# Patient Record
Sex: MALE | Race: WHITE | NOT HISPANIC OR LATINO | Employment: UNEMPLOYED | URBAN - METROPOLITAN AREA
[De-identification: names, ages, dates, MRNs, and addresses within clinical notes are randomized per-mention and may not be internally consistent; named-entity substitution may affect disease eponyms.]

---

## 2017-10-08 ENCOUNTER — HOSPITAL ENCOUNTER (EMERGENCY)
Facility: CLINIC | Age: 47
Discharge: HOME | End: 2017-10-08
Attending: EMERGENCY MEDICINE | Admitting: EMERGENCY MEDICINE
Payer: COMMERCIAL

## 2017-10-08 VITALS
OXYGEN SATURATION: 99 % | WEIGHT: 137 LBS | DIASTOLIC BLOOD PRESSURE: 106 MMHG | SYSTOLIC BLOOD PRESSURE: 165 MMHG | RESPIRATION RATE: 16 BRPM | TEMPERATURE: 98.4 F

## 2017-10-08 DIAGNOSIS — R73.9 HYPERGLYCEMIA: ICD-10-CM

## 2017-10-08 LAB
ALBUMIN SERPL-MCNC: 3.4 G/DL (ref 3.4–5)
ALP SERPL-CCNC: 124 U/L (ref 40–150)
ALT SERPL W P-5'-P-CCNC: 52 U/L (ref 0–70)
ANION GAP SERPL CALCULATED.3IONS-SCNC: 8 MMOL/L (ref 3–14)
AST SERPL W P-5'-P-CCNC: 73 U/L (ref 0–45)
BASOPHILS # BLD AUTO: 0 10E9/L (ref 0–0.2)
BASOPHILS NFR BLD AUTO: 0.3 %
BILIRUB SERPL-MCNC: 0.2 MG/DL (ref 0.2–1.3)
BUN SERPL-MCNC: 9 MG/DL (ref 7–30)
CALCIUM SERPL-MCNC: 9.5 MG/DL (ref 8.5–10.1)
CHLORIDE SERPL-SCNC: 95 MMOL/L (ref 94–109)
CO2 SERPL-SCNC: 28 MMOL/L (ref 20–32)
CREAT SERPL-MCNC: 0.68 MG/DL (ref 0.66–1.25)
DIFFERENTIAL METHOD BLD: ABNORMAL
EOSINOPHIL # BLD AUTO: 0.1 10E9/L (ref 0–0.7)
EOSINOPHIL NFR BLD AUTO: 2 %
ERYTHROCYTE [DISTWIDTH] IN BLOOD BY AUTOMATED COUNT: 13.4 % (ref 10–15)
GFR SERPL CREATININE-BSD FRML MDRD: >90 ML/MIN/1.7M2
GLUCOSE BLDC GLUCOMTR-MCNC: 316 MG/DL (ref 70–99)
GLUCOSE BLDC GLUCOMTR-MCNC: 329 MG/DL (ref 70–99)
GLUCOSE BLDC GLUCOMTR-MCNC: 405 MG/DL (ref 70–99)
GLUCOSE SERPL-MCNC: 403 MG/DL (ref 70–99)
HCT VFR BLD AUTO: 34.2 % (ref 40–53)
HGB BLD-MCNC: 12.1 G/DL (ref 13.3–17.7)
IMM GRANULOCYTES # BLD: 0 10E9/L (ref 0–0.4)
IMM GRANULOCYTES NFR BLD: 0 %
LIPASE SERPL-CCNC: 278 U/L (ref 73–393)
LYMPHOCYTES # BLD AUTO: 0.9 10E9/L (ref 0.8–5.3)
LYMPHOCYTES NFR BLD AUTO: 23.9 %
MCH RBC QN AUTO: 29.7 PG (ref 26.5–33)
MCHC RBC AUTO-ENTMCNC: 35.4 G/DL (ref 31.5–36.5)
MCV RBC AUTO: 84 FL (ref 78–100)
MONOCYTES # BLD AUTO: 0.5 10E9/L (ref 0–1.3)
MONOCYTES NFR BLD AUTO: 13.5 %
NEUTROPHILS # BLD AUTO: 2.4 10E9/L (ref 1.6–8.3)
NEUTROPHILS NFR BLD AUTO: 60.3 %
NRBC # BLD AUTO: 0 10*3/UL
NRBC BLD AUTO-RTO: 0 /100
PLATELET # BLD AUTO: 238 10E9/L (ref 150–450)
POTASSIUM SERPL-SCNC: 3.8 MMOL/L (ref 3.4–5.3)
PROT SERPL-MCNC: 7.7 G/DL (ref 6.8–8.8)
RBC # BLD AUTO: 4.07 10E12/L (ref 4.4–5.9)
SODIUM SERPL-SCNC: 131 MMOL/L (ref 133–144)
WBC # BLD AUTO: 3.9 10E9/L (ref 4–11)

## 2017-10-08 PROCEDURE — 96374 THER/PROPH/DIAG INJ IV PUSH: CPT

## 2017-10-08 PROCEDURE — 99284 EMERGENCY DEPT VISIT MOD MDM: CPT | Mod: 25

## 2017-10-08 PROCEDURE — 85025 COMPLETE CBC W/AUTO DIFF WBC: CPT | Performed by: EMERGENCY MEDICINE

## 2017-10-08 PROCEDURE — 00000146 ZZHCL STATISTIC GLUCOSE BY METER IP

## 2017-10-08 PROCEDURE — 25000131 ZZH RX MED GY IP 250 OP 636 PS 637: Performed by: EMERGENCY MEDICINE

## 2017-10-08 PROCEDURE — 83690 ASSAY OF LIPASE: CPT | Performed by: EMERGENCY MEDICINE

## 2017-10-08 PROCEDURE — 96361 HYDRATE IV INFUSION ADD-ON: CPT

## 2017-10-08 PROCEDURE — 25000128 H RX IP 250 OP 636: Performed by: EMERGENCY MEDICINE

## 2017-10-08 PROCEDURE — 80053 COMPREHEN METABOLIC PANEL: CPT | Performed by: EMERGENCY MEDICINE

## 2017-10-08 RX ORDER — ASPIRIN 81 MG/1
81 TABLET, CHEWABLE ORAL DAILY
COMMUNITY
End: 2022-03-08

## 2017-10-08 RX ADMIN — SODIUM CHLORIDE 4 UNITS: 9 INJECTION, SOLUTION INTRAVENOUS at 21:53

## 2017-10-08 RX ADMIN — SODIUM CHLORIDE 1000 ML: 9 INJECTION, SOLUTION INTRAVENOUS at 21:55

## 2017-10-08 NOTE — ED AVS SNAPSHOT
Emergency Department    [address] [facility] [address]    Phone:  [phone]    Fax:  [phone]                                       Long [place]   MRN: [id number]    Department:   Emergency Department   Date of Visit:  10/8/2017           After Visit Summary Signature Page     I have received my discharge instructions, and my questions have been answered. I have discussed any challenges I see with this plan with the nurse or doctor.    ..........................................................................................................................................  Patient/Patient Representative Signature      ..........................................................................................................................................  Patient Representative Print Name and Relationship to Patient    ..................................................               ................................................  Date                                            Time    ..........................................................................................................................................  Reviewed by Signature/Title    ...................................................              ..............................................  Date                                                            Time

## 2017-10-08 NOTE — ED AVS SNAPSHOT
Emergency Department    [address] [facility] [address]    Phone:  [phone]    Fax:  [phone]                                       Long Mayfield   MRN: [id number]    Department:   Emergency Department   Date of Visit:  10/8/2017           Patient Information     Date Of Birth          1970        Your diagnoses for this visit were:     Hyperglycemia        You were seen by Cecy Wright MD.      Follow-up Information     Follow up with Jackelyn Jimenez MD.    Specialty:  Family Practice    Contact information:    [facility] [address]  [address]  [facility] [id number] [phone]          Discharge Instructions         How to Check Your Blood Sugar    Keeping track of how much sugar (glucose) is in your blood is an important part of self-care when you have diabetes. This is also called self-monitoring of blood glucose (SMBG). To make sure your glucose and insulin are in balance, check your blood sugar as instructed by your healthcare provider. You may need to check your blood glucose levels at certain times every day. Or you may need to check them only a few times a week.  What you need  To check your blood sugar, make sure you have the following:     A small pricking needle (lancing device)    Test strips    A glucose meter    A notebook, chart, or log book and pen or pencil   Using a blood glucose meter  You can check your blood sugar at home, at work, and anywhere else. Your diabetes team will help you choose a blood glucose meter. A meter measures the amount of glucose in a tiny drop of blood. You ll use a device called a lancet to draw a drop of blood. Put the strip in the meter first. Then touch the test strip to the drop of blood. The meter then gives you a number (reading) that tells you the level of your blood sugar.  Aim for your target range  Your blood sugar should be in your target range--not too high and not too low. A target range is where your blood sugar level  is healthiest. Staying in this range as much as possible will help lower your risk for health problems (complications). Your diabetes team will help you figure out the best target range for you. That range depends on many things. They include your age, other health problems, how well your diabetes is controlled, and how long you have had diabetes. In general, target ranges are:    Control of blood glucose (hemoglobin A1c or A1c): generally, 7.0% or less. You will usually have this test at the lab.    Before a meal (preprandial glucose): Between 80 and 130 mg/dL.    One to 2 hours after a meal (postprandial glucose): Less than 180 mg/dL.  Track your readings  Use a notebook, chart, or log book to keep track of your readings. Write down the date, time, and your blood sugar level numbers. This helps you see patterns, such as high blood sugar after eating certain foods. Take your log along when you see your healthcare provider. Your blood glucose levels will help your provider decide if he or she needs to make any changes to your management plan. To check your blood sugar, follow the steps below.  Step 1. Get ready    Wash your hands with soap and warm (not hot) water.    Follow all of the instructions that came with your glucometer. Be sure that your test strips were designed to be used with your meter and are not .   Step 2. Draw a drop of blood    Prick the side of your finger with the lancet. Squeeze gently until you get a drop of blood. Squeezing too hard can cause an inaccurate reading.    Put the lancet in a special sharps container. Ask your healthcare team where you can buy one or what you can use to throw away any sharps.    If you are unable to get enough blood, hold your hand at your side and gently shake it.  Step 3. Place the drop on a strip    Wait for the meter to show a message or symbol that it is time to test.    Touch the test strip to the drop of blood.    Be sure to follow the instructions  included with meter.  Step 4. Read and record your results    Wait for your meter to show the result.    If you see an error message, recheck using a fresh strip and a fresh drop of blood. Also recheck if the glucose numbers aren't what you expect--too low without symptoms, or too high for no reason.    Write the results in your log book.  Date Last Reviewed: 6/1/2016 2000-2017 [employer]. [address] [place] [address], [place] [address]. All rights reserved. This information is not intended as a substitute for professional medical care. Always follow your healthcare professional's instructions.          High Blood Sugar (Hyperglycemia)     When you have hyperglycemia, drink plenty of water or other sugar-free, caffeine-free liquids.   Too much glucose (sugar) in your blood is called hyperglycemia or high blood sugar. High blood sugar can lead to a dangerous condition called ketoacidosis. In severe cases, it can lead to dehydration and coma.  Possible causes of hyperglycemia    Inadequate treatment plan for diabetes     Being sick    Being under stress    Taking certain medicines, such as steroids    Eating too much food, especially carbohydrates    Being less active than usual    Not taking enough diabetes medicine  Symptoms of hyperglycemia  Hyperglycemia may not cause symptoms. If you do have symptoms, they may include:    Thirst    Frequent need to urinate    Feeling tired    Nausea and vomiting    Itchy, dry skin    Blurry vision    Fast breathing and breath that smells fruity     Weakness    Dizziness    Wounds or skin infections that don t heal    Unexplained weight loss if hyperglycemia lasts for more than a few days   What you should do  Make sure you do the following:    Check your blood sugar.    Drink plenty of sugar-free, caffeine-free liquids such as water. Don t drink fruit juice.    Check your blood sugar again every 4 hours. If you take insulin or diabetes medicines, follow your sick-day  plan for taking medicine. Call your healthcare provider if you are not able to eat.    Check your blood or urine for ketones as directed.    Call your healthcare provider if your blood sugar and ketones do not return to your target range.  Preventing high blood sugar  To help keep your blood sugar from getting too high:    Control stress.    When you're ill, follow your sick-day plan.     Follow your meal plan. Eat only the amount of food on your meal plan    Follow your exercise plan.    Take your insulin or diabetes medicines as directed by your healthcare team. Also test your blood sugar as directed. If the plan is not working for you, discuss it with your healthcare provider.  Other things to do    Carry a medical ID card, a compact USB drive, or wear a medical alert bracelet or necklace. It should say that you have diabetes. It should also say what to do in case you pass out or go into a coma.    Make sure family, friends, and coworkers know the signs of high blood sugar. Tell them what to do if your blood sugar gets very high and you can t help yourself.    Talk to your healthcare team about other things you can do to prevent high blood sugar.   Special note: Drink plenty of sugar-free and caffeine-free liquids when you feel symptoms of hyperglycemia. Call your healthcare provider if you keep having episodes of hyperglycemia.   Date Last Reviewed: 5/1/2016 2000-2017 [employer]. [address] [place] [address], [place] [id number]. All rights reserved. This information is not intended as a substitute for professional medical care. Always follow your healthcare professional's instructions.          24 Hour Appointment Hotline       To make an appointment at any [facility], call [phone] ([phone]). If you don't have a family doctor or clinic, we will help you find one. [facility] are conveniently located to serve the needs of you and your family.             Review of your medicines       Our records show that you are taking the medicines listed below. If these are incorrect, please call your family doctor or clinic.        Dose / Directions Last dose taken    aspirin 81 MG chewable tablet   Dose:  81 mg        Take 81 mg by mouth daily   Refills:  0        insulin  UNIT/ML injection   Commonly known as:  HumuLIN N/NovoLIN N        Inject Subcutaneous 2 times daily (before meals)   Refills:  0        LEVEMIR FLEXPEN/FLEXTOUCH 100 UNIT/ML injection   Generic drug:  insulin detemir        Inject Subcutaneous At Bedtime   Refills:  0                Procedures and tests performed during your visit     Procedure/Test Number of Times Performed    CBC with platelets differential 1    Comprehensive metabolic panel 1    Glucose by meter 3    Glucose monitor nursing POCT 1    Lipase 1      Orders Needing Specimen Collection     Ordered          10/08/17 2057  UA reflex to Microscopic and Culture - STAT, [facility]: STAT, Needs to be Collected     Scheduled Task Status   10/08/17 2058 Collect UA reflex to Microscopic and Culture Open   Order Class:  PCU Collect                  Pending Results     No orders found from 10/6/2017 to 10/9/2017.            Pending Culture Results     No orders found from 10/6/2017 to 10/9/2017.            Pending Results Instructions     If you had any lab results that were not finalized at the time of your Discharge, you can call the ED Lab Result RN at [phone]. You will be contacted by this team for any positive Lab results or changes in treatment. The nurses are available 7 days a week from 10A to 6:30P.  You can leave a message 24 hours per day and they will return your call.        Test Results From Your Hospital Stay        10/8/2017  8:56 PM      Component Results     Component Value Ref Range & Units Status    Glucose 405 (H) 70 - 99 mg/dL Final         10/8/2017  9:25 PM      Component Results     Component Value Ref Range & Units Status    WBC 3.9 (L) 4.0 -  11.0 10e9/L Final    RBC Count 4.07 (L) 4.4 - 5.9 10e12/L Final    Hemoglobin 12.1 (L) 13.3 - 17.7 g/dL Final    Hematocrit 34.2 (L) 40.0 - 53.0 % Final    MCV 84 78 - 100 fl Final    MCH 29.7 26.5 - 33.0 pg Final    MCHC 35.4 31.5 - 36.5 g/dL Final    RDW 13.4 10.0 - 15.0 % Final    Platelet Count 238 150 - 450 10e9/L Final    Diff Method Automated Method  Final    % Neutrophils 60.3 % Final    % Lymphocytes 23.9 % Final    % Monocytes 13.5 % Final    % Eosinophils 2.0 % Final    % Basophils 0.3 % Final    % Immature Granulocytes 0.0 % Final    Nucleated RBCs 0 0 /100 Final    Absolute Neutrophil 2.4 1.6 - 8.3 10e9/L Final    Absolute Lymphocytes 0.9 0.8 - 5.3 10e9/L Final    Absolute Monocytes 0.5 0.0 - 1.3 10e9/L Final    Absolute Eosinophils 0.1 0.0 - 0.7 10e9/L Final    Absolute Basophils 0.0 0.0 - 0.2 10e9/L Final    Abs Immature Granulocytes 0.0 0 - 0.4 10e9/L Final    Absolute Nucleated RBC 0.0  Final         10/8/2017  9:42 PM      Component Results     Component Value Ref Range & Units Status    Sodium 131 (L) 133 - 144 mmol/L Final    Potassium 3.8 3.4 - 5.3 mmol/L Final    Chloride 95 94 - 109 mmol/L Final    Carbon Dioxide 28 20 - 32 mmol/L Final    Anion Gap 8 3 - 14 mmol/L Final    Glucose 403 (H) 70 - 99 mg/dL Final    Urea Nitrogen 9 7 - 30 mg/dL Final    Creatinine 0.68 0.66 - 1.25 mg/dL Final    GFR Estimate >90 >60 mL/min/1.7m2 Final    Non  GFR Calc    GFR Estimate If Black >90 >60 mL/min/1.7m2 Final    African American GFR Calc    Calcium 9.5 8.5 - 10.1 mg/dL Final    Bilirubin Total 0.2 0.2 - 1.3 mg/dL Final    Albumin 3.4 3.4 - 5.0 g/dL Final    Protein Total 7.7 6.8 - 8.8 g/dL Final    Alkaline Phosphatase 124 40 - 150 U/L Final    ALT 52 0 - 70 U/L Final    AST 73 (H) 0 - 45 U/L Final         10/8/2017  9:40 PM      Component Results     Component Value Ref Range & Units Status    Lipase 278 73 - 393 U/L Final         10/8/2017 10:27 PM      Component Results     Component  Value Ref Range & Units Status    Glucose 329 (H) 70 - 99 mg/dL Final         10/8/2017 10:51 PM      Component Results     Component Value Ref Range & Units Status    Glucose 316 (H) 70 - 99 mg/dL Final                Clinical Quality Measure: Blood Pressure Screening     Your blood pressure was checked while you were in the emergency department today. The last reading we obtained was  BP: (!) 165/106 . Please read the guidelines below about what these numbers mean and what you should do about them.  If your systolic blood pressure (the top number) is less than 120 and your diastolic blood pressure (the bottom number) is less than 80, then your blood pressure is normal. There is nothing more that you need to do about it.  If your systolic blood pressure (the top number) is 120-139 or your diastolic blood pressure (the bottom number) is 80-89, your blood pressure may be higher than it should be. You should have your blood pressure rechecked within a year by a primary care provider.  If your systolic blood pressure (the top number) is 140 or greater or your diastolic blood pressure (the bottom number) is 90 or greater, you may have high blood pressure. High blood pressure is treatable, but if left untreated over time it can put you at risk for heart attack, stroke, or kidney failure. You should have your blood pressure rechecked by a primary care provider within the next 4 weeks.  If your provider in the emergency department today gave you specific instructions to follow-up with your doctor or provider even sooner than that, you should follow that instruction and not wait for up to 4 weeks for your follow-up visit.        Thank you for choosing [facility]       Thank you for choosing [facility] for your care. Our goal is always to provide you with excellent care. Hearing back from our patients is one way we can continue to improve our services. Please take a few minutes to complete the written survey that you may receive  "in the mail after you visit with us. Thank you!        [employer] Information     [employer] lets you send messages to your doctor, view your test results, renew your prescriptions, schedule appointments and more. To sign up, go to [email or web address] . Click on \"Log in\" on the left side of the screen, which will take you to the Welcome page. Then click on \"Sign up Now\" on the right side of the page.     You will be asked to enter the access code listed below, as well as some personal information. Please follow the directions to create your username and password.     Your access code is: [email or web address]  Expires: 2018 10:49 PM     Your access code will  in 90 days. If you need help or a new code, please call your [place] clinic or [phone].        Care EveryWhere ID     This is your Care EveryWhere ID. This could be used by other organizations to access your [place] medical records  [id number]        Equal Access to Services     [facility] CenterESTELITA : Hadii donna calderón hadasho Sosandyali, waaxda luqadaha, qaybta kaalmada adeegyada, meaghan hui . So [facility] [phone].    ATENCIÓN: Si habla español, tiene a kaufman disposición servicios gratuitos de asistencia lingüística. Llame al [phone].    We comply with applicable federal civil rights laws and [place] laws. We do not discriminate on the basis of race, color, national origin, age, disability, sex, sexual orientation, or gender identity.            After Visit Summary       This is your record. Keep this with you and show to your community pharmacist(s) and doctor(s) at your next visit.                  "

## 2017-10-09 NOTE — DISCHARGE INSTRUCTIONS
How to Check Your Blood Sugar    Keeping track of how much sugar (glucose) is in your blood is an important part of self-care when you have diabetes. This is also called self-monitoring of blood glucose (SMBG). To make sure your glucose and insulin are in balance, check your blood sugar as instructed by your healthcare provider. You may need to check your blood glucose levels at certain times every day. Or you may need to check them only a few times a week.  What you need  To check your blood sugar, make sure you have the following:     A small pricking needle (lancing device)    Test strips    A glucose meter    A notebook, chart, or log book and pen or pencil   Using a blood glucose meter  You can check your blood sugar at home, at work, and anywhere else. Your diabetes team will help you choose a blood glucose meter. A meter measures the amount of glucose in a tiny drop of blood. You ll use a device called a lancet to draw a drop of blood. Put the strip in the meter first. Then touch the test strip to the drop of blood. The meter then gives you a number (reading) that tells you the level of your blood sugar.  Aim for your target range  Your blood sugar should be in your target range--not too high and not too low. A target range is where your blood sugar level is healthiest. Staying in this range as much as possible will help lower your risk for health problems (complications). Your diabetes team will help you figure out the best target range for you. That range depends on many things. They include your age, other health problems, how well your diabetes is controlled, and how long you have had diabetes. In general, target ranges are:    Control of blood glucose (hemoglobin A1c or A1c): generally, 7.0% or less. You will usually have this test at the lab.    Before a meal (preprandial glucose): Between 80 and 130 mg/dL.    One to 2 hours after a meal (postprandial glucose): Less than 180 mg/dL.  Track your  readings  Use a notebook, chart, or log book to keep track of your readings. Write down the date, time, and your blood sugar level numbers. This helps you see patterns, such as high blood sugar after eating certain foods. Take your log along when you see your healthcare provider. Your blood glucose levels will help your provider decide if he or she needs to make any changes to your management plan. To check your blood sugar, follow the steps below.  Step 1. Get ready    Wash your hands with soap and warm (not hot) water.    Follow all of the instructions that came with your glucometer. Be sure that your test strips were designed to be used with your meter and are not .   Step 2. Draw a drop of blood    Prick the side of your finger with the lancet. Squeeze gently until you get a drop of blood. Squeezing too hard can cause an inaccurate reading.    Put the lancet in a special sharps container. Ask your healthcare team where you can buy one or what you can use to throw away any sharps.    If you are unable to get enough blood, hold your hand at your side and gently shake it.  Step 3. Place the drop on a strip    Wait for the meter to show a message or symbol that it is time to test.    Touch the test strip to the drop of blood.    Be sure to follow the instructions included with meter.  Step 4. Read and record your results    Wait for your meter to show the result.    If you see an error message, recheck using a fresh strip and a fresh drop of blood. Also recheck if the glucose numbers aren't what you expect--too low without symptoms, or too high for no reason.    Write the results in your log book.  Date Last Reviewed: 2016-2017 [employer]. [address] [place] [address], [place] [address]. All rights reserved. This information is not intended as a substitute for professional medical care. Always follow your healthcare professional's instructions.          High Blood Sugar (Hyperglycemia)      When you have hyperglycemia, drink plenty of water or other sugar-free, caffeine-free liquids.   Too much glucose (sugar) in your blood is called hyperglycemia or high blood sugar. High blood sugar can lead to a dangerous condition called ketoacidosis. In severe cases, it can lead to dehydration and coma.  Possible causes of hyperglycemia    Inadequate treatment plan for diabetes     Being sick    Being under stress    Taking certain medicines, such as steroids    Eating too much food, especially carbohydrates    Being less active than usual    Not taking enough diabetes medicine  Symptoms of hyperglycemia  Hyperglycemia may not cause symptoms. If you do have symptoms, they may include:    Thirst    Frequent need to urinate    Feeling tired    Nausea and vomiting    Itchy, dry skin    Blurry vision    Fast breathing and breath that smells fruity     Weakness    Dizziness    Wounds or skin infections that don t heal    Unexplained weight loss if hyperglycemia lasts for more than a few days   What you should do  Make sure you do the following:    Check your blood sugar.    Drink plenty of sugar-free, caffeine-free liquids such as water. Don t drink fruit juice.    Check your blood sugar again every 4 hours. If you take insulin or diabetes medicines, follow your sick-day plan for taking medicine. Call your healthcare provider if you are not able to eat.    Check your blood or urine for ketones as directed.    Call your healthcare provider if your blood sugar and ketones do not return to your target range.  Preventing high blood sugar  To help keep your blood sugar from getting too high:    Control stress.    When you're ill, follow your sick-day plan.     Follow your meal plan. Eat only the amount of food on your meal plan    Follow your exercise plan.    Take your insulin or diabetes medicines as directed by your healthcare team. Also test your blood sugar as directed. If the plan is not working for you, discuss it  with your healthcare provider.  Other things to do    Carry a medical ID card, a compact USB drive, or wear a medical alert bracelet or necklace. It should say that you have diabetes. It should also say what to do in case you pass out or go into a coma.    Make sure family, friends, and coworkers know the signs of high blood sugar. Tell them what to do if your blood sugar gets very high and you can t help yourself.    Talk to your healthcare team about other things you can do to prevent high blood sugar.   Special note: Drink plenty of sugar-free and caffeine-free liquids when you feel symptoms of hyperglycemia. Call your healthcare provider if you keep having episodes of hyperglycemia.   Date Last Reviewed: 5/1/2016 2000-2017 [employer]. [address] [place] [address], [place] [address]. All rights reserved. This information is not intended as a substitute for professional medical care. Always follow your healthcare professional's instructions.

## 2017-10-09 NOTE — ED PROVIDER NOTES
"  History     Chief Complaint:  Hyperglycemia     HPI   Long Mayfield is a 47 year old male with a history of Bell's Palsy who presents with hyperglycemia. The patient was just diagnosed with diabetes and was discharged from the hospital. The patient states that his blood glucose measurement was 300 at lunch time so he took insulin. The patient was prescribed Prednisone for his Bell's Palsy which \"jump-started\" his diabetes, and why he went to the hospital. He stopped the Prednisone immediately. After his discharge from the hospital, the patient began taking short-term insulin with his meals and before bed for his diabetes today. He took insulin and ate lunch today at [facility], drove to [place] and was going to eat dinner and read his blood sugar and it was at 529. When he realized his sugar was at 529, he did not take any insulin, instead he presented to the emergency department immediately. The patient does not have a PCP in [place], which is why he presents to the emergency department. He denies any symptoms other than the high blood pressure. The patient is visiting [place] from [place], and recently moved from [place] two months ago. Moreover, he is moving to [place] in two weeks.    Allergies:  No Known Drug Allergies     Medications:    Insulin determir  Aspirin   Insulin NPH    Past Medical History:    Bell's palsy  Diabetes     Past Surgical History:    Orthopedic surgery    Family History:    Adopted    Social History:  The patient was alone.  Smoking Status: Never  Smokeless Tobacco: Never  Alcohol Use: No    Review of Systems   Constitutional:        Hyperglycemia   All other systems reviewed and are negative.      Physical Exam   First Vitals:  BP: (!) 152/101  Heart Rate: 76  Temp: 98.4  F (36.9  C)  Resp: 16  Weight: 62.1 kg (137 lb)  SpO2: 99 %    Physical Exam  General: Patient is alert and normal appearing.  HEENT: Head atraumatic    Eyes: pupils equal and reactive. Conjunctiva " clear   Nares: patent   Oropharynx: no lesions, uvula midline, no palatal draping, normal voice, no trismus  Neck: Supple without lymphadenopathy, no meningismus  Chest: Heart regular rate and rhythm.   Lungs: Equal clear to auscultation with no wheeze or rales  Abdomen: Soft, non tender, nondistended, normal bowel sounds  Back: No costovertebral angle tenderness, no midline C, T or L spine tenderness  Neuro: left facial droop involving forehead, normal speech, strength equal bilaterally,   Extremities: No deformities, equal radial and DP pulses. No clubbing, cyanosis.  No edema  Skin: Warm and dry with no rash.     Emergency Department Course     Laboratory:  Laboratory findings were communicated with the patient who voiced understanding of the findings.  Glucose by meter (2241): 316 (H)   Glucose by meter (2221): 329 (H)   Glucose by meter (2049: 405 (H)   CBC:  WBC 3.9 (L), HGB 12.1 (L), o/w WNL. ()   CMP:  (L), Glucose 403 (H), AST 73 (H) o/w WNL (Creatinine 0.68)  Lipase: 278    Interventions:  2153 - Insulin 4mg IV  2155 - NS Bolus 1,000mL IV      Emergency Department Course:  Nursing notes and vitals reviewed.  2125: I performed an exam of the patient as documented above.   2242: Patient rechecked and updated.   Findings and plan explained to the Patient. Patient discharged home with instructions regarding supportive care, medications, and reasons to return. The importance of close follow-up was reviewed.   I personally reviewed the laboratory results with the Patient and answered all related questions prior to discharge.    Impression & Plan      Medical Decision Making:  Long Mayfield is a 47 year old male who presents for evaluation of elevated blood sugar.  By blood work there is no signs of DKA, no clinical features to suggest hyperosmolar issues. Based on history of medication and compliance, I would continue new recent insulin sliding scale started at recent hospitalization and not start  new meds at this time. Patient with dietary noncompliance ([employer] for lunch and sedentary for 4 hour drive afterward this evening) likely contributing factors to hyperglycemia.    Discussed in detail with patient and they agree.  Patient new to the area.  PMD information provided.  Patient understands may need sliding scale adjustment as he is newly on these meds but given dietary noncompliance would continue to keep blood sugar log for a few more days before deciding.  Patient asymptomatic tonight.  Given IV insulin and fluids in ED with improvement in blood sugar.  Will eat at home and then take night time lantus.     Diagnosis:    ICD-10-CM    1. Hyperglycemia R73.9        Disposition:  Discharged to home.    Scribe Disclosure:  IDulce, am serving as a scribe at 9:25 PM on 10/8/2017 to document services personally performed by Cecy Wright MD based on my observations and the provider's statements to me.   10/8/2017    EMERGENCY DEPARTMENT       Cecy Wright MD  10/09/17 0033

## 2022-03-08 ENCOUNTER — APPOINTMENT (OUTPATIENT)
Dept: ULTRASOUND IMAGING | Facility: CLINIC | Age: 52
End: 2022-03-08
Attending: STUDENT IN AN ORGANIZED HEALTH CARE EDUCATION/TRAINING PROGRAM
Payer: MEDICAID

## 2022-03-08 ENCOUNTER — HOSPITAL ENCOUNTER (INPATIENT)
Facility: CLINIC | Age: 52
LOS: 4 days | Discharge: HOME | End: 2022-03-12
Attending: EMERGENCY MEDICINE | Admitting: STUDENT IN AN ORGANIZED HEALTH CARE EDUCATION/TRAINING PROGRAM
Payer: MEDICAID

## 2022-03-08 DIAGNOSIS — I10 BENIGN ESSENTIAL HYPERTENSION: ICD-10-CM

## 2022-03-08 DIAGNOSIS — Z79.4 INSULIN DEPENDENT TYPE 2 DIABETES MELLITUS (H): Primary | ICD-10-CM

## 2022-03-08 DIAGNOSIS — E11.9 INSULIN DEPENDENT TYPE 2 DIABETES MELLITUS (H): Primary | ICD-10-CM

## 2022-03-08 DIAGNOSIS — R19.7 VOMITING AND DIARRHEA: ICD-10-CM

## 2022-03-08 DIAGNOSIS — R73.9 HYPERGLYCEMIA: ICD-10-CM

## 2022-03-08 DIAGNOSIS — A04.72 C. DIFFICILE COLITIS: ICD-10-CM

## 2022-03-08 DIAGNOSIS — R11.10 VOMITING AND DIARRHEA: ICD-10-CM

## 2022-03-08 DIAGNOSIS — E46 MALNUTRITION, UNSPECIFIED TYPE (H): ICD-10-CM

## 2022-03-08 DIAGNOSIS — E88.89 KETOSIS (H): ICD-10-CM

## 2022-03-08 LAB
ALBUMIN SERPL-MCNC: 3.4 G/DL (ref 3.4–5)
ALBUMIN UR-MCNC: 20 MG/DL
ALP SERPL-CCNC: 175 U/L (ref 40–150)
ALT SERPL W P-5'-P-CCNC: 28 U/L (ref 0–70)
ANION GAP SERPL CALCULATED.3IONS-SCNC: 16 MMOL/L (ref 3–14)
APPEARANCE UR: CLEAR
AST SERPL W P-5'-P-CCNC: 40 U/L (ref 0–45)
BASE EXCESS BLDV CALC-SCNC: -2.9 MMOL/L (ref -7.7–1.9)
BASOPHILS # BLD AUTO: 0 10E3/UL (ref 0–0.2)
BASOPHILS NFR BLD AUTO: 0 %
BILIRUB DIRECT SERPL-MCNC: 0.3 MG/DL (ref 0–0.2)
BILIRUB SERPL-MCNC: 1.1 MG/DL (ref 0.2–1.3)
BILIRUB UR QL STRIP: ABNORMAL
BUN SERPL-MCNC: 15 MG/DL (ref 7–30)
CALCIUM SERPL-MCNC: 9.6 MG/DL (ref 8.5–10.1)
CHLORIDE BLD-SCNC: 87 MMOL/L (ref 94–109)
CO2 SERPL-SCNC: 21 MMOL/L (ref 20–32)
COLOR UR AUTO: YELLOW
CREAT SERPL-MCNC: 0.91 MG/DL (ref 0.66–1.25)
EOSINOPHIL # BLD AUTO: 0.3 10E3/UL (ref 0–0.7)
EOSINOPHIL NFR BLD AUTO: 5 %
ERYTHROCYTE [DISTWIDTH] IN BLOOD BY AUTOMATED COUNT: 13.6 % (ref 10–15)
ETHANOL SERPL-MCNC: <0.01 G/DL
GFR SERPL CREATININE-BSD FRML MDRD: >90 ML/MIN/1.73M2
GLUCOSE BLD-MCNC: 384 MG/DL (ref 70–99)
GLUCOSE BLDC GLUCOMTR-MCNC: 241 MG/DL (ref 70–99)
GLUCOSE UR STRIP-MCNC: >=1000 MG/DL
HBA1C MFR BLD: >14 % (ref 0–5.6)
HCO3 BLDV-SCNC: 22 MMOL/L (ref 21–28)
HCT VFR BLD AUTO: 35.2 % (ref 40–53)
HGB BLD-MCNC: 11.9 G/DL (ref 13.3–17.7)
HGB UR QL STRIP: NEGATIVE
HOLD SPECIMEN: NORMAL
IMM GRANULOCYTES # BLD: 0 10E3/UL
IMM GRANULOCYTES NFR BLD: 0 %
KETONES BLD-SCNC: 5.9 MMOL/L (ref 0–0.6)
KETONES UR STRIP-MCNC: 80 MG/DL
LEUKOCYTE ESTERASE UR QL STRIP: NEGATIVE
LYMPHOCYTES # BLD AUTO: 0.6 10E3/UL (ref 0.8–5.3)
LYMPHOCYTES NFR BLD AUTO: 8 %
MAGNESIUM SERPL-MCNC: 1.7 MG/DL (ref 1.6–2.3)
MCH RBC QN AUTO: 29 PG (ref 26.5–33)
MCHC RBC AUTO-ENTMCNC: 33.8 G/DL (ref 31.5–36.5)
MCV RBC AUTO: 86 FL (ref 78–100)
MONOCYTES # BLD AUTO: 1 10E3/UL (ref 0–1.3)
MONOCYTES NFR BLD AUTO: 15 %
MUCOUS THREADS #/AREA URNS LPF: PRESENT /LPF
NEUTROPHILS # BLD AUTO: 5 10E3/UL (ref 1.6–8.3)
NEUTROPHILS NFR BLD AUTO: 72 %
NITRATE UR QL: NEGATIVE
NRBC # BLD AUTO: 0 10E3/UL
NRBC BLD AUTO-RTO: 0 /100
O2/TOTAL GAS SETTING VFR VENT: 21 %
OSMOLALITY SERPL: 286 MMOL/KG (ref 275–295)
OSMOLALITY UR: 606 MMOL/KG (ref 100–1200)
OXYHGB MFR BLDV: 74 % (ref 70–75)
PCO2 BLDV: 36 MM HG (ref 40–50)
PH BLDV: 7.39 [PH] (ref 7.32–7.43)
PH UR STRIP: 5 [PH] (ref 5–7)
PHOSPHATE SERPL-MCNC: 2.3 MG/DL (ref 2.5–4.5)
PLATELET # BLD AUTO: 243 10E3/UL (ref 150–450)
PO2 BLDV: 40 MM HG (ref 25–47)
POTASSIUM BLD-SCNC: 3.7 MMOL/L (ref 3.4–5.3)
PROT SERPL-MCNC: 7.7 G/DL (ref 6.8–8.8)
RBC # BLD AUTO: 4.11 10E6/UL (ref 4.4–5.9)
RBC URINE: 0 /HPF
SARS-COV-2 RNA RESP QL NAA+PROBE: NEGATIVE
SODIUM SERPL-SCNC: 124 MMOL/L (ref 133–144)
SP GR UR STRIP: 1.03 (ref 1–1.03)
UROBILINOGEN UR STRIP-MCNC: NORMAL MG/DL
WBC # BLD AUTO: 7 10E3/UL (ref 4–11)
WBC URINE: 8 /HPF

## 2022-03-08 PROCEDURE — 82805 BLOOD GASES W/O2 SATURATION: CPT

## 2022-03-08 PROCEDURE — 250N000012 HC RX MED GY IP 250 OP 636 PS 637

## 2022-03-08 PROCEDURE — 76705 ECHO EXAM OF ABDOMEN: CPT

## 2022-03-08 PROCEDURE — 96361 HYDRATE IV INFUSION ADD-ON: CPT

## 2022-03-08 PROCEDURE — 81001 URINALYSIS AUTO W/SCOPE: CPT | Performed by: EMERGENCY MEDICINE

## 2022-03-08 PROCEDURE — 83930 ASSAY OF BLOOD OSMOLALITY: CPT | Performed by: STUDENT IN AN ORGANIZED HEALTH CARE EDUCATION/TRAINING PROGRAM

## 2022-03-08 PROCEDURE — 83036 HEMOGLOBIN GLYCOSYLATED A1C: CPT | Performed by: STUDENT IN AN ORGANIZED HEALTH CARE EDUCATION/TRAINING PROGRAM

## 2022-03-08 PROCEDURE — 80053 COMPREHEN METABOLIC PANEL: CPT | Performed by: EMERGENCY MEDICINE

## 2022-03-08 PROCEDURE — 82248 BILIRUBIN DIRECT: CPT | Performed by: EMERGENCY MEDICINE

## 2022-03-08 PROCEDURE — 96374 THER/PROPH/DIAG INJ IV PUSH: CPT

## 2022-03-08 PROCEDURE — 84100 ASSAY OF PHOSPHORUS: CPT | Performed by: STUDENT IN AN ORGANIZED HEALTH CARE EDUCATION/TRAINING PROGRAM

## 2022-03-08 PROCEDURE — 83935 ASSAY OF URINE OSMOLALITY: CPT | Performed by: STUDENT IN AN ORGANIZED HEALTH CARE EDUCATION/TRAINING PROGRAM

## 2022-03-08 PROCEDURE — 120N000001 HC R&B MED SURG/OB

## 2022-03-08 PROCEDURE — 36415 COLL VENOUS BLD VENIPUNCTURE: CPT

## 2022-03-08 PROCEDURE — 258N000003 HC RX IP 258 OP 636: Performed by: EMERGENCY MEDICINE

## 2022-03-08 PROCEDURE — 85025 COMPLETE CBC W/AUTO DIFF WBC: CPT | Performed by: EMERGENCY MEDICINE

## 2022-03-08 PROCEDURE — 82565 ASSAY OF CREATININE: CPT | Performed by: HOSPITALIST

## 2022-03-08 PROCEDURE — 82010 KETONE BODYS QUAN: CPT

## 2022-03-08 PROCEDURE — 82077 ASSAY SPEC XCP UR&BREATH IA: CPT

## 2022-03-08 PROCEDURE — 83735 ASSAY OF MAGNESIUM: CPT

## 2022-03-08 PROCEDURE — 87635 SARS-COV-2 COVID-19 AMP PRB: CPT | Performed by: EMERGENCY MEDICINE

## 2022-03-08 PROCEDURE — 99285 EMERGENCY DEPT VISIT HI MDM: CPT | Mod: 25

## 2022-03-08 PROCEDURE — 84295 ASSAY OF SERUM SODIUM: CPT | Performed by: HOSPITALIST

## 2022-03-08 PROCEDURE — C9803 HOPD COVID-19 SPEC COLLECT: HCPCS

## 2022-03-08 PROCEDURE — 36415 COLL VENOUS BLD VENIPUNCTURE: CPT | Performed by: EMERGENCY MEDICINE

## 2022-03-08 PROCEDURE — 36415 COLL VENOUS BLD VENIPUNCTURE: CPT | Performed by: STUDENT IN AN ORGANIZED HEALTH CARE EDUCATION/TRAINING PROGRAM

## 2022-03-08 PROCEDURE — 99223 1ST HOSP IP/OBS HIGH 75: CPT | Mod: AI | Performed by: STUDENT IN AN ORGANIZED HEALTH CARE EDUCATION/TRAINING PROGRAM

## 2022-03-08 PROCEDURE — 258N000003 HC RX IP 258 OP 636

## 2022-03-08 RX ORDER — DIAZEPAM 5 MG
10 TABLET ORAL EVERY 30 MIN PRN
Status: DISCONTINUED | OUTPATIENT
Start: 2022-03-08 | End: 2022-03-12 | Stop reason: HOSPADM

## 2022-03-08 RX ORDER — OLANZAPINE 5 MG/1
5-10 TABLET, ORALLY DISINTEGRATING ORAL EVERY 6 HOURS PRN
Status: DISCONTINUED | OUTPATIENT
Start: 2022-03-08 | End: 2022-03-12 | Stop reason: HOSPADM

## 2022-03-08 RX ORDER — AMOXICILLIN 250 MG
2 CAPSULE ORAL 2 TIMES DAILY PRN
Status: DISCONTINUED | OUTPATIENT
Start: 2022-03-08 | End: 2022-03-12 | Stop reason: HOSPADM

## 2022-03-08 RX ORDER — LOPERAMIDE HCL 2 MG
2 CAPSULE ORAL 4 TIMES DAILY PRN
Status: ON HOLD | COMMUNITY
End: 2022-03-12

## 2022-03-08 RX ORDER — PROCHLORPERAZINE 25 MG
25 SUPPOSITORY, RECTAL RECTAL EVERY 12 HOURS PRN
Status: DISCONTINUED | OUTPATIENT
Start: 2022-03-08 | End: 2022-03-12 | Stop reason: HOSPADM

## 2022-03-08 RX ORDER — HALOPERIDOL 5 MG/ML
2.5-5 INJECTION INTRAMUSCULAR EVERY 6 HOURS PRN
Status: DISCONTINUED | OUTPATIENT
Start: 2022-03-08 | End: 2022-03-12 | Stop reason: HOSPADM

## 2022-03-08 RX ORDER — LIDOCAINE 40 MG/G
CREAM TOPICAL
Status: DISCONTINUED | OUTPATIENT
Start: 2022-03-08 | End: 2022-03-12 | Stop reason: HOSPADM

## 2022-03-08 RX ORDER — MULTIPLE VITAMINS W/ MINERALS TAB 9MG-400MCG
1 TAB ORAL DAILY
Status: DISCONTINUED | OUTPATIENT
Start: 2022-03-09 | End: 2022-03-12 | Stop reason: HOSPADM

## 2022-03-08 RX ORDER — ONDANSETRON 4 MG/1
4 TABLET, ORALLY DISINTEGRATING ORAL EVERY 6 HOURS PRN
Status: DISCONTINUED | OUTPATIENT
Start: 2022-03-08 | End: 2022-03-12 | Stop reason: HOSPADM

## 2022-03-08 RX ORDER — PLANT STANOL ESTER 450 MG
2.5 TABLET ORAL DAILY
COMMUNITY
End: 2024-05-03

## 2022-03-08 RX ORDER — FLUMAZENIL 0.1 MG/ML
0.2 INJECTION, SOLUTION INTRAVENOUS
Status: DISCONTINUED | OUTPATIENT
Start: 2022-03-08 | End: 2022-03-12 | Stop reason: HOSPADM

## 2022-03-08 RX ORDER — PROCHLORPERAZINE MALEATE 5 MG
10 TABLET ORAL EVERY 6 HOURS PRN
Status: DISCONTINUED | OUTPATIENT
Start: 2022-03-08 | End: 2022-03-12 | Stop reason: HOSPADM

## 2022-03-08 RX ORDER — NICOTINE POLACRILEX 4 MG
15-30 LOZENGE BUCCAL
Status: DISCONTINUED | OUTPATIENT
Start: 2022-03-08 | End: 2022-03-12 | Stop reason: HOSPADM

## 2022-03-08 RX ORDER — POTASSIUM CHLORIDE 7.45 MG/ML
10 INJECTION INTRAVENOUS ONCE
Status: DISCONTINUED | OUTPATIENT
Start: 2022-03-08 | End: 2022-03-08

## 2022-03-08 RX ORDER — DEXTROSE, SODIUM CHLORIDE, SODIUM LACTATE, POTASSIUM CHLORIDE, AND CALCIUM CHLORIDE 5; .6; .31; .03; .02 G/100ML; G/100ML; G/100ML; G/100ML; G/100ML
INJECTION, SOLUTION INTRAVENOUS CONTINUOUS
Status: DISCONTINUED | OUTPATIENT
Start: 2022-03-08 | End: 2022-03-09

## 2022-03-08 RX ORDER — ACETAMINOPHEN 650 MG/1
650 SUPPOSITORY RECTAL EVERY 6 HOURS PRN
Status: DISCONTINUED | OUTPATIENT
Start: 2022-03-08 | End: 2022-03-12 | Stop reason: HOSPADM

## 2022-03-08 RX ORDER — POLYETHYLENE GLYCOL 3350 17 G/17G
17 POWDER, FOR SOLUTION ORAL DAILY PRN
Status: DISCONTINUED | OUTPATIENT
Start: 2022-03-08 | End: 2022-03-12 | Stop reason: HOSPADM

## 2022-03-08 RX ORDER — DEXTROSE MONOHYDRATE 25 G/50ML
25-50 INJECTION, SOLUTION INTRAVENOUS
Status: DISCONTINUED | OUTPATIENT
Start: 2022-03-08 | End: 2022-03-12 | Stop reason: HOSPADM

## 2022-03-08 RX ORDER — FOLIC ACID 1 MG/1
1 TABLET ORAL DAILY
Status: DISCONTINUED | OUTPATIENT
Start: 2022-03-09 | End: 2022-03-12 | Stop reason: HOSPADM

## 2022-03-08 RX ORDER — AMOXICILLIN 250 MG
1 CAPSULE ORAL 2 TIMES DAILY PRN
Status: DISCONTINUED | OUTPATIENT
Start: 2022-03-08 | End: 2022-03-12 | Stop reason: HOSPADM

## 2022-03-08 RX ORDER — DIAZEPAM 10 MG/2ML
5-10 INJECTION, SOLUTION INTRAMUSCULAR; INTRAVENOUS EVERY 30 MIN PRN
Status: DISCONTINUED | OUTPATIENT
Start: 2022-03-08 | End: 2022-03-12 | Stop reason: HOSPADM

## 2022-03-08 RX ORDER — ONDANSETRON 2 MG/ML
4 INJECTION INTRAMUSCULAR; INTRAVENOUS EVERY 6 HOURS PRN
Status: DISCONTINUED | OUTPATIENT
Start: 2022-03-08 | End: 2022-03-12 | Stop reason: HOSPADM

## 2022-03-08 RX ORDER — ACETAMINOPHEN 325 MG/1
650 TABLET ORAL EVERY 6 HOURS PRN
Status: DISCONTINUED | OUTPATIENT
Start: 2022-03-08 | End: 2022-03-12 | Stop reason: HOSPADM

## 2022-03-08 RX ADMIN — SODIUM CHLORIDE 1000 ML: 9 INJECTION, SOLUTION INTRAVENOUS at 19:29

## 2022-03-08 RX ADMIN — SODIUM CHLORIDE 6 UNITS: 9 INJECTION, SOLUTION INTRAVENOUS at 20:29

## 2022-03-08 RX ADMIN — SODIUM CHLORIDE 1000 ML: 9 INJECTION, SOLUTION INTRAVENOUS at 20:11

## 2022-03-08 ASSESSMENT — ACTIVITIES OF DAILY LIVING (ADL)
CONCENTRATING,_REMEMBERING_OR_MAKING_DECISIONS_DIFFICULTY: NO
ADLS_ACUITY_SCORE: 4
DIFFICULTY_EATING/SWALLOWING: NO
WEAR_GLASSES_OR_BLIND: YES
DRESSING/BATHING_DIFFICULTY: NO
CHANGE_IN_FUNCTIONAL_STATUS_SINCE_ONSET_OF_CURRENT_ILLNESS/INJURY: NO
WALKING_OR_CLIMBING_STAIRS_DIFFICULTY: NO
FALL_HISTORY_WITHIN_LAST_SIX_MONTHS: NO
ADLS_ACUITY_SCORE: 12
HEARING_DIFFICULTY_OR_DEAF: NO
DOING_ERRANDS_INDEPENDENTLY_DIFFICULTY: NO
DIFFICULTY_COMMUNICATING: NO
ADLS_ACUITY_SCORE: 4
TOILETING_ISSUES: NO

## 2022-03-08 ASSESSMENT — ENCOUNTER SYMPTOMS
BLOOD IN STOOL: 0
VOMITING: 1
NAUSEA: 1
DIARRHEA: 1
DIZZINESS: 0
COUGH: 0
SHORTNESS OF BREATH: 0
APPETITE CHANGE: 1
FEVER: 0
FATIGUE: 1
NUMBNESS: 0
DYSURIA: 0
ABDOMINAL PAIN: 0

## 2022-03-08 NOTE — ED TRIAGE NOTES
Pt presents to the ER via triage with c/o diarrhea x 2-3 weeks and N/V x 2 days. Hx of DM2 BS at home 438 this am. Takes long acting insulin to cover BSs.

## 2022-03-09 ENCOUNTER — APPOINTMENT (OUTPATIENT)
Dept: CT IMAGING | Facility: CLINIC | Age: 52
End: 2022-03-09
Attending: HOSPITALIST
Payer: MEDICAID

## 2022-03-09 LAB
ALBUMIN SERPL-MCNC: 2.9 G/DL (ref 3.4–5)
ALP SERPL-CCNC: 299 U/L (ref 40–150)
ALT SERPL W P-5'-P-CCNC: 54 U/L (ref 0–70)
ANION GAP SERPL CALCULATED.3IONS-SCNC: 7 MMOL/L (ref 3–14)
AST SERPL W P-5'-P-CCNC: 153 U/L (ref 0–45)
BILIRUB SERPL-MCNC: 0.6 MG/DL (ref 0.2–1.3)
BUN SERPL-MCNC: 12 MG/DL (ref 7–30)
C COLI+JEJUNI+LARI FUSA STL QL NAA+PROBE: NOT DETECTED
C DIFF TOX B STL QL: POSITIVE
CALCIUM SERPL-MCNC: 8.7 MG/DL (ref 8.5–10.1)
CHLORIDE BLD-SCNC: 97 MMOL/L (ref 94–109)
CO2 SERPL-SCNC: 27 MMOL/L (ref 20–32)
CREAT SERPL-MCNC: 0.64 MG/DL (ref 0.66–1.25)
CREAT SERPL-MCNC: 0.77 MG/DL (ref 0.66–1.25)
CREAT UR-MCNC: 31 MG/DL
EC STX1 GENE STL QL NAA+PROBE: NOT DETECTED
EC STX2 GENE STL QL NAA+PROBE: NOT DETECTED
ERYTHROCYTE [DISTWIDTH] IN BLOOD BY AUTOMATED COUNT: 13.5 % (ref 10–15)
FRACT EXCRET NA UR+SERPL-RTO: 0.5 %
GFR SERPL CREATININE-BSD FRML MDRD: >90 ML/MIN/1.73M2
GFR SERPL CREATININE-BSD FRML MDRD: >90 ML/MIN/1.73M2
GLUCOSE BLD-MCNC: 142 MG/DL (ref 70–99)
GLUCOSE BLDC GLUCOMTR-MCNC: 152 MG/DL (ref 70–99)
GLUCOSE BLDC GLUCOMTR-MCNC: 170 MG/DL (ref 70–99)
GLUCOSE BLDC GLUCOMTR-MCNC: 217 MG/DL (ref 70–99)
GLUCOSE BLDC GLUCOMTR-MCNC: 342 MG/DL (ref 70–99)
GLUCOSE BLDC GLUCOMTR-MCNC: 352 MG/DL (ref 70–99)
GLUCOSE BLDC GLUCOMTR-MCNC: 415 MG/DL (ref 70–99)
HCT VFR BLD AUTO: 33.3 % (ref 40–53)
HGB BLD-MCNC: 11.4 G/DL (ref 13.3–17.7)
MAGNESIUM SERPL-MCNC: 1.8 MG/DL (ref 1.6–2.3)
MCH RBC QN AUTO: 28.6 PG (ref 26.5–33)
MCHC RBC AUTO-ENTMCNC: 34.2 G/DL (ref 31.5–36.5)
MCV RBC AUTO: 84 FL (ref 78–100)
NOROV GI+II ORF1-ORF2 JNC STL QL NAA+PR: NOT DETECTED
PLATELET # BLD AUTO: 205 10E3/UL (ref 150–450)
POTASSIUM BLD-SCNC: 3.1 MMOL/L (ref 3.4–5.3)
POTASSIUM BLD-SCNC: 3.7 MMOL/L (ref 3.4–5.3)
PROT SERPL-MCNC: 6.7 G/DL (ref 6.8–8.8)
RBC # BLD AUTO: 3.98 10E6/UL (ref 4.4–5.9)
RVA NSP5 STL QL NAA+PROBE: NOT DETECTED
SALMONELLA SP RPOD STL QL NAA+PROBE: NOT DETECTED
SHIGELLA SP+EIEC IPAH STL QL NAA+PROBE: NOT DETECTED
SODIUM SERPL-SCNC: 130 MMOL/L (ref 133–144)
SODIUM SERPL-SCNC: 131 MMOL/L (ref 133–144)
SODIUM UR-SCNC: 28 MMOL/L
TSH SERPL DL<=0.005 MIU/L-ACNC: 1.8 MU/L (ref 0.4–4)
V CHOL+PARA RFBL+TRKH+TNAA STL QL NAA+PR: NOT DETECTED
WBC # BLD AUTO: 4.5 10E3/UL (ref 4–11)
Y ENTERO RECN STL QL NAA+PROBE: NOT DETECTED

## 2022-03-09 PROCEDURE — 999N000216 HC STATISTIC ADULT CD FACE TO FACE-NO CHRG

## 2022-03-09 PROCEDURE — 74177 CT ABD & PELVIS W/CONTRAST: CPT

## 2022-03-09 PROCEDURE — 87493 C DIFF AMPLIFIED PROBE: CPT

## 2022-03-09 PROCEDURE — 85027 COMPLETE CBC AUTOMATED: CPT | Performed by: STUDENT IN AN ORGANIZED HEALTH CARE EDUCATION/TRAINING PROGRAM

## 2022-03-09 PROCEDURE — 84132 ASSAY OF SERUM POTASSIUM: CPT | Performed by: HOSPITALIST

## 2022-03-09 PROCEDURE — 250N000009 HC RX 250: Performed by: HOSPITALIST

## 2022-03-09 PROCEDURE — 99233 SBSQ HOSP IP/OBS HIGH 50: CPT | Performed by: HOSPITALIST

## 2022-03-09 PROCEDURE — 258N000003 HC RX IP 258 OP 636: Performed by: INTERNAL MEDICINE

## 2022-03-09 PROCEDURE — 120N000001 HC R&B MED SURG/OB

## 2022-03-09 PROCEDURE — 250N000012 HC RX MED GY IP 250 OP 636 PS 637: Performed by: STUDENT IN AN ORGANIZED HEALTH CARE EDUCATION/TRAINING PROGRAM

## 2022-03-09 PROCEDURE — 84443 ASSAY THYROID STIM HORMONE: CPT | Performed by: HOSPITALIST

## 2022-03-09 PROCEDURE — 36415 COLL VENOUS BLD VENIPUNCTURE: CPT | Performed by: STUDENT IN AN ORGANIZED HEALTH CARE EDUCATION/TRAINING PROGRAM

## 2022-03-09 PROCEDURE — 80053 COMPREHEN METABOLIC PANEL: CPT | Performed by: STUDENT IN AN ORGANIZED HEALTH CARE EDUCATION/TRAINING PROGRAM

## 2022-03-09 PROCEDURE — 250N000013 HC RX MED GY IP 250 OP 250 PS 637: Performed by: HOSPITALIST

## 2022-03-09 PROCEDURE — 250N000013 HC RX MED GY IP 250 OP 250 PS 637: Performed by: STUDENT IN AN ORGANIZED HEALTH CARE EDUCATION/TRAINING PROGRAM

## 2022-03-09 PROCEDURE — 84300 ASSAY OF URINE SODIUM: CPT | Performed by: STUDENT IN AN ORGANIZED HEALTH CARE EDUCATION/TRAINING PROGRAM

## 2022-03-09 PROCEDURE — 250N000011 HC RX IP 250 OP 636: Performed by: STUDENT IN AN ORGANIZED HEALTH CARE EDUCATION/TRAINING PROGRAM

## 2022-03-09 PROCEDURE — 82040 ASSAY OF SERUM ALBUMIN: CPT | Performed by: STUDENT IN AN ORGANIZED HEALTH CARE EDUCATION/TRAINING PROGRAM

## 2022-03-09 PROCEDURE — 87506 IADNA-DNA/RNA PROBE TQ 6-11: CPT

## 2022-03-09 PROCEDURE — 250N000013 HC RX MED GY IP 250 OP 250 PS 637: Performed by: INTERNAL MEDICINE

## 2022-03-09 PROCEDURE — 250N000011 HC RX IP 250 OP 636: Performed by: HOSPITALIST

## 2022-03-09 PROCEDURE — 36415 COLL VENOUS BLD VENIPUNCTURE: CPT | Performed by: HOSPITALIST

## 2022-03-09 PROCEDURE — 83735 ASSAY OF MAGNESIUM: CPT | Performed by: HOSPITALIST

## 2022-03-09 RX ORDER — IOPAMIDOL 755 MG/ML
58 INJECTION, SOLUTION INTRAVASCULAR ONCE
Status: COMPLETED | OUTPATIENT
Start: 2022-03-09 | End: 2022-03-09

## 2022-03-09 RX ORDER — PLANT STANOL ESTER 450 MG
2.5 TABLET ORAL DAILY
Status: DISCONTINUED | OUTPATIENT
Start: 2022-03-09 | End: 2022-03-12 | Stop reason: HOSPADM

## 2022-03-09 RX ORDER — SODIUM CHLORIDE, SODIUM LACTATE, POTASSIUM CHLORIDE, CALCIUM CHLORIDE 600; 310; 30; 20 MG/100ML; MG/100ML; MG/100ML; MG/100ML
INJECTION, SOLUTION INTRAVENOUS CONTINUOUS
Status: DISCONTINUED | OUTPATIENT
Start: 2022-03-09 | End: 2022-03-11

## 2022-03-09 RX ORDER — POTASSIUM CHLORIDE 1500 MG/1
20 TABLET, EXTENDED RELEASE ORAL ONCE
Status: COMPLETED | OUTPATIENT
Start: 2022-03-09 | End: 2022-03-09

## 2022-03-09 RX ORDER — VANCOMYCIN HYDROCHLORIDE 125 MG/1
125 CAPSULE ORAL 4 TIMES DAILY
Status: DISCONTINUED | OUTPATIENT
Start: 2022-03-09 | End: 2022-03-12 | Stop reason: HOSPADM

## 2022-03-09 RX ADMIN — SODIUM CHLORIDE, POTASSIUM CHLORIDE, SODIUM LACTATE AND CALCIUM CHLORIDE: 600; 310; 30; 20 INJECTION, SOLUTION INTRAVENOUS at 22:32

## 2022-03-09 RX ADMIN — SODIUM CHLORIDE, POTASSIUM CHLORIDE, SODIUM LACTATE AND CALCIUM CHLORIDE: 600; 310; 30; 20 INJECTION, SOLUTION INTRAVENOUS at 02:51

## 2022-03-09 RX ADMIN — POTASSIUM & SODIUM PHOSPHATES POWDER PACK 280-160-250 MG 1 PACKET: 280-160-250 PACK at 21:38

## 2022-03-09 RX ADMIN — POTASSIUM & SODIUM PHOSPHATES POWDER PACK 280-160-250 MG 1 PACKET: 280-160-250 PACK at 17:12

## 2022-03-09 RX ADMIN — MULTIPLE VITAMINS W/ MINERALS TAB 1 TABLET: TAB at 09:20

## 2022-03-09 RX ADMIN — VANCOMYCIN HYDROCHLORIDE 125 MG: 125 CAPSULE ORAL at 17:12

## 2022-03-09 RX ADMIN — INSULIN GLARGINE 5 UNITS: 100 INJECTION, SOLUTION SUBCUTANEOUS at 09:42

## 2022-03-09 RX ADMIN — VANCOMYCIN HYDROCHLORIDE 125 MG: 125 CAPSULE ORAL at 21:38

## 2022-03-09 RX ADMIN — THIAMINE HCL TAB 100 MG 100 MG: 100 TAB at 09:20

## 2022-03-09 RX ADMIN — POTASSIUM & SODIUM PHOSPHATES POWDER PACK 280-160-250 MG 1 PACKET: 280-160-250 PACK at 10:26

## 2022-03-09 RX ADMIN — SITAGLIPTIN 100 MG: 100 TABLET, FILM COATED ORAL at 14:24

## 2022-03-09 RX ADMIN — VANCOMYCIN HYDROCHLORIDE 125 MG: 125 CAPSULE ORAL at 14:11

## 2022-03-09 RX ADMIN — POTASSIUM CHLORIDE 20 MEQ: 1500 TABLET, EXTENDED RELEASE ORAL at 09:20

## 2022-03-09 RX ADMIN — SODIUM CHLORIDE 60 ML: 9 INJECTION, SOLUTION INTRAVENOUS at 11:30

## 2022-03-09 RX ADMIN — Medication 2.5 MEQ: at 14:24

## 2022-03-09 RX ADMIN — SODIUM CHLORIDE, POTASSIUM CHLORIDE, SODIUM LACTATE AND CALCIUM CHLORIDE: 600; 310; 30; 20 INJECTION, SOLUTION INTRAVENOUS at 12:59

## 2022-03-09 RX ADMIN — VANCOMYCIN HYDROCHLORIDE 125 MG: 125 CAPSULE ORAL at 09:20

## 2022-03-09 RX ADMIN — SODIUM CHLORIDE, SODIUM LACTATE, POTASSIUM CHLORIDE, CALCIUM CHLORIDE AND DEXTROSE MONOHYDRATE: 5; 600; 310; 30; 20 INJECTION, SOLUTION INTRAVENOUS at 01:30

## 2022-03-09 RX ADMIN — FOLIC ACID 1 MG: 1 TABLET ORAL at 09:20

## 2022-03-09 RX ADMIN — IOPAMIDOL 58 ML: 755 INJECTION, SOLUTION INTRAVENOUS at 11:30

## 2022-03-09 ASSESSMENT — ACTIVITIES OF DAILY LIVING (ADL)
ADLS_ACUITY_SCORE: 4
ADLS_ACUITY_SCORE: 8
ADLS_ACUITY_SCORE: 4

## 2022-03-09 NOTE — PROGRESS NOTES
[facility]  Hospitalist Progress Note        Ronald Wilkins MD   03/09/2022        Interval History:        - remains afebrile, no leukocytosis  - C diff came back positive and started on PO Vancomycin; enteric panel pending  - K 3.1; phos 2.3, Na 131  - US abdomen UR  - discussed with GI, since c diff positive, will hold off on formal GI eval at this time; patient states he was planned for outpatient colonoscopy in [place] but cancelled due to ongoing diarrhea         Assessment and Plan:        Long Mayfield is a 51 year old male with PMH of chronic diarrhea, alcohol use, diabetes admitted on 3/8/2022. He presents with chronic diarrhea and generalized weakness.      Likely acute on Chronic diarrhea  C diff diarrhea  Weight loss  Dehydration  Hypokalemia  Hypophosphatemia  Hyponatremia  Malnutrition  - he reports 10-20 episodes of diarrhea daily for at least 8 months; feels dehydrated, has lost about 25 lbs in  Just over a year; states he was planned for colonoscopy in [place] but was canceled due to ongoing diarrhea   - noted positive C diff this admission ; enteric panel pending   - will continue PO vancomycin ; discussed with Dr Strong from GI, will hold off on formal evaluation at this time; will consult GI if no significant clinical improvement with C diff treatment   -given chronic diarrhea, unintentional weight loss will get a CT chest/abdomen/pelvis for further evaluation  -will check thyroid function test  -electrolyte derangements including low K , Na and phos likely due to ongoing loss with diarrhea; will start K and Phos supple protocol; check magnesium; monitor BMP  - will continue IVF until improvement and diarrhea  - Na 130---131, K 3.1; phos 2.3  -nutrition consulted for malnutrition, weight loss     EtOH use disorder  *nearly daily use. Buys 1 pint of vodka every 4 days on average with episodes of significant increase  *states he drinks for pain control, and denies hx  "of withdrawal  - CIWA protocxol; not currently in withdrawal  - CD consulted but patient not interested in evaluation of referrals to inpatient CD treatment at this time  - thiamine, folate    Hyperglycemia  uncontrolled DMT2  - A1c markedly elevated >14  -PTA on Trulicity, Januvia (used to be on metformin in past, discontinued due to diarrhea)  -hold off on Trulicity; resume Januvia  - started on Lantus 5 units daily this admission  - will continue sliding scale insulin    Orders Placed This Encounter      Moderate Consistent Carb (60 g CHO per Meal) Diet    DVT Prophylaxis: Pneumatic Compression Devices    Code Status:   FULL CODE       Clinically Significant Risk Factors Present on Admission         # Hyponatremia: Na = 131 mmol/L (Ref range: 133 - 144 mmol/L) on admission, will monitor as appropriate         # Diabetes, type II: last A1C >14.0 % (Ref range: 0.0 - 5.6 %)       Disposition: likely 2 to 3 days pending clinical improvement and diarrhea and workup complete  Will get PT/OT eval and sw for disposition planning    Page Me (7 am to 6 pm)    Care plan discussed with patient and nursing              Physical Exam:      Blood pressure (!) 133/90, pulse 87, temperature 99  F (37.2  C), temperature source Oral, resp. rate 17, height 1.651 m (5' 5\"), weight 51.8 kg (114 lb 3.2 oz), SpO2 100 %.  Vitals:    03/08/22 2135   Weight: 51.8 kg (114 lb 3.2 oz)     Vital Signs with Ranges  Temp:  [98.4  F (36.9  C)-99  F (37.2  C)] 99  F (37.2  C)  Pulse:  [87-98] 87  Resp:  [17-18] 17  BP: (130-148)/(62-90) 133/90  SpO2:  [100 %] 100 %  I/O's Last 24 hours  I/O last 3 completed shifts:  In: 240 [P.O.:240]  Out: 1600 [Urine:1600]    Constitutional: Alert, awake and oriented X 3; resting comfortably in no apparent distress; thin built   HEENT: Pupils equal and reactive to light and accomodation, neck supple    Oral cavity: Dry oral mucosa   Cardiovascular: Normal s1 s2, regular rate and rhythm, no murmur   Lungs: B/l " clear to auscultation, no wheezes or crepitations   Abdomen: Soft, nt, nd, no guarding, rigidity or rebound; BS +   LE : No edema   Musculoskeletal: Power 5/5 in all extremities   Neuro: No focal neurological deficits noted   Psychiatry: normal mood and affect                Medications:          folic acid  1 mg Oral Daily     insulin aspart  1-7 Units Subcutaneous TID AC     insulin aspart  1-5 Units Subcutaneous At Bedtime     insulin glargine  5 Units Subcutaneous QAM AC     multivitamin w/minerals  1 tablet Oral Daily     potassium & sodium phosphates  1 packet Oral TID     potassium chloride  20 mEq Oral Once     sodium chloride (PF)  3 mL Intracatheter Q8H     thiamine  100 mg Oral Daily     vancomycin  125 mg Oral 4x Daily     PRN Meds: acetaminophen **OR** acetaminophen, glucose **OR** dextrose **OR** glucagon, diazepam **OR** diazepam, flumazenil, OLANZapine zydis **OR** haloperidol lactate, lidocaine 4%, lidocaine (buffered or not buffered), melatonin, ondansetron **OR** ondansetron, polyethylene glycol, prochlorperazine **OR** prochlorperazine **OR** prochlorperazine, senna-docusate **OR** senna-docusate, sodium chloride (PF)         Data:      All new lab and imaging data was reviewed.   Recent Labs   Lab Test 03/09/22  0651 03/08/22  1756 10/08/17  2105   WBC 4.5 7.0 3.9*   HGB 11.4* 11.9* 12.1*   MCV 84 86 84    243 238      Recent Labs   Lab Test 03/09/22  0651 03/09/22  0440 03/09/22  0154 03/08/22  2150 03/08/22  2146 03/08/22  1756 10/08/17  2105   *  --   --   --  130* 124* 131*   POTASSIUM 3.1*  --   --   --   --  3.7 3.8   CHLORIDE 97  --   --   --   --  87* 95   CO2 27  --   --   --   --  21 28   BUN 12  --   --   --   --  15 9   CR 0.64*  --   --   --  0.77 0.91 0.68   ANIONGAP 7  --   --   --   --  16* 8   AROLDO 8.7  --   --   --   --  9.6 9.5   * 217* 415*   < >  --  384* 403*    < > = values in this interval not displayed.     No lab results found.    Invalid input(s):  TROP, TROPONINIES

## 2022-03-09 NOTE — CONSULTS
3/9/2022      Spoke with pt via telephone to offer CD services. Pt reports he is not interested in an evaluation or referrals to inpatient CD treatment at this time. Pt reports he is not attending sober support meetings. Pt reports he doesn't need any CD resources at this time. Pt is able to call Mental Health and Addiction Services Line: [phone] and make an appt through [facility] if he would like an evaluation after he is discharged.     Home  [facility]  [email or web address]      Supportive Services     [facility]  https://www.smartrecovery.org    Alcoholics Anonymous  http://www.aa.org  Community Drug & Alcohol Support Resources   Alcoholics Anonymous   24/7 Phone Line: [phone]   [email or web address]   [email or web address].org/   For additional list of online meetings: http://aa-intergroup.org     AMISH Christiansen  Phone: [phone]  Email: [email or web address]

## 2022-03-09 NOTE — ED PROVIDER NOTES
History   Chief Complaint:  Nausea, Vomiting, & Diarrhea       HPI   Long Mayfield is a 51 year old male with history of recently diagnosed diabetes II and hypertension who presents to the ED complaining of nausea, vomiting and diarrhea.  The patient states that over the past 3 weeks he has had diarrhea.  For the past 2 days he has felt very dehydrated, nauseous and has vomited a few times.  He denies any abdominal pain or fevers.  He has noticed no blood in his stools.  He describes his diarrhea as watery and brown in color.  He denies any recent antibiotic use.  No recent travel, no ill exposures.  He is triple vaccinated for Covid.  He states he is on a regimen of Trulicity shot once weekly and oral Januvia daily.  He mentions he could not tolerate Metformin so these medications were recently switched up. The patient states he drinks ETOH, but hasn't had a drink for four days.    Review of Systems   Constitutional: Positive for appetite change and fatigue. Negative for fever.   Respiratory: Negative for shortness of breath.    Cardiovascular: Negative for chest pain.   Gastrointestinal: Positive for diarrhea, nausea and vomiting. Negative for abdominal pain and blood in stool.   Genitourinary: Negative for dysuria.   Neurological: Negative for dizziness and numbness.   All other systems reviewed and are negative.    Allergies:  No Known Allergies    Medications:  Januvia  Trulicity    Past Medical History:     Past Medical History:   Diagnosis Date     Bell's palsy      Diabetes (H)        Past Surgical History:    Past Surgical History:   Procedure Laterality Date     ORTHOPEDIC SURGERY          Family History:    Patient is adopted.  No known family history.    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  HENT: Patient wearing face mask. When taken off, mucous membranes appear dry.  Eyes: Conjunctive and sclera clear.  CV: Regular rate and rhythm. Normal S1, S2. No appreciable murmurs, gallops or rubs.  Resp: Lungs clear to auscultation bilaterally. Normal respiratory effort. Speaks in full sentences. No stridor or cough observed.  GI: Abdomen soft, non distended and nontender. No rebound or guarding.  MSK: Moves all extremities without difficulty. No lower extremity edema.  Skin: Warm and dry.  Neuro: Awake, alert, oriented. Cranial nerves grossly intact.  Psych: Cooperative. Normal affect.    Emergency Department Course   Laboratory:  Labs Ordered and Resulted from Time of ED Arrival to Time of ED Departure   BASIC METABOLIC PANEL - Abnormal       Result Value    Sodium 124 (*)     Potassium 3.7      Chloride 87 (*)     Carbon Dioxide (CO2) 21      Anion Gap 16 (*)     Urea Nitrogen 15      Creatinine 0.91      Calcium 9.6      Glucose 384 (*)     GFR Estimate >90     HEPATIC FUNCTION PANEL - Abnormal    Bilirubin Total 1.1      Bilirubin Direct 0.3 (*)     Protein Total 7.7      Albumin 3.4      Alkaline Phosphatase 175 (*)     AST 40      ALT 28     CBC WITH PLATELETS AND DIFFERENTIAL - Abnormal    WBC Count 7.0      RBC Count 4.11 (*)     Hemoglobin 11.9 (*)     Hematocrit 35.2 (*)     MCV 86      MCH 29.0      MCHC 33.8      RDW 13.6      Platelet Count 243      % Neutrophils 72      % Lymphocytes 8      % Monocytes 15      % Eosinophils 5      % Basophils 0      % Immature Granulocytes 0      NRBCs per 100 WBC 0      Absolute Neutrophils 5.0      Absolute Lymphocytes 0.6 (*)     Absolute Monocytes 1.0      " Absolute Eosinophils 0.3      Absolute Basophils 0.0      Absolute Immature Granulocytes 0.0      Absolute NRBCs 0.0     BLOOD GAS VENOUS WITH OXYHEMOGLOBIN - Abnormal    pH Venous 7.39      pCO2 Venous 36 (*)     pO2 Venous 40      Bicarbonate Venous 22      FIO2 21      Oxyhemoglobin Venous 74      Base Excess/Deficit (+/-) -2.9     KETONE BETA-HYDROXYBUTYRATE QUANTITATIVE, RAPID - Abnormal    Ketone (Beta-Hydroxybutyrate) Quantitative 5.9 (*)    ROUTINE UA WITH MICROSCOPIC REFLEX TO CULTURE - Abnormal    Color Urine Yellow      Appearance Urine Clear      Glucose Urine >=1000 (*)     Bilirubin Urine Small (*)     Ketones Urine 80  (*)     Specific Gravity Urine 1.029      Blood Urine Negative      pH Urine 5.0      Protein Albumin Urine 20  (*)     Urobilinogen Urine Normal      Nitrite Urine Negative      Leukocyte Esterase Urine Negative      Mucus Urine Present (*)     RBC Urine 0      WBC Urine 8 (*)    MAGNESIUM - Normal    Magnesium 1.7     ETHYL ALCOHOL LEVEL - Normal    Alcohol ethyl <0.01     COVID-19 VIRUS (CORONAVIRUS) BY PCR - Normal    SARS CoV2 PCR Negative     PHOSPHORUS   URINE MACROSCOPIC WITH REFLEX TO MICRO   OSMOLALITY   OSMOLALITY, RANDOM URINE   ENTERIC BACTERIA AND VIRUS PANEL BY KYLE STOOL   CLOSTRIDIUM DIFFICILE TOXIN B   FRACTIONAL EXCRETION OF SODIUM        Emergency Department Course:    Reviewed:  I reviewed nursing notes, vitals and past medical history    Assessments:  1859 I obtained history and examined the patient as noted above. I discussed his case with my attending physician. We ordered 2L normal saline, potassium replacement and 6units of insulin.    2030 My attending physician Dr. Asencio spoke to the hospitalist, Dr. Iyer, who accepted the patient for admission.    Interventions:  Medications   glucose gel 15-30 g (has no administration in time range)     Or   dextrose 50 % injection 25-50 mL (has no administration in time range)     Or   glucagon injection 1 mg (has no  administration in time range)   insulin aspart (NovoLOG) injection (RAPID ACTING) (has no administration in time range)   insulin aspart (NovoLOG) injection (RAPID ACTING) (has no administration in time range)   insulin glargine (LANTUS PEN) injection 5 Units (has no administration in time range)   lidocaine 1 % 0.1-1 mL (has no administration in time range)   lidocaine (LMX4) cream (has no administration in time range)   sodium chloride (PF) 0.9% PF flush 3 mL (has no administration in time range)   sodium chloride (PF) 0.9% PF flush 3 mL (has no administration in time range)   OLANZapine zydis (zyPREXA) ODT tab 5-10 mg (has no administration in time range)     Or   haloperidol lactate (HALDOL) injection 2.5-5 mg (has no administration in time range)   flumazenil (ROMAZICON) injection 0.2 mg (has no administration in time range)   melatonin tablet 5 mg (has no administration in time range)   dextrose 5% in lactated ringers infusion (has no administration in time range)   acetaminophen (TYLENOL) tablet 650 mg (has no administration in time range)     Or   acetaminophen (TYLENOL) Suppository 650 mg (has no administration in time range)   senna-docusate (SENOKOT-S/PERICOLACE) 8.6-50 MG per tablet 1 tablet (has no administration in time range)     Or   senna-docusate (SENOKOT-S/PERICOLACE) 8.6-50 MG per tablet 2 tablet (has no administration in time range)   polyethylene glycol (MIRALAX) Packet 17 g (has no administration in time range)   prochlorperazine (COMPAZINE) injection 10 mg (has no administration in time range)     Or   prochlorperazine (COMPAZINE) tablet 10 mg (has no administration in time range)     Or   prochlorperazine (COMPAZINE) suppository 25 mg (has no administration in time range)   ondansetron (ZOFRAN-ODT) ODT tab 4 mg (has no administration in time range)     Or   ondansetron (ZOFRAN) injection 4 mg (has no administration in time range)   diazepam (VALIUM) tablet 10 mg (has no administration in  time range)     Or   diazepam (VALIUM) injection 5-10 mg (has no administration in time range)   thiamine (B-1) tablet 100 mg (has no administration in time range)   folic acid (FOLVITE) tablet 1 mg (has no administration in time range)   multivitamin w/minerals (THERA-VIT-M) tablet 1 tablet (has no administration in time range)   0.9% sodium chloride BOLUS (0 mLs Intravenous Stopped 3/8/22 2011)   0.9% sodium chloride BOLUS (1,000 mLs Intravenous New Bag 3/8/22 2011)   insulin regular 1 unit/mL injection 6 Units (6 Units Intravenous Given 3/8/22 2029)     Disposition:  The patient was admitted to the hospital under the care of Dr. Iyer.     Impression & Plan       Medical Decision Making:  Long is a 52yo male with recently diagnosed Type II Diabetes who presented to the emergency department for nausea, vomiting and diarrhea per detailed HPI above.  On labs, the patient was found to have blood sugar of 384 with an anion gap of 16.  I was initially concerned for possible DKA and immediately started IV fluids.  Ketones were elevated, however his pH was 7.39 and his bicarb was 22, thus he did not meet criteria for DKA.  I discussed his case with my attending physician Dr. Asencio.  We decided to provide 6 units of insulin to lower his blood sugar and also provided IV potassium.  The patient was given 2 L of fluids as well.  Currently the patient is on a dual regimen of oral Januvia and weekly Trulicity.  Apparently, he was unable to tolerate Metformin when he was first diagnosed.  He has only recently been started on his medication regimen.  I spoke to the pharmacist, who felt that the patient's oral Januvia is likely contributory to his diarrhea.  I did obtain enteric viral studies and C. difficile as well, which were pending at the time of admission.  Patient had no leukocytosis and was afebrile, so I was not concerned for infection as the cause of his hyperglycemia.  CBC showed no signs of anemia.  Metabolic  panel showed sodium of 124, however when corrected for hyperglycemia it was at 129.  We provided normal saline which will help this.  My attending provider Dr. Asencio spoke with the hospitalist, Dr. Iyer, who accepted the patient for admission and further evaluation.  He remained hemodynamically stable throughout his course here.  All questions were answered prior to admission.      Diagnosis:    ICD-10-CM    1. Hyperglycemia  R73.9    2. Ketosis (H)  E88.89    3. Vomiting and diarrhea  R11.10     R19.7        Scribe Disclosure:  Julita SANTIAGO APC-T  I saw and evaluated this patient under attending provider Dr. Asencio.           Julita Martell PA-C  03/08/22 0105

## 2022-03-09 NOTE — PHARMACY-ADMISSION MEDICATION HISTORY
Pharmacy Medication History  Admission medication history interview status for the 3/8/2022  admission is complete. See EPIC admission navigator for prior to admission medications     Location of Interview: Patient room  Medication history sources: Patient    In the past week, patient estimated taking medication this percent of the time: greater than 90%    Medication reconciliation completed by provider prior to medication history? No    Time spent in this activity: 20 minutes    Prior to Admission medications    Medication Sig Last Dose Taking? Auth Provider   dulaglutide (TRULICITY) 0.75 MG/0.5ML pen Inject 0.75 mg Subcutaneous every 7 days 3/6/2022 Yes Unknown, Entered By History   loperamide (IMODIUM) 2 MG capsule Take 2 mg by mouth 4 times daily as needed for diarrhea Past Week at Unknown time Yes Unknown, Entered By History   potassium gluconate 2.5 MEQ tablet Take 2.5 mEq by mouth daily 3/8/2022 at Unknown time Yes Unknown, Entered By History   sitagliptin (JANUVIA) 100 MG tablet Take 100 mg by mouth daily 3/8/2022 at am Yes Unknown, Entered By History       The information provided in this note is only as accurate as the sources available at the time of update(s)

## 2022-03-09 NOTE — ED NOTES
DATE:  3/8/2022   TIME OF RECEIPT FROM LAB:  7:37 PM  LAB TEST:  Ketone   LAB VALUE:  5.9  RESULTS GIVEN WITH READ-BACK TO (PROVIDER):  Nory Asencio MD  TIME LAB VALUE REPORTED TO PROVIDER:   7:38 PM

## 2022-03-09 NOTE — PROVIDER NOTIFICATION
MD Notification    Notified Person: MD    Notified Person Name: IAM MISTRY     Notification Date/Time: 3/9/22 @ 0229    Notification Interaction: text page    Purpose of Notification: FYI: 0200 blood sugar was 415. Pt on D5 LR at 100 mL/hr.     Orders Received: Give 10 units Novolog. Change D5 LR to plain LR at 100 mL/hr.

## 2022-03-09 NOTE — UTILIZATION REVIEW
Admission Status; Secondary Review Determination       Under the authority of the Utilization Management Committee, the utilization review process indicated a secondary review on the above patient. The review outcome is based on review of the medical records, discussions with staff, and applying clinical experience noted on the date of the review.     (x) Inpatient Status Appropriate - This patient's medical care is consistent with medical management for inpatient care and reasonable inpatient medical practice.     RATIONALE FOR DETERMINATION       Long Mayfield is a 51 year old male with history of alcohol use disorder, diabetes poorly controlled, and chronic diarrhea. He presented to the [facility] ER emergency department on 3/8/2022 for evaluation of of nausea, vomiting, worsened diarrhea, and weakness.  He had been unable to get out of bed for several days.   He reports having diarrhea since at least last summer.    Normally, he has multiple episodes per day of liquidy brown stools but no blood or melanic component and no associated pain or cramping.  His diarrhea has been worse in recent days or weeks.  After a drinking binge with a friend last week he began having nausea and vomiting.  He became weak and was barely able to get out of bed.  Emergency department evaluation showed heart rate in the 90s.  Laboratory evaluation showed sodium 130 alkaline phosphatase 175, hemoglobin A1c greater than 14, ketones 5.9, glucose 384, white blood cells 7, hemoglobin 11.9, urinalysis with glucose, negative COVID-19 testing, and negative blood alcohol level.  Ultrasound of abdomen and pelvis was unremarkable.  Patient was admitted to the hospital for further cares.  Stool was sent for C. difficile testing which came back positive.  Enteric panel is pending.  He has continued to have watery stools- at least 5 overnight.  He continues to be weak.  Blood sugars remain quite elevated.  Inpatient admission  is appropriate for ongoing treatment of C. difficile colitis, severe deconditioning, and uncontrolled diabetes.       At the time of admission with the information available to the attending physician more than 2 nights Hospital complex care was anticipated, based on patient risk of adverse outcome if treated as outpatient and complex care required. Inpatient admission is appropriate based on the Medicare guidelines.     This document was produced using voice recognition software       The information on this document is developed by the utilization review team in order for the business office to ensure compliance. This only denotes the appropriateness of proper admission status and does not reflect the quality of care rendered.   The definitions of Inpatient Status and Observation Status used in making the determination above are those provided in the CMS Coverage Manual, Chapter 1 and Chapter 6, section 70.4.   Sincerely,     Jr Lemos MD    Utilization Review  Physician Advisor  [facility].

## 2022-03-09 NOTE — PROGRESS NOTES
GI consult received for diarrhea.     Overnight C diff pcr +, now on vanco.     Discussed with Dr. Wilkins, somewhat of an atypical presentation for C diff, but we agree to treat this and if failing to improve, formal GI consult would be appropriate.  Presently, +C diff precludes endoscopic eval, however.     Ramesh Strong DO   [facility]  Cell [phone]

## 2022-03-09 NOTE — CONSULTS
Care Management Initial Consult    General Information  Assessment completed with: Patient,    Type of CM/SW Visit: Offer D/C Planning    Primary Care Provider verified and updated as needed: Yes   Readmission within the last 30 days:        Reason for Consult: discharge planning  Advance Care Planning:            Communication Assessment  Patient's communication style: spoken language (English or Bilingual)    Hearing Difficulty or Deaf: no   Wear Glasses or Blind: yes    Cognitive  Cognitive/Neuro/Behavioral: WDL                      Living Environment:   People in home: parent(s)     Current living Arrangements: apartment      Able to return to prior arrangements: yes       Family/Social Support:  Care provided by: self  Provides care for:    Marital Status: Single             Description of Support System:           Current Resources:   Patient receiving home care services: No     Community Resources: None  Equipment currently used at home: none  Supplies currently used at home:      Employment/Financial:  Employment Status: employed part-time        Financial Concerns:             Lifestyle & Psychosocial Needs:  Social Determinants of Health     Tobacco Use: Low Risk      Smoking Tobacco Use: Never Smoker     Smokeless Tobacco Use: Never Used   Alcohol Use: Not on file   Financial Resource Strain: Not on file   Food Insecurity: Not on file   Transportation Needs: Not on file   Physical Activity: Not on file   Stress: Not on file   Social Connections: Not on file   Intimate Partner Violence: Not on file   Depression: Not on file   Housing Stability: Not on file       Functional Status:  Prior to admission patient needed assistance:              Mental Health Status:          Chemical Dependency Status:                Values/Beliefs:  Spiritual, Cultural Beliefs, Anabaptism Practices, Values that affect care:                 Additional Information:  Met with patient to discuss role in discharge planning. Pt lives  "indep with his mom.  Reports he recently moved here from [place] to \"help\" his mom during COVID.  Pt works part time at [employer] store. Pt notes he has insurance ([facility]) and was going to establish with his mom's clinic (in [place], name unknown).  Pt voices no needs for discharge.  States he feels better now that he knows what is causing diarrhea and has started treatment. Pt wants to make own follow up appointments and voices no other needs for discharge.     CC available should needs arise.     Jenifer Escamilla RN      "

## 2022-03-09 NOTE — ED NOTES
[facility]  ED Nurse Handoff Report    ED Chief complaint: Nausea, Vomiting, & Diarrhea      ED Diagnosis:   Final diagnoses:   Hyperglycemia   Ketosis (H)   Vomiting and diarrhea       Code Status: Full Code    Allergies: No Known Allergies    Patient Story: Patient presents to the ED complaining of months of diarrhea   Focused Assessment:  Patient is alert and oriented, hx of diarrhea and diabetes.  States recently his medications were changed and he is no longer taking insulin.  Patient is a poor historian.  Patient up with stand by assist to the bathroom.      Treatments and/or interventions provided: labs, imaging   Patient's response to treatments and/or interventions:   Labs Ordered and Resulted from Time of ED Arrival to Time of ED Departure   BASIC METABOLIC PANEL - Abnormal       Result Value    Sodium 124 (*)     Potassium 3.7      Chloride 87 (*)     Carbon Dioxide (CO2) 21      Anion Gap 16 (*)     Urea Nitrogen 15      Creatinine 0.91      Calcium 9.6      Glucose 384 (*)     GFR Estimate >90     HEPATIC FUNCTION PANEL - Abnormal    Bilirubin Total 1.1      Bilirubin Direct 0.3 (*)     Protein Total 7.7      Albumin 3.4      Alkaline Phosphatase 175 (*)     AST 40      ALT 28     CBC WITH PLATELETS AND DIFFERENTIAL - Abnormal    WBC Count 7.0      RBC Count 4.11 (*)     Hemoglobin 11.9 (*)     Hematocrit 35.2 (*)     MCV 86      MCH 29.0      MCHC 33.8      RDW 13.6      Platelet Count 243      % Neutrophils 72      % Lymphocytes 8      % Monocytes 15      % Eosinophils 5      % Basophils 0      % Immature Granulocytes 0      NRBCs per 100 WBC 0      Absolute Neutrophils 5.0      Absolute Lymphocytes 0.6 (*)     Absolute Monocytes 1.0      Absolute Eosinophils 0.3      Absolute Basophils 0.0      Absolute Immature Granulocytes 0.0      Absolute NRBCs 0.0     BLOOD GAS VENOUS WITH OXYHEMOGLOBIN - Abnormal    pH Venous 7.39      pCO2 Venous 36 (*)     pO2 Venous 40      Bicarbonate  Venous 22      FIO2 21      Oxyhemoglobin Venous 74      Base Excess/Deficit (+/-) -2.9     KETONE BETA-HYDROXYBUTYRATE QUANTITATIVE, RAPID - Abnormal    Ketone (Beta-Hydroxybutyrate) Quantitative 5.9 (*)    ROUTINE UA WITH MICROSCOPIC REFLEX TO CULTURE - Abnormal    Color Urine Yellow      Appearance Urine Clear      Glucose Urine >=1000 (*)     Bilirubin Urine Small (*)     Ketones Urine 80  (*)     Specific Gravity Urine 1.029      Blood Urine Negative      pH Urine 5.0      Protein Albumin Urine 20  (*)     Urobilinogen Urine Normal      Nitrite Urine Negative      Leukocyte Esterase Urine Negative      Mucus Urine Present (*)     RBC Urine 0      WBC Urine 8 (*)    MAGNESIUM - Normal    Magnesium 1.7     ETHYL ALCOHOL LEVEL - Normal    Alcohol ethyl <0.01     COVID-19 VIRUS (CORONAVIRUS) BY PCR   ENTERIC BACTERIA AND VIRUS PANEL BY KYLE STOOL   CLOSTRIDIUM DIFFICILE TOXIN B         To be done/followed up on inpatient unit:      Does this patient have any cognitive concerns?: none    Activity level - Baseline/Home:  Independent  Activity Level - Current:   Independent    Patient's Preferred language: English   Needed?: No    Isolation: None  Infection: Not Applicable  Patient tested for COVID 19 prior to admission: YES  Bariatric?: No    Vital Signs:   Vitals:    03/08/22 1758   BP: (!) 148/62   Patient Position: Chair   Pulse: 98   Resp: 18   Temp: 98.4  F (36.9  C)   TempSrc: Oral   SpO2: 100%       Cardiac Rhythm:     Was the PSS-3 completed:   Yes  What interventions are required if any?               Family Comments: Sister at bedside   OBS brochure/video discussed/provided to patient/family: N/A              Name of person given brochure if not patient:               Relationship to patient:     For the majority of the shift this patient's behavior was Green.   Behavioral interventions performed were .    ED NURSE PHONE NUMBER: *[id number]

## 2022-03-09 NOTE — PROGRESS NOTES
RECEIVING UNIT ED HANDOFF REVIEW    ED Nurse Handoff Report was reviewed by: Miroslava Ji RN on March 8, 2022 at 9:14 PM

## 2022-03-09 NOTE — PLAN OF CARE
Shift Summary [id number]    Pt arrived from ED at 2130 via cart. Ambulated from cart to room.     Admitting Diagnosis: Ketosis (H) [E88.89]  Hyperglycemia [R73.9]  Vomiting and diarrhea [R11.10, R19.7]   Vitals WDL  Pain generalized  A&Ox4  Voiding - multiple liquidy stools. Bowel incontinence at times. Depends on.   Mobility - up ad amina  CMS N/T baseline in BLE  Lung Sounds clear on room air  GI - abdominal discomfort especially during/after eating.     Mod carb diet    Blood sugar at 0200 was 415. MD notified. D5 LR at 100 mL/hr changed to LR at 100 mL/hr. Novolog 10 units given.     C. Diff positive - Oral Vanco QID ordered     Plan: TBD - social work needs to be consulted per family for placement/housing/insurance issues.    Goal Outcome Evaluation:    Plan of Care Reviewed With: patient     Overall Patient Progress: improving

## 2022-03-09 NOTE — H&P
[facility]    History and Physical - Hospitalist Service       Date of Admission:  3/8/2022    Assessment & Plan      Long Mayfield is a 51 year old male admitted on 3/8/2022. He presents with chronic diarrhea and inability to get out of bed.     Chronic diarrhea  *reports 10-20 episodes of diarrhea daily for at least 8 months. Diarrhea is unformed, liquid, brown. No blood, melena, mucus. No abdominal pain or cramping. No obvious association with diet. Unclear trigger, but he thinks that it started with metformin and worsened with januvia.  - Cdiff and enteric panel  - consult to [facility]    EtOH use disorder  *nearly daily use. Buys 1 pint of vodka every 4 days on average with episodes of significant increase  *states he drinks for pain control, and denies hx of withdrawal  -CIWA, can discontinue in 24 hours if no withdrawal  -thiamine, folate  -chem dept    Ketosis, likely mixed for EtOH and starvation   Anion gap  *binge episode of EtOH on 3/4 followed by several days of poor solute intake (only drinking H2O)  - IVF with dextrose    Pseudohyponatremia  *Sodium corrects to 131  Hyponatremia  *Likely from poor solute intake.  - check urine studies and osms  - monitor  - IVF as above    Hyperglycemia  DMT2  - A1c  - hold PTA meds  - start lantus 5  - MDSSI    Concern for malnutrition  - reports weight loss, poor PO  - consult to nutrition       Diet:   carb controlled  DVT Prophylaxis: Pneumatic Compression Devices  Osman Catheter: Not present  Central Lines: None  Cardiac Monitoring: None  Code Status:   FULL CODE    Clinically Significant Risk Factors Present on Admission         # Hyponatremia: Na = 124 mmol/L (Ref range: 133 - 144 mmol/L) on admission, will monitor as appropriate    # Anion Gap Metabolic Acidosis: AG = 16 mmol/L (Ref range: 3 - 14 mmol/L) on admission, will monitor and treat as appropriate    # Platelet Defect: home medication list includes an antiplatelet medication        Disposition Plan   Expected Discharge:    Anticipated discharge location:  Awaiting care coordination huddle  Delays:            The patient's care was discussed with the Bedside Nurse, Patient, Patient's Family and ED Team.    Jerry Iyer MD  Hospitalist Service  [facility]  Securely message with the Vocera Web Console (learn more here)  Text page via wildcraft Paging/Directory         ______________________________________________________________________    Chief Complaint   diarrhea    History is obtained from the patient, electronic health record, emergency department physician and patient's sister    History of Present Illness   Long Mayfield is a 51 year old male who has history of alcohol use disorder, diabetes poorly controlled, and chronic diarrhea and presents to hospital on 3/8/2022.  By report he has had diarrhea since at least last summer.  Multiple episodes per day of liquidy brown stools.  No blood or melanic component.  No associated pain or cramping.  He does have an issue with daily or near daily alcohol use.  He reports that he drinks approximately 1 pint of vodka every 4 to 5 days.  Sometimes he supplements this with beer.  Recently on Friday he had a significant binge at the bar with 2 gentle persons that he had never met before.  After this he felt significantly unwell and had some increased nausea and vomiting and was unable to get out of bed for several days.  Due to the inability to get of bed his family became concerned and brought him to urgent care who then directed them to the emergency department.    Review of Systems    The 10 point Review of Systems is negative other than noted in the HPI or here.     Past Medical History    I have reviewed this patient's medical history and updated it with pertinent information if needed.   Past Medical History:   Diagnosis Date     Bell's palsy      Diabetes (H)        Past Surgical History   I have reviewed this  patient's surgical history and updated it with pertinent information if needed.  Past Surgical History:   Procedure Laterality Date     ORTHOPEDIC SURGERY         Social History   I have reviewed this patient's social history and updated it with pertinent information if needed.  Social History     Tobacco Use     Smoking status: Never Smoker     Smokeless tobacco: Never Used   Substance Use Topics     Alcohol use: Yes     Drug use: No       Family History     No significant family history, including no reported history of sudden cardiac death.    Prior to Admission Medications   Prior to Admission Medications   Prescriptions Last Dose Informant Patient Reported? Taking?   aspirin 81 MG chewable tablet   Yes No   Sig: Take 81 mg by mouth daily   insulin NPH (HUMULIN N/NOVOLIN N) 100 UNIT/ML injection   Yes No   Sig: Inject Subcutaneous 2 times daily (before meals)   insulin detemir (LEVEMIR FLEXPEN/FLEXTOUCH) 100 UNIT/ML injection   Yes No   Sig: Inject Subcutaneous At Bedtime      Facility-Administered Medications: None     Allergies   No Known Allergies    Physical Exam   Vital Signs: Temp: 98.4  F (36.9  C) Temp src: Oral BP: (!) 148/62 Pulse: 98   Resp: 18 SpO2: 100 % O2 Device: None (Room air)    Weight: 0 lbs 0 oz    Constitutional: Awake, alert, cooperative, no apparent cardiopulmonary distress. thin  Eyes: Conjunctiva and pupils examined and normal.  HEENT: Moist mucous membranes, normal dentition.  Respiratory: Clear to auscultation bilaterally, no crackles or wheezing.  Cardiovascular: Regular rate and rhythm, normal S1 and S2, and no murmur noted.  GI: Soft, non-distended, non-tender, normal bowel sounds.  Lymph/Hematologic: No anterior cervical or supraclavicular adenopathy.  Skin: No rashes, no cyanosis, no edema noted on exposed skin.  Musculoskeletal: No joint swelling, erythema or tenderness. No gross bony abnormalities  Neurologic: Cranial nerves 2-12 grossly intact, normal strength and  sensation.  Psychiatric: Alert, oriented to person, place and time, no obvious anxiety or depression.      Data   Data reviewed today: I reviewed all medications, new labs and imaging results over the last 24 hours. I personally reviewed no images or EKG's today.    Recent Labs   Lab 03/08/22  1756   WBC 7.0   HGB 11.9*   MCV 86      *   POTASSIUM 3.7   CHLORIDE 87*   CO2 21   BUN 15   CR 0.91   ANIONGAP 16*   AROLDO 9.6   *   ALBUMIN 3.4   PROTTOTAL 7.7   BILITOTAL 1.1   ALKPHOS 175*   ALT 28   AST 40     No results found for this or any previous visit (from the past 24 hour(s)).

## 2022-03-09 NOTE — PROVIDER NOTIFICATION
MD Notification    Notified Person: MD    Notified Person Name: Meghann Onofre    Notification Date/Time: 3/9/22 @ 0633    Notification Interaction: Text Page    Purpose of Notification: 4930 [address].HMickey - LORII: C. Diff test result returned positive. ANGUS Hernandez *[id number]     Orders Received: Oral Vanco ordered

## 2022-03-09 NOTE — ED PROVIDER NOTES
"  History   Chief Complaint:  Nausea, Vomiting, & Diarrhea       HPI   Long Mayfield is a 51 year old male with history of type 2 diabetes who presents with decreased appetite and vomiting for the last couple of days. The patient reports that in late November he was switched to Trulicity, last injection on Sunday, and Januvia, which he takes once a day. He does not take any insulin. Since then he has had diarrhea. He denies abdominal pain, bloody stools, fever, or cough. No history of gallstones or gallbladder issues. He does drink alcohol but denies a history of alcohol withdrawal.     Review of Systems   Constitutional: Positive for appetite change. Negative for fever.   Respiratory: Negative for cough.    Gastrointestinal: Positive for diarrhea and vomiting. Negative for abdominal pain and blood in stool.   All other systems reviewed and are negative.    Allergies:  The patient has no known allergies.     Medications:  Aspirin 81 mg  Trulicity   Januvia    Past Medical History:     Bell's palsy   Diabetes  SALVADOR  Alcohol dependence  Hypertension    Past Surgical History:    Orthopedic surgery    Social History:  The patient presents to the ED with his sister.   Recently moved to the area.     Physical Exam     Patient Vitals for the past 24 hrs:   BP Temp Temp src Pulse Resp SpO2 Height Weight   03/08/22 2135 130/83 98.4  F (36.9  C) Oral 96 18 100 % 1.651 m (5' 5\") 51.8 kg (114 lb 3.2 oz)   03/08/22 1758 (!) 148/62 98.4  F (36.9  C) Oral 98 18 100 % -- --       Physical Exam  Nursing note and vitals reviewed.  Constitutional:  Appears well-developed and well-nourished.   HENT:   Head:    Atraumatic.   Mouth/Throat:   Oropharynx is clear and moist. No oropharyngeal exudate. Dry mucous membranes  Eyes:    Pupils are equal, round, and reactive to light.   Neck:    Normal range of motion. Neck supple.      No tracheal deviation present. No thyromegaly present.   Cardiovascular:  Normal rate, regular rhythm, no " murmur   Pulmonary/Chest: Breath sounds are clear and equal without wheezes or crackles.  Abdominal:   Soft. Bowel sounds are normal. Exhibits no distension and      no mass. There is no tenderness.      There is no rebound and no guarding.   Musculoskeletal:  Exhibits no edema.   Lymphadenopathy:  No cervical adenopathy.   Neurological:   Alert and oriented to person, place, and time.   Skin:    Skin is warm and dry. No rash noted. No pallor.     Emergency Department Course     Laboratory:  Labs Ordered and Resulted from Time of ED Arrival to Time of ED Departure   BASIC METABOLIC PANEL - Abnormal       Result Value    Sodium 124 (*)     Potassium 3.7      Chloride 87 (*)     Carbon Dioxide (CO2) 21      Anion Gap 16 (*)     Urea Nitrogen 15      Creatinine 0.91      Calcium 9.6      Glucose 384 (*)     GFR Estimate >90     HEPATIC FUNCTION PANEL - Abnormal    Bilirubin Total 1.1      Bilirubin Direct 0.3 (*)     Protein Total 7.7      Albumin 3.4      Alkaline Phosphatase 175 (*)     AST 40      ALT 28     CBC WITH PLATELETS AND DIFFERENTIAL - Abnormal    WBC Count 7.0      RBC Count 4.11 (*)     Hemoglobin 11.9 (*)     Hematocrit 35.2 (*)     MCV 86      MCH 29.0      MCHC 33.8      RDW 13.6      Platelet Count 243      % Neutrophils 72      % Lymphocytes 8      % Monocytes 15      % Eosinophils 5      % Basophils 0      % Immature Granulocytes 0      NRBCs per 100 WBC 0      Absolute Neutrophils 5.0      Absolute Lymphocytes 0.6 (*)     Absolute Monocytes 1.0      Absolute Eosinophils 0.3      Absolute Basophils 0.0      Absolute Immature Granulocytes 0.0      Absolute NRBCs 0.0     BLOOD GAS VENOUS WITH OXYHEMOGLOBIN - Abnormal    pH Venous 7.39      pCO2 Venous 36 (*)     pO2 Venous 40      Bicarbonate Venous 22      FIO2 21      Oxyhemoglobin Venous 74      Base Excess/Deficit (+/-) -2.9     KETONE BETA-HYDROXYBUTYRATE QUANTITATIVE, RAPID - Abnormal    Ketone (Beta-Hydroxybutyrate) Quantitative 5.9 (*)     ROUTINE UA WITH MICROSCOPIC REFLEX TO CULTURE - Abnormal    Color Urine Yellow      Appearance Urine Clear      Glucose Urine >=1000 (*)     Bilirubin Urine Small (*)     Ketones Urine 80  (*)     Specific Gravity Urine 1.029      Blood Urine Negative      pH Urine 5.0      Protein Albumin Urine 20  (*)     Urobilinogen Urine Normal      Nitrite Urine Negative      Leukocyte Esterase Urine Negative      Mucus Urine Present (*)     RBC Urine 0      WBC Urine 8 (*)    MAGNESIUM - Normal    Magnesium 1.7     ETHYL ALCOHOL LEVEL - Normal    Alcohol ethyl <0.01     COVID-19 VIRUS (CORONAVIRUS) BY PCR - Normal    SARS CoV2 PCR Negative     PHOSPHORUS   URINE MACROSCOPIC WITH REFLEX TO MICRO   OSMOLALITY   OSMOLALITY, RANDOM URINE   ENTERIC BACTERIA AND VIRUS PANEL BY KYLE STOOL   CLOSTRIDIUM DIFFICILE TOXIN B   FRACTIONAL EXCRETION OF SODIUM      Emergency Department Course:         Reviewed:  I reviewed nursing notes, vitals, past medical history and Care Everywhere    Assessments:  2011 I obtained history and examined the patient as noted above.     2026 I rechecked the patient and explained plan for admission.     Consults:  2016 I spoke with Dr. Iyer, hospitalist, who accepts the patient.    Interventions:  1929 NS 1L IV  2011 NS 1L IV  2029 Insulin regular 6 units IV    Disposition:  The patient was admitted to the hospital under the care of Dr. Iyer.     Impression & Plan     Medical Decision Making:  This patient has a history of diabetes and has had poorly controlled diabetes melitis with elevated blood sugars.  He drinks alcohol also and has been binge drinking heavily along with having GI symptoms of several months of daily diarrhea.  He has ketosis which is likely a mixture of alcohol and starvation and pseudohyponatremia.  He was IV hydrated with fluids here and given insulin IV and admitted to the care of the hospitalist for further evaluation and treatment.  He does not have any sign of bacterial  infection or sepsis at this time.    Diagnosis:    ICD-10-CM    1. Hyperglycemia  R73.9    2. Ketosis (H)  E88.89    3. Vomiting and diarrhea  R11.10     R19.7        Scribe Disclosure:  I, Shahid Ordonez, am serving as a scribe at 8:10 PM on 3/8/2022 to document services personally performed by Nory Asencio MD based on my observations and the provider's statements to me.            Nory Asencio MD  03/24/22 0165

## 2022-03-09 NOTE — CONSULTS
"CLINICAL NUTRITION SERVICES  -  ASSESSMENT NOTE    Recommendations Ordered by Registered Dietitian (RD):   - Suggested foods that can be helpful with diarrhea - BRAT diet. Educated that they are also high carb foods.    Future/Additional Recommendations:   - Diabetes diet ed as able.    Malnutrition: (3/9)   % Weight Loss:  Up to 20% in 1 year (moderate malnutrition)  % Intake:  </= 75% for >/= 1 month (severe malnutrition)  Subcutaneous Fat Loss:  Orbital region moderate depletion, Upper arm region moderate depletion and Thoracic region moderate depletion  Muscle Loss:  Temporal region moderate depletion, Clavicle bone region moderate depletion and Dorsal hand region moderate depletion  Fluid Retention:  None noted    Malnutrition Diagnosis: Severe malnutrition  In Context of:  Acute illness or injury  Chronic illness or disease      REASON FOR ASSESSMENT  Long Mayfield is a 51 year old male seen by Registered Dietitian for Nutrition Admission Risk Screen Received -   Have you recently lost weight without trying in the last 6 months ? - \"yes lost 2-13 pounds\"  Have you been eating poorly due to a decreased appetite ?- \"yes\"   and Provider Order - \"malnutrition assessment\"    NUTRITION HISTORY  - Information obtained from chart review and patient report.   - Recently diagnosed DM2, Hypertension. Presented with nausea, vomiting, diarrhea (found to be CDiff +).   - Diarrhea ongoing for the past 8 months, N/V about 2 days PTA.   - Daily alcohol use    - Pt gives a somewhat convoluted history of diarrhea and weight loss.   - He reports DM diagnosis about ?5 years ago. He was started on Metformin at some point, which caused diarrhea - no longer takes.   - Thinks the chronic diarrhea began sometime between end of 2020 - Spring 2021. Has been ongoing.   - \"I don't understand how I'm dehydrated, I'm constantly drinking water for dry mouth\". States his medication Januvia causes dry mouth.   - \"everything I eat goes right " "through me\".   - Once weighed a stable 140#, now down to 114#. Pt states \"I don't own a scale\" - had a hard time providing time span of weight loss.       CURRENT NUTRITION ORDERS  Diet Order:     Moderate Consistent Carbohydrate     Current Intake/Tolerance:  Ordered a large breakfast - omelet, cottage cheese, blueberry muffin, skim milk x2.     NUTRITION FOCUSED PHYSICAL ASSESSMENT FOR DIAGNOSING MALNUTRITION)  Yes         Observed:    Muscle wasting (refer to documentation in Malnutrition section) and Subcutaneous fat loss (refer to documentation in Malnutrition section)    Obtained from Chart/Interdisciplinary Team:  +Cdiff. Started on vanco    ANTHROPOMETRICS  Height: 5' 5\"  Weight: 114 lbs 3.2 oz (51.8 kg)   Body mass index is 19 kg/m .  Weight Status:  Normal BMI  IBW: 61.8 kg   % IBW: 84%  Weight History: up to an 18% weight loss in ~1 year.   Wt Readings from Last 10 Encounters:   03/08/22 51.8 kg (114 lb 3.2 oz)   10/08/17 62.1 kg (137 lb)       LABS  Labs reviewed  Na 131 (L), K 3.1 (L)  Alk Phos 299 (H),  (H)    Recent Labs   Lab 03/09/22  0819 03/09/22  0651 03/09/22  0440 03/09/22  0154 03/08/22  2150 03/08/22  1756   * 142* 217* 415* 241* 384*       MEDICATIONS  Medications reviewed  Folic Acid 1 mg, Thiamine, Thera vit M daily   Medium sliding scale insulin + Lantus 5 units  Neutra-Phos  LR @ 100 mL/hr     ASSESSED NUTRITION NEEDS PER APPROVED PRACTICE GUIDELINES:  Dosing Weight 51.8 kg   Estimated Energy Needs: 1402-6669 kcals (30-35 Kcal/Kg)  Justification: repletion and underweight  Estimated Protein Needs: 62-78+ grams protein (1.2-1.5+ g pro/Kg)  Justification: Repletion  Estimated Fluid Needs: 1 mL/kcal   Justification: maintenance    MALNUTRITION:  % Weight Loss:  Up to 20% in 1 year (moderate malnutrition)  % Intake:  </= 75% for >/= 1 month (severe malnutrition)  Subcutaneous Fat Loss:  Orbital region moderate depletion, Upper arm region moderate depletion and Thoracic region " moderate depletion  Muscle Loss:  Temporal region moderate depletion, Clavicle bone region moderate depletion and Dorsal hand region moderate depletion  Fluid Retention:  None noted    Malnutrition Diagnosis: Severe malnutrition  In Context of:  Acute illness or injury  Chronic illness or disease    NUTRITION DIAGNOSIS:  Inadequate oral intake related to increased losses in diarrhea, acute nausea/vomiting as evidenced by patient reports that everything he eats goes right through him, weight loss of up to 20% in 1 year, low stores of fat and muscle on exam.       NUTRITION INTERVENTIONS  Recommendations / Nutrition Prescription  Mod CHO diet  BRAT diet while having excessive stooling      Implementation  Nutrition education: Provided education on BRAT diet. Eventually will benefit from DM diet ed.       Nutrition Goals  Intake of at least 75% meals BID - TID.       MONITORING AND EVALUATION:  Progress towards goals will be monitored and evaluated per protocol and Practice Guidelines    Maggie Maurer RD, LD  [facility], [facility], [facility], [facility]  Pager: [phone]  Weekend Pager: [phone]

## 2022-03-10 ENCOUNTER — APPOINTMENT (OUTPATIENT)
Dept: OCCUPATIONAL THERAPY | Facility: CLINIC | Age: 52
End: 2022-03-10
Attending: HOSPITALIST
Payer: MEDICAID

## 2022-03-10 LAB
ANION GAP SERPL CALCULATED.3IONS-SCNC: 7 MMOL/L (ref 3–14)
BUN SERPL-MCNC: 7 MG/DL (ref 7–30)
CALCIUM SERPL-MCNC: 8.2 MG/DL (ref 8.5–10.1)
CHLORIDE BLD-SCNC: 96 MMOL/L (ref 94–109)
CO2 SERPL-SCNC: 27 MMOL/L (ref 20–32)
CREAT SERPL-MCNC: 0.64 MG/DL (ref 0.66–1.25)
GFR SERPL CREATININE-BSD FRML MDRD: >90 ML/MIN/1.73M2
GLUCOSE BLD-MCNC: 331 MG/DL (ref 70–99)
GLUCOSE BLDC GLUCOMTR-MCNC: 242 MG/DL (ref 70–99)
GLUCOSE BLDC GLUCOMTR-MCNC: 269 MG/DL (ref 70–99)
GLUCOSE BLDC GLUCOMTR-MCNC: 285 MG/DL (ref 70–99)
GLUCOSE BLDC GLUCOMTR-MCNC: 305 MG/DL (ref 70–99)
GLUCOSE BLDC GLUCOMTR-MCNC: 335 MG/DL (ref 70–99)
GLUCOSE BLDC GLUCOMTR-MCNC: 338 MG/DL (ref 70–99)
PHOSPHATE SERPL-MCNC: 2.4 MG/DL (ref 2.5–4.5)
POTASSIUM BLD-SCNC: 3.4 MMOL/L (ref 3.4–5.3)
SODIUM SERPL-SCNC: 130 MMOL/L (ref 133–144)

## 2022-03-10 PROCEDURE — 82310 ASSAY OF CALCIUM: CPT | Performed by: HOSPITALIST

## 2022-03-10 PROCEDURE — 250N000013 HC RX MED GY IP 250 OP 250 PS 637: Performed by: INTERNAL MEDICINE

## 2022-03-10 PROCEDURE — 97165 OT EVAL LOW COMPLEX 30 MIN: CPT | Mod: GO | Performed by: OCCUPATIONAL THERAPIST

## 2022-03-10 PROCEDURE — 84100 ASSAY OF PHOSPHORUS: CPT | Performed by: HOSPITALIST

## 2022-03-10 PROCEDURE — 250N000013 HC RX MED GY IP 250 OP 250 PS 637: Performed by: STUDENT IN AN ORGANIZED HEALTH CARE EDUCATION/TRAINING PROGRAM

## 2022-03-10 PROCEDURE — 120N000001 HC R&B MED SURG/OB

## 2022-03-10 PROCEDURE — 250N000012 HC RX MED GY IP 250 OP 636 PS 637: Performed by: STUDENT IN AN ORGANIZED HEALTH CARE EDUCATION/TRAINING PROGRAM

## 2022-03-10 PROCEDURE — 36415 COLL VENOUS BLD VENIPUNCTURE: CPT | Performed by: HOSPITALIST

## 2022-03-10 PROCEDURE — 99233 SBSQ HOSP IP/OBS HIGH 50: CPT | Performed by: HOSPITALIST

## 2022-03-10 PROCEDURE — 250N000013 HC RX MED GY IP 250 OP 250 PS 637: Performed by: HOSPITALIST

## 2022-03-10 RX ADMIN — INSULIN ASPART 3 UNITS: 100 INJECTION, SOLUTION INTRAVENOUS; SUBCUTANEOUS at 21:28

## 2022-03-10 RX ADMIN — Medication 2.5 MEQ: at 08:49

## 2022-03-10 RX ADMIN — FOLIC ACID 1 MG: 1 TABLET ORAL at 08:49

## 2022-03-10 RX ADMIN — POTASSIUM & SODIUM PHOSPHATES POWDER PACK 280-160-250 MG 1 PACKET: 280-160-250 PACK at 11:35

## 2022-03-10 RX ADMIN — POTASSIUM & SODIUM PHOSPHATES POWDER PACK 280-160-250 MG 1 PACKET: 280-160-250 PACK at 21:26

## 2022-03-10 RX ADMIN — VANCOMYCIN HYDROCHLORIDE 125 MG: 125 CAPSULE ORAL at 08:49

## 2022-03-10 RX ADMIN — VANCOMYCIN HYDROCHLORIDE 125 MG: 125 CAPSULE ORAL at 12:35

## 2022-03-10 RX ADMIN — MULTIPLE VITAMINS W/ MINERALS TAB 1 TABLET: TAB at 08:49

## 2022-03-10 RX ADMIN — VANCOMYCIN HYDROCHLORIDE 125 MG: 125 CAPSULE ORAL at 21:26

## 2022-03-10 RX ADMIN — POTASSIUM & SODIUM PHOSPHATES POWDER PACK 280-160-250 MG 1 PACKET: 280-160-250 PACK at 17:41

## 2022-03-10 RX ADMIN — SITAGLIPTIN 100 MG: 100 TABLET, FILM COATED ORAL at 08:49

## 2022-03-10 RX ADMIN — THIAMINE HCL TAB 100 MG 100 MG: 100 TAB at 08:49

## 2022-03-10 RX ADMIN — INSULIN GLARGINE 5 UNITS: 100 INJECTION, SOLUTION SUBCUTANEOUS at 08:25

## 2022-03-10 RX ADMIN — VANCOMYCIN HYDROCHLORIDE 125 MG: 125 CAPSULE ORAL at 17:41

## 2022-03-10 ASSESSMENT — ACTIVITIES OF DAILY LIVING (ADL)
ADLS_ACUITY_SCORE: 8
ADLS_ACUITY_SCORE: 4
ADLS_ACUITY_SCORE: 8
ADLS_ACUITY_SCORE: 4
ADLS_ACUITY_SCORE: 4
ADLS_ACUITY_SCORE: 8
ADLS_ACUITY_SCORE: 8
ADLS_ACUITY_SCORE: 4
ADLS_ACUITY_SCORE: 8
ADLS_ACUITY_SCORE: 4
ADLS_ACUITY_SCORE: 4
ADLS_ACUITY_SCORE: 8

## 2022-03-10 NOTE — PROGRESS NOTES
Care Management Follow Up    Length of Stay (days): 2    Expected Discharge Date: 03/11/2022     Concerns to be Addressed:       Patient plan of care discussed at interdisciplinary rounds: Yes    Anticipated Discharge Disposition: Home  Disposition Comments: Pt wants to make own follow up appointments.   Anticipated Discharge Services:    Anticipated Discharge DME:      Patient/family educated on Medicare website which has current facility and service quality ratings:    Education Provided on the Discharge Plan:    Patient/Family in Agreement with the Plan: unable to assess    Referrals Placed by CM/SW:    Private pay costs discussed: Not applicable    Additional Information:  Writer received a phone call from patients sister Mimi.  Mimi reports her concern for Logn once he leaves the hospital and wanted to know the plan for after discharge.  Writer informed Mimi that plan is for patient to be discharged home tomorrow (Friday 3/11).  Mimi then indicated her concern because he lives with his mother and drinks alcohol and does not take care of his diabetes or other personal needs.  She also stated Long told her this morning that he will be discharged on Saturday.      Writer shared with Mimi that Long has declined to go to [facility] dep treatment and has indicated to the CC that he will make his own follow up appointment.  Long is free to make his own decisions.  Writer suggested if the concern is for Long living with his elderly, the family may want to discuss their concerns directly with him and require sobriety while he is staying in his mom's home.  Writer also indicated the plan is for discharge home tomorrow unless the hospitalist's note for today, indicates otherwise.  Writer will call Mimi back after MD note is complete to update her.  Mimi stated she or her brother will pick Long up when he is ready for discharge.      Missy Kunz RN

## 2022-03-10 NOTE — PLAN OF CARE
Goal Outcome Evaluation:        C. Diff      A/O x4. VSS on RA. Up independently. CIWA 0, 0, 0 - may be discontinued at 2100. Denies pain. Tolerating mod carb diet. , 342, and 352 - one time does 10 units. K+ 3.1, replaced, recheck 3.7. Phos 2.3, replaced - one more does tonight, recheck in AM. Discharge pending clinical improvement.

## 2022-03-10 NOTE — PLAN OF CARE
Physical Therapy: Orders received. Chart reviewed and discussed with care team.? Physical Therapy not indicated due to being independent with all mobility per OT screen.? Defer discharge recommendations to OT and medical team.? Will complete orders.

## 2022-03-10 NOTE — PROGRESS NOTES
03/10/22 1205   Quick Adds   Type of Visit Initial Occupational Therapy Evaluation   Living Environment   People in Home parent(s)  (83 year old mother)   Current Living Arrangements condominium   Home Accessibility no concerns   Self-Care   Usual Activity Tolerance good   Current Activity Tolerance moderate   Equipment Currently Used at Home none   Fall history within last six months no   Activity/Exercise/Self-Care Comment At baseline, pt is (I) with all ADL and mobility   Instrumental Activities of Daily Living (IADL)   Previous Responsibilities work;driving;shopping;meal prep;housekeeping;laundry;medication management;finances   General Information   Onset of Illness/Injury or Date of Surgery 03/08/22   Referring Physician Ronald Wilkins MD   Patient/Family Therapy Goal Statement (OT) Long Mayfield is a 51 year old male with history of alcohol use disorder, diabetes poorly controlled, and chronic diarrhea. He presented to the [facility] ER emergency department on 3/8/2022 for evaluation of of nausea, vomiting, worsened diarrhea, and weakness. Found to be positive for C-Diff   Cognitive Status Examination   Orientation Status orientation to person, place and time   Affect/Mental Status (Cognitive) WNL   Follows Commands follows multi-step commands   Cognitive Status Comments No concerns noted. Pt able to recall recent events, what medication he is taking. Reports he decided to stop taking certain meds due to their side effects; encouraged him to speak with MD about this.   Visual Perception   Visual Impairment/Limitations WNL;corrective lenses for reading   Sensory   Sensory Comments reports neuropathy in hands and feet   Pain Assessment   Patient Currently in Pain Yes, see Vital Sign flowsheet  (chronic back, knee, shoulder pain)   Range of Motion Comprehensive   General Range of Motion no range of motion deficits identified   Comment, General Range of Motion sore shoulder from  arthritis   Strength Comprehensive (MMT)   General Manual Muscle Testing (MMT) Assessment no strength deficits identified   Muscle Tone Assessment   Muscle Tone Quick Adds No deficits were identified   Coordination   Upper Extremity Coordination No deficits were identified   Coordination Comments arthritis and neuropathy in hands but WFL   Bed Mobility   Comment (Bed Mobility) (I) supine<>EOB   Transfers   Transfer Comments (I) sit<>stand   Balance   Balance Comments (I) ambulation without AD   Activities of Daily Living   BADL Assessment/Intervention lower body dressing;toileting   Lower Body Dressing Assessment/Training   [place] Level (Lower Body Dressing) independent   Toileting   [place] Level (Toileting) independent   Clinical Impression   Criteria for Skilled Therapeutic Interventions Met (OT) Evaluation only;No problems identified which require skilled intervention   Clinical Decision Making Complexity (OT) low complexity   Risk & Benefits of therapy have been explained evaluation/treatment results reviewed;participants voiced agreement with care plan;participants included;patient   OT Discharge Planning   OT Discharge Recommendation (DC Rec) home   OT Rationale for DC Rec Pt is (I) with ADL and mobility; no skilled therapy needed. In chart it notes his sister has some concern over his medication management. Pt able to tell me what meds he is on and what they are for, but reports some meds had side effects he did not like so he stopped taking them. Encouraged him to talk with MD/Pharmacist about what his best options are.   OT Brief overview of current status up (I)ly in room   Total Evaluation Time (Minutes)   Total Evaluation Time (Minutes) 10

## 2022-03-10 NOTE — PROGRESS NOTES
X Cover    Received page for BG of 352, reviewed glucose/insulin log will give patient an additional 10 units of rapid acting insulin.    Emile Ward MD PhD

## 2022-03-10 NOTE — PROGRESS NOTES
[facility]  Hospitalist Progress Note        Ronald Wilkins MD   03/10/2022        Interval History:        - Continues with loose watery stools but reports feeling better with improving appetite  - CT chest /abdomen/pelvis noted with no definite malignancy  - normal TSH  - Nutrition following for severe malnutrition; diet/supplements per nutrition  - noted markedly elevated blood sugars in 300s, likely uncontrolled diabetes; will increase Lantus to 10 units BID         Assessment and Plan:        Long Mayfield is a 51 year old male with PMH of chronic diarrhea, alcohol use, diabetes admitted on 3/8/2022. He presents with chronic diarrhea and generalized weakness.      Likely acute on Chronic diarrhea  C diff diarrhea  Weight loss  Dehydration  Hypokalemia  Hypophosphatemia  Hyponatremia  Severe Malnutrition  - he reports 10-20 episodes of diarrhea daily for at least 8 months; feels dehydrated, has lost about 25 lbs in  Just over a year; states he was planned for colonoscopy in [place] but was canceled due to ongoing diarrhea   - noted positive C diff this admission ; enteric panel unremarkable  - will continue PO vancomycin ; discussed with Dr Strong from GI, will hold off on formal evaluation at this time; will consult GI if no significant clinical improvement with C diff treatment   - given chronic diarrhea, unintentional weight loss, obtained a CT chest/abdomen/pelvis on 3/9 which was noted with no definite malignancy  - normal TSH  - Nutrition following for severe malnutrition; diet/supplements per nutrition   - electrolyte derangements including low K , Na and phos likely due to ongoing loss with diarrhea; supplementing per protocol; monitor BMP  - will continue IVF until improvement and diarrhea  - Na in 130s; K 3.1---3.4; phos 2.3---2.4    Hyperglycemia  uncontrolled DMT2  - A1c markedly elevated >14  - PTA on Trulicity, Januvia (used to be on metformin in past, discontinued due  "to diarrhea; he also used to be on Insulin in past and apparently d/rui after starting Trulicity)  - hold off on Trulicity while here; continue Januvia  - started on Lantus, will increase to 10 units BID; will need insulin and supplies on discharge   - will continue sliding scale insulin     EtOH use disorder  *nearly daily use. Buys 1 pint of vodka every 4 days on average with episodes of significant increase  *states he drinks for pain control, and denies hx of withdrawal  - no s/o alcohol withdrawal; will discontinue CIWA protocol  - CD consulted but patient not interested in evaluation of referrals to inpatient CD treatment at this time  - thiamine, folate    Orders Placed This Encounter      Moderate Consistent Carb (60 g CHO per Meal) Diet    DVT Prophylaxis: Pneumatic Compression Devices    Code Status:   FULL CODE       Clinically Significant Risk Factors Present on Admission              # Diabetes, type II: last A1C >14.0 % (Ref range: 0.0 - 5.6 %)  # Severe Malnutrition: based on nutrition assessment      Disposition: likely 1-2 days pending clinical improvement in diarrhea  No PT needs; OT rec home    Page Me (7 am to 6 pm)    Care plan discussed with patient; he doesnot consent to discussion wwith family at this time              Physical Exam:      Blood pressure (!) 137/91, pulse 80, temperature 97.5  F (36.4  C), temperature source Oral, resp. rate 16, height 1.651 m (5' 5\"), weight 51.8 kg (114 lb 3.2 oz), SpO2 100 %.  Vitals:    03/08/22 2135   Weight: 51.8 kg (114 lb 3.2 oz)     Vital Signs with Ranges  Temp:  [97.5  F (36.4  C)-98  F (36.7  C)] 97.5  F (36.4  C)  Pulse:  [80-82] 80  Resp:  [16] 16  BP: (127-147)/(87-99) 137/91  SpO2:  [98 %-100 %] 100 %  I/O's Last 24 hours  I/O last 3 completed shifts:  In: 480 [P.O.:480]  Out: -     Constitutional: Alert, awake and oriented X 3; resting comfortably in no apparent distress; thin built   HEENT: Pupils equal and reactive to light and accomodation, " neck supple    Oral cavity: Moist oral mucosa   Cardiovascular: Normal s1 s2, regular rate and rhythm, no murmur   Lungs: B/l clear to auscultation, no wheezes or crepitations   Abdomen: Soft, nt, nd, no guarding, rigidity or rebound; BS +   LE : No edema   Musculoskeletal: Power 5/5 in all extremities   Neuro: No focal neurological deficits noted   Psychiatry: normal mood and affect                Medications:          folic acid  1 mg Oral Daily     insulin aspart  1-7 Units Subcutaneous TID AC     insulin aspart  1-5 Units Subcutaneous At Bedtime     insulin glargine  5 Units Subcutaneous QAM AC     multivitamin w/minerals  1 tablet Oral Daily     potassium gluconate  2.5 mEq Oral Daily     sitagliptin  100 mg Oral Daily     sodium chloride (PF)  3 mL Intracatheter Q8H     thiamine  100 mg Oral Daily     vancomycin  125 mg Oral 4x Daily     PRN Meds: acetaminophen **OR** acetaminophen, glucose **OR** dextrose **OR** glucagon, diazepam **OR** diazepam, flumazenil, OLANZapine zydis **OR** haloperidol lactate, lidocaine 4%, lidocaine (buffered or not buffered), melatonin, ondansetron **OR** ondansetron, polyethylene glycol, prochlorperazine **OR** prochlorperazine **OR** prochlorperazine, senna-docusate **OR** senna-docusate, sodium chloride (PF)         Data:      All new lab and imaging data was reviewed.   Recent Labs   Lab Test 03/09/22  0651 03/08/22  1756 10/08/17  2105   WBC 4.5 7.0 3.9*   HGB 11.4* 11.9* 12.1*   MCV 84 86 84    243 238      Recent Labs   Lab Test 03/10/22  0647 03/10/22  0604 03/10/22  0214 03/09/22  1756 03/09/22  1307 03/09/22  0819 03/09/22  0651 03/08/22  2150 03/08/22  2146 03/08/22  1756   *  --   --   --   --   --  131*  --  130* 124*   POTASSIUM 3.4  --   --   --  3.7  --  3.1*  --   --  3.7   CHLORIDE 96  --   --   --   --   --  97  --   --  87*   CO2 27  --   --   --   --   --  27  --   --  21   BUN 7  --   --   --   --   --  12  --   --  15   CR 0.64*  --   --   --    --   --  0.64*  --  0.77 0.91   ANIONGAP 7  --   --   --   --   --  7  --   --  16*   AROLDO 8.2*  --   --   --   --   --  8.7  --   --  9.6   * 305* 338*   < >  --    < > 142*   < >  --  384*    < > = values in this interval not displayed.     No lab results found.    Invalid input(s): TROP, TROPONINIES

## 2022-03-10 NOTE — PROVIDER NOTIFICATION
MD Notification    Notified Person: MD    Notified Person Name: Ed    Notification Date/Time: 3/9/22   1015    Notification Interaction:  Web page     Purpose of Notification:  BG notification     Orders Received: one time does of 10 units    Comments: FYI pt BG is 352.

## 2022-03-10 NOTE — PLAN OF CARE
A04. Talkative. Indep. Sometimes incont bowel. Cont with voiding bladder. CDIFF isolation. /ml/hr. RA. Tylenol for pain. Phosphorous replacement pending after am lab draw. Denied abdominal pain. Diabetic.

## 2022-03-11 LAB
ANION GAP SERPL CALCULATED.3IONS-SCNC: 4 MMOL/L (ref 3–14)
BUN SERPL-MCNC: 7 MG/DL (ref 7–30)
CALCIUM SERPL-MCNC: 8.6 MG/DL (ref 8.5–10.1)
CHLORIDE BLD-SCNC: 97 MMOL/L (ref 94–109)
CO2 SERPL-SCNC: 30 MMOL/L (ref 20–32)
CREAT SERPL-MCNC: 0.59 MG/DL (ref 0.66–1.25)
GFR SERPL CREATININE-BSD FRML MDRD: >90 ML/MIN/1.73M2
GLUCOSE BLD-MCNC: 166 MG/DL (ref 70–99)
GLUCOSE BLDC GLUCOMTR-MCNC: 173 MG/DL (ref 70–99)
GLUCOSE BLDC GLUCOMTR-MCNC: 188 MG/DL (ref 70–99)
GLUCOSE BLDC GLUCOMTR-MCNC: 191 MG/DL (ref 70–99)
GLUCOSE BLDC GLUCOMTR-MCNC: 260 MG/DL (ref 70–99)
PHOSPHATE SERPL-MCNC: 3 MG/DL (ref 2.5–4.5)
POTASSIUM BLD-SCNC: 3.6 MMOL/L (ref 3.4–5.3)
SODIUM SERPL-SCNC: 131 MMOL/L (ref 133–144)

## 2022-03-11 PROCEDURE — 99233 SBSQ HOSP IP/OBS HIGH 50: CPT | Performed by: HOSPITALIST

## 2022-03-11 PROCEDURE — 84100 ASSAY OF PHOSPHORUS: CPT | Performed by: HOSPITALIST

## 2022-03-11 PROCEDURE — 36415 COLL VENOUS BLD VENIPUNCTURE: CPT | Performed by: HOSPITALIST

## 2022-03-11 PROCEDURE — 250N000013 HC RX MED GY IP 250 OP 250 PS 637: Performed by: INTERNAL MEDICINE

## 2022-03-11 PROCEDURE — 82310 ASSAY OF CALCIUM: CPT | Performed by: HOSPITALIST

## 2022-03-11 PROCEDURE — 120N000001 HC R&B MED SURG/OB

## 2022-03-11 PROCEDURE — 258N000003 HC RX IP 258 OP 636: Performed by: HOSPITALIST

## 2022-03-11 PROCEDURE — 250N000013 HC RX MED GY IP 250 OP 250 PS 637: Performed by: HOSPITALIST

## 2022-03-11 PROCEDURE — 250N000013 HC RX MED GY IP 250 OP 250 PS 637: Performed by: STUDENT IN AN ORGANIZED HEALTH CARE EDUCATION/TRAINING PROGRAM

## 2022-03-11 RX ORDER — AMLODIPINE BESYLATE 5 MG/1
5 TABLET ORAL DAILY
Status: DISCONTINUED | OUTPATIENT
Start: 2022-03-11 | End: 2022-03-12 | Stop reason: HOSPADM

## 2022-03-11 RX ORDER — HYDRALAZINE HYDROCHLORIDE 20 MG/ML
10 INJECTION INTRAMUSCULAR; INTRAVENOUS EVERY 4 HOURS PRN
Status: DISCONTINUED | OUTPATIENT
Start: 2022-03-11 | End: 2022-03-12 | Stop reason: HOSPADM

## 2022-03-11 RX ORDER — SODIUM CHLORIDE, SODIUM LACTATE, POTASSIUM CHLORIDE, CALCIUM CHLORIDE 600; 310; 30; 20 MG/100ML; MG/100ML; MG/100ML; MG/100ML
INJECTION, SOLUTION INTRAVENOUS CONTINUOUS
Status: ACTIVE | OUTPATIENT
Start: 2022-03-11 | End: 2022-03-12

## 2022-03-11 RX ADMIN — SITAGLIPTIN 100 MG: 100 TABLET, FILM COATED ORAL at 09:40

## 2022-03-11 RX ADMIN — VANCOMYCIN HYDROCHLORIDE 125 MG: 125 CAPSULE ORAL at 18:17

## 2022-03-11 RX ADMIN — VANCOMYCIN HYDROCHLORIDE 125 MG: 125 CAPSULE ORAL at 13:07

## 2022-03-11 RX ADMIN — VANCOMYCIN HYDROCHLORIDE 125 MG: 125 CAPSULE ORAL at 22:37

## 2022-03-11 RX ADMIN — MULTIPLE VITAMINS W/ MINERALS TAB 1 TABLET: TAB at 09:40

## 2022-03-11 RX ADMIN — THIAMINE HCL TAB 100 MG 100 MG: 100 TAB at 09:40

## 2022-03-11 RX ADMIN — AMLODIPINE BESYLATE 5 MG: 5 TABLET ORAL at 09:40

## 2022-03-11 RX ADMIN — VANCOMYCIN HYDROCHLORIDE 125 MG: 125 CAPSULE ORAL at 09:40

## 2022-03-11 RX ADMIN — Medication 2.5 MEQ: at 09:40

## 2022-03-11 RX ADMIN — FOLIC ACID 1 MG: 1 TABLET ORAL at 09:40

## 2022-03-11 RX ADMIN — SODIUM CHLORIDE, POTASSIUM CHLORIDE, SODIUM LACTATE AND CALCIUM CHLORIDE: 600; 310; 30; 20 INJECTION, SOLUTION INTRAVENOUS at 16:15

## 2022-03-11 ASSESSMENT — ACTIVITIES OF DAILY LIVING (ADL)
ADLS_ACUITY_SCORE: 4
ADLS_ACUITY_SCORE: 5
ADLS_ACUITY_SCORE: 4
ADLS_ACUITY_SCORE: 5
ADLS_ACUITY_SCORE: 5
ADLS_ACUITY_SCORE: 4
ADLS_ACUITY_SCORE: 5
ADLS_ACUITY_SCORE: 4
ADLS_ACUITY_SCORE: 4
ADLS_ACUITY_SCORE: 5
ADLS_ACUITY_SCORE: 4
ADLS_ACUITY_SCORE: 4
ADLS_ACUITY_SCORE: 5
ADLS_ACUITY_SCORE: 4
ADLS_ACUITY_SCORE: 5
ADLS_ACUITY_SCORE: 4
ADLS_ACUITY_SCORE: 4

## 2022-03-11 NOTE — PLAN OF CARE
Goal Outcome Evaluation:                    , 260. AxO x4. Inde in room. Pt declined bed alarms/staff assistance/ hospital socks with mobility. Verbal ed given and pt verbalized understanding and still refused. Enteric precautions. Continue to monitor and with plan of care.

## 2022-03-11 NOTE — PLAN OF CARE
Shift Summary 3/10-3/11    Admitting Diagnosis: Ketosis (H) [E88.89]  Hyperglycemia [R73.9]  Vomiting and diarrhea [R11.10, R19.7]   Vitals Stable  Pain: Denies  A&Ox4  Voiding: Indep. Stool is loose and frequent  Mobility: Ind  Tele NA  CMS Intact  Lung Sounds CL via RA  GI: Loose stools   Dressing N/A     Pot and Phos replacement and C-diff ISO    Orders Placed This Encounter      Moderate Consistent Carb (60 g CHO per Meal) Diet       Plan: Discharge when medically cleared.

## 2022-03-11 NOTE — PLAN OF CARE
Goal Outcome Evaluation:           A/O x4. VSS on RA. Up independently. Denies pain. Frequent loose stools. Mod carb diet. , 269, 285, and 242. Lantis adjusted today. Phos 2.4, started replacement, one more does tonight, recheck scheduled for AM. CIWA 0, 0, discontinued. Discharge pending clinical improvement.

## 2022-03-11 NOTE — PROGRESS NOTES
[facility]  Hospitalist Progress Note        Ronadl Wilkins MD   03/11/2022        Interval History:        - Continues with some loose stools, remains afebrile, no pain abdomen; good appetite  - sodium stable in 130s, potassium and phosphorus improved  - will decrease LVF to 75 ml/hr and discontinue in 24 hrs  - blood glucose better controlled in 160s---170s; will increase Lantus to 12 units BID  - BP elevated in 160s; he used to be on Lisinopril in past but then had discontinued on his own; will start amlodipine 5 mg daily         Assessment and Plan:        Long Mayfield is a 51 year old male with PMH of chronic diarrhea, alcohol use, diabetes admitted on 3/8/2022. He presents with chronic diarrhea and generalized weakness.      Likely acute on Chronic diarrhea  C diff diarrhea  Weight loss  Dehydration  Hypokalemia  Hypophosphatemia  Hyponatremia  Severe Malnutrition  - he reports 10-20 episodes of diarrhea daily for at least 8 months; reported feeling dehydrated, has lost about 25 lbs in  Just over a year; states he was planned for colonoscopy in [place] but was canceled due to ongoing diarrhea   - noted positive C diff this admission ; enteric panel unremarkable  - will continue PO vancomycin ; discussed with Dr Strong from GI, will hold off on formal evaluation at this time  - given chronic diarrhea, unintentional weight loss, obtained a CT chest/abdomen/pelvis on 3/9 which was noted with no definite malignancy  - normal TSH  - Nutrition following for severe malnutrition; diet/supplements per nutrition   - electrolyte derangements including low K , Na and phos likely due to ongoing loss with diarrhea; supplementing per protocol  - sodium stable in 130s, potassium and phosphorus improved  - will decrease LVF to 75 ml/hr and discontinue in 24 hrs  - will need to complete a course of PO vancomycin for C diff diarrhea before following up with G.I. for consideration of  "colonoscopy    Hyperglycemia  uncontrolled DMT2  - A1c markedly elevated >14 with BG >300s  - PTA on Trulicity, Januvia (used to be on metformin in past, discontinued due to diarrhea; he also used to be on Insulin in past and apparently d/rui after starting Trulicity)  - hold off on Trulicity while here; continue Januvia  - started on Lantus and blood glucose better controlled in 160s---170s; will increase Lantus to 12 units BID; will need prescription for insulin on discharge  - will continue sliding scale insulin    Hypertension  - BP elevated in 160s; he used to be on Lisinopril in past but then had discontinued on his own; will start amlodipine 5 mg daily     EtOH use disorder  *nearly daily use. Buys 1 pint of vodka every 4 days on average with episodes of significant increase  *states he drinks for pain control, and denies hx of withdrawal  - no s/o alcohol withdrawal; discontinued CIWA protocol 3/10  - CD consulted but patient not interested in evaluation of referrals to inpatient CD treatment at this time  - thiamine, folate    Orders Placed This Encounter      Moderate Consistent Carb (60 g CHO per Meal) Diet    DVT Prophylaxis: Pneumatic Compression Devices    Code Status:   FULL CODE       Clinically Significant Risk Factors Present on Admission              # Diabetes, type II: last A1C >14.0 % (Ref range: 0.0 - 5.6 %)  # Severe Malnutrition: based on nutrition assessment      Disposition: likely 3/12 if clinically stable   No PT needs; OT rec home    - will need to complete a course of PO vancomycin for C diff diarrhea before following up with G.I. for consideration of colonoscopy    Page Me (7 am to 6 pm)    Care plan discussed with patient; he doesnot consent to discussion with family at this time              Physical Exam:      Blood pressure (!) 169/116, pulse 79, temperature 98.4  F (36.9  C), temperature source Oral, resp. rate 16, height 1.651 m (5' 5\"), weight 51.8 kg (114 lb 3.2 oz), SpO2 100 " %.  Vitals:    03/08/22 2135   Weight: 51.8 kg (114 lb 3.2 oz)     Vital Signs with Ranges  Temp:  [97.7  F (36.5  C)-98.4  F (36.9  C)] 98.4  F (36.9  C)  Pulse:  [79-81] 79  Resp:  [16] 16  BP: (138-178)/() 169/116  SpO2:  [99 %-100 %] 100 %  I/O's Last 24 hours  I/O last 3 completed shifts:  In: 1680 [P.O.:1680]  Out: -     Constitutional: Alert, awake and oriented X 3; resting comfortably in no apparent distress; thin built   HEENT: Pupils equal and reactive to light and accomodation, neck supple    Oral cavity: Moist oral mucosa   Cardiovascular: Normal s1 s2, regular rate and rhythm, no murmur   Lungs: B/l clear to auscultation, no wheezes or crepitations   Abdomen: Soft, nt, nd, no guarding, rigidity or rebound; BS +   LE : No edema   Musculoskeletal: Power 5/5 in all extremities   Neuro: No focal neurological deficits noted   Psychiatry: normal mood and affect                Medications:          amLODIPine  5 mg Oral Daily     folic acid  1 mg Oral Daily     insulin aspart  1-7 Units Subcutaneous TID AC     insulin aspart  1-5 Units Subcutaneous At Bedtime     insulin glargine  12 Units Subcutaneous BID     multivitamin w/minerals  1 tablet Oral Daily     potassium gluconate  2.5 mEq Oral Daily     sitagliptin  100 mg Oral Daily     sodium chloride (PF)  3 mL Intracatheter Q8H     thiamine  100 mg Oral Daily     vancomycin  125 mg Oral 4x Daily     PRN Meds: acetaminophen **OR** acetaminophen, glucose **OR** dextrose **OR** glucagon, diazepam **OR** diazepam, flumazenil, OLANZapine zydis **OR** haloperidol lactate, hydrALAZINE, lidocaine 4%, lidocaine (buffered or not buffered), melatonin, ondansetron **OR** ondansetron, polyethylene glycol, prochlorperazine **OR** prochlorperazine **OR** prochlorperazine, senna-docusate **OR** senna-docusate, sodium chloride (PF)         Data:      All new lab and imaging data was reviewed.   Recent Labs   Lab Test 03/09/22  0651 03/08/22  1756 10/08/17  2105   WBC  4.5 7.0 3.9*   HGB 11.4* 11.9* 12.1*   MCV 84 86 84    243 238      Recent Labs   Lab Test 03/11/22  1243 03/11/22  0649 03/11/22  0636 03/10/22  1134 03/10/22  0647 03/09/22  1756 03/09/22  1307 03/09/22  0819 03/09/22  0651   NA  --  131*  --   --  130*  --   --   --  131*   POTASSIUM  --  3.6  --   --  3.4  --  3.7  --  3.1*   CHLORIDE  --  97  --   --  96  --   --   --  97   CO2  --  30  --   --  27  --   --   --  27   BUN  --  7  --   --  7  --   --   --  12   CR  --  0.59*  --   --  0.64*  --   --   --  0.64*   ANIONGAP  --  4  --   --  7  --   --   --  7   AROLDO  --  8.6  --   --  8.2*  --   --   --  8.7   * 166* 173*   < > 331*   < >  --    < > 142*    < > = values in this interval not displayed.     No lab results found.    Invalid input(s): TROP, TROPONINIES

## 2022-03-11 NOTE — PROGRESS NOTES
CLINICAL NUTRITION SERVICES - REASSESSMENT NOTE    Recommendations Ordered by Registered Dietitian (RD):   - Pt is eating much better. No concerns at this time.    Malnutrition:  (3/9)   % Weight Loss:  Up to 20% in 1 year (moderate malnutrition)  % Intake:  </= 75% for >/= 1 month (severe malnutrition)  Subcutaneous Fat Loss:  Orbital region moderate depletion, Upper arm region moderate depletion and Thoracic region moderate depletion  Muscle Loss:  Temporal region moderate depletion, Clavicle bone region moderate depletion and Dorsal hand region moderate depletion  Fluid Retention:  None noted     Malnutrition Diagnosis: Severe malnutrition  In Context of:  Acute illness or injury  Chronic illness or disease     EVALUATION OF PROGRESS TOWARD GOALS   Diet: Moderate CHO  Intake/Tolerance:   - Pt is eating 100% of meals since admission. Reviewed meals ordered, pt orders adequately TID.   - Saw pt in room this afternoon - eating lunch. Hamburger, chips, ice cream, pepsi, is[place] mist.   - Reports that he continues with frequent loose stooling (records show last BM was 2x today, only 1 recorded yesterday).     - Pt had no questions regarding diet order today but did accept written education materials.     ASSESSED NUTRITION NEEDS:  Dosing Weight 51.8 kg   Estimated Energy Needs: 7593-0071 kcals (30-35 Kcal/Kg)  Justification: repletion and underweight  Estimated Protein Needs: 62-78+ grams protein (1.2-1.5+ g pro/Kg)  Justification: Repletion  Estimated Fluid Needs: 1 mL/kcal   Justification: maintenance    Previous Goals:   Intake of at least 75% meals BID - TID  Evaluation: Met    Previous Nutrition Diagnosis:   Inadequate oral intake related to increased losses in diarrhea, acute nausea/vomiting as evidenced by patient reports that everything he eats goes right through him, weight loss of up to 20% in 1 year, low stores of fat and muscle on exam  Evaluation: Improving    CURRENT NUTRITION DIAGNOSIS  No nutrition  diagnosis identified at this time     INTERVENTIONS  Recommendations / Nutrition Prescription  Moderate CHO diet     Implementation  Nutrition Education: provided handout on carb counting for DM    Goals  Intake of <75% adequate meals TID.       MONITORING AND EVALUATION:  Progress towards goals will be monitored and evaluated per protocol and Practice Guidelines    Maggie Maurer RD, LD  [facility] [place], [facility], Ortho, Ortho Spine  Pager: [phone]  Weekend Pager: [phone]

## 2022-03-12 VITALS
SYSTOLIC BLOOD PRESSURE: 146 MMHG | TEMPERATURE: 97.7 F | OXYGEN SATURATION: 100 % | DIASTOLIC BLOOD PRESSURE: 98 MMHG | WEIGHT: 114.2 LBS | HEART RATE: 81 BPM | RESPIRATION RATE: 18 BRPM | HEIGHT: 65 IN | BODY MASS INDEX: 19.03 KG/M2

## 2022-03-12 LAB
ANION GAP SERPL CALCULATED.3IONS-SCNC: 5 MMOL/L (ref 3–14)
BASOPHILS # BLD AUTO: 0 10E3/UL (ref 0–0.2)
BASOPHILS NFR BLD AUTO: 1 %
BUN SERPL-MCNC: 7 MG/DL (ref 7–30)
CALCIUM SERPL-MCNC: 8.8 MG/DL (ref 8.5–10.1)
CHLORIDE BLD-SCNC: 96 MMOL/L (ref 94–109)
CO2 SERPL-SCNC: 29 MMOL/L (ref 20–32)
CREAT SERPL-MCNC: 0.6 MG/DL (ref 0.66–1.25)
EOSINOPHIL # BLD AUTO: 0.1 10E3/UL (ref 0–0.7)
EOSINOPHIL NFR BLD AUTO: 2 %
ERYTHROCYTE [DISTWIDTH] IN BLOOD BY AUTOMATED COUNT: 13.4 % (ref 10–15)
GFR SERPL CREATININE-BSD FRML MDRD: >90 ML/MIN/1.73M2
GLUCOSE BLD-MCNC: 271 MG/DL (ref 70–99)
GLUCOSE BLDC GLUCOMTR-MCNC: 167 MG/DL (ref 70–99)
GLUCOSE BLDC GLUCOMTR-MCNC: 219 MG/DL (ref 70–99)
GLUCOSE BLDC GLUCOMTR-MCNC: 239 MG/DL (ref 70–99)
GLUCOSE BLDC GLUCOMTR-MCNC: 266 MG/DL (ref 70–99)
HCT VFR BLD AUTO: 37.1 % (ref 40–53)
HGB BLD-MCNC: 12 G/DL (ref 13.3–17.7)
IMM GRANULOCYTES # BLD: 0 10E3/UL
IMM GRANULOCYTES NFR BLD: 1 %
LYMPHOCYTES # BLD AUTO: 1.2 10E3/UL (ref 0.8–5.3)
LYMPHOCYTES NFR BLD AUTO: 35 %
MCH RBC QN AUTO: 28.1 PG (ref 26.5–33)
MCHC RBC AUTO-ENTMCNC: 32.3 G/DL (ref 31.5–36.5)
MCV RBC AUTO: 87 FL (ref 78–100)
MONOCYTES # BLD AUTO: 0.7 10E3/UL (ref 0–1.3)
MONOCYTES NFR BLD AUTO: 19 %
NEUTROPHILS # BLD AUTO: 1.5 10E3/UL (ref 1.6–8.3)
NEUTROPHILS NFR BLD AUTO: 42 %
NRBC # BLD AUTO: 0 10E3/UL
NRBC BLD AUTO-RTO: 0 /100
PHOSPHATE SERPL-MCNC: 3.6 MG/DL (ref 2.5–4.5)
PLATELET # BLD AUTO: 250 10E3/UL (ref 150–450)
POTASSIUM BLD-SCNC: 3.8 MMOL/L (ref 3.4–5.3)
RBC # BLD AUTO: 4.27 10E6/UL (ref 4.4–5.9)
SODIUM SERPL-SCNC: 130 MMOL/L (ref 133–144)
WBC # BLD AUTO: 3.5 10E3/UL (ref 4–11)

## 2022-03-12 PROCEDURE — 85025 COMPLETE CBC W/AUTO DIFF WBC: CPT | Performed by: HOSPITALIST

## 2022-03-12 PROCEDURE — 250N000013 HC RX MED GY IP 250 OP 250 PS 637: Performed by: STUDENT IN AN ORGANIZED HEALTH CARE EDUCATION/TRAINING PROGRAM

## 2022-03-12 PROCEDURE — 82310 ASSAY OF CALCIUM: CPT | Performed by: HOSPITALIST

## 2022-03-12 PROCEDURE — 84100 ASSAY OF PHOSPHORUS: CPT | Performed by: HOSPITALIST

## 2022-03-12 PROCEDURE — 250N000013 HC RX MED GY IP 250 OP 250 PS 637: Performed by: INTERNAL MEDICINE

## 2022-03-12 PROCEDURE — 99239 HOSP IP/OBS DSCHRG MGMT >30: CPT | Performed by: INTERNAL MEDICINE

## 2022-03-12 PROCEDURE — 36415 COLL VENOUS BLD VENIPUNCTURE: CPT | Performed by: HOSPITALIST

## 2022-03-12 PROCEDURE — 250N000013 HC RX MED GY IP 250 OP 250 PS 637: Performed by: HOSPITALIST

## 2022-03-12 RX ORDER — AMLODIPINE BESYLATE 5 MG/1
5 TABLET ORAL DAILY
Qty: 30 TABLET | Refills: 0 | Status: SHIPPED | OUTPATIENT
Start: 2022-03-13 | End: 2024-09-24

## 2022-03-12 RX ORDER — FOLIC ACID 1 MG/1
1 TABLET ORAL DAILY
Qty: 30 TABLET | Refills: 0 | Status: SHIPPED | OUTPATIENT
Start: 2022-03-13 | End: 2024-05-03

## 2022-03-12 RX ORDER — LANOLIN ALCOHOL/MO/W.PET/CERES
100 CREAM (GRAM) TOPICAL DAILY
Qty: 30 TABLET | Refills: 0 | Status: SHIPPED | OUTPATIENT
Start: 2022-03-13 | End: 2024-05-03

## 2022-03-12 RX ORDER — MULTIPLE VITAMINS W/ MINERALS TAB 9MG-400MCG
1 TAB ORAL DAILY
Qty: 30 TABLET | Refills: 0 | Status: SHIPPED | OUTPATIENT
Start: 2022-03-13

## 2022-03-12 RX ORDER — VANCOMYCIN HYDROCHLORIDE 125 MG/1
125 CAPSULE ORAL 4 TIMES DAILY
Qty: 44 CAPSULE | Refills: 0 | Status: SHIPPED | OUTPATIENT
Start: 2022-03-12 | End: 2022-03-23

## 2022-03-12 RX ADMIN — AMLODIPINE BESYLATE 5 MG: 5 TABLET ORAL at 08:33

## 2022-03-12 RX ADMIN — Medication 2.5 MEQ: at 08:33

## 2022-03-12 RX ADMIN — MULTIPLE VITAMINS W/ MINERALS TAB 1 TABLET: TAB at 08:33

## 2022-03-12 RX ADMIN — ACETAMINOPHEN 650 MG: 325 TABLET, FILM COATED ORAL at 02:57

## 2022-03-12 RX ADMIN — ACETAMINOPHEN 650 MG: 325 TABLET, FILM COATED ORAL at 09:37

## 2022-03-12 RX ADMIN — FOLIC ACID 1 MG: 1 TABLET ORAL at 08:33

## 2022-03-12 RX ADMIN — THIAMINE HCL TAB 100 MG 100 MG: 100 TAB at 08:33

## 2022-03-12 RX ADMIN — VANCOMYCIN HYDROCHLORIDE 125 MG: 125 CAPSULE ORAL at 12:53

## 2022-03-12 RX ADMIN — VANCOMYCIN HYDROCHLORIDE 125 MG: 125 CAPSULE ORAL at 08:33

## 2022-03-12 RX ADMIN — SITAGLIPTIN 100 MG: 100 TABLET, FILM COATED ORAL at 08:34

## 2022-03-12 ASSESSMENT — ACTIVITIES OF DAILY LIVING (ADL)
ADLS_ACUITY_SCORE: 5
ADLS_ACUITY_SCORE: 4
ADLS_ACUITY_SCORE: 5
ADLS_ACUITY_SCORE: 4
ADLS_ACUITY_SCORE: 5
ADLS_ACUITY_SCORE: 5

## 2022-03-12 NOTE — PLAN OF CARE
Goal Outcome Evaluation:  Alert and oriented X4, VSS on RA, independent in room, Blood sugar 188 and 191 during the shift, will continue to monitor.

## 2022-03-12 NOTE — PROGRESS NOTES
A&Ox4, ind in room, VSS. Loose stools throughout shift, tylenol given for mild pain. Will continue to monitor.

## 2022-03-12 NOTE — PROGRESS NOTES
Pt A&O x4, up indep. VSS except BP is slightly high. RA. On enteric precaution. Mild pain to back managed with Tyl. Adequate I&O. BM x3. AVS and med reviewed with pt. All questions answered. Pt has all his belongings. Will discharge in the afternoon.

## 2022-03-12 NOTE — DISCHARGE SUMMARY
[facility]  Hospitalist Discharge Summary      Date of Admission:  3/8/2022  Date of Discharge:  3/12/2022  Discharging Provider: Kimberly Ma MD  Discharge Service: Hospitalist Service    Discharge Diagnoses   C. difficile colitis with diarrhea  Acute on chronic diarrhea related to above  Severe malnutrition  Hypokalemia  Hypophosphatemia  Hyponatremia  Uncontrolled type 2 diabetes mellitus  Hypertension  Ethanol use disorder    Follow-ups Needed After Discharge   Follow-up Appointments     Follow-up and recommended labs and tests       Follow up with primary care provider, Physician No Ref-Primary, within 7   days for hospital follow- up.  The following labs/tests are recommended:   CBC, BMP.             Unresulted Labs Ordered in the Past 30 Days of this Admission     No orders found from 2/6/2022 to 3/9/2022.        Discharge Disposition   Discharged to home  Condition at discharge: Stable    Hospital Course   Long Mayfield is a 51 year old male with PMH of chronic diarrhea, alcohol use, diabetes admitted on 3/8/2022. He presented with chronic diarrhea and generalized weakness.      Acute on Chronic diarrhea  C diff diarrhea  Dehydration  Hypokalemia  Hypophosphatemia  Hyponatremia  Severe Malnutrition  -At presentation 10-20 episodes of diarrhea daily for at least 8 months; reported feeling dehydrated, has lost about 25 lbs in  Just over a year; states he was planned for colonoscopy in [place] but was canceled due to ongoing diarrhea   - noted positive C diff this admission ; enteric panel unremarkable  -Was started on PO vancomycin  - given chronic diarrhea, unintentional weight loss,CT chest/abdomen/pelvis was done on 3/9 which was noted with no definite malignancy  - normal TSH  - Nutrition following for severe malnutrition; diet/supplements per nutrition, multivitamin  - electrolyte derangements including low K , Na and phos likely due to ongoing loss with diarrhea;  supplementing per protocol  - sodium stable in 130s, potassium and phosphorus improved  -Outpatient follow-up with PCP  -Outpatient consideration of colonoscopy after completion of treatment for C. difficile  -Discharged on oral vancomycin to complete 2 weeks course     Hyperglycemia  uncontrolled DMT2  - A1c markedly elevated >14 with BG >300s  - PTA on Trulicity, Januvia (used to be on metformin in past, discontinued due to diarrhea; he also used to be on Insulin in past and apparently d/rui after starting Trulicity)  -PTA Trulicity and Januvia  -Also started back on Lantus and was discharged on 12 units twice daily and NovoLog sliding scale insulin.  Patient to follow-up with PCP for medication adjustment     Hypertension  - BP elevated in 160s; he used to be on Lisinopril in past but then had discontinued on his own  -Started on amlodipine during this hospital stay  -Advised to monitor his blood pressure and follow-up with PCP for any medication adjustment     EtOH use disorder  *nearly daily use. Buys 1 pint of vodka every 4 days on average with episodes of significant increase  *states he drinks for pain control, and denies hx of withdrawal  - no s/o alcohol withdrawal; discontinued CIWA protocol 3/10  - CD consulted but patient not interested in evaluation of referrals to inpatient CD treatment at this time  - thiamine, folate       Consultations This Hospital Stay   GASTROENTEROLOGY IP CONSULT  CHEMICAL DEPENDENCY IP CONSULT  NUTRITION SERVICES ADULT IP CONSULT  PHYSICAL THERAPY ADULT IP CONSULT  OCCUPATIONAL THERAPY ADULT IP CONSULT  SOCIAL WORK IP CONSULT  CARE MANAGEMENT / SOCIAL WORK IP CONSULT    Code Status   Full Code    Time Spent on this Encounter   IKimberly MD, personally saw the patient today and spent greater than 30 minutes discharging this patient.       Kimberly aM MD  [facility] ORTHOPED[facility] SPINE  [address] [address]  Phone: [phone]  Fax:  [phone]  ______________________________________________________________________    Physical Exam   Vital Signs: Temp: 97.7  F (36.5  C) Temp src: Oral BP: (!) 146/98 Pulse: 81   Resp: 18 SpO2: 100 % O2 Device: None (Room air)    Weight: 114 lbs 3.2 oz  Exam:  Constitutional: Awake, alert and no distress. Appears comfortable  Head: Normocephalic. No masses, lesions, tenderness or abnormalities  ENT: ENT exam normal, no neck nodes or sinus tenderness  Cardiovascular: RRR.  No murmurs, no rubs or JVD  Respiratory: Normal WOB,b/l equal air entry, no wheezes or crackles   Gastrointestinal: Abdomen soft, non-tender. BS normal. No masses, organomegaly  : Deferred  Extremities : No edema , no clubbing or cyanosis    Neurologic: Cranial nerves II-XII grossly intact , power symmetrical, Reflexes normal and symmetric. Sensation grossly WNL.         Primary Care Physician   Physician No Ref-Primary    Discharge Orders      Reason for your hospital stay    C diff colitis     Follow-up and recommended labs and tests     Follow up with primary care provider, Physician No Ref-Primary, within 7 days for hospital follow- up.  The following labs/tests are recommended: CBC, BMP.     Activity    Your activity upon discharge: activity as tolerated     Monitor and record    blood glucose 4 times a day, before meals and at bedtime and readings to PCP for any medication adjustment   blood pressure daily and readings to PCP for any medication adjustment     Diet    Follow this diet upon discharge: Orders Placed This Encounter      Moderate Consistent Carb (60 g CHO per Meal) Diet       Significant Results and Procedures   Results for orders placed or performed during the hospital encounter of 03/08/22   US Abdomen Limited    Narrative    EXAM: US ABDOMEN LIMITED  LOCATION: [facility]  DATE/TIME: 3/8/2022 11:15 PM    INDICATION: abdominal pain  COMPARISON: None.  TECHNIQUE: Limited abdominal  ultrasound.    FINDINGS:    GALLBLADDER: Normal. No gallstones, wall thickening, or pericholecystic fluid. Negative sonographic Nolan's sign.    BILE DUCTS: No biliary dilatation. The common duct measures 2 mm.    LIVER: Normal parenchyma with smooth contour. No focal mass.    RIGHT KIDNEY: No hydronephrosis.    PANCREAS: The visualized portions are normal.    No ascites.      Impression    IMPRESSION:  1.  Normal limited abdominal ultrasound.       CT Chest/Abdomen/Pelvis w Contrast    Narrative    CT CHEST/ABDOMEN/PELVIS WITH CONTRAST  3/9/2022 11:47 AM    CLINICAL HISTORY: Weight loss, unintended. Failure to thrive, chronic  diarrhea.    TECHNIQUE: CT scan of the chest, abdomen, and pelvis was performed  following injection of IV contrast. Multiplanar reformats were  obtained. Dose reduction techniques were used.   CONTRAST: 58 mL Isovue-370    COMPARISON: None.    FINDINGS:   LUNGS AND PLEURA: There are a few scattered diminutive pulmonary  nodules measuring less than 3 mm. An example nodule in the right  middle lobe on image 186 measures 2 mm. The lungs are otherwise clear.  No pleural effusion.    MEDIASTINUM/AXILLAE: Small upper mediastinal lymph nodes measure less  than 1 cm. No lymphadenopathy by size criteria. Moderate coronary  artery calcification.    HEPATOBILIARY: 1.3 cm indeterminate lesion posterior right hepatic  lobe, series 3 image 128. No additional liver lesions.    PANCREAS: The pancreas is atrophic with calcifications, consistent  with chronic pancreatitis. No inflammation.    SPLEEN: Normal.    ADRENAL GLANDS: Normal.    KIDNEYS/BLADDER: Normal.    BOWEL: Normal.    PELVIC ORGANS: Normal.    ADDITIONAL FINDINGS: Diffuse small vessel atherosclerotic  calcification consistent with diabetes.    MUSCULOSKELETAL: Normal.      Impression    IMPRESSION:  1.  No definite malignancy in the chest, abdomen, or pelvis.  2.  Indeterminate liver lesion. Consider MRI for further evaluation.    JUAN JOSE DIMAS  MD VIVIANA         SYSTEM ID:  [id number]       Discharge Medications   Current Discharge Medication List      START taking these medications    Details   amLODIPine (NORVASC) 5 MG tablet Take 1 tablet (5 mg) by mouth daily  Qty: 30 tablet, Refills: 0    Comments: Future refills by PCP  Physician No Ref-Primary with phone number None.  Associated Diagnoses: Benign essential hypertension      folic acid (FOLVITE) 1 MG tablet Take 1 tablet (1 mg) by mouth daily  Qty: 30 tablet, Refills: 0    Associated Diagnoses: Malnutrition, unspecified type (H)      !! insulin aspart (NOVOLOG PEN) 100 UNIT/ML pen Inject 1-7 Units Subcutaneous 3 times daily (before meals) For Pre-Meal  - 189 give 1 unit. For Pre-Meal  - 239 give 2 units. For Pre-Meal  - 289 give 3 units. For Pre-Meal  - 339 give 4 units. For Pre-Meal - 399 give 5 units. For Pre-Meal -449 give 6 units For Pre-Meal BG greater than or equal to 450 give 7 units.  Qty: 15 mL, Refills: 0    Associated Diagnoses: Insulin dependent type 2 diabetes mellitus (H)      !! insulin aspart (NOVOLOG PEN) 100 UNIT/ML pen Inject 1-5 Units Subcutaneous At Bedtime For  - 249 give 1 units.   For  - 299 give 2 units.   For  - 349 give 3 units.   For  -399 give 4 units.   For BG greater than or equal to 400 give 5 units.  Qty: 15 mL, Refills: 0    Associated Diagnoses: Insulin dependent type 2 diabetes mellitus (H)      insulin glargine (LANTUS PEN) 100 UNIT/ML pen Inject 12 Units Subcutaneous 2 times daily  Qty: 15 mL, Refills: 0    Comments: If Lantus is not covered by insurance, may substitute Basaglar or Semglee or other insulin glargine product per insurance preference at same dose and frequency.  Associated Diagnoses: Insulin dependent type 2 diabetes mellitus (H)      multivitamin w/minerals (THERA-VIT-M) tablet Take 1 tablet by mouth daily  Qty: 30 tablet, Refills: 0    Associated Diagnoses: Malnutrition, unspecified  type (H)      thiamine (B-1) 100 MG tablet Take 1 tablet (100 mg) by mouth daily  Qty: 30 tablet, Refills: 0    Associated Diagnoses: Malnutrition, unspecified type (H)      vancomycin (VANCOCIN) 125 MG capsule Take 1 capsule (125 mg) by mouth 4 times daily for 11 days  Qty: 44 capsule, Refills: 0    Associated Diagnoses: C. difficile colitis       !! - Potential duplicate medications found. Please discuss with provider.      CONTINUE these medications which have NOT CHANGED    Details   dulaglutide (TRULICITY) 0.75 MG/0.5ML pen Inject 0.75 mg Subcutaneous every 7 days      potassium gluconate 2.5 MEQ tablet Take 2.5 mEq by mouth daily      sitagliptin (JANUVIA) 100 MG tablet Take 100 mg by mouth daily         STOP taking these medications       loperamide (IMODIUM) 2 MG capsule Comments:   Reason for Stopping:             Allergies   No Known Allergies

## 2022-03-14 ENCOUNTER — PATIENT OUTREACH (OUTPATIENT)
Dept: CARE COORDINATION | Facility: CLINIC | Age: 52
End: 2022-03-14
Payer: MEDICAID

## 2022-03-14 DIAGNOSIS — Z71.89 OTHER SPECIFIED COUNSELING: ICD-10-CM

## 2022-03-14 NOTE — PROGRESS NOTES
Clinic Care Coordination Contact  [facility]/Voicemail       Clinical Data: Care Coordinator Outreach  Outreach attempted x 1.  Left message on patient's voicemail with call back information and requested return call.  Plan: Care Coordinator will try to reach patient again in 1-2 business days.    Frannie Borja  Community Health Worker  [facility] Care [facility]  [phone]

## 2022-03-15 NOTE — PROGRESS NOTES
Clinic Care Coordination Contact  [facility]/Voicemail       Clinical Data: Care Coordinator Outreach  Outreach attempted x 2.  Left message on patient's voicemail with call back information and requested return call.  Plan:Care Coordinator will do no further outreaches at this time.    Frannie Borja  Community Health Worker  [facility] Care [facility]  [phone]

## 2022-04-21 ENCOUNTER — TRANSFERRED RECORDS (OUTPATIENT)
Dept: HEALTH INFORMATION MANAGEMENT | Facility: CLINIC | Age: 52
End: 2022-04-21

## 2022-08-06 ENCOUNTER — HOSPITAL ENCOUNTER (EMERGENCY)
Facility: CLINIC | Age: 52
Discharge: HOME | End: 2022-08-07
Attending: EMERGENCY MEDICINE | Admitting: EMERGENCY MEDICINE
Payer: COMMERCIAL

## 2022-08-06 DIAGNOSIS — R73.9 HYPERGLYCEMIA: ICD-10-CM

## 2022-08-06 DIAGNOSIS — E87.20 LACTIC ACIDOSIS: ICD-10-CM

## 2022-08-06 DIAGNOSIS — F10.229 ACUTE ALCOHOLIC INTOXICATION IN ALCOHOLISM WITH COMPLICATION (H): ICD-10-CM

## 2022-08-06 LAB
ALBUMIN SERPL-MCNC: 3 G/DL (ref 3.4–5)
ALP SERPL-CCNC: 118 U/L (ref 40–150)
ALT SERPL W P-5'-P-CCNC: 57 U/L (ref 0–70)
ANION GAP SERPL CALCULATED.3IONS-SCNC: 13 MMOL/L (ref 3–14)
AST SERPL W P-5'-P-CCNC: 71 U/L (ref 0–45)
BASOPHILS # BLD AUTO: 0 10E3/UL (ref 0–0.2)
BASOPHILS NFR BLD AUTO: 1 %
BILIRUB SERPL-MCNC: 0.2 MG/DL (ref 0.2–1.3)
BUN SERPL-MCNC: 12 MG/DL (ref 7–30)
CALCIUM SERPL-MCNC: 8.5 MG/DL (ref 8.5–10.1)
CHLORIDE BLD-SCNC: 97 MMOL/L (ref 94–109)
CO2 SERPL-SCNC: 24 MMOL/L (ref 20–32)
CREAT SERPL-MCNC: 0.71 MG/DL (ref 0.66–1.25)
EOSINOPHIL # BLD AUTO: 0.2 10E3/UL (ref 0–0.7)
EOSINOPHIL NFR BLD AUTO: 2 %
ERYTHROCYTE [DISTWIDTH] IN BLOOD BY AUTOMATED COUNT: 14.2 % (ref 10–15)
ETHANOL SERPL-MCNC: 0.44 G/DL
GFR SERPL CREATININE-BSD FRML MDRD: >90 ML/MIN/1.73M2
GLUCOSE BLD-MCNC: 407 MG/DL (ref 70–99)
GLUCOSE BLDC GLUCOMTR-MCNC: 249 MG/DL (ref 70–99)
GLUCOSE BLDC GLUCOMTR-MCNC: 323 MG/DL (ref 70–99)
HCT VFR BLD AUTO: 33.9 % (ref 40–53)
HGB BLD-MCNC: 11.4 G/DL (ref 13.3–17.7)
IMM GRANULOCYTES # BLD: 0 10E3/UL
IMM GRANULOCYTES NFR BLD: 0 %
KETONES BLD-SCNC: 0.2 MMOL/L (ref 0–0.6)
LACTATE SERPL-SCNC: 4.8 MMOL/L (ref 0.7–2)
LIPASE SERPL-CCNC: 30 U/L (ref 73–393)
LYMPHOCYTES # BLD AUTO: 2.8 10E3/UL (ref 0.8–5.3)
LYMPHOCYTES NFR BLD AUTO: 37 %
MCH RBC QN AUTO: 27.5 PG (ref 26.5–33)
MCHC RBC AUTO-ENTMCNC: 33.6 G/DL (ref 31.5–36.5)
MCV RBC AUTO: 82 FL (ref 78–100)
MONOCYTES # BLD AUTO: 0.5 10E3/UL (ref 0–1.3)
MONOCYTES NFR BLD AUTO: 7 %
NEUTROPHILS # BLD AUTO: 4.1 10E3/UL (ref 1.6–8.3)
NEUTROPHILS NFR BLD AUTO: 53 %
NRBC # BLD AUTO: 0 10E3/UL
NRBC BLD AUTO-RTO: 0 /100
PH BLDV: 7.39 [PH] (ref 7.32–7.43)
PLATELET # BLD AUTO: 239 10E3/UL (ref 150–450)
POTASSIUM BLD-SCNC: 2.9 MMOL/L (ref 3.4–5.3)
PROT SERPL-MCNC: 6.8 G/DL (ref 6.8–8.8)
RBC # BLD AUTO: 4.14 10E6/UL (ref 4.4–5.9)
SODIUM SERPL-SCNC: 134 MMOL/L (ref 133–144)
TROPONIN I SERPL HS-MCNC: 8 NG/L
WBC # BLD AUTO: 7.7 10E3/UL (ref 4–11)

## 2022-08-06 PROCEDURE — 36415 COLL VENOUS BLD VENIPUNCTURE: CPT | Performed by: EMERGENCY MEDICINE

## 2022-08-06 PROCEDURE — 96365 THER/PROPH/DIAG IV INF INIT: CPT | Mod: 59

## 2022-08-06 PROCEDURE — 82040 ASSAY OF SERUM ALBUMIN: CPT | Performed by: EMERGENCY MEDICINE

## 2022-08-06 PROCEDURE — 250N000011 HC RX IP 250 OP 636: Performed by: EMERGENCY MEDICINE

## 2022-08-06 PROCEDURE — 85014 HEMATOCRIT: CPT | Performed by: EMERGENCY MEDICINE

## 2022-08-06 PROCEDURE — 82010 KETONE BODYS QUAN: CPT | Performed by: EMERGENCY MEDICINE

## 2022-08-06 PROCEDURE — 80053 COMPREHEN METABOLIC PANEL: CPT | Performed by: EMERGENCY MEDICINE

## 2022-08-06 PROCEDURE — 96361 HYDRATE IV INFUSION ADD-ON: CPT

## 2022-08-06 PROCEDURE — 82800 BLOOD PH: CPT | Performed by: EMERGENCY MEDICINE

## 2022-08-06 PROCEDURE — 96366 THER/PROPH/DIAG IV INF ADDON: CPT

## 2022-08-06 PROCEDURE — 258N000003 HC RX IP 258 OP 636: Performed by: EMERGENCY MEDICINE

## 2022-08-06 PROCEDURE — 99285 EMERGENCY DEPT VISIT HI MDM: CPT | Mod: 25

## 2022-08-06 PROCEDURE — 83690 ASSAY OF LIPASE: CPT | Performed by: EMERGENCY MEDICINE

## 2022-08-06 PROCEDURE — 82077 ASSAY SPEC XCP UR&BREATH IA: CPT | Performed by: EMERGENCY MEDICINE

## 2022-08-06 PROCEDURE — 83605 ASSAY OF LACTIC ACID: CPT | Performed by: EMERGENCY MEDICINE

## 2022-08-06 PROCEDURE — 84484 ASSAY OF TROPONIN QUANT: CPT | Performed by: EMERGENCY MEDICINE

## 2022-08-06 PROCEDURE — 250N000013 HC RX MED GY IP 250 OP 250 PS 637: Performed by: EMERGENCY MEDICINE

## 2022-08-06 RX ORDER — MAGNESIUM SULFATE HEPTAHYDRATE 40 MG/ML
2 INJECTION, SOLUTION INTRAVENOUS ONCE
Status: COMPLETED | OUTPATIENT
Start: 2022-08-06 | End: 2022-08-07

## 2022-08-06 RX ORDER — POTASSIUM CHLORIDE 1500 MG/1
40 TABLET, EXTENDED RELEASE ORAL ONCE
Status: COMPLETED | OUTPATIENT
Start: 2022-08-06 | End: 2022-08-06

## 2022-08-06 RX ADMIN — MAGNESIUM SULFATE HEPTAHYDRATE 2 G: 40 INJECTION, SOLUTION INTRAVENOUS at 23:40

## 2022-08-06 RX ADMIN — SODIUM CHLORIDE, POTASSIUM CHLORIDE, SODIUM LACTATE AND CALCIUM CHLORIDE 2000 ML: 600; 310; 30; 20 INJECTION, SOLUTION INTRAVENOUS at 20:22

## 2022-08-06 RX ADMIN — POTASSIUM CHLORIDE 40 MEQ: 1500 TABLET, EXTENDED RELEASE ORAL at 20:28

## 2022-08-06 ASSESSMENT — ENCOUNTER SYMPTOMS
VOMITING: 0
CONFUSION: 1
DYSPHORIC MOOD: 1

## 2022-08-07 VITALS
SYSTOLIC BLOOD PRESSURE: 174 MMHG | TEMPERATURE: 98.2 F | OXYGEN SATURATION: 98 % | WEIGHT: 140 LBS | BODY MASS INDEX: 23.3 KG/M2 | DIASTOLIC BLOOD PRESSURE: 115 MMHG | RESPIRATION RATE: 18 BRPM | HEART RATE: 95 BPM

## 2022-08-07 LAB
GLUCOSE BLDC GLUCOMTR-MCNC: 292 MG/DL (ref 70–99)
HCO3 BLDV-SCNC: 27 MMOL/L (ref 21–28)
LACTATE BLD-SCNC: 3.4 MMOL/L
PCO2 BLDV: 41 MM HG (ref 40–50)
PH BLDV: 7.42 [PH] (ref 7.32–7.43)
PO2 BLDV: 69 MM HG (ref 25–47)
SAO2 % BLDV: 94 % (ref 94–100)

## 2022-08-07 PROCEDURE — 250N000012 HC RX MED GY IP 250 OP 636 PS 637: Performed by: EMERGENCY MEDICINE

## 2022-08-07 PROCEDURE — 83605 ASSAY OF LACTIC ACID: CPT

## 2022-08-07 PROCEDURE — 96372 THER/PROPH/DIAG INJ SC/IM: CPT | Performed by: EMERGENCY MEDICINE

## 2022-08-07 PROCEDURE — 82803 BLOOD GASES ANY COMBINATION: CPT

## 2022-08-07 RX ORDER — LORAZEPAM 1 MG/1
1 TABLET ORAL
Qty: 8 TABLET | Refills: 0 | Status: SHIPPED | OUTPATIENT
Start: 2022-08-07 | End: 2024-05-03

## 2022-08-07 RX ADMIN — INSULIN ASPART 3 UNITS: 100 INJECTION, SOLUTION INTRAVENOUS; SUBCUTANEOUS at 08:42

## 2022-08-07 NOTE — ED NOTES
The writer called [facility] detox facilities again, no beds available at this time.

## 2022-08-07 NOTE — ED NOTES
Patient alert, oriented x4. Patient states he was mourning the death of one of his close friends. Also states he has chronic pain and nothing takes it away.   Patient aware that we will be planning with family for dispo plan.

## 2022-08-07 NOTE — DISCHARGE INSTRUCTIONS

## 2022-08-07 NOTE — ED NOTES
Pt ambulatory and eating without difficulty.  Pt and family informed all detox centers in the [facility] are full.  Plans to feed pt, make one more round of calls to detox centers, and discharge pt if beds are not available.   Pt requesting removal of PIVs.

## 2022-08-07 NOTE — ED NOTES
Plan to discharge pt to care of his brother.  Pt provided with e-prescribed ativan to manage potential withdrawal symptoms, as well as detox resources in the [facility] area.

## 2022-08-07 NOTE — ED NOTES
RN spoke with patient's brother Richie. Richie states that pt has history of alcohol abuse, multiple TBIs, and pancreatitis. Richie reported to RN that pt has cognitive deficits as his baseline and is not able to manage medication independently. Richie stated that patient was not allowed to return home to their mother's house, where pt has been living, due to alcohol abuse. Richie would like to be called and updated regarding the plan of care for pt.     Richie Mayfield (brother)  [phone]

## 2022-08-07 NOTE — ED TRIAGE NOTES
Pt BIBA; First BG was 542;  when arrived. Concerned for DKA; history of DM type 1. Family called EMS concerned with altered mental state and alcohol abuse; pt was found by family covered in feces and slurring.      Triage Assessment     Row Name 08/06/22 1951       Triage Assessment (Adult)    Airway WDL WDL       Respiratory WDL    Respiratory WDL WDL       Skin Circulation/Temperature WDL    Skin Circulation/Temperature WDL WDL       Cardiac WDL    Cardiac WDL WDL       Peripheral/Neurovascular WDL    Peripheral Neurovascular WDL WDL       Cognitive/Neuro/Behavioral WDL    Cognitive/Neuro/Behavioral WDL X    Arousal Level arouses to voice    Orientation situation    Speech slurred;slow;incoherent    Mood/Behavior cooperative       [place] Coma Scale    Best Eye Response 4-->(E4) spontaneous    Best Motor Response 6-->(M6) obeys commands    Best Verbal Response 5-->(V5) oriented    [place] Coma Scale Score 15

## 2022-08-07 NOTE — ED NOTES
RN spoke with pt's sister Edilia. RN provided update and will communicate with family when patient is planning to be discharged. Edilia would like to be involved in discharge planning with MD.     Edilia Mayfield (sister)  [phone]

## 2022-08-07 NOTE — ED PROVIDER NOTES
Pt alert in the morning.  Pt signed out to Dr. Erazo with detox vs discharge pending.  Pt had stated he was interested in detox and nurse was looking into availability.       Abram Mortensen MD  08/07/22 5845

## 2022-08-07 NOTE — ED PROVIDER NOTES
Patient signed out to me pending clinical sobriety and to see if there is a detox bed.  He came in with an alcoholic ketoacidosis with an elevated lactate.  His blood sugars are coming down and they are in the 200s now.  He was given 3 units of regular insulin and breakfast.  His lactate is still elevated but improved after IV fluids.  The patient is clinically sober.  There are no detox beds.  The patient is willing to be discharged home with a prescription for Ativan for signs of withdrawal and follow-up in the clinic this week.  He was given numbers for detox and if needs it this week and able to get in, he can go there directly.      ICD-10-CM    1. Acute alcoholic intoxication in alcoholism with complication (H)  F10.229    2. Hyperglycemia  R73.9    3. Lactic acidosis  E87.2           Amanda Erazo MD  08/07/22 4040

## 2022-08-07 NOTE — ED NOTES
The writer called [facility], [address] [place] [address], [facility], and [facility].  No beds available at this time.

## 2022-08-07 NOTE — ED PROVIDER NOTES
History     Chief Complaint:  Blood Sugar Problem    HPI   Long Mayfield is a 52 year old male with a history of insulin dependant type two diabetes mellitus, hypertension, and chronic pancreatitis who presents to the emergency department via EMS for evaluation of high blood sugar. The patient was found by his brother and parents. They were concerned that he was altered and abnormally tired. EMS was called, and Long was transported to the emergency department. En route, they obtained a blood sugar of 542, and he was hypotensive in the 90's. Here, he notes a friend just passed away. He states he was sad and did not want to leave his room. He states his blood sugars are always high. He denied any pain or emesis. He also denied any use of alcohol.     Review of Systems   Unable to perform ROS: Mental status change   Gastrointestinal: Negative for vomiting.   Psychiatric/Behavioral: Positive for behavioral problems, confusion and dysphoric mood.   All other systems reviewed and are negative.    Allergies:  The patient has no known allergies on file.     Medications:    amLODIPine (NORVASC) 5 MG tablet  dulaglutide (TRULICITY) 0.75 MG/0.5ML pen  folic acid (FOLVITE) 1 MG tablet  multivitamin w/minerals (THERA-VIT-M) tablet  potassium gluconate 2.5 MEQ tablet  sitagliptin (JANUVIA) 100 MG tablet  thiamine (B-1) 100 MG tablet    Past Medical History:    Bell's palsy   Type two diabetes mellitus    SALVADOR  Alcohol dependence  Hypertension   Hyperlipidemia  GERD  Chronic pancreatitis    Past Surgical History:    Orthopedic surgery     Social History:  The patient presents to the ED via EMS.     Physical Exam     Patient Vitals for the past 24 hrs:   Pulse Resp SpO2 Weight   08/06/22 2030 82 12 96 % --   08/06/22 1951 -- -- -- 63.5 kg (140 lb)     Physical Exam  Nursing note and vitals reviewed.  HENT:   Mouth/Throat: Moist mucous membranes.   Eyes: EOMI, nonicteric sclera  Cardiovascular: Normal rate, regular rhythm, no  murmurs, rubs, or gallops  Pulmonary/Chest: Effort normal and breath sounds normal. No respiratory distress. No wheezes. No rales.   Abdominal: Soft. Nontender, nondistended, no guarding or rigidity.   Musculoskeletal: Normal range of motion.   Neurological: Alert. Speech slurred.  Moves all extremities spontaneously.   Skin: Skin is warm and dry. No rash noted.   Psychiatric: Normal mood and affect.       Emergency Department Course     Laboratory:  Labs Ordered and Resulted from Time of ED Arrival to Time of ED Departure   COMPREHENSIVE METABOLIC PANEL - Abnormal       Result Value    Sodium 134      Potassium 2.9 (*)     Chloride 97      Carbon Dioxide (CO2) 24      Anion Gap 13      Urea Nitrogen 12      Creatinine 0.71      Calcium 8.5      Glucose 407 (*)     Alkaline Phosphatase 118      AST 71 (*)     ALT 57      Protein Total 6.8      Albumin 3.0 (*)     Bilirubin Total 0.2      GFR Estimate >90     LACTIC ACID WHOLE BLOOD - Abnormal    Lactic Acid 4.8 (*)    ETHYL ALCOHOL LEVEL - Abnormal    Alcohol ethyl 0.44 (*)    LIPASE - Abnormal    Lipase 30 (*)    CBC WITH PLATELETS AND DIFFERENTIAL - Abnormal    WBC Count 7.7      RBC Count 4.14 (*)     Hemoglobin 11.4 (*)     Hematocrit 33.9 (*)     MCV 82      MCH 27.5      MCHC 33.6      RDW 14.2      Platelet Count 239      % Neutrophils 53      % Lymphocytes 37      % Monocytes 7      % Eosinophils 2      % Basophils 1      % Immature Granulocytes 0      NRBCs per 100 WBC 0      Absolute Neutrophils 4.1      Absolute Lymphocytes 2.8      Absolute Monocytes 0.5      Absolute Eosinophils 0.2      Absolute Basophils 0.0      Absolute Immature Granulocytes 0.0      Absolute NRBCs 0.0     GLUCOSE BY METER - Abnormal    GLUCOSE BY METER POCT 323 (*)    TROPONIN I - Normal    Troponin I High Sensitivity 8     KETONE BETA-HYDROXYBUTYRATE QUANTITATIVE, RAPID - Normal    Ketone (Beta-Hydroxybutyrate) Quantitative 0.2     PH VENOUS - Normal    pH Venous 7.39        Emergency Department Course:    Reviewed:  I reviewed nursing notes, vitals, past medical history and Care Everywhere    Assessments:  1939 I obtained history and examined the patient as noted above.   2037 I rechecked the patient and explained findings.     Interventions:  2022  lactated ringers BOLUS 2,000 mL, IV  2028  potassium chloride ER (KLOR-CON M) CR tablet 40 mEq, PO    Disposition:  Care of the patient was transferred to my colleague Dr. Mortensen pending clinical sobriety.     Impression & Plan      Medical Decision Making:  Pt presents as a red team activation via EMS for concern of altered mental status in context of hyperglycemia. Broad ddx considered. No evidence of trauma noted on exam. Pt is altered and slurring his speech. He denied any alcohol use. Concern for DKA, AKA, starvation ketosis, head bleed among other etiologies. Labs obtained and are notable for hyperglycemia without evidence of ketosis or acidosis. Pt alcohol level significantly elevated at 0.44. This is certainly reason for lactic acidosis. I am not concerned for severe sepsis or septic shock. No need to repeat this lab given pt's EtOH level. Glucose improving after IVF here in ED. Plan for pt to sober up then discharge home with sober  - likely in AM. Pt signed out to Dr. Mortensen pending ultimate disposition.         Diagnosis:    ICD-10-CM    1. Acute alcoholic intoxication in alcoholism with complication (H)  F10.229    2. Hyperglycemia  R73.9    3. Lactic acidosis  E87.2      Scribe Disclosure:  I, Jieleoncio Glover, am serving as a scribe at 7:39 PM on 8/6/2022 to document services personally performed by Jamari Dugan MD based on my observations and the provider's statements to me.      Jamari Dugan MD  08/06/22 7116

## 2022-08-07 NOTE — ED NOTES
Bed: ED20  Expected date:   Expected time:   Means of arrival:   Comments:  [phone] [place] DKA [id number]

## 2022-09-17 ENCOUNTER — HEALTH MAINTENANCE LETTER (OUTPATIENT)
Age: 52
End: 2022-09-17

## 2022-12-22 ENCOUNTER — TRANSFERRED RECORDS (OUTPATIENT)
Dept: HEALTH INFORMATION MANAGEMENT | Facility: CLINIC | Age: 52
End: 2022-12-22

## 2023-01-23 ENCOUNTER — HEALTH MAINTENANCE LETTER (OUTPATIENT)
Age: 53
End: 2023-01-23

## 2023-01-23 ENCOUNTER — HOSPITAL ENCOUNTER (EMERGENCY)
Facility: CLINIC | Age: 53
Discharge: HOME | End: 2023-01-23
Attending: EMERGENCY MEDICINE | Admitting: EMERGENCY MEDICINE
Payer: COMMERCIAL

## 2023-01-23 VITALS
RESPIRATION RATE: 16 BRPM | HEART RATE: 77 BPM | TEMPERATURE: 97.8 F | OXYGEN SATURATION: 99 % | SYSTOLIC BLOOD PRESSURE: 109 MMHG | DIASTOLIC BLOOD PRESSURE: 65 MMHG

## 2023-01-23 DIAGNOSIS — R42 LIGHTHEADEDNESS: ICD-10-CM

## 2023-01-23 LAB
ABO/RH(D): NORMAL
ALBUMIN SERPL BCG-MCNC: 4.1 G/DL (ref 3.5–5.2)
ALP SERPL-CCNC: 113 U/L (ref 40–129)
ALT SERPL W P-5'-P-CCNC: 27 U/L (ref 10–50)
ANION GAP SERPL CALCULATED.3IONS-SCNC: 10 MMOL/L (ref 7–15)
ANTIBODY SCREEN: NEGATIVE
AST SERPL W P-5'-P-CCNC: 35 U/L (ref 10–50)
B-OH-BUTYR SERPL-MCNC: <0.2 MMOL/L
BASOPHILS # BLD AUTO: 0 10E3/UL (ref 0–0.2)
BASOPHILS NFR BLD AUTO: 1 %
BILIRUB SERPL-MCNC: 0.2 MG/DL
BUN SERPL-MCNC: 22.9 MG/DL (ref 6–20)
CALCIUM SERPL-MCNC: 9.6 MG/DL (ref 8.6–10)
CHLORIDE SERPL-SCNC: 93 MMOL/L (ref 98–107)
CREAT SERPL-MCNC: 0.93 MG/DL (ref 0.67–1.17)
DEPRECATED HCO3 PLAS-SCNC: 30 MMOL/L (ref 22–29)
EOSINOPHIL # BLD AUTO: 0.2 10E3/UL (ref 0–0.7)
EOSINOPHIL NFR BLD AUTO: 2 %
ERYTHROCYTE [DISTWIDTH] IN BLOOD BY AUTOMATED COUNT: 13.5 % (ref 10–15)
GFR SERPL CREATININE-BSD FRML MDRD: >90 ML/MIN/1.73M2
GLUCOSE SERPL-MCNC: 253 MG/DL (ref 70–99)
HCT VFR BLD AUTO: 37.5 % (ref 40–53)
HGB BLD-MCNC: 12.4 G/DL (ref 13.3–17.7)
HOLD SPECIMEN: NORMAL
HOLD SPECIMEN: NORMAL
IMM GRANULOCYTES # BLD: 0 10E3/UL
IMM GRANULOCYTES NFR BLD: 0 %
LIPASE SERPL-CCNC: 6 U/L (ref 13–60)
LYMPHOCYTES # BLD AUTO: 2.4 10E3/UL (ref 0.8–5.3)
LYMPHOCYTES NFR BLD AUTO: 34 %
MCH RBC QN AUTO: 27.2 PG (ref 26.5–33)
MCHC RBC AUTO-ENTMCNC: 33.1 G/DL (ref 31.5–36.5)
MCV RBC AUTO: 82 FL (ref 78–100)
MONOCYTES # BLD AUTO: 0.5 10E3/UL (ref 0–1.3)
MONOCYTES NFR BLD AUTO: 7 %
NEUTROPHILS # BLD AUTO: 4 10E3/UL (ref 1.6–8.3)
NEUTROPHILS NFR BLD AUTO: 56 %
NRBC # BLD AUTO: 0 10E3/UL
NRBC BLD AUTO-RTO: 0 /100
PLATELET # BLD AUTO: 347 10E3/UL (ref 150–450)
POTASSIUM SERPL-SCNC: 3.4 MMOL/L (ref 3.4–5.3)
PROT SERPL-MCNC: 7.2 G/DL (ref 6.4–8.3)
RBC # BLD AUTO: 4.56 10E6/UL (ref 4.4–5.9)
SODIUM SERPL-SCNC: 133 MMOL/L (ref 136–145)
SPECIMEN EXPIRATION DATE: NORMAL
WBC # BLD AUTO: 7.1 10E3/UL (ref 4–11)

## 2023-01-23 PROCEDURE — 80053 COMPREHEN METABOLIC PANEL: CPT | Performed by: EMERGENCY MEDICINE

## 2023-01-23 PROCEDURE — 258N000003 HC RX IP 258 OP 636: Performed by: EMERGENCY MEDICINE

## 2023-01-23 PROCEDURE — 82010 KETONE BODYS QUAN: CPT | Performed by: EMERGENCY MEDICINE

## 2023-01-23 PROCEDURE — 96360 HYDRATION IV INFUSION INIT: CPT

## 2023-01-23 PROCEDURE — 86901 BLOOD TYPING SEROLOGIC RH(D): CPT | Performed by: EMERGENCY MEDICINE

## 2023-01-23 PROCEDURE — 85025 COMPLETE CBC W/AUTO DIFF WBC: CPT | Performed by: EMERGENCY MEDICINE

## 2023-01-23 PROCEDURE — 83690 ASSAY OF LIPASE: CPT | Performed by: EMERGENCY MEDICINE

## 2023-01-23 PROCEDURE — 99283 EMERGENCY DEPT VISIT LOW MDM: CPT | Mod: 25

## 2023-01-23 PROCEDURE — 36415 COLL VENOUS BLD VENIPUNCTURE: CPT | Performed by: EMERGENCY MEDICINE

## 2023-01-23 PROCEDURE — 96361 HYDRATE IV INFUSION ADD-ON: CPT

## 2023-01-23 RX ADMIN — SODIUM CHLORIDE 1000 ML: 9 INJECTION, SOLUTION INTRAVENOUS at 19:46

## 2023-01-23 ASSESSMENT — ENCOUNTER SYMPTOMS
LIGHT-HEADEDNESS: 1
ABDOMINAL PAIN: 0
VOMITING: 0
HEADACHES: 0
SHORTNESS OF BREATH: 0

## 2023-01-23 ASSESSMENT — ACTIVITIES OF DAILY LIVING (ADL): ADLS_ACUITY_SCORE: 33

## 2023-01-24 NOTE — ED PROVIDER NOTES
History     Chief Complaint:  Lightheadedness     HPI   Long Mayfield is a 52 year old male who presents with lightheadedness. The patient reports that he has been having intermittent lightheaded episodes with activity for over the past week. The patient states the episodes are worse with activity and resolve with rest.  He also indicates that the episodes do not last very long.  He states that he had his blood glucose checked at his endocrinologist where it was found to be over 700 and he was referred to the ED. He reports that his mother was really sick and passed away 1/22, so he was unable to go to the ED at that time. The patient denies any chest pain, shortness of breath, abdominal pain, vomiting, and new headache. He notes chronic diarrhea and neck pain that is unchanged. The patient denies any alcohol consumption and states he has been sober for 5 months.  Additionally, he currently does not feel lightheaded.    Review of External Notes: Care everywhere past medical history updated 1/23/23     ROS:  Review of Systems   Respiratory: Negative for shortness of breath.    Cardiovascular: Negative for chest pain.   Gastrointestinal: Negative for abdominal pain and vomiting.   Neurological: Positive for light-headedness. Negative for headaches.   All other systems reviewed and are negative.      Allergies:  No Known Allergies     Medications:    Norvasc   Trulicity   Novolog   Lantus   Ativan   Januvia   Creon   Lexapro   Neurontin   Lipitor   Viagra    Past Medical History:    Bell's palsy   Diabetes   Hypertension   Alcohol abuse   Depression   Cervical disc disease  Hyperlipidemia   GERD    Past Surgical History:    Orthopedic surgery     Social History:   reports that he has never smoked. He has never used smokeless tobacco. He denies current alcohol use. He reports that he does not use drugs.  PCP: Ana Maria Blakely     Physical Exam     Patient Vitals for the past 24 hrs:   BP Temp Temp src Pulse  Resp SpO2   01/23/23 1952 109/65 -- -- -- -- --   01/23/23 1951 99/63 -- -- -- -- --   01/23/23 1944 -- -- -- 77 -- --   01/23/23 1934 (!) 69/31 97.8  F (36.6  C) Temporal 82 16 99 %        Physical Exam  Nursing note and vitals reviewed.  Constitutional:  Oriented to person, place, and time. Cooperative.   HENT:   Mouth/Throat:   Face covering in place   Eyes:    Conjunctivae normal and EOM are normal.      Pupils are equal, round, and reactive to light.   Neck:    Trachea normal.   Cardiovascular:  Normal rate, regular rhythm, normal heart sounds and normal pulses. 3/6 systolic murmur present.   Pulmonary/Chest:  Effort normal and breath sounds normal.   Abdominal:   Soft. Normal appearance and bowel sounds are normal.      There is no tenderness.      There is no rebound and no CVA tenderness.   Musculoskeletal:  Extremities atraumatic x 4.   Lymphadenopathy:  No cervical adenopathy.   Neurological:   Alert and oriented to person, place, and time. Normal strength.      No cranial nerve deficit or sensory deficit. GCS eye subscore is 4. GCS verbal subscore is 5. GCS motor subscore is 6.   Skin:    Skin is intact. No rash noted.   Psychiatric:   Normal mood and affect.    Emergency Department Course     Laboratory:  Labs Ordered and Resulted from Time of ED Arrival to Time of ED Departure   COMPREHENSIVE METABOLIC PANEL - Abnormal       Result Value    Sodium 133 (*)     Potassium 3.4      Chloride 93 (*)     Carbon Dioxide (CO2) 30 (*)     Anion Gap 10      Urea Nitrogen 22.9 (*)     Creatinine 0.93      Calcium 9.6      Glucose 253 (*)     Alkaline Phosphatase 113      AST 35      ALT 27      Protein Total 7.2      Albumin 4.1      Bilirubin Total 0.2      GFR Estimate >90     LIPASE - Abnormal    Lipase 6 (*)    CBC WITH PLATELETS AND DIFFERENTIAL - Abnormal    WBC Count 7.1      RBC Count 4.56      Hemoglobin 12.4 (*)     Hematocrit 37.5 (*)     MCV 82      MCH 27.2      MCHC 33.1      RDW 13.5      Platelet  Count 347      % Neutrophils 56      % Lymphocytes 34      % Monocytes 7      % Eosinophils 2      % Basophils 1      % Immature Granulocytes 0      NRBCs per 100 WBC 0      Absolute Neutrophils 4.0      Absolute Lymphocytes 2.4      Absolute Monocytes 0.5      Absolute Eosinophils 0.2      Absolute Basophils 0.0      Absolute Immature Granulocytes 0.0      Absolute NRBCs 0.0     KETONE BETA-HYDROXYBUTYRATE QUANTITATIVE, RAPID - Normal    Ketone (Beta-Hydroxybutyrate) Quantitative <0.20     TYPE AND SCREEN, ADULT    ABO/RH(D) O POS      Antibody Screen Negative      SPECIMEN EXPIRATION DATE [id number]     ABO/RH TYPE AND SCREEN          Emergency Department Course & Assessments:    Interventions:  Medications   0.9% sodium chloride BOLUS (0 mLs Intravenous Stopped 1/23/23 2318)        ED Course as of 01/23/23 2319 Mon Jan 23, 2023 2308 I obtained history and examined the patient as noted above.         Social Determinants of Health affecting care:  Mother passed away 1/22     Disposition:  The patient was discharged to home.     Impression & Plan      Medical Decision Making:  This is a 52-year-old male who came in with lightheadedness periodically, as well as a very high blood sugar from his clinic from January 20.  He has a normal exam here with normal vital signs, and his laboratory work-up today is unremarkable, other than his blood sugar.  However his blood sugar is at a much more normal level at 253.  He is not in DKA.  I suspect that his episodes of dizziness are likely from dehydration and possibly elevated blood sugars.  However at this point I am not finding anything significant to require hospitalization at this point.  I feel that he is safe for discharge and continued outpatient management.  I stressed the importance of close outpatient follow-up with his endocrinologist as well as keeping well-hydrated and managing his blood sugar much more closely.  He should return here with any concerns or  worsening symptoms as well.    Diagnosis:    ICD-10-CM    1. Lightheadedness  R42            Scribe Disclosure:  I, Katiuska Vasquez, am serving as a scribe at 11:00 PM on 1/23/2023 to document services personally performed by Crispin Tabor MD based on my observations and the provider's statements to me.     1/23/2023   Crispin Tabor MD Lashkowitz, Seth H, MD  01/24/23 0506

## 2023-01-24 NOTE — DISCHARGE INSTRUCTIONS
Discharge Instructions  Dizziness (Lightheaded)  Today you were seen for dizziness.  Dizziness can be caused by many things and it can be very difficult to determine the cause of dizziness.  At this time, your provider has found no signs that your dizziness is due to a serious or life-threatening condition. However, sometimes there is a serious problem that does not show up right away, and it is important for you to follow up with your regular provider as instructed.  Generally, every Emergency Department visit should have a follow-up clinic visit with either a primary or a specialty clinic/provider. Please follow-up as instructed by your emergency provider today.      Return to the Emergency Department if:    You pass out (fainting or falling out), especially during exercise.    You develop chest pain, chest pressure or difficulty breathing.  Your feel an irregular heartbeat.  You have excessive vaginal bleeding, or blood in your stool or vomit (throw up).  You have a high fever.  Your symptoms get worse or more frequent.    If when you begin to feel dizzy or lightheaded, it is important to sit down or lay down immediately to prevent injury from falling.  If you were given a prescription for medicine here today, be sure to read all of the information (including the package insert) that comes with your prescription.  This will include important information about the medicine, its side effects, and any warnings that you need to know about.  The pharmacist who fills the prescription can provide more information and answer questions you may have about the medicine.  If you have questions or concerns that the pharmacist cannot address, please call or return to the Emergency Department.   Remember that you can always come back to the Emergency Department if you are not able to see your regular provider in the amount of time listed above, if you get any new symptoms, or if there is anything that worries you.     No

## 2023-05-06 ENCOUNTER — HEALTH MAINTENANCE LETTER (OUTPATIENT)
Age: 53
End: 2023-05-06

## 2023-10-07 ENCOUNTER — HEALTH MAINTENANCE LETTER (OUTPATIENT)
Age: 53
End: 2023-10-07

## 2023-10-10 ENCOUNTER — TRANSFERRED RECORDS (OUTPATIENT)
Dept: HEALTH INFORMATION MANAGEMENT | Facility: CLINIC | Age: 53
End: 2023-10-10

## 2024-01-08 ENCOUNTER — MEDICAL CORRESPONDENCE (OUTPATIENT)
Dept: HEALTH INFORMATION MANAGEMENT | Facility: CLINIC | Age: 54
End: 2024-01-08

## 2024-01-11 ENCOUNTER — MEDICAL CORRESPONDENCE (OUTPATIENT)
Dept: HEALTH INFORMATION MANAGEMENT | Facility: CLINIC | Age: 54
End: 2024-01-11

## 2024-02-24 ENCOUNTER — HEALTH MAINTENANCE LETTER (OUTPATIENT)
Age: 54
End: 2024-02-24

## 2024-02-25 ENCOUNTER — NURSE TRIAGE (OUTPATIENT)
Dept: NURSING | Facility: CLINIC | Age: 54
End: 2024-02-25
Payer: COMMERCIAL

## 2024-02-25 ENCOUNTER — HOSPITAL ENCOUNTER (EMERGENCY)
Facility: CLINIC | Age: 54
Discharge: HOME | End: 2024-02-26
Attending: EMERGENCY MEDICINE | Admitting: EMERGENCY MEDICINE
Payer: COMMERCIAL

## 2024-02-25 VITALS
WEIGHT: 140 LBS | SYSTOLIC BLOOD PRESSURE: 174 MMHG | TEMPERATURE: 97 F | HEART RATE: 73 BPM | DIASTOLIC BLOOD PRESSURE: 114 MMHG | RESPIRATION RATE: 16 BRPM | OXYGEN SATURATION: 98 % | BODY MASS INDEX: 23.32 KG/M2 | HEIGHT: 65 IN

## 2024-02-25 DIAGNOSIS — R51.9 ACUTE NONINTRACTABLE HEADACHE, UNSPECIFIED HEADACHE TYPE: ICD-10-CM

## 2024-02-25 DIAGNOSIS — K08.89 PAIN, DENTAL: ICD-10-CM

## 2024-02-25 PROCEDURE — 250N000011 HC RX IP 250 OP 636: Performed by: EMERGENCY MEDICINE

## 2024-02-25 PROCEDURE — 99285 EMERGENCY DEPT VISIT HI MDM: CPT

## 2024-02-25 PROCEDURE — 250N000013 HC RX MED GY IP 250 OP 250 PS 637: Performed by: EMERGENCY MEDICINE

## 2024-02-25 RX ORDER — HYDROCODONE BITARTRATE AND ACETAMINOPHEN 5; 325 MG/1; MG/1
1 TABLET ORAL EVERY 6 HOURS PRN
Qty: 10 TABLET | Refills: 0 | Status: SHIPPED | OUTPATIENT
Start: 2024-02-25 | End: 2024-02-25

## 2024-02-25 RX ORDER — HYDROCODONE BITARTRATE AND ACETAMINOPHEN 5; 325 MG/1; MG/1
1 TABLET ORAL EVERY 6 HOURS PRN
Qty: 10 TABLET | Refills: 0 | Status: ON HOLD | OUTPATIENT
Start: 2024-02-25 | End: 2024-05-07

## 2024-02-25 RX ORDER — HYDROCODONE BITARTRATE AND ACETAMINOPHEN 5; 325 MG/1; MG/1
2 TABLET ORAL ONCE
Status: COMPLETED | OUTPATIENT
Start: 2024-02-25 | End: 2024-02-25

## 2024-02-25 RX ORDER — IBUPROFEN 600 MG/1
600 TABLET, FILM COATED ORAL ONCE
Status: COMPLETED | OUTPATIENT
Start: 2024-02-25 | End: 2024-02-25

## 2024-02-25 RX ORDER — IBUPROFEN 600 MG/1
600 TABLET, FILM COATED ORAL EVERY 6 HOURS PRN
Qty: 30 TABLET | Refills: 0 | Status: ON HOLD | OUTPATIENT
Start: 2024-02-25 | End: 2024-05-07

## 2024-02-25 RX ORDER — ONDANSETRON 4 MG/1
4 TABLET, ORALLY DISINTEGRATING ORAL ONCE
Status: COMPLETED | OUTPATIENT
Start: 2024-02-25 | End: 2024-02-25

## 2024-02-25 RX ORDER — HYDRALAZINE HYDROCHLORIDE 25 MG/1
25 TABLET, FILM COATED ORAL ONCE
Status: COMPLETED | OUTPATIENT
Start: 2024-02-25 | End: 2024-02-25

## 2024-02-25 RX ORDER — IBUPROFEN 600 MG/1
600 TABLET, FILM COATED ORAL EVERY 6 HOURS PRN
Qty: 30 TABLET | Refills: 0 | Status: SHIPPED | OUTPATIENT
Start: 2024-02-25 | End: 2024-02-25

## 2024-02-25 RX ADMIN — HYDROCODONE BITARTRATE AND ACETAMINOPHEN 2 TABLET: 5; 325 TABLET ORAL at 23:02

## 2024-02-25 RX ADMIN — IBUPROFEN 600 MG: 600 TABLET ORAL at 23:02

## 2024-02-25 RX ADMIN — HYDRALAZINE HYDROCHLORIDE 25 MG: 25 TABLET, FILM COATED ORAL at 23:02

## 2024-02-25 RX ADMIN — ONDANSETRON 4 MG: 4 TABLET, ORALLY DISINTEGRATING ORAL at 23:02

## 2024-02-25 ASSESSMENT — ACTIVITIES OF DAILY LIVING (ADL)
ADLS_ACUITY_SCORE: 37
ADLS_ACUITY_SCORE: 35
ADLS_ACUITY_SCORE: 35

## 2024-02-25 NOTE — ED TRIAGE NOTES
Pt comes in today with a headache for the last 4 days   Pt 7 days ago had a tooth pulled   Pt also has had persistent Htn since tooth pulled   Pt came from a group home

## 2024-02-25 NOTE — TELEPHONE ENCOUNTER
HA 3-4 days.   Nurse Triage SBAR    Is this a 2nd Level Triage? NO    Situation: pt is calling due to an ongoing HA for the last 3-4 days.  8-9/10. Pain now. Had a tooth pulled 8 days ago.  Denies fever.  BP was 180/? Last night.  Pt states he never gets HA's.     vomited this am.      Protocol Recommended Disposition:   Go to ED Now (Or PCP Triage), See HCP Within 4 Hours (Or PCP Triage)    Recommendation:     ER.  He is at an [facility]  care facilty.  Now.    Reason for Disposition   [1] SEVERE pain (e.g., excruciating, unable to do any normal activities) AND [2] not improved 2 hours after pain medicine   Patient sounds very sick or weak to the triager    Additional Information   Negative: Sounds like a life-threatening emergency to the triager   Negative: Tooth knocked out   Negative: [1] Bleeding present > 30 minutes AND [2] using correct technique of direct pressure   Negative: [1] Bleeding now AND [2] second call after being instructed in correct technique of direct pressure   Negative: [1] Has packing sutured over socket (extraction site) AND [2] now bleeding around the packing  (Exception: Few drops or ooze.)   Negative: Tongue is very swollen and tender   Negative: [1] Face is swollen AND [2] fever   Negative: Patient sounds very sick or weak to the triager   Negative: Difficult to awaken or acting confused (e.g., disoriented, slurred speech)   Negative: [1] Weakness of the face, arm or leg on one side of the body AND [2] new-onset   Negative: [1] Numbness of the face, arm or leg on one side of the body AND [2] new-onset   Negative: [1] Loss of speech or garbled speech AND [2] new-onset   Negative: Passed out (i.e., lost consciousness, collapsed and was not responding)   Negative: Sounds like a life-threatening emergency to the triager   Negative: Followed a head injury   Negative: Pregnant   Negative: Postpartum (from 0 to 6 weeks after delivery)   Negative: Traumatic Brain Injury (TBI) is suspected    "Negative: Unable to walk, or can only walk with assistance (e.g., requires support)   Negative: Stiff neck (can't touch chin to chest)   Negative: Severe pain in one eye   Negative: [1] Other family members (or people in same household) with headaches AND [2] possibility of carbon monoxide exposure   Negative: [1] Fever > 100.0 F (37.8 C) AND [2] diabetes mellitus or weak immune system (e.g., HIV positive, cancer chemo, splenectomy, organ transplant, chronic steroids)   Negative: Loss of vision or double vision  (Exception: Same as prior migraines.)   Negative: [1] SEVERE headache AND [2] sudden-onset (i.e., reaching maximum intensity within seconds to 1 hour)   Negative: [1] SEVERE headache AND [2] fever   Negative: [1] SEVERE headache (e.g., excruciating) AND [2] \"worst headache\" of life    Protocols used: Tooth [id number], Headache-A-    Samantha Chakraborty RN on 2/25/2024 at 2:43 PM   "

## 2024-02-26 NOTE — ED PROVIDER NOTES
History     Chief Complaint:  Headache and Dental Pain       HPI   Long Mayfield is a 53 year old male who presents to the ED for evaluation of 4 days of headache and persistent pain in his right mandible area at the site of a tooth extraction.  The patient states that he had a tooth extracted within the last week although he cannot remember exactly what day it was as the tooth was broken and an old filling had fallen out.  It was causing pain therefore his dentist extracted it.  Since then he has had intermittent left-sided headaches.  No bleeding swelling or pus in the area of the dental extraction.  No fevers.  He denies any fall or head injury.  No numbness tingling or weakness.  He denies any other symptoms or concerns at this time.    Independent Historian:   None - Patient Only    Review of External Notes:  I reviewed the paper records sent by the Wend[facility] of [place] including the patient's facesheet medication records and [facility]. -Patient is not currently on any antibiotics.    Medications:    HYDROcodone-acetaminophen (NORCO) 5-325 MG tablet  ibuprofen (ADVIL/MOTRIN) 600 MG tablet  amLODIPine (NORVASC) 5 MG tablet  dulaglutide (TRULICITY) 0.75 MG/0.5ML pen  folic acid (FOLVITE) 1 MG tablet  insulin aspart (NOVOLOG PEN) 100 UNIT/ML pen  insulin aspart (NOVOLOG PEN) 100 UNIT/ML pen  insulin glargine (LANTUS PEN) 100 UNIT/ML pen  LORazepam (ATIVAN) 1 MG tablet  multivitamin w/minerals (THERA-VIT-M) tablet  potassium gluconate 2.5 MEQ tablet  sitagliptin (JANUVIA) 100 MG tablet  thiamine (B-1) 100 MG tablet        Past Medical History:    Past Medical History:   Diagnosis Date    Bell's palsy     Diabetes (H)        Past Surgical History:    Past Surgical History:   Procedure Laterality Date    ORTHOPEDIC SURGERY          Physical Exam   Patient Vitals for the past 24 hrs:   BP Temp Temp src Pulse Resp SpO2 Height Weight   02/25/24 1621 (!) 174/114 97  F (36.1  C) Oral 73 16 98 % -- --   02/25/24 1618 -- --  "-- -- -- -- 1.651 m (5' 5\") 63.5 kg (140 lb)        Physical Exam  General: Well appearing, nontoxic.  Resting comfortably  Head:  Scalp, face, and head appear normal, atraumatic non tender to palpation. No tenderness to palpation over the temporal region.   Eyes:  Pupils are equal, round, reactive to light     Conjunctivae non-injected and sclerae white  ENT:    The external nose is normal. Right TM and auditory canal normal.     Oropharynx and posterior pharynx is normal without swelling erythema or exudates.  There is a recent right-sided maxillary tooth extraction site at the first molar which appears to be healing well.  No gingival swelling erythema fluctuance purulence or bleeding.  No dry socket.    Pinnae are normal  Neck:  Normal range of motion    There is no rigidity noted    Trachea is in the midline  CV:  Extremities warm and well-perfused.  Resp:  There is no tachypnea    No increased work of breathing  MS:  Normal muscular tone  Skin:  No rash or acute skin lesions noted  Neuro:  A&Ox3, GCS 15    CN II - XII intact    Speech clear, fluent, and normal    Strength 5/5 and symmetric in bilateral upper and lower extremities.    No pronator drift. No leg drift. SILT throughout.    No ankle clonus    FTN testing normal. No tremor.     No meningismus   Psych: Normal affect. Appropriate interactions.     Emergency Department Course   No results found for this or any previous visit.    Imaging:  No orders to display        Laboratory:  Labs Ordered and Resulted from Time of ED Arrival to Time of ED Departure - No data to display     Procedures     Emergency Department Course & Assessments:             Interventions:  Medications   hydrALAZINE (APRESOLINE) tablet 25 mg (25 mg Oral $Given 2/25/24 2302)   ondansetron (ZOFRAN ODT) ODT tab 4 mg (4 mg Oral $Given 2/25/24 2302)   HYDROcodone-acetaminophen (NORCO) 5-325 MG per tablet 2 tablet (2 tablets Oral $Given 2/25/24 2302)   ibuprofen (ADVIL/MOTRIN) tablet 600 " mg (600 mg Oral $Given 2/25/24 0684)        Assessments, Independent Interpretation, Consults/Discussion of Management/Tests:  ED Course as of 02/26/24 1157   Sun Feb 25, 2024   7987 Patient seen and evaluated        Social Determinants of Health affecting care:  None    Disposition:  The patient was discharged to home.     Impression & Plan      Medical Decision Making:  Long Mayfield is a 53 year old male who presents to the ED for evaluation of pain following recent dental extraction of a right mandibular molar resulting in headache.  On my evaluation he is well-appearing, hemodynamically stable and afebrile.  No focal neurologic deficits.  No head trauma.  No fever.  Examination is reassuring.  No dry socket.  No neurologic deficits.  No evidence of any infection, bleeding or dehiscence of the tooth extraction site.  No indication for any emergent imaging.  I recommended supportive care at home with Tylenol, ibuprofen and limited Norco.  Patient's blood pressure was elevated and his home dose of hydralazine was given.  I recommended close follow-up with the dentist and primary care physician if symptoms do not continue to improve.  At this time there is no evidence of underlying emergent serious life-threatening process.  He is stable for ongoing outpatient management.  Patient agreeable with plan of care he was discharged in stable condition.      Diagnosis:    ICD-10-CM    1. Pain, dental  K08.89       2. Acute nonintractable headache, unspecified headache type  R51.9            Discharge Medications:  Discharge Medication List as of 2/25/2024 11:35 PM        START taking these medications    Details   HYDROcodone-acetaminophen (NORCO) 5-325 MG tablet Take 1 tablet by mouth every 6 hours as needed for severe pain, Disp-10 tablet, R-0, E-Prescribe      ibuprofen (ADVIL/MOTRIN) 600 MG tablet Take 1 tablet (600 mg) by mouth every 6 hours as needed for other (pain, aches, fever) Take with food., Disp-30 tablet,  R-0, Local Print                2/25/2024   Nahum Freeman MD Daro, Ryan Clay, MD  02/26/24 7512

## 2024-03-18 ENCOUNTER — TRANSFERRED RECORDS (OUTPATIENT)
Dept: HEALTH INFORMATION MANAGEMENT | Facility: CLINIC | Age: 54
End: 2024-03-18

## 2024-03-28 ENCOUNTER — TRANSFERRED RECORDS (OUTPATIENT)
Dept: HEALTH INFORMATION MANAGEMENT | Facility: CLINIC | Age: 54
End: 2024-03-28

## 2024-05-03 ENCOUNTER — HOSPITAL ENCOUNTER (OUTPATIENT)
Facility: CLINIC | Age: 54
Setting detail: OBSERVATION
Discharge: NURSING, REHAB OR LONG-TERM CARE | End: 2024-05-07
Attending: EMERGENCY MEDICINE | Admitting: INTERNAL MEDICINE
Payer: COMMERCIAL

## 2024-05-03 DIAGNOSIS — G89.29 CHRONIC NECK AND BACK PAIN: Primary | ICD-10-CM

## 2024-05-03 DIAGNOSIS — M25.561 RIGHT KNEE PAIN, UNSPECIFIED CHRONICITY: ICD-10-CM

## 2024-05-03 DIAGNOSIS — M54.9 CHRONIC NECK AND BACK PAIN: Primary | ICD-10-CM

## 2024-05-03 DIAGNOSIS — I10 PRIMARY HYPERTENSION: ICD-10-CM

## 2024-05-03 DIAGNOSIS — N28.9 ACUTE RENAL INSUFFICIENCY: ICD-10-CM

## 2024-05-03 DIAGNOSIS — K21.9 GASTROESOPHAGEAL REFLUX DISEASE WITHOUT ESOPHAGITIS: ICD-10-CM

## 2024-05-03 DIAGNOSIS — M54.2 CHRONIC NECK AND BACK PAIN: Primary | ICD-10-CM

## 2024-05-03 DIAGNOSIS — E16.2 HYPOGLYCEMIA: ICD-10-CM

## 2024-05-03 DIAGNOSIS — D64.9 ANEMIA, UNSPECIFIED TYPE: ICD-10-CM

## 2024-05-03 PROBLEM — I51.5 CARDIAC CALCIFICATION (H): Status: ACTIVE | Noted: 2023-01-19

## 2024-05-03 PROBLEM — E78.49 OTHER HYPERLIPIDEMIA: Status: ACTIVE | Noted: 2022-03-15

## 2024-05-03 PROBLEM — M50.00 CERVICAL DISC DISEASE WITH MYELOPATHY: Status: ACTIVE | Noted: 2022-05-01

## 2024-05-03 PROBLEM — L97.519 DIABETIC ULCER OF TOE OF RIGHT FOOT ASSOCIATED WITH TYPE 2 DIABETES MELLITUS, UNSPECIFIED ULCER STAGE (H): Status: ACTIVE | Noted: 2023-12-01

## 2024-05-03 PROBLEM — M54.42 CHRONIC BILATERAL LOW BACK PAIN WITH BILATERAL SCIATICA: Status: ACTIVE | Noted: 2022-04-08

## 2024-05-03 PROBLEM — F10.10 ALCOHOL ABUSE: Status: ACTIVE | Noted: 2022-09-12

## 2024-05-03 PROBLEM — I96 GANGRENE OF TOE OF RIGHT FOOT (H): Status: ACTIVE | Noted: 2023-11-08

## 2024-05-03 PROBLEM — R63.4 UNINTENTIONAL WEIGHT LOSS: Status: ACTIVE | Noted: 2023-10-11

## 2024-05-03 PROBLEM — K86.81 EXOCRINE PANCREATIC INSUFFICIENCY: Status: ACTIVE | Noted: 2024-05-03

## 2024-05-03 PROBLEM — Z79.4 TYPE 2 DIABETES MELLITUS, WITH LONG-TERM CURRENT USE OF INSULIN (H): Status: ACTIVE | Noted: 2018-10-05

## 2024-05-03 PROBLEM — E11.621 DIABETIC ULCER OF TOE OF RIGHT FOOT ASSOCIATED WITH TYPE 2 DIABETES MELLITUS, UNSPECIFIED ULCER STAGE (H): Status: ACTIVE | Noted: 2023-12-01

## 2024-05-03 PROBLEM — E11.9 TYPE 2 DIABETES MELLITUS, WITH LONG-TERM CURRENT USE OF INSULIN (H): Status: ACTIVE | Noted: 2018-10-05

## 2024-05-03 PROBLEM — F33.2 SEVERE EPISODE OF RECURRENT MAJOR DEPRESSIVE DISORDER, WITHOUT PSYCHOTIC FEATURES (H): Status: ACTIVE | Noted: 2023-01-19

## 2024-05-03 PROBLEM — M54.41 CHRONIC BILATERAL LOW BACK PAIN WITH BILATERAL SCIATICA: Status: ACTIVE | Noted: 2022-04-08

## 2024-05-03 LAB
ABO/RH(D): NORMAL
ALBUMIN SERPL BCG-MCNC: 3.4 G/DL (ref 3.5–5.2)
ALP SERPL-CCNC: 138 U/L (ref 40–150)
ALT SERPL W P-5'-P-CCNC: 8 U/L (ref 0–70)
ANION GAP SERPL CALCULATED.3IONS-SCNC: 12 MMOL/L (ref 7–15)
ANTIBODY SCREEN: NEGATIVE
AST SERPL W P-5'-P-CCNC: 17 U/L (ref 0–45)
BASOPHILS # BLD AUTO: 0 10E3/UL (ref 0–0.2)
BASOPHILS NFR BLD AUTO: 0 %
BILIRUB SERPL-MCNC: <0.2 MG/DL
BLD PROD TYP BPU: NORMAL
BLOOD COMPONENT TYPE: NORMAL
BUN SERPL-MCNC: 33.5 MG/DL (ref 6–20)
CALCIUM SERPL-MCNC: 8.5 MG/DL (ref 8.6–10)
CHLORIDE SERPL-SCNC: 101 MMOL/L (ref 98–107)
CODING SYSTEM: NORMAL
CREAT SERPL-MCNC: 1.5 MG/DL (ref 0.67–1.17)
CROSSMATCH: NORMAL
DEPRECATED HCO3 PLAS-SCNC: 23 MMOL/L (ref 22–29)
EGFRCR SERPLBLD CKD-EPI 2021: 54 ML/MIN/1.73M2
EOSINOPHIL # BLD AUTO: 0.3 10E3/UL (ref 0–0.7)
EOSINOPHIL NFR BLD AUTO: 2 %
ERYTHROCYTE [DISTWIDTH] IN BLOOD BY AUTOMATED COUNT: 15.9 % (ref 10–15)
FERRITIN SERPL-MCNC: 104 NG/ML (ref 31–409)
GLUCOSE BLDC GLUCOMTR-MCNC: 101 MG/DL (ref 70–99)
GLUCOSE BLDC GLUCOMTR-MCNC: 103 MG/DL (ref 70–99)
GLUCOSE BLDC GLUCOMTR-MCNC: 121 MG/DL (ref 70–99)
GLUCOSE BLDC GLUCOMTR-MCNC: 134 MG/DL (ref 70–99)
GLUCOSE BLDC GLUCOMTR-MCNC: 144 MG/DL (ref 70–99)
GLUCOSE BLDC GLUCOMTR-MCNC: 67 MG/DL (ref 70–99)
GLUCOSE BLDC GLUCOMTR-MCNC: 96 MG/DL (ref 70–99)
GLUCOSE SERPL-MCNC: 98 MG/DL (ref 70–99)
HCT VFR BLD AUTO: 22.5 % (ref 40–53)
HEMOCCULT STL QL: NEGATIVE
HGB BLD-MCNC: 7 G/DL (ref 13.3–17.7)
HOLD SPECIMEN: NORMAL
HOLD SPECIMEN: NORMAL
IMM GRANULOCYTES # BLD: 0.1 10E3/UL
IMM GRANULOCYTES NFR BLD: 1 %
IRON BINDING CAPACITY (ROCHE): 238 UG/DL (ref 240–430)
IRON SATN MFR SERPL: 5 % (ref 15–46)
IRON SERPL-MCNC: 11 UG/DL (ref 61–157)
ISSUE DATE AND TIME: NORMAL
LYMPHOCYTES # BLD AUTO: 1.4 10E3/UL (ref 0.8–5.3)
LYMPHOCYTES NFR BLD AUTO: 13 %
MCH RBC QN AUTO: 24.3 PG (ref 26.5–33)
MCHC RBC AUTO-ENTMCNC: 31.1 G/DL (ref 31.5–36.5)
MCV RBC AUTO: 78 FL (ref 78–100)
MONOCYTES # BLD AUTO: 1 10E3/UL (ref 0–1.3)
MONOCYTES NFR BLD AUTO: 9 %
NEUTROPHILS # BLD AUTO: 8.1 10E3/UL (ref 1.6–8.3)
NEUTROPHILS NFR BLD AUTO: 75 %
NRBC # BLD AUTO: 0 10E3/UL
NRBC BLD AUTO-RTO: 0 /100
PLATELET # BLD AUTO: 364 10E3/UL (ref 150–450)
POTASSIUM SERPL-SCNC: 4.2 MMOL/L (ref 3.4–5.3)
PROT SERPL-MCNC: 7.4 G/DL (ref 6.4–8.3)
RBC # BLD AUTO: 2.88 10E6/UL (ref 4.4–5.9)
RETICS # AUTO: 0.06 10E6/UL (ref 0.03–0.1)
RETICS/RBC NFR AUTO: 1.9 % (ref 0.5–2)
SODIUM SERPL-SCNC: 136 MMOL/L (ref 135–145)
SPECIMEN EXPIRATION DATE: NORMAL
UNIT ABO/RH: NORMAL
UNIT NUMBER: NORMAL
UNIT STATUS: NORMAL
UNIT TYPE ISBT: 5100
WBC # BLD AUTO: 10.9 10E3/UL (ref 4–11)

## 2024-05-03 PROCEDURE — C9113 INJ PANTOPRAZOLE SODIUM, VIA: HCPCS | Performed by: EMERGENCY MEDICINE

## 2024-05-03 PROCEDURE — 82962 GLUCOSE BLOOD TEST: CPT

## 2024-05-03 PROCEDURE — P9016 RBC LEUKOCYTES REDUCED: HCPCS | Performed by: EMERGENCY MEDICINE

## 2024-05-03 PROCEDURE — 36415 COLL VENOUS BLD VENIPUNCTURE: CPT | Performed by: EMERGENCY MEDICINE

## 2024-05-03 PROCEDURE — 86900 BLOOD TYPING SEROLOGIC ABO: CPT | Performed by: EMERGENCY MEDICINE

## 2024-05-03 PROCEDURE — 36430 TRANSFUSION BLD/BLD COMPNT: CPT

## 2024-05-03 PROCEDURE — 82728 ASSAY OF FERRITIN: CPT | Performed by: INTERNAL MEDICINE

## 2024-05-03 PROCEDURE — 99285 EMERGENCY DEPT VISIT HI MDM: CPT | Mod: 25

## 2024-05-03 PROCEDURE — 86923 COMPATIBILITY TEST ELECTRIC: CPT | Performed by: EMERGENCY MEDICINE

## 2024-05-03 PROCEDURE — 85045 AUTOMATED RETICULOCYTE COUNT: CPT | Performed by: EMERGENCY MEDICINE

## 2024-05-03 PROCEDURE — 83550 IRON BINDING TEST: CPT | Performed by: EMERGENCY MEDICINE

## 2024-05-03 PROCEDURE — 96374 THER/PROPH/DIAG INJ IV PUSH: CPT

## 2024-05-03 PROCEDURE — 250N000013 HC RX MED GY IP 250 OP 250 PS 637: Performed by: INTERNAL MEDICINE

## 2024-05-03 PROCEDURE — 258N000003 HC RX IP 258 OP 636: Performed by: EMERGENCY MEDICINE

## 2024-05-03 PROCEDURE — 82272 OCCULT BLD FECES 1-3 TESTS: CPT | Performed by: EMERGENCY MEDICINE

## 2024-05-03 PROCEDURE — 250N000011 HC RX IP 250 OP 636: Performed by: EMERGENCY MEDICINE

## 2024-05-03 PROCEDURE — 85025 COMPLETE CBC W/AUTO DIFF WBC: CPT | Performed by: EMERGENCY MEDICINE

## 2024-05-03 PROCEDURE — 96375 TX/PRO/DX INJ NEW DRUG ADDON: CPT

## 2024-05-03 PROCEDURE — 258N000001 HC RX 258

## 2024-05-03 PROCEDURE — 99223 1ST HOSP IP/OBS HIGH 75: CPT | Mod: AI | Performed by: INTERNAL MEDICINE

## 2024-05-03 PROCEDURE — 120N000001 HC R&B MED SURG/OB

## 2024-05-03 PROCEDURE — 96361 HYDRATE IV INFUSION ADD-ON: CPT

## 2024-05-03 PROCEDURE — 80053 COMPREHEN METABOLIC PANEL: CPT | Performed by: EMERGENCY MEDICINE

## 2024-05-03 RX ORDER — NALOXONE HYDROCHLORIDE 0.4 MG/ML
0.2 INJECTION, SOLUTION INTRAMUSCULAR; INTRAVENOUS; SUBCUTANEOUS
Status: DISCONTINUED | OUTPATIENT
Start: 2024-05-03 | End: 2024-05-07 | Stop reason: HOSPADM

## 2024-05-03 RX ORDER — ASPIRIN 81 MG/1
81 TABLET ORAL DAILY
COMMUNITY

## 2024-05-03 RX ORDER — AMLODIPINE BESYLATE 5 MG/1
5 TABLET ORAL DAILY
Status: ON HOLD | COMMUNITY
End: 2024-05-07

## 2024-05-03 RX ORDER — METHOCARBAMOL 500 MG/1
500 TABLET, FILM COATED ORAL EVERY 8 HOURS PRN
COMMUNITY

## 2024-05-03 RX ORDER — OXYCODONE HYDROCHLORIDE 5 MG/1
5 TABLET ORAL EVERY 4 HOURS PRN
Status: DISCONTINUED | OUTPATIENT
Start: 2024-05-03 | End: 2024-05-05

## 2024-05-03 RX ORDER — LIDOCAINE 4 G/G
1 PATCH TOPICAL AT BEDTIME
Status: DISCONTINUED | OUTPATIENT
Start: 2024-05-03 | End: 2024-05-07 | Stop reason: HOSPADM

## 2024-05-03 RX ORDER — INSULIN ASPART 100 [IU]/ML
1-5 INJECTION, SOLUTION INTRAVENOUS; SUBCUTANEOUS
COMMUNITY

## 2024-05-03 RX ORDER — PROCHLORPERAZINE 25 MG
25 SUPPOSITORY, RECTAL RECTAL EVERY 12 HOURS PRN
Status: DISCONTINUED | OUTPATIENT
Start: 2024-05-03 | End: 2024-05-07 | Stop reason: HOSPADM

## 2024-05-03 RX ORDER — LOPERAMIDE HCL 2 MG
4 CAPSULE ORAL 3 TIMES DAILY PRN
Status: DISCONTINUED | OUTPATIENT
Start: 2024-05-03 | End: 2024-05-07 | Stop reason: HOSPADM

## 2024-05-03 RX ORDER — LIDOCAINE 4 G/G
1 PATCH TOPICAL 2 TIMES DAILY
Status: ON HOLD | COMMUNITY
End: 2024-05-07

## 2024-05-03 RX ORDER — GABAPENTIN 100 MG/1
100 CAPSULE ORAL 3 TIMES DAILY
Status: DISCONTINUED | OUTPATIENT
Start: 2024-05-03 | End: 2024-05-07 | Stop reason: HOSPADM

## 2024-05-03 RX ORDER — DULOXETIN HYDROCHLORIDE 30 MG/1
60 CAPSULE, DELAYED RELEASE ORAL DAILY
Status: DISCONTINUED | OUTPATIENT
Start: 2024-05-04 | End: 2024-05-07 | Stop reason: HOSPADM

## 2024-05-03 RX ORDER — SODIUM CHLORIDE 9 MG/ML
INJECTION, SOLUTION INTRAVENOUS CONTINUOUS
Status: DISCONTINUED | OUTPATIENT
Start: 2024-05-03 | End: 2024-05-04

## 2024-05-03 RX ORDER — DEXTROSE MONOHYDRATE 25 G/50ML
50 INJECTION, SOLUTION INTRAVENOUS ONCE
Status: COMPLETED | OUTPATIENT
Start: 2024-05-03 | End: 2024-05-03

## 2024-05-03 RX ORDER — AMLODIPINE BESYLATE 5 MG/1
5 TABLET ORAL DAILY
Status: DISCONTINUED | OUTPATIENT
Start: 2024-05-04 | End: 2024-05-07 | Stop reason: HOSPADM

## 2024-05-03 RX ORDER — HYDRALAZINE HYDROCHLORIDE 25 MG/1
25 TABLET, FILM COATED ORAL 2 TIMES DAILY
COMMUNITY

## 2024-05-03 RX ORDER — HYDRALAZINE HYDROCHLORIDE 20 MG/ML
10 INJECTION INTRAMUSCULAR; INTRAVENOUS EVERY 4 HOURS PRN
Status: DISCONTINUED | OUTPATIENT
Start: 2024-05-03 | End: 2024-05-07 | Stop reason: HOSPADM

## 2024-05-03 RX ORDER — BUMETANIDE 2 MG/1
2 TABLET ORAL DAILY
Status: ON HOLD | COMMUNITY
End: 2024-05-07

## 2024-05-03 RX ORDER — DEXTROSE MONOHYDRATE 25 G/50ML
25-50 INJECTION, SOLUTION INTRAVENOUS
Status: DISCONTINUED | OUTPATIENT
Start: 2024-05-03 | End: 2024-05-07 | Stop reason: HOSPADM

## 2024-05-03 RX ORDER — HYDRALAZINE HYDROCHLORIDE 25 MG/1
25 TABLET, FILM COATED ORAL 2 TIMES DAILY
Status: DISCONTINUED | OUTPATIENT
Start: 2024-05-03 | End: 2024-05-07 | Stop reason: HOSPADM

## 2024-05-03 RX ORDER — ACETAMINOPHEN 325 MG/1
650 TABLET ORAL EVERY 6 HOURS PRN
Status: DISCONTINUED | OUTPATIENT
Start: 2024-05-03 | End: 2024-05-03

## 2024-05-03 RX ORDER — DEXTROSE MONOHYDRATE 25 G/50ML
INJECTION, SOLUTION INTRAVENOUS
Status: COMPLETED
Start: 2024-05-03 | End: 2024-05-03

## 2024-05-03 RX ORDER — METHOCARBAMOL 500 MG/1
500 TABLET, FILM COATED ORAL EVERY 8 HOURS PRN
Status: DISCONTINUED | OUTPATIENT
Start: 2024-05-03 | End: 2024-05-07 | Stop reason: HOSPADM

## 2024-05-03 RX ORDER — PANTOPRAZOLE SODIUM 40 MG/1
40 TABLET, DELAYED RELEASE ORAL
Status: DISCONTINUED | OUTPATIENT
Start: 2024-05-04 | End: 2024-05-06

## 2024-05-03 RX ORDER — CALCIUM CARBONATE 500 MG/1
1000 TABLET, CHEWABLE ORAL 4 TIMES DAILY PRN
Status: DISCONTINUED | OUTPATIENT
Start: 2024-05-03 | End: 2024-05-07 | Stop reason: HOSPADM

## 2024-05-03 RX ORDER — INSULIN ASPART 100 [IU]/ML
8 INJECTION, SOLUTION INTRAVENOUS; SUBCUTANEOUS 2 TIMES DAILY WITH MEALS
Status: ON HOLD | COMMUNITY
End: 2024-05-07

## 2024-05-03 RX ORDER — SODIUM POLYSTYRENE SULFONATE 15 G/60ML
15 SUSPENSION ORAL; RECTAL DAILY
Status: ON HOLD | COMMUNITY
End: 2024-05-07

## 2024-05-03 RX ORDER — ONDANSETRON 4 MG/1
4 TABLET, ORALLY DISINTEGRATING ORAL EVERY 6 HOURS PRN
Status: DISCONTINUED | OUTPATIENT
Start: 2024-05-03 | End: 2024-05-07 | Stop reason: HOSPADM

## 2024-05-03 RX ORDER — MULTIPLE VITAMINS W/ MINERALS TAB 9MG-400MCG
1 TAB ORAL DAILY
Status: DISCONTINUED | OUTPATIENT
Start: 2024-05-04 | End: 2024-05-07 | Stop reason: HOSPADM

## 2024-05-03 RX ORDER — ACETAMINOPHEN 325 MG/1
650 TABLET ORAL EVERY 6 HOURS PRN
COMMUNITY
End: 2024-09-11

## 2024-05-03 RX ORDER — ONDANSETRON 2 MG/ML
4 INJECTION INTRAMUSCULAR; INTRAVENOUS EVERY 6 HOURS PRN
Status: DISCONTINUED | OUTPATIENT
Start: 2024-05-03 | End: 2024-05-07 | Stop reason: HOSPADM

## 2024-05-03 RX ORDER — INSULIN ASPART 100 [IU]/ML
10 INJECTION, SOLUTION INTRAVENOUS; SUBCUTANEOUS
Status: ON HOLD | COMMUNITY
End: 2024-05-07

## 2024-05-03 RX ORDER — LOPERAMIDE HCL 2 MG
4 CAPSULE ORAL EVERY 8 HOURS PRN
COMMUNITY

## 2024-05-03 RX ORDER — PROCHLORPERAZINE MALEATE 10 MG
10 TABLET ORAL EVERY 6 HOURS PRN
Status: DISCONTINUED | OUTPATIENT
Start: 2024-05-03 | End: 2024-05-07 | Stop reason: HOSPADM

## 2024-05-03 RX ORDER — ACETAMINOPHEN 325 MG/1
650 TABLET ORAL EVERY 4 HOURS PRN
Status: DISCONTINUED | OUTPATIENT
Start: 2024-05-03 | End: 2024-05-07 | Stop reason: HOSPADM

## 2024-05-03 RX ORDER — HYDRALAZINE HYDROCHLORIDE 10 MG/1
10 TABLET, FILM COATED ORAL EVERY 4 HOURS PRN
Status: DISCONTINUED | OUTPATIENT
Start: 2024-05-03 | End: 2024-05-07 | Stop reason: HOSPADM

## 2024-05-03 RX ORDER — ASPIRIN 81 MG/1
81 TABLET ORAL DAILY
Status: DISCONTINUED | OUTPATIENT
Start: 2024-05-04 | End: 2024-05-07 | Stop reason: HOSPADM

## 2024-05-03 RX ORDER — NICOTINE POLACRILEX 4 MG
15-30 LOZENGE BUCCAL
Status: DISCONTINUED | OUTPATIENT
Start: 2024-05-03 | End: 2024-05-07 | Stop reason: HOSPADM

## 2024-05-03 RX ORDER — ACETAMINOPHEN 650 MG/1
650 SUPPOSITORY RECTAL EVERY 4 HOURS PRN
Status: DISCONTINUED | OUTPATIENT
Start: 2024-05-03 | End: 2024-05-07 | Stop reason: HOSPADM

## 2024-05-03 RX ORDER — NALOXONE HYDROCHLORIDE 0.4 MG/ML
0.4 INJECTION, SOLUTION INTRAMUSCULAR; INTRAVENOUS; SUBCUTANEOUS
Status: DISCONTINUED | OUTPATIENT
Start: 2024-05-03 | End: 2024-05-07 | Stop reason: HOSPADM

## 2024-05-03 RX ORDER — GABAPENTIN 100 MG/1
300 CAPSULE ORAL EVERY 12 HOURS
COMMUNITY
End: 2024-09-11

## 2024-05-03 RX ORDER — CETIRIZINE HYDROCHLORIDE 10 MG/1
10 TABLET ORAL DAILY
Status: DISCONTINUED | OUTPATIENT
Start: 2024-05-04 | End: 2024-05-07 | Stop reason: HOSPADM

## 2024-05-03 RX ORDER — DULOXETIN HYDROCHLORIDE 60 MG/1
60 CAPSULE, DELAYED RELEASE ORAL DAILY
COMMUNITY

## 2024-05-03 RX ORDER — CETIRIZINE HYDROCHLORIDE 10 MG/1
10 TABLET ORAL DAILY
COMMUNITY

## 2024-05-03 RX ORDER — LIDOCAINE 40 MG/G
CREAM TOPICAL
Status: DISCONTINUED | OUTPATIENT
Start: 2024-05-03 | End: 2024-05-07 | Stop reason: HOSPADM

## 2024-05-03 RX ADMIN — DEXTROSE MONOHYDRATE 50 ML: 25 INJECTION, SOLUTION INTRAVENOUS at 17:16

## 2024-05-03 RX ADMIN — PANTOPRAZOLE SODIUM 80 MG: 40 INJECTION, POWDER, FOR SOLUTION INTRAVENOUS at 19:23

## 2024-05-03 RX ADMIN — SODIUM CHLORIDE 1000 ML: 9 INJECTION, SOLUTION INTRAVENOUS at 16:31

## 2024-05-03 RX ADMIN — GABAPENTIN 100 MG: 100 CAPSULE ORAL at 22:49

## 2024-05-03 RX ADMIN — SODIUM CHLORIDE 1000 ML: 9 INJECTION, SOLUTION INTRAVENOUS at 17:16

## 2024-05-03 RX ADMIN — OXYCODONE HYDROCHLORIDE 5 MG: 5 TABLET ORAL at 20:58

## 2024-05-03 RX ADMIN — HYDRALAZINE HYDROCHLORIDE 25 MG: 25 TABLET, FILM COATED ORAL at 22:49

## 2024-05-03 RX ADMIN — LIDOCAINE 1 PATCH: 4 PATCH TOPICAL at 22:49

## 2024-05-03 ASSESSMENT — ACTIVITIES OF DAILY LIVING (ADL)
ADLS_ACUITY_SCORE: 37
ADLS_ACUITY_SCORE: 22
ADLS_ACUITY_SCORE: 22
ADLS_ACUITY_SCORE: 37
ADLS_ACUITY_SCORE: 37

## 2024-05-03 ASSESSMENT — COLUMBIA-SUICIDE SEVERITY RATING SCALE - C-SSRS: IS THE PATIENT NOT ABLE TO COMPLETE C-SSRS: UNABLE TO VERBALIZE

## 2024-05-03 NOTE — ED NOTES
[facility]  ED Nurse Handoff Report    ED Chief complaint: Hypoglycemia      ED Diagnosis:   Final diagnoses:   None       Code Status: tbd    Allergies: No Known Allergies    Patient Story: Long Mayfield is a 54 year old male who presents to the ED with a history of gait and mobility muscle wasting and atrophy, coronary artery disease, insulin-dependent diabetes, acute pulmonary edema, diabetic foot ulcer with staphylococcal infection and right transmetatarsal joint amputation, chronic pancreatitis, among other chronic medical conditions.  The patient currently resides at [facility] in a care facility.  Reportedly, the patient was given 10 units of subcutaneous insulin aspartate FlexPen earlier this afternoon for a blood glucose around 200.  The patient then went to bed and when staff went to wake him up but he was unresponsive.  His blood glucose was in the 30s, EMS was contacted and they arrived and provided 20 g of D10.  Blood sugar was checked by them and was 199 and then shortly thereafter 111.  On arrival to the emergency department the nurse checked the blood sugar which was 101. He received one amp of D50 and his last blood sugar was 140. He is very tired but easily awakened. He received 2 liters or NS.         Treatments and/or interventions provided: dextrose and NS  Patient's response to treatments and/or interventions: good    To be done/followed up on inpatient unit:  cont to monitor blood sugars    Does this patient have any cognitive concerns?:  none    Activity level - Baseline/Home:  Stand with Assist  Activity Level - Current:   Stand with Assist    Patient's Preferred language: English   Needed?: No    Isolation: Contact   Infection: ESBL  Patient tested for COVID 19 prior to admission: NO  Bariatric?: No    Vital Signs:   Vitals:    05/03/24 1715 05/03/24 1730 05/03/24 1745 05/03/24 1800   BP: 94/55 (!) 140/77 (!) 152/92 (!) 163/83   Pulse: 66 71 70 67    Resp:  14 12 10   Temp:       TempSrc:       SpO2: 98% 99% 96% 94%       Cardiac Rhythm:     Was the PSS-3 completed:   No -but unable to answer  What interventions are required if any?               Family Comments: none  OBS brochure/video discussed/provided to patient/family: No              Name of person given brochure if not patient: na              Relationship to patient: na    For the majority of the shift this patient's behavior was Green.   Behavioral interventions performed were na.    ED NURSE PHONE NUMBER: [phone]

## 2024-05-03 NOTE — H&P
[facility]    History and Physical : Hospitalist Service:        Date of Admission:  5/3/2024    Cumulative Summary:   Long Mayfield is a 54 year old male with past medical history significant for insulin-dependent diabetes mellitus, history of diabetic ulcer of foot leading to right toe amputation, history of major depressive disorder, essential hypertension, hyperlipidemia, CAD, history of flash pulmonary edema, exocrine pancreatic insufficiency causing chronic diarrhea, history of alcohol abuse, sober for 2 years and per patient history of chronic muscular atrophy mobility disorder due to which patient currently has been residing at [facility].  Patient receives Lantus, 3 times a day NovoLog fixed dose 8 units, 8 units and then 10 units and medium dose sliding scale insulin at facility.  Patient was noticed to be hypoglycemic this afternoon by staff when he was also sleepy and was slightly encephalopathic.  EMS was called who gave patient D10 and brought patient to the ED for further evaluation.  Patient is also complaining of more fatigue and decreased appetite recently.  Patient was found to have acute kidney injury with creatinine of 1.5 as compared to 0.8 and was also found to have hemoglobin of 7.0 which was documented 12.215 months ago.  Patient is admitted for further evaluation and management.    Assessment & Plan     Insulin-dependent diabetes mellitus  History of diabetic ulcer of toe of right foot, s/p toe amputation, stable  Hypoglycemia: Most likely secondary to insulin administration in the picture of poor oral intake, acute kidney injury and fatigue    As above, patient is facility resident at this time.  Patient has been receiving fixed NovoLog 8 units, 8 units and 10 units 3 times a day with meals.    Specific information regarding which meals get what dose is not available at the time of admission.  Patient also receives medium dose sliding scale  insulin.  Patient also received 15 units of Lantus in the morning.  It seems like patient received Lantus 15 units, 8 units NovoLog with breakfast this morning and then again received 10 units of NovoLog with lunch and later was found to be encephalopathic.  His blood sugar was in the 30s and EMS was called.  Patient was given D10 infusion and was later also given amp of D50 in ED.  Patient has also received 2 L of IV fluids and is admitted for further evaluation.    On obtaining history, patient is denying any frequent histories of hypoglycemia and thinks that usually his appetite is good although in the past few days his appetite has not been that great.    --Admit patient to medical floor with telemetry for close monitoring.  --Will order to regular diet.  --Activity as tolerated with fall precautions and assist, patient does use wheelchairs on and off in the facility.  --Will hold glargine at this time but expecting that patient should be able to receive 8 to 10 units tomorrow morning.  --Will hold carb coverage.  --Will order medium dose sliding scale insulin.  --Patient last 2 blood sugars are 130 and 110.  --Patient will also be given IV fluids.  --Recheck BMP tomorrow morning.  --Depending upon his blood sugars in the next 24 hours, will reassess if his PTA medication dose need to be adjusted.    Acute kidney injury:     Likely secondary to prerenal azotemia.  As patient has decreased appetite and poor oral oral intake in the past few days.  No significant changes in medications.    Of note patient also has been on Bumex, also use Advil for chronic pain from time to time.  Patient also has history of chronic diarrhea which can also contribute to volume insufficiency.    --As above, patient has received 2 L of normal saline in the ED.  --Will order normal saline at 75 mill per hour for next 30 hours.  --Repeat BMP tomorrow.  --Will check UA with [facility].  --Will hold off on renal ultrasound this evening, if  renal functions are not improving then patient will need further renal imaging.  --Of note, patient does not have any recent use of antibiotics.    Newly diagnosed anemia   Previous hemoglobin was 12.2 , 15 months ago, now presenting with hemoglobin of 7.0.  Patient has not noticed any dark or melanotic stools.  Patient has been following up with [facility] in an outpatient setting for chronic diarrhea from exocrine pancreatic insufficiency.  Patient is denying any colonoscopy or EGD in the past.  According to patient he has been due for colonoscopy.  Patient is at risk for developing NSAID induced gastric ulcer as he has been using NSAIDs for his chronic pain    --Will check iron panel with ferritin.  --Check reticulocyte count.  --Will check orthostatic blood pressure.  --Continue to monitor on telemetry.  --Continue intake and output.  --Will transfuse 1 unit of packed red blood cell transfusion.  --Repeat CBC tomorrow, patient has probably slow blood loss, does not need hemoglobin to be repeated tonight.  --Check fecal occult blood test.  --Will consult [facility] if they will consider EGD or colonoscopy in an inpatient setting versus schedule it for outpatient.  --Will hold NSAIDs.  --Start patient on Protonix 40 mg p.o. daily.  --Will check 2D echo of the heart as patient has been receiving IV fluids and 1 unit of packed red blood cell transfusion and there is history of CAD and history of flash pulmonary edema.    History of alcohol abuse: Noted, sober for 2 years, encouraged him to continue severity.    Cervical disc disease with myelopathy  Chronic debilitation  Diabetic neuropathy    --Continue as needed Tylenol.  --Continue PTA Cymbalta.  --Continue PTA gabapentin.  --Continue PTA lidocaine patch.  --Continue PTA Robaxin.  --Hold PTA Norco.  --Will order oxycodone 2.5 mg p.o. every 4 hours for moderate pain and 5 mg every 6 hours for severe pain.  --Will hold Advil as above due to the concern for  possible gastric ulcer.    Chronic pancreatic insufficiency leading to chronic diarrhea:  Patient follows up with [facility] in an outpatient setting, takes Creon as supplement  --Continue PTA Creon.  --Will order PTA loperamide.  --Not sure why patient PTA medications include Kayexalate, will hold off, might consider stopping it on discharge.    Essential hypertension  Hyperlipidemia  History of CAD  History of flash pulmonary edema    --Continue PTA hydralazine.  --Will hold PTA Bumex.  --Continue amlodipine.  --Continue PTA atorvastatin 10 mg p.o. 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    Clinically Significant Risk Factors Present on Admission              # Hypoalbuminemia: Lowest albumin = 3.4 g/dL at 5/3/2024  4:26 PM, will monitor as appropriate   # Drug Induced Platelet Defect: home medication list includes an antiplatelet medication   # Hypertension: Noted on problem list                 Diet:  Regular diet  Osman Catheter: Not present  DVT Prophylaxis: Pneumatic Compression Devices  Code Status: Full Code    Disposition: Expecting patient to stay more than 2 nights, tentative discharge on Monday morning    The patient's care was discussed with the Patient and ED Team.    Medical Decision Making       75 MINUTES SPENT BY ME on the date of service doing chart review, history, exam, documentation & further activities per the note.         Shira Lin MD, FACP    ----------------------------------------------------------------------------------------------------------------------    Primary Care Physician   Ana Maria Blakely    Chief Complaint     Metabolic encephalopathy secondary to hypoglycemia, resolved  Fatigue and poor appetite      History is obtained from the patient      History of Present Illness     Long Mayfield is a 54 year old male with  past medical history significant for insulin-dependent diabetes mellitus, history of diabetic ulcer of foot leading to toes amputation, history of major depressive disorder, essential hypertension, hyperlipidemia, coronary artery disease, history of flash pulmonary edema, exocrine pancreatic insufficiency causing chronic diarrhea, history of alcohol abuse, now sober for 2 years and cording to patient history of chronic muscular atrophy mobility disorder due to which currently patient has been residing at a care facility.    According to patient, he was in his usual state of health at [facility], where he is usually given 15 units of Lantus in the morning, patient also receives 8 units of fast acting NovoLog with 2 meals (twice a day) and 10 units of NovoLog with 1 meal.  It is not clear from history regarding timings of administration of NovoLog with which meal.  Patient also has sliding scale insulin ordered.  It seems like patient already received Lantus this morning and then received 8 units of NovoLog this morning and then 10 units later in the afternoon with lunch.  According to patient he does not have a great appetite.  Patient then went to bed and when his staff went to wake him up they found him unresponsive and his blood sugar was in the 30s.  EMS was called and when they arrived they gave patient 20 g of D10.  Blood sugar was then checked and it was 199 and then shortly after 111.  On arrival to the ED his blood sugar was 101.  Then it dropped again to 67 and patient was given amp of D50.  Patient was also given 2 L of IV fluids in the emergency department, so far now patient blood sugars are close to 130, diet has been ordered in the ED and patient has not received any further D5.    On arrival to the ED patient temperature was 97.2, pulse was 75, blood pressure was 93/56, respiratory rate was 12 and he was saturating 97% on room air and he was alert awake and oriented and in his encephalopathy  has completely resolved.  His blood work was concerning for creatinine of 1.50 his creatinine was in normal range in the beginning of the year.  His calcium was 8.5, his alkaline phosphatase and LFTs were in normal range.  His blood glucose was 134.  His CBC was also concerning for hemoglobin of 7.0 which was 15 months ago 12.2.  Patient is absolutely denying any history of melanoma although he is not sure if he has pay too much attention to his bowel movements.  Patient told me that he does have chronic pancreatic insufficiency because of that he has chronic diarrhea for which he takes Creon and loperamide.  He has been following up with gastroenterology because of this issue and was seen by [facility] few months ago.  Patient does not remember if he has undergone colonoscopy.  Per history patient also has a history of pulmonary edema in the past, patient was given 500 mL IV fluids in the ED.  Patient will also be given 1 unit of packed red blood cell transfusion, fecal occult blood test has been obtained, iron studies are sent and patient is admitted for further evaluation and management.    Review of Systems       CONSTITUTIONAL:  positive for fatigue and generalized weakness.  EYES:  negative for blurred vision and visual disturbance  HEENT:  negative for hoarseness and voice change  RESPIRATORY:  negative for cough with sputum, dyspnea, wheezing and chest pain  CARDIOVASCULAR:  negative for chest pain, palpitations, orthopnea, exertional chest pressure/discomfort  GASTROINTESTINAL: Negative for abd pain, nausea , vomiting ,constipation and abdominal pain  GENITOURINARY: Negative for burning /urgency and frequency.  HEMATOLOGIC/LYMPHATIC:  negative  ALLERGIC/IMMUNOLOGIC:  negative for drug reactions  ENDOCRINE:  negative for diabetic symptoms including polyuria, polydipsia and weight loss  MUSCULOSKELETAL:  positive for arthralgias  NEUROLOGICAL:  negative  BEHAVIOR/PSYCH: negative      Past Medical History     I have reviewed this patient's medical history and updated it with pertinent information if needed.   Past Medical History:   Diagnosis Date    Bell's palsy     Diabetes (H)        Past Surgical History   I have reviewed this patient's surgical history and updated it with pertinent information if needed.  Past Surgical History:   Procedure Laterality Date    ORTHOPEDIC SURGERY         Prior to Admission Medications   Prior to Admission Medications   Prescriptions Last Dose Informant Patient Reported? Taking?   DULoxetine (CYMBALTA) 60 MG capsule   Yes Yes   Sig: Take 60 mg by mouth daily   HYDROcodone-acetaminophen (NORCO) 5-325 MG tablet   No Yes   Sig: Take 1 tablet by mouth every 6 hours as needed for severe pain   Lidocaine (LIDOCARE) 4 % Patch   Yes Yes   Sig: Place 1 patch onto the skin 2 times daily Apply to neck/back  To prevent lidocaine toxicity, patient should be patch free for 12 hrs daily.   acetaminophen (TYLENOL) 325 MG tablet   Yes Yes   Sig: Take 650 mg by mouth every 6 hours as needed for pain   amLODIPine (NORVASC) 5 MG tablet   No Yes   Sig: Take 1 tablet (5 mg) by mouth daily   amLODIPine (NORVASC) 5 MG tablet   Yes Yes   Sig: Take 5 mg by mouth daily   aspirin 81 MG EC tablet   Yes Yes   Sig: Take 81 mg by mouth daily   bumetanide (BUMEX) 2 MG tablet   Yes Yes   Sig: Take 2 mg by mouth daily   cetirizine (ZYRTEC) 10 MG tablet   Yes Yes   Sig: Take 10 mg by mouth daily   gabapentin (NEURONTIN) 100 MG capsule  at am  Yes Yes   Sig: Take 100 mg by mouth 3 times daily   hydrALAZINE (APRESOLINE) 25 MG tablet   Yes Yes   Sig: Take 25 mg by mouth 2 times daily Hold if SBP < 110   ibuprofen (ADVIL/MOTRIN) 600 MG tablet   No Yes   Sig: Take 1 tablet (600 mg) by mouth every 6 hours as needed for other (pain, aches, fever) Take with food.   insulin aspart (NOVOLOG FLEXPEN) 100 UNIT/ML pen   Yes Yes   Sig: Inject 1-5 Units Subcutaneous 3 times daily (with meals) Inject as per sliding scale:  If 200-250 =  1   251-300 = 2  301-350 = 3  351-400 = 4  401+ = 5  Repeat BS after 3 hours and call MD if still over 400   insulin aspart (NOVOLOG FLEXPEN) 100 UNIT/ML pen   Yes Yes   Sig: Inject 8 Units Subcutaneous 2 times daily (with meals) Breakfast and lunch   insulin aspart (NOVOLOG FLEXPEN) 100 UNIT/ML pen   Yes Yes   Sig: Inject 10 Units Subcutaneous daily (with dinner)   insulin glargine (LANTUS PEN) 100 UNIT/ML pen   Yes Yes   Sig: Inject 15 Units Subcutaneous every morning   lipase-protease-amylase (CREON 36) [id number] units CPEP   Yes Yes   Sig: Take 3 capsules by mouth 3 times daily (with meals)   lipase-protease-amylase (CREON 36) [id number] units CPEP   Yes Yes   Sig: Take 1-2 capsules by mouth Take with snacks or supplements   loperamide (IMODIUM) 2 MG capsule   Yes Yes   Sig: Take 4 mg by mouth 3 times daily as needed for diarrhea   methocarbamol (ROBAXIN) 500 MG tablet   Yes Yes   Sig: Take 500 mg by mouth every 8 hours as needed for muscle spasms   multivitamin w/minerals (THERA-VIT-M) tablet   No Yes   Sig: Take 1 tablet by mouth daily   sodium polystyrene (KAYEXALATE) 15 GM/60ML suspension   Yes Yes   Sig: Take 15 g by mouth daily Avoid taking other oral medications 3 hours before or after this medication.      Facility-Administered Medications: None     Allergies   No Known Allergies    Social History   I have reviewed this patient's social history and updated it with pertinent information if needed. Long Mayfield  reports that he has never smoked. He has never used smokeless tobacco. He reports current alcohol use. He reports that he does not use drugs.  His brother and sister live in the area    Family History   I have reviewed this patient's family history and updated it with pertinent information if needed.   Mother  of old age      Physical Exam   Temp: 97.2  F (36.2  C) Temp src: Temporal BP: 134/76 Pulse: 63   Resp: 11 SpO2: 97 %      Vital Signs with Ranges  Temp:  [97.2  F  (36.2  C)] 97.2  F (36.2  C)  Pulse:  [63-75] 63  Resp:  [10-14] 11  BP: ()/(55-92) 134/76  SpO2:  [82 %-99 %] 97 %  0 lbs 0 oz    Constitutional: Awake, alert,oriented to time, place and person , cooperative, no apparent distress.Pleasent and cooperative  Eyes: Conjunctiva and pupils examined and normal.  HEENT: Moist mucous membranes, normal dentition.  Respiratory: Clear to auscultation bilaterally, no crackles or wheezing.  Cardiovascular: Regular rate and rhythm, normal S1 and S2, and no murmur noted.  GI: Soft, non-distended, non-tender, normal bowel sounds.  Lymph/Hematologic: No anterior cervical or supraclavicular adenopathy.  Skin: No rashes, no cyanosis, no edema.  S/p right toe amputation, healed site  Musculoskeletal: No joint swelling, erythema or tenderness.  Neurologic: Cranial nerves 2-12 intact, normal strength and sensation.  Psychiatric: Alert, oriented to person, place and time, no obvious anxiety or depression.    Data   Data reviewed today: No EKG or imagings were obtained today at the time of admission.  EKG is ordered .    Recent Labs   Lab 05/03/24  1853 05/03/24  1801 05/03/24  1737 05/03/24  1708 05/03/24  1626   WBC  --   --   --   --  10.9   HGB  --   --   --   --  7.0*   MCV  --   --   --   --  78   PLT  --   --   --   --  364   NA  --   --   --   --  136   POTASSIUM  --   --   --   --  4.2   CHLORIDE  --   --   --   --  101   CO2  --   --   --   --  23   BUN  --   --   --   --  33.5*   CR  --   --   --   --  1.50*   ANIONGAP  --   --   --   --  12   AROLDO  --   --   --   --  8.5*   * 144* 103*   < > 98   ALBUMIN  --   --   --   --  3.4*   PROTTOTAL  --   --   --   --  7.4   BILITOTAL  --   --   --   --  <0.2   ALKPHOS  --   --   --   --  138   ALT  --   --   --   --  8   AST  --   --   --   --  17    < > = values in this interval not displayed.       Imaging:  No results found for this or any previous visit (from the past 24 hour(s)).

## 2024-05-03 NOTE — ED TRIAGE NOTES
Pt had 10 units of fast acting insulin with lunch with a blood sugar of 199, staff went to wake pt up in the afternoon and he was unresponsive with a blood sugar of 35, ems gave 20 grams of D10 and bg was 199 and then 111

## 2024-05-03 NOTE — PHARMACY-ADMISSION MEDICATION HISTORY
Pharmacist Admission Medication History    Admission medication history is complete. The information provided in this note is only as accurate as the sources available at the time of the update.    Information Source(s): Facility (U/NH/) medication list/MAR and CareEverywhere/SureScripts via N/A    Pertinent Information: Med hx completed using med list (not MAR) from [facility]. Facility staff confirmed that 8 units insulin aspart is given with breakfast and lunch but 10 units is given with dinner    Changes made to PTA medication list:  Added: hydralazine, cymbalta, creon, cetirizine, bumex, apap, SPS, asa, lidocaine, loperamide, methocarbamol, gabapentin  Deleted: trulicity, folic acid, lorazepam, potassium gluconate, januvia, thiamine  Changed: novolog, lantus    Allergies reviewed with patient and updates made in EHR: no    Medication History Completed By: Mimi Stout RPH 5/3/2024 6:44 PM    PTA Med List   Medication Sig Note Last Dose    acetaminophen (TYLENOL) 325 MG tablet Take 650 mg by mouth every 6 hours as needed for pain      amLODIPine (NORVASC) 5 MG tablet Take 5 mg by mouth daily      amLODIPine (NORVASC) 5 MG tablet Take 1 tablet (5 mg) by mouth daily      aspirin 81 MG EC tablet Take 81 mg by mouth daily      bumetanide (BUMEX) 2 MG tablet Take 2 mg by mouth daily      cetirizine (ZYRTEC) 10 MG tablet Take 10 mg by mouth daily      DULoxetine (CYMBALTA) 60 MG capsule Take 60 mg by mouth daily      gabapentin (NEURONTIN) 100 MG capsule Take 100 mg by mouth 3 times daily   at am    hydrALAZINE (APRESOLINE) 25 MG tablet Take 25 mg by mouth 2 times daily Hold if SBP < 110      HYDROcodone-acetaminophen (NORCO) 5-325 MG tablet Take 1 tablet by mouth every 6 hours as needed for severe pain      ibuprofen (ADVIL/MOTRIN) 600 MG tablet Take 1 tablet (600 mg) by mouth every 6 hours as needed for other (pain, aches, fever) Take with food.      insulin aspart (NOVOLOG FLEXPEN) 100 UNIT/ML pen Inject  1-5 Units Subcutaneous 3 times daily (with meals) Inject as per sliding scale:  If 200-250 = 1   251-300 = 2  301-350 = 3  351-400 = 4  401+ = 5  Repeat BS after 3 hours and call MD if still over 400      insulin aspart (NOVOLOG FLEXPEN) 100 UNIT/ML pen Inject 8 Units Subcutaneous 2 times daily (with meals) Breakfast and lunch      insulin aspart (NOVOLOG FLEXPEN) 100 UNIT/ML pen Inject 10 Units Subcutaneous daily (with dinner)      insulin glargine (LANTUS PEN) 100 UNIT/ML pen Inject 15 Units Subcutaneous every morning      Lidocaine (LIDOCARE) 4 % Patch Place 1 patch onto the skin 2 times daily Apply to neck/back  To prevent lidocaine toxicity, patient should be patch free for 12 hrs daily. 5/3/2024: On facility list for BID although typically only applied QD to allow for 12 hour lidocaine-free period      lipase-protease-amylase (CREON 36) [id number] units CPEP Take 3 capsules by mouth 3 times daily (with meals)      lipase-protease-amylase (CREON 36) [id number] units CPEP Take 1-2 capsules by mouth Take with snacks or supplements      loperamide (IMODIUM) 2 MG capsule Take 4 mg by mouth 3 times daily as needed for diarrhea      methocarbamol (ROBAXIN) 500 MG tablet Take 500 mg by mouth every 8 hours as needed for muscle spasms      multivitamin w/minerals (THERA-VIT-M) tablet Take 1 tablet by mouth daily      sodium polystyrene (KAYEXALATE) 15 GM/60ML suspension Take 15 g by mouth daily Avoid taking other oral medications 3 hours before or after this medication.

## 2024-05-03 NOTE — ED PROVIDER NOTES
History     Chief Complaint:  Hypoglycemia       HPI   Long Mayfield is a 54 year old male who presents to the ED with a history of gait and mobility muscle wasting and atrophy, coronary artery disease, insulin-dependent diabetes, acute pulmonary edema, diabetic foot ulcer with staphylococcal infection and right transmetatarsal joint amputation, chronic pancreatitis, among other chronic medical conditions.  The patient currently resides at [facility] in a care facility.  Reportedly, the patient was given 10 units of subcutaneous insulin aspartate FlexPen earlier this afternoon for a blood glucose around 200.  The patient then went to bed and when staff went to wake him up but he was unresponsive.  His blood glucose was in the 30s, EMS was contacted and they arrived and provided 20 g of D10.  Blood sugar was checked by them and was 199 and then shortly thereafter 111.  On arrival to the emergency department the nurse checked the blood sugar which was 101.      Independent Historian:   None.    Review of External Notes:  The [facility] records were reviewed in detail.  The patient is on Lantus insulin 15 units subcutaneously in the morning.  He is also on fast acting insulin aspartate FlexPen 8 units subcutaneously twice a day with meals, and he has 10 units subcutaneously 1 time a day with meals.  The patient also is written for a sliding scale.    Review of old laboratories in the system show that the patient does not normally have a hemoglobin on the order of 7.  His hemoglobin has historically been above 10.    Allergies:  No Known Allergies     Listed Medications:    acetaminophen (TYLENOL) 325 MG tablet  amLODIPine (NORVASC) 5 MG tablet  amLODIPine (NORVASC) 5 MG tablet  aspirin 81 MG EC tablet  bumetanide (BUMEX) 2 MG tablet  cetirizine (ZYRTEC) 10 MG tablet  DULoxetine (CYMBALTA) 60 MG capsule  gabapentin (NEURONTIN) 100 MG capsule  hydrALAZINE (APRESOLINE) 25 MG  tablet  HYDROcodone-acetaminophen (NORCO) 5-325 MG tablet  ibuprofen (ADVIL/MOTRIN) 600 MG tablet  insulin aspart (NOVOLOG FLEXPEN) 100 UNIT/ML pen  insulin aspart (NOVOLOG FLEXPEN) 100 UNIT/ML pen  insulin aspart (NOVOLOG FLEXPEN) 100 UNIT/ML pen  insulin glargine (LANTUS PEN) 100 UNIT/ML pen  Lidocaine (LIDOCARE) 4 % Patch  lipase-protease-amylase (CREON 36) 62275-204404-278831 units CPEP  lipase-protease-amylase (CREON 36) 20745-808880-044851 units CPEP  loperamide (IMODIUM) 2 MG capsule  methocarbamol (ROBAXIN) 500 MG tablet  multivitamin w/minerals (THERA-VIT-M) tablet  sodium polystyrene (KAYEXALATE) 15 GM/60ML suspension        Past Medical and Surgical History:    Past Medical History:   Diagnosis Date    Bell's palsy     Diabetes (H)      Past Surgical History:   Procedure Laterality Date    ORTHOPEDIC SURGERY          Family History:    family history is not on file.    Social History:   reports that he has never smoked. He has never used smokeless tobacco. He reports current alcohol use. He reports that he does not use drugs.      Physical Exam   Patient Vitals for the past 24 hrs:   BP Temp Temp src Pulse Resp SpO2   05/03/24 1845 134/76 -- -- 63 11 97 %   05/03/24 1830 137/79 -- -- 64 11 97 %   05/03/24 1815 (!) 141/74 -- -- 68 14 96 %   05/03/24 1800 (!) 163/83 -- -- 67 10 94 %   05/03/24 1745 (!) 152/92 -- -- 70 12 96 %   05/03/24 1730 (!) 140/77 -- -- 71 14 99 %   05/03/24 1715 94/55 -- -- 66 -- 98 %   05/03/24 1700 114/64 -- -- 66 -- (!) 82 %   05/03/24 1645 115/72 -- -- 72 14 97 %   05/03/24 1633 108/58 97.2  F (36.2  C) Temporal 71 12 98 %   05/03/24 1627 105/60 -- -- 72 -- 99 %   05/03/24 1620 93/56 -- -- 75 12 97 %        General: Resting comfortably on the gurney.  Appears mildly somnolent.  Easily awakes to voice.  Head:  The scalp, face, and head appear normal  Eyes:  The pupils are equal, round, and reactive to light    There is no nystagmus    Extraocular muscles are intact    Conjunctivae  and sclerae are normal  ENT:    The nose is normal    Pinnae are normal    The oropharynx is normal  Neck:  Normal range of motion    There is no rigidity noted  CV:  Regular rate  Normal underlying rhythm     Normal S1/S2    No pathological murmur detected  Resp:  Lungs are clear    There is no tachypnea    Non-labored    No rales    No wheezing   GI:  Abdomen is soft, there is no rigidity    Mild tympany in the upper abdomen, asymptomatic    No rebound tenderness     Non-surgical without peritoneal features at this time  Rectal:  There is some yellowish/brown stool in the rectal vault.  There is no melena.  There is no hematochezia.  MS:      Symmetric motor strength, given this, there is symmetric weakness which is chronic.    No major joint effusions    No asymmetric leg swelling, no calf tenderness    Right foot: Prior scarring and transmetatarsal amputation  Skin:  No rash or acute skin lesions noted    Scarring from prior staphylococcal infection to the right lower extremity is noted.  Neuro:  Speech is normal and fluent, there is no aphasia    No motor deficits    Cranial nerves are intact  Psych: Awake. Alert.      Normal affect.  Appropriate interactions.            Emergency Department Course   EKG:  No results found for this or any previous visit.     Imaging:  No orders to display        Reports in this section have been read by the radiologist.    Laboratory:  Labs Ordered and Resulted from Time of ED Arrival to Time of ED Departure   COMPREHENSIVE METABOLIC PANEL - Abnormal       Result Value    Sodium 136      Potassium 4.2      Carbon Dioxide (CO2) 23      Anion Gap 12      Urea Nitrogen 33.5 (*)     Creatinine 1.50 (*)     GFR Estimate 54 (*)     Calcium 8.5 (*)     Chloride 101      Glucose 98      Alkaline Phosphatase 138      AST 17      ALT 8      Protein Total 7.4      Albumin 3.4 (*)     Bilirubin Total <0.2     CBC WITH PLATELETS AND DIFFERENTIAL - Abnormal    WBC Count 10.9      RBC Count  2.88 (*)     Hemoglobin 7.0 (*)     Hematocrit 22.5 (*)     MCV 78      MCH 24.3 (*)     MCHC 31.1 (*)     RDW 15.9 (*)     Platelet Count 364      % Neutrophils 75      % Lymphocytes 13      % Monocytes 9      % Eosinophils 2      % Basophils 0      % Immature Granulocytes 1      NRBCs per 100 WBC 0      Absolute Neutrophils 8.1      Absolute Lymphocytes 1.4      Absolute Monocytes 1.0      Absolute Eosinophils 0.3      Absolute Basophils 0.0      Absolute Immature Granulocytes 0.1      Absolute NRBCs 0.0     GLUCOSE BY METER - Abnormal    GLUCOSE BY METER POCT 101 (*)    GLUCOSE BY METER - Abnormal    GLUCOSE BY METER POCT 67 (*)    GLUCOSE BY METER - Abnormal    GLUCOSE BY METER POCT 103 (*)    TYPE AND SCREEN, ADULT    ABO/RH(D) O POS      SPECIMEN EXPIRATION DATE [id number]     ABO/RH TYPE AND SCREEN        Procedures:  Procedures       Emergency Department Course & Assessments:             Interventions/ED Medications:  Medications   dextrose 50 % injection 50 mL (has no administration in time range)   pantoprazole (PROTONIX) IV push injection 80 mg (has no administration in time range)   sodium chloride 0.9% BOLUS 1,000 mL (0 mLs Intravenous Stopped 5/3/24 1716)   dextrose 50 % injection 50 mL (50 mLs Intravenous $Given 5/3/24 1716)   sodium chloride 0.9% BOLUS 1,000 mL (0 mLs Intravenous Stopped 5/3/24 1745)          Independent Interpretation of Radiology Studies by Dr. Phillips      Assessments/Consultations/Discussion of Management:     7:14 PM  I spoke with the admitting hospitalist, Dr. Lin regarding the admission.  I performed a rectal examination on the patient.    Disposition:  Admit to the hospital    Impression & Plan        Medical Decision Making:    This patient presents to the emergency department complaining of low blood sugar.  He notes that he has had poor appetite and feels generally weak malaised and fatigued.  He was found to have a blood glucose of 33 earlier this afternoon after  receiving reportedly 10 units of fast acting insulin.  He is written for insulin 3 times a day with meals (fast-acting) and 1 dose in the morning of Lantus (long-acting).  The patient was given prehospital dextrose solution which caused an immediate rise in his blood sugar but it started falling again upon arrival to the emergency department.  Things eventually stabilized.  His fast acting insulin likely was starting to wear off.  The patient was hydrated in the emergency department as his blood pressure was slightly soft.  The patient returned with a hemoglobin at 7 which is new.  This could explain the patient's weakness and fatigue.  There is no evidence of rosalinda melena or hematochezia.  BUN is mildly elevated which could indicate upper GI bleed or prerenal azotemia and acute renal failure.  The renal insufficiency could lead to increased insulin effect.  Patient will require admission to the hospital for further management.  He will be made inpatient status at this time.  Transfusion with 1 unit of packed red blood cells has been ordered.  Empiric Protonix was administered intravenously.      Diagnosis:    ICD-10-CM    1. Anemia, unspecified type  D64.9       2. Acute renal insufficiency  N28.9       3. Hypoglycemia  E16.2              Discharge Medications (if applicable):         Mark Phillips MD  5/3/2024   Mark Phillips MD Rock, Michael P, MD  05/03/24 1916

## 2024-05-04 ENCOUNTER — APPOINTMENT (OUTPATIENT)
Dept: PHYSICAL THERAPY | Facility: CLINIC | Age: 54
End: 2024-05-04
Attending: INTERNAL MEDICINE
Payer: COMMERCIAL

## 2024-05-04 LAB
ALBUMIN SERPL BCG-MCNC: 3.4 G/DL (ref 3.5–5.2)
ALBUMIN UR-MCNC: 20 MG/DL
ALP SERPL-CCNC: 134 U/L (ref 40–150)
ALT SERPL W P-5'-P-CCNC: 8 U/L (ref 0–70)
ANION GAP SERPL CALCULATED.3IONS-SCNC: 12 MMOL/L (ref 7–15)
APPEARANCE UR: CLEAR
AST SERPL W P-5'-P-CCNC: 15 U/L (ref 0–45)
BASOPHILS # BLD AUTO: 0 10E3/UL (ref 0–0.2)
BASOPHILS NFR BLD AUTO: 0 %
BILIRUB SERPL-MCNC: 0.2 MG/DL
BILIRUB UR QL STRIP: NEGATIVE
BUN SERPL-MCNC: 25 MG/DL (ref 6–20)
CALCIUM SERPL-MCNC: 8.6 MG/DL (ref 8.6–10)
CHLORIDE SERPL-SCNC: 104 MMOL/L (ref 98–107)
COLOR UR AUTO: ABNORMAL
CREAT SERPL-MCNC: 1.17 MG/DL (ref 0.67–1.17)
CREAT SERPL-MCNC: 1.35 MG/DL (ref 0.67–1.17)
CREAT UR-MCNC: 38.4 MG/DL
DEPRECATED HCO3 PLAS-SCNC: 19 MMOL/L (ref 22–29)
EGFRCR SERPLBLD CKD-EPI 2021: 73 ML/MIN/1.73M2
EOSINOPHIL # BLD AUTO: 0.3 10E3/UL (ref 0–0.7)
EOSINOPHIL NFR BLD AUTO: 3 %
ERYTHROCYTE [DISTWIDTH] IN BLOOD BY AUTOMATED COUNT: 15.8 % (ref 10–15)
FRACT EXCRET NA UR+SERPL-RTO: 2.1 %
GLUCOSE BLDC GLUCOMTR-MCNC: 125 MG/DL (ref 70–99)
GLUCOSE BLDC GLUCOMTR-MCNC: 203 MG/DL (ref 70–99)
GLUCOSE BLDC GLUCOMTR-MCNC: 220 MG/DL (ref 70–99)
GLUCOSE BLDC GLUCOMTR-MCNC: 249 MG/DL (ref 70–99)
GLUCOSE BLDC GLUCOMTR-MCNC: 305 MG/DL (ref 70–99)
GLUCOSE BLDC GLUCOMTR-MCNC: 322 MG/DL (ref 70–99)
GLUCOSE SERPL-MCNC: 138 MG/DL (ref 70–99)
GLUCOSE UR STRIP-MCNC: NEGATIVE MG/DL
HBA1C MFR BLD: 9.4 %
HCT VFR BLD AUTO: 26.4 % (ref 40–53)
HGB BLD-MCNC: 8.2 G/DL (ref 13.3–17.7)
HGB BLD-MCNC: 8.8 G/DL (ref 13.3–17.7)
HGB UR QL STRIP: ABNORMAL
IMM GRANULOCYTES # BLD: 0 10E3/UL
IMM GRANULOCYTES NFR BLD: 0 %
KETONES UR STRIP-MCNC: NEGATIVE MG/DL
LEUKOCYTE ESTERASE UR QL STRIP: NEGATIVE
LYMPHOCYTES # BLD AUTO: 1.7 10E3/UL (ref 0.8–5.3)
LYMPHOCYTES NFR BLD AUTO: 18 %
MAGNESIUM SERPL-MCNC: 2 MG/DL (ref 1.7–2.3)
MCH RBC QN AUTO: 24.3 PG (ref 26.5–33)
MCHC RBC AUTO-ENTMCNC: 31.1 G/DL (ref 31.5–36.5)
MCV RBC AUTO: 78 FL (ref 78–100)
MONOCYTES # BLD AUTO: 1.1 10E3/UL (ref 0–1.3)
MONOCYTES NFR BLD AUTO: 11 %
MUCOUS THREADS #/AREA URNS LPF: PRESENT /LPF
NEUTROPHILS # BLD AUTO: 6.3 10E3/UL (ref 1.6–8.3)
NEUTROPHILS NFR BLD AUTO: 68 %
NITRATE UR QL: NEGATIVE
NRBC # BLD AUTO: 0 10E3/UL
NRBC BLD AUTO-RTO: 0 /100
PH UR STRIP: 5.5 [PH] (ref 5–7)
PHOSPHATE SERPL-MCNC: 3.1 MG/DL (ref 2.5–4.5)
PLATELET # BLD AUTO: 334 10E3/UL (ref 150–450)
POTASSIUM SERPL-SCNC: 4.5 MMOL/L (ref 3.4–5.3)
PROT SERPL-MCNC: 7.5 G/DL (ref 6.4–8.3)
RBC # BLD AUTO: 3.38 10E6/UL (ref 4.4–5.9)
RBC URINE: 8 /HPF
SODIUM SERPL-SCNC: 135 MMOL/L (ref 135–145)
SODIUM SERPL-SCNC: 135 MMOL/L (ref 135–145)
SODIUM UR-SCNC: 81 MMOL/L
SP GR UR STRIP: 1.01 (ref 1–1.03)
UROBILINOGEN UR STRIP-MCNC: NORMAL MG/DL
WBC # BLD AUTO: 9.4 10E3/UL (ref 4–11)
WBC URINE: <1 /HPF

## 2024-05-04 PROCEDURE — 250N000011 HC RX IP 250 OP 636: Performed by: HOSPITALIST

## 2024-05-04 PROCEDURE — 250N000012 HC RX MED GY IP 250 OP 636 PS 637: Performed by: INTERNAL MEDICINE

## 2024-05-04 PROCEDURE — 93005 ELECTROCARDIOGRAM TRACING: CPT

## 2024-05-04 PROCEDURE — 84300 ASSAY OF URINE SODIUM: CPT | Performed by: INTERNAL MEDICINE

## 2024-05-04 PROCEDURE — 96375 TX/PRO/DX INJ NEW DRUG ADDON: CPT

## 2024-05-04 PROCEDURE — 83735 ASSAY OF MAGNESIUM: CPT | Performed by: HOSPITALIST

## 2024-05-04 PROCEDURE — G0378 HOSPITAL OBSERVATION PER HR: HCPCS

## 2024-05-04 PROCEDURE — 84295 ASSAY OF SERUM SODIUM: CPT | Performed by: INTERNAL MEDICINE

## 2024-05-04 PROCEDURE — 96361 HYDRATE IV INFUSION ADD-ON: CPT

## 2024-05-04 PROCEDURE — 83036 HEMOGLOBIN GLYCOSYLATED A1C: CPT | Performed by: INTERNAL MEDICINE

## 2024-05-04 PROCEDURE — 97530 THERAPEUTIC ACTIVITIES: CPT | Mod: GP

## 2024-05-04 PROCEDURE — 85025 COMPLETE CBC W/AUTO DIFF WBC: CPT | Performed by: INTERNAL MEDICINE

## 2024-05-04 PROCEDURE — 82962 GLUCOSE BLOOD TEST: CPT

## 2024-05-04 PROCEDURE — 99233 SBSQ HOSP IP/OBS HIGH 50: CPT | Performed by: HOSPITALIST

## 2024-05-04 PROCEDURE — 250N000013 HC RX MED GY IP 250 OP 250 PS 637: Performed by: INTERNAL MEDICINE

## 2024-05-04 PROCEDURE — 82565 ASSAY OF CREATININE: CPT | Performed by: INTERNAL MEDICINE

## 2024-05-04 PROCEDURE — 258N000003 HC RX IP 258 OP 636: Performed by: INTERNAL MEDICINE

## 2024-05-04 PROCEDURE — 36415 COLL VENOUS BLD VENIPUNCTURE: CPT | Performed by: INTERNAL MEDICINE

## 2024-05-04 PROCEDURE — 97161 PT EVAL LOW COMPLEX 20 MIN: CPT | Mod: GP

## 2024-05-04 PROCEDURE — 93010 ELECTROCARDIOGRAM REPORT: CPT | Performed by: INTERNAL MEDICINE

## 2024-05-04 PROCEDURE — 258N000003 HC RX IP 258 OP 636: Performed by: HOSPITALIST

## 2024-05-04 PROCEDURE — 81001 URINALYSIS AUTO W/SCOPE: CPT | Performed by: INTERNAL MEDICINE

## 2024-05-04 PROCEDURE — 84100 ASSAY OF PHOSPHORUS: CPT | Performed by: HOSPITALIST

## 2024-05-04 PROCEDURE — 85018 HEMOGLOBIN: CPT | Performed by: HOSPITALIST

## 2024-05-04 PROCEDURE — 36415 COLL VENOUS BLD VENIPUNCTURE: CPT | Performed by: HOSPITALIST

## 2024-05-04 RX ADMIN — ACETAMINOPHEN 650 MG: 325 TABLET, FILM COATED ORAL at 22:11

## 2024-05-04 RX ADMIN — PANTOPRAZOLE SODIUM 40 MG: 40 TABLET, DELAYED RELEASE ORAL at 06:55

## 2024-05-04 RX ADMIN — SODIUM CHLORIDE: 9 INJECTION, SOLUTION INTRAVENOUS at 00:00

## 2024-05-04 RX ADMIN — IRON SUCROSE 300 MG: 20 INJECTION, SOLUTION INTRAVENOUS at 16:54

## 2024-05-04 RX ADMIN — HYDRALAZINE HYDROCHLORIDE 25 MG: 25 TABLET, FILM COATED ORAL at 20:38

## 2024-05-04 RX ADMIN — INSULIN ASPART 3 UNITS: 100 INJECTION, SOLUTION INTRAVENOUS; SUBCUTANEOUS at 22:15

## 2024-05-04 RX ADMIN — PANCRELIPASE 3 CAPSULE: 36000; 180000; 114000 CAPSULE, DELAYED RELEASE PELLETS ORAL at 12:15

## 2024-05-04 RX ADMIN — GABAPENTIN 100 MG: 100 CAPSULE ORAL at 09:26

## 2024-05-04 RX ADMIN — Medication 1 TABLET: at 09:26

## 2024-05-04 RX ADMIN — LOPERAMIDE HYDROCHLORIDE 4 MG: 2 CAPSULE ORAL at 09:50

## 2024-05-04 RX ADMIN — CETIRIZINE HYDROCHLORIDE 10 MG: 10 TABLET, FILM COATED ORAL at 09:26

## 2024-05-04 RX ADMIN — ASPIRIN 81 MG: 81 TABLET, COATED ORAL at 09:26

## 2024-05-04 RX ADMIN — GABAPENTIN 100 MG: 100 CAPSULE ORAL at 13:46

## 2024-05-04 RX ADMIN — AMLODIPINE BESYLATE 5 MG: 5 TABLET ORAL at 09:26

## 2024-05-04 RX ADMIN — PANCRELIPASE 2 CAPSULE: 36000; 180000; 114000 CAPSULE, DELAYED RELEASE PELLETS ORAL at 22:09

## 2024-05-04 RX ADMIN — DULOXETINE HYDROCHLORIDE 60 MG: 60 CAPSULE, DELAYED RELEASE ORAL at 09:26

## 2024-05-04 RX ADMIN — LIDOCAINE 1 PATCH: 4 PATCH TOPICAL at 21:58

## 2024-05-04 RX ADMIN — OXYCODONE HYDROCHLORIDE 5 MG: 5 TABLET ORAL at 06:55

## 2024-05-04 RX ADMIN — PANCRELIPASE 3 CAPSULE: 36000; 180000; 114000 CAPSULE, DELAYED RELEASE PELLETS ORAL at 16:03

## 2024-05-04 RX ADMIN — HYDRALAZINE HYDROCHLORIDE 25 MG: 25 TABLET, FILM COATED ORAL at 09:26

## 2024-05-04 RX ADMIN — OXYCODONE HYDROCHLORIDE 5 MG: 5 TABLET ORAL at 16:02

## 2024-05-04 RX ADMIN — METHOCARBAMOL 500 MG: 500 TABLET ORAL at 22:11

## 2024-05-04 RX ADMIN — GABAPENTIN 100 MG: 100 CAPSULE ORAL at 20:39

## 2024-05-04 ASSESSMENT — ACTIVITIES OF DAILY LIVING (ADL)
ADLS_ACUITY_SCORE: 29
ADLS_ACUITY_SCORE: 25
ADLS_ACUITY_SCORE: 30
ADLS_ACUITY_SCORE: 24
ADLS_ACUITY_SCORE: 29
ADLS_ACUITY_SCORE: 25
ADLS_ACUITY_SCORE: 24
ADLS_ACUITY_SCORE: 25
ADLS_ACUITY_SCORE: 25
ADLS_ACUITY_SCORE: 29
ADLS_ACUITY_SCORE: 25
ADLS_ACUITY_SCORE: 30
ADLS_ACUITY_SCORE: 30
ADLS_ACUITY_SCORE: 29
ADLS_ACUITY_SCORE: 24
ADLS_ACUITY_SCORE: 25
ADLS_ACUITY_SCORE: 29
ADLS_ACUITY_SCORE: 25
ADLS_ACUITY_SCORE: 29
ADLS_ACUITY_SCORE: 24
ADLS_ACUITY_SCORE: 24
ADLS_ACUITY_SCORE: 30
ADLS_ACUITY_SCORE: 24

## 2024-05-04 NOTE — CONSULTS
GASTROENTEROLOGY CONSULTATION      Long Mayfield  [id number] [employer] [facility] [address]  54 year old male     Admission Date/Time: 5/3/2024  Primary Care Provider: Ana Maria Blakely     We were asked to see the patient in consultation by Dr. Briceño for evaluation of anemia.    ASSESSMENT:    1.  Anemia-no overt GI bleeding, iron studies are consistent with anemia of chronic disease, however, patient was seen in our clinic by Dr. Sifuentes January 2024 and recommended to have an upper endoscopy and colonoscopy for diarrhea.  Also, given the hemoglobin drop from 12 January 2020 23-7 now, it is reasonable to pursue endoscopic evaluation.    2.  Insulin-dependent diabetes with hyperglycemia-history of toe amputation.  Management per primary service    3.  Acute renal insufficiency-management per primary service    4.  Chronic diarrhea-patient does have a history of exocrine pancreatic insufficiency, he does take pancreatic enzymes and Imodium.    RECOMMENDATIONS:  -Monitor hemoglobin  - Clear liquid diet starting at midnight  - GoLytely prep tomorrow starting at 1500  - N.p.o. at midnight tomorrow  - Plan for upper endoscopy and colonoscopy with MAC sedation on Monday  - Daily PPI    Miguelangel Arenas MD   [facility]  [phone]      ________________________________________________________________________        HPI:  Long Mayfield is a 54 year old male who has a past medical history significant for insulin-dependent diabetes mellitus.  History of diabetic foot ulcer leading to right toe imitation.  Also, history of major depressive disorder, hypertension, hyperlipidemia, coronary disease, flash pulmonary edema, exocrine pancreatic insufficiency, alcohol abuse now sober for 2 years.  Per H&P, patient also reports chronic muscular atrophy mobility disorder for which the patient is residing at an [facility].    Patient reports that he felt hot and was concern for hypoglycemia, thus he was admitted.   He does report some fatigue, he had slight encephalopathy on admission.  His hemoglobin was found to be 7 and per the EMR, it was 12 in January 2023.  Patient denies any melena or hematochezia.  There is no hematemesis.  He does work chronic diarrhea for which she takes Creon and Imodium.     ROS: A comprehensive ten point review of systems was negative aside from those in mentioned in the HPI.      PAST MEDICAL HISTORY:  Past Medical History:   Diagnosis Date    Bell's palsy     Diabetes (H)      PAST SURGICAL HISTORY:  Past Surgical History:   Procedure Laterality Date    ORTHOPEDIC SURGERY       SOCIAL HISTORY:  Social History     Tobacco Use    Smoking status: Never    Smokeless tobacco: Never   Substance Use Topics    Alcohol use: Yes    Drug use: No     FAMILY HISTORY:  History reviewed. No pertinent family history.  ALLERGIES: No Known Allergies  MEDICATIONS:   Prior to Admission medications    Medication Sig Start Date End Date Taking? Authorizing Provider   acetaminophen (TYLENOL) 325 MG tablet Take 650 mg by mouth every 6 hours as needed for pain   Yes Unknown, Entered By History   amLODIPine (NORVASC) 5 MG tablet Take 5 mg by mouth daily   Yes Unknown, Entered By History   amLODIPine (NORVASC) 5 MG tablet Take 1 tablet (5 mg) by mouth daily 3/13/22  Yes Kimberly Ma MD   aspirin 81 MG EC tablet Take 81 mg by mouth daily   Yes Unknown, Entered By History   bumetanide (BUMEX) 2 MG tablet Take 2 mg by mouth daily   Yes Unknown, Entered By History   cetirizine (ZYRTEC) 10 MG tablet Take 10 mg by mouth daily   Yes Unknown, Entered By History   DULoxetine (CYMBALTA) 60 MG capsule Take 60 mg by mouth daily   Yes Unknown, Entered By History   gabapentin (NEURONTIN) 100 MG capsule Take 100 mg by mouth 3 times daily   Yes Unknown, Entered By History   hydrALAZINE (APRESOLINE) 25 MG tablet Take 25 mg by mouth 2 times daily Hold if SBP < 110   Yes Unknown, Entered By History   HYDROcodone-acetaminophen (NORCO)  5-325 MG tablet Take 1 tablet by mouth every 6 hours as needed for severe pain 2/25/24  Yes Nahum Freeman MD   ibuprofen (ADVIL/MOTRIN) 600 MG tablet Take 1 tablet (600 mg) by mouth every 6 hours as needed for other (pain, aches, fever) Take with food. 2/25/24  Yes Nahum Freeman MD   insulin aspart (NOVOLOG FLEXPEN) 100 UNIT/ML pen Inject 1-5 Units Subcutaneous 3 times daily (with meals) Inject as per sliding scale:  If 200-250 = 1   251-300 = 2  301-350 = 3  351-400 = 4  401+ = 5  Repeat BS after 3 hours and call MD if still over 400   Yes Unknown, Entered By History   insulin aspart (NOVOLOG FLEXPEN) 100 UNIT/ML pen Inject 8 Units Subcutaneous 2 times daily (with meals) Breakfast and lunch   Yes Unknown, Entered By History   insulin aspart (NOVOLOG FLEXPEN) 100 UNIT/ML pen Inject 10 Units Subcutaneous daily (with dinner)   Yes Unknown, Entered By History   insulin glargine (LANTUS PEN) 100 UNIT/ML pen Inject 15 Units Subcutaneous every morning   Yes Unknown, Entered By History   Lidocaine (LIDOCARE) 4 % Patch Place 1 patch onto the skin 2 times daily Apply to neck/back  To prevent lidocaine toxicity, patient should be patch free for 12 hrs daily.   Yes Unknown, Entered By History   lipase-protease-amylase (CREON 36) 52486-457574-395219 units CPEP Take 3 capsules by mouth 3 times daily (with meals)   Yes Unknown, Entered By History   lipase-protease-amylase (CREON 36) 04921-680134-822526 units CPEP Take 1-2 capsules by mouth Take with snacks or supplements   Yes Unknown, Entered By History   loperamide (IMODIUM) 2 MG capsule Take 4 mg by mouth 3 times daily as needed for diarrhea   Yes Unknown, Entered By History   methocarbamol (ROBAXIN) 500 MG tablet Take 500 mg by mouth every 8 hours as needed for muscle spasms   Yes Unknown, Entered By History   multivitamin w/minerals (THERA-VIT-M) tablet Take 1 tablet by mouth daily 3/13/22  Yes Kimberly Ma MD   sodium polystyrene (KAYEXALATE) 15 GM/60ML suspension  Take 15 g by mouth daily Avoid taking other oral medications 3 hours before or after this medication.   Yes Unknown, Entered By History     PHYSICAL EXAM:   /89 (BP Location: Left arm)   Pulse 92   Temp 97.9  F (36.6  C) (Oral)   Resp 17   SpO2 100%    GEN: Alert, NAD.  RUPERT: AT, anicteric, OP without erythema, exudate, or ulcers.    LYMPH: No LAD noted.  HRT: RRR, no JAYA  LUNGS: CTA  ABD: +BS, non-tender, non-distended, no rebound or guarding.  SKIN: No rash, jaundice   NEURO: MS intact       ADDITIONAL DATA:   I reviewed the patient's new clinical lab test results.   Recent Labs   Lab Test 05/03/24 1626 01/23/23 1945 08/06/22 2005   WBC 10.9 7.1 7.7   HGB 7.0* 12.4* 11.4*   MCV 78 82 82    347 239     Recent Labs   Lab Test 05/04/24 0857 05/04/24 0246 05/04/24 0201 05/03/24 2251 05/03/24 1708 05/03/24 1626 05/03/24 1624 01/23/23 1945 08/06/22 2125 08/06/22 2005   NA  --  135  --   --   --  136  --  133*  --  134   POTASSIUM  --   --   --   --   --  4.2  --  3.4  --  2.9*   CHLORIDE  --   --   --   --   --  101  --  93*  --  97   CO2  --   --   --   --   --  23  --  30*  --  24   BUN  --   --   --   --   --  33.5*  --  22.9*  --  12   CR  --  1.35*  --   --   --  1.50*  --  0.93  --  0.71   ANIONGAP  --   --   --   --   --  12  --  10  --  13   AROLDO  --   --   --   --   --  8.5*  --  9.6  --  8.5   *  --  203* 96   < > 98   < > 253*   < > 407*    < > = values in this interval not displayed.     Recent Labs   Lab Test 05/04/24 0246 05/03/24 1626 01/23/23 1945 08/06/22  2005 03/09/22  0651 03/08/22 2011 03/08/22  1756 10/08/17  2105   ALBUMIN  --  3.4* 4.1 3.0*   < >  --    < > 3.4   BILITOTAL  --  <0.2 0.2 0.2   < >  --    < > 0.2   ALT  --  8 27 57   < >  --    < > 52   AST  --  17 35 71*   < >  --    < > 73*   PROTEIN 20*  --   --   --   --  20*  --   --    LIPASE  --   --  6* 30*  --   --   --  278    < > = values in this interval not displayed.        I reviewed the  patient's new imaging results.

## 2024-05-04 NOTE — PROGRESS NOTES
RECEIVING UNIT ED HANDOFF REVIEW    ED Nurse Handoff Report was reviewed by: Danisha Carranza RN on May 3, 2024 at 8:56 PM

## 2024-05-04 NOTE — PLAN OF CARE
Goal Outcome Evaluation:      Plan of Care Reviewed With: patient    Overall Patient Progress: improvingOverall Patient Progress: improving     Summary:  Admitted with hypoglycemia and Anemia.   DATE & TIME: 05/03-04/2024; [id number]    Cognitive Concerns/ Orientation: A/O x4. Calm and cooperative.    BEHAVIOR & AGGRESSION TOOL COLOR: Green   CIWA SCORE: N/A   ABNL VS/O2: Hypertensive, otherwise VSS On RA  MOBILITY: SBA   PAIN MANAGMENT: PRN oxy available. C/o pain on neck/lt knee; warm pack provided   DIET: Tolerating a regular diet; Good appetite  BOWEL/BLADDER: Continent of B/B. Uses urinal at bedside   ABNL LAB/BG: HGB 7.0, creat 1.35, ca+ 8.5. BG 96, 203  DRAIN/DEVICES: PIV SL   TELEMETRY RHYTHM:  NSR   SKIN: Scab to rt shin. Rt foot partial amputation   TESTS/PROCEDURES: Echo needs to be completed   D/C DAY/GOALS/PLACE: Rest   OTHER IMPORTANT INFO: 1 Unit of RBC given 05/03; Recheck this am. UA/UC completed, waiting UC results. Stool sample negative for occult blood. Pt refused bed alarm.

## 2024-05-04 NOTE — UTILIZATION REVIEW
"      Admission Status; Secondary Review Determination         Under the authority of the Utilization Management Committee, the utilization review process indicated a secondary review on the above patient.  The review outcome is based on review of the medical records, discussions with staff, and applying clinical experience noted on the date of the review.          (x) Observation Status Appropriate - This patient does not meet hospital inpatient criteria and is placed in observation status. If this patient's primary payer is Medicare and was admitted as an inpatient, Condition Code 44 should be used and patient status changed to \"observation\".     RATIONALE FOR DETERMINATION            The Patient is 55 y/o  man with PMHx significant for muscle wasting and atrophy with secondary gait and mobility issues, CAD, DM 1, diabetic foot ulcer with partial foot amputation, chronic pancreatitis lives in a facility and he received additional 10 units subcu insulin aspartate for blood sugar around 200.  When the staff went to recheck the patient, he was unresponsive, blood sugar was in the 30s.  EMS provided 20 g of D10 and blood sugars went up to 199.  In ER blood sugar was 101.    Admission blood pressure on the high side, otherwise normal vital signs.    Blood sugar is better controlled, no hypoglycemia with adjusting insulin dose  Admission creatinine at 1.5 improved to Prior range after IV fluids.  Admission hemoglobin at 7.0  Patient received 1 unit of RBC and her hemoglobin stabilized at 8.2.  GI felt anemia was anemia of chronic disease and recommended upper and lower endoscopy since hemoglobin was significantly lower from the baseline of 12.4 and the patient was recommended endoscopy in January 2024 for chronic diarrhea.    54-year-old man with DM1 was given 10 units subcu insulin for blood sugar of 200 and he was found unresponsive with blood sugars of 30.  Patient recovered with D10 and blood sugars are better with " insulin adjustment.  Hemoglobin of 7.0 for which he received 1 unit RBC, no signs of bleeding and negative occult blood.  Patient scheduled for upper and lower endoscopy     The severity of illness, intensity of service provided, expected LOS and risk for adverse outcome make the care appropriate for further observation; however, doesn't meet criteria for hospital inpatient admission. Dr. Briceño  notified of this determination.    This document was produced using voice recognition software.      The information on this document is developed by the utilization review team in order for the business office to ensure compliance.  This only denotes the appropriateness of proper admission status and does not reflect the quality of care rendered.         The definitions of Inpatient Status and Observation Status used in making the determination above are those provided in the CMS Coverage Manual, Chapter 1 and Chapter 6, section 70.4.      Sincerely,     CATIE ESTEBAN MD   Utilization Review  Physician Advisor  [facility]

## 2024-05-04 NOTE — PLAN OF CARE
Physical Therapy Discharge Summary    Reason for therapy discharge:    All goals and outcomes met, no further needs identified.    Progress towards therapy goal(s). See goals on Care Plan in [facility] electronic health record for goal details.  Goals met    Therapy recommendation(s):    Continue home exercise program. Continue short distance ambulation for exercise.

## 2024-05-04 NOTE — PROGRESS NOTES
"   05/04/24 0828   Appointment Info   Signing Clinician's Name / Credentials (PT) Sanjana Blake, PT, DPT   Living Environment   People in Home facility resident   Transportation Anticipated health plan transportation   Living Environment Comments Lives at [facility], which provides meals, cleaning, meds.   Self-Care   Usual Activity Tolerance moderate   Current Activity Tolerance moderate   Equipment Currently Used at Home wheelchair, manual;cane, straight   Activity/Exercise/Self-Care Comment Pt reports variable levels of mobility- states he uses w/c for some distances including sometimes to get to the bathroom, otherwise a cane, but also that he could walk around [employer]. Reports independence with dressing, toileting, bathing.   General Information   Onset of Illness/Injury or Date of Surgery 05/03/24   Referring Physician Shira Lin MD   Patient/Family Therapy Goals Statement (PT) to go home   Pertinent History of Current Problem (include personal factors and/or comorbidities that impact the POC) \" Long Mayfield is a 54 year old male with past medical history significant for insulin-dependent diabetes mellitus, history of diabetic ulcer of foot leading to right toe amputation, history of major depressive disorder, essential hypertension, hyperlipidemia, CAD, history of flash pulmonary edema, exocrine pancreatic insufficiency causing chronic diarrhea, history of alcohol abuse, sober for 2 years and per patient history of chronic muscular atrophy mobility disorder due to which patient currently has been residing at [facility].  Patient receives Lantus, 3 times a day NovoLog fixed dose 8 units, 8 units and then 10 units and medium dose sliding scale insulin at facility.  Patient was noticed to be hypoglycemic this afternoon by staff when he was also sleepy and was slightly encephalopathic.  EMS was called who gave patient D10 and brought patient to the ED for further evaluation.  Patient is also " "complaining of more fatigue and decreased appetite recently.  Patient was found to have acute kidney injury with creatinine of 1.5 as compared to 0.8 and was also found to have hemoglobin of 7.0 which was documented 12.215 months ago.  Patient is admitted for further evaluation and management.\"   Existing Precautions/Restrictions fall   Weight-Bearing Status - LLE full weight-bearing   Weight-Bearing Status - RLE full weight-bearing   Cognition   Orientation Status (Cognition) oriented to;person;place;time   Cognitive Status Comments may have memory deficits?   Pain Assessment   Patient Currently in Pain Yes, see Vital Sign flowsheet  (R knee)   Integumentary/Edema   Integumentary/Edema Comments R knee more swollen compared to L, painful   Range of Motion (ROM)   ROM Comment stiff R knee, but WFL range   Strength (Manual Muscle Testing)   Strength (Manual Muscle Testing) No deficits observed during functional mobility   Strength Comments fatigues over time   Bed Mobility   Comment, (Bed Mobility) supervision supine to seated EOB   Transfers   Comment, (Transfers) supervision with holding IV pole   Gait/Stairs (Locomotion)   Comment, (Gait/Stairs) supervision pushing IV pole   Balance   Balance Comments no overt deficits   Sensory Examination   Sensory Perception Comments neuropathy in B feet   Clinical Impression   Criteria for Skilled Therapeutic Intervention Yes, treatment indicated   PT Diagnosis (PT) impaired functional mobility   Influenced by the following impairments decreased activity tolerance   Functional limitations due to impairments ambulation, transfers   Clinical Presentation (PT Evaluation Complexity) stable   Clinical Presentation Rationale clinical judgement   Clinical Decision Making (Complexity) low complexity   Planned Therapy Interventions (PT) balance training;bed mobility training;gait training;patient/family education;transfer training   Risk & Benefits of therapy have been explained " evaluation/treatment results reviewed;care plan/treatment goals reviewed;risks/benefits reviewed;current/potential barriers reviewed;participants voiced agreement with care plan;participants included;patient   PT Total Evaluation Time   PT Eval, Low Complexity Minutes (78802) 12   Physical Therapy Goals   PT Frequency One time eval and treatment only   PT Predicted Duration/Target Date for Goal Attainment 05/04/24   PT Goals Bed Mobility;Transfers;Gait   PT: Bed Mobility Independent   PT: Transfers Independent   PT: Gait Independent   Interventions   Interventions Quick Adds Therapeutic Activity   Therapeutic Activity   Therapeutic Activities: dynamic activities to improve functional performance Minutes (56532) 10   Symptoms Noted During/After Treatment None   Treatment Detail/Skilled Intervention Pt reluctant to mobilize as he just woke up, but ultimately agreeable. Supine to seated EOB w/o assist, needed time to adjust to upright and distracted easily needing cuing back to task. Standing transfer with holding IV pole, no overt LOB. Ambulation x 30ft with pushing IV pole, mild gait deviation likely d/t partial R foot amp, otherwise no LOB. Back to sitting EOB at end of session. Reviewed recommendations for x 3 walks a day while in hospital and up to chair for meals. Pt verbally agreed, but hopes to discharge soon.   PT Discharge Planning   PT Plan d/c   PT Discharge Recommendation (DC Rec) home with assist   PT Rationale for DC Rec Anticipated patient will be able to return back to [facility] when medically appropriate. pt is able to mobilize without assist, likely close to his baseline for mobility.   PT Brief overview of current status supervision with pushing IV pole for ambulation   Total Session Time   Timed Code Treatment Minutes 10   Total Session Time (sum of timed and untimed services) [address] [place] [address] Rehabilitation Services  OUTPATIENT PHYSICAL THERAPY EVALUATION  PLAN OF TREATMENT FOR OUTPATIENT  REHABILITATION  (COMPLETE FOR INITIAL CLAIMS ONLY)  Patient's Last Name, First Name, M.I.  YOB: 1970  Long Mayfield                        Provider's Name  [facility] Medical Record No.  [id number]                             Onset Date:  05/03/24   Start of Care Date:      Type:     _X_PT   ___OT   ___SLP Medical Diagnosis:                 PT Diagnosis:  impaired functional mobility Visits from SOC:  1     See note for plan of treatment, functional goals and certification details    I CERTIFY THE NEED FOR THESE SERVICES FURNISHED UNDER        THIS PLAN OF TREATMENT AND WHILE UNDER MY CARE     (Physician co-signature of this document indicates review and certification of the therapy plan).

## 2024-05-04 NOTE — PROGRESS NOTES
[facility]  Hospitalist Progress Note   05/04/2024          Assessment and Plan:       Long Mayfield is a 54 year old male with history of insulin-dependent diabetes mellitus, history of diabetic ulcer of foot leading to right toe amputation, history of major depressive disorder, hypertension, hyperlipidemia, CAD, exocrine pancreatic insufficiency causing chronic diarrhea, history of alcohol abuse, sober for 2 years and per patient history of chronic muscular atrophy mobility disorder due to which patient currently has been residing at [facility] admitted on-5/3/2024 with symptomatic hypoglycemia and anemia.     Hypoglycemia: Likely multifactorial from insulin administration, poor intake, acute kidney injury.  Uncontrolled type 2 diabetes mellitus, insulin-dependent.  Patient's last hemoglobin A1c 12.9 in November 2023, now hemoglobin A1c 9.4 in the setting of severe anemia.  --Per report patient received Lantus 15 units, 8 units NovoLog with breakfast this morning and then received 10 units of NovoLog with lunch and later was found to be poorly responsive, blood glucose was in the 30s.  EMS contacted, received 20 g of D10 and blood sugar checked and was 199.   -- Per report mental status subsequently improved.   --Carb controlled diet.  Hold insulin Lantus.  Medium intensity sliding scale insulin.  Will 6 on insulin aspart 5 units 3 times daily with meals.  Discontinue IV fluids.  Monitor blood sugars closely, optimize regimen.  Recommend endocrinology evaluation as outpatient    Status post PRBC transfusion  Acute on chronic anemia, likely multifactorial  Severe iron deficiency.  On outside records review hemoglobin fluctuating between 7-8.  Last hemoglobin from 1/11/2024 at 7.2.  Now presented with hemoglobin of 7.0.  Reports on and off Advil use.  No previous EGD or colonoscopies in the past.    -Received 1 unit of blood transfusion on 5/3.  This morning hemoglobin recheck  pending.  Occult blood negative.  Iron saturation index 5.  Ferritin 104.  Occult blood negative.  Iron saturation index 5.  Ferritin 104.  [facility] following, plan for EGD and colonoscopy on Monday.  Appreciate comanagement.  Conditional order to transfuse for hemoglobin less than 7 or symptomatic.    Monitor hemoglobin levels twice daily or earlier if symptomatic.  Hold NSAIDs.  Continue Protonix 40 mg oral daily.    CKD stage 2-3  Previous baseline Cr 1-1.5 in outside records.  Patient reports poor appetite, PTA on Bumex and also uses Advil as needed for pain.  Creatinine 1.5 on admission, Fena 2.1.  -- Received IV fluids, Bumex held with which creatinine improved to 1.17.  Discontinue IV fluids, continue to hold p.o. with PTA Bumex tonight.  Monitor renal function in a.m., avoid nephrotoxic drugs.     Hypertension, hyperlipidemia  History of CAD.  History of flash pulmonary edema.  Denies any chest pain or palpitations or shortness of breath at this time.  Telemetry monitoring.  Echocardiogram pending.  Hold PTA aspirin until colonoscopy am   Continue PTA hydralazine and amlodipine.  Continue PTA Lipitor.  Hold PTA Bumex.  Discontinued IV fluids.     Chronic pancreatic insufficiency leading to chronic diarrhea:  Follows up with [facility] in an outpatient setting, takes Creon as supplement  --Continue PTA Creon and PTA loperamide.    History of hyperkalemia.  Admitted 1/2024 for hyperkalemia and then discharged with Kayexalate.  Potassium within normal limits, might consider discontinuing at discharge    History of alcohol abuse: Noted, sober for 2 years  Encouraged him to continue soberity.     Cervical disc disease with myelopathy  Chronic bilateral leg pain.  Diabetic neuropathy.  History of diabetic foot ulcer, osteomyelitis status post right transmetatarsal amputation.  No acute issues.  Continue PTA Cymbalta, gabapentin, Robaxin.  Continue PTA lidocaine patch.  Hold PTA [place], as needed  oxycodone 2.5 mg p.o. every 4 hours for moderate pain and 5 mg every 6 hours for severe pain.  Hold Advil      History of severe recurrent major depressive disorder   No acute issues   --As above, continue PTA Cymbalta.     Physical deconditioning from medical illness, chronic debility.  Per report uses wheelchair on and off at care facility.  Activity as tolerated with fall precautions and assistance.  Rehab team evaluation prior to discharge.    History of nighttime oxygen desaturation.  Was recommended sleep studies as outpatient.    History of lung nodule.  On chart review 10mm spiculated LLL nodule (noted on CT chest abdominal pelvis from 12/27/2023)   Was then recommend outpatient imaging.  Unsure if patient had followed up.  Recommend age-appropriate health guidance, follow-up imaging as outpatient     Orders Placed This Encounter      Combination Diet Regular Diet Adult      NPO per Anesthesia Guidelines for Procedure/Surgery Except for: Meds      DVT Prophylaxis: SCDs, ambulate in the setting of anemia requiring blood transfer  Code Status: Full Code  Disposition: Expected discharge likely Tuesday after procedure    Medically Ready for Discharge: Anticipated in 2-4 Days    Discussed with patient, bedside RN  >51 minutes spent by me on the date of service doing chart review, history, exam, documentation & further activities per the note.      Ludwig Briceño MD        Interval History:      Patient lying in bed.  Denies any chest pain or palpitations.  Denies any headache or dizziness.  Denies any nausea or vomiting.  Denies any new tingling or numbness.  Blood glucose 96 > 203  Hemoglobin 7.0, received 1 unit blood transfusion.  Telemetry normal sinus rhythm.         Physical Exam:        Physical Exam   Temp:  [97.2  F (36.2  C)-98.2  F (36.8  C)] 97.9  F (36.6  C)  Pulse:  [63-92] (P) 81  Resp:  [10-18] (P) 18  BP: ()/() (P) 160/97  SpO2:  [82 %-100 %] (P) 99 %    Intake/Output Summary (Last  24 hours) at 5/4/2024 1325  Last data filed at 5/4/2024 1216  Gross per 24 hour   Intake 1491 ml   Output 2000 ml   Net -509 ml     PHYSICAL EXAM  GENERAL: Patient is in no distress. Alert and oriented.  HEART: Regular rate and rhythm. S1S2.   LUNGS: Clear to auscultation bilaterally. No expiratory wheeze.  Respirations unlabored  ABDOMEN: Soft, no abdominal tenderness, bowel sounds heard   NEURO: Moving all extremities.  EXTREMITIES: No pedal edema.   SKIN: Warm, dry.  Right foot transmetatarsal amputation.  Scarring right lower extremity  PSYCHIATRY Cooperative       Medications:        Current Facility-Administered Medications   Medication Dose Route Frequency Provider Last Rate Last Admin    amLODIPine (NORVASC) tablet 5 mg  5 mg Oral Daily Shira Lin MD   5 mg at 05/04/24 0926    aspirin EC tablet 81 mg  81 mg Oral Daily Shira Lin MD   81 mg at 05/04/24 0926    cetirizine (zyrTEC) tablet 10 mg  10 mg Oral Daily Shira Lin MD   10 mg at 05/04/24 0926    DULoxetine (CYMBALTA) DR capsule 60 mg  60 mg Oral Daily Shira Lin MD   60 mg at 05/04/24 0926    gabapentin (NEURONTIN) capsule 100 mg  100 mg Oral TID Shira Lin MD   100 mg at 05/04/24 0926    hydrALAZINE (APRESOLINE) tablet 25 mg  25 mg Oral BID Shira Lin MD   25 mg at 05/04/24 0926    insulin aspart (NovoLOG) injection (RAPID ACTING)  1-7 Units Subcutaneous TID AC Shira Lin MD   2 Units at 05/04/24 0923    insulin aspart (NovoLOG) injection (RAPID ACTING)  1-5 Units Subcutaneous At Bedtime Shira Lin MD        Lidocaine (LIDOCARE) 4 % Patch 1 patch  1 patch Transdermal At Bedtime Shira Lin MD   1 patch at 05/03/24 0464    lipase-protease-amylase (CREON 36) 55449-776086-205169 units per EC Capsule 3 capsule  3 capsule Oral TID w/meals Shira Lin MD   3 capsule at 05/04/24 1215    multivitamin w/minerals (THERA-VIT-M) tablet 1 tablet  1 tablet Oral Daily Shira Lin MD   1 tablet at 05/04/24 0959    pantoprazole (PROTONIX)  EC tablet 40 mg  40 mg Oral QAM AC Shira Lin MD   40 mg at 05/04/24 0655    [START ON 5/5/2024] polyethylene glycol (GoLYTELY) suspension 4,000 mL  4,000 mL Oral Once Miguelangel Arenas MD        sodium chloride (PF) 0.9% PF flush 3 mL  3 mL Intracatheter Q8H Shira Lin MD   3 mL at 05/04/24 0656     Current Facility-Administered Medications   Medication Dose Route Frequency Provider Last Rate Last Admin    acetaminophen (TYLENOL) tablet 650 mg  650 mg Oral Q4H PRN Shira Lin MD        Or    acetaminophen (TYLENOL) Suppository 650 mg  650 mg Rectal Q4H PRN Shira Lin MD        calcium carbonate (TUMS) chewable tablet 1,000 mg  1,000 mg Oral 4x Daily PRN Shira Lin MD        glucose gel 15-30 g  15-30 g Oral Q15 Min PRN Shira Lin MD        Or    dextrose 50 % injection 25-50 mL  25-50 mL Intravenous Q15 Min PRN Shira Lin MD        Or    glucagon injection 1 mg  1 mg Subcutaneous Q15 Min PRN Shira Lin MD        hydrALAZINE (APRESOLINE) tablet 10 mg  10 mg Oral Q4H PRN Shira Lin MD        Or    hydrALAZINE (APRESOLINE) injection 10 mg  10 mg Intravenous Q4H PRN Shira Lin MD        lidocaine (LMX4) cream   Topical Q1H PRN Shira Lin MD        lidocaine 1 % 0.1-1 mL  0.1-1 mL Other Q1H PRN Shira Lin MD        lipase-protease-amylase (CREON 36) 01277-280978-217349 units per EC Capsule 1-2 capsule  1-2 capsule Oral With Snacks or Supplements Shira Lin MD        loperamide (IMODIUM) capsule 4 mg  4 mg Oral TID PRN Shira Lin MD   4 mg at 05/04/24 0950    melatonin tablet 5 mg  5 mg Oral At Bedtime PRN Shira Lin MD        methocarbamol (ROBAXIN) tablet 500 mg  500 mg Oral Q8H PRN Shira Lin MD        naloxone (NARCAN) injection 0.2 mg  0.2 mg Intravenous Q2 Min PRN Ludwig Briceño MD        Or    naloxone (NARCAN) injection 0.4 mg  0.4 mg Intravenous Q2 Min PRN Ludwig Briceño MD        Or    naloxone (NARCAN) injection 0.2 mg  0.2 mg Intramuscular Q2 Min PRN  Ludwig Briceño MD        Or    naloxone (NARCAN) injection 0.4 mg  0.4 mg Intramuscular Q2 Min PRN Ludwig Briceño MD        ondansetron (ZOFRAN ODT) ODT tab 4 mg  4 mg Oral Q6H PRN Shira Lin MD        Or    ondansetron (ZOFRAN) injection 4 mg  4 mg Intravenous Q6H PRN Shira Lin MD        oxyCODONE (ROXICODONE) tablet 5 mg  5 mg Oral Q4H PRN Shira Lin MD   5 mg at 05/04/24 0655    oxyCODONE IR (ROXICODONE) half-tab 2.5 mg  2.5 mg Oral Q4H PRN Shira Lin MD        prochlorperazine (COMPAZINE) injection 10 mg  10 mg Intravenous Q6H PRN Shira Lin MD        Or    prochlorperazine (COMPAZINE) tablet 10 mg  10 mg Oral Q6H PRN Shira Lin MD        Or    prochlorperazine (COMPAZINE) suppository 25 mg  25 mg Rectal Q12H PRN Shira Lin MD        sodium chloride (PF) 0.9% PF flush 3 mL  3 mL Intracatheter q1 min prn Shira Lin MD                Data:      All new lab and imaging data was reviewed.

## 2024-05-05 ENCOUNTER — APPOINTMENT (OUTPATIENT)
Dept: CARDIOLOGY | Facility: CLINIC | Age: 54
End: 2024-05-05
Attending: INTERNAL MEDICINE
Payer: COMMERCIAL

## 2024-05-05 LAB
ANION GAP SERPL CALCULATED.3IONS-SCNC: 12 MMOL/L (ref 7–15)
ATRIAL RATE - MUSE: 88 BPM
BUN SERPL-MCNC: 22.5 MG/DL (ref 6–20)
CALCIUM SERPL-MCNC: 9.1 MG/DL (ref 8.6–10)
CHLORIDE SERPL-SCNC: 100 MMOL/L (ref 98–107)
CREAT SERPL-MCNC: 1.17 MG/DL (ref 0.67–1.17)
DEPRECATED HCO3 PLAS-SCNC: 22 MMOL/L (ref 22–29)
DIASTOLIC BLOOD PRESSURE - MUSE: NORMAL MMHG
EGFRCR SERPLBLD CKD-EPI 2021: 73 ML/MIN/1.73M2
ERYTHROCYTE [DISTWIDTH] IN BLOOD BY AUTOMATED COUNT: 15.8 % (ref 10–15)
GLUCOSE BLDC GLUCOMTR-MCNC: 132 MG/DL (ref 70–99)
GLUCOSE BLDC GLUCOMTR-MCNC: 142 MG/DL (ref 70–99)
GLUCOSE BLDC GLUCOMTR-MCNC: 221 MG/DL (ref 70–99)
GLUCOSE BLDC GLUCOMTR-MCNC: 271 MG/DL (ref 70–99)
GLUCOSE BLDC GLUCOMTR-MCNC: 339 MG/DL (ref 70–99)
GLUCOSE SERPL-MCNC: 230 MG/DL (ref 70–99)
HCT VFR BLD AUTO: 30.1 % (ref 40–53)
HGB BLD-MCNC: 9.2 G/DL (ref 13.3–17.7)
INTERPRETATION ECG - MUSE: NORMAL
LVEF ECHO: NORMAL
MAGNESIUM SERPL-MCNC: 2.1 MG/DL (ref 1.7–2.3)
MAGNESIUM SERPL-MCNC: 2.2 MG/DL (ref 1.7–2.3)
MCH RBC QN AUTO: 24.5 PG (ref 26.5–33)
MCHC RBC AUTO-ENTMCNC: 30.6 G/DL (ref 31.5–36.5)
MCV RBC AUTO: 80 FL (ref 78–100)
P AXIS - MUSE: 40 DEGREES
PHOSPHATE SERPL-MCNC: 3.6 MG/DL (ref 2.5–4.5)
PHOSPHATE SERPL-MCNC: 3.8 MG/DL (ref 2.5–4.5)
PLATELET # BLD AUTO: 371 10E3/UL (ref 150–450)
POTASSIUM SERPL-SCNC: 4.6 MMOL/L (ref 3.4–5.3)
PR INTERVAL - MUSE: 178 MS
QRS DURATION - MUSE: 72 MS
QT - MUSE: 380 MS
QTC - MUSE: 459 MS
R AXIS - MUSE: -16 DEGREES
RBC # BLD AUTO: 3.75 10E6/UL (ref 4.4–5.9)
SODIUM SERPL-SCNC: 134 MMOL/L (ref 135–145)
SYSTOLIC BLOOD PRESSURE - MUSE: NORMAL MMHG
T AXIS - MUSE: 17 DEGREES
VENTRICULAR RATE- MUSE: 88 BPM
WBC # BLD AUTO: 8.6 10E3/UL (ref 4–11)

## 2024-05-05 PROCEDURE — 93306 TTE W/DOPPLER COMPLETE: CPT

## 2024-05-05 PROCEDURE — 99232 SBSQ HOSP IP/OBS MODERATE 35: CPT | Performed by: HOSPITALIST

## 2024-05-05 PROCEDURE — 258N000003 HC RX IP 258 OP 636: Performed by: HOSPITALIST

## 2024-05-05 PROCEDURE — G0378 HOSPITAL OBSERVATION PER HR: HCPCS

## 2024-05-05 PROCEDURE — 84100 ASSAY OF PHOSPHORUS: CPT | Performed by: HOSPITALIST

## 2024-05-05 PROCEDURE — 80048 BASIC METABOLIC PNL TOTAL CA: CPT | Performed by: HOSPITALIST

## 2024-05-05 PROCEDURE — 85027 COMPLETE CBC AUTOMATED: CPT | Performed by: INTERNAL MEDICINE

## 2024-05-05 PROCEDURE — 999N000111 HC STATISTIC OT IP EVAL DEFER: Performed by: OCCUPATIONAL THERAPIST

## 2024-05-05 PROCEDURE — 36415 COLL VENOUS BLD VENIPUNCTURE: CPT | Performed by: HOSPITALIST

## 2024-05-05 PROCEDURE — 250N000013 HC RX MED GY IP 250 OP 250 PS 637: Performed by: HOSPITALIST

## 2024-05-05 PROCEDURE — 250N000011 HC RX IP 250 OP 636: Performed by: HOSPITALIST

## 2024-05-05 PROCEDURE — 82962 GLUCOSE BLOOD TEST: CPT

## 2024-05-05 PROCEDURE — 93306 TTE W/DOPPLER COMPLETE: CPT | Mod: 26 | Performed by: INTERNAL MEDICINE

## 2024-05-05 PROCEDURE — 250N000013 HC RX MED GY IP 250 OP 250 PS 637: Performed by: INTERNAL MEDICINE

## 2024-05-05 PROCEDURE — 96376 TX/PRO/DX INJ SAME DRUG ADON: CPT

## 2024-05-05 PROCEDURE — 83735 ASSAY OF MAGNESIUM: CPT | Performed by: HOSPITALIST

## 2024-05-05 RX ORDER — HYDROCODONE BITARTRATE AND ACETAMINOPHEN 5; 325 MG/1; MG/1
1 TABLET ORAL EVERY 6 HOURS PRN
Status: DISCONTINUED | OUTPATIENT
Start: 2024-05-05 | End: 2024-05-07 | Stop reason: HOSPADM

## 2024-05-05 RX ADMIN — PANTOPRAZOLE SODIUM 40 MG: 40 TABLET, DELAYED RELEASE ORAL at 06:45

## 2024-05-05 RX ADMIN — CETIRIZINE HYDROCHLORIDE 10 MG: 10 TABLET, FILM COATED ORAL at 09:23

## 2024-05-05 RX ADMIN — PANCRELIPASE 2 CAPSULE: 36000; 180000; 114000 CAPSULE, DELAYED RELEASE PELLETS ORAL at 09:23

## 2024-05-05 RX ADMIN — HYDRALAZINE HYDROCHLORIDE 25 MG: 25 TABLET, FILM COATED ORAL at 09:23

## 2024-05-05 RX ADMIN — HYDROCODONE BITARTRATE AND ACETAMINOPHEN 1 TABLET: 5; 325 TABLET ORAL at 21:05

## 2024-05-05 RX ADMIN — GABAPENTIN 100 MG: 100 CAPSULE ORAL at 19:44

## 2024-05-05 RX ADMIN — METHOCARBAMOL 500 MG: 500 TABLET ORAL at 10:04

## 2024-05-05 RX ADMIN — OXYCODONE HYDROCHLORIDE 5 MG: 5 TABLET ORAL at 09:31

## 2024-05-05 RX ADMIN — GABAPENTIN 100 MG: 100 CAPSULE ORAL at 09:22

## 2024-05-05 RX ADMIN — LOPERAMIDE HYDROCHLORIDE 4 MG: 2 CAPSULE ORAL at 00:13

## 2024-05-05 RX ADMIN — HYDROCODONE BITARTRATE AND ACETAMINOPHEN 1 TABLET: 5; 325 TABLET ORAL at 14:25

## 2024-05-05 RX ADMIN — POLYETHYLENE GLYCOL 3350, SODIUM SULFATE ANHYDROUS, SODIUM BICARBONATE, SODIUM CHLORIDE, POTASSIUM CHLORIDE 4000 ML: 236; 22.74; 6.74; 5.86; 2.97 POWDER, FOR SOLUTION ORAL at 17:46

## 2024-05-05 RX ADMIN — DULOXETINE HYDROCHLORIDE 60 MG: 60 CAPSULE, DELAYED RELEASE ORAL at 09:22

## 2024-05-05 RX ADMIN — OXYCODONE HYDROCHLORIDE 5 MG: 5 TABLET ORAL at 00:18

## 2024-05-05 RX ADMIN — AMLODIPINE BESYLATE 5 MG: 5 TABLET ORAL at 09:24

## 2024-05-05 RX ADMIN — IRON SUCROSE 300 MG: 20 INJECTION, SOLUTION INTRAVENOUS at 18:22

## 2024-05-05 RX ADMIN — METHOCARBAMOL 500 MG: 500 TABLET ORAL at 17:57

## 2024-05-05 RX ADMIN — ACETAMINOPHEN 650 MG: 325 TABLET, FILM COATED ORAL at 10:08

## 2024-05-05 RX ADMIN — LIDOCAINE 1 PATCH: 4 PATCH TOPICAL at 21:05

## 2024-05-05 RX ADMIN — Medication 1 TABLET: at 09:23

## 2024-05-05 RX ADMIN — HYDRALAZINE HYDROCHLORIDE 25 MG: 25 TABLET, FILM COATED ORAL at 19:44

## 2024-05-05 RX ADMIN — PANCRELIPASE 1 CAPSULE: 36000; 180000; 114000 CAPSULE, DELAYED RELEASE PELLETS ORAL at 17:43

## 2024-05-05 RX ADMIN — GABAPENTIN 100 MG: 100 CAPSULE ORAL at 14:25

## 2024-05-05 RX ADMIN — INSULIN ASPART 1 UNITS: 100 INJECTION, SOLUTION INTRAVENOUS; SUBCUTANEOUS at 22:31

## 2024-05-05 ASSESSMENT — ACTIVITIES OF DAILY LIVING (ADL)
ADLS_ACUITY_SCORE: 30
ADLS_ACUITY_SCORE: 30
ADLS_ACUITY_SCORE: 29
ADLS_ACUITY_SCORE: 30
ADLS_ACUITY_SCORE: 29
ADLS_ACUITY_SCORE: 29
DEPENDENT_IADLS:: CLEANING;COOKING;LAUNDRY;MEDICATION MANAGEMENT;MEAL PREPARATION
ADLS_ACUITY_SCORE: 30
ADLS_ACUITY_SCORE: 29
ADLS_ACUITY_SCORE: 29
ADLS_ACUITY_SCORE: 30
ADLS_ACUITY_SCORE: 30
ADLS_ACUITY_SCORE: 29
ADLS_ACUITY_SCORE: 30
ADLS_ACUITY_SCORE: 29
ADLS_ACUITY_SCORE: 30
ADLS_ACUITY_SCORE: 29

## 2024-05-05 NOTE — PROGRESS NOTES
Occupational Therapy: Orders received. Chart reviewed and discussed with care team. patient history of chronic muscular atrophy mobility disorder due to which patient currently has been residing at [facility] admitted on-5/3/2024 with symptomatic hypoglycemia and anemia under observation status. Per PT, pt is supervision with functional mobility and near or at baseline. Recommending return to [facility] when medically stable. As pt near baseline with functional mobility and safe discharge plan, will defer OT to LTC facility to futher screen as needed for ADl's and cognition.  Defer discharge recommendations to PT and medical team.? Will complete orders. Recommend OT at pt's LTC facility for safety with ADl/IADl's to PLOF as needed.

## 2024-05-05 NOTE — PLAN OF CARE
Goal Outcome Evaluation:    Summary:  Admitted with hypoglycemia and Anemia.   DATE & TIME: 5/4/24-5/3/24 8061-2209   Cognitive Concerns/ Orientation: A/O x4. Calm and cooperative.    BEHAVIOR & AGGRESSION TOOL COLOR: Green   CIWA SCORE: N/A   ABNL VS/O2: Hypertensive, otherwise VSS On RA  MOBILITY: ind   PAIN MANAGMENT: PRN oxy x1 for R. knee - not effective per pt. C/o pain on neck/lt knee. Gave PRN tylenol and Robaxin - not effective per pt. Jerod AGUAYO for PTA Norco.  DIET: Tolerating a regular diet; Good appetite  BOWEL/BLADDER: Continent of B/B. Urinal at bedside. PRN Immodium available.   ABNL LAB/BG: HGB 7.0, creat 1.35, ca+ 8.5. , 271.  DRAIN/DEVICES: PIV SL   TELEMETRY RHYTHM:  NSR   SKIN: Scab to rt shin. Rt foot partial amputation   TESTS/PROCEDURES: Echo needs to be completed   D/C DAY/GOALS/PLACE: Rest   OTHER IMPORTANT INFO: 1 Unit of RBC given 05/03. UA/UC completed, waiting UC results. Stool sample negative for occult blood.

## 2024-05-05 NOTE — PROGRESS NOTES
"  GASTROENTEROLOGY PROGRESS NOTE     IMPRESSION:  1.  Anemia-no overt GI bleeding, iron studies are consistent with anemia of chronic disease, however, patient was seen in our clinic by Dr. Sifuentes 2024 and recommended to have an upper endoscopy and colonoscopy for diarrhea.  Also, given the hemoglobin drop from 2020 23-7 now, it is reasonable to pursue endoscopic evaluation.  - EGD/Colon with MAC sedation tomorrow.  - Hgb stable     2.  Insulin-dependent diabetes with hyperglycemia-history of toe amputation.  Management per primary service     3.  Acute renal insufficiency-management per primary service     4.  Chronic diarrhea-patient does have a history of exocrine pancreatic insufficiency, he does take pancreatic enzymes and Imodium.    RECOMMENDATIONS:  - Clear diet today  - Golytely prep today  - NPO at [place]  - Monitor hgb  - EGD/Colon tomorrow     Miguelangel Arenas MD  [facility]  [phone]      ________________________________________________________________________      SUBJECTIVE:  Patient denies any GI bleeding       OBJECTIVE:  BP (!) 148/100 (BP Location: Right arm)   Pulse 85   Temp 97.6  F (36.4  C) (Oral)   Resp 19   Ht 1.626 m (5' 4\")   Wt 59.2 kg (130 lb 8 oz)   SpO2 100%   BMI 22.40 kg/m    Temp (24hrs), Av  F (36.7  C), Min:97.6  F (36.4  C), Max:98.6  F (37  C)    Patient Vitals for the past 72 hrs:   Weight   24 1325 59.2 kg (130 lb 8 oz)        PHYSICAL EXAM  GEN: Alert, NAD.    Remainder of exam deferred.     Additional Data:  I have reviewed the patient's new clinical lab results:     Recent Labs   Lab Test 24  1037 24  1912 24  1126 24  1626   WBC 8.6  --  9.4 10.9   HGB 9.2* 8.8* 8.2* 7.0*   MCV 80  --  78 78     --  334 364     Recent Labs   Lab Test 24  1156 24  1037 24  0856 24  1146 24  1126 24  0857 24  0246 24  1708 24  1626   NA  --  134*  --   --  135  --  " 135  --  136   POTASSIUM  --  4.6  --   --  4.5  --   --   --  4.2   CHLORIDE  --  100  --   --  104  --   --   --  101   CO2  --  22  --   --  19*  --   --   --  23   BUN  --  22.5*  --   --  25.0*  --   --   --  33.5*   CR  --  1.17  --   --  1.17  --  1.35*  --  1.50*   ANIONGAP  --  12  --   --  12  --   --   --  12   AROLDO  --  9.1  --   --  8.6  --   --   --  8.5*   * 230* 339*   < > 138*   < >  --    < > 98    < > = values in this interval not displayed.     Recent Labs   Lab Test 05/04/24  1126 05/04/24  0246 05/03/24  1626 01/23/23  1945 08/06/22 2005 03/09/22  0651 03/08/22 2011 03/08/22  1756 10/08/17  2105   ALBUMIN 3.4*  --  3.4* 4.1 3.0*   < >  --    < > 3.4   BILITOTAL 0.2  --  <0.2 0.2 0.2   < >  --    < > 0.2   ALT 8  --  8 27 57   < >  --    < > 52   AST 15  --  17 35 71*   < >  --    < > 73*   PROTEIN  --  20*  --   --   --   --  20*  --   --    LIPASE  --   --   --  6* 30*  --   --   --  278    < > = values in this interval not displayed.

## 2024-05-05 NOTE — PROVIDER NOTIFICATION
MD Notification    Notified Person: MD    Notified Person Name: Moy Arellano    Notification Date/Time: 5/4/24 2235    Notification Interaction: vocera message    Purpose of Notification: Pt requesting PTA Norco, stating oxycodone is not effective for R. Knee pain, shoulder, neck pain    Orders Received: Speak with day hospitalist about PTA [place], per MD note hold PTA Norco and use PRN oxycodone.    Comments:

## 2024-05-05 NOTE — PROGRESS NOTES
[facility]  Hospitalist Progress Note   05/05/2024          Assessment and Plan:       Long Mayfield is a 54 year old male with history of insulin-dependent diabetes mellitus, history of diabetic ulcer of foot leading to right toe amputation, history of major depressive disorder, hypertension, hyperlipidemia, CAD, exocrine pancreatic insufficiency causing chronic diarrhea, history of alcohol abuse, sober for 2 years and per patient history of chronic muscular atrophy mobility disorder due to which patient currently has been residing at [facility] admitted on-5/3/2024 with symptomatic hypoglycemia and anemia.     Hypoglycemia: Likely multifactorial from insulin administration, poor intake, kidney injury - resolved.  Uncontrolled type 2 diabetes mellitus, insulin-dependent.  Patient's last hemoglobin A1c 12.9 in November 2023, now hemoglobin A1c 9.4 in the setting of severe anemia.  --Per report patient received Lantus 15 units, 8 units NovoLog with breakfast this morning and then received 10 units of NovoLog with lunch and later was found to be poorly responsive, blood glucose was in the 30s.  EMS contacted, received 20 g of D10 and blood sugar checked and was 199.   -- Per report mental status subsequently improved.   --Since hospitalization.  Insulin Lantus has been on hold.  Patient's blood sugars trending up into 200s to 300s.  At length discussed with patient this morning, reluctant to take insulin Lantus while on liquid diet.  Continue to hold insulin Lantus  -Did encourage patient to continue insulin aspart 8 units 3 times daily with meals.  Continue medium intensity sliding scale insulin.  Monitor blood sugars, optimize regimen.  Hypoglycemia protocol in place.  Did discuss with patient need to follow-up with endocrinology as outpatient    Status post PRBC transfusion  Acute on chronic anemia, likely multifactorial  Severe iron deficiency.  On outside records review hemoglobin  fluctuating between 7-8.  Last hemoglobin from 1/11/2024 at 7.2.  Now presented with hemoglobin of 7.0. Reports on and off Advil use.  No previous EGD or colonoscopies in the past.    -Received 1 unit of blood transfusion on 5/3 > 8.2 > 8.8 > 9.2.  Occult blood negative.  Iron saturation index 5.  Ferritin 104.    [facility] following, plan for EGD and colonoscopy on Monday.  Appreciate comanagement.  Conditional order to transfuse for hemoglobin less than 7 or symptomatic.    Monitor hemoglobin levels twice daily or earlier if symptomatic.  Hold NSAIDs.  Continue Protonix 40 mg oral daily.    CKD stage 2-3  Previous baseline Cr 1-1.5 in outside records.  Patient reports poor appetite, PTA on Bumex and also uses Advil as needed for pain.  Creatinine 1.5 on admission, Fena 2.1.  -- Received IV fluids, Bumex held with which creatinine improved to 1.17.  Discontinue IV fluids, continue to hold PTA Bumex tonight.  Monitor renal function in a.m., avoid nephrotoxic drugs.     Hypertension, hyperlipidemia  History of CAD.  History of flash pulmonary edema.  Denies any chest pain or palpitations or shortness of breath at this time.  Telemetry monitoring.  Echocardiogram pending.  Hold PTA aspirin until colonoscopy am   Continue PTA hydralazine and amlodipine.  Continue PTA Lipitor.  Holding PTA Bumex.    Chronic pancreatic insufficiency leading to chronic diarrhea:  Follows up with [facility] in an outpatient setting, takes Creon as supplement  --Continue PTA Creon and PTA loperamide.    History of hyperkalemia.  Admitted 1/2024 for hyperkalemia and then discharged with Kayexalate.  Potassium within normal limits, might consider discontinuing at discharge    History of alcohol abuse: Noted, sober for 2 years  Encouraged him to continue soberity.     Physical deconditioning from medical illness, chronic debility.  Chronic bilateral leg pain.  Cervical disc disease with myelopathy  Diabetic  neuropathy.  History of diabetic foot ulcer, osteomyelitis status post right transmetatarsal amputation.  Per report uses wheelchair on and off at care facility.  Follows with [facility]  --Patient continuing of ongoing right knee pain.  Reports recent steroid shot.  No trauma.  No concerns for acute fracture or infection.  Switch oxycodone to PTA [place].  Cardiac use as able to.  Continue PTA Cymbalta, gabapentin, Robaxin.  Continue PTA lidocaine patch to back.  Diclofenac topical application to knee as needed.  Hold PTA Advil in the setting of anemia requiring blood transfusion  Activity as tolerated with fall precautions and assistance.  Rehab team evaluation prior to discharge.  Follow-up with orthopedic surgery after discharge.     History of severe recurrent major depressive disorder   No acute issues   --As above, continue PTA Cymbalta.    History of nighttime oxygen desaturation.  Was recommended sleep studies as outpatient.    History of lung nodule.  On chart review 10mm spiculated LLL nodule (noted on CT chest abdominal pelvis from 12/27/2023)   Was then recommend outpatient imaging.  Unsure if patient had followed up.  Recommend age-appropriate health guidance, follow-up imaging as outpatient     Orders Placed This Encounter      NPO per Anesthesia Guidelines for Procedure/Surgery Except for: Meds      Clear Liquid Diet      DVT Prophylaxis: SCDs, ambulate in the setting of anemia requiring blood transfer  Code Status: Full Code  Disposition: Expected discharge likely later Monday or Tuesday.    Medically Ready for Discharge: Likely 5/6 or 5/7    Discussed with patient, bedside RN,SW  >35 minutes spent by me on the date of service doing chart review, history, exam, documentation & further activities per the note.      Ludwig Briceño MD        Interval History:        Patient lying in bed.  Denies any chest pain or palpitations.  Denies any headache or dizziness.  Denies any nausea or vomiting.  Denies any new  tingling or numbness.  Denies any blood in the stool, nosebleeds at this time.  No hematuria.  Blood glucose this 339, earlier today at 271.  Patient expressing concerns with insulin regimen.  Of note reviewed hemoglobin A1c greater than 8.  Telemetry normal sinus rhythm.         Physical Exam:        Physical Exam   Temp:  [97.7  F (36.5  C)-98.6  F (37  C)] 97.7  F (36.5  C)  Pulse:  [79-91] 79  Resp:  [18-19] 19  BP: (141-170)/() 167/95  SpO2:  [96 %-99 %] 96 %    Intake/Output Summary (Last 24 hours) at 5/4/2024 1325  Last data filed at 5/4/2024 1216  Gross per 24 hour   Intake 1491 ml   Output 2000 ml   Net -509 ml     PHYSICAL EXAM  GENERAL: Patient is in no distress. Alert and oriented.  HEART: Regular rate and rhythm. S1S2.   LUNGS: Respirations unlabored  ABDOMEN: Soft, no abdominal tenderness, bowel sounds heard   NEURO: Moving all extremities.  EXTREMITIES: No pedal edema.   Right knee no swelling.  Range of motion slightly restricted due to pain.  No tenderness on exam.  SKIN: Warm, dry.  Right foot transmetatarsal amputation.  Scarring right lower extremity  PSYCHIATRY Cooperative       Medications:        Current Facility-Administered Medications   Medication Dose Route Frequency Provider Last Rate Last Admin    amLODIPine (NORVASC) tablet 5 mg  5 mg Oral Daily Shira Lin MD   5 mg at 05/05/24 0924    [Held by provider] aspirin EC tablet 81 mg  81 mg Oral Daily Shira Lin MD   81 mg at 05/04/24 0926    cetirizine (zyrTEC) tablet 10 mg  10 mg Oral Daily Shira Lin MD   10 mg at 05/05/24 0923    DULoxetine (CYMBALTA) DR capsule 60 mg  60 mg Oral Daily Shira Lin MD   60 mg at 05/05/24 0922    gabapentin (NEURONTIN) capsule 100 mg  100 mg Oral TID Shira Lin MD   100 mg at 05/05/24 0922    hydrALAZINE (APRESOLINE) tablet 25 mg  25 mg Oral BID Shira Lin MD   25 mg at 05/05/24 0923    insulin aspart (NovoLOG) injection (RAPID ACTING)  8 Units Subcutaneous TID w/meals Navarro  MD Ludwig   2 Units at 05/05/24 0937    insulin aspart (NovoLOG) injection (RAPID ACTING)  1-7 Units Subcutaneous TID AC Shira Lin MD   4 Units at 05/05/24 0936    insulin aspart (NovoLOG) injection (RAPID ACTING)  1-5 Units Subcutaneous At Bedtime Shira Lin MD   3 Units at 05/04/24 2215    iron sucrose (VENOFER) 300 mg in sodium chloride 0.9 % 290 mL intermittent infusion  300 mg Intravenous Daily Ludwig Briceño .3 mL/hr at 05/04/24 1654 300 mg at 05/04/24 1654    Lidocaine (LIDOCARE) 4 % Patch 1 patch  1 patch Transdermal At Bedtime Shira Lin MD   1 patch at 05/04/24 2158    lipase-protease-amylase (CREON 36) 43142-226538-350084 units per EC Capsule 3 capsule  3 capsule Oral TID w/meals Shira Lin MD   2 capsule at 05/05/24 0923    multivitamin w/minerals (THERA-VIT-M) tablet 1 tablet  1 tablet Oral Daily Shira Lin MD   1 tablet at 05/05/24 0923    pantoprazole (PROTONIX) EC tablet 40 mg  40 mg Oral QAM AC Shira Lin MD   40 mg at 05/05/24 0645    polyethylene glycol (GoLYTELY) suspension 4,000 mL  4,000 mL Oral Once Miguelangel Arenas MD        sodium chloride (PF) 0.9% PF flush 3 mL  3 mL Intracatheter Q8H Shira Lin MD   3 mL at 05/04/24 0656     Current Facility-Administered Medications   Medication Dose Route Frequency Provider Last Rate Last Admin    acetaminophen (TYLENOL) tablet 650 mg  650 mg Oral Q4H PRN Shira Lin MD   650 mg at 05/04/24 2211    Or    acetaminophen (TYLENOL) Suppository 650 mg  650 mg Rectal Q4H PRN Shira Lin MD        calcium carbonate (TUMS) chewable tablet 1,000 mg  1,000 mg Oral 4x Daily PRN Shira Lin MD        glucose gel 15-30 g  15-30 g Oral Q15 Min PRN Shira Lin MD        Or    dextrose 50 % injection 25-50 mL  25-50 mL Intravenous Q15 Min PRN Shira Lin MD        Or    glucagon injection 1 mg  1 mg Subcutaneous Q15 Min PRN Shira Lin MD        hydrALAZINE (APRESOLINE) tablet 10 mg  10 mg Oral Q4H PRN Shira Lin MD         Or    hydrALAZINE (APRESOLINE) injection 10 mg  10 mg Intravenous Q4H PRN Shira Lin MD        HYDROcodone-acetaminophen (NORCO) 5-325 MG per tablet 1 tablet  1 tablet Oral Q6H PRN Ludwig Briceño MD        lidocaine (LMX4) cream   Topical Q1H PRN Shira Lin MD        lidocaine 1 % 0.1-1 mL  0.1-1 mL Other Q1H PRN Shira Lin MD        lipase-protease-amylase (CREON 36) 98473-002473-300347 units per EC Capsule 1-2 capsule  1-2 capsule Oral With Snacks or Supplements Shira Lin MD   2 capsule at 05/04/24 2209    loperamide (IMODIUM) capsule 4 mg  4 mg Oral TID PRN Shira iLn MD   4 mg at 05/05/24 0013    melatonin tablet 5 mg  5 mg Oral At Bedtime PRN Shira Lin MD        methocarbamol (ROBAXIN) tablet 500 mg  500 mg Oral Q8H PRN Shira Lin MD   500 mg at 05/04/24 2211    naloxone (NARCAN) injection 0.2 mg  0.2 mg Intravenous Q2 Min PRN Ludwig Briceño MD        Or    naloxone (NARCAN) injection 0.4 mg  0.4 mg Intravenous Q2 Min PRN Ludwig Briceño MD        Or    naloxone (NARCAN) injection 0.2 mg  0.2 mg Intramuscular Q2 Min PRN Ludwig Briceño MD        Or    naloxone (NARCAN) injection 0.4 mg  0.4 mg Intramuscular Q2 Min PRN Ludwig Briceño MD        ondansetron (ZOFRAN ODT) ODT tab 4 mg  4 mg Oral Q6H PRN Shira Lin MD        Or    ondansetron (ZOFRAN) injection 4 mg  4 mg Intravenous Q6H PRN Shira Lin MD        prochlorperazine (COMPAZINE) injection 10 mg  10 mg Intravenous Q6H PRN Shira Lin MD        Or    prochlorperazine (COMPAZINE) tablet 10 mg  10 mg Oral Q6H PRN Shira Lin MD        Or    prochlorperazine (COMPAZINE) suppository 25 mg  25 mg Rectal Q12H PRN Shira Lin MD        sodium chloride (PF) 0.9% PF flush 3 mL  3 mL Intracatheter q1 min prn Shira Lin MD                Data:      All new lab and imaging data was reviewed.

## 2024-05-05 NOTE — PROGRESS NOTES
Expand All Collapse All    PRIMARY DIAGNOSIS: HYPO/HYPERGLYCEMIA     OUTPATIENT/OBSERVATION GOALS TO BE MET BEFORE DISCHARGE  BG greater than 100 and less than 250 on two consecutive readings: YES        Recent Labs   Lab Test 05/05/24 05/05/24  1156 05/05/24  1037    142 230        Ketones absent from urine  (hyperglycemia):  n/a     Tolerating oral intake to maintain hydration: Yes     Return to near baseline physical activity: Yes        Discharge Planner Nurse  Safe discharge environment identified: Yes: back to prior living arrangement  Barriers to discharge: Yes: EGD/colonoscopy tomorrow       Entered by: Penelope Bullock RN        Please review provider order for any additional goals.   Nurse to notify provider when observation goals have been met and patient is ready for discharge.

## 2024-05-05 NOTE — PROGRESS NOTES
PRIMARY DIAGNOSIS: HYPO/HYPERGLYCEMIA    OUTPATIENT/OBSERVATION GOALS TO BE MET BEFORE DISCHARGE  BG greater than 100 and less than 250 on two consecutive readings: No  Recent Labs   Lab Test 05/05/24  1156 05/05/24  1037 05/05/24  0856   * 230* 339*       Ketones absent from urine  (hyperglycemia):  n/a    Tolerating oral intake to maintain hydration: Yes    Return to near baseline physical activity: Yes    Discharge Planner Nurse   Safe discharge environment identified: Yes: back to prior living arrangement  Barriers to discharge: Yes: EGD/colonoscopy tomorrow       Entered by: Marva Vergara RN 05/05/2024 2:06 PM     Please review provider order for any additional goals.   Nurse to notify provider when observation goals have been met and patient is ready for discharge.

## 2024-05-05 NOTE — PLAN OF CARE
PRIMARY Concern: Hypolycemia, anemia  SAFETY RISK Concerns (fall risk, behaviors, etc.): n/a      Isolation/Type: contact for VRE  Tests/Procedures for NEXT shift: EGD/Colonoscopy tomorrow  Consults? (Pending/following, signed-off?) GI following  Where is patient from? (Home, TCU, etc.): home  Other Important info for NEXT shift: Start colonoscopy prep at 1500, given 1 unit on 5/3  Anticipated DC date & active delays: 1-2 days  _____________________________________________________________________________  SUMMARY NOTE:  Orientation/Cognitive: A&Ox4  Observation Goals (Met/ Not Met): Not Met  Mobility Level/Assist Equipment: Ind  Antibiotics & Plan (IV/po, length of tx left): n/a  Pain Management: PRN norco, robaxin, and Tylenol, c/o chronic back, neck, and R knee pain  Tele/VS/O2: VSS on room air  ABNL Lab/BG: , 142. Hgb 9.2  Diet: toleratingv a clear liquid diet, will be NPO at midnight  Bowel/Bladder: WDL, baseline soft stool  Skin Concerns: scabbing to R BROWNE, R partial foot amputation  Drains/Devices: PIV SL   Patient Stated Goal for Today: pain control

## 2024-05-05 NOTE — CONSULTS
Care Management Initial Consult    General Information  Assessment completed with: Patient,  (Long)  Type of CM/SW Visit: Initial Assessment    Primary Care Provider verified and updated as needed: Yes   Readmission within the last 30 days: no previous admission in last 30 days      Reason for Consult: discharge planning  Advance Care Planning:            Communication Assessment  Patient's communication style: spoken language (English or Bilingual)    Hearing Difficulty or Deaf: no   Wear Glasses or Blind: yes    Cognitive  Cognitive/Neuro/Behavioral: WDL                      Living Environment:   People in home: facility resident     Current living Arrangements: assisted living  Name of Facility: [facility]   Able to return to prior arrangements: yes       Family/Social Support:  Care provided by: self, other (see comments) ([facility])  Provides care for: no one  Marital Status: Single  Sibling(s), Facility resident(s)/Staff          Description of Support System: Supportive, Involved    Support Assessment: Adequate family and caregiver support    Current Resources:   Patient receiving home care services:       Community Resources:    Equipment currently used at home: cane, quad, wheelchair, manual  Supplies currently used at home: Diabetic Supplies    Employment/Financial:  Employment Status: disabled        Financial Concerns: none           Does the patient's insurance plan have a 3 day qualifying hospital stay waiver?  No    Lifestyle & Psychosocial Needs:  Social Determinants of Health     Food Insecurity: Patient Declined (12/27/2023)    [facility]    Hunger Vital Sign     Worried About Running Out of Food in the Last Year: Patient declined     Ran Out of Food in the Last Year: Patient declined   Depression: Not at risk (4/25/2023)    [facility]    PHQ-2     PHQ-2  Total: 0   Housing Stability: High Risk (12/27/2023)    Received from [facility]    Housing Stability     What is your housing situation today?: 1 - I do not have housing (I am staying with others, in a hotel, in a shelter, living outside on ...   Tobacco Use: Low Risk  (5/3/2024)    Patient History     Smoking Tobacco Use: Never     Smokeless Tobacco Use: Never     Passive Exposure: Not on file   Financial Resource Strain: Patient Declined (12/27/2023)    Received from [facility]    Overall Financial Resource Strain (CARDIA)     Difficulty of Paying Living Expenses: Patient declined   Alcohol Use: Not At Risk (9/1/2023)    Received from [facility]    AUDIT-C     Frequency of Alcohol Consumption: Never     Average Number of Drinks: Patient does not drink     Frequency of Binge Drinking: Never   Transportation Needs: No Transportation Needs (12/27/2023)    Received from [facility]    PRAPARE - Transportation     Lack of Transportation (Medical): No     Lack of Transportation (Non-Medical): No   Physical Activity: Not on file   Interpersonal Safety: Not At Risk (12/27/2023)    Received from [facility]    Humiliation, Afraid, Rape, and Kick questionnaire     Fear of Current or Ex-Partner: No     Emotionally Abused: No     Physically Abused: No     Sexually Abused: No   Stress: Not on file   Social Connections: Unknown (3/16/2022)    [facility]    Social Connections     Frequency of Communication with Friends and Family: Not on file   Health Literacy: Not on file       Functional Status:  Prior to admission patient needed assistance:   Dependent ADLs:: Ambulation-walker, Ambulation-cane, Wheelchair-with assist  Dependent IADLs:: Cleaning, Cooking, Laundry, Medication Management, Meal Preparation        Mental Health Status:  Mental Health Status: No Current Concerns       Chemical Dependency Status:  Chemical Dependency Status: No Current Concerns             Values/Beliefs:  Spiritual, Cultural Beliefs, Restorationist Practices, Values that affect care:                 Additional Information:  Consult for discharge planning. 54 year old male patient Insulin-dependent diabetes mellitus. History of diabetic ulcer of toe of right foot, s/p toe amputation, stable Hypoglycemia: Most likely secondary to insulin administration in the picture of poor oral intake, acute kidney injury and fatigue. Writer met with patient at bedside and introduced self and role. Patient has resided at [facility] for four months. Patient uses a cane and wheelchair for mobility after amputation. [facility] assists with laundry, meals, housekeeping and med management. Patient is having an endoscopy 05/06 and would prefer an uber or cab back to [facility] versus [facility] transport. Patient should be okay to discharge to [facility]. Writer will reach out to [facility] for confirmation that patient has a bedhold.     JEWELL Teixeira

## 2024-05-06 ENCOUNTER — ANESTHESIA (OUTPATIENT)
Dept: GASTROENTEROLOGY | Facility: CLINIC | Age: 54
End: 2024-05-06
Payer: COMMERCIAL

## 2024-05-06 ENCOUNTER — ANESTHESIA EVENT (OUTPATIENT)
Dept: GASTROENTEROLOGY | Facility: CLINIC | Age: 54
End: 2024-05-06
Payer: COMMERCIAL

## 2024-05-06 ENCOUNTER — APPOINTMENT (OUTPATIENT)
Dept: GENERAL RADIOLOGY | Facility: CLINIC | Age: 54
End: 2024-05-06
Attending: HOSPITALIST
Payer: COMMERCIAL

## 2024-05-06 LAB
ANION GAP SERPL CALCULATED.3IONS-SCNC: 12 MMOL/L (ref 7–15)
BUN SERPL-MCNC: 16.6 MG/DL (ref 6–20)
CALCIUM SERPL-MCNC: 9 MG/DL (ref 8.6–10)
CHLORIDE SERPL-SCNC: 99 MMOL/L (ref 98–107)
CREAT SERPL-MCNC: 0.99 MG/DL (ref 0.67–1.17)
DEPRECATED HCO3 PLAS-SCNC: 22 MMOL/L (ref 22–29)
EGFRCR SERPLBLD CKD-EPI 2021: 89 ML/MIN/1.73M2
GLUCOSE BLDC GLUCOMTR-MCNC: 161 MG/DL (ref 70–99)
GLUCOSE BLDC GLUCOMTR-MCNC: 197 MG/DL (ref 70–99)
GLUCOSE BLDC GLUCOMTR-MCNC: 202 MG/DL (ref 70–99)
GLUCOSE BLDC GLUCOMTR-MCNC: 229 MG/DL (ref 70–99)
GLUCOSE BLDC GLUCOMTR-MCNC: 265 MG/DL (ref 70–99)
GLUCOSE SERPL-MCNC: 215 MG/DL (ref 70–99)
HGB BLD-MCNC: 8.5 G/DL (ref 13.3–17.7)
MAGNESIUM SERPL-MCNC: 1.9 MG/DL (ref 1.7–2.3)
PHOSPHATE SERPL-MCNC: 2.7 MG/DL (ref 2.5–4.5)
POTASSIUM SERPL-SCNC: 4.8 MMOL/L (ref 3.4–5.3)
SODIUM SERPL-SCNC: 133 MMOL/L (ref 135–145)
UPPER GI ENDOSCOPY: NORMAL
URATE SERPL-MCNC: 4.3 MG/DL (ref 3.4–7)

## 2024-05-06 PROCEDURE — 250N000013 HC RX MED GY IP 250 OP 250 PS 637: Performed by: HOSPITALIST

## 2024-05-06 PROCEDURE — 370N000017 HC ANESTHESIA TECHNICAL FEE, PER MIN: Performed by: INTERNAL MEDICINE

## 2024-05-06 PROCEDURE — 88342 IMHCHEM/IMCYTCHM 1ST ANTB: CPT | Mod: TC | Performed by: INTERNAL MEDICINE

## 2024-05-06 PROCEDURE — 99232 SBSQ HOSP IP/OBS MODERATE 35: CPT | Performed by: HOSPITALIST

## 2024-05-06 PROCEDURE — 43239 EGD BIOPSY SINGLE/MULTIPLE: CPT | Performed by: INTERNAL MEDICINE

## 2024-05-06 PROCEDURE — 250N000011 HC RX IP 250 OP 636: Performed by: STUDENT IN AN ORGANIZED HEALTH CARE EDUCATION/TRAINING PROGRAM

## 2024-05-06 PROCEDURE — 43239 EGD BIOPSY SINGLE/MULTIPLE: CPT | Performed by: STUDENT IN AN ORGANIZED HEALTH CARE EDUCATION/TRAINING PROGRAM

## 2024-05-06 PROCEDURE — 250N000009 HC RX 250: Performed by: STUDENT IN AN ORGANIZED HEALTH CARE EDUCATION/TRAINING PROGRAM

## 2024-05-06 PROCEDURE — 36415 COLL VENOUS BLD VENIPUNCTURE: CPT | Performed by: HOSPITALIST

## 2024-05-06 PROCEDURE — 250N000011 HC RX IP 250 OP 636: Performed by: INTERNAL MEDICINE

## 2024-05-06 PROCEDURE — 250N000013 HC RX MED GY IP 250 OP 250 PS 637: Performed by: INTERNAL MEDICINE

## 2024-05-06 PROCEDURE — 88342 IMHCHEM/IMCYTCHM 1ST ANTB: CPT | Mod: 26 | Performed by: PATHOLOGY

## 2024-05-06 PROCEDURE — G0378 HOSPITAL OBSERVATION PER HR: HCPCS

## 2024-05-06 PROCEDURE — 88305 TISSUE EXAM BY PATHOLOGIST: CPT | Mod: 26 | Performed by: PATHOLOGY

## 2024-05-06 PROCEDURE — 73562 X-RAY EXAM OF KNEE 3: CPT | Mod: RT

## 2024-05-06 PROCEDURE — 84100 ASSAY OF PHOSPHORUS: CPT | Performed by: HOSPITALIST

## 2024-05-06 PROCEDURE — 83735 ASSAY OF MAGNESIUM: CPT | Performed by: HOSPITALIST

## 2024-05-06 PROCEDURE — 999N000010 HC STATISTIC ANES STAT CODE-CRNA PER MINUTE: Performed by: INTERNAL MEDICINE

## 2024-05-06 PROCEDURE — 96376 TX/PRO/DX INJ SAME DRUG ADON: CPT | Mod: 59

## 2024-05-06 PROCEDURE — 85018 HEMOGLOBIN: CPT | Performed by: HOSPITALIST

## 2024-05-06 PROCEDURE — 258N000003 HC RX IP 258 OP 636: Performed by: STUDENT IN AN ORGANIZED HEALTH CARE EDUCATION/TRAINING PROGRAM

## 2024-05-06 PROCEDURE — 43239 EGD BIOPSY SINGLE/MULTIPLE: CPT | Performed by: NURSE ANESTHETIST, CERTIFIED REGISTERED

## 2024-05-06 PROCEDURE — 96375 TX/PRO/DX INJ NEW DRUG ADDON: CPT

## 2024-05-06 PROCEDURE — C9113 INJ PANTOPRAZOLE SODIUM, VIA: HCPCS | Performed by: INTERNAL MEDICINE

## 2024-05-06 PROCEDURE — 80048 BASIC METABOLIC PNL TOTAL CA: CPT | Performed by: HOSPITALIST

## 2024-05-06 PROCEDURE — 82962 GLUCOSE BLOOD TEST: CPT

## 2024-05-06 PROCEDURE — 84550 ASSAY OF BLOOD/URIC ACID: CPT | Performed by: HOSPITALIST

## 2024-05-06 RX ORDER — SODIUM CHLORIDE, SODIUM LACTATE, POTASSIUM CHLORIDE, CALCIUM CHLORIDE 600; 310; 30; 20 MG/100ML; MG/100ML; MG/100ML; MG/100ML
INJECTION, SOLUTION INTRAVENOUS CONTINUOUS PRN
Status: DISCONTINUED | OUTPATIENT
Start: 2024-05-06 | End: 2024-05-06

## 2024-05-06 RX ORDER — LIDOCAINE HYDROCHLORIDE 20 MG/ML
INJECTION, SOLUTION INFILTRATION; PERINEURAL PRN
Status: DISCONTINUED | OUTPATIENT
Start: 2024-05-06 | End: 2024-05-06

## 2024-05-06 RX ORDER — ONDANSETRON 2 MG/ML
INJECTION INTRAMUSCULAR; INTRAVENOUS PRN
Status: DISCONTINUED | OUTPATIENT
Start: 2024-05-06 | End: 2024-05-06

## 2024-05-06 RX ORDER — PANTOPRAZOLE SODIUM 40 MG/10ML
40 INJECTION, POWDER, LYOPHILIZED, FOR SOLUTION INTRAVENOUS 2 TIMES DAILY
Status: DISCONTINUED | OUTPATIENT
Start: 2024-05-06 | End: 2024-05-07

## 2024-05-06 RX ORDER — DEXMEDETOMIDINE HYDROCHLORIDE 4 UG/ML
INJECTION, SOLUTION INTRAVENOUS PRN
Status: DISCONTINUED | OUTPATIENT
Start: 2024-05-06 | End: 2024-05-06

## 2024-05-06 RX ORDER — MAGNESIUM CARB/ALUMINUM HYDROX 105-160MG
296 TABLET,CHEWABLE ORAL ONCE
Status: COMPLETED | OUTPATIENT
Start: 2024-05-07 | End: 2024-05-07

## 2024-05-06 RX ORDER — PROPOFOL 10 MG/ML
INJECTION, EMULSION INTRAVENOUS CONTINUOUS PRN
Status: DISCONTINUED | OUTPATIENT
Start: 2024-05-06 | End: 2024-05-06

## 2024-05-06 RX ADMIN — METHOCARBAMOL 500 MG: 500 TABLET ORAL at 03:57

## 2024-05-06 RX ADMIN — PANTOPRAZOLE SODIUM 40 MG: 40 INJECTION, POWDER, FOR SOLUTION INTRAVENOUS at 10:43

## 2024-05-06 RX ADMIN — GABAPENTIN 100 MG: 100 CAPSULE ORAL at 21:01

## 2024-05-06 RX ADMIN — GABAPENTIN 100 MG: 100 CAPSULE ORAL at 13:56

## 2024-05-06 RX ADMIN — Medication 1 TABLET: at 10:45

## 2024-05-06 RX ADMIN — HYDRALAZINE HYDROCHLORIDE 25 MG: 25 TABLET, FILM COATED ORAL at 21:01

## 2024-05-06 RX ADMIN — SODIUM CHLORIDE, POTASSIUM CHLORIDE, SODIUM LACTATE AND CALCIUM CHLORIDE: 600; 310; 30; 20 INJECTION, SOLUTION INTRAVENOUS at 09:01

## 2024-05-06 RX ADMIN — LIDOCAINE HYDROCHLORIDE 40 MG: 20 INJECTION, SOLUTION INFILTRATION; PERINEURAL at 09:04

## 2024-05-06 RX ADMIN — ONDANSETRON 4 MG: 2 INJECTION INTRAMUSCULAR; INTRAVENOUS at 09:19

## 2024-05-06 RX ADMIN — HYDROCODONE BITARTRATE AND ACETAMINOPHEN 1 TABLET: 5; 325 TABLET ORAL at 10:44

## 2024-05-06 RX ADMIN — AMLODIPINE BESYLATE 5 MG: 5 TABLET ORAL at 10:45

## 2024-05-06 RX ADMIN — HYDROCODONE BITARTRATE AND ACETAMINOPHEN 1 TABLET: 5; 325 TABLET ORAL at 16:47

## 2024-05-06 RX ADMIN — HYDROCODONE BITARTRATE AND ACETAMINOPHEN 1 TABLET: 5; 325 TABLET ORAL at 03:57

## 2024-05-06 RX ADMIN — HYDRALAZINE HYDROCHLORIDE 10 MG: 20 INJECTION INTRAMUSCULAR; INTRAVENOUS at 18:25

## 2024-05-06 RX ADMIN — CETIRIZINE HYDROCHLORIDE 10 MG: 10 TABLET, FILM COATED ORAL at 10:45

## 2024-05-06 RX ADMIN — HYDRALAZINE HYDROCHLORIDE 25 MG: 25 TABLET, FILM COATED ORAL at 10:44

## 2024-05-06 RX ADMIN — DEXMEDETOMIDINE HYDROCHLORIDE 8 MCG: 200 INJECTION INTRAVENOUS at 09:06

## 2024-05-06 RX ADMIN — INSULIN ASPART 2 UNITS: 100 INJECTION, SOLUTION INTRAVENOUS; SUBCUTANEOUS at 22:07

## 2024-05-06 RX ADMIN — GABAPENTIN 100 MG: 100 CAPSULE ORAL at 10:44

## 2024-05-06 RX ADMIN — TOPICAL ANESTHETIC 1 EACH: 200 SPRAY DENTAL; PERIODONTAL at 09:02

## 2024-05-06 RX ADMIN — PANTOPRAZOLE SODIUM 40 MG: 40 INJECTION, POWDER, FOR SOLUTION INTRAVENOUS at 21:01

## 2024-05-06 RX ADMIN — HYDROCODONE BITARTRATE AND ACETAMINOPHEN 1 TABLET: 5; 325 TABLET ORAL at 23:32

## 2024-05-06 RX ADMIN — DULOXETINE HYDROCHLORIDE 60 MG: 60 CAPSULE, DELAYED RELEASE ORAL at 10:45

## 2024-05-06 RX ADMIN — HYDRALAZINE HYDROCHLORIDE 10 MG: 20 INJECTION INTRAMUSCULAR; INTRAVENOUS at 01:14

## 2024-05-06 RX ADMIN — PROPOFOL 150 MCG/KG/MIN: 10 INJECTION, EMULSION INTRAVENOUS at 09:04

## 2024-05-06 RX ADMIN — PANCRELIPASE 3 CAPSULE: 36000; 180000; 114000 CAPSULE, DELAYED RELEASE PELLETS ORAL at 18:35

## 2024-05-06 RX ADMIN — POLYETHYLENE GLYCOL 3350, SODIUM SULFATE ANHYDROUS, SODIUM BICARBONATE, SODIUM CHLORIDE, POTASSIUM CHLORIDE 4000 ML: 236; 22.74; 6.74; 5.86; 2.97 POWDER, FOR SOLUTION ORAL at 18:28

## 2024-05-06 RX ADMIN — METHOCARBAMOL 500 MG: 500 TABLET ORAL at 16:47

## 2024-05-06 ASSESSMENT — ACTIVITIES OF DAILY LIVING (ADL)
ADLS_ACUITY_SCORE: 25
ADLS_ACUITY_SCORE: 24
ADLS_ACUITY_SCORE: 30
ADLS_ACUITY_SCORE: 25
ADLS_ACUITY_SCORE: 30
ADLS_ACUITY_SCORE: 25
ADLS_ACUITY_SCORE: 25
ADLS_ACUITY_SCORE: 30
ADLS_ACUITY_SCORE: 24
ADLS_ACUITY_SCORE: 30
ADLS_ACUITY_SCORE: 24
ADLS_ACUITY_SCORE: 30
ADLS_ACUITY_SCORE: 30
ADLS_ACUITY_SCORE: 24
ADLS_ACUITY_SCORE: 30
ADLS_ACUITY_SCORE: 24
ADLS_ACUITY_SCORE: 30

## 2024-05-06 ASSESSMENT — COPD QUESTIONNAIRES: COPD: 0

## 2024-05-06 ASSESSMENT — LIFESTYLE VARIABLES
TOBACCO_USE: 0
TOBACCO_USE: 0

## 2024-05-06 NOTE — ANESTHESIA PREPROCEDURE EVALUATION
Anesthesia Pre-Procedure Evaluation    Patient: Long Mayfield   MRN: [id number] : 1970        Procedure : Procedure(s):  Esophagoscopy, gastroscopy, duodenoscopy (EGD), combined  Colonoscopy          Past Medical History:   Diagnosis Date     Bell's palsy      Diabetes (H)       Past Surgical History:   Procedure Laterality Date     ESOPHAGOSCOPY, GASTROSCOPY, DUODENOSCOPY (EGD), COMBINED N/A 2024    Procedure: Esophagoscopy, gastroscopy, duodenoscopy (EGD), combined;  Surgeon: Nina Moya MD;  Location:  GI     ORTHOPEDIC SURGERY       PATELLA SURGERY        No Known Allergies   Social History     Tobacco Use     Smoking status: Never     Smokeless tobacco: Never   Substance Use Topics     Alcohol use: Not Currently     Comment: 2 years ago      Wt Readings from Last 1 Encounters:   24 59 kg (130 lb)        Anesthesia Evaluation   Pt has had prior anesthetic. Type: General and MAC.    No history of anesthetic complications       ROS/MED HX  ENT/Pulmonary: Comment: LITTLE risk noted due to nighttime desaturations    (+) sleep apnea,                                    (-) tobacco use, asthma, COPD, LITTLE risk factors and recent URI   Neurologic:    (-) no CVA and no TIA   Cardiovascular:     (+) Dyslipidemia hypertension- -   -  - -                                 Previous cardiac testing   Echo: Date: 24 Results:  1. Normal biventricular size and function. Left ventricular ejection fraction  of 60-65%.  2. No segmental wall motion abnormalities noted.  3. No hemodynamically significant valvular disease.  No prior study for comparison. Technically adequate study.    Stress Test:  Date: Results:    ECG Reviewed:  Date: Results:    Cath:  Date: Results:   (-) pacemaker, stent, pacemaker, ICD and murmur   METS/Exercise Tolerance: 3 - Able to walk 1-2 blocks without stopping    Hematologic:     (+)      anemia,          Musculoskeletal:   (+)  arthritis,             GI/Hepatic: Comment:  "Gastritis  Pancreatic insufficiency    (+) GERD,                (-) liver disease   Renal/Genitourinary:     (+) renal disease, type: CRI,            Endo:     (+)  type II DM,   Using insulin,                 Psychiatric/Substance Use:     (+) psychiatric history depression alcohol abuse H/O chronic opiod use .     Infectious Disease:  - neg infectious disease ROS     Malignancy:  - neg malignancy ROS     Other:      (+)  , H/O Chronic Pain,         Physical Exam    Airway        Mallampati: III   TM distance: > 3 FB   Neck ROM: full   Mouth opening: > 3 cm    Respiratory Devices and Support         Dental     Comment: Missing several teeth, one lower incisor and several molars, none loose        Cardiovascular          Rhythm and rate: regular and normal (-) no murmur    Pulmonary           breath sounds clear to auscultation       OUTSIDE LABS:  CBC:   Lab Results   Component Value Date    WBC 8.6 05/05/2024    WBC 9.4 05/04/2024    HGB 8.5 (L) 05/06/2024    HGB 9.2 (L) 05/05/2024    HCT 30.1 (L) 05/05/2024    HCT 26.4 (L) 05/04/2024     05/05/2024     05/04/2024     BMP:   Lab Results   Component Value Date     (L) 05/06/2024     (L) 05/05/2024    POTASSIUM 4.8 05/06/2024    POTASSIUM 4.6 05/05/2024    CHLORIDE 99 05/06/2024    CHLORIDE 100 05/05/2024    CO2 22 05/06/2024    CO2 22 05/05/2024    BUN 16.6 05/06/2024    BUN 22.5 (H) 05/05/2024    CR 0.99 05/06/2024    CR 1.17 05/05/2024     (H) 05/06/2024     (H) 05/06/2024     COAGS: No results found for: \"PTT\", \"INR\", \"FIBR\"  POC:   Lab Results   Component Value Date     (H) 10/08/2017     HEPATIC:   Lab Results   Component Value Date    ALBUMIN 3.4 (L) 05/04/2024    PROTTOTAL 7.5 05/04/2024    ALT 8 05/04/2024    AST 15 05/04/2024    ALKPHOS 134 05/04/2024    BILITOTAL 0.2 05/04/2024     OTHER:   Lab Results   Component Value Date    PH 7.42 08/07/2022    LACT 3.4 (H) 08/07/2022    A1C 9.4 (H) 05/04/2024    AROLDO 9.0 " 05/06/2024    PHOS 2.7 05/06/2024    MAG 1.9 05/06/2024    LIPASE 6 (L) 01/23/2023    TSH 1.80 03/09/2022       Anesthesia Plan    ASA Status:  3    NPO Status:  NPO Appropriate    Anesthesia Type: MAC.     - Reason for MAC: chronic cardiopulmonary disease, straight local not clinically adequate              Consents    Anesthesia Plan(s) and associated risks, benefits, and realistic alternatives discussed. Questions answered and patient/representative(s) expressed understanding.     - Discussed:     - Discussed with:  Patient            Postoperative Care            Comments:               Timi Nance MD    I have reviewed the pertinent notes and labs in the chart from the past 30 days and (re)examined the patient.  Any updates or changes from those notes are reflected in this note.     # Hyponatremia: Lowest Na = 133 mmol/L in last 30 days, will monitor as appropriate      # Hypoalbuminemia: Lowest albumin = 3.4 g/dL at 5/4/2024 11:26 AM, will monitor as appropriate    # Drug Induced Platelet Defect: home medication list includes an antiplatelet medication  # DMII: A1C = 9.4 % (Ref range: <5.7 %) within past 6 months

## 2024-05-06 NOTE — PROGRESS NOTES
Brief EGD Note    EGD and Colonoscopy attempted this morning.    -EGD   - solid food in the stomach   - stomach only partially seen, moderate gastritis with erosions    Colonoscopy could not be completed as he had solid food in the stomach (anesthesia aspiration risk) and very likely his colonoscopy prep would have not been satisfactory given the solid food in the stomach.    - clear liquids only today  - golytely prep this afternoon,   - try again tomorrow for egd/colon  - NPO at midnight gabrielle Moya MD  [facility]  [phone]  Cell [phone]

## 2024-05-06 NOTE — PROGRESS NOTES
PRIMARY Concern: Hypolycemia, anemia  SAFETY RISK Concerns (fall risk, behaviors, etc.): n/a      Isolation/Type: contact for VRE  Tests/Procedures for NEXT shift: EGD/Colonoscopy tomorrow  Consults? (Pending/following, signed-off?) GI following  Where is patient from? (Home, TCU, etc.): home  Other Important info for NEXT shift: Bowel prep begun  Anticipated DC date & active delays: 1-2 days  _____________________________________________________________________________  SUMMARY NOTE:  Orientation/Cognitive: A&Ox4  Observation Goals (Met/ Not Met): Not Met  Mobility Level/Assist Equipment: Ind  Antibiotics & Plan (IV/po, length of tx left): n/a  Pain Management: PRN robaxin given x1 this shift, PRN norco given in AM  Tele/VS/O2: VSS on room air  ABNL Lab/BG: . Hgb 9.2  Diet: tolerating a clear liquid diet, will be NPO at midnight  Bowel/Bladder: WDL, baseline soft stool  Skin Concerns: scabbing to R BROWNE, R partial foot amputation  Drains/Devices: PIV SL            Patient Stated Goal for Today: pain control   Low Risk (score 7-11)

## 2024-05-06 NOTE — CONSULTS
SW: Consult in to send patient to [facility] for review for inpatient. Updated charge RN with request.     JEWELL Baeza  Social Work  [facility]

## 2024-05-06 NOTE — PROGRESS NOTES
[facility]  Hospitalist Progress Note   05/06/2024          Assessment and Plan:       Long Mayfield is a 54 year old male with history of insulin-dependent diabetes mellitus, history of diabetic ulcer of foot leading to right toe amputation, history of major depressive disorder, hypertension, hyperlipidemia, CAD, exocrine pancreatic insufficiency causing chronic diarrhea, history of alcohol abuse, sober for 2 years and per patient history of chronic muscular atrophy mobility disorder due to which patient currently has been residing at [facility] admitted on-5/3/2024 with symptomatic hypoglycemia and anemia.     Hypoglycemia: Likely multifactorial from insulin administration, poor intake, kidney injury - resolved.  Uncontrolled type 2 diabetes mellitus, insulin-dependent.  Patient's last hemoglobin A1c 12.9 in November 2023, now hemoglobin A1c 9.4 in the setting of severe anemia.  --Per report patient received Lantus 15 units, 8 units NovoLog with breakfast this morning and then received 10 units of NovoLog with lunch and later was found to be poorly responsive, blood glucose was in the 30s.  EMS contacted, received 20 g of D10 and blood sugar checked and was 199. On chart review mental status subsequently improved.   --Since hospitalization. Insulin Lantus has been on hold.    Continue to hold insulin Lantus while on liquid diet, bowel prep for colonoscopy tomorrow  -Did encourage patient to continue insulin aspart 6 units 3 times daily with meals.  Continue medium intensity sliding scale insulin.  Monitor blood sugars, optimize regimen.  Hypoglycemia protocol in place.  Did discuss with patient need to follow-up with endocrinology as outpatient.    Status post PRBC transfusion  Acute on chronic anemia, likely multifactorial  Severe iron deficiency.  Moderate gastritis on EGD.  On outside records review hemoglobin fluctuating between 7-8.  Last hemoglobin from 1/11/2024 at 7.2.  Now  presented with hemoglobin of 7.0. Reports on and off Advil use.    No previous EGD or colonoscopies in the past.    -Received 1 unit of blood transfusion on 5/3 > 8.2 > 8.8 > 9.2.  Occult blood negative.  Iron saturation index 5.  Ferritin 104.    -Received 1 unit of PRBC on 5/3, 2 doses of IV Venofer on 5/4, 5/5.  EGD 5/6 -solid food in the stomach. Stomach only partially seen, moderate gastritis with erosions.  [facility] following, plan for EGD and colonoscopy on Tuesday.  Appreciate comanagement.  Clear liquid diet, n.p.o. from midnight.  Conditional order to transfuse for hemoglobin less than 7 or symptomatic.    Monitor hemoglobin levels in a.m. or earlier if symptomatic.  Hold NSAIDs.  Continue Protonix 40 mg oral daily.  Recommend hematology evaluation as outpatient  Holding PTA aspirin for procedure.    Right knee pain.  History of degenerative joint disease, prior steroid injections  Follows with [facility], has had prior steroid injections.  No trauma.  Continues to report ongoing right knee pain, little relief of pain with Norco, gabapentin, Robaxin and topical diclofenac.   Right knee swelling +, warmth +.  Range of motion slightly restricted due to pain. + tenderness on exam.  No erythema.  -X-ray right knee ordered, will follow uric acid levels  Pain management as below.     Physical deconditioning from medical illness, chronic debility.  Chronic bilateral leg pain.  Cervical disc disease with myelopathy  Diabetic neuropathy.  History of diabetic foot ulcer, osteomyelitis status post right transmetatarsal amputation.  Per report uses wheelchair on and off at care facility.  Follows with [facility]  Continue PTA as needed Norco for moderate to severe pain.   Continue PTA Cymbalta, gabapentin, Robaxin.  Continue PTA lidocaine patch to back.  Diclofenac topical application to knee as needed.  Hold PTA Advil in the setting of anemia requiring blood transfusion  Activity as tolerated with fall  precautions and assistance.  PT, OT evaluation requested.  Follow-up with orthopedic surgery after discharge.    CKD stage 2-3  Previous baseline Cr 1-1.5 in outside records.  Patient reports poor appetite, PTA on Bumex and also uses Advil as needed for pain.  Creatinine 1.5 on admission, Fena 2.1.  -- Received IV fluids, Bumex held with which creatinine improved to 0.9  Continue to hold PTA Bumex tonight while undergoing colonoscopy prep  Monitor renal function in a.m., avoid nephrotoxic drugs.     Hypertension, hyperlipidemia  History of CAD.  History of flash pulmonary edema.  Denies any chest pain or palpitations or shortness of breath at this time.  Telemetry monitoring -no acute events.  Echocardiogram with LVEF of 60 to 65%.  No segmental wall motion abnormalities, no hemodynamically significant valvular disease  Hold PTA aspirin until colonoscopy in am.  Holding PTA Bumex.  Continue PTA hydralazine and amlodipine.  Continue PTA Lipitor.    History of severe recurrent major depressive disorder   No acute issues   --As above, continue PTA Cymbalta.    History of nighttime oxygen desaturation.  Was recommended sleep studies as outpatient.    History of lung nodule.  On chart review 10mm spiculated LLL nodule (noted on CT chest abdominal pelvis from 12/27/2023)   Was then recommend outpatient imaging.  Unsure if patient had followed up.  Recommend age-appropriate health guidance, follow-up imaging as outpatient.    Chronic pancreatic insufficiency leading to chronic diarrhea:  Follows up with [facility] in an outpatient setting, takes Creon as supplement  --Continue PTA Creon and PTA loperamide.    History of hyperkalemia.  Admitted 1/2024 for hyperkalemia and then discharged with Kayexalate.  Potassium within normal limits, might consider discontinuing at discharge    History of alcohol abuse: Noted, sober for 2 years  Encouraged him to continue soberity.     Orders Placed This Encounter      Clear Liquid  Diet      DVT Prophylaxis: SCDs, ambulate in the setting of anemia requiring blood transfer  Code Status: Full Code  Disposition: Expected discharge likely later Monday or Tuesday.    Medically Ready for Discharge: Likely 5/6 or 5/7    Discussed with patient, bedside RN,MADYSON  >35 minutes spent by me on the date of service doing chart review, history, exam, documentation & further activities per the note.      Ludwig Briceño MD        Interval History:        Patient lying in bed.  Denies any chest pain or palpitations.  Denies any headache or dizziness.  Denies any nausea or vomiting.  Denies any new tingling or numbness.  Denies any blood in the stool, nosebleeds at this time.  No hematuria.  Expressing some frustration with canceled colonoscopy, having to prep again for procedure, liquid diet.   Continues to complain of ongoing pain in his right knee, reports little relief of pain with current regimen.  On chart review noted chronic pain.  Expressing concerns with insulin regimen while on liquid diet, colonoscopy prep.  Fasting blood sugars this morning 202.         Physical Exam:        Physical Exam   Temp:  [97.3  F (36.3  C)-97.9  F (36.6  C)] 97.9  F (36.6  C)  Pulse:  [79-92] 83  Resp:  [9-20] 12  BP: (121-180)/() 154/102  SpO2:  [95 %-100 %] 95 %    Intake/Output Summary (Last 24 hours) at 5/4/2024 1325  Last data filed at 5/4/2024 1216  Gross per 24 hour   Intake 1491 ml   Output 2000 ml   Net -509 ml     PHYSICAL EXAM  GENERAL: Patient is in no distress. Alert and oriented.  HEART: Regular rate and rhythm. S1S2.   LUNGS: Respirations unlabored  ABDOMEN: Soft, no abdominal tenderness, bowel sounds heard   NEURO: Moving all extremities.  EXTREMITIES: No pedal edema.   Right knee swelling +, warmth +.  Range of motion slightly restricted due to pain. + tenderness on exam.  No erythema.  SKIN: Warm, dry.  Right foot transmetatarsal amputation.  Scarring right lower extremity  PSYCHIATRY Cooperative        Medications:        Current Facility-Administered Medications   Medication Dose Route Frequency Provider Last Rate Last Admin    amLODIPine (NORVASC) tablet 5 mg  5 mg Oral Daily Shira Lin MD   5 mg at 05/06/24 1045    [Held by provider] aspirin EC tablet 81 mg  81 mg Oral Daily Shira iLn MD   81 mg at 05/04/24 0926    cetirizine (zyrTEC) tablet 10 mg  10 mg Oral Daily Shira Lin MD   10 mg at 05/06/24 1045    DULoxetine (CYMBALTA) DR capsule 60 mg  60 mg Oral Daily Shira Lin MD   60 mg at 05/06/24 1045    gabapentin (NEURONTIN) capsule 100 mg  100 mg Oral TID Shira Lin MD   100 mg at 05/06/24 1044    hydrALAZINE (APRESOLINE) tablet 25 mg  25 mg Oral BID Shira Lin MD   25 mg at 05/06/24 1044    insulin aspart (NovoLOG) injection (RAPID ACTING)  8 Units Subcutaneous TID w/meals Ludwig Briceño MD   6 Units at 05/05/24 1006    insulin aspart (NovoLOG) injection (RAPID ACTING)  1-7 Units Subcutaneous TID AC Shira Lin MD   2 Units at 05/06/24 1100    insulin aspart (NovoLOG) injection (RAPID ACTING)  1-5 Units Subcutaneous At Bedtime Shira Lin MD   1 Units at 05/05/24 2231    Lidocaine (LIDOCARE) 4 % Patch 1 patch  1 patch Transdermal At Bedtime Shira Lin MD   1 patch at 05/05/24 2105    lipase-protease-amylase (CREON 36) 09362-071334-179238 units per EC Capsule 3 capsule  3 capsule Oral TID w/meals Shira Lin MD   1 capsule at 05/05/24 1743    polyethylene glycol (GoLYTELY) suspension 4,000 mL  4,000 mL Oral Once Nina Moya MD        Followed by    [START ON 5/7/2024] magnesium citrate solution 296 mL  296 mL Oral Once Nina Moya MD        multivitamin w/minerals (THERA-VIT-M) tablet 1 tablet  1 tablet Oral Daily Shira Lin MD   1 tablet at 05/06/24 1045    pantoprazole (PROTONIX) injection 40 mg  40 mg Intravenous BID Nina Moya MD   40 mg at 05/06/24 104    sodium chloride (PF) 0.9% PF flush 3 mL  3 mL Intracatheter Q8H Lin,  MD Shira   3 mL at 05/05/24 2232     Current Facility-Administered Medications   Medication Dose Route Frequency Provider Last Rate Last Admin    acetaminophen (TYLENOL) tablet 650 mg  650 mg Oral Q4H PRN Shira Lin MD   650 mg at 05/05/24 1008    Or    acetaminophen (TYLENOL) Suppository 650 mg  650 mg Rectal Q4H PRN Shira Lin MD        calcium carbonate (TUMS) chewable tablet 1,000 mg  1,000 mg Oral 4x Daily PRN Shira Lin MD        glucose gel 15-30 g  15-30 g Oral Q15 Min PRN Shira Lin MD        Or    dextrose 50 % injection 25-50 mL  25-50 mL Intravenous Q15 Min PRN Shira Lin MD        Or    glucagon injection 1 mg  1 mg Subcutaneous Q15 Min PRN Shira Lin MD        diclofenac (VOLTAREN) 1 % topical gel 2 g  2 g Topical 4x Daily PRN Ludwig Briceño MD        hydrALAZINE (APRESOLINE) tablet 10 mg  10 mg Oral Q4H PRN Shira Lin MD        Or    hydrALAZINE (APRESOLINE) injection 10 mg  10 mg Intravenous Q4H PRN Shira Lin MD   10 mg at 05/06/24 0114    HYDROcodone-acetaminophen (NORCO) 5-325 MG per tablet 1 tablet  1 tablet Oral Q6H PRN Ludwig Briceño MD   1 tablet at 05/06/24 1044    lidocaine (LMX4) cream   Topical Q1H PRN Shira Lin MD        lidocaine 1 % 0.1-1 mL  0.1-1 mL Other Q1H PRN Shira Lin MD        lipase-protease-amylase (CREON 36) [id number] units per EC Capsule 1-2 capsule  1-2 capsule Oral With Snacks or Supplements Shira Lin MD   2 capsule at 05/04/24 2209    loperamide (IMODIUM) capsule 4 mg  4 mg Oral TID PRN Shira iLn MD   4 mg at 05/05/24 0013    melatonin tablet 5 mg  5 mg Oral At Bedtime PRN Shira Lin MD        methocarbamol (ROBAXIN) tablet 500 mg  500 mg Oral Q8H PRN Shira Lin MD   500 mg at 05/06/24 0357    naloxone (NARCAN) injection 0.2 mg  0.2 mg Intravenous Q2 Min PRN Ludwig Briceño MD        Or    naloxone (NARCAN) injection 0.4 mg  0.4 mg Intravenous Q2 Min PRN Ludwig Briceño MD        Or    naloxone  (NARCAN) injection 0.2 mg  0.2 mg Intramuscular Q2 Min PRN Ludwig Briceño MD        Or    naloxone (NARCAN) injection 0.4 mg  0.4 mg Intramuscular Q2 Min PRN Ludwig Briceño MD        ondansetron (ZOFRAN ODT) ODT tab 4 mg  4 mg Oral Q6H PRN Shira Lin MD        Or    ondansetron (ZOFRAN) injection 4 mg  4 mg Intravenous Q6H PRN Shira Lin MD        prochlorperazine (COMPAZINE) injection 10 mg  10 mg Intravenous Q6H PRN Shira Lin MD        Or    prochlorperazine (COMPAZINE) tablet 10 mg  10 mg Oral Q6H PRN Shira Lin MD        Or    prochlorperazine (COMPAZINE) suppository 25 mg  25 mg Rectal Q12H PRN Shira Lin MD        sodium chloride (PF) 0.9% PF flush 3 mL  3 mL Intracatheter q1 min prn Shira Lin MD                Data:      All new lab and imaging data was reviewed.

## 2024-05-06 NOTE — PROGRESS NOTES
PRIMARY DIAGNOSIS: GI BLEED    OUTPATIENT/OBSERVATION GOALS TO BE MET BEFORE DISCHARGE  Orthostatic performed: No    Stable Hgb Yes.   Recent Labs   Lab Test 05/06/24  0724 05/05/24  1037 05/04/24  1912   HGB 8.5* 9.2* 8.8*       Resolved or declined bleeding episodes: Yes Last episode: NA    Appropriate testing complete: No, repeat EGD and colonoscopy tomorrow    Cleared for discharge by consultants (if involved): No    Safe discharge environment identified: Yes    Discharge Planner Nurse   Safe discharge environment identified: Yes  Barriers to discharge: No       Entered by: Aman Ann RN 05/06/2024 11:44 AM     Please review provider order for any additional goals.   Nurse to notify provider when observation goals have been met and patient is ready for discharge.

## 2024-05-06 NOTE — ANESTHESIA PREPROCEDURE EVALUATION
Anesthesia Pre-Procedure Evaluation    Patient: Long Mayfield   MRN: [id number] : 1970        Procedure : Procedure(s):  Esophagoscopy, gastroscopy, duodenoscopy (EGD), combined  Colonoscopy          Past Medical History:   Diagnosis Date    Bell's palsy     Diabetes (H)       Past Surgical History:   Procedure Laterality Date    ORTHOPEDIC SURGERY        No Known Allergies   Social History     Tobacco Use    Smoking status: Never    Smokeless tobacco: Never   Substance Use Topics    Alcohol use: Yes      Wt Readings from Last 1 Encounters:   24 59.2 kg (130 lb 8 oz)        Anesthesia Evaluation            ROS/MED HX  ENT/Pulmonary:    (-) tobacco use, asthma and sleep apnea   Neurologic:    (-) no CVA and no TIA   Cardiovascular:     (+)  hypertension- -   -  - -                                 Previous cardiac testing   Echo: Date: 24 Results:  1. Normal biventricular size and function. Left ventricular ejection fraction  of 60-65%.  2. No segmental wall motion abnormalities noted.  3. No hemodynamically significant valvular disease.  No prior study for comparison. Technically adequate study.    Stress Test:  Date: Results:    ECG Reviewed:  Date: Results:    Cath:  Date: Results:   (-) pacemaker, stent, pacemaker and ICD   METS/Exercise Tolerance: 3 - Able to walk 1-2 blocks without stopping    Hematologic:     (+)      anemia,          Musculoskeletal:   (+)  arthritis,             GI/Hepatic:     (+) GERD,                   Renal/Genitourinary:     (+) renal disease, type: CRI,            Endo:     (+)  type II DM,                    Psychiatric/Substance Use:     (+)   alcohol abuse      Infectious Disease:  - neg infectious disease ROS     Malignancy:  - neg malignancy ROS     Other:      (+)  , H/O Chronic Pain,         Physical Exam    Airway        Mallampati: II   TM distance: > 3 FB   Neck ROM: full   Mouth opening: > 3 cm    Respiratory Devices and Support         Dental       (+)  "Modest Abnormalities - crowns, retainers, 1 or 2 missing teeth      Cardiovascular   cardiovascular exam normal          Pulmonary   pulmonary exam normal                OUTSIDE LABS:  CBC:   Lab Results   Component Value Date    WBC 8.6 05/05/2024    WBC 9.4 05/04/2024    HGB 8.5 (L) 05/06/2024    HGB 9.2 (L) 05/05/2024    HCT 30.1 (L) 05/05/2024    HCT 26.4 (L) 05/04/2024     05/05/2024     05/04/2024     BMP:   Lab Results   Component Value Date     (L) 05/06/2024     (L) 05/05/2024    POTASSIUM 4.8 05/06/2024    POTASSIUM 4.6 05/05/2024    CHLORIDE 99 05/06/2024    CHLORIDE 100 05/05/2024    CO2 22 05/06/2024    CO2 22 05/05/2024    BUN 16.6 05/06/2024    BUN 22.5 (H) 05/05/2024    CR 0.99 05/06/2024    CR 1.17 05/05/2024     (H) 05/06/2024     (H) 05/06/2024     COAGS: No results found for: \"PTT\", \"INR\", \"FIBR\"  POC:   Lab Results   Component Value Date     (H) 10/08/2017     HEPATIC:   Lab Results   Component Value Date    ALBUMIN 3.4 (L) 05/04/2024    PROTTOTAL 7.5 05/04/2024    ALT 8 05/04/2024    AST 15 05/04/2024    ALKPHOS 134 05/04/2024    BILITOTAL 0.2 05/04/2024     OTHER:   Lab Results   Component Value Date    PH 7.42 08/07/2022    LACT 3.4 (H) 08/07/2022    A1C 9.4 (H) 05/04/2024    AROLDO 9.0 05/06/2024    PHOS 2.7 05/06/2024    MAG 1.9 05/06/2024    LIPASE 6 (L) 01/23/2023    TSH 1.80 03/09/2022       Anesthesia Plan    ASA Status:  3    NPO Status:  NPO Appropriate    Anesthesia Type: MAC.     - Reason for MAC: immobility needed, straight local not clinically adequate              Consents    Anesthesia Plan(s) and associated risks, benefits, and realistic alternatives discussed. Questions answered and patient/representative(s) expressed understanding.     - Discussed:     - Discussed with:  Patient            Postoperative Care    Pain management: IV analgesics.   PONV prophylaxis: Ondansetron (or other 5HT-3)     Comments:               Jordy Cornejo" MD Ramesh    I have reviewed the pertinent notes and labs in the chart from the past 30 days and (re)examined the patient.  Any updates or changes from those notes are reflected in this note.     # Hyponatremia: Lowest Na = 133 mmol/L in last 30 days, will monitor as appropriate      # Hypoalbuminemia: Lowest albumin = 3.4 g/dL at 5/4/2024 11:26 AM, will monitor as appropriate    # Drug Induced Platelet Defect: home medication list includes an antiplatelet medication  # DMII: A1C = 9.4 % (Ref range: <5.7 %) within past 6 months

## 2024-05-06 NOTE — OR NURSING
Patient had a lot of food still in the stomach.  The colonoscopy was canceled due to incomplete prep and high risk for aspiration.

## 2024-05-06 NOTE — UTILIZATION REVIEW
Concurrent stay review; Secondary Review Determination    Under the authority of the Utilization Management Committee, the utilization review process indicated a secondary review on the above patient. The review outcome is based on review of the medical records, discussions with staff, and applying clinical experience noted on the date of the review.    (x) Observation Status Appropriate - Concurrent stay review        RATIONALE FOR DETERMINATION: 54-year-old male with known history of muscle wasting/atrophy, CAD, insulin-dependent diabetes, prior heart failure, diabetic foot ulcer with staphylococcal infection requiring right transmetatarsal joint amputation, chronic pancreatitis, major depression, history of alcohol abuse who resides at a assisted living facility was found to be confused and noted to have hypoglycemia and given D10 by paramedics.  Patient himself notes increased fatigue and reduced appetite recently and patient found to have significant acute kidney injury with marked anemia.  Last December/January patient was noted to have hemoglobins in the 7.2-7.8 range without iron deficiency.  Patient's mental status has improved as well as the acute kidney injury.  Observation care appropriate for initial management of hypoglycemia.    Patient is clinically improving and there is no clear indication to change patient's status to inpatient. The severity of illness, intensity of service provided, expected LOS and risk for adverse outcome make the care appropriate for observation.    This document was produced using voice recognition software    The information on this document is developed by the utilization review team in order for the business office to ensure compliance. This only denotes the appropriateness of proper admission status and does not reflect the quality of care rendered.    The definitions of Inpatient Status and Observation Status used in making the determination above are those provided in  the CMS Coverage Manual, Chapter 1 and Chapter 6, section 70.4.    Sincerely,    He Reyes MD  Utilization Review  Physician Advisor  [facility].

## 2024-05-06 NOTE — PROGRESS NOTES
Goal Outcome Evaluation:  PRIMARY Concern: Hypolycemia, anemia  SAFETY RISK Concerns (fall risk, behaviors, etc.): n/a      Isolation/Type: contact for ESBL  Tests/Procedures for NEXT shift: repeat EGD/Colonoscopy tomorrow, X-ray of R knee, RN to call ED X-ray when pt is ready  Consults? (Pending/following, signed-off?) GI following, SW following  Where is patient from? (Home, TCU, etc.): [facility]  Other Important info for NEXT shift: repeat bowel prep this evening  Anticipated DC date & active delays: 1-2 days  _____________________________________________________________________________  SUMMARY NOTE:  Orientation/Cognitive: A&Ox4  Observation Goals (Met/ Not Met): Not Met  Mobility Level/Assist Equipment: SBA  Antibiotics & Plan (IV/po, length of tx left): n/a  Pain Management: PRN robaxin available and Norco  given x 1 for right knee pain with minimal relief, cold pack in use  Tele/VS/O2: VSS on room air  ABNL Lab/BG: BG in the high 100s-200s, insulin dosing adjusted, Hgb down to 8.5  Diet: NPO at midnight, clear liquid for now  Bowel/Bladder: Continent  Skin Concerns: scabbing to R BROWNE, R partial foot amputation  Drains/Devices: PIV SL            Patient Stated Goal for Today: pain control

## 2024-05-06 NOTE — PLAN OF CARE
Goal Outcome Evaluation:  PRIMARY Concern: Hypolycemia, anemia  SAFETY RISK Concerns (fall risk, behaviors, etc.): n/a      Isolation/Type: contact for VRE  Tests/Procedures for NEXT shift: EGD/Colonoscopy today  Consults? (Pending/following, signed-off?) GI following, SW following  Where is patient from? (Home, TCU, etc.): [facility]  Other Important info for NEXT shift: Bowel prep completed, BM clear  Anticipated DC date & active delays: 1-2 days  _____________________________________________________________________________  SUMMARY NOTE:  Orientation/Cognitive: A&Ox4  Observation Goals (Met/ Not Met): Not Met  Mobility Level/Assist Equipment: Ind  Antibiotics & Plan (IV/po, length of tx left): n/a  Pain Management: PRN robaxin  and Norco  given x 1 for right knee pain  Tele/VS/O2: VSS on room air  ABNL Lab/BG: . Hgb 9.2  Diet: NPO at midnight  Bowel/Bladder: WDL, baseline soft stool  Skin Concerns: scabbing to R BROWNE, R partial foot amputation  Drains/Devices: PIV SL            Patient Stated Goal for Today: pain control

## 2024-05-06 NOTE — ANESTHESIA CARE TRANSFER NOTE
Patient: Long Mayfield    Procedure: Procedure(s):  Esophagoscopy, gastroscopy, duodenoscopy (EGD), combined  Colonoscopy       Diagnosis: Anemia [D64.9]  Diagnosis Additional Information: No value filed.    Anesthesia Type:   MAC     Note:    Oropharynx: oropharynx clear of all foreign objects and spontaneously breathing  Level of Consciousness: drowsy  Oxygen Supplementation: room air    Independent Airway: airway patency satisfactory and stable  Dentition: dentition unchanged  Vital Signs Stable: post-procedure vital signs reviewed and stable  Report to RN Given: handoff report given  Patient transferred to: PACU    Handoff Report: Identifed the Patient, Identified the Reponsible Provider, Reviewed the pertinent medical history, Discussed the surgical course, Reviewed Intra-OP anesthesia mangement and issues during anesthesia, Set expectations for post-procedure period and Allowed opportunity for questions and acknowledgement of understanding      Vitals:  Vitals Value Taken Time   /95 05/06/24 0924   Temp     Pulse 74 05/06/24 0926   Resp 13 05/06/24 0926   SpO2 98 % 05/06/24 0926   Vitals shown include unfiled device data.    Electronically Signed By: ROSE Sheppard CRNA  May 6, 2024  9:27 AM

## 2024-05-06 NOTE — ANESTHESIA POSTPROCEDURE EVALUATION
Patient: Long Mayfield    Procedure: Procedure(s):  Esophagoscopy, gastroscopy, duodenoscopy (EGD), combined       Anesthesia Type:  MAC    Note:  Disposition: Inpatient   Postop Pain Control: Uneventful            Sign Out: Well controlled pain   PONV: No   Neuro/Psych: Uneventful            Sign Out: Acceptable/Baseline neuro status   Airway/Respiratory: Uneventful            Sign Out: Acceptable/Baseline resp. status   CV/Hemodynamics: Uneventful            Sign Out: Acceptable CV status   Other NRE: NONE   DID A NON-ROUTINE EVENT OCCUR?            Last vitals:  Vitals Value Taken Time   /102 05/06/24 1045   Temp 36.6  C (97.9  F) 05/06/24 1045   Pulse 83 05/06/24 1045   Resp 12 05/06/24 1045   SpO2 95 % 05/06/24 1045       Electronically Signed By: Jordy Guillory MD  May 6, 2024  12:47 PM

## 2024-05-06 NOTE — PROGRESS NOTES
PRIMARY DIAGNOSIS: HYPO/HYPERGLYCEMIA     OUTPATIENT/OBSERVATION GOALS TO BE MET BEFORE DISCHARGE  BG greater than 100 and less than 250 on two consecutive readings: YES                          Recent Labs    Lab Test 05/05/24     1744 05/05/24  1156 05/05/24  1037        142 230             Ketones absent from urine  (hyperglycemia):  n/a     Tolerating oral intake to maintain hydration: Yes     Return to near baseline physical activity: Yes        Discharge Planner Nurse  Safe discharge environment identified: Yes: back to prior living arrangement  Barriers to discharge: Yes: EGD/colonoscopy tomorrow       Entered by: Penelope Bullock RN        Please review provider order for any additional goals.   Nurse to notify provider when observation goals have been met and patient is ready for discharge.

## 2024-05-06 NOTE — PROGRESS NOTES
Observation goals  PRIOR TO DISCHARGE        Comments: -diagnostic tests and consults completed and resulted: Not met  -vital signs normal or at patient baseline: Not met  -adequate pain control on oral analgesics: not met  Nurse to notify provider when observation goals have been met and patient is ready for discharge.

## 2024-05-06 NOTE — PROGRESS NOTES
PRE-PROCEDURE NOTE    CHIEF COMPLAINT / REASON FOR PROCEDURE:  anemia    History and Physical Reviewed:  yes    PRE-SEDATION ASSESSMENT:    Lung Exam:  normal  Heart Exam:  normal  Mallampati Airway Classification:  3  Previous reaction to anesthesia/sedation:   no  Sedation plan based on assessment:  MAC  Comment(s):  risks of procedure explained  ASA Classification:  3    IMPRESSION:  rule out cause of bleeding by egd and colonoscopy    PLAN:  egd and colonoscopy       Nina Moya MD, MD

## 2024-05-07 ENCOUNTER — APPOINTMENT (OUTPATIENT)
Dept: OCCUPATIONAL THERAPY | Facility: CLINIC | Age: 54
End: 2024-05-07
Attending: HOSPITALIST
Payer: COMMERCIAL

## 2024-05-07 ENCOUNTER — ANESTHESIA (OUTPATIENT)
Dept: GASTROENTEROLOGY | Facility: CLINIC | Age: 54
End: 2024-05-07
Payer: COMMERCIAL

## 2024-05-07 VITALS
DIASTOLIC BLOOD PRESSURE: 82 MMHG | SYSTOLIC BLOOD PRESSURE: 125 MMHG | TEMPERATURE: 97.7 F | OXYGEN SATURATION: 99 % | WEIGHT: 130 LBS | HEART RATE: 89 BPM | HEIGHT: 64 IN | RESPIRATION RATE: 16 BRPM | BODY MASS INDEX: 22.2 KG/M2

## 2024-05-07 LAB
ANION GAP SERPL CALCULATED.3IONS-SCNC: 12 MMOL/L (ref 7–15)
BUN SERPL-MCNC: 13.2 MG/DL (ref 6–20)
CALCIUM SERPL-MCNC: 9 MG/DL (ref 8.6–10)
CHLORIDE SERPL-SCNC: 99 MMOL/L (ref 98–107)
COLONOSCOPY: NORMAL
CREAT SERPL-MCNC: 1.02 MG/DL (ref 0.67–1.17)
DEPRECATED HCO3 PLAS-SCNC: 23 MMOL/L (ref 22–29)
EGFRCR SERPLBLD CKD-EPI 2021: 86 ML/MIN/1.73M2
GLUCOSE BLDC GLUCOMTR-MCNC: 172 MG/DL (ref 70–99)
GLUCOSE BLDC GLUCOMTR-MCNC: 188 MG/DL (ref 70–99)
GLUCOSE BLDC GLUCOMTR-MCNC: 190 MG/DL (ref 70–99)
GLUCOSE BLDC GLUCOMTR-MCNC: 221 MG/DL (ref 70–99)
GLUCOSE SERPL-MCNC: 196 MG/DL (ref 70–99)
HGB BLD-MCNC: 8.4 G/DL (ref 13.3–17.7)
POTASSIUM SERPL-SCNC: 4.5 MMOL/L (ref 3.4–5.3)
SODIUM SERPL-SCNC: 134 MMOL/L (ref 135–145)
UPPER GI ENDOSCOPY: NORMAL

## 2024-05-07 PROCEDURE — 250N000013 HC RX MED GY IP 250 OP 250 PS 637: Performed by: HOSPITALIST

## 2024-05-07 PROCEDURE — 258N000003 HC RX IP 258 OP 636: Performed by: NURSE ANESTHETIST, CERTIFIED REGISTERED

## 2024-05-07 PROCEDURE — 99239 HOSP IP/OBS DSCHRG MGMT >30: CPT | Performed by: HOSPITALIST

## 2024-05-07 PROCEDURE — 96376 TX/PRO/DX INJ SAME DRUG ADON: CPT | Mod: 59

## 2024-05-07 PROCEDURE — 43235 EGD DIAGNOSTIC BRUSH WASH: CPT | Performed by: ANESTHESIOLOGY

## 2024-05-07 PROCEDURE — 43235 EGD DIAGNOSTIC BRUSH WASH: CPT | Performed by: INTERNAL MEDICINE

## 2024-05-07 PROCEDURE — 250N000011 HC RX IP 250 OP 636: Performed by: INTERNAL MEDICINE

## 2024-05-07 PROCEDURE — 82565 ASSAY OF CREATININE: CPT | Performed by: HOSPITALIST

## 2024-05-07 PROCEDURE — 250N000013 HC RX MED GY IP 250 OP 250 PS 637: Performed by: INTERNAL MEDICINE

## 2024-05-07 PROCEDURE — 250N000011 HC RX IP 250 OP 636: Performed by: NURSE ANESTHETIST, CERTIFIED REGISTERED

## 2024-05-07 PROCEDURE — 85018 HEMOGLOBIN: CPT | Performed by: HOSPITALIST

## 2024-05-07 PROCEDURE — G0378 HOSPITAL OBSERVATION PER HR: HCPCS

## 2024-05-07 PROCEDURE — 250N000009 HC RX 250: Performed by: NURSE ANESTHETIST, CERTIFIED REGISTERED

## 2024-05-07 PROCEDURE — 97165 OT EVAL LOW COMPLEX 30 MIN: CPT | Mod: GO

## 2024-05-07 PROCEDURE — 82962 GLUCOSE BLOOD TEST: CPT

## 2024-05-07 PROCEDURE — C9113 INJ PANTOPRAZOLE SODIUM, VIA: HCPCS | Performed by: INTERNAL MEDICINE

## 2024-05-07 PROCEDURE — 370N000017 HC ANESTHESIA TECHNICAL FEE, PER MIN: Performed by: INTERNAL MEDICINE

## 2024-05-07 PROCEDURE — 45380 COLONOSCOPY AND BIOPSY: CPT | Performed by: INTERNAL MEDICINE

## 2024-05-07 PROCEDURE — 88305 TISSUE EXAM BY PATHOLOGIST: CPT | Mod: 26 | Performed by: PATHOLOGY

## 2024-05-07 PROCEDURE — 36415 COLL VENOUS BLD VENIPUNCTURE: CPT | Performed by: HOSPITALIST

## 2024-05-07 PROCEDURE — 82374 ASSAY BLOOD CARBON DIOXIDE: CPT | Performed by: HOSPITALIST

## 2024-05-07 PROCEDURE — 88305 TISSUE EXAM BY PATHOLOGIST: CPT | Mod: TC | Performed by: INTERNAL MEDICINE

## 2024-05-07 PROCEDURE — 999N000010 HC STATISTIC ANES STAT CODE-CRNA PER MINUTE: Performed by: INTERNAL MEDICINE

## 2024-05-07 PROCEDURE — 43235 EGD DIAGNOSTIC BRUSH WASH: CPT | Performed by: NURSE ANESTHETIST, CERTIFIED REGISTERED

## 2024-05-07 RX ORDER — PROPOFOL 10 MG/ML
INJECTION, EMULSION INTRAVENOUS PRN
Status: DISCONTINUED | OUTPATIENT
Start: 2024-05-07 | End: 2024-05-07

## 2024-05-07 RX ORDER — PANTOPRAZOLE SODIUM 40 MG/1
40 TABLET, DELAYED RELEASE ORAL DAILY
Status: DISCONTINUED | OUTPATIENT
Start: 2024-05-08 | End: 2024-05-07 | Stop reason: HOSPADM

## 2024-05-07 RX ORDER — ONDANSETRON 2 MG/ML
INJECTION INTRAMUSCULAR; INTRAVENOUS PRN
Status: DISCONTINUED | OUTPATIENT
Start: 2024-05-07 | End: 2024-05-07

## 2024-05-07 RX ORDER — PANTOPRAZOLE SODIUM 40 MG/1
40 TABLET, DELAYED RELEASE ORAL DAILY
Qty: 30 TABLET | Refills: 0 | Status: SHIPPED | OUTPATIENT
Start: 2024-05-07

## 2024-05-07 RX ORDER — LIDOCAINE HYDROCHLORIDE 20 MG/ML
INJECTION, SOLUTION INFILTRATION; PERINEURAL PRN
Status: DISCONTINUED | OUTPATIENT
Start: 2024-05-07 | End: 2024-05-07

## 2024-05-07 RX ORDER — HYDROCODONE BITARTRATE AND ACETAMINOPHEN 5; 325 MG/1; MG/1
1 TABLET ORAL EVERY 8 HOURS PRN
Qty: 10 TABLET | Refills: 0 | Status: ON HOLD | OUTPATIENT
Start: 2024-05-07 | End: 2024-07-16

## 2024-05-07 RX ORDER — BUMETANIDE 1 MG/1
1 TABLET ORAL DAILY
COMMUNITY
Start: 2024-05-07 | End: 2024-05-07

## 2024-05-07 RX ORDER — BUMETANIDE 1 MG/1
1 TABLET ORAL DAILY
Qty: 30 TABLET | Refills: 0 | Status: SHIPPED | OUTPATIENT
Start: 2024-05-07

## 2024-05-07 RX ORDER — LIDOCAINE 4 G/G
2 PATCH TOPICAL EVERY 24 HOURS
Qty: 15 PATCH | Refills: 0 | Status: ON HOLD | OUTPATIENT
Start: 2024-05-07 | End: 2024-09-14

## 2024-05-07 RX ORDER — INSULIN ASPART 100 [IU]/ML
8 INJECTION, SOLUTION INTRAVENOUS; SUBCUTANEOUS 2 TIMES DAILY WITH MEALS
COMMUNITY
Start: 2024-05-07

## 2024-05-07 RX ORDER — PROPOFOL 10 MG/ML
INJECTION, EMULSION INTRAVENOUS CONTINUOUS PRN
Status: DISCONTINUED | OUTPATIENT
Start: 2024-05-07 | End: 2024-05-07

## 2024-05-07 RX ORDER — FENTANYL CITRATE 50 UG/ML
INJECTION, SOLUTION INTRAMUSCULAR; INTRAVENOUS PRN
Status: DISCONTINUED | OUTPATIENT
Start: 2024-05-07 | End: 2024-05-07

## 2024-05-07 RX ORDER — SODIUM CHLORIDE, SODIUM LACTATE, POTASSIUM CHLORIDE, CALCIUM CHLORIDE 600; 310; 30; 20 MG/100ML; MG/100ML; MG/100ML; MG/100ML
INJECTION, SOLUTION INTRAVENOUS CONTINUOUS PRN
Status: DISCONTINUED | OUTPATIENT
Start: 2024-05-07 | End: 2024-05-07

## 2024-05-07 RX ORDER — HYDROCODONE BITARTRATE AND ACETAMINOPHEN 5; 325 MG/1; MG/1
1 TABLET ORAL EVERY 6 HOURS PRN
Qty: 10 TABLET | Refills: 0 | Status: SHIPPED | OUTPATIENT
Start: 2024-05-07 | End: 2024-05-07

## 2024-05-07 RX ORDER — PANTOPRAZOLE SODIUM 40 MG/1
40 TABLET, DELAYED RELEASE ORAL DAILY
Qty: 30 TABLET | Refills: 0 | Status: SHIPPED | OUTPATIENT
Start: 2024-05-07 | End: 2024-05-07

## 2024-05-07 RX ORDER — LIDOCAINE 4 G/G
1 PATCH TOPICAL EVERY 24 HOURS
Qty: 15 PATCH | Refills: 0 | Status: SHIPPED | OUTPATIENT
Start: 2024-05-07 | End: 2024-05-07

## 2024-05-07 RX ORDER — LIDOCAINE 4 G/G
2 PATCH TOPICAL EVERY 24 HOURS
Qty: 15 PATCH | Refills: 0 | Status: SHIPPED | OUTPATIENT
Start: 2024-05-07 | End: 2024-05-07

## 2024-05-07 RX ADMIN — PROPOFOL 150 MCG/KG/MIN: 10 INJECTION, EMULSION INTRAVENOUS at 10:56

## 2024-05-07 RX ADMIN — LIDOCAINE HYDROCHLORIDE 60 MG: 20 INJECTION, SOLUTION INFILTRATION; PERINEURAL at 10:56

## 2024-05-07 RX ADMIN — CETIRIZINE HYDROCHLORIDE 10 MG: 10 TABLET, FILM COATED ORAL at 08:29

## 2024-05-07 RX ADMIN — PANTOPRAZOLE SODIUM 40 MG: 40 INJECTION, POWDER, FOR SOLUTION INTRAVENOUS at 08:29

## 2024-05-07 RX ADMIN — HYDRALAZINE HYDROCHLORIDE 25 MG: 25 TABLET, FILM COATED ORAL at 08:29

## 2024-05-07 RX ADMIN — DULOXETINE HYDROCHLORIDE 60 MG: 60 CAPSULE, DELAYED RELEASE ORAL at 08:29

## 2024-05-07 RX ADMIN — LOPERAMIDE HYDROCHLORIDE 4 MG: 2 CAPSULE ORAL at 13:37

## 2024-05-07 RX ADMIN — HYDROCODONE BITARTRATE AND ACETAMINOPHEN 1 TABLET: 5; 325 TABLET ORAL at 08:28

## 2024-05-07 RX ADMIN — METHOCARBAMOL 500 MG: 500 TABLET ORAL at 08:49

## 2024-05-07 RX ADMIN — PANCRELIPASE 3 CAPSULE: 36000; 180000; 114000 CAPSULE, DELAYED RELEASE PELLETS ORAL at 13:30

## 2024-05-07 RX ADMIN — PROPOFOL 50 MG: 10 INJECTION, EMULSION INTRAVENOUS at 10:56

## 2024-05-07 RX ADMIN — HYDROCODONE BITARTRATE AND ACETAMINOPHEN 1 TABLET: 5; 325 TABLET ORAL at 15:04

## 2024-05-07 RX ADMIN — MAGNESIUM CITRATE 296 ML: 1.75 LIQUID ORAL at 04:51

## 2024-05-07 RX ADMIN — FENTANYL CITRATE 50 MCG: 50 INJECTION INTRAMUSCULAR; INTRAVENOUS at 10:55

## 2024-05-07 RX ADMIN — Medication 1 TABLET: at 08:28

## 2024-05-07 RX ADMIN — GABAPENTIN 100 MG: 100 CAPSULE ORAL at 08:28

## 2024-05-07 RX ADMIN — AMLODIPINE BESYLATE 5 MG: 5 TABLET ORAL at 08:29

## 2024-05-07 RX ADMIN — SODIUM CHLORIDE, POTASSIUM CHLORIDE, SODIUM LACTATE AND CALCIUM CHLORIDE: 600; 310; 30; 20 INJECTION, SOLUTION INTRAVENOUS at 10:52

## 2024-05-07 RX ADMIN — ONDANSETRON 4 MG: 2 INJECTION INTRAMUSCULAR; INTRAVENOUS at 10:58

## 2024-05-07 ASSESSMENT — ACTIVITIES OF DAILY LIVING (ADL)
ADLS_ACUITY_SCORE: 25

## 2024-05-07 NOTE — PROGRESS NOTES
[facility] Brief GI Note    EGD and Colonoscopy completed.    Findings:  - small amount of solid food in the stomach, gastritis, no large ulcers, no reason for severe anemia seen  - On colonoscopy, no evidence of colitis, no AVMs, no polyps, left sided diverticulosis    A/P:  No Findings on EGD or colonoscopy to explain anemia. There was gastritis, which could have been contributing, but did not appear severe enough to cause a hgb of 7.   - Await biopsies to rule out celiac and microscopic colitis, I will notify patient if these are positive  - resume diet  - daily oral protonix    GI will not actively follow, please call if questions arise and we would be happy to see patient again.    Nina Moya MD  [facility]  [phone] - cell

## 2024-05-07 NOTE — PLAN OF CARE
OUTPATIENT/OBSERVATION GOALS TO BE MET BEFORE DISCHARGE        1. Colonoscopy/endoscopy complete: not met  2. Tolerating oral intake to maintain hydration: met  3. Return to near baseline physical activity: met

## 2024-05-07 NOTE — ANESTHESIA CARE TRANSFER NOTE
Patient: Long Mayfield    Procedure: Procedure(s):  Esophagoscopy, gastroscopy, duodenoscopy (EGD), combined  Colonoscopy       Diagnosis: Anemia [D64.9]  Diagnosis Additional Information: No value filed.    Anesthesia Type:   MAC     Note:    Oropharynx: oropharynx clear of all foreign objects and spontaneously breathing  Level of Consciousness: awake  Oxygen Supplementation: room air    Independent Airway: airway patency satisfactory and stable  Dentition: dentition unchanged  Vital Signs Stable: post-procedure vital signs reviewed and stable  Report to RN Given: handoff report given  Patient transferred to: Phase II    Handoff Report: Identifed the Patient, Identified the Reponsible Provider, Reviewed the pertinent medical history, Discussed the surgical course, Reviewed Intra-OP anesthesia mangement and issues during anesthesia, Set expectations for post-procedure period and Allowed opportunity for questions and acknowledgement of understanding      Vitals:  Vitals Value Taken Time   BP     Temp     Pulse 77 05/07/24 1132   Resp 9 05/07/24 1132   SpO2 97 % 05/07/24 1132   Vitals shown include unfiled device data.    Electronically Signed By: ROSE García CRNA  May 7, 2024  11:34 AM

## 2024-05-07 NOTE — PLAN OF CARE
PRIMARY Concern: Hypolycemia, anemia  SAFETY RISK Concerns (fall risk, behaviors, etc.): n/a      Isolation/Type: contact for VRE  Tests/Procedures for NEXT shift: Repeat EGD/Colonoscopy today. XR R knee resulted.  Consults? (Pending/following, signed-off?) GI following, SW following  Where is patient from? (Home, TCU, etc.): [facility]  Other Important info for NEXT shift: Bowel prep done.  Anticipated DC date & active delays: 1-2 days  ______________________________________________________________________  SUMMARY NOTE:  Orientation/Cognitive: A&Ox4  Observation Goals (Met/ Not Met): Not Met  Mobility Level/Assist Equipment: Ind  Antibiotics & Plan (IV/po, length of tx left): n/a  Pain Management: Norco  given x 1 for right knee pain  Tele/VS/O2: VSS on RA ex hypertensive   Effective. Ortho BP neg.  ABNL Lab/BG: See chart  Diet: Clear liquid. NPO at midnight  Bowel/Bladder: Up to bathroom, loose clear stool per pt.  Skin Concerns: scabbing to R BROWNE, R partial foot amputation  Drains/Devices: PIV SL            Patient Stated Goal for Today: pain control

## 2024-05-07 NOTE — DISCHARGE SUMMARY
Discharge Summary  Hospitalist    Date of Admission:  5/3/2024  Date of Discharge:  5/7/2024  Discharging Provider: Ludwig Briceño MD    Primary Care Physician   Ana Maria Blakely  Primary Care Provider Phone Number: [phone]  Primary Care Provider Fax Number: [phone]    PRINCIPAL DIAGNOSIS  Hypoglycemia: Likely multifactorial from insulin administration, poor intake, kidney injury - resolved.  Status post PRBC transfusion  Acute on chronic anemia, likely multifactorial  Severe iron deficiency.  Moderate gastritis on EGD.  Acute on chronic right knee pain -improving.     Past Medical History:   Diagnosis Date    Arthritis     Bell's palsy     Diabetes (H)     Hypertension        History of Present Illness   Long Mayfield is an 54 year old male who presented with hypoglycemia.     Hospital Course   Long Mayfield is a 54 year old male with history of insulin-dependent diabetes mellitus, history of diabetic ulcer of foot leading to right toe amputation, history of major depressive disorder, hypertension, hyperlipidemia, CAD, exocrine pancreatic insufficiency causing chronic diarrhea, history of alcohol abuse, sober for 2 years and per patient history of chronic muscular atrophy mobility disorder due to which patient currently has been residing at [facility] admitted on-5/3/2024 with symptomatic hypoglycemia and anemia.     Hypoglycemia: Likely multifactorial from insulin administration, poor intake, kidney injury - resolved.  Uncontrolled type 2 diabetes mellitus, insulin-dependent.  Patient's last hemoglobin A1c 12.9 in November 2023, now hemoglobin A1c 9.4 in the setting of severe anemia.  --Per report patient received Lantus 15 units, 8 units NovoLog with breakfast this morning and then received 10 units of NovoLog with lunch and later was found to be poorly responsive, blood glucose was in the 30s.  EMS contacted, received 20 g of D10 and blood sugar checked and was 199. On chart review  mental status subsequently improved.   --Since hospitalization. Insulin Lantus has been on hold [initially for hypoglycemia, subsequently patient having bowel prep for endoscopy]  Recommend to resume PTA insulin Lantus on discharge.  Will switch PTA insulin aspart to 8 units 3 times daily with meals.  Continue PTA sliding scale insulin.  Monitor blood sugars 3 times a day, review on provider visit and optimize therapy.  Recommend follow-up with endocrinology, diabetic educator as outpatient.  Emphasized need for optimal blood sugar control given ongoing medical issues.    Status post PRBC transfusion  Acute on chronic anemia, likely multifactorial  Severe iron deficiency.  Moderate gastritis on EGD.  On outside records review hemoglobin fluctuating between 7-8.  Last hemoglobin from 1/11/2024 at 7.2.  Now presented with hemoglobin of 7.0. Reports on and off Advil use.    -Received 1 unit of blood transfusion on 5/3 > 8.2 > 8.8 > 9.2 > 8.5 >8.4  Occult blood negative.  Iron saturation index 5.  Ferritin 104.    -Received 1 unit of PRBC on 5/3, 2 doses of IV Venofer on 5/4, 5/5.  EGD 5/6 -solid food in the stomach. Stomach only partially seen, moderate gastritis with erosions.  Repeat EGD 5/7 with small amount of food residue in the stomach, moderate gastritis.  Colonoscopy 5/7 with moderate diverticulosis in the sigmoid colon and in the descending colon.  External hemorrhoids.    [facility] followed during hospital stay.  Biopsies taken during EGD and colonoscopy.  Postprocedure tolerated oral diet.  Plan for discharge with close follow-up.  Continue Protonix 40 mg oral daily.  Continue PTA aspirin.  Avoid NSAIDs.  Recommend follow-up with hematology for further anemia workup.  Follow-up with [facility], biopsies from EGD, colonoscopy pending.    Monitor hemoglobin level in 7 days or earlier if symptomatic.    Acute on chronic right knee pain -improving.   Follows with [facility], history of  degenerative joint disease, prior steroid injections, No trauma.    Right knee swelling +, warmth improved.  No tenderness.  No erythema.  Afebrile, no leukocytosis.  Uric acid 4.3.  X-ray right knee with tricompartmental degenerative arthrosis with small marginal osteophytes and moderate joint space narrowing.  No acute fracture.  Moderate joint effusion or synovitis.    Support to the sleeve for the knee while ambulating.  Fall precautions.  As needed Tylenol.  Norco as needed for severe pain.  Recommend follow-up with [facility] orthopedic surgery for ongoing right knee pain.     Physical deconditioning from medical illness, chronic debility.  Chronic bilateral leg pain.  History of chronic muscular atrophy mobility disorder  Cervical disc disease with myelopathy.  History of chronic back pain.  Diabetic neuropathy.  History of diabetic foot ulcer, osteomyelitis status post right transmetatarsal amputation.  Per report uses wheelchair on and off at care facility.  Follows with [facility]  Continue PTA as needed Norco , prescription for 10 tablets provided on discharge.  Minimize narcotic use as able to.   Continue PTA Cymbalta, gabapentin, Robaxin.  Continue PTA lidocaine patch to back.  Discontinued PTA NSAIDs in the setting of gastritis, anemia.  Activity as tolerated with fall precautions and assistance.  PT, OT followed, ongoing rehabilitation as outpatient.  Follow-up with orthopedic surgery after discharge.     CKD stage 2-3  Previous baseline Cr 1-1.5 in outside records.  Patient reports poor appetite, PTA on Bumex and also uses Advil as needed for pain.  Creatinine 1.5 on admission, Fena 2.1.  -- Received IV fluids, Bumex held with which creatinine improved to 0.9.  Resumed PTA Bumex at lower dose of 1 mg oral daily on discharge.  Monitor renal function in 5 to 7 days, avoid nephrotoxic drugs.  Avoid NSAIDs     Hypertension, hyperlipidemia  History of CAD.  History of flash pulmonary edema.  Denies any chest  pain or palpitations or shortness of breath at this time.  Telemetry monitoring - no acute events.  Echocardiogram with LVEF of 60 to 65%.  No segmental wall motion abnormalities, no hemodynamically significant valvular disease  PTA aspirin held for EGD, colonoscopy.  Resumed on discharge.  Held PTA Bumex as patient had been bowel prep for endoscopy evaluation during hospital stay.  Resumed PTA Bumex at lower dose of 1 mg oral daily on discharge.  Optimize dose as outpatient.  Continue PTA hydralazine and amlodipine.  Continue PTA Lipitor.  Monitor daily blood pressure, heart rate review on provider visit and optimize diuretic dose.    Monitor daily weights if able to.     History of severe recurrent major depressive disorder   No acute issues   --As above, continue PTA Cymbalta.     History of nighttime oxygen desaturation.  Was recommended sleep studies as outpatient.     History of lung nodule.  On chart review 10mm spiculated LLL nodule (noted on CT chest abdominal pelvis from 12/27/2023)   Was then recommend outpatient imaging.  Unsure if patient had followed up.  Recommend age-appropriate health guidance, Follow-up previously noted lung nodule per protocol as outpatient.    Chronic pancreatic insufficiency leading to chronic diarrhea:  Follows up with [facility] in an outpatient setting, takes Creon as supplement  --Continue PTA Creon and PTA loperamide.     History of hyperkalemia.  Admitted 1/2024 for hyperkalemia and then discharged with Kayexalate.  Potassium within normal limits, axillary panel during hospital stay.   On Bumex as above.  Discontinue Kayexalate on discharge     History of alcohol abuse: Noted, sober for 2 years  Encouraged him to continue soberity.    Ludwig Briceño MD.    Pending Results   These results will be followed up by ordering provider   Unresulted Labs Ordered in the Past 30 Days of this Admission       Date and Time Order Name Status Description    5/7/2024 11:17 AM Surgical  Pathology Exam In process     5/6/2024  9:27 AM Surgical Pathology Exam In process                Physical Exam   Vitals:    05/04/24 1325 05/06/24 0846   Weight: 59.2 kg (130 lb 8 oz) 59 kg (130 lb)     Vital Signs with Ranges  Temp:  [97.2  F (36.2  C)-98.2  F (36.8  C)] 97.2  F (36.2  C)  Pulse:  [78-93] 86  Resp:  [7-18] 11  BP: (100-192)/() 100/86  SpO2:  [95 %-99 %] 95 %  I/O last 3 completed shifts:  In: 2200 [P.O.:2000; I.V.:200]  Out: -   PHYSICAL EXAM  GENERAL: Patient is in no distress. Alert and oriented.  HEART: Regular rate and rhythm. S1S2.   LUNGS: Respirations unlabored  ABDOMEN: Soft, no abdominal tenderness, bowel sounds heard   NEURO: Moving all extremities.  EXTREMITIES: No pedal edema.   Right knee swelling +, no warmth, no tenderness.Range of motion intact   SKIN: Warm, dry.  Right foot transmetatarsal amputation.  Scarring right lower extremity  PSYCHIATRY Cooperative  )Consultations This Hospital Stay   PHYSICAL THERAPY ADULT IP CONSULT  OCCUPATIONAL THERAPY ADULT IP CONSULT  CARE MANAGEMENT / SOCIAL WORK IP CONSULT  GASTROENTEROLOGY IP CONSULT  CARE MANAGEMENT / SOCIAL WORK IP CONSULT  OCCUPATIONAL THERAPY ADULT IP CONSULT    Time Spent on this Encounter   Ludwig CORREA MD, personally saw the patient today and spent greater than 30 minutes discharging this patient.. Discussed with patient, bedside RN.    Discharge Disposition   Discharged to home,LTC.  Condition at discharge: Good    Discharge Orders      Reason for your hospital stay    You were admitted to the hospital with symptomatic hypoglycemia and anemia.  Followed by hospitalist team, gastroenterology.  Underwent EGD and colonoscopy.  Plan for discharge with close follow-up.     Follow-up and recommended labs and tests     Follow up with primary care provider, Ana Maria Blakely, within 7 days for hospital follow- up.    The following labs/tests are recommended: CBC, BMP in 1 week.    Recommend follow-up with [facility] orthopedic surgery for ongoing right knee pain.    Follow-up with [facility], biopsies from EGD, colonoscopy pending.      Recommend follow-up with endocrinology, diabetic educator as outpatient.  Recommend follow-up with hematology for further anemia workup.    Recommend age-appropriate health maintenance as outpatient.  Follow-up previously noted lung nodule per protocol as outpatient.    Consider sleep studies as outpatient.     Activity    Your activity upon discharge: activity as tolerated     Monitor and record    Monitor blood sugars 3 times a day, review on provider visit and optimize therapy.    Monitor daily blood pressure, heart rate review on provider visit and optimize diuretic dose.    Monitor daily weights if able to.     Discharge Instructions    Avoid nephrotoxic drugs including NSAIDs.  Fall precautions.  Can consider Knee support sleeve for ambulation.     Mantoux instructions    Give two-step Mantoux (PPD) Per Facility Policy Yes     General info for SNF    Length of Stay Estimate: Long Term Care  Condition at Discharge: Stable  Level of care:skilled   Rehabilitation Potential: Fair  Admission H&P remains valid and up-to-date: Yes  Recent Chemotherapy: N/A  Use Nursing Home Standing Orders: Yes     Activity - Up with nursing assistance     Physical Therapy Adult Consult    Evaluate and treat as clinically indicated.    Reason: Physical deconditioning     Occupational Therapy Adult Consult    Evaluate and treat as clinically indicated.    Reason: Physical deconditioning     Diet    Recommend carbohydrate controlled diet       Discharge Medications   Current Discharge Medication List        START taking these medications    Details   pantoprazole (PROTONIX) 40 MG EC tablet Take 1 tablet (40 mg) by mouth daily  Qty: 30 tablet, Refills: 0    Associated Diagnoses: Gastroesophageal reflux disease without esophagitis           CONTINUE these medications which have CHANGED     Details   bumetanide (BUMEX) 1 MG tablet Take 1 tablet (1 mg) by mouth daily Dose decreased to 1 mg on discharge.  Optimize dose on PCP visit.  Qty: 30 tablet, Refills: 0    Associated Diagnoses: Primary hypertension      HYDROcodone-acetaminophen (NORCO) 5-325 MG tablet Take 1 tablet by mouth every 8 hours as needed for severe pain Minimise use as able to. Hold if drowsy.  Qty: 10 tablet, Refills: 0    Associated Diagnoses: Right knee pain, unspecified chronicity      !! insulin aspart (NOVOLOG FLEXPEN) 100 UNIT/ML pen Inject 8 Units Subcutaneous 3 times daily (with meals) Breakfast and lunch      Lidocaine (LIDOCARE) 4 % Patch Place 2 patches onto the skin every 24 hours Apply to neck/back. To prevent lidocaine toxicity, patient should be patch free for 12 hrs daily.  Qty: 15 patch, Refills: 0    Associated Diagnoses: Chronic neck and back pain       !! - Potential duplicate medications found. Please discuss with provider.        CONTINUE these medications which have NOT CHANGED    Details   acetaminophen (TYLENOL) 325 MG tablet Take 650 mg by mouth every 6 hours as needed for pain      amLODIPine (NORVASC) 5 MG tablet Take 1 tablet (5 mg) by mouth daily  Qty: 30 tablet, Refills: 0    Comments: Future refills by PCP  Physician No Ref-Primary with phone number None.  Associated Diagnoses: Benign essential hypertension      aspirin 81 MG EC tablet Take 81 mg by mouth daily      cetirizine (ZYRTEC) 10 MG tablet Take 10 mg by mouth daily      DULoxetine (CYMBALTA) 60 MG capsule Take 60 mg by mouth daily      gabapentin (NEURONTIN) 100 MG capsule Take 100 mg by mouth 3 times daily      hydrALAZINE (APRESOLINE) 25 MG tablet Take 25 mg by mouth 2 times daily Hold if SBP < 110      !! insulin aspart (NOVOLOG FLEXPEN) 100 UNIT/ML pen Inject 1-5 Units Subcutaneous 3 times daily (with meals) Inject as per sliding scale:  If 200-250 = 1   251-300 = 2  301-350 = 3  351-400 = 4  401+ = 5  Repeat BS after 3 hours and call MD  if still over 400      insulin glargine (LANTUS PEN) 100 UNIT/ML pen Inject 15 Units Subcutaneous every morning      !! lipase-protease-amylase (CREON 36) 19749-342486-943652 units CPEP Take 3 capsules by mouth 3 times daily (with meals)      !! lipase-protease-amylase (CREON 36) 30011-225129-668945 units CPEP Take 1-2 capsules by mouth Take with snacks or supplements      loperamide (IMODIUM) 2 MG capsule Take 4 mg by mouth 3 times daily as needed for diarrhea      methocarbamol (ROBAXIN) 500 MG tablet Take 500 mg by mouth every 8 hours as needed for muscle spasms      multivitamin w/minerals (THERA-VIT-M) tablet Take 1 tablet by mouth daily  Qty: 30 tablet, Refills: 0    Associated Diagnoses: Malnutrition, unspecified type (H24)       !! - Potential duplicate medications found. Please discuss with provider.        STOP taking these medications       ibuprofen (ADVIL/MOTRIN) 600 MG tablet Comments:   Reason for Stopping:         sodium polystyrene (KAYEXALATE) 15 GM/60ML suspension Comments:   Reason for Stopping:             Allergies   No Known Allergies    DATA  Most Recent 3 CBC's:  Recent Labs   Lab Test 05/07/24  0716 05/06/24  0724 05/05/24  1037 05/04/24  1912 05/04/24  1126 05/03/24  1626   WBC  --   --  8.6  --  9.4 10.9   HGB 8.4* 8.5* 9.2*   < > 8.2* 7.0*   MCV  --   --  80  --  78 78   PLT  --   --  371  --  334 364    < > = values in this interval not displayed.      Most Recent 3 BMP's:  Recent Labs   Lab Test 05/07/24  1213 05/07/24  0827 05/07/24  0716 05/06/24  0818 05/06/24  0724 05/05/24  1156 05/05/24  1037   NA  --   --  134*  --  133*  --  134*   POTASSIUM  --   --  4.5  --  4.8  --  4.6   CHLORIDE  --   --  99  --  99  --  100   CO2  --   --  23  --  22  --  22   BUN  --   --  13.2  --  16.6  --  22.5*   CR  --   --  1.02  --  0.99  --  1.17   ANIONGAP  --   --  12  --  12  --  12   AROLDO  --   --  9.0  --  9.0  --  9.1   * 188* 196*   < > 215*   < > 230*    < > = values in this  interval not displayed.     Most Recent 2 LFT's:  Recent Labs   Lab Test 24  1126 24  1626   AST 15 17   ALT 8 8   ALKPHOS 134 138   BILITOTAL 0.2 <0.2     Most Recent TSH, T4 and A1c Labs:  Recent Labs   Lab Test 24  1126 22  0651   TSH  --  1.80   A1C 9.4*  --      Results for orders placed or performed during the hospital encounter of 24   XR Knee Right 3 Views    Narrative    EXAM: XR KNEE RIGHT 3 VIEWS  LOCATION: [facility]  DATE: 2024    INDICATION: right knee pain with swelling.  COMPARISON: None.      Impression    IMPRESSION: Tricompartmental degenerative arthrosis with small marginal osteophytes and moderate joint space narrowing. No acute fracture. Moderate joint effusion and/or synovitis.   Echocardiogram Complete     Value    LVEF  60-65%    Narrative    [id number]  [id number]  [id number]  [id number]^TRISTEN^KATHYA     [facility]  Echocardiography Laboratory  [address] [place] [address]     Name: ENEIDA COLLADO  MRN: [id number]  : 1970  Study Date: 2024 01:24 PM  Age: 54 yrs  Gender: Male  Patient Location: [facility]  Reason For Study: SOB  Ordering Physician: KATHYA RAMON  Referring Physician: KATHYA RAMON  Performed By: Timothy Gaitan     BSA: 1.6 m2  Height: 64 in  Weight: 130 lb  HR: 76  BP: 165/102 mmHg  ______________________________________________________________________________  Procedure  Complete Echo Adult.  ______________________________________________________________________________  Interpretation Summary     1. Normal biventricular size and function. Left ventricular ejection fraction  of 60-65%.  2. No segmental wall motion abnormalities noted.  3. No hemodynamically significant valvular disease.  No prior study for comparison. Technically adequate study.  ______________________________________________________________________________  Left Ventricle  The left ventricle is normal in size.  There is mild concentric left  ventricular hypertrophy. Left ventricular systolic function is normal. The  visual ejection fraction is 60-65%. Diastolic Doppler findings (E/E' ratio  and/or other parameters) suggest left ventricular filling pressures are  indeterminate. No regional wall motion abnormalities noted.     Right Ventricle  The right ventricle is normal in size and function.     Atria  Normal left atrial size. Right atrial size is normal.     Mitral Valve  The mitral valve leaflets appear normal. There is no evidence of stenosis,  fluttering, or prolapse. There is mild (1+) mitral regurgitation.     Tricuspid Valve  Normal tricuspid valve. There is trace tricuspid regurgitation. The right  ventricular systolic pressure is approximated at 12.8 mmHg plus the right  atrial pressure.     Aortic Valve  Normal tricuspid aortic valve. There is trace aortic regurgitation. No aortic  stenosis is present.     Pulmonic Valve  The pulmonic valve is not well visualized.     Vessels  Normal size aorta. Normal size ascending aorta. IVC diameter <2.1 cm  collapsing >50% with sniff suggests a normal RA pressure of 3 mmHg.     Pericardium  There is no pericardial effusion.     Rhythm  Sinus rhythm was noted.  ______________________________________________________________________________  MMode/2D Measurements & Calculations  IVSd: 0.93 cm     LVIDd: 5.0 cm  LVIDs: 3.5 cm  LVPWd: 1.0 cm  FS: 30.5 %  LV mass(C)d: 176.9 grams  LV mass(C)dI: 108.6 grams/m2  Ao root diam: 3.7 cm  LA dimension: 4.3 cm  asc Aorta Diam: 3.5 cm  LA/Ao: 1.2  LVOT diam: 2.0 cm  LVOT area: 3.3 cm2  Ao root diam index Ht(cm/m): 2.3  Ao root diam index BSA (cm/m2): 2.3  Asc Ao diam index BSA (cm/m2): 2.2  Asc Ao diam index Ht(cm/m): 2.2  LA Volume (BP): 51.9 ml     LA Volume Index (BP): 31.8 ml/m2  RV Base: 3.6 cm  RWT: 0.42  TAPSE: 2.5 cm     Doppler Measurements & Calculations  MV E max bessy: 125.0 cm/sec  MV A max bessy: 87.0 cm/sec  MV E/A: 1.4  MV dec  time: 0.18 sec  Ao V2 max: 128.0 cm/sec  Ao max P.0 mmHg  Ao V2 mean: 91.4 cm/sec  Ao mean P.0 mmHg  Ao V2 VTI: 27.6 cm  BRO(I,D): 2.5 cm2  BRO(V,D): 2.7 cm2  LV V1 max P.7 mmHg  LV V1 max: 108.0 cm/sec  LV V1 VTI: 21.6 cm  SV(LVOT): 70.3 ml  SI(LVOT): 43.2 ml/m2  PA acc time: 0.15 sec  TR max jarred: 178.9 cm/sec  TR max P.8 mmHg  AV Jarred Ratio (DI): 0.84  BRO Index (cm2/m2): 1.6     E/E' avg: 10.5  Lateral E/e': 7.7  Medial E/e': 13.4  RV S Jarred: 11.3 cm/sec     ______________________________________________________________________________  Report approved by: Kit Beatty 2024 06:36 PM

## 2024-05-07 NOTE — PLAN OF CARE
PRIMARY Concern: hypoglycemia and anemia  SAFETY RISK Concerns (fall risk, behaviors, etc.): none      Isolation/Type: contact  Tests/Procedures for NEXT shift: none  Consults? (Pending/following, signed-off?) GI following, SW following  Where is patient from? (Home, TCU, etc.): [facility]  Other Important info for NEXT shift: pt plans to discharge back to [facility]. Pt wanted to take an Uber home, but needs supervision for 24 hours. RN spoke to endoscopy who stated pt will need medical transportation. Pt said staff at facility are available 24/7 round throughout night.  Anticipated DC date & active delays: 5/7  _____________________________________________________________________________  SUMMARY NOTE:  Orientation/Cognitive: A&Ox4  Observation Goals (Met/ Not Met): met  Mobility Level/Assist Equipment: independent  Antibiotics & Plan (IV/po, length of tx left): n/a  Pain Management: prn norco given x1  Tele/VS/O2: VSS  ABNL Lab/BG: hgb 8.4, , 190, 172  Diet: regular  Bowel/Bladder: continent  Skin Concerns: none  Drains/Devices: none  Patient Stated Goal for Today: discharge  OUTPATIENT/OBSERVATION GOALS TO BE MET BEFORE DISCHARGE        1. Colonoscopy/endoscopy complete: met  2. Tolerating oral intake to maintain hydration: met  3. Return to near baseline physical activity: met

## 2024-05-07 NOTE — PROGRESS NOTES
PRIMARY DIAGNOSIS: GI BLEED    OUTPATIENT/OBSERVATION GOALS TO BE MET BEFORE DISCHARGE  Orthostatic performed: No    Stable Hgb Yes.   Recent Labs   Lab Test 05/06/24  0724 05/05/24  1037 05/04/24  1912   HGB 8.5* 9.2* 8.8*       Resolved or declined bleeding episodes: No,  without any bleeding Last episode: none    Appropriate testing complete: Yes. Repeat EGD and colonoscopy tomorrow    Cleared for discharge by consultants (if involved): No , GI following    Safe discharge environment identified: No    Discharge Planner Nurse   Safe discharge environment identified: No  Barriers to discharge: Yes       Entered by: Franky Hi RN 05/06/2024 8:47 PM     Please review provider order for any additional goals.   Nurse to notify provider when observation goals have been met and patient is ready for discharge.

## 2024-05-07 NOTE — PLAN OF CARE
Discharge instructions and education reviewed, medication schedule and follow up appts discussed. Pt had no questions. All belongings sent with pt. Paper scripts sent with pt (too soon to fill at discharge pharmacy with pt's insurance). Transportation arranged by SW, next shift will discharge pt.

## 2024-05-07 NOTE — PROGRESS NOTES
PRIMARY Concern: Hypolycemia, anemia  SAFETY RISK Concerns (fall risk, behaviors, etc.): n/a      Isolation/Type: contact for VRE  Tests/Procedures for NEXT shift: Repeat EGD/Colonoscopy tomorrow. XR R knee resulted.  Consults? (Pending/following, signed-off?) GI following, SW following  Where is patient from? (Home, TCU, etc.): [facility]  Other Important info for NEXT shift: Bowel prep started, almost done.  Anticipated DC date & active delays: 1-2 days  ______________________________________________________________________  SUMMARY NOTE:  Orientation/Cognitive: A&Ox4  Observation Goals (Met/ Not Met): Not Met  Mobility Level/Assist Equipment: Ind  Antibiotics & Plan (IV/po, length of tx left): n/a  Pain Management: PRN robaxin and Norco  given x 1 for right knee pain  Tele/VS/O2: VSS on RA ex hypertensive 181/101. Prn iv hydralazine given. Effective. Ortho BP neg.  ABNL Lab/BG: , 265. Hgb 9.2. Na- 133.  Diet: Clear liquid. NPO at midnight  Bowel/Bladder: Up to bathroom, loose brown stool per pt.  Skin Concerns: scabbing to R BROWNE, R partial foot amputation  Drains/Devices: PIV SL            Patient Stated Goal for Today: pain control

## 2024-05-07 NOTE — PROGRESS NOTES
Care Management Discharge Note    Discharge Date: 05/07/2024       Discharge Disposition: Assisted Living    Discharge Services:      Discharge DME:      Discharge Transportation: health plan transportation    Private pay costs discussed: Not applicable    Does the patient's insurance plan have a 3 day qualifying hospital stay waiver?  No    PAS Confirmation Code:    Patient/family educated on Medicare website which has current facility and service quality ratings:      Education Provided on the Discharge Plan:    Persons Notified of Discharge Plans: Bedside Marco Cedeno with [facility].  Patient/Family in Agreement with the Plan:      Handoff Referral Completed: No    Additional Information:  Writer is informed by colleague that bedside RN stopped by to state that pt is planning on discharging back to TCU at 3pm today via uber.  Writer spoke with bedside RN who verifies that discharge is set for 3pm for pt via uber.   Writer spoke with Marco from [facility]. She states that pt can return with rehab/tcu orders. She states no concern regarding insurance coverage for TCU. She states pt can return via uber if he wishes and they will keep an eye out for him. She states she has received orders that writer has faxed her. She states no further questions or concerns at this time.     Addendum: Writer is informed by bedside that pt is now needing transport for wheelchair. Writer set up transport for [phone]. Writer updated marco of transport time. Marco states understanding.     LUDIN Luke  Social Work  [facility]

## 2024-05-07 NOTE — PROGRESS NOTES
05/07/24 0906   Appointment Info   Signing Clinician's Name / Credentials (OT) Alfredo Vicente, OTR/L   Quick Adds   Quick Adds Certification   Living Environment   People in Home facility resident   Home Accessibility no concerns   Living Environment Comments Pt lives in [facility]   Self-Care   Usual Activity Tolerance moderate   Current Activity Tolerance moderate   Equipment Currently Used at Home cane, quad;wheelchair, manual   Activity/Exercise/Self-Care Comment Pt reports variable levels of mobility- states he uses w/c for some distances including sometimes to get to the bathroom, otherwise a cane, but also that he could walk around [employer]. Baseline independent with ADL, driving, bill pay, and other IADL. Facility provides meals, cleaning and meds.   General Information   Onset of Illness/Injury or Date of Surgery 05/03/24   Referring Physician Ludwig Briceño MD   Patient/Family Therapy Goal Statement (OT) get better   Additional Occupational Profile Info/Pertinent History of Current Problem Long Mayfield is a 54 year old male with history of insulin-dependent diabetes mellitus, history of diabetic ulcer of foot leading to right toe amputation, history of major depressive disorder, hypertension, hyperlipidemia, CAD, exocrine pancreatic insufficiency causing chronic diarrhea, history of alcohol abuse, sober for 2 years and per patient history of chronic muscular atrophy mobility disorder due to which patient currently has been residing at [facility] admitted on-5/3/2024 with symptomatic hypoglycemia and anemia.   Cognitive Status Examination   Orientation Status orientation to person, place and time   Affect/Mental Status (Cognitive) WFL   Follows Commands WFL   Cognitive Screens/Assessments   Cognitive Assessments Completed Other Cognitive Screen/Assessment   Cognitive Assessment Test Short Blessed Test   Cognitive Assessment Test Score 3   Cognitive Assessment Test Interpretation 0-4 =  normal. The patient is off on time of day by >1 hour (1 hr 20 min). Otherwise no errors on cognition screen.   Pain Assessment   Patient Currently in Pain Yes, see Vital Sign flowsheet   Activities of Daily Living   BADL Assessment/Intervention lower body dressing;upper body dressing;toileting;grooming   Upper Body Dressing Assessment/Training   [place] Level (Upper Body Dressing) independent   Lower Body Dressing Assessment/Training   [place] Level (Lower Body Dressing) independent   Grooming Assessment/Training   [place] Level (Grooming) independent   Toileting   [place] Level (Toileting) independent   Clinical Impression   Criteria for Skilled Therapeutic Interventions Met (OT) Evaluation only   Clinical Decision Making Complexity (OT) problem focused assessment/low complexity   OT Total Evaluation Time   OT Eval, Low Complexity Minutes (63397) 10   Therapy Certification   Medical Diagnosis hypoglycemia   Start of Care Date 05/07/24   Certification date from 05/07/24   Certification date to 05/07/24   OT Discharge Planning   OT Discharge Recommendation (DC Rec) home with assist   OT Rationale for DC Rec Anticipated patient will be able to return back to [facility] when medically appropriate. Patient is able to mobilize and completed ADL without assist, up independent in room. Scores within normal range on cognition screen. Will benefit from continued assist for taxing IADL at [facility].   Total Session Time   Total Session Time (sum of timed and untimed services) 10      [facility]  OUTPATIENT OCCUPATIONAL THERAPY  EVALUATION  PLAN OF TREATMENT FOR OUTPATIENT REHABILITATION  (COMPLETE FOR INITIAL CLAIMS ONLY)  Patient's Last Name, First Name, M.I.  YOB: 1970  Long Mayfield                          Provider's Name  [facility] Medical Record No.  [id number]                             Onset Date:  05/03/24   Start  of Care Date:  05/07/24   Type:     ___PT   _X_OT   ___SLP Medical Diagnosis:  (P) hypoglycemia                    OT Diagnosis:    Visits from SOC:  1     See note for plan of treatment, functional goals and certification details    I CERTIFY THE NEED FOR THESE SERVICES FURNISHED UNDER        THIS PLAN OF TREATMENT AND WHILE UNDER MY CARE     (Physician co-signature of this document indicates review and certification of the therapy plan).

## 2024-05-07 NOTE — PROGRESS NOTES
PRE-PROCEDURE NOTE    CHIEF COMPLAINT / REASON FOR PROCEDURE:  anemia    History and Physical Reviewed:  yes    PRE-SEDATION ASSESSMENT:    Lung Exam:  normal  Heart Exam:  normal  Mallampati Airway Classification:  3  Previous reaction to anesthesia/sedation:   no  Sedation plan based on assessment:  MAC  Comment(s):  risks explained  ASA Classification:  3    IMPRESSION:  anemia    PLAN:  egd and colon       Nina Moya MD, MD

## 2024-05-07 NOTE — ANESTHESIA POSTPROCEDURE EVALUATION
Patient: Long Mayfield    Procedure: Procedure(s):  Esophagoscopy, gastroscopy, duodenoscopy (EGD), combined  Colonoscopy       Anesthesia Type:  MAC    Note:  Disposition: Inpatient   Postop Pain Control: Uneventful            Sign Out: Well controlled pain   PONV: No   Neuro/Psych: Uneventful            Sign Out: Acceptable/Baseline neuro status   Airway/Respiratory: Uneventful            Sign Out: Acceptable/Baseline resp. status   CV/Hemodynamics: Uneventful            Sign Out: Acceptable CV status; No obvious hypovolemia; No obvious fluid overload   Other NRE: NONE   DID A NON-ROUTINE EVENT OCCUR? No       Last vitals:  Vitals Value Taken Time   /86 05/07/24 1228   Temp 36.2  C (97.2  F) 05/07/24 1228   Pulse 86 05/07/24 1228   Resp 11 05/07/24 1145   SpO2 95 % 05/07/24 1228       Electronically Signed By: Timi Nance MD  May 7, 2024  12:45 PM

## 2024-05-08 ENCOUNTER — PATIENT OUTREACH (OUTPATIENT)
Dept: CARE COORDINATION | Facility: CLINIC | Age: 54
End: 2024-05-08
Payer: COMMERCIAL

## 2024-05-08 LAB
PATH REPORT.COMMENTS IMP SPEC: NORMAL
PATH REPORT.FINAL DX SPEC: NORMAL
PATH REPORT.FINAL DX SPEC: NORMAL
PATH REPORT.GROSS SPEC: NORMAL
PATH REPORT.GROSS SPEC: NORMAL
PATH REPORT.MICROSCOPIC SPEC OTHER STN: NORMAL
PATH REPORT.MICROSCOPIC SPEC OTHER STN: NORMAL
PATH REPORT.RELEVANT HX SPEC: NORMAL
PATH REPORT.RELEVANT HX SPEC: NORMAL
PHOTO IMAGE: NORMAL
PHOTO IMAGE: NORMAL

## 2024-05-08 NOTE — PROGRESS NOTES
[facility]    Background: Transitional Care Management program identified per system criteria and reviewed by [facility] team for possible outreach.    Assessment: Upon chart review, CCRC Team member will not proceed with patient outreach related to this episode of Transitional Care Management program due to reason below:    Non-[facility] TCU: CCRC team member noted patient discharged to TCU/ARU/LTACH. Patient is not established with a [facility] currently supported by Primary Care-Care Coordination therefore handoff to Primary Care-Care Coordination is not appropriate at this time.    Plan: Transitional Care Management episode addressed appropriately per reason noted above.      SARINA Moran  [phone]  [facility]     *Connected Care Resource Team does NOT follow patient ongoing. Referrals are identified based on internal discharge reports and the outreach is to ensure patient has an understanding of their discharge instructions.

## 2024-05-09 ENCOUNTER — TRANSFERRED RECORDS (OUTPATIENT)
Dept: HEALTH INFORMATION MANAGEMENT | Facility: CLINIC | Age: 54
End: 2024-05-09

## 2024-05-17 ENCOUNTER — HOSPITAL ENCOUNTER (OUTPATIENT)
Facility: CLINIC | Age: 54
End: 2024-05-17
Attending: ORTHOPAEDIC SURGERY | Admitting: ORTHOPAEDIC SURGERY
Payer: COMMERCIAL

## 2024-05-22 ENCOUNTER — LAB REQUISITION (OUTPATIENT)
Dept: LAB | Facility: CLINIC | Age: 54
End: 2024-05-22
Payer: COMMERCIAL

## 2024-05-22 DIAGNOSIS — M25.461 EFFUSION, RIGHT KNEE: ICD-10-CM

## 2024-05-22 LAB
APPEARANCE FLD: ABNORMAL
BACTERIA SNV CULT: NORMAL
CELL COUNT BODY FLUID SOURCE: ABNORMAL
COLOR FLD: YELLOW
CRYSTALS SNV MICRO: NORMAL
GRAM STAIN RESULT: NORMAL
GRAM STAIN RESULT: NORMAL
LYMPHOCYTES NFR FLD MANUAL: 1 %
MONOS+MACROS NFR FLD MANUAL: 1 %
NEUTS BAND NFR FLD MANUAL: 98 %
WBC # FLD AUTO: ABNORMAL /UL

## 2024-05-22 PROCEDURE — 89060 EXAM SYNOVIAL FLUID CRYSTALS: CPT | Mod: ORL | Performed by: ORTHOPAEDIC SURGERY

## 2024-05-22 PROCEDURE — 87070 CULTURE OTHR SPECIMN AEROBIC: CPT | Mod: ORL | Performed by: ORTHOPAEDIC SURGERY

## 2024-05-22 PROCEDURE — 87205 SMEAR GRAM STAIN: CPT | Mod: ORL | Performed by: ORTHOPAEDIC SURGERY

## 2024-05-22 PROCEDURE — 87075 CULTR BACTERIA EXCEPT BLOOD: CPT | Mod: ORL | Performed by: ORTHOPAEDIC SURGERY

## 2024-05-22 PROCEDURE — 89051 BODY FLUID CELL COUNT: CPT | Mod: ORL | Performed by: ORTHOPAEDIC SURGERY

## 2024-05-27 LAB — BACTERIA SNV CULT: NO GROWTH

## 2024-05-29 ENCOUNTER — LAB REQUISITION (OUTPATIENT)
Dept: LAB | Facility: CLINIC | Age: 54
End: 2024-05-29
Payer: COMMERCIAL

## 2024-05-29 DIAGNOSIS — M25.569 PAIN IN UNSPECIFIED KNEE: ICD-10-CM

## 2024-05-29 LAB
APPEARANCE FLD: ABNORMAL
BACTERIA SPEC CULT: NORMAL
CELL COUNT BODY FLUID SOURCE: ABNORMAL
COLOR FLD: YELLOW
CRYSTALS SNV MICRO: NORMAL
GRAM STAIN RESULT: NORMAL
GRAM STAIN RESULT: NORMAL
LYMPHOCYTES NFR FLD MANUAL: 1 %
MONOS+MACROS NFR FLD MANUAL: 4 %
NEUTS BAND NFR FLD MANUAL: 95 %
WBC # FLD AUTO: ABNORMAL /UL

## 2024-05-29 PROCEDURE — 87205 SMEAR GRAM STAIN: CPT | Mod: ORL | Performed by: ORTHOPAEDIC SURGERY

## 2024-05-29 PROCEDURE — 87075 CULTR BACTERIA EXCEPT BLOOD: CPT | Mod: ORL | Performed by: ORTHOPAEDIC SURGERY

## 2024-05-29 PROCEDURE — 89060 EXAM SYNOVIAL FLUID CRYSTALS: CPT | Mod: ORL | Performed by: ORTHOPAEDIC SURGERY

## 2024-05-29 PROCEDURE — 89051 BODY FLUID CELL COUNT: CPT | Mod: ORL | Performed by: ORTHOPAEDIC SURGERY

## 2024-05-29 PROCEDURE — 87070 CULTURE OTHR SPECIMN AEROBIC: CPT | Mod: ORL | Performed by: ORTHOPAEDIC SURGERY

## 2024-06-03 LAB — BACTERIA SNV CULT: NO GROWTH

## 2024-06-05 LAB — BACTERIA SNV CULT: NORMAL

## 2024-06-12 LAB — BACTERIA SNV CULT: NORMAL

## 2024-06-15 RX ORDER — DEXAMETHASONE SODIUM PHOSPHATE 10 MG/ML
10 INJECTION, SOLUTION INTRAMUSCULAR; INTRAVENOUS ONCE
Status: CANCELLED | OUTPATIENT
Start: 2024-06-15 | End: 2024-06-15

## 2024-06-15 RX ORDER — GABAPENTIN 300 MG/1
300 CAPSULE ORAL
Status: CANCELLED | OUTPATIENT
Start: 2024-06-15

## 2024-06-15 RX ORDER — CEFAZOLIN SODIUM/WATER 2 G/20 ML
2 SYRINGE (ML) INTRAVENOUS
Status: CANCELLED | OUTPATIENT
Start: 2024-06-15

## 2024-06-15 RX ORDER — CEFAZOLIN SODIUM/WATER 2 G/20 ML
2 SYRINGE (ML) INTRAVENOUS SEE ADMIN INSTRUCTIONS
Status: CANCELLED | OUTPATIENT
Start: 2024-06-15

## 2024-06-16 RX ORDER — FENTANYL CITRATE 50 UG/ML
25 INJECTION, SOLUTION INTRAMUSCULAR; INTRAVENOUS EVERY 5 MIN PRN
Status: CANCELLED | OUTPATIENT
Start: 2024-06-16

## 2024-06-16 RX ORDER — ONDANSETRON 2 MG/ML
4 INJECTION INTRAMUSCULAR; INTRAVENOUS EVERY 30 MIN PRN
Status: CANCELLED | OUTPATIENT
Start: 2024-06-16

## 2024-06-16 RX ORDER — DEXAMETHASONE SODIUM PHOSPHATE 4 MG/ML
4 INJECTION, SOLUTION INTRA-ARTICULAR; INTRALESIONAL; INTRAMUSCULAR; INTRAVENOUS; SOFT TISSUE
Status: CANCELLED | OUTPATIENT
Start: 2024-06-16

## 2024-06-16 RX ORDER — ONDANSETRON 4 MG/1
4 TABLET, ORALLY DISINTEGRATING ORAL EVERY 30 MIN PRN
Status: CANCELLED | OUTPATIENT
Start: 2024-06-16

## 2024-06-16 RX ORDER — HYDROMORPHONE HYDROCHLORIDE 1 MG/ML
0.4 INJECTION, SOLUTION INTRAMUSCULAR; INTRAVENOUS; SUBCUTANEOUS EVERY 5 MIN PRN
Status: CANCELLED | OUTPATIENT
Start: 2024-06-16

## 2024-06-16 RX ORDER — SODIUM CHLORIDE, SODIUM LACTATE, POTASSIUM CHLORIDE, CALCIUM CHLORIDE 600; 310; 30; 20 MG/100ML; MG/100ML; MG/100ML; MG/100ML
INJECTION, SOLUTION INTRAVENOUS CONTINUOUS
Status: CANCELLED | OUTPATIENT
Start: 2024-06-16

## 2024-06-16 RX ORDER — LABETALOL HYDROCHLORIDE 5 MG/ML
10 INJECTION, SOLUTION INTRAVENOUS
Status: CANCELLED | OUTPATIENT
Start: 2024-06-16

## 2024-06-16 RX ORDER — FENTANYL CITRATE 50 UG/ML
50 INJECTION, SOLUTION INTRAMUSCULAR; INTRAVENOUS EVERY 5 MIN PRN
Status: CANCELLED | OUTPATIENT
Start: 2024-06-16

## 2024-06-16 RX ORDER — HYDROMORPHONE HYDROCHLORIDE 1 MG/ML
0.2 INJECTION, SOLUTION INTRAMUSCULAR; INTRAVENOUS; SUBCUTANEOUS EVERY 5 MIN PRN
Status: CANCELLED | OUTPATIENT
Start: 2024-06-16

## 2024-06-16 RX ORDER — NALOXONE HYDROCHLORIDE 0.4 MG/ML
0.1 INJECTION, SOLUTION INTRAMUSCULAR; INTRAVENOUS; SUBCUTANEOUS
Status: CANCELLED | OUTPATIENT
Start: 2024-06-16

## 2024-06-17 ENCOUNTER — MEDICAL CORRESPONDENCE (OUTPATIENT)
Dept: HEALTH INFORMATION MANAGEMENT | Facility: CLINIC | Age: 54
End: 2024-06-17
Payer: COMMERCIAL

## 2024-06-17 NOTE — PROGRESS NOTES
PTA medications completed by Medication Scribe day of surgery     Medication history sources: H&P and MAR (Wendisuebetsy Maguire)  In the past week, patient estimated taking medication this percent of the time: Greater than 90%      Significant changes made to the medication list:  None      Additional medication history information:   None    Medication reconciliation completed by provider prior to medication history? No    Time spent in this activity: 45 minutes    The information provided in this note is only as accurate as the sources available at the time of update(s)      Prior to Admission medications    Medication Sig Last Dose Taking? Auth Provider Long Term End Date   acetaminophen (TYLENOL) 325 MG tablet Take 650 mg by mouth every 6 hours as needed for pain  at PRN Yes Unknown, Entered By History     amLODIPine (NORVASC) 5 MG tablet Take 1 tablet (5 mg) by mouth daily 6/16/2024 at 10am Yes Kimberly Ma MD Yes    aspirin 81 MG EC tablet Take 81 mg by mouth daily 6/13/2024 at 10am Yes Unknown, Entered By History     bumetanide (BUMEX) 1 MG tablet Take 1 tablet (1 mg) by mouth daily Dose decreased to 1 mg on discharge.  Optimize dose on PCP visit. 6/16/2024 at 10am Yes Ludwig Briceño MD Yes    cetirizine (ZYRTEC) 10 MG tablet Take 10 mg by mouth daily 6/16/2024 at 10am Yes Unknown, Entered By History     DULoxetine (CYMBALTA) 60 MG capsule Take 60 mg by mouth daily 6/16/2024 at 10am Yes Unknown, Entered By History Yes    gabapentin (NEURONTIN) 100 MG capsule Take 100 mg by mouth 3 times daily 6/17/2024 at 8am Yes Unknown, Entered By History Yes    hydrALAZINE (APRESOLINE) 25 MG tablet Take 25 mg by mouth 2 times daily Hold if SBP < 110 6/16/2024 at 11pm Yes Unknown, Entered By History     HYDROcodone-acetaminophen (NORCO) 5-325 MG tablet Take 1 tablet by mouth every 8 hours as needed for severe pain Minimise use as able to. Hold if drowsy. 6/13/2024 at PRN Yes Ludwig Briceño MD     insulin aspart  (NOVOLOG FLEXPEN) 100 UNIT/ML pen Inject 8 Units Subcutaneous 3 times daily (with meals) Breakfast and lunch 6/16/2024 at 5pm Yes Ludwig Briceño MD Yes    insulin glargine (LANTUS PEN) 100 UNIT/ML pen Inject 15 Units Subcutaneous every morning 6/16/2024 at 10am Yes Unknown, Entered By History No    Lidocaine (LIDOCARE) 4 % Patch Place 2 patches onto the skin every 24 hours Apply to neck/back. To prevent lidocaine toxicity, patient should be patch free for 12 hrs daily. 6/7/2024 at 8pm Yes Ludwig Briceño MD     lipase-protease-amylase (CREON 36) [id number] units CPEP Take 3 capsules by mouth 3 times daily (with meals) 6/16/2024 at 4pm Yes Unknown, Entered By History     loperamide (IMODIUM) 2 MG capsule Take 4 mg by mouth 3 times daily as needed for diarrhea 6/12/2024 at PRN Yes Unknown, Entered By History No    methocarbamol (ROBAXIN) 500 MG tablet Take 500 mg by mouth every 8 hours as needed for muscle spasms 6/13/2024 at PRN Yes Unknown, Entered By History     multivitamin w/minerals (THERA-VIT-M) tablet Take 1 tablet by mouth daily 6/16/2024 at 10am Yes Kimberly Ma MD     pantoprazole (PROTONIX) 40 MG EC tablet Take 1 tablet (40 mg) by mouth daily 6/16/2024 at 10am Yes Ludwig Briceño MD     insulin aspart (NOVOLOG FLEXPEN) 100 UNIT/ML pen Inject 1-5 Units Subcutaneous 3 times daily (with meals) Inject as per sliding scale:  If 200-250 = 1   251-300 = 2  301-350 = 3  351-400 = 4  401+ = 5  Repeat BS after 3 hours and call MD if still over 400   Unknown, Entered By History Yes    lipase-protease-amylase (CREON 36) [id number] units CPEP Take 1-2 capsules by mouth Take with snacks or supplements   Unknown, Entered By History            Medication history completed by: Theodore Dickinson

## 2024-07-05 DIAGNOSIS — M54.2 NECK PAIN: Primary | ICD-10-CM

## 2024-07-09 ENCOUNTER — HOSPITAL ENCOUNTER (OUTPATIENT)
Dept: CT IMAGING | Facility: CLINIC | Age: 54
Discharge: HOME | End: 2024-07-09
Attending: HOSPITALIST | Admitting: HOSPITALIST
Payer: COMMERCIAL

## 2024-07-09 DIAGNOSIS — R63.4 WEIGHT LOSS: ICD-10-CM

## 2024-07-09 LAB
CREAT BLD-MCNC: 1.9 MG/DL (ref 0.7–1.3)
EGFRCR SERPLBLD CKD-EPI 2021: 41 ML/MIN/1.73M2

## 2024-07-09 PROCEDURE — 250N000011 HC RX IP 250 OP 636: Performed by: HOSPITALIST

## 2024-07-09 PROCEDURE — 74177 CT ABD & PELVIS W/CONTRAST: CPT

## 2024-07-09 PROCEDURE — 250N000009 HC RX 250: Performed by: HOSPITALIST

## 2024-07-09 PROCEDURE — 82565 ASSAY OF CREATININE: CPT

## 2024-07-09 RX ORDER — IOPAMIDOL 755 MG/ML
64 INJECTION, SOLUTION INTRAVASCULAR ONCE
Status: COMPLETED | OUTPATIENT
Start: 2024-07-09 | End: 2024-07-09

## 2024-07-09 RX ADMIN — SODIUM CHLORIDE 58 ML: 9 INJECTION, SOLUTION INTRAVENOUS at 15:53

## 2024-07-09 RX ADMIN — IOPAMIDOL 64 ML: 755 INJECTION, SOLUTION INTRAVENOUS at 15:52

## 2024-07-09 NOTE — TELEPHONE ENCOUNTER
Action July 9, 2024 3:20 PM MT   Action Taken Sent a request for the emg report from [facility].  Sent a request for imaging from [facility].  Sent [facility] to scan.        DIAGNOSIS: NECK/CERVICAL SPINE   APPOINTMENT DATE: 07/10/2024   NOTES STATUS DETAILS   OFFICE NOTE from referring provider Media Tab 03/14/2024 - Nahum Garcia MD - [facility]   OFFICE NOTE from other specialist Media Tab 05/09/2024 - Simeon Wallace MD - [facility]   EMG (for Spine) In process [facility]   MRI PACS [facility]:  03/28/2024, 12/22/2022, 04/21/2022 - C Spine  10/10/2023 - Shoulder   CT SCAN PACS [facility]:  03/18/2024 - C Spine   XRAYS (IMAGES & REPORTS) PACS [facility]:  04/14/2022 - C Spine  04/14/2022 - L Spine

## 2024-07-10 ENCOUNTER — ANCILLARY PROCEDURE (OUTPATIENT)
Dept: GENERAL RADIOLOGY | Facility: CLINIC | Age: 54
End: 2024-07-10
Attending: ORTHOPAEDIC SURGERY
Payer: COMMERCIAL

## 2024-07-10 ENCOUNTER — PRE VISIT (OUTPATIENT)
Dept: ORTHOPEDICS | Facility: CLINIC | Age: 54
End: 2024-07-10

## 2024-07-10 ENCOUNTER — LAB (OUTPATIENT)
Dept: LAB | Facility: CLINIC | Age: 54
End: 2024-07-10
Payer: COMMERCIAL

## 2024-07-10 ENCOUNTER — OFFICE VISIT (OUTPATIENT)
Dept: ORTHOPEDICS | Facility: CLINIC | Age: 54
End: 2024-07-10
Payer: COMMERCIAL

## 2024-07-10 VITALS — BODY MASS INDEX: 21.51 KG/M2 | WEIGHT: 126 LBS | HEIGHT: 64 IN

## 2024-07-10 DIAGNOSIS — M25.561 ACUTE PAIN OF RIGHT KNEE: Primary | ICD-10-CM

## 2024-07-10 DIAGNOSIS — M54.2 NECK PAIN: ICD-10-CM

## 2024-07-10 DIAGNOSIS — M25.561 RIGHT KNEE PAIN: ICD-10-CM

## 2024-07-10 DIAGNOSIS — M50.00 CERVICAL DISC DISEASE WITH MYELOPATHY: ICD-10-CM

## 2024-07-10 DIAGNOSIS — F10.10 ALCOHOL ABUSE: ICD-10-CM

## 2024-07-10 DIAGNOSIS — K86.81 EXOCRINE PANCREATIC INSUFFICIENCY: ICD-10-CM

## 2024-07-10 DIAGNOSIS — M00.9 PYOGENIC ARTHRITIS OF RIGHT KNEE JOINT, DUE TO UNSPECIFIED ORGANISM (H): ICD-10-CM

## 2024-07-10 DIAGNOSIS — R63.4 UNINTENTIONAL WEIGHT LOSS: ICD-10-CM

## 2024-07-10 DIAGNOSIS — D64.9 ANEMIA, UNSPECIFIED TYPE: ICD-10-CM

## 2024-07-10 DIAGNOSIS — M54.12 CERVICAL RADICULOPATHY: ICD-10-CM

## 2024-07-10 LAB
ALBUMIN SERPL BCG-MCNC: 3.5 G/DL (ref 3.5–5.2)
ALP SERPL-CCNC: 188 U/L (ref 40–150)
ALT SERPL W P-5'-P-CCNC: 14 U/L (ref 0–70)
ANION GAP SERPL CALCULATED.3IONS-SCNC: 7 MMOL/L (ref 7–15)
APPEARANCE FLD: ABNORMAL
AST SERPL W P-5'-P-CCNC: 17 U/L (ref 0–45)
BASOPHILS # BLD AUTO: 0 10E3/UL (ref 0–0.2)
BASOPHILS NFR BLD AUTO: 0 %
BILIRUB SERPL-MCNC: 0.2 MG/DL
BUN SERPL-MCNC: 26 MG/DL (ref 6–20)
CALCIUM SERPL-MCNC: 9.1 MG/DL (ref 8.6–10)
CELL COUNT BODY FLUID SOURCE: ABNORMAL
CHLORIDE SERPL-SCNC: 98 MMOL/L (ref 98–107)
COLOR FLD: YELLOW
CREAT SERPL-MCNC: 1.61 MG/DL (ref 0.67–1.17)
CRP SERPL-MCNC: 113 MG/L
DEPRECATED HCO3 PLAS-SCNC: 26 MMOL/L (ref 22–29)
EGFRCR SERPLBLD CKD-EPI 2021: 50 ML/MIN/1.73M2
EOSINOPHIL # BLD AUTO: 0.1 10E3/UL (ref 0–0.7)
EOSINOPHIL NFR BLD AUTO: 1 %
ERYTHROCYTE [DISTWIDTH] IN BLOOD BY AUTOMATED COUNT: 19.1 % (ref 10–15)
ERYTHROCYTE [SEDIMENTATION RATE] IN BLOOD BY WESTERGREN METHOD: 112 MM/HR (ref 0–20)
GLUCOSE SERPL-MCNC: 195 MG/DL (ref 70–99)
HCT VFR BLD AUTO: 26.9 % (ref 40–53)
HGB BLD-MCNC: 8.3 G/DL (ref 13.3–17.7)
IMM GRANULOCYTES # BLD: 0.1 10E3/UL
IMM GRANULOCYTES NFR BLD: 1 %
LYMPHOCYTES # BLD AUTO: 1.8 10E3/UL (ref 0.8–5.3)
LYMPHOCYTES NFR BLD AUTO: 17 %
LYMPHOCYTES NFR FLD MANUAL: 1 %
MCH RBC QN AUTO: 24.6 PG (ref 26.5–33)
MCHC RBC AUTO-ENTMCNC: 30.9 G/DL (ref 31.5–36.5)
MCV RBC AUTO: 80 FL (ref 78–100)
MONOCYTES # BLD AUTO: 1.3 10E3/UL (ref 0–1.3)
MONOCYTES NFR BLD AUTO: 13 %
MONOS+MACROS NFR FLD MANUAL: 6 %
NEUTROPHILS # BLD AUTO: 7.1 10E3/UL (ref 1.6–8.3)
NEUTROPHILS NFR BLD AUTO: 68 %
NEUTS BAND NFR FLD MANUAL: 93 %
NRBC # BLD AUTO: 0 10E3/UL
NRBC BLD AUTO-RTO: 0 /100
PLATELET # BLD AUTO: 438 10E3/UL (ref 150–450)
POTASSIUM SERPL-SCNC: 5.4 MMOL/L (ref 3.4–5.3)
PROT SERPL-MCNC: 7.9 G/DL (ref 6.4–8.3)
RBC # BLD AUTO: 3.38 10E6/UL (ref 4.4–5.9)
SODIUM SERPL-SCNC: 131 MMOL/L (ref 135–145)
WBC # BLD AUTO: 10.4 10E3/UL (ref 4–11)
WBC # FLD AUTO: ABNORMAL /UL

## 2024-07-10 PROCEDURE — 85025 COMPLETE CBC W/AUTO DIFF WBC: CPT | Performed by: PATHOLOGY

## 2024-07-10 PROCEDURE — 72050 X-RAY EXAM NECK SPINE 4/5VWS: CPT | Performed by: RADIOLOGY

## 2024-07-10 PROCEDURE — 87075 CULTR BACTERIA EXCEPT BLOOD: CPT | Performed by: ORTHOPAEDIC SURGERY

## 2024-07-10 PROCEDURE — 86140 C-REACTIVE PROTEIN: CPT | Performed by: PATHOLOGY

## 2024-07-10 PROCEDURE — 99204 OFFICE O/P NEW MOD 45 MIN: CPT | Performed by: ORTHOPAEDIC SURGERY

## 2024-07-10 PROCEDURE — 85652 RBC SED RATE AUTOMATED: CPT | Performed by: PATHOLOGY

## 2024-07-10 PROCEDURE — 36415 COLL VENOUS BLD VENIPUNCTURE: CPT | Performed by: PATHOLOGY

## 2024-07-10 PROCEDURE — 99000 SPECIMEN HANDLING OFFICE-LAB: CPT | Performed by: PATHOLOGY

## 2024-07-10 PROCEDURE — 89050 BODY FLUID CELL COUNT: CPT | Performed by: ORTHOPAEDIC SURGERY

## 2024-07-10 PROCEDURE — 80053 COMPREHEN METABOLIC PANEL: CPT | Performed by: PATHOLOGY

## 2024-07-10 PROCEDURE — 87205 SMEAR GRAM STAIN: CPT | Performed by: ORTHOPAEDIC SURGERY

## 2024-07-10 RX ORDER — TRANEXAMIC ACID 650 MG/1
1950 TABLET ORAL ONCE
Status: CANCELLED | OUTPATIENT
Start: 2024-07-10 | End: 2024-07-10

## 2024-07-10 RX ORDER — CEFAZOLIN SODIUM 2 G/50ML
2 SOLUTION INTRAVENOUS SEE ADMIN INSTRUCTIONS
Status: CANCELLED | OUTPATIENT
Start: 2024-07-10

## 2024-07-10 RX ORDER — CEFAZOLIN SODIUM 2 G/50ML
2 SOLUTION INTRAVENOUS
Status: CANCELLED | OUTPATIENT
Start: 2024-07-10

## 2024-07-10 NOTE — PROGRESS NOTES
Spine Surgery Consultation    REFERRING PHYSICIAN: Referred Self   PRIMARY CARE PHYSICIAN: Ana Maria Blakely           Chief Complaint:   Consult ( neck & (B) shoulders, surgical consult 2nd opinion /  ??? Jose @ [facility] )      History of Present Illness:  Symptom Profile Including: location of symptoms, onset, severity, exacerbating/alleviating factors, previous treatments:        Long Mayfield is a 54 year old male who Presents to orthopedic spine clinic for evaluation of cervical myelopathy.  The patient was a patient of Dr. Nahum Gurrola.  He was scheduled to have a two-level ACDF however on the date of his surgery he was called and told his surgery was canceled.  This was reportedly due to abnormalities in his lab results.  The patient has an extremely complex history which started last November when he was admitted for an infection in his foot that resulted in partial amputation of his right foot.  He has been in a TCU since the time of this surgery.  He was treated on long-term antibiotics.  In addition the patient has history of right knee pain with an aspiration at [facility] in late May.  The results of the studies were concerning from our perspective for infection however there was no follow-up or surgery scheduled.  He continues to report right knee pain and swelling.  No additional complaints or concerns reported at this time.         Past Medical History:     Past Medical History:   Diagnosis Date    Allergic rhinitis     Anemia     Arthritis     Bell's palsy     Cervicalgia     Diabetes (H)     Diabetic foot ulcer (H)     Generalized edema     Hyperkalemia     Hypertension     Hypo-osmolality and hyponatremia     Major depressive disorder, recurrent episode, mild (H24)     Other acute osteomyelitis, right ankle and foot (H)     Other chronic pancreatitis (H)     Other polyosteoarthritis     Solitary pulmonary nodule             Past Surgical History:     Past Surgical History:   Procedure Laterality  Date    COLONOSCOPY N/A 05/07/2024    Procedure: Colonoscopy;  Surgeon: Nina Moya MD;  Location:  GI    ESOPHAGOSCOPY, GASTROSCOPY, DUODENOSCOPY (EGD), COMBINED N/A 05/06/2024    Procedure: Esophagoscopy, gastroscopy, duodenoscopy (EGD), combined;  Surgeon: Nina Moya MD;  Location:  GI    ESOPHAGOSCOPY, GASTROSCOPY, DUODENOSCOPY (EGD), COMBINED N/A 05/07/2024    Procedure: Esophagoscopy, gastroscopy, duodenoscopy (EGD), combined;  Surgeon: Nina Moya MD;  Location:  GI    ORTHOPEDIC SURGERY      partial amputation of right foot Right     PATELLA SURGERY              Social History:     Social History     Tobacco Use    Smoking status: Never    Smokeless tobacco: Never   Substance Use Topics    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         Allergies:     Allergies   Allergen Reactions    Vancomycin      Other Reaction(s): Renal Failure    Vancomycin-induced nephrotoxicity (11/2023, outside hospital)    Seasonal Allergies      HAYFEVER:    -Watery Eyes  -Eye burn    Daptomycin Rash     Likely delayed hypersensitivity reaction to daptomycin 11/2023            Medications:     Current Outpatient Medications   Medication Sig Dispense Refill    acetaminophen (TYLENOL) 325 MG tablet Take 650 mg by mouth every 6 hours as needed for pain      amLODIPine (NORVASC) 5 MG tablet Take 1 tablet (5 mg) by mouth daily 30 tablet 0    aspirin 81 MG EC tablet Take 81 mg by mouth daily      bumetanide (BUMEX) 1 MG tablet Take 1 tablet (1 mg) by mouth daily Dose decreased to 1 mg on discharge.  Optimize dose on PCP visit. 30 tablet 0    cetirizine (ZYRTEC) 10 MG tablet Take 10 mg by mouth daily      DULoxetine (CYMBALTA) 60 MG capsule Take 60 mg by mouth daily      gabapentin (NEURONTIN) 100 MG capsule Take 100 mg by mouth 3 times daily      hydrALAZINE (APRESOLINE) 25 MG tablet Take 25 mg by mouth 2 times daily Hold if  "SBP < 110      HYDROcodone-acetaminophen (NORCO) 5-325 MG tablet Take 1 tablet by mouth every 8 hours as needed for severe pain Minimise use as able to. Hold if drowsy. 10 tablet 0    insulin aspart (NOVOLOG FLEXPEN) 100 UNIT/ML pen Inject 8 Units Subcutaneous 3 times daily (with meals) Breakfast and lunch      insulin aspart (NOVOLOG FLEXPEN) 100 UNIT/ML pen Inject 1-5 Units Subcutaneous 3 times daily (with meals) Inject as per sliding scale:  If 200-250 = 1   251-300 = 2  301-350 = 3  351-400 = 4  401+ = 5  Repeat BS after 3 hours and call MD if still over 400      insulin glargine (LANTUS PEN) 100 UNIT/ML pen Inject 15 Units Subcutaneous every morning      Lidocaine (LIDOCARE) 4 % Patch Place 2 patches onto the skin every 24 hours Apply to neck/back. To prevent lidocaine toxicity, patient should be patch free for 12 hrs daily. 15 patch 0    lipase-protease-amylase (CREON 36) [id number] units CPEP Take 3 capsules by mouth 3 times daily (with meals)      lipase-protease-amylase (CREON 36) [id number] units CPEP Take 1-2 capsules by mouth Take with snacks or supplements      loperamide (IMODIUM) 2 MG capsule Take 4 mg by mouth 3 times daily as needed for diarrhea      methocarbamol (ROBAXIN) 500 MG tablet Take 500 mg by mouth every 8 hours as needed for muscle spasms      multivitamin w/minerals (THERA-VIT-M) tablet Take 1 tablet by mouth daily 30 tablet 0    pantoprazole (PROTONIX) 40 MG EC tablet Take 1 tablet (40 mg) by mouth daily 30 tablet 0     No current facility-administered medications for this visit.             Review of Systems:     A 10 point ROS was performed and reviewed. Specific responses to these questions are noted at the end of the document.         Physical Exam:   Vitals: Ht 1.626 m (5' 4\")   Wt 57.2 kg (126 lb)   BMI 21.63 kg/m    Constitutional: awake, alert, cooperative, no apparent distress, appears stated age.    Eyes: The sclera are white.  Ears, Nose, Throat: The " trachea is midline.  Psychiatric: The patient has a normal affect.  Respiratory: breathing non-labored  Cardiovascular: The extremities are warm and perfused.  Skin: no obvious rashes or lesions.  Musculoskeletal, Neurologic, and Spine:   Cervical spine:    Appearance -no gross step-offs, kyphosis.    Motor -     C5: Deltoids R 5/5 and L \4/5 strength    C6: Biceps R 5/5 and L 4/5 strength     C7: Triceps R 5/5 and L 5/5 strength     C8:  R 5/5 and L 5/5 strength     T1: Dorsal interossei R 4/5 and L 4/5 strength    The patient does have bilateral hand atrophy.  He has significant weakness in his rotator cuff with a positive drop arm test.  He uses his right hand to position his left hand in space.      Sensation: intact to light touch in C5-T1      Special Tests -      Lhermitte's Test - Negative     Spurling's Test - Negative      Richardson's Test - Negative       Lumbar Spine:    Appearance - No gross stepoffs or deformities    Motor -     L2-3: Hip flexion 5/5 R and 5/5 L strength          L3/4:  Knee extension R 5/5 and L 5/5 strength         L4/5:  Foot dorsiflexion R 5/5 L 5/5 and       EHL dorsiflexion R 4/5 L 4/5 strength         S1:  Plantarflexion/Peroneal Muscles  R 5/5 and L 5/5 strength    Sensation: intact to light touch L3-S1 distribution BLE      Neurologic:      REFLEXES Left Right   Biceps 1+ 1+   Triceps 1+ 1+   Brachioradialis 1+ 1+   Patella 1+ 1+   Ankle jerk 1+ 1+   Babinski No upgoing great toe No upgoing great toe   Clonus 0 beats 0 beats     Hip Exam:  No pain with hip log roll and no tenderness over the greater trochanters.    Alignment:  Patient stands with a neutral standing sagittal balance.    The right knee is swollen and tender to palpation.  He lacks full extension.           Imaging:   We ordered and independently reviewed new radiographs at this clinic visit. The results were discussed with the patient.  Findings include:    X-ray cervical spine: My personal interpretation,  spondylolisthesis of C3 on 4 and C4 on 5 that reduces with extension.    MRI cervical spine: My personal interpretation, there is degenerative changes throughout the cervical spine with spondylolisthesis of C3 on 4 and C4 on 5.  There is severe central and neuroforaminal stenosis at these levels.             Assessment and Plan:   Assessment:  54 year old male with a complex medical history with signs and symptoms concerning for cervical myelopathy.  We did discuss the natural history of cervical myelopathy and the surgical indications.  However at this time we are concerned that the patient may have a septic right knee due to previous aspirate findings and continued pain and swelling.  As such we felt that the patient should have a right knee aspiration.  We discussed the risk, benefits, nature of the procedure in detail.  Verbal consent was obtained.  Will also send the patient for new labs including CBC CMP, CRP, ESR.    Procedure: Right knee aspiration  The right knee was prepped and draped in sterile fashion.  An 18-gauge needle was introduced into the patient's right knee.  I removed approximately 60 cc of cloudy yellow fluid concerning for infection.  The needle was removed and a sterile bandage was replaced.  EBL: Minimal  Complications: None     Plan:  Right knee aspiration  CBC's, CMP, ESR, CRP  Possible right knee irrigation debridement    The patient was seen and plan was developed under supervision of my attending, Dr. Dejon Espinoza

## 2024-07-10 NOTE — H&P (VIEW-ONLY)
Spine Surgery Consultation    REFERRING PHYSICIAN: Referred Self   PRIMARY CARE PHYSICIAN: Ana Maria Blakely           Chief Complaint:   Consult ( neck & (B) shoulders, surgical consult 2nd opinion /  ??? Jose @ [facility] )      History of Present Illness:  Symptom Profile Including: location of symptoms, onset, severity, exacerbating/alleviating factors, previous treatments:        Long Mayfield is a 54 year old male who Presents to orthopedic spine clinic for evaluation of cervical myelopathy.  The patient was a patient of Dr. Nahum Gurrola.  He was scheduled to have a two-level ACDF however on the date of his surgery he was called and told his surgery was canceled.  This was reportedly due to abnormalities in his lab results.  The patient has an extremely complex history which started last November when he was admitted for an infection in his foot that resulted in partial amputation of his right foot.  He has been in a TCU since the time of this surgery.  He was treated on long-term antibiotics.  In addition the patient has history of right knee pain with an aspiration at [facility] in late May.  The results of the studies were concerning from our perspective for infection however there was no follow-up or surgery scheduled.  He continues to report right knee pain and swelling.  No additional complaints or concerns reported at this time.         Past Medical History:     Past Medical History:   Diagnosis Date    Allergic rhinitis     Anemia     Arthritis     Bell's palsy     Cervicalgia     Diabetes (H)     Diabetic foot ulcer (H)     Generalized edema     Hyperkalemia     Hypertension     Hypo-osmolality and hyponatremia     Major depressive disorder, recurrent episode, mild (H24)     Other acute osteomyelitis, right ankle and foot (H)     Other chronic pancreatitis (H)     Other polyosteoarthritis     Solitary pulmonary nodule             Past Surgical History:     Past Surgical History:   Procedure Laterality  Date    COLONOSCOPY N/A 05/07/2024    Procedure: Colonoscopy;  Surgeon: Nina Moya MD;  Location:  GI    ESOPHAGOSCOPY, GASTROSCOPY, DUODENOSCOPY (EGD), COMBINED N/A 05/06/2024    Procedure: Esophagoscopy, gastroscopy, duodenoscopy (EGD), combined;  Surgeon: Nina Moya MD;  Location:  GI    ESOPHAGOSCOPY, GASTROSCOPY, DUODENOSCOPY (EGD), COMBINED N/A 05/07/2024    Procedure: Esophagoscopy, gastroscopy, duodenoscopy (EGD), combined;  Surgeon: Nina Moya MD;  Location:  GI    ORTHOPEDIC SURGERY      partial amputation of right foot Right     PATELLA SURGERY              Social History:     Social History     Tobacco Use    Smoking status: Never    Smokeless tobacco: Never   Substance Use Topics    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         Allergies:     Allergies   Allergen Reactions    Vancomycin      Other Reaction(s): Renal Failure    Vancomycin-induced nephrotoxicity (11/2023, outside hospital)    Seasonal Allergies      HAYFEVER:    -Watery Eyes  -Eye burn    Daptomycin Rash     Likely delayed hypersensitivity reaction to daptomycin 11/2023            Medications:     Current Outpatient Medications   Medication Sig Dispense Refill    acetaminophen (TYLENOL) 325 MG tablet Take 650 mg by mouth every 6 hours as needed for pain      amLODIPine (NORVASC) 5 MG tablet Take 1 tablet (5 mg) by mouth daily 30 tablet 0    aspirin 81 MG EC tablet Take 81 mg by mouth daily      bumetanide (BUMEX) 1 MG tablet Take 1 tablet (1 mg) by mouth daily Dose decreased to 1 mg on discharge.  Optimize dose on PCP visit. 30 tablet 0    cetirizine (ZYRTEC) 10 MG tablet Take 10 mg by mouth daily      DULoxetine (CYMBALTA) 60 MG capsule Take 60 mg by mouth daily      gabapentin (NEURONTIN) 100 MG capsule Take 100 mg by mouth 3 times daily      hydrALAZINE (APRESOLINE) 25 MG tablet Take 25 mg by mouth 2 times daily Hold if  "SBP < 110      HYDROcodone-acetaminophen (NORCO) 5-325 MG tablet Take 1 tablet by mouth every 8 hours as needed for severe pain Minimise use as able to. Hold if drowsy. 10 tablet 0    insulin aspart (NOVOLOG FLEXPEN) 100 UNIT/ML pen Inject 8 Units Subcutaneous 3 times daily (with meals) Breakfast and lunch      insulin aspart (NOVOLOG FLEXPEN) 100 UNIT/ML pen Inject 1-5 Units Subcutaneous 3 times daily (with meals) Inject as per sliding scale:  If 200-250 = 1   251-300 = 2  301-350 = 3  351-400 = 4  401+ = 5  Repeat BS after 3 hours and call MD if still over 400      insulin glargine (LANTUS PEN) 100 UNIT/ML pen Inject 15 Units Subcutaneous every morning      Lidocaine (LIDOCARE) 4 % Patch Place 2 patches onto the skin every 24 hours Apply to neck/back. To prevent lidocaine toxicity, patient should be patch free for 12 hrs daily. 15 patch 0    lipase-protease-amylase (CREON 36) [id number] units CPEP Take 3 capsules by mouth 3 times daily (with meals)      lipase-protease-amylase (CREON 36) [id number] units CPEP Take 1-2 capsules by mouth Take with snacks or supplements      loperamide (IMODIUM) 2 MG capsule Take 4 mg by mouth 3 times daily as needed for diarrhea      methocarbamol (ROBAXIN) 500 MG tablet Take 500 mg by mouth every 8 hours as needed for muscle spasms      multivitamin w/minerals (THERA-VIT-M) tablet Take 1 tablet by mouth daily 30 tablet 0    pantoprazole (PROTONIX) 40 MG EC tablet Take 1 tablet (40 mg) by mouth daily 30 tablet 0     No current facility-administered medications for this visit.             Review of Systems:     A 10 point ROS was performed and reviewed. Specific responses to these questions are noted at the end of the document.         Physical Exam:   Vitals: Ht 1.626 m (5' 4\")   Wt 57.2 kg (126 lb)   BMI 21.63 kg/m    Constitutional: awake, alert, cooperative, no apparent distress, appears stated age.    Eyes: The sclera are white.  Ears, Nose, Throat: The " trachea is midline.  Psychiatric: The patient has a normal affect.  Respiratory: breathing non-labored  Cardiovascular: The extremities are warm and perfused.  Skin: no obvious rashes or lesions.  Musculoskeletal, Neurologic, and Spine:   Cervical spine:    Appearance -no gross step-offs, kyphosis.    Motor -     C5: Deltoids R 5/5 and L \4/5 strength    C6: Biceps R 5/5 and L 4/5 strength     C7: Triceps R 5/5 and L 5/5 strength     C8:  R 5/5 and L 5/5 strength     T1: Dorsal interossei R 4/5 and L 4/5 strength    The patient does have bilateral hand atrophy.  He has significant weakness in his rotator cuff with a positive drop arm test.  He uses his right hand to position his left hand in space.      Sensation: intact to light touch in C5-T1      Special Tests -      Lhermitte's Test - Negative     Spurling's Test - Negative      Richardson's Test - Negative       Lumbar Spine:    Appearance - No gross stepoffs or deformities    Motor -     L2-3: Hip flexion 5/5 R and 5/5 L strength          L3/4:  Knee extension R 5/5 and L 5/5 strength         L4/5:  Foot dorsiflexion R 5/5 L 5/5 and       EHL dorsiflexion R 4/5 L 4/5 strength         S1:  Plantarflexion/Peroneal Muscles  R 5/5 and L 5/5 strength    Sensation: intact to light touch L3-S1 distribution BLE      Neurologic:      REFLEXES Left Right   Biceps 1+ 1+   Triceps 1+ 1+   Brachioradialis 1+ 1+   Patella 1+ 1+   Ankle jerk 1+ 1+   Babinski No upgoing great toe No upgoing great toe   Clonus 0 beats 0 beats     Hip Exam:  No pain with hip log roll and no tenderness over the greater trochanters.    Alignment:  Patient stands with a neutral standing sagittal balance.    The right knee is swollen and tender to palpation.  He lacks full extension.           Imaging:   We ordered and independently reviewed new radiographs at this clinic visit. The results were discussed with the patient.  Findings include:    X-ray cervical spine: My personal interpretation,  spondylolisthesis of C3 on 4 and C4 on 5 that reduces with extension.    MRI cervical spine: My personal interpretation, there is degenerative changes throughout the cervical spine with spondylolisthesis of C3 on 4 and C4 on 5.  There is severe central and neuroforaminal stenosis at these levels.             Assessment and Plan:   Assessment:  54 year old male with a complex medical history with signs and symptoms concerning for cervical myelopathy.  We did discuss the natural history of cervical myelopathy and the surgical indications.  However at this time we are concerned that the patient may have a septic right knee due to previous aspirate findings and continued pain and swelling.  As such we felt that the patient should have a right knee aspiration.  We discussed the risk, benefits, nature of the procedure in detail.  Verbal consent was obtained.  Will also send the patient for new labs including CBC CMP, CRP, ESR.    Procedure: Right knee aspiration  The right knee was prepped and draped in sterile fashion.  An 18-gauge needle was introduced into the patient's right knee.  I removed approximately 60 cc of cloudy yellow fluid concerning for infection.  The needle was removed and a sterile bandage was replaced.  EBL: Minimal  Complications: None     Plan:  Right knee aspiration  CBC's, CMP, ESR, CRP  Possible right knee irrigation debridement    The patient was seen and plan was developed under supervision of my attending, Dr. Dejon Espinoza

## 2024-07-10 NOTE — LETTER
7/10/2024      Long Mayfield  [address] [address]      Dear Colleague,    Thank you for referring your patient, Long Mayfield, to the [facility] [place]. Please see a copy of my visit note below.    Spine Surgery Consultation    REFERRING PHYSICIAN: Referred Self   PRIMARY CARE PHYSICIAN: Ana Maria Blakely           Chief Complaint:   Consult ( neck & (B) shoulders, surgical consult 2nd opinion /  ??? Jose @ [facility] )      History of Present Illness:  Symptom Profile Including: location of symptoms, onset, severity, exacerbating/alleviating factors, previous treatments:        Long Mayfield is a 54 year old male who Presents to orthopedic spine clinic for evaluation of cervical myelopathy.  The patient was a patient of Dr. Nahum Gurrola.  He was scheduled to have a two-level ACDF however on the date of his surgery he was called and told his surgery was canceled.  This was reportedly due to abnormalities in his lab results.  The patient has an extremely complex history which started last November when he was admitted for an infection in his foot that resulted in partial amputation of his right foot.  He has been in a TCU since the time of this surgery.  He was treated on long-term antibiotics.  In addition the patient has history of right knee pain with an aspiration at [facility] in late May.  The results of the studies were concerning from our perspective for infection however there was no follow-up or surgery scheduled.  He continues to report right knee pain and swelling.  No additional complaints or concerns reported at this time.         Past Medical History:     Past Medical History:   Diagnosis Date    Allergic rhinitis     Anemia     Arthritis     Bell's palsy     Cervicalgia     Diabetes (H)     Diabetic foot ulcer (H)     Generalized edema     Hyperkalemia     Hypertension     Hypo-osmolality and hyponatremia     Major depressive disorder, recurrent  episode, mild (H24)     Other acute osteomyelitis, right ankle and foot (H)     Other chronic pancreatitis (H)     Other polyosteoarthritis     Solitary pulmonary nodule             Past Surgical History:     Past Surgical History:   Procedure Laterality Date    COLONOSCOPY N/A 05/07/2024    Procedure: Colonoscopy;  Surgeon: Nina Moya MD;  Location:  GI    ESOPHAGOSCOPY, GASTROSCOPY, DUODENOSCOPY (EGD), COMBINED N/A 05/06/2024    Procedure: Esophagoscopy, gastroscopy, duodenoscopy (EGD), combined;  Surgeon: Nina Moya MD;  Location:  GI    ESOPHAGOSCOPY, GASTROSCOPY, DUODENOSCOPY (EGD), COMBINED N/A 05/07/2024    Procedure: Esophagoscopy, gastroscopy, duodenoscopy (EGD), combined;  Surgeon: Nina Moya MD;  Location:  GI    ORTHOPEDIC SURGERY      partial amputation of right foot Right     P[facility]LLA SURGERY              Social History:     Social History     Tobacco Use    Smoking status: Never    Smokeless tobacco: Never   Substance Use Topics    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         Allergies:     Allergies   Allergen Reactions    Vancomycin      Other Reaction(s): Renal Failure    Vancomycin-induced nephrotoxicity (11/2023, outside hospital)    Seasonal Allergies      HAYFEVER:    -Watery Eyes  -Eye burn    Daptomycin Rash     Likely delayed hypersensitivity reaction to daptomycin 11/2023            Medications:     Current Outpatient Medications   Medication Sig Dispense Refill    acetaminophen (TYLENOL) 325 MG tablet Take 650 mg by mouth every 6 hours as needed for pain      amLODIPine (NORVASC) 5 MG tablet Take 1 tablet (5 mg) by mouth daily 30 tablet 0    aspirin 81 MG EC tablet Take 81 mg by mouth daily      bumetanide (BUMEX) 1 MG tablet Take 1 tablet (1 mg) by mouth daily Dose decreased to 1 mg on discharge.  Optimize dose on PCP visit. 30 tablet 0    cetirizine (ZYRTEC) 10 MG tablet  Take 10 mg by mouth daily      DULoxetine (CYMBALTA) 60 MG capsule Take 60 mg by mouth daily      gabapentin (NEURONTIN) 100 MG capsule Take 100 mg by mouth 3 times daily      hydrALAZINE (APRESOLINE) 25 MG tablet Take 25 mg by mouth 2 times daily Hold if SBP < 110      HYDROcodone-acetaminophen (NORCO) 5-325 MG tablet Take 1 tablet by mouth every 8 hours as needed for severe pain Minimise use as able to. Hold if drowsy. 10 tablet 0    insulin aspart (NOVOLOG FLEXPEN) 100 UNIT/ML pen Inject 8 Units Subcutaneous 3 times daily (with meals) Breakfast and lunch      insulin aspart (NOVOLOG FLEXPEN) 100 UNIT/ML pen Inject 1-5 Units Subcutaneous 3 times daily (with meals) Inject as per sliding scale:  If 200-250 = 1   251-300 = 2  301-350 = 3  351-400 = 4  401+ = 5  Repeat BS after 3 hours and call MD if still over 400      insulin glargine (LANTUS PEN) 100 UNIT/ML pen Inject 15 Units Subcutaneous every morning      Lidocaine (LIDOCARE) 4 % Patch Place 2 patches onto the skin every 24 hours Apply to neck/back. To prevent lidocaine toxicity, patient should be patch free for 12 hrs daily. 15 patch 0    lipase-protease-amylase (CREON 36) [id number] units CPEP Take 3 capsules by mouth 3 times daily (with meals)      lipase-protease-amylase (CREON 36) [id number] units CPEP Take 1-2 capsules by mouth Take with snacks or supplements      loperamide (IMODIUM) 2 MG capsule Take 4 mg by mouth 3 times daily as needed for diarrhea      methocarbamol (ROBAXIN) 500 MG tablet Take 500 mg by mouth every 8 hours as needed for muscle spasms      multivitamin w/minerals (THERA-VIT-M) tablet Take 1 tablet by mouth daily 30 tablet 0    pantoprazole (PROTONIX) 40 MG EC tablet Take 1 tablet (40 mg) by mouth daily 30 tablet 0     No current facility-administered medications for this visit.             Review of Systems:     A 10 point ROS was performed and reviewed. Specific responses to these questions are noted at the end of  "the document.         Physical Exam:   Vitals: Ht 1.626 m (5' 4\")   Wt 57.2 kg (126 lb)   BMI 21.63 kg/m    Constitutional: awake, alert, cooperative, no apparent distress, appears stated age.    Eyes: The sclera are white.  Ears, Nose, Throat: The trachea is midline.  Psychiatric: The patient has a normal affect.  Respiratory: breathing non-labored  Cardiovascular: The extremities are warm and perfused.  Skin: no obvious rashes or lesions.  Musculoskeletal, Neurologic, and Spine:   Cervical spine:    Appearance -no gross step-offs, kyphosis.    Motor -     C5: Deltoids R 5/5 and L \4/5 strength    C6: Biceps R 5/5 and L 4/5 strength     C7: Triceps R 5/5 and L 5/5 strength     C8:  R 5/5 and L 5/5 strength     T1: Dorsal interossei R 4/5 and L 4/5 strength    The patient does have bilateral hand atrophy.  He has significant weakness in his rotator cuff with a positive drop arm test.  He uses his right hand to position his left hand in space.      Sensation: intact to light touch in C5-T1      Special Tests -      Lhermitte's Test - Negative     Spurling's Test - Negative      Richardson's Test - Negative       Lumbar Spine:    Appearance - No gross stepoffs or deformities    Motor -     L2-3: Hip flexion 5/5 R and 5/5 L strength          L3/4:  Knee extension R 5/5 and L 5/5 strength         L4/5:  Foot dorsiflexion R 5/5 L 5/5 and       EHL dorsiflexion R 4/5 L 4/5 strength         S1:  Plantarflexion/Peroneal Muscles  R 5/5 and L 5/5 strength    Sensation: intact to light touch L3-S1 distribution BLE      Neurologic:      REFLEXES Left Right   Biceps 1+ 1+   Triceps 1+ 1+   Brachioradialis 1+ 1+   Patella 1+ 1+   Ankle jerk 1+ 1+   Babinski No upgoing great toe No upgoing great toe   Clonus 0 beats 0 beats     Hip Exam:  No pain with hip log roll and no tenderness over the greater trochanters.    Alignment:  Patient stands with a neutral standing sagittal balance.    The right knee is swollen and tender to " palpation.  He lacks full extension.           Imaging:   We ordered and independently reviewed new radiographs at this clinic visit. The results were discussed with the patient.  Findings include:    X-ray cervical spine: My personal interpretation, spondylolisthesis of C3 on 4 and C4 on 5 that reduces with extension.    MRI cervical spine: My personal interpretation, there is degenerative changes throughout the cervical spine with spondylolisthesis of C3 on 4 and C4 on 5.  There is severe central and neuroforaminal stenosis at these levels.             Assessment and Plan:   Assessment:  54 year old male with a complex medical history with signs and symptoms concerning for cervical myelopathy.  We did discuss the natural history of cervical myelopathy and the surgical indications.  However at this time we are concerned that the patient may have a septic right knee due to previous aspirate findings and continued pain and swelling.  As such we felt that the patient should have a right knee aspiration.  We discussed the risk, benefits, nature of the procedure in detail.  Verbal consent was obtained.  Will also send the patient for new labs including CBC CMP, CRP, ESR.    Procedure: Right knee aspiration  The right knee was prepped and draped in sterile fashion.  An 18-gauge needle was introduced into the patient's right knee.  I removed approximately 60 cc of cloudy yellow fluid concerning for infection.  The needle was removed and a sterile bandage was replaced.  EBL: Minimal  Complications: None     Plan:  Right knee aspiration  CBC's, CMP, ESR, CRP  Possible right knee irrigation debridement    The patient was seen and plan was developed under supervision of my attending, Dr. Dejon Espinoza              Attestation signed by Dejon Espinoza MD at 7/11/2024  1:46 PM (Updated):  Long Mayfield presents following referral from Dr. Garcia ([facility]) for evaluation of cervical myelopathy.  He also has several  months of right knee pain with obvious effusion.  Unfortunately, patient previously had arthrocentesis of right knee highly suggestive of infection which was not addressed. He has CRP > 100 consistent with active infection.  We performed repeat arthrocentesis with >65,000 nucleated cells and 93% PMN consistent with septic arthritis.  I counseled patient on need for irrigation and debridement and have added him for irrigation and debridement of the right knee. He also has multiple metabolic abnormalities on his labs which will need to be addressed by internal medicine prior to addressing his cervical stenosis.  He has myelopathy and profound weakness of the left upper extremity and will need decompression and fusion after he is optimized from medical perspective.     I personally examined patient and counseled him regarding both issues.  I agree with the documentation by Dr. Keyes.     Time spent on this clinical encounter including previsit chart review, history and physical examination, patient counseling and documentation was 30 minutes on the date of encounter.    Dejon Espinoza MD  , Department of Orthopedic Surgery  [facility]

## 2024-07-10 NOTE — NURSING NOTE
"Reason For Visit:   Chief Complaint   Patient presents with    Consult      neck & (B) shoulders, surgical consult 2nd opinion /  ??? Jose @ [facility]        Primary MD: Ana Maria Blakely  Ref. MD: Dr. Jose WHALEYO    ?  No  Occupation: Disability.    Date of injury: No  Type of injury: on going Chronic.    Date of surgery: No  Type of surgery: No.    Smoker: No  Request smoking cessation information: No    Ht 1.626 m (5' 4\")   Wt 57.2 kg (126 lb)   BMI 21.63 kg/m      Pain Assessment  Patient Currently in Pain: Yes  0-10 Pain Scale: 8  Primary Pain Location: Neck    Oswestry (ARDEN) Questionnaire        7/10/2024     9:59 AM   OSWESTRY DISABILITY INDEX   Count 10   Sum 25   Oswestry Score (%) 50 %            Neck Disability Index (NDI) Questionnaire        7/10/2024    10:09 AM   Neck Disability Index (NDI)   Neck Disability Index: Count 9   NDI: Total Score = SUM (points for all 10 findings) Incomplete   Neck Disability in Percent = (Total Score) / 50 * 100 Incomplete              Visual Analog Pain Scale  Back Pain Scale 0-10: 0  Right leg pain: 0  Left leg pain: 0  Neck Pain Scale 0-10: 8  Right arm pain: 6.5  Left arm pain: 8    Promis 10 Assessment         No data to display                         Racquel Crandall LPN   "

## 2024-07-11 ENCOUNTER — ANESTHESIA EVENT (OUTPATIENT)
Dept: SURGERY | Facility: CLINIC | Age: 54
End: 2024-07-11
Payer: COMMERCIAL

## 2024-07-11 ENCOUNTER — HOSPITAL ENCOUNTER (INPATIENT)
Facility: CLINIC | Age: 54
LOS: 4 days | Discharge: NURSING, REHAB OR LONG-TERM CARE | End: 2024-07-17
Attending: ORTHOPAEDIC SURGERY | Admitting: ORTHOPAEDIC SURGERY
Payer: COMMERCIAL

## 2024-07-11 ENCOUNTER — ANESTHESIA (OUTPATIENT)
Dept: SURGERY | Facility: CLINIC | Age: 54
End: 2024-07-11
Payer: COMMERCIAL

## 2024-07-11 ENCOUNTER — TELEPHONE (OUTPATIENT)
Dept: ORTHOPEDICS | Facility: CLINIC | Age: 54
End: 2024-07-11
Payer: COMMERCIAL

## 2024-07-11 DIAGNOSIS — M25.561 ACUTE PAIN OF RIGHT KNEE: ICD-10-CM

## 2024-07-11 DIAGNOSIS — Z98.890 S/P DEBRIDEMENT: Primary | ICD-10-CM

## 2024-07-11 DIAGNOSIS — M00.9 PYOGENIC ARTHRITIS OF RIGHT KNEE JOINT, DUE TO UNSPECIFIED ORGANISM (H): ICD-10-CM

## 2024-07-11 PROBLEM — M54.12 CERVICAL RADICULOPATHY: Status: ACTIVE | Noted: 2024-07-11

## 2024-07-11 PROBLEM — E11.29 TYPE 2 DIABETES MELLITUS WITH KIDNEY COMPLICATION, WITH LONG-TERM CURRENT USE OF INSULIN (H): Status: ACTIVE | Noted: 2018-10-05

## 2024-07-11 LAB
ABO/RH(D): NORMAL
ANTIBODY SCREEN: NEGATIVE
BACTERIA SPEC CULT: NORMAL
GLUCOSE BLDC GLUCOMTR-MCNC: 148 MG/DL (ref 70–99)
GLUCOSE BLDC GLUCOMTR-MCNC: 189 MG/DL (ref 70–99)
GLUCOSE BLDC GLUCOMTR-MCNC: 290 MG/DL (ref 70–99)
GRAM STAIN RESULT: NORMAL
SPECIMEN EXPIRATION DATE: NORMAL

## 2024-07-11 PROCEDURE — 272N000001 HC OR GENERAL SUPPLY STERILE: Performed by: ORTHOPAEDIC SURGERY

## 2024-07-11 PROCEDURE — 99254 IP/OBS CNSLTJ NEW/EST MOD 60: CPT

## 2024-07-11 PROCEDURE — 250N000009 HC RX 250

## 2024-07-11 PROCEDURE — 360N000076 HC SURGERY LEVEL 3, PER MIN: Performed by: ORTHOPAEDIC SURGERY

## 2024-07-11 PROCEDURE — 87801 DETECT AGNT MULT DNA AMPLI: CPT | Performed by: INTERNAL MEDICINE

## 2024-07-11 PROCEDURE — 250N000013 HC RX MED GY IP 250 OP 250 PS 637: Performed by: STUDENT IN AN ORGANIZED HEALTH CARE EDUCATION/TRAINING PROGRAM

## 2024-07-11 PROCEDURE — 250N000025 HC SEVOFLURANE, PER MIN: Performed by: ORTHOPAEDIC SURGERY

## 2024-07-11 PROCEDURE — 250N000011 HC RX IP 250 OP 636: Performed by: ORTHOPAEDIC SURGERY

## 2024-07-11 PROCEDURE — 87205 SMEAR GRAM STAIN: CPT | Performed by: ORTHOPAEDIC SURGERY

## 2024-07-11 PROCEDURE — 250N000013 HC RX MED GY IP 250 OP 250 PS 637: Performed by: ORTHOPAEDIC SURGERY

## 2024-07-11 PROCEDURE — 87075 CULTR BACTERIA EXCEPT BLOOD: CPT | Performed by: ORTHOPAEDIC SURGERY

## 2024-07-11 PROCEDURE — 27347 REMOVE KNEE CYST: CPT | Performed by: ANESTHESIOLOGY

## 2024-07-11 PROCEDURE — 250N000009 HC RX 250: Performed by: ORTHOPAEDIC SURGERY

## 2024-07-11 PROCEDURE — 36415 COLL VENOUS BLD VENIPUNCTURE: CPT | Performed by: STUDENT IN AN ORGANIZED HEALTH CARE EDUCATION/TRAINING PROGRAM

## 2024-07-11 PROCEDURE — 250N000013 HC RX MED GY IP 250 OP 250 PS 637

## 2024-07-11 PROCEDURE — 370N000017 HC ANESTHESIA TECHNICAL FEE, PER MIN: Performed by: ORTHOPAEDIC SURGERY

## 2024-07-11 PROCEDURE — 250N000011 HC RX IP 250 OP 636

## 2024-07-11 PROCEDURE — 27347 REMOVE KNEE CYST: CPT

## 2024-07-11 PROCEDURE — 0SBC0ZZ EXCISION OF RIGHT KNEE JOINT, OPEN APPROACH: ICD-10-PCS | Performed by: ORTHOPAEDIC SURGERY

## 2024-07-11 PROCEDURE — 999N000141 HC STATISTIC PRE-PROCEDURE NURSING ASSESSMENT: Performed by: ORTHOPAEDIC SURGERY

## 2024-07-11 PROCEDURE — 258N000001 HC RX 258: Performed by: ORTHOPAEDIC SURGERY

## 2024-07-11 PROCEDURE — 87102 FUNGUS ISOLATION CULTURE: CPT | Performed by: ORTHOPAEDIC SURGERY

## 2024-07-11 PROCEDURE — 258N000003 HC RX IP 258 OP 636

## 2024-07-11 PROCEDURE — 710N000010 HC RECOVERY PHASE 1, LEVEL 2, PER MIN: Performed by: ORTHOPAEDIC SURGERY

## 2024-07-11 PROCEDURE — 258N000003 HC RX IP 258 OP 636: Performed by: STUDENT IN AN ORGANIZED HEALTH CARE EDUCATION/TRAINING PROGRAM

## 2024-07-11 PROCEDURE — 87070 CULTURE OTHR SPECIMN AEROBIC: CPT | Performed by: ORTHOPAEDIC SURGERY

## 2024-07-11 PROCEDURE — 120N000002 HC R&B MED SURG/OB UMMC

## 2024-07-11 PROCEDURE — 86900 BLOOD TYPING SEROLOGIC ABO: CPT | Performed by: STUDENT IN AN ORGANIZED HEALTH CARE EDUCATION/TRAINING PROGRAM

## 2024-07-11 RX ORDER — LIDOCAINE HYDROCHLORIDE 20 MG/ML
INJECTION, SOLUTION INFILTRATION; PERINEURAL PRN
Status: DISCONTINUED | OUTPATIENT
Start: 2024-07-11 | End: 2024-07-11

## 2024-07-11 RX ORDER — OXYCODONE HYDROCHLORIDE 5 MG/1
5 TABLET ORAL
Status: COMPLETED | OUTPATIENT
Start: 2024-07-11 | End: 2024-07-11

## 2024-07-11 RX ORDER — ONDANSETRON 2 MG/ML
4 INJECTION INTRAMUSCULAR; INTRAVENOUS EVERY 30 MIN PRN
Status: DISCONTINUED | OUTPATIENT
Start: 2024-07-11 | End: 2024-07-11 | Stop reason: HOSPADM

## 2024-07-11 RX ORDER — FENTANYL CITRATE 50 UG/ML
INJECTION, SOLUTION INTRAMUSCULAR; INTRAVENOUS PRN
Status: DISCONTINUED | OUTPATIENT
Start: 2024-07-11 | End: 2024-07-11

## 2024-07-11 RX ORDER — HYDRALAZINE HYDROCHLORIDE 20 MG/ML
2.5-5 INJECTION INTRAMUSCULAR; INTRAVENOUS EVERY 10 MIN PRN
Status: DISCONTINUED | OUTPATIENT
Start: 2024-07-11 | End: 2024-07-11 | Stop reason: HOSPADM

## 2024-07-11 RX ORDER — ONDANSETRON 2 MG/ML
INJECTION INTRAMUSCULAR; INTRAVENOUS PRN
Status: DISCONTINUED | OUTPATIENT
Start: 2024-07-11 | End: 2024-07-11

## 2024-07-11 RX ORDER — NALOXONE HYDROCHLORIDE 0.4 MG/ML
0.2 INJECTION, SOLUTION INTRAMUSCULAR; INTRAVENOUS; SUBCUTANEOUS
Status: DISCONTINUED | OUTPATIENT
Start: 2024-07-11 | End: 2024-07-17 | Stop reason: HOSPADM

## 2024-07-11 RX ORDER — ASPIRIN 81 MG/1
81 TABLET ORAL DAILY
Status: DISCONTINUED | OUTPATIENT
Start: 2024-07-12 | End: 2024-07-17 | Stop reason: HOSPADM

## 2024-07-11 RX ORDER — HYDRALAZINE HYDROCHLORIDE 25 MG/1
25 TABLET, FILM COATED ORAL 2 TIMES DAILY
Status: DISCONTINUED | OUTPATIENT
Start: 2024-07-11 | End: 2024-07-17 | Stop reason: HOSPADM

## 2024-07-11 RX ORDER — METHOCARBAMOL 500 MG/1
500 TABLET, FILM COATED ORAL EVERY 8 HOURS PRN
Status: DISCONTINUED | OUTPATIENT
Start: 2024-07-11 | End: 2024-07-17 | Stop reason: HOSPADM

## 2024-07-11 RX ORDER — NALOXONE HYDROCHLORIDE 0.4 MG/ML
0.4 INJECTION, SOLUTION INTRAMUSCULAR; INTRAVENOUS; SUBCUTANEOUS
Status: DISCONTINUED | OUTPATIENT
Start: 2024-07-11 | End: 2024-07-17 | Stop reason: HOSPADM

## 2024-07-11 RX ORDER — NICOTINE POLACRILEX 4 MG
15-30 LOZENGE BUCCAL
Status: DISCONTINUED | OUTPATIENT
Start: 2024-07-11 | End: 2024-07-12

## 2024-07-11 RX ORDER — ONDANSETRON 4 MG/1
4 TABLET, ORALLY DISINTEGRATING ORAL EVERY 30 MIN PRN
Status: DISCONTINUED | OUTPATIENT
Start: 2024-07-11 | End: 2024-07-11 | Stop reason: HOSPADM

## 2024-07-11 RX ORDER — TRANEXAMIC ACID 650 MG/1
1950 TABLET ORAL ONCE
Status: COMPLETED | OUTPATIENT
Start: 2024-07-11 | End: 2024-07-11

## 2024-07-11 RX ORDER — DEXTROSE MONOHYDRATE 25 G/50ML
25-50 INJECTION, SOLUTION INTRAVENOUS
Status: DISCONTINUED | OUTPATIENT
Start: 2024-07-11 | End: 2024-07-12

## 2024-07-11 RX ORDER — CEFAZOLIN SODIUM/WATER 2 G/20 ML
2 SYRINGE (ML) INTRAVENOUS SEE ADMIN INSTRUCTIONS
Status: DISCONTINUED | OUTPATIENT
Start: 2024-07-11 | End: 2024-07-11 | Stop reason: HOSPADM

## 2024-07-11 RX ORDER — GABAPENTIN 100 MG/1
100 CAPSULE ORAL 3 TIMES DAILY
Status: DISCONTINUED | OUTPATIENT
Start: 2024-07-11 | End: 2024-07-17 | Stop reason: HOSPADM

## 2024-07-11 RX ORDER — FENTANYL CITRATE 50 UG/ML
50 INJECTION, SOLUTION INTRAMUSCULAR; INTRAVENOUS EVERY 5 MIN PRN
Status: DISCONTINUED | OUTPATIENT
Start: 2024-07-11 | End: 2024-07-11 | Stop reason: HOSPADM

## 2024-07-11 RX ORDER — CETIRIZINE HYDROCHLORIDE 10 MG/1
10 TABLET ORAL DAILY
Status: DISCONTINUED | OUTPATIENT
Start: 2024-07-12 | End: 2024-07-17 | Stop reason: HOSPADM

## 2024-07-11 RX ORDER — PANTOPRAZOLE SODIUM 40 MG/1
40 TABLET, DELAYED RELEASE ORAL DAILY
Status: DISCONTINUED | OUTPATIENT
Start: 2024-07-12 | End: 2024-07-17 | Stop reason: HOSPADM

## 2024-07-11 RX ORDER — TOBRAMYCIN 1.2 G/30ML
INJECTION, POWDER, LYOPHILIZED, FOR SOLUTION INTRAVENOUS PRN
Status: DISCONTINUED | OUTPATIENT
Start: 2024-07-11 | End: 2024-07-11 | Stop reason: HOSPADM

## 2024-07-11 RX ORDER — LOPERAMIDE HCL 2 MG
4 CAPSULE ORAL 3 TIMES DAILY PRN
Status: DISCONTINUED | OUTPATIENT
Start: 2024-07-11 | End: 2024-07-17 | Stop reason: HOSPADM

## 2024-07-11 RX ORDER — BUMETANIDE 1 MG/1
1 TABLET ORAL DAILY
Status: DISCONTINUED | OUTPATIENT
Start: 2024-07-12 | End: 2024-07-17 | Stop reason: HOSPADM

## 2024-07-11 RX ORDER — PROPOFOL 10 MG/ML
INJECTION, EMULSION INTRAVENOUS PRN
Status: DISCONTINUED | OUTPATIENT
Start: 2024-07-11 | End: 2024-07-11

## 2024-07-11 RX ORDER — SODIUM CHLORIDE, SODIUM LACTATE, POTASSIUM CHLORIDE, CALCIUM CHLORIDE 600; 310; 30; 20 MG/100ML; MG/100ML; MG/100ML; MG/100ML
INJECTION, SOLUTION INTRAVENOUS CONTINUOUS
Status: DISCONTINUED | OUTPATIENT
Start: 2024-07-11 | End: 2024-07-11 | Stop reason: HOSPADM

## 2024-07-11 RX ORDER — MAGNESIUM HYDROXIDE 1200 MG/15ML
LIQUID ORAL PRN
Status: DISCONTINUED | OUTPATIENT
Start: 2024-07-11 | End: 2024-07-11 | Stop reason: HOSPADM

## 2024-07-11 RX ORDER — FENTANYL CITRATE 50 UG/ML
25 INJECTION, SOLUTION INTRAMUSCULAR; INTRAVENOUS EVERY 5 MIN PRN
Status: DISCONTINUED | OUTPATIENT
Start: 2024-07-11 | End: 2024-07-11 | Stop reason: HOSPADM

## 2024-07-11 RX ORDER — HALOPERIDOL 5 MG/ML
1 INJECTION INTRAMUSCULAR
Status: DISCONTINUED | OUTPATIENT
Start: 2024-07-11 | End: 2024-07-11 | Stop reason: HOSPADM

## 2024-07-11 RX ORDER — MULTIPLE VITAMINS W/ MINERALS TAB 9MG-400MCG
1 TAB ORAL DAILY
Status: DISCONTINUED | OUTPATIENT
Start: 2024-07-12 | End: 2024-07-17 | Stop reason: HOSPADM

## 2024-07-11 RX ORDER — DEXAMETHASONE SODIUM PHOSPHATE 4 MG/ML
4 INJECTION, SOLUTION INTRA-ARTICULAR; INTRALESIONAL; INTRAMUSCULAR; INTRAVENOUS; SOFT TISSUE
Status: DISCONTINUED | OUTPATIENT
Start: 2024-07-11 | End: 2024-07-11 | Stop reason: HOSPADM

## 2024-07-11 RX ORDER — ACETAMINOPHEN 325 MG/1
975 TABLET ORAL ONCE
Status: COMPLETED | OUTPATIENT
Start: 2024-07-11 | End: 2024-07-11

## 2024-07-11 RX ORDER — LABETALOL HYDROCHLORIDE 5 MG/ML
10 INJECTION, SOLUTION INTRAVENOUS
Status: DISCONTINUED | OUTPATIENT
Start: 2024-07-11 | End: 2024-07-11 | Stop reason: HOSPADM

## 2024-07-11 RX ORDER — ACETAMINOPHEN 325 MG/1
975 TABLET ORAL ONCE
Status: DISCONTINUED | OUTPATIENT
Start: 2024-07-11 | End: 2024-07-11 | Stop reason: HOSPADM

## 2024-07-11 RX ORDER — HYDROMORPHONE HYDROCHLORIDE 1 MG/ML
0.4 INJECTION, SOLUTION INTRAMUSCULAR; INTRAVENOUS; SUBCUTANEOUS EVERY 5 MIN PRN
Status: DISCONTINUED | OUTPATIENT
Start: 2024-07-11 | End: 2024-07-11 | Stop reason: HOSPADM

## 2024-07-11 RX ORDER — KETAMINE HYDROCHLORIDE 10 MG/ML
INJECTION INTRAMUSCULAR; INTRAVENOUS PRN
Status: DISCONTINUED | OUTPATIENT
Start: 2024-07-11 | End: 2024-07-11

## 2024-07-11 RX ORDER — NALOXONE HYDROCHLORIDE 0.4 MG/ML
0.1 INJECTION, SOLUTION INTRAMUSCULAR; INTRAVENOUS; SUBCUTANEOUS
Status: DISCONTINUED | OUTPATIENT
Start: 2024-07-11 | End: 2024-07-11 | Stop reason: HOSPADM

## 2024-07-11 RX ORDER — LIDOCAINE 40 MG/G
CREAM TOPICAL
Status: DISCONTINUED | OUTPATIENT
Start: 2024-07-11 | End: 2024-07-11 | Stop reason: HOSPADM

## 2024-07-11 RX ORDER — AMLODIPINE BESYLATE 5 MG/1
5 TABLET ORAL DAILY
Status: DISCONTINUED | OUTPATIENT
Start: 2024-07-12 | End: 2024-07-17 | Stop reason: HOSPADM

## 2024-07-11 RX ORDER — EPHEDRINE SULFATE 50 MG/ML
INJECTION, SOLUTION INTRAMUSCULAR; INTRAVENOUS; SUBCUTANEOUS PRN
Status: DISCONTINUED | OUTPATIENT
Start: 2024-07-11 | End: 2024-07-11

## 2024-07-11 RX ORDER — HYDROMORPHONE HYDROCHLORIDE 1 MG/ML
0.2 INJECTION, SOLUTION INTRAMUSCULAR; INTRAVENOUS; SUBCUTANEOUS EVERY 5 MIN PRN
Status: DISCONTINUED | OUTPATIENT
Start: 2024-07-11 | End: 2024-07-11 | Stop reason: HOSPADM

## 2024-07-11 RX ORDER — CEFAZOLIN SODIUM/WATER 2 G/20 ML
2 SYRINGE (ML) INTRAVENOUS
Status: COMPLETED | OUTPATIENT
Start: 2024-07-11 | End: 2024-07-11

## 2024-07-11 RX ORDER — DULOXETIN HYDROCHLORIDE 60 MG/1
60 CAPSULE, DELAYED RELEASE ORAL DAILY
Status: DISCONTINUED | OUTPATIENT
Start: 2024-07-12 | End: 2024-07-17 | Stop reason: HOSPADM

## 2024-07-11 RX ADMIN — Medication 2 G: at 18:26

## 2024-07-11 RX ADMIN — PHENYLEPHRINE HYDROCHLORIDE 100 MCG: 10 INJECTION INTRAVENOUS at 18:27

## 2024-07-11 RX ADMIN — EPHEDRINE SULFATE 5 MG: 5 INJECTION INTRAVENOUS at 18:58

## 2024-07-11 RX ADMIN — EPHEDRINE SULFATE 5 MG: 5 INJECTION INTRAVENOUS at 18:48

## 2024-07-11 RX ADMIN — MIDAZOLAM 2 MG: 1 INJECTION INTRAMUSCULAR; INTRAVENOUS at 18:25

## 2024-07-11 RX ADMIN — FENTANYL CITRATE 100 MCG: 50 INJECTION INTRAMUSCULAR; INTRAVENOUS at 18:26

## 2024-07-11 RX ADMIN — PROPOFOL 150 MG: 10 INJECTION, EMULSION INTRAVENOUS at 18:27

## 2024-07-11 RX ADMIN — Medication 25 MG: at 18:44

## 2024-07-11 RX ADMIN — ONDANSETRON 4 MG: 2 INJECTION INTRAMUSCULAR; INTRAVENOUS at 19:21

## 2024-07-11 RX ADMIN — SUGAMMADEX 200 MG: 100 INJECTION, SOLUTION INTRAVENOUS at 19:35

## 2024-07-11 RX ADMIN — EPHEDRINE SULFATE 5 MG: 5 INJECTION INTRAVENOUS at 19:33

## 2024-07-11 RX ADMIN — OXYCODONE HYDROCHLORIDE 5 MG: 5 TABLET ORAL at 23:52

## 2024-07-11 RX ADMIN — PROPOFOL 50 MG: 10 INJECTION, EMULSION INTRAVENOUS at 18:25

## 2024-07-11 RX ADMIN — PHENYLEPHRINE HYDROCHLORIDE 50 MCG: 10 INJECTION INTRAVENOUS at 19:18

## 2024-07-11 RX ADMIN — SODIUM CHLORIDE, POTASSIUM CHLORIDE, SODIUM LACTATE AND CALCIUM CHLORIDE: 600; 310; 30; 20 INJECTION, SOLUTION INTRAVENOUS at 18:10

## 2024-07-11 RX ADMIN — LIDOCAINE HYDROCHLORIDE 80 MG: 20 INJECTION, SOLUTION INFILTRATION; PERINEURAL at 18:26

## 2024-07-11 RX ADMIN — HYDRALAZINE HYDROCHLORIDE 25 MG: 25 TABLET ORAL at 23:17

## 2024-07-11 RX ADMIN — GABAPENTIN 100 MG: 100 CAPSULE ORAL at 23:17

## 2024-07-11 RX ADMIN — ACETAMINOPHEN 975 MG: 325 TABLET, FILM COATED ORAL at 17:54

## 2024-07-11 RX ADMIN — TRANEXAMIC ACID 1950 MG: 650 TABLET ORAL at 17:44

## 2024-07-11 RX ADMIN — Medication 50 MG: at 18:27

## 2024-07-11 RX ADMIN — EPHEDRINE SULFATE 5 MG: 5 INJECTION INTRAVENOUS at 18:50

## 2024-07-11 RX ADMIN — EPHEDRINE SULFATE 5 MG: 5 INJECTION INTRAVENOUS at 19:16

## 2024-07-11 ASSESSMENT — ACTIVITIES OF DAILY LIVING (ADL)
ADLS_ACUITY_SCORE: 38
ADLS_ACUITY_SCORE: 39
ADLS_ACUITY_SCORE: 38
ADLS_ACUITY_SCORE: 38
ADLS_ACUITY_SCORE: 35

## 2024-07-11 ASSESSMENT — COPD QUESTIONNAIRES: COPD: 0

## 2024-07-11 ASSESSMENT — LIFESTYLE VARIABLES: TOBACCO_USE: 0

## 2024-07-11 NOTE — ANESTHESIA PREPROCEDURE EVALUATION
Anesthesia Pre-Procedure Evaluation    Patient: Long Mayfield   MRN: [id number] : 1970        Procedure : Procedure(s):  Irrigation and debridement knee          Past Medical History:   Diagnosis Date     Allergic rhinitis      Anemia      Arthritis      Bell's palsy      Cervicalgia      Diabetes (H)      Diabetic foot ulcer (H)      Generalized edema      Hyperkalemia      Hypertension      Hypo-osmolality and hyponatremia      Major depressive disorder, recurrent episode, mild (H24)      Other acute osteomyelitis, right ankle and foot (H)      Other chronic pancreatitis (H)      Other polyosteoarthritis      Solitary pulmonary nodule       Past Surgical History:   Procedure Laterality Date     COLONOSCOPY N/A 2024    Procedure: Colonoscopy;  Surgeon: Nina Moya MD;  Location:  GI     ESOPHAGOSCOPY, GASTROSCOPY, DUODENOSCOPY (EGD), COMBINED N/A 2024    Procedure: Esophagoscopy, gastroscopy, duodenoscopy (EGD), combined;  Surgeon: Nina Moya MD;  Location:  GI     ESOPHAGOSCOPY, GASTROSCOPY, DUODENOSCOPY (EGD), COMBINED N/A 2024    Procedure: Esophagoscopy, gastroscopy, duodenoscopy (EGD), combined;  Surgeon: Nina Moya MD;  Location:  GI     ORTHOPEDIC SURGERY       partial amputation of right foot Right      PATELLA SURGERY        Allergies   Allergen Reactions     Vancomycin      Other Reaction(s): Renal Failure    Vancomycin-induced nephrotoxicity (2023, outside hospital)     Seasonal Allergies      HAYFEVER:    -Watery Eyes  -Eye burn     Daptomycin Rash     Likely delayed hypersensitivity reaction to daptomycin 2023      Social History     Tobacco Use     Smoking status: Never     Smokeless tobacco: Never   Substance Use Topics     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      Wt Readings from Last 1 Encounters:   24 57.2 kg (126 lb 1.7 oz)        Anesthesia Evaluation   Pt has had prior anesthetic. Type: General  and MAC.    No history of anesthetic complications       ROS/MED HX  ENT/Pulmonary: Comment: LITTLE risk noted due to nighttime desaturations    (+) sleep apnea,                                    (-) tobacco use, asthma, COPD, LITTLE risk factors and recent URI   Neurologic:    (-) no CVA and no TIA   Cardiovascular:     (+) Dyslipidemia hypertension- -   -  - -                                 Previous cardiac testing   Echo: Date: 5/5/24 Results:  1. Normal biventricular size and function. Left ventricular ejection fraction  of 60-65%.  2. No segmental wall motion abnormalities noted.  3. No hemodynamically significant valvular disease.  No prior study for comparison. Technically adequate study.    Stress Test:  Date: Results:    ECG Reviewed:  Date: Results:    Cath:  Date: Results:   (-) pacemaker, stent, pacemaker and ICD   METS/Exercise Tolerance: 3 - Able to walk 1-2 blocks without stopping    Hematologic:     (+)      anemia,          Musculoskeletal: Comment: Osteomyelitis R ankle and foot  (+)  arthritis,             GI/Hepatic: Comment: Gastritis  Pancreatic insufficiency    (+) GERD,                (-) liver disease   Renal/Genitourinary:     (+) renal disease, type: CRI,            Endo:     (+)  type II DM,   Using insulin,                 Psychiatric/Substance Use:     (+) psychiatric history depression alcohol abuse H/O chronic opiod use .     Infectious Disease:  - neg infectious disease ROS     Malignancy:  - neg malignancy ROS     Other:      (+)  , H/O Chronic Pain,         Physical Exam    Airway        Mallampati: II   TM distance: > 3 FB   Neck ROM: full   Mouth opening: > 3 cm    Respiratory Devices and Support         Dental       (+) Minor Abnormalities - some fillings, tiny chips      Cardiovascular   cardiovascular exam normal          Pulmonary   pulmonary exam normal              OUTSIDE LABS:  CBC:   Lab Results   Component Value Date    WBC 10.4 07/10/2024    WBC 8.6 05/05/2024    HGB 8.3  "(L) 07/10/2024    HGB 8.4 (L) 05/07/2024    HCT 26.9 (L) 07/10/2024    HCT 30.1 (L) 05/05/2024     07/10/2024     05/05/2024     BMP:   Lab Results   Component Value Date     (L) 07/10/2024     (L) 05/07/2024    POTASSIUM 5.4 (H) 07/10/2024    POTASSIUM 4.5 05/07/2024    CHLORIDE 98 07/10/2024    CHLORIDE 99 05/07/2024    CO2 26 07/10/2024    CO2 23 05/07/2024    BUN 26.0 (H) 07/10/2024    BUN 13.2 05/07/2024    CR 1.61 (H) 07/10/2024    CR 1.9 (H) 07/09/2024     (H) 07/11/2024     (H) 07/10/2024     COAGS: No results found for: \"PTT\", \"INR\", \"FIBR\"  POC:   Lab Results   Component Value Date     (H) 10/08/2017     HEPATIC:   Lab Results   Component Value Date    ALBUMIN 3.5 07/10/2024    PROTTOTAL 7.9 07/10/2024    ALT 14 07/10/2024    AST 17 07/10/2024    ALKPHOS 188 (H) 07/10/2024    BILITOTAL 0.2 07/10/2024     OTHER:   Lab Results   Component Value Date    PH 7.42 08/07/2022    LACT 3.4 (H) 08/07/2022    A1C 9.4 (H) 05/04/2024    AROLDO 9.1 07/10/2024    PHOS 2.7 05/06/2024    MAG 1.9 05/06/2024    LIPASE 6 (L) 01/23/2023    TSH 1.80 03/09/2022     (H) 07/10/2024       Anesthesia Plan    ASA Status:  3       Anesthesia Type: General.     - Airway: ETT   Induction: Intravenous.   Maintenance: Balanced.        Consents    Anesthesia Plan(s) and associated risks, benefits, and realistic alternatives discussed. Questions answered and patient/representative(s) expressed understanding.     - Discussed: Risks, Benefits and Alternatives for BOTH SEDATION and the PROCEDURE were discussed     - Discussed with:  Patient            Postoperative Care    Pain management: IV analgesics, Multi-modal analgesia.   PONV prophylaxis: Ondansetron (or other 5HT-3), Scopolamine patch     Comments:               Sharri Pires MD    I have reviewed the pertinent notes and labs in the chart from the past 30 days and (re)examined the patient.  Any updates or changes from those " notes are reflected in this note.    # Hyperkalemia: Highest K = 5.4 mmol/L in last 2 days, will monitor as appropriate  # Hyponatremia: Lowest Na = 131 mmol/L in last 30 days, will monitor as appropriate         # Drug Induced Platelet Defect: home medication list includes an antiplatelet medication  # DMII: A1C = 9.4 % (Ref range: <5.7 %) within past 6 months

## 2024-07-11 NOTE — TELEPHONE ENCOUNTER
RN called patient to discuss admission to hospital / surgery this afternoon. Patient talked to Dr. Espinoza and is going to the hospital here in a couple minutes per patient. He has had clear liquids until 3 and is NPO now.     Laura Hartley RN

## 2024-07-11 NOTE — LETTER
Transition Communication Hand-off for Care Transitions to Next Level of Care Provider    Name: Long Mayfield  : 1970  MRN #: [id number]  Primary Care Provider: Ana Maria Blakely     Primary Clinic: [address]  [place] [address]     Reason for Hospitalization:  Pyogenic arthritis of right knee joint, due to unspecified organism (H) [M00.9]  Admit Date/Time: 2024  4:54 PM  Discharge Date: 24  Payor Source: Payor: [employer] / Plan: [employer] AND [employer] / Product Type: Indemnity /          Reason for Communication Hand-off Referral: Other Continuity of care    Discharge Plan: Back to long term care facility       Concern for non-adherence with plan of care:   No  Discharge Needs Assessment:  Needs      Flowsheet Row Most Recent Value   Equipment Currently Used at Home hospital bed, grab bar, toilet, grab bar, tub/shower, cane, straight, raised toilet seat, shower chair, wheelchair, manual            Already enrolled in Tele-monitoring program and name of program:  N/A  Follow-up specialty is recommended: Yes      Follow-up plan:    Future Appointments   Date Time Provider Department Center   2024  1:00 PM Dejon Espinoza MD [facility]   2024  3:00 PM Edward Zuleta MD [facility]       Any outstanding tests or procedures:        Referrals       Future Labs/Procedures    Occupational Therapy Adult Consult     Comments:    Evaluate and treat as clinically indicated.    Reason: Status Post Knee Surgery    Physical Therapy Adult Consult     Comments:    Evaluate and treat as clinically indicated.    Reason: Status Post Knee Surgery              Key Recommendations:      Mellissa Jimenez, HAILEY  [phone]    AVS/Discharge Summary is the source of truth; this is a helpful guide for improved communication of patient story

## 2024-07-11 NOTE — INTERVAL H&P NOTE
I have reviewed the surgical (or preoperative) H&P that is linked to this encounter, and examined the patient. There are no significant changes    Clinical Conditions Present on Arrival:  Clinically Significant Risk Factors Present on Admission        # Hyperkalemia: Highest K = 5.4 mmol/L in last 2 days, will monitor as appropriate  # Hyponatremia: Lowest Na = 131 mmol/L in last 30 days, will monitor as appropriate         # Drug Induced Platelet Defect: home medication list includes an antiplatelet medication      # DMII: A1C = 9.4 % (Ref range: <5.7 %) within past 6 months

## 2024-07-12 PROBLEM — M25.561 ACUTE PAIN OF RIGHT KNEE: Status: ACTIVE | Noted: 2024-07-12

## 2024-07-12 LAB
ANION GAP SERPL CALCULATED.3IONS-SCNC: 9 MMOL/L (ref 7–15)
BASOPHILS # BLD AUTO: 0 10E3/UL (ref 0–0.2)
BASOPHILS NFR BLD AUTO: 1 %
BUN SERPL-MCNC: 29.4 MG/DL (ref 6–20)
CALCIUM SERPL-MCNC: 8.5 MG/DL (ref 8.6–10)
CHLORIDE SERPL-SCNC: 99 MMOL/L (ref 98–107)
CREAT SERPL-MCNC: 1.72 MG/DL (ref 0.67–1.17)
CRP SERPL-MCNC: 69.93 MG/L
DEPRECATED HCO3 PLAS-SCNC: 24 MMOL/L (ref 22–29)
EGFRCR SERPLBLD CKD-EPI 2021: 46 ML/MIN/1.73M2
EOSINOPHIL # BLD AUTO: 0.2 10E3/UL (ref 0–0.7)
EOSINOPHIL NFR BLD AUTO: 3 %
ERYTHROCYTE [DISTWIDTH] IN BLOOD BY AUTOMATED COUNT: 18.8 % (ref 10–15)
GLUCOSE BLDC GLUCOMTR-MCNC: 135 MG/DL (ref 70–99)
GLUCOSE BLDC GLUCOMTR-MCNC: 220 MG/DL (ref 70–99)
GLUCOSE BLDC GLUCOMTR-MCNC: 293 MG/DL (ref 70–99)
GLUCOSE BLDC GLUCOMTR-MCNC: 373 MG/DL (ref 70–99)
GLUCOSE BLDC GLUCOMTR-MCNC: 389 MG/DL (ref 70–99)
GLUCOSE BLDC GLUCOMTR-MCNC: 401 MG/DL (ref 70–99)
GLUCOSE BLDC GLUCOMTR-MCNC: 91 MG/DL (ref 70–99)
GLUCOSE SERPL-MCNC: 390 MG/DL (ref 70–99)
HBV SURFACE AG SERPL QL IA: NONREACTIVE
HCT VFR BLD AUTO: 23.4 % (ref 40–53)
HCV AB SERPL QL IA: NONREACTIVE
HGB BLD-MCNC: 7.3 G/DL (ref 13.3–17.7)
HIV 1+2 AB+HIV1 P24 AG SERPL QL IA: NONREACTIVE
IMM GRANULOCYTES # BLD: 0 10E3/UL
IMM GRANULOCYTES NFR BLD: 0 %
LYMPHOCYTES # BLD AUTO: 1.6 10E3/UL (ref 0.8–5.3)
LYMPHOCYTES NFR BLD AUTO: 21 %
MCH RBC QN AUTO: 25.1 PG (ref 26.5–33)
MCHC RBC AUTO-ENTMCNC: 31.2 G/DL (ref 31.5–36.5)
MCV RBC AUTO: 80 FL (ref 78–100)
MONOCYTES # BLD AUTO: 0.7 10E3/UL (ref 0–1.3)
MONOCYTES NFR BLD AUTO: 9 %
NEUTROPHILS # BLD AUTO: 4.9 10E3/UL (ref 1.6–8.3)
NEUTROPHILS NFR BLD AUTO: 66 %
NRBC # BLD AUTO: 0 10E3/UL
NRBC BLD AUTO-RTO: 0 /100
PLATELET # BLD AUTO: 448 10E3/UL (ref 150–450)
POTASSIUM SERPL-SCNC: 5.2 MMOL/L (ref 3.4–5.3)
RBC # BLD AUTO: 2.91 10E6/UL (ref 4.4–5.9)
SODIUM SERPL-SCNC: 132 MMOL/L (ref 135–145)
WBC # BLD AUTO: 7.5 10E3/UL (ref 4–11)

## 2024-07-12 PROCEDURE — 80048 BASIC METABOLIC PNL TOTAL CA: CPT | Performed by: ORTHOPAEDIC SURGERY

## 2024-07-12 PROCEDURE — 250N000012 HC RX MED GY IP 250 OP 636 PS 637

## 2024-07-12 PROCEDURE — 258N000003 HC RX IP 258 OP 636: Performed by: ORTHOPAEDIC SURGERY

## 2024-07-12 PROCEDURE — 250N000013 HC RX MED GY IP 250 OP 250 PS 637: Performed by: INTERNAL MEDICINE

## 2024-07-12 PROCEDURE — 250N000013 HC RX MED GY IP 250 OP 250 PS 637

## 2024-07-12 PROCEDURE — 87340 HEPATITIS B SURFACE AG IA: CPT | Performed by: INTERNAL MEDICINE

## 2024-07-12 PROCEDURE — 86140 C-REACTIVE PROTEIN: CPT | Performed by: ORTHOPAEDIC SURGERY

## 2024-07-12 PROCEDURE — 99232 SBSQ HOSP IP/OBS MODERATE 35: CPT | Performed by: INTERNAL MEDICINE

## 2024-07-12 PROCEDURE — 99255 IP/OBS CONSLTJ NEW/EST HI 80: CPT | Performed by: INTERNAL MEDICINE

## 2024-07-12 PROCEDURE — 86803 HEPATITIS C AB TEST: CPT | Performed by: INTERNAL MEDICINE

## 2024-07-12 PROCEDURE — 258N000003 HC RX IP 258 OP 636: Performed by: INTERNAL MEDICINE

## 2024-07-12 PROCEDURE — 250N000013 HC RX MED GY IP 250 OP 250 PS 637: Performed by: ORTHOPAEDIC SURGERY

## 2024-07-12 PROCEDURE — 86618 LYME DISEASE ANTIBODY: CPT | Performed by: INTERNAL MEDICINE

## 2024-07-12 PROCEDURE — G0378 HOSPITAL OBSERVATION PER HR: HCPCS

## 2024-07-12 PROCEDURE — 87081 CULTURE SCREEN ONLY: CPT | Performed by: INTERNAL MEDICINE

## 2024-07-12 PROCEDURE — 36415 COLL VENOUS BLD VENIPUNCTURE: CPT | Performed by: ORTHOPAEDIC SURGERY

## 2024-07-12 PROCEDURE — 85025 COMPLETE CBC W/AUTO DIFF WBC: CPT | Performed by: ORTHOPAEDIC SURGERY

## 2024-07-12 PROCEDURE — 82962 GLUCOSE BLOOD TEST: CPT

## 2024-07-12 PROCEDURE — 87389 HIV-1 AG W/HIV-1&-2 AB AG IA: CPT | Performed by: INTERNAL MEDICINE

## 2024-07-12 PROCEDURE — 250N000011 HC RX IP 250 OP 636: Performed by: INTERNAL MEDICINE

## 2024-07-12 RX ORDER — OXYCODONE HYDROCHLORIDE 5 MG/1
5 TABLET ORAL
Status: DISCONTINUED | OUTPATIENT
Start: 2024-07-12 | End: 2024-07-17 | Stop reason: HOSPADM

## 2024-07-12 RX ORDER — SODIUM CHLORIDE, SODIUM LACTATE, POTASSIUM CHLORIDE, CALCIUM CHLORIDE 600; 310; 30; 20 MG/100ML; MG/100ML; MG/100ML; MG/100ML
INJECTION, SOLUTION INTRAVENOUS CONTINUOUS
Status: DISCONTINUED | OUTPATIENT
Start: 2024-07-12 | End: 2024-07-15

## 2024-07-12 RX ORDER — CEFTRIAXONE 2 G/1
2 INJECTION, POWDER, FOR SOLUTION INTRAMUSCULAR; INTRAVENOUS EVERY 24 HOURS
Status: DISCONTINUED | OUTPATIENT
Start: 2024-07-12 | End: 2024-07-17 | Stop reason: HOSPADM

## 2024-07-12 RX ORDER — OXYCODONE HYDROCHLORIDE 5 MG/1
5 TABLET ORAL
Status: COMPLETED | OUTPATIENT
Start: 2024-07-12 | End: 2024-07-12

## 2024-07-12 RX ORDER — SODIUM CHLORIDE 9 MG/ML
INJECTION, SOLUTION INTRAVENOUS
Status: COMPLETED
Start: 2024-07-12 | End: 2024-07-12

## 2024-07-12 RX ORDER — DEXTROSE MONOHYDRATE 25 G/50ML
25-50 INJECTION, SOLUTION INTRAVENOUS
Status: DISCONTINUED | OUTPATIENT
Start: 2024-07-12 | End: 2024-07-17 | Stop reason: HOSPADM

## 2024-07-12 RX ORDER — NICOTINE POLACRILEX 4 MG
15-30 LOZENGE BUCCAL
Status: DISCONTINUED | OUTPATIENT
Start: 2024-07-12 | End: 2024-07-17 | Stop reason: HOSPADM

## 2024-07-12 RX ADMIN — SODIUM CHLORIDE, POTASSIUM CHLORIDE, SODIUM LACTATE AND CALCIUM CHLORIDE: 600; 310; 30; 20 INJECTION, SOLUTION INTRAVENOUS at 17:13

## 2024-07-12 RX ADMIN — CETIRIZINE HYDROCHLORIDE 10 MG: 10 TABLET, FILM COATED ORAL at 07:53

## 2024-07-12 RX ADMIN — OXYCODONE HYDROCHLORIDE 5 MG: 5 TABLET ORAL at 21:07

## 2024-07-12 RX ADMIN — AMLODIPINE BESYLATE 5 MG: 5 TABLET ORAL at 07:54

## 2024-07-12 RX ADMIN — METHOCARBAMOL 500 MG: 500 TABLET ORAL at 04:27

## 2024-07-12 RX ADMIN — HYDRALAZINE HYDROCHLORIDE 25 MG: 25 TABLET ORAL at 19:19

## 2024-07-12 RX ADMIN — LOPERAMIDE HYDROCHLORIDE 4 MG: 2 CAPSULE ORAL at 11:11

## 2024-07-12 RX ADMIN — CEFTRIAXONE SODIUM 2 G: 2 INJECTION, POWDER, FOR SOLUTION INTRAMUSCULAR; INTRAVENOUS at 12:03

## 2024-07-12 RX ADMIN — GABAPENTIN 100 MG: 100 CAPSULE ORAL at 07:54

## 2024-07-12 RX ADMIN — PANCRELIPASE 3 CAPSULE: 36000; 180000; 114000 CAPSULE, DELAYED RELEASE PELLETS ORAL at 08:44

## 2024-07-12 RX ADMIN — DULOXETINE HYDROCHLORIDE 60 MG: 60 CAPSULE, DELAYED RELEASE ORAL at 07:54

## 2024-07-12 RX ADMIN — INSULIN ASPART 3 UNITS: 100 INJECTION, SOLUTION INTRAVENOUS; SUBCUTANEOUS at 08:00

## 2024-07-12 RX ADMIN — OXYCODONE HYDROCHLORIDE 5 MG: 5 TABLET ORAL at 11:11

## 2024-07-12 RX ADMIN — GABAPENTIN 100 MG: 100 CAPSULE ORAL at 19:19

## 2024-07-12 RX ADMIN — PANTOPRAZOLE SODIUM 40 MG: 40 TABLET, DELAYED RELEASE ORAL at 07:54

## 2024-07-12 RX ADMIN — SODIUM CHLORIDE 500 ML: 9 INJECTION, SOLUTION INTRAVENOUS at 12:03

## 2024-07-12 RX ADMIN — Medication 1 TABLET: at 07:54

## 2024-07-12 RX ADMIN — PANCRELIPASE 3 CAPSULE: 36000; 180000; 114000 CAPSULE, DELAYED RELEASE PELLETS ORAL at 18:19

## 2024-07-12 RX ADMIN — HYDRALAZINE HYDROCHLORIDE 25 MG: 25 TABLET ORAL at 07:54

## 2024-07-12 RX ADMIN — ASPIRIN 81 MG: 81 TABLET, COATED ORAL at 07:54

## 2024-07-12 RX ADMIN — GABAPENTIN 100 MG: 100 CAPSULE ORAL at 14:58

## 2024-07-12 ASSESSMENT — ACTIVITIES OF DAILY LIVING (ADL)
ADLS_ACUITY_SCORE: 29

## 2024-07-12 NOTE — PLAN OF CARE
Goal Outcome Evaluation:          Pt A&Ox4. Pt denies chest pain, SOB, N/V, N/T. Pt states pain. Paged provider, Oxy ordered and given. Pt dressing CDI. JOSH output 30mL. Gave insulin per orders. Need to collect urine sample for pt, unable to this shift, pt aware to call. Continued IV ABX. Continue POC.

## 2024-07-12 NOTE — PLAN OF CARE
Goal Outcome Evaluation:      Plan of Care Reviewed With: patient    Overall Patient Progress: improvingOverall Patient Progress: improving    Outcome Evaluation: A&Ox4. C/o Chronic pain. PRN given per MAR. BM this shift. Up with assist of 1 walker/GB pivot to commode. Tolerating PO fluid and food. Blood sugar high this shift page provider. 1x correction dose given per MAR. JOSH drain with minimal out put. 15 ml this shift. Incontinent of bowel and bladder at times.     Continue to monitor POC.

## 2024-07-12 NOTE — PROGRESS NOTES
Hyperglycemic to 401mg/dL at 4am. This is after eating during the night.   - One-time 3 units insulin aspart now. RN to ensure that next correctional dose is at least 3 hours after this one is administered.   - Day team to review insulin regimen and adjust as indicated.     Wendy Cuevas MD  07/12/24  4:08 AM

## 2024-07-12 NOTE — ANESTHESIA PROCEDURE NOTES
Airway       Patient location during procedure: OR       Procedure Start/Stop Times: 7/11/2024 6:29 PM  Staff -        Anesthesiologist:  Sharri Castillo MD       CRNA: Malka Ross APRN CRNA       Performed By: CRNA  Consent for Airway        Urgency: elective  Indications and Patient Condition       Indications for airway management: luis daniel-procedural       Induction type:intravenous       Mask difficulty assessment: 0 - not attempted    Final Airway Details       Final airway type: endotracheal airway       Successful airway: ETT - single and Oral  Endotracheal Airway Details        ETT size (mm): 7.0       Cuffed: yes       Tracheal cuff pressure (cm H2O): 25       Successful intubation technique: video laryngoscopy       VL Blade Size: Glidescope 3       Grade View of Cords: 1       Adjucts: stylet       Position: Right       Measured from: gums/teeth       Secured at (cm): 22       Bite block used: None    Post intubation assessment        Placement verified by: capnometry, equal breath sounds and chest rise        Number of attempts at approach: 1       Number of other approaches attempted: 0       Secured with: tape       Ease of procedure: easy       Dentition: Intact and Unchanged    Medication(s) Administered   Medication Administration Time: 7/11/2024 6:29 PM

## 2024-07-12 NOTE — OR NURSING
"PACU to Inpatient Nursing Handoff    Patient Long Mayfield is a 54 year old male who speaks English.   Procedure Procedure(s):  Irrigation and debridement knee   Surgeon(s) Primary: Dejon Espinoza MD     Allergies   Allergen Reactions    Vancomycin      Other Reaction(s): Renal Failure    Vancomycin-induced nephrotoxicity (11/2023, outside hospital)    Seasonal Allergies      HAYFEVER:    -Watery Eyes  -Eye burn    Daptomycin Rash     Likely delayed hypersensitivity reaction to daptomycin 11/2023       Isolation  [unfilled]     Past Medical History   has a past medical history of Allergic rhinitis, Anemia, Arthritis, Bell's palsy, Cervicalgia, Diabetes (H), Diabetic foot ulcer (H), Generalized edema, Hyperkalemia, Hypertension, Hypo-osmolality and hyponatremia, Major depressive disorder, recurrent episode, mild (H24), Other acute osteomyelitis, right ankle and foot (H), Other chronic pancreatitis (H), Other polyosteoarthritis, and Solitary pulmonary nodule.    Anesthesia General   Dermatome Level     Preop Meds Not applicable   Nerve block Not applicable   Intraop Meds fentanyl (Sublimaze): 100 mcg total  ondansetron (Zofran): last given at 1921  Versed 2 mg ketamine 25 mg,    Local Meds No   Antibiotics cefazolin (Ancef) - last given at 1826     Pain Patient Currently in Pain: no   PACU meds  Not applicable   PCA / epidural No   Capnography     Telemetry ECG Rhythm: Normal sinus rhythm   Inpatient Telemetry Monitor Ordered? No        Labs Glucose Lab Results   Component Value Date     07/11/2024     08/06/2022     10/08/2017       Hgb Lab Results   Component Value Date    HGB 8.3 07/10/2024    HGB 12.1 10/08/2017       INR No results found for: \"INR\"   PACU Imaging Not applicable     Wound/Incision Incision/Surgical Site 07/11/24 Right Knee (Active)   Incision Assessment UTV 07/11/24 1953   Flower-Incision Assessment UTV 07/11/24 1953   Closure Sutures;Staples 07/11/24 1953   Dressing " Intervention Clean, dry, intact 07/11/24 1953   Number of days: 0      CMS        Equipment ice pack   Other LDA       IV Access Peripheral IV 07/11/24 Right Lower forearm (Active)   Site Assessment WDL 07/11/24 1953   Line Status Infusing 07/11/24 1953   Dressing Transparent 07/11/24 1953   Dressing Status clean;dry;intact 07/11/24 1953   Phlebitis Scale 0-->no symptoms 07/11/24 1953   Infiltration? no 07/11/24 1953   Number of days: 0      Blood Products Not applicable EBL 0 mL   Intake/Output Date 07/11/24 0700 - 07/12/24 0659   Shift 8131-3921 0982-1441 2108-8346 24 Hour Total   INTAKE   I.V.  600  600   Shift Total(mL/kg)  600(10.49)  600(10.49)   OUTPUT   Shift Total(mL/kg)       Weight (kg)  57.2 57.2 57.2      Drains / Osman Closed/Suction Drain 1 Right Knee Bulb (Active)   Site Description UTV 07/11/24 1953   Dressing Status Normal: Clean, Dry & Intact 07/11/24 1953   Drainage Appearance Bloody/Bright Red 07/11/24 1953   Number of days: 0      Time of void PreOp Time of Void Prior to Procedure: 1730 (07/11/24 1727)    PostOp      Diapered? Yes   Bladder Scan     PO    water and ice chips     Vitals    B/P: 136/76  T: 97.5  F (36.4  C)    Temp src: Axillary  P:  Pulse: 76 (07/11/24 2000)          R: 11  O2:  SpO2: 100 %    O2 Device: Oxymask (07/11/24 1953)    Oxygen Delivery: 6 LPM (07/11/24 1953)         Family/support present His sister is his contact   Patient belongings     Patient transported on cart   DC meds/scripts (obs/outpt) Not applicable   Inpatient Pain Meds Released? Yes       Special needs/considerations None   Tasks needing completion None       Shruthi Degroot, RN  ernesto

## 2024-07-12 NOTE — CONSULTS
"[facility]  Consult Note - Hospitalist Service  Date of Admission:  7/11/2024  Consult Requested by: Dejon Espinoza MD    Reason for Consult: \"medical comanagement of complex patient\"     Assessment & Plan   Long Mayfield is a 54 year old male admitted on 7/11/2024. He has a PMHx notable for right knee pain, cervical myelopathy, chronic bilateral leg pain, chronic back pain, chronic muscular atrophy mobility disorder, hx diabetic foot ulcer and osteomyelitis s/p right transmetatarsal amputation, diabetic neuropathy, type 2 DM, acute on chronic anemia, iron deficiency anemia, GERD, CKD stage 3, HTN, HLD, hx of CAD, depression, and chronic pancreatic insufficiency leading to chronic diarrhea. He is currently admitted following I&D of right knee with orthopaedic surgery. Internal Medicine has been consulted for medical co-management.     Right knee pain s/p irrigation and debridement of right knee (7/11/2024)   Right knee pain with aspiration [facility] in May 2024. Results of this study concerning for infection but now follow-up or surgery was scheduled. Continued to report right knee pain and swelling at OP ortho appointment on 7/10/2024. At this appointment, he underwent a right knee aspiration where 60 ml of cloudy yellow fluid was removed, concerning for infection. Admitted following the above procedure with the orthopaedic team.   - orthopaedic surgery primary  - PT/OT  - infectious disease consult per primary team   - pain control per primary team   - antibiotics per primary team  - activity restrictions per primary team     Cervical myelopathy   Chronic bilateral leg pain   Hx of chronic back pain   Hx of chronic muscular atrophy mobility disorder   Follows with OP spine clinic, last seen 7/10/2024. Was scheduled to have two-level ACDF however this surgery was cancelled d/t lab abnormalities per chart review. Uses a wheelchair on and off. Follows with [facility].   - " continue PTA gabapentin 100 mg TID, robaxin  - PT/OT as above   - fall precautions     Hx of diabetic foot ulcer, osteomyelitis s/p right transmetatarsal amputation   Diabetic neuropathy   [facility] since [facility]. Treated with long-term antibiotics.   - noted     Type 2 DM  HgbA1c 9.4% on 5/4/24.   - initiate sliding scale insulin that best matches PTA sliding scale   - continue PTA insulin glargine 15 units daily AM  - continue PTA insulin aspart 8 units TID with meals     Acute on chronic anemia   Iron deficiency anemia   Baseline hgb ~ 8-9 recently. Currently, Hgb 8.3.   - monitor for s/s of bleeding  - CBC in the AM     GERD   - continue PTA protonix 40 mg daily     CKD stage 3  Baseline creatinine ~ 1.0-1.5. Creatinine slightly elevated from baseline on 7/10.   - repeat BMP in the AM     HTN  HLD  Hx of CAD   - hold PTA bumex 1 mg oral daily in setting of slight rise in creatinine   - continue PTA hydralazine 25 mg BID  - continue PTA amlodipine 5 mg daily   - continue PTA aspirin 81 mg daily     Depression  - continue PTA Cymbalta 60 mg daily     Chronic pancreatic insufficiency leading to chronic diarrhea   Follows with [facility] as OP.   - continue PTA creon and loperamide TID PRN       Clinically Significant Risk Factors Present on Admission        # Hyperkalemia: Highest K = 5.4 mmol/L in last 2 days, will monitor as appropriate         # Drug Induced Platelet Defect: home medication list includes an antiplatelet medication   # Hypertension: Noted on problem list    # Anemia: based on hgb <11          # DMII: A1C = 9.4 % (Ref range: <5.7 %) within past 6 months       # Financial/Environmental Concerns:           Debi Pyle NP  Hospitalist Service  Securely message with [employer] (more info)  Text page via [facility] Paging/Directory   ______________________________________________________________________    Chief Complaint   Surgery    History is obtained from the patient and electronic health  record    History of Present Illness   Long Mayfield is a 54 year old male admitted on 7/11/2024. He has a PMHx notable for right knee pain, cervical myelopathy, chronic bilateral leg pain, chronic back pain, chronic muscular atrophy mobility disorder, hx diabetic foot ulcer and osteomyelitis s/p right transmetatarsal amputation, diabetic neuropathy, type 2 DM, acute on chronic anemia, iron deficiency anemia, GERD, CKD stage 3, HTN, HLD, hx of CAD, depression, and chronic pancreatic insufficiency leading to chronic diarrhea. He is currently admitted following I&D of right knee with orthopaedic surgery. Internal Medicine has been consulted for medical co-management.     Long was seen resting in bed. He says that he feels good, no pain. No questions or concerns at this time.       Past Medical History    Past Medical History:   Diagnosis Date    Allergic rhinitis     Anemia     Arthritis     Bell's palsy     Cervicalgia     Diabetes (H)     Diabetic foot ulcer (H)     Generalized edema     Hyperkalemia     Hypertension     Hypo-osmolality and hyponatremia     Major depressive disorder, recurrent episode, mild (H24)     Other acute osteomyelitis, right ankle and foot (H)     Other chronic pancreatitis (H)     Other polyosteoarthritis     Solitary pulmonary nodule        Past Surgical History   Past Surgical History:   Procedure Laterality Date    COLONOSCOPY N/A 05/07/2024    Procedure: Colonoscopy;  Surgeon: Nina Moya MD;  Location:  GI    ESOPHAGOSCOPY, GASTROSCOPY, DUODENOSCOPY (EGD), COMBINED N/A 05/06/2024    Procedure: Esophagoscopy, gastroscopy, duodenoscopy (EGD), combined;  Surgeon: Nina Moya MD;  Location:  GI    ESOPHAGOSCOPY, GASTROSCOPY, DUODENOSCOPY (EGD), COMBINED N/A 05/07/2024    Procedure: Esophagoscopy, gastroscopy, duodenoscopy (EGD), combined;  Surgeon: Nina Moya MD;  Location:  GI    ORTHOPEDIC SURGERY      partial amputation of right foot  Right     PATELLA SURGERY         Medications   Medications Prior to Admission   Medication Sig Dispense Refill Last Dose    acetaminophen (TYLENOL) 325 MG tablet Take 650 mg by mouth every 6 hours as needed for pain   Past Week    amLODIPine (NORVASC) 5 MG tablet Take 1 tablet (5 mg) by mouth daily 30 tablet 0 7/11/2024    aspirin 81 MG EC tablet Take 81 mg by mouth daily   7/10/2024 at 0800    bumetanide (BUMEX) 1 MG tablet Take 1 tablet (1 mg) by mouth daily Dose decreased to 1 mg on discharge.  Optimize dose on PCP visit. 30 tablet 0 7/11/2024 at 0800    cetirizine (ZYRTEC) 10 MG tablet Take 10 mg by mouth daily   7/11/2024 at 0800    DULoxetine (CYMBALTA) 60 MG capsule Take 60 mg by mouth daily   7/11/2024 at 0800    gabapentin (NEURONTIN) 100 MG capsule Take 100 mg by mouth 3 times daily   7/11/2024 at 0900    hydrALAZINE (APRESOLINE) 25 MG tablet Take 25 mg by mouth 2 times daily Hold if SBP < 110   7/11/2024 at 0900    HYDROcodone-acetaminophen (NORCO) 5-325 MG tablet Take 1 tablet by mouth every 8 hours as needed for severe pain Minimise use as able to. Hold if drowsy. 10 tablet 0 7/10/2024    insulin aspart (NOVOLOG FLEXPEN) 100 UNIT/ML pen Inject 8 Units Subcutaneous 3 times daily (with meals) Breakfast and lunch   7/11/2024 at 0900    insulin aspart (NOVOLOG FLEXPEN) 100 UNIT/ML pen Inject 1-5 Units Subcutaneous 3 times daily (with meals) Inject as per sliding scale:  If 200-250 = 1   251-300 = 2  301-350 = 3  351-400 = 4  401+ = 5  Repeat BS after 3 hours and call MD if still over 400   7/11/2024 at 0900    insulin glargine (LANTUS PEN) 100 UNIT/ML pen Inject 15 Units Subcutaneous every morning   7/11/2024 at 0920    Lidocaine (LIDOCARE) 4 % Patch Place 2 patches onto the skin every 24 hours Apply to neck/back. To prevent lidocaine toxicity, patient should be patch free for 12 hrs daily. 15 patch 0 Past Week    lipase-protease-amylase (CREON 36) [id number] units CPEP Take 3 capsules by mouth 3  times daily (with meals)   7/11/2024 at 0930    lipase-protease-amylase (CREON 36) [id number] units CPEP Take 1-2 capsules by mouth Take with snacks or supplements   7/11/2024 at 0930    loperamide (IMODIUM) 2 MG capsule Take 4 mg by mouth 3 times daily as needed for diarrhea   7/11/2024    methocarbamol (ROBAXIN) 500 MG tablet Take 500 mg by mouth every 8 hours as needed for muscle spasms   Past Week    multivitamin w/minerals (THERA-VIT-M) tablet Take 1 tablet by mouth daily 30 tablet 0 7/11/2024 at 0900    pantoprazole (PROTONIX) 40 MG EC tablet Take 1 tablet (40 mg) by mouth daily 30 tablet 0 7/11/2024 at 0900         Physical Exam   Vital Signs: Temp: 97.5  F (36.4  C) Temp src: Oral BP: 131/76 Pulse: 78   Resp: 14 SpO2: 99 % O2 Device: None (Room air) Oxygen Delivery: 6 LPM  Weight: 137 lbs 12.6 oz    GENERAL: Alert and awake. Answering questions appropriately. Oriented x 3. NAD. Pleasant and conversational   HEENT: Anicteric sclera. EOMI. Mucous membranes moist   CARDIOVASCULAR: RRR. S1, S2. No murmurs, rubs, or gallops.   RESPIRATORY: Effort normal on RA. Clear to auscultation bilaterally, no rales, rhonchi or wheezes  GI: Abdomen soft, non-tender abdomen without rebound or guarding, normoactive bowel sounds present  MUSCULOSKELETAL: Moves all extremities.   EXTREMITIES: No peripheral edema. No calf asymmetry, erythema, or tenderness.   NEUROLOGICAL: No focal deficits. Moving all extremities symmetrically.   SKIN: Intact. Warm and dry. No jaundice. No rashes on exposed skin   PSYCH: Affect appropriate     Medical Decision Making       40 MINUTES SPENT BY ME on the date of service doing chart review, history, exam, documentation & further activities per the note.      Data   Imaging results reviewed over the past 24 hrs:   No results found for this or any previous visit (from the past 24 hour(s)).  Recent Labs   Lab 07/11/24  1956 07/11/24  1713 07/10/24  1157 07/09/24  1552   WBC  --   --  10.4  --     HGB  --   --  8.3*  --    MCV  --   --  80  --    PLT  --   --  438  --    NA  --   --  131*  --    POTASSIUM  --   --  5.4*  --    CHLORIDE  --   --  98  --    CO2  --   --  26  --    BUN  --   --  26.0*  --    CR  --   --  1.61* 1.9*   ANIONGAP  --   --  7  --    AROLDO  --   --  9.1  --    * 148* 195*  --    ALBUMIN  --   --  3.5  --    PROTTOTAL  --   --  7.9  --    BILITOTAL  --   --  0.2  --    ALKPHOS  --   --  188*  --    ALT  --   --  14  --    AST  --   --  17  --

## 2024-07-12 NOTE — ANESTHESIA CARE TRANSFER NOTE
Patient: Long Mayfield    Procedure: Procedure(s):  Irrigation and debridement knee       Diagnosis: Pyogenic arthritis of right knee joint, due to unspecified organism (H) [M00.9]  Diagnosis Additional Information: No value filed.    Anesthesia Type:   No value filed.     Note:    Oropharynx: oropharynx clear of all foreign objects and spontaneously breathing  Level of Consciousness: awake and drowsy  Oxygen Supplementation: face mask  Level of Supplemental Oxygen (L/min / FiO2): 8  Independent Airway: airway patency satisfactory and stable  Dentition: dentition unchanged  Vital Signs Stable: post-procedure vital signs reviewed and stable  Report to RN Given: handoff report given  Patient transferred to: PACU    Handoff Report: Identifed the Patient, Identified the Reponsible Provider, Reviewed the pertinent medical history, Discussed the surgical course, Reviewed Intra-OP anesthesia mangement and issues during anesthesia, Set expectations for post-procedure period and Allowed opportunity for questions and acknowledgement of understanding      Vitals:  Vitals Value Taken Time   /71 07/11/24 1953   Temp 36.8    Pulse 76 07/11/24 1958   Resp 9 07/11/24 1958   SpO2 100 % 07/11/24 1958   Vitals shown include unfiled device data.    Electronically Signed By: JAMIR ZIMMERMAN APRN CRNA  July 11, 2024  7:59 PM  
alcohol intoxication

## 2024-07-12 NOTE — ANESTHESIA POSTPROCEDURE EVALUATION
Patient: Long Mayfield    Procedure: Procedure(s):  Irrigation and debridement knee       Anesthesia Type:  No value filed.    Note:  Disposition: Inpatient   Postop Pain Control: Uneventful            Sign Out: Well controlled pain   PONV: No   Neuro/Psych: Uneventful            Sign Out: Acceptable/Baseline neuro status   Airway/Respiratory: Uneventful            Sign Out: Acceptable/Baseline resp. status   CV/Hemodynamics: Uneventful            Sign Out: Acceptable CV status; No obvious hypovolemia; No obvious fluid overload   Other NRE:    DID A NON-ROUTINE EVENT OCCUR?        Last vitals:  Vitals Value Taken Time   BP 90/72 07/11/24 2034   Temp 36.4  C (97.5  F) 07/11/24 1953   Pulse 81 07/11/24 2039   Resp 16 07/11/24 2039   SpO2 97 % 07/11/24 2039   Vitals shown include unfiled device data.    Electronically Signed By: Liban Hamilton MD  July 11, 2024  9:10 PM

## 2024-07-12 NOTE — PROGRESS NOTES
[facility]    Medicine Progress Note - Hospitalist Service, GOLD TEAM 19    Date of Admission:  7/11/2024    Assessment & Plan   Long Mayfield is a 54 year old male admitted on 7/11/2024.  He has a PMHx notable for right knee pain, cervical myelopathy, chronic bilateral leg pain, chronic back pain, chronic muscular atrophy mobility disorder, hx diabetic foot ulcer and osteomyelitis s/p right transmetatarsal amputation, diabetic neuropathy, type 2 DM, acute on chronic anemia, iron deficiency anemia, GERD, CKD stage 3, HTN, HLD, hx of CAD, depression, and chronic pancreatic insufficiency leading to chronic diarrhea. He underwent I&D of right knee by Dr. Espinoza on 7/11 and was then admitted. Internal Medicine consulted for medical co-management.      Right knee pain  S/p irrigation and debridement of right knee (7/11/2024)   ---   Right knee pain with aspiration at [facility] in May 2024.  Results of this study concerning for infection.   ---   Continued to report right knee pain and swelling at OP ortho appointment on 7/10/2024.  At that appointment, he underwent right knee aspiration where 60 ml of cloudy yellow fluid was removed, concerning for infection.   ---   He was admitted on 7/11 and underwent I&D of right knee with Dr. Espinoza  ---   NGTD from right knee aspirate on 7/10 and 7/11  ---   PT/OT  ---   Currently on ceftriaxone 2 g IV daily  ---   ID consult service requested  ---   Pain control per primary team      Cervical myelopathy   Chronic bilateral leg pain   Hx of chronic back pain   Hx of chronic muscular atrophy mobility disorder   ---   Follows with OP spine clinic, last seen 7/10/2024.  Was scheduled to have two-level ACDF, however this surgery was cancelled d/t lab abnormalities per chart review.  Uses a wheelchair on and off.  Follows with [facility].   ---   Continue PTA gabapentin 100 mg TID, robaxin  ---   PT/OT as above   ---   Fall precautions       Hx of diabetic foot ulcer, osteomyelitis s/p right transmetatarsal amputation   Diabetic neuropathy   ---   Residing at [facility] since this surgery.   ---   Treated with long-term antibiotics.       Type 2 DM  ---   Hgba1c 9.4% on 5/4/24.   ---   Glucose has been in the range of 1 95-3 90  ---   Increase PTA insulin glargine to 25 units q AM  ---   Hold PTA insulin aspart 8 units TID with meals   ---   Start high intensity NovoLog SSI     Acute on chronic anemia   Iron deficiency anemia   ---   Baseline Hgb ~ 8-9 recently.    ---   Hgb is 7.3 g today, was 8.3 g on 7/10  ---   Monitor for s/s of bleeding     GERD   ---   Continue PTA protonix 40 mg daily      CKD stage 3  ---   Baseline creatinine ~ 1.0-1.5.   ---   Creatinine slightly elevated at 1.72 today  ---   Start gentle IVF hydration  ---   Repeat BMP in the AM      HTN  HLD  Hx of CAD   ---   Hold PTA bumex 1 mg oral daily in setting of slight rise in creatinine   ---   Continue PTA hydralazine 25 mg BID  ---   Continue PTA amlodipine 5 mg daily   ---   Continue PTA aspirin 81 mg daily      Depression  ----   Continue PTA Cymbalta 60 mg daily      Chronic pancreatic insufficiency leading to chronic diarrhea   ---   Follows with [facility] as OP.   ---   Continue PTA creon and loperamide TID PRN     Hyponatremia  ---   Na level is 132  ---   Suspect due to hypovolemia and hyperglycemia  ---   Insulin dose adjusted  ---   Start gentle isotonic IVF hydration        Diet: Consistent Carbohydrate Diet Moderate Consistent Carb (60 g CHO per Meal) Diet    DVT Prophylaxis: ASA EC 81 mg daily  Osman Catheter: Not present  Lines: None     Cardiac Monitoring: None  Code Status:   Full code  Disposition Plan   per primary orthopedics team      Dmitry Uribe MD  Hospitalist Service, GOLD TEAM [address] [place] [address]  Securely message with [employer] (more info)  Text page via [facility] Paging/Directory   See signed in provider for up to date coverage  information  ______________________________________________________________________    Interval History   Has right knee pain  No other complaint  Uneventful night    Physical Exam   Vital Signs: Temp: 98.2  F (36.8  C) Temp src: Oral BP: (!) 163/89 Pulse: 75   Resp: 16 SpO2: 95 % O2 Device: None (Room air) Oxygen Delivery: 6 LPM  Weight: 137 lbs 12.6 oz  General: aao x 3, NAD.  HEENT:  NC/AT, PERRL, EOMI, neck supple, no thyromegaly, op clear, mmm.  CVS:  NL s 1 and s2, no m/r/g.  Lungs:  CTA B/L.   Abd:  Soft, + bs, NT, no rebound or gaurding, no fluid shift.  Ext:  No c/c.  Lymph:  No edema.  Neuro:  Nonfocal.  Musculoskeletal: No calf tenderness to palpation.    Skin:  No rash.  Psychiatry:  Mood and affect appropriate.        Data   ------------------------- PAST 24 HR DATA REVIEWED -----------------------------------------------    I have personally reviewed the following data over the past 24 hrs:    7.5  \   7.3 (L)   / 448     132 (L) 99 29.4 (H) /  135 (H)   5.2 24 1.72 (H) \     Procal: N/A CRP: 69.93 (H) Lactic Acid: N/A         Imaging results reviewed over the past 24 hrs:   No results found for this or any previous visit (from the past 24 hour(s)).

## 2024-07-12 NOTE — CONSULTS
[facility] ID SERVICE: NEW CONSULTATION  Patient:  Long Mayfield, Date of birth 1970, Medical record number [id number]  Date of Admission: 7/11/2024  Date of Visit:  7/12/2024  Requesting Provider: Dejon Espinoza    ASSESSMENT AND PLAN     Long Mayfield is a 54 year old male with right knee swelling. Synovial fluid showed inflammation. So far, multiple cultures are negative. I would start work up for unusual etiologies for septic arthritis to rule out them out. Due to his exposure outdoors, I added Lyme. I will also request STI testing. Due to his chronic diarrhea, I will add Whipple's testing. In the meantime, I would start empiric treatment for septic arthritis. The most common causes are GP and GN organisms which we can cover. This will also treat Lyme and gonorrhea.     IMPRESSION:  Culture-negative right knee arthritis, etiology under investigation   T2 DM   History of diabetic foot infection, s/p right TMA  History of alcohol use disoder  Chronic diarrhea   History of pancreatitis     RECOMMENDATIONS:  HIV, HCV, HBsAg, Lyme ab (added to stored specimen).   ESBL stool culture weekly x2.   Start IV ceftriaxone 2g daily (ordered).     Thank you for this consult. ID will continue to follow this patient. Please feel free to call with any questions.     Najma Zuleta MD  Infectious Diseases   Contact me via [employer]     80 MINUTES SPENT BY ME on the date of service doing chart review, history, exam, documentation & further activities per the note.         SUBJECTIVE       History of Present Illness:    Long Mayfield is a 54 year old male with right knee swelling. He could not tell when exactly it started to become swollen or painful. It seems like it's been on and off for yours.   He has had injection to his right knee from 2 years ago. This year he had another injection to his right knee in April 2024 at [facility]. However, in May, they found that his knee is too swollen so they aspirated the knee  "instead. Cell count 76k and a week later 109k. Cultures negative. He was not told there's a problem and was not given antibiotics. He has chronic diarrhea attributed to pancreatitis from previous alcohol use disorder. He does not have fever, chills, diarrhea.     He was born in [facility]. He lived in [place], [facility] and [facility]. He skied a lot. He was sexually active with women but not recently. He does not inject drugs. 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 He does not recall tick bites.     Antimicrobial therapy:  Ceftri 7/12-    Past Medical History:  Past Medical History:   Diagnosis Date    Allergic rhinitis     Anemia     Arthritis     Bell's palsy     Cervicalgia     Diabetes (H)     Diabetic foot ulcer (H)     Generalized edema     Hyperkalemia     Hypertension     Hypo-osmolality and hyponatremia     Major depressive disorder, recurrent episode, mild (H24)     Other acute osteomyelitis, right ankle and foot (H)     Other chronic pancreatitis (H)     Other polyosteoarthritis     Solitary pulmonary nodule        Allergies   Allergen Reactions    Vancomycin      Other Reaction(s): Renal Failure    Vancomycin-induced nephrotoxicity (11/2023, outside hospital)    Seasonal Allergies      HAYFEVER:    -Watery Eyes  -Eye burn    Daptomycin Rash     Likely delayed hypersensitivity reaction to daptomycin 11/2023     OBJECTIVE     Physical Exam:  /77 (BP Location: Right arm)   Pulse 75   Temp 98.2  F (36.8  C) (Oral)   Resp 16   Ht 1.626 m (5' 4\")   Wt 62.5 kg (137 lb 12.6 oz)   SpO2 95%   BMI 23.65 kg/m     Exam:  GENERAL:  Well-developed, well-nourished, sitting in bed in no acute distress.   ENT: Oropharynx is moist without exudates or ulcers.  EYES:  Eyes have anicteric sclerae.    NECK:  Supple.  LUNGS:  Clear to auscultation.  CARDIOVASCULAR:  Regular rate and rhythm with no murmurs  ABDOMEN:  Normal bowel sounds, soft, nontender.  EXT: Right knee swollen and mildly " tender. No tenderness. Left knee normal.   NEUROLOGIC:  Grossly nonfocal.    I reviewed the results of the following diagnostics:    CRP  7/12 69  7/10 113    Microbiology:  7/11 R knee -   7/10 R knee - 65k, 93%N  Cx -  5/29 R knee - 109k, 95%N - no growth   5/22 R knee 76k 98%N - no growth

## 2024-07-13 LAB
ANION GAP SERPL CALCULATED.3IONS-SCNC: 9 MMOL/L (ref 7–15)
B BURGDOR IGG+IGM SER QL: 0.09
BASOPHILS # BLD AUTO: 0 10E3/UL (ref 0–0.2)
BASOPHILS NFR BLD AUTO: 0 %
BUN SERPL-MCNC: 21.9 MG/DL (ref 6–20)
CALCIUM SERPL-MCNC: 8.3 MG/DL (ref 8.6–10)
CHLORIDE SERPL-SCNC: 100 MMOL/L (ref 98–107)
CREAT SERPL-MCNC: 1.38 MG/DL (ref 0.67–1.17)
DEPRECATED HCO3 PLAS-SCNC: 24 MMOL/L (ref 22–29)
EGFRCR SERPLBLD CKD-EPI 2021: 60 ML/MIN/1.73M2
EOSINOPHIL # BLD AUTO: 0.2 10E3/UL (ref 0–0.7)
EOSINOPHIL NFR BLD AUTO: 3 %
ERYTHROCYTE [DISTWIDTH] IN BLOOD BY AUTOMATED COUNT: 18.8 % (ref 10–15)
GLUCOSE BLDC GLUCOMTR-MCNC: 184 MG/DL (ref 70–99)
GLUCOSE BLDC GLUCOMTR-MCNC: 199 MG/DL (ref 70–99)
GLUCOSE BLDC GLUCOMTR-MCNC: 204 MG/DL (ref 70–99)
GLUCOSE BLDC GLUCOMTR-MCNC: 207 MG/DL (ref 70–99)
GLUCOSE BLDC GLUCOMTR-MCNC: 219 MG/DL (ref 70–99)
GLUCOSE BLDC GLUCOMTR-MCNC: 227 MG/DL (ref 70–99)
GLUCOSE BLDC GLUCOMTR-MCNC: 253 MG/DL (ref 70–99)
GLUCOSE SERPL-MCNC: 218 MG/DL (ref 70–99)
HCT VFR BLD AUTO: 25 % (ref 40–53)
HGB BLD-MCNC: 7.6 G/DL (ref 13.3–17.7)
IMM GRANULOCYTES # BLD: 0 10E3/UL
IMM GRANULOCYTES NFR BLD: 0 %
LYMPHOCYTES # BLD AUTO: 1.5 10E3/UL (ref 0.8–5.3)
LYMPHOCYTES NFR BLD AUTO: 18 %
MAGNESIUM SERPL-MCNC: 2.3 MG/DL (ref 1.7–2.3)
MCH RBC QN AUTO: 24.6 PG (ref 26.5–33)
MCHC RBC AUTO-ENTMCNC: 30.4 G/DL (ref 31.5–36.5)
MCV RBC AUTO: 81 FL (ref 78–100)
MONOCYTES # BLD AUTO: 0.7 10E3/UL (ref 0–1.3)
MONOCYTES NFR BLD AUTO: 8 %
NEUTROPHILS # BLD AUTO: 6 10E3/UL (ref 1.6–8.3)
NEUTROPHILS NFR BLD AUTO: 71 %
NRBC # BLD AUTO: 0 10E3/UL
NRBC BLD AUTO-RTO: 0 /100
PLATELET # BLD AUTO: 465 10E3/UL (ref 150–450)
POTASSIUM SERPL-SCNC: 5.6 MMOL/L (ref 3.4–5.3)
POTASSIUM SERPL-SCNC: 5.6 MMOL/L (ref 3.4–5.3)
RBC # BLD AUTO: 3.09 10E6/UL (ref 4.4–5.9)
SODIUM SERPL-SCNC: 133 MMOL/L (ref 135–145)
WBC # BLD AUTO: 8.5 10E3/UL (ref 4–11)

## 2024-07-13 PROCEDURE — 83735 ASSAY OF MAGNESIUM: CPT | Performed by: INTERNAL MEDICINE

## 2024-07-13 PROCEDURE — 250N000013 HC RX MED GY IP 250 OP 250 PS 637: Performed by: INTERNAL MEDICINE

## 2024-07-13 PROCEDURE — 250N000011 HC RX IP 250 OP 636: Performed by: INTERNAL MEDICINE

## 2024-07-13 PROCEDURE — 82962 GLUCOSE BLOOD TEST: CPT

## 2024-07-13 PROCEDURE — 36415 COLL VENOUS BLD VENIPUNCTURE: CPT | Performed by: ORTHOPAEDIC SURGERY

## 2024-07-13 PROCEDURE — 36415 COLL VENOUS BLD VENIPUNCTURE: CPT | Performed by: PHYSICIAN ASSISTANT

## 2024-07-13 PROCEDURE — 85004 AUTOMATED DIFF WBC COUNT: CPT | Performed by: ORTHOPAEDIC SURGERY

## 2024-07-13 PROCEDURE — 96374 THER/PROPH/DIAG INJ IV PUSH: CPT

## 2024-07-13 PROCEDURE — G0378 HOSPITAL OBSERVATION PER HR: HCPCS

## 2024-07-13 PROCEDURE — 250N000013 HC RX MED GY IP 250 OP 250 PS 637: Performed by: ORTHOPAEDIC SURGERY

## 2024-07-13 PROCEDURE — 84132 ASSAY OF SERUM POTASSIUM: CPT | Performed by: PHYSICIAN ASSISTANT

## 2024-07-13 PROCEDURE — 80048 BASIC METABOLIC PNL TOTAL CA: CPT | Performed by: ORTHOPAEDIC SURGERY

## 2024-07-13 PROCEDURE — 258N000003 HC RX IP 258 OP 636: Performed by: INTERNAL MEDICINE

## 2024-07-13 PROCEDURE — 87798 DETECT AGENT NOS DNA AMP: CPT | Performed by: INTERNAL MEDICINE

## 2024-07-13 PROCEDURE — 87491 CHLMYD TRACH DNA AMP PROBE: CPT | Performed by: INTERNAL MEDICINE

## 2024-07-13 PROCEDURE — 250N000013 HC RX MED GY IP 250 OP 250 PS 637

## 2024-07-13 PROCEDURE — 99233 SBSQ HOSP IP/OBS HIGH 50: CPT | Performed by: INTERNAL MEDICINE

## 2024-07-13 RX ADMIN — GABAPENTIN 100 MG: 100 CAPSULE ORAL at 20:52

## 2024-07-13 RX ADMIN — PANTOPRAZOLE SODIUM 40 MG: 40 TABLET, DELAYED RELEASE ORAL at 08:05

## 2024-07-13 RX ADMIN — METHOCARBAMOL 500 MG: 500 TABLET ORAL at 17:16

## 2024-07-13 RX ADMIN — OXYCODONE HYDROCHLORIDE 5 MG: 5 TABLET ORAL at 20:52

## 2024-07-13 RX ADMIN — OXYCODONE HYDROCHLORIDE 5 MG: 5 TABLET ORAL at 17:16

## 2024-07-13 RX ADMIN — OXYCODONE HYDROCHLORIDE 5 MG: 5 TABLET ORAL at 10:06

## 2024-07-13 RX ADMIN — DULOXETINE HYDROCHLORIDE 60 MG: 60 CAPSULE, DELAYED RELEASE ORAL at 08:05

## 2024-07-13 RX ADMIN — SODIUM CHLORIDE, POTASSIUM CHLORIDE, SODIUM LACTATE AND CALCIUM CHLORIDE: 600; 310; 30; 20 INJECTION, SOLUTION INTRAVENOUS at 02:50

## 2024-07-13 RX ADMIN — OXYCODONE HYDROCHLORIDE 5 MG: 5 TABLET ORAL at 06:08

## 2024-07-13 RX ADMIN — HYDRALAZINE HYDROCHLORIDE 25 MG: 25 TABLET ORAL at 08:05

## 2024-07-13 RX ADMIN — HYDRALAZINE HYDROCHLORIDE 25 MG: 25 TABLET ORAL at 20:52

## 2024-07-13 RX ADMIN — GABAPENTIN 100 MG: 100 CAPSULE ORAL at 14:22

## 2024-07-13 RX ADMIN — ASPIRIN 81 MG: 81 TABLET, COATED ORAL at 08:05

## 2024-07-13 RX ADMIN — OXYCODONE HYDROCHLORIDE 5 MG: 5 TABLET ORAL at 02:49

## 2024-07-13 RX ADMIN — CEFTRIAXONE SODIUM 2 G: 2 INJECTION, POWDER, FOR SOLUTION INTRAMUSCULAR; INTRAVENOUS at 11:48

## 2024-07-13 RX ADMIN — AMLODIPINE BESYLATE 5 MG: 5 TABLET ORAL at 08:05

## 2024-07-13 RX ADMIN — GABAPENTIN 100 MG: 100 CAPSULE ORAL at 08:05

## 2024-07-13 RX ADMIN — PANCRELIPASE 3 CAPSULE: 36000; 180000; 114000 CAPSULE, DELAYED RELEASE PELLETS ORAL at 17:04

## 2024-07-13 RX ADMIN — Medication 1 TABLET: at 08:05

## 2024-07-13 RX ADMIN — PANCRELIPASE 3 CAPSULE: 36000; 180000; 114000 CAPSULE, DELAYED RELEASE PELLETS ORAL at 08:19

## 2024-07-13 RX ADMIN — METHOCARBAMOL 500 MG: 500 TABLET ORAL at 02:49

## 2024-07-13 RX ADMIN — CETIRIZINE HYDROCHLORIDE 10 MG: 10 TABLET, FILM COATED ORAL at 08:05

## 2024-07-13 ASSESSMENT — ACTIVITIES OF DAILY LIVING (ADL)
ADLS_ACUITY_SCORE: 29

## 2024-07-13 NOTE — PROGRESS NOTES
Pt requesting oxycodone, does not have current active order but was previously getting 5 mg q3h prn. Messaged [facility] via [employer].

## 2024-07-13 NOTE — PLAN OF CARE
Patient POD 1 from I&D R knee    Goal Outcome Evaluation:      Plan of Care Reviewed With: patient    Overall Patient Progress: no change    Outcome Evaluation: No changes      VS: Temp: 98.2  F (36.8  C) Temp src: Oral BP: 139/78 Pulse: 77   Resp: 16 SpO2: 94 % O2 Device: None (Room air)      O2: RA   Output: Pivots to commode. Still needs urine sample- has been contaminated by stool, pt instructed to use urinal or cup. 30 out per JOSH   Last BM: 7/12, has chronic diarrhea. Stool sample sent.    Activity: SBA   Skin: Incision to knee   Pain: High and chronic, received oxycodone x1   Neuro: A&O x4   Labs: , 293. Received one time dose 10 unit(s) Lantus   Diet: Mod carb   IV: R PIV   LDA:  JOSH drain   Plan: Continue therapies   Additional Info:

## 2024-07-13 NOTE — PROGRESS NOTES
[facility]    Medicine Progress Note - Hospitalist Service, GOLD TEAM 19    Date of Admission:  7/11/2024    Assessment & Plan   Long Mayfield is a 54 year old male admitted on 7/11/2024.  He has a PMHx notable for right knee pain, cervical myelopathy, chronic bilateral leg pain, chronic back pain, chronic muscular atrophy mobility disorder, hx diabetic foot ulcer and osteomyelitis s/p right transmetatarsal amputation, diabetic neuropathy, type 2 DM, acute on chronic anemia, iron deficiency anemia, GERD, CKD stage 3, HTN, HLD, hx of CAD, depression, and chronic pancreatic insufficiency leading to chronic diarrhea. He underwent I&D of right knee by Dr. Espinoza on 7/11 and was then admitted. Internal Medicine consulted for medical co-management.      Right knee pain  S/p irrigation and debridement of right knee (7/11/2024)   ---   Right knee pain with aspiration at O in May 2024.  Results of this study concerning for infection.   ---   Continued to report right knee pain and swelling at OP ortho appointment on 7/10/2024.  At that appointment, he underwent right knee aspiration where 60 ml of cloudy yellow fluid was removed, concerning for infection.   ---   Admitted on 7/11 after undergoing I&D in the OR by Dr. Espinoza  ---   NGTD from aspirate culture on 7/10 and 7/11  ---   Orthopaedic surgery primary  ---   PT/OT following  ---   HIV antigen antibody combo nonreactive  ---   HCV antibody nonreactive   ---   HBsAg nonreactive  ---   Lyme ab results pending  ---   ESBL stool culture weekly x2, first sample collected on 7/12 and result pending  ---   Currently on ceftriaxone 2 g IV daily  ---   ID consult service signed off  ---   Pain control per primary team      Cervical myelopathy   Chronic bilateral leg pain   Hx of chronic back pain   Hx of chronic muscular atrophy mobility disorder   ---   Follows with OP spine clinic, last seen 7/10/2024.  Was  scheduled to have two-level ACDF, however this surgery was cancelled d/t lab abnormalities per chart review.  Uses a wheelchair on and off.  Follows with [facility].   ---   Continue PTA gabapentin 100 mg TID, robaxin  ---   PT/OT as above   ---   Fall precautions      Hx of diabetic foot ulcer, osteomyelitis s/p right transmetatarsal amputation   Diabetic neuropathy   ---   Residing at [facility] since this surgery.   ---   Treated with long-term antibiotics.       Type 2 DM  ---   Hgba1c 9.4% on 5/4/24.   ---   Glucose has been in the range of 1 95-3 90  ---   Continue increased dose of glargine 25 units q AM  ---   Holding PTA insulin aspart 8 units TID with meals   ---   Continue high intensity NovoLog SSI     Acute on chronic anemia   Iron deficiency anemia   ---   Baseline Hgb ~ 8-9 recently.    ---   Hgb is 7.6 g today, was 8.3 g on 7/10  ---   Monitor for s/s of bleeding     GERD   ---   Continue PTA protonix 40 mg daily      CKD stage 3  ---   Baseline creatinine ~ 1.0-1.5.   ---   Creatinine was 1.61 on 7/10 ---> 1.72 on 7/12 ---> 1.38 today, within baseline range  ---   Continue gentle IVF hydration  ---   Repeat BMP in the AM      HTN  HLD  Hx of CAD   ---   Hold PTA bumex 1 mg oral daily in setting of slight rise in creatinine   ---   Continue PTA hydralazine 25 mg BID  ---   Continue PTA amlodipine 5 mg daily   ---   Continue PTA aspirin 81 mg daily      Depression  ----   Continue PTA Cymbalta 60 mg daily      Chronic pancreatic insufficiency leading to chronic diarrhea   ---   Follows with [facility] as OP.   ---   Continue PTA creon and loperamide TID PRN     Hyponatremia  ---   Na level is 131 ---> 132 ---> 133  ---   Suspect due to hypovolemia and hyperglycemia  ---   Insulin dose adjusted  ---   Continue gentle isotonic IVF hydration    Hyperkalemia  ---   K level is 5.6 today, was 5.2 yesterday  ---   Continue IVF hydration  ---   Will treat him with Lokelma 15 mg po once today          Diet: Consistent  Carbohydrate Diet Moderate Consistent Carb (60 g CHO per Meal) Diet    DVT Prophylaxis: ASA EC 81 mg daily  Osman Catheter: Not present  Lines: None     Cardiac Monitoring: None  Code Status:   Full code  Disposition Plan   per primary orthopedics team          Dmitry Uribe MD  Hospitalist Service, GOLD TEAM 19  [facility]  Securely message with [employer] (more info)  Text page via [employer] Paging/Directory   See signed in provider for up to date coverage information  ______________________________________________________________________    Interval History   Right knee pain better today  No other complaint  Uneventful night    Physical Exam   Vital Signs: Temp: 98.4  F (36.9  C) Temp src: Oral BP: (!) 148/93 Pulse: 76   Resp: 19 SpO2: 98 % O2 Device: None (Room air)    Weight: 137 lbs 12.6 oz  General: aao x 3, NAD.  HEENT:  NC/AT, PERRL, EOMI, neck supple, no thyromegaly, op clear, mmm.  CVS:  NL s 1 and s2, no m/r/g.  Lungs:  CTA B/L.   Abd:  Soft, + bs, NT, no rebound or gaurding, no fluid shift.  Ext:  No c/c.  Lymph:  No edema.  Neuro:  Nonfocal.  Musculoskeletal: No calf tenderness to palpation.    Skin:  No rash.  Psychiatry:  Mood and affect appropriate.        Data   ------------------------- PAST 24 HR DATA REVIEWED -----------------------------------------------    I have personally reviewed the following data over the past 24 hrs:    8.5  \   7.6 (L)   / 465 (H)     133 (L) 100 21.9 (H) /  218 (H)   5.6 (H) 24 1.38 (H) \       Imaging results reviewed over the past 24 hrs:   No results found for this or any previous visit (from the past 24 hour(s)).

## 2024-07-13 NOTE — PROGRESS NOTES
"  Orthopaedic Surgery Daily Progress Note     Subjective:   Pain is well controlled. Slightly better than than prior to surgery. Tolerating diet. No fever, chills.  No urinary difficulties.     Physical Exam   BP (!) 148/93 (BP Location: Right arm)   Pulse 76   Temp 98.4  F (36.9  C) (Oral)   Resp 19   Ht 1.626 m (5' 4\")   Wt 62.5 kg (137 lb 12.6 oz)   SpO2 98%   BMI 23.65 kg/m      Intake/Output Summary (Last 24 hours) at 7/13/2024 1218  Last data filed at 7/12/2024 2216  Gross per 24 hour   Intake --   Output 30 ml   Net -30 ml     General: Alert, well-appearing  in no acute distress.  Respiratory: Non-labored breathing.   Cardiovascular: Extremities warm and well perfused   Extremities: moving all four extremities.   RLE:  -Sens: SILT over the plantar and dorsal aspect of the foot  - prior amputations of the toes.   -Motor: Fires TA and gastroc  -Vasc: Distal aspect of the extremity is warm and well perfused  Dressing CDI    Skin: As noted above. No rashes or lesions appreciated.    Drains with 30/30 out in the last two shifts with serosangenous fluids    Labs    Complete Blood Count   Recent Labs   Lab 07/13/24  0833 07/12/24  0716 07/10/24  1157   WBC 8.5 7.5 10.4   HGB 7.6* 7.3* 8.3*   * 448 438     Basic Metabolic Panel  Recent Labs   Lab 07/13/24  1149 07/13/24  0833 07/13/24  0756 07/13/24  0232 07/12/24  0730 07/12/24  0716 07/11/24  1713 07/10/24  1157 07/09/24  1552   0000   NA  --  133*  --   --   --  132*  --  131*  --   --    POTASSIUM  --  5.6*  --   --   --  5.2  --  5.4*  --   --    CHLORIDE  --  100  --   --   --  99  --  98  --   --    CO2  --  24  --   --   --  24  --  26  --   --    BUN  --  21.9*  --   --   --  29.4*  --  26.0*  --   --    CR  --  1.38*  --   --   --  1.72*  --  1.61* 1.9*  --    * 218* 227* 207*   < > 390*   < > 195*  --    < >    < > = values in this interval not displayed.     Coagulation Profile  No lab results found in last 7 " days.    Assessment/Plan:  Assessment and Plan:  Long Mayfield is a 54 year old male with status post right knee I&D on 07/11 with Dr. Espinoza. Cultures without growth.     Today:   Follow cultures   Continue ceftriaxone per ID  Continue drains per ID      Orthopedic Surgery Primary  Activity: Up with assist until independent. Knee ROM as tolerated.  Weight bearing status: WBAT.  Pain management: Transition from IV to PO as tolerated.    Antibiotics: Continue antibiotics per ID, recommending ceftriaxone.   Diet: Begin with clear fluids and progress diet as tolerated.   DVT prophylaxis: ASA 81 mg daily  Labs: Monitor Hgb   Bracing/Splinting: None.   Dressings: Keep clean, dry and intact , maintain drain - anticipate discontinuing on 07/14  Elevation: Elevate RLE on pillows to keep above the level of the heart as much as possible - no pillows under knee.   Drains: Removed.  Physical Therapy/Occupational Therapy: Eval and treat. iD  Consults: IM, SW.  Follow-up: Clinic 2 weeks with Dr. Nichols  Disposition: Pending antibiotic plan    --  Max Hoffman MD  Orthopedic Surgery PGY-4

## 2024-07-13 NOTE — PLAN OF CARE
"Goal Outcome Evaluation:  Vitally stable, /77 (BP Location: Right arm, Patient Position: Supine, Cuff Size: Adult Regular)   Pulse 77   Temp 98.2  F (36.8  C) (Oral)   Resp 16   Ht 1.626 m (5' 4\")   Wt 62.5 kg (137 lb 12.6 oz)   SpO2 94%   BMI 23.65 kg/m        Alert and oriented.    Assist of one with walker.    JOSH bulb detached from the drainage tubing. Was reinserted and tapped. Provider informed    Pain managed with oxycodone and robaxin.       Continue plan of care.                          "

## 2024-07-13 NOTE — PLAN OF CARE
Goal Outcome Evaluation:         Pt A&Ox4. Pt denies chest pain, SOB, N/V, N/T. Pt states pain, managed with Oxy, and Robaxin. Pt refused morning Lantus 25 units, states it will drop his BS and doesn't like how it feels, paged provider.Collected urine sample, sent to lab. Gave insulin per orders. JOSH minimal output. Continue POC.

## 2024-07-14 LAB
ANION GAP SERPL CALCULATED.3IONS-SCNC: 9 MMOL/L (ref 7–15)
BASOPHILS # BLD AUTO: 0 10E3/UL (ref 0–0.2)
BASOPHILS NFR BLD AUTO: 0 %
BUN SERPL-MCNC: 22.7 MG/DL (ref 6–20)
C TRACH DNA SPEC QL PROBE+SIG AMP: NEGATIVE
CALCIUM SERPL-MCNC: 8.9 MG/DL (ref 8.6–10)
CHLORIDE SERPL-SCNC: 100 MMOL/L (ref 98–107)
CREAT SERPL-MCNC: 1.29 MG/DL (ref 0.67–1.17)
CRP SERPL-MCNC: 65.82 MG/L
DEPRECATED HCO3 PLAS-SCNC: 24 MMOL/L (ref 22–29)
EGFRCR SERPLBLD CKD-EPI 2021: 65 ML/MIN/1.73M2
EOSINOPHIL # BLD AUTO: 0.3 10E3/UL (ref 0–0.7)
EOSINOPHIL NFR BLD AUTO: 3 %
ERYTHROCYTE [DISTWIDTH] IN BLOOD BY AUTOMATED COUNT: 18.8 % (ref 10–15)
GLUCOSE BLDC GLUCOMTR-MCNC: 160 MG/DL (ref 70–99)
GLUCOSE BLDC GLUCOMTR-MCNC: 242 MG/DL (ref 70–99)
GLUCOSE BLDC GLUCOMTR-MCNC: 242 MG/DL (ref 70–99)
GLUCOSE BLDC GLUCOMTR-MCNC: 277 MG/DL (ref 70–99)
GLUCOSE BLDC GLUCOMTR-MCNC: 88 MG/DL (ref 70–99)
GLUCOSE SERPL-MCNC: 226 MG/DL (ref 70–99)
HCT VFR BLD AUTO: 24.1 % (ref 40–53)
HGB BLD-MCNC: 7.5 G/DL (ref 13.3–17.7)
IMM GRANULOCYTES # BLD: 0 10E3/UL
IMM GRANULOCYTES NFR BLD: 0 %
LYMPHOCYTES # BLD AUTO: 1.7 10E3/UL (ref 0.8–5.3)
LYMPHOCYTES NFR BLD AUTO: 22 %
MCH RBC QN AUTO: 24.8 PG (ref 26.5–33)
MCHC RBC AUTO-ENTMCNC: 31.1 G/DL (ref 31.5–36.5)
MCV RBC AUTO: 80 FL (ref 78–100)
MONOCYTES # BLD AUTO: 0.7 10E3/UL (ref 0–1.3)
MONOCYTES NFR BLD AUTO: 9 %
N GONORRHOEA DNA SPEC QL NAA+PROBE: NEGATIVE
NEUTROPHILS # BLD AUTO: 5.1 10E3/UL (ref 1.6–8.3)
NEUTROPHILS NFR BLD AUTO: 66 %
NRBC # BLD AUTO: 0 10E3/UL
NRBC BLD AUTO-RTO: 0 /100
PLATELET # BLD AUTO: 474 10E3/UL (ref 150–450)
POTASSIUM SERPL-SCNC: 5.1 MMOL/L (ref 3.4–5.3)
POTASSIUM SERPL-SCNC: 5.3 MMOL/L (ref 3.4–5.3)
POTASSIUM SERPL-SCNC: 5.6 MMOL/L (ref 3.4–5.3)
RBC # BLD AUTO: 3.03 10E6/UL (ref 4.4–5.9)
SODIUM SERPL-SCNC: 133 MMOL/L (ref 135–145)
WBC # BLD AUTO: 7.8 10E3/UL (ref 4–11)

## 2024-07-14 PROCEDURE — 258N000003 HC RX IP 258 OP 636: Performed by: INTERNAL MEDICINE

## 2024-07-14 PROCEDURE — 250N000013 HC RX MED GY IP 250 OP 250 PS 637: Performed by: INTERNAL MEDICINE

## 2024-07-14 PROCEDURE — 36415 COLL VENOUS BLD VENIPUNCTURE: CPT | Performed by: ORTHOPAEDIC SURGERY

## 2024-07-14 PROCEDURE — 96376 TX/PRO/DX INJ SAME DRUG ADON: CPT

## 2024-07-14 PROCEDURE — 84132 ASSAY OF SERUM POTASSIUM: CPT | Performed by: INTERNAL MEDICINE

## 2024-07-14 PROCEDURE — 99233 SBSQ HOSP IP/OBS HIGH 50: CPT | Performed by: INTERNAL MEDICINE

## 2024-07-14 PROCEDURE — 82962 GLUCOSE BLOOD TEST: CPT

## 2024-07-14 PROCEDURE — 36415 COLL VENOUS BLD VENIPUNCTURE: CPT | Performed by: INTERNAL MEDICINE

## 2024-07-14 PROCEDURE — 250N000013 HC RX MED GY IP 250 OP 250 PS 637

## 2024-07-14 PROCEDURE — 80048 BASIC METABOLIC PNL TOTAL CA: CPT | Performed by: PHYSICIAN ASSISTANT

## 2024-07-14 PROCEDURE — G0378 HOSPITAL OBSERVATION PER HR: HCPCS

## 2024-07-14 PROCEDURE — 85004 AUTOMATED DIFF WBC COUNT: CPT | Performed by: ORTHOPAEDIC SURGERY

## 2024-07-14 PROCEDURE — 250N000013 HC RX MED GY IP 250 OP 250 PS 637: Performed by: ORTHOPAEDIC SURGERY

## 2024-07-14 PROCEDURE — 96375 TX/PRO/DX INJ NEW DRUG ADDON: CPT

## 2024-07-14 PROCEDURE — 36415 COLL VENOUS BLD VENIPUNCTURE: CPT | Performed by: PHYSICIAN ASSISTANT

## 2024-07-14 PROCEDURE — 120N000002 HC R&B MED SURG/OB UMMC

## 2024-07-14 PROCEDURE — 250N000011 HC RX IP 250 OP 636: Performed by: INTERNAL MEDICINE

## 2024-07-14 PROCEDURE — 82565 ASSAY OF CREATININE: CPT | Performed by: ORTHOPAEDIC SURGERY

## 2024-07-14 PROCEDURE — 86140 C-REACTIVE PROTEIN: CPT | Performed by: ORTHOPAEDIC SURGERY

## 2024-07-14 RX ORDER — BUMETANIDE 0.25 MG/ML
1 INJECTION INTRAMUSCULAR; INTRAVENOUS ONCE
Status: COMPLETED | OUTPATIENT
Start: 2024-07-14 | End: 2024-07-14

## 2024-07-14 RX ORDER — MAGNESIUM OXIDE 400 MG/1
800 TABLET ORAL ONCE
Status: DISCONTINUED | OUTPATIENT
Start: 2024-07-14 | End: 2024-07-14

## 2024-07-14 RX ADMIN — HYDRALAZINE HYDROCHLORIDE 25 MG: 25 TABLET ORAL at 20:39

## 2024-07-14 RX ADMIN — CEFTRIAXONE SODIUM 2 G: 2 INJECTION, POWDER, FOR SOLUTION INTRAMUSCULAR; INTRAVENOUS at 13:12

## 2024-07-14 RX ADMIN — SODIUM CHLORIDE, POTASSIUM CHLORIDE, SODIUM LACTATE AND CALCIUM CHLORIDE: 600; 310; 30; 20 INJECTION, SOLUTION INTRAVENOUS at 14:38

## 2024-07-14 RX ADMIN — PANCRELIPASE 3 CAPSULE: 36000; 180000; 114000 CAPSULE, DELAYED RELEASE PELLETS ORAL at 15:54

## 2024-07-14 RX ADMIN — ASPIRIN 81 MG: 81 TABLET, COATED ORAL at 07:49

## 2024-07-14 RX ADMIN — OXYCODONE HYDROCHLORIDE 5 MG: 5 TABLET ORAL at 17:32

## 2024-07-14 RX ADMIN — CETIRIZINE HYDROCHLORIDE 10 MG: 10 TABLET, FILM COATED ORAL at 07:49

## 2024-07-14 RX ADMIN — AMLODIPINE BESYLATE 5 MG: 5 TABLET ORAL at 07:49

## 2024-07-14 RX ADMIN — PANTOPRAZOLE SODIUM 40 MG: 40 TABLET, DELAYED RELEASE ORAL at 07:49

## 2024-07-14 RX ADMIN — OXYCODONE HYDROCHLORIDE 5 MG: 5 TABLET ORAL at 20:40

## 2024-07-14 RX ADMIN — SODIUM CHLORIDE, POTASSIUM CHLORIDE, SODIUM LACTATE AND CALCIUM CHLORIDE 1000 ML: 600; 310; 30; 20 INJECTION, SOLUTION INTRAVENOUS at 12:07

## 2024-07-14 RX ADMIN — OXYCODONE HYDROCHLORIDE 5 MG: 5 TABLET ORAL at 13:27

## 2024-07-14 RX ADMIN — PANCRELIPASE 3 CAPSULE: 36000; 180000; 114000 CAPSULE, DELAYED RELEASE PELLETS ORAL at 07:52

## 2024-07-14 RX ADMIN — GABAPENTIN 100 MG: 100 CAPSULE ORAL at 20:40

## 2024-07-14 RX ADMIN — OXYCODONE HYDROCHLORIDE 5 MG: 5 TABLET ORAL at 06:45

## 2024-07-14 RX ADMIN — METHOCARBAMOL 500 MG: 500 TABLET ORAL at 13:27

## 2024-07-14 RX ADMIN — BUMETANIDE 1 MG: 0.25 INJECTION INTRAMUSCULAR; INTRAVENOUS at 13:11

## 2024-07-14 RX ADMIN — Medication 1 TABLET: at 07:49

## 2024-07-14 RX ADMIN — HYDRALAZINE HYDROCHLORIDE 25 MG: 25 TABLET ORAL at 07:49

## 2024-07-14 RX ADMIN — GABAPENTIN 100 MG: 100 CAPSULE ORAL at 13:12

## 2024-07-14 RX ADMIN — GABAPENTIN 100 MG: 100 CAPSULE ORAL at 07:49

## 2024-07-14 RX ADMIN — DULOXETINE HYDROCHLORIDE 60 MG: 60 CAPSULE, DELAYED RELEASE ORAL at 07:49

## 2024-07-14 RX ADMIN — PANCRELIPASE 3 CAPSULE: 36000; 180000; 114000 CAPSULE, DELAYED RELEASE PELLETS ORAL at 21:39

## 2024-07-14 ASSESSMENT — ACTIVITIES OF DAILY LIVING (ADL)
ADLS_ACUITY_SCORE: 32
ADLS_ACUITY_SCORE: 32
ADLS_ACUITY_SCORE: 29
ADLS_ACUITY_SCORE: 32
ADLS_ACUITY_SCORE: 32
ADLS_ACUITY_SCORE: 29
ADLS_ACUITY_SCORE: 32
ADLS_ACUITY_SCORE: 29
ADLS_ACUITY_SCORE: 32
ADLS_ACUITY_SCORE: 29
ADLS_ACUITY_SCORE: 32
DEPENDENT_IADLS:: CLEANING;COOKING;LAUNDRY;SHOPPING;MEAL PREPARATION;MEDICATION MANAGEMENT;TRANSPORTATION
ADLS_ACUITY_SCORE: 29
ADLS_ACUITY_SCORE: 29
ADLS_ACUITY_SCORE: 32
ADLS_ACUITY_SCORE: 29
ADLS_ACUITY_SCORE: 32
ADLS_ACUITY_SCORE: 32
ADLS_ACUITY_SCORE: 29

## 2024-07-14 NOTE — UTILIZATION REVIEW
Admission Status; Secondary Review Determination         Under the authority of the Utilization Management Committee, the utilization review process indicated a secondary review on the above patient.  The review outcome is based on review of the medical records, discussions with staff, and applying clinical experience noted on the date of the review.        (x)      Inpatient Status Appropriate - This patient's medical care is consistent with medical management for inpatient care and reasonable inpatient medical practice.     RATIONALE FOR DETERMINATION   Patient is a 54-year-old male who was admitted on 7/11/2024.  Patient has had right knee pain and was seen by orthopedic surgery.  Because of concerns for septic knee an I&D was done of this knee with aspiration of 60 mL of cloudy yellow fluid removed.  Infectious disease consult was requested.  Patient does have a history of osteomyelitis and has type 2 diabetes mellitus.  Recommendations for HIV and HCV along with HBS antigen and Lyme manage on recommended.  Recommendation also for starting IV ceftriaxone based on concerns for right knee septic arthritis.  Initial cultures are negative.  He had received steroid injections in April and because of excessive swelling and may, aspiration was done with cell count of 76,000.  Patient does have a history of pancreatitis.  Today's orthopedic visit does recommend continuation of IV ceftriaxone.  Hemoglobin is quite low at 7.5 consistent with admission hemoglobin.  CRP is quite high at 65.82.  Based on need for additional evaluation and treatment with significant findings, recommend switching from observation to inpatient status. Will attempt to find appropriate ortho service provider to make this change.       The severity of illness, intensity of service provided, expected LOS and risk for adverse outcome make the care complex, high risk and appropriate for hospital admission.        The information on this document is  developed by the utilization review team in order for the business office to ensure compliance.  This only denotes the appropriateness of proper admission status and does not reflect the quality of care rendered.         The definitions of Inpatient Status and Observation Status used in making the determination above are those provided in the CMS Coverage Manual, Chapter 1 and Chapter 6, section 70.4.      Sincerely,     Keith Gutierrez MD  Physician Advisor  Utilization Review/ Case Management  [facility].

## 2024-07-14 NOTE — PLAN OF CARE
Goal Outcome Evaluation:  Patient is alert and oriented x 4 and able to make needs known using call light. Pt denies chest pain, cough or SOB. Appetite good; tolerating regular diet. Pain is managed with Oxycodone and Robaxin PRN; patient advocates for pain medications as needed. Continues on IV maintenance at 100 ml/hr. Up to wheelchair independently. Dressing and wraps CDI. Tele NSR. Possible discharge to home tomorrow.

## 2024-07-14 NOTE — PROGRESS NOTES
"Orthopedic Surgery Progress Note: 07/14/2024    Subjective:   No acute events overnight. Pain well-controlled. Tolerating PO without N/V. Voiding. Denies CP/SOB. No fevers/chills. Denies new numbness or tingling.  No Other concerns or complaints. Cultures from 7/11 ngtd.     Objective:   /81   Pulse 82   Temp 98.2  F (36.8  C) (Oral)   Resp 16   Ht 1.626 m (5' 4\")   Wt 62.5 kg (137 lb 12.6 oz)   SpO2 94%   BMI 23.65 kg/m    No intake/output data recorded.  General: NAD. Resting comfortably in bed.  Respiratory: Breathing comfortably on RA.  Drain Output: 30cc over last 24 and 45 over last  shift. Output appears Serosanguinous   Musculoskeletal:     RLE  Dressings clean and dry   SILT in sural, saphenous, superficial peroneal nerve, deep peroneal nerve, and tibial nerve distributions  Fires FHL/TA and EHL/GSC without issue  2+ DP Pulse. Foot is WWP      Laboratory Data:  Lab Results   Component Value Date    WBC 7.8 07/14/2024    HGB 7.5 (L) 07/14/2024     (H) 07/14/2024       Images:  No new images were obtained.     Assessment & Plan:    Long Mayfield is a 54 year old male with status post right knee I&D on 07/11 with Dr. Espinoza. Cultures without growth.      7/14/24  Follow cultures   Continue ceftriaxone per ID  Pull drain      Orthopedic Surgery Primary  Activity: Up with assist until independent. Knee ROM as tolerated.  Weight bearing status: WBAT.  Pain management: Transition from IV to PO as tolerated.    Antibiotics: Continue antibiotics per ID, recommending ceftriaxone.   Diet: Begin with clear fluids and progress diet as tolerated.   DVT prophylaxis: ASA 81 mg daily  Labs: Monitor Hgb   Bracing/Splinting: None.   Dressings: Keep clean, dry and intact , maintain drain - anticipate discontinuing on 07/14  Elevation: Elevate RLE on pillows to keep above the level of the heart as much as possible - no pillows under knee.   Drains: Removed.  Physical Therapy/Occupational Therapy: " Eval and treat. iD  Consults: MADYSON AKINS.  Follow-up: Clinic 2 weeks with Dr. Nichols  Disposition: Pending antibiotic plan    ------------------------------------------------------------------------------------------    Thank you,    Rasheed Oropeza MD   PGY3  Department of Orthopaedic Surgery  Pager [phone]      Please page me directly with any questions/concerns during regular weekday hours before 5 pm. If there is no response, if it is a weekend, or if it is during evening hours then please page the orthopedic surgery resident on call.    FOLLOWUP:    No future appointments.

## 2024-07-14 NOTE — OP NOTE
Orthopaedic Surgery Op-Note     Patient: Long Mayfield  : 1970  Date of Service: 2024 7:51 AM    Pre-operative Diagnosis:   Pyogenic arthritis of right knee joint, due to unspecified organism (H) [M00.9]    Post-operative Diagnosis: SAME    Procedure(s) Performed: irrigation and debridement of right knee    Staff: Dr. Dejon Espinoza MD  Resident/Fellow: There was no resident or fellow available to assist with this case      Implants: none  Drains: 10 Fr JOSH channel drain in right knee  Intra-op Labs/Cxs/Specimens:   ID Type Source Tests Collected by Time Destination   A : Right Knee Aspirate Knee, Right ANAEROBIC BACTERIAL CULTURE ROUTINE, GRAM STAIN, FUNGAL OR YEAST CULTURE ROUTINE, AEROBIC BACTERIAL CULTURE ROUTINE Dejon Espinoza MD 2024  7:12 PM    B : Right Knee Aspirate Knee, Right ANAEROBIC BACTERIAL CULTURE ROUTINE, GRAM STAIN, FUNGAL OR YEAST CULTURE ROUTINE, AEROBIC BACTERIAL CULTURE ROUTINE Dejon Espinoza MD 2024  7:13 PM    C : Right Knee Aspirate Knee, Right ANAEROBIC BACTERIAL CULTURE ROUTINE, GRAM STAIN, FUNGAL OR YEAST CULTURE ROUTINE, AEROBIC BACTERIAL CULTURE ROUTINE Dejon Espinoza MD 2024  7:13 PM        Indication for Procedure:   Patient is a 54 year old male who presented to my clinic with months of right knee pain, effusion, prior aspirate with greater than 100k nucleated cells.  He was indicated for irrigation and debridement of the right knee to reduce infectious burden, prevent additional inflammatory damage to the articular surface, and eliminate a nidus of infection prior to expected need for neck surgery to address his severe cervical myelopathy.  After appropriate discussion of risks, alternatives and benefits of surgery the patient elected to proceed with surgery.  We discussed the risk of cardiac, pulmonary, and embolic complications which can be life threatening. We also discussed risk of incomplete symptom relief, injury to the  nerve roots or nerves which can result in permanent sensory or motor function loss or persistent infection require additional surgery to address. No guarantees were given. Patient voluntarily signed written informed consent.       Description of Procedure:   On the day of surgery I met the patient in the preoperative holding area.  We reviewed the surgical plan. I answered the patient's questions to the best of my ability. Site was marked and consent forms were signed by the patient and I.  OR and Anesthesia team were briefed. Patient was taken to the OR and general anesthesia administered with endotracheal intubation. IV antibiotics and tranexamic acid were administered prior to incision. Patient was positioned in the supine position.  Non-sterile tourniquet was applied to the right thigh.  The surgical site was prepped and draped in sterile fashion. Timeout was performed.    The right leg was elevated for exsanguination for one minute and the thigh tourniquet inflated to 250 mmHg. I then made a right medial parapatellar incision. I then used bovie to create arthrotomy in line with the skin incision.  Immediately there was abnormal appearing synovial fluid. I collected a large amount of the joint fluid for cultures.  I then used a ley and Leksell to debride the synovium extensively, sending additional cultures.  I then irrigated the joint with 2 liters of sterile saline by cysto tubing. .  Given the purulent nature of the joint fluid and the chronic timeline of his symptoms I elected to irrigate the joint with dillute betadine.  I felt the need to eradicate infection outweighed the potential harmful effect of dillute betadine on the joint.  I then irrigated with additional 2L of saline by cysto tubing. I then irrigated the joint with irricept solution. After the irricept solution was suctioned out I irrigated the joint with a final 2 liters of sterile saline.  After the irrigation and debridement was completed I  sent an additional 3 cultures to inform whether any infection remained in the joint, which would be an indication for additional I&D surgery.       I then placed a drain in the knee.  The tourniquet was deflated.  I then placed tobramycin powder in the joint. The arthrotomy was closed with 0 PDS.  The dermis was then closed with 2-0 and skin closed with staples.  Sterile bandage was applied. Drapes were removed and patient transferred to the hospital cart. Patient emerged from general anesthesia and was extubated. They were taken to the PACU in stable condition.     All sponge and needle counts were correct x 2.  I was present and scrubbed for the entire procedure.     Post op plan:   Ortho Spine Primary team  Activity: Up with assist until independent.   Weight bearing status: Weight bear as tolerated  Pain management: Oral pain medications with IV for breakthrough. Wean IV as able.    Antibiotics: Will ask ID regarding antibiotics; if drain in place continue IV antibiotics until drains out  Diet: Begin with clear fluids and progress diet as tolerated. Shar nutritional supplement twice daily  DVT prophylaxis: Mechanical prophylaxis with SCD  Imaging: Standing radiographs when able  Labs: Monitor Hgb   Bracing/Splinting: None.   Dressings: Keep clean, dry and intact. Patient may shower with bandage in place. Dressing to be removed 2 weeks after surgery. Please change or reinforce as necessary for strikethrough or saturation.   Drains: drain to be removed when output < 30 ml per shift  Physical Therapy/Occupational Therapy: Eval and treat.  Consults: IM, MADYSNO, ID  Follow-up: Clinic 2 weeks  Disposition: Admitted for physical therapy and pain control    Dejon Espinoza MD  Orthopedic Spine Surgeon  , Department of Orthopedic Surgery

## 2024-07-14 NOTE — PROGRESS NOTES
[facility]    Medicine Progress Note - Hospitalist Service, GOLD TEAM 19    Date of Admission:  7/11/2024    Assessment & Plan   Long Mayfield is a 54 year old male admitted on 7/11/2024.  He has a PMHx notable for right knee pain, cervical myelopathy, chronic bilateral leg pain, chronic back pain, chronic muscular atrophy mobility disorder, hx diabetic foot ulcer and osteomyelitis s/p right transmetatarsal amputation, diabetic neuropathy, type 2 DM, acute on chronic anemia, iron deficiency anemia, GERD, CKD stage 3, HTN, HLD, hx of CAD, depression, and chronic pancreatic insufficiency leading to chronic diarrhea. He underwent I&D of right knee by Dr. Espinoza on 7/11 and was then admitted. Internal Medicine consulted for medical co-management.      Right knee pain  S/p irrigation and debridement of right knee (7/11/2024)   ---   Right knee pain with aspiration at O in May 2024.  Result of this study concerning for infection.   ---   Continued to report right knee pain and swelling at OP ortho appointment on 7/10/2024.    ---   S/p right knee aspiration on 7/10 in clinic where 60 ml of cloudy yellow fluid was removed, concerning for infection.  S/p I&D on 7/11 by Dr. Espinoza at  OR  ---   Orthopaedic surgery primary  ---   NGTD from right knee aspirate on 7/10 and 7/11  ---   Has right knee drain  ---   HIV antigen antibody combo nonreactive  ---   HCV antibody nonreactive   ---   HBsAg nonreactive  ---   Lyme ab negative  ---   ESBL stool culture weekly x2, first sample collected on 7/12 and result pending  ---   Currently on ceftriaxone 2 g IV daily  ---   ID consult service signed off  ---   Pain control per primary team   ---   PT/OT following  ---   We may need to reconsult with ID tomorrow for discharge oral antibiotic     Cervical myelopathy   Chronic bilateral leg pain   Hx of chronic back pain   Hx of chronic muscular atrophy mobility disorder    ---   Follows with OP spine clinic, last seen 7/10/2024.  Was scheduled to have two-level ACDF, however this surgery was cancelled d/t lab abnormalities per chart review.  Uses a wheelchair on and off.  Follows with [facility].   ---   Continue PTA gabapentin 100 mg TID, robaxin  ---   PT/OT as above   ---   Fall precautions      Hx of diabetic foot ulcer, osteomyelitis s/p right transmetatarsal amputation   Diabetic neuropathy   ---   Residing at [facility] since this surgery.   ---   Treated with long-term antibiotics.       Type 2 DM  ---   Hgba1c 9.4% on 5/4/24.   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    ---   Hgb is 7.5 g today, was 8.3 g on 7/10  ---   Monitor for s/s of bleeding     GERD   ---   Continue PTA protonix 40 mg daily      CKD stage 3  ---   Baseline creatinine ~ 1.0-1.5.   ---   Creatinine was 1.61 --> 1.72 ---> 1.38 ---> 1.29 today, within baseline range  ---   Continue gentle IVF hydration  ---   Repeat BMP in the AM      HTN  HLD  Hx of CAD   ---   PTA bumex 1 mg oral daily on hold since admission because of slight creatinine rise.  However, patient received Bumex 1 mg IV today for treatment of hyperkalemia  ---   Continue PTA hydralazine 25 mg BID  ---   Continue PTA amlodipine 5 mg daily   ---   Continue PTA aspirin 81 mg daily      Depression  ----   Continue PTA Cymbalta 60 mg daily      Chronic pancreatic insufficiency leading to chronic diarrhea   ---   Follows with [facility] as OP.   ---   Continue PTA creon and loperamide TID PRN     Hyponatremia  ---   Na level is 131 ---> 132 ---> 133  ---    Suspect due to hypovolemia and hyperglycemia  ---   Insulin dose adjusted  ---   Continue gentle isotonic IVF hydration    Hyperkalemia  ---   K level is 5.6 again today, patient refused to take Lokelma or Kayexalate because of side effect of nausea and vomiting  ---   Continue IVF hydration  ---   Will treat him with a liter of LR bolus and Bumex 5 mg IV once today  ---   Repeat K level later this afternoon          Diet: Consistent Carbohydrate Diet Moderate Consistent Carb (60 g CHO per Meal) Diet    DVT Prophylaxis: ASA EC 81 mg daily  Osman Catheter: Not present  Lines: None     Cardiac Monitoring: ACTIVE order. Indication: Electrolyte Imbalance (24 hours)- Magnesium <1.3 mg/ml; Potassium < =2.8 or > 5.5 mg/ml  Code Status:   Full code  Disposition Plan   per primary orthopedics team          Dmitry Uribe MD  Hospitalist Service, GOLD TEAM [address] [place] [address]  Securely message with "[employer] (more info)  Text page via [facility] Paging/Directory   See signed in provider for up to date coverage information  ______________________________________________________________________    Interval History   Patient refused to take Lokelma or Kayexalate for treatment of hyperkalemia  He states both medications give him nausea and vomiting  He does not have any other complaints and would like to discharge home as soon as possible  He still have right knee drain in place  Right knee pain under fair control with current medications    Physical Exam   Vital Signs: Temp: 98.4  F (36.9  C) Temp src: Oral BP: 136/81 Pulse: 77   Resp: 19 SpO2: 94 % O2 Device: None (Room air)    Weight: 137 lbs 12.6 oz  General: aao x 3, NAD.  HEENT:  NC/AT, PERRL, EOMI, neck supple, no thyromegaly, op clear, mmm.  CVS:  NL s 1 and s2, no m/r/g.  Lungs:  CTA B/L.   Abd:  Soft, + bs, NT, no rebound or gaurding, no fluid shift.  Ext:  No c/c.  Lymph:  No edema.  Neuro:  Nonfocal.  Musculoskeletal: No calf  tenderness to palpation.    Skin:  No rash.  Psychiatry:  Mood and affect appropriate.        Data   ------------------------- PAST 24 HR DATA REVIEWED -----------------------------------------------    I have personally reviewed the following data over the past 24 hrs:    7.8  \   7.5 (L)   / 474 (H)     133 (L) 100 22.7 (H) /  242 (H)   5.6 (H) 24 1.29 (H) \     Procal: N/A CRP: 65.82 (H) Lactic Acid: N/A         Imaging results reviewed over the past 24 hrs:   No results found for this or any previous visit (from the past 24 hour(s)).

## 2024-07-14 NOTE — CONSULTS
Care Management Initial Consult    General Information  Assessment completed with: Patient, VM-chart review,  (Long Mayfield)  Type of CM/SW Visit: Initial Assessment    Primary Care Provider verified and updated as needed: Yes (Pt currently under the care of SNF PCP, but pt plans to resume care with community PCP, Dr. Blakely, at [facility].)   Readmission within the last 30 days: no previous admission in last 30 days      Reason for Consult: discharge planning  Advance Care Planning: Advance Care Planning Reviewed: no concerns identified          Communication Assessment  Patient's communication style: spoken language (English or Bilingual)    Hearing Difficulty or Deaf: no   Wear Glasses or Blind: yes    Cognitive  Cognitive/Neuro/Behavioral: WDL                      Living Environment:   People in home: facility resident     Current living Arrangements: residential facility  Name of Facility:  ([facility])   Able to return to prior arrangements: yes       Family/Social Support:  Care provided by: self (and SNF staff)  Provides care for: no one, unable/limited ability to care for self  Marital Status: Single  Sibling(s), Friend          Description of Support System: Supportive, Involved    Support Assessment: Adequate family and caregiver support, Adequate social supports    Current Resources:   Patient receiving home care services: No     Community Resources: Skilled Nursing Facility  Equipment currently used at home: hospital bed, grab bar, toilet, grab bar, tub/shower, cane, straight, raised toilet seat, shower chair, wheelchair, manual  Supplies currently used at home: Diabetic Supplies    Employment/Financial:  Employment Status: disabled (SSDI status pending, pt anticipates receiving decision in July 2024.)     Employment/ Comments:  (Pt is not a )  Financial Concerns: none   Referral to Financial Worker: No       Does the patient's insurance plan have a 3 day qualifying  hospital stay waiver?  Yes     Which insurance plan 3 day waiver is available? Alternative insurance waiver    Will the waiver be used for post-acute placement? No    Lifestyle & Psychosocial Needs:  Social Determinants of Health     Food Insecurity: Patient Declined (12/27/2023)    Received from [facility]    Hunger Vital Sign     Worried About Running Out of Food in the Last Year: Patient declined     Ran Out of Food in the Last Year: Patient declined   Depression: Not at risk (7/10/2024)    PHQ-2     PHQ-2 Score: 2   Housing Stability: High Risk (12/27/2023)    Received from [facility]    Housing Stability     What is your housing situation today?: 1 - I do not have housing (I am staying with others, in a hotel, in a shelter, living outside on ...   Tobacco Use: Low Risk  (7/11/2024)    Patient History     Smoking Tobacco Use: Never     Smokeless Tobacco Use: Never     Passive Exposure: Not on file   Financial Resource Strain: Patient Declined (12/27/2023)    Received from [facility]    Overall Financial Resource Strain (CARDIA)     Difficulty of Paying Living Expenses: Patient declined   Alcohol Use: Not At Risk (6/27/2024)    Received from [facility]    AUDIT-C     Frequency of Alcohol Consumption: Never     Average Number of Drinks: Patient does not drink     Frequency of Binge Drinking: Never   Transportation Needs: No Transportation Needs (12/27/2023)    Received from [facility]    PRAPARE - Transportation     Lack of Transportation (Medical): No     Lack of Transportation (Non-Medical): No   Physical Activity: Not on file   Interpersonal Safety: Not At Risk (12/27/2023)    Received from [facility]    Humiliation, Afraid, Rape, and Kick questionnaire     Fear of Current or Ex-Partner: No     Emotionally Abused: No     Physically Abused: No     Sexually Abused: No    Stress: Not on file   Social Connections: Unknown (3/16/2022)    Received from [facility]    Social Connections     Frequency of Communication with Friends and Family: Not on file   Health Literacy: Not on file       Functional Status:  Prior to admission patient needed assistance:   Dependent ADLs:: Wheelchair-independent, Ambulation-cane  Dependent IADLs:: Cleaning, Cooking, Laundry, Shopping, Meal Preparation, Medication Management, Transportation  Assesssment of Functional Status: At functional baseline    Mental Health Status:  Mental Health Status: No Current Concerns       Chemical Dependency Status:  Chemical Dependency Status: Past Concern  Chemical Dependency Management: Previous treatment          Values/Beliefs:  Spiritual, Cultural Beliefs, Catholic Practices, Values that affect care: yes  Description of Beliefs that Will Affect Care:  (Pt is Shireen)            Additional Information:  Long Mayfield is a 54 year old male admitted on 7/11/2024 to undergo  I&D of right knee with Dr. Espinoza. He has a PMHx notable for right knee pain, cervical myelopathy, chronic bilateral leg pain, chronic back pain, chronic muscular atrophy mobility disorder, hx diabetic foot ulcer and osteomyelitis s/p right transmetatarsal amputation, diabetic neuropathy, type 2 DM, acute on chronic anemia, iron deficiency anemia, GERD, CKD stage 3, HTN, HLD, hx of CAD, depression, and chronic pancreatic insufficiency leading to chronic diarrhea.     SW met with pt at bedside to for Initial CMA. Pt currently resides at [facility] in LTC (no longer receiving PT/OT 5 days/week). Per chart review, [facility] CC, Marycruz-Raymundo Alexandra, has been working with pt for [place] Choice Assessment (presumably to get pt on CADI waiver). Pt reported working with a Relocation Worker for community placement. Pt reported that he has [employer], but usually utilizes  [employer] ride and family for transportation. Pt reported he is IND with ADLs and utilizes a straight cane or WC for mobility (uses WC as a walker). Pt confirmed that he still receives mail at address on face sheet (sister's residence). Pt reported good family support and denied any MH concerns. Pt stated he has been sober from alcohol for 2 years, since he completed IP CD Tx.    [facility]  Fabiola Melgar RN, PHN, MSN  PH: [phone]  Fax: [phone]  [email or web address]       DISCHARGE PLAN: Pt will return to [facility] for LTC [place] [address]  PH: [phone]  Fax: [phone]      Unit CM/SW will continue to follow for pt's return to SNF.          Weekend Coverage   JEWELL JARA   PHONE: [phone]    Weekday 5 MS  PH: [phone]     SW Coverage SEARCHABLE in [employer] - search 5 MS SW  (or by name)  Or click on link below:  5 Med Surg Vocera

## 2024-07-14 NOTE — PLAN OF CARE
Goal Outcome Evaluation:         Pt A&Ox4. Pt denies chest pain, SOB, N/V, N/T. Pt states pain, managed with Oxy and Robaxin. Removed pt drain in knee. Pt up in wheelchair Independently. Gave insulin per orders. Continue POC.

## 2024-07-15 LAB
ANION GAP SERPL CALCULATED.3IONS-SCNC: 9 MMOL/L (ref 7–15)
BACTERIA ASPIRATE CULT: NO GROWTH
BACTERIA STL CULT: NORMAL
BASOPHILS # BLD AUTO: 0 10E3/UL (ref 0–0.2)
BASOPHILS NFR BLD AUTO: 0 %
BUN SERPL-MCNC: 23 MG/DL (ref 6–20)
CALCIUM SERPL-MCNC: 8.2 MG/DL (ref 8.6–10)
CHLORIDE SERPL-SCNC: 99 MMOL/L (ref 98–107)
CREAT SERPL-MCNC: 1.12 MG/DL (ref 0.67–1.17)
DEPRECATED HCO3 PLAS-SCNC: 25 MMOL/L (ref 22–29)
EGFRCR SERPLBLD CKD-EPI 2021: 77 ML/MIN/1.73M2
EOSINOPHIL # BLD AUTO: 0.3 10E3/UL (ref 0–0.7)
EOSINOPHIL NFR BLD AUTO: 3 %
ERYTHROCYTE [DISTWIDTH] IN BLOOD BY AUTOMATED COUNT: 18.5 % (ref 10–15)
GLUCOSE BLDC GLUCOMTR-MCNC: 117 MG/DL (ref 70–99)
GLUCOSE BLDC GLUCOMTR-MCNC: 183 MG/DL (ref 70–99)
GLUCOSE BLDC GLUCOMTR-MCNC: 196 MG/DL (ref 70–99)
GLUCOSE BLDC GLUCOMTR-MCNC: 229 MG/DL (ref 70–99)
GLUCOSE BLDC GLUCOMTR-MCNC: 248 MG/DL (ref 70–99)
GLUCOSE SERPL-MCNC: 231 MG/DL (ref 70–99)
GRAM STAIN RESULT: NORMAL
GRAM STAIN RESULT: NORMAL
HCT VFR BLD AUTO: 23.3 % (ref 40–53)
HGB BLD-MCNC: 7.2 G/DL (ref 13.3–17.7)
IMM GRANULOCYTES # BLD: 0 10E3/UL
IMM GRANULOCYTES NFR BLD: 0 %
LYMPHOCYTES # BLD AUTO: 1.4 10E3/UL (ref 0.8–5.3)
LYMPHOCYTES NFR BLD AUTO: 18 %
MCH RBC QN AUTO: 24.9 PG (ref 26.5–33)
MCHC RBC AUTO-ENTMCNC: 30.9 G/DL (ref 31.5–36.5)
MCV RBC AUTO: 81 FL (ref 78–100)
MONOCYTES # BLD AUTO: 0.8 10E3/UL (ref 0–1.3)
MONOCYTES NFR BLD AUTO: 10 %
NEUTROPHILS # BLD AUTO: 5.2 10E3/UL (ref 1.6–8.3)
NEUTROPHILS NFR BLD AUTO: 69 %
NRBC # BLD AUTO: 0 10E3/UL
NRBC BLD AUTO-RTO: 0 /100
PLATELET # BLD AUTO: 437 10E3/UL (ref 150–450)
POTASSIUM SERPL-SCNC: 4.9 MMOL/L (ref 3.4–5.3)
RBC # BLD AUTO: 2.89 10E6/UL (ref 4.4–5.9)
SODIUM SERPL-SCNC: 133 MMOL/L (ref 135–145)
WBC # BLD AUTO: 7.7 10E3/UL (ref 4–11)

## 2024-07-15 PROCEDURE — 120N000002 HC R&B MED SURG/OB UMMC

## 2024-07-15 PROCEDURE — 250N000013 HC RX MED GY IP 250 OP 250 PS 637: Performed by: ORTHOPAEDIC SURGERY

## 2024-07-15 PROCEDURE — 99232 SBSQ HOSP IP/OBS MODERATE 35: CPT | Performed by: INTERNAL MEDICINE

## 2024-07-15 PROCEDURE — 258N000003 HC RX IP 258 OP 636: Performed by: INTERNAL MEDICINE

## 2024-07-15 PROCEDURE — 99233 SBSQ HOSP IP/OBS HIGH 50: CPT | Performed by: STUDENT IN AN ORGANIZED HEALTH CARE EDUCATION/TRAINING PROGRAM

## 2024-07-15 PROCEDURE — 36415 COLL VENOUS BLD VENIPUNCTURE: CPT | Performed by: ORTHOPAEDIC SURGERY

## 2024-07-15 PROCEDURE — 250N000013 HC RX MED GY IP 250 OP 250 PS 637: Performed by: INTERNAL MEDICINE

## 2024-07-15 PROCEDURE — 36415 COLL VENOUS BLD VENIPUNCTURE: CPT | Performed by: STUDENT IN AN ORGANIZED HEALTH CARE EDUCATION/TRAINING PROGRAM

## 2024-07-15 PROCEDURE — 86038 ANTINUCLEAR ANTIBODIES: CPT | Performed by: STUDENT IN AN ORGANIZED HEALTH CARE EDUCATION/TRAINING PROGRAM

## 2024-07-15 PROCEDURE — 250N000013 HC RX MED GY IP 250 OP 250 PS 637

## 2024-07-15 PROCEDURE — 80048 BASIC METABOLIC PNL TOTAL CA: CPT | Performed by: ORTHOPAEDIC SURGERY

## 2024-07-15 PROCEDURE — 250N000011 HC RX IP 250 OP 636: Performed by: INTERNAL MEDICINE

## 2024-07-15 PROCEDURE — 85048 AUTOMATED LEUKOCYTE COUNT: CPT | Performed by: ORTHOPAEDIC SURGERY

## 2024-07-15 RX ORDER — LIDOCAINE 40 MG/G
CREAM TOPICAL
Status: DISCONTINUED | OUTPATIENT
Start: 2024-07-15 | End: 2024-07-17 | Stop reason: HOSPADM

## 2024-07-15 RX ADMIN — SODIUM CHLORIDE, POTASSIUM CHLORIDE, SODIUM LACTATE AND CALCIUM CHLORIDE: 600; 310; 30; 20 INJECTION, SOLUTION INTRAVENOUS at 14:16

## 2024-07-15 RX ADMIN — PANCRELIPASE 3 CAPSULE: 36000; 180000; 114000 CAPSULE, DELAYED RELEASE PELLETS ORAL at 19:59

## 2024-07-15 RX ADMIN — PANTOPRAZOLE SODIUM 40 MG: 40 TABLET, DELAYED RELEASE ORAL at 08:58

## 2024-07-15 RX ADMIN — AMLODIPINE BESYLATE 5 MG: 5 TABLET ORAL at 08:58

## 2024-07-15 RX ADMIN — GABAPENTIN 100 MG: 100 CAPSULE ORAL at 19:58

## 2024-07-15 RX ADMIN — ASPIRIN 81 MG: 81 TABLET, COATED ORAL at 08:58

## 2024-07-15 RX ADMIN — CEFTRIAXONE SODIUM 2 G: 2 INJECTION, POWDER, FOR SOLUTION INTRAMUSCULAR; INTRAVENOUS at 12:09

## 2024-07-15 RX ADMIN — OXYCODONE HYDROCHLORIDE 5 MG: 5 TABLET ORAL at 16:59

## 2024-07-15 RX ADMIN — PANCRELIPASE 3 CAPSULE: 36000; 180000; 114000 CAPSULE, DELAYED RELEASE PELLETS ORAL at 10:08

## 2024-07-15 RX ADMIN — OXYCODONE HYDROCHLORIDE 5 MG: 5 TABLET ORAL at 02:38

## 2024-07-15 RX ADMIN — METHOCARBAMOL 500 MG: 500 TABLET ORAL at 16:59

## 2024-07-15 RX ADMIN — Medication 1 TABLET: at 08:58

## 2024-07-15 RX ADMIN — HYDRALAZINE HYDROCHLORIDE 25 MG: 25 TABLET ORAL at 08:58

## 2024-07-15 RX ADMIN — PANCRELIPASE 3 CAPSULE: 36000; 180000; 114000 CAPSULE, DELAYED RELEASE PELLETS ORAL at 15:06

## 2024-07-15 RX ADMIN — OXYCODONE HYDROCHLORIDE 5 MG: 5 TABLET ORAL at 08:58

## 2024-07-15 RX ADMIN — GABAPENTIN 100 MG: 100 CAPSULE ORAL at 14:05

## 2024-07-15 RX ADMIN — DULOXETINE HYDROCHLORIDE 60 MG: 60 CAPSULE, DELAYED RELEASE ORAL at 08:58

## 2024-07-15 RX ADMIN — CETIRIZINE HYDROCHLORIDE 10 MG: 10 TABLET, FILM COATED ORAL at 08:58

## 2024-07-15 RX ADMIN — OXYCODONE HYDROCHLORIDE 5 MG: 5 TABLET ORAL at 14:05

## 2024-07-15 RX ADMIN — HYDRALAZINE HYDROCHLORIDE 25 MG: 25 TABLET ORAL at 19:58

## 2024-07-15 RX ADMIN — METHOCARBAMOL 500 MG: 500 TABLET ORAL at 02:38

## 2024-07-15 RX ADMIN — GABAPENTIN 100 MG: 100 CAPSULE ORAL at 08:58

## 2024-07-15 RX ADMIN — SODIUM CHLORIDE, POTASSIUM CHLORIDE, SODIUM LACTATE AND CALCIUM CHLORIDE: 600; 310; 30; 20 INJECTION, SOLUTION INTRAVENOUS at 02:39

## 2024-07-15 ASSESSMENT — ACTIVITIES OF DAILY LIVING (ADL)
ADLS_ACUITY_SCORE: 32
ADLS_ACUITY_SCORE: 30
ADLS_ACUITY_SCORE: 32
ADLS_ACUITY_SCORE: 30
ADLS_ACUITY_SCORE: 32
ADLS_ACUITY_SCORE: 30
ADLS_ACUITY_SCORE: 32
ADLS_ACUITY_SCORE: 30
ADLS_ACUITY_SCORE: 30
ADLS_ACUITY_SCORE: 32
ADLS_ACUITY_SCORE: 30
ADLS_ACUITY_SCORE: 32
ADLS_ACUITY_SCORE: 30
ADLS_ACUITY_SCORE: 30
ADLS_ACUITY_SCORE: 32

## 2024-07-15 NOTE — PLAN OF CARE
Goal Outcome Evaluation:       54 year old patient admitted 7/11 for I&D of the right knee. Patient is post-op day 4. Vitals are stable, on room air, alert x4. Last BM was on, urine output is adequate. Patient can use either the bedside commode or get up to bathroom. Regular diet, stand by assist. Left PIV running 100 ml/hr of LR. Patient is on tele, patient uses a wheel chair for ambulation. Patient has diabetes type 2 with sliding scale and pre-meal glucose. Patient also takes creon prior to meals.

## 2024-07-15 NOTE — PLAN OF CARE
"Goal Outcome Evaluation:       Vitally stable, /83 (BP Location: Right arm)   Pulse 76   Temp 98.2  F (36.8  C) (Oral)   Resp 16   Ht 1.626 m (5' 4\")   Wt 62.5 kg (137 lb 12.6 oz)   SpO2 95%   BMI 23.65 kg/m        Alert and oriented X4.    Pain managed with oxycodone and robaxin.    Refused bed alarm.     On Blood sugar checks and sliding scale.    Continue plan of care.                 "

## 2024-07-15 NOTE — PROGRESS NOTES
[facility] ID SERVICE :  PROGRESS NOTE    Patient:  Long Mayfield   YOB: 1970, MRN: [id number]  Date of Visit: 07/15/2024  Date of Admission: 7/11/2024  Consult Requester: Dejon Espinoza MD          Assessment and Recommendations:     ID Problem List:  Culture negative right knee arthritis, unclear etiology so far, ongoing work up  7/10/2024 Synovial fluid analysis TNC 60312, 93% neutrophils; cultures NGTD  Elevated CRP, down trending  SALVADOR on admission, resolving  Type 2 DM (A1c 9.4% on 5/4/2024)  Hx of diabetic foot infection, s/p right TMA  Peripheral vascular disease  Hx of alcohol use disorder  Chronic diarrhea  Hx of pancreatitis    Discussion:  53 yo M with right knee swelling. Synovial fluid showed inflammation. So far, multiple cultures are negative. Writer spoke to [facility] lab if able to send broad range PCR. However, not adequate quantity of specimen left to run this molecular testing. Work up for unusual pathogens were obtained - negative Lyme, Chlamydia/Gonorrhea; pending Whipple's testing. Noted some improvement in right knee swelling and down trending CRP since on empiric antibiotic. Recommend to continue iv ceftriaxone. Anticipate about 3 weeks course, and outpatient ID follow up.     Recommendations:  Synovial fluid cultures remained negative so far.   Of note, writer inquired with [facility] lab regarding broad range PCR. However, there is not enough quantity of synovial fluid left to send for this molecular testing.   Pending Whipple's testing  Continue iv ceftriaxone 2g daily  KATHY w/ reflex for comprehensive work up for arthritis. Though likely septic arthritis, given down trending CRP while on empiric antibiotic coverage.   Okay to place PICC line, anticipate about empiric 3 weeks course, and outpatient ID follow up. Sent message to our ID schedulers.     Recommendations are discussed with the primary and orthopedic teams.     ID team will  "continue to follow. Please reach out if any questions or concerns.     Total time spent during this encounter (chart review, documentation, MDM, coordination of care): 50 minutes    Selene Mccurdy MD   Infectious Diseases Staff Physician  Allyson leach   07/15/2024         Interval History and Events:     Seen and examined - no new complaints. Notes some decrease in right knee swelling.          HPI as adopted from initial ID consult on 7/12/2024:     Long Mayfield is a 54 year old male with right knee swelling. He could not tell when exactly it started to become swollen or painful. It seems like it's been on and off for yours.   He has had injection to his right knee from 2 years ago. This year he had another injection to his right knee in April 2024 at [facility]. However, in May, they found that his knee is too swollen so they aspirated the knee instead. Cell count 76k and a week later 109k. Cultures negative. He was not told there's a problem and was not given antibiotics. He has chronic diarrhea attributed to pancreatitis from previous alcohol use disorder. He does not have fever, chills, diarrhea.      He was born in [facility]. He lived in [place], [facility] and [facility]. He skied a lot. He was sexually active with women but not recently. He does not inject drugs. 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 He does not recall tick bites.          Review of Systems:   Targeted 10 point ROS was completed with pertinent positives and negatives are detailed above.         Antimicrobial history:     Current:  Ceftriaxone 7/12 - present         Physical Examination:     Vital signs:  BP (!) 142/93 (BP Location: Right arm)   Pulse 76   Temp 98.4  F (36.9  C) (Oral)   Resp 16   Ht 1.626 m (5' 4\")   Wt 62.5 kg (137 lb 12.6 oz)   SpO2 95%   BMI 23.65 kg/m      GENERAL: alert and no distress  NECK: no adenopathy, no asymmetry, masses, or scars  RESP: lungs clear to auscultation - no rales, " rhonchi or wheezes  CV: regular rate and rhythm, normal S1 S2, no S3 or S4, no murmur, click or rub, no peripheral edema  ABDOMEN: soft, nontender, no hepatosplenomegaly, no masses and bowel sounds normal  MS: Right knee +swelling, +warmth, +tender, decreased active and passive ROM. Right TMA - well healed stump.       Lines and devices:    PIV CDI, non-tender, no surrounding erythema.    Labs, Microbiology and Imaging studies reviewed.   Elevated CRP, down trending    Lyme total Ab negative  Chlamydia/Gonorrhea negative  Whipple's testing - in process         Laboratory Data:       Microbiology:    Culture   Date Value Ref Range Status   07/12/2024 Culture in progress  Preliminary   07/11/2024 No anaerobic organisms isolated after 3 days  Preliminary   07/11/2024 No anaerobic organisms isolated after 3 days  Preliminary   07/11/2024 No growth after 3 days  Preliminary   07/11/2024 No growth after 3 days  Preliminary   07/11/2024 No growth after 3 days  Preliminary   07/11/2024 No growth after 3 days  Preliminary   07/11/2024 No anaerobic organisms isolated after 3 days  Preliminary   07/11/2024 No growth after 3 days  Preliminary   07/11/2024 No growth after 3 days  Preliminary   07/10/2024 No anaerobic organisms isolated after 4 days  Preliminary   07/10/2024 No Growth  Final   05/29/2024 No Growth  Final   05/29/2024 No anaerobic organisms isolated  Final   05/22/2024 No Growth  Final   05/22/2024 No anaerobic organisms isolated  Final   05/22/2024 See corresponding culture for results  Final     GS Culture   Date Value Ref Range Status   07/11/2024 See corresponding culture for results  Final   07/11/2024 See corresponding culture for results  Final   07/11/2024 See corresponding culture for results  Final   05/29/2024 See corresponding culture for results  Final     Inflammatory Markers:   Recent Labs   Lab Test 07/10/24  1157   *     Urine Studies:    Recent Labs   Lab Test 05/04/24  0246 03/08/22 2011    LEUKEST Negative Negative   WBCU <1 8*     Hematology Studies:    Recent Labs   Lab Test 07/15/24  0839 07/14/24  0611 07/13/24  0833 07/12/24  0716 07/10/24  1157 05/07/24  0716 05/06/24  0724 05/05/24  1037 03/08/22  1756 10/08/17  2105   WBC 7.7 7.8 8.5 7.5 10.4  --   --  8.6   < > 3.9*   ANEU  --   --   --   --   --   --   --   --   --  2.4   AEOS  --   --   --   --   --   --   --   --   --  0.1   HGB 7.2* 7.5* 7.6* 7.3* 8.3* 8.4*   < > 9.2*   < > 12.1*   MCV 81 80 81 80 80  --   --  80   < > 84    474* 465* 448 438  --   --  371   < > 238    < > = values in this interval not displayed.      Metabolic Studies:   Recent Labs   Lab Test 07/15/24  0839 07/14/24  1411 07/14/24  0611 07/14/24  0118 07/13/24  2053 07/13/24  0833 07/12/24  0716 07/10/24  1157   *  --  133*  --   --  133* 132* 131*   POTASSIUM 4.9 5.1 5.6* 5.3 5.6* 5.6* 5.2 5.4*   CHLORIDE 99  --  100  --   --  100 99 98   CO2 25  --  24  --   --  24 24 26   BUN 23.0*  --  22.7*  --   --  21.9* 29.4* 26.0*   CR 1.12  --  1.29*  --   --  1.38* 1.72* 1.61*   GFRESTIMATED 77  --  65  --   --  60* 46* 50*     Hepatic Studies:   Recent Labs   Lab Test 07/10/24  1157 05/04/24  1126 05/03/24  1626 01/23/23  1945 08/06/22 2005 03/09/22  0651   BILITOTAL 0.2 0.2 <0.2 0.2 0.2 0.6   ALKPHOS 188* 134 138 113 118 299*   ALBUMIN 3.5 3.4* 3.4* 4.1 3.0* 2.9*   AST 17 15 17 35 71* 153*   ALT 14 8 8 27 57 54     Hepatitis B Testing:   Recent Labs   Lab Test 07/12/24  0716   HEPBANG Nonreactive     Hepatitis C Testing:   Hepatitis C Antibody   Date Value Ref Range Status   07/12/2024 Nonreactive Nonreactive Final     Comment:     A nonreactive screening test result does not exclude the possibility of exposure to or infection with HCV. Nonreactive screening test results in individuals with prior exposure to HCV may be due to antibody levels below the limit of detection of this assay or lack of reactivity to the HCV antigens used in this assay. Patients with  recent HCV infections (<3 months from time of exposure) may have false-negative HCV antibody results due to the time needed for seroconversion (average of 8 to 9 weeks).          Last check of C difficile  C Difficile Toxin B by PCR   Date Value Ref Range Status   2022 Positive (A) Negative Final     Comment:     Detection of C. difficile nucleic acid in stools confirms the presence of these organisms in diarrheal patients but may not indicate that C. difficile are the etiologic agents of the diarrhea. Results from the Xpert C. difficile assay should be interpreted in conjunction with other laboratory and clinical data available to the clinician.       Last Pathology Report   Case Report   Date Value Ref Range Status   2024   Final    Surgical Pathology Report                         Case: [id number]                                  Authorizing Provider:  Nina Moya MD Collected:           2024 11:17 AM          Ordering Location:     [facility]          Received:            2024 12:08 PM                                 [facility]                                                          Pathologist:           Miguelangel Martin MD                                                            Specimen:    Large Intestine, Colon, Random, rule out microscopic colitis                                Clinical Information   Date Value Ref Range Status   2024   Final    Procedure:  Esophagoscopy, gastroscopy, duodenoscopy (EGD), combined  Colonoscopy  Pre-op Diagnosis: Anemia [D64.9]  Post-op Diagnosis: D64.9 - Anemia [ICD-10-CM]       Final Diagnosis   Date Value Ref Range Status   2024   Final    Colon, random, biopsies:  --No significant histopathologic change.                  Imagin/10/2024 XR cervical spine:  IMPRESSION:   1.  Cervical degenerative changes, most pronounced at C6-7. Grade 1 anterolisthesis of C3 on C4, C4 on C5, and C7 on T1. No dynamic  instability.  2.  Severe calcifications in the bilateral carotid artery bulbs.    7/9/2024 CT-AP w/ contrast:  IMPRESSION:   1.  Mild right inguinal lymphadenopathy is indeterminant but more likely reactive.  2.  Otherwise, no acute findings or significant change in the abdomen or pelvis compared to previous.

## 2024-07-15 NOTE — PLAN OF CARE
Goal Outcome Evaluation: reviewed w/ pt.    Received alert and oriented x4, in RA  On telemetry - NSR  Complained of pain, given Oxycodone  Denies, N/V, SOB and chest discomfort  Baseline - mild tingling sensation on b/l lower extremities  Surgical dressing is dry and intact - Rt lower leg  Due oral meds given  Blood glucose checked, on Carb coverage.   Pt is not eating on time. Pending Carb Coverage  On wheelchair by himself  Instructed to use call light for assistance.  Possible for discharge tomorrow.

## 2024-07-15 NOTE — PROGRESS NOTES
[facility]    Medicine Progress Note - Hospitalist Service, GOLD TEAM 19    Date of Admission:  7/11/2024    Course reviewed with  Dr Uribe    Assessment & Plan   Long Mayfield is a 54 year old male admitted on 7/11/2024.  He has a PMHx notable for right knee pain, cervical myelopathy, chronic bilateral leg pain, chronic back pain, chronic muscular atrophy mobility disorder, hx diabetic foot ulcer and osteomyelitis s/p right transmetatarsal amputation, diabetic neuropathy, type 2 DM, acute on chronic anemia, iron deficiency anemia, GERD, CKD stage 3, HTN, HLD, hx of CAD, depression, and chronic pancreatic insufficiency leading to chronic diarrhea. He underwent I&D of right knee by Dr. Espinoza on 7/11 and was then admitted. Internal Medicine consulted for medical co-management.      Right knee pain  S/p irrigation and debridement of right knee (7/11/2024)   ---   Right knee pain with aspiration at O in May 2024.  Result of this study concerning for infection.   ---   Continued to report right knee pain and swelling at OP ortho appointment on 7/10/2024.    ---   S/p right knee aspiration on 7/10 in clinic where 60 ml of cloudy yellow fluid was removed, concerning for infection.  S/p I&D on 7/11 by Dr. Espinoza at  OR  ---   Orthopaedic surgery primary  ---   NGTD from right knee aspirate on 7/10 and 7/11  ---   Has right knee drain  ---   HIV antigen antibody combo nonreactive  ---   HCV antibody nonreactive   ---   HBsAg nonreactive  ---   Lyme ab negative  ---   ESBL stool culture weekly x2, first sample collected on 7/12 and result pending  ---   Currently on ceftriaxone 2 g IV daily  ---   ID likely to recommend continuing empiric Ceftriaxone-  --- PICC line ordered     Cervical myelopathy   Chronic bilateral leg pain   Hx of chronic back pain   Hx of chronic muscular atrophy mobility disorder   ---   Follows with OP spine clinic, last seen 7/10/2024.  Was  scheduled to have two-level ACDF, however this surgery was cancelled d/t lab abnormalities per chart review.  Uses a wheelchair on and off.  Follows with [facility].   ---   Continue PTA gabapentin 100 mg TID, robaxin  ---   PT/OT as above   ---   Fall precautions      Hx of diabetic foot ulcer, osteomyelitis s/p right transmetatarsal amputation   Diabetic neuropathy   ---   Residing at [facility] since this surgery.         Type 2 DM  ---   Hgba1c 9.4% on 5/4/24.   ---   Glucose has been > 200+  ---   On Lantus 15 units sq qam prior to admit  ---   Pt adamant that he can only receive Lantus 10 units daily + prandial insulin---  ---   Holding PTA insulin aspart 8 units TID with meals   ---   Continue high intensity NovoLog SSI     Acute on chronic anemia   Iron deficiency anemia   ---   Baseline Hgb ~ 8-9 recently.    ---   Hgb gradually declining, asymptomatic  ---   Monitor for s/s of bleeding  ---   repeat Hgb 7/16/24     GERD   ---   Continue PTA protonix 40 mg daily      CKD stage 3  ---   Baseline creatinine ~ 1.0-1.5.   ---   Creatinine was 1.61 --> 1.72 ---> 1.38 ---> 112 today, within baseline range  ---   Continue to monitor bmp       HTN  HLD  Hx of CAD   ---   PTA bumex 1 mg oral daily on hold since admission   ---   Continue PTA hydralazine 25 mg BID  ---   Continue PTA amlodipine 5 mg daily   ---   Continue PTA aspirin 81 mg daily   ---   Resume Bumex 1 mg daily in am     Depression  ----   Continue PTA Cymbalta 60 mg daily      Chronic pancreatic insufficiency leading to chronic diarrhea   ---   Follows with [facility] as OP.   ---   Continue PTA creon and loperamide TID PRN     Hyponatremia  ---   Na level is 131 ---> 132 ---> 133  ---   Saline lock IV    Hyperkalemia  Stable  Plan repeat BMP in am  ---          Diet: Consistent Carbohydrate Diet Moderate Consistent Carb (60 g CHO per Meal) Diet    DVT Prophylaxis: ASA EC 81 mg daily  Osman Catheter: Not present  Lines: None     Cardiac Monitoring: ACTIVE order.  Indication: Electrolyte Imbalance (24 hours)- Magnesium <1.3 mg/ml; Potassium < =2.8 or > 5.5 mg/ml  Code Status:   Full code  Disposition Plan   per primary orthopedics team          Jr Westbrook MD  Hospitalist Service, GOLD TEAM 19  [facility]  Securely message with [employer] (more info)  Text page via [employer] Paging/Directory   See signed in provider for up to date coverage information  ______________________________________________________________________    Interval History     Pt feels well  Mild knee pain  No cough, chest pain, SOB    Physical Exam   Vital Signs: Temp: 98.4  F (36.9  C) Temp src: Oral BP: (!) 142/93 Pulse: 76   Resp: 16 SpO2: 95 % O2 Device: None (Room air)    Weight: 137 lbs 12.6 oz  General: in NAD  HEENT:  oral mass  CVS: RRR  Lungs:  clear   Abd:  Soft,  Ext:  No c/c.  Lymph:  No edema.  Neuro:  Nonfocal.  Musculoskeletal: No calf tenderness        Data   ------------------------- PAST 24 HR DATA REVIEWED -----------------------------------------------    I have personally reviewed the following data over the past 24 hrs:    7.7  \   7.2 (L)   / 437     133 (L) 99 23.0 (H) /  248 (H)   4.9 25 1.12 \       Imaging results reviewed over the past 24 hrs:   No results found for this or any previous visit (from the past 24 hour(s)).

## 2024-07-15 NOTE — PROGRESS NOTES
"Orthopedic Surgery Progress Note: 07/15/2024    Subjective:   No acute events overnight. Pain well-controlled. States that it is difficult to say if he has less pain now compared to prior to the surgery. Tolerating PO without N/V. Voiding. Denies CP/SOB. No fevers/chills. Denies new numbness or tingling.      Objective:   /83 (BP Location: Right arm)   Pulse 76   Temp 98.2  F (36.8  C) (Oral)   Resp 16   Ht 1.626 m (5' 4\")   Wt 62.5 kg (137 lb 12.6 oz)   SpO2 95%   BMI 23.65 kg/m    No intake/output data recorded.  General: NAD. Resting comfortably in bed.  Respiratory: Breathing comfortably on RA.  Musculoskeletal:     RLE  Dressings clean and dry   Drain has been removed  Able to tolerate knee motion from 5-80 without pain  SILT over the dorsal and plantar surfaces of the foot  Fires TA and EHL/GSC without issue   Foot is WWP    Laboratory Data:  Lab Results   Component Value Date    WBC 7.8 07/14/2024    HGB 7.5 (L) 07/14/2024     (H) 07/14/2024     Cultures without growth to date.     Images:  No new images were obtained.     Assessment & Plan:    Long Mayfield is a 54 year old male with status post right knee I&D on 07/11 with Dr. Espinoza for a concern for a chronic right knee septic arthritis. Cultures without growth.      7/14/24  Follow cultures   Continue ceftriaxone per ID - need to re-discuss antibiotic plan with ID  OK for discharge when have antibiotic plan     Orthopedic Surgery Primary  Activity: Up with assist until independent. Knee ROM as tolerated.  Weight bearing status: WBAT.  Pain management: Transition from IV to PO as tolerated.    Antibiotics: Continue antibiotics per ID, recommending ceftriaxone.   Diet: Begin with clear fluids and progress diet as tolerated.   DVT prophylaxis: ASA 81 mg daily  Labs: Monitor Hgb   Bracing/Splinting: None.   Dressings: Keep clean, dry and intact , maintain drain - anticipate discontinuing on 07/14  Elevation: Elevate RLE on pillows " to keep above the level of the heart as much as possible - no pillows under knee.   Drains: Removed.  Physical Therapy/Occupational Therapy: Eval and treat. iD  Consults: MADYSON AKINS.  Follow-up: Clinic 2 weeks with Dr. Nichols  Disposition: Pending antibiotic plan    ------------------------------------------------------------------------------------------    Thank you,    Max Hoffman MD     FOLLOWUP:    No future appointments.

## 2024-07-16 LAB
ANA SER QL IF: NEGATIVE
ANION GAP SERPL CALCULATED.3IONS-SCNC: 8 MMOL/L (ref 7–15)
BACTERIA ASPIRATE CULT: NO GROWTH
BASOPHILS # BLD AUTO: 0.1 10E3/UL (ref 0–0.2)
BASOPHILS NFR BLD AUTO: 1 %
BUN SERPL-MCNC: 19.4 MG/DL (ref 6–20)
CALCIUM SERPL-MCNC: 8.3 MG/DL (ref 8.6–10)
CHLORIDE SERPL-SCNC: 103 MMOL/L (ref 98–107)
CREAT SERPL-MCNC: 1.13 MG/DL (ref 0.67–1.17)
CRP SERPL-MCNC: 60.91 MG/L
EGFRCR SERPLBLD CKD-EPI 2021: 76 ML/MIN/1.73M2
EOSINOPHIL # BLD AUTO: 0.3 10E3/UL (ref 0–0.7)
EOSINOPHIL NFR BLD AUTO: 4 %
ERYTHROCYTE [DISTWIDTH] IN BLOOD BY AUTOMATED COUNT: 18.6 % (ref 10–15)
GLUCOSE BLDC GLUCOMTR-MCNC: 128 MG/DL (ref 70–99)
GLUCOSE BLDC GLUCOMTR-MCNC: 190 MG/DL (ref 70–99)
GLUCOSE BLDC GLUCOMTR-MCNC: 198 MG/DL (ref 70–99)
GLUCOSE SERPL-MCNC: 194 MG/DL (ref 70–99)
HCO3 SERPL-SCNC: 26 MMOL/L (ref 22–29)
HCT VFR BLD AUTO: 24.4 % (ref 40–53)
HGB BLD-MCNC: 7.4 G/DL (ref 13.3–17.7)
IMM GRANULOCYTES # BLD: 0 10E3/UL
IMM GRANULOCYTES NFR BLD: 0 %
LYMPHOCYTES # BLD AUTO: 1.7 10E3/UL (ref 0.8–5.3)
LYMPHOCYTES NFR BLD AUTO: 23 %
MCH RBC QN AUTO: 24.6 PG (ref 26.5–33)
MCHC RBC AUTO-ENTMCNC: 30.3 G/DL (ref 31.5–36.5)
MCV RBC AUTO: 81 FL (ref 78–100)
MONOCYTES # BLD AUTO: 0.7 10E3/UL (ref 0–1.3)
MONOCYTES NFR BLD AUTO: 9 %
NEUTROPHILS # BLD AUTO: 4.5 10E3/UL (ref 1.6–8.3)
NEUTROPHILS NFR BLD AUTO: 63 %
NRBC # BLD AUTO: 0 10E3/UL
NRBC BLD AUTO-RTO: 0 /100
PLATELET # BLD AUTO: 493 10E3/UL (ref 150–450)
POTASSIUM SERPL-SCNC: 4.9 MMOL/L (ref 3.4–5.3)
RBC # BLD AUTO: 3.01 10E6/UL (ref 4.4–5.9)
SODIUM SERPL-SCNC: 137 MMOL/L (ref 135–145)
WBC # BLD AUTO: 7.3 10E3/UL (ref 4–11)

## 2024-07-16 PROCEDURE — 250N000013 HC RX MED GY IP 250 OP 250 PS 637: Performed by: INTERNAL MEDICINE

## 2024-07-16 PROCEDURE — 80048 BASIC METABOLIC PNL TOTAL CA: CPT | Performed by: ORTHOPAEDIC SURGERY

## 2024-07-16 PROCEDURE — 250N000013 HC RX MED GY IP 250 OP 250 PS 637: Performed by: ORTHOPAEDIC SURGERY

## 2024-07-16 PROCEDURE — 85025 COMPLETE CBC W/AUTO DIFF WBC: CPT | Performed by: ORTHOPAEDIC SURGERY

## 2024-07-16 PROCEDURE — 99233 SBSQ HOSP IP/OBS HIGH 50: CPT | Performed by: STUDENT IN AN ORGANIZED HEALTH CARE EDUCATION/TRAINING PROGRAM

## 2024-07-16 PROCEDURE — 99232 SBSQ HOSP IP/OBS MODERATE 35: CPT | Performed by: INTERNAL MEDICINE

## 2024-07-16 PROCEDURE — 36415 COLL VENOUS BLD VENIPUNCTURE: CPT | Performed by: ORTHOPAEDIC SURGERY

## 2024-07-16 PROCEDURE — 250N000013 HC RX MED GY IP 250 OP 250 PS 637

## 2024-07-16 PROCEDURE — 86140 C-REACTIVE PROTEIN: CPT | Performed by: ORTHOPAEDIC SURGERY

## 2024-07-16 PROCEDURE — 120N000002 HC R&B MED SURG/OB UMMC

## 2024-07-16 PROCEDURE — 250N000011 HC RX IP 250 OP 636: Performed by: INTERNAL MEDICINE

## 2024-07-16 RX ORDER — CEFTRIAXONE 2 G/1
2 INJECTION, POWDER, FOR SOLUTION INTRAMUSCULAR; INTRAVENOUS EVERY 24 HOURS
Refills: 0 | Status: ACTIVE
Start: 2024-07-12 | End: 2024-08-11

## 2024-07-16 RX ORDER — OXYCODONE HYDROCHLORIDE 5 MG/1
5 TABLET ORAL
Qty: 30 TABLET | Refills: 0 | Status: ON HOLD | OUTPATIENT
Start: 2024-07-16 | End: 2024-08-19

## 2024-07-16 RX ADMIN — CETIRIZINE HYDROCHLORIDE 10 MG: 10 TABLET, FILM COATED ORAL at 08:16

## 2024-07-16 RX ADMIN — PANCRELIPASE 3 CAPSULE: 36000; 180000; 114000 CAPSULE, DELAYED RELEASE PELLETS ORAL at 08:30

## 2024-07-16 RX ADMIN — Medication 1 TABLET: at 08:15

## 2024-07-16 RX ADMIN — CEFTRIAXONE SODIUM 2 G: 2 INJECTION, POWDER, FOR SOLUTION INTRAMUSCULAR; INTRAVENOUS at 11:06

## 2024-07-16 RX ADMIN — GABAPENTIN 100 MG: 100 CAPSULE ORAL at 20:11

## 2024-07-16 RX ADMIN — HYDRALAZINE HYDROCHLORIDE 25 MG: 25 TABLET ORAL at 08:15

## 2024-07-16 RX ADMIN — DULOXETINE HYDROCHLORIDE 60 MG: 60 CAPSULE, DELAYED RELEASE ORAL at 08:15

## 2024-07-16 RX ADMIN — HYDRALAZINE HYDROCHLORIDE 25 MG: 25 TABLET ORAL at 20:11

## 2024-07-16 RX ADMIN — METHOCARBAMOL 500 MG: 500 TABLET ORAL at 08:15

## 2024-07-16 RX ADMIN — METHOCARBAMOL 500 MG: 500 TABLET ORAL at 23:03

## 2024-07-16 RX ADMIN — OXYCODONE HYDROCHLORIDE 5 MG: 5 TABLET ORAL at 01:43

## 2024-07-16 RX ADMIN — AMLODIPINE BESYLATE 5 MG: 5 TABLET ORAL at 08:15

## 2024-07-16 RX ADMIN — OXYCODONE HYDROCHLORIDE 5 MG: 5 TABLET ORAL at 15:18

## 2024-07-16 RX ADMIN — OXYCODONE HYDROCHLORIDE 5 MG: 5 TABLET ORAL at 11:04

## 2024-07-16 RX ADMIN — GABAPENTIN 100 MG: 100 CAPSULE ORAL at 15:18

## 2024-07-16 RX ADMIN — GABAPENTIN 100 MG: 100 CAPSULE ORAL at 08:15

## 2024-07-16 RX ADMIN — BUMETANIDE 1 MG: 1 TABLET ORAL at 08:15

## 2024-07-16 RX ADMIN — PANTOPRAZOLE SODIUM 40 MG: 40 TABLET, DELAYED RELEASE ORAL at 08:15

## 2024-07-16 RX ADMIN — PANCRELIPASE 3 CAPSULE: 36000; 180000; 114000 CAPSULE, DELAYED RELEASE PELLETS ORAL at 15:29

## 2024-07-16 RX ADMIN — OXYCODONE HYDROCHLORIDE 5 MG: 5 TABLET ORAL at 05:58

## 2024-07-16 RX ADMIN — ASPIRIN 81 MG: 81 TABLET, COATED ORAL at 08:15

## 2024-07-16 RX ADMIN — OXYCODONE HYDROCHLORIDE 5 MG: 5 TABLET ORAL at 20:11

## 2024-07-16 ASSESSMENT — ACTIVITIES OF DAILY LIVING (ADL)
ADLS_ACUITY_SCORE: 30
ADLS_ACUITY_SCORE: 29
ADLS_ACUITY_SCORE: 30
ADLS_ACUITY_SCORE: 29
ADLS_ACUITY_SCORE: 29
ADLS_ACUITY_SCORE: 30
ADLS_ACUITY_SCORE: 29
ADLS_ACUITY_SCORE: 30
ADLS_ACUITY_SCORE: 29
ADLS_ACUITY_SCORE: 30
ADLS_ACUITY_SCORE: 29
ADLS_ACUITY_SCORE: 30
ADLS_ACUITY_SCORE: 29
ADLS_ACUITY_SCORE: 30
ADLS_ACUITY_SCORE: 30

## 2024-07-16 NOTE — PLAN OF CARE
Goal Outcome Evaluation:      Plan of Care Reviewed With: patient    Overall Patient Progress: improvingOverall Patient Progress: improving    Orientation: Remains alert and orinted x4.    Oxygen:On RA; Reports no SOB.    Bowel: Continent. LBM 7/16. Large formed.    Bladder: voiding without difficulty.    Pain: Rates pain 8/10. Managed with PRN oxycodone and robaxin.    Ambulation/Transfers: Pt independent in room; uses wheelchair. WBAT on RLE.    Blood sugars:BG checks TID.    Diet/ Liquids: Regular/ Thin    Tubes/ Lines/ Drains: L PIV SL.    Skin: R knee incision; Dressing C/D/I.    Call  light within reach. Nephrology clearance before PICC line placement per peds vascular.    Continue with POC.

## 2024-07-16 NOTE — PLAN OF CARE
"Goal Outcome Evaluation:       BP (!) 158/97 (BP Location: Right arm)   Pulse 84   Temp 98.3  F (36.8  C) (Oral)   Resp 16   Ht 1.626 m (5' 4\")   Wt 62.5 kg (137 lb 12.6 oz)   SpO2 100%   BMI 23.65 kg/m         End of shift Summary: See flowsheet for VS and detail assessments.     Changes this Shift: No acute change on this shift.     Pulmonary: Pt is stable on RA. Sat >95%. Pt denies chest pain, cough or SOB.      Output: Voids adequate, use urinal at bedside. LBM 7/15, passing flatus. Active BS. Denies, N/V      Activity: Ax1, on wheelchair by himself, Independently.      Skin: R knee incision site, visible skin is intact.     Pain: Pt c/o neck, shoulder, back and knee pain. Moderate to sever pine. Pain managed with oxycodone and robaxin.     Neuro/CMS: Alert and oriented X4. Calm and cooperative. Baseline - mild tingling sensation on b/l lower extremities.     Dressings/Drains: Surgical dressing is dry and intact - Right lower leg.     IV: L PIV infusing TKO NS.    Diet: Appetite good; regular diet, thin liquid, meds whole.     Additional info: On BG checks 177 & 196, no correction needed. Sleep and rest promoted. Call light within reach and able to make needs known.      Plan: Continue plan of care.           "

## 2024-07-16 NOTE — PROGRESS NOTES
[facility] ID SERVICE :  PROGRESS NOTE    Patient:  Long Mayfield   YOB: 1970, MRN: [id number]  Date of Visit: 07/16/2024  Date of Admission: 7/11/2024  Consult Requester: Dejon Espinoza MD          Assessment and Recommendations:     ID Problem List:  Culture negative right knee arthritis, unclear etiology so far, ongoing work up  7/11/2024 s/p I&D, intra-op cultures remained negative  7/10/2024 Synovial fluid analysis TNC 34554, 93% neutrophils; cultures NGTD  Elevated CRP, down trending  SALVADOR on admission, resolving  Type 2 DM (A1c 9.4% on 5/4/2024)  Hx of diabetic foot infection, s/p right TMA  Peripheral vascular disease  Hx of alcohol use disorder  Chronic diarrhea  Hx of pancreatitis    Discussion:  55 yo M with right knee swelling. Synovial fluid showed inflammation. S/p I&D on 7/11. So far, multiple cultures are negative. Writer spoke to [facility] lab if able to send broad range PCR. However, not adequate quantity of specimen left to run this molecular testing. Work up for unusual pathogens were obtained - negative Lyme, Chlamydia/Gonorrhea; pending Whipple's testing. Noted some improvement in right knee swelling and down trending CRP since on empiric antibiotic. Picture of surgery site reviewed in patient's phone - well approximated, clean/dry/intact incision. Recommend to continue iv ceftriaxone. Anticipate about 3-4 weeks course, and final plan upon outpatient ID follow up.     Recommendations:  Synovial fluid cultures remained negative so far.   Of note, writer inquired with [facility] lab regarding broad range PCR. However, there is not enough quantity of synovial fluid left to send for this molecular testing.   Continue iv ceftriaxone 2g daily  Follow up pending test results:  Whipple's  KATHY w/ reflex for comprehensive work up for arthritis. Though likely septic arthritis, given down trending CRP while on empiric antibiotic coverage.   Awaiting PICC line  "placement  Anticipate about empiric 3-4 weeks course, and outpatient ID follow up with Dr. Zuleta (already scheduled for 7/31/2024)   See OPAT note below for additional recommendations    ========================================================  Primary team:  Place order panel  OPAT Pharmacy ([facility]) Review and ID Care Transition   Place imaging order(s) requested above prior to discharge.  Contact ID teams about missed ID clinic appointments and/or ongoing therapy needs.    Prolonged Parenteral/Oral Antibiotic Recommendations and ID Follow up  This template provides final ID recommendations as of this date.     Infectious Diseases Indication: Right knee septic arthritis, culture negative    Antibiotic Information  Name of Antibiotic Dose of Antibiotic1 Anticipated duration Effective start date2 End date      IV Ceftriaxone   2g daily   3-4 weeks    7/12/2024   TBD in clinic                 1.Dose of antibiotic will need to be renally adjusted if creatinine clearance changes  2.Effective start date is the date of adequate therapy with appropriate spectrum    Method of antibiotic delivery:PICC line.Is the line being used for another indication besides antimicrobials? No At the end of therapy should the line used for antimicrobials be removed or de-accessed? Yes. Selecting \"yes\" will function as written order to remove PICC line or de-access the indwelling line at the end of therapy.    Weekly labs required: CBC with diff, CMP, and CRP. Dr. Mccurdy will follow labs at discharge until ID follow up. Fax labs to ID clinic.    Are there pending microbial tests: Yes Cultures and Other Whipple's and KATHY w/ reflex panel      We will attempt to make OPAT appointments within 2 weeks of discharge based on clinic availability.  Provider: Song, timing of visit  7/31/2024 (already scheduled) , and the type of clinic appointment visit In-Person visit.   Patients discharged to [facility] will be seen by [facility] Infectious Diseases " group if ID follow up is need. [facility] ID academic group is not credentialed there.    ID provider route note: OPAT RN Care Coordinator [facility] ID Clinic Information:  Phone: [phone]  Fax: [phone] (Attention ID clinic nurses)    ========================================================  Recommendations are discussed with the primary and orthopedic teams.     ID team will continue to follow. Please reach out if any questions or concerns.     Total time spent during this encounter (chart review, documentation, MDM, coordination of care): 50 minutes    Selene Mccurdy MD   Infectious Diseases Staff Physician  Allyson haylee   07/16/2024         Interval History and Events:     Seen and examined - No new complaints. Has questions about antibiotic plan, given negative cultures.          HPI as adopted from initial ID consult on 7/12/2024:     Long Mayfield is a 54 year old male with right knee swelling. He could not tell when exactly it started to become swollen or painful. It seems like it's been on and off for yours.   He has had injection to his right knee from 2 years ago. This year he had another injection to his right knee in April 2024 at [facility]. However, in May, they found that his knee is too swollen so they aspirated the knee instead. Cell count 76k and a week later 109k. Cultures negative. He was not told there's a problem and was not given antibiotics. He has chronic diarrhea attributed to pancreatitis from previous alcohol use disorder. He does not have fever, chills, diarrhea.      He was born in [facility]. He lived in [place], [place] [facility] [place] [facility] and [place]. He skied a lot. He was sexually active with women but not recently. He does not inject drugs. 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 He does not recall tick bites.          Review of Systems:   Targeted 10 point ROS was completed with pertinent positives and negatives are detailed  "above.         Antimicrobial history:     Current:  Ceftriaxone 7/12 - present         Physical Examination:     Vital signs:  /87 (BP Location: Right arm)   Pulse 77   Temp 97.8  F (36.6  C) (Oral)   Resp 14   Ht 1.626 m (5' 4\")   Wt 62.5 kg (137 lb 12.6 oz)   SpO2 98%   BMI 23.65 kg/m      GENERAL: alert and no distress  NECK: no adenopathy, no asymmetry, masses, or scars  RESP: lungs clear to auscultation - no rales, rhonchi or wheezes  CV: regular rate and rhythm, normal S1 S2, no S3 or S4, no murmur, click or rub, no peripheral edema  ABDOMEN: soft, nontender, no hepatosplenomegaly, no masses and bowel sounds normal  MS: Right knee covered with ace wrap; +intact ROM 5-90 degrees w/o pain. Right TMA - well healed stump.     Lines and devices:    PIV CDI, non-tender, no surrounding erythema.    Labs, Microbiology and Imaging studies reviewed.   Elevated CRP, down trending    Lyme total Ab negative  Chlamydia/Gonorrhea negative  Whipple's testing - in process         Laboratory Data:       Microbiology:    Culture   Date Value Ref Range Status   07/12/2024   Final    No ESBL-producing organisms isolated. A negative result does not preclude the presence of ESBL-producing organisms.   07/11/2024 No anaerobic organisms isolated after 4 days  Preliminary   07/11/2024 No anaerobic organisms isolated after 4 days  Preliminary   07/11/2024 No growth after 4 days  Preliminary   07/11/2024 No growth after 4 days  Preliminary   07/11/2024 No Growth  Final   07/11/2024 No Growth  Final   07/11/2024 No anaerobic organisms isolated after 4 days  Preliminary   07/11/2024 No growth after 4 days  Preliminary   07/11/2024 No Growth  Final   07/10/2024 No anaerobic organisms isolated after 5 days  Preliminary   07/10/2024 No Growth  Final   05/29/2024 No Growth  Final   05/29/2024 No anaerobic organisms isolated  Final   05/22/2024 No Growth  Final   05/22/2024 No anaerobic organisms isolated  Final   05/22/2024 See " corresponding culture for results  Final      Culture   Date Value Ref Range Status   07/11/2024 See corresponding culture for results  Final   07/11/2024 See corresponding culture for results  Final   07/11/2024 See corresponding culture for results  Final   05/29/2024 See corresponding culture for results  Final     Inflammatory Markers:   Recent Labs   Lab Test 07/10/24  1157   *     Urine Studies:    Recent Labs   Lab Test 05/04/24  0246 03/08/22 2011   LEUKEST Negative Negative   WBCU <1 8*     Hematology Studies:    Recent Labs   Lab Test 07/16/24  0716 07/15/24  0839 07/14/24  0611 07/13/24  0833 07/12/24  0716 07/10/24  1157 03/08/22  1756 10/08/17  2105   WBC 7.3 7.7 7.8 8.5 7.5 10.4   < > 3.9*   ANEU  --   --   --   --   --   --   --  2.4   AEOS  --   --   --   --   --   --   --  0.1   HGB 7.4* 7.2* 7.5* 7.6* 7.3* 8.3*   < > 12.1*   MCV 81 81 80 81 80 80   < > 84   * 437 474* 465* 448 438   < > 238    < > = values in this interval not displayed.      Metabolic Studies:   Recent Labs   Lab Test 07/15/24  0839 07/14/24  1411 07/14/24  0611 07/14/24  0118 07/13/24 2053 07/13/24  0833 07/12/24  0716 07/10/24  1157   *  --  133*  --   --  133* 132* 131*   POTASSIUM 4.9 5.1 5.6* 5.3 5.6* 5.6* 5.2 5.4*   CHLORIDE 99  --  100  --   --  100 99 98   CO2 25  --  24  --   --  24 24 26   BUN 23.0*  --  22.7*  --   --  21.9* 29.4* 26.0*   CR 1.12  --  1.29*  --   --  1.38* 1.72* 1.61*   GFRESTIMATED 77  --  65  --   --  60* 46* 50*     Hepatic Studies:   Recent Labs   Lab Test 07/10/24  1157 05/04/24  1126 05/03/24  1626 01/23/23  1945 08/06/22 2005 03/09/22  0651   BILITOTAL 0.2 0.2 <0.2 0.2 0.2 0.6   ALKPHOS 188* 134 138 113 118 299*   ALBUMIN 3.5 3.4* 3.4* 4.1 3.0* 2.9*   AST 17 15 17 35 71* 153*   ALT 14 8 8 27 57 54     Hepatitis B Testing:   Recent Labs   Lab Test 07/12/24  0716   HEPBANG Nonreactive     Hepatitis C Testing:   Hepatitis C Antibody   Date Value Ref Range Status   07/12/2024  Nonreactive Nonreactive Final     Comment:     A nonreactive screening test result does not exclude the possibility of exposure to or infection with HCV. Nonreactive screening test results in individuals with prior exposure to HCV may be due to antibody levels below the limit of detection of this assay or lack of reactivity to the HCV antigens used in this assay. Patients with recent HCV infections (<3 months from time of exposure) may have false-negative HCV antibody results due to the time needed for seroconversion (average of 8 to 9 weeks).          Last check of C difficile  C Difficile Toxin B by PCR   Date Value Ref Range Status   03/09/2022 Positive (A) Negative Final     Comment:     Detection of C. difficile nucleic acid in stools confirms the presence of these organisms in diarrheal patients but may not indicate that C. difficile are the etiologic agents of the diarrhea. Results from the Xpert C. difficile assay should be interpreted in conjunction with other laboratory and clinical data available to the clinician.       Last Pathology Report   Case Report   Date Value Ref Range Status   05/07/2024   Final    Surgical Pathology Report                         Case: [id number]                                  Authorizing Provider:  Nina Moya MD Collected:           05/07/2024 11:17 AM          Ordering Location:     [facility]          Received:            05/07/2024 12:08 PM                                 [facility] Endoscopy                                                          Pathologist:           Miguelangel Martin MD                                                            Specimen:    Large Intestine, Colon, Random, rule out microscopic colitis                                Clinical Information   Date Value Ref Range Status   05/07/2024   Final    Procedure:  Esophagoscopy, gastroscopy, duodenoscopy (EGD), combined  Colonoscopy  Pre-op Diagnosis: Anemia [D64.9]  Post-op  Diagnosis: D64.9 - Anemia [ICD-10-CM]       Final Diagnosis   Date Value Ref Range Status   2024   Final    Colon, random, biopsies:  --No significant histopathologic change.                  Imagin/10/2024 XR cervical spine:  IMPRESSION:   1.  Cervical degenerative changes, most pronounced at C6-7. Grade 1 anterolisthesis of C3 on C4, C4 on C5, and C7 on T1. No dynamic instability.  2.  Severe calcifications in the bilateral carotid artery bulbs.    2024 CT-AP w/ contrast:  IMPRESSION:   1.  Mild right inguinal lymphadenopathy is indeterminant but more likely reactive.  2.  Otherwise, no acute findings or significant change in the abdomen or pelvis compared to previous.

## 2024-07-16 NOTE — PROGRESS NOTES
"Orthopedic Surgery Progress Note: 07/16/2024    Subjective:   No acute events overnight. Pain well-controlled. Knee is feeling well.  Tolerating PO without N/V. Voiding. Denies CP/SOB. No fevers/chills. Denies new numbness or tingling.      Objective:   BP (!) 158/97 (BP Location: Right arm)   Pulse 84   Temp 98.3  F (36.8  C) (Oral)   Resp 16   Ht 1.626 m (5' 4\")   Wt 62.5 kg (137 lb 12.6 oz)   SpO2 100%   BMI 23.65 kg/m    I/O this shift:  In: -   Out: 650 [Urine:650]  General: NAD. Resting comfortably in bed.  Respiratory: Breathing comfortably on RA.  Musculoskeletal:     RLE  Dressings clean and dry. Dressings changed and wounds are CDI without drainage or surrounding erythema.   Drain has been removed  Able to tolerate knee motion from 5-90 without pain  SILT over the dorsal and plantar surfaces of the foot  Fires TA and EHL/GSC without issue   Foot is WWP    Laboratory Data:  Lab Results   Component Value Date    WBC 7.7 07/15/2024    HGB 7.2 (L) 07/15/2024     07/15/2024     Cultures without growth to date.     Images:  No new images were obtained.     Assessment & Plan:    Long Mayfield is a 54 year old male with status post right knee I&D on 07/11 with Dr. Espinoza for a concern for a chronic right knee septic arthritis. Cultures without growth.     Followed by ID recommending PICC line placement for three week course with outpatient ID follow up.      7/14/24  Follow cultures   Continue ceftriaxone per ID - plan for PICC line placement and three week course   OK for discharge when have antibiotic plan     Orthopedic Surgery Primary  Activity: Up with assist until independent. Knee ROM as tolerated.  Weight bearing status: WBAT.  Pain management: Transition from IV to PO as tolerated.    Antibiotics: Continue antibiotics per ID, recommending ceftriaxone.   Diet: Begin with clear fluids and progress diet as tolerated.   DVT prophylaxis: ASA 81 mg daily  Labs: Monitor Hgb "   Bracing/Splinting: None.   Dressings: Keep clean, dry and intact , maintain drain - anticipate discontinuing on 07/14  Elevation: Elevate RLE on pillows to keep above the level of the heart as much as possible - no pillows under knee.   Drains: Removed.  Physical Therapy/Occupational Therapy: Eval and treat. iD  Consults: MADYSON AKINS.  Follow-up: Clinic 2 weeks with Dr. Nichols  Disposition: Pending antibiotic plan    ------------------------------------------------------------------------------------------    Thank you,    Max Hoffman MD     FOLLOWUP:    No future appointments.

## 2024-07-16 NOTE — PROGRESS NOTES
Consult placed for PICC for 3wks of IV antibiotics related to right knee infection.     Pt has hx of CKD III. Nephrology clearance is needed prior to PICC placement. MD on Gold team and bedside RN notified.

## 2024-07-16 NOTE — PROGRESS NOTES
"Prolonged Parenteral/Oral Antibiotic Recommendations and ID Follow up  This template provides final ID recommendations as of this date.      Infectious Diseases Indication: Right knee septic arthritis, culture negative     Antibiotic Information  Name of Antibiotic Dose of Antibiotic1 Anticipated duration Effective start date2 End date      IV Ceftriaxone   2g daily   3-4 weeks    7/12/2024   TBD in clinic                           1.Dose of antibiotic will need to be renally adjusted if creatinine clearance changes  2.Effective start date is the date of adequate therapy with appropriate spectrum     Method of antibiotic delivery:PICC line.Is the line being used for another indication besides antimicrobials? No At the end of therapy should the line used for antimicrobials be removed or de-accessed? Yes. Selecting \"yes\" will function as written order to remove PICC line or de-access the indwelling line at the end of therapy.     Weekly labs required: CBC with diff, CMP, and CRP. Dr. Mccurdy will follow labs at discharge until ID follow up. Fax labs to ID clinic.     Are there pending microbial tests: Yes Cultures and Other Whipple's and KATHY w/ reflex panel       We will attempt to make OPAT appointments within 2 weeks of discharge based on clinic availability.  Provider: Song, timing of visit  7/31/2024 (already scheduled) , and the type of clinic appointment visit In-Person visit.   Patients [facility] if ID follow up is need. [facility] ID academic group is not credentialed there.     ID provider route note: OPAT RN Care Coordinator [facility] Information:  Phone: [phone]  Fax: [phone] (Attention ID clinic nurses)  "

## 2024-07-17 VITALS
BODY MASS INDEX: 23.52 KG/M2 | OXYGEN SATURATION: 98 % | DIASTOLIC BLOOD PRESSURE: 99 MMHG | WEIGHT: 137.79 LBS | TEMPERATURE: 98.4 F | RESPIRATION RATE: 18 BRPM | HEART RATE: 77 BPM | HEIGHT: 64 IN | SYSTOLIC BLOOD PRESSURE: 178 MMHG

## 2024-07-17 LAB
ANION GAP SERPL CALCULATED.3IONS-SCNC: 9 MMOL/L (ref 7–15)
BASOPHILS # BLD AUTO: 0 10E3/UL (ref 0–0.2)
BASOPHILS NFR BLD AUTO: 0 %
BUN SERPL-MCNC: 29 MG/DL (ref 6–20)
CALCIUM SERPL-MCNC: 8.9 MG/DL (ref 8.8–10.4)
CHLORIDE SERPL-SCNC: 103 MMOL/L (ref 98–107)
CREAT SERPL-MCNC: 1.28 MG/DL (ref 0.67–1.17)
CRP SERPL-MCNC: 43.99 MG/L
EGFRCR SERPLBLD CKD-EPI 2021: 66 ML/MIN/1.73M2
EOSINOPHIL # BLD AUTO: 0.3 10E3/UL (ref 0–0.7)
EOSINOPHIL NFR BLD AUTO: 4 %
ERYTHROCYTE [DISTWIDTH] IN BLOOD BY AUTOMATED COUNT: 18.4 % (ref 10–15)
GLUCOSE BLDC GLUCOMTR-MCNC: 173 MG/DL (ref 70–99)
GLUCOSE BLDC GLUCOMTR-MCNC: 199 MG/DL (ref 70–99)
GLUCOSE BLDC GLUCOMTR-MCNC: 232 MG/DL (ref 70–99)
GLUCOSE SERPL-MCNC: 217 MG/DL (ref 70–99)
HCO3 SERPL-SCNC: 23 MMOL/L (ref 22–29)
HCT VFR BLD AUTO: 23.9 % (ref 40–53)
HGB BLD-MCNC: 7.4 G/DL (ref 13.3–17.7)
IMM GRANULOCYTES # BLD: 0 10E3/UL
IMM GRANULOCYTES NFR BLD: 0 %
LYMPHOCYTES # BLD AUTO: 1.5 10E3/UL (ref 0.8–5.3)
LYMPHOCYTES NFR BLD AUTO: 20 %
MCH RBC QN AUTO: 24.9 PG (ref 26.5–33)
MCHC RBC AUTO-ENTMCNC: 31 G/DL (ref 31.5–36.5)
MCV RBC AUTO: 81 FL (ref 78–100)
MONOCYTES # BLD AUTO: 0.6 10E3/UL (ref 0–1.3)
MONOCYTES NFR BLD AUTO: 8 %
NEUTROPHILS # BLD AUTO: 5.1 10E3/UL (ref 1.6–8.3)
NEUTROPHILS NFR BLD AUTO: 68 %
NRBC # BLD AUTO: 0 10E3/UL
NRBC BLD AUTO-RTO: 0 /100
PLATELET # BLD AUTO: 491 10E3/UL (ref 150–450)
POTASSIUM SERPL-SCNC: 4.8 MMOL/L (ref 3.4–5.3)
RBC # BLD AUTO: 2.97 10E6/UL (ref 4.4–5.9)
SODIUM SERPL-SCNC: 135 MMOL/L (ref 135–145)
T WHIPPLEI DNA SPEC QL NAA+PROBE: NOT DETECTED
WBC # BLD AUTO: 7.5 10E3/UL (ref 4–11)

## 2024-07-17 PROCEDURE — 250N000011 HC RX IP 250 OP 636: Performed by: ORTHOPAEDIC SURGERY

## 2024-07-17 PROCEDURE — 99233 SBSQ HOSP IP/OBS HIGH 50: CPT | Performed by: INTERNAL MEDICINE

## 2024-07-17 PROCEDURE — 86140 C-REACTIVE PROTEIN: CPT

## 2024-07-17 PROCEDURE — 250N000011 HC RX IP 250 OP 636: Performed by: INTERNAL MEDICINE

## 2024-07-17 PROCEDURE — 250N000013 HC RX MED GY IP 250 OP 250 PS 637: Performed by: INTERNAL MEDICINE

## 2024-07-17 PROCEDURE — 250N000013 HC RX MED GY IP 250 OP 250 PS 637: Performed by: ORTHOPAEDIC SURGERY

## 2024-07-17 PROCEDURE — 80048 BASIC METABOLIC PNL TOTAL CA: CPT | Performed by: ORTHOPAEDIC SURGERY

## 2024-07-17 PROCEDURE — 999N000040 HC STATISTIC CONSULT NO CHARGE VASC ACCESS

## 2024-07-17 PROCEDURE — 999N000007 HC SITE CHECK

## 2024-07-17 PROCEDURE — 250N000009 HC RX 250: Performed by: INTERNAL MEDICINE

## 2024-07-17 PROCEDURE — 85049 AUTOMATED PLATELET COUNT: CPT | Performed by: ORTHOPAEDIC SURGERY

## 2024-07-17 PROCEDURE — 272N000276 HC DEVICE 3FR SECURACATH

## 2024-07-17 PROCEDURE — 250N000013 HC RX MED GY IP 250 OP 250 PS 637

## 2024-07-17 PROCEDURE — 36569 INSJ PICC 5 YR+ W/O IMAGING: CPT

## 2024-07-17 PROCEDURE — 272N000454 HC KIT, 3FR SV SINGLE LUMEN POWERPICC

## 2024-07-17 PROCEDURE — 36415 COLL VENOUS BLD VENIPUNCTURE: CPT | Performed by: ORTHOPAEDIC SURGERY

## 2024-07-17 RX ORDER — HEPARIN SODIUM,PORCINE 10 UNIT/ML
5-20 VIAL (ML) INTRAVENOUS EVERY 24 HOURS
Status: DISCONTINUED | OUTPATIENT
Start: 2024-07-17 | End: 2024-07-17 | Stop reason: HOSPADM

## 2024-07-17 RX ORDER — HEPARIN SODIUM,PORCINE 10 UNIT/ML
5-20 VIAL (ML) INTRAVENOUS
Status: DISCONTINUED | OUTPATIENT
Start: 2024-07-17 | End: 2024-07-17 | Stop reason: HOSPADM

## 2024-07-17 RX ADMIN — CEFTRIAXONE SODIUM 2 G: 2 INJECTION, POWDER, FOR SOLUTION INTRAMUSCULAR; INTRAVENOUS at 11:22

## 2024-07-17 RX ADMIN — GABAPENTIN 100 MG: 100 CAPSULE ORAL at 14:41

## 2024-07-17 RX ADMIN — GABAPENTIN 100 MG: 100 CAPSULE ORAL at 09:16

## 2024-07-17 RX ADMIN — ASPIRIN 81 MG: 81 TABLET, COATED ORAL at 09:17

## 2024-07-17 RX ADMIN — BUMETANIDE 1 MG: 1 TABLET ORAL at 09:17

## 2024-07-17 RX ADMIN — OXYCODONE HYDROCHLORIDE 5 MG: 5 TABLET ORAL at 06:48

## 2024-07-17 RX ADMIN — OXYCODONE HYDROCHLORIDE 5 MG: 5 TABLET ORAL at 10:06

## 2024-07-17 RX ADMIN — PANCRELIPASE 3 CAPSULE: 36000; 180000; 114000 CAPSULE, DELAYED RELEASE PELLETS ORAL at 09:18

## 2024-07-17 RX ADMIN — HEPARIN, PORCINE (PF) 10 UNIT/ML INTRAVENOUS SYRINGE 5 ML: at 15:05

## 2024-07-17 RX ADMIN — Medication 1 TABLET: at 09:17

## 2024-07-17 RX ADMIN — METHOCARBAMOL 500 MG: 500 TABLET ORAL at 06:48

## 2024-07-17 RX ADMIN — PANTOPRAZOLE SODIUM 40 MG: 40 TABLET, DELAYED RELEASE ORAL at 09:17

## 2024-07-17 RX ADMIN — CETIRIZINE HYDROCHLORIDE 10 MG: 10 TABLET, FILM COATED ORAL at 09:17

## 2024-07-17 RX ADMIN — OXYCODONE HYDROCHLORIDE 5 MG: 5 TABLET ORAL at 15:28

## 2024-07-17 RX ADMIN — OXYCODONE HYDROCHLORIDE 5 MG: 5 TABLET ORAL at 00:04

## 2024-07-17 RX ADMIN — HYDRALAZINE HYDROCHLORIDE 25 MG: 25 TABLET ORAL at 09:17

## 2024-07-17 RX ADMIN — LIDOCAINE HYDROCHLORIDE ANHYDROUS 1 ML: 10 INJECTION, SOLUTION INFILTRATION at 14:16

## 2024-07-17 RX ADMIN — DULOXETINE HYDROCHLORIDE 60 MG: 60 CAPSULE, DELAYED RELEASE ORAL at 09:17

## 2024-07-17 RX ADMIN — AMLODIPINE BESYLATE 5 MG: 5 TABLET ORAL at 09:17

## 2024-07-17 ASSESSMENT — ACTIVITIES OF DAILY LIVING (ADL)
ADLS_ACUITY_SCORE: 29

## 2024-07-17 NOTE — PROGRESS NOTES
"Orthopedic Surgery Progress Note: 07/17/2024    Subjective:   No acute events overnight. Pain well-controlled. Knee feeling stable.  Tolerating PO without N/V. Voiding. Denies CP/SOB. No fevers/chills. Denies new numbness or tingling.      Objective:   BP (!) 178/99   Pulse 77   Temp 98.1  F (36.7  C) (Oral)   Resp 16   Ht 1.626 m (5' 4\")   Wt 62.5 kg (137 lb 12.6 oz)   SpO2 98%   BMI 23.65 kg/m    No intake/output data recorded.  General: NAD. Resting comfortably in bed.  Respiratory: Breathing comfortably on RA.  Musculoskeletal:     RLE  Dressings clean and dry.  Able to tolerate knee motion from 90  of flexion without pain  SILT over the dorsal and plantar surfaces of the foot  Fires TA and EHL/GSC without issue   Foot is WWP    Laboratory Data:  Lab Results   Component Value Date    WBC 7.3 07/16/2024    HGB 7.4 (L) 07/16/2024     (H) 07/16/2024     Cultures without growth to date.     Images:  No new images were obtained.     Assessment & Plan:    Long Mayfield is a 54 year old male with status post right knee I&D on 07/11 with Dr. Espinoza for a concern for a chronic right knee septic arthritis. Cultures without growth.     Followed by ID recommending PICC line placement for three week course with outpatient ID follow up.      7/14/24  Follow cultures   Continue ceftriaxone per ID - plan for PICC line placement and 3-4 week course  OK for discharge when have antibiotic plan     Orthopedic Surgery Primary  Activity: Up with assist until independent. Knee ROM as tolerated.  Weight bearing status: WBAT.  Pain management: Transition from IV to PO as tolerated.    Antibiotics: Continue antibiotics per ID, recommending ceftriaxone.   Diet: Begin with clear fluids and progress diet as tolerated.   DVT prophylaxis: ASA 81 mg daily  Labs: Monitor Hgb   Bracing/Splinting: None.   Dressings: Keep clean, dry and intact , maintain drain - anticipate discontinuing on 07/14  Elevation: Elevate RLE on " pillows to keep above the level of the heart as much as possible - no pillows under knee.   Drains: Removed.  Physical Therapy/Occupational Therapy: Eval and treat. iD  Consults: MADYSON AKINS.  Follow-up: Clinic 2 weeks with Dr. Nichols  Disposition: Pending antibiotic plan    ------------------------------------------------------------------------------------------    Thank you,    Max Hoffman MD     FOLLOWUP:    No future appointments.

## 2024-07-17 NOTE — PROGRESS NOTES
[facility]    Medicine Progress Note - Hospitalist Service, GOLD TEAM 19    Date of Admission:  7/11/2024    Course reviewed with  Dr Uribe    Assessment & Plan   Long Mayfield is a 54 year old male admitted on 7/11/2024.  He has a PMHx notable for right knee pain, cervical myelopathy, chronic bilateral leg pain, chronic back pain, chronic muscular atrophy mobility disorder, hx diabetic foot ulcer and osteomyelitis s/p right transmetatarsal amputation, diabetic neuropathy, type 2 DM, acute on chronic anemia, iron deficiency anemia, GERD, CKD stage 3, HTN, HLD, hx of CAD, depression, and chronic pancreatic insufficiency leading to chronic diarrhea. He underwent I&D of right knee by Dr. Espinoza on 7/11 and was then admitted. Internal Medicine consulted for medical co-management.      # Right knee pain  # S/p irrigation and debridement of right knee (7/11/2024)   - Right knee pain with aspiration at [facility] in May 2024.  Result of this study concerning for infection.   - Continued to report right knee pain and swelling at OP ortho appointment on 7/10/2024.    - /p right knee aspiration on 7/10 in clinic where 60 ml of cloudy yellow fluid was removed, concerning for infection.    - S/p I&D on 7/11 by Dr. Espinoza at  OR  - Orthopaedic surgery primary  - NGTD from right knee aspirate on 7/10 and 7/11  - Has right knee drain  - HIV antigen antibody combo nonreactive  - HCV antibody nonreactive   - HBsAg nonreactive  - Lyme ab negative  - S/p PICC line placement  - ESBL stool culture weekly x2, first sample collected on 7/12 and result pending  - Currently on ceftriaxone 2 g IV daily; per infectious disease anticipate 3-4 weeks of abx, but will follow-up with Dr. Zuleta   - Cleared for PICC line by Nephrology        # Cervical myelopathy   # Chronic bilateral leg pain   # Hx of chronic back pain   # Hx of chronic muscular atrophy mobility disorder   - Follows with OP spine  clinic, last seen 7/10/2024.  Was scheduled to have two-level ACDF, however this surgery was cancelled d/t lab abnormalities per chart review.  Uses a wheelchair on and off.  Follows with [facility].   - Continue PTA gabapentin 100 mg TID, robaxin  - PT/OT as above   - Fall precautions      # Hx of diabetic foot ulcer, osteomyelitis s/p right transmetatarsal amputation   # Diabetic neuropathy   - Residing at [facility] since this surgery.         #Type 2 DM  - Hgba1c 9.4% on 5/4/24.   - Glucose has been > 200+  - On Lantus 15 units sq qam prior to admit  - Pt adamant that he can only receive Lantus 10 units daily + prandial insulin---  - Holding PTA insulin aspart 8 units TID with meals   - Continue high intensity NovoLog sliding scale insulin  - blood sugar mostly labile.      #Acute on chronic anemia   #Iron deficiency anemia   - Baseline Hgb ~ 8-9 recently.    - Hgb gradually declining, asymptomatic, and relatively stable   - Monitor for s/s of bleeding     GERD   - Continue PTA protonix 40 mg daily      CKD stage 3  - Baseline creatinine ~ 1.0-1.5.   - Cr close to baseline now        # HTN  # HLD  # Hx of CAD   - Continue PTA hydralazine 25 mg BID  - Continue PTA amlodipine 5 mg daily   - ontinue PTA aspirin 81 mg daily   - Resumed Bumex 1 mg daily in am     # Depression  - Continue PTA Cymbalta 60 mg daily      # Chronic pancreatic insufficiency leading to chronic diarrhea   - Follows with [facility] as OP.   - Continue PTA creon and loperamide TID PRN     # Hyponatremia - resolved   - Na level is 131 ---> 132 ---> 133  - Saline lock IV    # Hyperkalemia - resolved         Diet: Consistent Carbohydrate Diet Moderate Consistent Carb (60 g CHO per Meal) Diet  Diet    DVT Prophylaxis: ASA EC 81 mg daily  Osman Catheter: Not present  Lines: None     Cardiac Monitoring: None  Code Status:   Full code  Disposition Plan   per primary orthopedics team          BAILEY PERDOMO MD  Hospitalist Service, GOLD TEAM [address] [place] [address]  [facility]  Securely message with Zauber (more info)  Text page via [employer] Paging/Directory   See signed in provider for up to date coverage information  ______________________________________________________________________    Interval History   Afebrile and hemodynamically stable  Reports adequate pain control   Likely to get PICC line placed today     Physical Exam   Vital Signs: Temp: 98.1  F (36.7  C) Temp src: Oral BP: (!) 178/99     Resp: 16   O2 Device: None (Room air)    Weight: 137 lbs 12.6 oz    General Appearance: Lying comfortably in bed, on room air, in no acute distress of discomfort  HEENT: PERRL: EOMI; moist mucous membrane w/o lesions  Neck: No JVD  Pulmonary: Clear to auscultation bilaterally, no wheezes or crackles  CVS: Regular rhythm, no murmurs, rubs or gallops  GI: BS (+), soft nontender, no rebound or guarding   Extremities: s/p R-TMA, R-knee in dressing   Skin: No rashes or lesions  Neurologic: A&O x3          Data   ------------------------- PAST 24 HR DATA REVIEWED -----------------------------------------------    I have personally reviewed the following data over the past 24 hrs:    7.5  \   7.4 (L)   / 491 (H)     135 103 29.0 (H) /  217 (H)   4.8 23 1.28 (H) \     Procal: N/A CRP: 43.99 (H) Lactic Acid: N/A         Imaging results reviewed over the past 24 hrs:   No results found for this or any previous visit (from the past 24 hour(s)).

## 2024-07-17 NOTE — CONSULTS
Nephrology Note    Received request to clear patient for PICC line for ABx treatment.     Patient is a 54 year old male with insulin-dependent DM and somewhat variable creatinine history, but generally his eGFR has been >60, where it is now. UAs have shown a low degree of albuminuria, and UPCR checked at [facility] in April 2024 was 0.16 g/g (also low-grade).    Thus, his CKD is mild (more appropriately categorized as stage 2 CKD than stage 3). A PICC is acceptable at this time.     Nephrology will sign off.     Yunior Nolan MD

## 2024-07-17 NOTE — PROGRESS NOTES
Care Management Discharge Note    Discharge Date: 07/17/2024       Discharge Disposition:  (Return to SNF for LTC)    Discharge Services:  (IADL assistance from SNF staff; continue working with Relocation Worker for community placement; continue working with [facility] to get on CADI waiver)    Discharge DME: None    Discharge Transportation: health plan transportation    Private pay costs discussed: Not applicable    Does the patient's insurance plan have a 3 day qualifying hospital stay waiver?  No    PAS Confirmation Code:  (N/A, as pt is returning to prior SNF.)  Patient/family educated on Medicare website which has current facility and service quality ratings: no    Education Provided on the Discharge Plan: Yes  Persons Notified of Discharge Plans: patient  Patient/Family in Agreement with the Plan: yes    Handoff Referral Completed: Yes    Additional Information:  Writer notified by vascular access that pt has PICC line placed. Writer updated hospitalist and ortho. Discharge orders were entered. Writer updated patient in room regarding discharge plan. Pt stated that he will call he health plan to set up a hospital discharge ride. Writer called [facility] to notify them of discharge and they should expect patient back today by 5pm. Writer updated charge nurse and bedside nurse about discharge. Facility will fill meds.  Manually faxed discharge orders and hard script to facility.    Writer called number listed for [facility] but the number listed is incorrect. Patient explained that he has not been in touch with this CC.    [facility]  Fabiola Melgar RN, PHN, MSN  PH: ?? (Previous phone number listed is incorrect)  Fax: [phone]  [email or web address]      DISCHARGE PLAN: Pt will return to [facility] for LTC when [place] [address]  PH: [phone]  Fax: [phone]    Mellissa Jimenez, MSN, APRN, AGCNS-BC    5MS [phone]  Nurse Coordinator  Securely message with Allyson, 5 Med Surg Vocera

## 2024-07-17 NOTE — PLAN OF CARE
[id number]  Goal Outcome Evaluation:           Overall Patient Progress: improvingOverall Patient Progress: improving    Outcome Evaluation: A&Ox4. Denies SOB, nausea, chest pain. Chronic pain and acute managed with PRN's. Weight bearing as tolerated. Last BM 07/16. Left PIV SL. Calls approperiately.    Pending PICC placement for IV ABX. Per Peds vascular note needs clearance from nephology. Provider notified and will let day team know.     Continue to monitor POC.

## 2024-07-17 NOTE — PLAN OF CARE
Goal Outcome Evaluation:       A/O x 4. Resting in bed. Oxycodone given for pain with relief. VSS. Dressing intact. No acute issues. Cont to assess.

## 2024-07-17 NOTE — PHARMACY
[facility]  Parenteral ANtibiotic Review at Departure from Acute Care Collaborative Note     Patient: Long Mayfield  MRN: [id number]  Allergies: Vancomycin, Seasonal allergies, and Daptomycin    Current Location:  5MS  OPAT to be provided by: External Facility (TCU, ARU, LTACH)  [facility]    Line Type: PICC    Diagnosis/Indications: Right knee septic arthritis, culture negative  Organism(s): Culture negative  MRDO? No  Pending Cultures/Microbiological Tests: yes 7/10 and 7/11 knee aspirate culture finalization, Whipple's PCR and KATHY w/ reflex panel    Inpatient ID involved in developing OPAT plan: Yes - discharge OPAT plan has no changes from ID provider, Dr. Selene Mccurdy, OPAT plan charted on 7/16/2024    Outpatient ID Follow-up: ID OPAT Clinic Referral Placed ([facility] Ph: [phone] and Fax: [phone]) - appointment scheduled (on 7/31/2024 with Dr. Najma Zuleta)  Designated Provider: Dr. Selene Mccurdy until ID follow-up appointment    Antimicrobial Regimen / Route Anticipated  Duration Start Date Stop /  Reassess Date   Ceftriaxone 2 g every 24 hours/IV Anticipated 3-4 weeks, duration TBD in ID clinic 7/12/2024 TBD     Laboratory Tests and Monitoring Frequency: CBC with Diff, CMP, CRP Once Weekly    Imaging/Miscellaneous Monitoring: None    ID Pharmacist Interventions: None                          Julita Daniel, PharmD, BCIDP  Pager: [phone]

## 2024-07-17 NOTE — PROGRESS NOTES
Care Management Follow Up    Length of Stay (days): 4    Expected Discharge Date:       Concerns to be Addressed: discharge planning     Patient plan of care discussed at interdisciplinary rounds: Yes    Anticipated Discharge Disposition:  (Return to SNF for LTC)     Anticipated Discharge Services:  (IADL assistance from SNF staff; continue working with Relocation Worker for community placement; continue working with [facility] to get on CADI waiver)  Anticipated Discharge DME: None    Patient/family educated on Medicare website which has current facility and service quality ratings: no  Education Provided on the Discharge Plan: Yes  Patient/Family in Agreement with the Plan: yes    Referrals Placed by CM/SW:    Private pay costs discussed: Not applicable    Additional Information:  Writer spoke with someone at pt's long term care facility. If patient gets PICC and provider is ready to discharge him, they can accept patient before 6pm. Verified that LTC facility can manage IV ABX. Writer updated provider.     [facility]  Fabiola Melgar RN, PHN, MSN  PH: [phone]  Fax: [phone]  [email or web address]      DISCHARGE PLAN: Pt will return to [facility] for LTC when [place] [address]  PH: [phone]  Fax: [phone]      Mellissa Jimenez, MSN, APRN, [facility] [phone]  Nurse Coordinator  Securely message with Allyson, 5 Med Surg Vocera

## 2024-07-17 NOTE — PROCEDURES
[facility]    Single Lumen PICC Placement    Date/Time: 7/17/2024 1:47 PM    Performed by: Celeste Avery RN  Authorized by: Dejon Espinoza MD  Indications: vascular access      UNIVERSAL PROTOCOL   Site Marked: Yes  Prior Images Obtained and Reviewed:  Yes  Required items: Required blood products, implants, devices and special equipment available    Patient identity confirmed:  Verbally with patient, arm band and hospital-assigned identification number  NA - No sedation, light sedation, or local anesthesia  Confirmation Checklist:  Patient's identity using two indicators, relevant allergies, procedure was appropriate and matched the consent or emergent situation and correct equipment/implants were available  Time out: Immediately prior to the procedure a time out was called    Universal Protocol: the Joint Commission Universal Protocol was followed    Preparation: Patient was prepped and draped in usual sterile fashion    ESBL (mL):  2     ANESTHESIA    Anesthesia:  Local infiltration  Local Anesthetic:  Lidocaine 1% without epinephrine  Anesthetic Total (mL):  1      SEDATION    Patient Sedated: No        Preparation: skin prepped with ChloraPrep  Skin prep agent: skin prep agent completely dried prior to procedure  Sterile barriers: maximum sterile barriers were used: cap, mask, sterile gown, sterile gloves, and large sterile sheet  Hand hygiene: hand hygiene performed prior to central venous catheter insertion  Type of line used: PICC  Catheter type: single lumen  Lumen type: power PICC and non-valved  Catheter size: 3 Fr  Brand: Bard  Lot number: [id number]  Placement method: venipuncture, MST, ultrasound and tip navigation system  Number of attempts: 1  Difficulty threading catheter: no  Successful placement: yes  Orientation: right  Catheter to Vein (%): 22  Location: basilic vein  Tip Location: SVC/RA Junction  Site rationale: left arm stiffness  : 11cm  up.  Extremity circumference: 26  Visible catheter length: 1  Total catheter length: 38  Dressing and securement: adhesive securement device, chlorhexidine disc applied, gloves changed prior to final dressing, blood cleaned with CHG, transparent securement dressing and statlock  Post procedure assessment: blood return through all ports, free fluid flow and placement verified by 3CG technology  PROCEDURE   Patient Tolerance:  Patient tolerated the procedure well with no immediate complicationsDescribe Procedure: Successful PICC placement.  Tip verification 3cg technology ok to use  Disposal: sharps and needle count correct at the end of procedure, needles and guidewire disposed in sharps container

## 2024-07-17 NOTE — PLAN OF CARE
"  BP (!) 178/99   Pulse 77   Temp 98.4  F (36.9  C) (Oral)   Resp 18   Ht 1.626 m (5' 4\")   Wt 62.5 kg (137 lb 12.6 oz)   SpO2 98%   BMI 23.65 kg/m    Vss on RA   Up IND     Pt able to make needs known, call light within reach   Plan to Discharge back to TCU this afternoon     Problem: Adult Inpatient Plan of Care  Goal: Plan of Care Review  Description: The Plan of Care Review/Shift note should be completed every shift.  The Outcome Evaluation is a brief statement about your assessment that the patient is improving, declining, or no change.  This information will be displayed automatically on your shift  note.  Outcome: Adequate for Care Transition  Flowsheets (Taken 7/17/2024 1526)  Outcome Evaluation: A&Ox4, up IND. VSS on RA. Pain management w/ oral oxycodone. PICC placed today. Adequate for DC  Plan of Care Reviewed With: patient  Overall Patient Progress: improving   Goal Outcome Evaluation:      Plan of Care Reviewed With: patient    Overall Patient Progress: improvingOverall Patient Progress: improving    Outcome Evaluation: A&Ox4, up IND. VSS on RA. Pain management w/ oral oxycodone. PICC placed today. Adequate for DC        Pt. discharged at 1545 to TCU, was accompanied by Ride Service, and left with personal belongings. Pt. received complete discharge paperwork and ALL medications faxed to TCU facility.   Pt. had no further questions at the time of discharge and no unmet needs were identified.    "

## 2024-07-17 NOTE — CONSULTS
"PICC placed in right upper extremity for anticipated 4 weeks of antibiotic therapy. Patient tolerated well and denies pain. Tip location verified at the cavoatrial junction (CAJ) using ECG technology. PICC is ready to use. To contact the [facility] team enter a \"nursing to consult for vascular access\" [id number] order in [facility].        "

## 2024-07-17 NOTE — PROGRESS NOTES
Order received for PICC Placement.  Patient has history of CKD3A.  In accordance to KDOQI guidelines request made to gold team provider to contact nephrology to obtain clearance for PICC line placement.  Once clearance is obtained PICC will be scheduled by the [facility] Access Team.  For questions or plan of care change please contact  Vascular Access Triage via [employer].

## 2024-07-18 ENCOUNTER — PATIENT OUTREACH (OUTPATIENT)
Dept: CARE COORDINATION | Facility: CLINIC | Age: 54
End: 2024-07-18
Payer: COMMERCIAL

## 2024-07-18 NOTE — DISCHARGE SUMMARY
ORTHOPEDIC SURGERY DISCHARGE SUMMARY     Date of Admission: 7/11/2024  Date of Discharge: 7/17/2024  3:56 PM  Disposition: Home  Staff Physician: Dr Espinoza  Primary Care Provider: Ana Maria Blakely    DISCHARGE DIAGNOSIS:  Pyogenic arthritis of right knee joint, due to unspecified organism (H) [M00.9]    PROCEDURES: Procedure(s):  Irrigation and debridement knee right on 7/11/2024    BRIEF HISTORY:  This is a 54 year old patient who has been followed in clinic. Please refer to that documentation for full details. Briefly, the patient has a history of chronic right knee septic arthritis. He had an aspiration in clinic with significantly elevated WBC concerning for ongoing infection within the knee. Therefore, after reviewing non-operative and operative options including the risks and benefits associated with each, the patient elected to proceed with the above stated procedure.     HOSPITAL COURSE:    The patient was admitted following the above listed procedures for pain control and rehabilitation. Long Mayfield did well post-operatively. Medicine was consulted post operatively to aid in management of medical co-morbidities. Infectious disease was consulted to assist with antibiotic plan. The patient received routine nursing cares and at the time of discharge was medically stable. Vital signs were stable throughout admission. The patient is tolerating a regular diet and is voiding spontaneously. All PT/OT goals have been met for safe mobility. Pain is now controlled on oral medications which will be available on discharge. Stool softeners have been used while taking pain medications to help prevent constipation. Long Mayfield is deemed medically safe to discharge.     Antibiotics:  Per ID recommendations. Empiric Ceftriaxone via PICC line for 3-4 weeks  DVT prophylaxis:  ASA initiated after surgery and will be continued for 4-6 weeks.  PT Progress:  Has met PT/OT goals for safe mobility.   Pain Meds:   Weaned off all IV pain meds by discharge.  Inpatient Events:  No significant postoperative events or complications.     PHYSICAL EXAM:    General: NAD. Resting comfortably in bed.  Respiratory: Breathing comfortably on RA.  Musculoskeletal:      RLE  Dressings clean and dry.  Able to tolerate knee motion from 90  of flexion without pain  SILT over the dorsal and plantar surfaces of the foot  Fires TA and EHL/GSC without issue   Foot is WWP       FOLLOWUP:    Follow up with Dr. Espinoza at two weeks postoperatively.    Future Appointments   Date Time Provider Department Center   7/31/2024  1:00 PM Dejon Espinoza MD [facility]   7/31/2024  3:00 PM Edward Zuleta MD [facility]       Orthopedic Surgery appointments are at the [facility] ([address] [place] [address]). Call [phone] to schedule a follow-up appointment at this location with your provider.     PLANNED DISCHARGE ORDERS:      Discharge Medication List as of 7/17/2024 10:04 PM        START taking these medications    Details   cefTRIAXone (ROCEPHIN) 2 GM vial Inject 2 g into the vein every 24 hours for 30 days, R-0, No Print Out      oxyCODONE (ROXICODONE) 5 MG tablet Take 1 tablet (5 mg) by mouth every 3 hours as needed for severe pain, Disp-30 tablet, R-0, Local Print           CONTINUE these medications which have NOT CHANGED    Details   acetaminophen (TYLENOL) 325 MG tablet Take 650 mg by mouth every 6 hours as needed for pain, Historical      amLODIPine (NORVASC) 5 MG tablet Take 1 tablet (5 mg) by mouth daily, Disp-30 tablet, R-0, E-PrescribeFuture refills by PCP  Physician No Ref-Primary with phone number None.      aspirin 81 MG EC tablet Take 81 mg by mouth daily, Historical      bumetanide (BUMEX) 1 MG tablet Take 1 tablet (1 mg) by mouth daily Dose decreased to 1 mg on discharge.  Optimize dose on PCP visit., Disp-30 tablet, R-0, Local Print      cetirizine (ZYRTEC) 10 MG tablet  Take 10 mg by mouth daily, Historical      DULoxetine (CYMBALTA) 60 MG capsule Take 60 mg by mouth daily, Historical      gabapentin (NEURONTIN) 100 MG capsule Take 100 mg by mouth 3 times daily, Historical      hydrALAZINE (APRESOLINE) 25 MG tablet Take 25 mg by mouth 2 times daily Hold if SBP < 110, Historical      !! insulin aspart (NOVOLOG FLEXPEN) 100 UNIT/ML pen Inject 8 Units Subcutaneous 3 times daily (with meals) Breakfast and lunch, Historical      !! insulin aspart (NOVOLOG FLEXPEN) 100 UNIT/ML pen Inject 1-5 Units Subcutaneous 3 times daily (with meals) Inject as per sliding scale:  If 200-250 = 1   251-300 = 2  301-350 = 3  351-400 = 4  401+ = 5  Repeat BS after 3 hours and call MD if still over 400, Historical      insulin glargine (LANTUS PEN) 100 UNIT/ML pen Inject 15 Units Subcutaneous every morning, Historical      Lidocaine (LIDOCARE) 4 % Patch Place 2 patches onto the skin every 24 hours Apply to neck/back. To prevent lidocaine toxicity, patient should be patch free for 12 hrs daily.Disp-15 patch, R-0Local Print      !! lipase-protease-amylase (CREON 36) 92775-145930-552717 units CPEP Take 3 capsules by mouth 3 times daily (with meals), Historical      !! lipase-protease-amylase (CREON 36) 02042-407793-468988 units CPEP Take 1-2 capsules by mouth Take with snacks or supplements, Historical      loperamide (IMODIUM) 2 MG capsule Take 4 mg by mouth 3 times daily as needed for diarrhea, Historical      methocarbamol (ROBAXIN) 500 MG tablet Take 500 mg by mouth every 8 hours as needed for muscle spasms, Historical      multivitamin w/minerals (THERA-VIT-M) tablet Take 1 tablet by mouth daily, Disp-30 tablet, R-0, Local Print      pantoprazole (PROTONIX) 40 MG EC tablet Take 1 tablet (40 mg) by mouth daily, Disp-30 tablet, R-0, Local Print       !! - Potential duplicate medications found. Please discuss with provider.        STOP taking these medications       HYDROcodone-acetaminophen (NORCO) 5-325  "MG tablet Comments:   Reason for Stopping:                 Discharge Procedure Orders   Comprehensive metabolic panel   Standing Status: Standing Number of Occurrences: 4 Standing Exp. Date: 07/16/25   Order Comments: Fax lab result to [facility] ID Clinic Information:  Phone: [phone]  Fax: [phone] (Attention ID clinic nurses)     CRP, inflammation   Standing Status: Standing Number of Occurrences: 4 Standing Exp. Date: 07/16/25   Order Comments: Fax lab result to [facility] ID Clinic Information:  Phone: [phone]  Fax: [phone] (Attention ID clinic nurses)     OPAT enrollment and ID Clinic Referral   Referral Priority: Routine: Next available opening Referral Type: Consultation   Number of Visits Requested: 1     General info for SNF   Order Comments: Length of Stay Estimate: Short Term Care: Estimated # of Days <30 Condition at Discharge: Stable Level of care:skilled  Rehabilitation Potential: Good Admission H&P remains valid and up-to-date: Yes Recent Chemotherapy: N/A Use Nursing Home Standing Orders: Yes     Mantoux Instructions   Order Comments: Give two-step Mantoux (PPD) Per Facility Policy {.:[id number]     Incentive Spirometry   Order Comments: Incentive Spirometry 10 times per hour, 4 times per day.     Reason for your hospital stay   Order Comments: Right knee I&D     When to call - Contact Surgeon Team   Order Comments: You may experience symptoms that require follow-up before your scheduled appointment. Refer to the \"Stoplight Tool\" for instructions on when to contact your Surgeon Team if you are concerned about pain control, blood clots, constipation, or if you are unable to urinate.     When to call - Reach out to Urgent Care   Order Comments: If you are not able to reach your Surgeon Team and you need immediate care, go to the Orthopedic Walk-in Clinic or Urgent Care at your Surgeon's office.  Do NOT go to the Emergency " Room unless you have shortness of breath, chest pain, or other signs of a medical emergency.     When to call - Reasons to Call 911   Order Comments: Call 911 immediately if you experience sudden-onset chest pain, arm weakness/numbness, slurred speech, or shortness of breath     Symptoms - Fever Management   Order Comments: A low grade fever can be expected after surgery.  Use acetaminophen (TYLENOL) as needed for fever management.  Contact your Surgeon Team if you have a fever greater than 101.5 F, chills, and/or night sweats.     Symptoms - Constipation management   Order Comments: Constipation (hard, dry bowel movements) is expected after surgery due to the combination of being less active, the anesthetic, and the opioid pain medication.  You can do the following to help reduce constipation:  ~  FLUIDS:  Drink clear liquids (water or Gatorade), or juice (apple/prune).  ~  DIET:  Eat a fiber rich diet.    ~  ACTIVITY:  Get up and move around several times a day.  Increase your activity as you are able.  MEDICATIONS:  Reduce the risk of constipation by starting medications before you are constipated.  You can take Miralax   (1 packet as directed) and/or a stool softener (Senokot 1-2 tablets 1-2 times a day).  If you already have constipation and these medications are not working, you can get magnesium citrate and use as directed.  If you continue to have constipation you can try an over the counter suppository or enema.  Call your Surgeon Team if it has been greater than 3 days since your last bowel movement.     Symptoms - Reduced Urine Output   Order Comments: Changes in the amount of fluids you drank before and after surgery may result in problems urinating.  It is important to stay well-hydrated after surgery and drink plenty of water. If it has been greater than 8 hours since you have urinated despite drinking plenty of water, call your Surgeon Team.     Activity - Exercises to prevent blood clots   Order  Comments: Unless otherwise directed by your Surgeon team, perform the following exercises at least three times per day for the first four weeks after surgery to prevent blood clots in your legs: 1) Point and flex your feet (Ankle Pumps), 2) Move your ankle around in big circles, 3) Wiggle your toes, 4) Walk, even for short distances, several times a day, will help decrease the risk of blood clots.     Order Specific Question Answer Comments   Is discharge order? Yes      Comfort and Pain Management - Pain after Surgery   Order Comments: Pain after surgery is normal and expected.  You will have some amount of pain for several weeks after surgery.  Your pain will improve with time.  There are several things you can do to help reduce your pain including: rest, ice, elevation, and using pain medications as needed. Contact your Surgeon Team if you have pain that persists or worsens after surgery despite rest, ice, elevation, and taking your medication(s) as prescribed. Contact your Surgeon Team if you have new numbness, tingling, or weakness in your operative extremity.     Comfort and Pain Management - Swelling after Surgery   Order Comments: Swelling and/or bruising of the surgical extremity is common and may persist for several months after surgery. In addition to frequent icing and elevation, gentle compressive support with an ACE wrap or tubigrip may help with swelling. Apply compression regularly, removing at least twice daily to perform skin checks. Contact your Surgeon Team if your swelling increases and is NOT associated with an increase in your activity level, or if your swelling increases and is associated with redness and pain.     Comfort and Pain Management - Cold therapy   Order Comments: Ice can be used to control swelling and discomfort after surgery. Place a thin towel over your operative site and apply the ice pack overtop. Leave ice pack in place for 20 minutes, then remove for 20 minutes. Repeat this  20 minutes on/20 minutes off routine as often as tolerated.     Medication Instructions - Acetaminophen (TYLENOL) Instructions   Order Comments: You were discharged with acetaminophen (TYLENOL) for pain management after surgery. Acetaminophen most effectively manages pain symptoms when it is taken on a schedule without missing doses (every four, six, or eight hours). Your Provider will prescribe a safe daily dose between 3000 - 4000 mg.  Do NOT exceed this daily dose. Most patients use acetaminophen for pain control for the first four weeks after surgery.  You can wean from this medication as your pain decreases.     Medication Instructions - NSAID Instructions   Order Comments: You were discharged with an anti-inflammatory medication for pain management to use in combination with acetaminophen (TYLENOL) and the narcotic pain medication.  Take this medication exactly as directed.  You should only take one anti-inflammatory at a time.  Some common anti-inflammatories include: ibuprofen (ADVIL, MOTRIN), naproxen (ALEVE, NAPROSYN), celecoxib (CELEBREX), meloxicam (MOBIC), ketorolac (TORADOL).  Take this medication with food and water.     Follow Up Care   Order Comments: Follow-up with your Surgeon Team in 2 weeks for wound check.     Assess heels for pressure points     Shower with wound/dressing covered   Order Comments: You must COVER your dressing or incision with saran wrap (or any other non-permeable covering) to allow the incision to remain dry while showering. You may shower 7 days after surgery as long as the surgical wound stays dry. Continue to cover your dressing or incision for showering until your first office visit.  You are strictly prohibited from soaking or submerging the surgical wound underwater.     Medication instructions -  Anticoagulation - aspirin   Order Comments: Take the aspirin as prescribed for a total of four weeks after surgery.  This is given to help minimize your risk of blood clot.  "    Comfort and Pain Management - LOWER Extremity Elevation   Order Comments: Swelling is expected for several months after surgery. This type of swelling is usually associated with gravity and activity, and can be improved with elevation.   The best way to do this is to get your \"toes above your nose\" by laying down and placing several pillows lengthwise under your calf and heel. When elevating your leg keep your knee completely straight. Perform this elevation as often as possible especially for the first two weeks after surgery.     Physical Therapy Adult Consult   Order Comments: Evaluate and treat as clinically indicated.    Reason: Status Post Knee Surgery     Occupational Therapy Adult Consult   Order Comments: Evaluate and treat as clinically indicated.    Reason: Status Post Knee Surgery     Fall precautions     Diet   Order Comments: Follow this diet upon discharge: Regular     Order Specific Question Answer Comments   Is discharge order? Yes      CBC with platelets and differential   Standing Status: Standing Number of Occurrences: 4 Standing Exp. Date: 07/16/25   Order Comments: Fax lab result to [facility] ID Clinic Information:  Phone: [phone]  Fax: [phone] (Attention ID clinic nurses)       Orthopedic surgery staff for this patient is Dr. Espinoza. Discussed.    --  Max Hoffman MD  Orthopedic Surgery PGY-4        "

## 2024-07-18 NOTE — PROGRESS NOTES
[facility]    Background: Transitional Care Management program identified per system criteria and reviewed by [facility] team for possible outreach.    Assessment: Upon chart review, [facility] Team member will not proceed with patient outreach related to this episode of Transitional Care Management program due to reason below:    Patient has discharged to a Memory Care, Long-term Care, Assisted Living or Group Home where patient is receiving on-site support with their daily cares, including support with hospital follow up plan.    Patient discharged back to their Skilled Nursing Facility/Long-Term Care Facility. No [facility] outreach calls needed at this time.    Plan: Transitional Care Management episode addressed appropriately per reason noted above.      SARINA Moran  [phone]  [facility]     *Connected Care Resource Team does NOT follow patient ongoing. Referrals are identified based on internal discharge reports and the outreach is to ensure patient has an understanding of their discharge instructions.

## 2024-07-22 LAB — SCANNED LAB RESULT: NORMAL

## 2024-07-24 LAB — BACTERIA ASPIRATE CULT: NORMAL

## 2024-07-25 LAB
BACTERIA ASPIRATE CULT: NORMAL

## 2024-07-31 ENCOUNTER — LAB (OUTPATIENT)
Dept: LAB | Facility: CLINIC | Age: 54
End: 2024-07-31
Payer: COMMERCIAL

## 2024-07-31 ENCOUNTER — OFFICE VISIT (OUTPATIENT)
Dept: ORTHOPEDICS | Facility: CLINIC | Age: 54
End: 2024-07-31
Payer: COMMERCIAL

## 2024-07-31 ENCOUNTER — OFFICE VISIT (OUTPATIENT)
Dept: CT IMAGING | Facility: CLINIC | Age: 54
End: 2024-07-31
Attending: INTERNAL MEDICINE
Payer: COMMERCIAL

## 2024-07-31 ENCOUNTER — MEDICAL CORRESPONDENCE (OUTPATIENT)
Dept: HEALTH INFORMATION MANAGEMENT | Facility: CLINIC | Age: 54
End: 2024-07-31

## 2024-07-31 VITALS
HEART RATE: 85 BPM | OXYGEN SATURATION: 100 % | DIASTOLIC BLOOD PRESSURE: 101 MMHG | BODY MASS INDEX: 22.14 KG/M2 | SYSTOLIC BLOOD PRESSURE: 186 MMHG | WEIGHT: 129 LBS

## 2024-07-31 DIAGNOSIS — M00.9 PYOGENIC ARTHRITIS OF RIGHT KNEE JOINT, DUE TO UNSPECIFIED ORGANISM (H): Primary | ICD-10-CM

## 2024-07-31 DIAGNOSIS — M00.9 PYOGENIC ARTHRITIS OF RIGHT KNEE JOINT, DUE TO UNSPECIFIED ORGANISM (H): ICD-10-CM

## 2024-07-31 DIAGNOSIS — Z79.2 ENCOUNTER FOR LONG-TERM (CURRENT) USE OF ANTIBIOTICS: ICD-10-CM

## 2024-07-31 DIAGNOSIS — Z79.2 ENCOUNTER FOR LONG-TERM (CURRENT) USE OF ANTIBIOTICS: Primary | ICD-10-CM

## 2024-07-31 LAB
BASOPHILS # BLD AUTO: 0.1 10E3/UL (ref 0–0.2)
BASOPHILS NFR BLD AUTO: 1 %
EOSINOPHIL # BLD AUTO: 0.5 10E3/UL (ref 0–0.7)
EOSINOPHIL NFR BLD AUTO: 5 %
ERYTHROCYTE [DISTWIDTH] IN BLOOD BY AUTOMATED COUNT: 17.8 % (ref 10–15)
HCT VFR BLD AUTO: 26.8 % (ref 40–53)
HGB BLD-MCNC: 8.1 G/DL (ref 13.3–17.7)
IMM GRANULOCYTES # BLD: 0 10E3/UL
IMM GRANULOCYTES NFR BLD: 0 %
LYMPHOCYTES # BLD AUTO: 1.8 10E3/UL (ref 0.8–5.3)
LYMPHOCYTES NFR BLD AUTO: 19 %
MCH RBC QN AUTO: 24.5 PG (ref 26.5–33)
MCHC RBC AUTO-ENTMCNC: 30.2 G/DL (ref 31.5–36.5)
MCV RBC AUTO: 81 FL (ref 78–100)
MONOCYTES # BLD AUTO: 0.8 10E3/UL (ref 0–1.3)
MONOCYTES NFR BLD AUTO: 9 %
NEUTROPHILS # BLD AUTO: 6.2 10E3/UL (ref 1.6–8.3)
NEUTROPHILS NFR BLD AUTO: 67 %
PLATELET # BLD AUTO: 357 10E3/UL (ref 150–450)
RBC # BLD AUTO: 3.3 10E6/UL (ref 4.4–5.9)
WBC # BLD AUTO: 9.4 10E3/UL (ref 4–11)

## 2024-07-31 PROCEDURE — 36415 COLL VENOUS BLD VENIPUNCTURE: CPT

## 2024-07-31 PROCEDURE — G0463 HOSPITAL OUTPT CLINIC VISIT: HCPCS | Performed by: INTERNAL MEDICINE

## 2024-07-31 PROCEDURE — 84075 ASSAY ALKALINE PHOSPHATASE: CPT

## 2024-07-31 PROCEDURE — 85025 COMPLETE CBC W/AUTO DIFF WBC: CPT

## 2024-07-31 PROCEDURE — 84460 ALANINE AMINO (ALT) (SGPT): CPT

## 2024-07-31 PROCEDURE — G2211 COMPLEX E/M VISIT ADD ON: HCPCS | Performed by: INTERNAL MEDICINE

## 2024-07-31 PROCEDURE — 99024 POSTOP FOLLOW-UP VISIT: CPT | Performed by: ORTHOPAEDIC SURGERY

## 2024-07-31 PROCEDURE — 99215 OFFICE O/P EST HI 40 MIN: CPT | Performed by: INTERNAL MEDICINE

## 2024-07-31 PROCEDURE — 86140 C-REACTIVE PROTEIN: CPT

## 2024-07-31 PROCEDURE — 82565 ASSAY OF CREATININE: CPT

## 2024-07-31 ASSESSMENT — PAIN SCALES - GENERAL: PAINLEVEL: EXTREME PAIN (8)

## 2024-07-31 NOTE — LETTER
7/31/2024      Long Mayfield  [address] [address]      Dear Colleague,    Thank you for referring your patient, Long Mayfield, to the [facility] [place]. Please see a copy of my visit note below.    Concern: Follow-up right knee irrigation debridement    Bertrand follows up today now 20 days status post open irrigation debridement of right knee.  Intraoperative cultures have been negative to date.  Preoperative he had an aspirate with greater than 100,000 cells.  Knee pain has been improving he is walking without assistive device.  No constitutional symptoms.  He remains on IV antibiotics per Dr. Zuleta.      Examination today the knee looks well-healed.  Staples in place without surrounding erythema or fluctuance.'s knee range of motion is without pain.    Removed staples today and placed a dry bandage.    Asked patient to leave the bandage in place for 1 day after that it can be open to air.  Okay to shower now but asked him to not submerge the leg for 3 days.  After that time he can submerge in pool, tub or even a lake.  Will follow-up as needed basis.      Dejon Espinoza MD  , Department of Orthopedic Surgery  [facility]      Again, thank you for allowing me to participate in the care of your patient.        Sincerely,        Dejon Espinoza MD

## 2024-07-31 NOTE — NURSING NOTE
Reason For Visit:   Chief Complaint   Patient presents with    RECHECK     DOS 7/11/24 irrigation and debridement of right knee       Primary MD: Ana Maria Blakely  Ref. MD: EST    ?  No  Occupation: Disability.     Date of injury: No  Type of injury: on going Chronic.     Date of surgery: 7/11/2024  Type of surgery: Irrigation and debridement knee      Smoker: No  Request smoking cessation information: No    There were no vitals taken for this visit.    Pain Assessment  Patient Currently in Pain: Yes  0-10 Pain Scale: 7  Primary Pain Location: Knee    Oswestry (ARDEN) Questionnaire        7/10/2024     9:59 AM   OSWESTRY DISABILITY INDEX   Count 10   Sum 25   Oswestry Score (%) 50 %            Neck Disability Index (NDI) Questionnaire        7/10/2024    10:09 AM   Neck Disability Index (NDI)   Neck Disability Index: Count 9   NDI: Total Score = SUM (points for all 10 findings) Incomplete   Neck Disability in Percent = (Total Score) / 50 * 100 Incomplete              Racquel Crandall LPN

## 2024-07-31 NOTE — NURSING NOTE
Chief Complaint   Patient presents with    RECHECK     2-3 week hospital follow up per Dr. Mccurdy     BP (!) 186/101 (BP Location: Left arm, Patient Position: Sitting, Cuff Size: Adult Regular)   Pulse 85   Wt 58.5 kg (129 lb)   SpO2 100%   BMI 22.14 kg/m    Nina Gregorio [facility]

## 2024-07-31 NOTE — PROGRESS NOTES
[facility] INFECTIOUS DISEASE  [address]  [address]  [facility] [address]  Phone: [phone]  Fax: [phone]    Patient:  Long Mayfield, Date of birth 1970  Date of Visit:  07/31/2024  Referring Provider Selene Mccurdy  Reason for visit: Right knee septic arthritis     Assessment & Plan      Long Mayfield is a 54 year old male with right knee swelling. Synovial fluid showed inflammation. I discussed with him again that due to very high number of cells in his knee, we assumed and treated him for infection. However, we did not find a definitive cause of the infection. Therefore, he would need close monitoring and if there is persistent inflammation, then we will need to investigate further.     His CRP today is still elevated so I will complete therapy at 4 weeks. I will see him in 2 months to check if his knee swelling has improved.      IMPRESSION:  Culture-negative right knee arthritis, s/p I&D Jul 11, 2024 Dr. Espinoza  T2 DM   History of diabetic foot infection, s/p right TMA  History of alcohol use disoder  Chronic diarrhea   History of pancreatitis      RECOMMENDATIONS:  Continue IV ceftriaxone 2g daily until Aug 8, 2024.  Check CRP on Aug 8 then pull PICC.   Return to clinic in Oct 2024.    40 minutes spent by me on the date of the encounter doing chart review, history and exam, documentation and further activities per the note  The longitudinal plan of care for the diagnosis(es)/condition(s) as documented were addressed during this visit. Due to the added complexity in care, I will continue to support Long in the subsequent management and with ongoing continuity of care.    History of Present Illness     Pertinent history obtain from: patient    Long Mayfield is a 54 year old male with right knee swelling. He could not tell when exactly it started to become swollen or painful. It seems like it's been on and off for years.   He has had injection to his right  knee from 2 years ago. This year he had another injection to his right knee in April 2024 at [facility]. However, in May, they found that his knee is too swollen so they aspirated the knee instead. Cell count 76k and a week later 109k. Cultures negative. He was not told there's a problem and was not given antibiotics. He has chronic diarrhea attributed to pancreatitis from previous alcohol use disorder. He does not have fever, chills, diarrhea.      He was born in [facility] [place] [facility] [place] [facility] [place]. He lived in [place], [place] and [place]. He skied a lot. He was sexually active with women but not recently. He does not inject drugs. 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 He does not recall tick bites.     Jul 11, 2024 - Irrigation and debridement knee, Dr. Espinoza [facility] - abnormal appearing synovial fluid. purulent nature of the joint fluid     He still has left knee swelling but no pain or limitation in ROM.     Physical Exam     BP (!) 186/101 (BP Location: Left arm, Patient Position: Sitting, Cuff Size: Adult Regular)   Pulse 85   Wt 58.5 kg (129 lb)   SpO2 100%   BMI 22.14 kg/m    Right knee swelling with warmth. No limitation in ROM. Incision well healed.   Left knee without swelling.     Data   Laboratory data and imaging listed below     KATHY - neg     CRP  7/31 19  7/17 43  7/12 69  7/10 113     Microbiology:  7/13 GC - neg   7/13 [employer] Blood - neg   7/12 Lyme, HIV, HCV, HBsAg - neg   7/11 R knee - 3/3 no growth, 16s neg  7/10 R knee - 65k, 93%N  Cx - no growth   5/29 R knee - 109k, 95%N - no growth   5/22 R knee 76k 98%N - no growth

## 2024-07-31 NOTE — PROGRESS NOTES
Concern: Follow-up right knee irrigation debridement    Bertrand follows up today now 20 days status post open irrigation debridement of right knee.  Intraoperative cultures have been negative to date.  Preoperative he had an aspirate with greater than 100,000 cells.  Knee pain has been improving he is walking without assistive device.  No constitutional symptoms.  He remains on IV antibiotics per Dr. Zuleta.      Examination today the knee looks well-healed.  Staples in place without surrounding erythema or fluctuance.'s knee range of motion is without pain.    Removed staples today and placed a dry bandage.    Asked patient to leave the bandage in place for 1 day after that it can be open to air.  Okay to shower now but asked him to not submerge the leg for 3 days.  After that time he can submerge in pool, tub or even a lake.  Will follow-up as needed basis.      Dejon Espinoza MD  , Department of Orthopedic Surgery  [facility]

## 2024-08-01 LAB
ALP SERPL-CCNC: 151 U/L (ref 40–150)
ALT SERPL W P-5'-P-CCNC: 10 U/L (ref 0–70)
CREAT SERPL-MCNC: 1.27 MG/DL (ref 0.67–1.17)
CRP SERPL-MCNC: 19 MG/L
EGFRCR SERPLBLD CKD-EPI 2021: 66 ML/MIN/1.73M2

## 2024-08-01 NOTE — PROGRESS NOTES
"Per Dr. Zuleta's OV 7/31/24   \"RECOMMENDATIONS:  Continue IV ceftriaxone 2g daily until Aug 8, 2024.  Check CRP on Aug 8 then pull PICC.   Return to clinic in Oct 2024.\"    Faxed orders to [facility] PH: [phone] Fax: [phone]        Surinder Horton RN  Infectious Disease on 8/1/2024 at 8:42 AM    "

## 2024-08-02 ENCOUNTER — NURSE TRIAGE (OUTPATIENT)
Dept: INFECTIOUS DISEASES | Facility: CLINIC | Age: 54
End: 2024-08-02
Payer: COMMERCIAL

## 2024-08-02 NOTE — TELEPHONE ENCOUNTER
S-(situation): Patient calling as he was told by Dr. Zuleta that if swelling came back that he was to call clinic. He saw provider on Wed. Then Thursday morning woke up with double the size in knee and very stiff unable to walk without pain. Patient has tried ice and pain medication. This is mildly helping.     B-(background): Saw Dr. Zuleta for follow up Right knee septic arthritis post hospital discharge 7/11/24. Patient is on IV ceftriaxone till 8/8/24.     A-(assessment): Patient is having new swelling of right knee while on abx and was told to call if this happens.     R-(recommendations): Will send to provider to follow up as RN recommended to have pt go to UC or ED and patient refused to go. Patient would like provider to review and follow up with the patient.

## 2024-08-02 NOTE — TELEPHONE ENCOUNTER
"Reason for Disposition    SEVERE pain (e.g., excruciating, unable to walk) and not improved after 2 hours of pain medicine    SEVERE swelling (e.g., can't move swollen knee at all)    Additional Information    Negative: Followed a knee injury    Negative: Followed a bee sting and has localized swelling (e.g., small area of puffy or swollen skin)    Negative: Followed an insect bite and has localized swelling (e.g., small area of puffy or swollen skin)    Negative: Knee pain is main symptom    Negative: Swelling of calf or leg is main symptom    Negative: Knee pain and fever    Negative: Knee redness and fever    Negative: Patient sounds very sick or weak to the triager    Negative: Redness and painful when touched and no fever    Negative: Red area or streak > 2 inches (or 5 cm)    Negative: Thigh or calf pain and only 1 side and present > 1 hour    Negative: Thigh or calf swelling and only 1 side    Answer Assessment - Initial Assessment Questions  1. LOCATION: \"Where is the swelling located?\"  (e.g., left, right, both knees)      Right knee  2. ONSET: \"When did the swelling start?\" \"Does it come and go, or is it there all the time?\"      Thursday morning   3. SWELLING: \"How bad is the swelling?\" Or, \"How large is it?\" (e.g., mild, moderate, severe; size of localized swelling)     - NONE: No joint swelling.    - LOCALIZED: Localized; small area of puffy or swollen skin (e.g., insect bite, skin irritation).    - MILD: Joint looks or feels mildly swollen or puffy.    - MODERATE: Swollen; interferes with normal activities (e.g., work or school); can't move joint normally (bend and straighten completely); may be limping.    - SEVERE: Very swollen; can't move swollen joint at all; limping a lot or unable to walk.      Moderate - at least double the size   4. PAIN: \"Is there any pain?\" If Yes, ask: \"How bad is it?\" (Scale 1-10; or mild, moderate, severe)    - NONE (0): no pain.    - MILD (1-3): doesn't interfere with " "normal activities.     - MODERATE (4-7): interferes with normal activities (e.g., work or school) or awakens from sleep, limping.     - SEVERE (8-10): excruciating pain, unable to do any normal activities, unable to walk.       8/10 pain   5. SETTING: \"Has there been any recent work, exercise or other activity that involved that part of the body?\"       No   6. AGGRAVATING FACTORS: \"What makes the knee swelling worse?\" (e.g., walking, climbing stairs, running)      Walking make it worse  7. ASSOCIATED SYMPTOMS: \"Is there any pain or redness?\"      Been using wheelchair   8. OTHER SYMPTOMS: \"Do you have any other symptoms?\" (e.g., chest pain, difficulty breathing, fever, calf pain)      None   9. PREGNANCY: \"Is there any chance you are pregnant?\" \"When was your last menstrual period?\"      na    Protocols used: Knee Swelling-A-[place]    "

## 2024-08-07 NOTE — TELEPHONE ENCOUNTER
Called pt to follow up on knee pain, still swollen and tight over weekend. Today is loosen up a lot more and can walk on it more. Feels better about progress today. No redness or pain or warmth.     Doing much better than last week. Not expecting any further follow up.       Surinder Horton RN  Infectious Disease on 8/7/2024 at 10:36 AM

## 2024-08-08 ENCOUNTER — TELEPHONE (OUTPATIENT)
Dept: INFECTIOUS DISEASES | Facility: CLINIC | Age: 54
End: 2024-08-08
Payer: COMMERCIAL

## 2024-08-08 LAB
BACTERIA ASPIRATE CULT: NO GROWTH

## 2024-08-12 ENCOUNTER — TELEPHONE (OUTPATIENT)
Dept: ORTHOPEDICS | Facility: CLINIC | Age: 54
End: 2024-08-12
Payer: COMMERCIAL

## 2024-08-12 DIAGNOSIS — M00.9 PYOGENIC ARTHRITIS OF RIGHT KNEE JOINT, DUE TO UNSPECIFIED ORGANISM (H): Primary | ICD-10-CM

## 2024-08-12 DIAGNOSIS — M25.561 ACUTE PAIN OF RIGHT KNEE: ICD-10-CM

## 2024-08-12 NOTE — TELEPHONE ENCOUNTER
RN attempted to call patient. Left VM for patient to call back clinic number.     Laura Hartley RN

## 2024-08-12 NOTE — TELEPHONE ENCOUNTER
Pt is saying his right knee is swollen again and wans to follow up with DR. Espinoza please review if follow up is appropriate with Alexis or if he is supposed to follow up with another provider    Could we send this information to you in [employer] or would you prefer to receive a phone call?:   Patient would prefer a phone call   Okay to leave a detailed message?: Yes at Cell number on file:    Telephone Information:   Mobile [phone]

## 2024-08-13 NOTE — TELEPHONE ENCOUNTER
Other: Pt is returning call back to Laura is requesting a call back asap     Could we send this information to you in [employer] or would you prefer to receive a phone call?:   Patient would prefer a phone call   Okay to leave a detailed message?: Yes at Cell number on file:    Telephone Information:   Mobile [phone]

## 2024-08-13 NOTE — TELEPHONE ENCOUNTER
RN called patient to discus condition. Patient stating that he is having increase knee pain and swelling. It feels like the last time he states when we had to take him into surgery. RN scheduled patient an appointment tomorrow with Dr. Espinoza and labs prior.     Laura Hartley RN

## 2024-08-14 ENCOUNTER — OFFICE VISIT (OUTPATIENT)
Dept: ORTHOPEDICS | Facility: CLINIC | Age: 54
End: 2024-08-14
Payer: COMMERCIAL

## 2024-08-14 ENCOUNTER — TELEPHONE (OUTPATIENT)
Dept: INFECTIOUS DISEASES | Facility: CLINIC | Age: 54
End: 2024-08-14

## 2024-08-14 ENCOUNTER — LAB (OUTPATIENT)
Dept: LAB | Facility: CLINIC | Age: 54
End: 2024-08-14
Payer: COMMERCIAL

## 2024-08-14 DIAGNOSIS — M25.561 ACUTE PAIN OF RIGHT KNEE: ICD-10-CM

## 2024-08-14 DIAGNOSIS — M00.9 PYOGENIC ARTHRITIS OF RIGHT KNEE JOINT, DUE TO UNSPECIFIED ORGANISM (H): Primary | ICD-10-CM

## 2024-08-14 DIAGNOSIS — M00.9 PYOGENIC ARTHRITIS OF RIGHT KNEE JOINT, DUE TO UNSPECIFIED ORGANISM (H): ICD-10-CM

## 2024-08-14 LAB
APPEARANCE FLD: ABNORMAL
BASOPHILS # BLD AUTO: 0 10E3/UL (ref 0–0.2)
BASOPHILS NFR BLD AUTO: 1 %
CELL COUNT BODY FLUID SOURCE: ABNORMAL
COLOR FLD: YELLOW
CRP SERPL-MCNC: 103 MG/L
CRYSTALS SNV MICRO: NORMAL
EOSINOPHIL # BLD AUTO: 0.4 10E3/UL (ref 0–0.7)
EOSINOPHIL NFR BLD AUTO: 4 %
ERYTHROCYTE [DISTWIDTH] IN BLOOD BY AUTOMATED COUNT: 17.7 % (ref 10–15)
ERYTHROCYTE [SEDIMENTATION RATE] IN BLOOD BY WESTERGREN METHOD: 105 MM/HR (ref 0–20)
HCT VFR BLD AUTO: 25 % (ref 40–53)
HGB BLD-MCNC: 7.4 G/DL (ref 13.3–17.7)
IMM GRANULOCYTES # BLD: 0 10E3/UL
IMM GRANULOCYTES NFR BLD: 0 %
LYMPHOCYTES # BLD AUTO: 1.7 10E3/UL (ref 0.8–5.3)
LYMPHOCYTES NFR BLD AUTO: 22 %
LYMPHOCYTES NFR FLD MANUAL: 0 %
MCH RBC QN AUTO: 23.6 PG (ref 26.5–33)
MCHC RBC AUTO-ENTMCNC: 29.6 G/DL (ref 31.5–36.5)
MCV RBC AUTO: 80 FL (ref 78–100)
MONOCYTES # BLD AUTO: 1.1 10E3/UL (ref 0–1.3)
MONOCYTES NFR BLD AUTO: 14 %
MONOS+MACROS NFR FLD MANUAL: 0 %
NEUTROPHILS # BLD AUTO: 4.7 10E3/UL (ref 1.6–8.3)
NEUTROPHILS NFR BLD AUTO: 59 %
NEUTS BAND NFR FLD MANUAL: 100 %
NRBC # BLD AUTO: 0 10E3/UL
NRBC BLD AUTO-RTO: 0 /100
PLATELET # BLD AUTO: 312 10E3/UL (ref 150–450)
RBC # BLD AUTO: 3.13 10E6/UL (ref 4.4–5.9)
WBC # BLD AUTO: 8 10E3/UL (ref 4–11)
WBC # FLD AUTO: ABNORMAL /UL

## 2024-08-14 PROCEDURE — 85025 COMPLETE CBC W/AUTO DIFF WBC: CPT | Performed by: PATHOLOGY

## 2024-08-14 PROCEDURE — 87075 CULTR BACTERIA EXCEPT BLOOD: CPT | Performed by: ORTHOPAEDIC SURGERY

## 2024-08-14 PROCEDURE — 89060 EXAM SYNOVIAL FLUID CRYSTALS: CPT | Performed by: ORTHOPAEDIC SURGERY

## 2024-08-14 PROCEDURE — 85652 RBC SED RATE AUTOMATED: CPT | Performed by: ORTHOPAEDIC SURGERY

## 2024-08-14 PROCEDURE — 36415 COLL VENOUS BLD VENIPUNCTURE: CPT | Performed by: PATHOLOGY

## 2024-08-14 PROCEDURE — 87070 CULTURE OTHR SPECIMN AEROBIC: CPT | Performed by: ORTHOPAEDIC SURGERY

## 2024-08-14 PROCEDURE — 87102 FUNGUS ISOLATION CULTURE: CPT | Performed by: ORTHOPAEDIC SURGERY

## 2024-08-14 PROCEDURE — 89050 BODY FLUID CELL COUNT: CPT | Performed by: ORTHOPAEDIC SURGERY

## 2024-08-14 PROCEDURE — 86140 C-REACTIVE PROTEIN: CPT | Performed by: PATHOLOGY

## 2024-08-14 PROCEDURE — 99024 POSTOP FOLLOW-UP VISIT: CPT | Performed by: ORTHOPAEDIC SURGERY

## 2024-08-14 PROCEDURE — 99000 SPECIMEN HANDLING OFFICE-LAB: CPT | Performed by: PATHOLOGY

## 2024-08-14 NOTE — PROGRESS NOTES
Chief Concern: one month follow up after Irrigation and Debridement RIGHT knee on 7/11    HPI:   S/p I and D of right knee pyogenic arthritis on 7/11. Over past several days knee has swollen and is stiff and painful. No constitutional symptoms    On exam obvious right knee effusion. Lacks terminal extension and flexion due to effusion and pain. Has pedal edema below the right knee without fluctuance. Very concerning for persistent/recurrent septic arthritis.    CRP drawn today has jumped to 103 from 19 two weeks ago    I performed aspiration of the right knee under sterile conditions.  I aspirated 35 mL of yellow, turbid appearing fluid.  Aspirate was sent for cell count, crystals, gram stain, aerobic, anaerobic, fungal cultures.     I counseled Bertrand that if aspirate is consistent with infection he will need another irrigation and debridement to reduce infectious burden.      Will follow labs/cultures and coordinate as appropriate.       Dejon Espinoza MD  , Department of Orthopedic Surgery  [facility]      Addendum 08/15/2024  Aspirate shows 94,560 total nucleated cells with 100% PMN. Cultures with no growth however given history and cell count patient needs surgical irrigation and debridement of the right knee. Case request has been placed. Have communicated.       Dejon Espinoza MD  , Department of Orthopedic Surgery  [facility]

## 2024-08-14 NOTE — TELEPHONE ENCOUNTER
Called [facility] where patient resides currently. Had to leave a VM to contact RN to discuss if patient has discontinued abx and had PICC removed.       Surinder Horton RN  Infectious Disease on 8/14/2024 at 11:41 AM

## 2024-08-14 NOTE — NURSING NOTE
Reason For Visit:   Chief Complaint   Patient presents with    RECHECK     Increased knee swelling and pain // labs       There were no vitals taken for this visit.    Pain Assessment  Patient Currently in Pain: Yes  0-10 Pain Scale: 8  Primary Pain Location: Knee    Racquel Crandall LPN

## 2024-08-14 NOTE — LETTER
8/14/2024      Long Mayfield  [address] [address]      Dear Colleague,    Thank you for referring your patient, Long Mayfield, to the [facility] [place]. Please see a copy of my visit note below.    Chief Concern: one month follow up after Irrigation and Debridement RIGHT knee on 7/11    HPI:   S/p I and D of right knee pyogenic arthritis on 7/11. Over past several days knee has swollen and is stiff and painful. No constitutional symptoms    On exam obvious right knee effusion. Lacks terminal extension and flexion due to effusion and pain. Has pedal edema below the right knee without fluctuance. Very concerning for persistent/recurrent septic arthritis.    CRP drawn today has jumped to 103 from 19 two weeks ago    I performed aspiration of the right knee under sterile conditions.  I aspirated 35 mL of yellow, turbid appearing fluid.  Aspirate was sent for cell count, crystals, gram stain, aerobic, anaerobic, fungal cultures.     I counseled Bertrand that if aspirate is consistent with infection he will need another irrigation and debridement to reduce infectious burden.      Will follow labs/cultures and coordinate as appropriate.       Dejno Espinoza MD  , Department of Orthopedic Surgery  [facility]      Addendum 08/15/2024  Aspirate shows 94,560 total nucleated cells with 100% PMN. Cultures with no growth however given history and cell count patient needs surgical irrigation and debridement of the right knee. Case request has been placed. Have communicated.       Dejon Espinoza MD  , Department of Orthopedic Surgery  [facility]            Again, thank you for allowing me to participate in the care of your patient.        Sincerely,        Dejon Espinoza MD

## 2024-08-15 RX ORDER — CEFAZOLIN SODIUM 2 G/50ML
2 SOLUTION INTRAVENOUS SEE ADMIN INSTRUCTIONS
Status: CANCELLED | OUTPATIENT
Start: 2024-08-15

## 2024-08-15 RX ORDER — GABAPENTIN 300 MG/1
300 CAPSULE ORAL
Status: CANCELLED | OUTPATIENT
Start: 2024-08-15

## 2024-08-15 RX ORDER — CEFAZOLIN SODIUM 2 G/50ML
2 SOLUTION INTRAVENOUS
Status: CANCELLED | OUTPATIENT
Start: 2024-08-15

## 2024-08-15 RX ORDER — ACETAMINOPHEN 325 MG/1
975 TABLET ORAL ONCE
Status: CANCELLED | OUTPATIENT
Start: 2024-08-15 | End: 2024-08-15

## 2024-08-15 NOTE — TELEPHONE ENCOUNTER
Called TCU and spoke with RN caring for him today. She checked and he completed abx and had PICC removed on 8/14/24.  Informed RN that patient was to RTC in OCT but no appt has been scheduled, she stated the [facility] will call back to make that appointment.         Documentation of OPAT Completion    OPAT completed on: 8/8/2024    Date Infusion company notified on: TCU had orders for LOT 8/8/24     PICC Pulled on: 8/14/24    Episode closed? 08/15/24     Flowsheet updated? 08/15/24

## 2024-08-16 ENCOUNTER — HOSPITAL ENCOUNTER (INPATIENT)
Facility: CLINIC | Age: 54
LOS: 3 days | Discharge: NURSING, REHAB OR LONG-TERM CARE | End: 2024-08-19
Attending: EMERGENCY MEDICINE | Admitting: INTERNAL MEDICINE
Payer: COMMERCIAL

## 2024-08-16 ENCOUNTER — HOSPITAL ENCOUNTER (OUTPATIENT)
Facility: CLINIC | Age: 54
Discharge: STILL ADMITTED OR ADMINISTRATIVE | End: 2024-08-16
Attending: ORTHOPAEDIC SURGERY | Admitting: ORTHOPAEDIC SURGERY
Payer: COMMERCIAL

## 2024-08-16 VITALS
OXYGEN SATURATION: 98 % | BODY MASS INDEX: 22.37 KG/M2 | SYSTOLIC BLOOD PRESSURE: 166 MMHG | RESPIRATION RATE: 16 BRPM | DIASTOLIC BLOOD PRESSURE: 101 MMHG | HEIGHT: 65 IN | WEIGHT: 134.26 LBS | HEART RATE: 90 BPM | TEMPERATURE: 97.8 F

## 2024-08-16 DIAGNOSIS — Z98.890 S/P DEBRIDEMENT: ICD-10-CM

## 2024-08-16 DIAGNOSIS — E11.65 INADEQUATELY CONTROLLED DIABETES MELLITUS (H): ICD-10-CM

## 2024-08-16 DIAGNOSIS — Z79.4 ENCOUNTER FOR LONG-TERM (CURRENT) USE OF INSULIN (H): ICD-10-CM

## 2024-08-16 DIAGNOSIS — R73.9 HYPERGLYCEMIA: ICD-10-CM

## 2024-08-16 DIAGNOSIS — M00.9 PYOGENIC ARTHRITIS OF RIGHT KNEE JOINT, DUE TO UNSPECIFIED ORGANISM (H): ICD-10-CM

## 2024-08-16 DIAGNOSIS — M00.9 PYOGENIC ARTHRITIS OF RIGHT KNEE JOINT, DUE TO UNSPECIFIED ORGANISM (H): Primary | ICD-10-CM

## 2024-08-16 DIAGNOSIS — M25.561 ACUTE PAIN OF RIGHT KNEE: ICD-10-CM

## 2024-08-16 LAB
ABO/RH(D): NORMAL
ALBUMIN SERPL BCG-MCNC: 2.7 G/DL (ref 3.5–5.2)
ALBUMIN UR-MCNC: 30 MG/DL
ALP SERPL-CCNC: 138 U/L (ref 40–150)
ALT SERPL W P-5'-P-CCNC: 7 U/L (ref 0–70)
ANION GAP SERPL CALCULATED.3IONS-SCNC: 11 MMOL/L (ref 7–15)
ANION GAP SERPL CALCULATED.3IONS-SCNC: 13 MMOL/L (ref 7–15)
ANTIBODY SCREEN: NEGATIVE
APPEARANCE UR: CLEAR
APTT PPP: 46 SECONDS (ref 22–38)
AST SERPL W P-5'-P-CCNC: 15 U/L (ref 0–45)
BASOPHILS # BLD AUTO: 0.1 10E3/UL (ref 0–0.2)
BASOPHILS # BLD AUTO: 0.1 10E3/UL (ref 0–0.2)
BASOPHILS NFR BLD AUTO: 1 %
BASOPHILS NFR BLD AUTO: 1 %
BILIRUB SERPL-MCNC: <0.2 MG/DL
BILIRUB UR QL STRIP: NEGATIVE
BUN SERPL-MCNC: 24.5 MG/DL (ref 6–20)
BUN SERPL-MCNC: 36.9 MG/DL (ref 6–20)
CALCIUM SERPL-MCNC: 6.2 MG/DL (ref 8.8–10.4)
CALCIUM SERPL-MCNC: 8.6 MG/DL (ref 8.8–10.4)
CHLORIDE SERPL-SCNC: 110 MMOL/L (ref 98–107)
CHLORIDE SERPL-SCNC: 92 MMOL/L (ref 98–107)
COLOR UR AUTO: ABNORMAL
CREAT SERPL-MCNC: 0.97 MG/DL (ref 0.67–1.17)
CREAT SERPL-MCNC: 1.49 MG/DL (ref 0.67–1.17)
EGFRCR SERPLBLD CKD-EPI 2021: 55 ML/MIN/1.73M2
EGFRCR SERPLBLD CKD-EPI 2021: >90 ML/MIN/1.73M2
EOSINOPHIL # BLD AUTO: 0.3 10E3/UL (ref 0–0.7)
EOSINOPHIL # BLD AUTO: 0.4 10E3/UL (ref 0–0.7)
EOSINOPHIL NFR BLD AUTO: 5 %
EOSINOPHIL NFR BLD AUTO: 5 %
ERYTHROCYTE [DISTWIDTH] IN BLOOD BY AUTOMATED COUNT: 17.2 % (ref 10–15)
ERYTHROCYTE [DISTWIDTH] IN BLOOD BY AUTOMATED COUNT: 17.4 % (ref 10–15)
GLUCOSE BLDC GLUCOMTR-MCNC: 109 MG/DL (ref 70–99)
GLUCOSE BLDC GLUCOMTR-MCNC: 183 MG/DL (ref 70–99)
GLUCOSE BLDC GLUCOMTR-MCNC: 200 MG/DL (ref 70–99)
GLUCOSE BLDC GLUCOMTR-MCNC: 284 MG/DL (ref 70–99)
GLUCOSE BLDC GLUCOMTR-MCNC: 405 MG/DL (ref 70–99)
GLUCOSE BLDC GLUCOMTR-MCNC: 459 MG/DL (ref 70–99)
GLUCOSE BLDC GLUCOMTR-MCNC: 558 MG/DL (ref 70–99)
GLUCOSE SERPL-MCNC: 188 MG/DL (ref 70–99)
GLUCOSE SERPL-MCNC: 638 MG/DL (ref 70–99)
GLUCOSE UR STRIP-MCNC: >=1000 MG/DL
HCO3 SERPL-SCNC: 14 MMOL/L (ref 22–29)
HCO3 SERPL-SCNC: 21 MMOL/L (ref 22–29)
HCT VFR BLD AUTO: 24.7 % (ref 40–53)
HCT VFR BLD AUTO: 25.8 % (ref 40–53)
HGB BLD-MCNC: 7.4 G/DL (ref 13.3–17.7)
HGB BLD-MCNC: 7.9 G/DL (ref 13.3–17.7)
HGB UR QL STRIP: NEGATIVE
HOLD SPECIMEN: NORMAL
IMM GRANULOCYTES # BLD: 0 10E3/UL
IMM GRANULOCYTES # BLD: 0 10E3/UL
IMM GRANULOCYTES NFR BLD: 0 %
IMM GRANULOCYTES NFR BLD: 0 %
INR PPP: 1.14 (ref 0.85–1.15)
KETONES UR STRIP-MCNC: NEGATIVE MG/DL
LEUKOCYTE ESTERASE UR QL STRIP: NEGATIVE
LYMPHOCYTES # BLD AUTO: 2 10E3/UL (ref 0.8–5.3)
LYMPHOCYTES # BLD AUTO: 2.1 10E3/UL (ref 0.8–5.3)
LYMPHOCYTES NFR BLD AUTO: 28 %
LYMPHOCYTES NFR BLD AUTO: 29 %
MCH RBC QN AUTO: 24.3 PG (ref 26.5–33)
MCH RBC QN AUTO: 24.3 PG (ref 26.5–33)
MCHC RBC AUTO-ENTMCNC: 30 G/DL (ref 31.5–36.5)
MCHC RBC AUTO-ENTMCNC: 30.6 G/DL (ref 31.5–36.5)
MCV RBC AUTO: 79 FL (ref 78–100)
MCV RBC AUTO: 81 FL (ref 78–100)
MONOCYTES # BLD AUTO: 0.7 10E3/UL (ref 0–1.3)
MONOCYTES # BLD AUTO: 0.7 10E3/UL (ref 0–1.3)
MONOCYTES NFR BLD AUTO: 10 %
MONOCYTES NFR BLD AUTO: 9 %
NEUTROPHILS # BLD AUTO: 3.8 10E3/UL (ref 1.6–8.3)
NEUTROPHILS # BLD AUTO: 4.4 10E3/UL (ref 1.6–8.3)
NEUTROPHILS NFR BLD AUTO: 55 %
NEUTROPHILS NFR BLD AUTO: 57 %
NITRATE UR QL: NEGATIVE
NRBC # BLD AUTO: 0 10E3/UL
NRBC # BLD AUTO: 0 10E3/UL
NRBC BLD AUTO-RTO: 0 /100
NRBC BLD AUTO-RTO: 0 /100
PH UR STRIP: 5.5 [PH] (ref 5–7)
PLATELET # BLD AUTO: 374 10E3/UL (ref 150–450)
PLATELET # BLD AUTO: 395 10E3/UL (ref 150–450)
POTASSIUM SERPL-SCNC: 3.5 MMOL/L (ref 3.4–5.3)
POTASSIUM SERPL-SCNC: 5.4 MMOL/L (ref 3.4–5.3)
PROT SERPL-MCNC: 6.8 G/DL (ref 6.4–8.3)
RBC # BLD AUTO: 3.04 10E6/UL (ref 4.4–5.9)
RBC # BLD AUTO: 3.25 10E6/UL (ref 4.4–5.9)
RBC URINE: 3 /HPF
SODIUM SERPL-SCNC: 126 MMOL/L (ref 135–145)
SODIUM SERPL-SCNC: 135 MMOL/L (ref 135–145)
SP GR UR STRIP: 1.01 (ref 1–1.03)
SPECIMEN EXPIRATION DATE: NORMAL
SQUAMOUS EPITHELIAL: <1 /HPF
UROBILINOGEN UR STRIP-MCNC: NORMAL MG/DL
WBC # BLD AUTO: 6.9 10E3/UL (ref 4–11)
WBC # BLD AUTO: 7.7 10E3/UL (ref 4–11)
WBC URINE: <1 /HPF

## 2024-08-16 PROCEDURE — 999N000001 HC CANCELLED SURGERY UP TO 15 MINS: Performed by: ORTHOPAEDIC SURGERY

## 2024-08-16 PROCEDURE — 82962 GLUCOSE BLOOD TEST: CPT

## 2024-08-16 PROCEDURE — 99285 EMERGENCY DEPT VISIT HI MDM: CPT | Performed by: EMERGENCY MEDICINE

## 2024-08-16 PROCEDURE — 85004 AUTOMATED DIFF WBC COUNT: CPT | Performed by: EMERGENCY MEDICINE

## 2024-08-16 PROCEDURE — 86900 BLOOD TYPING SEROLOGIC ABO: CPT | Performed by: ORTHOPAEDIC SURGERY

## 2024-08-16 PROCEDURE — 81001 URINALYSIS AUTO W/SCOPE: CPT | Performed by: EMERGENCY MEDICINE

## 2024-08-16 PROCEDURE — 250N000013 HC RX MED GY IP 250 OP 250 PS 637: Performed by: INTERNAL MEDICINE

## 2024-08-16 PROCEDURE — 36415 COLL VENOUS BLD VENIPUNCTURE: CPT | Performed by: ORTHOPAEDIC SURGERY

## 2024-08-16 PROCEDURE — 85610 PROTHROMBIN TIME: CPT | Performed by: ORTHOPAEDIC SURGERY

## 2024-08-16 PROCEDURE — 258N000003 HC RX IP 258 OP 636: Performed by: EMERGENCY MEDICINE

## 2024-08-16 PROCEDURE — 85048 AUTOMATED LEUKOCYTE COUNT: CPT | Performed by: ORTHOPAEDIC SURGERY

## 2024-08-16 PROCEDURE — 250N000012 HC RX MED GY IP 250 OP 636 PS 637: Performed by: INTERNAL MEDICINE

## 2024-08-16 PROCEDURE — 86923 COMPATIBILITY TEST ELECTRIC: CPT | Performed by: ANESTHESIOLOGY

## 2024-08-16 PROCEDURE — 36415 COLL VENOUS BLD VENIPUNCTURE: CPT | Performed by: EMERGENCY MEDICINE

## 2024-08-16 PROCEDURE — 85730 THROMBOPLASTIN TIME PARTIAL: CPT | Performed by: ORTHOPAEDIC SURGERY

## 2024-08-16 PROCEDURE — 250N000011 HC RX IP 250 OP 636: Performed by: INTERNAL MEDICINE

## 2024-08-16 PROCEDURE — 999N000141 HC STATISTIC PRE-PROCEDURE NURSING ASSESSMENT: Performed by: ORTHOPAEDIC SURGERY

## 2024-08-16 PROCEDURE — 120N000002 HC R&B MED SURG/OB UMMC

## 2024-08-16 PROCEDURE — 80048 BASIC METABOLIC PNL TOTAL CA: CPT | Performed by: ORTHOPAEDIC SURGERY

## 2024-08-16 RX ORDER — IBUPROFEN 600 MG/1
1 TABLET ORAL DAILY PRN
COMMUNITY

## 2024-08-16 RX ORDER — HYDRALAZINE HYDROCHLORIDE 25 MG/1
25 TABLET, FILM COATED ORAL 2 TIMES DAILY
Status: DISCONTINUED | OUTPATIENT
Start: 2024-08-16 | End: 2024-08-19 | Stop reason: HOSPADM

## 2024-08-16 RX ORDER — AMOXICILLIN 250 MG
1 CAPSULE ORAL 2 TIMES DAILY PRN
Status: DISCONTINUED | OUTPATIENT
Start: 2024-08-16 | End: 2024-08-19 | Stop reason: HOSPADM

## 2024-08-16 RX ORDER — BUMETANIDE 1 MG/1
1 TABLET ORAL DAILY
Status: DISCONTINUED | OUTPATIENT
Start: 2024-08-16 | End: 2024-08-19 | Stop reason: HOSPADM

## 2024-08-16 RX ORDER — ENOXAPARIN SODIUM 100 MG/ML
30 INJECTION SUBCUTANEOUS EVERY 24 HOURS
Status: DISCONTINUED | OUTPATIENT
Start: 2024-08-16 | End: 2024-08-16

## 2024-08-16 RX ORDER — GABAPENTIN 300 MG/1
300 CAPSULE ORAL 2 TIMES DAILY
Status: DISCONTINUED | OUTPATIENT
Start: 2024-08-16 | End: 2024-08-19 | Stop reason: HOSPADM

## 2024-08-16 RX ORDER — GABAPENTIN 100 MG/1
100 CAPSULE ORAL 3 TIMES DAILY
Status: DISCONTINUED | OUTPATIENT
Start: 2024-08-16 | End: 2024-08-16

## 2024-08-16 RX ORDER — AMOXICILLIN 250 MG
2 CAPSULE ORAL 2 TIMES DAILY PRN
Status: DISCONTINUED | OUTPATIENT
Start: 2024-08-16 | End: 2024-08-19 | Stop reason: HOSPADM

## 2024-08-16 RX ORDER — LIDOCAINE 40 MG/G
CREAM TOPICAL
Status: DISCONTINUED | OUTPATIENT
Start: 2024-08-16 | End: 2024-08-19 | Stop reason: HOSPADM

## 2024-08-16 RX ORDER — NICOTINE POLACRILEX 4 MG
15-30 LOZENGE BUCCAL
Status: DISCONTINUED | OUTPATIENT
Start: 2024-08-16 | End: 2024-08-19 | Stop reason: HOSPADM

## 2024-08-16 RX ORDER — CEFAZOLIN SODIUM/WATER 2 G/20 ML
2 SYRINGE (ML) INTRAVENOUS
Status: DISCONTINUED | OUTPATIENT
Start: 2024-08-16 | End: 2024-08-16 | Stop reason: HOSPADM

## 2024-08-16 RX ORDER — MULTIPLE VITAMINS W/ MINERALS TAB 9MG-400MCG
1 TAB ORAL DAILY
Status: DISCONTINUED | OUTPATIENT
Start: 2024-08-16 | End: 2024-08-19 | Stop reason: HOSPADM

## 2024-08-16 RX ORDER — PANTOPRAZOLE SODIUM 40 MG/1
40 TABLET, DELAYED RELEASE ORAL
Status: DISCONTINUED | OUTPATIENT
Start: 2024-08-16 | End: 2024-08-19 | Stop reason: HOSPADM

## 2024-08-16 RX ORDER — GABAPENTIN 100 MG/1
300 CAPSULE ORAL
Status: DISCONTINUED | OUTPATIENT
Start: 2024-08-16 | End: 2024-08-16 | Stop reason: HOSPADM

## 2024-08-16 RX ORDER — DULOXETIN HYDROCHLORIDE 60 MG/1
60 CAPSULE, DELAYED RELEASE ORAL DAILY
Status: DISCONTINUED | OUTPATIENT
Start: 2024-08-16 | End: 2024-08-19 | Stop reason: HOSPADM

## 2024-08-16 RX ORDER — CALCIUM CARBONATE 500 MG/1
1000 TABLET, CHEWABLE ORAL 4 TIMES DAILY PRN
Status: DISCONTINUED | OUTPATIENT
Start: 2024-08-16 | End: 2024-08-19 | Stop reason: HOSPADM

## 2024-08-16 RX ORDER — CEFAZOLIN SODIUM/WATER 2 G/20 ML
2 SYRINGE (ML) INTRAVENOUS SEE ADMIN INSTRUCTIONS
Status: DISCONTINUED | OUTPATIENT
Start: 2024-08-16 | End: 2024-08-16 | Stop reason: HOSPADM

## 2024-08-16 RX ORDER — ACETAMINOPHEN 325 MG/1
650 TABLET ORAL EVERY 6 HOURS PRN
Status: DISCONTINUED | OUTPATIENT
Start: 2024-08-16 | End: 2024-08-19 | Stop reason: HOSPADM

## 2024-08-16 RX ORDER — DEXTROSE MONOHYDRATE 25 G/50ML
25-50 INJECTION, SOLUTION INTRAVENOUS
Status: DISCONTINUED | OUTPATIENT
Start: 2024-08-16 | End: 2024-08-19 | Stop reason: HOSPADM

## 2024-08-16 RX ORDER — AMLODIPINE BESYLATE 5 MG/1
5 TABLET ORAL DAILY
Status: DISCONTINUED | OUTPATIENT
Start: 2024-08-16 | End: 2024-08-19 | Stop reason: HOSPADM

## 2024-08-16 RX ORDER — ACETAMINOPHEN 325 MG/1
975 TABLET ORAL ONCE
Status: COMPLETED | OUTPATIENT
Start: 2024-08-16 | End: 2024-08-16

## 2024-08-16 RX ORDER — OXYCODONE HYDROCHLORIDE 5 MG/1
5 TABLET ORAL
Status: DISCONTINUED | OUTPATIENT
Start: 2024-08-16 | End: 2024-08-19 | Stop reason: HOSPADM

## 2024-08-16 RX ORDER — ASPIRIN 81 MG/1
81 TABLET ORAL DAILY
Status: DISCONTINUED | OUTPATIENT
Start: 2024-08-16 | End: 2024-08-19 | Stop reason: HOSPADM

## 2024-08-16 RX ORDER — ENOXAPARIN SODIUM 100 MG/ML
40 INJECTION SUBCUTANEOUS EVERY 24 HOURS
Status: DISCONTINUED | OUTPATIENT
Start: 2024-08-16 | End: 2024-08-19 | Stop reason: HOSPADM

## 2024-08-16 RX ADMIN — HYDRALAZINE HYDROCHLORIDE 25 MG: 25 TABLET ORAL at 12:31

## 2024-08-16 RX ADMIN — DULOXETINE HYDROCHLORIDE 60 MG: 60 CAPSULE, DELAYED RELEASE ORAL at 12:30

## 2024-08-16 RX ADMIN — HYDRALAZINE HYDROCHLORIDE 25 MG: 25 TABLET ORAL at 21:21

## 2024-08-16 RX ADMIN — INSULIN ASPART 1 UNITS: 100 INJECTION, SOLUTION INTRAVENOUS; SUBCUTANEOUS at 19:03

## 2024-08-16 RX ADMIN — AMLODIPINE BESYLATE 5 MG: 5 TABLET ORAL at 12:30

## 2024-08-16 RX ADMIN — SODIUM CHLORIDE 1000 ML: 9 INJECTION, SOLUTION INTRAVENOUS at 08:43

## 2024-08-16 RX ADMIN — OXYCODONE HYDROCHLORIDE 5 MG: 5 TABLET ORAL at 12:31

## 2024-08-16 RX ADMIN — INSULIN GLARGINE 13 UNITS: 100 INJECTION, SOLUTION SUBCUTANEOUS at 14:29

## 2024-08-16 RX ADMIN — Medication 1 TABLET: at 12:30

## 2024-08-16 RX ADMIN — PANTOPRAZOLE SODIUM 40 MG: 40 TABLET, DELAYED RELEASE ORAL at 14:28

## 2024-08-16 RX ADMIN — OXYCODONE HYDROCHLORIDE 5 MG: 5 TABLET ORAL at 21:33

## 2024-08-16 RX ADMIN — ENOXAPARIN SODIUM 40 MG: 40 INJECTION SUBCUTANEOUS at 14:30

## 2024-08-16 RX ADMIN — GABAPENTIN 300 MG: 300 CAPSULE ORAL at 21:20

## 2024-08-16 ASSESSMENT — ACTIVITIES OF DAILY LIVING (ADL)
ADLS_ACUITY_SCORE: 37
ADLS_ACUITY_SCORE: 29
ADLS_ACUITY_SCORE: 37
ADLS_ACUITY_SCORE: 27
ADLS_ACUITY_SCORE: 37

## 2024-08-16 ASSESSMENT — COLUMBIA-SUICIDE SEVERITY RATING SCALE - C-SSRS
2. HAVE YOU ACTUALLY HAD ANY THOUGHTS OF KILLING YOURSELF IN THE PAST MONTH?: NO
1. IN THE PAST MONTH, HAVE YOU WISHED YOU WERE DEAD OR WISHED YOU COULD GO TO SLEEP AND NOT WAKE UP?: NO
6. HAVE YOU EVER DONE ANYTHING, STARTED TO DO ANYTHING, OR PREPARED TO DO ANYTHING TO END YOUR LIFE?: NO

## 2024-08-16 NOTE — PROGRESS NOTES
Case cancelled by writer this AM. Patient presented as outpatient for I&D knee procedure.     Blood sugars as high as 638 on BMP panel. Hyponatremia 126, K 5.4. History of poorly controlled type 2 diabetes.     Patient asymptomatic. Vitals stable.    Discussed with Dr. Alexis STEWARD.     Plan to send to ED for rule out DKA and better medical management. Patient will then need medical admission for optimization for anesthesia and I&D procedure.    Given significant nature of his sugar, electrolyte abnormalities, and the resulting fluid imbalances most likely will need to be add on over the weekend.     Signout given to ED provider.     Maurice Cortes MD  Anesthesia Staff

## 2024-08-16 NOTE — ED PROVIDER NOTES
"ED Provider Note  [facility]      History     Chief Complaint   Patient presents with    Hyperglycemia     Pt was in pre-op here at the hospital. BS was 638, later dropped to 459. Pt gave self 8 U of insulin in the lobby before going into pre-op. A1c at 10.2 per pre-op nurse report, ANGUS Vallejo.     HPI  Long Mayfield is a 54 year old male who presents from preop.  Patient reports that he is a type II diabetic, he is insulin-dependent.  He reports that yesterday he had made the mistake of \"eating some peanut butter and jelly sandwiches\" and getting preoccupied and forgetting to check his blood sugar.  He notes he normally wears his glucometer.  He got to preop at 5 in the morning and checked his blood sugars noticed it was in the 600s and administered himself insulin.  By the time he was in preop his electrolytes were abnormal including the elevated sugars so they canceled his surgery and sent him into the ER for optimization.    Patient denies any other acute concerns right now including no belly pain, nausea vomiting.  No reported history of DKA.  He is currently undergoing surgery of his right knee for OR debridement and washout for concern for septic arthritis.  He endorses knee pain and swelling but no fevers chills nausea vomiting or systemic illness.  He endorses his usual chronic neck and shoulder pain but no other acute concerns today.  Reports he is otherwise feeling well.    Past Medical History  Past Medical History:   Diagnosis Date    Allergic rhinitis     Anemia     Arthritis     Bell's palsy     Cervicalgia     Diabetes (H)     Diabetic foot ulcer (H)     Generalized edema     Hyperkalemia     Hypertension     Hypo-osmolality and hyponatremia     Major depressive disorder, recurrent episode, mild (H24)     Other acute osteomyelitis, right ankle and foot (H)     Other chronic pancreatitis (H)     Other polyosteoarthritis     Solitary pulmonary nodule      Past Surgical " History:   Procedure Laterality Date    COLONOSCOPY N/A 05/07/2024    Procedure: Colonoscopy;  Surgeon: Nina Moya MD;  Location:  GI    ESOPHAGOSCOPY, GASTROSCOPY, DUODENOSCOPY (EGD), COMBINED N/A 05/06/2024    Procedure: Esophagoscopy, gastroscopy, duodenoscopy (EGD), combined;  Surgeon: Nina Moya MD;  Location:  GI    ESOPHAGOSCOPY, GASTROSCOPY, DUODENOSCOPY (EGD), COMBINED N/A 05/07/2024    Procedure: Esophagoscopy, gastroscopy, duodenoscopy (EGD), combined;  Surgeon: Nina Moya MD;  Location:  GI    IRRIGATION AND DEBRIDEMENT SPINE Right 7/11/2024    Procedure: Irrigation and debridement knee;  Surgeon: Dejon Espinoza MD;  Location: [facility]    ORTHOPEDIC SURGERY      partial amputation of right foot Right     PATELLA SURGERY       acetaminophen (TYLENOL) 325 MG tablet  amLODIPine (NORVASC) 5 MG tablet  aspirin 81 MG EC tablet  bumetanide (BUMEX) 1 MG tablet  cetirizine (ZYRTEC) 10 MG tablet  DULoxetine (CYMBALTA) 60 MG capsule  gabapentin (NEURONTIN) 100 MG capsule  Glucagon, rDNA, (GLUCAGON EMERGENCY) 1 MG KIT  hydrALAZINE (APRESOLINE) 25 MG tablet  insulin aspart (NOVOLOG FLEXPEN) 100 UNIT/ML pen  insulin aspart (NOVOLOG FLEXPEN) 100 UNIT/ML pen  insulin aspart (NOVOLOG PEN) 100 UNIT/ML pen  insulin glargine (LANTUS PEN) 100 UNIT/ML pen  lipase-protease-amylase (CREON 36) 63184-463232-815655 units CPEP  lipase-protease-amylase (CREON 36) 17770-895541-144514 units CPEP  loperamide (IMODIUM) 2 MG capsule  methocarbamol (ROBAXIN) 500 MG tablet  multivitamin w/minerals (THERA-VIT-M) tablet  oxyCODONE (ROXICODONE) 5 MG tablet  pantoprazole (PROTONIX) 40 MG EC tablet  Lidocaine (LIDOCARE) 4 % Patch      Allergies   Allergen Reactions    Vancomycin      Other Reaction(s): Renal Failure    Vancomycin-induced nephrotoxicity (11/2023, outside hospital)    Seasonal Allergies      HAYFEVER:    -Watery Eyes  -Eye burn    Daptomycin Rash     Likely delayed hypersensitivity  reaction to daptomycin 11/2023     Family History  No family history on file.  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    Physical Exam   BP: 139/71  Pulse: 85  Temp: 97.9  F (36.6  C)  Resp: 18  SpO2: 98 %  Physical Exam  General: awake, alert, NAD  Head: normal cephalic  HEENT: pupils equal, conjugate gaze intact  Neck: Supple  CV: regular rate and rhythm without murmur  Lungs: clear to auscultation  Abd: soft, non-tender, no guarding, no peritoneal signs  EXT: Patient's right knee has well-healed surgical incisions noted.  It is swollen, tender, no warmth or redness appreciated.  Neuro: awake, answers questions appropriately. No focal deficits noted       ED Course, Procedures, & Data      Procedures    Results for orders placed or performed during the hospital encounter of 08/16/24   Comprehensive metabolic panel     Status: Abnormal   Result Value Ref Range    Sodium 135 135 - 145 mmol/L    Potassium 3.5 3.4 - 5.3 mmol/L    Carbon Dioxide (CO2) 14 (L) 22 - 29 mmol/L    Anion Gap 11 7 - 15 mmol/L    Urea Nitrogen 24.5 (H) 6.0 - 20.0 mg/dL    Creatinine 0.97 0.67 - 1.17 mg/dL    GFR Estimate >90 >60 mL/min/1.73m2    Calcium 6.2 (L) 8.8 - 10.4 mg/dL    Chloride 110 (H) 98 - 107 mmol/L    Glucose 188 (H) 70 - 99 mg/dL    Alkaline Phosphatase 138 40 - 150 U/L    AST 15 0 - 45 U/L    ALT 7 0 - 70 U/L    Protein Total 6.8 6.4 - 8.3 g/dL    Albumin 2.7 (L) 3.5 - 5.2 g/dL    Bilirubin Total <0.2 <=1.2 mg/dL   UA with Microscopic reflex to Culture     Status: Abnormal    Specimen: Urine, Clean Catch   Result Value Ref Range    Color Urine Straw Colorless, Straw, Light Yellow,  Yellow    Appearance Urine Clear Clear    Glucose Urine >=1000 (A) Negative mg/dL    Bilirubin Urine Negative Negative    Ketones Urine Negative Negative mg/dL    Specific Gravity Urine 1.009 1.003 - 1.035    Blood Urine Negative Negative    pH Urine 5.5 5.0 - 7.0    Protein Albumin Urine 30 (A) Negative mg/dL    Urobilinogen Urine Normal Normal, 2.0 mg/dL    Nitrite Urine Negative Negative    Leukocyte Esterase Urine Negative Negative    RBC Urine 3 (H) <=2 /HPF    WBC Urine <1 <=5 /HPF    Squamous Epithelials Urine <1 <=1 /HPF    Narrative    Urine Culture not indicated   CBC with platelets differential     Status: None ()    Narrative    The following orders were created for panel order CBC with platelets differential.  Procedure                               Abnormality         Status                     ---------                               -----------         ------                     CBC with platelets and d...[[id number]]                                                   Please view results for these tests on the individual orders.   Results for orders placed or performed during the hospital encounter of 08/16/24   Basic metabolic panel     Status: Abnormal   Result Value Ref Range    Sodium 126 (L) 135 - 145 mmol/L    Potassium 5.4 (H) 3.4 - 5.3 mmol/L    Chloride 92 (L) 98 - 107 mmol/L    Carbon Dioxide (CO2) 21 (L) 22 - 29 mmol/L    Anion Gap 13 7 - 15 mmol/L    Urea Nitrogen 36.9 (H) 6.0 - 20.0 mg/dL    Creatinine 1.49 (H) 0.67 - 1.17 mg/dL    GFR Estimate 55 (L) >60 mL/min/1.73m2    Calcium 8.6 (L) 8.8 - 10.4 mg/dL    Glucose 638 (HH) 70 - 99 mg/dL   INR     Status: Normal   Result Value Ref Range    INR 1.14 0.85 - 1.15   Partial thromboplastin time     Status: Abnormal   Result Value Ref Range    aPTT 46 (H) 22 - 38 Seconds   CBC with platelets and differential     Status: Abnormal   Result Value Ref Range    WBC Count 6.9 4.0 - 11.0 10e3/uL    RBC Count 3.04 (L) 4.40 - 5.90 10e6/uL    Hemoglobin  7.4 (L) 13.3 - 17.7 g/dL    Hematocrit 24.7 (L) 40.0 - 53.0 %    MCV 81 78 - 100 fL    MCH 24.3 (L) 26.5 - 33.0 pg    MCHC 30.0 (L) 31.5 - 36.5 g/dL    RDW 17.4 (H) 10.0 - 15.0 %    Platelet Count 374 150 - 450 10e3/uL    % Neutrophils 55 %    % Lymphocytes 29 %    % Monocytes 10 %    % Eosinophils 5 %    % Basophils 1 %    % Immature Granulocytes 0 %    NRBCs per 100 WBC 0 <1 /100    Absolute Neutrophils 3.8 1.6 - 8.3 10e3/uL    Absolute Lymphocytes 2.0 0.8 - 5.3 10e3/uL    Absolute Monocytes 0.7 0.0 - 1.3 10e3/uL    Absolute Eosinophils 0.3 0.0 - 0.7 10e3/uL    Absolute Basophils 0.1 0.0 - 0.2 10e3/uL    Absolute Immature Granulocytes 0.0 <=0.4 10e3/uL    Absolute NRBCs 0.0 10e3/uL   Glucose by meter     Status: Abnormal   Result Value Ref Range    GLUCOSE BY METER POCT 558 (HH) 70 - 99 mg/dL   Glucose by meter     Status: Abnormal   Result Value Ref Range    GLUCOSE BY METER POCT 459 (H) 70 - 99 mg/dL   Glucose by meter     Status: Abnormal   Result Value Ref Range    GLUCOSE BY METER POCT 405 (H) 70 - 99 mg/dL   Glucose by meter     Status: Abnormal   Result Value Ref Range    GLUCOSE BY METER POCT 284 (H) 70 - 99 mg/dL   Adult Type and Screen     Status: None   Result Value Ref Range    ABO/RH(D) O POS     Antibody Screen Negative Negative    SPECIMEN EXPIRATION DATE [id number]    CBC with platelets differential     Status: Abnormal    Narrative    The following orders were created for panel order CBC with platelets differential.  Procedure                               Abnormality         Status                     ---------                               -----------         ------                     CBC with platelets and d...[[id number]]  Abnormal            Final result                 Please view results for these tests on the individual orders.   ABO/Rh type and screen     Status: None    Narrative    The following orders were created for panel order ABO/Rh type and screen.  Procedure                                Abnormality         Status                     ---------                               -----------         ------                     Adult Type and Screen[[id number]]                            Final result                 Please view results for these tests on the individual orders.     Medications   lidocaine 1 % 0.1-1 mL (has no administration in time range)   lidocaine (LMX4) cream (has no administration in time range)   sodium chloride (PF) 0.9% PF flush 3 mL (has no administration in time range)   sodium chloride (PF) 0.9% PF flush 3 mL (has no administration in time range)   senna-docusate (SENOKOT-S/PERICOLACE) 8.6-50 MG per tablet 1 tablet (has no administration in time range)     Or   senna-docusate (SENOKOT-S/PERICOLACE) 8.6-50 MG per tablet 2 tablet (has no administration in time range)   calcium carbonate (TUMS) chewable tablet 1,000 mg (has no administration in time range)   acetaminophen (TYLENOL) tablet 650 mg (has no administration in time range)   amLODIPine (NORVASC) tablet 5 mg (has no administration in time range)   aspirin EC tablet 81 mg ( Oral Automatically Held 8/19/24 0800)   bumetanide (BUMEX) tablet 1 mg ( Oral Automatically Held 8/19/24 0800)   DULoxetine (CYMBALTA) DR capsule 60 mg (has no administration in time range)   gabapentin (NEURONTIN) capsule 100 mg (has no administration in time range)   hydrALAZINE (APRESOLINE) tablet 25 mg (has no administration in time range)   insulin aspart (NovoLOG) injection (RAPID ACTING) (has no administration in time range)   insulin aspart (NovoLOG) injection (RAPID ACTING) ( Subcutaneous Automatically Held 8/19/24 1800)   oxyCODONE (ROXICODONE) tablet 5 mg (has no administration in time range)   multivitamin w/minerals (THERA-VIT-M) tablet 1 tablet (has no administration in time range)   lipase-protease-amylase (CREON 36) 39408-289939-246721 units per EC Capsule 3 capsule (has no administration in time range)   enoxaparin ANTICOAGULANT  (LOVENOX) injection 30 mg (has no administration in time range)   insulin glargine (LANTUS PEN) injection 13 Units ( Subcutaneous Automatically Held 8/20/24 0800)   insulin glargine (LANTUS PEN) injection 5 Units (has no administration in time range)   sodium chloride 0.9% BOLUS 1,000 mL (0 mLs Intravenous Stopped 8/16/24 0951)     Labs Ordered and Resulted from Time of ED Arrival to Time of ED Departure   COMPREHENSIVE METABOLIC PANEL - Abnormal       Result Value    Sodium 135      Potassium 3.5      Carbon Dioxide (CO2) 14 (*)     Anion Gap 11      Urea Nitrogen 24.5 (*)     Creatinine 0.97      GFR Estimate >90      Calcium 6.2 (*)     Chloride 110 (*)     Glucose 188 (*)     Alkaline Phosphatase 138      AST 15      ALT 7      Protein Total 6.8      Albumin 2.7 (*)     Bilirubin Total <0.2     ROUTINE UA WITH MICROSCOPIC REFLEX TO CULTURE - Abnormal    Color Urine Straw      Appearance Urine Clear      Glucose Urine >=1000 (*)     Bilirubin Urine Negative      Ketones Urine Negative      Specific Gravity Urine 1.009      Blood Urine Negative      pH Urine 5.5      Protein Albumin Urine 30 (*)     Urobilinogen Urine Normal      Nitrite Urine Negative      Leukocyte Esterase Urine Negative      RBC Urine 3 (*)     WBC Urine <1      Squamous Epithelials Urine <1     GLUCOSE MONITOR NURSING POCT   CBC WITH PLATELETS AND DIFFERENTIAL     No orders to display          Critical care was not performed.     Medical Decision Making  The patient's presentation was of moderate complexity (a chronic illness mild to moderate exacerbation, progression, or side effect of treatment).    The patient's evaluation involved:  review of external note(s) from 1 sources (see separate area of note for details)  review of 2 test result(s) ordered prior to this encounter (see separate area of note for details)  ordering and/or review of 2 test(s) in this encounter (see separate area of note for details)  discussion of management or test  interpretation with another health professional (sign off from the anesthesia staff and sign out to admitting service)    The patient's management necessitated high risk (a decision regarding hospitalization).    Assessment & Plan    Long Mayfield is a 54-year-old male who is presented to the emergency department after being found to have elevated sugars and preop.    On exam he has normal vital signs, no acute complaints.  Has been n.p.o. since midnight.  Notes that he neglected to check his sugar after eating peanut butter jelly sandwiches and did not notice his hyperglycemia till 5 AM at which point he corrected with insulin.  Point-of-care glucose upon arrival was 218.     I reviewed BMP that was drawn prior to arrival with hyponatremia (likely reflective of the hyper glycemia) minimally elevated potassium at 5.4 and minimally low CO2 at 21, but no elevated anion gap.  Will recheck.  Will also check urine to assess for ketones.  Would anticipate BMP is improved now that his point-of-care glucoses in the 200s.    Patient's recheck glucose was 188.  Had a low bicarb however no anion gap and negative urine ketones.  He is asymptomatic with no anion gap and negative urinary ketones so I do not think this is DKA.  Patient will be admitted to the medicine service for further management.  Surgical be postponed until tomorrow.      I have reviewed the nursing notes. I have reviewed the findings, diagnosis, plan and need for follow up with the patient.    New Prescriptions    No medications on file       Final diagnoses:   Hyperglycemia       Charles KINNEY [facility] EMERGENCY DEPARTMENT  8/16/2024     Charles Lange MD  08/16/24 0634

## 2024-08-16 NOTE — PHARMACY-ADMISSION MEDICATION HISTORY
Pharmacist Admission Medication History    Admission medication history is complete. The information provided in this note is only as accurate as the sources available at the time of the update.    Information Source(s): Facility (TCU/NH/) medication list/MAR via  fax.    Pertinent Information: Last doses were unable to be verified. Pt is a poor historian and was unable to recall any medications, encouraged writer to speak with [facility] in [place] ([phone]) where the pt resides.    Changes made to PTA medication list:  Added: insulin Aspart given with snacks, glucagon emergency kit.  Deleted: None  Changed: gabapentin dosing frequency, oxycodone dosing freqency (from q3h to q4h).    Allergies reviewed with patient and updates made in EHR:  attempted to assess, but pt is a poor historian.    Medication History Completed By: Sherry Castellon [facility] 8/16/2024 11:05 AM    PTA Med List   Medication Sig Last Dose    acetaminophen (TYLENOL) 325 MG tablet Take 650 mg by mouth every 6 hours as needed for pain Past Week    amLODIPine (NORVASC) 5 MG tablet Take 1 tablet (5 mg) by mouth daily Past Week    aspirin 81 MG EC tablet Take 81 mg by mouth daily Past Week    bumetanide (BUMEX) 1 MG tablet Take 1 tablet (1 mg) by mouth daily Dose decreased to 1 mg on discharge.  Optimize dose on PCP visit. Past Week    cetirizine (ZYRTEC) 10 MG tablet Take 10 mg by mouth daily Past Week    DULoxetine (CYMBALTA) 60 MG capsule Take 60 mg by mouth daily Past Week    gabapentin (NEURONTIN) 100 MG capsule Take 300 mg by mouth every 12 hours Past Week    hydrALAZINE (APRESOLINE) 25 MG tablet Take 25 mg by mouth 2 times daily Hold if SBP < 110 Past Week    insulin aspart (NOVOLOG FLEXPEN) 100 UNIT/ML pen Inject 8 Units subcutaneously 2 times daily (with meals) Breakfast and lunch Past Week    insulin aspart (NOVOLOG FLEXPEN) 100 UNIT/ML pen Inject 1-5 Units Subcutaneous 3 times daily (with meals) Inject as per sliding scale:  If 200-250  = 1   251-300 = 2  301-350 = 3  351-400 = 4  401+ = 5  Repeat BS after 3 hours and call MD if still over 400 Past Week    insulin aspart (NOVOLOG PEN) 100 UNIT/ML pen Inject 6 Units subcutaneously every morning Past Week    insulin glargine (LANTUS PEN) 100 UNIT/ML pen Inject 13 Units subcutaneously every morning Past Week    lipase-protease-amylase (CREON 36) 10911-534511-355928 units CPEP Take 3 capsules by mouth 3 times daily (with meals) Past Week    lipase-protease-amylase (CREON 36) 32044-479626-563498 units CPEP Take 2 capsules by mouth Take with snacks or supplements (evening snack) Past Week    loperamide (IMODIUM) 2 MG capsule Take 4 mg by mouth 3 times daily as needed for diarrhea Past Week    methocarbamol (ROBAXIN) 500 MG tablet Take 500 mg by mouth every 8 hours as needed for muscle spasms Past Week    multivitamin w/minerals (THERA-VIT-M) tablet Take 1 tablet by mouth daily Past Week    oxyCODONE (ROXICODONE) 5 MG tablet Take 1 tablet (5 mg) by mouth every 3 hours as needed for severe pain (Patient taking differently: Take 5 mg by mouth every 4 hours as needed for severe pain) Past Week    pantoprazole (PROTONIX) 40 MG EC tablet Take 1 tablet (40 mg) by mouth daily Past Week

## 2024-08-16 NOTE — ED NOTES
Bed: ED18  Expected date: 8/16/24  Expected time: 7:41 AM  Means of arrival:   Comments:  D.H from pre-op high BS

## 2024-08-16 NOTE — ED TRIAGE NOTES
Pt from TCU. Pt was down in pre-op for an I&D of the R knee. BS was 638 in pre-op. Came down to 459 due to pt giving self 8 units of novalog insulin at around 0530. All other meds were held this morning. Hx of hospital admision due to high blood sugar in July. Knee swollen and tender. 18g in L forearm. Report got from Pre-op nurse, ANGUS Vallejo.      Triage Assessment (Adult)       Row Name 08/16/24 0817          Triage Assessment    Airway WDL WDL        Respiratory WDL    Respiratory WDL WDL        Skin Circulation/Temperature WDL    Skin Circulation/Temperature WDL WDL        Cardiac WDL    Cardiac WDL WDL        Peripheral/Neurovascular WDL    Peripheral Neurovascular WDL WDL        Cognitive/Neuro/Behavioral WDL    Cognitive/Neuro/Behavioral WDL WDL

## 2024-08-16 NOTE — CONSULTS
Orthopaedic Surgery Consultation Note    Long Mayfield MRN# [id number]   Age: 54 year old YOB: 1970     Date of Admission:  8/16/2024    Reason for consult: Right knee septic arthritis       Requesting physician: Dejon Anders MD         Assessment and Plan:   Assessment:  Pt is a 54 year old male with a history of poorly managed diabetes and septic arthritis of the right knee s/p washout on 7/11/2024 by Dr. Espinoza.  Here for repeat I&D of the right knee however upon arrival to the preop the patient was noted to have blood sugar of 558 and was admitted to medicine for glycemic control.    Plan:  OR on 8/17/2024 for repeat I&D of the right knee    Medicine primary  Activity: Up with assist.  Weight bearing status: WBAT  Antibiotics: Not currently on antibiotics  Diet: N.p.o. at midnight tonight  DVT prophylaxis: Per primary  Bracing/Splinting: None  Elevation: Elevate RLE on pillows for edema control  Wound Care: N/A  Pain management: utilize all oral meds first, utilize IV meds for severe breakthrough pain after PO meds given adequate time to take effect  Imaging: Complete  Labs: Trend glucose  Cultures: IntraOp  Consults: IM, ID, PT/OT. Appreciate assistance in patient care  Disposition: TBD  Follow-up: TBD          History of Present Illness:   Patient was seen and examined by me. History, PMH, Meds, SH, complete ROS (10 organ systems) and PE reviewed with patient and prior medical records.      Pt is a 54 year old male with a history of poorly treated diabetes, osteomyelitis of the right foot's s/p TMA, electrolyte abnormalities, pancreatitis and chronic neck pain, here for repeat I&D of the right knee septic arthritis.  Patient states that he had previously been receiving injections at [facility] however right knee became edematous and extremely painful a few months ago.  He was seen by Dr. Espinoza for complaints of neck pain however during the visit it was clear that there was concern  for infection of the right knee.  I&D was completed at that time and the patient was placed on long-term antibiotics which were completed on 8/8/2024.  After completion of antibiotics CRP was rechecked 2 weeks later and was noted to be elevated again to 103.  Aspiration was completed and showed cell count of 94,560 with no growth on cultures.  Patient was scheduled for repeat I&D of the right knee this morning however his blood sugars were noted to be above 500 and the surgery was canceled.  Currently admitted to medicine for glycemic control with plan on surgery for the morning         Past Medical History:     Past Medical History:   Diagnosis Date    Allergic rhinitis     Anemia     Arthritis     Bell's palsy     Cervicalgia     Diabetes (H)     Diabetic foot ulcer (H)     Generalized edema     Hyperkalemia     Hypertension     Hypo-osmolality and hyponatremia     Major depressive disorder, recurrent episode, mild (H24)     Other acute osteomyelitis, right ankle and foot (H)     Other chronic pancreatitis (H)     Other polyosteoarthritis     Solitary pulmonary nodule              Past Surgical History:     Past Surgical History:   Procedure Laterality Date    COLONOSCOPY N/A 05/07/2024    Procedure: Colonoscopy;  Surgeon: Nina Moya MD;  Location:  GI    ESOPHAGOSCOPY, GASTROSCOPY, DUODENOSCOPY (EGD), COMBINED N/A 05/06/2024    Procedure: Esophagoscopy, gastroscopy, duodenoscopy (EGD), combined;  Surgeon: Nina oMya MD;  Location:  GI    ESOPHAGOSCOPY, GASTROSCOPY, DUODENOSCOPY (EGD), COMBINED N/A 05/07/2024    Procedure: Esophagoscopy, gastroscopy, duodenoscopy (EGD), combined;  Surgeon: Nina Moya MD;  Location:  GI    IRRIGATION AND DEBRIDEMENT SPINE Right 7/11/2024    Procedure: Irrigation and debridement knee;  Surgeon: Dejon Espinoza MD;  Location:  OR    ORTHOPEDIC SURGERY      partial amputation of right foot Right     PATELLA SURGERY                Social History:     Social History     Socioeconomic History    Marital status: Single   Tobacco Use    Smoking status: Never    Smokeless tobacco: Never   Vaping Use    Vaping status: Never Used   Substance and Sexual Activity    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    Drug use: No     Social Determinants of Health     Financial Resource Strain: Patient Declined (12/27/2023)    Received from [facility]    Overall Financial Resource Strain (CARDIA)     Difficulty of Paying Living Expenses: Patient declined   Food Insecurity: Patient Declined (12/27/2023)    Received from [facility]    Hunger Vital Sign     Worried About Running Out of Food in the Last Year: Patient declined     Ran Out of Food in the Last Year: Patient declined   Transportation Needs: No Transportation Needs (12/27/2023)    Received from [facility]    PRAPARE - Transportation     Lack of Transportation (Medical): No     Lack of Transportation (Non-Medical): No    Received from [facility]    Social Connections   Interpersonal Safety: Not At Risk (12/27/2023)    Received from [facility]    Humiliation, Afraid, Rape, and Kick questionnaire     Fear of Current or Ex-Partner: No     Emotionally Abused: No     Physically Abused: No     Sexually Abused: No   Housing Stability: High Risk (12/27/2023)    Received from [facility]    Housing Stability     What is your housing situation today?: 1 - I do not have housing (I am staying with others, in a hotel, in a shelter, living outside on ...             Family History:   No family history on file.           Medications:     Current Facility-Administered Medications   Medication Dose Route Frequency Provider Last Rate Last Admin    acetaminophen (TYLENOL) tablet 650 mg   650 mg Oral Q6H PRN Dejon Anders MD        amLODIPine (NORVASC) tablet 5 mg  5 mg Oral Daily Dejon Anders MD        [Held by provider] aspirin EC tablet 81 mg  81 mg Oral Daily Dejon Anders MD        [Held by provider] bumetanide (BUMEX) tablet 1 mg  1 mg Oral Daily Dejon Anders MD        calcium carbonate (TUMS) chewable tablet 1,000 mg  1,000 mg Oral 4x Daily PRN Dejon Anders MD        DULoxetine (CYMBALTA) DR capsule 60 mg  60 mg Oral Daily Dejon Anders MD        enoxaparin ANTICOAGULANT (LOVENOX) injection 30 mg  30 mg Subcutaneous Q24H Dejon Anders MD        gabapentin (NEURONTIN) capsule 100 mg  100 mg Oral TID Dejon Anders MD        hydrALAZINE (APRESOLINE) tablet 25 mg  25 mg Oral BID Dejon Anders MD        insulin aspart (NovoLOG) injection (RAPID ACTING)  1-5 Units Subcutaneous TID w/meals Dejon Anders MD        [Held by provider] insulin aspart (NovoLOG) injection (RAPID ACTING)  8 Units Subcutaneous TID w/meals Dejon Anders MD        [Held by provider] insulin glargine (LANTUS PEN) injection 13 Units  13 Units Subcutaneous QAM Dejon Anders MD        insulin glargine (LANTUS PEN) injection 5 Units  5 Units Subcutaneous Once Dejon Anders MD        lidocaine (LMX4) cream   Topical Q1H PRN Dejon Anders MD        lidocaine 1 % 0.1-1 mL  0.1-1 mL Other Q1H PRN Dejon Anders MD        [START ON 8/17/2024] lipase-protease-amylase (CREON 36) [id number] units per EC Capsule 3 capsule  3 capsule Oral TID w/meals Dejon Anders MD        multivitamin w/minerals (THERA-VIT-M) tablet 1 tablet  1 tablet Oral Daily Dejon Anders MD        oxyCODONE (ROXICODONE) tablet 5 mg  5 mg Oral Q3H PRN Dejon Anders MD        senna-docusate (SENOKOT-S/PERICOLACE) 8.6-50 MG per tablet 1 tablet  1 tablet Oral BID Dejon Gil MD        Or    lamont  (SENOKOT-S/PERICOLACE) 8.6-50 MG per tablet 2 tablet  2 tablet Oral BID PRN Dejon Anders MD        sodium chloride (PF) 0.9% PF flush 3 mL  3 mL Intracatheter Q8H Dejon Anders MD        sodium chloride (PF) 0.9% PF flush 3 mL  3 mL Intracatheter q1 min prn Dejon Anders MD         Current Outpatient Medications   Medication Sig Dispense Refill    acetaminophen (TYLENOL) 325 MG tablet Take 650 mg by mouth every 6 hours as needed for pain      amLODIPine (NORVASC) 5 MG tablet Take 1 tablet (5 mg) by mouth daily 30 tablet 0    aspirin 81 MG EC tablet Take 81 mg by mouth daily      bumetanide (BUMEX) 1 MG tablet Take 1 tablet (1 mg) by mouth daily Dose decreased to 1 mg on discharge.  Optimize dose on PCP visit. 30 tablet 0    cetirizine (ZYRTEC) 10 MG tablet Take 10 mg by mouth daily      DULoxetine (CYMBALTA) 60 MG capsule Take 60 mg by mouth daily      gabapentin (NEURONTIN) 100 MG capsule Take 300 mg by mouth every 12 hours      Glucagon, rDNA, (GLUCAGON EMERGENCY) 1 MG KIT Inject 1 mg into the muscle daily as needed (hypoglycemia)      hydrALAZINE (APRESOLINE) 25 MG tablet Take 25 mg by mouth 2 times daily Hold if SBP < 110      insulin aspart (NOVOLOG FLEXPEN) 100 UNIT/ML pen Inject 8 Units subcutaneously 2 times daily (with meals) Breakfast and lunch      insulin aspart (NOVOLOG FLEXPEN) 100 UNIT/ML pen Inject 1-5 Units Subcutaneous 3 times daily (with meals) Inject as per sliding scale:  If 200-250 = 1   251-300 = 2  301-350 = 3  351-400 = 4  401+ = 5  Repeat BS after 3 hours and call MD if still over 400      insulin aspart (NOVOLOG PEN) 100 UNIT/ML pen Inject 6 Units subcutaneously every morning      insulin glargine (LANTUS PEN) 100 UNIT/ML pen Inject 13 Units subcutaneously every morning      lipase-protease-amylase (CREON 36) [id number] units CPEP Take 3 capsules by mouth 3 times daily (with meals)      lipase-protease-amylase (CREON 36) [id number] units CPEP Take 2  capsules by mouth Take with snacks or supplements (evening snack)      loperamide (IMODIUM) 2 MG capsule Take 4 mg by mouth 3 times daily as needed for diarrhea      methocarbamol (ROBAXIN) 500 MG tablet Take 500 mg by mouth every 8 hours as needed for muscle spasms      multivitamin w/minerals (THERA-VIT-M) tablet Take 1 tablet by mouth daily 30 tablet 0    oxyCODONE (ROXICODONE) 5 MG tablet Take 1 tablet (5 mg) by mouth every 3 hours as needed for severe pain (Patient taking differently: Take 5 mg by mouth every 4 hours as needed for severe pain) 30 tablet 0    pantoprazole (PROTONIX) 40 MG EC tablet Take 1 tablet (40 mg) by mouth daily 30 tablet 0    Lidocaine (LIDOCARE) 4 % Patch Place 2 patches onto the skin every 24 hours Apply to neck/back. To prevent lidocaine toxicity, patient should be patch free for 12 hrs daily. (Patient not taking: Reported on 8/16/2024) 15 patch 0             Allergies:      Allergies   Allergen Reactions    Vancomycin      Other Reaction(s): Renal Failure    Vancomycin-induced nephrotoxicity (11/2023, outside hospital)    Seasonal Allergies      HAYFEVER:    -Watery Eyes  -Eye burn    Daptomycin Rash     Likely delayed hypersensitivity reaction to daptomycin 11/2023            Review of Systems:   A comprehensive 10 point review of systems (constitutional, ENT, cardiac, peripheral vascular, respiratory, GI, , Musculoskeletal, skin, Neurological) was performed and found to be negative except as described in this note.           Physical Exam:   COMPLETE EXAMINATION:   VITAL SIGNS: /71   Pulse 85   Temp 97.9  F (36.6  C) (Oral)   Resp 18   SpO2 98%   GENERAL:  No acute distress, calm and cooperative   RESP: Non labored breathing  MSK:  Right Upper Extremity  - No gross deformity. Skin intact  - All compartments soft and compressible  - Fires deltoid, biceps, triceps, wrist extension/flexion, EPL, FPL, FDS/FDP/EDC all fingers, IO  - SILT to axillary, median, radial, and ulnar  nerve distributions  - 2+ radial pulse, fingers warm and well perfused    Left Upper Extremity  - No gross deformity. Skin intact  - All compartments soft and compressible  - Fires deltoid, biceps, triceps, wrist extension/flexion, EPL, FPL, FDS/FDP/EDC all fingers, IO  - SILT to median, radial, and ulnar nerve distributions. Chronic decrease in sensation to the axillary nerve distribution  - 2+ radial pulse, fingers warm and well perfused    Right Lower Extremity  - Skin intact  - Prior TMA of right foot is well healed. DP/PT pulses are palpable at 2+ and foot is WWP  - All compartments soft and compressible  - No pain with log roll or axial loading.  - Fires quad, TA, gastroc/soleus. Able to SLR  - sensation is intact to the LE proximal to prior TMA  -Right knee has significant edema which extends both proximally and distally to the knee.  There is no warmth or erythema of the knee.  -Right knee ROM: -20 degrees extension to 90 degrees flexion with minimal pain    Left Lower Extremity  - No gross deformity. Skin intact  - All compartments soft and compressible  - No pain with log roll or axial loading.  - Fires quad, TA, gastroc/soleus, EHL, FHL, wiggles toes. Able to SLR  - SILT to superficial peroneal, deep peroneal, saphenous, sural, and tibial nerve distributions  - 2+ DP and PT, foot warm and well perfused            Data:   All pertinent laboratory data reviewed  Recent Labs   Lab Test 08/16/24  0610 08/14/24  1253 07/31/24  1550 07/12/24  0716 07/10/24  1157   HGB 7.4* 7.4* 8.1*   < > 8.3*   SED  --  105*  --   --  112*   WBC 6.9 8.0 9.4   < > 10.4    < > = values in this interval not displayed.     Recent Labs   Lab Test 08/14/24  1338 07/10/24  1136 05/29/24  1000   FNEU 100 93 95   FCOL Yellow Yellow Yellow   FAPR Turbid* Turbid* Cloudy*   FWBC 94,560 65,670 109,100       Imaging: N/A    This consultation has been discussed with Dr. Lugo. Orthopedic staff for this patient is Dr. Alban Potts  MD Keny  Orthopedic Surgery Resident- PGY1

## 2024-08-16 NOTE — LETTER
Recipient:  Kimberly at [facility]          Sender:  JEWELL Jeffrey  [phone]          Date: August 19, 2024  Patient Name:  Long Mayfield  Patient YOB: 1970  Routing Message:  Discharge orders for D. H. (Also sent via [employer])        The documents accompanying this notice contain confidential information belonging to the sender.  This information is intended only for the use of the individual or entity named above.  The authorized recipient of this information is prohibited from disclosing this information to any other party and is required to destroy the information after its stated need has been fulfilled, unless otherwise required by state law.    If you are not the intended recipient, you are hereby notified that any disclosure, copy, distribution or action taken in reliance on the contents of these documents is strictly prohibited.  If you have received this document in error, please return it by fax to [phone] with a note on the cover sheet explaining why you are returning it (e.g. not your patient, not your provider, etc.).  If you need further assistance, please call [facility] Centralized Transcription at [phone].  Documents may also be returned by mail to [employer], , [address], [place], [place] .

## 2024-08-16 NOTE — OR NURSING
Patient's blood sugar is 558 this morning here in pre-op. Patient states that he had 2 peanut butter sandwiches last night before 2100 and thinks he forgot to take his insulin. He stated that he gave himself 8 units of insulin in the waiting room before coming here to pre-op. Anesthesiologist in charge, Dr. Barrientos, called and messaged- awaiting response.Will recheck blood sugar.

## 2024-08-16 NOTE — H&P
[facility]    History and Physical - Hospitalist Service, GOLD TEAM 21       Date of Admission:  8/16/2024    Assessment & Plan      Long Mayfield is a 54 year old male admitted on 8/16/2024. He has a history of hypertension, diabetes mellitus type II, dependent on insulin, s/p R TMA    Presented this morning to preop for right knee washout by orthopedics  Found to have glucose of 638 and was sent to ED for mgmt  Also with SALVADOR  Admitted to medicine    #hyperglycemia  #DM2 insulin dependent   -resolved with 8 units of aspart in ED  -continue PTA insulin dosing:   -lantus 13 units qam, including today when eating   -while here, aspart 6 units with each meal, plus an additional sliding scale with meals as follows:  201-250: 1  251-300: 2  301-350: 3  351-400: 4  401+: 5  -continue PTA cymbalta     #SALVADOR  -resolved in ED with IVF  -now oral fluids     #right knee infection  -NPO after midnight for ortho washout, now scheduled for 8/17 am  -lantus 5 units tomorrow while NPO with no meal coverage when not eating  -no medical contraindication to proceed with surgery at this time    #hypertension  -continue PTA amlodipine 5 and PTA hydralazine 25 bid  -holding PTA aspirin, restart as per orthopedics           Diet: NPO per Anesthesia Guidelines for Procedure/Surgery Except for: Meds  NPO per Anesthesia Guidelines for Procedure/Surgery Except for: Meds    DVT Prophylaxis: Enoxaparin (Lovenox) SQ  Osman Catheter: Not present  Lines: None     Cardiac Monitoring: None  Code Status: Full Code      Clinically Significant Risk Factors Present on Admission        # Hyperkalemia: Highest K = 5.4 mmol/L in last 2 days, will monitor as appropriate  # Hyponatremia: Lowest Na = 126 mmol/L in last 2 days, will monitor as appropriate      # Hypoalbuminemia: Lowest albumin = 2.7 g/dL at 8/16/2024  9:11 AM, will monitor as appropriate   # Drug Induced Platelet Defect: home medication list  includes an antiplatelet medication   # Hypertension: Noted on problem list    # Anemia: based on hgb <11      # DMII: A1C = N/A within past 6 months       # Financial/Environmental Concerns:                 Disposition Plan     Medically Ready for Discharge:            Dejon Anders MD  Hospitalist Service, GOLD TEAM [address] [place] [address]  Securely message with [employer] (more info)  Text page via [facility] Paging/Directory   See signed in provider for up to date coverage information    ______________________________________________________________________    Chief Complaint   Hyperglycemia     History is obtained from the patient    History of Present Illness   Long Mayfield is a 54 year old male who presented to preop this morning for a planned washout in the OR with orthopedics.   Upon arrival, glucose found to be >600, and was sent to the ED for management.   Pt gave himself aspart 8 subcutaneous from his own supply. Glucose improved to 200s.   No chest pain  No dyspnea   No fevers  No urinary complaints   No lightheadedness  No other complaints   Washout with ortho was postponed to tomorrow morning and was admitted to medicine.       Past Medical History    Past Medical History:   Diagnosis Date    Allergic rhinitis     Anemia     Arthritis     Bell's palsy     Cervicalgia     Diabetes (H)     Diabetic foot ulcer (H)     Generalized edema     Hyperkalemia     Hypertension     Hypo-osmolality and hyponatremia     Major depressive disorder, recurrent episode, mild (H24)     Other acute osteomyelitis, right ankle and foot (H)     Other chronic pancreatitis (H)     Other polyosteoarthritis     Solitary pulmonary nodule        Past Surgical History   Past Surgical History:   Procedure Laterality Date    COLONOSCOPY N/A 05/07/2024    Procedure: Colonoscopy;  Surgeon: Nina Moya MD;  Location:  GI    ESOPHAGOSCOPY, GASTROSCOPY, DUODENOSCOPY (EGD), COMBINED N/A  05/06/2024    Procedure: Esophagoscopy, gastroscopy, duodenoscopy (EGD), combined;  Surgeon: Nina Moya MD;  Location:  GI    ESOPHAGOSCOPY, GASTROSCOPY, DUODENOSCOPY (EGD), COMBINED N/A 05/07/2024    Procedure: Esophagoscopy, gastroscopy, duodenoscopy (EGD), combined;  Surgeon: Nina Moya MD;  Location:  GI    IRRIGATION AND DEBRIDEMENT SPINE Right 7/11/2024    Procedure: Irrigation and debridement knee;  Surgeon: Dejon Espinoza MD;  Location: [facility] OR    ORTHOPEDIC SURGERY      partial amputation of right foot Right     PATELLA SURGERY         Prior to Admission Medications   Prior to Admission Medications   Prescriptions Last Dose Informant Patient Reported? Taking?   DULoxetine (CYMBALTA) 60 MG capsule Past Week Nursing Home Yes Yes   Sig: Take 60 mg by mouth daily   Glucagon, rDNA, (GLUCAGON EMERGENCY) 1 MG KIT Unknown  Yes Yes   Sig: Inject 1 mg into the muscle daily as needed (hypoglycemia)   Lidocaine (LIDOCARE) 4 % Patch Not Taking Nursing Home No No   Sig: Place 2 patches onto the skin every 24 hours Apply to neck/back. To prevent lidocaine toxicity, patient should be patch free for 12 hrs daily.   Patient not taking: Reported on 8/16/2024   acetaminophen (TYLENOL) 325 MG tablet Past Week Nursing Home Yes Yes   Sig: Take 650 mg by mouth every 6 hours as needed for pain   amLODIPine (NORVASC) 5 MG tablet Past Week Nursing Home No Yes   Sig: Take 1 tablet (5 mg) by mouth daily   aspirin 81 MG EC tablet Past Week Nursing Home Yes Yes   Sig: Take 81 mg by mouth daily   bumetanide (BUMEX) 1 MG tablet Past Week Nursing Home No Yes   Sig: Take 1 tablet (1 mg) by mouth daily Dose decreased to 1 mg on discharge.  Optimize dose on PCP visit.   cetirizine (ZYRTEC) 10 MG tablet Past Week Nursing Home Yes Yes   Sig: Take 10 mg by mouth daily   gabapentin (NEURONTIN) 100 MG capsule Past Week Nursing Home Yes Yes   Sig: Take 300 mg by mouth every 12 hours   hydrALAZINE (APRESOLINE) 25 MG  tablet Past Week Nursing Home Yes Yes   Sig: Take 25 mg by mouth 2 times daily Hold if SBP < 110   insulin aspart (NOVOLOG FLEXPEN) 100 UNIT/ML pen Past Week Nursing Home Yes Yes   Sig: Inject 1-5 Units Subcutaneous 3 times daily (with meals) Inject as per sliding scale:  If 200-250 = 1   251-300 = 2  301-350 = 3  351-400 = 4  401+ = 5  Repeat BS after 3 hours and call MD if still over 400   insulin aspart (NOVOLOG FLEXPEN) 100 UNIT/ML pen Past Week Nursing Home Yes Yes   Sig: Inject 8 Units subcutaneously 2 times daily (with meals) Breakfast and lunch   insulin aspart (NOVOLOG PEN) 100 UNIT/ML pen Past Week  Yes Yes   Sig: Inject 6 Units subcutaneously every morning   insulin glargine (LANTUS PEN) 100 UNIT/ML pen Past Week Nursing Home Yes Yes   Sig: Inject 13 Units subcutaneously every morning   lipase-protease-amylase (CREON 36) [id number] units CPEP Past Week Nursing Home Yes Yes   Sig: Take 3 capsules by mouth 3 times daily (with meals)   lipase-protease-amylase (CREON 36) [id number] units CPEP Past Week Nursing Home Yes Yes   Sig: Take 2 capsules by mouth Take with snacks or supplements (evening snack)   loperamide (IMODIUM) 2 MG capsule Past Week Nursing Home Yes Yes   Sig: Take 4 mg by mouth 3 times daily as needed for diarrhea   methocarbamol (ROBAXIN) 500 MG tablet Past Week Nursing Home Yes Yes   Sig: Take 500 mg by mouth every 8 hours as needed for muscle spasms   multivitamin w/minerals (THERA-VIT-M) tablet Past Week Nursing Home No Yes   Sig: Take 1 tablet by mouth daily   oxyCODONE (ROXICODONE) 5 MG tablet Past Week  No Yes   Sig: Take 1 tablet (5 mg) by mouth every 3 hours as needed for severe pain   Patient taking differently: Take 5 mg by mouth every 4 hours as needed for severe pain   pantoprazole (PROTONIX) 40 MG EC tablet Past Week Nursing Home No Yes   Sig: Take 1 tablet (40 mg) by mouth daily      Facility-Administered Medications: None        Review of Systems    The 10  point Review of Systems is negative other than noted in the HPI.       Physical Exam   Vital Signs: Temp: 97.9  F (36.6  C) Temp src: Oral BP: 139/71 Pulse: 85   Resp: 18 SpO2: 98 %      Weight: 0 lbs 0 oz    General Appearance: alert and oriented to person, place and time  HEENT: clear sclera, EOMI   Respiratory: clear to auscultation bilaterally   Cardiovascular: regular rate and rhythm   GI: soft nontender nondistended no rebound tenderness   Genitourinary: no suprapubic tenderness   Skin: no rash  Musculoskeletal: right TMA. Right knee swollen and warm   Neurologic: ambulatory   Psychiatric: normal affect     Medical Decision Making       60 MINUTES SPENT BY ME on the date of service doing chart review, history, exam, documentation & further activities per the note.      Data

## 2024-08-16 NOTE — OR NURSING
Addended by: JARAD DE LA GARZA on: 8/9/2023 04:09 PM     Modules accepted: Orders     Patient transferring to the emergency department, until a bed on medicine floor opens up, to decrease his blood sugars before his I&D Handoff given to ANGUS Fontana. All belongings sent with him.

## 2024-08-17 ENCOUNTER — ANESTHESIA (OUTPATIENT)
Dept: SURGERY | Facility: CLINIC | Age: 54
End: 2024-08-17
Payer: COMMERCIAL

## 2024-08-17 ENCOUNTER — ANESTHESIA EVENT (OUTPATIENT)
Dept: SURGERY | Facility: CLINIC | Age: 54
End: 2024-08-17
Payer: COMMERCIAL

## 2024-08-17 LAB
ANION GAP SERPL CALCULATED.3IONS-SCNC: 8 MMOL/L (ref 7–15)
BLD PROD TYP BPU: NORMAL
BLOOD COMPONENT TYPE: NORMAL
BUN SERPL-MCNC: 25.7 MG/DL (ref 6–20)
CALCIUM SERPL-MCNC: 8.3 MG/DL (ref 8.8–10.4)
CHLORIDE SERPL-SCNC: 105 MMOL/L (ref 98–107)
CODING SYSTEM: NORMAL
CREAT SERPL-MCNC: 1.14 MG/DL (ref 0.67–1.17)
CROSSMATCH: NORMAL
CRP SERPL-MCNC: 38.98 MG/L
EGFRCR SERPLBLD CKD-EPI 2021: 75 ML/MIN/1.73M2
ERYTHROCYTE [DISTWIDTH] IN BLOOD BY AUTOMATED COUNT: 17.2 % (ref 10–15)
GLUCOSE BLDC GLUCOMTR-MCNC: 101 MG/DL (ref 70–99)
GLUCOSE BLDC GLUCOMTR-MCNC: 109 MG/DL (ref 70–99)
GLUCOSE BLDC GLUCOMTR-MCNC: 200 MG/DL (ref 70–99)
GLUCOSE BLDC GLUCOMTR-MCNC: 237 MG/DL (ref 70–99)
GLUCOSE BLDC GLUCOMTR-MCNC: 285 MG/DL (ref 70–99)
GLUCOSE BLDC GLUCOMTR-MCNC: 319 MG/DL (ref 70–99)
GLUCOSE BLDC GLUCOMTR-MCNC: 342 MG/DL (ref 70–99)
GLUCOSE BLDC GLUCOMTR-MCNC: 78 MG/DL (ref 70–99)
GLUCOSE BLDC GLUCOMTR-MCNC: 80 MG/DL (ref 70–99)
GLUCOSE SERPL-MCNC: 140 MG/DL (ref 70–99)
HBA1C MFR BLD: 9.2 %
HCO3 SERPL-SCNC: 22 MMOL/L (ref 22–29)
HCT VFR BLD AUTO: 22.9 % (ref 40–53)
HGB BLD-MCNC: 7 G/DL (ref 13.3–17.7)
MCH RBC QN AUTO: 24.4 PG (ref 26.5–33)
MCHC RBC AUTO-ENTMCNC: 30.6 G/DL (ref 31.5–36.5)
MCV RBC AUTO: 80 FL (ref 78–100)
PLATELET # BLD AUTO: 337 10E3/UL (ref 150–450)
POTASSIUM SERPL-SCNC: 5 MMOL/L (ref 3.4–5.3)
RBC # BLD AUTO: 2.87 10E6/UL (ref 4.4–5.9)
SODIUM SERPL-SCNC: 135 MMOL/L (ref 135–145)
UNIT ABO/RH: NORMAL
UNIT NUMBER: NORMAL
UNIT STATUS: NORMAL
UNIT TYPE ISBT: 5100
WBC # BLD AUTO: 5.7 10E3/UL (ref 4–11)

## 2024-08-17 PROCEDURE — 360N000076 HC SURGERY LEVEL 3, PER MIN: Performed by: ORTHOPAEDIC SURGERY

## 2024-08-17 PROCEDURE — 87801 DETECT AGNT MULT DNA AMPLI: CPT | Performed by: STUDENT IN AN ORGANIZED HEALTH CARE EDUCATION/TRAINING PROGRAM

## 2024-08-17 PROCEDURE — 27310 EXPLORATION OF KNEE JOINT: CPT | Performed by: NURSE ANESTHETIST, CERTIFIED REGISTERED

## 2024-08-17 PROCEDURE — 83036 HEMOGLOBIN GLYCOSYLATED A1C: CPT | Performed by: INTERNAL MEDICINE

## 2024-08-17 PROCEDURE — 258N000003 HC RX IP 258 OP 636: Performed by: NURSE ANESTHETIST, CERTIFIED REGISTERED

## 2024-08-17 PROCEDURE — 250N000013 HC RX MED GY IP 250 OP 250 PS 637

## 2024-08-17 PROCEDURE — 86140 C-REACTIVE PROTEIN: CPT | Performed by: STUDENT IN AN ORGANIZED HEALTH CARE EDUCATION/TRAINING PROGRAM

## 2024-08-17 PROCEDURE — 85027 COMPLETE CBC AUTOMATED: CPT | Performed by: INTERNAL MEDICINE

## 2024-08-17 PROCEDURE — 250N000013 HC RX MED GY IP 250 OP 250 PS 637: Performed by: INTERNAL MEDICINE

## 2024-08-17 PROCEDURE — 272N000001 HC OR GENERAL SUPPLY STERILE: Performed by: ORTHOPAEDIC SURGERY

## 2024-08-17 PROCEDURE — 370N000017 HC ANESTHESIA TECHNICAL FEE, PER MIN: Performed by: ORTHOPAEDIC SURGERY

## 2024-08-17 PROCEDURE — 36415 COLL VENOUS BLD VENIPUNCTURE: CPT | Performed by: INTERNAL MEDICINE

## 2024-08-17 PROCEDURE — 87070 CULTURE OTHR SPECIMN AEROBIC: CPT | Performed by: ORTHOPAEDIC SURGERY

## 2024-08-17 PROCEDURE — 0MDN4ZZ EXTRACTION OF RIGHT KNEE BURSA AND LIGAMENT, PERCUTANEOUS ENDOSCOPIC APPROACH: ICD-10-PCS | Performed by: ORTHOPAEDIC SURGERY

## 2024-08-17 PROCEDURE — 710N000010 HC RECOVERY PHASE 1, LEVEL 2, PER MIN: Performed by: ORTHOPAEDIC SURGERY

## 2024-08-17 PROCEDURE — 87075 CULTR BACTERIA EXCEPT BLOOD: CPT | Performed by: ORTHOPAEDIC SURGERY

## 2024-08-17 PROCEDURE — 99255 IP/OBS CONSLTJ NEW/EST HI 80: CPT | Performed by: STUDENT IN AN ORGANIZED HEALTH CARE EDUCATION/TRAINING PROGRAM

## 2024-08-17 PROCEDURE — 258N000001 HC RX 258: Performed by: ORTHOPAEDIC SURGERY

## 2024-08-17 PROCEDURE — 87102 FUNGUS ISOLATION CULTURE: CPT | Performed by: ORTHOPAEDIC SURGERY

## 2024-08-17 PROCEDURE — 250N000025 HC SEVOFLURANE, PER MIN: Performed by: ORTHOPAEDIC SURGERY

## 2024-08-17 PROCEDURE — 82374 ASSAY BLOOD CARBON DIOXIDE: CPT | Performed by: INTERNAL MEDICINE

## 2024-08-17 PROCEDURE — 250N000011 HC RX IP 250 OP 636: Performed by: NURSE ANESTHETIST, CERTIFIED REGISTERED

## 2024-08-17 PROCEDURE — 120N000002 HC R&B MED SURG/OB UMMC

## 2024-08-17 PROCEDURE — 27310 EXPLORATION OF KNEE JOINT: CPT | Performed by: ANESTHESIOLOGY

## 2024-08-17 PROCEDURE — 999N000141 HC STATISTIC PRE-PROCEDURE NURSING ASSESSMENT: Performed by: ORTHOPAEDIC SURGERY

## 2024-08-17 PROCEDURE — 250N000012 HC RX MED GY IP 250 OP 636 PS 637

## 2024-08-17 PROCEDURE — 250N000009 HC RX 250: Performed by: NURSE ANESTHETIST, CERTIFIED REGISTERED

## 2024-08-17 PROCEDURE — 250N000011 HC RX IP 250 OP 636

## 2024-08-17 RX ORDER — HYDROMORPHONE HYDROCHLORIDE 1 MG/ML
0.4 INJECTION, SOLUTION INTRAMUSCULAR; INTRAVENOUS; SUBCUTANEOUS EVERY 5 MIN PRN
Status: DISCONTINUED | OUTPATIENT
Start: 2024-08-17 | End: 2024-08-17 | Stop reason: HOSPADM

## 2024-08-17 RX ORDER — NALOXONE HYDROCHLORIDE 0.4 MG/ML
0.1 INJECTION, SOLUTION INTRAMUSCULAR; INTRAVENOUS; SUBCUTANEOUS
Status: DISCONTINUED | OUTPATIENT
Start: 2024-08-17 | End: 2024-08-17 | Stop reason: HOSPADM

## 2024-08-17 RX ORDER — PROPOFOL 10 MG/ML
INJECTION, EMULSION INTRAVENOUS PRN
Status: DISCONTINUED | OUTPATIENT
Start: 2024-08-17 | End: 2024-08-17

## 2024-08-17 RX ORDER — LABETALOL HYDROCHLORIDE 5 MG/ML
10 INJECTION, SOLUTION INTRAVENOUS
Status: DISCONTINUED | OUTPATIENT
Start: 2024-08-17 | End: 2024-08-17 | Stop reason: HOSPADM

## 2024-08-17 RX ORDER — ONDANSETRON 2 MG/ML
4 INJECTION INTRAMUSCULAR; INTRAVENOUS EVERY 30 MIN PRN
Status: DISCONTINUED | OUTPATIENT
Start: 2024-08-17 | End: 2024-08-17 | Stop reason: HOSPADM

## 2024-08-17 RX ORDER — FENTANYL CITRATE 50 UG/ML
25 INJECTION, SOLUTION INTRAMUSCULAR; INTRAVENOUS EVERY 5 MIN PRN
Status: DISCONTINUED | OUTPATIENT
Start: 2024-08-17 | End: 2024-08-17 | Stop reason: HOSPADM

## 2024-08-17 RX ORDER — HYDROMORPHONE HYDROCHLORIDE 1 MG/ML
0.2 INJECTION, SOLUTION INTRAMUSCULAR; INTRAVENOUS; SUBCUTANEOUS EVERY 5 MIN PRN
Status: DISCONTINUED | OUTPATIENT
Start: 2024-08-17 | End: 2024-08-17 | Stop reason: HOSPADM

## 2024-08-17 RX ORDER — CEFTRIAXONE 2 G/1
2 INJECTION, POWDER, FOR SOLUTION INTRAMUSCULAR; INTRAVENOUS EVERY 24 HOURS
Status: DISCONTINUED | OUTPATIENT
Start: 2024-08-17 | End: 2024-08-19 | Stop reason: HOSPADM

## 2024-08-17 RX ORDER — FENTANYL CITRATE 50 UG/ML
INJECTION, SOLUTION INTRAMUSCULAR; INTRAVENOUS PRN
Status: DISCONTINUED | OUTPATIENT
Start: 2024-08-17 | End: 2024-08-17

## 2024-08-17 RX ORDER — DEXAMETHASONE SODIUM PHOSPHATE 4 MG/ML
4 INJECTION, SOLUTION INTRA-ARTICULAR; INTRALESIONAL; INTRAMUSCULAR; INTRAVENOUS; SOFT TISSUE
Status: DISCONTINUED | OUTPATIENT
Start: 2024-08-17 | End: 2024-08-17 | Stop reason: HOSPADM

## 2024-08-17 RX ORDER — LIDOCAINE HYDROCHLORIDE 20 MG/ML
INJECTION, SOLUTION INFILTRATION; PERINEURAL PRN
Status: DISCONTINUED | OUTPATIENT
Start: 2024-08-17 | End: 2024-08-17

## 2024-08-17 RX ORDER — SODIUM CHLORIDE, SODIUM LACTATE, POTASSIUM CHLORIDE, CALCIUM CHLORIDE 600; 310; 30; 20 MG/100ML; MG/100ML; MG/100ML; MG/100ML
INJECTION, SOLUTION INTRAVENOUS CONTINUOUS PRN
Status: DISCONTINUED | OUTPATIENT
Start: 2024-08-17 | End: 2024-08-17

## 2024-08-17 RX ORDER — MEPERIDINE HYDROCHLORIDE 25 MG/ML
12.5 INJECTION INTRAMUSCULAR; INTRAVENOUS; SUBCUTANEOUS EVERY 5 MIN PRN
Status: DISCONTINUED | OUTPATIENT
Start: 2024-08-17 | End: 2024-08-17 | Stop reason: HOSPADM

## 2024-08-17 RX ORDER — CEFAZOLIN SODIUM/WATER 2 G/20 ML
SYRINGE (ML) INTRAVENOUS PRN
Status: DISCONTINUED | OUTPATIENT
Start: 2024-08-17 | End: 2024-08-17

## 2024-08-17 RX ORDER — ONDANSETRON 2 MG/ML
INJECTION INTRAMUSCULAR; INTRAVENOUS PRN
Status: DISCONTINUED | OUTPATIENT
Start: 2024-08-17 | End: 2024-08-17

## 2024-08-17 RX ORDER — SODIUM CHLORIDE, SODIUM LACTATE, POTASSIUM CHLORIDE, CALCIUM CHLORIDE 600; 310; 30; 20 MG/100ML; MG/100ML; MG/100ML; MG/100ML
INJECTION, SOLUTION INTRAVENOUS CONTINUOUS
Status: DISCONTINUED | OUTPATIENT
Start: 2024-08-17 | End: 2024-08-17 | Stop reason: HOSPADM

## 2024-08-17 RX ORDER — FENTANYL CITRATE 50 UG/ML
50 INJECTION, SOLUTION INTRAMUSCULAR; INTRAVENOUS EVERY 5 MIN PRN
Status: DISCONTINUED | OUTPATIENT
Start: 2024-08-17 | End: 2024-08-17 | Stop reason: HOSPADM

## 2024-08-17 RX ORDER — ONDANSETRON 4 MG/1
4 TABLET, ORALLY DISINTEGRATING ORAL EVERY 30 MIN PRN
Status: DISCONTINUED | OUTPATIENT
Start: 2024-08-17 | End: 2024-08-17 | Stop reason: HOSPADM

## 2024-08-17 RX ORDER — TRANEXAMIC ACID 650 MG/1
1950 TABLET ORAL ONCE
Status: COMPLETED | OUTPATIENT
Start: 2024-08-17 | End: 2024-08-17

## 2024-08-17 RX ADMIN — PANCRELIPASE 3 CAPSULE: 36000; 180000; 114000 CAPSULE, DELAYED RELEASE PELLETS ORAL at 19:15

## 2024-08-17 RX ADMIN — SODIUM CHLORIDE, POTASSIUM CHLORIDE, SODIUM LACTATE AND CALCIUM CHLORIDE: 600; 310; 30; 20 INJECTION, SOLUTION INTRAVENOUS at 09:05

## 2024-08-17 RX ADMIN — MIDAZOLAM 2 MG: 1 INJECTION INTRAMUSCULAR; INTRAVENOUS at 09:15

## 2024-08-17 RX ADMIN — Medication 50 MG: at 09:19

## 2024-08-17 RX ADMIN — OXYCODONE HYDROCHLORIDE 5 MG: 5 TABLET ORAL at 17:28

## 2024-08-17 RX ADMIN — OXYCODONE HYDROCHLORIDE 5 MG: 5 TABLET ORAL at 08:00

## 2024-08-17 RX ADMIN — PROPOFOL 150 MG: 10 INJECTION, EMULSION INTRAVENOUS at 09:18

## 2024-08-17 RX ADMIN — TRANEXAMIC ACID 1950 MG: 650 TABLET ORAL at 08:09

## 2024-08-17 RX ADMIN — LIDOCAINE HYDROCHLORIDE 100 MG: 20 INJECTION, SOLUTION INFILTRATION; PERINEURAL at 09:18

## 2024-08-17 RX ADMIN — HYDRALAZINE HYDROCHLORIDE 25 MG: 25 TABLET ORAL at 21:54

## 2024-08-17 RX ADMIN — INSULIN ASPART 3 UNITS: 100 INJECTION, SOLUTION INTRAVENOUS; SUBCUTANEOUS at 17:33

## 2024-08-17 RX ADMIN — FENTANYL CITRATE 50 MCG: 50 INJECTION INTRAMUSCULAR; INTRAVENOUS at 09:18

## 2024-08-17 RX ADMIN — PANTOPRAZOLE SODIUM 40 MG: 40 TABLET, DELAYED RELEASE ORAL at 08:00

## 2024-08-17 RX ADMIN — PHENYLEPHRINE HYDROCHLORIDE 50 MCG: 10 INJECTION INTRAVENOUS at 09:52

## 2024-08-17 RX ADMIN — GABAPENTIN 300 MG: 300 CAPSULE ORAL at 21:54

## 2024-08-17 RX ADMIN — ONDANSETRON 4 MG: 2 INJECTION INTRAMUSCULAR; INTRAVENOUS at 10:09

## 2024-08-17 RX ADMIN — SUGAMMADEX 200 MG: 100 INJECTION, SOLUTION INTRAVENOUS at 10:16

## 2024-08-17 RX ADMIN — OXYCODONE HYDROCHLORIDE 5 MG: 5 TABLET ORAL at 04:07

## 2024-08-17 RX ADMIN — GABAPENTIN 300 MG: 300 CAPSULE ORAL at 07:59

## 2024-08-17 RX ADMIN — DULOXETINE HYDROCHLORIDE 60 MG: 60 CAPSULE, DELAYED RELEASE ORAL at 08:00

## 2024-08-17 RX ADMIN — Medication 2 G: at 09:18

## 2024-08-17 RX ADMIN — OXYCODONE HYDROCHLORIDE 5 MG: 5 TABLET ORAL at 22:54

## 2024-08-17 RX ADMIN — CEFTRIAXONE SODIUM 2 G: 2 INJECTION, POWDER, FOR SOLUTION INTRAMUSCULAR; INTRAVENOUS at 12:44

## 2024-08-17 RX ADMIN — OXYCODONE HYDROCHLORIDE 5 MG: 5 TABLET ORAL at 00:33

## 2024-08-17 ASSESSMENT — ACTIVITIES OF DAILY LIVING (ADL)
ADLS_ACUITY_SCORE: 27
ADLS_ACUITY_SCORE: 27
ADLS_ACUITY_SCORE: 28
ADLS_ACUITY_SCORE: 27
ADLS_ACUITY_SCORE: 25
ADLS_ACUITY_SCORE: 28
ADLS_ACUITY_SCORE: 28
ADLS_ACUITY_SCORE: 27
ADLS_ACUITY_SCORE: 28
ADLS_ACUITY_SCORE: 27
ADLS_ACUITY_SCORE: 27
ADLS_ACUITY_SCORE: 28
ADLS_ACUITY_SCORE: 27
ADLS_ACUITY_SCORE: 28
ADLS_ACUITY_SCORE: 28

## 2024-08-17 ASSESSMENT — LIFESTYLE VARIABLES: TOBACCO_USE: 0

## 2024-08-17 ASSESSMENT — COPD QUESTIONNAIRES: COPD: 0

## 2024-08-17 NOTE — PROGRESS NOTES
[facility]    Medicine Progress Note - Hospitalist Service, GOLD TEAM 21    Date of Admission:  8/16/2024    Assessment & Plan      Long Mayfield is a 54 year old male admitted on 8/16/2024. He has a history of hypertension, diabetes mellitus type II, dependent on insulin, s/p R TMA    Presented this morning to preop for right knee washout by orthopedics  Found to have glucose of 638 and was sent to ED for mgmt  Also with SALVADOR, improved  Admitted to medicine 8/16  S/p right knee I&D 8/17 by ortho  Seen by ID, input much appreciated. Cultures sent. On ceftriaxone.     #hyperglycemia  #DM2 insulin dependent   -resolved with 8 units of aspart in ED  -continue PTA insulin dosing when eating:   -lantus 13 units daily. Received 8/16 at 3pm. Can resume later today when eating    -while here, aspart 6 units with each meal, plus an additional sliding scale with meals as follows:  201-250: 1  251-300: 2  301-350: 3  351-400: 4  401+: 5  -continue PTA cymbalta     #SALVADOR  -resolved in ED with IVF  -now oral fluids     #septic arthritis of right knee  S/p I&D in OR 8/17  No systemic signs of sepsis  Monitor temperature   Seen by ID. Cultures sent. On ceftriaxone. Trending CRP  Hold lovenox dvt ppx today, can resume in am 8/18    #hypertension  -continue PTA amlodipine 5 and PTA hydralazine 25 bid  -holding PTA aspirin, restart as per orthopedics           Diet: NPO per Anesthesia Guidelines for Procedure/Surgery Except for: Meds    DVT Prophylaxis: Enoxaparin (Lovenox) SQ  Osman Catheter: Not present  Lines: None     Cardiac Monitoring: None  Code Status: Full Code      Clinically Significant Risk Factors        # Hyperkalemia: Highest K = 5.4 mmol/L in last 2 days, will monitor as appropriate  # Hyponatremia: Lowest Na = 126 mmol/L in last 2 days, will monitor as appropriate      # Hypoalbuminemia: Lowest albumin = 2.7 g/dL at 8/16/2024  9:11 AM, will monitor as appropriate     #  Hypertension: Noted on problem list          # DMII: A1C = 9.2 % (Ref range: <5.7 %) within past 6 months       # Financial/Environmental Concerns:                 Disposition Plan     Medically Ready for Discharge: Anticipated Tomorrow             Dejon Anders MD  Hospitalist Service, GOLD TEAM [address] [place] [address]  Securely message with [employer] (more info)  Text page via [employer] Paging/Directory   See signed in provider for up to date coverage information  ______________________________________________________________________    Interval History   To OR today for right knee I&D    Physical Exam   Vital Signs: Temp: 98.1  F (36.7  C) Temp src: Axillary BP: 139/75 Pulse: 66   Resp: 14 SpO2: 100 % O2 Device: None (Room air) Oxygen Delivery: 4 LPM  Weight: 130 lbs 8.2 oz        Medical Decision Making             Data

## 2024-08-17 NOTE — ED TRIAGE NOTES
Triage Assessment (Adult)       Row Name 08/16/24 0817          Triage Assessment    Airway WDL WDL        Respiratory WDL    Respiratory WDL WDL        Skin Circulation/Temperature WDL    Skin Circulation/Temperature WDL WDL        Cardiac WDL    Cardiac WDL WDL        Peripheral/Neurovascular WDL    Peripheral Neurovascular WDL WDL        Cognitive/Neuro/Behavioral WDL    Cognitive/Neuro/Behavioral WDL WDL

## 2024-08-17 NOTE — PLAN OF CARE
5 ORTHO Admission Note    Reason for admission: R knee I&D   Primary team notified of pt arrival - yes, Xcover Dr. Alexia Martinez informed via text page.  Admitted from:  ED  Via: cart  Accompanied by: self  Belongings: remains with pt, and documented in flowsheet; home med sent to central pharm.  Admission Required Doc Completed: Yes  Teaching: Orientation to unit and call light- call light within reach, use of console, meal times, when to call for the RN, and enforced importance of safety.  IV Access: yes, SL  Telemetry: No  Ht./Wt.: Completed  Code Status verified on armband: Yes  2 RN Skin Assessment Completed with: Radha GRECO.  Suction/Ambu bag/Flowmeter at bedside: Yes    Pt status:  R knee, neck + shoulder pain managed with Oxy Q3H. Up ad amina, cane at bedside. NPO since midnight. 2AM . First surgical scrub completed. Checklist started. OR today @ 0900 for R knee I&D.      Temp:  [97.8  F (36.6  C)-98.2  F (36.8  C)] 98.2  F (36.8  C)  Pulse:  [81-90] 81  Resp:  [16-18] 18  BP: (126-166)/() 126/75  SpO2:  [97 %-99 %] 99 %        Goal Outcome Evaluation:  Plan of Care Reviewed With: patient  Overall Patient Progress: no change    For vital signs and complete assessments, please see documentation flowsheets.

## 2024-08-17 NOTE — ANESTHESIA PREPROCEDURE EVALUATION
Anesthesia Pre-Procedure Evaluation    Patient: Long Mayfield   MRN: [id number] : 1970        Procedure : Procedure(s):  Arthroscopic  or possible open IRRIGATION AND DEBRIDEMENT, Right KNEE,          Past Medical History:   Diagnosis Date     Allergic rhinitis      Anemia      Arthritis      Bell's palsy      Cervicalgia      Diabetes (H)      Diabetic foot ulcer (H)      Generalized edema      Hyperkalemia      Hypertension      Hypo-osmolality and hyponatremia      Major depressive disorder, recurrent episode, mild (H24)      Other acute osteomyelitis, right ankle and foot (H)      Other chronic pancreatitis (H)      Other polyosteoarthritis      Solitary pulmonary nodule       Past Surgical History:   Procedure Laterality Date     COLONOSCOPY N/A 2024    Procedure: Colonoscopy;  Surgeon: Nina Moya MD;  Location:  GI     ESOPHAGOSCOPY, GASTROSCOPY, DUODENOSCOPY (EGD), COMBINED N/A 2024    Procedure: Esophagoscopy, gastroscopy, duodenoscopy (EGD), combined;  Surgeon: Nina Moya MD;  Location:  GI     ESOPHAGOSCOPY, GASTROSCOPY, DUODENOSCOPY (EGD), COMBINED N/A 2024    Procedure: Esophagoscopy, gastroscopy, duodenoscopy (EGD), combined;  Surgeon: Nina Moya MD;  Location:  GI     IRRIGATION AND DEBRIDEMENT SPINE Right 2024    Procedure: Irrigation and debridement knee;  Surgeon: Dejon Espinoza MD;  Location: [facility] OR     ORTHOPEDIC SURGERY       partial amputation of right foot Right      PATELLA SURGERY        Allergies   Allergen Reactions     Vancomycin      Other Reaction(s): Renal Failure    Vancomycin-induced nephrotoxicity (2023, outside hospital)     Seasonal Allergies      HAYFEVER:    -Watery Eyes  -Eye burn     Daptomycin Rash     Likely delayed hypersensitivity reaction to daptomycin 2023      Social History     Tobacco Use     Smoking status: Never     Smokeless tobacco: Never   Substance Use Topics     Alcohol  use: Not Currently     Comment: sober 1 year      Wt Readings from Last 1 Encounters:   08/16/24 59.2 kg (130 lb 8.2 oz)        Anesthesia Evaluation   Pt has had prior anesthetic. Type: General and MAC.    No history of anesthetic complications       ROS/MED HX  ENT/Pulmonary: Comment: LITTLE risk noted due to nighttime desaturations    (+) sleep apnea,                                    (-) tobacco use, asthma, COPD, LITTLE risk factors and recent URI   Neurologic:    (-) no CVA and no TIA   Cardiovascular:     (+) Dyslipidemia hypertension- -   -  - -                                 Previous cardiac testing   Echo: Date: 5/5/24 Results:  1. Normal biventricular size and function. Left ventricular ejection fraction  of 60-65%.  2. No segmental wall motion abnormalities noted.  3. No hemodynamically significant valvular disease.  No prior study for comparison. Technically adequate study.    Stress Test:  Date: Results:    ECG Reviewed:  Date: Results:    Cath:  Date: Results:   (-) pacemaker, stent, pacemaker and ICD   METS/Exercise Tolerance: 3 - Able to walk 1-2 blocks without stopping    Hematologic: Comments: Patient has had anemia since at least 5/24. Unknown reason for being so low. He was transfused prior to one of his recent procedures which put him around 8. He has been hovering around 7.4. he is 7 this morning, no SOB  symptoms. Says he has been more fatigued at home.    (+)      anemia,          Musculoskeletal: Comment: Osteomyelitis R ankle and foot  (+)  arthritis,             GI/Hepatic: Comment: Gastritis  Pancreatic insufficiency    (+) GERD,                (-) liver disease   Renal/Genitourinary:     (+) renal disease, type: CRI,            Endo:     (+)  type II DM,   Using insulin,  Normal glucose range: 200's,               Psychiatric/Substance Use:     (+) psychiatric history depression alcohol abuse H/O chronic opiod use .     Infectious Disease:  - neg infectious disease ROS     Malignancy:  -  neg malignancy ROS     Other:      (+)  , H/O Chronic Pain,         Physical Exam    Airway        Mallampati: II   TM distance: > 3 FB   Neck ROM: full   Mouth opening: > 3 cm    Respiratory Devices and Support         Dental     Comment: All of his bottom teeth are rotten and decaying. None are loose    (+) Multiple visibly decayed, broken teeth and Removable bridges or other hardware      Cardiovascular   cardiovascular exam normal          Pulmonary   pulmonary exam normal            OUTSIDE LABS:  CBC:   Lab Results   Component Value Date    WBC 5.7 08/17/2024    WBC 7.7 08/16/2024    HGB 7.0 (L) 08/17/2024    HGB 7.9 (L) 08/16/2024    HCT 22.9 (L) 08/17/2024    HCT 25.8 (L) 08/16/2024     08/17/2024     08/16/2024     BMP:   Lab Results   Component Value Date     08/17/2024     08/16/2024    POTASSIUM 5.0 08/17/2024    POTASSIUM 3.5 08/16/2024    CHLORIDE 105 08/17/2024    CHLORIDE 110 (H) 08/16/2024    CO2 22 08/17/2024    CO2 14 (L) 08/16/2024    BUN 25.7 (H) 08/17/2024    BUN 24.5 (H) 08/16/2024    CR 1.14 08/17/2024    CR 0.97 08/16/2024     (H) 08/17/2024     (H) 08/17/2024     COAGS:   Lab Results   Component Value Date    PTT 46 (H) 08/16/2024    INR 1.14 08/16/2024     POC:   Lab Results   Component Value Date     (H) 10/08/2017     HEPATIC:   Lab Results   Component Value Date    ALBUMIN 2.7 (L) 08/16/2024    PROTTOTAL 6.8 08/16/2024    ALT 7 08/16/2024    AST 15 08/16/2024    ALKPHOS 138 08/16/2024    BILITOTAL <0.2 08/16/2024     OTHER:   Lab Results   Component Value Date    PH 7.42 08/07/2022    LACT 3.4 (H) 08/07/2022    A1C 9.2 (H) 08/17/2024    AROLDO 8.3 (L) 08/17/2024    PHOS 2.7 05/06/2024    MAG 2.3 07/13/2024    LIPASE 6 (L) 01/23/2023    TSH 1.80 03/09/2022     (H) 08/14/2024       Anesthesia Plan    ASA Status:  3    NPO Status:  NPO Appropriate    Anesthesia Type: General.     - Airway: ETT   Induction: Intravenous.   Maintenance:  Inhalation.   Techniques and Equipment:     - Airway: Video-Laryngoscope       - Blood: T&C (I unit ordered to be ready on call to OR)     Consents    Anesthesia Plan(s) and associated risks, benefits, and realistic alternatives discussed. Questions answered and patient/representative(s) expressed understanding.     - Discussed:     - Discussed with:  Patient      - Extended Intubation/Ventilatory Support Discussed: No.      - Patient is DNR/DNI Status: No     Use of blood products discussed: Yes.     - Discussed with: Patient.     - Consented: consented to blood products     Postoperative Care    Pain management: IV analgesics, Oral pain medications.   PONV prophylaxis: Ondansetron (or other 5HT-3), Promethazine or metoclopramide     Comments:    Other Comments: We will avoid decadron due to his diabetes. Patient understands risks and benefits of GA as well as tranfusion of PRBC. We discussed transfusing if needed during surgery.           Amanda Kidd MD    I have reviewed the pertinent notes and labs in the chart from the past 30 days and (re)examined the patient.  Any updates or changes from those notes are reflected in this note.    # Hyperkalemia: Highest K = 5.4 mmol/L in last 2 days, will monitor as appropriate  # Hyponatremia: Lowest Na = 126 mmol/L in last 2 days, will monitor as appropriate      # Hypoalbuminemia: Lowest albumin = 2.7 g/dL at 8/16/2024  9:11 AM, will monitor as appropriate    # Drug Induced Platelet Defect: home medication list includes an antiplatelet medication  # DMII: A1C = 9.2 % (Ref range: <5.7 %) within past 6 months

## 2024-08-17 NOTE — PROGRESS NOTES
PACU to Inpatient Nursing Handoff    Patient Long Mayfield is a 54 year old male who speaks English.   Procedure Procedure(s):  Arthroscopic  IRRIGATION AND DEBRIDEMENT, Right KNEE,   Surgeon(s) Primary: Jeff Phoenix MD  Resident - Assisting: Delroy Bustillo MD; Dario Moctezuma MD     Allergies   Allergen Reactions    Vancomycin      Other Reaction(s): Renal Failure    Vancomycin-induced nephrotoxicity (11/2023, outside hospital)    Seasonal Allergies      HAYFEVER:    -Watery Eyes  -Eye burn    Daptomycin Rash     Likely delayed hypersensitivity reaction to daptomycin 11/2023       Isolation  Contact     Past Medical History   has a past medical history of Allergic rhinitis, Anemia, Arthritis, Bell's palsy, Cervicalgia, Diabetes (H), Diabetic foot ulcer (H), Generalized edema, Hyperkalemia, Hypertension, Hypo-osmolality and hyponatremia, Major depressive disorder, recurrent episode, mild (H24), Other acute osteomyelitis, right ankle and foot (H), Other chronic pancreatitis (H), Other polyosteoarthritis, and Solitary pulmonary nodule.    Anesthesia Choice   Dermatome Level     Preop Meds 1950 txa - time given: 0809  5 mg oxycodone at 0800  300 mg gabapentin at 0759   Nerve block Not applicable   Intraop Meds lidocaine 2% (mg)   Total dose: 100 mg  Date/Time Rate/Dose/Volume Action Route Admin User Audit    08/17/24 0918 100 mg Given Other Kimi Serrano APRN CRNA     propofol 10 mg/mL (mg)   Total dose: 150 mg  Date/Time Rate/Dose/Volume Action Route Admin User Audit    08/17/24 0918 150 mg Given Intravenous Kimi Serrano APRN CRNA     fentaNYL 50 mcg/mL (mcg)   Total dose: 50 mcg  Date/Time Rate/Dose/Volume Action Route Admin User Audit    08/17/24 0918 50 mcg Given Intravenous Kimi Serrano APRN CRNA     rocuronium 10 mg/mL (mg)   Total dose: 50 mg  Date/Time Rate/Dose/Volume Action Route Admin User Audit    08/17/24 0919 50 mg Given Intravenous Kimi Serrano APRN CRNA      ceFAZolin (ANCEF) IV solution 2g/20 mL syringe (g)   Total dose: 2 g  Date/Time Rate/Dose/Volume Action Route Admin User Audit    08/17/24 0918 2 g Given Intravenous Kimi Serrano APRN CRNA     midazolam 1 mg/mL (mg)   Total dose: 2 mg  Date/Time Rate/Dose/Volume Action Route Admin User Audit    08/17/24 0915 2 mg Given Intravenous Kimi Serrano APRN CRNA     phenylephrine (KATIE-SYNEPHRINE) injection (mcg)   Total dose: 50 mcg  Date/Time Rate/Dose/Volume Action Route Admin User Audit    08/17/24 0952 50 mcg New Bag Intravenous Kimi Serrano APRN CRNA     ondansetron 2 mg/mL (mg)   Total dose: 4 mg  Date/Time Rate/Dose/Volume Action Route Admin User Audit    08/17/24 1009 4 mg Given Intravenous Jacki Valente APRN CRNA     sugammadex (BRIDION) 200mg/2mL (mg)   Total dose: 200 mg  Date/Time Rate/Dose/Volume Action Route Admin User Audit    08/17/24 1016 200 mg Given Intravenous Jacki Valente APRN CRNA     LR (mL)   Total volume: 0 mL  Date/Time Rate/Dose/Volume Action Route Admin User Audit    08/17/24 0905  New Bag Intravenous Kimi Serrano APRN CRNA        Local Meds No   Antibiotics cefazolin (Ancef) - last given at 0918       Pain Patient Currently in Pain: yes   PACU meds  Not applicable   PCA / epidural No   Capnography     Telemetry     Inpatient Telemetry Monitor Ordered? No        Labs Glucose Lab Results   Component Value Date     08/17/2024     08/17/2024     08/06/2022     10/08/2017       Hgb Lab Results   Component Value Date    HGB 7.0 08/17/2024    HGB 12.1 10/08/2017       INR Lab Results   Component Value Date    INR 1.14 08/16/2024      PACU Imaging Not applicable     Wound/Incision Incision/Surgical Site 08/17/24 Anterior;Right Knee (Active)   Number of days: 0      CMS        Equipment Not applicable   Other LDA ETT Cuffed 7.5 mm (Active)   Number of days: 0        IV Access Peripheral IV 08/17/24 Anterior;Right Upper forearm (Active)    Site Assessment WDL 08/17/24 0800   Line Status Saline locked 08/17/24 0800   Dressing Transparent 08/17/24 0800   Dressing Status clean;dry;intact 08/17/24 0800   Phlebitis Scale 0-->no symptoms 08/17/24 0800   Infiltration? no 08/17/24 0800   Number of days: 0      Blood Products Not applicable EBL 10 mL   Intake/Output    Drains / Osman     Time of void PreOp Time of Void Prior to Procedure: 0819 (08/17/24 0818)    PostOp Urine Occurrence: 1 (08/17/24 0905)    Diapered? No   Bladder Scan     PO    tolerating sips and water     Vitals    B/P: 139/75  T: 98.1  F (36.7  C)    Temp src: Oral  P:  Pulse: 66 (08/17/24 1100)          R: 16  O2:  SpO2: 100 %    O2 Device: None (Room air) (08/17/24 1100)              Family/support present No one present   Patient belongings     Patient transported on bed   DC meds/scripts (obs/outpt) Not applicable   Inpatient Pain Meds Released? No       Special needs/considerations Glucose 78 at 1044. Pt drank one apple juice, chocolate pudding, and apple sauce. Recheck glucose is 80 at 1124. Per [facility] okay to go upstairs   Tasks needing completion None       Jackie Thomas RN

## 2024-08-17 NOTE — ANESTHESIA POSTPROCEDURE EVALUATION
Patient: Long Mayfield    Procedure: Procedure(s):  Arthroscopic  IRRIGATION AND DEBRIDEMENT, Right KNEE,       Anesthesia Type:  General    Note:  Disposition: Inpatient   Postop Pain Control: Uneventful            Sign Out: Well controlled pain   PONV: No   Neuro/Psych: Uneventful            Sign Out: Acceptable/Baseline neuro status   Airway/Respiratory: Uneventful            Sign Out: Acceptable/Baseline resp. status   CV/Hemodynamics: Uneventful            Sign Out: Acceptable CV status; No obvious hypovolemia; No obvious fluid overload   Other NRE: NONE   DID A NON-ROUTINE EVENT OCCUR? No       Last vitals:  Vitals Value Taken Time   /82 08/17/24 1116   Temp 36  C (96.8  F) 08/17/24 1115   Pulse 67 08/17/24 1125   Resp 16 08/17/24 1125   SpO2 99 % 08/17/24 1125   Vitals shown include unfiled device data.    Electronically Signed By: Amanda Kidd MD  August 17, 2024  11:26 AM

## 2024-08-17 NOTE — CONSULTS
"  [facility] ID SERVICE : NEW CONSULTATION  Patient:  Long Mayfield, Date of birth 1970, Medical record number [id number]  Date of Admission: 8/16/2024  Date of Visit:  8/17/2024  Requesting Provider: Dejon Anders         Assessment and Recommendations:     ID Problem List:  Right knee septic arthritis s/p I&D 8/17/2024 8/17/2024 intra-op cultures, in-process  8/14/2024 synovial fluid aspirate: analysis yellow, turbid, TNC 96304, 100% neutrophils; culture NGTD    Hx of culture-negative right knee arthritis, unclear etiology, previous work up negative  S/p iv ceftriaxone (7/12 - 8/8, 2024)  7/11/2024 s/p I&D, intra-op cultures negative; 16S negative.   7/10/2024 Synovial fluid analysis TNC 30581, 93% neutrophils; cultures negative    Elevated CRP  Type 2 DM (A1c 9.2% on 8/17/2024)  Hx of diabetic foot infection, leading to MSSA bacteremia s/p right TMA (11/2023 - 01/[place] at [facility] [place]  Peripheral vascular disease  Hx of alcohol use disorder  Chronic diarrhea, thought 2/2 chronic pancreatitis  Hx of pancreatitis    Discussion:  53 yo M with right knee septic arthritis, previous negative work up s/p I&D 7/11/2024, negative cultures, received empiric iv ceftriaxone 4 weeks through 8/8/2024. However, persistent swelling, pain. Outpatient Orthopedic evaluation 8/14, s/p arthrocentesis, which consistent with infection, though culture remained negative so far. Now admitted and underwent another I&D. OP findings \"Significant synovitis throughout the knee. There was a large knee effusion. There was murky and cloudy fluid that was expressed from the joint upon making an arthrotomy\". Multiple intra-op cultures are obtained. Writer spoke to Microbiology lab regarding molecular testing, if no growth by day 3. Would check MRSA nares. Continue iv ceftriaxone, further recommendations to follow based on intra-op cultures.     Recommendations:  Continue iv ceftriaxone 2g daily  MRSA nares (ordered)  Follow up " intra-op cultures, and synovial fluid aspirate 8/14 (NGTD).   Writer spoke to Microbiology lab regarding add-on 16S and 28S to intra-op cultures from 8/17/2024, if were to remained negative by day 3.   Trend CRP q72h while inpatient    Recommendations discussed with primary team.    Thank you for this consult. ID team will continue to follow this patient. Please reach out if any questions or concerns.     I am covering this weekend and Dr. Kory Mendenhall will assume [facility] ID service from Monday 8/19/2024 onwards.     Total time spent during this encounter (chart review, documentation, MDM, coordination of care): 80 minutes     Selene Mccurdy MD  Infectious Diseases Staff Physician  Allyson haylee   Date: 8/17/2024       History of Present Illness:     Long Mayfield is a 53 yo M with PMHx of uncontrolled DM (A1c 9.2% on 8/17/2024), chronic pancreatitis and likely diarrhea 2/2 it, hx of diabetic foot infection with MSSA bacteremia s/p right TMA (at [facility] [place] [id number]), PAD, osteoarthritis of right knee s/p injections at [facility] since April 2024. Reportedly, found swollen knee in May, which was aspirated, cell count of 76k, and a week after 109k with cultures negative. It seems that patient was not made aware of it. He then presented at [facility] in July, 2024 with progressively worsening right knee swelling, and pain. Arthrocentesis was suggestive of infection, underwent I&D 7/11/2024. Cultures remained negative including 16S PCR. Seen by inpatient ID team at that time. Additional work up obtained including Lyme, Gonococcal/Chlamydia, Whipple's, HIV, HBV, HCV - all negative. He received empiric iv ceftriaxone about 4 weeks through 8/8, with improvement in CRP. Per patient, there was no improvement in swelling rather worsening with ambulation. He has been off of antibiotics since then. Seen in Orthopedic clinic on 8/14. Arthrocentesis suggestive of infection, once again culture remained negative. Admitted and  underwent another I&D 8/17. Intra-op cultures are obtained. Started on iv ceftriaxone. ID consult is requested for antibiotic management.          Review of Systems:     Complete 12 point review of systems negative except as noted in HPI.          Antimicrobial history:     Current:  Ceftriaxone 8/17 - present  Cefazolin (luis daniel-op)    Prior:  Ceftriaxone 7/12 - 8/8       Past Medical History:     Past Medical History:   Diagnosis Date    Allergic rhinitis     Anemia     Arthritis     Bell's palsy     Cervicalgia     Diabetes (H)     Diabetic foot ulcer (H)     Generalized edema     Hyperkalemia     Hypertension     Hypo-osmolality and hyponatremia     Major depressive disorder, recurrent episode, mild (H24)     Other acute osteomyelitis, right ankle and foot (H)     Other chronic pancreatitis (H)     Other polyosteoarthritis     Solitary pulmonary nodule          Allergies:      Allergies   Allergen Reactions    Vancomycin      Other Reaction(s): Renal Failure    Vancomycin-induced nephrotoxicity (11/2023, outside hospital)    Seasonal Allergies      HAYFEVER:    -Watery Eyes  -Eye burn    Daptomycin Rash     Likely delayed hypersensitivity reaction to daptomycin 11/2023          Family History:   Reviewed and noncontributory.   History reviewed. No pertinent family history.       Social History:     Social History     Socioeconomic History    Marital status: Single     Spouse name: Not on file    Number of children: Not on file    Years of education: Not on file    Highest education level: Not on file   Occupational History    Not on file   Tobacco Use    Smoking status: Never    Smokeless tobacco: Never   Vaping Use    Vaping status: Never Used   Substance and Sexual Activity    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    Drug use: No    Sexual activity: Not on file   Other Topics Concern    Not on file   Social History Narrative    Not on file     Social Determinants of Health     Financial Resource  "Strain: Patient Declined (12/27/2023)    Received from [facility]    Overall Financial Resource Strain (CARDIA)     Difficulty of Paying Living Expenses: Patient declined   Food Insecurity: Patient Declined (12/27/2023)    Received from [facility]    Hunger Vital Sign     Worried About Running Out of Food in the Last Year: Patient declined     Ran Out of Food in the Last Year: Patient declined   Transportation Needs: No Transportation Needs (12/27/2023)    Received from [facility]    PRAPARE - Transportation     Lack of Transportation (Medical): No     Lack of Transportation (Non-Medical): No   Physical Activity: Not on file   Stress: Not on file   Social Connections: Unknown (3/16/2022)    Received from [facility]    Social Connections     Frequency of Communication with Friends and Family: Not on file   Interpersonal Safety: Not At Risk (12/27/2023)    Received from [facility]    Humiliation, Afraid, Rape, and Kick questionnaire     Fear of Current or Ex-Partner: No     Emotionally Abused: No     Physically Abused: No     Sexually Abused: No   Housing Stability: High Risk (12/27/2023)    Received from [facility]    Housing Stability     What is your housing situation today?: 1 - I do not have housing (I am staying with others, in a hotel, in a shelter, living outside on ...              Physical Examination:     Vital signs:  BP (!) 178/95   Pulse 72   Temp 97.2  F (36.2  C) (Oral)   Resp 14   Ht 1.651 m (5' 5\")   Wt 59.2 kg (130 lb 8.2 oz)   SpO2 98%   BMI 21.72 kg/m      GENERAL: alert and no distress  EYES: Eyes grossly normal to inspection, PERRL and conjunctivae and sclerae normal  HENT: ear canals and TM's normal, nose and mouth without ulcers or lesions  NECK: no adenopathy, " no asymmetry, masses, or scars  RESP: lungs clear to auscultation - no rales, rhonchi or wheezes  CV: regular rate and rhythm, normal S1 S2, no S3 or S4, no murmur, click or rub, no peripheral edema  ABDOMEN: soft, nontender, no hepatosplenomegaly, no masses and bowel sounds normal  MS: s/p R TMA (no acute issues, well healed stump), s/p right knee I&D with hemovac in place  SKIN: no suspicious lesions or rashes  NEURO: Normal strength and tone, mentation intact and speech normal  PSYCH: mentation appears normal, affect normal/bright      Lines and devices:    PIV CDI, non-tender, no surrounding erythema.    Labs, Microbiology and Imaging studies reviewed.   Elevated CRP    July 2024:  Lyme total Ab negative  Chlamydia/Gonorrhea negative  Whipple's testing - negative    7/12/2024 HIV non reactive         Laboratory Data:       Microbiology:    Culture   Date Value Ref Range Status   08/14/2024 No anaerobic organisms isolated after 3 days  Preliminary   08/14/2024 No growth after 2 days  Preliminary   08/14/2024 No growth after 3 days  Preliminary   07/12/2024   Final    No ESBL-producing organisms isolated. A negative result does not preclude the presence of ESBL-producing organisms.   07/11/2024 No anaerobic organisms isolated  Final   07/11/2024 No anaerobic organisms isolated  Final   07/11/2024 No Growth  Final   07/11/2024 No Growth  Final   07/11/2024 No Growth  Final   07/11/2024 No Growth  Final   07/11/2024 No anaerobic organisms isolated  Final   07/11/2024 No Growth  Final   07/11/2024 No Growth  Final   07/10/2024 No anaerobic organisms isolated  Final   07/10/2024 No Growth  Final   05/29/2024 No Growth  Final   05/29/2024 No anaerobic organisms isolated  Final   05/22/2024 No Growth  Final   05/22/2024 No anaerobic organisms isolated  Final   05/22/2024 See corresponding culture for results  Final      Culture   Date Value Ref Range Status   07/11/2024 See corresponding culture for results  Final    07/11/2024 See corresponding culture for results  Final   07/11/2024 See corresponding culture for results  Final   05/29/2024 See corresponding culture for results  Final     Inflammatory Markers:   Recent Labs   Lab Test 08/14/24  1253 07/10/24  1157   * 112*     Urine Studies:    Recent Labs   Lab Test 08/16/24  0957 05/04/24  0246 03/08/22 2011   LEUKEST Negative Negative Negative   WBCU <1 <1 8*     Hematology Studies:    Recent Labs   Lab Test 08/17/24  0648 08/16/24  1540 08/16/24  0610 08/14/24  1253 07/31/24  1550 07/17/24  0555 03/08/22  1756 10/08/17  2105   WBC 5.7 7.7 6.9 8.0 9.4 7.5   < > 3.9*   ANEU  --   --   --   --   --   --   --  2.4   AEOS  --   --   --   --   --   --   --  0.1   HGB 7.0* 7.9* 7.4* 7.4* 8.1* 7.4*   < > 12.1*   MCV 80 79 81 80 81 81   < > 84    395 374 312 357 491*   < > 238    < > = values in this interval not displayed.      Metabolic Studies:   Recent Labs   Lab Test 08/17/24  0648 08/16/24  0911 08/16/24  0610 07/31/24  1550 07/17/24  0555 07/16/24  0716    135 126*  --  135 137   POTASSIUM 5.0 3.5 5.4*  --  4.8 4.9   CHLORIDE 105 110* 92*  --  103 103   CO2 22 14* 21*  --  23 26   BUN 25.7* 24.5* 36.9*  --  29.0* 19.4   CR 1.14 0.97 1.49* 1.27* 1.28* 1.13   GFRESTIMATED 75 >90 55* 66 66 76     Hepatic Studies:   Recent Labs   Lab Test 08/16/24  0911 07/31/24  1550 07/10/24  1157 05/04/24  1126 05/03/24  1626 01/23/23  1945 08/06/22 2005   BILITOTAL <0.2  --  0.2 0.2 <0.2 0.2 0.2   ALKPHOS 138 151* 188* 134 138 113 118   ALBUMIN 2.7*  --  3.5 3.4* 3.4* 4.1 3.0*   AST 15  --  17 15 17 35 71*   ALT 7 10 14 8 8 27 57       Hepatitis B Testing:   Recent Labs   Lab Test 07/12/24  0716   HEPBANG Nonreactive     Hepatitis C Testing:   Hepatitis C Antibody   Date Value Ref Range Status   07/12/2024 Nonreactive Nonreactive Final     Comment:     A nonreactive screening test result does not exclude the possibility of exposure to or infection with HCV. Nonreactive  screening test results in individuals with prior exposure to HCV may be due to antibody levels below the limit of detection of this assay or lack of reactivity to the HCV antigens used in this assay. Patients with recent HCV infections (<3 months from time of exposure) may have false-negative HCV antibody results due to the time needed for seroconversion (average of 8 to 9 weeks).            Last check of C difficile  C Difficile Toxin B by PCR   Date Value Ref Range Status   03/09/2022 Positive (A) Negative Final     Comment:     Detection of C. difficile nucleic acid in stools confirms the presence of these organisms in diarrheal patients but may not indicate that C. difficile are the etiologic agents of the diarrhea. Results from the Xpert C. difficile assay should be interpreted in conjunction with other laboratory and clinical data available to the clinician.       Last Pathology Report   Case Report   Date Value Ref Range Status   05/07/2024   Final    Surgical Pathology Report                         Case: [id number]                                  Authorizing Provider:  Nina Moya MD Collected:           05/07/2024 11:17 AM          Ordering Location:     [facility]          Received:            05/07/2024 12:08 PM                                 [facility]                                                          Pathologist:           Miguelangel Martin MD                                                            Specimen:    Large Intestine, Colon, Random, rule out microscopic colitis                                Clinical Information   Date Value Ref Range Status   05/07/2024   Final    Procedure:  Esophagoscopy, gastroscopy, duodenoscopy (EGD), combined  Colonoscopy  Pre-op Diagnosis: Anemia [D64.9]  Post-op Diagnosis: D64.9 - Anemia [ICD-10-CM]       Final Diagnosis   Date Value Ref Range Status   05/07/2024   Final    Colon, random, biopsies:  --No significant  histopathologic change.                  Imaging:     None, this admission

## 2024-08-17 NOTE — PLAN OF CARE
"Goal Outcome Evaluation:      Plan of Care Reviewed With: patient    Overall Patient Progress: improvingOverall Patient Progress: improving         VS: Blood pressure (!) 178/95, pulse 72, temperature 97.2  F (36.2  C), temperature source Oral, resp. rate 14, height 1.651 m (5' 5\"), weight 59.2 kg (130 lb 8.2 oz), SpO2 98%.   O2: Stable on RA. Denies SOB or CP.   Output: Voids spontaneously in bathroom.    Last BM: 8/16   Activity: SBA with walker.    Skin: R knee surgical incision   Pain: Managed with PRN oxycodone.    CMS: A&O x 4   Dressing: CDI   Diet: Regular diet.    LDA: R PIV TKO, drain   Equipment: IV pole, call light, and personal belongings   Plan: Continue POC.    Additional Info:          "

## 2024-08-17 NOTE — BRIEF OP NOTE
[facility]    Brief Operative Note    Pre-operative diagnosis: Pyogenic arthritis of right knee joint, due to unspecified organism (H) [M00.9]  Post-operative diagnosis Same as pre-operative diagnosis    Procedure: Arthroscopic, Right - Knee  or possible open IRRIGATION AND DEBRIDEMENT, Right KNEE,, Right - Knee    Surgeon: Surgeons and Role:     * Jeff Phoenix MD - Primary     * Delroy Bustillo MD - Resident - Assisting     * Dario Moctezuma MD - Resident - Assisting  Anesthesia: Choice   Estimated Blood Loss: 10 mL     Drains: Hemovac - suprapatellar pouch  Specimens:   ID Type Source Tests Collected by Time Destination   A : (1) Rt Knee Synovium Tissue Tissue Knee, Right ANAEROBIC BACTERIAL CULTURE ROUTINE, FUNGAL OR YEAST CULTURE ROUTINE, AEROBIC BACTERIAL CULTURE ROUTINE Jeff Phoenix MD 8/17/2024 10:05 AM    B : (2) Rt Knee Synovium Tissue Tissue Knee, Right ANAEROBIC BACTERIAL CULTURE ROUTINE, FUNGAL OR YEAST CULTURE ROUTINE, AEROBIC BACTERIAL CULTURE ROUTINE Jeff Phoenix MD 8/17/2024 10:06 AM    C : (3) Rt Knee Synovium Tissue Tissue Knee, Right ANAEROBIC BACTERIAL CULTURE ROUTINE, FUNGAL OR YEAST CULTURE ROUTINE, AEROBIC BACTERIAL CULTURE ROUTINE Jeff Phoenix MD 8/17/2024 10:06 AM      Findings:   None.  Complications: None.  Implants: * No implants in log *    POST OPERATIVE PLAN    Assessment/Plan: Long Mayfield is a 54 year old male with a history of septic arthritis on the right knee who presented with repeat knee effusion and swelling with aspiration with concern for recurrent septic arthritis and is now status post repeat arthroscopic I&D of the right knee on 8/17/2024 with Dr. Phoenix    Activity: up with assist  Weight bearing status: WBAT  Antibiotics:  Per primary. Recommend broad spectrum and narrow per cultures. Recommend ID consult  Diet: Regular. Bowel regimen. Anti-emetics PRN.     DVT prophylaxis: Per primary. OK to resume POD 1  Elevation: Elevate heels off of bed on pillows   Wound Care: Dressings in place for 5 days  Drains: hemovac drain in the suprapatellar pouch. Will monitor output  Pain management: Orals PRN, IV for breakthrough only  X-rays: complete  Physical Therapy: Mobilization, ROM, ADL's  Occupational Therapy: ADL's  Labs: Trend CRP q 48 hours  Disposition: Pending post op course and cultures   Plan to follow up in clinic with Dr. Espinoza in 1 week from discharge    No future appointments.      Max Hoffman MD  Orthopaedic Surgery, PGY4

## 2024-08-17 NOTE — PROGRESS NOTES
Orthopedic Surgery Progress Note 08/17/2024    Subjective:  No acute overnight events, vitals are stable. Pain is well-tolerated on current regimen. Denies any new numbness or tingling, fevers/chills, nausea/vomiting, chest pain or shortness of breath.    Objective:  Temp: 98.2  F (36.8  C) Temp src: Oral BP: 126/75 Pulse: 81   Resp: 18 SpO2: 99 % O2 Device: None (Room air)      Exam:  Gen: No acute distress, resting comfortably in bed. Alert and oriented, responds to questions appropriately.   MSK:  Right Lower Extremity  - Skin intact  - Prior TMA of right foot is well healed. DP/PT pulses are palpable at 2+ and foot is WWP  - All compartments soft and compressible  - No pain with log roll or axial loading.  - Fires quad, TA, gastroc/soleus. Able to SLR  - sensation is intact to the LE proximal to prior TMA  -Right knee has significant edema which extends both proximally and distally to the knee.  There is no warmth or erythema of the knee.  -Right knee ROM: -20 degrees extension to 90 degrees flexion with minimal pain      Recent Labs   Lab 08/17/24  0648 08/16/24  1540 08/16/24  0610   WBC 5.7 7.7 6.9   HGB 7.0* 7.9* 7.4*    395 374     Erythrocyte Sedimentation Rate   Date Value Ref Range Status   08/14/2024 105 (H) 0 - 20 mm/hr Final         Assessment:  Pt is a 54 year old male with a history of poorly managed diabetes and septic arthritis of the right knee s/p washout on 7/11/2024 by Dr. Espinoza.  Here for repeat I&D of the right knee however upon arrival to the preop the patient was noted to have blood sugar of 558 and was admitted to medicine for glycemic control.     Plan:  OR on 8/17/2024 for repeat I&D of the right knee     Medicine primary  Activity: Up with assist.  Weight bearing status: WBAT  Antibiotics: Not currently on antibiotics  Diet: N.p.o. at midnight tonight  DVT prophylaxis: Per primary  Bracing/Splinting: None  Elevation: Elevate RLE on pillows for edema control  Wound Care:  N/A  Pain management: utilize all oral meds first, utilize IV meds for severe breakthrough pain after PO meds given adequate time to take effect  Imaging: Complete  Labs: Trend glucose  Cultures: IntraOp  Consults: IM, ID, PT/OT. Appreciate assistance in patient care  Disposition: TBD  Follow-up: TBD    Orthopedic surgery staff for this patient is Dr. Phoenix. Discussed.    --  Respectfully,    Delroy Bustillo MD  Orthopedic Surgery Resident- PGY1      No future appointments.

## 2024-08-17 NOTE — ANESTHESIA CARE TRANSFER NOTE
Patient: Long Mayfield    Procedure: Procedure(s):  Arthroscopic  IRRIGATION AND DEBRIDEMENT, Right KNEE,       Diagnosis: Pyogenic arthritis of right knee joint, due to unspecified organism (H) [M00.9]  Diagnosis Additional Information: No value filed.    Anesthesia Type:   General     Note:    Oropharynx: oropharynx clear of all foreign objects and spontaneously breathing  Level of Consciousness: drowsy  Oxygen Supplementation: face mask  Level of Supplemental Oxygen (L/min / FiO2): 7  Independent Airway: airway patency satisfactory and stable  Dentition: dentition unchanged  Vital Signs Stable: post-procedure vital signs reviewed and stable  Report to RN Given: handoff report given  Patient transferred to: PACU    Handoff Report: Identifed the Patient, Identified the Reponsible Provider, Reviewed the pertinent medical history, Discussed the surgical course, Reviewed Intra-OP anesthesia mangement and issues during anesthesia, Set expectations for post-procedure period and Allowed opportunity for questions and acknowledgement of understanding      Vitals:  Vitals Value Taken Time   /67    Temp 36.7    Pulse 65 08/17/24 1040   Resp 9 08/17/24 1040   SpO2 100 % 08/17/24 1040   Vitals shown include unfiled device data.    Electronically Signed By: ROSE Castillo CRNA  August 17, 2024  10:40 AM

## 2024-08-17 NOTE — OP NOTE
OPERATIVE REPORT    Patient: Long Mayfield  : 1970  Date of Service: 2024     PREOPERATIVE DIAGNOSIS: Pyogenic arthritis of right knee joint, due to unspecified organism (H) [M00.9]               POSTOPERATIVE DIAGNOSIS: Same    PROCEDURES:  1. Procedure(s):  Arthroscopic  IRRIGATION AND DEBRIDEMENT, Right KNEE,    STAFF SURGEON: Dr. Aj Phoenix MD    ASSISTANT:   Max Hoffman MD, PGY-4  Delroy Bustillo MD, PGY-1    ANESTHESIA: General Endotracheal    COMPLICATIONS: None apparent immediately postoperatively.    SPECIMENS: See below  ID Type Source Tests Collected by Time Destination   A : (1) Rt Knee Synovium Tissue Tissue Knee, Right ANAEROBIC BACTERIAL CULTURE ROUTINE, FUNGAL OR YEAST CULTURE ROUTINE, AEROBIC BACTERIAL CULTURE ROUTINE Jeff Phoenix MD 2024 10:05 AM    B : (2) Rt Knee Synovium Tissue Tissue Knee, Right ANAEROBIC BACTERIAL CULTURE ROUTINE, FUNGAL OR YEAST CULTURE ROUTINE, AEROBIC BACTERIAL CULTURE ROUTINE Jeff Phoenix MD 2024 10:06 AM    C : (3) Rt Knee Synovium Tissue Tissue Knee, Right ANAEROBIC BACTERIAL CULTURE ROUTINE, FUNGAL OR YEAST CULTURE ROUTINE, AEROBIC BACTERIAL CULTURE ROUTINE Jeff Phoenix MD 2024 10:06 AM        DRAINS: Hemovac drain in the suprapatellar pouch    ESTIMATED BLOOD LOSS: 10cc    IMPLANTS: None      SIGNIFICANT FINDINGS:  Significant synovitis throughout the knee.  There was a large knee effusion.  There was murky and cloudy fluid that was expressed from the joint upon making an arthrotomy.    INDICATIONS:                          Long Mayfield is a 54 year old male who has a history of poorly controlled diabetes, osteomyelitis of the right foot status post transmetatarsal amputation as well as a history of right knee septic arthritis.  Patient initially underwent a irrigation and debridement of the right knee in July with Dr. Espinoza.  Cultures at that time were without growth.   The patient was seen by infectious disease and was treated with a prolonged course of antibiotics.  The patient followed up with Dr. Espinoza in clinic on 06/15/2024 and he had recurrent right knee pain as well as a knee effusion.  The patient had an elevated CRP and aspiration was performed and he had a cell count at 94,000 white blood cells.  Due to the elevated white cell count he was recommended to undergo a repeat irrigation and debridement of the right knee.. Indications for surgery were discussed with the patient in detail. After discussing risks, benefits and alternatives to procedure, the patient elected to proceed with surgical intervention.     The patient understood that risks include, but are not limited to: Bleeding, infection, damage to surrounding structures (such as nerve, vessel or tendon), need for additional procedures, pain, stiffness, need for therapy, and anesthetic complications. The patient expressed understanding and agreement and wished to proceed.     Consent was obtained for the procedure.    DESCRIPTION OF PROCEDURE:             Long Mayfield was met in the preoperative holding area where the correct surgical site was identified and marked by the primary surgeon. The patient was thentaken to the operating room, where the Anesthesiology Service induced satisfactory general anesthesia. Venous thromboembolic prophylaxis was performed with sequential devices.  The patient was placed supineon the operating room table with all bony prominences well padded and a safety strap across the patient's waist. The patient was then prepped and draped in the usual sterile fashion. Prior to proceeding with the operation a timeout was held per hospital policy correctly identifying the patient, operative site, and procedure to be performed. All in the room agreed.    On marks about the knee were prepped and marked.  An anterior lateral portal was established.  A stab incision was made with an 11  blade.  A snap was used to make an arthrotomy into the supracondylar notch.  On the arthrotomy was made a significant amount of murky synovial fluid was expressed and sent for culture. A pituitary rongeur was introduced into the knee and synovium was obtained and sent for culture. The arthroscope was inserted into the knee and diagnostic arthroscopy was undertaken. Within the suprapatellar pouch there was a significant amount of synovitis and hypertrophic synovitis. An anterolateral outflow portal was established under direct visualization. The was also hypertrophic and inflamed synovium in the gutters as well as the fat pad. A anteromedial portal was then established under direct visualization. The arthroscopic shaver was introduced and brought into the suprapatellar pouch.  The hypertrophic and inflamed synovium was debrided using arthroscopic shaver.  This was also performed in the medial and lateral gutters as well as in the anterior fat pad.  No loose bodies were seen.  A total of 10 L of sterile saline was irrigated through the joint.  A Hemovac drain was introduced through the anteromedial portal and was brought out through the superolateral outflow portal.  The wounds were then closed with nylon sutures using a portal stitch.  The Hemovac drain was sutured into place with a drain stitch.  Dressing with Adaptic, gauze, ABD, cast padding and an Ace wrap.    All needle and sponge counts were correct at the end of the procedure. There were no complications.    IJeff MD was present in the operating room for the entire procedure.      The patient was awoken from anesthesia and taken to the postoperative recovery unit in stable condition.    POSTOPERATIVE PLAN:    Activity: up with assist  Weight bearing status: WBAT  Antibiotics:  Per primary. Recommend broad spectrum and narrow per cultures. Recommend ID consult  Diet: Regular. Bowel regimen. Anti-emetics PRN.    DVT prophylaxis: Per primary.  OK to resume POD 1  Elevation: Elevate heels off of bed on pillows   Wound Care: Dressings in place for 5 days  Drains: hemovac drain in the suprapatellar pouch. Will monitor output  Pain management: Orals PRN, IV for breakthrough only  X-rays: complete  Physical Therapy: Mobilization, ROM, ADL's  Occupational Therapy: ADL's  Labs: Trend CRP q 48 hours  Disposition: Pending post op course and cultures   Plan to follow up in clinic with Dr. Espinoza in 1 week from discharge     Max Hoffman MD  PGY-4 Orthopaedic Surgery

## 2024-08-17 NOTE — ANESTHESIA PROCEDURE NOTES
Airway       Patient location during procedure: OR       Procedure Start/Stop Times: 8/17/2024 9:20 AM  Staff -        CRNA: Kimi Serrano APRN CRNA       Performed By: CRNA  Consent for Airway        Urgency: elective  Indications and Patient Condition       Indications for airway management: luis daniel-procedural       Induction type:intravenous       Mask difficulty assessment: 1 - vent by mask    Final Airway Details       Final airway type: endotracheal airway       Successful airway: ETT - single  Endotracheal Airway Details        ETT size (mm): 7.5       Cuffed: yes       Successful intubation technique: video laryngoscopy       VL Blade Size: MAC 3       Grade View of Cords: 1       Adjucts: stylet       Position: Right       Measured from: lips       Secured at (cm): 22       Bite block used: None    Post intubation assessment        Placement verified by: capnometry, equal breath sounds and chest rise        Number of attempts at approach: 1       Number of other approaches attempted: 0       Secured with: tape       Ease of procedure: easy       Dentition: Intact and Unchanged    Medication(s) Administered   Medication Administration Time: 8/17/2024 9:20 AM

## 2024-08-18 LAB
ANION GAP SERPL CALCULATED.3IONS-SCNC: 11 MMOL/L (ref 7–15)
BUN SERPL-MCNC: 17 MG/DL (ref 6–20)
CALCIUM SERPL-MCNC: 8.2 MG/DL (ref 8.8–10.4)
CHLORIDE SERPL-SCNC: 104 MMOL/L (ref 98–107)
CREAT SERPL-MCNC: 0.98 MG/DL (ref 0.67–1.17)
EGFRCR SERPLBLD CKD-EPI 2021: >90 ML/MIN/1.73M2
ERYTHROCYTE [DISTWIDTH] IN BLOOD BY AUTOMATED COUNT: 17 % (ref 10–15)
GLUCOSE BLDC GLUCOMTR-MCNC: 119 MG/DL (ref 70–99)
GLUCOSE BLDC GLUCOMTR-MCNC: 297 MG/DL (ref 70–99)
GLUCOSE BLDC GLUCOMTR-MCNC: 311 MG/DL (ref 70–99)
GLUCOSE BLDC GLUCOMTR-MCNC: 364 MG/DL (ref 70–99)
GLUCOSE BLDC GLUCOMTR-MCNC: 378 MG/DL (ref 70–99)
GLUCOSE BLDC GLUCOMTR-MCNC: 77 MG/DL (ref 70–99)
GLUCOSE SERPL-MCNC: 324 MG/DL (ref 70–99)
HCO3 SERPL-SCNC: 21 MMOL/L (ref 22–29)
HCT VFR BLD AUTO: 24.3 % (ref 40–53)
HGB BLD-MCNC: 7.4 G/DL (ref 13.3–17.7)
MCH RBC QN AUTO: 24.3 PG (ref 26.5–33)
MCHC RBC AUTO-ENTMCNC: 30.5 G/DL (ref 31.5–36.5)
MCV RBC AUTO: 80 FL (ref 78–100)
PLATELET # BLD AUTO: 361 10E3/UL (ref 150–450)
POTASSIUM SERPL-SCNC: 5.5 MMOL/L (ref 3.4–5.3)
RBC # BLD AUTO: 3.04 10E6/UL (ref 4.4–5.9)
SODIUM SERPL-SCNC: 136 MMOL/L (ref 135–145)
WBC # BLD AUTO: 6.2 10E3/UL (ref 4–11)

## 2024-08-18 PROCEDURE — 250N000013 HC RX MED GY IP 250 OP 250 PS 637

## 2024-08-18 PROCEDURE — 250N000011 HC RX IP 250 OP 636: Performed by: INTERNAL MEDICINE

## 2024-08-18 PROCEDURE — 36415 COLL VENOUS BLD VENIPUNCTURE: CPT

## 2024-08-18 PROCEDURE — 85014 HEMATOCRIT: CPT

## 2024-08-18 PROCEDURE — 250N000012 HC RX MED GY IP 250 OP 636 PS 637: Performed by: INTERNAL MEDICINE

## 2024-08-18 PROCEDURE — 80048 BASIC METABOLIC PNL TOTAL CA: CPT

## 2024-08-18 PROCEDURE — 120N000002 HC R&B MED SURG/OB UMMC

## 2024-08-18 PROCEDURE — 250N000011 HC RX IP 250 OP 636

## 2024-08-18 PROCEDURE — 250N000013 HC RX MED GY IP 250 OP 250 PS 637: Performed by: INTERNAL MEDICINE

## 2024-08-18 RX ORDER — LOPERAMIDE HCL 2 MG
2 CAPSULE ORAL 4 TIMES DAILY PRN
Status: DISCONTINUED | OUTPATIENT
Start: 2024-08-18 | End: 2024-08-19 | Stop reason: HOSPADM

## 2024-08-18 RX ADMIN — OXYCODONE HYDROCHLORIDE 5 MG: 5 TABLET ORAL at 19:25

## 2024-08-18 RX ADMIN — GABAPENTIN 300 MG: 300 CAPSULE ORAL at 08:10

## 2024-08-18 RX ADMIN — PANCRELIPASE 3 CAPSULE: 36000; 180000; 114000 CAPSULE, DELAYED RELEASE PELLETS ORAL at 13:45

## 2024-08-18 RX ADMIN — PANCRELIPASE 3 CAPSULE: 36000; 180000; 114000 CAPSULE, DELAYED RELEASE PELLETS ORAL at 08:12

## 2024-08-18 RX ADMIN — HYDRALAZINE HYDROCHLORIDE 25 MG: 25 TABLET ORAL at 19:21

## 2024-08-18 RX ADMIN — DULOXETINE HYDROCHLORIDE 60 MG: 60 CAPSULE, DELAYED RELEASE ORAL at 08:10

## 2024-08-18 RX ADMIN — Medication 1 TABLET: at 08:10

## 2024-08-18 RX ADMIN — PANCRELIPASE 3 CAPSULE: 36000; 180000; 114000 CAPSULE, DELAYED RELEASE PELLETS ORAL at 19:07

## 2024-08-18 RX ADMIN — LOPERAMIDE HYDROCHLORIDE 2 MG: 2 CAPSULE ORAL at 19:25

## 2024-08-18 RX ADMIN — PANTOPRAZOLE SODIUM 40 MG: 40 TABLET, DELAYED RELEASE ORAL at 08:10

## 2024-08-18 RX ADMIN — OXYCODONE HYDROCHLORIDE 5 MG: 5 TABLET ORAL at 13:44

## 2024-08-18 RX ADMIN — OXYCODONE HYDROCHLORIDE 5 MG: 5 TABLET ORAL at 08:19

## 2024-08-18 RX ADMIN — GABAPENTIN 300 MG: 300 CAPSULE ORAL at 19:21

## 2024-08-18 RX ADMIN — ENOXAPARIN SODIUM 40 MG: 40 INJECTION SUBCUTANEOUS at 13:21

## 2024-08-18 RX ADMIN — INSULIN ASPART 4 UNITS: 100 INJECTION, SOLUTION INTRAVENOUS; SUBCUTANEOUS at 15:04

## 2024-08-18 RX ADMIN — CEFTRIAXONE SODIUM 2 G: 2 INJECTION, POWDER, FOR SOLUTION INTRAMUSCULAR; INTRAVENOUS at 13:21

## 2024-08-18 RX ADMIN — HYDRALAZINE HYDROCHLORIDE 25 MG: 25 TABLET ORAL at 08:09

## 2024-08-18 RX ADMIN — INSULIN GLARGINE 13 UNITS: 100 INJECTION, SOLUTION SUBCUTANEOUS at 15:04

## 2024-08-18 RX ADMIN — INSULIN ASPART 4 UNITS: 100 INJECTION, SOLUTION INTRAVENOUS; SUBCUTANEOUS at 10:14

## 2024-08-18 RX ADMIN — AMLODIPINE BESYLATE 5 MG: 5 TABLET ORAL at 08:10

## 2024-08-18 ASSESSMENT — ACTIVITIES OF DAILY LIVING (ADL)
ADLS_ACUITY_SCORE: 28
ADLS_ACUITY_SCORE: 29
ADLS_ACUITY_SCORE: 28
ADLS_ACUITY_SCORE: 29
ADLS_ACUITY_SCORE: 28
ADLS_ACUITY_SCORE: 29
ADLS_ACUITY_SCORE: 27
ADLS_ACUITY_SCORE: 28
ADLS_ACUITY_SCORE: 27
ADLS_ACUITY_SCORE: 29
ADLS_ACUITY_SCORE: 28
ADLS_ACUITY_SCORE: 27
ADLS_ACUITY_SCORE: 29
ADLS_ACUITY_SCORE: 27
ADLS_ACUITY_SCORE: 28
DEPENDENT_IADLS:: MEDICATION MANAGEMENT;TRANSPORTATION
ADLS_ACUITY_SCORE: 28
ADLS_ACUITY_SCORE: 29
ADLS_ACUITY_SCORE: 27
ADLS_ACUITY_SCORE: 28
ADLS_ACUITY_SCORE: 29
ADLS_ACUITY_SCORE: 29

## 2024-08-18 NOTE — PLAN OF CARE
"Goal Outcome Evaluation:       Vitally stable, except blood pressure elevated, BP (!) 149/91   Pulse 75   Temp 97.2  F (36.2  C) (Oral)   Resp 16   Ht 1.651 m (5' 5\")   Wt 59.2 kg (130 lb 8.2 oz)   SpO2 99%   BMI 21.72 kg/m      Alert and oriented X4.    Standby assist of one with walker.    Blood sugar checks.    Pain managed with oxycodone    Continue plan of care                 "

## 2024-08-18 NOTE — PLAN OF CARE
Goal Outcome Evaluation:      Plan of Care Reviewed With: patient          Outcome Evaluation: Initial CM assessment. Anticipate return to LTC once medically stable.
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
## 2024-08-18 NOTE — PROGRESS NOTES
Orthopedic Surgery Progress Note 08/18/2024    Subjective:  No acute overnight events, vitals are stable. Pain is well-tolerated on current regimen. Was able to ambulate with minimal discomfort. Denies any new numbness or tingling, fevers/chills, nausea/vomiting, chest pain or shortness of breath.    Objective:  Temp: 97.2  F (36.2  C) Temp src: Oral BP: (!) 146/90 Pulse: 75   Resp: 16 SpO2: 99 % O2 Device: None (Room air) Oxygen Delivery: 4 LPM    Exam:  Gen: No acute distress, resting comfortably in bed. Alert and oriented, responds to questions appropriately.   MSK:    RLE:  - Dressings c/d/i  - SILT femoral/tibial/sural/saphenous/DP/SP nerves  - Fires quad, TA, EHL, FHL, GaSC  - PT/DP pulses 1+, foot wwp. Prior TMA    Drain output: 100 ml.    Recent Labs   Lab 08/17/24  0648 08/16/24  1540 08/16/24  0610   WBC 5.7 7.7 6.9   HGB 7.0* 7.9* 7.4*    395 374     Erythrocyte Sedimentation Rate   Date Value Ref Range Status   08/14/2024 105 (H) 0 - 20 mm/hr Final       Culture results: none to date    Assessment: Long Mayfield is a 54 year old male s/p R knee I&D    Plan:  Medicine Primary    Long Mayfield is a 54 year old male with a history of septic arthritis on the right knee who presented with repeat knee effusion and swelling with aspiration with concern for recurrent septic arthritis and is now status post repeat arthroscopic I&D of the right knee on 8/17/2024 with Dr. Phoenix     Activity: up with assist  Weight bearing status: WBAT  Antibiotics:  Per primary. Recommend broad spectrum and narrow per cultures. Recommend ID consult  Diet: Regular. Bowel regimen. Anti-emetics PRN.    DVT prophylaxis: Per primary. OK to resume POD 1  Elevation: Elevate heels off of bed on pillows   Wound Care: Dressings in place for 5 days  Drains: hemovac drain in the suprapatellar pouch. Will monitor output  Pain management: Orals PRN, IV for breakthrough only  X-rays: complete  Physical Therapy: Mobilization, ROM,  ADL's  Occupational Therapy: ADL's  Labs: Trend CRP q 48 hours  Disposition: Pending post op course and cultures   Plan to follow up in clinic with Dr. Espinoza in 1 week from discharge     No future appointments.      Nina Carrion, M4    Resident/Fellow Attestation   I, Delroy Bustillo MD, was present with the medical/OMI student who participated in the service and in the documentation of the note.  I have verified the history and personally performed the physical exam and medical decision making.  I agree with the assessment and plan of care as documented in the note.      Delroy Bustillo MD  PGY1  Date of Service (when I saw the patient): 08/18/24

## 2024-08-18 NOTE — PLAN OF CARE
"Pt BG at 0200 was 311, pt has been eating. Refused Lantus and said it \"will bottom him out\" per report, did take sliding scale insulin on eves. New Lantus pen came on eves, pt says he will still refuse.  "
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
## 2024-08-18 NOTE — CONSULTS
Care Management Initial Consult    General Information  Assessment completed with: Patient, VM-chart reviewLong  Type of CM/SW Visit: Initial Assessment    Primary Care Provider verified and updated as needed: Yes   Readmission within the last 30 days: previous discharge plan unsuccessful   Return Category: Exacerbation of disease  Reason for Consult: discharge planning  Advance Care Planning: Advance Care Planning Reviewed: other (see comments) (created health care directive today)          Communication Assessment  Patient's communication style: spoken language (English or Bilingual)    Hearing Difficulty or Deaf: no   Wear Glasses or Blind: yes    Cognitive  Cognitive/Neuro/Behavioral: WDL  Level of Consciousness: lethargic  Arousal Level: arouses to voice                Living Environment:   People in home: facility resident     Current living Arrangements: residential facility  Name of Facility: [facility]   Able to return to prior arrangements: yes       Family/Social Support:  Care provided by: other (see comments) (LTC staff)  Provides care for: no one, unable/limited ability to care for self  Marital Status: Single  Facility resident(s)/Staff          Description of Support System: Supportive    Support Assessment: Adequate family and caregiver support, Adequate social supports    Current Resources:   Patient receiving home care services: No     Community Resources: County Programs, County Worker  Equipment currently used at home: cane, quad, wheelchair, manual  Supplies currently used at home: Diabetic Supplies    Employment/Financial:  Employment Status: disabled        Financial Concerns: none   Referral to Financial Worker: No       Does the patient's insurance plan have a 3 day qualifying hospital stay waiver?  No    Lifestyle & Psychosocial Needs:  Social Determinants of Health     Food Insecurity: Patient Declined (12/27/2023)    Received from [facility] [place], [facility]     Hunger Vital Sign     Worried About Running Out of Food in the Last Year: Patient declined     Ran Out of Food in the Last Year: Patient declined   Depression: Not at risk (8/14/2024)    PHQ-2     PHQ-2 Score: 2   Housing Stability: High Risk (12/27/2023)    Received from [facility]    Housing Stability     What is your housing situation today?: 1 - I do not have housing (I am staying with others, in a hotel, in a shelter, living outside on ...   Tobacco Use: Low Risk  (8/17/2024)    Patient History     Smoking Tobacco Use: Never     Smokeless Tobacco Use: Never     Passive Exposure: Not on file   Financial Resource Strain: Patient Declined (12/27/2023)    Received from [facility]    Overall Financial Resource Strain (CARDIA)     Difficulty of Paying Living Expenses: Patient declined   Alcohol Use: Not At Risk (6/27/2024)    Received from [facility]    AUDIT-C     Frequency of Alcohol Consumption: Never     Average Number of Drinks: Patient does not drink     Frequency of Binge Drinking: Never   Transportation Needs: No Transportation Needs (12/27/2023)    Received from [facility]    PRAPARE - Transportation     Lack of Transportation (Medical): No     Lack of Transportation (Non-Medical): No   Physical Activity: Not on file   Interpersonal Safety: Not At Risk (12/27/2023)    Received from [facility]    Humiliation, Afraid, Rape, and Kick questionnaire     Fear of Current or Ex-Partner: No     Emotionally Abused: No     Physically Abused: No     Sexually Abused: No   Stress: Not on file   Social Connections: Unknown (3/16/2022)    Received from [facility], [facility]    Social Connections     Frequency of Communication with Friends and Family: Not on file   Health Literacy: Not on file       Functional Status:  Prior to admission  patient needed assistance:   Dependent ADLs:: Ambulation-cane  Dependent IADLs:: Medication Management, Transportation  Assesssment of Functional Status: At functional baseline    Mental Health Status:  Mental Health Status: No Current Concerns       Chemical Dependency Status:  Chemical Dependency Status: Past Concern (sober > 2 years)             Values/Beliefs:  Spiritual, Cultural Beliefs, Yazidi Practices, Values that affect care: yes  Description of Beliefs that Will Affect Care: Shireen            Additional Information:  Long Mayfield is a 54 year old male admitted to hospital on 8/16/2024 due to Hyperglycemia [R73.9]  Pyogenic arthritis of right knee joint, due to unspecified organism (H) [M00.9]. Weekend SW met with Long Mayfield for initial CM assessment. He is alert and oriented times three.    Long continues to reside at [facility]. He is working with his  and anticipates moving into his own apartment in September - [facility] and receiving services there. He anticipates getting his SSDI approved this upcoming week. He denies and current MH/CD concerns. No spiritual/Samaritan needs during hospitalization. He is not a . DME includes cane and wheelchair although he is able to ambulate independently at times. Anticipates return to LTC once medically stable, has had IV abx in the past at LTC. Transportation through health plan.     Fabiola KHAN RN, PHN, MSN  [email or web address]   [facility] Care Coordination     [place] [id number] [phone]    PLAN:  [x] Health care directive (HCD) completed naming his brother and sister as joint decisions makers. Original given to Long, copy scanned and emailed to: [email or web address]. Copy also sent to his LTC.     TO-DO:  [] Coordinate return to LTC once medically stable.   [] Arrange discharge transportation.  [] External hand off -  Ana Maria Blakely [facility] PCP        Ruth Aviles, [facility] Sebasticook Valley HospitalSW, MSW  8/18/2024  , Casual Staff, Care Management Department   SEARCHABLE in [facility] - search SOCIAL WORK     [place] (0800 - 1630) Saturday and Sunday     Units: 4A Vocera, 4C Vocera, 4E Vocera      Units: 5A 7444-1036 Vocera, 5A 9565-0008 Vocera , BMT SW 1 BMT SW 2, BMT SW 3 & BMT SW 4   5C Off Service 5401 - 5416  5C Off Service [phone]   Units: 6A Vocera, 6B Vocera    Units: 6C Vocera   Units: 7A Vocera, 7B Vocera    Units: 7C Med Surg 7401 thru 7418, 7C Med Surg 7502 thru 7521    Unit: [place] ED Vocera, [place] Obs Vocera     [facility] ([phone]) Saturday and Sunday      Units: 5 Ortho Vocera, 5 Med Surg Vocera, WB ED Vocera   Units: 6 Med Surg Vocera, 8 Med Surg Vocera, 10 ICU Vocera      After hours Vocera [facility] and After Hours Vocera [place]   Saturday & Sunday (1630 - 0000)  Mon-Fri ([phone])   [facility] Recognized Holidays  ([phone])

## 2024-08-18 NOTE — PROGRESS NOTES
[facility]    Medicine Progress Note - Hospitalist Service, GOLD TEAM 21    Date of Admission:  8/16/2024    Assessment & Plan   Long Mayfield is a 54 year old male admitted on 8/16/2024. He has a history of hypertension, diabetes mellitus type II, dependent on insulin, s/p R TMA    Presented this morning to preop for right knee washout by orthopedics  Found to have glucose of 638 and was sent to ED for mgmt  Also with SALVADOR, improved  Admitted to medicine 8/16  S/p right knee I&D 8/17 by ortho  Seen by ID, input much appreciated. Cultures sent 8/17. On ceftriaxone 8/17-    #hyperglycemia  #DM2 insulin dependent   -resolved with 8 units of aspart in ED  -continue PTA insulin dosing when eating:   -lantus 13 units daily. Pt declined yesterday, glucoses 285-364 with aspart 12 and 10.   -lantus today     -while here, aspart 6 units with each meal, plus an additional sliding scale with meals as follows:  201-250: 1  251-300: 2  301-350: 3  351-400: 4  401+: 5  -continue PTA cymbalta     #SALVADOR  -resolved in ED with IVF  -now oral fluids     #septic arthritis of right knee  S/p I&D in OR 8/17  No systemic signs of sepsis  Monitor temperature   Seen by ID. Cultures sent. On ceftriaxone. Trending CRP  lovenox dvt ppx  8/18    #hypertension  -continue PTA amlodipine 5 and PTA hydralazine 25 bid  -restarted PTA aspirin          Diet: Advance Diet as Tolerated: Regular Diet Adult    DVT Prophylaxis: Enoxaparin (Lovenox) SQ  Soman Catheter: Not present  Lines: None     Cardiac Monitoring: None  Code Status: Full Code      Clinically Significant Risk Factors        # Hyperkalemia: Highest K = 5.5 mmol/L in last 2 days, will monitor as appropriate       # Hypoalbuminemia: Lowest albumin = 2.7 g/dL at 8/16/2024  9:11 AM, will monitor as appropriate     # Hypertension: Noted on problem list          # DMII: A1C = 9.2 % (Ref range: <5.7 %) within past 6 months       #  Financial/Environmental Concerns: none               Disposition Plan     Medically Ready for Discharge: Anticipated Tomorrow             Dejon Anders MD  Hospitalist Service, GOLD TEAM 21  [facility]  Securely message with [employer] (more info)  Text page via [employer] Paging/Directory   See signed in provider for up to date coverage information  ______________________________________________________________________    Interval History   No events    Physical Exam   Vital Signs: Temp: 97.2  F (36.2  C) Temp src: Oral BP: 133/89 Pulse: 75   Resp: 16 SpO2: 99 % O2 Device: None (Room air)    Weight: 130 lbs 8.2 oz        Medical Decision Making             Data

## 2024-08-19 ENCOUNTER — DOCUMENTATION ONLY (OUTPATIENT)
Dept: OTHER | Facility: CLINIC | Age: 54
End: 2024-08-19
Payer: COMMERCIAL

## 2024-08-19 ENCOUNTER — HOSPITAL ENCOUNTER (EMERGENCY)
Facility: CLINIC | Age: 54
Discharge: NURSING, REHAB OR LONG-TERM CARE | End: 2024-08-19
Attending: EMERGENCY MEDICINE | Admitting: EMERGENCY MEDICINE
Payer: COMMERCIAL

## 2024-08-19 ENCOUNTER — APPOINTMENT (OUTPATIENT)
Dept: CT IMAGING | Facility: CLINIC | Age: 54
End: 2024-08-19
Attending: EMERGENCY MEDICINE
Payer: COMMERCIAL

## 2024-08-19 VITALS
SYSTOLIC BLOOD PRESSURE: 185 MMHG | HEIGHT: 65 IN | RESPIRATION RATE: 16 BRPM | OXYGEN SATURATION: 98 % | TEMPERATURE: 97.4 F | HEART RATE: 75 BPM | WEIGHT: 130.51 LBS | DIASTOLIC BLOOD PRESSURE: 98 MMHG | BODY MASS INDEX: 21.74 KG/M2

## 2024-08-19 VITALS
RESPIRATION RATE: 18 BRPM | OXYGEN SATURATION: 99 % | SYSTOLIC BLOOD PRESSURE: 154 MMHG | DIASTOLIC BLOOD PRESSURE: 90 MMHG | TEMPERATURE: 97.8 F | HEART RATE: 77 BPM

## 2024-08-19 DIAGNOSIS — M96.89 POSTOPERATIVE SURGICAL COMPLICATION INVOLVING MUSCULOSKELETAL SYSTEM ASSOCIATED WITH MUSCULOSKELETAL PROCEDURE, UNSPECIFIED COMPLICATION: ICD-10-CM

## 2024-08-19 DIAGNOSIS — M25.00 HEMARTHROSIS: ICD-10-CM

## 2024-08-19 LAB
ANION GAP SERPL CALCULATED.3IONS-SCNC: 6 MMOL/L (ref 7–15)
BACTERIA ASPIRATE CULT: NO GROWTH
BUN SERPL-MCNC: 21.5 MG/DL (ref 6–20)
CALCIUM SERPL-MCNC: 8.6 MG/DL (ref 8.8–10.4)
CHLORIDE SERPL-SCNC: 103 MMOL/L (ref 98–107)
CREAT SERPL-MCNC: 1.15 MG/DL (ref 0.67–1.17)
EGFRCR SERPLBLD CKD-EPI 2021: 75 ML/MIN/1.73M2
ERYTHROCYTE [DISTWIDTH] IN BLOOD BY AUTOMATED COUNT: 17.2 % (ref 10–15)
GLUCOSE BLDC GLUCOMTR-MCNC: 234 MG/DL (ref 70–99)
GLUCOSE BLDC GLUCOMTR-MCNC: 318 MG/DL (ref 70–99)
GLUCOSE BLDC GLUCOMTR-MCNC: 323 MG/DL (ref 70–99)
GLUCOSE SERPL-MCNC: 343 MG/DL (ref 70–99)
HCO3 SERPL-SCNC: 23 MMOL/L (ref 22–29)
HCT VFR BLD AUTO: 23.6 % (ref 40–53)
HGB BLD-MCNC: 7.1 G/DL (ref 13.3–17.7)
MCH RBC QN AUTO: 24.1 PG (ref 26.5–33)
MCHC RBC AUTO-ENTMCNC: 30.1 G/DL (ref 31.5–36.5)
MCV RBC AUTO: 80 FL (ref 78–100)
PLATELET # BLD AUTO: 391 10E3/UL (ref 150–450)
POTASSIUM SERPL-SCNC: 5.6 MMOL/L (ref 3.4–5.3)
RBC # BLD AUTO: 2.94 10E6/UL (ref 4.4–5.9)
SODIUM SERPL-SCNC: 132 MMOL/L (ref 135–145)
WBC # BLD AUTO: 6.1 10E3/UL (ref 4–11)

## 2024-08-19 PROCEDURE — 73700 CT LOWER EXTREMITY W/O DYE: CPT | Mod: RT

## 2024-08-19 PROCEDURE — 250N000013 HC RX MED GY IP 250 OP 250 PS 637

## 2024-08-19 PROCEDURE — 250N000013 HC RX MED GY IP 250 OP 250 PS 637: Performed by: INTERNAL MEDICINE

## 2024-08-19 PROCEDURE — 99233 SBSQ HOSP IP/OBS HIGH 50: CPT | Performed by: STUDENT IN AN ORGANIZED HEALTH CARE EDUCATION/TRAINING PROGRAM

## 2024-08-19 PROCEDURE — 93010 ELECTROCARDIOGRAM REPORT: CPT | Performed by: INTERNAL MEDICINE

## 2024-08-19 PROCEDURE — 99284 EMERGENCY DEPT VISIT MOD MDM: CPT | Mod: 25

## 2024-08-19 PROCEDURE — 250N000011 HC RX IP 250 OP 636: Performed by: INTERNAL MEDICINE

## 2024-08-19 PROCEDURE — G0463 HOSPITAL OUTPT CLINIC VISIT: HCPCS | Mod: 25

## 2024-08-19 PROCEDURE — 36415 COLL VENOUS BLD VENIPUNCTURE: CPT

## 2024-08-19 PROCEDURE — 250N000011 HC RX IP 250 OP 636

## 2024-08-19 PROCEDURE — 85027 COMPLETE CBC AUTOMATED: CPT

## 2024-08-19 PROCEDURE — 80048 BASIC METABOLIC PNL TOTAL CA: CPT

## 2024-08-19 PROCEDURE — 97602 WOUND(S) CARE NON-SELECTIVE: CPT

## 2024-08-19 RX ORDER — CIPROFLOXACIN 500 MG/1
500 TABLET, FILM COATED ORAL 2 TIMES DAILY
Qty: 28 TABLET | Refills: 0 | Status: SHIPPED | OUTPATIENT
Start: 2024-08-19 | End: 2024-08-19

## 2024-08-19 RX ORDER — OXYCODONE HYDROCHLORIDE 5 MG/1
5 TABLET ORAL EVERY 6 HOURS PRN
Qty: 12 TABLET | Refills: 0 | Status: SHIPPED | OUTPATIENT
Start: 2024-08-19 | End: 2024-08-22

## 2024-08-19 RX ORDER — CIPROFLOXACIN 250 MG/1
750 TABLET, FILM COATED ORAL 2 TIMES DAILY
Qty: 84 TABLET | Refills: 0 | Status: SHIPPED | OUTPATIENT
Start: 2024-08-19 | End: 2024-09-02

## 2024-08-19 RX ADMIN — PANTOPRAZOLE SODIUM 40 MG: 40 TABLET, DELAYED RELEASE ORAL at 09:14

## 2024-08-19 RX ADMIN — HYDRALAZINE HYDROCHLORIDE 25 MG: 25 TABLET ORAL at 09:13

## 2024-08-19 RX ADMIN — ASPIRIN 81 MG: 81 TABLET, COATED ORAL at 09:14

## 2024-08-19 RX ADMIN — Medication 1 TABLET: at 09:14

## 2024-08-19 RX ADMIN — AMLODIPINE BESYLATE 5 MG: 5 TABLET ORAL at 09:14

## 2024-08-19 RX ADMIN — OXYCODONE HYDROCHLORIDE 5 MG: 5 TABLET ORAL at 09:26

## 2024-08-19 RX ADMIN — DULOXETINE HYDROCHLORIDE 60 MG: 60 CAPSULE, DELAYED RELEASE ORAL at 09:13

## 2024-08-19 RX ADMIN — LOPERAMIDE HYDROCHLORIDE 2 MG: 2 CAPSULE ORAL at 09:26

## 2024-08-19 RX ADMIN — PANCRELIPASE 3 CAPSULE: 36000; 180000; 114000 CAPSULE, DELAYED RELEASE PELLETS ORAL at 09:38

## 2024-08-19 RX ADMIN — ACETAMINOPHEN 650 MG: 325 TABLET ORAL at 02:12

## 2024-08-19 RX ADMIN — OXYCODONE HYDROCHLORIDE 5 MG: 5 TABLET ORAL at 02:12

## 2024-08-19 RX ADMIN — OXYCODONE HYDROCHLORIDE 5 MG: 5 TABLET ORAL at 12:52

## 2024-08-19 RX ADMIN — INSULIN ASPART 3 UNITS: 100 INJECTION, SOLUTION INTRAVENOUS; SUBCUTANEOUS at 09:39

## 2024-08-19 RX ADMIN — ACETAMINOPHEN 650 MG: 325 TABLET ORAL at 12:52

## 2024-08-19 RX ADMIN — CEFTRIAXONE SODIUM 2 G: 2 INJECTION, POWDER, FOR SOLUTION INTRAMUSCULAR; INTRAVENOUS at 12:42

## 2024-08-19 RX ADMIN — ENOXAPARIN SODIUM 40 MG: 40 INJECTION SUBCUTANEOUS at 12:44

## 2024-08-19 RX ADMIN — GABAPENTIN 300 MG: 300 CAPSULE ORAL at 09:14

## 2024-08-19 ASSESSMENT — ACTIVITIES OF DAILY LIVING (ADL)
ADLS_ACUITY_SCORE: 29
ADLS_ACUITY_SCORE: 37
ADLS_ACUITY_SCORE: 29
ADLS_ACUITY_SCORE: 37
ADLS_ACUITY_SCORE: 29
ADLS_ACUITY_SCORE: 37
ADLS_ACUITY_SCORE: 29

## 2024-08-19 ASSESSMENT — COLUMBIA-SUICIDE SEVERITY RATING SCALE - C-SSRS
1. IN THE PAST MONTH, HAVE YOU WISHED YOU WERE DEAD OR WISHED YOU COULD GO TO SLEEP AND NOT WAKE UP?: NO
2. HAVE YOU ACTUALLY HAD ANY THOUGHTS OF KILLING YOURSELF IN THE PAST MONTH?: NO
6. HAVE YOU EVER DONE ANYTHING, STARTED TO DO ANYTHING, OR PREPARED TO DO ANYTHING TO END YOUR LIFE?: NO

## 2024-08-19 NOTE — DISCHARGE SUMMARY
[facility]  Hospitalist Discharge Summary      Date of Admission:  8/16/2024  Date of Discharge:  8/19/2024  Discharging Provider: Dejon Anders MD  Discharge Service: Hospitalist Service, GOLD TEAM 21    Discharge Diagnoses   Septic arthritis of right knee  Hyperglycemia     Clinically Significant Risk Factors     # DMII: A1C = 9.2 % (Ref range: <5.7 %) within past 6 months       Follow-ups Needed After Discharge   Follow-up Appointments     Adult [facility] Follow-up and recommended labs and tests      Follow up with primary care provider, Ana Maria Blakely, within 7 days   for hospital follow- up.     Follow up with ID clinic within 2 weeks as arranged.     Appointments on [place] and/or [facility] (with [facility] or [facility]   provider or service). Call [phone] if you haven't heard regarding   these appointments within 7 days of discharge.            Unresulted Labs Ordered in the Past 30 Days of this Admission       Date and Time Order Name Status Description    8/19/2024 11:25 AM Fungal DNA 28S Ribosomal Non Blood In process     8/19/2024 11:24 AM Bacterial DNA 16S Ribosomal Non Blood with Reflex to NGS 16S with Reflex to Enterobacterales Identification In process     8/17/2024 10:06 AM Tissue Aerobic Bacterial Culture Routine Preliminary     8/17/2024 10:06 AM Tissue Aerobic Bacterial Culture Routine Preliminary     8/17/2024 10:06 AM Tissue Aerobic Bacterial Culture Routine Preliminary     8/17/2024 10:06 AM Fungal or Yeast Culture Routine Preliminary     8/17/2024 10:06 AM Fungal or Yeast Culture Routine Preliminary     8/17/2024 10:06 AM Fungal or Yeast Culture Routine Preliminary     8/17/2024 10:06 AM Anaerobic Bacterial Culture Routine Preliminary     8/17/2024 10:06 AM Anaerobic Bacterial Culture Routine Preliminary     8/17/2024 10:06 AM Anaerobic Bacterial Culture Routine Preliminary     8/17/2024  8:18 AM Prepare red blood cells (unit)  Preliminary     8/14/2024  1:19 PM FUNGAL OR YEAST CULTURE ROUTINE Preliminary     8/14/2024  1:19 PM ANAEROBIC BACTERIAL CULTURE ROUTINE Preliminary         These results will be followed up by ID clinic.     Discharge Disposition   Discharged to home  Condition at discharge: Stable    Hospital Course   Long Mayfield is a 54 year old male admitted on 8/16/2024. He has a history of hypertension, diabetes mellitus type II, dependent on insulin, s/p R TMA    Presented to preop on 8/16 for right knee washout by orthopedics  Found to have glucose of 638 and was sent to ED for mgmt  Also with SALVADOR, improved  Admitted to medicine 8/16  S/p right knee I&D 8/17 by ortho  Seen by ID, input much appreciated.   On ceftriaxone 8/17-8/19  Discharged 8/19 with cipro 750 po bid x 14 more days. Follow-up in ID clinic arranged.     #hyperglycemia  #DM2 insulin dependent   -resolved with 8 units of aspart in ED  -continue PTA insulin dosing when eating:   -lantus 13 units daily, glucose .     -while here, aspart 6 units with each meal, plus an additional sliding scale with meals as follows:  201-250: 1  251-300: 2  301-350: 3  351-400: 4  401+: 5    -continue PTA cymbalta     #SALVADOR  -resolved in ED with IVF  -now oral fluids     #septic arthritis of right knee  S/p I&D in OR 8/17  No systemic signs of sepsis  Monitor temperature   Seen by ID. Cultures sent. On ceftriaxone -> cipro 750 po bid x 14 more days   lovenox dvt ppx while admitted     #hypertension  -continue PTA amlodipine 5 and PTA hydralazine 25 bid  -restarted PTA aspirin    Consultations This Hospital Stay   ORTHOPAEDIC SURGERY ADULT/PEDS IP CONSULT  INFECTIOUS DISEASE [facility] ADULT IP CONSULT  CARE MANAGEMENT / SOCIAL WORK IP CONSULT  WOUND OSTOMY CONTINENCE NURSE  IP CONSULT    Code Status   Full Code    Time Spent on this Encounter   IDejon MD, personally saw the patient today and spent greater than 30 minutes discharging this patient.        Dejon Anders MD  [facility]  [address] [address]  Phone: [phone]  Fax: [phone]  ______________________________________________________________________    Physical Exam   Vital Signs: Temp: 97.4  F (36.3  C) Temp src: Oral BP: (!) 185/98 Pulse: (P) 72   Resp: 16 SpO2: 98 % O2 Device: None (Room air)    Weight: 130 lbs 8.2 oz  General Appearance: A&Ox3 in NAD  Respiratory: CTAB   Cardiovascular: RRR  GI: soft nt  Skin: wwp  Other: Right knee s/p I&D        Primary Care Physician   Ana Maria Blakely    Discharge Orders      Reason for your hospital stay    Septic arthritis of right knee     Activity    Your activity upon discharge: activity as tolerated and no driving for today     Adult [facility] Follow-up and recommended labs and tests    Follow up with primary care provider, Ana Maria Blakely, within 7 days for hospital follow- up.     Follow up with ID clinic within 2 weeks as arranged.     Appointments on [place] and/or [facility] (with [facility] or [facility] provider or service). Call [phone] if you haven't heard regarding these appointments within 7 days of discharge.     Diet    Follow this diet upon discharge: Orders Placed This Encounter      Advance Diet as Tolerated: Regular Diet Adult       Significant Results and Procedures        Discharge Medications   Current Discharge Medication List        START taking these medications    Details   ciprofloxacin (CIPRO) 250 MG tablet Take 3 tablets (750 mg) by mouth 2 times daily for 14 days  Qty: 84 tablet, Refills: 0    Associated Diagnoses: Acute pain of right knee           CONTINUE these medications which have CHANGED    Details   oxyCODONE (ROXICODONE) 5 MG tablet Take 1 tablet (5 mg) by mouth every 6 hours as needed for pain  Qty: 12 tablet, Refills: 0    Associated Diagnoses: Acute pain of right knee           CONTINUE these medications which have NOT CHANGED    Details    acetaminophen (TYLENOL) 325 MG tablet Take 650 mg by mouth every 6 hours as needed for pain      amLODIPine (NORVASC) 5 MG tablet Take 1 tablet (5 mg) by mouth daily  Qty: 30 tablet, Refills: 0    Comments: Future refills by PCP  Physician No Ref-Primary with phone number None.  Associated Diagnoses: Benign essential hypertension      aspirin 81 MG EC tablet Take 81 mg by mouth daily      bumetanide (BUMEX) 1 MG tablet Take 1 tablet (1 mg) by mouth daily Dose decreased to 1 mg on discharge.  Optimize dose on PCP visit.  Qty: 30 tablet, Refills: 0    Associated Diagnoses: Primary hypertension      cetirizine (ZYRTEC) 10 MG tablet Take 10 mg by mouth daily      DULoxetine (CYMBALTA) 60 MG capsule Take 60 mg by mouth daily      gabapentin (NEURONTIN) 100 MG capsule Take 300 mg by mouth every 12 hours      Glucagon, rDNA, (GLUCAGON EMERGENCY) 1 MG KIT Inject 1 mg into the muscle daily as needed (hypoglycemia)      hydrALAZINE (APRESOLINE) 25 MG tablet Take 25 mg by mouth 2 times daily Hold if SBP < 110      !! insulin aspart (NOVOLOG FLEXPEN) 100 UNIT/ML pen Inject 8 Units subcutaneously 2 times daily (with meals) Breakfast and lunch      !! insulin aspart (NOVOLOG FLEXPEN) 100 UNIT/ML pen Inject 1-5 Units subcutaneously 3 times daily (with meals) In addition to 8 units with each meal, inject additional units as per this sliding scale:  If 200-250 = 1   251-300 = 2   301-350 = 3  351-400 = 4  401+ = 5  Repeat BS after 3 hours and call MD if still over 400      !! insulin aspart (NOVOLOG PEN) 100 UNIT/ML pen Inject 6 Units subcutaneously every morning      insulin glargine (LANTUS PEN) 100 UNIT/ML pen Inject 13 Units subcutaneously every morning      lipase-protease-amylase (CREON 36) 19602-071704-444170 units CPEP Take 2 capsules by mouth Take with snacks or supplements (evening snack)      loperamide (IMODIUM) 2 MG capsule Take 4 mg by mouth 3 times daily as needed for diarrhea      methocarbamol (ROBAXIN) 500 MG  tablet Take 500 mg by mouth every 8 hours as needed for muscle spasms      multivitamin w/minerals (THERA-VIT-M) tablet Take 1 tablet by mouth daily  Qty: 30 tablet, Refills: 0    Associated Diagnoses: Malnutrition, unspecified type (H24)      pantoprazole (PROTONIX) 40 MG EC tablet Take 1 tablet (40 mg) by mouth daily  Qty: 30 tablet, Refills: 0    Associated Diagnoses: Gastroesophageal reflux disease without esophagitis      Lidocaine (LIDOCARE) 4 % Patch Place 2 patches onto the skin every 24 hours Apply to neck/back. To prevent lidocaine toxicity, patient should be patch free for 12 hrs daily.  Qty: 15 patch, Refills: 0    Associated Diagnoses: Chronic neck and back pain       !! - Potential duplicate medications found. Please discuss with provider.        Allergies   Allergies   Allergen Reactions    Vancomycin      Other Reaction(s): Renal Failure    Vancomycin-induced nephrotoxicity (11/2023, outside hospital)    Seasonal Allergies      HAYFEVER:    -Watery Eyes  -Eye burn    Daptomycin Rash     Likely delayed hypersensitivity reaction to daptomycin 11/2023

## 2024-08-19 NOTE — PROGRESS NOTES
Pt sent with incorrect dose of ciprofloxacin (sent with 500mg tablets instead of 250mg), RN notified nursing home RN (Miguel) that he has the incorrect dose with him. Miguel informed this RN he can order the correct dose for the patient.

## 2024-08-19 NOTE — PROGRESS NOTES
[facility]    Medicine Progress Note - Hospitalist Service, GOLD TEAM 21    Date of Admission:  8/16/2024    Assessment & Plan   Long Mayfield is a 54 year old male admitted on 8/16/2024. He has a history of hypertension, diabetes mellitus type II, dependent on insulin, s/p R TMA    Presented to preop on 8/16 for right knee washout by orthopedics  Found to have glucose of 638 and was sent to ED for mgmt  Also with SALVADOR, improved  Admitted to medicine 8/16  S/p right knee I&D 8/17 by ortho  Seen by ID, input much appreciated, awaiting microbiology add-ons if cultures negative on day 3. Cultures sent 8/17. On ceftriaxone 8/17-    #hyperglycemia  #DM2 insulin dependent   -resolved with 8 units of aspart in ED  -continue PTA insulin dosing when eating:   -lantus 13 units daily, glucose .     -while here, aspart 6 units with each meal, plus an additional sliding scale with meals as follows:  201-250: 1  251-300: 2  301-350: 3  351-400: 4  401+: 5    -continue PTA cymbalta     #SALVADOR  -resolved in ED with IVF  -now oral fluids     #septic arthritis of right knee  S/p I&D in OR 8/17  No systemic signs of sepsis  Monitor temperature   Seen by ID. Cultures sent. On ceftriaxone. Trending CRP  lovenox dvt ppx  8/18-     #hypertension  -continue PTA amlodipine 5 and PTA hydralazine 25 bid  -restarted PTA aspirin          Diet: Advance Diet as Tolerated: Regular Diet Adult    DVT Prophylaxis: Enoxaparin (Lovenox) SQ  Osman Catheter: Not present  Lines: None     Cardiac Monitoring: None  Code Status: Full Code      Clinically Significant Risk Factors        # Hyperkalemia: Highest K = 5.6 mmol/L in last 2 days, will monitor as appropriate       # Hypoalbuminemia: Lowest albumin = 2.7 g/dL at 8/16/2024  9:11 AM, will monitor as appropriate     # Hypertension: Noted on problem list          # DMII: A1C = 9.2 % (Ref range: <5.7 %) within past 6 months       #  Financial/Environmental Concerns: none               Disposition Plan     Medically Ready for Discharge: Anticipated Tomorrow             Dejon Anders MD  Hospitalist Service, GOLD TEAM 21  [facility]  Securely message with [employer] (more info)  Text page via [employer] Paging/Directory   See signed in provider for up to date coverage information  ______________________________________________________________________    Interval History   No events  No complaints  Reports good appetite     Physical Exam   Vital Signs: Temp: 97.4  F (36.3  C) Temp src: Oral BP: (!) 185/98 Pulse: (P) 72   Resp: 16 SpO2: 98 % O2 Device: None (Room air)    Weight: 130 lbs 8.2 oz    General Appearance: A&Ox3 in NAD  Respiratory: CTAB   Cardiovascular: RRR   GI: soft nt  Skin: wwp  Other: Right knee s/p I&D     Medical Decision Making             Data      ,

## 2024-08-19 NOTE — PLAN OF CARE
Patient A/Ox4. Denies CP, SOB, dizziness/LH. LSCTA. +fl/BS. Voiding well in bathroom. Dressing to knee CDI, re-wrapped by evening RN. Tolerating regular diet without NV, BG checks. Activity level is good, pt walks in room occasionally. IV SL. Pain rated as comfortably managed throughout shift, given oxycodone and tylenol PRN x1 so far. SW following for placement. Patient has demonstrated ability to call appropriately. Patient is resting with call light within reach. Will continue to monitor.

## 2024-08-19 NOTE — PROGRESS NOTES
Orthopedic Surgery Progress Note 08/19/2024    Subjective:  No acute overnight events, vitals are stable. Pain is well-tolerated on current regimen. Denies any new numbness or tingling, fevers/chills, nausea/vomiting, chest pain or shortness of breath.    Objective:  Temp: (P) 98.4  F (36.9  C) Temp src: (P) Oral BP: (!) 175/89 Pulse: (P) 72   Resp: (P) 18   O2 Device: (P) None (Room air)      Exam:  Gen: No acute distress, resting comfortably in bed. Alert and oriented, responds to questions appropriately.   MSK:  RLE:  - Dressings c/d/i  - SILT femoral/tibial/sural/saphenous/DP/SP nerves  - Fires quad, TA, GaSC  - PT/DP pulses 2+, foot wwp    Drain output: 75.    Recent Labs   Lab 08/18/24  0744 08/17/24  0648 08/16/24  1540   WBC 6.2 5.7 7.7   HGB 7.4* 7.0* 7.9*    337 395     Erythrocyte Sedimentation Rate   Date Value Ref Range Status   08/14/2024 105 (H) 0 - 20 mm/hr Final       Culture results: NGTD    Assessment: Long Mayfield is a 54 year old male with a history of septic arthritis on the right knee who presented with repeat knee effusion and swelling with aspiration with concern for recurrent septic arthritis and is now status post repeat arthroscopic I&D of the right knee on 8/17/2024 with Dr. Phoenix     Plan:  Medicine Primary    Activity: Up with assist.  Weight bearing status: WBAT  Antibiotics: Ancef 2g daily until cultures result per ID  Diet: Begin with clear fluids and progress diet as tolerated.  DVT prophylaxis: Per primary  Elevation: Elevate RLE while at rest for edema control.  Wound Care: Dressings in place for 5 days.  Drains: Please document output per shift.  Pain management: Utilize all oral meds first, IV meds for severe breakthrough pain after PO meds given adequate time to take effect.  X-rays: Complete  Physical Therapy: ROM, ADL's, mobilization  Occupational Therapy: ADLs.  Labs: Trend CRP q72hrs .  Cultures: Pending, follow culture results closely.  Consults: PT, OT, ID.  Appreciate assistance in caring for this patient.  Disposition: Pending progress with therapies, pain control on orals, medical stability, and cultures.  Follow-up: Clinic with Dr. Espinoza in 1 week from discharge.    Orthopedic surgery staff for this patient is Dr. Phoenix. Discussed.  Pilo Maguire, MS  [facility]     Resident/Fellow Attestation   I, Jose Cain MD, was present with the medical/OMI student who participated in the service and in the documentation of the note.  I have verified the history and personally performed the physical exam and medical decision making.  I agree with the assessment and plan of care as documented in the note.        Jose Cain MD  PGY1  Date of Service (when I saw the patient): 08/19/24      No future appointments.

## 2024-08-19 NOTE — PROGRESS NOTES
Care Management Discharge Note    Discharge Date: 08/19/2024       Discharge Disposition: Return [place] [address]  PH: [phone]  Fax: [phone]    Discharge Services: I/ADL assistance from SNF staff; continued Care Coordination for return to community.    [facility] CC  Fabiola Melgar RN, PHN, MSN  PH: [phone]  Fax: [phone]  [email or web address]     Discharge DME: None    Discharge Transportation: health plan transportation - [facility] PMAP plan (PH: [phone])    Private pay costs discussed: Not applicable d/t pt having MA.    Does the patient's insurance plan have a 3 day qualifying hospital stay waiver?  Yes     Which insurance plan 3 day waiver is available? Alternative insurance waiver    Will the waiver be used for post-acute placement? No - pt is returning to SNF for LTC    PAS Confirmation Code:  N/A, as pt is returning to prior SNF.  Patient/family educated on Medicare website which has current facility and service quality ratings: no    Education Provided on the Discharge Plan: Yes  Persons Notified of Discharge Plans: Bedside nurse, patient, Dr. Anders, and Kimberly in Admissions at [facility].  Patient/Family in Agreement with the Plan:  Yes    Handoff Referral Completed: No; pt's care is managed by SNF PCP at this time. SNF PCP will send hand-off to pt's PCP if/when pt discharges back to the community.    Additional Information:  1330: MADYSON received notification from bedside nurse that pt is med ready to discharge today and needs a cab ride for transportation    1332: MADYSON spoke to Kimberly in Admissions at [facility], who confirmed that pt has a bed-hold. MADYSON informed Kimberly that pt is med ready to discharge today. Kimberly stated that pt can come anytime before 7:00 p.m. today, confirmed fax # for orders to be sent to (documented above).    1339: Discharge orders faxed  and sent via [employer].    1400: MADYSON arranged transportation through pt's [facility] PMAP plan (PH: [phone]). [employer] will pick-up pt from the main lobby at 3:00 p.m.,  will call when in route.    1420: MADYSON updated pt at bedside of the above discharge transportation details, pt to head down to main lobby at 2:50 p.m.    1422: MADYSON updated bedside nurse of discharge ride details via [employer].    1423: MADYSON spoke to Kimberly in Admissions, provided update of pt's discharge ride time. Kimberly stated that orders were received, nothing further needed from hospital.          LUDIN Jeffrey, LSW  [facility]   PHONE: [phone]    MADYSON Coverage SEARCHABLE in [employer] - search [facility] SW  (or by name)  Or click on link below:  [facility] [place]

## 2024-08-19 NOTE — PROGRESS NOTES
General ID Service: Follow Up Note      Patient:  Long Mayfield, Date of birth 1970, Medical record number [id number]  Date of Visit:  August 19, 2024         Assessment and Recommendations:   Problem List:  Right knee septic arthritis s/p I&D 8/17/2024 8/17/2024 intra-op cultures, in-process  8/14/2024 synovial fluid aspirate: analysis yellow, turbid, TNC 75438, 100% neutrophils; culture NGTD  Hx of culture-negative right knee arthritis, unclear etiology, previous work up negative  S/p iv ceftriaxone (7/12 - 8/8, 2024)  7/11/2024 s/p I&D, intra-op cultures negative; 16S negative.   7/10/2024 Synovial fluid analysis TNC 23084, 93% neutrophils; cultures negative   Type 2 DM (A1c 9.2% on 8/17/2024)  Hx of diabetic foot infection, leading to MSSA bacteremia s/p right [place] [facility] [place]  Peripheral vascular disease  Hx of alcohol use disorder  Chronic diarrhea, thought 2/2 chronic pancreatitis  Hx of pancreatitis    Recommendations:  Continue ceftriaxone 2g q24h while here  On discharge, could transition to ciprofloxain 750mg PO q12h to maintain similar spectrum while transitioning to PO  Would aim for further two weeks of therapy (shorter course given he just finished 4 weeks of IV ceftriaxone for the same complaint)  Will need EKG prior to transitioning to this regimen to ensure QTc not too prolonged  Have messaged our clinic to arrange follow-up in ~2 weeks to assess response to therapy and determine need for longer course of abx  Contact microbiology lab and have ordered both 16s and 28s testing, will take several days for results.   Only a small amount of sample remains, so if only one able to be sent, have asked to prioritize 16s.    ID will sign off at this time, but please do not hesitate to call with any questions!    Discussion:  53yo M with h/o uncontrolled DM2, previous DFI s/p right TMA who was hospitalized for right knee septic arthritis s/p I&D on 7/11 with negative work-up  including 16s. He received 4 weeks of IV ceftriaxone (ending on 8/8/24) with improvement in inflammatory markers but with minimal symptomatic improvement, per patient. He is now re-admitted and has had a repeat I&D (8/17/24), again with negative cultures. He is symptomatically improving on ceftriaxone at present.     Given he recently finished a 4 week course of ceftriaxone and is symptomatically improving, I think we could potentially swap to PO therapy with ciprofloxacin (will need updated EKG). Will arrange for ID clinic follow-up in about 2 weeks to assess need for longer abx course. Have also asked microbiology lab to send 16s and, if sample size permits, 28s testing.    Willian Mendenhall MD  Division of Infectious Diseases and International Medicine  P: [phone]        Interval History:     Seen and examined. Says knee is feeling a bit better today. Denies fevers/chills overnight and today.         Review of Systems:     Full 9 pt ROS obtained and negative unless noted above in assessment and interval history.         Current Antimicrobials     Ceftriaxone         Physical Exam:   Ranges for vital signs:  Temp:  [97.4  F (36.3  C)-98.4  F (36.9  C)] 97.4  F (36.3  C)  Pulse:  [72] (P) 72  Resp:  [16-18] 16  BP: (175-185)/(89-98) 185/98  SpO2:  [98 %] 98 %    Intake/Output Summary (Last 24 hours) at 8/19/2024 1140  Last data filed at 8/18/2024 2215  Gross per 24 hour   Intake --   Output 75 ml   Net -75 ml     Exam:  GENERAL:  well-developed, well-nourished, sitting in bed in no acute distress.   ENT:  Head is normocephalic, atraumatic. Oropharynx is moist without exudates or ulcers.  EYES:  Eyes have anicteric sclerae.    NECK:  Supple.  LUNGS:  Clear to auscultation.  CARDIOVASCULAR:  Regular rate and rhythm with no murmurs, gallops or rubs.  ABDOMEN:  Normal bowel sounds, soft, nontender.  EXT:Right leg wrapped with drain in place, serosang fluid in line. Other limbs WNL.  SKIN:  No acute rashes.     NEUROLOGIC:  Grossly nonfocal.         Laboratory Data:   Reviewed.  Pertinent for:    Microbiology:  Culture   Date Value Ref Range Status   08/17/2024 No anaerobic organisms isolated after 1 day  Preliminary   08/17/2024 No anaerobic organisms isolated after 1 day  Preliminary   08/17/2024 No growth after 1 day  Preliminary   08/17/2024 No growth after 1 day  Preliminary   08/17/2024 No growth after 1 day  Preliminary   08/17/2024 No growth after 1 day  Preliminary   08/17/2024 No anaerobic organisms isolated after 1 day  Preliminary   08/17/2024 No growth after 1 day  Preliminary   08/17/2024 No growth after 1 day  Preliminary   08/14/2024 No anaerobic organisms isolated after 4 days  Preliminary   08/14/2024 No Growth  Final   08/14/2024 No growth after 4 days  Preliminary   07/12/2024   Final    No ESBL-producing organisms isolated. A negative result does not preclude the presence of ESBL-producing organisms.   07/11/2024 No anaerobic organisms isolated  Final   07/11/2024 No anaerobic organisms isolated  Final   07/11/2024 No Growth  Final   07/11/2024 No Growth  Final   07/11/2024 No Growth  Final   07/11/2024 No Growth  Final   07/11/2024 No anaerobic organisms isolated  Final   07/11/2024 No Growth  Final   07/11/2024 No Growth  Final   07/10/2024 No anaerobic organisms isolated  Final   07/10/2024 No Growth  Final   05/29/2024 No Growth  Final   05/29/2024 No anaerobic organisms isolated  Final   05/22/2024 No Growth  Final   05/22/2024 No anaerobic organisms isolated  Final   05/22/2024 See corresponding culture for results  Final     GS Culture   Date Value Ref Range Status   07/11/2024 See corresponding culture for results  Final   07/11/2024 See corresponding culture for results  Final   07/11/2024 See corresponding culture for results  Final   05/29/2024 See corresponding culture for results  Final     Inflammatory Markers    Recent Labs   Lab Test 08/14/24  1253 07/10/24  1157   * 112*      Metabolic Studies       Recent Labs   Lab Test 08/19/24  0925 08/19/24  0759 08/19/24  0611 08/19/24  0205 08/18/24  2158 08/18/24  1801 08/18/24  1011 08/18/24  0744 08/17/24  0755 08/17/24  0648 08/16/24  1215 08/16/24  0911 08/16/24  0654 08/16/24  0610 08/16/24  0607 07/31/24  1550 07/17/24  0936 07/17/24  0555 07/13/24  1149 07/13/24  0833 05/06/24  0818 05/06/24  0724 05/05/24  1744 05/05/24  1438 01/23/23  1945 08/07/22  0828 08/06/22 2125 08/06/22 2005   NA  --   --  132*  --   --   --   --  136  --  135  --  135  --  126*  --   --   --  135   < > 133*   < > 133*  --   --    < >  --   --  134   POTASSIUM  --   --  5.6*  --   --   --   --  5.5*  --  5.0  --  3.5  --  5.4*  --   --   --  4.8   < > 5.6*   < > 4.8  --   --    < >  --   --  2.9*   CHLORIDE  --   --  103  --   --   --   --  104  --  105  --  110*  --  92*  --   --   --  103   < > 100   < > 99  --   --    < >  --   --  97   CO2  --   --  23  --   --   --   --  21*  --  22  --  14*  --  21*  --   --   --  23   < > 24   < > 22  --   --    < >  --   --  24   ANIONGAP  --   --  6*  --   --   --   --  11  --  8  --  11  --  13  --   --   --  9   < > 9   < > 12  --   --    < >  --   --  13   BUN  --   --  21.5*  --   --   --   --  17.0  --  25.7*  --  24.5*  --  36.9*  --   --   --  29.0*   < > 21.9*   < > 16.6  --   --    < >  --   --  12   CR  --   --  1.15  --   --   --   --  0.98  --  1.14  --  0.97  --  1.49*  --  1.27*  --  1.28*   < > 1.38*   < > 0.99  --   --    < >  --   --  0.71   GFRESTIMATED  --   --  75  --   --   --   --  >90  --  75  --  >90  --  55*  --  66  --  66   < > 60*   < > 89  --   --    < >  --   --  >90   * 318* 343* 234* 119* 77   < > 324*   < > 140*   < > 188*   < > 638*   < >  --    < > 217*   < > 218*   < > 215*   < >  --    < >  --    < > 407*   A1C  --   --   --   --   --   --   --   --   --  9.2*  --   --   --   --   --   --   --   --   --   --   --   --   --   --    < >  --   --   --    AROLDO  --   --  8.6*   --   --   --   --  8.2*  --  8.3*  --  6.2*  --  8.6*  --   --   --  8.9   < > 8.3*   < > 9.0  --   --    < >  --   --  8.5   PHOS  --   --   --   --   --   --   --   --   --   --   --   --   --   --   --   --   --   --   --   --   --  2.7  --  3.8   < >  --   --   --    MAG  --   --   --   --   --   --   --   --   --   --   --   --   --   --   --   --   --   --   --  2.3  --  1.9  --  2.1   < >  --   --   --    LACT  --   --   --   --   --   --   --   --   --   --   --   --   --   --   --   --   --   --   --   --   --   --   --   --   --  3.4*  --  4.8*    < > = values in this interval not displayed.     Hepatic Studies    Recent Labs   Lab Test 08/16/24  0911 07/31/24  1550 07/10/24  1157 05/04/24  1126 05/03/24  1626 01/23/23  1945 08/06/22 2005   BILITOTAL <0.2  --  0.2 0.2 <0.2 0.2 0.2   ALKPHOS 138 151* 188* 134 138 113 118   ALBUMIN 2.7*  --  3.5 3.4* 3.4* 4.1 3.0*   AST 15  --  17 15 17 35 71*   ALT 7 10 14 8 8 27 57     Hematology Studies      Recent Labs   Lab Test 08/19/24  0611 08/18/24  0744 08/17/24  0648 08/16/24  1540 08/16/24  0610 08/14/24  1253 03/08/22  1756 10/08/17  2105   WBC 6.1 6.2 5.7 7.7 6.9 8.0   < > 3.9*   ANEU  --   --   --   --   --   --   --  2.4   ALYM  --   --   --   --   --   --   --  0.9   MIKA  --   --   --   --   --   --   --  0.5   AEOS  --   --   --   --   --   --   --  0.1   HGB 7.1* 7.4* 7.0* 7.9* 7.4* 7.4*   < > 12.1*   HCT 23.6* 24.3* 22.9* 25.8* 24.7* 25.0*   < > 34.2*    361 337 395 374 312   < > 238    < > = values in this interval not displayed.     Urine Studies     Recent Labs   Lab Test 08/16/24  0957 05/04/24  0246 03/08/22 2011   URINEPH 5.5 5.5 5.0   NITRITE Negative Negative Negative   LEUKEST Negative Negative Negative   WBCU <1 <1 8*         Latest Ref Rng & Units 8/19/2024     6:11 AM 8/18/2024     7:44 AM 8/17/2024     6:48 AM 8/16/2024     3:40 PM 8/16/2024     9:11 AM   Transplant Immunosuppression Labs   Creat 0.67 - 1.17 mg/dL 1.15  0.98  1.14    0.97    Urea Nitrogen 6.0 - 20.0 mg/dL 21.5  17.0  25.7   24.5    WBC 4.0 - 11.0 10e3/uL 6.1  6.2  5.7  7.7     Neutrophil %    57

## 2024-08-19 NOTE — CONSULTS
[facility]  WOC Nurse Inpatient Assessment     Consulted for: Right middle finger butn    Summary: Patient with burn to tip of right middle finger prior to admission- burn from steam from microwave popcorn.    Patient History (according to provider note(s):      Per Dr Jose Cain on 8/19/2024: Long Mayfield is a 54 year old male with a history of septic arthritis on the right knee who presented with repeat knee effusion and swelling with aspiration with concern for recurrent septic arthritis and is now status post repeat arthroscopic I&D of the right knee on 8/17/2024 with Dr. Phoenix     Assessment:      Areas visualized during today's visit: Right middle finger    Wound location: Tip of right middle finger    8/19/2024    Last photo: 8/19/2024  Wound due to: Burn  Wound history/plan of care: from steam from bag of microwave popcorn, present on admission  Wound base: 100 % dermis     Palpation of the wound bed: normal      Drainage: scant     Description of drainage: serous     Measurements (length x width x depth, in cm): 1  x 1  x  0.1 cm      Tunneling: N/A     Undermining: N/A  Periwound skin: Intact      Color: pink      Temperature: normal   Odor: none  Pain: denies   Pain interventions prior to dressing change: patient tolerated well  Treatment goal: Heal   STATUS: initial assessment  Supplies ordered: supplies stored on unit       Treatment Plan:     Right middle finger: Daily : Wash with saline and gauze or soap and water. Coat wound with thin layer of bacitracin and cover with BandAid.      Orders: Written    RECOMMEND PRIMARY TEAM ORDER: None, at this time  Education provided: plan of care  Discussed plan of care with: Patient  WOC nurse follow-up plan: signing off  Notify WOC if wound(s) deteriorate.  Nursing to notify the Provider(s) and re-consult the WOC Nurse if new skin concern.    DATA:     Current support surface: Standard  Standard Isoflex  gel  Containment of urine/stool: Continent of bladder and Continent of bowel  BMI: Body mass index is 21.72 kg/m .   Active diet order: Orders Placed This Encounter      Advance Diet as Tolerated: Regular Diet Adult     Output: I/O last 3 completed shifts:  In: -   Out: 75 [Drains:75]     Labs:   Recent Labs   Lab 08/19/24  0611 08/18/24  0744 08/17/24  0648 08/16/24  1540 08/16/24  0911 08/16/24  0610   ALBUMIN  --   --   --   --  2.7*  --    HGB 7.1*   < > 7.0*   < >  --  7.4*   INR  --   --   --   --   --  1.14   WBC 6.1   < > 5.7   < >  --  6.9   A1C  --   --  9.2*  --   --   --     < > = values in this interval not displayed.     Pressure injury risk assessment:   Sensory Perception: 3-->slightly limited  Moisture: 3-->occasionally moist  Activity: 3-->walks occasionally  Mobility: 3-->slightly limited  Nutrition: 4-->excellent  Friction and Shear: 3-->no apparent problem  Blaze Score: 19    Kathya Washington RN CWOCN  Pager no longer is use, please contact through [place] group: [facility]  Dept. Office Number: *[phone]

## 2024-08-19 NOTE — PLAN OF CARE
"Goal Outcome Evaluation:      Plan of Care Reviewed With: patient    Overall Patient Progress: improvingOverall Patient Progress: improving       VS: BP (!) 185/98 (BP Location: Right leg, Patient Position: Semi-Butcher's, Cuff Size: Adult Regular)   Pulse (P) 72   Temp 97.4  F (36.3  C) (Oral)   Resp 16   Ht 1.651 m (5' 5\")   Wt 59.2 kg (130 lb 8.2 oz)   SpO2 98%   BMI 21.72 kg/m       O2: SpO2 >90% on RA. Denies SOB/chest pain.    Output: Voids adequately and spontaneously into toilet    Last BM: 8/19, loperamide given    Activity: Independent, steady, walker    Skin: R knee surgical incision, R middle finger burn (seen by WOC today, dressing change due 8/20)    Pain: Managed with PRN oxycodone    CMS: A&Ox4. Baseline neuropathy.    Dressing: Small serosanguinous dried drainage on anterior knee, ace wrap unwrapped and reapplied.    Diet: Regular, BG checks    LDA: Hemovac in place    Equipment: IV pole, personal belongings, walker    Plan: Pending   Additional Info: Declined lunch BG check, has dexcom in place.        DISCHARGE SUMMARY    Pt discharging to: Back to LTC facility   Transportation: Taxi   AVS given and discussed: Pt was given AVS and pt states understanding of content. Pt has no further questions. Paperwork sent with pt to nursing home.   Stoplight Tool given and discussed: Yes, no further questions.  Medications given: Yes, discussed. No further questions. Home loperamide returned to pt.   Belongings returned: Yes, ensured all belongings packed and sent with pt. No items in security.   Comments: Escorted safely to elevators. 3295. RN to RN report given to Miguel at [facility].           "

## 2024-08-20 ENCOUNTER — TELEPHONE (OUTPATIENT)
Dept: ORTHOPEDICS | Facility: CLINIC | Age: 54
End: 2024-08-20
Payer: COMMERCIAL

## 2024-08-20 DIAGNOSIS — T85.838A BLEEDING FROM JACKSON-PRATT DRAIN, INITIAL ENCOUNTER: Primary | ICD-10-CM

## 2024-08-20 LAB
ATRIAL RATE - MUSE: 78 BPM
DIASTOLIC BLOOD PRESSURE - MUSE: NORMAL MMHG
INTERPRETATION ECG - MUSE: NORMAL
P AXIS - MUSE: 64 DEGREES
PR INTERVAL - MUSE: 174 MS
QRS DURATION - MUSE: 64 MS
QT - MUSE: 392 MS
QTC - MUSE: 446 MS
R AXIS - MUSE: -14 DEGREES
SYSTOLIC BLOOD PRESSURE - MUSE: NORMAL MMHG
T AXIS - MUSE: 16 DEGREES
VENTRICULAR RATE- MUSE: 78 BPM

## 2024-08-20 NOTE — PROGRESS NOTES
Brief Ortho Progress Note    I was contacted by Dr. Cheatham at the [facility] ED. This patient had an I&D and drain placement into the R knee on 8/17 with Dr. Phoenix. He was discharged today from [facility]. Reportedly, patient went to use restroom around 1600 and ace wrap got caught in clothing and he felt it tugging on drain. CT scan looks reassuring for drain placement and Dr. Cheatham noted that drain sutures are in place and the drain tubing is in place.     We will schedule the patient for follow up within 1 week with Dr. Espinoza. Schedulers messaged to ensure follow up is scheduled.    Tita Xavier MD  Resident Physician  Orthopedic Surgery

## 2024-08-20 NOTE — TELEPHONE ENCOUNTER
Pt is POST OP, see ON FILE (8.16.24)    Pt had to go to the ER, due to his POST OP bandage concerns and also POST OP drain concerns.    Pt would like to see MD Alexis on 8.21.24 >> but MD Alexis is booked out until 9.18.24    Are you able to fit this pt in?    This TE is being sent as HIGH PRIORITY.    Pt was directed to the After Hours On Call MD    Pt's call was also transferred to the Nurse Triage line.    Please CALL pt, as soon as possible, to discuss pt's concerns and a plan of care. Thank you.

## 2024-08-20 NOTE — ED TRIAGE NOTES
Patient arrives EMS from [facility]. Had knee surgery on Saturday and was discharged today. Pt went to use restroom around 1600 and ace wrap got caught in clothing and pulled out.

## 2024-08-20 NOTE — ED PROVIDER NOTES
Emergency Department Note      History of Present Illness     Chief Complaint  Post-op Problem    HPI  Long Mayfield is a 54 year old male who presents the emergency room after feeling like earlier he pulled on the hematoma drainage tube from his right knee.  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  He states he has no other trauma, no other pain and no other concerns.      Independent Historian  No    Review of External Notes  Yes I have reviewed the patient's last hospital admission to the [facility] from the 16th to 19 August 2024.  Had his right knee washed out by Ortho 2 days ago and was discharged on ceftriaxone and Cipro with ID follow-up.    It looks like his original surgery was done during an admission on 11 July of this year by Dr. Espinoza over at the [facility] for pyogenic arthritis of the right knee joint.      Past Medical History   Medical History and Problem List  Past Medical History:   Diagnosis Date    Allergic rhinitis     Anemia     Arthritis     Bell's palsy     Cervicalgia     Diabetes (H)     Diabetic foot ulcer (H)     Generalized edema     Hyperkalemia     Hypertension     Hypo-osmolality and hyponatremia     Major depressive disorder, recurrent episode, mild (H24)     Other acute osteomyelitis, right ankle and foot (H)     Other chronic pancreatitis (H)     Other polyosteoarthritis     Solitary pulmonary nodule        Medications  acetaminophen (TYLENOL) 325 MG tablet  amLODIPine (NORVASC) 5 MG tablet  aspirin 81 MG EC tablet  bumetanide (BUMEX) 1 MG tablet  cetirizine (ZYRTEC) 10 MG tablet  ciprofloxacin (CIPRO) 250 MG tablet  DULoxetine (CYMBALTA) 60 MG capsule  gabapentin (NEURONTIN) 100 MG capsule  Glucagon, rDNA, (GLUCAGON EMERGENCY) 1 MG KIT  hydrALAZINE (APRESOLINE) 25 MG tablet  insulin aspart (NOVOLOG FLEXPEN) 100 UNIT/ML pen  insulin aspart (NOVOLOG FLEXPEN) 100 UNIT/ML  pen  insulin aspart (NOVOLOG PEN) 100 UNIT/ML pen  insulin glargine (LANTUS PEN) 100 UNIT/ML pen  Lidocaine (LIDOCARE) 4 % Patch  lipase-protease-amylase (CREON 36) 38014-492009-043781 units CPEP  loperamide (IMODIUM) 2 MG capsule  methocarbamol (ROBAXIN) 500 MG tablet  multivitamin w/minerals (THERA-VIT-M) tablet  oxyCODONE (ROXICODONE) 5 MG tablet  pantoprazole (PROTONIX) 40 MG EC tablet        Surgical History   Past Surgical History:   Procedure Laterality Date    ARTHROSCOPY KNEE Right 8/17/2024    Procedure: Arthroscopic;  Surgeon: Jeff Phoenix MD;  Location: [facility] OR    COLONOSCOPY N/A 05/07/2024    Procedure: Colonoscopy;  Surgeon: Nina Moya MD;  Location:  GI    ESOPHAGOSCOPY, GASTROSCOPY, DUODENOSCOPY (EGD), COMBINED N/A 05/06/2024    Procedure: Esophagoscopy, gastroscopy, duodenoscopy (EGD), combined;  Surgeon: Nina Moya MD;  Location:  GI    ESOPHAGOSCOPY, GASTROSCOPY, DUODENOSCOPY (EGD), COMBINED N/A 05/07/2024    Procedure: Esophagoscopy, gastroscopy, duodenoscopy (EGD), combined;  Surgeon: Nina Moya MD;  Location:  GI    IRRIGATION AND DEBRIDEMENT KNEE, COMBINED Right 8/17/2024    Procedure: IRRIGATION AND DEBRIDEMENT, Right KNEE,;  Surgeon: Jeff Phoenix MD;  Location: [facility] OR    IRRIGATION AND DEBRIDEMENT SPINE Right 7/11/2024    Procedure: Irrigation and debridement knee;  Surgeon: Dejon Espinoza MD;  Location: [facility] OR    ORTHOPEDIC SURGERY      partial amputation of right foot Right     PATELLA SURGERY           Physical Exam   Patient Vitals for the past 24 hrs:   BP Temp Temp src Pulse Resp SpO2   08/19/24 2005 (!) 160/109 97.8  F (36.6  C) Oral 74 16 96 %       Physical Exam  Vitals: reviewed by me  General: Pt seen on [facility], pleasant, cooperative, and alert to conversation  Eyes: Tracking well, clear conjunctiva BL  ENT: MMM, midline trachea.   Lungs: No tachypnea, no accessory muscle use. No respiratory  distress.   CV: Rate as above  MSK: no joint effusion.  Right knee is bandaged and when bandages removed, there is a clear insertion point of the catheter into the knee and it appears to be in place.  There is a suture around the insertion site, and also 1 nearby tethering around the tube and both of these are intact.  Skin: No rash  Neuro: Clear speech and no facial droop.  Psych: Not RIS, no e/o AH/VH      Diagnostics   Lab Results   Labs Ordered and Resulted from Time of ED Arrival to Time of ED Departure - No data to display    Imaging  CT Knee Right w/o Contrast   Final Result   IMPRESSION:   1.  Surgical drain within the right knee.   2.  Tricompartmental degenerative arthrosis with a large heterogeneous joint effusion, possibly reflecting a hemarthrosis.                     ED Course      Discussion of Management   Yes I spoke with the orthopedist on-call at [facility] covering the group that did the original surgery and washout.  It is felt that the tube is still very much in place after the physician he would the CT scan.  The physicians name was Dr. Xavier        Optional/Additional Documentation  Stress/Adjustment Disorders      Medical Decision Making / Diagnosis       MDM  This is a very pleasant 54-year-old presents the emergency room with what sounds like a concern that he possibly had pulled out his surgical drain.  From what I can see externally, there is no evidence of trauma to the skin and both of the stay sutures, 1 of which is of course tied around the tube, are intact with only minimal laxity noted at the skin surface.  There is no bleeding, the drain still seems to have blood coming out of it and based my discussion with the orthopedist, I do think that discharge is appropriate.  He certainly is high risk given his recent washout and diabetes history and the orthopedic team has recommended very close outpatient follow-up and they are going to get in touch with him and set up an  appointment in the neck several days I am told.  Patient feels comfortable with this plan, will plan for discharge as above.    ICD-10 Codes:    ICD-10-CM    1. Postoperative surgical complication involving musculoskeletal system associated with musculoskeletal procedure, unspecified complication  M96.89       2. Hemarthrosis  M25.00                           Mark Cheatham MD  08/19/24 3302

## 2024-08-20 NOTE — ED NOTES
Called [facility] and let them know patient will be coming back. Reviewed AVS with ANGUS Evans. Transport set up.

## 2024-08-20 NOTE — DISCHARGE INSTRUCTIONS
As we discussed, it does look like the tube is still in place on the CT scan and on my exam outside on the scan.  [facility] and they also agree that you should continue with your discharge and they will try and see you in the next several days with an expedited appointment I was told.  Please come back to the ER immediately with any concerns you have or any new symptoms.  Please be very careful to not pull out the drain if you are at all able.   LOW SALT  AND LOW FAT DIET. CONTINUE CURRENT MEDICATIONS TAKING REGULARLY. DECREASE IMDUR DOSAGE TO 30 MG. DAILY. [address] OXYGEN AT 2 LITRE/MIN AS NEEDED. REGULAR WALKING ADVISED. NEXT APPOINTMENT IN 1 MONTH.

## 2024-08-20 NOTE — ED NOTES
Bed: ED24  Expected date:   Expected time:   Means of arrival:   Comments:  Ting 2 54m dislodged tube ETA now

## 2024-08-21 NOTE — TELEPHONE ENCOUNTER
Other: Wade Long is calling because he has some questions, his tube that is coming of his  incision is leaking    Could we send this information to you in [employer] or would you prefer to receive a phone call?:   Patient would prefer a phone call   Okay to leave a detailed message?: Yes at Cell number on file:    Telephone Information:   Mobile [phone]

## 2024-08-21 NOTE — TELEPHONE ENCOUNTER
RN called patient and reiterated what they are suppose to help him with. Patient verbalized understanding    Laura Hartley RN

## 2024-08-21 NOTE — TELEPHONE ENCOUNTER
RN called patient to assess condition. Patient stating that he has a lot of blood coming out of where the drain is placed. He states this is going around the tube and saturating the gauze and ace bandage. RN asked patient if he or nursing staff at [facility] is stripping his drain. Patient stating he doesn't know what this is. RN got in touch with patient's RN staff that states they don't have an order for this. RN spoke with Dr. Espinoza who verbalized order for prn drain stripping and maintenance stripping of JOSH drain. RN faxed this order over to [phone].     Order can be found under nursing orders tab    Laura Hartley RN

## 2024-08-22 LAB
BACTERIA TISS BX CULT: NO GROWTH

## 2024-08-23 ENCOUNTER — TELEPHONE (OUTPATIENT)
Dept: ORTHOPEDICS | Facility: CLINIC | Age: 54
End: 2024-08-23
Payer: COMMERCIAL

## 2024-08-23 ENCOUNTER — HOSPITAL ENCOUNTER (EMERGENCY)
Facility: CLINIC | Age: 54
Discharge: HOME | End: 2024-08-23
Attending: EMERGENCY MEDICINE | Admitting: EMERGENCY MEDICINE
Payer: COMMERCIAL

## 2024-08-23 VITALS
TEMPERATURE: 98.5 F | DIASTOLIC BLOOD PRESSURE: 101 MMHG | RESPIRATION RATE: 16 BRPM | HEART RATE: 89 BPM | OXYGEN SATURATION: 98 % | SYSTOLIC BLOOD PRESSURE: 170 MMHG

## 2024-08-23 DIAGNOSIS — Z51.89 VISIT FOR WOUND CHECK: ICD-10-CM

## 2024-08-23 LAB
ANION GAP SERPL CALCULATED.3IONS-SCNC: 10 MMOL/L (ref 7–15)
BASOPHILS # BLD AUTO: 0 10E3/UL (ref 0–0.2)
BASOPHILS NFR BLD AUTO: 1 %
BUN SERPL-MCNC: 21.2 MG/DL (ref 6–20)
CALCIUM SERPL-MCNC: 8.7 MG/DL (ref 8.8–10.4)
CHLORIDE SERPL-SCNC: 103 MMOL/L (ref 98–107)
CREAT SERPL-MCNC: 1.25 MG/DL (ref 0.67–1.17)
CRP SERPL-MCNC: 101.15 MG/L
EGFRCR SERPLBLD CKD-EPI 2021: 68 ML/MIN/1.73M2
EOSINOPHIL # BLD AUTO: 0.4 10E3/UL (ref 0–0.7)
EOSINOPHIL NFR BLD AUTO: 4 %
ERYTHROCYTE [DISTWIDTH] IN BLOOD BY AUTOMATED COUNT: 17.7 % (ref 10–15)
GLUCOSE SERPL-MCNC: 230 MG/DL (ref 70–99)
HCO3 SERPL-SCNC: 20 MMOL/L (ref 22–29)
HCT VFR BLD AUTO: 25.6 % (ref 40–53)
HGB BLD-MCNC: 7.9 G/DL (ref 13.3–17.7)
IMM GRANULOCYTES # BLD: 0 10E3/UL
IMM GRANULOCYTES NFR BLD: 1 %
LYMPHOCYTES # BLD AUTO: 1.6 10E3/UL (ref 0.8–5.3)
LYMPHOCYTES NFR BLD AUTO: 20 %
MCH RBC QN AUTO: 24.7 PG (ref 26.5–33)
MCHC RBC AUTO-ENTMCNC: 30.9 G/DL (ref 31.5–36.5)
MCV RBC AUTO: 80 FL (ref 78–100)
MONOCYTES # BLD AUTO: 0.9 10E3/UL (ref 0–1.3)
MONOCYTES NFR BLD AUTO: 11 %
NEUTROPHILS # BLD AUTO: 5.3 10E3/UL (ref 1.6–8.3)
NEUTROPHILS NFR BLD AUTO: 64 %
NRBC # BLD AUTO: 0 10E3/UL
NRBC BLD AUTO-RTO: 0 /100
PLATELET # BLD AUTO: 383 10E3/UL (ref 150–450)
POTASSIUM SERPL-SCNC: 5.2 MMOL/L (ref 3.4–5.3)
RBC # BLD AUTO: 3.2 10E6/UL (ref 4.4–5.9)
SODIUM SERPL-SCNC: 133 MMOL/L (ref 135–145)
WBC # BLD AUTO: 8.3 10E3/UL (ref 4–11)

## 2024-08-23 PROCEDURE — 99283 EMERGENCY DEPT VISIT LOW MDM: CPT

## 2024-08-23 PROCEDURE — 86140 C-REACTIVE PROTEIN: CPT | Performed by: EMERGENCY MEDICINE

## 2024-08-23 PROCEDURE — 36415 COLL VENOUS BLD VENIPUNCTURE: CPT | Performed by: EMERGENCY MEDICINE

## 2024-08-23 PROCEDURE — 85025 COMPLETE CBC W/AUTO DIFF WBC: CPT | Performed by: EMERGENCY MEDICINE

## 2024-08-23 PROCEDURE — 80048 BASIC METABOLIC PNL TOTAL CA: CPT | Performed by: EMERGENCY MEDICINE

## 2024-08-23 ASSESSMENT — COLUMBIA-SUICIDE SEVERITY RATING SCALE - C-SSRS
6. HAVE YOU EVER DONE ANYTHING, STARTED TO DO ANYTHING, OR PREPARED TO DO ANYTHING TO END YOUR LIFE?: NO
1. IN THE PAST MONTH, HAVE YOU WISHED YOU WERE DEAD OR WISHED YOU COULD GO TO SLEEP AND NOT WAKE UP?: NO
2. HAVE YOU ACTUALLY HAD ANY THOUGHTS OF KILLING YOURSELF IN THE PAST MONTH?: NO

## 2024-08-23 ASSESSMENT — ACTIVITIES OF DAILY LIVING (ADL)
ADLS_ACUITY_SCORE: 37
ADLS_ACUITY_SCORE: 35

## 2024-08-23 NOTE — ED TRIAGE NOTES
Pt with hx of recent right knee surgery with ariel shepherd drain - pt noted since Monday, drain appeared to not be working and dressing is all wet and needing frequent changing. Denies fevers     Triage Assessment (Adult)       Row Name 08/23/24 1651          Respiratory WDL    Respiratory WDL WDL        Cardiac WDL    Cardiac WDL WDL        Cognitive/Neuro/Behavioral WDL    Cognitive/Neuro/Behavioral WDL WDL

## 2024-08-23 NOTE — TELEPHONE ENCOUNTER
Other: pt called would like for care team to reach out regarding tubing and incision concerns     Could we send this information to you in [employer] or would you prefer to receive a phone call?:   Patient would prefer a phone call   Okay to leave a detailed message?: Yes at Cell number on file:    Telephone Information:   Mobile [phone]

## 2024-08-23 NOTE — TELEPHONE ENCOUNTER
RN called patient who states that the nursing staff still won't help with his drain. Patient stating he has been stripping the drain himself but that it's still leaking blood and fluid around the tube and he is changing his dressing. RN encouraged patient to go to the ED for evaluation.     Laura Hartley RN     Rec appt 9/6 at 2:30-3:00 PM for evaluation.  If pt cannot make this visit rec urgent care visit

## 2024-08-23 NOTE — ED PROVIDER NOTES
Emergency Department Note      History of Present Illness     Chief Complaint   Drainage from Incision    HPI   Long Mayfield is a 54 year old male with a history of diabetes, generalized edema, hyperkalemia, and hypertension presenting with drainage from his right knee incision. He had his knee cleaned and flushed out for the second time 6 days ago. He did not get a replacement. The drainage was left in place and there is no found infection. His doctors think there might be, but they have not found one. Long is here today because the drainage is not going down the tube, but around it. He had a CT scan done on Monday that was normal. He has changed the dressing multiple times. Long is able to bend his leg and walk around. He denies any new pain.    Independent Historian   None    Review of External Notes   I reviewed the discharge summary from hospitalization from 8/16/2024 through 8/19/2024 at [facility].  Patient was admitted for concern of a knee infection.  He went for a knee washout by orthopedics.  He was found to have blood sugar of 638 and with SALVADOR.  He is status post I&D on 8/17 by orthopedics and was seen by infectious disease.  He was on ceftriaxone from 817 8 2 through 8 19 and discharged with Cipro 750 p.o. twice daily for 14 more days.    I reviewed the nursing note from earlier today.  Patient reported that nursing staff were not helping him with the drain.  He states that he was stripping the drain himself but still leaking blood and fluid around the tube and was told to come to the emergency department.    I reviewed joint aspirate and tissue cultures from 8/17/24. All are no growth to date.      Past Medical History     Medical History and Problem List   Allergic rhinitis  Anemia  Arthritis  Acidosis   SALVADOR  Alcohol dependence   Bell's palsy  Cervicalgia  Cardiac calcification   Chronic pancreatitis   Diabetes, type II   Diabetic foot ulcer   Generalized edema  Gangrene of toe  of right foot   GERD  Hyperkalemia  Hypertension  Hyperglycemia   Hyponatremia   Hypo-osmolality and hyponatremia  Ketosis   Major depressive disorder, recurrent episode, mild   Other acute osteomyelitis, right ankle and foot  Other chronic pancreatitis   Other polyosteoarthritis  Solitary pulmonary nodule  Vomiting and diarrhea     Medications   Amlodipine   Aspirin 81 mg  Atorvastatin   Bumetanide   Certirizine   Ciprofloxacin   Duloxetine   Furosemide   Gabapentin  Hydralazine   Insulin aspart   Insulin glargine   Loperamide   Methocarbamol   Metoprolol tartrate   Methylprednisolone   Pantoprazole     Surgical History   Arthroscopy knee, right  Colonoscopy  EGD x2  Irrigation and debridement knee, right  Irrigation and debridement spine, right  Orthopedic surgery  Partial amputation of right foot  Patella surgery, right     Physical Exam     Patient Vitals for the past 24 hrs:   BP Temp Pulse Resp SpO2   08/23/24 2100 (!) 170/101 -- -- -- --   08/23/24 2058 -- -- -- -- 98 %   08/23/24 2057 -- -- 89 -- --   08/23/24 1654 134/78 98.5  F (36.9  C) 92 16 98 %     Physical Exam  General: Well-nourished,  appears to be resting comfortably when I enter the room  Eyes: PERRL, conjunctivae pink no scleral icterus or conjunctival injection  ENT:  Moist mucus membranes, posterior oropharynx clear without erythema or exudates  Respiratory:  Lungs clear to auscultation bilaterally, no crackles/rubs/wheezes.  Good air movement  CV: Normal rate and rhythm, no murmurs/rubs/gallops  GI:  Abdomen soft and non-distended.  Normoactive BS.  No tenderness, guarding or rebound  Skin: Warm, dry.  No rashes or petechiae  Musculoskeletal: No peripheral edema or calf tenderness.  Right knee with dressing with strikethrough as seen below.  Able to extend and flex without significant difficulty.  Mild right ankle edema.  Incision from toe/foot amputation is clean dry and intact without signs of infection.      Neuro: Alert and oriented to  person/place/time  Psychiatric: Normal affect      Diagnostics     Lab Results   Labs Ordered and Resulted from Time of ED Arrival to Time of ED Departure   BASIC METABOLIC PANEL - Abnormal       Result Value    Sodium 133 (*)     Potassium 5.2      Chloride 103      Carbon Dioxide (CO2) 20 (*)     Anion Gap 10      Urea Nitrogen 21.2 (*)     Creatinine 1.25 (*)     GFR Estimate 68      Calcium 8.7 (*)     Glucose 230 (*)    CBC WITH PLATELETS AND DIFFERENTIAL - Abnormal    WBC Count 8.3      RBC Count 3.20 (*)     Hemoglobin 7.9 (*)     Hematocrit 25.6 (*)     MCV 80      MCH 24.7 (*)     MCHC 30.9 (*)     RDW 17.7 (*)     Platelet Count 383      % Neutrophils 64      % Lymphocytes 20      % Monocytes 11      % Eosinophils 4      % Basophils 1      % Immature Granulocytes 1      NRBCs per 100 WBC 0      Absolute Neutrophils 5.3      Absolute Lymphocytes 1.6      Absolute Monocytes 0.9      Absolute Eosinophils 0.4      Absolute Basophils 0.0      Absolute Immature Granulocytes 0.0      Absolute NRBCs 0.0     CRP INFLAMMATION - Abnormal    CRP Inflammation 101.15 (*)        Imaging   No orders to display     Independent Interpretation   None    ED Course      Medications Administered   Medications - No data to display    Procedures   Procedures     Discussion of Management   Orthopedics, Dr. Bartholomew     ED Course   ED Course as of 08/24/24 0022   Fri Aug 23, 2024   1832 I obtained the history and examined the patient as above.    2022 I rechecked and updated the patient.     2045 I spoke with Dr. Bartholomew from orthopedics regarding the patient's presentation and plan of care.     2110 I rechecked the patient and thoroughly inspected his drainage tube.    2132 I spoke with Dr. Bartholomew from orthopedics regarding the patient's presentation and plan of care.         Additional Documentation  None    Medical Decision Making / Diagnosis     CMS Diagnoses: None    MIPS   None    MDM   Long Mayfield is a 54 year old male  status post I&D and washout for suspected right knee infection.  He comes today with concern that his Noah-Sidhu drain is not functioning properly and he is having leakage around his drain site.  No fevers, increase in swelling, increase in pain or other systemic symptoms.  I have very low suspicion for worsening of the infection.  He confirms that he is taking his ciprofloxacin.  I obtain laboratory studies which were normal white blood cell count.  He does not have a fever here.  He is not tachycardic.  He has no other signs of sepsis.  I consulted with orthopedics at the [place] where his surgeon is.  They recommended that we check a CRP to trend this.  They noted that he has an appointment coming up with his orthopedic surgeon in 4 days at which time the drain would be removed.  They recommended that if the drain is holding suction we should leave it in place.  I checked per their instructions and the drain is indeed holding suction.  I did obtain a CRP and unfortunately this is increased from what it previously was.  However, this is a nonspecific test/ Tthere is no purulent drainage in the drain tubing.  No overlying evidence of skin infection.  The resident recommended that we could check a joint aspirate and if it is elevated he could be admitted for intravenous antibiotics.  I discussed this with the patient who has had this done before.  He reports that he does not have a fever, does not have an elevated white count, does not have any increase in swelling and no increase in pain and he declines this at this time.  I think this is completely reasonable.  He is to follow-up with his surgeon as directed and scheduled in 4 days.  If he develops a fever, worsening pain, worsening swelling, redness, warmth or any new or concerning symptoms he should come to the emergency department as he may need the aspiration and antibiotics at that time.  At this time, with reasonable clinical confidence, I do believe  he safe for discharge home.    Disposition   The patient was discharged.     Diagnosis     ICD-10-CM    1. Visit for wound check  Z51.89            Discharge Medications   Discharge Medication List as of 8/23/2024 10:32 PM            Scribe Disclosure:  I, Phoenix Peterson, am serving as a scribe at 6:37 PM on 8/23/2024 to document services personally performed by Shruthi Carrillo MD based on my observations and the provider's statements to me.        Shruthi Carrillo MD  08/24/24 0026       Shruthi Carrillo MD  08/24/24 0027

## 2024-08-24 LAB
BACTERIA TISS BX CULT: NORMAL

## 2024-08-24 NOTE — DISCHARGE INSTRUCTIONS
*Continue wound cares and dressing changes.  *Continue your medications including your ciprofloxacin.  *Follow-up with your surgeon in 4 days as previously scheduled.  *Return to the emergency department if you develop a fever, worsening pain, redness or worsening swelling, are unable to range her knee or become worse in any way.

## 2024-08-24 NOTE — PROVIDER NOTIFICATION
Orthopaedic Surgery ED Phone Consult      DATE OF SERVICE:  2024    Orthopedic Surgery was contacted to provide curbside consultation and weigh in on the care Long Mayfield (patient) received in the ED for operative concerns after operative debridement by our team of his right knee for septic arthritis.     I was called by Dr. Carrillo on  regarding Long Mayfield (patient) and was provided with the following information.  He presented from his transitional care unit for evaluation of his right knee.  He has a retained intra-articular drain in place.  The drain has been having ongoing output, including output that is requiring changing of his bandages.  He told them that his knee pain has been stable, not necessarily getting worse.  Per the ED staff, the clinical exam revealed some generalized swelling around the knee but no erythema, pain appropriately controlled.  The drain was found to be holding suction with serosanguineous drainage and some varying degrees of blood within the tubing.  No purulence in the drain canister.        He was on ceftriaxone from  through  and has been more recently discharged on ciprofloxacin 750 mg twice daily for another 14 days.  He was afebrile, no leukocytosis however CRP was elevated 201 from 38 to 7 days ago.    He has scheduled follow-up on     Based on the limited information provided, I recommended the followin-year-old male patient with history of right knee septic arthritis requiring 2 washouts presenting with drain concerns.    I discussed with the treating emergency department provider that since his CRP is elevated it would be reasonable to consider reaspirating the knee to identify if there is any recrudescence of his septic arthritis, especially as he has been transitioned off of IV antibiotics.  Based on the report it sounds like his knee is not any more painful and there is no purulence in the drain however there is no really other good  explanation for the elevation in his CRP.  If knee aspiration showed cell count greater than 50k then could consider another surgical debridement.     If the cell count is less than 50,000 or if he would not like to proceed with aspiration then would be reasonable to proceed with previous antibiotic plan and follow-up as scheduled in 3 days.  Continue the drain.  Change dressings as needed.     Dr. Carrillo did not request that I personally see or examine the patient at this time.      My recommendations are not intended to take the place of the Dr. Carrillo's clinical judgment, which should always be utilized to provide the most appropriate care to meet the unique needs of each patient.     Orthopedic Surgery did not see nor was involved with the physical examination of the patient for this encounter.     --  Jose Alberto Welch MD  Orthopedic Surgery PGY-4

## 2024-08-26 ENCOUNTER — TELEPHONE (OUTPATIENT)
Dept: OTHER | Facility: CLINIC | Age: 54
End: 2024-08-26
Payer: COMMERCIAL

## 2024-08-26 ENCOUNTER — TELEPHONE (OUTPATIENT)
Dept: INFECTIOUS DISEASES | Facility: CLINIC | Age: 54
End: 2024-08-26
Payer: COMMERCIAL

## 2024-08-26 ENCOUNTER — TELEPHONE (OUTPATIENT)
Dept: ORTHOPEDICS | Facility: CLINIC | Age: 54
End: 2024-08-26
Payer: COMMERCIAL

## 2024-08-26 DIAGNOSIS — Z48.03 CHANGE OR REMOVAL OF DRAINS: Primary | ICD-10-CM

## 2024-08-26 LAB
SCANNED LAB RESULT: NORMAL
SCANNED LAB RESULT: NORMAL

## 2024-08-26 NOTE — TELEPHONE ENCOUNTER
ATC called pt. He informed that his tube came out and still having some drainage. He has been covering the area with dry dressing. Informed pt that Dr. Espinoza's team left for the day and the clinic is closed now. Provided ortho resident on-call number to pt for further recommendations. Also transferred call to the  number.       -SINTIA Cartagena- [facility]

## 2024-08-26 NOTE — TELEPHONE ENCOUNTER
HERMELINDO called 8/26 to sched a follow up with any ID provider per Dr. Mendenhall.       ----- Message from Kizzy PAT sent at 8/19/2024 11:21 AM CDT -----  Can you please reach out to help this patient get scheduled for an appointment.   Thanks,  LIBAN Durand  ----- Message -----  From: Willian Mendenhall MD  Sent: 8/19/2024  11:17 AM CDT  To: [facility]    Hi,    Could we get Mr. Mayfield scheduled for a virtual or in-person visit with any general ID doc in about tow weeks? He previously followed with Dr. Zuleta, but he doesn't appear to have any availability for the next 1+ months.    -Kory

## 2024-08-26 NOTE — TELEPHONE ENCOUNTER
Other: Patient had tube come out wondering what he should do would like to speak to the care team       Could we send this information to you in [employer] or would you prefer to receive a phone call?:   Patient would prefer a phone call   Okay to leave a detailed message?: Yes at Cell number on file:    Telephone Information:   Mobile [phone]

## 2024-08-26 NOTE — TELEPHONE ENCOUNTER
The patient called in to the center, he is status post Hemovac placement in his suprapatellar pouch with Dr. Espinoza on 8/17/2024 after an arthroscopic I&D.  The patient reports that since his discharge, the Hemovac drain came loose and has fallen out of his incision a couple times.  This has resulted in him heading to the ED twice now to have the drain replaced, and confirmed to be in proper placement by imaging resulting.  He calls again today sharing that his drain fell out of this incision, and was wondering what he must do.  Considering this is a recurrent problem, the patient is almost 10 days postop, and he has scheduled follow-up with an OMI tomorrow, I informed the patient he does not have to return to an emergency department for placement of the Hemovac drain, and to bring his concerns to clinic tomorrow.  He reports that there was minimal drainage in the Hemovac before it fell out today.  He does not share that he has been having excessive leakage from the site.  He continues to be on his antibiotics from discharge.  Patient is comfortable with this plan, all questions answered.  Informed the patient to arrive promptly to his 420 appointment tomorrow with any additional concerns.     Jose Cain MD  Orthopedic Surgery PGY-1

## 2024-08-27 ENCOUNTER — OFFICE VISIT (OUTPATIENT)
Dept: ORTHOPEDICS | Facility: CLINIC | Age: 54
End: 2024-08-27
Payer: COMMERCIAL

## 2024-08-27 ENCOUNTER — LAB (OUTPATIENT)
Dept: LAB | Facility: CLINIC | Age: 54
End: 2024-08-27
Payer: COMMERCIAL

## 2024-08-27 DIAGNOSIS — M00.9 PYOGENIC ARTHRITIS OF RIGHT KNEE JOINT, DUE TO UNSPECIFIED ORGANISM (H): Primary | ICD-10-CM

## 2024-08-27 DIAGNOSIS — Z98.890 S/P DEBRIDEMENT: ICD-10-CM

## 2024-08-27 DIAGNOSIS — M00.9 PYOGENIC ARTHRITIS OF RIGHT KNEE JOINT, DUE TO UNSPECIFIED ORGANISM (H): ICD-10-CM

## 2024-08-27 LAB
ALBUMIN SERPL BCG-MCNC: 3.4 G/DL (ref 3.5–5.2)
ALP SERPL-CCNC: 170 U/L (ref 40–150)
ALT SERPL W P-5'-P-CCNC: 15 U/L (ref 0–70)
ANION GAP SERPL CALCULATED.3IONS-SCNC: 8 MMOL/L (ref 7–15)
APPEARANCE FLD: ABNORMAL
AST SERPL W P-5'-P-CCNC: 26 U/L (ref 0–45)
BASOPHILS # BLD AUTO: 0 10E3/UL (ref 0–0.2)
BASOPHILS NFR BLD AUTO: 1 %
BILIRUB SERPL-MCNC: <0.2 MG/DL
BUN SERPL-MCNC: 21.1 MG/DL (ref 6–20)
CALCIUM SERPL-MCNC: 8.6 MG/DL (ref 8.8–10.4)
CELL COUNT BODY FLUID SOURCE: ABNORMAL
CHLORIDE SERPL-SCNC: 102 MMOL/L (ref 98–107)
COLOR FLD: ABNORMAL
CREAT SERPL-MCNC: 1.63 MG/DL (ref 0.67–1.17)
CRP SERPL-MCNC: 39.6 MG/L
CRYSTALS SNV MICRO: NORMAL
EGFRCR SERPLBLD CKD-EPI 2021: 49 ML/MIN/1.73M2
EOSINOPHIL # BLD AUTO: 0.4 10E3/UL (ref 0–0.7)
EOSINOPHIL NFR BLD AUTO: 4 %
ERYTHROCYTE [DISTWIDTH] IN BLOOD BY AUTOMATED COUNT: 17.8 % (ref 10–15)
ERYTHROCYTE [SEDIMENTATION RATE] IN BLOOD BY WESTERGREN METHOD: 99 MM/HR (ref 0–20)
GLUCOSE SERPL-MCNC: 242 MG/DL (ref 70–99)
HCO3 SERPL-SCNC: 24 MMOL/L (ref 22–29)
HCT VFR BLD AUTO: 24.6 % (ref 40–53)
HGB BLD-MCNC: 7.3 G/DL (ref 13.3–17.7)
IMM GRANULOCYTES # BLD: 0 10E3/UL
IMM GRANULOCYTES NFR BLD: 1 %
LYMPHOCYTES # BLD AUTO: 2.4 10E3/UL (ref 0.8–5.3)
LYMPHOCYTES NFR BLD AUTO: 29 %
LYMPHOCYTES NFR FLD MANUAL: 2 %
MCH RBC QN AUTO: 23.8 PG (ref 26.5–33)
MCHC RBC AUTO-ENTMCNC: 29.7 G/DL (ref 31.5–36.5)
MCV RBC AUTO: 80 FL (ref 78–100)
MONOCYTES # BLD AUTO: 0.9 10E3/UL (ref 0–1.3)
MONOCYTES NFR BLD AUTO: 11 %
MONOS+MACROS NFR FLD MANUAL: 2 %
NEUTROPHILS # BLD AUTO: 4.6 10E3/UL (ref 1.6–8.3)
NEUTROPHILS NFR BLD AUTO: 54 %
NEUTS BAND NFR FLD MANUAL: 96 %
NRBC # BLD AUTO: 0 10E3/UL
NRBC BLD AUTO-RTO: 0 /100
PLATELET # BLD AUTO: 375 10E3/UL (ref 150–450)
POTASSIUM SERPL-SCNC: 4.9 MMOL/L (ref 3.4–5.3)
PROT SERPL-MCNC: 7.9 G/DL (ref 6.4–8.3)
RBC # BLD AUTO: 3.07 10E6/UL (ref 4.4–5.9)
SODIUM SERPL-SCNC: 134 MMOL/L (ref 135–145)
WBC # BLD AUTO: 8.4 10E3/UL (ref 4–11)
WBC # FLD AUTO: ABNORMAL /UL

## 2024-08-27 PROCEDURE — 87206 SMEAR FLUORESCENT/ACID STAI: CPT | Mod: 90 | Performed by: PATHOLOGY

## 2024-08-27 PROCEDURE — 87075 CULTR BACTERIA EXCEPT BLOOD: CPT

## 2024-08-27 PROCEDURE — 99024 POSTOP FOLLOW-UP VISIT: CPT

## 2024-08-27 PROCEDURE — 87102 FUNGUS ISOLATION CULTURE: CPT

## 2024-08-27 PROCEDURE — 87116 MYCOBACTERIA CULTURE: CPT | Mod: 90 | Performed by: PATHOLOGY

## 2024-08-27 PROCEDURE — 20610 DRAIN/INJ JOINT/BURSA W/O US: CPT | Mod: 78

## 2024-08-27 PROCEDURE — 86360 T CELL ABSOLUTE COUNT/RATIO: CPT | Mod: GT | Performed by: INTERNAL MEDICINE

## 2024-08-27 PROCEDURE — 99000 SPECIMEN HANDLING OFFICE-LAB: CPT | Performed by: PATHOLOGY

## 2024-08-27 PROCEDURE — 36415 COLL VENOUS BLD VENIPUNCTURE: CPT | Performed by: PATHOLOGY

## 2024-08-27 PROCEDURE — 89050 BODY FLUID CELL COUNT: CPT

## 2024-08-27 PROCEDURE — 80053 COMPREHEN METABOLIC PANEL: CPT | Performed by: PATHOLOGY

## 2024-08-27 PROCEDURE — 86359 T CELLS TOTAL COUNT: CPT | Mod: GT | Performed by: INTERNAL MEDICINE

## 2024-08-27 PROCEDURE — 87070 CULTURE OTHR SPECIMN AEROBIC: CPT

## 2024-08-27 PROCEDURE — 86140 C-REACTIVE PROTEIN: CPT | Performed by: PATHOLOGY

## 2024-08-27 PROCEDURE — 85652 RBC SED RATE AUTOMATED: CPT | Performed by: PATHOLOGY

## 2024-08-27 PROCEDURE — 89060 EXAM SYNOVIAL FLUID CRYSTALS: CPT

## 2024-08-27 PROCEDURE — 85025 COMPLETE CBC W/AUTO DIFF WBC: CPT | Performed by: PATHOLOGY

## 2024-08-27 NOTE — LETTER
8/27/2024      Long Mayfield  [address] [facility]  [place] [address]      Dear Colleague,    Thank you for referring your patient, Long Mayfield, to the [facility] [place]. Please see a copy of my visit note below.    Chief Complaint:   Follow up    Procedures:  Right knee irrigation and debridement - 7/11/24, Dr. Espinoza, [facility]  Right knee irrigation and debridement - 8/17/24, Dr. Phoenix, [facility]    History:  Long Mayfield is a 54 year old patient with uncontrolled DM2 (A1C 9.2) s/p above procedures, here for follow up. Has unfortunately had a rough course in terms of his right knee, outlined below:  5/2/24: Completed Euflexa injection series -[facility]  5/22/24: Returned to clinic with painful R knee effusion, aspirated in clinic -76,860 cells, no growth on cultures - [facility]  5/29/24: Returned to clinic again with painful effusion, R knee was re aspirated and R knee corticosteroid injection was administered. Synovial fluid returned 109,100 cells, no growth on cultures - [facility]  6/13/24: Returned to clinic, subjective improvement in symptoms - [facility]  7/10/24: Referral from Dr. Garcia ([facility]) to see Dr. Espinoza ([facility]) for neck eval. Alexis re aspirated R knee after patient endorsed months of right knee pain. , 65,670 cell count.   7/11/24: R knee I&D - Dr. Espinoza, [facility]  8/14/24: Re aspiration in clinic with 94,560 cells, 100% PMN, indicated for repeat surgical irrigation and debridement. Of note patient's initially surgery was cancelled after he was found to have a  in pre op. - Dr. Espinoza, [facility]  8/17/24: Repeat R knee I&D - Dr. Phoenix, [facility]  8/19/24: Discharged to TCU with hemovac drain in place. Later presented to ED feeling as though his drain pulled out. CT R knee showed drain in place.  8/23/24: Presents to ED with significant drainage about the dressing, no drainage in hemovac drain. No new pain. .5, ED/patient declined R knee aspiration recommended by  Ortho team. Discharged to TCU.     Today Long is here for follow up. Denies new or worsening knee pain, though states it certainly is not improving. He is able to ambulate with use of a  cane. States his hemovac drain has disconnected from the tubing, he provides this in a bag. Open tubing is still attached to his knee, anchor suture intact.  Endorses drainage saturating his dressing around the drain tubing 3x daily until this morning. Denies fever, malaise, cough, shortness of breath, or N/V. Taking Cipro as prescribed by Infectious Disease.       Exam:     General: Awake, Alert, and oriented. Articulates and communicates with a normal affect     Right Lower Extremity:  Obvious R knee effusion   Drain tubing in place anchored with suture, open without hemovac drain attached  Arthroscopic incisions well healed without erythema, drainage, or wound dehiscence. Nylon sutures removed.   ROM 10-80, tolerates passive ROM within this arc of motion   TA/Gsc/EHL/FHL with 5/5 strength  Distal pulses palpable, toes are warm and well perfused   Gait: Antalgic with use of a cane     Imaging:  Imaging of the right knee from 5/6/24  and 8/19/24 were independently reviewed by me and findings were discussed with the patient today. The imaging demonstrates tricompartmental OA with large effusion     Medications:     Current Outpatient Medications:      acetaminophen (TYLENOL) 325 MG tablet, Take 650 mg by mouth every 6 hours as needed for pain, Disp: , Rfl:      amLODIPine (NORVASC) 5 MG tablet, Take 1 tablet (5 mg) by mouth daily, Disp: 30 tablet, Rfl: 0     aspirin 81 MG EC tablet, Take 81 mg by mouth daily, Disp: , Rfl:      bumetanide (BUMEX) 1 MG tablet, Take 1 tablet (1 mg) by mouth daily Dose decreased to 1 mg on discharge.  Optimize dose on PCP visit., Disp: 30 tablet, Rfl: 0     cetirizine (ZYRTEC) 10 MG tablet, Take 10 mg by mouth daily, Disp: , Rfl:      ciprofloxacin (CIPRO) 250 MG tablet, Take 3 tablets (750 mg) by  mouth 2 times daily for 14 days, Disp: 84 tablet, Rfl: 0     DULoxetine (CYMBALTA) 60 MG capsule, Take 60 mg by mouth daily, Disp: , Rfl:      gabapentin (NEURONTIN) 100 MG capsule, Take 300 mg by mouth every 12 hours, Disp: , Rfl:      Glucagon, rDNA, (GLUCAGON EMERGENCY) 1 MG KIT, Inject 1 mg into the muscle daily as needed (hypoglycemia), Disp: , Rfl:      hydrALAZINE (APRESOLINE) 25 MG tablet, Take 25 mg by mouth 2 times daily Hold if SBP < 110, Disp: , Rfl:      insulin aspart (NOVOLOG FLEXPEN) 100 UNIT/ML pen, Inject 8 Units subcutaneously 2 times daily (with meals) Breakfast and lunch, Disp: , Rfl:      insulin aspart (NOVOLOG FLEXPEN) 100 UNIT/ML pen, Inject 1-5 Units subcutaneously 3 times daily (with meals) In addition to 8 units with each meal, inject additional units as per this sliding scale: If 200-250 = 1  251-300 = 2  301-350 = 3 351-400 = 4 401+ = 5 Repeat BS after 3 hours and call MD if still over 400, Disp: , Rfl:      insulin aspart (NOVOLOG PEN) 100 UNIT/ML pen, Inject 6 Units subcutaneously every morning, Disp: , Rfl:      insulin glargine (LANTUS PEN) 100 UNIT/ML pen, Inject 13 Units subcutaneously every morning, Disp: , Rfl:      lipase-protease-amylase (CREON 36) 67556-240532-769000 units CPEP, Take 2 capsules by mouth Take with snacks or supplements (evening snack), Disp: , Rfl:      loperamide (IMODIUM) 2 MG capsule, Take 4 mg by mouth 3 times daily as needed for diarrhea, Disp: , Rfl:      methocarbamol (ROBAXIN) 500 MG tablet, Take 500 mg by mouth every 8 hours as needed for muscle spasms, Disp: , Rfl:      multivitamin w/minerals (THERA-VIT-M) tablet, Take 1 tablet by mouth daily, Disp: 30 tablet, Rfl: 0     pantoprazole (PROTONIX) 40 MG EC tablet, Take 1 tablet (40 mg) by mouth daily, Disp: 30 tablet, Rfl: 0     Lidocaine (LIDOCARE) 4 % Patch, Place 2 patches onto the skin every 24 hours Apply to neck/back. To prevent lidocaine toxicity, patient should be patch free for 12 hrs daily.  (Patient not taking: Reported on 8/16/2024), Disp: 15 patch, Rfl: 0    Assessment/Plan:  Persistent right knee pain and effusion s/p R knee I&D x 2, 10 days s/p most recent I&D on PO ciprofloxacin per ID. I am concerned about persistent infection as he had a CRP elevated >100 4 days ago and open drain tubing with significant drainage over the last week in the setting of uncontrolled diabetes. R knee was re aspirated in clinic today and sent for cell count, cultures, and crystals. Repeat CBC, CRP, ESR. Negative Lyme titers last admission. Will discuss with our surgical team/Alexis Walton when results return. Will update patient with plan by phone. We discussed this plan at length, the patient verbally expressed understanding.       Yinka Bello PA-C 8/27/2024 4:16 PM  Orthopedic Surgery    Large Joint Injection/Arthocentesis: R knee joint    Date/Time: 8/27/2024 5:17 PM    Performed by: Yinka Bello PA-C  Authorized by: Yinka Bello PA-C    Indications:  Pain  Needle Size:  18 G  Guidance: landmark guided    Approach:  Superolateral  Location:  Knee      Aspirate amount (mL):  9  Aspirate:  Cloudy and blood-tinged  Aspirate analysis: sent for lab analysis    Outcome:  Tolerated well, no immediate complications  Procedure discussed: discussed risks, benefits, and alternatives    Consent Given by:  Patient  Timeout: timeout called immediately prior to procedure    Prep: patient was prepped and draped in usual sterile fashion            Again, thank you for allowing me to participate in the care of your patient.        Sincerely,        YINKA VILLAFANA PA-C

## 2024-08-27 NOTE — PROGRESS NOTES
Chief Complaint:   Follow up    Procedures:  Right knee irrigation and debridement - 7/11/24, Dr. Espinoza, [facility]  Right knee irrigation and debridement - 8/17/24, Dr. Phoenix, [facility]    History:  Long Mayfield is a 54 year old patient with uncontrolled DM2 (A1C 9.2) s/p above procedures, here for follow up. Has unfortunately had a rough course in terms of his right knee, outlined below:  5/2/24: Completed Euflexa injection series -[facility]  5/22/24: Returned to clinic with painful R knee effusion, aspirated in clinic -76,860 cells, no growth on cultures - [facility]  5/29/24: Returned to clinic again with painful effusion, R knee was re aspirated and R knee corticosteroid injection was administered. Synovial fluid returned 109,100 cells, no growth on cultures - [facility]  6/13/24: Returned to clinic, subjective improvement in symptoms - [facility]  7/10/24: Referral from Dr. Garcia ([facility]) to see Dr. Espinoza ([facility]) for neck eval. Alexis re aspirated R knee after patient endorsed months of right knee pain. , 65,670 cell count.   7/11/24: R knee I&D - Dr. Espinoza, [facility]  8/14/24: Re aspiration in clinic with 94,560 cells, 100% PMN, indicated for repeat surgical irrigation and debridement. Of note patient's initially surgery was cancelled after he was found to have a  in pre op. - Dr. Espinoza, [facility]  8/17/24: Repeat R knee I&D - Dr. Phoenix, [facility]  8/19/24: Discharged to TCU with hemovac drain in place. Later presented to ED feeling as though his drain pulled out. CT R knee showed drain in place.  8/23/24: Presents to ED with significant drainage about the dressing, no drainage in hemovac drain. No new pain. .5, ED/patient declined R knee aspiration recommended by Ortho team. Discharged to TCU.     Today Long is here for follow up. Denies new or worsening knee pain, though states it certainly is not improving. He is able to ambulate with use of a  cane. States his hemovac drain has disconnected from the  tubing, he provides this in a bag. Open tubing is still attached to his knee, anchor suture intact.  Endorses drainage saturating his dressing around the drain tubing 3x daily until this morning. Denies fever, malaise, cough, shortness of breath, or N/V. Taking Cipro as prescribed by Infectious Disease.       Exam:     General: Awake, Alert, and oriented. Articulates and communicates with a normal affect     Right Lower Extremity:  Obvious R knee effusion   Drain tubing in place anchored with suture, open without hemovac drain attached  Arthroscopic incisions well healed without erythema, drainage, or wound dehiscence. Nylon sutures removed.   ROM 10-80, tolerates passive ROM within this arc of motion   TA/Gsc/EHL/FHL with 5/5 strength  Distal pulses palpable, toes are warm and well perfused   Gait: Antalgic with use of a cane     Imaging:  Imaging of the right knee from 5/6/24  and 8/19/24 were independently reviewed by me and findings were discussed with the patient today. The imaging demonstrates tricompartmental OA with large effusion     Medications:     Current Outpatient Medications:     acetaminophen (TYLENOL) 325 MG tablet, Take 650 mg by mouth every 6 hours as needed for pain, Disp: , Rfl:     amLODIPine (NORVASC) 5 MG tablet, Take 1 tablet (5 mg) by mouth daily, Disp: 30 tablet, Rfl: 0    aspirin 81 MG EC tablet, Take 81 mg by mouth daily, Disp: , Rfl:     bumetanide (BUMEX) 1 MG tablet, Take 1 tablet (1 mg) by mouth daily Dose decreased to 1 mg on discharge.  Optimize dose on PCP visit., Disp: 30 tablet, Rfl: 0    cetirizine (ZYRTEC) 10 MG tablet, Take 10 mg by mouth daily, Disp: , Rfl:     ciprofloxacin (CIPRO) 250 MG tablet, Take 3 tablets (750 mg) by mouth 2 times daily for 14 days, Disp: 84 tablet, Rfl: 0    DULoxetine (CYMBALTA) 60 MG capsule, Take 60 mg by mouth daily, Disp: , Rfl:     gabapentin (NEURONTIN) 100 MG capsule, Take 300 mg by mouth every 12 hours, Disp: , Rfl:     Glucagon, rDNA,  (GLUCAGON EMERGENCY) 1 MG KIT, Inject 1 mg into the muscle daily as needed (hypoglycemia), Disp: , Rfl:     hydrALAZINE (APRESOLINE) 25 MG tablet, Take 25 mg by mouth 2 times daily Hold if SBP < 110, Disp: , Rfl:     insulin aspart (NOVOLOG FLEXPEN) 100 UNIT/ML pen, Inject 8 Units subcutaneously 2 times daily (with meals) Breakfast and lunch, Disp: , Rfl:     insulin aspart (NOVOLOG FLEXPEN) 100 UNIT/ML pen, Inject 1-5 Units subcutaneously 3 times daily (with meals) In addition to 8 units with each meal, inject additional units as per this sliding scale: If 200-250 = 1  251-300 = 2  301-350 = 3 351-400 = 4 401+ = 5 Repeat BS after 3 hours and call MD if still over 400, Disp: , Rfl:     insulin aspart (NOVOLOG PEN) 100 UNIT/ML pen, Inject 6 Units subcutaneously every morning, Disp: , Rfl:     insulin glargine (LANTUS PEN) 100 UNIT/ML pen, Inject 13 Units subcutaneously every morning, Disp: , Rfl:     lipase-protease-amylase (CREON 36) [id number] units CPEP, Take 2 capsules by mouth Take with snacks or supplements (evening snack), Disp: , Rfl:     loperamide (IMODIUM) 2 MG capsule, Take 4 mg by mouth 3 times daily as needed for diarrhea, Disp: , Rfl:     methocarbamol (ROBAXIN) 500 MG tablet, Take 500 mg by mouth every 8 hours as needed for muscle spasms, Disp: , Rfl:     multivitamin w/minerals (THERA-VIT-M) tablet, Take 1 tablet by mouth daily, Disp: 30 tablet, Rfl: 0    pantoprazole (PROTONIX) 40 MG EC tablet, Take 1 tablet (40 mg) by mouth daily, Disp: 30 tablet, Rfl: 0    Lidocaine (LIDOCARE) 4 % Patch, Place 2 patches onto the skin every 24 hours Apply to neck/back. To prevent lidocaine toxicity, patient should be patch free for 12 hrs daily. (Patient not taking: Reported on 8/16/2024), Disp: 15 patch, Rfl: 0    Assessment/Plan:  Persistent right knee pain and effusion s/p R knee I&D x 2, 10 days s/p most recent I&D on PO ciprofloxacin per ID. I am concerned about persistent infection as he had a CRP  elevated >100 4 days ago and open drain tubing with significant drainage over the last week in the setting of uncontrolled diabetes. R knee was re aspirated in clinic today and sent for cell count, cultures, and crystals. Repeat CBC, CRP, ESR. Negative Lyme titers last admission. Will discuss with our surgical team/Alexis Walton when results return. Will update patient with plan by phone. We discussed this plan at length, the patient verbally expressed understanding.       Tootie Bello PA-C 8/27/2024 4:16 PM  Orthopedic Surgery    Large Joint Injection/Arthocentesis: R knee joint    Date/Time: 8/27/2024 5:17 PM    Performed by: Tootie Bello PA-C  Authorized by: Tootie Bello PA-C    Indications:  Pain  Needle Size:  18 G  Guidance: landmark guided    Approach:  Superolateral  Location:  Knee      Aspirate amount (mL):  9  Aspirate:  Cloudy and blood-tinged  Aspirate analysis: sent for lab analysis    Outcome:  Tolerated well, no immediate complications  Procedure discussed: discussed risks, benefits, and alternatives    Consent Given by:  Patient  Timeout: timeout called immediately prior to procedure    Prep: patient was prepped and draped in usual sterile fashion

## 2024-08-27 NOTE — NURSING NOTE
Reason For Visit:   Chief Complaint   Patient presents with    Surgical Followup     10d s/p right knee I&D Dr. Phoenix, DOS: 8/17/24.           There were no vitals taken for this visit.    Pain Assessment  Patient Currently in Pain: Yes  0-10 Pain Scale: 6  Primary Pain Location: Knee (Right)    Miracle Hobbs ATC

## 2024-08-28 ENCOUNTER — TELEPHONE (OUTPATIENT)
Dept: INFECTIOUS DISEASES | Facility: CLINIC | Age: 54
End: 2024-08-28

## 2024-08-28 ENCOUNTER — VIRTUAL VISIT (OUTPATIENT)
Dept: CT IMAGING | Facility: CLINIC | Age: 54
End: 2024-08-28
Attending: INTERNAL MEDICINE
Payer: COMMERCIAL

## 2024-08-28 VITALS — WEIGHT: 130 LBS | HEIGHT: 64 IN | BODY MASS INDEX: 22.2 KG/M2

## 2024-08-28 DIAGNOSIS — M25.461 PAIN AND SWELLING OF RIGHT KNEE: Primary | ICD-10-CM

## 2024-08-28 DIAGNOSIS — M25.561 PAIN AND SWELLING OF RIGHT KNEE: Primary | ICD-10-CM

## 2024-08-28 LAB — BACTERIA ASPIRATE CULT: NORMAL

## 2024-08-28 PROCEDURE — G2211 COMPLEX E/M VISIT ADD ON: HCPCS | Mod: 95 | Performed by: INTERNAL MEDICINE

## 2024-08-28 PROCEDURE — 99215 OFFICE O/P EST HI 40 MIN: CPT | Mod: 95 | Performed by: INTERNAL MEDICINE

## 2024-08-28 ASSESSMENT — PAIN SCALES - GENERAL: PAINLEVEL: EXTREME PAIN (8)

## 2024-08-28 NOTE — NURSING NOTE
Current patient location:  TCU    Is the patient currently in the state of [place]? YES    Visit mode:VIDEO    If the visit is dropped, the patient can be reconnected by: VIDEO VISIT: Text to cell phone:   Telephone Information:   Mobile [phone]    and VIDEO VISIT: Send to e-mail at: [email or web address]    Will anyone else be joining the visit? NO  (If patient encounters technical issues they should call [phone])    How would you like to obtain your AVS? MyChart    Are changes needed to the allergy or medication list? No    Patient denies any changes and states that all information remains accurate since last reviewed/verified.   Reviewed yesterday.     Are refills needed on medications prescribed by this physician? Will discuss with provider    Rooming Documentation:  Not applicable    No other vitals to report today    Reason for visit: GISSELLE Roy MA VVF

## 2024-08-28 NOTE — TELEPHONE ENCOUNTER
Called patient to offer virtual video visit with Dr. Zuleta today at 4:30pm as requested by ortho team. LVM requesting call back on direct line. Appointment held for patient.    Sent patient a [employer] message offering appointment today with Dr. Zuleta. Patient not active on [employer] since 07/23/24.

## 2024-08-28 NOTE — PROGRESS NOTES
[facility] INFECTIOUS DISEASE  [address]  [address]  [facility] [address]  Phone: [phone]  Fax: [phone]    Patient:  Long Mayfield, Date of birth 1970  Date of Visit:  08/28/2024  Referring Provider Referred Self  Reason for visit: Right knee inflammatory arthritis     Assessment & Plan      Long Mayfield is a 54 year old male with persistent right knee swelling and found to have high synovial cell count. Multiple cultures off antibiotics has been negative and two I&D with antibiotic therapy is not effective. We need to rule out less common causes of persistent monoarticular inflammatory arthritis. I will expand the workup to zoonotic causes of infection. I will refer him to our rheumatology colleagues to assist in the differential diagnosis. Due to his pancreatitis history and his chronic diarrhea, I will refer him to GI to review possible Whipple's disease. It is unlikely to affect single joint but not impossible. I don't think he needs an urgent debridement as his case is chronic and he is not septic.     Right knee inflammatory arthritis, etiology to be determined, s/p I&D Jul 11, Aug 18, 2024  T2 DM   History of diabetic foot infection, s/p right TMA  History of alcohol use disoder  Chronic diarrhea   History of pancreatitis    PLAN:  Brucella, Bartonella, Q fever, CD4, Ig levels.   MRI right knee with and without contrast if negative.   Rheumatology and GI consult.   Complete oral ciprofloxacin course.     ADDENDUM:  Additional ID testing negative. Would pursue MRI of right knee. I called him with the updates. I sent a message to the Ortho team regarding the plan.     50 minutes spent by me on the date of the encounter doing chart review, history and exam, documentation and further activities per the note  The longitudinal plan of care for the diagnosis(es)/condition(s) as documented were addressed during this visit. Due to the added complexity in care, I will  continue to support Long in the subsequent management and with ongoing continuity of care.    Virtual Visit Details    Type of service:  Video Visit   Video Start Time:   Video End Time:  Joined the call at 8/28/2024, 4:38:18 pm.  Left the call at 8/28/2024, 5:01:10 pm.  You were on the call for 22 minutes 52 seconds .    Originating Location (pt. Location): Home    Distant Location (provider location):  On-site  Platform used for Video Visit: Gian    History of Present Illness     Pertinent history obtain from: patient    Long Mayfield is a 54 year old male with right knee swelling. He could not tell when exactly it started to become swollen or painful. It seems like it's been on and off for yours.   He has had injection to his right knee from 2 years ago. This year he had another injection to his right knee in April 2024 at [facility]. However, in May, they found that his knee is too swollen so they aspirated the knee instead. Cell count 76k and a week later 109k. Cultures negative. He was not told there's a problem and was not given antibiotics. He has chronic diarrhea attributed to pancreatitis from previous alcohol use disorder. He does not have fever, chills, diarrhea.      He was born in [facility]. He lived in [place], [facility] and [facility]. He skied a lot. He was sexually active with women but not recently. He does not inject drugs. 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 He does not recall tick bites. 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     7/11/23 I&D - cultures negative. Discharged on 4 weeks of ceftriaxone. Therapy ended Aug 8  8/14 Seen by Ortho Dr. Espinoza. Cell count 94k, 100%N Culture negative, crystals negative  8/17 Arthroscopic I&D Dr. Phoenix - cultures negative. - discharged on ciprofloxacin     8/19 CT scan Surgical drain within the right knee. Tricompartmental degenerative arthrosis with a large heterogeneous joint effusion, possibly reflecting a  hemarthrosis.  8/28 Seen by Ortho aspirated cell count 53k, 96% N    I talked to him through video today. He does not have fevers. Still has knee swelling. No new joint pains.     Data   Laboratory data and imaging listed below was reviewed prior to this encounter.     CRP elevated     MICRO  8/27 R knee -   8/17 R knee - no growth, 16s, 28s negative, fungal cx neg  8/14 R knee - no growth   7/13 GC - neg   7/13 Whipple Blood - neg   7/12 Lyme, HIV, HCV, HBsAg - neg   7/11 R knee - 3/3 no growth, 16s neg  7/10 R knee - 65k, 93%N  Cx - no growth   5/29 R knee - 109k, 95%N - no growth   5/22 R knee 76k 98%N - no growth

## 2024-08-28 NOTE — TELEPHONE ENCOUNTER
----- Message -----   From: Laura Hartley, ANGUS   Sent: 8/28/2024  11:09 AM CDT   To: Vianca Johnson PA-C; *     I spoke with Tootie and she thinks we should also get ID involved sooner. I am including them in this message to see if they can get him in this week with recommendations. This is a chronic ongoing thing for the patient.     Laura Green BSN RN   Orthopedic Clinic   Dr. Dejon Phoenix   ----- Message -----   From: Jeff Phoenix MD   Sent: 8/28/2024  10:42 AM CDT   To: Vianca Johnson PA-C; Laura Hartley RN; *     Tootie saw and evaluated the patient, what would you recormmend based on your clinical assessment?   ----- Message -----   From: Laura Hartley RN   Sent: 8/28/2024   9:14 AM CDT   To: Vianca Johnson PA-C; *     Hi Dr. Phoenix,     This was a patient that Dr. Espinoza had originally washed out and you also did on 08/17/2024. Our Orthopedic MAX Sommers saw him yesterday in clinic, he has a follow up with Infectious Disease next week. We did an aspiration yesterday that yielded 9 ml. Below are his labs from yesterday. After reviewing them do you feel like he needs to go back in for another washout?

## 2024-08-29 ENCOUNTER — LAB (OUTPATIENT)
Dept: LAB | Facility: CLINIC | Age: 54
End: 2024-08-29
Payer: COMMERCIAL

## 2024-08-29 DIAGNOSIS — M25.461 PAIN AND SWELLING OF RIGHT KNEE: ICD-10-CM

## 2024-08-29 DIAGNOSIS — M25.561 PAIN AND SWELLING OF RIGHT KNEE: ICD-10-CM

## 2024-08-29 LAB
CD3 CELLS # BLD: 1661 CELLS/UL (ref 603–2990)
CD3 CELLS NFR BLD: 66 % (ref 49–84)
CD3+CD4+ CELLS # BLD: 952 CELLS/UL (ref 441–2156)
CD3+CD4+ CELLS NFR BLD: 38 % (ref 28–63)
CD3+CD4+ CELLS/CD3+CD8+ CLL BLD: 1.41 % (ref 1.4–2.6)
CD3+CD8+ CELLS # BLD: 674 CELLS/UL (ref 125–1312)
CD3+CD8+ CELLS NFR BLD: 27 % (ref 10–40)
Lab: NORMAL
PERFORMING LABORATORY: NORMAL
SPECIMEN STATUS: NORMAL
T CELL COMMENT: NORMAL
T PALLIDUM AB SER QL: NONREACTIVE
TEST NAME: NORMAL

## 2024-08-29 PROCEDURE — 86611 BARTONELLA ANTIBODY: CPT

## 2024-08-29 PROCEDURE — 36415 COLL VENOUS BLD VENIPUNCTURE: CPT

## 2024-08-29 PROCEDURE — 82784 ASSAY IGA/IGD/IGG/IGM EACH: CPT

## 2024-08-29 PROCEDURE — 86622 BRUCELLA ANTIBODY: CPT | Mod: 59

## 2024-08-29 PROCEDURE — 86638 Q FEVER ANTIBODY: CPT

## 2024-08-29 PROCEDURE — 86780 TREPONEMA PALLIDUM: CPT

## 2024-08-30 LAB
ACID FAST STAIN (ARUP): NORMAL
ACID FAST STAIN (ARUP): NORMAL
IGA SERPL-MCNC: 404 MG/DL (ref 84–499)
IGG SERPL-MCNC: 1884 MG/DL (ref 610–1616)
IGM SERPL-MCNC: 239 MG/DL (ref 35–242)
MAYO MISC RESULT: NORMAL

## 2024-09-01 LAB
BACTERIA SNV CULT: NO GROWTH
BRUCELLA AB TITR SER AGGL: NORMAL {TITER}
GRAM STAIN RESULT: NORMAL
GRAM STAIN RESULT: NORMAL

## 2024-09-02 LAB
C BURNET PH1 IGG SER QL IF: NEGATIVE
C BURNET PH2 IGG SER QL IF: NEGATIVE

## 2024-09-04 ENCOUNTER — TELEPHONE (OUTPATIENT)
Dept: ORTHOPEDICS | Facility: CLINIC | Age: 54
End: 2024-09-04
Payer: COMMERCIAL

## 2024-09-04 ENCOUNTER — TELEPHONE (OUTPATIENT)
Dept: RHEUMATOLOGY | Facility: CLINIC | Age: 54
End: 2024-09-04
Payer: COMMERCIAL

## 2024-09-04 ENCOUNTER — TELEPHONE (OUTPATIENT)
Dept: INFECTIOUS DISEASES | Facility: CLINIC | Age: 54
End: 2024-09-04
Payer: COMMERCIAL

## 2024-09-04 NOTE — TELEPHONE ENCOUNTER
EP called 9/4 to sched pt for 10/2024 to see Dr. Zuleta per provider and gave number for Imaging for pt to sched MRI per provider. Pt had a question about prednisone or any type of steriod. Reaching out to ID team.     ----- Message from NORMA ZULETA sent at 9/3/2024  9:43 AM CDT -----  Please schedule his MRI at [facility] please. Thank you.   And a return visit with me in Oct 2024. At earliest possible slot.

## 2024-09-04 NOTE — TELEPHONE ENCOUNTER
[facility] Call Center    Phone Message    May a detailed message be left on voicemail: yes     Reason for Call: Other: Pt calling in to speak with Tootie Raya regarding his knee. Per pt, his knee is still very swollen and would like to talk to someone. Please call pt back on cell number     Action Taken: Other: [phone]    Travel Screening: Not Applicable     Date of Service:

## 2024-09-04 NOTE — TELEPHONE ENCOUNTER
----- Message from Hector Cruz sent at 9/4/2024  1:37 PM CDT -----  Regarding: Schedule Appointment  [facility] team,  Can you please schedule an appointment for this patient on 9/17 at 1:30 pm, new vasculitis.    Thank you,

## 2024-09-05 ENCOUNTER — TELEPHONE (OUTPATIENT)
Dept: ORTHOPEDICS | Facility: CLINIC | Age: 54
End: 2024-09-05
Payer: COMMERCIAL

## 2024-09-05 NOTE — TELEPHONE ENCOUNTER
Edward Zuleta MD Barfnecht, Bria, ANGUS; [facility] Infectious Disease Adult Csc22 hours ago (6:16 PM)     DT  I called the patient with the plan. See associated note.

## 2024-09-05 NOTE — TELEPHONE ENCOUNTER
ATC called patient to schedule appointment with Darren Oviedo PA-C for MRI review of knee and possible aspiration or biopsy of knee for laboratory analysis.  Providers involved were then notified of the appointment per ongoing staff message discussion.    Mark Bethea MS, ATC, LAT, [facility]  Dr. Jeff Garcia

## 2024-09-06 PROBLEM — K86.1 CHRONIC PANCREATITIS (H): Status: ACTIVE | Noted: 2024-09-06

## 2024-09-06 PROBLEM — M25.561 ACUTE PAIN OF RIGHT KNEE: Status: RESOLVED | Noted: 2024-07-12 | Resolved: 2024-09-06

## 2024-09-06 PROBLEM — Z76.89 ENCOUNTER TO ESTABLISH CARE: Status: ACTIVE | Noted: 2024-09-06

## 2024-09-06 PROBLEM — E16.2 HYPOGLYCEMIA: Status: RESOLVED | Noted: 2024-05-03 | Resolved: 2024-09-06

## 2024-09-06 PROBLEM — K52.9 CHRONIC DIARRHEA: Status: ACTIVE | Noted: 2022-03-08

## 2024-09-06 PROBLEM — Z87.19 HISTORY OF ACUTE PANCREATITIS: Status: ACTIVE | Noted: 2024-09-06

## 2024-09-06 PROBLEM — I96 GANGRENE OF TOE OF RIGHT FOOT (H): Status: RESOLVED | Noted: 2023-11-08 | Resolved: 2024-09-06

## 2024-09-06 PROBLEM — M25.561 RIGHT KNEE PAIN: Status: RESOLVED | Noted: 2024-07-11 | Resolved: 2024-09-06

## 2024-09-10 LAB — BACTERIA SNV CULT: NORMAL

## 2024-09-11 ENCOUNTER — HOSPITAL ENCOUNTER (INPATIENT)
Facility: CLINIC | Age: 54
LOS: 3 days | Discharge: NURSING, REHAB OR LONG-TERM CARE | End: 2024-09-14
Attending: EMERGENCY MEDICINE | Admitting: HOSPITALIST
Payer: COMMERCIAL

## 2024-09-11 ENCOUNTER — APPOINTMENT (OUTPATIENT)
Dept: GENERAL RADIOLOGY | Facility: CLINIC | Age: 54
End: 2024-09-11
Attending: EMERGENCY MEDICINE
Payer: COMMERCIAL

## 2024-09-11 ENCOUNTER — MEDICAL CORRESPONDENCE (OUTPATIENT)
Dept: HEALTH INFORMATION MANAGEMENT | Facility: CLINIC | Age: 54
End: 2024-09-11

## 2024-09-11 ENCOUNTER — APPOINTMENT (OUTPATIENT)
Dept: CT IMAGING | Facility: CLINIC | Age: 54
End: 2024-09-11
Attending: EMERGENCY MEDICINE
Payer: COMMERCIAL

## 2024-09-11 DIAGNOSIS — R09.02 HYPOXIA: ICD-10-CM

## 2024-09-11 DIAGNOSIS — R52 PAIN: Primary | ICD-10-CM

## 2024-09-11 DIAGNOSIS — R39.9 LOWER URINARY TRACT SYMPTOMS (LUTS): ICD-10-CM

## 2024-09-11 DIAGNOSIS — J18.9 PNEUMONIA OF LEFT LOWER LOBE DUE TO INFECTIOUS ORGANISM: ICD-10-CM

## 2024-09-11 LAB
ALBUMIN SERPL BCG-MCNC: 3 G/DL (ref 3.5–5.2)
ALBUMIN UR-MCNC: 70 MG/DL
ALP SERPL-CCNC: 176 U/L (ref 40–150)
ALT SERPL W P-5'-P-CCNC: 9 U/L (ref 0–70)
ANION GAP SERPL CALCULATED.3IONS-SCNC: 15 MMOL/L (ref 7–15)
APPEARANCE UR: ABNORMAL
AST SERPL W P-5'-P-CCNC: 24 U/L (ref 0–45)
ATRIAL RATE - MUSE: 91 BPM
BACTERIA ASPIRATE CULT: NO GROWTH
BASE EXCESS BLDV CALC-SCNC: -3 MMOL/L (ref -3–3)
BASOPHILS # BLD AUTO: 0 10E3/UL (ref 0–0.2)
BASOPHILS NFR BLD AUTO: 0 %
BILIRUB SERPL-MCNC: 0.3 MG/DL
BILIRUB UR QL STRIP: NEGATIVE
BUN SERPL-MCNC: 41.7 MG/DL (ref 6–20)
C DIFF TOX B STL QL: NEGATIVE
CALCIUM SERPL-MCNC: 8.2 MG/DL (ref 8.8–10.4)
CHLORIDE SERPL-SCNC: 94 MMOL/L (ref 98–107)
COLOR UR AUTO: YELLOW
CREAT SERPL-MCNC: 2.41 MG/DL (ref 0.67–1.17)
CRP SERPL-MCNC: 228.22 MG/L
DIASTOLIC BLOOD PRESSURE - MUSE: NORMAL MMHG
EGFRCR SERPLBLD CKD-EPI 2021: 31 ML/MIN/1.73M2
EOSINOPHIL # BLD AUTO: 0 10E3/UL (ref 0–0.7)
EOSINOPHIL NFR BLD AUTO: 0 %
ERYTHROCYTE [DISTWIDTH] IN BLOOD BY AUTOMATED COUNT: 16.6 % (ref 10–15)
FLUAV RNA SPEC QL NAA+PROBE: NEGATIVE
FLUBV RNA RESP QL NAA+PROBE: NEGATIVE
FRAGMENTS BLD QL SMEAR: SLIGHT
GLUCOSE BLDC GLUCOMTR-MCNC: 183 MG/DL (ref 70–99)
GLUCOSE BLDC GLUCOMTR-MCNC: 269 MG/DL (ref 70–99)
GLUCOSE SERPL-MCNC: 266 MG/DL (ref 70–99)
GLUCOSE UR STRIP-MCNC: 100 MG/DL
HCO3 BLDV-SCNC: 22 MMOL/L (ref 21–28)
HCO3 SERPL-SCNC: 19 MMOL/L (ref 22–29)
HCT VFR BLD AUTO: 23.8 % (ref 40–53)
HGB BLD-MCNC: 7.4 G/DL (ref 13.3–17.7)
HGB UR QL STRIP: ABNORMAL
HOLD SPECIMEN: NORMAL
HOLD SPECIMEN: NORMAL
HYALINE CASTS: 20 /LPF
IMM GRANULOCYTES # BLD: 0 10E3/UL
IMM GRANULOCYTES NFR BLD: 0 %
INTERPRETATION ECG - MUSE: NORMAL
KETONES UR STRIP-MCNC: NEGATIVE MG/DL
LACTATE BLD-SCNC: 1.4 MMOL/L
LEUKOCYTE ESTERASE UR QL STRIP: NEGATIVE
LYMPHOCYTES # BLD AUTO: 0.9 10E3/UL (ref 0.8–5.3)
LYMPHOCYTES NFR BLD AUTO: 12 %
MCH RBC QN AUTO: 24 PG (ref 26.5–33)
MCHC RBC AUTO-ENTMCNC: 31.1 G/DL (ref 31.5–36.5)
MCV RBC AUTO: 77 FL (ref 78–100)
MONOCYTES # BLD AUTO: 0.8 10E3/UL (ref 0–1.3)
MONOCYTES NFR BLD AUTO: 11 %
MUCOUS THREADS #/AREA URNS LPF: PRESENT /LPF
NEUTROPHILS # BLD AUTO: 5.8 10E3/UL (ref 1.6–8.3)
NEUTROPHILS NFR BLD AUTO: 77 %
NITRATE UR QL: NEGATIVE
NRBC # BLD AUTO: 0 10E3/UL
NRBC BLD AUTO-RTO: 0 /100
OSMOLALITY UR: 321 MMOL/KG (ref 100–1200)
P AXIS - MUSE: 79 DEGREES
PCO2 BLDV: 38 MM HG (ref 40–50)
PH BLDV: 7.37 [PH] (ref 7.32–7.43)
PH UR STRIP: 5.5 [PH] (ref 5–7)
PLAT MORPH BLD: ABNORMAL
PLATELET # BLD AUTO: 409 10E3/UL (ref 150–450)
PO2 BLDV: 34 MM HG (ref 25–47)
POTASSIUM SERPL-SCNC: 4.7 MMOL/L (ref 3.4–5.3)
POTASSIUM SERPL-SCNC: 4.8 MMOL/L (ref 3.4–5.3)
POTASSIUM SERPL-SCNC: 5.1 MMOL/L (ref 3.4–5.3)
POTASSIUM SERPL-SCNC: 5.4 MMOL/L (ref 3.4–5.3)
PR INTERVAL - MUSE: 180 MS
PROT SERPL-MCNC: 7.7 G/DL (ref 6.4–8.3)
QRS DURATION - MUSE: 72 MS
QT - MUSE: 352 MS
QTC - MUSE: 432 MS
R AXIS - MUSE: 23 DEGREES
RBC # BLD AUTO: 3.08 10E6/UL (ref 4.4–5.9)
RBC MORPH BLD: ABNORMAL
RBC URINE: 17 /HPF
RSV RNA SPEC NAA+PROBE: NEGATIVE
SAO2 % BLDV: 64 % (ref 70–75)
SARS-COV-2 RNA RESP QL NAA+PROBE: NEGATIVE
SODIUM SERPL-SCNC: 128 MMOL/L (ref 135–145)
SODIUM SERPL-SCNC: 128 MMOL/L (ref 135–145)
SODIUM UR-SCNC: <20 MMOL/L
SP GR UR STRIP: 1.02 (ref 1–1.03)
SQUAMOUS EPITHELIAL: 1 /HPF
SYSTOLIC BLOOD PRESSURE - MUSE: NORMAL MMHG
T AXIS - MUSE: 60 DEGREES
UROBILINOGEN UR STRIP-MCNC: NORMAL MG/DL
VENTRICULAR RATE- MUSE: 91 BPM
WBC # BLD AUTO: 7.5 10E3/UL (ref 4–11)
WBC URINE: 2 /HPF

## 2024-09-11 PROCEDURE — 87205 SMEAR GRAM STAIN: CPT

## 2024-09-11 PROCEDURE — 84450 TRANSFERASE (AST) (SGOT): CPT | Performed by: EMERGENCY MEDICINE

## 2024-09-11 PROCEDURE — 99285 EMERGENCY DEPT VISIT HI MDM: CPT | Mod: 25

## 2024-09-11 PROCEDURE — 87507 IADNA-DNA/RNA PROBE TQ 12-25: CPT

## 2024-09-11 PROCEDURE — 36415 COLL VENOUS BLD VENIPUNCTURE: CPT

## 2024-09-11 PROCEDURE — 87493 C DIFF AMPLIFIED PROBE: CPT

## 2024-09-11 PROCEDURE — 250N000013 HC RX MED GY IP 250 OP 250 PS 637: Performed by: EMERGENCY MEDICINE

## 2024-09-11 PROCEDURE — 84295 ASSAY OF SERUM SODIUM: CPT

## 2024-09-11 PROCEDURE — 81001 URINALYSIS AUTO W/SCOPE: CPT

## 2024-09-11 PROCEDURE — 96360 HYDRATION IV INFUSION INIT: CPT

## 2024-09-11 PROCEDURE — 250N000012 HC RX MED GY IP 250 OP 636 PS 637

## 2024-09-11 PROCEDURE — 99223 1ST HOSP IP/OBS HIGH 75: CPT

## 2024-09-11 PROCEDURE — 82040 ASSAY OF SERUM ALBUMIN: CPT | Performed by: EMERGENCY MEDICINE

## 2024-09-11 PROCEDURE — 72125 CT NECK SPINE W/O DYE: CPT

## 2024-09-11 PROCEDURE — 85004 AUTOMATED DIFF WBC COUNT: CPT | Performed by: EMERGENCY MEDICINE

## 2024-09-11 PROCEDURE — 258N000003 HC RX IP 258 OP 636

## 2024-09-11 PROCEDURE — 83935 ASSAY OF URINE OSMOLALITY: CPT

## 2024-09-11 PROCEDURE — 93005 ELECTROCARDIOGRAM TRACING: CPT

## 2024-09-11 PROCEDURE — 87040 BLOOD CULTURE FOR BACTERIA: CPT | Performed by: EMERGENCY MEDICINE

## 2024-09-11 PROCEDURE — 82803 BLOOD GASES ANY COMBINATION: CPT

## 2024-09-11 PROCEDURE — 250N000013 HC RX MED GY IP 250 OP 250 PS 637

## 2024-09-11 PROCEDURE — 258N000003 HC RX IP 258 OP 636: Performed by: EMERGENCY MEDICINE

## 2024-09-11 PROCEDURE — 87637 SARSCOV2&INF A&B&RSV AMP PRB: CPT | Performed by: EMERGENCY MEDICINE

## 2024-09-11 PROCEDURE — 71046 X-RAY EXAM CHEST 2 VIEWS: CPT

## 2024-09-11 PROCEDURE — 36415 COLL VENOUS BLD VENIPUNCTURE: CPT | Performed by: EMERGENCY MEDICINE

## 2024-09-11 PROCEDURE — 84132 ASSAY OF SERUM POTASSIUM: CPT

## 2024-09-11 PROCEDURE — 84300 ASSAY OF URINE SODIUM: CPT

## 2024-09-11 PROCEDURE — 86140 C-REACTIVE PROTEIN: CPT | Performed by: HOSPITALIST

## 2024-09-11 PROCEDURE — 120N000001 HC R&B MED SURG/OB

## 2024-09-11 PROCEDURE — 250N000011 HC RX IP 250 OP 636: Performed by: EMERGENCY MEDICINE

## 2024-09-11 PROCEDURE — 70450 CT HEAD/BRAIN W/O DYE: CPT

## 2024-09-11 RX ORDER — NICOTINE POLACRILEX 4 MG
LOZENGE BUCCAL PRN
COMMUNITY

## 2024-09-11 RX ORDER — NALOXONE HYDROCHLORIDE 0.4 MG/ML
0.2 INJECTION, SOLUTION INTRAMUSCULAR; INTRAVENOUS; SUBCUTANEOUS
Status: DISCONTINUED | OUTPATIENT
Start: 2024-09-11 | End: 2024-09-14 | Stop reason: HOSPADM

## 2024-09-11 RX ORDER — NICOTINE POLACRILEX 4 MG
15-30 LOZENGE BUCCAL
Status: DISCONTINUED | OUTPATIENT
Start: 2024-09-11 | End: 2024-09-11

## 2024-09-11 RX ORDER — OXYCODONE HYDROCHLORIDE 5 MG/1
5 TABLET ORAL EVERY 6 HOURS PRN
Status: ON HOLD | COMMUNITY
End: 2024-09-14

## 2024-09-11 RX ORDER — DULOXETIN HYDROCHLORIDE 60 MG/1
60 CAPSULE, DELAYED RELEASE ORAL DAILY
Status: DISCONTINUED | OUTPATIENT
Start: 2024-09-12 | End: 2024-09-14 | Stop reason: HOSPADM

## 2024-09-11 RX ORDER — ASPIRIN 81 MG/1
81 TABLET ORAL DAILY
Status: DISCONTINUED | OUTPATIENT
Start: 2024-09-12 | End: 2024-09-14 | Stop reason: HOSPADM

## 2024-09-11 RX ORDER — ACETAMINOPHEN 650 MG/1
650 SUPPOSITORY RECTAL EVERY 4 HOURS PRN
Status: DISCONTINUED | OUTPATIENT
Start: 2024-09-11 | End: 2024-09-14 | Stop reason: HOSPADM

## 2024-09-11 RX ORDER — OXYCODONE HYDROCHLORIDE 5 MG/1
5 TABLET ORAL EVERY 6 HOURS PRN
Status: DISCONTINUED | OUTPATIENT
Start: 2024-09-11 | End: 2024-09-14 | Stop reason: HOSPADM

## 2024-09-11 RX ORDER — ACETAMINOPHEN 500 MG
1000 TABLET ORAL ONCE
Status: COMPLETED | OUTPATIENT
Start: 2024-09-11 | End: 2024-09-11

## 2024-09-11 RX ORDER — AMOXICILLIN 250 MG
1 CAPSULE ORAL 2 TIMES DAILY PRN
Status: DISCONTINUED | OUTPATIENT
Start: 2024-09-11 | End: 2024-09-14 | Stop reason: HOSPADM

## 2024-09-11 RX ORDER — ACETAMINOPHEN 500 MG
1000 TABLET ORAL 3 TIMES DAILY
COMMUNITY

## 2024-09-11 RX ORDER — ONDANSETRON 4 MG/1
4 TABLET, ORALLY DISINTEGRATING ORAL EVERY 6 HOURS PRN
Status: DISCONTINUED | OUTPATIENT
Start: 2024-09-11 | End: 2024-09-14 | Stop reason: HOSPADM

## 2024-09-11 RX ORDER — NICOTINE POLACRILEX 4 MG
15-30 LOZENGE BUCCAL
Status: DISCONTINUED | OUTPATIENT
Start: 2024-09-11 | End: 2024-09-14 | Stop reason: HOSPADM

## 2024-09-11 RX ORDER — SODIUM CHLORIDE 9 MG/ML
INJECTION, SOLUTION INTRAVENOUS CONTINUOUS
Status: DISCONTINUED | OUTPATIENT
Start: 2024-09-11 | End: 2024-09-14 | Stop reason: HOSPADM

## 2024-09-11 RX ORDER — PANTOPRAZOLE SODIUM 40 MG/1
40 TABLET, DELAYED RELEASE ORAL DAILY
Status: DISCONTINUED | OUTPATIENT
Start: 2024-09-12 | End: 2024-09-14 | Stop reason: HOSPADM

## 2024-09-11 RX ORDER — ACETAMINOPHEN 500 MG
1000 TABLET ORAL DAILY PRN
COMMUNITY

## 2024-09-11 RX ORDER — AMOXICILLIN 250 MG
2 CAPSULE ORAL 2 TIMES DAILY PRN
Status: DISCONTINUED | OUTPATIENT
Start: 2024-09-11 | End: 2024-09-14 | Stop reason: HOSPADM

## 2024-09-11 RX ORDER — DEXTROSE MONOHYDRATE 25 G/50ML
25-50 INJECTION, SOLUTION INTRAVENOUS
Status: DISCONTINUED | OUTPATIENT
Start: 2024-09-11 | End: 2024-09-11

## 2024-09-11 RX ORDER — SODIUM POLYSTYRENE SULFONATE 15 G/60ML
30 SUSPENSION ORAL; RECTAL ONCE
Status: COMPLETED | OUTPATIENT
Start: 2024-09-11 | End: 2024-09-11

## 2024-09-11 RX ORDER — GABAPENTIN 300 MG/1
300 CAPSULE ORAL 2 TIMES DAILY
COMMUNITY

## 2024-09-11 RX ORDER — DEXTROSE MONOHYDRATE 25 G/50ML
25-50 INJECTION, SOLUTION INTRAVENOUS
Status: DISCONTINUED | OUTPATIENT
Start: 2024-09-11 | End: 2024-09-14 | Stop reason: HOSPADM

## 2024-09-11 RX ORDER — ACETAMINOPHEN 325 MG/1
650 TABLET ORAL EVERY 4 HOURS PRN
Status: DISCONTINUED | OUTPATIENT
Start: 2024-09-11 | End: 2024-09-14 | Stop reason: HOSPADM

## 2024-09-11 RX ORDER — ONDANSETRON 2 MG/ML
4 INJECTION INTRAMUSCULAR; INTRAVENOUS EVERY 6 HOURS PRN
Status: DISCONTINUED | OUTPATIENT
Start: 2024-09-11 | End: 2024-09-14 | Stop reason: HOSPADM

## 2024-09-11 RX ORDER — CEFTRIAXONE 2 G/1
2 INJECTION, POWDER, FOR SOLUTION INTRAMUSCULAR; INTRAVENOUS EVERY 24 HOURS
Status: DISCONTINUED | OUTPATIENT
Start: 2024-09-12 | End: 2024-09-14 | Stop reason: HOSPADM

## 2024-09-11 RX ORDER — AZITHROMYCIN 500 MG/1
500 INJECTION, POWDER, LYOPHILIZED, FOR SOLUTION INTRAVENOUS ONCE
Status: COMPLETED | OUTPATIENT
Start: 2024-09-11 | End: 2024-09-11

## 2024-09-11 RX ORDER — GABAPENTIN 300 MG/1
300 CAPSULE ORAL 2 TIMES DAILY
Status: DISCONTINUED | OUTPATIENT
Start: 2024-09-11 | End: 2024-09-14

## 2024-09-11 RX ORDER — POLYETHYLENE GLYCOL 3350 17 G/17G
17 POWDER, FOR SOLUTION ORAL 2 TIMES DAILY PRN
Status: DISCONTINUED | OUTPATIENT
Start: 2024-09-11 | End: 2024-09-14 | Stop reason: HOSPADM

## 2024-09-11 RX ORDER — CEFTRIAXONE 2 G/1
2 INJECTION, POWDER, FOR SOLUTION INTRAMUSCULAR; INTRAVENOUS ONCE
Status: COMPLETED | OUTPATIENT
Start: 2024-09-11 | End: 2024-09-11

## 2024-09-11 RX ORDER — NALOXONE HYDROCHLORIDE 0.4 MG/ML
0.4 INJECTION, SOLUTION INTRAMUSCULAR; INTRAVENOUS; SUBCUTANEOUS
Status: DISCONTINUED | OUTPATIENT
Start: 2024-09-11 | End: 2024-09-14 | Stop reason: HOSPADM

## 2024-09-11 RX ORDER — LIDOCAINE 40 MG/G
CREAM TOPICAL
Status: DISCONTINUED | OUTPATIENT
Start: 2024-09-11 | End: 2024-09-14 | Stop reason: HOSPADM

## 2024-09-11 RX ADMIN — PANCRELIPASE 1 CAPSULE: 36000; 180000; 114000 CAPSULE, DELAYED RELEASE PELLETS ORAL at 21:16

## 2024-09-11 RX ADMIN — ACETAMINOPHEN 650 MG: 325 TABLET ORAL at 21:17

## 2024-09-11 RX ADMIN — SODIUM CHLORIDE 1000 ML: 9 INJECTION, SOLUTION INTRAVENOUS at 11:36

## 2024-09-11 RX ADMIN — AZITHROMYCIN MONOHYDRATE 500 MG: 500 INJECTION, POWDER, LYOPHILIZED, FOR SOLUTION INTRAVENOUS at 13:39

## 2024-09-11 RX ADMIN — CEFTRIAXONE SODIUM 2 G: 2 INJECTION, POWDER, FOR SOLUTION INTRAMUSCULAR; INTRAVENOUS at 13:23

## 2024-09-11 RX ADMIN — ACETAMINOPHEN 1000 MG: 500 TABLET ORAL at 11:34

## 2024-09-11 RX ADMIN — PANCRELIPASE 3 CAPSULE: 36000; 180000; 114000 CAPSULE, DELAYED RELEASE PELLETS ORAL at 19:29

## 2024-09-11 RX ADMIN — INSULIN ASPART 6 UNITS: 100 INJECTION, SOLUTION INTRAVENOUS; SUBCUTANEOUS at 19:31

## 2024-09-11 RX ADMIN — OXYCODONE HYDROCHLORIDE 5 MG: 5 TABLET ORAL at 19:29

## 2024-09-11 RX ADMIN — SODIUM CHLORIDE: 9 INJECTION, SOLUTION INTRAVENOUS at 16:03

## 2024-09-11 ASSESSMENT — ACTIVITIES OF DAILY LIVING (ADL)
ADLS_ACUITY_SCORE: 39
ADLS_ACUITY_SCORE: 39
ADLS_ACUITY_SCORE: 26
ADLS_ACUITY_SCORE: 37
ADLS_ACUITY_SCORE: 37
ADLS_ACUITY_SCORE: 26
ADLS_ACUITY_SCORE: 26
ADLS_ACUITY_SCORE: 39
ADLS_ACUITY_SCORE: 26
ADLS_ACUITY_SCORE: 39

## 2024-09-11 NOTE — PROGRESS NOTES
RECEIVING UNIT ED HANDOFF REVIEW    ED Nurse Handoff Report was reviewed by: Luigi Artis RN on September 11, 2024 at 2:30 PM

## 2024-09-11 NOTE — H&P
[facility]    History and Physical - Hospitalist Service       Date of Admission:  9/11/2024    Assessment & Plan      Long Mayfield is a 54 year old male admitted on 9/11/2024. He has a past medical history of hypertension, diabetes mellitus type 2, and recent septic arthritis who presents from TCU after passing out in bed with reports of defecation and vomiting in his sleep.    Acute hypoxic respiratory failure secondary to aspiration pneumonia  Fever   *CXR shows patchy and nodular opacities throughout the left lung concerning for pneumonia  *COVID, RSV, Flu PCR negative   *VBG 7.37/38/64/22  *Lactic acid 1.4  *Blood culture; in process   -Admit to inpatient  -Vital signs every 4 hours  -continue ceftriaxone 2 g daily and 250 mg azithromycin IV daily   -Continuous pulse oximetry  -Supplemental oxygen to maintain oxygen saturations greater than 92%  -as needed acetaminophen for fever   -Admission chest x-ray concerning for extensive patchy and nodular opacities with recommendation for CT follow-up in 6 to 8 weeks to ensure resolution.    Altered mental status  Right eye hematoma  Likely secondary to infection noted above.  Staff this morning found the patient rolled in his bed between mattress and the wall.  Noted to have right eye subcutaneous hematoma.  Patient has no recollection of how he rolled into the wall.  Nursing staff found him with vomit stool. Neuro is intact on admission.  CT imaging ordered by ED attending.  -CT head; ordered not completed  -CT cervical spine; ordered not completed    Acute kidney injury  Hyponatremia- na 128  Hyperkalemia- k+ 5.4  *Creatinine 2.41 on admission GFR 31  *Received 1 L NS IV bolus in ED  -Continuous IV fluids 100 mL/hr of NS   -Add on urine osmolality and urine sodium  -Give x1 dose 30 g kayexalate    -repeat potassium q4 hrs for 2 occurrences  -repeat sodium in 6 hrs (~1700 pm)   -Continuous telemetry     Chronic diarrhea  No leukocytosis  "or abdominal pain on admission.  Fever with stool incontinence. Reports he ate \"funny tasting vegetable soup\" on 9/10/2024 at TCU.  -Obtain enteric bacterial and viral stool panel  -C diff panel given numerous antibiotics over the last 2 months  -Maintenance IV fluids as noted above    Acute blood loss anemia   Hgb 7.4 on admission. Had Hemovac to right knee over the course of the summer (drain was removed in August) along with x2 I&Ds. No concerns for acute bleeding on admission. Hemodynamically stable.   -Monitor daily hgb   -Could consider blood transfusion if Hgb <7.0     Right knee inflammatory arthritis s/p I&D July 11 and August 18, 2024  Etiology to be determined.  Admitted to [facility] 8/16 to 8/19/2024.  Has been seen a handful of times and outpatient follow-up with persistent right knee swelling found to have high synovial cell count.  Multiple cultures off antibiotics have been negative following I&D procedures with antibiotic therapy and ineffective.  Work up included zoonotic causes of infection.  Has been referred to rheumatology to assist in differential diagnosis.  -Possible new vasculitis. Rheumatology appointment scheduled for 9/17/24   -Orthopedic surgery recommended MRI right knee with and without contrast (scheduled for 9/21/24)   -Prescribed bumex 1 mg daily for knee swelling. Defer given SALVADOR noted above.     History of diabetic foot infection s/p right transmetatarsal amputation (all five toes) 2023  ESBL infected right foot wound 2023    Hyperglycemia  Diabetes mellitus type 2 insulin-dependent  PTA regimen: 13 units Lantus every morning, 8 units aspart insulin 2 times daily with breakfast and lunch and 6 units aspart every morning with additional sliding scale 3 times daily with meals  -Continue PTA Lantus  -Continue on high resistance sliding scale insulin  -Blood glucose checks 3 times daily and at bedtime  -Hypoglycemia protocol in place    Hypertension   PTA regimen: 5 mg amlodipine and 25 " mg hydralazine daily  -Hold PTA antihypertensives given normotension and infectious etiology noted above        Diet:  Moderate carb  DVT Prophylaxis: Pneumatic Compression Devices  Osman Catheter: Not present  Lines: None     Cardiac Monitoring: Yes, electrolyte derangements  Code Status:  Full code    Clinically Significant Risk Factors Present on Admission        # Hyperkalemia: Highest K = 5.4 mmol/L in last 2 days, will monitor as appropriate  # Hyponatremia: Lowest Na = 128 mmol/L in last 2 days, will monitor as appropriate      # Hypoalbuminemia: Lowest albumin = 3 g/dL at 9/11/2024 11:29 AM, will monitor as appropriate   # Drug Induced Platelet Defect: home medication list includes an antiplatelet medication  # Acute Kidney Injury, unspecified: based on a >150% or 0.3 mg/dL increase in last creatinine compared to past 90 day average, will monitor renal function  # Hypertension: Noted on problem list    # Anemia: based on hgb <11      # DMII: A1C = 9.2 % (Ref range: <5.7 %) within past 6 months       # Financial/Environmental Concerns:           Disposition Plan     Medically Ready for Discharge: Anticipated in 2-4 Days         The patient's care was discussed with the Attending Physician, Dr. Luna .    Monae Maldonado NP  Hospitalist Service  [facility] [place]  Securely message with [employer] (more info)  Text page via [employer] Paging/Directory     ______________________________________________________________________    Chief Complaint   Passing out with defecation and vomiting during sleep     History is obtained from the patient and electronic health record    History of Present Illness   Long Mayfield is a 54 year old male admitted on 9/11/2024. He has a past medical history of hypertension, diabetes mellitus type 2, and recent septic arthritis who presents from TCU after passing out in bed with reports of defecation and vomiting in his sleep.    Reviewed ED notes.  Received signout  from ED attending Dr. Torres.  Patient presented to the ED after being found by [facility] staff [facility] this morning.  Nursing staff noted the patient was between the mattress and the wall and had defecated and vomited in his sleep.  Patient does not recall these events happening or how he got in that position.  He reports poor sleeping over the last several days anticipating a [place] from [facility] to a new apartment in [place] and he has been up packing.  He reports he got up to the bathroom a handful of times overnight prior to this episode.  On arrival to ED patient reports frequent congested cough.  Reports ongoing chills.  Denies abdominal pain or dysuria.  No shortness of breath or chest pain.  Has repeated complaints of right knee swelling for which she has been following with orthopedic surgery.     Lab studies completed in the ED included a blood culture in process.  A CBC showing hemoglobin 7.4.  A comprehensive metabolic panel with sodium 128, potassium 5.4, CO2 19, BUN 41.7, creatinine 2.41, GFR 31, calcium 8.2.  Blood glucose 266, alk phos 176.  Albumin 3.0 VBG 7.3 7/38/64/22.  Lactic 1.4.  COVID flu and RSV PCR negative.    A urinalysis ordered; not resulted        Imaging studies completed in the ED included a 2 view chest x-ray showing extensive patchy and nodular opacities throughout the left lung.     A CT head and CT cervical spine ordered; not resulted     Patient received 1 g acetaminophen, 1 L NS IV bolus, 500 mg azithromycin and 2 g ceftriaxone IV while in the ED.       While in the ED patient was noted to be febrile temperature 101.7 oral, HR 86, /69, RR 13 and SpO2 92% on 4 L nasal cannula.     Past Medical History    Past Medical History:   Diagnosis Date    Acute pain of right knee 07/12/2024    Allergic rhinitis     Anemia     Arthritis     Bell's palsy     Cervicalgia     Diabetes (H)     Diabetic foot ulcer (H)     Generalized edema     Hyperkalemia     Hypertension     Hypo-osmolality  and hyponatremia     Hypoglycemia 05/03/2024    Major depressive disorder, recurrent episode, mild (H24)     Other acute osteomyelitis, right ankle and foot (H)     Other chronic pancreatitis (H)     Other polyosteoarthritis     Right knee pain 07/11/2024    Solitary pulmonary nodule        Past Surgical History   Past Surgical History:   Procedure Laterality Date    ARTHROSCOPY KNEE Right 8/17/2024    Procedure: Arthroscopic;  Surgeon: Jeff Phoenix MD;  Location: [facility] OR    COLONOSCOPY N/A 05/07/2024    Procedure: Colonoscopy;  Surgeon: Nina Moya MD;  Location:  GI    ESOPHAGOSCOPY, GASTROSCOPY, DUODENOSCOPY (EGD), COMBINED N/A 05/06/2024    Procedure: Esophagoscopy, gastroscopy, duodenoscopy (EGD), combined;  Surgeon: Nina Moya MD;  Location:  GI    ESOPHAGOSCOPY, GASTROSCOPY, DUODENOSCOPY (EGD), COMBINED N/A 05/07/2024    Procedure: Esophagoscopy, gastroscopy, duodenoscopy (EGD), combined;  Surgeon: Nina Moya MD;  Location:  GI    IRRIGATION AND DEBRIDEMENT KNEE, COMBINED Right 8/17/2024    Procedure: IRRIGATION AND DEBRIDEMENT, Right KNEE,;  Surgeon: Jeff Phoenix MD;  Location: [facility] OR    IRRIGATION AND DEBRIDEMENT SPINE Right 7/11/2024    Procedure: Irrigation and debridement knee;  Surgeon: Dejon Espinoza MD;  Location: [facility] OR    ORTHOPEDIC SURGERY      partial amputation of right foot Right     PATELLA SURGERY         Prior to Admission Medications   Prior to Admission Medications   Prescriptions Last Dose Informant Patient Reported? Taking?   DULoxetine (CYMBALTA) 60 MG capsule  Nursing Home Yes No   Sig: Take 60 mg by mouth daily   Glucagon, rDNA, (GLUCAGON EMERGENCY) 1 MG KIT   Yes No   Sig: Inject 1 mg into the muscle daily as needed (hypoglycemia)   Lidocaine (LIDOCARE) 4 % Patch  Nursing Home No No   Sig: Place 2 patches onto the skin every 24 hours Apply to neck/back. To prevent lidocaine toxicity, patient should be patch  free for 12 hrs daily.   Patient not taking: Reported on 8/16/2024   acetaminophen (TYLENOL) 325 MG tablet  Nursing Home Yes No   Sig: Take 650 mg by mouth every 6 hours as needed for pain   amLODIPine (NORVASC) 5 MG tablet  Nursing Home No No   Sig: Take 1 tablet (5 mg) by mouth daily   aspirin 81 MG EC tablet  Nursing Home Yes No   Sig: Take 81 mg by mouth daily   bumetanide (BUMEX) 1 MG tablet  Nursing Home No No   Sig: Take 1 tablet (1 mg) by mouth daily Dose decreased to 1 mg on discharge.  Optimize dose on PCP visit.   cetirizine (ZYRTEC) 10 MG tablet  Nursing Home Yes No   Sig: Take 10 mg by mouth daily   gabapentin (NEURONTIN) 100 MG capsule  Nursing Home Yes No   Sig: Take 300 mg by mouth every 12 hours   hydrALAZINE (APRESOLINE) 25 MG tablet  Nursing Home Yes No   Sig: Take 25 mg by mouth 2 times daily Hold if SBP < 110   insulin aspart (NOVOLOG FLEXPEN) 100 UNIT/ML pen  Nursing Home Yes No   Sig: Inject 1-5 Units subcutaneously 3 times daily (with meals) In addition to 8 units with each meal, inject additional units as per this sliding scale:  If 200-250 = 1   251-300 = 2   301-350 = 3  351-400 = 4  401+ = 5  Repeat BS after 3 hours and call MD if still over 400   insulin aspart (NOVOLOG FLEXPEN) 100 UNIT/ML pen  Nursing Home Yes No   Sig: Inject 8 Units subcutaneously 2 times daily (with meals) Breakfast and lunch   insulin aspart (NOVOLOG PEN) 100 UNIT/ML pen   Yes No   Sig: Inject 6 Units subcutaneously every morning   insulin glargine (LANTUS PEN) 100 UNIT/ML pen  Nursing Home Yes No   Sig: Inject 13 Units subcutaneously every morning   lipase-protease-amylase (CREON 36) 70300-518261-842123 units CPEP  Nursing Home Yes No   Sig: Take 2 capsules by mouth Take with snacks or supplements (evening snack)   loperamide (IMODIUM) 2 MG capsule  Nursing Home Yes No   Sig: Take 4 mg by mouth 3 times daily as needed for diarrhea   methocarbamol (ROBAXIN) 500 MG tablet  Nursing Home Yes No   Sig: Take 500 mg by  mouth every 8 hours as needed for muscle spasms   multivitamin w/minerals (THERA-VIT-M) tablet  Nursing Home No No   Sig: Take 1 tablet by mouth daily   pantoprazole (PROTONIX) 40 MG EC tablet  Nursing Home No No   Sig: Take 1 tablet (40 mg) by mouth daily      Facility-Administered Medications: None        Review of Systems    The 10 point Review of Systems is negative other than noted in the HPI or here.     Social History   I have reviewed this patient's social history and updated it with pertinent information if needed.  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    Drug use: No         Allergies   Allergies   Allergen Reactions    Vancomycin      Other Reaction(s): Renal Failure    Vancomycin-induced nephrotoxicity (11/2023, outside hospital)    Seasonal Allergies      HAYFEVER:    -Watery Eyes  -Eye burn    Daptomycin Rash     Likely delayed hypersensitivity reaction to daptomycin 11/2023        Physical Exam   Vital Signs: Temp: (!) 101.7  F (38.7  C) Temp src: Oral BP: 115/65 Pulse: 82   Resp: 10 SpO2: 92 % O2 Device: Nasal cannula Oxygen Delivery: 4 LPM  Weight: 130 lbs 0 oz    Physical Exam  Constitutional:       General: He is not in acute distress.     Appearance: He is normal weight. He is not ill-appearing or toxic-appearing.   HENT:      Mouth/Throat:      Mouth: Mucous membranes are dry.      Comments: Dried blood. No cuts or scabs or bite marks to tongue or lips. A few absent teeth removed by oral surgery and to wear retainer.    Eyes:      Extraocular Movements: Extraocular movements intact.      Pupils: Pupils are equal, round, and reactive to light.   Cardiovascular:      Rate and Rhythm: Normal rate and regular rhythm.      Pulses: Normal pulses.      Heart sounds: Normal heart sounds.   Pulmonary:      Effort: Pulmonary effort is  normal. No tachypnea or respiratory distress.      Breath sounds: Examination of the left-middle field reveals rales. Examination of the left-lower field reveals rales. Rales present.   Abdominal:      General: Bowel sounds are normal. There is no distension.      Palpations: Abdomen is soft. There is no mass.      Tenderness: There is no abdominal tenderness. There is no rebound.      Hernia: No hernia is present.   Musculoskeletal:      Right knee: Swelling and effusion present. No erythema, ecchymosis or crepitus. Decreased range of motion.      Comments: Absent right sided toes   Skin:     General: Skin is warm and dry.      Capillary Refill: Capillary refill takes less than 2 seconds.   Neurological:      Mental Status: He is oriented to person, place, and time. He is lethargic.   Psychiatric:         Mood and Affect: Mood normal.         Behavior: Behavior normal.         Thought Content: Thought content normal.       Medical Decision Making       75 MINUTES SPENT BY ME on the date of service doing chart review, history, exam, documentation & further activities per the note.      Data     I have personally reviewed the following data over the past 24 hrs:    7.5  \   7.4 (L)   / 409     128 (L) 94 (L) 41.7 (H) /  266 (H)   5.4 (H) 19 (L) 2.41 (H) \     ALT: 9 AST: 24 AP: 176 (H) TBILI: 0.3   ALB: 3.0 (L) TOT PROTEIN: 7.7 LIPASE: N/A     Procal: N/A CRP: N/A Lactic Acid: 1.4         Imaging results reviewed over the past 24 hrs:   Recent Results (from the past 24 hour(s))   Chest XR,  PA & LAT    Narrative    XR CHEST 2 VIEWS 9/11/2024 11:56 AM    HISTORY: hypoxia    COMPARISON: 3/9/2022      Impression    IMPRESSION: Extensive patchy and nodular opacities throughout the left  lung, concerning for pneumonia. A follow-up chest x-ray of chest CT  after treatment in about 6-8 weeks is recommended to ensure  resolution. No pleural effusion or pneumothorax. The cardiac and  mediastinal silhouettes are  normal.    BETSY JON MD         SYSTEM ID:  [id number]

## 2024-09-11 NOTE — CONSULTS
Care Management Initial Consult    General Information  Assessment completed with:  ,         Primary Care Provider verified and updated as needed:     Readmission within the last 30 days:           Advance Care Planning:            Communication Assessment  Patient's communication style: spoken language (English or Bilingual)             Cognitive  Cognitive/Neuro/Behavioral: .WDL except, all  Level of Consciousness: somnolent                   Living Environment:   People in home:       Current living Arrangements:        Able to return to prior arrangements:         Family/Social Support:  Care provided by:    Provides care for:       Support system:            Description of Support System:           Current Resources:   Patient receiving home care services:          Community Resources:    Equipment currently used at home:    Supplies currently used at home:      Employment/Financial:  Employment Status:          Financial Concerns:             Does the patient's insurance plan have a 3 day qualifying hospital stay waiver?  No    Lifestyle & Psychosocial Needs:  Social Determinants of Health     Food Insecurity: Patient Declined (12/27/2023)    Received from [facility]    Hunger Vital Sign     Worried About Running Out of Food in the Last Year: Patient declined     Ran Out of Food in the Last Year: Patient declined   Depression: Not at risk (8/28/2024)    PHQ-2     PHQ-2 Score: 0   Housing Stability: High Risk (12/27/2023)    Received from [facility]    Housing Stability     What is your housing situation today?: 1 - I do not have housing (I am staying with others, in a hotel, in a shelter, living outside on ...   Tobacco Use: Low Risk  (8/28/2024)    Patient History     Smoking Tobacco Use: Never     Smokeless Tobacco Use: Never     Passive Exposure: Not on file   Financial Resource Strain: Patient Declined (12/27/2023)    [facility]    Overall Financial Resource Strain (CARDIA)     Difficulty of Paying Living Expenses: Patient declined   Alcohol Use: Not At Risk (6/27/2024)    Received from [facility]    AUDIT-C     Frequency of Alcohol Consumption: Never     Average Number of Drinks: Patient does not drink     Frequency of Binge Drinking: Never   Transportation Needs: No Transportation Needs (12/27/2023)    Received from [facility]    PRAPARE - Transportation     Lack of Transportation (Medical): No     Lack of Transportation (Non-Medical): No   Physical Activity: Not on file   Interpersonal Safety: Not At Risk (12/27/2023)    Received from [facility]    Humiliation, Afraid, Rape, and Kick questionnaire     Fear of Current or Ex-Partner: No     Emotionally Abused: No     Physically Abused: No     Sexually Abused: No   Stress: Not on file   Social Connections: Unknown (3/16/2022)    [facility]    Social Connections     Frequency of Communication with Friends and Family: Not on file   Health Literacy: Not on file       Functional Status:  Prior to admission patient needed assistance:              Mental Health Status:          Chemical Dependency Status:                Values/Beliefs:  Spiritual, Cultural Beliefs, [employer] Practices, Values that affect care:                 Discussed  Partnership in Safe Discharge Planning  document with patient/family: No    Additional Information:  Per care management consult, chart was reviewed. H&P states pt is a \"54 year old male admitted on 9/11/2024. He has a past medical history of hypertension, diabetes mellitus type 2, and recent septic arthritis who presents from TCU after passing out in bed with reports of defecation and vomiting in his sleep.\"    SW met with pt at bedside. Pt was sleepy and drifting off during consult. " Pt confirmed he lives at [facility]. Pt has a cane and a wheelchair. [facility] helps with laundry, meals, housekeeping, and medication management.     MADYSON told pt they will check in once they have a better idea of discharge plans.     MADYSON called [facility] and [facility] for the  representative on the 3rd floor where pt lives. MADYSON requested a call back.     Next Steps: Wait for therapy recommendations.     JEWELL Davis  [facility]  Inpatient Care Management

## 2024-09-11 NOTE — ED TRIAGE NOTES
Patient staying at TCU s/p toe amputation. Staff stated he defacated and vomited in his sleep and seemed more confused. New swelling to forehead. VSS. Sats in high 80s for EMS. , patient is diabetic.

## 2024-09-11 NOTE — ED NOTES
Bed: ED25  Expected date:   Expected time:   Means of arrival:   Comments:  Ting 2 54m syncope ETA 1103

## 2024-09-11 NOTE — PHARMACY-ADMISSION MEDICATION HISTORY
Pharmacist Admission Medication History    Admission medication history is complete. The information provided in this note is only as accurate as the sources available at the time of the update.    Information Source(s): Facility ([facility]/) medication list/MAR via phone --> from [facility]    Pertinent Information:   1) There was confusion with patient as to whether or not he was on a Butrans patch, spoke with RN & patient. He had a Butrans patch placed 2 weeks ago (removed after 1 week) and didn't find it useful so he didn't receive any further doses and the plan was to discontinue the medication at discharge from the facility.      Changes made to PTA medication list:  Added: Tylenol, glucose 40%, TID creon doses  Deleted: None  Changed: Updated timing on Novolog dosing    Medication History Completed By: Barb Fontaine, PharmD 9/11/2024 2:20 PM    PTA Med List   Medication Sig Note Last Dose    acetaminophen (TYLENOL) 500 MG tablet Take 1,000 mg by mouth 3 times daily.  9/11/2024 at AM    acetaminophen (TYLENOL) 500 MG tablet Take 1,000 mg by mouth daily as needed for mild pain. In addition to scheduled doses.  prn med    amLODIPine (NORVASC) 5 MG tablet Take 1 tablet (5 mg) by mouth daily  9/11/2024 at AM    aspirin 81 MG EC tablet Take 81 mg by mouth daily  9/11/2024 at AM    bumetanide (BUMEX) 1 MG tablet Take 1 tablet (1 mg) by mouth daily Dose decreased to 1 mg on discharge.  Optimize dose on PCP visit.  9/11/2024 at AM    cetirizine (ZYRTEC) 10 MG tablet Take 10 mg by mouth daily  9/11/2024 at AM    DULoxetine (CYMBALTA) 60 MG capsule Take 60 mg by mouth daily  9/11/2024 at AM    gabapentin (NEURONTIN) 300 MG capsule Take 300 mg by mouth 2 times daily.  9/11/2024 at 0800    Glucagon, rDNA, (GLUCAGON EMERGENCY) 1 MG KIT Inject 1 mg into the muscle daily as needed (hypoglycemia)  prn med    glucose 40 % (400 mg/mL) gel Take by mouth as needed for low blood sugar (For BG <60).  prn med     hydrALAZINE (APRESOLINE) 25 MG tablet Take 25 mg by mouth 2 times daily Hold if SBP < 110  9/11/2024 at AM    insulin aspart (NOVOLOG FLEXPEN) 100 UNIT/ML pen Inject 8 Units subcutaneously 2 times daily (with meals). 1130 & 1730  9/10/2024    insulin aspart (NOVOLOG FLEXPEN) 100 UNIT/ML pen Inject 1-5 Units subcutaneously 3 times daily (with meals). In addition to 6-8 units with each meal, inject additional units as per this sliding scale:  If 200-250 = 1   251-300 = 2   301-350 = 3  351-400 = 4  401+ = 5  Repeat BS after 3 hours and call MD if still over 400  9/10/2024    insulin aspart (NOVOLOG PEN) 100 UNIT/ML pen Inject 6 Units subcutaneously every morning. 0730  9/10/2024 at AM    insulin glargine (LANTUS PEN) 100 UNIT/ML pen Inject 13 Units subcutaneously every morning 9/11/2024: Patient refused dose 9/10 9/9/2024    lipase-protease-amylase (CREON 36) 69204-698524-202843 units CPEP Take 3 capsules by mouth 3 times daily (with meals). 0730, 1130, 1630  9/11/2024 at 0730    lipase-protease-amylase (CREON 36) 11346-710055-822152 units CPEP Take 1 capsule by mouth 2 times daily. With snack 1000 & 2000  9/10/2024    loperamide (IMODIUM) 2 MG capsule Take 4 mg by mouth every 8 hours as needed for diarrhea.  prn med    methocarbamol (ROBAXIN) 500 MG tablet Take 500 mg by mouth every 8 hours as needed for muscle spasms  Past Week    multivitamin w/minerals (THERA-VIT-M) tablet Take 1 tablet by mouth daily  9/11/2024 at AM    oxyCODONE (ROXICODONE) 5 MG tablet Take 5 mg by mouth every 6 hours as needed for severe pain.  9/10/2024    pantoprazole (PROTONIX) 40 MG EC tablet Take 1 tablet (40 mg) by mouth daily  9/11/2024 at AM

## 2024-09-11 NOTE — PROVIDER NOTIFICATION
MD Notification    Notified Person: MD    Notified Person Name: THERESE Maldonado    Notification Date/Time: 1717 9/11/24    Notification Interaction: [employer] webpage    Purpose of Notification: Pharmacist wants you to know that kayexalate was never given. do you want us to still give it?     K =5.1 at 1724, next draw at 1930    Orders Received: no, please brenda not given    Comments:

## 2024-09-11 NOTE — PLAN OF CARE
Goal Outcome Evaluation:      Plan of Care Reviewed With: patient    Overall Patient Progress: no changeOverall Patient Progress: no change   [id number]  Pt arrived from TCU to ED for passing out in bed found on floor w/ report of defecation & vomiting in sleep. Was at TCU for recovery from toe amputation. Admitted to 8th floor at 1500.     ONLY POST BELOW FOR END OF SHIFT NOTE     Orientation: A&Ox4 lethargic - improved towards end of shift to alert & awake  Aggression Stop Light: Green  Activity: SBA w/ IV pole  Diet/BS Checks: AVHS, Mod Carb, BS =269  Tele:  NSR  IV Access/Drains: PIV infusing NS 100mL/hr  Pain Management: 8/10 Oxy given  Abnormal VS/Results: VSS on 2L, K+ 5.1, 4.8, next recheck at 1930, Hgb 7.4  Bowel/Bladder: episode of stool smear incontinence, otherwise continent in bathroom  Skin/Wounds: 2+ edema RLE, hematoma on head from fall at TCU  Consults: Pending  D/C Disposition: Pending  Other Info: Sputum, Urine, Stool Cx collected   Isolation for Rule out Enteric/Contact

## 2024-09-11 NOTE — ED PROVIDER NOTES
Emergency Department Note      History of Present Illness     Chief Complaint   Syncope      HPI   Long Mayfield is a 54 year old male with history of type 2 diabetes and hypertension who presents with syncope. The patient reports that he remembers little about passing out while in bed today, but staff at his TCU report that the patient defecated and vomited in his sleep today. He also has new swelling to the forehead and seemed more confused than his typical baseline. The patient explains that he has been staying at this TCU for about 8 months to recover from right toe amputation in the setting of type 2 diabetes issues. He denies urinary frequency and shortness of breath.    Independent Historian   None    Review of External Notes       Past Medical History     Medical History and Problem List   Allergic rhinitis  Anemia  Arthritis  Acidosis   SALVADOR  Alcohol dependence   Bell's palsy  Cervicalgia  Cardiac calcification   Chronic pancreatitis   Diabetes, type II   Diabetic foot ulcer   Generalized edema  Gangrene of toe of right foot   GERD  Hyperkalemia  Hypertension  Hyperglycemia   Hyponatremia   Hypo-osmolality and hyponatremia  Ketosis   Major depressive disorder, recurrent episode, mild   Other acute osteomyelitis, right ankle and foot  Other chronic pancreatitis   Other polyosteoarthritis  Solitary pulmonary nodule  Vomiting and diarrhea      Medications   Amlodipine   Aspirin 81 mg  Atorvastatin   Bumetanide   Certirizine   Ciprofloxacin   Duloxetine   Furosemide   Gabapentin  Hydralazine   Insulin aspart   Insulin glargine   Loperamide   Methocarbamol   Metoprolol tartrate   Methylprednisolone   Pantoprazole      Surgical History   Arthroscopy knee, right  Colonoscopy  EGD x2  Irrigation and debridement knee, right  Irrigation and debridement spine, right  Orthopedic surgery  Partial amputation of right foot  Patella surgery, right     Physical Exam     Patient Vitals for the past 24 hrs:   BP Temp Temp  "src Pulse Resp SpO2 Height Weight   09/11/24 1212 115/65 -- -- 82 10 -- -- --   09/11/24 1157 107/69 -- -- -- -- -- -- --   09/11/24 1142 126/74 -- -- 86 13 -- -- --   09/11/24 1114 136/70 (!) 101.7  F (38.7  C) Oral 92 18 92 % 1.651 m (5' 5\") 59 kg (130 lb)     Physical Exam  Vitals reviewed.   Constitutional:       Appearance: He is obese.   HENT:      Head: Normocephalic.      Right Ear: Tympanic membrane normal.      Left Ear: Tympanic membrane normal.      Mouth/Throat:      Mouth: Mucous membranes are moist.   Eyes:      General: No scleral icterus.     Pupils: Pupils are equal, round, and reactive to light.   Cardiovascular:      Rate and Rhythm: Regular rhythm. Tachycardia present.   Pulmonary:      Effort: Pulmonary effort is normal.      Breath sounds: Rhonchi present.   Abdominal:      General: Abdomen is flat. Bowel sounds are normal.   Skin:     General: Skin is warm.      Comments: There is prior amputation in the right midfoot.  Wound healed without dehiscence or signs of infection.   Neurological:      General: No focal deficit present.      Mental Status: He is alert and oriented to person, place, and time.   Psychiatric:         Mood and Affect: Mood normal.           Diagnostics     Lab Results   Labs Ordered and Resulted from Time of ED Arrival to Time of ED Departure   COMPREHENSIVE METABOLIC PANEL - Abnormal       Result Value    Sodium 128 (*)     Potassium 5.4 (*)     Carbon Dioxide (CO2) 19 (*)     Anion Gap 15      Urea Nitrogen 41.7 (*)     Creatinine 2.41 (*)     GFR Estimate 31 (*)     Calcium 8.2 (*)     Chloride 94 (*)     Glucose 266 (*)     Alkaline Phosphatase 176 (*)     AST 24      ALT 9      Protein Total 7.7      Albumin 3.0 (*)     Bilirubin Total 0.3     CBC WITH PLATELETS AND DIFFERENTIAL - Abnormal    WBC Count 7.5      RBC Count 3.08 (*)     Hemoglobin 7.4 (*)     Hematocrit 23.8 (*)     MCV 77 (*)     MCH 24.0 (*)     MCHC 31.1 (*)     RDW 16.6 (*)     Platelet Count 409   "    % Neutrophils 77      % Lymphocytes 12      % Monocytes 11      % Eosinophils 0      % Basophils 0      % Immature Granulocytes 0      NRBCs per 100 WBC 0      Absolute Neutrophils 5.8      Absolute Lymphocytes 0.9      Absolute Monocytes 0.8      Absolute Eosinophils 0.0      Absolute Basophils 0.0      Absolute Immature Granulocytes 0.0      Absolute NRBCs 0.0     RBC AND PLATELET MORPHOLOGY - Abnormal    RBC Morphology Confirmed RBC Indices      Platelet Assessment        Value: Automated Count Confirmed. Platelet morphology is normal.    RBC Fragments Slight (*)    ISTAT GASES LACTATE VENOUS POCT - Abnormal    Lactic Acid POCT 1.4      Bicarbonate Venous POCT 22      O2 Sat, Venous POCT 64 (*)     pCO2 Venous POCT 38 (*)     pH Venous POCT 7.37      pO2 Venous POCT 34      Base Excess/Deficit (+/-) POCT -3.0     INFLUENZA A/B, RSV, & SARS-COV2 PCR - Normal    Influenza A PCR Negative      Influenza B PCR Negative      RSV PCR Negative      SARS CoV2 PCR Negative     ROUTINE UA WITH MICROSCOPIC REFLEX TO CULTURE   BLOOD CULTURE       Imaging   Chest XR,  PA & LAT   Final Result   IMPRESSION: Extensive patchy and nodular opacities throughout the left   lung, concerning for pneumonia. A follow-up chest x-ray of chest CT   after treatment in about 6-8 weeks is recommended to ensure   resolution. No pleural effusion or pneumothorax. The cardiac and   mediastinal silhouettes are normal.      BETSY JON MD            SYSTEM ID:  [id number]          EKG   ECG results from 09/11/24   EKG 12 lead     Value    Systolic Blood Pressure     Diastolic Blood Pressure     Ventricular Rate 91    Atrial Rate 91    CT Interval 180    QRS Duration 72        QTc 432    P Axis 79    R AXIS 23    T Axis 60    Interpretation ECG      Sinus rhythm  Normal ECG  When compared with ECG of 19-Aug-2024 11:50,  T wave amplitude has increased in Lateral leads  Confirmed by GENERATED REPORT, COMPUTER ([id number]),  Converse Marlene  ([id number]) on 9/11/2024 11:39:54 AM         Independent Interpretation   CXR: Left lower lobe infiltrate.    ED Course      Medications Administered   Medications   cefTRIAXone (ROCEPHIN) 2 g vial to attach to  ml bag for ADULTS or NS 50 ml bag for PEDS (has no administration in time range)   azithromycin (ZITHROMAX) 500 mg vial to attach to  mL bag (has no administration in time range)   sodium chloride 0.9% BOLUS 1,000 mL (0 mLs Intravenous Stopped 9/11/24 1258)   acetaminophen (TYLENOL) tablet 1,000 mg (1,000 mg Oral $Given 9/11/24 1134)         Discussion of Management   1241 I spoke with Monae Maldonado, hospitalist MAX, who accepts the patient for Dr. Luna.    ED Course   ED Course as of 09/11/24 1302   Wed Sep 11, 2024   1140 I obtained history and examined the patient as noted above   1223 I rechecked the patient and explained findings. Discussed plan to admit.       Additional Documentation  None    Medical Decision Making / Diagnosis     CMS Diagnoses: None    MIPS       None    MDM   Long Mayfield is a 54 year old male presents with near syncopal event.  Possibly vomited in his sleep.  Currently in a TCU.  Coarse breath sounds present as well as fever to 101.  Chest x-ray confirms left lower lobe infiltrate.  Patient is on large doses of Butrans suspect this is pill placing patient at risk for either hypopnea or aspiration.  Suspect aspiration pneumonia.  Turner antibiotics given due to low oxygen levels and pneumonia recommended admission for IV antibiotics given discussed with the hospitalist and was admitted as an inpatient.      Disposition   The patient was admitted to the hospital.     Diagnosis     ICD-10-CM    1. Pneumonia of left lower lobe due to infectious organism  J18.9       2. Hypoxia  R09.02              Scribe Disclosure:  SUNNY, Arden Pierre, am serving as a scribe at 11:50 AM on 9/11/2024 to document services personally performed by Mitch Torres MD based on my  observations and the provider's statements to me.        Mitch Torres MD  09/12/24 [id number]

## 2024-09-12 LAB
ABO/RH(D): NORMAL
ADV 40+41 DNA STL QL NAA+NON-PROBE: NEGATIVE
ANION GAP SERPL CALCULATED.3IONS-SCNC: 13 MMOL/L (ref 7–15)
ANTIBODY SCREEN: NEGATIVE
ASTRO TYP 1-8 RNA STL QL NAA+NON-PROBE: NEGATIVE
BLD PROD TYP BPU: NORMAL
BLOOD COMPONENT TYPE: NORMAL
BUN SERPL-MCNC: 44.5 MG/DL (ref 6–20)
C CAYETANENSIS DNA STL QL NAA+NON-PROBE: NEGATIVE
CALCIUM SERPL-MCNC: 7.7 MG/DL (ref 8.8–10.4)
CAMPYLOBACTER DNA SPEC NAA+PROBE: NEGATIVE
CHLORIDE SERPL-SCNC: 100 MMOL/L (ref 98–107)
CODING SYSTEM: NORMAL
CREAT SERPL-MCNC: 1.92 MG/DL (ref 0.67–1.17)
CROSSMATCH: NORMAL
CRYPTOSP DNA STL QL NAA+NON-PROBE: NEGATIVE
E COLI O157 DNA STL QL NAA+NON-PROBE: NORMAL
E HISTOLYT DNA STL QL NAA+NON-PROBE: NEGATIVE
EAEC ASTA GENE ISLT QL NAA+PROBE: NEGATIVE
EC STX1+STX2 GENES STL QL NAA+NON-PROBE: NEGATIVE
EGFRCR SERPLBLD CKD-EPI 2021: 40 ML/MIN/1.73M2
EPEC EAE GENE STL QL NAA+NON-PROBE: NEGATIVE
ERYTHROCYTE [DISTWIDTH] IN BLOOD BY AUTOMATED COUNT: 16.6 % (ref 10–15)
ETEC LTA+ST1A+ST1B TOX ST NAA+NON-PROBE: NEGATIVE
G LAMBLIA DNA STL QL NAA+NON-PROBE: NEGATIVE
GLUCOSE BLDC GLUCOMTR-MCNC: 107 MG/DL (ref 70–99)
GLUCOSE BLDC GLUCOMTR-MCNC: 111 MG/DL (ref 70–99)
GLUCOSE BLDC GLUCOMTR-MCNC: 124 MG/DL (ref 70–99)
GLUCOSE BLDC GLUCOMTR-MCNC: 305 MG/DL (ref 70–99)
GLUCOSE BLDC GLUCOMTR-MCNC: 373 MG/DL (ref 70–99)
GLUCOSE SERPL-MCNC: 324 MG/DL (ref 70–99)
HCO3 SERPL-SCNC: 18 MMOL/L (ref 22–29)
HCT VFR BLD AUTO: 20.8 % (ref 40–53)
HGB BLD-MCNC: 6.4 G/DL (ref 13.3–17.7)
ISSUE DATE AND TIME: NORMAL
MCH RBC QN AUTO: 23.7 PG (ref 26.5–33)
MCHC RBC AUTO-ENTMCNC: 30.8 G/DL (ref 31.5–36.5)
MCV RBC AUTO: 77 FL (ref 78–100)
NOROVIRUS GI+II RNA STL QL NAA+NON-PROBE: NEGATIVE
P SHIGELLOIDES DNA STL QL NAA+NON-PROBE: NEGATIVE
PLATELET # BLD AUTO: 406 10E3/UL (ref 150–450)
POTASSIUM SERPL-SCNC: 4.8 MMOL/L (ref 3.4–5.3)
RBC # BLD AUTO: 2.7 10E6/UL (ref 4.4–5.9)
RVA RNA STL QL NAA+NON-PROBE: NEGATIVE
SALMONELLA SP RPOD STL QL NAA+PROBE: NEGATIVE
SAPO I+II+IV+V RNA STL QL NAA+NON-PROBE: NEGATIVE
SHIGELLA SP+EIEC IPAH ST NAA+NON-PROBE: NEGATIVE
SODIUM SERPL-SCNC: 131 MMOL/L (ref 135–145)
SPECIMEN EXPIRATION DATE: NORMAL
UNIT ABO/RH: NORMAL
UNIT NUMBER: NORMAL
UNIT STATUS: NORMAL
UNIT TYPE ISBT: 5100
V CHOLERAE DNA SPEC QL NAA+PROBE: NEGATIVE
VIBRIO DNA SPEC NAA+PROBE: NEGATIVE
WBC # BLD AUTO: 12.3 10E3/UL (ref 4–11)
Y ENTEROCOL DNA STL QL NAA+PROBE: NEGATIVE

## 2024-09-12 PROCEDURE — 250N000013 HC RX MED GY IP 250 OP 250 PS 637

## 2024-09-12 PROCEDURE — 85027 COMPLETE CBC AUTOMATED: CPT

## 2024-09-12 PROCEDURE — 36415 COLL VENOUS BLD VENIPUNCTURE: CPT

## 2024-09-12 PROCEDURE — P9016 RBC LEUKOCYTES REDUCED: HCPCS | Performed by: HOSPITALIST

## 2024-09-12 PROCEDURE — 250N000009 HC RX 250: Performed by: HOSPITALIST

## 2024-09-12 PROCEDURE — 258N000003 HC RX IP 258 OP 636

## 2024-09-12 PROCEDURE — 120N000001 HC R&B MED SURG/OB

## 2024-09-12 PROCEDURE — 250N000011 HC RX IP 250 OP 636

## 2024-09-12 PROCEDURE — 86900 BLOOD TYPING SEROLOGIC ABO: CPT | Performed by: HOSPITALIST

## 2024-09-12 PROCEDURE — 99233 SBSQ HOSP IP/OBS HIGH 50: CPT | Performed by: HOSPITALIST

## 2024-09-12 PROCEDURE — 80048 BASIC METABOLIC PNL TOTAL CA: CPT

## 2024-09-12 PROCEDURE — 86923 COMPATIBILITY TEST ELECTRIC: CPT | Performed by: HOSPITALIST

## 2024-09-12 PROCEDURE — 250N000012 HC RX MED GY IP 250 OP 636 PS 637

## 2024-09-12 RX ORDER — LOPERAMIDE HCL 2 MG
2-4 CAPSULE ORAL 4 TIMES DAILY PRN
Status: DISCONTINUED | OUTPATIENT
Start: 2024-09-12 | End: 2024-09-14 | Stop reason: HOSPADM

## 2024-09-12 RX ORDER — LIDOCAINE HYDROCHLORIDE 20 MG/ML
JELLY TOPICAL ONCE
Status: COMPLETED | OUTPATIENT
Start: 2024-09-12 | End: 2024-09-12

## 2024-09-12 RX ADMIN — PANTOPRAZOLE SODIUM 40 MG: 40 TABLET, DELAYED RELEASE ORAL at 09:59

## 2024-09-12 RX ADMIN — INSULIN ASPART 7 UNITS: 100 INJECTION, SOLUTION INTRAVENOUS; SUBCUTANEOUS at 09:53

## 2024-09-12 RX ADMIN — SODIUM CHLORIDE: 9 INJECTION, SOLUTION INTRAVENOUS at 11:49

## 2024-09-12 RX ADMIN — AZITHROMYCIN MONOHYDRATE 250 MG: 500 INJECTION, POWDER, LYOPHILIZED, FOR SOLUTION INTRAVENOUS at 14:39

## 2024-09-12 RX ADMIN — CEFTRIAXONE SODIUM 2 G: 2 INJECTION, POWDER, FOR SOLUTION INTRAMUSCULAR; INTRAVENOUS at 13:51

## 2024-09-12 RX ADMIN — LIDOCAINE HYDROCHLORIDE: 20 JELLY TOPICAL at 23:54

## 2024-09-12 RX ADMIN — ASPIRIN 81 MG: 81 TABLET, COATED ORAL at 09:59

## 2024-09-12 RX ADMIN — DULOXETINE HYDROCHLORIDE 60 MG: 60 CAPSULE, DELAYED RELEASE ORAL at 09:59

## 2024-09-12 RX ADMIN — SODIUM CHLORIDE: 9 INJECTION, SOLUTION INTRAVENOUS at 01:50

## 2024-09-12 RX ADMIN — OXYCODONE HYDROCHLORIDE 5 MG: 5 TABLET ORAL at 10:14

## 2024-09-12 RX ADMIN — OXYCODONE HYDROCHLORIDE 5 MG: 5 TABLET ORAL at 20:30

## 2024-09-12 RX ADMIN — INSULIN GLARGINE 13 UNITS: 100 INJECTION, SOLUTION SUBCUTANEOUS at 09:53

## 2024-09-12 ASSESSMENT — ACTIVITIES OF DAILY LIVING (ADL)
ADLS_ACUITY_SCORE: 27

## 2024-09-12 NOTE — PROGRESS NOTES
Care Management Follow Up    Length of Stay (days): 1    Expected Discharge Date: 09/13/2024     Concerns to be Addressed:       Patient plan of care discussed at interdisciplinary rounds: Yes    Anticipated Discharge Disposition: Long Term Care              Anticipated Discharge Services: None  Anticipated Discharge DME: None    Patient/family educated on Medicare website which has current facility and service quality ratings: no  Education Provided on the Discharge Plan: Yes  Patient/Family in Agreement with the Plan:      Referrals Placed by CM/SW: Post Acute Facilities  Private pay costs discussed: Not applicable    Discussed  Partnership in Safe Discharge Planning  document with patient/family: No     Handoff Completed: No, handoff not indicated or clinically appropriate    Additional Information:  Writer received a message from [employer] at [facility]. Patient has a LTC Bed hold and will return when stable for discharge.    Next Steps: Discharge to [facility] when Medically stable for discharge     JEWELL Diana

## 2024-09-12 NOTE — PLAN OF CARE
Goal Outcome Evaluation:    Summary:  Pt arrived from TCU to ED for passing out in bed found on floor w/ report of defecation & vomiting in sleep. Was at TCU for recovery from toe amputation.   1900-0730    Orientation: alert and oriented x4  Aggression Stop Light: green  Mobility: SBA with IV pole  Pain Management: Pain managed with Tylenol   Diet: regular   Tele: NSR  Vitals: Stable on 2L NC  Bowel/Bladder: continent; 1 BM during shift  Skin: 2+ edema RLE, hematoma on head from fall at TCU  Abnormal Lab/Assessments: , 373  Drain/Device/Wound: PIV infusing 100ml/hr   Consults: pending  D/C Disposition: pending    Shift Note: Sputum, Urine, Stool Cx collected   Isolation for Rule out Enteric/Contact

## 2024-09-12 NOTE — PROGRESS NOTES
[facility]    Medicine Progress Note - Hospitalist Service    Date of Admission:  9/11/2024    Assessment & Plan   Long Mayfield is a 54 year old male admitted on 9/11/2024. He has a past medical history of hypertension, diabetes mellitus type 2, and recent septic arthritis who presents from TCU after passing out in bed with reports of defecation and vomiting in his sleep.  He was evaluated in the ED and there was concern for aspiration pneumonia.  He was started on antibiotics and supplemental oxygen and admitted to the hospital service for further evaluation and management.    Acute hypoxic respiratory failure secondary to aspiration pneumonia  His memory of the events leading up to admission is somewhat poor.  States he ate some soup that tasted funny on the evening prior to admission.  Had an upset stomach that evening and vomited prior to going to bed.  Has chronic diarrhea that has not changed recently.  He feels like he slipped out of bed and ended up between the wall and bed.  CXR showed patchy and nodular opacities throughout the left lung concerning for pneumonia.  COVID, RSV, and Influenza PCR was negative on 9/11/24.  VBG 7.37/38/64/22.  Lactic acid 1.4.  Admitted to inpatient.  Started on Rocephin and azithromycin at the time of admission on 9/11/2024, continue the same.  Blood culture on 9/11/2024 pending, negative to date.  Continue supplemental oxygen, currently on 2 L/min, wean as tolerated.  As needed pain and nausea medications available.  PT consulted.  Admission chest x-ray concerning for extensive patchy and nodular opacities, recommend follow-up CT in 6 to 8 weeks to ensure resolution.  Likely discharge back to TCU in the next few days pending continued improvement.    Acute metabolic encephalopathy  Likely secondary to infection noted above.  On the morning of admission, staff at the transitional care unit found the patient rolled over in his bed between mattress  "and the wall.  Patient has no recollection of how he rolled into the wall.  Nursing staff found him with vomit and stool on himself.  Neuro was intact on admission.  CT head in the ED showed no acute intracranial abnormality.  CT cervical spine in the ED did not show any acute fracture or subluxation.  Improved on 9/12/2024.  Oriented x 3.  Continue to treat underlying infection and monitor mental status.    Right eye hematoma  Hematoma around right eye, likely related to injury when he was found between his bed and the wall at the transitional care unit.  CT head showed no acute intracranial abnormality.  No visual acuity changes or pain with movement of his eyes.  Monitor.  Symptomatic management.    Acute kidney injury  Hyponatremia  Hyperkalemia  On day of admission his sodium was 128, potassium 5.4, bicarb 19, creatinine 2.41.    Labs improved on 9/12/2024: Sodium 131, potassium 4.8, creatinine 1.92.  Continue IV fluids.  Recheck labs in AM.    Chronic diarrhea  No leukocytosis or abdominal pain on admission.  Reports he ate \"funny tasting vegetable soup\" on 9/10/2024 at TCU.  He feels like his bowel movements are at baseline.  Requests as needed loperamide, ordered.  Enteric bacterial and viral stool panels were negative on 9/11/24.    Anemia of chronic disease  Acute blood loss anemia   Hemoglobin 7.4 on admission.  Recent baseline hemoglobin has been around 7-9.  Had Hemovac to right knee over the course of the summer (drain was removed in August) along with x2 I&Ds. No concerns for acute bleeding on admission. Hemodynamically stable.   Hemoglobin down to 6.4 on 9/12/2024.  Recommend transfusion of 1 unit of PRBCs, risks/benefits of transfusion discussed with patient and he agrees to proceed.  Recheck hemoglobin in AM.    Right knee inflammatory arthritis s/p I&D July 11 and August 18, 2024, etiology unclear  Admitted to [facility] 8/16 to 8/19/2024.  Has been seen a handful of times and outpatient follow-up with " persistent right knee swelling found to have high synovial cell count.  Multiple cultures off antibiotics have been negative following I&D procedures with antibiotic therapy and ineffective.  Work up included zoonotic causes of infection.  Has been referred to rheumatology to assist in differential diagnosis.  Possible new vasculitis. Rheumatology appointment scheduled for 9/17/24.  Orthopedic surgery recommended MRI right knee with and without contrast (scheduled for 9/21/24).  Prescribed bumex 1 mg daily for knee swelling. Defer given SALVADOR noted above.  Symptoms and right knee appear to be at recent baseline, no acute worsening prior to or during this admission.  Continue to monitor closely and consider inpatient workup if needed.    Hyperglycemia  Diabetes mellitus type 2, insulin-dependent  History of diabetic foot infection s/p right transmetatarsal amputation (all five toes) 2023  ESBL infected right foot wound 2023  PTA regimen: 13 units Lantus every morning, 6 units aspart insulin daily with breakfast, 8 units twice daily with lunch and supper, and additional sliding scale insulin three times daily with meals  Continue PTA Lantus.  Monitor blood sugars before meals and at bedtime and use high-dose sliding scale NovoLog as indicated.   Add prandial NovoLog at 5 units 3 times daily as blood sugars persistently elevated.  Moderate carbohydrate diet.  Monitor for hypoglycemia.    Hypertension   PTA regimen: 5 mg amlodipine and 25 mg hydralazine daily.  PTA antihypertensives on hold for now, consider resuming if/when blood pressure trends up.          Diet: Combination Diet Regular Diet Adult; Moderate Consistent Carb (60 g CHO per Meal) Diet    DVT Prophylaxis: Pneumatic Compression Devices  Osman Catheter: Not present  Lines: None     Cardiac Monitoring: ACTIVE order. Indication: Electrolyte Imbalance (24 hours)- Magnesium <1.3 mg/ml; Potassium < =2.8 or > 5.5 mg/ml  Code Status: Full Code      Clinically  Significant Risk Factors Present on Admission        # Hyperkalemia: Highest K = 5.4 mmol/L in last 2 days, will monitor as appropriate  # Hyponatremia: Lowest Na = 128 mmol/L in last 2 days, will monitor as appropriate      # Hypoalbuminemia: Lowest albumin = 3 g/dL at 9/11/2024 11:29 AM, will monitor as appropriate   # Drug Induced Platelet Defect: home medication list includes an antiplatelet medication  # Acute Kidney Injury, unspecified: based on a >150% or 0.3 mg/dL increase in last creatinine compared to past 90 day average, will monitor renal function  # Hypertension: Noted on problem list    # Anemia: based on hgb <11      # DMII: A1C = 9.2 % (Ref range: <5.7 %) within past 6 months       # Financial/Environmental Concerns:                 Disposition Plan     Medically Ready for Discharge: Anticipated in 2-4 Days             Raman Luna MD  Hospitalist Service  [facility]  Securely message with [employer] (more info)  Text page via [employer] Paging/Directory   ______________________________________________________________________    Interval History   Long Mayfield was seen this morning.  He feels about the same today.  No new complaints/concerns.  Denies fevers, chest pain, shortness of breath, nausea, abdominal pain.  Has an ongoing cough.  Right knee swelling is about the same as it has been for the last few weeks, no better or worse.  Hemoglobin down to 6.4 this morning, he agrees with blood transfusion.  Plan of care discussed with nursing.    Physical Exam   Vital Signs: Temp: 99.9  F (37.7  C) Temp src: Oral BP: 117/69 Pulse: 87   Resp: 18 SpO2: 94 % O2 Device: Nasal cannula Oxygen Delivery: 2 LPM  Weight: 130 lbs 0 oz    Constitutional: awake, a little drowsy, cooperative, no apparent distress, laying in the hospital bed  Respiratory: no increased work of breathing, coarse lung sounds at left base  Cardiovascular: regular rate and rhythm, normal S1 and S2, no murmur  noted  GI: normal bowel sounds, soft, non-distended, non-tender  Skin: warm, dry  Musculoskeletal: no lower extremity pitting edema present, right knee effusion  Neurologic: awake, alert, oriented to name, month, year, city, current and previous [place] presidents; moves all extremities    Medical Decision Making       60 MINUTES SPENT BY ME on the date of service doing chart review, history, exam, documentation & further activities per the note.      Data     I have personally reviewed the following data over the past 24 hrs:    12.3 (H)  \   6.4 (LL)   / 406     131 (L) 100 44.5 (H) /  324 (H)   4.8 18 (L) 1.92 (H) \     ALT: N/A AST: N/A AP: N/A TBILI: N/A   ALB: N/A TOT PROTEIN: N/A LIPASE: N/A     Procal: N/A CRP: 228.22 (H) Lactic Acid: N/A         Imaging results reviewed over the past 24 hrs:   Recent Results (from the past 24 hour(s))   Chest XR,  PA & LAT    Narrative    XR CHEST 2 VIEWS 9/11/2024 11:56 AM    HISTORY: hypoxia    COMPARISON: 3/9/2022      Impression    IMPRESSION: Extensive patchy and nodular opacities throughout the left  lung, concerning for pneumonia. A follow-up chest x-ray of chest CT  after treatment in about 6-8 weeks is recommended to ensure  resolution. No pleural effusion or pneumothorax. The cardiac and  mediastinal silhouettes are normal.    BETSY JON MD         SYSTEM ID:  [id number]   Head CT w/o contrast    Narrative    EXAM: CT HEAD W/O CONTRAST  9/11/2024 2:17 PM     HISTORY: ALTERED HEAD INJURY       COMPARISON: None    TECHNIQUE: Using multidetector thin collimation helical acquisition  technique, axial, coronal and sagittal CT images from the skull base  to the vertex were obtained without intravenous contrast.   (topogram) image(s) also obtained and reviewed. Dose reduction  techniques were used.    FINDINGS:  No acute intracranial hemorrhage, mass effect, or midline shift.  Subcortical, periventricular areas of white matter hypoattenuation,  moderate parenchymal  volume loss and compensatory ventricular  dilatation compatible with sequelae of chronic microvascular ischemic  disease. Preserved gray-white matter differentiation.    Atraumatic calvarium. No substantial paranasal sinus mucosal disease.  Clear mastoid air cells. Nonfocal orbits.       Impression    IMPRESSION: Negative for acute intracranial hemorrhage, hydrocephalus  or transcortical infarct. No skull fracture.    RAÚL ABERNATHY DO         SYSTEM ID:  [id number]   CT Cervical Spine w/o Contrast    Narrative    EXAM: CT CERVICAL SPINE W/O CONTRAST  9/11/2024 2:17 PM     HISTORY: FALL HEAD INJURY; Neck pain; Trauma; Significant trauma       COMPARISON: none    TECHNIQUE: Using multidetector thin collimation helical acquisition  technique, axial, coronal and sagittal CT images through the cervical  spine were obtained without intravenous contrast. Dose reduction  techniques were used.    FINDINGS:  Preserved vertebral heights and alignment. No prevertebral edema.  Nonfocal extraspinal structures.    C2-T1: Loss of disc height at multiple levels. Multilevel facet  arthropathy. No high-grade spinal canal or neural foraminal stenosis.      Impression    IMPRESSION: Negative for fracture or posttraumatic subluxation.    RAÚL ABERNATHY DO         SYSTEM ID:  [id number]

## 2024-09-12 NOTE — PROVIDER NOTIFICATION
MD Notification    Notified Person: MD    Notified Person Name: Ryan Marie    Notification Date/Time: 09/12/24 0322    Notification Interaction: [employer] messaging    Purpose of Notification: Patient's 2 am BSC is 373    Orders Received:    Comments:

## 2024-09-13 LAB
ALBUMIN SERPL BCG-MCNC: 2.7 G/DL (ref 3.5–5.2)
ALP SERPL-CCNC: 160 U/L (ref 40–150)
ALT SERPL W P-5'-P-CCNC: 12 U/L (ref 0–70)
ANION GAP SERPL CALCULATED.3IONS-SCNC: 9 MMOL/L (ref 7–15)
AST SERPL W P-5'-P-CCNC: 40 U/L (ref 0–45)
BACTERIA SPT CULT: NORMAL
BILIRUB SERPL-MCNC: <0.2 MG/DL
BUN SERPL-MCNC: 25.4 MG/DL (ref 6–20)
CALCIUM SERPL-MCNC: 8.2 MG/DL (ref 8.8–10.4)
CHLORIDE SERPL-SCNC: 105 MMOL/L (ref 98–107)
CREAT SERPL-MCNC: 1.24 MG/DL (ref 0.67–1.17)
CRP SERPL-MCNC: 176.09 MG/L
EGFRCR SERPLBLD CKD-EPI 2021: 68 ML/MIN/1.73M2
ERYTHROCYTE [DISTWIDTH] IN BLOOD BY AUTOMATED COUNT: 16.7 % (ref 10–15)
GLUCOSE BLDC GLUCOMTR-MCNC: 103 MG/DL (ref 70–99)
GLUCOSE BLDC GLUCOMTR-MCNC: 122 MG/DL (ref 70–99)
GLUCOSE BLDC GLUCOMTR-MCNC: 133 MG/DL (ref 70–99)
GLUCOSE BLDC GLUCOMTR-MCNC: 97 MG/DL (ref 70–99)
GLUCOSE SERPL-MCNC: 120 MG/DL (ref 70–99)
GRAM STAIN RESULT: NORMAL
HCO3 SERPL-SCNC: 19 MMOL/L (ref 22–29)
HCT VFR BLD AUTO: 27.3 % (ref 40–53)
HGB BLD-MCNC: 8.4 G/DL (ref 13.3–17.7)
MCH RBC QN AUTO: 24.1 PG (ref 26.5–33)
MCHC RBC AUTO-ENTMCNC: 30.8 G/DL (ref 31.5–36.5)
MCV RBC AUTO: 78 FL (ref 78–100)
PLATELET # BLD AUTO: 462 10E3/UL (ref 150–450)
POTASSIUM SERPL-SCNC: 4.2 MMOL/L (ref 3.4–5.3)
PROT SERPL-MCNC: 7.1 G/DL (ref 6.4–8.3)
RBC # BLD AUTO: 3.48 10E6/UL (ref 4.4–5.9)
SODIUM SERPL-SCNC: 133 MMOL/L (ref 135–145)
WBC # BLD AUTO: 12.1 10E3/UL (ref 4–11)

## 2024-09-13 PROCEDURE — 250N000011 HC RX IP 250 OP 636

## 2024-09-13 PROCEDURE — 85018 HEMOGLOBIN: CPT | Performed by: HOSPITALIST

## 2024-09-13 PROCEDURE — 250N000013 HC RX MED GY IP 250 OP 250 PS 637: Performed by: HOSPITALIST

## 2024-09-13 PROCEDURE — 120N000001 HC R&B MED SURG/OB

## 2024-09-13 PROCEDURE — 250N000013 HC RX MED GY IP 250 OP 250 PS 637: Performed by: INTERNAL MEDICINE

## 2024-09-13 PROCEDURE — 258N000003 HC RX IP 258 OP 636

## 2024-09-13 PROCEDURE — 36415 COLL VENOUS BLD VENIPUNCTURE: CPT | Performed by: HOSPITALIST

## 2024-09-13 PROCEDURE — 250N000013 HC RX MED GY IP 250 OP 250 PS 637

## 2024-09-13 PROCEDURE — 86140 C-REACTIVE PROTEIN: CPT | Performed by: HOSPITALIST

## 2024-09-13 PROCEDURE — 80053 COMPREHEN METABOLIC PANEL: CPT | Performed by: HOSPITALIST

## 2024-09-13 PROCEDURE — 99232 SBSQ HOSP IP/OBS MODERATE 35: CPT | Performed by: HOSPITALIST

## 2024-09-13 RX ORDER — AMLODIPINE BESYLATE 5 MG/1
5 TABLET ORAL DAILY
Status: DISCONTINUED | OUTPATIENT
Start: 2024-09-13 | End: 2024-09-14 | Stop reason: HOSPADM

## 2024-09-13 RX ORDER — GUAIFENESIN 200 MG/10ML
200 LIQUID ORAL EVERY 4 HOURS PRN
Status: DISCONTINUED | OUTPATIENT
Start: 2024-09-13 | End: 2024-09-14 | Stop reason: HOSPADM

## 2024-09-13 RX ORDER — HYDRALAZINE HYDROCHLORIDE 25 MG/1
25 TABLET, FILM COATED ORAL 2 TIMES DAILY
Status: DISCONTINUED | OUTPATIENT
Start: 2024-09-13 | End: 2024-09-14 | Stop reason: HOSPADM

## 2024-09-13 RX ADMIN — SODIUM CHLORIDE: 9 INJECTION, SOLUTION INTRAVENOUS at 10:42

## 2024-09-13 RX ADMIN — HYDRALAZINE HYDROCHLORIDE 25 MG: 25 TABLET ORAL at 21:16

## 2024-09-13 RX ADMIN — ASPIRIN 81 MG: 81 TABLET, COATED ORAL at 10:31

## 2024-09-13 RX ADMIN — AMLODIPINE BESYLATE 5 MG: 5 TABLET ORAL at 13:10

## 2024-09-13 RX ADMIN — CEFTRIAXONE SODIUM 2 G: 2 INJECTION, POWDER, FOR SOLUTION INTRAMUSCULAR; INTRAVENOUS at 13:11

## 2024-09-13 RX ADMIN — GUAIFENESIN 200 MG: 200 SOLUTION ORAL at 22:23

## 2024-09-13 RX ADMIN — PANCRELIPASE 1 CAPSULE: 36000; 180000; 114000 CAPSULE, DELAYED RELEASE PELLETS ORAL at 22:23

## 2024-09-13 RX ADMIN — DULOXETINE HYDROCHLORIDE 60 MG: 60 CAPSULE, DELAYED RELEASE ORAL at 10:32

## 2024-09-13 RX ADMIN — SODIUM CHLORIDE: 9 INJECTION, SOLUTION INTRAVENOUS at 00:19

## 2024-09-13 RX ADMIN — AZITHROMYCIN MONOHYDRATE 250 MG: 500 INJECTION, POWDER, LYOPHILIZED, FOR SOLUTION INTRAVENOUS at 15:12

## 2024-09-13 RX ADMIN — ACETAMINOPHEN 650 MG: 325 TABLET ORAL at 20:36

## 2024-09-13 RX ADMIN — PANTOPRAZOLE SODIUM 40 MG: 40 TABLET, DELAYED RELEASE ORAL at 10:32

## 2024-09-13 RX ADMIN — OXYCODONE HYDROCHLORIDE 5 MG: 5 TABLET ORAL at 10:32

## 2024-09-13 RX ADMIN — OXYCODONE HYDROCHLORIDE 5 MG: 5 TABLET ORAL at 20:36

## 2024-09-13 RX ADMIN — ACETAMINOPHEN 650 MG: 325 TABLET ORAL at 10:32

## 2024-09-13 RX ADMIN — HYDRALAZINE HYDROCHLORIDE 25 MG: 25 TABLET ORAL at 13:10

## 2024-09-13 ASSESSMENT — ACTIVITIES OF DAILY LIVING (ADL)
ADLS_ACUITY_SCORE: 34
ADLS_ACUITY_SCORE: 27
ADLS_ACUITY_SCORE: 27
ADLS_ACUITY_SCORE: 29
ADLS_ACUITY_SCORE: 34
ADLS_ACUITY_SCORE: 27
ADLS_ACUITY_SCORE: 29
ADLS_ACUITY_SCORE: 34
ADLS_ACUITY_SCORE: 27
ADLS_ACUITY_SCORE: 29
ADLS_ACUITY_SCORE: 34
ADLS_ACUITY_SCORE: 27
ADLS_ACUITY_SCORE: 29
ADLS_ACUITY_SCORE: 34
ADLS_ACUITY_SCORE: 27
ADLS_ACUITY_SCORE: 34
ADLS_ACUITY_SCORE: 29
ADLS_ACUITY_SCORE: 27
ADLS_ACUITY_SCORE: 27
ADLS_ACUITY_SCORE: 34
ADLS_ACUITY_SCORE: 34

## 2024-09-13 NOTE — PLAN OF CARE
Goal Outcome Evaluation:    Shift Note: 0700-1930    Orientation: A/Ox4  Aggression Stop Light: Green   Activity: SBA   Diet/BS Checks: Mod card diet, poor appetite  Tele: NSR   IV Access/Drains: R PIV infusing  mL/hr, L PIV infusing blood transfusion   Pain Management: PRN oxycodone given x1 for pain   Abnormal VS/Results: VSS, on RA    Bowel/Bladder: Continent of B/B, no BM this shift   Skin/Wounds: R toe amputation, swelling to R knee, hematoma to R eye   Consults: SW following   D/C Disposition: pending pt status     Other Info: Hgb 6.4 today requiring PRBC, new L PIV placed. Pt slept most of the day, did not eat today d/t no appetite. Pt UOP low, strains to void. Bladder scanned this evening PRV and volume was 618 mL. Paged for PRN lidocaine cream for straight cath, pt declining intervention. Educated pt on urine retention, still reluctant to have straight cath done. Informed oncoming nurse, continue to monitor I&O's.

## 2024-09-13 NOTE — PROGRESS NOTES
[facility]    Medicine Progress Note - Hospitalist Service    Date of Admission:  9/11/2024    Assessment & Plan   Long Mayfield is a 54 year old male admitted on 9/11/2024. He has a past medical history of hypertension, diabetes mellitus type 2, and recent septic arthritis who presents from TCU after passing out in bed with reports of defecation and vomiting in his sleep.  He was evaluated in the ED and there was concern for aspiration pneumonia.  He was started on antibiotics and supplemental oxygen and admitted to the hospital service for further evaluation and management.    Acute hypoxic respiratory failure secondary to aspiration pneumonia  His memory of the events leading up to admission is somewhat poor.  States he ate some soup that tasted funny on the evening prior to admission.  Had an upset stomach that evening and vomited prior to going to bed.  Has chronic diarrhea that has not changed recently.  He feels like he slipped out of bed and ended up between the wall and bed.  CXR showed patchy and nodular opacities throughout the left lung concerning for pneumonia.  COVID, RSV, and Influenza PCR was negative on 9/11/24.  VBG 7.37/38/64/22.  Lactic acid 1.4.  Admitted to inpatient.  Started on Rocephin and azithromycin at the time of admission on 9/11/2024, continue the same.  Blood culture on 9/11/2024 pending, negative to date.  Continue supplemental oxygen, currently on 0-1 L/min, wean as tolerated.  As needed pain and nausea medications available.  PT consulted.  Admission chest x-ray concerning for extensive patchy and nodular opacities, recommend follow-up CT in 6 to 8 weeks to ensure resolution.  Potential discharge back to TCU as early as tomorrow pending continued improvement.    Acute metabolic encephalopathy  Likely secondary to infection noted above.  On the morning of admission, staff at the transitional care unit found the patient rolled over in his bed between  "mattress and the wall.  Patient has no recollection of how he rolled into the wall.  Nursing staff found him with vomit and stool on himself.  Neuro was intact on admission.  CT head in the ED showed no acute intracranial abnormality.  CT cervical spine in the ED did not show any acute fracture or subluxation.  Improved on 9/12/2024.  Oriented x 3.  Continue to treat underlying infection and monitor mental status.    Right eye hematoma  Hematoma around right eye, likely related to injury when he was found between his bed and the wall at the transitional care unit.  CT head showed no acute intracranial abnormality.  No visual acuity changes or pain with movement of his eyes.  Monitor.  Symptomatic management.    Acute kidney injury  Hyponatremia  Hyperkalemia  On day of admission his sodium was 128, potassium 5.4, bicarb 19, creatinine 2.41.    Labs improving. Sodium 133, potassium 4.2, creatinine 1.24 on 9/13/2024.  Decrease IV fluids.  Recheck labs in AM.    Chronic diarrhea  No leukocytosis or abdominal pain on admission.  Reports he ate \"funny tasting vegetable soup\" on 9/10/2024 at TCU.  He feels like his bowel movements are at baseline.  As needed loperamide available.  Enteric bacterial and viral stool panels were negative on 9/11/24.    Anemia of chronic disease  Acute blood loss anemia   Hemoglobin 7.4 on admission.  Recent baseline hemoglobin has been around 7-9.  Had Hemovac to right knee over the course of the summer (drain was removed in August) along with x2 I&Ds. No concerns for acute bleeding on admission. Hemodynamically stable.   Hemoglobin down to 6.4 on 9/12/2024.  Transfused 1 unit PRBCs on 9/12/2024 with improvement in hemoglobin to 8.4.  CBC in AM.    Right knee inflammatory arthritis s/p I&D July 11 and August 18, 2024, etiology unclear  Admitted to [facility] 8/16 to 8/19/2024.  Has been seen a handful of times and outpatient follow-up with persistent right knee swelling found to have high synovial " cell count.  Multiple cultures off antibiotics have been negative following I&D procedures with antibiotic therapy and ineffective.  Work up included zoonotic causes of infection.  Has been referred to rheumatology to assist in differential diagnosis.  Possible new vasculitis. Rheumatology appointment scheduled for 9/17/24.  Orthopedic surgery recommended MRI right knee with and without contrast (scheduled for 9/21/24).  Prescribed bumex 1 mg daily for knee swelling. Defer given SALVADOR noted above.  Symptoms and right knee continue to appear to be at recent baseline, no acute worsening prior to or during this admission.  Continue to monitor closely and consider inpatient workup if needed.    Hyperglycemia  Diabetes mellitus type 2, insulin-dependent  History of diabetic foot infection s/p right transmetatarsal amputation (all five toes) 2023  ESBL infected right foot wound 2023  PTA regimen: 13 units Lantus every morning, 6 units aspart insulin daily with breakfast, 8 units twice daily with lunch and supper, and additional sliding scale insulin three times daily with meals  Continue PTA Lantus, decrease dose to 10 units daily in a.m due to poor appetite.  Monitor blood sugars before meals and at bedtime and use high-dose sliding scale NovoLog as indicated.   Continue prandial NovoLog at 5 units 3 times daily.  Moderate carbohydrate diet.  Monitor for hypoglycemia.    Hypertension   PTA regimen: 5 mg amlodipine and 25 mg hydralazine daily.  PTA antihypertensives on hold initially, resumed on 9/13/2024 as blood pressure trended up.          Diet: Combination Diet Regular Diet Adult; Moderate Consistent Carb (60 g CHO per Meal) Diet    DVT Prophylaxis: Pneumatic Compression Devices  Osman Catheter: Not present  Lines: None     Cardiac Monitoring: ACTIVE order. Indication: Electrolyte Imbalance (24 hours)- Magnesium <1.3 mg/ml; Potassium < =2.8 or > 5.5 mg/ml  Code Status: Full Code      Clinically Significant Risk Factors          # Hyponatremia: Lowest Na = 128 mmol/L in last 2 days, will monitor as appropriate      # Hypoalbuminemia: Lowest albumin = 2.7 g/dL at 9/13/2024  8:58 AM, will monitor as appropriate     # Hypertension: Noted on problem list          # DMII: A1C = 9.2 % (Ref range: <5.7 %) within past 6 months       # Financial/Environmental Concerns:                 Disposition Plan     Medically Ready for Discharge: Anticipated Tomorrow             Raman Luna MD  Hospitalist Service  [facility]  Securely message with [employer] (more info)  Text page via [facility] Paging/Directory   ______________________________________________________________________    Interval History   Long Mayfield was seen today.  He feels about the same today.  Continues to have some generalized aches and pains.  Chronic diarrhea is at baseline.  Appetite is poor.  Denies nausea/vomiting/abdominal pain.  Right knee swelling stable.  Denies fevers, chest pain, shortness of breath.  Has a mild cough.  Plan of care discussed with nursing and social work.    Physical Exam   Vital Signs: Temp: 98.9  F (37.2  C) Temp src: Oral BP: (!) 160/91 Pulse: 92   Resp: 18 SpO2: 92 % O2 Device: None (Room air) Oxygen Delivery: 1 LPM  Weight: 130 lbs 0 oz    Constitutional: awake, alert, cooperative, no apparent distress, laying in the hospital bed  Respiratory: no increased work of breathing, coarse lung sounds at left base  Cardiovascular: regular rate and rhythm, normal S1 and S2, no murmur noted  GI: normal bowel sounds, soft, non-distended, non-tender  Skin: warm, dry  Musculoskeletal: no lower extremity pitting edema present, right knee effusion  Neurologic: awake, alert, oriented, moves all extremities    Medical Decision Making       40 MINUTES SPENT BY ME on the date of service doing chart review, history, exam, documentation & further activities per the note.      Data     I have personally reviewed the following data over the  past 24 hrs:    12.1 (H)  \   8.4 (L)   / 462 (H)     133 (L) 105 25.4 (H) /  122 (H)   4.2 19 (L) 1.24 (H) \     ALT: 12 AST: 40 AP: 160 (H) TBILI: <0.2   ALB: 2.7 (L) TOT PROTEIN: 7.1 LIPASE: N/A     Procal: N/A CRP: 176.09 (H) Lactic Acid: N/A

## 2024-09-13 NOTE — PROGRESS NOTES
Care Management Follow Up    Length of Stay (days): 2    Expected Discharge Date: 09/14/2024     Concerns to be Addressed:       Patient plan of care discussed at interdisciplinary rounds: Yes    Anticipated Discharge Disposition: Long Term Care    Anticipated Discharge Services: None  Anticipated Discharge DME: None    Patient/family educated on Medicare website which has current facility and service quality ratings: no  Education Provided on the Discharge Plan: Yes  Patient/Family in Agreement with the Plan:      Referrals Placed by CM/SW: Post Acute Facilities  Private pay costs discussed: Not applicable    Discussed  Partnership in Safe Discharge Planning  document with patient/family: No     Handoff Completed: No, handoff not indicated or clinically appropriate    Additional Information:  Writer called Carol Ann with [facility]. Patient is able to return over the weekend. SW can call Carol Ann's number at [phone] to coordinate discharge    Next Steps: Discharge to [facility] when medically stable     JEWELL Diana

## 2024-09-13 NOTE — PLAN OF CARE
[id number]  Orientation: a&ox4  Aggression Stop Light: green  Activity: SBA w/ IV pole  Diet/BS Checks: regular/mod carb. BS ACHS  Tele:  NSR  IV Access/Drains: R PIV infusing  mL/hr. L PIV SL  Pain Management: c/o of generalized pain in shoulder, back and R knee. Oxy given x1  Abnormal VS/Results: VSS on RA. Hgb 6.4. 1 unit PRBC given during day, recheck in AM  Bowel/Bladder: cont B/B  Skin/Wounds: R foot all toe amputations, edema in R leg. Hematoma on head from fall  Consults: SW  D/C Disposition: pending to [facility] when medically stable  Other Info:     - retaining urine, straight cath x1 for 475 mL following voiding protocol  -refused cont. Pulse ox

## 2024-09-13 NOTE — PROVIDER NOTIFICATION
MD Notification    Notified Person: MD adair    Notified Person Name:    Notification Date/Time:    Notification Interaction:    Purpose of Notification:pt wants lantus reduced as no appetite and poor po intake     Orders Received:    Comments:

## 2024-09-14 VITALS
SYSTOLIC BLOOD PRESSURE: 147 MMHG | OXYGEN SATURATION: 91 % | BODY MASS INDEX: 21.66 KG/M2 | TEMPERATURE: 97.8 F | DIASTOLIC BLOOD PRESSURE: 88 MMHG | HEART RATE: 83 BPM | RESPIRATION RATE: 18 BRPM | HEIGHT: 65 IN | WEIGHT: 130 LBS

## 2024-09-14 LAB
ANION GAP SERPL CALCULATED.3IONS-SCNC: 9 MMOL/L (ref 7–15)
BACTERIA TISS BX CULT: NO GROWTH
BUN SERPL-MCNC: 13.6 MG/DL (ref 6–20)
CALCIUM SERPL-MCNC: 8.6 MG/DL (ref 8.8–10.4)
CHLORIDE SERPL-SCNC: 104 MMOL/L (ref 98–107)
CREAT SERPL-MCNC: 1.01 MG/DL (ref 0.67–1.17)
EGFRCR SERPLBLD CKD-EPI 2021: 87 ML/MIN/1.73M2
ERYTHROCYTE [DISTWIDTH] IN BLOOD BY AUTOMATED COUNT: 16.8 % (ref 10–15)
GLUCOSE BLDC GLUCOMTR-MCNC: 167 MG/DL (ref 70–99)
GLUCOSE BLDC GLUCOMTR-MCNC: 200 MG/DL (ref 70–99)
GLUCOSE BLDC GLUCOMTR-MCNC: 99 MG/DL (ref 70–99)
GLUCOSE SERPL-MCNC: 167 MG/DL (ref 70–99)
HCO3 SERPL-SCNC: 22 MMOL/L (ref 22–29)
HCT VFR BLD AUTO: 27.7 % (ref 40–53)
HGB BLD-MCNC: 8.5 G/DL (ref 13.3–17.7)
MCH RBC QN AUTO: 23.6 PG (ref 26.5–33)
MCHC RBC AUTO-ENTMCNC: 30.7 G/DL (ref 31.5–36.5)
MCV RBC AUTO: 77 FL (ref 78–100)
PLATELET # BLD AUTO: 479 10E3/UL (ref 150–450)
POTASSIUM SERPL-SCNC: 4.2 MMOL/L (ref 3.4–5.3)
RBC # BLD AUTO: 3.6 10E6/UL (ref 4.4–5.9)
SODIUM SERPL-SCNC: 135 MMOL/L (ref 135–145)
WBC # BLD AUTO: 10.6 10E3/UL (ref 4–11)

## 2024-09-14 PROCEDURE — 250N000011 HC RX IP 250 OP 636

## 2024-09-14 PROCEDURE — 258N000003 HC RX IP 258 OP 636

## 2024-09-14 PROCEDURE — 85027 COMPLETE CBC AUTOMATED: CPT | Performed by: HOSPITALIST

## 2024-09-14 PROCEDURE — 250N000013 HC RX MED GY IP 250 OP 250 PS 637: Performed by: HOSPITALIST

## 2024-09-14 PROCEDURE — 250N000013 HC RX MED GY IP 250 OP 250 PS 637: Performed by: INTERNAL MEDICINE

## 2024-09-14 PROCEDURE — 36415 COLL VENOUS BLD VENIPUNCTURE: CPT | Performed by: HOSPITALIST

## 2024-09-14 PROCEDURE — 250N000013 HC RX MED GY IP 250 OP 250 PS 637

## 2024-09-14 PROCEDURE — 258N000003 HC RX IP 258 OP 636: Performed by: HOSPITALIST

## 2024-09-14 PROCEDURE — 99239 HOSP IP/OBS DSCHRG MGMT >30: CPT | Performed by: HOSPITALIST

## 2024-09-14 PROCEDURE — 80048 BASIC METABOLIC PNL TOTAL CA: CPT | Performed by: HOSPITALIST

## 2024-09-14 RX ORDER — AZITHROMYCIN 250 MG/1
250 TABLET, FILM COATED ORAL DAILY
DISCHARGE
Start: 2024-09-15 | End: 2024-09-16

## 2024-09-14 RX ORDER — OXYCODONE HYDROCHLORIDE 5 MG/1
5 TABLET ORAL EVERY 6 HOURS PRN
Qty: 10 TABLET | Refills: 0 | Status: SHIPPED | OUTPATIENT
Start: 2024-09-14

## 2024-09-14 RX ORDER — GUAIFENESIN 200 MG/10ML
200 LIQUID ORAL EVERY 4 HOURS PRN
DISCHARGE
Start: 2024-09-14

## 2024-09-14 RX ORDER — GABAPENTIN 300 MG/1
300 CAPSULE ORAL 2 TIMES DAILY
Status: DISCONTINUED | OUTPATIENT
Start: 2024-09-14 | End: 2024-09-14 | Stop reason: HOSPADM

## 2024-09-14 RX ORDER — TAMSULOSIN HYDROCHLORIDE 0.4 MG/1
0.4 CAPSULE ORAL DAILY
Status: DISCONTINUED | OUTPATIENT
Start: 2024-09-14 | End: 2024-09-14 | Stop reason: HOSPADM

## 2024-09-14 RX ORDER — TAMSULOSIN HYDROCHLORIDE 0.4 MG/1
0.4 CAPSULE ORAL DAILY
DISCHARGE
Start: 2024-09-15

## 2024-09-14 RX ORDER — CEFDINIR 300 MG/1
300 CAPSULE ORAL 2 TIMES DAILY
DISCHARGE
Start: 2024-09-14 | End: 2024-09-17

## 2024-09-14 RX ADMIN — AMLODIPINE BESYLATE 5 MG: 5 TABLET ORAL at 10:21

## 2024-09-14 RX ADMIN — HYDRALAZINE HYDROCHLORIDE 25 MG: 25 TABLET ORAL at 10:21

## 2024-09-14 RX ADMIN — GABAPENTIN 300 MG: 300 CAPSULE ORAL at 14:15

## 2024-09-14 RX ADMIN — SODIUM CHLORIDE: 9 INJECTION, SOLUTION INTRAVENOUS at 03:24

## 2024-09-14 RX ADMIN — AZITHROMYCIN MONOHYDRATE 250 MG: 500 INJECTION, POWDER, LYOPHILIZED, FOR SOLUTION INTRAVENOUS at 14:16

## 2024-09-14 RX ADMIN — ASPIRIN 81 MG: 81 TABLET, COATED ORAL at 10:21

## 2024-09-14 RX ADMIN — PANTOPRAZOLE SODIUM 40 MG: 40 TABLET, DELAYED RELEASE ORAL at 10:21

## 2024-09-14 RX ADMIN — TAMSULOSIN HYDROCHLORIDE 0.4 MG: 0.4 CAPSULE ORAL at 10:21

## 2024-09-14 RX ADMIN — DULOXETINE HYDROCHLORIDE 60 MG: 60 CAPSULE, DELAYED RELEASE ORAL at 10:21

## 2024-09-14 RX ADMIN — GUAIFENESIN 200 MG: 200 SOLUTION ORAL at 10:20

## 2024-09-14 RX ADMIN — CEFTRIAXONE SODIUM 2 G: 2 INJECTION, POWDER, FOR SOLUTION INTRAMUSCULAR; INTRAVENOUS at 13:04

## 2024-09-14 ASSESSMENT — ACTIVITIES OF DAILY LIVING (ADL)
ADLS_ACUITY_SCORE: 29
ADLS_ACUITY_SCORE: 28
ADLS_ACUITY_SCORE: 29
ADLS_ACUITY_SCORE: 28
ADLS_ACUITY_SCORE: 28
ADLS_ACUITY_SCORE: 29
ADLS_ACUITY_SCORE: 29

## 2024-09-14 NOTE — PROVIDER NOTIFICATION
MD Notification    Notified Person: MD    Notified Person Name: Marie    Notification Date/Time: 09/14/24 12:30 AM    Notification Interaction: [employer]aging    Purpose of Notification: Pt , refusing to be straight-cathed, please advise. Thanks.    Orders Received: MD aware, ok not to straight-cath, CTM    Comments:

## 2024-09-14 NOTE — PROGRESS NOTES
Discharge Note    Patient discharged to TCU via EMS/BLS accompanied by no family/friend .  IV: Discontinued  Prescriptions printed and given to patient/family and e-prescribed to pharmacy.   Belongings reviewed and sent with patient.   Home medications returned to patient: NA  Equipment sent with: patient, N/A.   patient verbalizes understanding of discharge instructions. AVS given to patient.

## 2024-09-14 NOTE — PLAN OF CARE
[id number] [phone]    Orientation: A&Ox4    Aggression Stop Light: Green    Activity: SBA w/IV pole    Diet/BS Checks: Regular/mod carb, BG checks - 97, 99    Tele: NSR    IV Access/Drains: R PIV infusing NS @ 50 mL/hr, L PIV SL    Pain Management: PRN oxy x1, tylenol x1, and heat packs given for R knee and back pain    Abnormal VS/Results: VSS on RA ex HTN    Bowel/Bladder: Continent,  - refused straight-cath - MD aware    Skin/Wounds: +2 R knee, leg edema    Consults: SW, PT    D/C Disposition: To [facility] pending improvement    Other Info:   - PRN robitussin x1 given for cough - effective  - Contact precautions maintained

## 2024-09-14 NOTE — PROVIDER NOTIFICATION
MD Notification    Notified Person: MD    Notified Person Name: Adan    Notification Date/Time: 09/13/24 10:03 PM     Notification Interaction: Vocera    Purpose of Notification: Pt c/o cough, can we get PRN robitussin? Thanks.    Orders Received: Ordered    Comments:

## 2024-09-14 NOTE — DISCHARGE SUMMARY
[facility]  Hospitalist Discharge Summary      Date of Admission:  9/11/2024  Date of Discharge:  9/14/2024  Discharging Provider: Raman Luna MD  Discharge Service: Hospitalist Service    Discharge Diagnoses   Acute hypoxic respiratory failure secondary to aspiration pneumonia  Acute metabolic encephalopathy  Right eye hematoma  Acute kidney injury  Hyponatremia  Hyperkalemia  Chronic diarrhea  Anemia of chronic disease  Acute blood loss anemia   Right knee inflammatory arthritis s/p I&D July 11 and August 18, 2024, etiology unclear  Hyperglycemia  Diabetes mellitus type 2, insulin-dependent  History of diabetic foot infection s/p right transmetatarsal amputation (all five toes) 2023  ESBL infected right foot wound 2023  Hypertension     Clinically Significant Risk Factors     # DMII: A1C = 9.2 % (Ref range: <5.7 %) within past 6 months       Follow-ups Needed After Discharge   Follow-up Appointments     Follow Up and recommended labs and tests      Follow up with long-term physician.  The following labs/tests are   recommended: CBC and BMP.  Follow up with Rheumatology and Orthopedic Surgery as previously directed.            Unresulted Labs Ordered in the Past 30 Days of this Admission       Date and Time Order Name Status Description    9/11/2024 11:28 AM Blood Culture Peripheral Blood Preliminary    These results will be followed up by Hospitalist Service.     Discharge Disposition   Discharged to short-term care facility  Condition at discharge: Stable    Hospital Course   Long May[place] is a 54 year old male admitted on 9/11/2024. He has a past medical history of hypertension, diabetes mellitus type 2, and recent septic arthritis who presents from TCU after passing out in bed with reports of defecation and vomiting in his sleep.  He was evaluated in the ED and there was concern for aspiration pneumonia.  He was started on antibiotics and supplemental oxygen and admitted  to the hospital service for further evaluation and management.    Acute hypoxic respiratory failure secondary to aspiration pneumonia  His memory of the events leading up to admission is somewhat poor.  States he ate some soup that tasted funny on the evening prior to admission.  Had an upset stomach that evening and vomited prior to going to bed.  Has chronic diarrhea that has not changed recently.  He feels like he slipped out of bed and ended up between the wall and bed.  CXR showed patchy and nodular opacities throughout the left lung concerning for pneumonia.  COVID, RSV, and Influenza PCR was negative on 9/11/24.  VBG 7.37/38/64/22.  Lactic acid 1.4.  Admitted to inpatient.  Started on Rocephin and azithromycin at the time of admission on 9/11/2024, continued in the hospital, will transition to oral omnicef for 3 days and azithromycin for 1 day upon discharge to complete course of antibiotics.  Blood culture on 9/11/2024 pending, negative to date.  Weaned off supplemental oxygen prior to discharge.  Admission chest x-ray concerning for extensive patchy and nodular opacities, recommend follow-up CT in 6 to 8 weeks to ensure resolution.  Much improved. Weaned off supplemental oxygen. Afebrile, denies shortness of breath. Has a mild cough. Feels ready for discharge back to TCU.  Discharge to TCU today.  Follow up with TCU provider.    Acute metabolic encephalopathy  Likely secondary to infection noted above.  On the morning of admission, staff at the transitional care unit found the patient rolled over in his bed between mattress and the wall.  Patient has no recollection of how he rolled into the wall.  Nursing staff found him with vomit and stool on himself.  Neuro was intact on admission.  CT head in the ED showed no acute intracranial abnormality.  CT cervical spine in the ED did not show any acute fracture or subluxation.  Improved on 9/12/2024.  Oriented x 3.  Treated underlying infection as noted  "above.    Right eye hematoma  Hematoma around right eye, likely related to injury when he was found between his bed and the wall at the transitional care unit.  CT head showed no acute intracranial abnormality.  No visual acuity changes or pain with movement of his eyes.  Monitor.  Symptomatic management.    Acute kidney injury  Hyponatremia  Hyperkalemia  On day of admission his sodium was 128, potassium 5.4, bicarb 19, creatinine 2.41.    Improved with IV fluids.  Sodium 135, potassium 4.2, creatinine 1.01 on 9/14/2024.  Check BMP at outpatient follow up.    Chronic diarrhea  No leukocytosis or abdominal pain on admission.  Reports he ate \"funny tasting vegetable soup\" on 9/10/2024 at TCU.  He feels like his bowel movements are at baseline.  As needed loperamide available.  Enteric bacterial and viral stool panels were negative on 9/11/24.    Anemia of chronic disease  Acute blood loss anemia   Hemoglobin 7.4 on admission.  Recent baseline hemoglobin has been around 7-9.  Had Hemovac to right knee over the course of the summer (drain was removed in August) along with x2 I&Ds. No concerns for acute bleeding on admission. Hemodynamically stable.   Hemoglobin down to 6.4 on 9/12/2024.  Transfused 1 unit PRBCs on 9/12/2024 with improvement in hemoglobin to 8.4 on 9/13/2024.  Hemoglobin stable at 8.5 on 9/14/2024.  Check CBC at outpatient follow up.    Right knee inflammatory arthritis s/p I&D July 11 and August 18, 2024, etiology unclear  Admitted to [facility] 8/16 to 8/19/2024.  Has been seen a handful of times and outpatient follow-up with persistent right knee swelling found to have high synovial cell count.  Multiple cultures off antibiotics have been negative following I&D procedures with antibiotic therapy and ineffective.  Work up included zoonotic causes of infection.  Has been referred to rheumatology to assist in differential diagnosis.  Possible new vasculitis. Rheumatology appointment scheduled for " 9/17/24.  Orthopedic surgery recommended MRI right knee with and without contrast (scheduled for 9/21/24).  Prescribed bumex 1 mg daily for knee swelling.  Bumex held in the hospital, will resume upon discharge.  Monitor at TCU and consider discontinuing bumex if any signs of dehydration.  Symptoms and right knee appeared to be at recent baseline, no acute worsening prior to or during this admission.  Continue with outpatient evaluation/management as previously directed.    Hyperglycemia  Diabetes mellitus type 2, insulin-dependent  History of diabetic foot infection s/p right transmetatarsal amputation (all five toes) 2023  ESBL infected right foot wound 2023  PTA regimen: 13 units Lantus every morning, 6 units aspart insulin daily with breakfast, 8 units twice daily with lunch and supper, and additional sliding scale insulin three times daily with meals  Resume PTA insulin regimen upon discharge.  Monitor blood sugars at TCU and adjust insulin regimen if needed.    Hypertension   PTA regimen: 5 mg amlodipine and 25 mg hydralazine daily.  PTA antihypertensives held initially, resumed on 9/13/2024 as blood pressure trended up.    Consultations This Hospital Stay   PHYSICAL THERAPY ADULT IP CONSULT  CARE MANAGEMENT / SOCIAL WORK IP CONSULT  PHYSICAL THERAPY ADULT IP CONSULT  OCCUPATIONAL THERAPY ADULT IP CONSULT    Code Status   Full Code    Time Spent on this Encounter   I, Raman Luna MD, personally saw the patient today and spent greater than 30 minutes discharging this patient.       Raman Luna MD  [place] [address] [facility]  [address] [place] [address] AVE, [address] [address]  Phone: [phone]  ______________________________________________________________________    Physical Exam   Vital Signs: Temp: 97.1  F (36.2  C) Temp src: Oral BP: (!) 153/82 Pulse: 78   Resp: 16 SpO2: 94 % O2 Device: None (Room air)    Weight: 130 lbs 0 oz  Constitutional: awake, alert, cooperative, no apparent  distress, laying in the hospital bed  Respiratory: no increased work of breathing, coarse lung sounds at left base  Cardiovascular: regular rate and rhythm, normal S1 and S2, no murmur noted  GI: normal bowel sounds, soft, non-distended, non-tender  Skin: warm, dry  Musculoskeletal: no lower extremity pitting edema present, right knee effusion stable  Neurologic: awake, alert, oriented, moves all extremities       Primary Care Physician   Ana Maria Blakely    Discharge Orders      General info for SNF    Length of Stay Estimate: Short Term Care: Estimated # of Days <30  Condition at Discharge: Improving  Level of care:skilled   Rehabilitation Potential: Good  Admission H&P remains valid and up-to-date: Yes  Recent Chemotherapy: N/A  Use Nursing Home Standing Orders: Yes     Mantoux instructions    Give two-step Mantoux (PPD) Per Facility Policy Yes     Follow Up and recommended labs and tests    Follow up with [facility] physician.  The following labs/tests are recommended: CBC and BMP.  Follow up with Rheumatology and Orthopedic Surgery as previously directed.     Reason for your hospital stay    Aspiration pneumonia     Glucose monitor nursing POCT    Before meals and at bedtime     Daily weights    Call Provider for weight gain of more than 2 pounds per day or 5 pounds per week.     Bladder scan    X 2 for post void residual     Activity - Up with nursing assistance     Full Code     Physical Therapy Adult Consult    Evaluate and treat as clinically indicated.    Reason:  deconditioning, right knee pain     Occupational Therapy Adult Consult    Evaluate and treat as clinically indicated.    Reason:  deconditioning, right knee pain     Contact Isolation     Diet    Follow this diet upon discharge: Moderate Consistent Carb (60 g CHO per Meal) Diet     Significant Results and Procedures   Most Recent 3 CBC's:  Recent Labs   Lab Test 09/14/24  1057 09/13/24  0858 09/12/24  0758   WBC 10.6 12.1* 12.3*   HGB 8.5*  8.4* 6.4*   MCV 77* 78 77*   * 462* 406     Most Recent 3 BMP's:  Recent Labs   Lab Test 09/14/24  1240 09/14/24  1057 09/14/24  0809 09/13/24  1015 09/13/24  0858 09/12/24  0952 09/12/24  0758   NA  --  135  --   --  133*  --  131*   POTASSIUM  --  4.2  --   --  4.2  --  4.8   CHLORIDE  --  104  --   --  105  --  100   CO2  --  22  --   --  19*  --  18*   BUN  --  13.6  --   --  25.4*  --  44.5*   CR  --  1.01  --   --  1.24*  --  1.92*   ANIONGAP  --  9  --   --  9  --  13   AROLDO  --  8.6*  --   --  8.2*  --  7.7*   * 167* 200*   < > 120*   < > 324*    < > = values in this interval not displayed.     Most Recent 2 LFT's:  Recent Labs   Lab Test 09/13/24  0858 09/11/24  1129   AST 40 24   ALT 12 9   ALKPHOS 160* 176*   BILITOTAL <0.2 0.3     Results for orders placed or performed during the hospital encounter of 09/11/24   Chest XR,  PA & LAT    Narrative    XR CHEST 2 VIEWS 9/11/2024 11:56 AM    HISTORY: hypoxia    COMPARISON: 3/9/2022      Impression    IMPRESSION: Extensive patchy and nodular opacities throughout the left  lung, concerning for pneumonia. A follow-up chest x-ray of chest CT  after treatment in about 6-8 weeks is recommended to ensure  resolution. No pleural effusion or pneumothorax. The cardiac and  mediastinal silhouettes are normal.    BETSY JON MD         SYSTEM ID:  [id number]   Head CT w/o contrast    Narrative    EXAM: CT HEAD W/O CONTRAST  9/11/2024 2:17 PM     HISTORY: ALTERED HEAD INJURY       COMPARISON: None    TECHNIQUE: Using multidetector thin collimation helical acquisition  technique, axial, coronal and sagittal CT images from the skull base  to the vertex were obtained without intravenous contrast.   (topogram) image(s) also obtained and reviewed. Dose reduction  techniques were used.    FINDINGS:  No acute intracranial hemorrhage, mass effect, or midline shift.  Subcortical, periventricular areas of white matter hypoattenuation,  moderate parenchymal volume  loss and compensatory ventricular  dilatation compatible with sequelae of chronic microvascular ischemic  disease. Preserved gray-white matter differentiation.    Atraumatic calvarium. No substantial paranasal sinus mucosal disease.  Clear mastoid air cells. Nonfocal orbits.       Impression    IMPRESSION: Negative for acute intracranial hemorrhage, hydrocephalus  or transcortical infarct. No skull fracture.    RAÚL ABERNATHY DO         SYSTEM ID:  [id number]   CT Cervical Spine w/o Contrast    Narrative    EXAM: CT CERVICAL SPINE W/O CONTRAST  9/11/2024 2:17 PM     HISTORY: FALL HEAD INJURY; Neck pain; Trauma; Significant trauma       COMPARISON: none    TECHNIQUE: Using multidetector thin collimation helical acquisition  technique, axial, coronal and sagittal CT images through the cervical  spine were obtained without intravenous contrast. Dose reduction  techniques were used.    FINDINGS:  Preserved vertebral heights and alignment. No prevertebral edema.  Nonfocal extraspinal structures.    C2-T1: Loss of disc height at multiple levels. Multilevel facet  arthropathy. No high-grade spinal canal or neural foraminal stenosis.      Impression    IMPRESSION: Negative for fracture or posttraumatic subluxation.    RAÚL ABERNATHY DO         SYSTEM ID:  [id number]     Discharge Medications   Current Discharge Medication List        START taking these medications    Details   azithromycin (ZITHROMAX) 250 MG tablet Take 1 tablet (250 mg) by mouth daily for 1 day. (One dose in AM on 9/15/24).    Associated Diagnoses: Pneumonia of left lower lobe due to infectious organism      cefdinir (OMNICEF) 300 MG capsule Take 1 capsule (300 mg) by mouth 2 times daily for 3 days. (Start in AM On 9/15/24, completes course with PM dose on 9/17/24).    Associated Diagnoses: Pneumonia of left lower lobe due to infectious organism      guaiFENesin (ROBITUSSIN) 20 mg/mL liquid Take 10 mLs (200 mg) by mouth every 4 hours as needed for cough.     Associated Diagnoses: Pneumonia of left lower lobe due to infectious organism      tamsulosin (FLOMAX) 0.4 MG capsule Take 1 capsule (0.4 mg) by mouth daily.    Associated Diagnoses: Lower urinary tract symptoms (LUTS)           CONTINUE these medications which have CHANGED    Details   oxyCODONE (ROXICODONE) 5 MG tablet Take 1 tablet (5 mg) by mouth every 6 hours as needed for severe pain.  Qty: 10 tablet, Refills: 0    Associated Diagnoses: Pain           CONTINUE these medications which have NOT CHANGED    Details   !! acetaminophen (TYLENOL) 500 MG tablet Take 1,000 mg by mouth 3 times daily.      !! acetaminophen (TYLENOL) 500 MG tablet Take 1,000 mg by mouth daily as needed for mild pain. In addition to scheduled doses.      amLODIPine (NORVASC) 5 MG tablet Take 1 tablet (5 mg) by mouth daily  Qty: 30 tablet, Refills: 0    Comments: Future refills by PCP  Physician No Ref-Primary with phone number None.  Associated Diagnoses: Benign essential hypertension      aspirin 81 MG EC tablet Take 81 mg by mouth daily      bumetanide (BUMEX) 1 MG tablet Take 1 tablet (1 mg) by mouth daily Dose decreased to 1 mg on discharge.  Optimize dose on PCP visit.  Qty: 30 tablet, Refills: 0    Associated Diagnoses: Primary hypertension      cetirizine (ZYRTEC) 10 MG tablet Take 10 mg by mouth daily      DULoxetine (CYMBALTA) 60 MG capsule Take 60 mg by mouth daily      gabapentin (NEURONTIN) 300 MG capsule Take 300 mg by mouth 2 times daily.      Glucagon, rDNA, (GLUCAGON EMERGENCY) 1 MG KIT Inject 1 mg into the muscle daily as needed (hypoglycemia)      glucose 40 % (400 mg/mL) gel Take by mouth as needed for low blood sugar (For BG <60).      hydrALAZINE (APRESOLINE) 25 MG tablet Take 25 mg by mouth 2 times daily Hold if SBP < 110      !! insulin aspart (NOVOLOG FLEXPEN) 100 UNIT/ML pen Inject 8 Units subcutaneously 2 times daily (with meals). 1130 & 1730      !! insulin aspart (NOVOLOG FLEXPEN) 100 UNIT/ML pen Inject 1-5  Units subcutaneously 3 times daily (with meals). In addition to 6-8 units with each meal, inject additional units as per this sliding scale:  If 200-250 = 1   251-300 = 2   301-350 = 3  351-400 = 4  401+ = 5  Repeat BS after 3 hours and call MD if still over 400      !! insulin aspart (NOVOLOG PEN) 100 UNIT/ML pen Inject 6 Units subcutaneously every morning. 0730      insulin glargine (LANTUS PEN) 100 UNIT/ML pen Inject 13 Units subcutaneously every morning      !! lipase-protease-amylase (CREON 36) 35939-741916-353014 units CPEP Take 3 capsules by mouth 3 times daily (with meals). 0730, 1130, 1630      !! lipase-protease-amylase (CREON 36) 73284-169457-258285 units CPEP Take 1 capsule by mouth 2 times daily. With snack 1000 & 2000      loperamide (IMODIUM) 2 MG capsule Take 4 mg by mouth every 8 hours as needed for diarrhea.      methocarbamol (ROBAXIN) 500 MG tablet Take 500 mg by mouth every 8 hours as needed for muscle spasms      multivitamin w/minerals (THERA-VIT-M) tablet Take 1 tablet by mouth daily  Qty: 30 tablet, Refills: 0    Associated Diagnoses: Malnutrition, unspecified type (H24)      pantoprazole (PROTONIX) 40 MG EC tablet Take 1 tablet (40 mg) by mouth daily  Qty: 30 tablet, Refills: 0    Associated Diagnoses: Gastroesophageal reflux disease without esophagitis       !! - Potential duplicate medications found. Please discuss with provider.        STOP taking these medications       Lidocaine (LIDOCARE) 4 % Patch Comments:   Reason for Stopping:             Allergies   Allergies   Allergen Reactions    Vancomycin      Other Reaction(s): Renal Failure    Vancomycin-induced nephrotoxicity (11/2023, outside hospital)    Seasonal Allergies      HAYFEVER:    -Watery Eyes  -Eye burn    Daptomycin Rash     Likely delayed hypersensitivity reaction to daptomycin 11/2023

## 2024-09-14 NOTE — PROGRESS NOTES
Care Management Discharge Note    Discharge Date: 09/14/2024       Discharge Disposition: Long Term Care    Discharge Services: None    Discharge DME: None    Discharge Transportation:  [facility]    Private pay costs discussed: transportation costs    Does the patient's insurance plan have a 3 day qualifying hospital stay waiver?  No    PAS Confirmation Code:    Patient/family educated on Medicare website which has current facility and service quality ratings: no    Education Provided on the Discharge Plan: Yes  Persons Notified of Discharge Plans: PtKimberly at [facility]  Patient/Family in Agreement with the Plan:  yes    Handoff Referral Completed: No, handoff not indicated or clinically appropriate    Additional Information:    MADYSON spoke with Kimberly at [facility] [place], they can take the pt back today. All orders were faxed for return and updated with transport time.    MADYSON spoke with the pt regarding discharge back to [facility], he agrees with the plan for discharge today. Initially we discussed transport back and he wanted to take a taxi, but later agreed to a WC ride due to not having any clothes with him and his WC not being present. WC ride set up with [facility] for pick-up between 15:30-14:10. Pt aware this will be submitted to insurance, but he could have a private cost to him.    Pt noted no further questions, he shared he will update his family with his discharge plan.    CASEY Dahl, [facility]  Phone [phone]

## 2024-09-14 NOTE — PLAN OF CARE
Goal Outcome Evaluation:                  [phone]  Orientation: a&ox4  Aggression Stop Light: green  Activity: SBA w/ IV pole, bed alarm off per request, but calls for BR  Diet/BS Checks: regular/mod carb. BS ACHS  Tele:  NSR  IV Access/Drains: R PIV infusing NS decreased to 50 mL/hr. L PIV SL  Pain Management: c/o of generalized pain in shoulder, back and R knee. Oxy and tylenol given x1 with relief.  Abnormal VS/Results: BP elevated, resumed home meds and improved. Hgb recheck 8.4. LS coarse LLL. Lantus adjusted as not eating too much and refused aspart at times.  Bowel/Bladder: cont/incont loose stool. Hesitancy with voiding and monitor PVR. Last 250. Urinal and hat given for monitoring.   Skin/Wounds: R foot all toe amputations, edema in R leg/knee chronic. Faint Hematoma/swelling on L eye from fall  Consults: SW  D/C Disposition: pending back to [facility] when medically stable, was moving into apartment this week, but then fell.   Other Info:      -refused cont. Pulse ox  -cont Iv abx

## 2024-09-14 NOTE — PROVIDER NOTIFICATION
MD Notification    Notified Person: MD adair    Notified Person Name:    Notification Date/Time:    Notification Interaction:    Purpose of Notification:pt wanting ivf stopped. asking why bumex stopped. not taking scheduled 5units of novalog ordered as states bgm will drop too much. currently trying to void as having diff all noc and says this is new problem    Orders Received:    Comments:

## 2024-09-16 ENCOUNTER — PATIENT OUTREACH (OUTPATIENT)
Dept: CARE COORDINATION | Facility: CLINIC | Age: 54
End: 2024-09-16
Payer: COMMERCIAL

## 2024-09-16 LAB — BACTERIA BLD CULT: NO GROWTH

## 2024-09-16 NOTE — PROGRESS NOTES
Connected Care Resource Center: [facility] [place]    Background: Transitional Care Management program identified per system criteria and reviewed by [facility] [place] team for possible outreach.    Assessment: Upon chart review, [facility] Team member will not proceed with patient outreach related to this episode of Transitional Care Management program due to reason below:    Patient has discharged to a Memory Care, Long-term Care, Assisted Living or Group Home where patient is receiving on-site support with their daily cares, including support with hospital follow up plan.    Plan: Transitional Care Management episode addressed appropriately per reason noted above.      Frannie Borja  Community Health Worker  [facility], [facility]  [phone]      *Connected Care Resource Team does NOT follow patient ongoing. Referrals are identified based on internal discharge reports and the outreach is to ensure patient has an understanding of their discharge instructions.

## 2024-09-17 ENCOUNTER — LAB (OUTPATIENT)
Dept: LAB | Facility: CLINIC | Age: 54
End: 2024-09-17
Payer: COMMERCIAL

## 2024-09-17 ENCOUNTER — HOSPITAL ENCOUNTER (OUTPATIENT)
Dept: GENERAL RADIOLOGY | Facility: CLINIC | Age: 54
Discharge: HOME | End: 2024-09-17
Attending: STUDENT IN AN ORGANIZED HEALTH CARE EDUCATION/TRAINING PROGRAM
Payer: COMMERCIAL

## 2024-09-17 ENCOUNTER — OFFICE VISIT (OUTPATIENT)
Dept: RHEUMATOLOGY | Facility: CLINIC | Age: 54
End: 2024-09-17
Attending: STUDENT IN AN ORGANIZED HEALTH CARE EDUCATION/TRAINING PROGRAM
Payer: COMMERCIAL

## 2024-09-17 VITALS
DIASTOLIC BLOOD PRESSURE: 98 MMHG | SYSTOLIC BLOOD PRESSURE: 179 MMHG | BODY MASS INDEX: 22.51 KG/M2 | HEART RATE: 82 BPM | OXYGEN SATURATION: 96 % | HEIGHT: 65 IN | WEIGHT: 135.1 LBS

## 2024-09-17 DIAGNOSIS — K86.1 CHRONIC PANCREATITIS, UNSPECIFIED PANCREATITIS TYPE (H): ICD-10-CM

## 2024-09-17 DIAGNOSIS — M25.50 MULTIPLE JOINT PAIN: ICD-10-CM

## 2024-09-17 DIAGNOSIS — Z11.1 TUBERCULOSIS SCREENING: ICD-10-CM

## 2024-09-17 DIAGNOSIS — Z86.79 HISTORY OF VASCULITIS: ICD-10-CM

## 2024-09-17 DIAGNOSIS — M25.50 MULTIPLE JOINT PAIN: Primary | ICD-10-CM

## 2024-09-17 PROBLEM — E87.5 HYPERKALEMIA: Status: ACTIVE | Noted: 2024-09-17

## 2024-09-17 PROBLEM — G93.41 ACUTE METABOLIC ENCEPHALOPATHY: Status: ACTIVE | Noted: 2024-09-17

## 2024-09-17 LAB
ALBUMIN UR-MCNC: 100 MG/DL
APPEARANCE UR: CLEAR
BACTERIA #/AREA URNS HPF: ABNORMAL /HPF
BILIRUB UR QL STRIP: NEGATIVE
COLOR UR AUTO: YELLOW
GLUCOSE UR STRIP-MCNC: 500 MG/DL
HGB UR QL STRIP: ABNORMAL
KETONES UR STRIP-MCNC: NEGATIVE MG/DL
LEUKOCYTE ESTERASE UR QL STRIP: NEGATIVE
MUCOUS THREADS #/AREA URNS LPF: PRESENT /LPF
NITRATE UR QL: NEGATIVE
PH UR STRIP: 6 [PH] (ref 5–7)
RBC #/AREA URNS AUTO: ABNORMAL /HPF
SP GR UR STRIP: 1.01 (ref 1–1.03)
UROBILINOGEN UR STRIP-ACNC: 0.2 E.U./DL
WBC #/AREA URNS AUTO: ABNORMAL /HPF

## 2024-09-17 PROCEDURE — 73130 X-RAY EXAM OF HAND: CPT | Mod: 50

## 2024-09-17 PROCEDURE — 81374 HLA I TYPING 1 ANTIGEN LR: CPT

## 2024-09-17 PROCEDURE — 81001 URINALYSIS AUTO W/SCOPE: CPT

## 2024-09-17 PROCEDURE — 72200 X-RAY EXAM SI JOINTS: CPT

## 2024-09-17 PROCEDURE — 36415 COLL VENOUS BLD VENIPUNCTURE: CPT

## 2024-09-17 PROCEDURE — G0463 HOSPITAL OUTPT CLINIC VISIT: HCPCS | Performed by: STUDENT IN AN ORGANIZED HEALTH CARE EDUCATION/TRAINING PROGRAM

## 2024-09-17 PROCEDURE — 83876 ASSAY MYELOPEROXIDASE: CPT

## 2024-09-17 PROCEDURE — 99205 OFFICE O/P NEW HI 60 MIN: CPT | Performed by: STUDENT IN AN ORGANIZED HEALTH CARE EDUCATION/TRAINING PROGRAM

## 2024-09-17 PROCEDURE — 73630 X-RAY EXAM OF FOOT: CPT | Mod: LT

## 2024-09-17 PROCEDURE — 83516 IMMUNOASSAY NONANTIBODY: CPT

## 2024-09-17 PROCEDURE — 82784 ASSAY IGA/IGD/IGG/IGM EACH: CPT

## 2024-09-17 PROCEDURE — 86431 RHEUMATOID FACTOR QUANT: CPT

## 2024-09-17 PROCEDURE — 86200 CCP ANTIBODY: CPT

## 2024-09-17 PROCEDURE — 82787 IGG 1 2 3 OR 4 EACH: CPT

## 2024-09-17 PROCEDURE — 82784 ASSAY IGA/IGD/IGG/IGM EACH: CPT | Mod: 59

## 2024-09-17 PROCEDURE — 99417 PROLNG OP E/M EACH 15 MIN: CPT | Performed by: STUDENT IN AN ORGANIZED HEALTH CARE EDUCATION/TRAINING PROGRAM

## 2024-09-17 ASSESSMENT — PAIN SCALES - GENERAL: PAINLEVEL: EXTREME PAIN (8)

## 2024-09-17 NOTE — LETTER
9/17/2024       RE: Long Mayfield  [address]  [place] [address]     Dear Colleague,    Thank you for referring your patient, Long Mayfield, to the [facility] RHEUMATOLOGY at [facility]. Please see a copy of my visit note below.    RHEUMATOLOGY OUTPATIENT CLINIC NOTE     Referring Provider: Edward Zuleta MD     Lab review     KATHY: Negative  dsDNA:  Negative    Complement C3: normal  Complement C4: normal  ANCA by IFA: Negative   MPO: Negative    ME-3 Abs: Positive, mildly positive outside at [facility]    Cell count, right knee multiple aspirations   Latest Reference Range & Units 05/22/24 09:26 05/29/24 10:00 07/10/24 11:36 08/14/24 13:38 08/27/24 17:09   Total Nucleated Cells /uL 76,860 109,100 65,670 94,560 53,900     Skin biopsy 12/2023, [facility]:   A. Skin, left, forearm, punch biopsy - Leukocytoclastic vasculitis.   B. Skin biopsy, left, forearm, direct immunofluorescence (DIF) -     Negative immunofluorescence     Imaging review     MRI cervical spine, outside, only report available 3/2024:  C4-5 moderate central/left-sided canal stenosis with left ventral cord impingement.  No abnormal T2 signal  Mild central canal stenosis with cord abutment C3-4  Multilevel degenerative foraminal stenosis severe left C 3-4, C4-5 with expected nerve impingement  Prominent erosive/inflammatory facet arthropathy left C3-4, C4-5    MRI Right foot, 1/2024:  1. Enhancing marrow edema at the second metatarsal amputation margin as well as head and neck of the third and fourth metatarsals. Indeterminate faint T1 hypointense marrow signal changes of the fourth metatarsal head and about the displaced oblique fracture of the third metatarsal neck. Overall findings compatible with osteitis, possibly osteomyelitis.   2. Irregular soft tissue amputation margins of the medial forefoot with emanating enhancing tissue edema concerning for cellulitis     CT chest,  Abdomen, pelvis without contrast, 12/2023:  1. Severe anasarca with moderate to large pleural effusions and a small pericardial effusion. Diffuse septal thickening in the lungs concerning for pulmonary edema. Anemic cardiac blood pool.   2. Solitary pulmonary nodule in the left lower lobe with an average diameter of 10 mm is indeterminate, but worrisome for neoplasm. This is new since 3/19/2022.   3. Patchy and confluent airspace consolidation within the left greater than right lung apices presumably represents infection, cannot exclude underlying soft tissue density nodularity on this noncontrast examination. Recommend pulmonary consultation.   4. Diffuse and severe vascular calcifications. No acute or suspicious abnormality noted within the abdomen or pelvis.     Subjective    Visit date September 17, 2024    oLng is a 54 year old male who presents today for consult.    He has chronic arthralgia since his age of 48 involving hands, elbows, neck, back and knees that he attributes to injuries from falling as he used to be a . He denies swelling in the hands, elbows, feet.     He has been getting gel injections in his knees with [facility] orthopedics and in May 2024, he developed swelling in the right knee, he was evaluated in July by Orthopedics at [facility], due to concerns for septic arthritis, he underwent washout and treated with 4 weeks of antibiotics however he had recurrence of knee swelling and has been following with infectious disease since then. His infectious workup has been so far inconclusive however his cell count has been persistently elevated.    In November - December 2023, he was hospitalized at [facility] for bilateral lower extremity swelling, rash, infection in his right big toe, he underwent surgery including amputation, there was concerns for osteomyelitis in the right foot.  During his evaluation, he was evaluated by dermatology and underwent skin biopsy showing LCV. He was treated  with antibiotics at the time during his hospital stay. He saw Nephrology for SALVADOR that was felt to be prerenal at the time vs antibiotic related during the time, he had ANCA serologies checked, ANCA by IFA was negative, NC-3 borderline positive, MPO antibody negative.    He denies recurrent sinus infections, epistaxis, hearing loss, uveitis, hemoptysis, hematuria.    He has chronic diarrhea and chronic pancreatitis, following with GI, unclear etiology but felt likely secondary to alcohol intake,  No reported inflammatory bowel disease on prior Colonoscopies    He has never smoked. He has not drank alcohol for over 2 years.  History of remote cocaine use, none recently.    No family history of autoimmune disease    Today, Long mentioned the following:    He was recently hospitalized for shortness of breath and felt to have pneumonia and treated with antibiotics. He had anemia and received blood transfusion, etiology of anemia is unknown.     He remains with pain and swelling in his right knee especially with ambulation.   He remains with some ankle swelling in the right ankle  He denies new or worsening arthritis in the hands, elbows, feet.     Denies recent fever but occasional chills  Denies recurrent sinus infections, epistaxis, hearing loss, uveitis, hemoptysis, hematuria, skin rash.     ROS    Current Medications   Azithromycin, cefdinir    Past Medical history   Past Medical History:   Diagnosis Date     Acute pain of right knee 07/12/2024     Allergic rhinitis      Anemia      Arthritis      Bell's palsy      Cervicalgia      Diabetes (H)      Diabetic foot ulcer (H)      Generalized edema      Hyperkalemia      Hypertension      Hypo-osmolality and hyponatremia      Hypoglycemia 05/03/2024     Major depressive disorder, recurrent episode, mild (H24)      Other acute osteomyelitis, right ankle and foot (H)      Other chronic pancreatitis (H)      Other polyosteoarthritis      Right knee pain 07/11/2024      "Solitary pulmonary nodule        Objective  BP (!) 179/98   Pulse 82   Ht 1.651 m (5' 5\")   Wt 61.3 kg (135 lb 1.6 oz)   SpO2 96%   BMI 22.48 kg/m        PHYSICAL EXAMINATION  Physical Exam  HENT:      Head: Normocephalic.      Mouth/Throat:      Mouth: Mucous membranes are moist.   Eyes:      Conjunctiva/sclera: Conjunctivae normal.   Pulmonary:      Effort: Pulmonary effort is normal.      Breath sounds: Normal breath sounds.   Musculoskeletal:         General: Swelling and tenderness present.      Comments: No noticeable swelling in the hands (MCP, PIP, DIP), wrists, elbows, ankles, feet.   Range of motion in the shoulders and hips are within accepted range for age.     Significant right knee swelling   Skin:     Findings: No rash.   Neurological:      Mental Status: He is alert.         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      He has been following with our colleagues in orthopedics and infectious disease for recurrent knee effusion, arthrocentesis revealed inflammatory cell count several times this year and based on infectious disease evaluation his workup for recurrent infection is inconclusive and there is no evidence of ongoing septic arthritis.  He has chronic arthralgias involving the neck, low back so unclear if there is an underlying spondylarthritis or " inflammatory arthropathy.  Outside MRI is showing extensive cervical disease and raising concerns for probable facet arthropathy.  He is planned for right knee MRI by infectious disease which is reasonable, I also recommended repeat evaluation with arthrocentesis by our colleagues in orthopedics and consideration to send for cytology to rule out pigmented villonodular synovitis.    We will undergo additional testing to evaluate for inflammatory arthropathy by getting x-rays of the hands, SI joint, left foot and check serology rheumatoid factor, CCP, HLA-B27.    He also has history of acute kidney injury, leukocytoclastic vasculitis however his ANCA's were discordant with mildly positive DC-3 antibody with negative immunofluorescence.  Therefore we discussed repeating his ANCA serologies, check urinalysis as well  to evaluate for possibility of ANCA associated vasculitis.  He has a history of pulmonary nodule and there was a plan for pulmonary referral, unclear if this was pursued at [facility].  This may need to be reevaluated based on test results.    He has chronic diarrhea and chronic pancreatitis, he has been evaluated by gastroenterology previously and seems like pancreatitis versus attributable to alcohol use, nonetheless given concerns of underlying autoimmune disease and reasonable to check for IgG levels with subclasses to rule out IgG4 related disease.    I will see him back after test results to discuss the next steps.    Plan:  - X-rays hands, left foot, SI joint to evaluate for inflammatory arthropathy  - Rheumatoid factor, CCP, HLA-B27  - ANCA serology, urinalysis   - IgG level with subclasses  - Agree with MRI of the right knee, arthrocentesis and cytology.      80 minutes spent by me on the date of the encounter doing chart review, history and exam, documentation and further activities per the note      Return in about 6 weeks (around 10/29/2024).    Hector Cruz MD  [facility]  RHEUMATOLOGY      Again, thank you for allowing me to participate in the care of your patient.      Sincerely,    Hector Cruz MD

## 2024-09-17 NOTE — NURSING NOTE
"Chief Complaint   Patient presents with    Consult     New Patient     BP (!) 179/98   Pulse 82   Ht 1.651 m (5' 5\")   Wt 61.3 kg (135 lb 1.6 oz)   SpO2 96%   BMI 22.48 kg/m    Sheri Butler on 9/17/2024 at 1:42 PM    "

## 2024-09-18 ENCOUNTER — TELEPHONE (OUTPATIENT)
Dept: ORTHOPEDICS | Facility: CLINIC | Age: 54
End: 2024-09-18
Payer: COMMERCIAL

## 2024-09-18 LAB
CCP AB SER IA-ACNC: 1.6 U/ML
RHEUMATOID FACT SERPL-ACNC: <10 IU/ML

## 2024-09-18 NOTE — TELEPHONE ENCOUNTER
Appointment     Reason for Call: Patient is wondering if there is anyway he can see Dr Phoenix on 09/23 because he will already be there for an appt herberth/Darren that day    Could we send this information to you in [employer] or would you prefer to receive a phone call?:   Patient would prefer a phone call   Okay to leave a detailed message?: No at Cell number on file:    Telephone Information:   Mobile [phone]

## 2024-09-19 NOTE — TELEPHONE ENCOUNTER
Patient was called back and the appointment with Dr. Phoenix was cancelled.  He will be able to be managed by the appointment on Monday with Darren SANTANA.  He was thankful for this call and had no other questions.

## 2024-09-19 NOTE — TELEPHONE ENCOUNTER
[facility] Call Center    Phone Message    May a detailed message be left on voicemail: yes     Reason for Call: Other: Pt is wondering if Darren can see him for both his MRI f/up and post op instead of coming back the next day to see Dr. Phoenix for his post op appt. Please review and call pt back to let him know.       Action Taken: Other: csc    Travel Screening: Not Applicable     Date of Service:

## 2024-09-20 LAB
IGA SERPL-MCNC: 457 MG/DL (ref 84–499)
IGG SERPL-MCNC: 2121 MG/DL (ref 610–1616)
IGG SERPL-MCNC: 2121 MG/DL (ref 610–1616)
IGG1 SER-MCNC: 1179 MG/DL (ref 382–929)
IGG2 SER-MCNC: 508 MG/DL (ref 242–700)
IGG3 SER-MCNC: 126 MG/DL (ref 22–176)
IGG4 SER-MCNC: 224 MG/DL (ref 4–86)
IGM SERPL-MCNC: 281 MG/DL (ref 35–242)
MYELOPEROXIDASE AB SER IA-ACNC: <0.3 U/ML
MYELOPEROXIDASE AB SER IA-ACNC: NEGATIVE
PROTEINASE3 AB SER IA-ACNC: <1 U/ML
PROTEINASE3 AB SER IA-ACNC: NEGATIVE
SUBCLASSES, PERCENT: 96 %

## 2024-09-21 ENCOUNTER — HOSPITAL ENCOUNTER (OUTPATIENT)
Dept: MRI IMAGING | Facility: CLINIC | Age: 54
Discharge: HOME | End: 2024-09-21
Attending: INTERNAL MEDICINE | Admitting: INTERNAL MEDICINE
Payer: COMMERCIAL

## 2024-09-21 DIAGNOSIS — M25.561 PAIN AND SWELLING OF RIGHT KNEE: ICD-10-CM

## 2024-09-21 DIAGNOSIS — M25.461 PAIN AND SWELLING OF RIGHT KNEE: ICD-10-CM

## 2024-09-21 PROCEDURE — 73723 MRI JOINT LWR EXTR W/O&W/DYE: CPT | Mod: RT

## 2024-09-21 PROCEDURE — A9585 GADOBUTROL INJECTION: HCPCS | Performed by: INTERNAL MEDICINE

## 2024-09-21 PROCEDURE — 255N000002 HC RX 255 OP 636: Performed by: INTERNAL MEDICINE

## 2024-09-21 RX ORDER — GADOBUTROL 604.72 MG/ML
6 INJECTION INTRAVENOUS ONCE
Status: COMPLETED | OUTPATIENT
Start: 2024-09-21 | End: 2024-09-21

## 2024-09-21 RX ADMIN — GADOBUTROL 6 ML: 604.72 INJECTION INTRAVENOUS at 15:24

## 2024-09-22 DIAGNOSIS — M25.50 MULTIPLE JOINT PAIN: ICD-10-CM

## 2024-09-22 DIAGNOSIS — K86.1 CHRONIC PANCREATITIS, UNSPECIFIED PANCREATITIS TYPE (H): ICD-10-CM

## 2024-09-22 DIAGNOSIS — R31.9 HEMATURIA, UNSPECIFIED TYPE: ICD-10-CM

## 2024-09-22 DIAGNOSIS — R59.9 ADENOPATHY: ICD-10-CM

## 2024-09-22 DIAGNOSIS — N28.9 RENAL INSUFFICIENCY: Primary | ICD-10-CM

## 2024-09-22 NOTE — RESULT ENCOUNTER NOTE
Urinalysis with red cells and proteins   Elevated IgG and IgG4 levels  ANCA serologies negative     Recommend:  CT evaluation for adenopathy given elevated of IgG and IgG4 levels   Nephrology consultation for further evaluation of renal disease that could be secondary to IgG4 related disease     Hector Cruz MD

## 2024-09-23 ENCOUNTER — OFFICE VISIT (OUTPATIENT)
Dept: ORTHOPEDICS | Facility: CLINIC | Age: 54
End: 2024-09-23
Payer: COMMERCIAL

## 2024-09-23 DIAGNOSIS — M00.9 PYOGENIC ARTHRITIS OF RIGHT KNEE JOINT, DUE TO UNSPECIFIED ORGANISM (H): Primary | ICD-10-CM

## 2024-09-23 DIAGNOSIS — M25.561 ACUTE PAIN OF RIGHT KNEE: ICD-10-CM

## 2024-09-23 LAB
APPEARANCE FLD: ABNORMAL
CELL COUNT BODY FLUID SOURCE: ABNORMAL
COLOR FLD: YELLOW
HOLD SPECIMEN: NORMAL
LYMPHOCYTES NFR FLD MANUAL: 1 %
MONOS+MACROS NFR FLD MANUAL: 0 %
NEUTS BAND NFR FLD MANUAL: 99 %
WBC # FLD AUTO: ABNORMAL /UL

## 2024-09-23 PROCEDURE — 88112 CYTOPATH CELL ENHANCE TECH: CPT | Mod: 26 | Performed by: PATHOLOGY

## 2024-09-23 PROCEDURE — 87116 MYCOBACTERIA CULTURE: CPT | Mod: 90 | Performed by: PATHOLOGY

## 2024-09-23 PROCEDURE — 87070 CULTURE OTHR SPECIMN AEROBIC: CPT | Performed by: PHYSICIAN ASSISTANT

## 2024-09-23 PROCEDURE — 99214 OFFICE O/P EST MOD 30 MIN: CPT | Mod: 25 | Performed by: PHYSICIAN ASSISTANT

## 2024-09-23 PROCEDURE — 88305 TISSUE EXAM BY PATHOLOGIST: CPT | Mod: 26 | Performed by: PATHOLOGY

## 2024-09-23 PROCEDURE — 88305 TISSUE EXAM BY PATHOLOGIST: CPT | Mod: TC | Performed by: PHYSICIAN ASSISTANT

## 2024-09-23 PROCEDURE — 87801 DETECT AGNT MULT DNA AMPLI: CPT | Performed by: PHYSICIAN ASSISTANT

## 2024-09-23 PROCEDURE — 87102 FUNGUS ISOLATION CULTURE: CPT | Performed by: PHYSICIAN ASSISTANT

## 2024-09-23 PROCEDURE — 89050 BODY FLUID CELL COUNT: CPT | Performed by: PHYSICIAN ASSISTANT

## 2024-09-23 PROCEDURE — 20610 DRAIN/INJ JOINT/BURSA W/O US: CPT | Mod: RT | Performed by: PHYSICIAN ASSISTANT

## 2024-09-23 PROCEDURE — 87206 SMEAR FLUORESCENT/ACID STAI: CPT | Mod: 90 | Performed by: PATHOLOGY

## 2024-09-23 PROCEDURE — 99000 SPECIMEN HANDLING OFFICE-LAB: CPT | Performed by: PATHOLOGY

## 2024-09-23 PROCEDURE — 87075 CULTR BACTERIA EXCEPT BLOOD: CPT | Performed by: PHYSICIAN ASSISTANT

## 2024-09-23 RX ORDER — LIDOCAINE HYDROCHLORIDE 10 MG/ML
3 INJECTION, SOLUTION EPIDURAL; INFILTRATION; INTRACAUDAL; PERINEURAL
Status: SHIPPED | OUTPATIENT
Start: 2024-09-23

## 2024-09-23 RX ADMIN — LIDOCAINE HYDROCHLORIDE 3 ML: 10 INJECTION, SOLUTION EPIDURAL; INFILTRATION; INTRACAUDAL; PERINEURAL at 16:23

## 2024-09-23 ASSESSMENT — KOOS JR
STRAIGHTENING KNEE FULLY: MODERATE
BENDING TO THE FLOOR TO PICK UP OBJECT: MODERATE
GOING UP OR DOWN STAIRS: SEVERE
HOW SEVERE IS YOUR KNEE STIFFNESS AFTER FIRST WAKING IN MORNING: SEVERE
RISING FROM SITTING: SEVERE
KOOS JR SCORING: 42.28
TWISING OR PIVOTING ON KNEE: SEVERE
STANDING UPRIGHT: MODERATE

## 2024-09-23 NOTE — PROGRESS NOTES
[facility]  Orthopaedic Surgery  by Darren Oviedo PA-C    [place] MRN# [id number]    YOB: 1970            Assessment and Plan:   Assessment:  54-year-old male with history of chronic knee pain secondary to tricompartmental degenerative changes with recent effusions suspicious for septic arthritis versus PVNS.  No signs of PVNS noted on MRI today.  Aspiration performed today.     Plan:  After examined the patient reviewing the x-rays and lab findings I extensive discussed my findings with the patient today.  I explained the MRI does not demonstrate any signs of PVNS or lesions that would cause his chronic effusions and pain.  In my opinion is still appears that this is a infectious or inflammatory process.  Although is not the normal process for the sake of being thorough I agreed we should aspirate the knee for cytology but also recommended further workup with NexGen sequencing as all cultures have been negative.  Given his uncontrolled diabetes there may be an indolent Terrio or fungal infection that has not presented itself.  Aspiration was performed and 45 mL of cloudy synovial fluid was sent for cytology, NexGen sequencing, aerobic, anaerobic, fungal, AFB and cell count.  If the patient experiences any fever or malaise start him to report to the emergency department.  When the labs been finalized I will reach out to him via [employer] with further recommendations.  I would also like him to follow-up with infectious disease for further recommendations.  All questions were answered the patient was agreed with the plan.       Darren Oviedo PA-C  Physician Assistant   Oncology and Adult Reconstructive Surgery  Dept Orthopaedic Surgery, [facility]             History of Present Illness:   54 year old male who presents today for evaluation of a chronic right knee effusion.  Patient states he was getting Synvisc injections of bilateral knees for arthritis at [facility].  Following his last Synvisc injection in May 2024 the right knee began to swell.  Knee was aspirated which showed a cell count of 76K and a week later at 100 9K.  All cultures were negative.  He was transferred to [facility] and did an I&D on 8/14/2024.  To follow-up I&D on 8/17/2024 by Dr. Phoenix.  All cultures have been negative and following the second I&D was placed on ciprofloxacin and referred to rheumatology and infectious disease.  He has had chronically elevated sed rate and CRP.  All cultures have remained negative.  Rheumatology and infectious disease reach out to me to rule out PVNS or other causes of chronic effusions.  Therefore an MRI was ordered and the patient is here today for review.  Patient states he continues to have chronic effusions and pain in the right knee.      Background history:  DX:  Acute metabolic encephalopathy  Hyperkalemia  Chronic pancreatitis  Type 2 diabetes with kidney complication  Alcohol abuse hyperglycemia  Primary hypertension  Cardiac calcification  GERD.    Anemia  Acute renal insufficiency    TREATMENTS:  1997, open reduction and intra fixation right patella  7/11/2024, I&D of right knee, Dr. Espinoza  8/17/2024, I&D of right knee, Dr. Phoenix           Physical Exam:     EXAMINATION pertinent findings:   PSYCH: Pleasant, healthy-appearing, alert, oriented x3, cooperative. Normal mood and affect.  VITAL SIGNS: There were no vitals taken for this visit..  Reviewed nursing intake notes.   There is no height or weight on file to calculate BMI.  RESP: non labored breathing   ABD: benign, soft, non-tender, no acute peritoneal findings  SKIN: grossly normal   LYMPHATIC: grossly normal, no adenopathy, no extremity edema  NEURO: grossly normal , no motor deficits  VASCULAR: satisfactory perfusion of all extremities   MUSCULOSKELETAL:     Right knee:   The incision is clean, dry, and intact with no erythema, dehiscence, or discharge.   Large joint effusion.  Pain with palpation diffusely throughout the knee.  Range of motion 0 to 90 degrees.  Good stability with varus valgus stress.  Normal patellar tracking minimal peripheral edema.    Right LE:              Thigh and leg compartments soft and compressible              +Quad/TA/GSC/FHL/EHL              SILT DP/SP/Cindy/Saph/Tib nerve distributions              Palpable dorsalis pedis pulse              Data:   All laboratory data reviewed  All imaging studies reviewed by me    9/21/2024 MRI right knee with and without contrast: Large joint effusion.  No soft tissue lesions appreciated.  Reactive synovitis noted.  Large complex popliteal cyst noted.  Subchondral tibial cysts with reactive edema.  Tricompartmental DJD with degenerative changes of the meniscus.    IMPRESSION:  1.  Large complex joint effusion with mildly thickened enhancing synovium of indeterminate etiology. Findings may be sequela of degenerative arthropathy, or an inflammatory or infectious arthropathy. If clinically indicated repeat arthrocentesis could be   performed for further characterization.  2.  Mild patchy enhancing edema-like marrow signal of the knee which is likely reactive. Early osteomyelitis cannot be entirely excluded, though, is considered less likely.  3.  Tricompartmental degenerative arthrosis.  4.  Marked mucoid degeneration/maceration of the medial and lateral menisci.  5.  Chronic rupture of the ACL.  6.  Mucoid degeneration and/or partial tearing of the PCL.  7.  Intact collateral ligaments.     Recent Labs   Lab Test 09/14/24  1057 09/13/24  0858 09/12/24  0758 09/11/24  1129 08/27/24  1725 08/16/24  0610 08/14/24  1253 07/12/24  0716 07/10/24  1157   HGB 8.5* 8.4* 6.4*   < > 7.3*   < > 7.4*   < > 8.3*   SED  --   --   --   --  99*  --  105*  --  112*   CRPI  --  176.09*  --    < > 39.60*   < > 103.00*   < > 113.00*   WBC 10.6 12.1* 12.3*   < > 8.4   < > 8.0   < > 10.4    < > = values in this interval not  displayed.     Recent Labs   Lab Test 08/27/24  1709 08/14/24  1338 07/10/24  1136   FNEU 96 100 93   FCOL Red* Yellow Yellow   FAPR Bloody* Turbid* Turbid*   FWBC 53,900 94,560 65,670      09/11/2024 1807 09/13/2024 1341 Respiratory Aerobic Bacterial Culture with Gram Stain [[id number]]   Sputum from Expectorate    Final result Component Value   Culture 2+ Normal Connor   Gram Stain Result <10 Squamous epithelial cells/low power field    >25 PMNs/low power field    1+ Mixed connor          09/11/2024 1801 09/12/2024 0554 Enteric Bacteria and Virus Panel PCR [[id number]]    Stool from Per Rectum    Final result Component Value   Campylobacter species Negative   Salmonella species Negative   Vibrio species Negative   Vibrio cholerae Negative   Yersinia enterocolitica Negative   Enteropathogenic E. coli (EPEC) Negative   Shiga-like toxin-producing E. coli (STEC) Negative   Shigella/Enteroinvasive E. coli (EIEC) Negative   Cryptosporidium species Negative   Giardia lamblia Negative   Norovirus Gl/Gll Negative   Rotavirus A Negative   Plesiomonas shigelloides Negative   Enteroaggregative E. coli (EAEC) Negative   Enterotoxigenic E. coli (ETEC) Negative   E. coli O157 NA   Cyclospora cayetanensis Negative   Entamoeba histolytica Negative   Adenovirus F40/41 Negative   Astrovirus Negative   Sapovirus Negative          09/11/2024 1801 09/11/2024 2326 C. difficile Toxin B PCR with reflex to C. difficile Antigen and Toxins A/B EIA [[id number]]    Stool from Per Rectum    Final result Component Value   C Difficile Toxin B by PCR Negative   A negative result does not exclude actual disease due to C. difficile and may be due to improper collection, handling and storage of the specimen or the number of organisms in the specimen is below the detection limit of the assay.          09/11/2024 1138 09/11/2024 1233 Symptomatic Influenza A/B, RSV, & SARS-CoV2 PCR (COVID-19) Nasopharyngeal [[id number]]    Swab from Nasopharyngeal     Final result Component Value   Influenza A PCR Negative   Influenza B PCR Negative   RSV PCR Negative   SARS CoV2 PCR Negative   NEGATIVE: SARS-CoV-2 (COVID-19) RNA not detected, presumed negative.          09/11/2024 1129 09/16/2024 1605 Blood Culture Peripheral Blood [[id number]]   Peripheral Blood    Final result Component Value   Culture No Growth          08/27/2024 1709 09/10/2024 1340 Anaerobic Bacterial Culture Routine [[id number]]   Synovial fluid from Knee, Right    Final result Component Value   Culture No anaerobic organisms isolated          08/27/2024 1709 09/01/2024 0708 Synovial fluid Aerobic Bacterial Culture Routine With Gram Stain [[id number]]   Synovial fluid from Knee, Right    Final result Component Value   Culture No Growth   Gram Stain Result No organisms seen    4+ WBC seen    Predominantly PMNs          08/27/2024 1709 08/27/2024 2020 Cell count with differential fluid [[id number]]    (Abnormal)   Synovial fluid from Knee, Right    Final result Component Value   No component results          08/27/2024 1709 09/22/2024 1831 Fungal or Yeast Culture Routine [[id number]]   Synovial fluid from Knee, Right    Preliminary result Component Value   Culture No growth after 26 days P          08/27/2024 1709 08/27/2024 1714 Acid-Fast Bacilli Culture and Stain [[id number]]    Synovial fluid from Knee, Right    In process Component Value   No component results          08/27/2024 1709 08/27/2024 2018 Crystal ID Synovial Fluid [[id number]]   Synovial fluid from Knee, Right    Final result Component Value   Crystals Analysis No clinically significant crystals seen.          08/27/2024 1709 08/27/2024 2018 Cell Count Body Fluid [[id number]]    (Abnormal)   Synovial fluid from Knee, Right    Final result Component Value Units   Color Red Abnormal     Clarity Bloody Abnormal     Cell Count Fluid Source Knee, Right    Total Nucleated Cells 53,900 /uL          08/27/2024 1709 08/30/2024 0940  Acid-Fast Bacilli Culture and Stain [[id number]]    Synovial fluid from Knee, Right    Preliminary result Component Value   Acid Fast Stain No acid fast bacilli seen P   Performed By: [id number] [place], [place] [address]Uqttlxdrfz Director: Raman Hummel MD, PhDCLIA Number: [id number]   Acid Fast Stain No acid fast bacilli seen P   Performed By: [facility] [id number] [place], [place] [id number] Director: Raman Hummel MD, PhDCLIA Number: [id number]  This is an appended report. These results have been appended to a previously preliminary verified report.          08/27/2024 1709 08/27/2024 2020 Differential Body Fluid [[id number]]    Synovial fluid from Knee, Right    Final result Component Value Units   % Neutrophils 96 %   % Lymphocytes 2 %   % Monocyte/Macrophages 2 %          08/17/2024 1006 08/24/2024 0722 Anaerobic Bacterial Culture Routine [[id number]]   Tissue from Knee, Right    Final result Component Value   Culture No anaerobic organisms isolated          08/17/2024 1006 08/24/2024 0722 Anaerobic Bacterial Culture Routine [[id number]]   Tissue from Knee, Right    Final result Component Value   Culture No anaerobic organisms isolated          08/17/2024 1006 09/14/2024 1023 Fungal or Yeast Culture Routine [[id number]]   Tissue from Knee, Right    Final result Component Value   Culture No Growth          08/17/2024 1006 09/14/2024 1023 Fungal or Yeast Culture Routine [[id number]]   Tissue from Knee, Right    Final result Component Value   Culture No Growth          08/17/2024 1006 08/22/2024 0705 Tissue Aerobic Bacterial Culture Routine [[id number]]   Tissue from Knee, Right    Final result Component Value   Culture No Growth          08/17/2024 1006 08/22/2024 0709 Tissue Aerobic Bacterial Culture Routine [[id number]]   Tissue from Knee, Right    Final result Component Value   Culture No Growth          08/17/2024 1005 08/24/2024 0722 Anaerobic  Bacterial Culture Routine [[id number]]   Tissue from Knee, Right    Final result Component Value   Culture No anaerobic organisms isolated          08/17/2024 1005 09/14/2024 1023 Fungal or Yeast Culture Routine [[id number]]   Tissue from Knee, Right    Final result Component Value   Culture No Growth          08/17/2024 1005 08/22/2024 0705 Tissue Aerobic Bacterial Culture Routine [[id number]]   Tissue from Knee, Right    Final result Component Value   Culture No Growth          08/17/2024 1005 08/26/2024 0755 Bacterial DNA 16S Ribosomal Non Blood with Reflex to NGS 16S with Reflex to Enterobacterales Identification [[id number]]   Tissue from Knee, Right    Final result Component Value   See Scanned Result BACTERIAL DNA 16S RIBOSOMAL NON BLOOD WITH REFLEX TO NGS 16S WITH REFLEX TO ENTEROBACTERALES IDENTIF-Scanned          08/17/2024 1005 08/26/2024 0757 Fungal DNA 28S Ribosomal Non Blood [[id number]]   Tissue from Knee, Right    Edited Result - FINAL Component Value   See Scanned Result FUNGAL DNA 28S RIBOSOMAL NON BLOOD-Scanned          08/14/2024 1338 08/28/2024 0754 Anaerobic Bacterial Culture Routine [[id number]]   Aspirate from Knee, Right    Final result Component Value   Culture No anaerobic organisms isolated          08/14/2024 1338 08/14/2024 1607 Cell count with differential fluid [[id number]]    (Abnormal)   Synovial fluid from Knee, Right    Final result Component Value   No component results          08/14/2024 1338 08/19/2024 0704 Aspirate Aerobic Bacterial Culture Routine Without Gram Stain [[id number]]   Aspirate from Knee, Right    Final result Component Value   Culture No Growth          08/14/2024 1338 09/11/2024 0716 Fungal or Yeast Culture Routine [[id number]]   Aspirate from Knee, Right    Final result Component Value   Culture No Growth          08/14/2024 1338 08/14/2024 1609 Crystal ID Synovial Fluid [[id number]]   Synovial fluid from Knee, Right    Final result  Component Value   Crystals Analysis No clinically significant crystals seen.          08/14/2024 1338 08/14/2024 1605 Cell Count Body Fluid [[id number]]    (Abnormal)   Synovial fluid from Knee, Right    Final result Component Value Units   Color Yellow    Clarity Turbid Abnormal     Cell Count Fluid Source Knee, Right    Total Nucleated Cells 94,560 /uL          08/14/2024 1338 08/14/2024 1607 Differential Body Fluid [[id number]]    Synovial fluid from Knee, Right    Final result Component Value Units   % Neutrophils 100 %   % Lymphocytes 0 %   % Monocyte/Macrophages 0 %          07/13/2024 1556 07/14/2024 1154 Chlamydia trachomatis/Neisseria gonorrhoeae by PCR [[id number]]   Urine, Voided    Final result Component Value   Chlamydia Trachomatis Negative   Negative for C. trachomatis rRNA by transcription mediated amplification.  A negative result by transcription mediated amplification does not preclude the presence of infection because results are dependent on proper and adequate collection, absence of inhibitors and sufficient rRNA to be detected.   Neisseria gonorrhoeae Negative   Negative for N. gonorrhoeae rRNA by transcription mediated amplification. A negative result by transcription mediated amplification does not preclude the presence of C. trachomatis infection because results are dependent on proper and adequate collection, absence of inhibitors and sufficient rRNA to be detected.          07/12/2024 2112 07/15/2024 1345 ESBL Organism Culture [[id number]]   Stool from Per Rectum    Final result Component Value   Culture No ESBL-producing organisms isolated. A negative result does not preclude the presence of ESBL-producing organisms.          07/11/2024 1913 07/25/2024 0749 Anaerobic Bacterial Culture Routine [[id number]]   Aspirate from Knee, Right    Final result Component Value   Culture No anaerobic organisms isolated          07/11/2024 1913 07/25/2024 1200 Anaerobic Bacterial Culture  Routine [[id number]]   Aspirate from Knee, Right    Final result Component Value   Culture No anaerobic organisms isolated          07/11/2024 1913 07/11/2024 2200 Gram Stain [[id number]]   Aspirate from Knee, Right    Final result Component Value   GS Culture See corresponding culture for results   Gram Stain Result No organisms seen   Gram Stain Result 4+ WBC seen   Predominantly PMNs          07/11/2024 1913 07/11/2024 2203 Gram Stain [[id number]]   Aspirate from Knee, Right    Final result Component Value   GS Culture See corresponding culture for results   Gram Stain Result No organisms seen   Gram Stain Result 4+ WBC seen   Predominantly PMNs          07/11/2024 1913 08/08/2024 0809 Fungal or Yeast Culture Routine [[id number]]   Aspirate from Knee, Right    Final result Component Value   Culture No Growth          07/11/2024 1913 08/08/2024 0809 Fungal or Yeast Culture Routine [[id number]]   Aspirate from Knee, Right    Final result Component Value   Culture No Growth          07/11/2024 1913 07/16/2024 0726 Aspirate Aerobic Bacterial Culture Routine [[id number]]   Aspirate from Knee, Right    Final result Component Value   Culture No Growth          07/11/2024 1913 07/16/2024 0726 Aspirate Aerobic Bacterial Culture Routine [[id number]]   Aspirate from Knee, Right    Final result Component Value   Culture No Growth          07/11/2024 1913 07/22/2024 0915 Bacterial DNA 16S Ribosomal Non Blood with Reflex to NGS 16S with Reflex to Enterobacterales Identification [[id number]]   Body fluid, unsp from Knee, Right    Final result Component Value   See Scanned Result BACTERIAL DNA 16S RIBOSOMAL NON BLOOD WITH REFLEX TO NGS 16S WITH REFLEX TO ENTEROBACTERALES IDENTIF-Scanned          07/11/2024 1912 07/25/2024 0757 Anaerobic Bacterial Culture Routine [[id number]]   Aspirate from Knee, Right    Final result Component Value   Culture No anaerobic organisms isolated          07/11/2024 1912 07/11/2024  2135 Gram Stain [[id number]]   Aspirate from Knee, Right    Final result Component Value   GS Culture See corresponding culture for results   Gram Stain Result No organisms seen   Gram Stain Result 4+ WBC seen   Predominantly PMNs          07/11/2024 1912 08/08/2024 0809 Fungal or Yeast Culture Routine [[id number]]   Aspirate from Knee, Right    Final result Component Value   Culture No Growth          07/11/2024 1912 07/16/2024 0733 Aspirate Aerobic Bacterial Culture Routine [[id number]]   Aspirate from Knee, Right    Final result Component Value   Culture No Growth          07/10/2024 1136 07/24/2024 0938 Anaerobic Bacterial Culture Routine [[id number]]   Aspirate from Knee, Right    Final result Component Value   Culture No anaerobic organisms isolated          07/10/2024 1136 07/10/2024 1506 Cell count with differential fluid [[id number]]    (Abnormal)   Synovial fluid from Knee, Right    Final result Component Value   No component results          07/10/2024 1136 07/15/2024 0715 Aspirate Aerobic Bacterial Culture Routine With Gram Stain [[id number]]   Aspirate from Knee, Right    Final result Component Value   Culture No Growth   Gram Stain Result No organisms seen    4+ WBC seen    Predominantly PMNs          07/10/2024 1136 07/10/2024 1506 Cell Count Body Fluid [[id number]]    (Abnormal)   Synovial fluid from Knee, Right    Final result Component Value Units   Color Yellow    Clarity Turbid Abnormal     Cell Count Fluid Source Knee, Right    Total Nucleated Cells 65,670 /uL          07/10/2024 1136 07/10/2024 1506 Differential Body Fluid [[id number]]    Synovial fluid from Knee, Right    Final result Component Value Units   % Neutrophils 93 %   % Lymphocytes 1 %   % Monocyte/Macrophages 6 %          05/29/2024 1000 06/03/2024 1031 Synovial fluid Aerobic Bacterial Culture Routine [[id number]]   Synovial fluid from Knee, Right    Final result Component Value   Culture No Growth           05/29/2024 1000 05/29/2024 1727 Gram Stain [[id number]]   Synovial fluid from Knee, Right    Final result Component Value   GS Culture See corresponding culture for results   Gram Stain Result No organisms seen   Gram Stain Result 4+ WBC seen   Predominantly PMNs          05/29/2024 1000 06/12/2024 1028 Anaerobic Bacterial Culture Routine [[id number]]   Synovial fluid from Knee, Right    Final result Component Value   Culture No anaerobic organisms isolated          05/29/2024 1000 05/29/2024 1546 Cell count with differential fluid [[id number]]    (Abnormal)   Synovial fluid from Knee, Right    Final result Component Value   No component results          05/29/2024 1000 05/29/2024 1546 Crystal ID Synovial Fluid [[id number]]   Synovial fluid from Knee, Right    Final result Component Value   Crystals Analysis No clinically significant crystals seen.          05/29/2024 1000 05/29/2024 1546 Cell Count Body Fluid [[id number]]    (Abnormal)   Synovial fluid from Knee, Right    Final result Component Value Units   Color Yellow    Clarity Cloudy Abnormal     Cell Count Fluid Source Knee, Right    Total Nucleated Cells 109,100 /uL          05/29/2024 1000 05/29/2024 1546 Differential Body Fluid [[id number]]    Synovial fluid from Knee, Right    Final result Component Value Units   % Neutrophils 95 %   % Lymphocytes 1 %   % Monocyte/Macrophages 4 %          05/22/2024 0926 05/27/2024 0709 Synovial fluid Aerobic Bacterial Culture Routine [[id number]]   Synovial fluid from Knee, Right    Final result Component Value   Culture No Growth          05/22/2024 0926 06/05/2024 0907 Anaerobic Bacterial Culture Routine [[id number]]   Synovial fluid from Knee, Right    Final result Component Value   Culture No anaerobic organisms isolated          05/22/2024 0926 05/22/2024 1348 Cell count with differential fluid [[id number]]    (Abnormal)   Synovial fluid from Knee, Right    Final result Component Value   No  component results          05/22/2024 0926 05/22/2024 1340 Crystal ID Synovial Fluid [[id number]]   Synovial fluid from Knee, Right    Final result Component Value   Crystals Analysis No clinically significant crystals seen.          05/22/2024 0926 05/22/2024 1631 Gram Stain [[id number]]   Synovial fluid from Knee, Right    Final result Component Value   Culture See corresponding culture for results   Gram Stain Result No organisms seen   Gram Stain Result 4+ WBC seen   Predominantly PMNs          05/22/2024 0926 05/22/2024 1348 Cell Count Body Fluid [[id number]]    (Abnormal)   Synovial fluid from Knee, Right    Final result Component Value Units   Color Yellow    Clarity Hazy Abnormal     Cell Count Fluid Source Knee, Right    Total Nucleated Cells 76,860 /uL          05/22/2024 0926 05/22/2024 1348 Differential Body Fluid [[id number]]    Synovial fluid from Knee, Right    Final result Component Value Units   % Neutrophils 98 %   % Lymphocytes 1 %   % Monocyte/Macrophages 1 %            DATA for DOCUMENTATION:         Past Medical History:     Patient Active Problem List   Diagnosis    Ketosis (H)    Hyperglycemia    Chronic diarrhea    Acute renal insufficiency    Anemia, unspecified type    Alcohol abuse    Cardiac calcification (H)    Cervical disc disease with myelopathy    Chronic bilateral low back pain with bilateral sciatica    Diabetic ulcer of toe of right foot associated with type 2 diabetes mellitus, unspecified ulcer stage (H)    Exocrine pancreatic insufficiency    GERD (gastroesophageal reflux disease)    Other hyperlipidemia    Primary hypertension    Severe episode of recurrent major depressive disorder, without psychotic features (H)    Type 2 diabetes mellitus with kidney complication, with long-term current use of insulin (H)    Unintentional weight loss    Pyogenic arthritis of right knee joint, due to unspecified organism (H)    Cervical radiculopathy    Change or removal of drains     Encounter to establish care    Chronic pancreatitis (H)    Hypoxia    Pneumonia of left lower lobe due to infectious organism    Acute metabolic encephalopathy    Hyperkalemia     Past Medical History:   Diagnosis Date    Acute pain of right knee 07/12/2024    Allergic rhinitis     Anemia     Arthritis     Bell's palsy     Cervicalgia     Diabetes (H)     Diabetic foot ulcer (H)     Generalized edema     Hyperkalemia     Hypertension     Hypo-osmolality and hyponatremia     Hypoglycemia 05/03/2024    Major depressive disorder, recurrent episode, mild (H24)     Other acute osteomyelitis, right ankle and foot (H)     Other chronic pancreatitis (H)     Other polyosteoarthritis     Right knee pain 07/11/2024    Solitary pulmonary nodule        Also see scanned health assessment forms.       Past Surgical History:     Past Surgical History:   Procedure Laterality Date    ARTHROSCOPY KNEE Right 8/17/2024    Procedure: Arthroscopic;  Surgeon: Jeff Phoenix MD;  Location: [facility]    COLONOSCOPY N/A 05/07/2024    Procedure: Colonoscopy;  Surgeon: Nina Moya MD;  Location:  GI    ESOPHAGOSCOPY, GASTROSCOPY, DUODENOSCOPY (EGD), COMBINED N/A 05/06/2024    Procedure: Esophagoscopy, gastroscopy, duodenoscopy (EGD), combined;  Surgeon: Nina Moya MD;  Location:  GI    ESOPHAGOSCOPY, GASTROSCOPY, DUODENOSCOPY (EGD), COMBINED N/A 05/07/2024    Procedure: Esophagoscopy, gastroscopy, duodenoscopy (EGD), combined;  Surgeon: Nina Moya MD;  Location:  GI    IRRIGATION AND DEBRIDEMENT KNEE, COMBINED Right 8/17/2024    Procedure: IRRIGATION AND DEBRIDEMENT, Right KNEE,;  Surgeon: Jeff Phoenix MD;  Location: [facility] OR    IRRIGATION AND DEBRIDEMENT SPINE Right 7/11/2024    Procedure: Irrigation and debridement knee;  Surgeon: Dejon Espinoza MD;  Location: [facility] OR    ORTHOPEDIC SURGERY      partial amputation of right foot Right     [facility]              Social  History:     Social History     Socioeconomic History    Marital status: Single     Spouse name: Not on file    Number of children: Not on file    Years of education: Not on file    Highest education level: Not on file   Occupational History    Not on file   Tobacco Use    Smoking status: Never    Smokeless tobacco: Never   Vaping Use    Vaping status: Never Used   Substance and Sexual Activity    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    Drug use: No    Sexual activity: Not on file   Other Topics Concern    Not on file   Social History Narrative    Not on file     Social Determinants of Health     Financial Resource Strain: Low Risk  (9/11/2024)    Financial Resource Strain     Within the past 12 months, have you or your family members you live with been unable to get utilities (heat, electricity) when it was really needed?: No   Food Insecurity: Low Risk  (9/11/2024)    Food Insecurity     Within the past 12 months, did you worry that your food would run out before you got money to buy more?: No     Within the past 12 months, did the food you bought just not last and you didn t have money to get more?: No   Transportation Needs: Low Risk  (9/11/2024)    Transportation Needs     Within the past 12 months, has lack of transportation kept you from medical appointments, getting your medicines, non-medical meetings or appointments, work, or from getting things that you need?: No   Physical Activity: Not on file   Stress: Not on file   Social Connections: Unknown (3/16/2022)    [facility]    Social Connections     Frequency of Communication with Friends and Family: Not on file   Interpersonal Safety: High Risk (9/11/2024)    Interpersonal Safety     Do you feel physically and emotionally safe where you currently live?: No     Within the past 12 months, have you been hit, slapped, kicked or otherwise physically  hurt by someone?: No     Within the past 12 months, have you been humiliated or emotionally abused in other ways by your partner or ex-partner?: No   Housing Stability: Low Risk  (9/11/2024)    Housing Stability     Do you have housing? : Yes     Are you worried about losing your housing?: No            Family History:     No family history on file.         Medications:     Current Outpatient Medications   Medication Sig Dispense Refill    acetaminophen (TYLENOL) 500 MG tablet Take 1,000 mg by mouth 3 times daily.      acetaminophen (TYLENOL) 500 MG tablet Take 1,000 mg by mouth daily as needed for mild pain. In addition to scheduled doses.      amLODIPine (NORVASC) 5 MG tablet Take 1 tablet (5 mg) by mouth daily 30 tablet 0    aspirin 81 MG EC tablet Take 81 mg by mouth daily      bumetanide (BUMEX) 1 MG tablet Take 1 tablet (1 mg) by mouth daily Dose decreased to 1 mg on discharge.  Optimize dose on PCP visit. 30 tablet 0    cetirizine (ZYRTEC) 10 MG tablet Take 10 mg by mouth daily      DULoxetine (CYMBALTA) 60 MG capsule Take 60 mg by mouth daily      gabapentin (NEURONTIN) 300 MG capsule Take 300 mg by mouth 2 times daily.      Glucagon, rDNA, (GLUCAGON EMERGENCY) 1 MG KIT Inject 1 mg into the muscle daily as needed (hypoglycemia)      glucose 40 % (400 mg/mL) gel Take by mouth as needed for low blood sugar (For BG <60).      guaiFENesin (ROBITUSSIN) 20 mg/mL liquid Take 10 mLs (200 mg) by mouth every 4 hours as needed for cough.      hydrALAZINE (APRESOLINE) 25 MG tablet Take 25 mg by mouth 2 times daily Hold if SBP < 110      insulin aspart (NOVOLOG FLEXPEN) 100 UNIT/ML pen Inject 8 Units subcutaneously 2 times daily (with meals). 1130 & 1730      insulin aspart (NOVOLOG FLEXPEN) 100 UNIT/ML pen Inject 1-5 Units subcutaneously 3 times daily (with meals). In addition to 6-8 units with each meal, inject additional units as per this sliding scale:  If 200-250 = 1   251-300 = 2   301-350 = 3  351-400 = 4  401+ =  5  Repeat BS after 3 hours and call MD if still over 400      insulin aspart (NOVOLOG PEN) 100 UNIT/ML pen Inject 6 Units subcutaneously every morning. 0730      insulin glargine (LANTUS PEN) 100 UNIT/ML pen Inject 13 Units subcutaneously every morning      lipase-protease-amylase (CREON 36) 69140-453369-769360 units CPEP Take 3 capsules by mouth 3 times daily (with meals). 0730, 1130, 1630      lipase-protease-amylase (CREON 36) 31154-121975-990955 units CPEP Take 1 capsule by mouth 2 times daily. With snack 1000 & 2000      loperamide (IMODIUM) 2 MG capsule Take 4 mg by mouth every 8 hours as needed for diarrhea.      methocarbamol (ROBAXIN) 500 MG tablet Take 500 mg by mouth every 8 hours as needed for muscle spasms      multivitamin w/minerals (THERA-VIT-M) tablet Take 1 tablet by mouth daily 30 tablet 0    oxyCODONE (ROXICODONE) 5 MG tablet Take 1 tablet (5 mg) by mouth every 6 hours as needed for severe pain. 10 tablet 0    pantoprazole (PROTONIX) 40 MG EC tablet Take 1 tablet (40 mg) by mouth daily 30 tablet 0    tamsulosin (FLOMAX) 0.4 MG capsule Take 1 capsule (0.4 mg) by mouth daily.       No current facility-administered medications for this visit.              Review of Systems:   A comprehensive 10 point review of systems (constitutional, ENT, cardiac, peripheral vascular, lymphatic, respiratory, GI, , Musculoskeletal, skin, Neurological) was performed and found to be negative except as described in this note.     Large Joint Injection/Arthocentesis: R knee joint    Date/Time: 9/23/2024 4:23 PM    Performed by: Darren Oviedo PA-C  Authorized by: Darren Oviedo PA-C    Indications:  Pain  Needle Size:  18 G  Guidance: landmark guided    Approach:  Superolateral  Location:  Knee      Medications:  3 mL lidocaine (PF) 1 %  Aspirate amount (mL):  45  Aspirate:  Cloudy  Outcome:  Tolerated well, no immediate complications  Consent Given by:  Patient  Timeout: timeout called immediately prior to  procedure    Prep: patient was prepped and draped in usual sterile fashion

## 2024-09-23 NOTE — NURSING NOTE
[facility] [address] [address]  [phone]  Dept: [phone]  ______________________________________________________________________________    Patient: Long Mayfield   : 1970   MRN: [id number]   2024    INVASIVE PROCEDURE SAFETY CHECKLIST    Date: 2024   Procedure:Right knee aspiration  Patient Name: Long Mayfield  MRN: [id number]  YOB: 1970    Action: Complete sections as appropriate. Any discrepancy results in a HARD COPY until resolved.     PRE PROCEDURE:  Patient ID verified with 2 identifiers (name and  or MRN): Yes  Procedure and site verified with patient/designee (when able): Yes  Accurate consent documentation in medical record: Yes  H&P (or appropriate assessment) documented in medical record: NA  H&P must be up to 20 days prior to procedure and updates within 24 hours of procedure as applicable: NA  Relevant diagnostic and radiology test results appropriately labeled and displayed as applicable: Yes  Procedure site(s) marked with provider initials: NA    TIMEOUT:  Time-Out performed immediately prior to starting procedure, including verbal and active participation of all team members addressing the following:Yes  * Correct patient identify  * Confirmed that the correct side and site are marked  * An accurate procedure consent form  * Agreement on the procedure to be done  * Correct patient position  * Relevant images and results are properly labeled and appropriately displayed  * The need to administer antibiotics or fluids for irrigation purposes during the procedure as applicable   * Safety precautions based on patient history or medication use    DURING PROCEDURE: Verification of correct person, site, and procedures any time the responsibility for care of the patient is transferred to another member of the care team.       The following medications were given:          Prior to injection, verified patient identity using patient's name and date of birth.  Due to injection administration, patient instructed to remain in clinic for 15 minutes  afterwards, and to report any adverse reaction to me immediately.    Joint aspiration was preformed   Medication Name: Lidocaine NDC [id number]  Drug Amount Wasted:  Yes: 2 mg/ml   Vial/Syringe: Single dose vial  Expiration Date:  04/2028      Scribed by Miracle Hobbs ATC for Darren Oviedo PA-C on September 23, 2024 at 4:23p based on the provider's statements to me.     Miracle Hobbs ATC

## 2024-09-23 NOTE — LETTER
9/23/2024      Long Mayfield  [address] [facility]  [place] [address]      Dear Colleague,    Thank you for referring your patient, Long Mayfield, to the [facility] [place]. Please see a copy of my visit note below.        [facility]  Orthopaedic Surgery  by Darren Oviedo PA-C    Long Mayfield MRN# [id number]    YOB: 1970            Assessment and Plan:   Assessment:  54-year-old male with history of chronic knee pain secondary to tricompartmental degenerative changes with recent effusions suspicious for septic arthritis versus PVNS.  No signs of PVNS noted on MRI today.  Aspiration performed today.     Plan:  After examined the patient reviewing the x-rays and lab findings I extensive discussed my findings with the patient today.  I explained the MRI does not demonstrate any signs of PVNS or lesions that would cause his chronic effusions and pain.  In my opinion is still appears that this is a infectious or inflammatory process.  Although is not the normal process for the sake of being thorough I agreed we should aspirate the knee for cytology but also recommended further workup with NexGen sequencing as all cultures have been negative.  Given his uncontrolled diabetes there may be an indolent Terrio or fungal infection that has not presented itself.  Aspiration was performed and 45 mL of cloudy synovial fluid was sent for cytology, NexGen sequencing, aerobic, anaerobic, fungal, AFB and cell count.  If the patient experiences any fever or malaise start him to report to the emergency department.  When the labs been finalized I will [facility] with further [facility].  I would also like him to follow-up with infectious disease for further recommendations.  All questions were answered the patient was agreed with the plan.       Darren Oviedo PA-C  Physician Assistant   Oncology and Adult Reconstructive Surgery  Dept Orthopaedic Surgery,  [facility]             History of Present Illness:   54 year old male who presents today for evaluation of a chronic right knee effusion.  Patient states he was getting Synvisc injections of bilateral knees for arthritis at [facility].  Following his last Synvisc injection in May 2024 the right knee began to swell.  Knee was aspirated which showed a cell count of 76K and a week later at 100 9K.  All cultures were negative.  He was transferred to [facility] and did an I&D on 8/14/2024.  To follow-up I&D on 8/17/2024 by Dr. Phoenix.  All cultures have been negative and following the second I&D was placed on ciprofloxacin and referred to rheumatology and infectious disease.  He has had chronically elevated sed rate and CRP.  All cultures have remained negative.  Rheumatology and infectious disease reach out to me to rule out PVNS or other causes of chronic effusions.  Therefore an MRI was ordered and the patient is here today for review.  Patient states he continues to have chronic effusions and pain in the right knee.      Background history:  DX:  Acute metabolic encephalopathy  Hyperkalemia  Chronic pancreatitis  Type 2 diabetes with kidney complication  Alcohol abuse hyperglycemia  Primary hypertension  Cardiac calcification  GERD.    Anemia  Acute renal insufficiency    TREATMENTS:  1997, open reduction and intra fixation right patella  7/11/2024, I&D of right knee, Dr. Espinoza  8/17/2024, I&D of right knee, Dr. Phoenix           Physical Exam:     EXAMINATION pertinent findings:   PSYCH: Pleasant, healthy-appearing, alert, oriented x3, cooperative. Normal mood and affect.  VITAL SIGNS: There were no vitals taken for this visit..  Reviewed nursing intake notes.   There is no height or weight on file to calculate BMI.  RESP: non labored breathing   ABD: benign, soft, non-tender, no acute peritoneal findings  SKIN: grossly normal   LYMPHATIC: grossly normal, no  adenopathy, no extremity edema  NEURO: grossly normal , no motor deficits  VASCULAR: satisfactory perfusion of all extremities   MUSCULOSKELETAL:     Right knee:   The incision is clean, dry, and intact with no erythema, dehiscence, or discharge.  Large joint effusion.  Pain with palpation diffusely throughout the knee.  Range of motion 0 to 90 degrees.  Good stability with varus valgus stress.  Normal patellar tracking minimal peripheral edema.    Right LE:              Thigh and leg compartments soft and compressible              +Quad/TA/GSC/FHL/EHL              SILT DP/SP/Cindy/Saph/Tib nerve distributions              Palpable dorsalis pedis pulse              Data:   All laboratory data reviewed  All imaging studies reviewed by me    9/21/2024 MRI right knee with and without contrast: Large joint effusion.  No soft tissue lesions appreciated.  Reactive synovitis noted.  Large complex popliteal cyst noted.  Subchondral tibial cysts with reactive edema.  Tricompartmental DJD with degenerative changes of the meniscus.    IMPRESSION:  1.  Large complex joint effusion with mildly thickened enhancing synovium of indeterminate etiology. Findings may be sequela of degenerative arthropathy, or an inflammatory or infectious arthropathy. If clinically indicated repeat arthrocentesis could be   performed for further characterization.  2.  Mild patchy enhancing edema-like marrow signal of the knee which is likely reactive. Early osteomyelitis cannot be entirely excluded, though, is considered less likely.  3.  Tricompartmental degenerative arthrosis.  4.  Marked mucoid degeneration/maceration of the medial and lateral menisci.  5.  Chronic rupture of the ACL.  6.  Mucoid degeneration and/or partial tearing of the PCL.  7.  Intact collateral ligaments.     Recent Labs   Lab Test 09/14/24  1057 09/13/24  0858 09/12/24  0758 09/11/24  1129 08/27/24  1725 08/16/24  0610 08/14/24  1253 07/12/24  0716 07/10/24  1157   HGB 8.5*  8.4* 6.4*   < > 7.3*   < > 7.4*   < > 8.3*   SED  --   --   --   --  99*  --  105*  --  112*   CRPI  --  176.09*  --    < > 39.60*   < > 103.00*   < > 113.00*   WBC 10.6 12.1* 12.3*   < > 8.4   < > 8.0   < > 10.4    < > = values in this interval not displayed.     Recent Labs   Lab Test 08/27/24  1709 08/14/24  1338 07/10/24  1136   FNEU 96 100 93   FCOL Red* Yellow Yellow   FAPR Bloody* Turbid* Turbid*   FWBC 53,900 94,560 65,670      09/11/2024 1807 09/13/2024 1341 Respiratory Aerobic Bacterial Culture with Gram Stain [[id number]]   Sputum from Expectorate    Final result Component Value   Culture 2+ Normal Connor   Gram Stain Result <10 Squamous epithelial cells/low power field    >25 PMNs/low power field    1+ Mixed connor          09/11/2024 1801 09/12/2024 0554 Enteric Bacteria and Virus Panel PCR [[id number]]    Stool from Per Rectum    Final result Component Value   Campylobacter species Negative   Salmonella species Negative   Vibrio species Negative   Vibrio cholerae Negative   Yersinia enterocolitica Negative   Enteropathogenic E. coli (EPEC) Negative   Shiga-like toxin-producing E. coli (STEC) Negative   Shigella/Enteroinvasive E. coli (EIEC) Negative   Cryptosporidium species Negative   Giardia lamblia Negative   Norovirus Gl/Gll Negative   Rotavirus A Negative   Plesiomonas shigelloides Negative   Enteroaggregative E. coli (EAEC) Negative   Enterotoxigenic E. coli (ETEC) Negative   E. coli O157 NA   Cyclospora cayetanensis Negative   Entamoeba histolytica Negative   Adenovirus F40/41 Negative   Astrovirus Negative   Sapovirus Negative          09/11/2024 1801 09/11/2024 2326 C. difficile Toxin B PCR with reflex to C. difficile Antigen and Toxins A/B EIA [[id number]]    Stool from Per Rectum    Final result Component Value   C Difficile Toxin B by PCR Negative   A negative result does not exclude actual disease due to C. difficile and may be due to improper collection, handling and storage of the  specimen or the number of organisms in the specimen is below the detection limit of the assay.          09/11/2024 1138 09/11/2024 1233 Symptomatic Influenza A/B, RSV, & SARS-CoV2 PCR (COVID-19) Nasopharyngeal [[id number]]    Swab from Nasopharyngeal    Final result Component Value   Influenza A PCR Negative   Influenza B PCR Negative   RSV PCR Negative   SARS CoV2 PCR Negative   NEGATIVE: SARS-CoV-2 (COVID-19) RNA not detected, presumed negative.          09/11/2024 1129 09/16/2024 1605 Blood Culture Peripheral Blood [[id number]]   Peripheral Blood    Final result Component Value   Culture No Growth          08/27/2024 1709 09/10/2024 1340 Anaerobic Bacterial Culture Routine [[id number]]   Synovial fluid from Knee, Right    Final result Component Value   Culture No anaerobic organisms isolated          08/27/2024 1709 09/01/2024 0708 Synovial fluid Aerobic Bacterial Culture Routine With Gram Stain [[id number]]   Synovial fluid from Knee, Right    Final result Component Value   Culture No Growth   Gram Stain Result No organisms seen    4+ WBC seen    Predominantly PMNs          08/27/2024 1709 08/27/2024 2020 Cell count with differential fluid [[id number]]    (Abnormal)   Synovial fluid from Knee, Right    Final result Component Value   No component results          08/27/2024 1709 09/22/2024 1831 Fungal or Yeast Culture Routine [[id number]]   Synovial fluid from Knee, Right    Preliminary result Component Value   Culture No growth after 26 days P          08/27/2024 1709 08/27/2024 1714 Acid-Fast Bacilli Culture and Stain [[id number]]    Synovial fluid from Knee, Right    In process Component Value   No component results          08/27/2024 1709 08/27/2024 2018 Crystal ID Synovial Fluid [[id number]]   Synovial fluid from Knee, Right    Final result Component Value   Crystals Analysis No clinically significant crystals seen.          08/27/2024 1709 08/27/2024 2018 Cell Count Body Fluid [[id number]]     (Abnormal)   Synovial fluid from Knee, Right    Final result Component Value Units   Color Red Abnormal     Clarity Bloody Abnormal     Cell Count Fluid Source Knee, Right    Total Nucleated Cells 53,900 /uL          08/27/2024 1709 08/30/2024 0940 Acid-Fast Bacilli Culture and Stain [[id number]]    Synovial fluid from Knee, Right    Preliminary result Component Value   Acid Fast Stain No acid fast bacilli seen P   Performed By: [facility] [place], [place] [id number] Director: Raman Hummel MD, PhDCLIA Number: [id number]   Acid Fast Stain No acid fast bacilli seen P   Performed By: [facility] [place], [place] [address]Yhnivxruxj Director: Raman Hummel MD, PhDCLIA Number: [id number]  This is an appended report. These results have been appended to a previously preliminary verified report.          08/27/2024 1709 08/27/2024 2020 Differential Body Fluid [[id number]]    Synovial fluid from Knee, Right    Final result Component Value Units   % Neutrophils 96 %   % Lymphocytes 2 %   % Monocyte/Macrophages 2 %          08/17/2024 1006 08/24/2024 0722 Anaerobic Bacterial Culture Routine [[id number]]   Tissue from Knee, Right    Final result Component Value   Culture No anaerobic organisms isolated          08/17/2024 1006 08/24/2024 0722 Anaerobic Bacterial Culture Routine [[id number]]   Tissue from Knee, Right    Final result Component Value   Culture No anaerobic organisms isolated          08/17/2024 1006 09/14/2024 1023 Fungal or Yeast Culture Routine [[id number]]   Tissue from Knee, Right    Final result Component Value   Culture No Growth          08/17/2024 1006 09/14/2024 1023 Fungal or Yeast Culture Routine [[id number]]   Tissue from Knee, Right    Final result Component Value   Culture No Growth          08/17/2024 1006 08/22/2024 0705 Tissue Aerobic Bacterial Culture Routine [[id number]]   Tissue from Knee, Right    Final result Component  Value   Culture No Growth          08/17/2024 1006 08/22/2024 0709 Tissue Aerobic Bacterial Culture Routine [[id number]]   Tissue from Knee, Right    Final result Component Value   Culture No Growth          08/17/2024 1005 08/24/2024 0722 Anaerobic Bacterial Culture Routine [[id number]]   Tissue from Knee, Right    Final result Component Value   Culture No anaerobic organisms isolated          08/17/2024 1005 09/14/2024 1023 Fungal or Yeast Culture Routine [[id number]]   Tissue from Knee, Right    Final result Component Value   Culture No Growth          08/17/2024 1005 08/22/2024 0705 Tissue Aerobic Bacterial Culture Routine [[id number]]   Tissue from Knee, Right    Final result Component Value   Culture No Growth          08/17/2024 1005 08/26/2024 0755 Bacterial DNA 16S Ribosomal Non Blood with Reflex to NGS 16S with Reflex to Enterobacterales Identification [[id number]]   Tissue from Knee, Right    Final result Component Value   See Scanned Result BACTERIAL DNA 16S RIBOSOMAL NON BLOOD WITH REFLEX TO NGS 16S WITH REFLEX TO ENTEROBACTERALES IDENTIF-Scanned          08/17/2024 1005 08/26/2024 0757 Fungal DNA 28S Ribosomal Non Blood [[id number]]   Tissue from Knee, Right    Edited Result - FINAL Component Value   See Scanned Result FUNGAL DNA 28S RIBOSOMAL NON BLOOD-Scanned          08/14/2024 1338 08/28/2024 0754 Anaerobic Bacterial Culture Routine [[id number]]   Aspirate from Knee, Right    Final result Component Value   Culture No anaerobic organisms isolated          08/14/2024 1338 08/14/2024 1607 Cell count with differential fluid [[id number]]    (Abnormal)   Synovial fluid from Knee, Right    Final result Component Value   No component results          08/14/2024 1338 08/19/2024 0704 Aspirate Aerobic Bacterial Culture Routine Without Gram Stain [[id number]]   Aspirate from Knee, Right    Final result Component Value   Culture No Growth          08/14/2024 1338 09/11/2024 0716 Fungal or  Yeast Culture Routine [[id number]]   Aspirate from Knee, Right    Final result Component Value   Culture No Growth          08/14/2024 1338 08/14/2024 1609 Crystal ID Synovial Fluid [[id number]]   Synovial fluid from Knee, Right    Final result Component Value   Crystals Analysis No clinically significant crystals seen.          08/14/2024 1338 08/14/2024 1605 Cell Count Body Fluid [[id number]]    (Abnormal)   Synovial fluid from Knee, Right    Final result Component Value Units   Color Yellow    Clarity Turbid Abnormal     Cell Count Fluid Source Knee, Right    Total Nucleated Cells 94,560 /uL          08/14/2024 1338 08/14/2024 1607 Differential Body Fluid [[id number]]    Synovial fluid from Knee, Right    Final result Component Value Units   % Neutrophils 100 %   % Lymphocytes 0 %   % Monocyte/Macrophages 0 %          07/13/2024 1556 07/14/2024 1154 Chlamydia trachomatis/Neisseria gonorrhoeae by PCR [[id number]]   Urine, Voided    Final result Component Value   Chlamydia Trachomatis Negative   Negative for C. trachomatis rRNA by transcription mediated amplification.  A negative result by transcription mediated amplification does not preclude the presence of infection because results are dependent on proper and adequate collection, absence of inhibitors and sufficient rRNA to be detected.   Neisseria gonorrhoeae Negative   Negative for N. gonorrhoeae rRNA by transcription mediated amplification. A negative result by transcription mediated amplification does not preclude the presence of C. trachomatis infection because results are dependent on proper and adequate collection, absence of inhibitors and sufficient rRNA to be detected.          07/12/2024 2112 07/15/2024 1345 ESBL Organism Culture [[id number]]   Stool from Per Rectum    Final result Component Value   Culture No ESBL-producing organisms isolated. A negative result does not preclude the presence of ESBL-producing organisms.           07/11/2024 1913 07/25/2024 0749 Anaerobic Bacterial Culture Routine [[id number]]   Aspirate from Knee, Right    Final result Component Value   Culture No anaerobic organisms isolated          07/11/2024 1913 07/25/2024 1200 Anaerobic Bacterial Culture Routine [[id number]]   Aspirate from Knee, Right    Final result Component Value   Culture No anaerobic organisms isolated          07/11/2024 1913 07/11/2024 2200 Gram Stain [[id number]]   Aspirate from Knee, Right    Final result Component Value   GS Culture See corresponding culture for results   Gram Stain Result No organisms seen   Gram Stain Result 4+ WBC seen   Predominantly PMNs          07/11/2024 1913 07/11/2024 2203 Gram Stain [[id number]]   Aspirate from Knee, Right    Final result Component Value   GS Culture See corresponding culture for results   Gram Stain Result No organisms seen   Gram Stain Result 4+ WBC seen   Predominantly PMNs          07/11/2024 1913 08/08/2024 0809 Fungal or Yeast Culture Routine [[id number]]   Aspirate from Knee, Right    Final result Component Value   Culture No Growth          07/11/2024 1913 08/08/2024 0809 Fungal or Yeast Culture Routine [[id number]]   Aspirate from Knee, Right    Final result Component Value   Culture No Growth          07/11/2024 1913 07/16/2024 0726 Aspirate Aerobic Bacterial Culture Routine [[id number]]   Aspirate from Knee, Right    Final result Component Value   Culture No Growth          07/11/2024 1913 07/16/2024 0726 Aspirate Aerobic Bacterial Culture Routine [[id number]]   Aspirate from Knee, Right    Final result Component Value   Culture No Growth          07/11/2024 1913 07/22/2024 0915 Bacterial DNA 16S Ribosomal Non Blood with Reflex to NGS 16S with Reflex to Enterobacterales Identification [[id number]]   Body fluid, unsp from Knee, Right    Final result Component Value   See Scanned Result BACTERIAL DNA 16S RIBOSOMAL NON BLOOD WITH REFLEX TO NGS 16S WITH REFLEX TO  ENTEROBACTERALES IDENTIF-Scanned          07/11/2024 1912 07/25/2024 0757 Anaerobic Bacterial Culture Routine [[id number]]   Aspirate from Knee, Right    Final result Component Value   Culture No anaerobic organisms isolated          07/11/2024 1912 07/11/2024 2135 Gram Stain [[id number]]   Aspirate from Knee, Right    Final result Component Value   GS Culture See corresponding culture for results   Gram Stain Result No organisms seen   Gram Stain Result 4+ WBC seen   Predominantly PMNs          07/11/2024 1912 08/08/2024 0809 Fungal or Yeast Culture Routine [[id number]]   Aspirate from Knee, Right    Final result Component Value   Culture No Growth          07/11/2024 1912 07/16/2024 0733 Aspirate Aerobic Bacterial Culture Routine [[id number]]   Aspirate from Knee, Right    Final result Component Value   Culture No Growth          07/10/2024 1136 07/24/2024 0938 Anaerobic Bacterial Culture Routine [[id number]]   Aspirate from Knee, Right    Final result Component Value   Culture No anaerobic organisms isolated          07/10/2024 1136 07/10/2024 1506 Cell count with differential fluid [[id number]]    (Abnormal)   Synovial fluid from Knee, Right    Final result Component Value   No component results          07/10/2024 1136 07/15/2024 0715 Aspirate Aerobic Bacterial Culture Routine With Gram Stain [[id number]]   Aspirate from Knee, Right    Final result Component Value   Culture No Growth   Gram Stain Result No organisms seen    4+ WBC seen    Predominantly PMNs          07/10/2024 1136 07/10/2024 1506 Cell Count Body Fluid [[id number]]    (Abnormal)   Synovial fluid from Knee, Right    Final result Component Value Units   Color Yellow    Clarity Turbid Abnormal     Cell Count Fluid Source Knee, Right    Total Nucleated Cells 65,670 /uL          07/10/2024 1136 07/10/2024 1506 Differential Body Fluid [[id number]]    Synovial fluid from Knee, Right    Final result Component Value Units   %  Neutrophils 93 %   % Lymphocytes 1 %   % Monocyte/Macrophages 6 %          05/29/2024 1000 06/03/2024 1031 Synovial fluid Aerobic Bacterial Culture Routine [[id number]]   Synovial fluid from Knee, Right    Final result Component Value   Culture No Growth          05/29/2024 1000 05/29/2024 1727 Gram Stain [[id number]]   Synovial fluid from Knee, Right    Final result Component Value   GS Culture See corresponding culture for results   Gram Stain Result No organisms seen   Gram Stain Result 4+ WBC seen   Predominantly PMNs          05/29/2024 1000 06/12/2024 1028 Anaerobic Bacterial Culture Routine [[id number]]   Synovial fluid from Knee, Right    Final result Component Value   Culture No anaerobic organisms isolated          05/29/2024 1000 05/29/2024 1546 Cell count with differential fluid [[id number]]    (Abnormal)   Synovial fluid from Knee, Right    Final result Component Value   No component results          05/29/2024 1000 05/29/2024 1546 Crystal ID Synovial Fluid [[id number]]   Synovial fluid from Knee, Right    Final result Component Value   Crystals Analysis No clinically significant crystals seen.          05/29/2024 1000 05/29/2024 1546 Cell Count Body Fluid [[id number]]    (Abnormal)   Synovial fluid from Knee, Right    Final result Component Value Units   Color Yellow    Clarity Cloudy Abnormal     Cell Count Fluid Source Knee, Right    Total Nucleated Cells 109,100 /uL          05/29/2024 1000 05/29/2024 1546 Differential Body Fluid [[id number]]    Synovial fluid from Knee, Right    Final result Component Value Units   % Neutrophils 95 %   % Lymphocytes 1 %   % Monocyte/Macrophages 4 %          05/22/2024 0926 05/27/2024 0709 Synovial fluid Aerobic Bacterial Culture Routine [[id number]]   Synovial fluid from Knee, Right    Final result Component Value   Culture No Growth          05/22/2024 0926 06/05/2024 0907 Anaerobic Bacterial Culture Routine [[id number]]   Synovial fluid from  Knee, Right    Final result Component Value   Culture No anaerobic organisms isolated          05/22/2024 0926 05/22/2024 1348 Cell count with differential fluid [[id number]]    (Abnormal)   Synovial fluid from Knee, Right    Final result Component Value   No component results          05/22/2024 0926 05/22/2024 1340 Crystal ID Synovial Fluid [[id number]]   Synovial fluid from Knee, Right    Final result Component Value   Crystals Analysis No clinically significant crystals seen.          05/22/2024 0926 05/22/2024 1631 Gram Stain [[id number]]   Synovial fluid from Knee, Right    Final result Component Value   Culture See corresponding culture for results   Gram Stain Result No organisms seen   Gram Stain Result 4+ WBC seen   Predominantly PMNs          05/22/2024 0926 05/22/2024 1348 Cell Count Body Fluid [[id number]]    (Abnormal)   Synovial fluid from Knee, Right    Final result Component Value Units   Color Yellow    Clarity Hazy Abnormal     Cell Count Fluid Source Knee, Right    Total Nucleated Cells 76,860 /uL          05/22/2024 0926 05/22/2024 1348 Differential Body Fluid [[id number]]    Synovial fluid from Knee, Right    Final result Component Value Units   % Neutrophils 98 %   % Lymphocytes 1 %   % Monocyte/Macrophages 1 %            DATA for DOCUMENTATION:         Past Medical History:     Patient Active Problem List   Diagnosis     Ketosis (H)     Hyperglycemia     Chronic diarrhea     Acute renal insufficiency     Anemia, unspecified type     Alcohol abuse     Cardiac calcification (H)     Cervical disc disease with myelopathy     Chronic bilateral low back pain with bilateral sciatica     Diabetic ulcer of toe of right foot associated with type 2 diabetes mellitus, unspecified ulcer stage (H)     Exocrine pancreatic insufficiency     GERD (gastroesophageal reflux disease)     Other hyperlipidemia     Primary hypertension     Severe episode of recurrent major depressive disorder, without  psychotic features (H)     Type 2 diabetes mellitus with kidney complication, with long-term current use of insulin (H)     Unintentional weight loss     Pyogenic arthritis of right knee joint, due to unspecified organism (H)     Cervical radiculopathy     Change or removal of drains     Encounter to establish care     Chronic pancreatitis (H)     Hypoxia     Pneumonia of left lower lobe due to infectious organism     Acute metabolic encephalopathy     Hyperkalemia     Past Medical History:   Diagnosis Date     Acute pain of right knee 07/12/2024     Allergic rhinitis      Anemia      Arthritis      Bell's palsy      Cervicalgia      Diabetes (H)      Diabetic foot ulcer (H)      Generalized edema      Hyperkalemia      Hypertension      Hypo-osmolality and hyponatremia      Hypoglycemia 05/03/2024     Major depressive disorder, recurrent episode, mild (H24)      Other acute osteomyelitis, right ankle and foot (H)      Other chronic pancreatitis (H)      Other polyosteoarthritis      Right knee pain 07/11/2024     Solitary pulmonary nodule        Also see scanned health assessment forms.       Past Surgical History:     Past Surgical History:   Procedure Laterality Date     ARTHROSCOPY KNEE Right 8/17/2024    Procedure: Arthroscopic;  Surgeon: Jeff Phoenix MD;  Location: [facility]     COLONOSCOPY N/A 05/07/2024    Procedure: Colonoscopy;  Surgeon: Nina Moya MD;  Location:  GI     ESOPHAGOSCOPY, GASTROSCOPY, DUODENOSCOPY (EGD), COMBINED N/A 05/06/2024    Procedure: Esophagoscopy, gastroscopy, duodenoscopy (EGD), combined;  Surgeon: Nina Moya MD;  Location:  GI     ESOPHAGOSCOPY, GASTROSCOPY, DUODENOSCOPY (EGD), COMBINED N/A 05/07/2024    Procedure: Esophagoscopy, gastroscopy, duodenoscopy (EGD), combined;  Surgeon: Nina Moya MD;  Location:  GI     IRRIGATION AND DEBRIDEMENT KNEE, COMBINED Right 8/17/2024    Procedure: IRRIGATION AND DEBRIDEMENT, Right KNEE,;   Surgeon: Jeff Phoenix MD;  Location: [facility]     IRRIGATION AND DEBRIDEMENT SPINE Right 7/11/2024    Procedure: Irrigation and debridement knee;  Surgeon: Dejon Espinoza MD;  Location: [facility] OR     ORTHOPEDIC SURGERY       partial amputation of right foot Right      PATELLA SURGERY              Social History:     Social History     Socioeconomic History     Marital status: Single     Spouse name: Not on file     Number of children: Not on file     Years of education: Not on file     Highest education level: Not on file   Occupational History     Not on file   Tobacco Use     Smoking status: Never     Smokeless tobacco: Never   Vaping Use     Vaping status: Never Used   Substance and Sexual Activity     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     Drug use: No     Sexual activity: Not on file   Other Topics Concern     Not on file   Social History Narrative     Not on file     Social Determinants of Health     Financial Resource Strain: Low Risk  (9/11/2024)    Financial Resource Strain      Within the past 12 months, have you or your family members you live with been unable to get utilities (heat, electricity) when it was really needed?: No   Food Insecurity: Low Risk  (9/11/2024)    Food Insecurity      Within the past 12 months, did you worry that your food would run out before you got money to buy more?: No      Within the past 12 months, did the food you bought just not last and you didn t have money to get more?: No   Transportation Needs: Low Risk  (9/11/2024)    Transportation Needs      Within the past 12 months, has lack of transportation kept you from medical appointments, getting your medicines, non-medical meetings or appointments, work, or from getting things that you need?: No   Physical Activity: Not on file   Stress: Not on file   Social Connections: Unknown (3/16/2022)    Received from [facility], [facility] [facility]    Social Connections      Frequency of Communication with Friends and Family: Not on file   Interpersonal Safety: High Risk (9/11/2024)    Interpersonal Safety      Do you feel physically and emotionally safe where you currently live?: No      Within the past 12 months, have you been hit, slapped, kicked or otherwise physically hurt by someone?: No      Within the past 12 months, have you been humiliated or emotionally abused in other ways by your partner or ex-partner?: No   Housing Stability: Low Risk  (9/11/2024)    Housing Stability      Do you have housing? : Yes      Are you worried about losing your housing?: No            Family History:     No family history on file.         Medications:     Current Outpatient Medications   Medication Sig Dispense Refill     acetaminophen (TYLENOL) 500 MG tablet Take 1,000 mg by mouth 3 times daily.       acetaminophen (TYLENOL) 500 MG tablet Take 1,000 mg by mouth daily as needed for mild pain. In addition to scheduled doses.       amLODIPine (NORVASC) 5 MG tablet Take 1 tablet (5 mg) by mouth daily 30 tablet 0     aspirin 81 MG EC tablet Take 81 mg by mouth daily       bumetanide (BUMEX) 1 MG tablet Take 1 tablet (1 mg) by mouth daily Dose decreased to 1 mg on discharge.  Optimize dose on PCP visit. 30 tablet 0     cetirizine (ZYRTEC) 10 MG tablet Take 10 mg by mouth daily       DULoxetine (CYMBALTA) 60 MG capsule Take 60 mg by mouth daily       gabapentin (NEURONTIN) 300 MG capsule Take 300 mg by mouth 2 times daily.       Glucagon, rDNA, (GLUCAGON EMERGENCY) 1 MG KIT Inject 1 mg into the muscle daily as needed (hypoglycemia)       glucose 40 % (400 mg/mL) gel Take by mouth as needed for low blood sugar (For BG <60).       guaiFENesin (ROBITUSSIN) 20 mg/mL liquid Take 10 mLs (200 mg) by mouth every 4 hours as needed for cough.       hydrALAZINE (APRESOLINE) 25 MG tablet Take 25 mg by mouth 2 times daily Hold if SBP < 110       insulin aspart (NOVOLOG FLEXPEN)  100 UNIT/ML pen Inject 8 Units subcutaneously 2 times daily (with meals). 1130 & 1730       insulin aspart (NOVOLOG FLEXPEN) 100 UNIT/ML pen Inject 1-5 Units subcutaneously 3 times daily (with meals). In addition to 6-8 units with each meal, inject additional units as per this sliding scale:  If 200-250 = 1   251-300 = 2   301-350 = 3  351-400 = 4  401+ = 5  Repeat BS after 3 hours and call MD if still over 400       insulin aspart (NOVOLOG PEN) 100 UNIT/ML pen Inject 6 Units subcutaneously every morning. 0730       insulin glargine (LANTUS PEN) 100 UNIT/ML pen Inject 13 Units subcutaneously every morning       lipase-protease-amylase (CREON 36) 32616-649059-518323 units CPEP Take 3 capsules by mouth 3 times daily (with meals). 0730, 1130, 1630       lipase-protease-amylase (CREON 36) 38380-573455-057914 units CPEP Take 1 capsule by mouth 2 times daily. With snack 1000 & 2000       loperamide (IMODIUM) 2 MG capsule Take 4 mg by mouth every 8 hours as needed for diarrhea.       methocarbamol (ROBAXIN) 500 MG tablet Take 500 mg by mouth every 8 hours as needed for muscle spasms       multivitamin w/minerals (THERA-VIT-M) tablet Take 1 tablet by mouth daily 30 tablet 0     oxyCODONE (ROXICODONE) 5 MG tablet Take 1 tablet (5 mg) by mouth every 6 hours as needed for severe pain. 10 tablet 0     pantoprazole (PROTONIX) 40 MG EC tablet Take 1 tablet (40 mg) by mouth daily 30 tablet 0     tamsulosin (FLOMAX) 0.4 MG capsule Take 1 capsule (0.4 mg) by mouth daily.       No current facility-administered medications for this visit.              Review of Systems:   A comprehensive 10 point review of systems (constitutional, ENT, cardiac, peripheral vascular, lymphatic, respiratory, GI, , Musculoskeletal, skin, Neurological) was performed and found to be negative except as described in this note.     Large Joint Injection/Arthocentesis: R knee joint    Date/Time: 9/23/2024 4:23 PM    Performed by: Darren Oviedo  MAX  Authorized by: Darren Oviedo PA-C    Indications:  Pain  Needle Size:  18 G  Guidance: landmark guided    Approach:  Superolateral  Location:  Knee      Medications:  3 mL lidocaine (PF) 1 %  Aspirate amount (mL):  45  Aspirate:  Cloudy  Outcome:  Tolerated well, no immediate complications  Consent Given by:  Patient  Timeout: timeout called immediately prior to procedure    Prep: patient was prepped and draped in usual sterile fashion          Again, thank you for allowing me to participate in the care of your patient.        Sincerely,        Darren Oviedo PA-C

## 2024-09-24 ENCOUNTER — OFFICE VISIT (OUTPATIENT)
Dept: FAMILY MEDICINE | Facility: CLINIC | Age: 54
End: 2024-09-24
Payer: COMMERCIAL

## 2024-09-24 VITALS
OXYGEN SATURATION: 98 % | SYSTOLIC BLOOD PRESSURE: 171 MMHG | HEART RATE: 76 BPM | WEIGHT: 133.3 LBS | DIASTOLIC BLOOD PRESSURE: 112 MMHG | TEMPERATURE: 98.5 F | BODY MASS INDEX: 22.21 KG/M2 | HEIGHT: 65 IN | RESPIRATION RATE: 16 BRPM

## 2024-09-24 DIAGNOSIS — I51.5 CARDIAC CALCIFICATION (H): ICD-10-CM

## 2024-09-24 DIAGNOSIS — E11.621 DIABETIC ULCER OF TOE OF RIGHT FOOT ASSOCIATED WITH TYPE 2 DIABETES MELLITUS, UNSPECIFIED ULCER STAGE (H): ICD-10-CM

## 2024-09-24 DIAGNOSIS — N28.9 ACUTE RENAL INSUFFICIENCY: ICD-10-CM

## 2024-09-24 DIAGNOSIS — K86.0 ALCOHOL-INDUCED CHRONIC PANCREATITIS (H): Primary | ICD-10-CM

## 2024-09-24 DIAGNOSIS — L97.519 DIABETIC ULCER OF TOE OF RIGHT FOOT ASSOCIATED WITH TYPE 2 DIABETES MELLITUS, UNSPECIFIED ULCER STAGE (H): ICD-10-CM

## 2024-09-24 DIAGNOSIS — N18.31 TYPE 2 DIABETES MELLITUS WITH STAGE 3A CHRONIC KIDNEY DISEASE, WITH LONG-TERM CURRENT USE OF INSULIN (H): ICD-10-CM

## 2024-09-24 DIAGNOSIS — Z79.4 TYPE 2 DIABETES MELLITUS WITH STAGE 3A CHRONIC KIDNEY DISEASE, WITH LONG-TERM CURRENT USE OF INSULIN (H): ICD-10-CM

## 2024-09-24 DIAGNOSIS — D64.9 ANEMIA, UNSPECIFIED TYPE: ICD-10-CM

## 2024-09-24 DIAGNOSIS — F33.2 SEVERE EPISODE OF RECURRENT MAJOR DEPRESSIVE DISORDER, WITHOUT PSYCHOTIC FEATURES (H): ICD-10-CM

## 2024-09-24 DIAGNOSIS — E87.5 HYPERKALEMIA: ICD-10-CM

## 2024-09-24 DIAGNOSIS — F10.10 ALCOHOL ABUSE: ICD-10-CM

## 2024-09-24 DIAGNOSIS — E11.22 TYPE 2 DIABETES MELLITUS WITH STAGE 3A CHRONIC KIDNEY DISEASE, WITH LONG-TERM CURRENT USE OF INSULIN (H): ICD-10-CM

## 2024-09-24 DIAGNOSIS — J18.9 PNEUMONIA OF LEFT LOWER LOBE DUE TO INFECTIOUS ORGANISM: ICD-10-CM

## 2024-09-24 DIAGNOSIS — E88.89 KETOSIS (H): ICD-10-CM

## 2024-09-24 DIAGNOSIS — M00.9 PYOGENIC ARTHRITIS OF RIGHT KNEE JOINT, DUE TO UNSPECIFIED ORGANISM (H): ICD-10-CM

## 2024-09-24 DIAGNOSIS — I10 PRIMARY HYPERTENSION: ICD-10-CM

## 2024-09-24 DIAGNOSIS — G93.41 ACUTE METABOLIC ENCEPHALOPATHY: ICD-10-CM

## 2024-09-24 LAB
ACID FAST STAIN (ARUP): NORMAL
ACID FAST STAIN (ARUP): NORMAL
ALBUMIN SERPL BCG-MCNC: 3.4 G/DL (ref 3.5–5.2)
ALP SERPL-CCNC: 161 U/L (ref 40–150)
ALT SERPL W P-5'-P-CCNC: 12 U/L (ref 0–70)
ANION GAP SERPL CALCULATED.3IONS-SCNC: 9 MMOL/L (ref 7–15)
AST SERPL W P-5'-P-CCNC: 21 U/L (ref 0–45)
BACTERIA SNV CULT: NO GROWTH
BASOPHILS # BLD AUTO: 0 10E3/UL (ref 0–0.2)
BASOPHILS NFR BLD AUTO: 0 %
BILIRUB SERPL-MCNC: 0.2 MG/DL
BUN SERPL-MCNC: 28.5 MG/DL (ref 6–20)
CALCIUM SERPL-MCNC: 9 MG/DL (ref 8.8–10.4)
CHLORIDE SERPL-SCNC: 102 MMOL/L (ref 98–107)
CHOLEST SERPL-MCNC: 138 MG/DL
CREAT SERPL-MCNC: 1.24 MG/DL (ref 0.67–1.17)
CREAT UR-MCNC: 50.3 MG/DL
CYSTATIN C (ROCHE): 2.1 MG/L (ref 0.6–1)
EGFRCR SERPLBLD CKD-EPI 2021: 68 ML/MIN/1.73M2
EOSINOPHIL # BLD AUTO: 0.3 10E3/UL (ref 0–0.7)
EOSINOPHIL NFR BLD AUTO: 2 %
ERYTHROCYTE [DISTWIDTH] IN BLOOD BY AUTOMATED COUNT: 18 % (ref 10–15)
FASTING STATUS PATIENT QL REPORTED: YES
FASTING STATUS PATIENT QL REPORTED: YES
FERRITIN SERPL-MCNC: 418 NG/ML (ref 31–409)
FOLATE SERPL-MCNC: 10 NG/ML (ref 4.6–34.8)
GFR/BSA.PRED SERPLBLD CYS-BASED-ARV: 29 ML/MIN/1.73M2
GLUCOSE SERPL-MCNC: 241 MG/DL (ref 70–99)
HCO3 SERPL-SCNC: 21 MMOL/L (ref 22–29)
HCT VFR BLD AUTO: 28 % (ref 40–53)
HDLC SERPL-MCNC: 45 MG/DL
HGB BLD-MCNC: 8.7 G/DL (ref 13.3–17.7)
IMM GRANULOCYTES # BLD: 0 10E3/UL
IMM GRANULOCYTES NFR BLD: 0 %
IRON BINDING CAPACITY (ROCHE): 230 UG/DL (ref 240–430)
IRON SATN MFR SERPL: 9 % (ref 15–46)
IRON SERPL-MCNC: 20 UG/DL (ref 61–157)
LDLC SERPL CALC-MCNC: 52 MG/DL
LYMPHOCYTES # BLD AUTO: 2 10E3/UL (ref 0.8–5.3)
LYMPHOCYTES NFR BLD AUTO: 19 %
MCH RBC QN AUTO: 24.2 PG (ref 26.5–33)
MCHC RBC AUTO-ENTMCNC: 31.1 G/DL (ref 31.5–36.5)
MCV RBC AUTO: 78 FL (ref 78–100)
MICROALBUMIN UR-MCNC: 1281 MG/L
MICROALBUMIN/CREAT UR: 2546.72 MG/G CR (ref 0–17)
MONOCYTES # BLD AUTO: 0.9 10E3/UL (ref 0–1.3)
MONOCYTES NFR BLD AUTO: 8 %
NEUTROPHILS # BLD AUTO: 7.5 10E3/UL (ref 1.6–8.3)
NEUTROPHILS NFR BLD AUTO: 70 %
NONHDLC SERPL-MCNC: 93 MG/DL
PLATELET # BLD AUTO: 307 10E3/UL (ref 150–450)
POTASSIUM SERPL-SCNC: 5.3 MMOL/L (ref 3.4–5.3)
PROT SERPL-MCNC: 8 G/DL (ref 6.4–8.3)
RBC # BLD AUTO: 3.6 10E6/UL (ref 4.4–5.9)
RETICS # AUTO: 0.04 10E6/UL (ref 0.03–0.1)
RETICS/RBC NFR AUTO: 1.1 % (ref 0.5–2)
SODIUM SERPL-SCNC: 132 MMOL/L (ref 135–145)
TRIGL SERPL-MCNC: 206 MG/DL
VIT B12 SERPL-MCNC: 662 PG/ML (ref 232–1245)
WBC # BLD AUTO: 10.8 10E3/UL (ref 4–11)

## 2024-09-24 PROCEDURE — 83550 IRON BINDING TEST: CPT | Performed by: PHYSICIAN ASSISTANT

## 2024-09-24 PROCEDURE — 82728 ASSAY OF FERRITIN: CPT | Performed by: PHYSICIAN ASSISTANT

## 2024-09-24 PROCEDURE — 90472 IMMUNIZATION ADMIN EACH ADD: CPT | Performed by: PHYSICIAN ASSISTANT

## 2024-09-24 PROCEDURE — 90677 PCV20 VACCINE IM: CPT | Performed by: PHYSICIAN ASSISTANT

## 2024-09-24 PROCEDURE — 80053 COMPREHEN METABOLIC PANEL: CPT | Performed by: PHYSICIAN ASSISTANT

## 2024-09-24 PROCEDURE — 82784 ASSAY IGA/IGD/IGG/IGM EACH: CPT | Performed by: INTERNAL MEDICINE

## 2024-09-24 PROCEDURE — 99204 OFFICE O/P NEW MOD 45 MIN: CPT | Mod: 25 | Performed by: PHYSICIAN ASSISTANT

## 2024-09-24 PROCEDURE — 82570 ASSAY OF URINE CREATININE: CPT | Performed by: PHYSICIAN ASSISTANT

## 2024-09-24 PROCEDURE — 85025 COMPLETE CBC W/AUTO DIFF WBC: CPT | Performed by: PHYSICIAN ASSISTANT

## 2024-09-24 PROCEDURE — 82607 VITAMIN B-12: CPT | Performed by: PHYSICIAN ASSISTANT

## 2024-09-24 PROCEDURE — 85045 AUTOMATED RETICULOCYTE COUNT: CPT | Performed by: PHYSICIAN ASSISTANT

## 2024-09-24 PROCEDURE — 90471 IMMUNIZATION ADMIN: CPT | Performed by: PHYSICIAN ASSISTANT

## 2024-09-24 PROCEDURE — 36415 COLL VENOUS BLD VENIPUNCTURE: CPT | Performed by: PHYSICIAN ASSISTANT

## 2024-09-24 PROCEDURE — 82746 ASSAY OF FOLIC ACID SERUM: CPT | Performed by: PHYSICIAN ASSISTANT

## 2024-09-24 PROCEDURE — 80061 LIPID PANEL: CPT | Performed by: PHYSICIAN ASSISTANT

## 2024-09-24 PROCEDURE — 85060 BLOOD SMEAR INTERPRETATION: CPT | Performed by: PATHOLOGY

## 2024-09-24 PROCEDURE — 90750 HZV VACC RECOMBINANT IM: CPT | Performed by: PHYSICIAN ASSISTANT

## 2024-09-24 PROCEDURE — 82043 UR ALBUMIN QUANTITATIVE: CPT | Performed by: PHYSICIAN ASSISTANT

## 2024-09-24 PROCEDURE — 83540 ASSAY OF IRON: CPT | Performed by: PHYSICIAN ASSISTANT

## 2024-09-24 PROCEDURE — 96127 BRIEF EMOTIONAL/BEHAV ASSMT: CPT | Performed by: PHYSICIAN ASSISTANT

## 2024-09-24 PROCEDURE — 82610 CYSTATIN C: CPT | Performed by: PHYSICIAN ASSISTANT

## 2024-09-24 PROCEDURE — 90673 RIV3 VACCINE NO PRESERV IM: CPT | Performed by: PHYSICIAN ASSISTANT

## 2024-09-24 RX ORDER — AMLODIPINE BESYLATE 10 MG/1
10 TABLET ORAL DAILY
Qty: 30 TABLET | Refills: 2 | Status: SHIPPED | OUTPATIENT
Start: 2024-09-24

## 2024-09-24 ASSESSMENT — PATIENT HEALTH QUESTIONNAIRE - PHQ9
SUM OF ALL RESPONSES TO PHQ QUESTIONS 1-9: 4
10. IF YOU CHECKED OFF ANY PROBLEMS, HOW DIFFICULT HAVE THESE PROBLEMS MADE IT FOR YOU TO DO YOUR WORK, TAKE CARE OF THINGS AT HOME, OR GET ALONG WITH OTHER PEOPLE: NOT DIFFICULT AT ALL
SUM OF ALL RESPONSES TO PHQ QUESTIONS 1-9: 4

## 2024-09-24 NOTE — ASSESSMENT & PLAN NOTE
Lab Results   Component Value Date     A1C 9.2 08/17/2024     A1C 9.4 05/04/2024     A1C >14.0 03/08/2022      Continue 13 units Lantus every morning, 6 units aspart insulin daily with breakfast, 8 units twice daily with lunch and supper, and additional sliding scale insulin three times daily with meals     He has not seen diabetes educator since July 2024.  It was recommended that once he is in his own place that he follow-up with the diabetes educator.  Order was placed today.    A1c due to be repeated November 2024.  Follow-up with endocrinology 11/5/2024.

## 2024-09-24 NOTE — ASSESSMENT & PLAN NOTE
Due to continued right knee swelling and history of chronic pancreatitis with chronic diarrhea, he was referred to GI to review possible Whipple's disease.

## 2024-09-24 NOTE — ASSESSMENT & PLAN NOTE
He was hospitalized with aspiration pneumonia on 9/11/2024.  He was treated with Rocephin and a results from [facility] at the time of admission.  He was discharged on Omnicef and azithromycin upon discharge.  Blood cultures negative to date.  Admission chest x-ray concerning for extensive patchy and nodular opacities, recommend follow-up CT in 6 to 8 weeks to ensure resolution.     He has a CT of the chest for follow-up scheduled for October 3, 2024.

## 2024-09-24 NOTE — ASSESSMENT & PLAN NOTE
On day of admission his sodium was 128, potassium 5.4, bicarb 19, creatinine 2.41.    Improved with IV fluids.  Sodium 135, potassium 4.2, creatinine 1.01 on 9/14/2024.     Will proceed with repeat CMP at this time.

## 2024-09-24 NOTE — ASSESSMENT & PLAN NOTE
Hospitalized for aspiration pneumonia 9/11/2024.  This was likely secondary to infection.  On the morning of admission, staff at the transitional care unit found the patient rolled over in his bed between mattress and the wall.  Patient has no recollection of how he rolled into the wall.  Nursing staff found him with vomit and stool on himself.  Neuro was intact on admission. CT head in the ED showed no acute intracranial abnormality.  Symptoms improved with treatment of infection.

## 2024-09-24 NOTE — ASSESSMENT & PLAN NOTE
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     Brucella, Bartonella, Q fever, CD4, Ig levels.   MRI right knee with and without contrast if negative.   Rheumatology and GI consult.   Complete oral ciprofloxacin course.      Admitted to [facility] 8/16 to 8/19/2024.  \"Has been seen a handful of times and outpatient follow-up with persistent right knee swelling found to have high synovial cell count.  Multiple cultures off antibiotics have been negative following I&D procedures with antibiotic therapy and ineffective.  Work up included zoonotic causes of infection.  Has been referred to rheumatology to assist in differential diagnosis.\"  Possible new vasculitis. Rheumatology appointment scheduled for 9/17/24.  Orthopedic surgery recommended MRI right knee with and without contrast (scheduled for 9/21/24).  Prescribed bumex 1 mg daily for knee swelling.  Bumex held in the hospital, will resume upon discharge.  Monitor at TCU and consider discontinuing bumex if any signs of dehydration.  Symptoms and right knee appeared to be at recent baseline, no acute worsening prior to or during this admission.  Continue with outpatient evaluation/management as previously directed.     \"He was seen by orthopedics yesterday with the following recommendations:  MRI does not demonstrate any signs of PVNS or lesions that would cause his chronic effusions and pain.  In my opinion is still appears that this is a infectious or inflammatory process.  Although is not the normal process for the sake of being thorough I agreed we should aspirate the knee for cytology but also recommended further workup with NexGen sequencing as all cultures have been negative.  Given his uncontrolled diabetes there may be an indolent Terrio or fungal infection that has not presented itself.  Aspiration was performed and 45 mL of cloudy synovial fluid was sent for cytology, NexGen sequencing, aerobic, anaerobic, " "fungal, AFB and cell count.  If the patient experiences any fever or malaise start him to report to the emergency department.  When the labs been finalized I will reach out to him via MyChart with further recommendations.  I would also like him to follow-up with infectious disease for further recommendations.  All questions were answered the patient was agreed with the plan.\"    He has upcoming appointment with ID on 10/2/24.  Upcoming appointment with Rheumatology 10/29/24.  "

## 2024-09-24 NOTE — ASSESSMENT & PLAN NOTE
Last Comprehensive Metabolic Panel:        Lab Results   Component Value Date      (L) 08/27/2024     POTASSIUM 4.9 08/27/2024     CHLORIDE 102 08/27/2024     CO2 24 08/27/2024     ANIONGAP 8 08/27/2024      (H) 08/27/2024     BUN 21.1 (H) 08/27/2024     CR 1.63 (H) 08/27/2024     GFRESTIMATED 49 (L) 08/27/2024     AROLDO 8.6 (L) 08/27/2024      Most recent creatinine 1.63 with GFR 49.  Will repeat BMP along with Cystatin C at this time.  Follow up appointment with Nephrology 1/27/25.    Addendum:   Repeat BMP completed September 24, 2024 demonstrated creatinine 1.24 with GFR.  Cystatin C is elevated at 2.1 with GFR of 29.  He currently has an appointment with nephrology in January.  We are going to attempt to move this up to him in the next month.

## 2024-09-24 NOTE — PROGRESS NOTES
"The longitudinal plan of care for the diagnosis(es)/condition(s) as documented were addressed during this visit. Due to the added complexity in care, I will continue to support Long in the subsequent management and with ongoing continuity of care.    I spent a total of 49 minutes on the day of the visit.   Time spent by me doing chart review, history and exam, documentation and further activities per the note    Assessment & Plan   Problem List Items Addressed This Visit       Ketosis (H)     Has a history of starvation ketosis.  Will proceed with CMP at this time.  Weight has stabilized.           Acute renal insufficiency     Last Comprehensive Metabolic Panel:        Lab Results   Component Value Date      (L) 08/27/2024     POTASSIUM 4.9 08/27/2024     CHLORIDE 102 08/27/2024     CO2 24 08/27/2024     ANIONGAP 8 08/27/2024      (H) 08/27/2024     BUN 21.1 (H) 08/27/2024     CR 1.63 (H) 08/27/2024     GFRESTIMATED 49 (L) 08/27/2024     AROLDO 8.6 (L) 08/27/2024      Most recent creatinine 1.63 with GFR 49.  Will repeat BMP along with Cystatin C at this time.  Follow up appointment with Nephrology 1/27/25.    Repeat BMP completed September 24, 2024 demonstrated creatinine 1.24 with GFR.  Cystatin C is elevated at 2.1 with GFR of 29.  He currently has an appointment with nephrology in January.  We are going to attempt to move this up to him in the next month.            Relevant Orders    Cystatin C with GFR (Completed)    Anemia, unspecified type     Lab Results   Component Value Date    WBC 10.6 09/14/2024    WBC 3.9 10/08/2017     Lab Results   Component Value Date    RBC 3.60 09/14/2024    RBC 4.07 10/08/2017     Lab Results   Component Value Date    HGB 8.5 09/14/2024    HGB 12.1 10/08/2017     Lab Results   Component Value Date    HCT 27.7 09/14/2024    HCT 34.2 10/08/2017     No components found for: \"MCT\"  Lab Results   Component Value Date    MCV 77 09/14/2024    MCV 84 10/08/2017     Lab " Results   Component Value Date    MCH 23.6 09/14/2024    MCH 29.7 10/08/2017     Lab Results   Component Value Date    MCHC 30.7 09/14/2024    MCHC 35.4 10/08/2017     Lab Results   Component Value Date    RDW 16.8 09/14/2024    RDW 13.4 10/08/2017     Lab Results   Component Value Date     09/14/2024     10/08/2017     He was recently hospitalized.  Hemoglobin was hemoglobin 7.4 on admission.  Recent baseline hemoglobin has been around 7-9.  Had Hemovac to right knee over the course of the summer (drain was removed in August) along with x2 I&Ds. No concerns for acute bleeding on admission. Hemodynamically stable.   Hemoglobin down to 6.4 on 9/12/2024.  Transfused 1 unit PRBCs on 9/12/2024 with improvement in hemoglobin to 8.4 on 9/13/2024.  Hemoglobin stable at 8.5 on 9/14/2024.     He has a history of anemia which has been noted since May 2024.  Cause has not been identified.  Hemoglobin has been stable right around 7.4.  Will proceed with additional evaluation with CBC, retake count, TSH, haptoglobin, iron studies, LDH, ferritin, B12 and folate.  Will refer to hematology for further evaluation as to the cause of her anemia.  Colonoscopy was completed in May 2024 which demonstrated several diverticula.  No source for bleeding was identified.  Pending results may need to see Hematology.           Relevant Orders    CBC with platelets and differential    Haptoglobin    Reticulocyte count    Vitamin B12 (Completed)    Folate (Completed)    Iron and iron binding capacity (Completed)    Ferritin (Completed)    Lab Blood Morphology Pathologist Review (Completed)    Alcohol abuse     He has a history of alcohol abuse but has not consumed alcohol for 2 years.  He was congratulated on that today.         Cardiac calcification (H)     May 3, 2024 echocardiogram was completed which demonstrated:  Interpretation Summary     1. Normal biventricular size and function. Left ventricular ejection fraction  of  60-65%.  2. No segmental wall motion abnormalities noted.  3. No hemodynamically significant valvular disease.  No prior study for comparison. Technically adequate study.     She is not currently on a statin.  ?????  She is on aspirin 81 mg daily.         Diabetic ulcer of toe of right foot associated with type 2 diabetes mellitus, unspecified ulcer stage (H)     He has a history of diabetic foot infection status post right TMA.  His most recent A1c in August was elevated at 9.2.  He is followed by endocrinology.  He has a follow-up appointment with endocrinology scheduled for 11/5/2024.  Hemoglobin A1C   Date Value Ref Range Status   08/17/2024 9.2 (H) <5.7 % Final     Comment:     Normal <5.7%   Prediabetes 5.7-6.4%    Diabetes 6.5% or higher     Note: Adopted from ADA consensus guidelines.             Relevant Orders    Lipid panel reflex to direct LDL Non-fasting (Completed)    Albumin Random Urine Quantitative with Creat Ratio (Completed)    Primary hypertension     Pressure is elevated today.  Recheck on blood pressure remains elevated at 171/112.  Will increase amlodipine to 10 mg daily.  He is to continue hydralazine 25 mg twice daily.  Follow-up in the clinic in 1 week for nurse blood pressure check.            Relevant Medications    amLODIPine (NORVASC) 10 MG tablet    Other Relevant Orders    Comprehensive metabolic panel (BMP + Alb, Alk Phos, ALT, AST, Total. Bili, TP) (Completed)    PRIMARY CARE FOLLOW-UP SCHEDULING    Severe episode of recurrent major depressive disorder, without psychotic features (H)     Symptoms stable on duloxetine 60 mg daily.         Type 2 diabetes mellitus with kidney complication, with long-term current use of insulin (H)           Lab Results   Component Value Date     A1C 9.2 08/17/2024     A1C 9.4 05/04/2024     A1C >14.0 03/08/2022      Continue 13 units Lantus every morning, 6 units aspart insulin daily with breakfast, 8 units twice daily with lunch and supper, and  "additional sliding scale insulin three times daily with meals     He has not seen diabetes educator since July 2024.  It was recommended that once he is in his own place that he follow-up with the diabetes educator.  Order was placed today.    A1c due to be repeated November 2024.  Follow-up with endocrinology 11/5/2024.            Relevant Orders    Adult Diabetes Education  Referral    Pyogenic arthritis of right knee joint, due to unspecified organism (H)     Right knee inflammatory arthritis, etiology to be determined, s/p I&D Jul 11, Aug 18, 2024.     Brucella, Bartonella, Q fever, CD4, Ig levels.   MRI right knee with and without contrast if negative.   Rheumatology and GI consult.   Complete oral ciprofloxacin course.      Admitted to [facility] 8/16 to 8/19/2024.  \"Has been seen a handful of times and outpatient follow-up with persistent right knee swelling found to have high synovial cell count.  Multiple cultures off antibiotics have been negative following I&D procedures with antibiotic therapy and ineffective.  Work up included zoonotic causes of infection.  Has been referred to rheumatology to assist in differential diagnosis.\"  Possible new vasculitis. Rheumatology appointment scheduled for 9/17/24.  Orthopedic surgery recommended MRI right knee with and without contrast (scheduled for 9/21/24).  Prescribed bumex 1 mg daily for knee swelling.  Bumex held in the hospital, will resume upon discharge.  Monitor at TCU and consider discontinuing bumex if any signs of dehydration.  Symptoms and right knee appeared to be at recent baseline, no acute worsening prior to or during this admission.  Continue with outpatient evaluation/management as previously directed.     \"He was seen by orthopedics yesterday with the following recommendations:  MRI does not demonstrate any signs of PVNS or lesions that would cause his chronic effusions and pain.  In my opinion is still appears that this is a infectious or " "inflammatory process.  Although is not the normal process for the sake of being thorough I agreed we should aspirate the knee for cytology but also recommended further workup with NexGen sequencing as all cultures have been negative.  Given his uncontrolled diabetes there may be an indolent Terrio or fungal infection that has not presented itself.  Aspiration was performed and 45 mL of cloudy synovial fluid was sent for cytology, NexGen sequencing, aerobic, anaerobic, fungal, AFB and cell count.  If the patient experiences any fever or malaise start him to report to the emergency department.  When the labs been finalized I will reach out to him via [employer] with further recommendations.  I would also like him to follow-up with infectious disease for further recommendations.  All questions were answered the patient was agreed with the plan.\"    He has upcoming appointment with ID on 10/2/24.  Upcoming appointment with Rheumatology 10/29/24.         Chronic pancreatitis (H) - Primary     Due to continued right knee swelling and history of chronic pancreatitis with chronic diarrhea, he was referred to GI to review possible Whipple's disease.            Pneumonia of left lower lobe due to infectious organism     He was hospitalized with aspiration pneumonia on 9/11/2024.  He was treated with Rocephin and a results from [facility] at the time of admission.  He was discharged on Omnicef and azithromycin upon discharge.  Blood cultures negative to date.  Admission chest x-ray concerning for extensive patchy and nodular opacities, recommend follow-up CT in 6 to 8 weeks to ensure resolution.     He has a CT of the chest for follow-up scheduled for October 3, 2024.         Acute metabolic encephalopathy     Hospitalized for aspiration pneumonia 9/11/2024.  This was likely secondary to infection.  On the morning of admission, staff at the transitional care unit found the patient rolled over in his bed between mattress and the wall.  " "Patient has no recollection of how he rolled into the wall.  Nursing staff found him with vomit and stool on himself.  Neuro was intact on admission. CT head in the ED showed no acute intracranial abnormality.  Symptoms improved with treatment of infection.          Hyperkalemia     On day of admission his sodium was 128, potassium 5.4, bicarb 19, creatinine 2.41.    Improved with IV fluids.  Sodium 135, potassium 4.2, creatinine 1.01 on 9/14/2024.     Will proceed with repeat CMP at this time.                 Shamir Alves is a 54 year old, presenting for the following health issues:  Establish Care (Go over meds that he received from [facility])        9/24/2024     9:23 AM   Additional Questions   Roomed by Julita HAYDEN   Accompanied by Self     History of Present Illness       Reason for visit:  Set up primary care   He is taking medications regularly.         Objective    BP (!) 171/112 (BP Location: Left arm, Patient Position: Sitting, Cuff Size: Adult Regular)   Pulse 76   Temp 98.5  F (36.9  C) (Oral)   Resp 16   Ht 1.651 m (5' 5\")   Wt 60.5 kg (133 lb 4.8 oz)   SpO2 98%   BMI 22.18 kg/m    Body mass index is 22.18 kg/m .  Physical Exam  Vitals and nursing note reviewed.   Constitutional:       Appearance: Normal appearance.   HENT:      Head: Normocephalic.   Cardiovascular:      Rate and Rhythm: Normal rate and regular rhythm.      Heart sounds: Normal heart sounds.   Pulmonary:      Effort: Pulmonary effort is normal.      Breath sounds: Normal breath sounds.   Musculoskeletal:      Comments: On exam of his right knee, he continues to have swelling that extends proximally above his knee.  There is no erythema or warmth to the touch.  Incision is healing nicely.               Signed Electronically by: Rena Blair PA-C    "

## 2024-09-24 NOTE — ASSESSMENT & PLAN NOTE
Has a history of starvation ketosis.  Will proceed with CMP at this time.  Weight has stabilized.

## 2024-09-24 NOTE — ASSESSMENT & PLAN NOTE
Pressure is elevated today.  Recheck on blood pressure remains elevated at 171/112.  Will increase amlodipine to 10 mg daily.  He is to continue hydralazine 25 mg twice daily.  Follow-up in the clinic in 1 week for nurse blood pressure check.

## 2024-09-24 NOTE — ASSESSMENT & PLAN NOTE
He has a history of alcohol abuse but has not consumed alcohol for 2 years.  He was congratulated on that today.

## 2024-09-24 NOTE — ASSESSMENT & PLAN NOTE
May 3, 2024 echocardiogram was completed which demonstrated:  Interpretation Summary     1. Normal biventricular size and function. Left ventricular ejection fraction  of 60-65%.  2. No segmental wall motion abnormalities noted.  3. No hemodynamically significant valvular disease.  No prior study for comparison. Technically adequate study.     She is not currently on a statin.  ?????  She is on aspirin 81 mg daily.

## 2024-09-24 NOTE — ASSESSMENT & PLAN NOTE
"Lab Results   Component Value Date    WBC 10.6 09/14/2024    WBC 3.9 10/08/2017     Lab Results   Component Value Date    RBC 3.60 09/14/2024    RBC 4.07 10/08/2017     Lab Results   Component Value Date    HGB 8.5 09/14/2024    HGB 12.1 10/08/2017     Lab Results   Component Value Date    HCT 27.7 09/14/2024    HCT 34.2 10/08/2017     No components found for: \"MCT\"  Lab Results   Component Value Date    MCV 77 09/14/2024    MCV 84 10/08/2017     Lab Results   Component Value Date    MCH 23.6 09/14/2024    MCH 29.7 10/08/2017     Lab Results   Component Value Date    MCHC 30.7 09/14/2024    MCHC 35.4 10/08/2017     Lab Results   Component Value Date    RDW 16.8 09/14/2024    RDW 13.4 10/08/2017     Lab Results   Component Value Date     09/14/2024     10/08/2017     He was recently hospitalized.  Hemoglobin was hemoglobin 7.4 on admission.  Recent baseline hemoglobin has been around 7-9.  Had Hemovac to right knee over the course of the summer (drain was removed in August) along with x2 I&Ds. No concerns for acute bleeding on admission. Hemodynamically stable.   Hemoglobin down to 6.4 on 9/12/2024.  Transfused 1 unit PRBCs on 9/12/2024 with improvement in hemoglobin to 8.4 on 9/13/2024.  Hemoglobin stable at 8.5 on 9/14/2024.     He has a history of anemia which has been noted since May 2024.  Cause has not been identified.  Hemoglobin has been stable right around 7.4.  Will proceed with additional evaluation with CBC, retake count, TSH, haptoglobin, iron studies, LDH, ferritin, B12 and folate.  Will refer to hematology for further evaluation as to the cause of her anemia.  Colonoscopy was completed in May 2024 which demonstrated several diverticula.  No source for bleeding was identified.  Pending results may need to see Hematology.    "

## 2024-09-24 NOTE — ASSESSMENT & PLAN NOTE
He has a history of diabetic foot infection status post right TMA.  His most recent A1c in August was elevated at 9.2.  He is followed by endocrinology.  He has a follow-up appointment with endocrinology scheduled for 11/5/2024.  Hemoglobin A1C   Date Value Ref Range Status   08/17/2024 9.2 (H) <5.7 % Final     Comment:     Normal <5.7%   Prediabetes 5.7-6.4%    Diabetes 6.5% or higher     Note: Adopted from ADA consensus guidelines.

## 2024-09-25 ENCOUNTER — TELEPHONE (OUTPATIENT)
Dept: FAMILY MEDICINE | Facility: CLINIC | Age: 54
End: 2024-09-25
Payer: COMMERCIAL

## 2024-09-25 LAB
PATH REPORT.COMMENTS IMP SPEC: NORMAL
PATH REPORT.FINAL DX SPEC: NORMAL
PATH REPORT.FINAL DX SPEC: NORMAL
PATH REPORT.GROSS SPEC: NORMAL
PATH REPORT.MICROSCOPIC SPEC OTHER STN: NORMAL
PATH REPORT.MICROSCOPIC SPEC OTHER STN: NORMAL
PATH REPORT.RELEVANT HX SPEC: NORMAL
PATH REPORT.RELEVANT HX SPEC: NORMAL

## 2024-09-25 NOTE — TELEPHONE ENCOUNTER
Left message to call back for: Long  Information to relay to patient: Please relay message below from Rena TAVERAS.

## 2024-09-26 ENCOUNTER — PATIENT OUTREACH (OUTPATIENT)
Dept: ONCOLOGY | Facility: CLINIC | Age: 54
End: 2024-09-26
Payer: COMMERCIAL

## 2024-09-26 ENCOUNTER — TELEPHONE (OUTPATIENT)
Dept: FAMILY MEDICINE | Facility: CLINIC | Age: 54
End: 2024-09-26
Payer: COMMERCIAL

## 2024-09-26 LAB
B LOCUS: NORMAL
B27TEST METHOD: NORMAL

## 2024-09-26 NOTE — PROGRESS NOTES
New Patient Oncology Nurse Navigator Note     Referral Received: 09/26/24      Referring provider: Rena Blair PA-C     Referring Clinic/Organization: [facility]     Referred to: Benign Hematology    Requested provider (if applicable): First available - did not specify      Evaluation for :   Diagnosis   D64.9 (ICD-10-CM) - Anemia, unspecified type      Clinical History (per Nurse review of records provided):      9/24 OV [place]:   He was recently hospitalized.  Hemoglobin was hemoglobin 7.4 on admission.  Recent baseline hemoglobin has been around 7-9.  Had Hemovac to right knee over the course of the summer (drain was removed in August) along with x2 I&Ds. No concerns for acute bleeding on admission. Hemodynamically stable.   Hemoglobin down to 6.4 on 9/12/2024.  Transfused 1 unit PRBCs on 9/12/2024 with improvement in hemoglobin to 8.4 on 9/13/2024.  Hemoglobin stable at 8.5 on 9/14/2024.     He has a history of anemia which has been noted since May 2024.  Cause has not been identified.  Hemoglobin has been stable right around 7.4.  Will proceed with additional evaluation with CBC, retake count, TSH, haptoglobin, iron studies, LDH, ferritin, B12 and folate.  Will refer to hematology for further evaluation as to the cause of her anemia.  Colonoscopy was completed in May 2024 which demonstrated several diverticula.  No source for bleeding was identified.  Pending results may need to see Hematology.       Latest Reference Range & Units 09/24/24 10:35   Sodium 135 - 145 mmol/L 132 (L)   Potassium 3.4 - 5.3 mmol/L 5.3   Chloride 98 - 107 mmol/L 102   Carbon Dioxide (CO2) 22 - 29 mmol/L 21 (L)   Urea Nitrogen 6.0 - 20.0 mg/dL 28.5 (H)   Creatinine 0.67 - 1.17 mg/dL 1.24 (H)   GFR Estimate >60 mL/min/1.73m2 68   Cystatin C 0.6 - 1.0 mg/L 2.1 (H)   GFR Calculated with Cystatin C >=60 mL/min/1.73m2 29 (L)   Calcium 8.8 - 10.4 mg/dL 9.0   Anion Gap 7 - 15 mmol/L 9   Albumin 3.5 - 5.2 g/dL 3.4 (L)   Protein Total  6.4 - 8.3 g/dL 8.0   Alkaline Phosphatase 40 - 150 U/L 161 (H)   ALT 0 - 70 U/L 12   AST 0 - 45 U/L 21   Bilirubin Total <=1.2 mg/dL 0.2   Cholesterol <200 mg/dL 138   Patient Fasting?  Yes  Yes   Ferritin 31 - 409 ng/mL 418 (H)   Folate 4.6 - 34.8 ng/mL 10.0   Glucose 70 - 99 mg/dL 241 (H)   HDL Cholesterol >=40 mg/dL 45   Iron 61 - 157 ug/dL 20 (L)   Iron Binding Capacity 240 - 430 ug/dL 230 (L)   Iron Sat Index 15 - 46 % 9 (L)   LDL Cholesterol Calculated <100 mg/dL 52   Non HDL Cholesterol <130 mg/dL 93   Triglycerides <150 mg/dL 206 (H)   Vitamin B12 232 - 1,245 pg/mL 662   WBC 4.0 - 11.0 10e3/uL 10.8   Hemoglobin 13.3 - 17.7 g/dL 8.7 (L)   Hematocrit 40.0 - 53.0 % 28.0 (L)   Platelet Count 150 - 450 10e3/uL 307   RBC Count 4.40 - 5.90 10e6/uL 3.60 (L)   MCV 78 - 100 fL 78   MCH 26.5 - 33.0 pg 24.2 (L)   MCHC 31.5 - 36.5 g/dL 31.1 (L)   RDW 10.0 - 15.0 % 18.0 (H)   % Neutrophils % 70   % Lymphocytes % 19   % Monocytes % 8   % Eosinophils % 2   % Basophils % 0   Absolute Basophils 0.0 - 0.2 10e3/uL 0.0   Absolute Eosinophils 0.0 - 0.7 10e3/uL 0.3   Absolute Immature Granulocytes <=0.4 10e3/uL 0.0   Absolute Lymphocytes 0.8 - 5.3 10e3/uL 2.0   Absolute Monocytes 0.0 - 1.3 10e3/uL 0.9   % Immature Granulocytes % 0   Absolute Neutrophils 1.6 - 8.3 10e3/uL 7.5   % Retic 0.5 - 2.0 % 1.1   Absolute Retic 0.025 - 0.095 10e6/uL 0.041   BLOOD MORPHOLOGY PATHOLOGIST REVIEW  Rpt   LAB BLOOD MORPHOLOGY PATHOLOGIST REVIEW  Rpt !   (L): Data is abnormally low  (H): Data is abnormally high  !: Data is abnormal  Rpt: View report in Results Review for more information  Records Location: [facility]     Records Needed:     N/A    Additional testing needed prior to consult:     ?    Referral updates and Plan:     09/26/2024 10:39 AM -  Referral received and reviewed. Anemia improving and stable.  Sent to NPS to schedule first available.    Neha Byrne, DELONN, RN  Hematology/Oncology Nurse Navigator  [facility]  [facility]  [phone] / [phone]

## 2024-09-26 NOTE — TELEPHONE ENCOUNTER
Patient's active nephrology referral is routine priority. Please advise if provider would like to change priority status of referral. Patient is not scheduled with [facility] Nephrology. If provider agrees, referral will need to be resent to nephrology clinic he is scheduled at to try to accommodate sooner appointment.     Rena Blair, Rena Hollis  Can you assist this patient to get his nephrology appointment moved up to hopefully within the next 2 to 4 weeks?  His current appointment is in January 2025 which is too far out.  He has increased Cystatin C with a low GFR.  Please let me know.  Thank you

## 2024-09-27 DIAGNOSIS — M00.9 PYOGENIC ARTHRITIS OF RIGHT KNEE JOINT, DUE TO UNSPECIFIED ORGANISM (H): ICD-10-CM

## 2024-09-27 DIAGNOSIS — M54.41 CHRONIC BILATERAL LOW BACK PAIN WITH BILATERAL SCIATICA: ICD-10-CM

## 2024-09-27 DIAGNOSIS — G89.29 CHRONIC BILATERAL LOW BACK PAIN WITH BILATERAL SCIATICA: ICD-10-CM

## 2024-09-27 DIAGNOSIS — N28.9 ACUTE RENAL INSUFFICIENCY: Primary | ICD-10-CM

## 2024-09-27 DIAGNOSIS — M54.42 CHRONIC BILATERAL LOW BACK PAIN WITH BILATERAL SCIATICA: ICD-10-CM

## 2024-09-27 LAB
IGG SERPL-MCNC: 2015 MG/DL (ref 610–1616)
IGG1 SER-MCNC: 1126 MG/DL (ref 382–929)
IGG2 SER-MCNC: 499 MG/DL (ref 242–700)
IGG3 SER-MCNC: 130 MG/DL (ref 22–176)
IGG4 SER-MCNC: 216 MG/DL (ref 4–86)
SUBCLASSES, PERCENT: 98 %

## 2024-09-27 NOTE — TELEPHONE ENCOUNTER
Called Long and relayed message from Rena TAVERAS.  Long stated that he has established care with Rena TAVERAS.  Follow up appointment scheduled for 10/15/24.  Reviewed referrals and scheduling numbers given.

## 2024-09-28 LAB — BACTERIA SNV CULT: NO GROWTH

## 2024-09-30 NOTE — TELEPHONE ENCOUNTER
"Patient reports in separate encounter he has established care with Rena Blair. Please advise if nephrology referral priority can be changed. Previously placed by Dr. Cruz for \"routine\".  "

## 2024-09-30 NOTE — TELEPHONE ENCOUNTER
General Call    Contacts       Contact Date/Time Type Contact Phone/Fax    09/30/2024 09:06 AM CDT Phone (Incoming) [place] (Self) [phone] (M)     Pt said Nephrology / Kidney provider not available for 5 mos, was told to get an appt within this month; please call pt back to advise           Reason for Call:  Pt said Nephrology / Kidney provider not available for 5 mos, was told to get an appt within this month; please call pt back to advise      What are your questions or concerns:  above     Date of last appointment with provider: 9.24.24    Could we send this information to you in [place] or would you prefer to receive a phone call?:   Patient would prefer a phone call   Okay to leave a detailed message?: Yes at Home number on file [phone] (home)

## 2024-10-01 ENCOUNTER — TELEPHONE (OUTPATIENT)
Dept: NEPHROLOGY | Facility: CLINIC | Age: 54
End: 2024-10-01
Payer: COMMERCIAL

## 2024-10-01 LAB
SCANNED LAB RESULT: NORMAL
SCANNED LAB RESULT: NORMAL

## 2024-10-01 NOTE — ASSESSMENT & PLAN NOTE
Lab Results   Component Value Date    WBC 9.5 10/15/2024    WBC 3.9 10/08/2017     Lab Results   Component Value Date    RBC 3.53 10/15/2024    RBC 4.07 10/08/2017     Lab Results   Component Value Date    HGB 8.3 10/15/2024    HGB 12.1 10/08/2017     Lab Results   Component Value Date    HCT 27.1 10/15/2024    HCT 34.2 10/08/2017     Lab Results   Component Value Date    MCV 77 10/15/2024    MCV 84 10/08/2017     Lab Results   Component Value Date    MCH 23.5 10/15/2024    MCH 29.7 10/08/2017     Lab Results   Component Value Date    MCHC 30.6 10/15/2024    MCHC 35.4 10/08/2017     Lab Results   Component Value Date    RDW 17.1 10/15/2024    RDW 13.4 10/08/2017     Lab Results   Component Value Date     10/15/2024     10/08/2017        He has a history of anemia which has been noted since May 2024.  Cause has not been identified.  Additional evaluation with CBC, retake count, TSH, haptoglobin, iron studies, LDH, ferritin, B12 and folate.  Ferritin elevated 418, iron low at 20, iron binding capacity low at 2030, iron saturation low at 9.  Normal B12 and folate.  Awaiting haptoglobin and reticulocyte count.  Denies blood in stool.  Upper and lower endoscopy completed 5/2024.   No GI source was identified for anemia.      He has been referred to hematology.  Appointment is scheduled for 12/12/2024.  Will attempt to move this appointment up to a closer date.  Concern for malignancy due to weight loss, anemia and abnormality seen on chest x-ray.  He has an upcoming CT of the chest scheduled for 10/22/2024 and a PET scan scheduled for 10/23/2024.    PERIPHERAL BLOOD MORPHOLOGY:        1.  MILD HYPOCHROMIC-BORDERLINE MICROCYTIC ANEMIA  A.  MODERATE ANISOCYTOSIS, WITHOUT INCREASED POIKILOCYTOSIS  B.  IRON STUDIES SUGGESTIVE OF ANEMIA OF CHRONIC DISEASE  C.  LACK OF CORRESPONDING RETICULOCYTOSIS        2.  NORMAL LEUKOCYTE DIFFERENTIAL AND MORPHOLOGY        3.  UNREMARKABLE PLATELET MORPHOLOGY

## 2024-10-01 NOTE — ASSESSMENT & PLAN NOTE
Last Comprehensive Metabolic Panel:  Lab Results   Component Value Date     (L) 10/15/2024    POTASSIUM 4.0 10/15/2024    CHLORIDE 98 10/15/2024    CO2 24 10/15/2024    ANIONGAP 9 10/15/2024     (H) 10/15/2024    BUN 22.3 (H) 10/15/2024    CR 1.35 (H) 10/15/2024    GFRESTIMATED 62 10/15/2024    AROLDO 8.6 (L) 10/15/2024      Seen by nephrology today.  He is going to undergo biopsy of the kidney.   Creatinine 1.35 with GFR 62  Awaiting Cystatin C.

## 2024-10-01 NOTE — TELEPHONE ENCOUNTER
This encounter is being sent to inform the clinic that this patient has a referral from Rena Blair for the diagnoses of Acute Renal Insufficiency, CKD, worsening Kidney function, cystatin C 2.1 GFR 29 and has requested that this patient be seen within 1-2 weeks. First referral was for Glomerular. Based on the availability of our provider(s), we are unable to accommodate this request.    Were all sites offered this patient?  Yes - This Pt is already scheduled from previous referral and is currently Thursday, 2/20 at 2:00 pm with Dr. Valdes.     Does scheduling algorithm request to schedule next available?  Patient has been scheduled for the first available opening with [facility] on 2/20.  We have informed the patient that the clinic will review their referral and reach out if a sooner appointment is medically necessary.     Sending message for review and follow-up with Pt if sooner appointment is available/needed. Thank you!

## 2024-10-01 NOTE — ASSESSMENT & PLAN NOTE
Last Comprehensive Metabolic Panel:  Sodium   Date Value Ref Range Status   09/24/2024 132 (L) 135 - 145 mmol/L Final   10/08/2017 131 (L) 133 - 144 mmol/L Final     Potassium   Date Value Ref Range Status   09/24/2024 5.3 3.4 - 5.3 mmol/L Final   08/06/2022 2.9 (L) 3.4 - 5.3 mmol/L Final   10/08/2017 3.8 3.4 - 5.3 mmol/L Final     Chloride   Date Value Ref Range Status   09/24/2024 102 98 - 107 mmol/L Final   08/06/2022 97 94 - 109 mmol/L Final   10/08/2017 95 94 - 109 mmol/L Final     Carbon Dioxide   Date Value Ref Range Status   10/08/2017 28 20 - 32 mmol/L Final     Carbon Dioxide (CO2)   Date Value Ref Range Status   09/24/2024 21 (L) 22 - 29 mmol/L Final   08/06/2022 24 20 - 32 mmol/L Final     Anion Gap   Date Value Ref Range Status   09/24/2024 9 7 - 15 mmol/L Final   08/06/2022 13 3 - 14 mmol/L Final   10/08/2017 8 3 - 14 mmol/L Final     Glucose   Date Value Ref Range Status   09/24/2024 241 (H) 70 - 99 mg/dL Final   08/06/2022 407 (H) 70 - 99 mg/dL Final   10/08/2017 403 (H) 70 - 99 mg/dL Final     GLUCOSE BY METER POCT   Date Value Ref Range Status   09/14/2024 167 (H) 70 - 99 mg/dL Final     Urea Nitrogen   Date Value Ref Range Status   09/24/2024 28.5 (H) 6.0 - 20.0 mg/dL Final   08/06/2022 12 7 - 30 mg/dL Final   10/08/2017 9 7 - 30 mg/dL Final     Creatinine   Date Value Ref Range Status   09/24/2024 1.24 (H) 0.67 - 1.17 mg/dL Final   10/08/2017 0.68 0.66 - 1.25 mg/dL Final     GFR Estimate   Date Value Ref Range Status   09/24/2024 68 >60 mL/min/1.73m2 Final     Comment:     eGFR calculated using 2021 CKD-EPI equation.   10/08/2017 >90 >60 mL/min/1.7m2 Final     Comment:     Non  GFR Calc     GFR, ESTIMATED POCT   Date Value Ref Range Status   07/09/2024 41 (L) >60 mL/min/1.73m2 Final     Calcium   Date Value Ref Range Status   09/24/2024 9.0 8.8 - 10.4 mg/dL Final     Comment:     Reference intervals for this test were updated on 7/16/2024 to reflect our healthy population more  accurately. There may be differences in the flagging of prior results with similar values performed with this method. Those prior results can be interpreted in the context of the updated reference intervals.   10/08/2017 9.5 8.5 - 10.1 mg/dL Final     Bilirubin Total   Date Value Ref Range Status   09/24/2024 0.2 <=1.2 mg/dL Final   10/08/2017 0.2 0.2 - 1.3 mg/dL Final     Alkaline Phosphatase   Date Value Ref Range Status   09/24/2024 161 (H) 40 - 150 U/L Final   10/08/2017 124 40 - 150 U/L Final     ALT   Date Value Ref Range Status   09/24/2024 12 0 - 70 U/L Final   10/08/2017 52 0 - 70 U/L Final     AST   Date Value Ref Range Status   09/24/2024 21 0 - 45 U/L Final   10/08/2017 73 (H) 0 - 45 U/L Final

## 2024-10-02 ENCOUNTER — VIRTUAL VISIT (OUTPATIENT)
Dept: CT IMAGING | Facility: CLINIC | Age: 54
End: 2024-10-02
Attending: INTERNAL MEDICINE
Payer: COMMERCIAL

## 2024-10-02 ENCOUNTER — TELEPHONE (OUTPATIENT)
Dept: RHEUMATOLOGY | Facility: CLINIC | Age: 54
End: 2024-10-02
Payer: COMMERCIAL

## 2024-10-02 VITALS — HEIGHT: 65 IN | BODY MASS INDEX: 21.66 KG/M2 | WEIGHT: 130 LBS

## 2024-10-02 DIAGNOSIS — M25.461 SWELLING OF RIGHT KNEE JOINT: Primary | ICD-10-CM

## 2024-10-02 DIAGNOSIS — R59.0 INGUINAL ADENOPATHY: ICD-10-CM

## 2024-10-02 DIAGNOSIS — R91.1 LUNG NODULE: Primary | ICD-10-CM

## 2024-10-02 PROBLEM — M00.9 PYOGENIC ARTHRITIS OF RIGHT KNEE JOINT, DUE TO UNSPECIFIED ORGANISM (H): Status: RESOLVED | Noted: 2024-07-11 | Resolved: 2024-10-02

## 2024-10-02 PROCEDURE — 99215 OFFICE O/P EST HI 40 MIN: CPT | Mod: 95 | Performed by: INTERNAL MEDICINE

## 2024-10-02 ASSESSMENT — PAIN SCALES - GENERAL: PAINLEVEL: EXTREME PAIN (8)

## 2024-10-02 NOTE — TELEPHONE ENCOUNTER
"Received message from Dr. Cruz \"Please call Long and cancel his scheduled CT for tomorrow.  Renal functions went down so best to avoid CT with contrast and I ordered PET scan instead. He had cystatin C after the visit showing worse renal functions after the last visit with me\". Patient called to inform. Patient provided with scheduling number for Radiology. Patient reports will call to cancel CT and get PET scan scheduled.     Coleen Plummer RN    "

## 2024-10-02 NOTE — NURSING NOTE
Patient declined medication review VVF.       Current patient location: Patient declined to provide     Is the patient currently in the state of [place]? YES    Visit mode:VIDEO    If the visit is dropped, the patient can be reconnected by: VIDEO VISIT: Text to cell phone:   Telephone Information:   Mobile [phone]    and VIDEO VISIT: Send to e-mail at: [email or web address]    Will anyone else be joining the visit? NO  (If patient encounters technical issues they should call [phone])    Are changes needed to the allergy or medication list? Pt declined med review    Are refills needed on medications prescribed by this physician? NO    Rooming Documentation:  Not applicable    Reason for visit: RECHECK (Swelling in right knee wont go down)      Magda Hammer VVF

## 2024-10-02 NOTE — TELEPHONE ENCOUNTER
Updated both Referral notes as well as Referring providers, Dr. Mancuso and NITIN Blair of scheduling.  Reached out to patient via viaCycle on availability.    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  Plan: follow up with availability once cleared to open.     Darlene Luna RN, BSN, PHN  [facility] Nephrology Nurse Navigator  [phone]

## 2024-10-02 NOTE — PROGRESS NOTES
[facility] INFECTIOUS DISEASE  [address]  [address]  [facility] [address]  Phone: [phone]  Fax: [phone]    Patient:  Long Mayfield, Date of birth 1970  Date of Visit:  10/02/2024  Referring Provider Referred Self  Reason for visit: Knee effusion     Assessment & Plan      Long Mayfield is a 54 year old male with persistent right knee swelling and found to have high synovial cell count. His inflammatory arthritis is culture negative multiple times and extensive ID workup has been negative so far. 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 I discussed the case with Dr. Cruz and we decided to pursue PET scan. I wonder what his pancreatitis and  Since he does not have a confirmed active infection, I will not follow him closely moving forward.      Right knee inflammatory arthritis, etiology to be determined, s/p I&D Jul 11, Aug 18, 2024  High IgG 4 levels  T2 DM   History of diabetic foot infection, s/p right TMA  History of alcohol use disoder  Chronic diarrhea   History of pancreatitis  CKD 4    PLAN:  Continue rheumatology workup.   Agree with PET scan.     40 minutes spent by me on the date of the encounter doing chart review, history and exam, documentation and further activities per the note    Virtual Visit Details    Type of service:  Video Visit   Video Start Time: 1:25:34 pm.  Video End Time:1:40:11 pm.    Originating Location (pt. Location): TCU    Distant Location (provider location):  On-site  Platform used for Video Visit: Gian    History of Present Illness     Pertinent history obtain from: patient    Long Mayfield is a 54 year old male with right knee swelling. He could not tell when exactly it started to become swollen or painful. It seems like it's been on and off for yours.   He has had injection to his right knee from 2 years ago. This year he had another injection to his right knee in April  2024 at [facility]. However, in May, they found that his knee is too swollen so they aspirated the knee instead. Cell count 76k and a week later 109k. Cultures negative. He was not told there's a problem and was not given antibiotics. He has chronic diarrhea attributed to pancreatitis from previous alcohol use disorder. He does not have fever, chills, diarrhea.      He was born in [facility]. He lived in [place], [place] [facility] and [facility]. He skied a lot. He was sexually active with women but not recently. He does not inject drugs. 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 He does not recall tick bites. 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      7/11/23 I&D - cultures negative. Discharged on 4 weeks of ceftriaxone. Therapy ended Aug 8  8/14 Seen by Ortho Dr. Espinoza. Cell count 94k, 100%N Culture negative, crystals negative  8/17 Arthroscopic I&D Dr. Phoenix - cultures negative. - discharged on ciprofloxacin      8/19 CT scan Surgical drain within the right knee. Tricompartmental degenerative arthrosis with a large heterogeneous joint effusion, possibly reflecting a hemarthrosis.  8/28 Seen by Ortho aspirated cell count 53k, 96% N  ID visit. Further testing requested. Antibiotic therapy completed.     9/21 MRI 1.  Large complex joint effusion with mildly thickened enhancing synovium of indeterminate etiology. Findings may be sequela of degenerative arthropathy, or an inflammatory or infectious arthropathy.   2.  Mild patchy enhancing edema-like marrow signal of the knee which is likely reactive. Early osteomyelitis cannot be entirely excluded, though, is considered less likely.    He is being worked up for rheumatologic diagnosis.     Today, I saw him through video partially. Then video stopped working so I called him. He still has right knee swelling and pain. He can walk but uses opioids. No fever, chills.       Data   Laboratory data and imaging listed below was reviewed prior to this encounter.      MICRO  9/23 R synovial fluid - 16s, 28s negative, cultures negative  8/29 Brucella, Q fever, Bartonella, syphilis ab - neg   8/27 R knee - fungal, afb, bacterial no growth   8/17 R knee - no growth, 16s, 28s negative, fungal cx neg  8/14 R knee - no growth   7/13 GC - neg   7/13 Whipple Blood - neg   7/12 Lyme, HIV, HCV, HBsAg - neg   7/11 R knee - 3/3 no growth, 16s neg  7/10 R knee - 65k, 93%N  Cx - no growth   5/29 R knee - 109k, 95%N - no growth   5/22 R knee 76k 98%N - no growth

## 2024-10-04 ENCOUNTER — TELEPHONE (OUTPATIENT)
Dept: NEPHROLOGY | Facility: CLINIC | Age: 54
End: 2024-10-04
Payer: COMMERCIAL

## 2024-10-04 DIAGNOSIS — R52 PAIN: ICD-10-CM

## 2024-10-04 NOTE — TELEPHONE ENCOUNTER
Patient Contacted for the patient to call back and schedule the following:    Appointment type: New Glomerular  Provider: Dr. Chinchilla  Return date: 10/15 held slot  Specialty phone number: [phone]  Additional appointment(s) needed: Labs prior  Additonal Notes: Slot per Darlene DIMAS

## 2024-10-04 NOTE — TELEPHONE ENCOUNTER
Medication Request  Medication name: oxyCODONE (ROXICODONE) 5 MG tablet   methocarbamol (ROBAXIN) 500 MG tablet   [facility]  When was patient last seen for this?:  09/24/2024  Patient offered appointment:  Patient scheduled 10/15/2024  Okay to leave a detailed message: no

## 2024-10-07 LAB — BACTERIA SNV CULT: NORMAL

## 2024-10-07 RX ORDER — OXYCODONE HYDROCHLORIDE 5 MG/1
5 TABLET ORAL EVERY 6 HOURS PRN
Qty: 10 TABLET | Refills: 0 | OUTPATIENT
Start: 2024-10-07

## 2024-10-07 RX ORDER — METHOCARBAMOL 500 MG/1
500 TABLET, FILM COATED ORAL EVERY 8 HOURS PRN
OUTPATIENT
Start: 2024-10-07

## 2024-10-07 NOTE — TELEPHONE ENCOUNTER
Please call patient and let him know that he needs to request the refill for the narcotics through his provider he was previously getting them through.  We do not have a CSA signed for him.  Thank you.

## 2024-10-07 NOTE — TELEPHONE ENCOUNTER
Spoke with Long regarding message from Rena Blair.  Patient stated that he was receiving them through his transitional care residence.  He only takes these when his knee and neck cause him pain.  He asked if he could come in to complete a CSA and was told that he needed an appointment with Rena TAVERAS.  Noted appointment on 10/15/24 and Long stated he would need this refilled before then.  No available in person appointments available this week.  Please advise on virtual visit to complete CSA is appropriate.      Patient would like a call with Rena Blair' response at [phone]

## 2024-10-08 RX ORDER — METHOCARBAMOL 500 MG/1
500 TABLET, FILM COATED ORAL EVERY 8 HOURS PRN
Qty: 30 TABLET | Refills: 0 | Status: SHIPPED | OUTPATIENT
Start: 2024-10-08

## 2024-10-08 RX ORDER — OXYCODONE HYDROCHLORIDE 5 MG/1
5 TABLET ORAL EVERY 6 HOURS PRN
Qty: 10 TABLET | Refills: 0 | Status: SHIPPED | OUTPATIENT
Start: 2024-10-08 | End: 2024-10-15

## 2024-10-08 NOTE — TELEPHONE ENCOUNTER
Called Long and relayed message from Rena Blair.  Long had question about a cough he just started.  He will try OTC cough medication and/ or antihistamine.

## 2024-10-09 NOTE — CONFIDENTIAL NOTE
DIAGNOSIS:  Acute renal insufficiency    DATE RECEIVED: 10.15.2024    NOTES STATUS DETAILS   OFFICE NOTE from referring provider Internal 09.27.2024 Rena Blair PA- C   DISCHARGE SUMMARY from hospital Internal 05.03.2024 [facility]   MEDICATION LIST Internal    LABS     CBC Internal 09.14.2024   CMP Internal 09.14.2024   BMP Internal 09.14.2024   UA Internal 09.17.2024   URINE PROTEIN Internal 09.17.2024

## 2024-10-15 ENCOUNTER — PRE VISIT (OUTPATIENT)
Dept: NEPHROLOGY | Facility: CLINIC | Age: 54
End: 2024-10-15

## 2024-10-15 ENCOUNTER — PATIENT OUTREACH (OUTPATIENT)
Dept: NEPHROLOGY | Facility: CLINIC | Age: 54
End: 2024-10-15

## 2024-10-15 ENCOUNTER — OFFICE VISIT (OUTPATIENT)
Dept: FAMILY MEDICINE | Facility: CLINIC | Age: 54
End: 2024-10-15
Payer: COMMERCIAL

## 2024-10-15 ENCOUNTER — OFFICE VISIT (OUTPATIENT)
Dept: NEPHROLOGY | Facility: CLINIC | Age: 54
End: 2024-10-15
Attending: STUDENT IN AN ORGANIZED HEALTH CARE EDUCATION/TRAINING PROGRAM
Payer: COMMERCIAL

## 2024-10-15 ENCOUNTER — LAB (OUTPATIENT)
Dept: LAB | Facility: CLINIC | Age: 54
End: 2024-10-15
Payer: COMMERCIAL

## 2024-10-15 VITALS
SYSTOLIC BLOOD PRESSURE: 107 MMHG | OXYGEN SATURATION: 92 % | HEART RATE: 85 BPM | WEIGHT: 122.2 LBS | BODY MASS INDEX: 20.34 KG/M2 | DIASTOLIC BLOOD PRESSURE: 73 MMHG | TEMPERATURE: 97.6 F

## 2024-10-15 VITALS
BODY MASS INDEX: 20.68 KG/M2 | RESPIRATION RATE: 16 BRPM | DIASTOLIC BLOOD PRESSURE: 72 MMHG | OXYGEN SATURATION: 99 % | HEIGHT: 65 IN | TEMPERATURE: 98.4 F | WEIGHT: 124.1 LBS | SYSTOLIC BLOOD PRESSURE: 129 MMHG | HEART RATE: 77 BPM

## 2024-10-15 DIAGNOSIS — E10.8 TYPE 1 DIABETES MELLITUS WITH COMPLICATIONS (H): ICD-10-CM

## 2024-10-15 DIAGNOSIS — N28.9 ACUTE RENAL INSUFFICIENCY: ICD-10-CM

## 2024-10-15 DIAGNOSIS — N18.32 ANEMIA DUE TO STAGE 3B CHRONIC KIDNEY DISEASE (H): ICD-10-CM

## 2024-10-15 DIAGNOSIS — D64.9 ANEMIA, UNSPECIFIED TYPE: ICD-10-CM

## 2024-10-15 DIAGNOSIS — E11.22 TYPE 2 DIABETES MELLITUS WITH STAGE 3A CHRONIC KIDNEY DISEASE, WITH LONG-TERM CURRENT USE OF INSULIN (H): ICD-10-CM

## 2024-10-15 DIAGNOSIS — R91.8 PULMONARY NODULES: Primary | ICD-10-CM

## 2024-10-15 DIAGNOSIS — K86.0 ALCOHOL-INDUCED CHRONIC PANCREATITIS (H): Primary | ICD-10-CM

## 2024-10-15 DIAGNOSIS — R63.4 UNINTENTIONAL WEIGHT LOSS: ICD-10-CM

## 2024-10-15 DIAGNOSIS — Z11.1 TUBERCULOSIS SCREENING: ICD-10-CM

## 2024-10-15 DIAGNOSIS — I77.6 VASCULITIS (H): ICD-10-CM

## 2024-10-15 DIAGNOSIS — N28.9 RENAL INSUFFICIENCY: ICD-10-CM

## 2024-10-15 DIAGNOSIS — N18.31 TYPE 2 DIABETES MELLITUS WITH STAGE 3A CHRONIC KIDNEY DISEASE, WITH LONG-TERM CURRENT USE OF INSULIN (H): ICD-10-CM

## 2024-10-15 DIAGNOSIS — F10.10 ALCOHOL ABUSE: ICD-10-CM

## 2024-10-15 DIAGNOSIS — D63.1 ANEMIA DUE TO STAGE 3B CHRONIC KIDNEY DISEASE (H): ICD-10-CM

## 2024-10-15 DIAGNOSIS — N18.32 CKD STAGE 3B, GFR 30-44 ML/MIN (H): ICD-10-CM

## 2024-10-15 DIAGNOSIS — Z79.4 TYPE 2 DIABETES MELLITUS WITH STAGE 3A CHRONIC KIDNEY DISEASE, WITH LONG-TERM CURRENT USE OF INSULIN (H): ICD-10-CM

## 2024-10-15 DIAGNOSIS — R93.89 ABNORMAL CXR: ICD-10-CM

## 2024-10-15 DIAGNOSIS — R80.1 PERSISTENT PROTEINURIA: ICD-10-CM

## 2024-10-15 DIAGNOSIS — L97.519 DIABETIC ULCER OF TOE OF RIGHT FOOT ASSOCIATED WITH TYPE 2 DIABETES MELLITUS, UNSPECIFIED ULCER STAGE (H): ICD-10-CM

## 2024-10-15 DIAGNOSIS — E87.5 HYPERKALEMIA: ICD-10-CM

## 2024-10-15 DIAGNOSIS — R31.9 HEMATURIA, UNSPECIFIED TYPE: ICD-10-CM

## 2024-10-15 DIAGNOSIS — R52 PAIN: ICD-10-CM

## 2024-10-15 DIAGNOSIS — M00.9 PYOGENIC ARTHRITIS OF RIGHT KNEE JOINT, DUE TO UNSPECIFIED ORGANISM (H): ICD-10-CM

## 2024-10-15 DIAGNOSIS — I10 PRIMARY HYPERTENSION: ICD-10-CM

## 2024-10-15 DIAGNOSIS — E11.621 DIABETIC ULCER OF TOE OF RIGHT FOOT ASSOCIATED WITH TYPE 2 DIABETES MELLITUS, UNSPECIFIED ULCER STAGE (H): ICD-10-CM

## 2024-10-15 LAB
ALBUMIN MFR UR ELPH: 93.2 MG/DL
ALBUMIN SERPL BCG-MCNC: 3.3 G/DL (ref 3.5–5.2)
ALBUMIN UR-MCNC: 70 MG/DL
ALP SERPL-CCNC: 121 U/L (ref 40–150)
ALT SERPL W P-5'-P-CCNC: <5 U/L (ref 0–70)
ANION GAP SERPL CALCULATED.3IONS-SCNC: 9 MMOL/L (ref 7–15)
APPEARANCE UR: CLEAR
AST SERPL W P-5'-P-CCNC: 15 U/L (ref 0–45)
BASOPHILS # BLD AUTO: 0.1 10E3/UL (ref 0–0.2)
BASOPHILS NFR BLD AUTO: 1 %
BILIRUB DIRECT SERPL-MCNC: <0.2 MG/DL (ref 0–0.3)
BILIRUB SERPL-MCNC: 0.2 MG/DL
BILIRUB UR QL STRIP: NEGATIVE
BUN SERPL-MCNC: 22.3 MG/DL (ref 6–20)
CALCIUM SERPL-MCNC: 8.6 MG/DL (ref 8.8–10.4)
CHLORIDE SERPL-SCNC: 98 MMOL/L (ref 98–107)
COLOR UR AUTO: ABNORMAL
CREAT SERPL-MCNC: 1.35 MG/DL (ref 0.67–1.17)
CREAT UR-MCNC: 65.9 MG/DL
CRP SERPL-MCNC: 69.2 MG/L
CYSTATIN C (ROCHE): 2 MG/L (ref 0.6–1)
EGFRCR SERPLBLD CKD-EPI 2021: 62 ML/MIN/1.73M2
EOSINOPHIL # BLD AUTO: 0.3 10E3/UL (ref 0–0.7)
EOSINOPHIL NFR BLD AUTO: 3 %
ERYTHROCYTE [DISTWIDTH] IN BLOOD BY AUTOMATED COUNT: 17.1 % (ref 10–15)
GFR/BSA.PRED SERPLBLD CYS-BASED-ARV: 31 ML/MIN/1.73M2
GLUCOSE SERPL-MCNC: 278 MG/DL (ref 70–99)
GLUCOSE UR STRIP-MCNC: 200 MG/DL
HCO3 SERPL-SCNC: 24 MMOL/L (ref 22–29)
HCT VFR BLD AUTO: 27.1 % (ref 40–53)
HGB BLD-MCNC: 8.3 G/DL (ref 13.3–17.7)
HGB UR QL STRIP: ABNORMAL
HYALINE CASTS: 14 /LPF
IMM GRANULOCYTES # BLD: 0 10E3/UL
IMM GRANULOCYTES NFR BLD: 0 %
KETONES UR STRIP-MCNC: NEGATIVE MG/DL
LEUKOCYTE ESTERASE UR QL STRIP: NEGATIVE
LYMPHOCYTES # BLD AUTO: 2.1 10E3/UL (ref 0.8–5.3)
LYMPHOCYTES NFR BLD AUTO: 22 %
MCH RBC QN AUTO: 23.5 PG (ref 26.5–33)
MCHC RBC AUTO-ENTMCNC: 30.6 G/DL (ref 31.5–36.5)
MCV RBC AUTO: 77 FL (ref 78–100)
MONOCYTES # BLD AUTO: 0.8 10E3/UL (ref 0–1.3)
MONOCYTES NFR BLD AUTO: 8 %
MUCOUS THREADS #/AREA URNS LPF: PRESENT /LPF
NEUTROPHILS # BLD AUTO: 6.3 10E3/UL (ref 1.6–8.3)
NEUTROPHILS NFR BLD AUTO: 67 %
NITRATE UR QL: NEGATIVE
NRBC # BLD AUTO: 0 10E3/UL
NRBC BLD AUTO-RTO: 0 /100
PH UR STRIP: 5.5 [PH] (ref 5–7)
PHOSPHATE SERPL-MCNC: 3.1 MG/DL (ref 2.5–4.5)
PLATELET # BLD AUTO: 620 10E3/UL (ref 150–450)
POTASSIUM SERPL-SCNC: 4 MMOL/L (ref 3.4–5.3)
PROT SERPL-MCNC: 8.2 G/DL (ref 6.4–8.3)
PROT/CREAT 24H UR: 1.41 MG/MG CR (ref 0–0.2)
RBC # BLD AUTO: 3.53 10E6/UL (ref 4.4–5.9)
RBC URINE: 3 /HPF
SODIUM SERPL-SCNC: 131 MMOL/L (ref 135–145)
SP GR UR STRIP: 1.01 (ref 1–1.03)
UROBILINOGEN UR STRIP-MCNC: NORMAL MG/DL
WBC # BLD AUTO: 9.5 10E3/UL (ref 4–11)
WBC URINE: 1 /HPF

## 2024-10-15 PROCEDURE — 86235 NUCLEAR ANTIGEN ANTIBODY: CPT | Performed by: INTERNAL MEDICINE

## 2024-10-15 PROCEDURE — 86160 COMPLEMENT ANTIGEN: CPT | Performed by: INTERNAL MEDICINE

## 2024-10-15 PROCEDURE — 84100 ASSAY OF PHOSPHORUS: CPT | Performed by: PATHOLOGY

## 2024-10-15 PROCEDURE — 86481 TB AG RESPONSE T-CELL SUSP: CPT | Performed by: STUDENT IN AN ORGANIZED HEALTH CARE EDUCATION/TRAINING PROGRAM

## 2024-10-15 PROCEDURE — 84156 ASSAY OF PROTEIN URINE: CPT | Performed by: PATHOLOGY

## 2024-10-15 PROCEDURE — 82610 CYSTATIN C: CPT | Performed by: INTERNAL MEDICINE

## 2024-10-15 PROCEDURE — 99205 OFFICE O/P NEW HI 60 MIN: CPT | Performed by: INTERNAL MEDICINE

## 2024-10-15 PROCEDURE — 99000 SPECIMEN HANDLING OFFICE-LAB: CPT | Performed by: PATHOLOGY

## 2024-10-15 PROCEDURE — 81001 URINALYSIS AUTO W/SCOPE: CPT | Performed by: PATHOLOGY

## 2024-10-15 PROCEDURE — G0463 HOSPITAL OUTPT CLINIC VISIT: HCPCS | Performed by: INTERNAL MEDICINE

## 2024-10-15 PROCEDURE — 82248 BILIRUBIN DIRECT: CPT | Performed by: PATHOLOGY

## 2024-10-15 PROCEDURE — 85025 COMPLETE CBC W/AUTO DIFF WBC: CPT | Performed by: PATHOLOGY

## 2024-10-15 PROCEDURE — 80053 COMPREHEN METABOLIC PANEL: CPT | Performed by: PATHOLOGY

## 2024-10-15 PROCEDURE — 99212 OFFICE O/P EST SF 10 MIN: CPT | Performed by: PHYSICIAN ASSISTANT

## 2024-10-15 PROCEDURE — 99417 PROLNG OP E/M EACH 15 MIN: CPT | Performed by: INTERNAL MEDICINE

## 2024-10-15 PROCEDURE — G2211 COMPLEX E/M VISIT ADD ON: HCPCS | Performed by: PHYSICIAN ASSISTANT

## 2024-10-15 PROCEDURE — 86140 C-REACTIVE PROTEIN: CPT | Performed by: PATHOLOGY

## 2024-10-15 PROCEDURE — 36415 COLL VENOUS BLD VENIPUNCTURE: CPT | Performed by: PATHOLOGY

## 2024-10-15 RX ORDER — OXYCODONE HYDROCHLORIDE 5 MG/1
5 TABLET ORAL EVERY 6 HOURS PRN
Qty: 10 TABLET | Refills: 0 | Status: SHIPPED | OUTPATIENT
Start: 2024-10-15

## 2024-10-15 RX ORDER — SILDENAFIL 50 MG/1
TABLET, FILM COATED ORAL PRN
COMMUNITY

## 2024-10-15 RX ORDER — ACYCLOVIR 400 MG/1
TABLET ORAL
COMMUNITY
Start: 2024-09-20

## 2024-10-15 RX ORDER — TRIAMCINOLONE ACETONIDE 1 MG/G
OINTMENT TOPICAL
COMMUNITY
Start: 2023-05-03

## 2024-10-15 ASSESSMENT — PAIN SCALES - GENERAL: PAINLEVEL: SEVERE PAIN (7)

## 2024-10-15 NOTE — ASSESSMENT & PLAN NOTE
He has a right knee inflammatory arthritis, etiology unclear at this time.  He was evaluated by infectious disease on October 2, 2024.   PET scan ordered.  IgG4 levels noted to be elevated.  His inflammatory arthritis is culture negative multiple times and extensive ID workup has been negative so far.  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 Upcoming appointment with Rheumatology 10/29/24.    Uses oxycodone 5 mg every 6 hours as needed.  He uses it only for severe pain.  CSA was signed today.  PDMP was reviewed.  He is also interested in looking into medical marijuana.

## 2024-10-15 NOTE — PROGRESS NOTES
Update from Dr. Chinchilla to support patient getting scheduled for CT scan as well as IR Kidney biopsy.  IR biopsy placed in visit.  CT scheduled for early next week for 10/22 505pm checkin at [facility] for 520pm CT scan.  No food or fluid restrictions.  Patient prefer afternoon appts and [facility] closest to patient vs [facility].  Confirmed with patient he is not on any blood thinners or aspirin at this time.  Updated MAR to prevent confusion.  Depending on labs may need urgent biopsy requested.   Fwd to [facility] team to watch for results and update Dr. Chinchilla on results and update patient/IR team if more urgent biopsy is needed.  Updated patient of CT appt for next week as patient is at another appt at this time.  Darlene Luna RN, BSN, PHN  [facility] Nephrology Nurse Navigator  [phone]

## 2024-10-15 NOTE — LETTER
Opioid / Opioid Plus Controlled Substance Agreement    This is an agreement between you and your provider about the safe and appropriate use of controlled substance/opioids prescribed by your care team. Controlled substances are medicines that can cause physical and mental dependence (abuse).    There are strict laws about having and using these medicines. We here at [facility] are committing to working with you in your efforts to get better. To support you in this work, we ll help you schedule regular office appointments for medicine refills. If we must cancel or change your appointment for any reason, we ll make sure you have enough medicine to last until your next appointment.     As a Provider, I will:  Listen carefully to your concerns and treat you with respect.   Recommend a treatment plan that I believe is in your best interest. This plan may involve therapies other than opioid pain medication.   Talk with you often about the possible benefits, and the risk of harm of any medicine that we prescribe for you.   Provide a plan on how to taper (discontinue or go off) using this medicine if the decision is made to stop its use.    As a Patient, I understand that opioid(s):   Are a controlled substance prescribed by my care team to help me function or work and manage my condition(s).   Are strong medicines and can cause serious side effects such as:  Drowsiness, which can seriously affect my driving ability  A lower breathing rate, enough to cause death  Harm to my thinking ability   Depression   Abuse of and addiction to this medicine  Need to be taken exactly as prescribed. Combining opioids with certain medicines or chemicals (such as illegal drugs, sedatives, sleeping pills, and benzodiazepines) can be dangerous or even fatal. If I stop opioids suddenly, I may have severe withdrawal symptoms.  Do not work for all types of pain nor for all patients. If they re not helpful, I may be asked to stop  them.        The risks, benefits and side effects of these medicine(s) were explained to me. I agree that:  I will take part in other treatments as advised by my care team. This may be psychiatry or counseling, physical therapy, behavioral therapy, group treatment or a referral to a specialist.     I will keep all my appointments. I understand that this is part of the monitoring of opioids. My care team may require an office visit for EVERY opioid/controlled substance refill. If I miss appointments or don t follow instructions, my care team may stop my medicine.    I will take my medicines as prescribed. I will not change the dose or schedule unless my care team tells me to. There will be no refills if I run out early.     I may be asked to come to the clinic and complete a urine drug test or complete a pill count at any time. If I don t give a urine sample or participate in a pill count, the care team may stop my medicine.    I will only receive prescriptions from this clinic for chronic pain. If I am treated by another provider for acute pain issues, I will tell them that I am taking opioid pain medication for chronic pain and that I have a treatment agreement with this provider. I will inform my [facility] care team within one business day if I am given a prescription for any pain medication by another healthcare provider. My [facility] care team can contact other providers and pharmacists about my use of any medicines.    It is up to me to make sure that I don t run out of my medicines on weekends or holidays. If my care team is willing to refill my opioid prescription without a visit, I must request refills only during office hours. Refills may take up to 3 business days to process. I will use one pharmacy to fill all my opioid and other controlled substance prescriptions. I will notify the clinic about any changes to my insurance or medication availability.    I am responsible for my  prescriptions. If the medicine/prescription is lost, stolen or destroyed, it will not be replaced. I also agree not to share controlled substance medicines with anyone.    I am aware I should not use any illegal or recreational drugs. I agree not to drink alcohol unless my care team says I can.       If I enroll in the [place] [facility] program, I will tell my care team prior to my next refill.     I will tell my care team right away if I become pregnant, have a new medical problem treated outside of my regular clinic, or have a change in my medications.    I understand that this medicine can affect my thinking, judgment and reaction time. Alcohol and drugs affect the brain and body, which can affect the safety of my driving. Being under the influence of alcohol or drugs can affect my decision-making, behaviors, personal safety, and the safety of others. Driving while impaired (DWI) can occur if a person is driving, operating, or in physical control of a car, motorcycle, boat, snowmobile, ATV, motorbike, off-road vehicle, or any other motor vehicle ([place] Statute 169A.20). I understand the risk if I choose to drive or operate any vehicle or machinery.    I understand that if I do not follow any of the conditions above, my prescriptions or treatment may be stopped or changed.          Opioids  What You Need to Know    What are opioids?   Opioids are pain medicines that must be prescribed by a doctor. They are also known as narcotics.     Examples are:   morphine (MS Contin, Nichole)  oxycodone (Oxycontin)  oxycodone and acetaminophen (Percocet)  hydrocodone and acetaminophen (Vicodin, Norco)   fentanyl patch (Duragesic)   hydromorphone (Dilaudid)   methadone  codeine (Tylenol #3)     What do opioids do well?   Opioids are best for severe short-term pain such as after a surgery or injury. They may work well for cancer pain. They may help some people with long-lasting (chronic) pain.     What do opioids NOT do  well?   Opioids never get rid of pain entirely, and they don t work well for most patients with chronic pain. Opioids don t reduce swelling, one of the causes of pain.                                    Other ways to manage chronic pain and improve function include:     Treat the health problem that may be causing pain  Anti-inflammation medicines, which reduce swelling and tenderness, such as ibuprofen (Advil, Motrin) or naproxen (Aleve)  Acetaminophen (Tylenol)  Antidepressants and anti-seizure medicines, especially for nerve pain  Topical treatments such as patches or creams  Injections or nerve blocks  Chiropractic or osteopathic treatment  Acupuncture, massage, deep breathing, meditation, visual imagery, aromatherapy  Use heat or ice at the pain site  Physical therapy   Exercise  Stop smoking  Take part in therapy       Risks and side effects     Talk to your doctor before you start or decide to keep taking opioids. Possible side effects include:    Lowering your breathing rate enough to cause death  Overdose, including death, especially if taking higher than prescribed doses  Worse depression symptoms; less pleasure in things you usually enjoy  Feeling tired or sluggish  Slower thoughts or cloudy thinking  Being more sensitive to pain over time; pain is harder to control  Trouble sleeping or restless sleep  Changes in hormone levels (for example, less testosterone)  Changes in sex drive or ability to have sex  Constipation  Unsafe driving  Itching and sweating  Dizziness  Nausea, throwing up and dry mouth    What else should I know about opioids?    Opioids may lead to dependence, tolerance, or addiction.    Dependence means that if you stop or reduce the medicine too quickly, you will have withdrawal symptoms. These include loose poop (diarrhea), jitters, flu-like symptoms, nervousness and tremors. Dependence is not the same as addiction.                     Tolerance means needing higher doses over time to  get the same effect. This may increase the chance of serious side effects.    Addiction is when people improperly use a substance that harms their body, their mind or their relations with others. Use of opiates can cause a relapse of addiction if you have a history of drug or alcohol abuse.    People who have used opioids for a long time may have a lower quality of life, worse depression, higher levels of pain and more visits to doctors.    You can overdose on opioids. Take these steps to lower your risk of overdose:    Recognize the signs:  Signs of overdose include decrease or loss of consciousness (blackout), slowed breathing, trouble waking up and blue lips. If someone is worried about overdose, they should call 911.    Talk to your doctor about Narcan (naloxone).   If you are at risk for overdose, you may be given a prescription for Narcan. This medicine very quickly reverses the effects of opioids.   If you overdose, a friend or family member can give you Narcan while waiting for the ambulance. They need to know the signs of overdose and how to give Narcan.     Don't use alcohol or street drugs.   Taking them with opioids can cause death.    Do not take any of these medicines unless your doctor says it s OK. Taking these with opioids can cause death:  Benzodiazepines, such as lorazepam (Ativan), alprazolam (Xanax) or diazepam (Valium)  Muscle relaxers, such as cyclobenzaprine (Flexeril)  Sleeping pills like zolpidem (Ambien)   Other opioids      How to keep you and other people safe while taking opioids:    Never share your opioids with others.  Opioid medicines are regulated by the Drug Enforcement Agency (ARACELY). Selling or sharing medications is a criminal act.    2. Be sure to store opioids in a secure place, locked up if possible. Young children can easily swallow them and overdose.    3. When you are traveling with your medicines, keep them in the original bottles. If you use a pill box, be sure you also  carry a copy of your medicine list from your clinic or pharmacy.    4. Safe disposal of opioids    Most pharmacies have places to get rid of medicine, called disposal kiosks. Medicine disposal options are also available in every [facility]. Search your county and  medication disposal  to find more options. You can find more details at:  [email or web address]://[email or web address]-green/[email or web address]-medications     I agree that my provider, clinic care team, and pharmacy may work with any city, state or federal law enforcement agency that investigates the misuse, sale, or other diversion of my controlled medicine. I will allow my provider to discuss my care with, or share a copy of, this agreement with any other treating provider, pharmacy or emergency room where I receive care.    I have read this agreement and have asked questions about anything I did not understand.    _______________________________________________________  Patient Signature - [place] _____________________                   Date     _______________________________________________________  Provider Signature - Rena Blair PA-C   _____________________                   Date     _______________________________________________________  Witness Signature (required if provider not present while patient signing)   _____________________                   Date

## 2024-10-15 NOTE — ASSESSMENT & PLAN NOTE
Last Comprehensive Metabolic Panel:  Lab Results   Component Value Date     (L) 10/15/2024    POTASSIUM 4.0 10/15/2024    CHLORIDE 98 10/15/2024    CO2 24 10/15/2024    ANIONGAP 9 10/15/2024     (H) 10/15/2024    BUN 22.3 (H) 10/15/2024    CR 1.35 (H) 10/15/2024    GFRESTIMATED 62 10/15/2024    AROLDO 8.6 (L) 10/15/2024     Seen by nephrology earlier today.  He is going to undergo kidney biopsy.  Creatinine 1.35 with GFR 62.  Awaiting Cystatin C at this time.  He is to avoid NSAIDs.

## 2024-10-15 NOTE — PATIENT INSTRUCTIONS
Please do not take Aspirin, aleve, motrin, ibuprofen.  Continue the other medications.  We will schedule you to have a kidney biopsy by the interventional radiology service.

## 2024-10-15 NOTE — PROGRESS NOTES
[facility]  Orthopaedic Surgery      Long Mayfield MRN# [id number]    YOB: 1970     Background history:  DX:  Acute metabolic encephalopathy  Hyperkalemia  Chronic pancreatitis  Type 2 diabetes with kidney complication  Alcohol abuse hyperglycemia  Primary hypertension  Cardiac calcification  GERD.    Anemia  Acute renal insufficiency    TREATMENTS:  1997, open reduction and intra fixation right patella  7/11/2024, I&D of right knee, Dr. Espinoza (Cultures negative)  8/17/2024, I&D of right knee, Dr. Phoenix (Cultures negative)  9/23/2024, Right knee aspiration, (Cutlures negative, Cytology negative)           Assessment and Plan:   Assessment:  54-year-old male with chronic right knee pain, severe bulky synovitis with extension proximally into the thigh, and recurrent effusions of unknown etiology    Plan:  We had a long discussion with the patient regarding his laboratory findings, imaging findings, and clinical presentation.  At this time, we are still uncertain as to the likely etiology of the patient's recurrent knee effusions, marked right lower extremity swelling, right knee pain, and persistently elevated synovial white blood cell counts.  Current workup has included both rheumatologic and infectious workups all which have appeared to be negative.  These infectious workups have included PCR and genetic analysis, as well as screening for tuberculosis--all which have been negative.  His advanced imaging does not appear to suggest osteomyelitis and the MRI demonstrates large complex knee effusions but no findings out right leg suggestive of PVNS.  In order to better elucidate the cause of the patient's symptoms, we believe that the next best course of action would be to obtain a synovial biopsy.  Therefore we will  attempt a synovial biopsy here in clinic.  If that proves unsuccessful then the patient will be scheduled for synovial biopsy of the right knee in the OR.  All patient's  questions were answered to his satisfaction.    Carlos Solorio MD  Fellow Adult Reconstruction     Attending MD (Dr. Dickson Day) Attestation :  This patient was seen and evaluated by me including a history, exam, and interpretation of all imaging and/or lab data.  I formulated the treatment plan along with either a physician's assistant (MAX) or training physician (resident/fellow), who also saw the patient. That individual or a scribe has documented the visit in the attached note which I approve.    MD Tashia Monet [facility]   Oncology and Adult Reconstructive Surgery  Dept Orthopaedic Surgery, [facility]  [phone] office, [phone] pager  [email or web address]          Procedure:   After informed consent repeat scanning was performed to identify the thickened synovium and medial intra-articular septation and adjacent anatomical structures. The skin was prepped and draped in sterile fashion.  1% lidocaine was used for local anesthesia.  Ultrasound was necessary to assure intrasynovial positioning and to avoid vital adjacent structures. Ultrasound guidance was used to pass a 14-gauge core biopsy needle into the mass for tissue biopsy. 10 passes were made.  Following the core needle biopsy 30 mL of serosanguineous complex fluid was aspirated from the joint and sent for labs. Images were permanently stored for the patient's record.The specimen was immediately placed in formalin and sent to the lab. The patient tolerated the procedure well and there were no immediate complications following the procedure.    1% lidocaine: Lot# [id number]    expiration Feb 1, 2025    Darren Oviedo PA-C  Physician Assistant   Oncology and Adult Reconstructive Surgery  Dept Orthopaedic Surgery, [facility]            History of Present Illness:   54 year old male who presents today for evaluation of a chronic right knee effusion.  Patient states he was getting Synvisc injections of bilateral knees for  arthritis at [facility].  Following his last Synvisc injection in May 2024 the right knee began to swell.  Knee was aspirated which showed a cell count of 76K and a week later at 100 9K.  All cultures were negative.  He was transferred to [facility] and did an I&D on 8/14/2024.  To follow-up I&D on 8/17/2024 by Dr. Phoenix.  All cultures have been negative and following the second I&D was placed on ciprofloxacin and referred to rheumatology and infectious disease.  He has had chronically elevated sed rate and CRP.  All cultures have remained negative.  Rheumatology and infectious disease reach out to me to rule out PVNS or other causes of chronic effusions.  He was recently evaluated in the orthopedic clinic where he underwent a right knee aspiration and the synovial fluid was sent for culture and cytology.  Both results to this workup were negative.  He has also been evaluated by rheumatology who has completed a autoimmune workup that has been inconclusive.  The patient's symptoms remain unchanged she has marked swelling to the right knee with a large effusion.  He has no draining sinuses or surrounding erythema.  He now requires a cane for ambulation assistance secondary to the right knee pain.      Of note the patient does have a right foot TMA (November 2023) for a infected diabetic foot ulcer which grew ESBL.             Physical Exam:     EXAMINATION pertinent findings:   PSYCH: Pleasant, healthy-appearing, alert, oriented x3, cooperative. Normal mood and affect.  VITAL SIGNS: There were no vitals taken for this visit..  Reviewed nursing intake notes.   There is no height or weight on file to calculate BMI.  RESP: non labored breathing   ABD: benign, soft, non-tender, no acute peritoneal findings  SKIN: grossly normal   LYMPHATIC: grossly normal, no adenopathy, no extremity edema  NEURO: grossly normal , no motor deficits  VASCULAR: satisfactory perfusion of all extremities    MUSCULOSKELETAL:     Right knee:   The incision is clean, dry, and intact with no erythema, dehiscence, or discharge.  Large joint effusion.  Pain with palpation diffusely throughout the knee.  Range of motion 0 to 90 degrees.  Good stability with varus valgus stress.  Normal patellar tracking minimal peripheral edema.    Right LE:              Thigh and leg compartments soft and compressible              +Quad/TA/GSC/FHL/EHL              SILT DP/SP/Cindy/Saph/Tib nerve distributions              Palpable dorsalis pedis pulse              Data:   All laboratory data reviewed  All imaging studies reviewed by me    9/21/2024 MRI right knee with and without contrast: Large joint effusion.  No soft tissue lesions appreciated.  Reactive synovitis noted.  Large complex popliteal cyst noted.  Subchondral tibial cysts with reactive edema.  Tricompartmental DJD with degenerative changes of the meniscus.    IMPRESSION:  1.  Large complex joint effusion with mildly thickened enhancing synovium of indeterminate etiology. Findings may be sequela of degenerative arthropathy, or an inflammatory or infectious arthropathy. If clinically indicated repeat arthrocentesis could be   performed for further characterization.  2.  Mild patchy enhancing edema-like marrow signal of the knee which is likely reactive. Early osteomyelitis cannot be entirely excluded, though, is considered less likely.  3.  Tricompartmental degenerative arthrosis.  4.  Marked mucoid degeneration/maceration of the medial and lateral menisci.  5.  Chronic rupture of the ACL.  6.  Mucoid degeneration and/or partial tearing of the PCL.  7.  Intact collateral ligaments.     Recent Labs   Lab Test 09/14/24  1057 09/13/24  0858 09/12/24  0758 09/11/24  1129 08/27/24  1725 08/16/24  0610 08/14/24  1253 07/12/24  0716 07/10/24  1157   HGB 8.5* 8.4* 6.4*   < > 7.3*   < > 7.4*   < > 8.3*   SED  --   --   --   --  99*  --  105*  --  112*   CRPI  --  176.09*  --    < > 39.60*    < > 103.00*   < > 113.00*   WBC 10.6 12.1* 12.3*   < > 8.4   < > 8.0   < > 10.4    < > = values in this interval not displayed.     Recent Labs   Lab Test 08/27/24  1709 08/14/24  1338 07/10/24  1136   FNEU 96 100 93   FCOL Red* Yellow Yellow   FAPR Bloody* Turbid* Turbid*   FWBC 53,900 94,560 65,670      09/11/2024 1807 09/13/2024 1341 Respiratory Aerobic Bacterial Culture with Gram Stain [[id number]]   Sputum from Expectorate    Final result Component Value   Culture 2+ Normal Connor   Gram Stain Result <10 Squamous epithelial cells/low power field    >25 PMNs/low power field    1+ Mixed connor          09/11/2024 1801 09/12/2024 0554 Enteric Bacteria and Virus Panel PCR [[id number]]    Stool from Per Rectum    Final result Component Value   Campylobacter species Negative   Salmonella species Negative   Vibrio species Negative   Vibrio cholerae Negative   Yersinia enterocolitica Negative   Enteropathogenic E. coli (EPEC) Negative   Shiga-like toxin-producing E. coli (STEC) Negative   Shigella/Enteroinvasive E. coli (EIEC) Negative   Cryptosporidium species Negative   Giardia lamblia Negative   Norovirus Gl/Gll Negative   Rotavirus A Negative   Plesiomonas shigelloides Negative   Enteroaggregative E. coli (EAEC) Negative   Enterotoxigenic E. coli (ETEC) Negative   E. coli O157 NA   Cyclospora cayetanensis Negative   Entamoeba histolytica Negative   Adenovirus F40/41 Negative   Astrovirus Negative   Sapovirus Negative          09/11/2024 1801 09/11/2024 2326 C. difficile Toxin B PCR with reflex to C. difficile Antigen and Toxins A/B EIA [[id number]]    Stool from Per Rectum    Final result Component Value   C Difficile Toxin B by PCR Negative   A negative result does not exclude actual disease due to C. difficile and may be due to improper collection, handling and storage of the specimen or the number of organisms in the specimen is below the detection limit of the assay.          09/11/2024 1138 09/11/2024  1233 Symptomatic Influenza A/B, RSV, & SARS-CoV2 PCR (COVID-19) Nasopharyngeal [[id number]]    Swab from Nasopharyngeal    Final result Component Value   Influenza A PCR Negative   Influenza B PCR Negative   RSV PCR Negative   SARS CoV2 PCR Negative   NEGATIVE: SARS-CoV-2 (COVID-19) RNA not detected, presumed negative.          09/11/2024 1129 09/16/2024 1605 Blood Culture Peripheral Blood [[id number]]   Peripheral Blood    Final result Component Value   Culture No Growth          08/27/2024 1709 09/10/2024 1340 Anaerobic Bacterial Culture Routine [[id number]]   Synovial fluid from Knee, Right    Final result Component Value   Culture No anaerobic organisms isolated          08/27/2024 1709 09/01/2024 0708 Synovial fluid Aerobic Bacterial Culture Routine With Gram Stain [[id number]]   Synovial fluid from Knee, Right    Final result Component Value   Culture No Growth   Gram Stain Result No organisms seen    4+ WBC seen    Predominantly PMNs          08/27/2024 1709 08/27/2024 2020 Cell count with differential fluid [[id number]]    (Abnormal)   Synovial fluid from Knee, Right    Final result Component Value   No component results          08/27/2024 1709 09/22/2024 1831 Fungal or Yeast Culture Routine [[id number]]   Synovial fluid from Knee, Right    Preliminary result Component Value   Culture No growth after 26 days P          08/27/2024 1709 08/27/2024 1714 Acid-Fast Bacilli Culture and Stain [[id number]]    Synovial fluid from Knee, Right    In process Component Value   No component results          08/27/2024 1709 08/27/2024 2018 Crystal ID Synovial Fluid [[id number]]   Synovial fluid from Knee, Right    Final result Component Value   Crystals Analysis No clinically significant crystals seen.          08/27/2024 1709 08/27/2024 2018 Cell Count Body Fluid [[id number]]    (Abnormal)   Synovial fluid from Knee, Right    Final result Component Value Units   Color Red Abnormal     Clarity Bloody  Abnormal     Cell Count Fluid Source Knee, Right    Total Nucleated Cells 53,900 /uL          08/27/2024 1709 08/30/2024 0940 Acid-Fast Bacilli Culture and Stain [[id number]]    Synovial fluid from Knee, Right    Preliminary result Component Value   Acid Fast Stain No acid fast bacilli seen P   Performed By: [facility] [place], [place] [id number] Director: Raman Hummel MD, PhDCLIA Number: [id number]   Acid Fast Stain No acid fast bacilli seen P   Performed By: [facility] [place], [place] [id number] Director: Raman Hummel MD, PhDCLIA Number: [id number]  This is an appended report. These results have been appended to a previously preliminary verified report.          08/27/2024 1709 08/27/2024 2020 Differential Body Fluid [[id number]]    Synovial fluid from Knee, Right    Final result Component Value Units   % Neutrophils 96 %   % Lymphocytes 2 %   % Monocyte/Macrophages 2 %          08/17/2024 1006 08/24/2024 0722 Anaerobic Bacterial Culture Routine [[id number]]   Tissue from Knee, Right    Final result Component Value   Culture No anaerobic organisms isolated          08/17/2024 1006 08/24/2024 0722 Anaerobic Bacterial Culture Routine [[id number]]   Tissue from Knee, Right    Final result Component Value   Culture No anaerobic organisms isolated          08/17/2024 1006 09/14/2024 1023 Fungal or Yeast Culture Routine [[id number]]   Tissue from Knee, Right    Final result Component Value   Culture No Growth          08/17/2024 1006 09/14/2024 1023 Fungal or Yeast Culture Routine [[id number]]   Tissue from Knee, Right    Final result Component Value   Culture No Growth          08/17/2024 1006 08/22/2024 0705 Tissue Aerobic Bacterial Culture Routine [[id number]]   Tissue from Knee, Right    Final result Component Value   Culture No Growth          08/17/2024 1006 08/22/2024 0709 Tissue Aerobic Bacterial Culture Routine [[id number]]    Tissue from Knee, Right    Final result Component Value   Culture No Growth          08/17/2024 1005 08/24/2024 0722 Anaerobic Bacterial Culture Routine [[id number]]   Tissue from Knee, Right    Final result Component Value   Culture No anaerobic organisms isolated          08/17/2024 1005 09/14/2024 1023 Fungal or Yeast Culture Routine [[id number]]   Tissue from Knee, Right    Final result Component Value   Culture No Growth          08/17/2024 1005 08/22/2024 0705 Tissue Aerobic Bacterial Culture Routine [[id number]]   Tissue from Knee, Right    Final result Component Value   Culture No Growth          08/17/2024 1005 08/26/2024 0755 Bacterial DNA 16S Ribosomal Non Blood with Reflex to NGS 16S with Reflex to Enterobacterales Identification [[id number]]   Tissue from Knee, Right    Final result Component Value   See Scanned Result BACTERIAL DNA 16S RIBOSOMAL NON BLOOD WITH REFLEX TO NGS 16S WITH REFLEX TO ENTEROBACTERALES IDENTIF-Scanned          08/17/2024 1005 08/26/2024 0757 Fungal DNA 28S Ribosomal Non Blood [[id number]]   Tissue from Knee, Right    Edited Result - FINAL Component Value   See Scanned Result FUNGAL DNA 28S RIBOSOMAL NON BLOOD-Scanned          08/14/2024 1338 08/28/2024 0754 Anaerobic Bacterial Culture Routine [[id number]]   Aspirate from Knee, Right    Final result Component Value   Culture No anaerobic organisms isolated          08/14/2024 1338 08/14/2024 1607 Cell count with differential fluid [[id number]]    (Abnormal)   Synovial fluid from Knee, Right    Final result Component Value   No component results          08/14/2024 1338 08/19/2024 0704 Aspirate Aerobic Bacterial Culture Routine Without Gram Stain [[id number]]   Aspirate from Knee, Right    Final result Component Value   Culture No Growth          08/14/2024 1338 09/11/2024 0716 Fungal or Yeast Culture Routine [[id number]]   Aspirate from Knee, Right    Final result Component Value   Culture No Growth           08/14/2024 1338 08/14/2024 1609 Crystal ID Synovial Fluid [[id number]]   Synovial fluid from Knee, Right    Final result Component Value   Crystals Analysis No clinically significant crystals seen.          08/14/2024 1338 08/14/2024 1605 Cell Count Body Fluid [[id number]]    (Abnormal)   Synovial fluid from Knee, Right    Final result Component Value Units   Color Yellow    Clarity Turbid Abnormal     Cell Count Fluid Source Knee, Right    Total Nucleated Cells 94,560 /uL          08/14/2024 1338 08/14/2024 1607 Differential Body Fluid [[id number]]    Synovial fluid from Knee, Right    Final result Component Value Units   % Neutrophils 100 %   % Lymphocytes 0 %   % Monocyte/Macrophages 0 %          07/13/2024 1556 07/14/2024 1154 Chlamydia trachomatis/Neisseria gonorrhoeae by PCR [[id number]]   Urine, Voided    Final result Component Value   Chlamydia Trachomatis Negative   Negative for C. trachomatis rRNA by transcription mediated amplification.  A negative result by transcription mediated amplification does not preclude the presence of infection because results are dependent on proper and adequate collection, absence of inhibitors and sufficient rRNA to be detected.   Neisseria gonorrhoeae Negative   Negative for N. gonorrhoeae rRNA by transcription mediated amplification. A negative result by transcription mediated amplification does not preclude the presence of C. trachomatis infection because results are dependent on proper and adequate collection, absence of inhibitors and sufficient rRNA to be detected.          07/12/2024 2112 07/15/2024 1345 ESBL Organism Culture [[id number]]   Stool from Per Rectum    Final result Component Value   Culture No ESBL-producing organisms isolated. A negative result does not preclude the presence of ESBL-producing organisms.          07/11/2024 1913 07/25/2024 0749 Anaerobic Bacterial Culture Routine [[id number]]   Aspirate from Knee, Right    Final result  Component Value   Culture No anaerobic organisms isolated          07/11/2024 1913 07/25/2024 1200 Anaerobic Bacterial Culture Routine [[id number]]   Aspirate from Knee, Right    Final result Component Value   Culture No anaerobic organisms isolated          07/11/2024 1913 07/11/2024 2200 Gram Stain [[id number]]   Aspirate from Knee, Right    Final result Component Value   GS Culture See corresponding culture for results   Gram Stain Result No organisms seen   Gram Stain Result 4+ WBC seen   Predominantly PMNs          07/11/2024 1913 07/11/2024 2203 Gram Stain [[id number]]   Aspirate from Knee, Right    Final result Component Value   GS Culture See corresponding culture for results   Gram Stain Result No organisms seen   Gram Stain Result 4+ WBC seen   Predominantly PMNs          07/11/2024 1913 08/08/2024 0809 Fungal or Yeast Culture Routine [[id number]]   Aspirate from Knee, Right    Final result Component Value   Culture No Growth          07/11/2024 1913 08/08/2024 0809 Fungal or Yeast Culture Routine [[id number]]   Aspirate from Knee, Right    Final result Component Value   Culture No Growth          07/11/2024 1913 07/16/2024 0726 Aspirate Aerobic Bacterial Culture Routine [[id number]]   Aspirate from Knee, Right    Final result Component Value   Culture No Growth          07/11/2024 1913 07/16/2024 0726 Aspirate Aerobic Bacterial Culture Routine [[id number]]   Aspirate from Knee, Right    Final result Component Value   Culture No Growth          07/11/2024 1913 07/22/2024 0915 Bacterial DNA 16S Ribosomal Non Blood with Reflex to NGS 16S with Reflex to Enterobacterales Identification [[id number]]   Body fluid, unsp from Knee, Right    Final result Component Value   See Scanned Result BACTERIAL DNA 16S RIBOSOMAL NON BLOOD WITH REFLEX TO NGS 16S WITH REFLEX TO ENTEROBACTERALES IDENTIF-Scanned          07/11/2024 1912 07/25/2024 0757 Anaerobic Bacterial Culture Routine [[id number]]   Aspirate  from Knee, Right    Final result Component Value   Culture No anaerobic organisms isolated          07/11/2024 1912 07/11/2024 2135 Gram Stain [[id number]]   Aspirate from Knee, Right    Final result Component Value   GS Culture See corresponding culture for results   Gram Stain Result No organisms seen   Gram Stain Result 4+ WBC seen   Predominantly PMNs          07/11/2024 1912 08/08/2024 0809 Fungal or Yeast Culture Routine [[id number]]   Aspirate from Knee, Right    Final result Component Value   Culture No Growth          07/11/2024 1912 07/16/2024 0733 Aspirate Aerobic Bacterial Culture Routine [[id number]]   Aspirate from Knee, Right    Final result Component Value   Culture No Growth          07/10/2024 1136 07/24/2024 0938 Anaerobic Bacterial Culture Routine [[id number]]   Aspirate from Knee, Right    Final result Component Value   Culture No anaerobic organisms isolated          07/10/2024 1136 07/10/2024 1506 Cell count with differential fluid [[id number]]    (Abnormal)   Synovial fluid from Knee, Right    Final result Component Value   No component results          07/10/2024 1136 07/15/2024 0715 Aspirate Aerobic Bacterial Culture Routine With Gram Stain [[id number]]   Aspirate from Knee, Right    Final result Component Value   Culture No Growth   Gram Stain Result No organisms seen    4+ WBC seen    Predominantly PMNs          07/10/2024 1136 07/10/2024 1506 Cell Count Body Fluid [[id number]]    (Abnormal)   Synovial fluid from Knee, Right    Final result Component Value Units   Color Yellow    Clarity Turbid Abnormal     Cell Count Fluid Source Knee, Right    Total Nucleated Cells 65,670 /uL          07/10/2024 1136 07/10/2024 1506 Differential Body Fluid [[id number]]    Synovial fluid from Knee, Right    Final result Component Value Units   % Neutrophils 93 %   % Lymphocytes 1 %   % Monocyte/Macrophages 6 %          05/29/2024 1000 06/03/2024 1031 Synovial fluid Aerobic Bacterial  Culture Routine [[id number]]   Synovial fluid from Knee, Right    Final result Component Value   Culture No Growth          05/29/2024 1000 05/29/2024 1727 Gram Stain [[id number]]   Synovial fluid from Knee, Right    Final result Component Value   GS Culture See corresponding culture for results   Gram Stain Result No organisms seen   Gram Stain Result 4+ WBC seen   Predominantly PMNs          05/29/2024 1000 06/12/2024 1028 Anaerobic Bacterial Culture Routine [[id number]]   Synovial fluid from Knee, Right    Final result Component Value   Culture No anaerobic organisms isolated          05/29/2024 1000 05/29/2024 1546 Cell count with differential fluid [[id number]]    (Abnormal)   Synovial fluid from Knee, Right    Final result Component Value   No component results          05/29/2024 1000 05/29/2024 1546 Crystal ID Synovial Fluid [[id number]]   Synovial fluid from Knee, Right    Final result Component Value   Crystals Analysis No clinically significant crystals seen.          05/29/2024 1000 05/29/2024 1546 Cell Count Body Fluid [[id number]]    (Abnormal)   Synovial fluid from Knee, Right    Final result Component Value Units   Color Yellow    Clarity Cloudy Abnormal     Cell Count Fluid Source Knee, Right    Total Nucleated Cells 109,100 /uL          05/29/2024 1000 05/29/2024 1546 Differential Body Fluid [[id number]]    Synovial fluid from Knee, Right    Final result Component Value Units   % Neutrophils 95 %   % Lymphocytes 1 %   % Monocyte/Macrophages 4 %          05/22/2024 0926 05/27/2024 0709 Synovial fluid Aerobic Bacterial Culture Routine [[id number]]   Synovial fluid from Knee, Right    Final result Component Value   Culture No Growth          05/22/2024 0926 06/05/2024 0907 Anaerobic Bacterial Culture Routine [[id number]]   Synovial fluid from Knee, Right    Final result Component Value   Culture No anaerobic organisms isolated          05/22/2024 0926 05/22/2024 1348 Cell count with  differential fluid [[id number]]    (Abnormal)   Synovial fluid from Knee, Right    Final result Component Value   No component results          05/22/2024 0926 05/22/2024 1340 Crystal ID Synovial Fluid [[id number]]   Synovial fluid from Knee, Right    Final result Component Value   Crystals Analysis No clinically significant crystals seen.          05/22/2024 0926 05/22/2024 1631 Gram Stain [[id number]]   Synovial fluid from Knee, Right    Final result Component Value   Culture See corresponding culture for results   Gram Stain Result No organisms seen   Gram Stain Result 4+ WBC seen   Predominantly PMNs          05/22/2024 0926 05/22/2024 1348 Cell Count Body Fluid [[id number]]    (Abnormal)   Synovial fluid from Knee, Right    Final result Component Value Units   Color Yellow    Clarity Hazy Abnormal     Cell Count Fluid Source Knee, Right    Total Nucleated Cells 76,860 /uL          05/22/2024 0926 05/22/2024 1348 Differential Body Fluid [[id number]]    Synovial fluid from Knee, Right    Final result Component Value Units   % Neutrophils 98 %   % Lymphocytes 1 %   % Monocyte/Macrophages 1 %            DATA for DOCUMENTATION:         Past Medical History:     Patient Active Problem List   Diagnosis    Ketosis (H)    Hyperglycemia    Chronic diarrhea    Acute renal insufficiency    Anemia, unspecified type    Alcohol abuse    Cardiac calcification (H)    Cervical disc disease with myelopathy    Chronic bilateral low back pain with bilateral sciatica    Diabetic ulcer of toe of right foot associated with type 2 diabetes mellitus, unspecified ulcer stage (H)    Exocrine pancreatic insufficiency    GERD (gastroesophageal reflux disease)    Other hyperlipidemia    Primary hypertension    Severe episode of recurrent major depressive disorder, without psychotic features (H)    Type 2 diabetes mellitus with kidney complication, with long-term current use of insulin (H)    Unintentional weight loss    Pyogenic  arthritis of right knee joint, due to unspecified organism (H)    Cervical radiculopathy    Change or removal of drains    Encounter to establish care    Chronic pancreatitis (H)    Hypoxia    Pneumonia of left lower lobe due to infectious organism    Acute metabolic encephalopathy    Hyperkalemia    Abnormal CXR     Past Medical History:   Diagnosis Date    Acute pain of right knee 07/12/2024    Allergic rhinitis     Anemia     Arthritis     Bell's palsy     Cervicalgia     Diabetes (H)     Diabetic foot ulcer (H)     Generalized edema     Hyperkalemia     Hypertension     Hypo-osmolality and hyponatremia     Hypoglycemia 05/03/2024    Major depressive disorder, recurrent episode, mild (H)     Other acute osteomyelitis, right ankle and foot (H)     Other chronic pancreatitis (H)     Other polyosteoarthritis     Right knee pain 07/11/2024    Solitary pulmonary nodule        Also see scanned health assessment forms.       Past Surgical History:     Past Surgical History:   Procedure Laterality Date    ARTHROSCOPY KNEE Right 8/17/2024    Procedure: Arthroscopic;  Surgeon: Jeff Phoenix MD;  Location: [facility]    COLONOSCOPY N/A 05/07/2024    Procedure: Colonoscopy;  Surgeon: Nina Moya MD;  Location:  GI    ESOPHAGOSCOPY, GASTROSCOPY, DUODENOSCOPY (EGD), COMBINED N/A 05/06/2024    Procedure: Esophagoscopy, gastroscopy, duodenoscopy (EGD), combined;  Surgeon: Nina Moya MD;  Location:  GI    ESOPHAGOSCOPY, GASTROSCOPY, DUODENOSCOPY (EGD), COMBINED N/A 05/07/2024    Procedure: Esophagoscopy, gastroscopy, duodenoscopy (EGD), combined;  Surgeon: Nina Moya MD;  Location:  GI    IRRIGATION AND DEBRIDEMENT KNEE, COMBINED Right 8/17/2024    Procedure: IRRIGATION AND DEBRIDEMENT, Right KNEE,;  Surgeon: Jeff Phoenix MD;  Location: [facility] OR    IRRIGATION AND DEBRIDEMENT SPINE Right 7/11/2024    Procedure: Irrigation and debridement knee;  Surgeon: Alexis  MD Dejon;  Location: [facility] OR    ORTHOPEDIC SURGERY      partial amputation of right foot Right     PATELLA SURGERY              Social History:     Social History     Socioeconomic History    Marital status: Single     Spouse name: Not on file    Number of children: Not on file    Years of education: Not on file    Highest education level: Not on file   Occupational History    Not on file   Tobacco Use    Smoking status: Never    Smokeless tobacco: Never   Vaping Use    Vaping status: Never Used   Substance and Sexual Activity    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    Drug use: No    Sexual activity: Not on file   Other Topics Concern    Not on file   Social History Narrative    Not on file     Social Determinants of Health     Financial Resource Strain: Low Risk  (9/24/2024)    Financial Resource Strain     Within the past 12 months, have you or your family members you live with been unable to get utilities (heat, electricity) when it was really needed?: No   Food Insecurity: Low Risk  (9/24/2024)    Food Insecurity     Within the past 12 months, did you worry that your food would run out before you got money to buy more?: No     Within the past 12 months, did the food you bought just not last and you didn t have money to get more?: No   Transportation Needs: Low Risk  (9/24/2024)    Transportation Needs     Within the past 12 months, has lack of transportation kept you from medical appointments, getting your medicines, non-medical meetings or appointments, work, or from getting things that you need?: No   Physical Activity: Not on file   Stress: Not on file   Social Connections: Unknown (3/16/2022)    Received from [facility], [facility] [place][note 2]    Social Connections     Frequency of Communication with Friends and Family: Not on file   Interpersonal Safety: High Risk (9/11/2024)    Interpersonal Safety     Do you feel physically and  emotionally safe where you currently live?: No     Within the past 12 months, have you been hit, slapped, kicked or otherwise physically hurt by someone?: No     Within the past 12 months, have you been humiliated or emotionally abused in other ways by your partner or ex-partner?: No   Housing Stability: Low Risk  (9/24/2024)    Housing Stability     Do you have housing? : Yes     Are you worried about losing your housing?: No            Family History:     No family history on file.         Medications:     Current Outpatient Medications   Medication Sig Dispense Refill    acetaminophen (TYLENOL) 500 MG tablet Take 1,000 mg by mouth daily as needed for mild pain. In addition to scheduled doses.      amLODIPine (NORVASC) 10 MG tablet Take 1 tablet (10 mg) by mouth daily. 30 tablet 2    bumetanide (BUMEX) 1 MG tablet Take 1 tablet (1 mg) by mouth daily Dose decreased to 1 mg on discharge.  Optimize dose on PCP visit. 30 tablet 0    cetirizine (ZYRTEC) 10 MG tablet Take 10 mg by mouth daily      Continuous Glucose Sensor (DEXCOM G7 SENSOR) MISC CHANGE EVERY 10 DAYS      DULoxetine (CYMBALTA) 60 MG capsule Take 60 mg by mouth daily      gabapentin (NEURONTIN) 300 MG capsule Take 300 mg by mouth 2 times daily.      Glucagon, rDNA, (GLUCAGON EMERGENCY) 1 MG KIT Inject 1 mg into the muscle daily as needed (hypoglycemia)      glucose 40 % (400 mg/mL) gel Take by mouth as needed for low blood sugar (For BG <60).      guaiFENesin (ROBITUSSIN) 20 mg/mL liquid Take 10 mLs (200 mg) by mouth every 4 hours as needed for cough.      hydrALAZINE (APRESOLINE) 25 MG tablet Take 25 mg by mouth 2 times daily Hold if SBP < 110      insulin aspart (NOVOLOG FLEXPEN) 100 UNIT/ML pen Inject 8 Units subcutaneously 2 times daily (with meals). 1130 & 1730      insulin aspart (NOVOLOG FLEXPEN) 100 UNIT/ML pen Inject 1-5 Units subcutaneously 3 times daily (with meals). In addition to 6-8 units with each meal, inject additional units as per this  sliding scale:  If 200-250 = 1   251-300 = 2   301-350 = 3  351-400 = 4  401+ = 5  Repeat BS after 3 hours and call MD if still over 400      insulin aspart (NOVOLOG PEN) 100 UNIT/ML pen Inject 6 Units subcutaneously every morning. 0730      insulin glargine (LANTUS PEN) 100 UNIT/ML pen Inject 13 Units subcutaneously every morning      lipase-protease-amylase (CREON 36) 48148-875759-877538 units CPEP Take 3 capsules by mouth 3 times daily (with meals). 0730, 1130, 1630      lipase-protease-amylase (CREON 36) 24444-244703-903710 units CPEP Take 1 capsule by mouth 2 times daily. With snack 1000 & 2000      loperamide (IMODIUM) 2 MG capsule Take 4 mg by mouth every 8 hours as needed for diarrhea.      methocarbamol (ROBAXIN) 500 MG tablet Take 1 tablet (500 mg) by mouth every 8 hours as needed for muscle spasms. 30 tablet 0    multivitamin w/minerals (THERA-VIT-M) tablet Take 1 tablet by mouth daily 30 tablet 0    oxyCODONE (ROXICODONE) 5 MG tablet Take 1 tablet (5 mg) by mouth every 6 hours as needed for severe pain. 10 tablet 0    pantoprazole (PROTONIX) 40 MG EC tablet Take 1 tablet (40 mg) by mouth daily 30 tablet 0    sildenafil (VIAGRA) 50 MG tablet as needed.      tamsulosin (FLOMAX) 0.4 MG capsule Take 1 capsule (0.4 mg) by mouth daily.      triamcinolone (KENALOG) 0.1 % external ointment Apply topically.       Current Facility-Administered Medications   Medication Dose Route Frequency Provider Last Rate Last Admin    lidocaine (PF) (XYLOCAINE) 1 % injection 3 mL  3 mL      3 mL at 09/23/24 1623              Review of Systems:   A comprehensive 10 point review of systems (constitutional, ENT, cardiac, peripheral vascular, lymphatic, respiratory, GI, , Musculoskeletal, skin, Neurological) was performed and found to be negative except as described in this note.

## 2024-10-15 NOTE — ASSESSMENT & PLAN NOTE
Previous CT of the chest completed at [facility] in December 2023 demonstrated:   Impression:   1. Severe anasarca with moderate to large pleural effusions and a small pericardial effusion. Diffuse septal thickening in the lungs concerning for pulmonary edema. Anemic cardiac blood pool.   2. Solitary pulmonary nodule in the left lower lobe with an average diameter of 10 mm is indeterminate, but worrisome for neoplasm. This is new since 3/19/2022.   3. Patchy and confluent airspace consolidation within the left greater than right lung apices presumably represents infection, cannot exclude underlying soft tissue density nodularity on this noncontrast examination. Recommend pulmonary consultation.   4. Diffuse and severe vascular calcifications. No acute or suspicious abnormality noted within the abdomen or pelvis.     CXR completed September 11, 2024:  Extensive patchy and nodular opacities throughout the left  lung, concerning for pneumonia. A follow-up chest x-ray of chest CT  after treatment in about 6-8 weeks is recommended to ensure  resolution. No pleural effusion or pneumothorax. The cardiac and  mediastinal silhouettes are normal.    No history of smoking.  PET scan has been ordered by ID.  Concern for possible malignancy with weight loss and anemia and abnormal CXR.  No history of smoking.

## 2024-10-15 NOTE — LETTER
10/15/2024       RE: Long Mayfield  [address] [facility] [address]     Dear Colleague,    Thank you for referring your patient, Long Mayfield, to the [facility] NEPHROLOGY [facility]INIC [place] at [facility]. Please see a copy of my visit note below.    Nephrology Clinic    Long Mayfield MRN:[id number] YOB: 1970  Date of Service: 10/15/2024  Primary care provider: Ana Maria Blakely  Requesting physician: AISHA Mcgowan       REASON FOR CONSULT: evaluation for possible systemic vasculitis ? ANCA    HISTORY OF PRESENT ILLNESS:   Long Mayfield is a 54 year old male who presents for evaluation of possible ANCA vasculitis.  Per Dr Mcgowan's evaluation of 9/17/24:  He has chronic arthralgia since his age of 48 involving hands, elbows, neck, back and knees that he attributes to injuries from falling as he used to be a . He denies swelling in the hands, elbows, feet.      He has been getting gel injections in his knees with [facility] orthopedics and in May 2024, he developed swelling in the right knee, he was evaluated in July by Orthopedics at [facility], due to concerns for septic arthritis, he underwent washout and treated with 4 weeks of antibiotics. however he had recurrence of knee swelling. His infectious workup has been so far inconclusive however his cell count has been persistently elevated.    Hospitalize in [facility], Nov 2023.  Staph infection in R first toe.  Hospitalization during which he reports having severe edema, diffuse, including scrotal edema. Has 30 Lbs of fluid weight.  Required high dose diuretics.     In November - December 2023, he was hospitalized at [facility] for bilateral lower extremity swelling, rash, infection in his right big toe, he underwent surgery including amputation, there was concerns for osteomyelitis in the right foot.  During his evaluation, a skin biopsy showed LCV. He was treated  "with antibiotics. He saw Nephrology for SALVADOR that was felt to be prerenal at the time vs antibiotic related during the time.    ANCA by IFA was negative, MI-3 borderline positive, MPO antibody negative. Repeat ANCA by Dr Mcgowan on 9/17: neg by IgG        He has chronic diarrhea and chronic pancreatitis, following with GI, unclear etiology but felt likely secondary to alcohol intake,  No reported inflammatory bowel disease on prior Colonoscopies     He has never smoked. He has not drank alcohol for over 2 years.  History of remote cocaine use, none recently.     He was recently hospitalized for shortness of breath and felt to have pneumonia and treated with antibiotics. He had anemia and received blood transfusion, etiology of anemia is unknown.  Was also admitted for hypoglycemia.    Has had diabetes x 6 yrs.  Has always been on insulin.    Around 2015, Remote history of being overweight (peak weight 180 lbs). Went on low carb and no alcohol + exercise -> lost 30lbs to his baseline weight.     History of HTN for several years.  States BP tends to be labile.     Denies history of gross hematuria     History of pulmonary nodule seen on CT scan in Dec (in setting of anasarca, pulm edema and effusions).       October 15, 2024  Today, he reports joint pain in neck, shoulder and right knee.  The R knee remains quite swollen and warm  No SOB, CP.  Mild cough x week, non productive.  No N, V, ABd pain. Some diarrhea .  No melena or hematochezia  No rash.     History of alcohol use, used to drink \"a lot\".  Stopped 2 years ago.   Followed by [facility] for chronic diarrhea.    Denies salivary gland swelling.  Sensation of dry eyes.      PAST MEDICAL HISTORY:  Past Medical History:   Diagnosis Date     Acute pain of right knee 07/12/2024     Allergic rhinitis      Anemia      Arthritis      Bell's palsy      Cervicalgia      Diabetes (H)      Diabetic foot ulcer (H)      Generalized edema      Hyperkalemia      Hypertension      " Hypo-osmolality and hyponatremia      Hypoglycemia 05/03/2024     Major depressive disorder, recurrent episode, mild (H)      Other acute osteomyelitis, right ankle and foot (H)      Other chronic pancreatitis (H)      Other polyosteoarthritis      Right knee pain 07/11/2024     Solitary pulmonary nodule      PAST SURGICAL HISTORY:  Past Surgical History:   Procedure Laterality Date     ARTHROSCOPY KNEE Right 8/17/2024    Procedure: Arthroscopic;  Surgeon: Jeff Phoenix MD;  Location: [facility] OR     COLONOSCOPY N/A 05/07/2024    Procedure: Colonoscopy;  Surgeon: Nina Moya MD;  Location:  GI     ESOPHAGOSCOPY, GASTROSCOPY, DUODENOSCOPY (EGD), COMBINED N/A 05/06/2024    Procedure: Esophagoscopy, gastroscopy, duodenoscopy (EGD), combined;  Surgeon: Nina Moya MD;  Location:  GI     ESOPHAGOSCOPY, GASTROSCOPY, DUODENOSCOPY (EGD), COMBINED N/A 05/07/2024    Procedure: Esophagoscopy, gastroscopy, duodenoscopy (EGD), combined;  Surgeon: Nina Moya MD;  Location:  GI     IRRIGATION AND DEBRIDEMENT KNEE, COMBINED Right 8/17/2024    Procedure: IRRIGATION AND DEBRIDEMENT, Right KNEE,;  Surgeon: Jeff Phoenix MD;  Location: [facility] OR     IRRIGATION AND DEBRIDEMENT SPINE Right 7/11/2024    Procedure: Irrigation and debridement knee;  Surgeon: Dejon Espinoza MD;  Location: [facility] OR     ORTHOPEDIC SURGERY       partial amputation of right foot Right      PATELLA SURGERY       MEDICATIONS:  Prescription Medications as of 10/15/2024 reviewed with patient on this date        Rx Number Disp Refills Start End Last Dispensed Date Next Fill Date Owning Pharmacy    acetaminophen (TYLENOL) 500 MG tablet  -- --  --       Sig: Take 1,000 mg by mouth 3 times daily.    Class: Historical    Route: Oral    acetaminophen (TYLENOL) 500 MG tablet  -- --  --       Sig: Take 1,000 mg by mouth daily as needed for mild pain. In addition to scheduled doses.    Class: Historical     Route: Oral    amLODIPine (NORVASC) 10 MG tablet  30 tablet 2 9/24/2024 --   [id number] - [place], [place] - [address] AT [facility] [place] [address]    Sig: Take 1 tablet (10 mg) by mouth daily.    Class: E-Prescribe    Route: Oral    aspirin 81 MG EC tablet  -- --  --       Sig: Take 81 mg by mouth daily.   NOT TAKING.  NOT TAKEN FOR > 2 WEEKS.    Class: Historical    Route: Oral    bumetanide (BUMEX) 1 MG tablet  30 tablet 0 5/7/2024 --       Sig: Take 1 tablet (1 mg) by mouth daily Dose decreased to 1 mg on discharge.  Optimize dose on PCP visit... NOT TAKING    Class: Local Print    Route: Oral    cetirizine (ZYRTEC) 10 MG tablet  -- --  --       Sig: Take 10 mg by mouth daily    Class: Historical    Route: Oral    DULoxetine (CYMBALTA) 60 MG capsule  -- --  --       Sig: Take 60 mg by mouth daily    Class: Historical    Route: Oral    gabapentin (NEURONTIN) 300 MG capsule  -- --  --       Sig: Take 300 mg by mouth 2 times daily.    Class: Historical    Route: Oral    Glucagon, rDNA, (GLUCAGON EMERGENCY) 1 MG KIT  -- --  --       Sig: Inject 1 mg into the muscle daily as needed (hypoglycemia)    Class: Historical    Route: Intramuscular    glucose 40 % (400 mg/mL) gel  -- --  --       Sig: Take by mouth as needed for low blood sugar (For BG <60).    Class: Historical    Route: Oral    guaiFENesin (ROBITUSSIN) 20 mg/mL liquid  -- -- 9/14/2024 --       Sig: Take 10 mLs (200 mg) by mouth every 4 hours as needed for cough.  NOT TAKING    Class: Transitional    Route: Oral    hydrALAZINE (APRESOLINE) 25 MG tablet  -- --  --       Sig: Take 25 mg by mouth 2 times daily Hold if SBP < 110    Class: Historical    Route: Oral    insulin aspart (NOVOLOG FLEXPEN) 100 UNIT/ML pen  -- -- 5/7/2024 --       Sig: Inject 8 Units subcutaneously 2 times daily (with meals). 1130 & 1730    Class: Historical    Route: Subcutaneous    insulin aspart (NOVOLOG FLEXPEN) 100 UNIT/ML pen  -- --  --       Sig: Inject  1-5 Units subcutaneously 3 times daily (with meals). In addition to 6-8 units with each meal, inject additional units as per this sliding scale:  If 200-250 = 1   251-300 = 2   301-350 = 3  351-400 = 4  401+ = 5  Repeat BS after 3 hours and call MD if still over 400    Class: Historical    Route: Subcutaneous    insulin aspart (NOVOLOG PEN) 100 UNIT/ML pen  -- --  --       Sig: Inject 6 Units subcutaneously every morning. 0730    Class: Historical    Route: Subcutaneous    insulin glargine (LANTUS PEN) 100 UNIT/ML pen  -- --  --       Sig: Inject 13 Units subcutaneously every morning    Class: Historical    Route: Subcutaneous    lipase-protease-amylase (CREON 36) 25046-222095-218075 units CPEP  -- --  --       Sig: Take 3 capsules by mouth 3 times daily (with meals). 0730, 1130, 1630    Class: Historical    Route: Oral    loperamide (IMODIUM) 2 MG capsule  -- --  --       Sig: Take 4 mg by mouth every 8 hours as needed for diarrhea.    Class: Historical    Route: Oral    methocarbamol (ROBAXIN) 500 MG tablet  30 tablet 0 10/8/2024 --   [id number] [place], [place] - [address] AT [address]    Sig: Take 1 tablet (500 mg) by mouth every 8 hours as needed for muscle spasms.    Class: E-Prescribe    Route: Oral    oxyCODONE (ROXICODONE) 5 MG tablet  10 tablet 0 10/8/2024 --   [id number] [place], [place] - [address] AT [address]    Sig: Take 1 tablet (5 mg) by mouth every 6 hours as needed for severe pain.    Class: E-Prescribe    Earliest Fill Date: 10/8/2024    Route: Oral    pantoprazole (PROTONIX) 40 MG EC tablet  30 tablet 0 5/7/2024 --       Sig: Take 1 tablet (40 mg) by mouth daily    Class: Local Print    Route: Oral    tamsulosin (FLOMAX) 0.4 MG capsule  -- -- 9/15/2024 --       Sig: Take 1 capsule (0.4 mg) by mouth daily.  NOT TAKING    Class: Transitional    Route: Oral          Clinic-Administered Medications as of  10/15/2024         Dose Frequency Start End    lidocaine (PF) (XYLOCAINE) 1 % injection 3 mL 3 mL  9/23/2024 --           ALLERGIES:    Allergies   Allergen Reactions     Vancomycin      Other Reaction(s): Renal Failure    Vancomycin-induced nephrotoxicity (11/2023, outside hospital)     Seasonal Allergies      HAYFEVER:    -Watery Eyes  -Eye burn     Daptomycin Rash     Likely delayed hypersensitivity reaction to daptomycin 11/2023     REVIEW OF SYSTEMS:  A comprehensive review of systems was performed and found to be negative except as described here or above.  SOCIAL HISTORY:   Social History     Socioeconomic History     Marital status: Single     Spouse name: Not on file     Number of children: Not on file     Years of education: Not on file     Highest education level: Not on file   Occupational History     Not on file   Tobacco Use     Smoking status: Never     Smokeless tobacco: Never   Vaping Use     Vaping status: Never Used   Substance and Sexual Activity     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     Drug use: No     Sexual activity: Not on file   Other Topics Concern     Not on file   Social History Narrative     Not on file     Social Determinants of Health     Financial Resource Strain: Low Risk  (9/24/2024)    Financial Resource Strain      Within the past 12 months, have you or your family members you live with been unable to get utilities (heat, electricity) when it was really needed?: No   Food Insecurity: Low Risk  (9/24/2024)    Food Insecurity      Within the past 12 months, did you worry that your food would run out before you got money to buy more?: No      Within the past 12 months, did the food you bought just not last and you didn t have money to get more?: No   Transportation Needs: Low Risk  (9/24/2024)    Transportation Needs      Within the past 12 months, has lack of transportation kept you from medical appointments, getting your medicines, non-medical meetings or  "appointments, work, or from getting things that you need?: No   Physical Activity: Not on file   Stress: Not on file   Social Connections: Unknown (3/16/2022)    Received from [facility], [facility]    Social Connections      Frequency of Communication with Friends and Family: Not on file   Interpersonal Safety: High Risk (9/11/2024)    Interpersonal Safety      Do you feel physically and emotionally safe where you currently live?: No      Within the past 12 months, have you been hit, slapped, kicked or otherwise physically hurt by someone?: No      Within the past 12 months, have you been humiliated or emotionally abused in other ways by your partner or ex-partner?: No   Housing Stability: Low Risk  (9/24/2024)    Housing Stability      Do you have housing? : Yes      Are you worried about losing your housing?: No     FAMILY MEDICAL HISTORY:   No family history on file.  PHYSICAL EXAM:   /73   Pulse 85   Temp 97.6  F (36.4  C) (Oral)   Wt 55.4 kg (122 lb 3.2 oz)   SpO2 92%   BMI 20.34 kg/m    GENERAL APPEARANCE: alert and no distress  EYES: nonicteric  NECK: supple, no adenopathy  Carotids 2+ without bruit  RESP: lungs clear to auscultation   CV: regular rhythm, normal rate, no rub  ABDOMEN: soft, nontender,    Extremities: 1+  edema right ankle, lower shin.  No edema on the L (difference in swelling is reportedly chronic)  MS: The right knee is quite swollen and warm,but there is no redness and minimal tenderness.  The area proximal to the knee is rather \"hard\" to the touch (patient states this has been chronic).   SKIN: no rash  NEURO: mentation intact and speech normal  PSYCH: affect normal/bright   LABS:   Recent Results (from the past 672 hour(s))   HLA-B27 Typing    Collection Time: 09/17/24  2:58 PM   Result Value Ref Range    A80VTON METHOD SSOP     B locus B27 Neg    Rheumatoid factor    Collection Time: 09/17/24  2:58 PM "   Result Value Ref Range    Rheumatoid Factor <10 <14 IU/mL   Cyclic Citrullinated Peptide Antibody IgG    Collection Time: 09/17/24  2:58 PM   Result Value Ref Range    Cyclic Citrullinated Peptide Antibody IgG 1.6 <7.0 U/mL   Myeloperoxidase and Proteinase 3 Panel    Collection Time: 09/17/24  2:58 PM   Result Value Ref Range    MPO [place] IgG Instrument Value <0.3 <3.5 U/mL    Myeloperoxidase Antibody IgG Negative Negative    Proteinase 3 [place] IgG Instrument Value <1.0 <2.0 U/mL    Proteinase 3 Antibody IgG Negative Negative   Immunoglobulins A G and M    Collection Time: 09/17/24  2:58 PM   Result Value Ref Range    Immunoglobulin G 2,121 (H) 610 - 1,616 mg/dL    Immunoglobulin A 457 84 - 499 mg/dL    Immunoglobulin M 281 (H) 35 - 242 mg/dL   IgG Subclasses    Collection Time: 09/17/24  2:58 PM   Result Value Ref Range    Immunoglobulin G 2,121 (H) 610 - 1,616 mg/dL    IgG1 1,179 (H) 382 - 929 mg/dL    IgG2 508 242 - 700 mg/dL    IgG3 126 22 - 176 mg/dL    IgG4 224 (H) 4 - 86 mg/dL    Subclasses Percent 96 %   UA with Microscopic    Collection Time: 09/17/24  3:05 PM   Result Value Ref Range    Color Urine Yellow Colorless, Straw, Light Yellow, Yellow    Appearance Urine Clear Clear    Glucose Urine 500 (A) Negative mg/dL    Bilirubin Urine Negative Negative    Ketones Urine Negative Negative mg/dL    Specific Gravity Urine 1.010 1.003 - 1.035    Blood Urine Small (A) Negative    pH Urine 6.0 5.0 - 7.0    Protein Albumin Urine 100 (A) Negative mg/dL    Urobilinogen Urine 0.2 0.2, 1.0 E.U./dL    Nitrite Urine Negative Negative    Leukocyte Esterase Urine Negative Negative   Urine Microscopic Exam    Collection Time: 09/17/24  3:05 PM   Result Value Ref Range    Bacteria Urine Few (A) None Seen /HPF    RBC Urine 2-5 (A) 0-2 /HPF /HPF    WBC Urine 0-5 0-5 /HPF /HPF    Mucus Urine Present (A) None Seen /LPF   Bacterial DNA 16S Ribosomal Non Blood with Reflex to NGS 16S with Reflex to Enterobacterales Identification     Collection Time: 09/23/24  1:52 PM    Specimen: Knee, Right; Synovial fluid   Result Value Ref Range    See Scanned Result       BACTERIAL DNA 16S RIBOSOMAL NON BLOOD WITH REFLEX TO NGS 16S WITH REFLEX TO ENTEROBACTERALES IDENTIF-Scanned   Fungal DNA 28S Ribosomal Non Blood    Collection Time: 09/23/24  1:52 PM    Specimen: Knee, Right; Synovial fluid   Result Value Ref Range    See Scanned Result FUNGAL DNA 28S RIBOSOMAL NON BLOOD-Scanned    Cytology non gyn    Collection Time: 09/23/24  1:52 PM   Result Value Ref Range    Final Diagnosis       Specimen A     Interpretation:      Negative for malignancy     Other Findings:      Acute inflammation present.     Adequacy:     Satisfactory for evaluation          Comment       Please correlate with microbiologic studies and crystal analysis.       Clinical Information       54M with right knee effusion, r/o PVNS      Gross Description       A(A). Knee, Right, :A. Knee, Right, Synovial Fluid:  Received 15 ml of hazy, orange fluid, processed as 1 Pap stained Autocyte,  1 Strickland stained cytospin and one hematoxylin and eosin stained cell block.       Microscopic Description       A microscopic examination was performed.     Case was reviewed by the following:  Pathology Fellow: Danielle Mccurdy DO  Pathology Fellow: Julee Oneal MD  Resident Pathologist: Dmitry Aparicio MD  A resident or fellow in a training program was involved in the initial review, preparation, and/or interpretation of this case.  I, as the senior physician, attest that I have personally reviewed all specimens and or slides, including the listed special stains, and used them with my medical judgement to determine the final diagnosis.              Performing Labs       The technical component of this testing was completed at [facility] and [facility].     Stain controls for all stains resulted within this report have been reviewed and  show appropriate reactivity.      Anaerobic Bacterial Culture Routine    Collection Time: 09/23/24  1:52 PM    Specimen: Knee, Right; Synovial fluid   Result Value Ref Range    Culture No anaerobic organisms isolated    Synovial fluid Aerobic Bacterial Culture Routine Without Gram Stain    Collection Time: 09/23/24  1:52 PM    Specimen: Knee, Right; Synovial fluid   Result Value Ref Range    Culture No Growth    Fungal or Yeast Culture Routine    Collection Time: 09/23/24  1:52 PM    Specimen: Knee, Right; Synovial fluid   Result Value Ref Range    Culture No growth after 21 days    Cell Count Body Fluid    Collection Time: 09/23/24  1:52 PM   Result Value Ref Range    Color Yellow Colorless, Yellow    Clarity Turbid (A) Clear    Cell Count Fluid Source Knee, Right     Total Nucleated Cells 42,560 /uL   Acid-Fast Bacilli Culture and Stain    Collection Time: 09/23/24  1:52 PM    Specimen: Knee, Right; Synovial fluid   Result Value Ref Range    Acid Fast Stain No acid fast bacilli seen     Acid Fast Stain No acid fast bacilli seen    Extra Body Fluid/CSF Collection    Collection Time: 09/23/24  1:52 PM   Result Value Ref Range    Hold Specimen JIC    Differential Body Fluid    Collection Time: 09/23/24  1:52 PM   Result Value Ref Range    % Neutrophils 99 %    % Lymphocytes 1 %    % Monocyte/Macrophages 0 %   Immunoglobulin G subclasses    Collection Time: 09/24/24 10:35 AM   Result Value Ref Range    Immunoglobulin G 2,015 (H) 610 - 1,616 mg/dL    IgG1 1,126 (H) 382 - 929 mg/dL    IgG2 499 242 - 700 mg/dL    IgG3 130 22 - 176 mg/dL    IgG4 216 (H) 4 - 86 mg/dL    Subclasses Percent 98 %   Lipid panel reflex to direct LDL Non-fasting    Collection Time: 09/24/24 10:35 AM   Result Value Ref Range    Cholesterol 138 <200 mg/dL    Triglycerides 206 (H) <150 mg/dL    Direct Measure HDL 45 >=40 mg/dL    LDL Cholesterol Calculated 52 <100 mg/dL    Non HDL Cholesterol 93 <130 mg/dL    Patient Fasting > 8hrs? Yes     Comprehensive metabolic panel (BMP + Alb, Alk Phos, ALT, AST, Total. Bili, TP)    Collection Time: 09/24/24 10:35 AM   Result Value Ref Range    Sodium 132 (L) 135 - 145 mmol/L    Potassium 5.3 3.4 - 5.3 mmol/L    Carbon Dioxide (CO2) 21 (L) 22 - 29 mmol/L    Anion Gap 9 7 - 15 mmol/L    Urea Nitrogen 28.5 (H) 6.0 - 20.0 mg/dL    Creatinine 1.24 (H) 0.67 - 1.17 mg/dL    GFR Estimate 68 >60 mL/min/1.73m2    Calcium 9.0 8.8 - 10.4 mg/dL    Chloride 102 98 - 107 mmol/L    Glucose 241 (H) 70 - 99 mg/dL    Alkaline Phosphatase 161 (H) 40 - 150 U/L    AST 21 0 - 45 U/L    ALT 12 0 - 70 U/L    Protein Total 8.0 6.4 - 8.3 g/dL    Albumin 3.4 (L) 3.5 - 5.2 g/dL    Bilirubin Total 0.2 <=1.2 mg/dL    Patient Fasting > 8hrs? Yes    Cystatin C with GFR    Collection Time: 09/24/24 10:35 AM   Result Value Ref Range    Cystatin C 2.1 (H) 0.6 - 1.0 mg/L    GFR Calculated with Cystatin C 29 (L) >=60 mL/min/1.73m2   Ferritin    Collection Time: 09/24/24 10:35 AM   Result Value Ref Range    Ferritin 418 (H) 31 - 409 ng/mL   Iron and iron binding capacity    Collection Time: 09/24/24 10:35 AM   Result Value Ref Range    Iron 20 (L) 61 - 157 ug/dL    Iron Binding Capacity 230 (L) 240 - 430 ug/dL    Iron Sat Index 9 (L) 15 - 46 %   Folate    Collection Time: 09/24/24 10:35 AM   Result Value Ref Range    Folic Acid 10.0 4.6 - 34.8 ng/mL   Vitamin B12    Collection Time: 09/24/24 10:35 AM   Result Value Ref Range    Vitamin B12 662 232 - 1,245 pg/mL   Bld morphology pathology review    Collection Time: 09/24/24 10:35 AM   Result Value Ref Range    Final Diagnosis       PERIPHERAL BLOOD MORPHOLOGY:        1.  MILD HYPOCHROMIC-BORDERLINE MICROCYTIC ANEMIA  A.  MODERATE ANISOCYTOSIS, WITHOUT INCREASED POIKILOCYTOSIS  B.  IRON STUDIES SUGGESTIVE OF ANEMIA OF CHRONIC DISEASE  C.  LACK OF CORRESPONDING RETICULOCYTOSIS        2.  NORMAL LEUKOCYTE DIFFERENTIAL AND MORPHOLOGY        3.  UNREMARKABLE PLATELET MORPHOLOGY        Comment       The  differential diagnosis of a borderline microcytic anemia would include iron deficiency anemia, pyridoxine responsive anemia, hemoglobinopathies/thalassemia, and anemia of chronic disease.  The iron studies are consistent with anemia of chronic disease.  A corresponding reticulocytosis (bone marrow response) is not present.      Clinical Information       Evaluation of anemia      Peripheral Smear       Erythrocytes are mildly decreased in number, hypochromic and borderline microcytic.  There is moderate anisocytosis, without increased poikilocytosis.  Increased polychromasia, basophilic stippling and nucleated red blood cells are not identified.    Leukocytes are normal in number and show a normal differential distribution, without a significant neutrophilic left shift or toxic changes.  Occasional hypersegmented neutrophils are present.  Other megaloblastic changes, dysplastic features and blasts are not identified.  Lymphocytes are mature and polymorphic in appearance.    Platelets are normal in number and morphology.      Performing Labs       The technical component of this testing was completed at the [facility] and [facility]      CBC with platelets and differential    Collection Time: 09/24/24 10:35 AM   Result Value Ref Range    WBC Count 10.8 4.0 - 11.0 10e3/uL    RBC Count 3.60 (L) 4.40 - 5.90 10e6/uL    Hemoglobin 8.7 (L) 13.3 - 17.7 g/dL    Hematocrit 28.0 (L) 40.0 - 53.0 %    MCV 78 78 - 100 fL    MCH 24.2 (L) 26.5 - 33.0 pg    MCHC 31.1 (L) 31.5 - 36.5 g/dL    RDW 18.0 (H) 10.0 - 15.0 %    Platelet Count 307 150 - 450 10e3/uL    % Neutrophils 70 %    % Lymphocytes 19 %    % Monocytes 8 %    % Eosinophils 2 %    % Basophils 0 %    % Immature Granulocytes 0 %    Absolute Neutrophils 7.5 1.6 - 8.3 10e3/uL    Absolute Lymphocytes 2.0 0.8 - 5.3 10e3/uL    Absolute Monocytes 0.9 0.0 - 1.3 10e3/uL    Absolute Eosinophils 0.3 0.0 - 0.7 10e3/uL    Absolute  Basophils 0.0 0.0 - 0.2 10e3/uL    Absolute Immature Granulocytes 0.0 <=0.4 10e3/uL   Reticulocyte count    Collection Time: 09/24/24 10:35 AM   Result Value Ref Range    % Reticulocyte 1.1 0.5 - 2.0 %    Absolute Reticulocyte 0.041 0.025 - 0.095 10e6/uL   Albumin Random Urine Quantitative with Creat Ratio    Collection Time: 09/24/24 10:37 AM   Result Value Ref Range    Creatinine Urine mg/dL 50.3 mg/dL    Albumin Urine mg/L 1,281.0 mg/L    Albumin Urine mg/g Cr 2,546.72 (H) 0.00 - 17.00 mg/g Cr     CMP  Recent Labs   Lab Test 10/15/24  1248 09/24/24  1035 09/14/24  1240 09/14/24  1057 09/13/24  1015 09/13/24  0858 09/11/24  1524 09/11/24  1129 07/13/24  1149 07/13/24  0833 05/06/24  0818 05/06/24  0724 05/05/24  1744 05/05/24  1438 05/05/24  1156 05/05/24  1037 03/08/22  1756 10/08/17  2105   * 132*  --  135  --  133*   < > 128*   < > 133*   < > 133*  --   --   --  134*   < > 131*   POTASSIUM 4.0 5.3  --  4.2  --  4.2   < > 5.4*   < > 5.6*   < > 4.8  --   --   --  4.6   < > 3.8   CHLORIDE 98 102  --  104  --  105   < > 94*   < > 100   < > 99  --   --   --  100   < > 95   CO2 24 21*  --  22  --  19*   < > 19*   < > 24   < > 22  --   --   --  22   < > 28   ANIONGAP 9 9  --  9  --  9   < > 15   < > 9   < > 12  --   --   --  12   < > 8   * 241* 167* 167*   < > 120*   < > 266*   < > 218*   < > 215*   < >  --    < > 230*   < > 403*   BUN 22.3* 28.5*  --  13.6  --  25.4*   < > 41.7*   < > 21.9*   < > 16.6  --   --   --  22.5*   < > 9   CR 1.35* 1.24*  --  1.01  --  1.24*   < > 2.41*   < > 1.38*   < > 0.99  --   --   --  1.17   < > 0.68   GFRESTIMATED 62 68  --  87  --  68   < > 31*   < > 60*   < > 89  --   --   --  73   < > >90   GFRESTBLACK  --   --   --   --   --   --   --   --   --   --   --   --   --   --   --   --   --  >90   AROLDO 8.6* 9.0  --  8.6*  --  8.2*   < > 8.2*   < > 8.3*   < > 9.0  --   --   --  9.1   < > 9.5   MAG  --   --   --   --   --   --   --   --   --  2.3  --  1.9  --  2.1  --  2.2   <  >  --    PHOS 3.1  --   --   --   --   --   --   --   --   --   --  2.7  --  3.8  --  3.6   < >  --    PROTTOTAL 8.2 8.0  --   --   --  7.1  --  7.7   < >  --    < >  --   --   --   --   --    < > 7.7   ALBUMIN 3.3* 3.4*  --   --   --  2.7*  --  3.0*   < >  --    < >  --   --   --   --   --    < > 3.4   BILITOTAL 0.2 0.2  --   --   --  <0.2  --  0.3   < >  --    < >  --   --   --   --   --    < > 0.2   ALKPHOS 121 161*  --   --   --  160*  --  176*   < >  --    < >  --   --   --   --   --    < > 124   AST 15 21  --   --   --  40  --  24   < >  --    < >  --   --   --   --   --    < > 73*   ALT <5 12  --   --   --  12  --  9   < >  --    < >  --   --   --   --   --    < > 52    < > = values in this interval not displayed.     CBC  Recent Labs   Lab Test 10/15/24  1248 09/24/24  1035 09/14/24  1057 09/13/24  0858   HGB 8.3* 8.7* 8.5* 8.4*   WBC 9.5 10.8 10.6 12.1*   RBC 3.53* 3.60* 3.60* 3.48*   HCT 27.1* 28.0* 27.7* 27.3*   MCV 77* 78 77* 78   * 307 479* 462*     INR  Recent Labs   Lab Test 08/16/24  0610   INR 1.14   PTT 46*     ABG  Recent Labs   Lab Test 08/07/22  0828 03/08/22  1929   PH 7.42  --    O2PER  --  21      URINE STUDIES  Recent Labs   Lab Test 10/15/24  1254 09/17/24  1505 09/11/24  1802 08/16/24  0957   APPEARANCE Clear Clear Slightly Cloudy* Clear   URINEGLC 200* 500* 100* >=1000*   URINEBILI Negative Negative Negative Negative   URINEKETONE Negative Negative Negative Negative   SG 1.009 1.010 1.017 1.009   UBLD Small* Small* Trace* Negative   URINEPH 5.5 6.0 5.5 5.5   PROTEIN 70* 100* 70* 30*   UROBILINOGEN  --  0.2  --   --    NITRITE Negative Negative Negative Negative   LEUKEST Negative Negative Negative Negative   RBCU 3* 2-5* 17* 3*   WBCU 1 0-5 2 <1     Lab on 10/15/2024   Component Date Value Ref Range Status     Color Urine 10/15/2024 Light Yellow  Colorless, Straw, Light Yellow, Yellow Final     Appearance Urine 10/15/2024 Clear  Clear Final     Glucose Urine 10/15/2024 200 (A)   Negative mg/dL Final     Bilirubin Urine 10/15/2024 Negative  Negative Final     Ketones Urine 10/15/2024 Negative  Negative mg/dL Final     Specific Gravity Urine 10/15/2024 1.009  1.003 - 1.035 Final     Blood Urine 10/15/2024 Small (A)  Negative Final     pH Urine 10/15/2024 5.5  5.0 - 7.0 Final     Protein Albumin Urine 10/15/2024 70 (A)  Negative mg/dL Final     Urobilinogen Urine 10/15/2024 Normal  Normal, 2.0 mg/dL Final     Nitrite Urine 10/15/2024 Negative  Negative Final     Leukocyte Esterase Urine 10/15/2024 Negative  Negative Final     Mucus Urine 10/15/2024 Present (A)  None Seen /LPF Final     RBC Urine 10/15/2024 3 (H)  <=2 /HPF Final     WBC Urine 10/15/2024 1  <=5 /HPF Final     Hyaline Casts Urine 10/15/2024 14 (H)  <=2 /LPF Final     Sodium 10/15/2024 131 (L)  135 - 145 mmol/L Final     Potassium 10/15/2024 4.0  3.4 - 5.3 mmol/L Final     Chloride 10/15/2024 98  98 - 107 mmol/L Final     Carbon Dioxide (CO2) 10/15/2024 24  22 - 29 mmol/L Final     Anion Gap 10/15/2024 9  7 - 15 mmol/L Final     Glucose 10/15/2024 278 (H)  70 - 99 mg/dL Final     Urea Nitrogen 10/15/2024 22.3 (H)  6.0 - 20.0 mg/dL Final     Creatinine 10/15/2024 1.35 (H)  0.67 - 1.17 mg/dL Final     GFR Estimate 10/15/2024 62  >60 mL/min/1.73m2 Final    eGFR calculated using 2021 CKD-EPI equation.     Calcium 10/15/2024 8.6 (L)  8.8 - 10.4 mg/dL Final    Reference intervals for this test were updated on 7/16/2024 to reflect our healthy population more accurately. There may be differences in the flagging of prior results with similar values performed with this method. Those prior results can be interpreted in the context of the updated reference intervals.     Albumin 10/15/2024 3.3 (L)  3.5 - 5.2 g/dL Final     Phosphorus 10/15/2024 3.1  2.5 - 4.5 mg/dL Final     Total Protein Urine mg/dL 10/15/2024 93.2    mg/dL Final     Total Protein Urine mg/mg Creat 10/15/2024 1.41 (H)  0.00 - 0.20 mg/mg Cr Final     Creatinine Urine mg/dL  10/15/2024 65.9  mg/dL Final     Cystatin C 10/15/2024 2.0 (H)  0.6 - 1.0 mg/L Final     GFR Calculated with Cystatin C 10/15/2024 31 (L)  >=60 mL/min/1.73m2 Final     CRP Inflammation 10/15/2024 69.20 (H)  <5.00 mg/L Final     WBC Count 10/15/2024 9.5  4.0 - 11.0 10e3/uL Final     RBC Count 10/15/2024 3.53 (L)  4.40 - 5.90 10e6/uL Final     Hemoglobin 10/15/2024 8.3 (L)  13.3 - 17.7 g/dL Final     Hematocrit 10/15/2024 27.1 (L)  40.0 - 53.0 % Final     MCV 10/15/2024 77 (L)  78 - 100 fL Final     MCH 10/15/2024 23.5 (L)  26.5 - 33.0 pg Final     MCHC 10/15/2024 30.6 (L)  31.5 - 36.5 g/dL Final     RDW 10/15/2024 17.1 (H)  10.0 - 15.0 % Final     Platelet Count 10/15/2024 620 (H)  150 - 450 10e3/uL Final     % Neutrophils 10/15/2024 67  % Final     % Lymphocytes 10/15/2024 22  % Final     % Monocytes 10/15/2024 8  % Final     % Eosinophils 10/15/2024 3  % Final     % Basophils 10/15/2024 1  % Final     % Immature Granulocytes 10/15/2024 0  % Final     NRBCs per 100 WBC 10/15/2024 0  <1 /100 Final     Absolute Neutrophils 10/15/2024 6.3  1.6 - 8.3 10e3/uL Final     Absolute Lymphocytes 10/15/2024 2.1  0.8 - 5.3 10e3/uL Final     Absolute Monocytes 10/15/2024 0.8  0.0 - 1.3 10e3/uL Final     Absolute Eosinophils 10/15/2024 0.3  0.0 - 0.7 10e3/uL Final     Absolute Basophils 10/15/2024 0.1  0.0 - 0.2 10e3/uL Final     Absolute Immature Granulocytes 10/15/2024 0.0  <=0.4 10e3/uL Final     Absolute NRBCs 10/15/2024 0.0  10e3/uL Final     Alkaline Phosphatase 10/15/2024 121  40 - 150 U/L Final     ALT 10/15/2024 <5  0 - 70 U/L Final     AST 10/15/2024 15  0 - 45 U/L Final     Bilirubin Total 10/15/2024 0.2  <=1.2 mg/dL Final     Bilirubin Direct 10/15/2024 <0.20  0.00 - 0.30 mg/dL Final     Protein Total 10/15/2024 8.2  6.4 - 8.3 g/dL Final   HgbA1c 10.2 on 6/27/24 at [facility] [place]  at [facility] [place]:  Serum free light chains on 4/30/24 ;  Kappa 9.32, Lambda 6.87, ratio 1.36 (consistent with CKD).  SPEP was without monoclonal spike in Dec  2023   In Nov 2021, his UACR was < 30 at [facility] [place]    CT chest 12/28/2023  [facility] [place]    Reading Radiologist: Deacon Vines  Reading Resident: Dennis Chavez  Narrative    Comparison: 12/26/2023 radiograph. Outside chest CT from 3/9/2022.    Indication: Anasarca of uncertain etiology; r/o malignancy      Technique: Volumetric helical acquisition of CT images from the lung apices through the symphysis pubis without intravenous contrast.    Total DLP = 829 mGy.cm.      Findings:    Chest:    Lungs: Moderate-large pleural effusions. No pneumothorax. Central airways are clear. Spiculated solid pulmonary nodule in the peripheral left lower lobe with pleural tagging with average diameter of 10 mm (series 202 image 78). An additional juxtapleural 3 mm solid pulmonary nodules noted superiorly in the left lower lobe (series 202 image 55).    Patchy and confluent airspace consolidation in the left greater than right lung apices, additional scattered groundglass airspace densities and interstitial prominence are noted throughout the aerated lobes. Compressive atelectasis adjacent to the pleural effusions.    Mediastinum: Normal size heart, small pericardial effusion. Moderate calcific atherosclerosis of the coronary arteries. Anemic intracardiac blood pool. Small sliding hiatal hernia. Mildly prominent prevascular and paratracheal mediastinal lymph nodes. Reactive appearing bilateral axillary lymph nodes.    Abdomen/Pelvis:      Liver: Within normal limits.    Gallbladder and biliary tree:  Layering calcified gallstones. No intra- or extrahepatic biliary ductal dilation..    Pancreas: Markedly atrophic with dystrophic calcifications scattered throughout. No ductal dilatation.    Spleen: Within normal limits.    Adrenals: Within normal limits..    Kidneys, ureters and bladder: No hydronephrosis or obstructing calculus. Normal urinary bladder and ureters.    Reproductive organs: No pelvic masses.    Bowel: No dilated or overly thickened  bowel loops. Small amount of stool admixed with contrast throughout the distal small bowel and proximal colon.    Vessels: Mild aortobiiliac atherosclerotic calcification, as well as circumferential atherosclerotic calcification throughout the internal iliac arteries and their terminal branches.    Lymph nodes: No enlarged lymph nodes.    Peritoneum: Diffuse mesenteric edema.    Bones/Soft Tissues:    No acute osseous abnormality. Opposing chronic erosive and sclerotic endplate changes of C6 and C7. Bilateral chronic rib fractures. No worrisome lytic or sclerotic osseous lesions.      Exam Date Exam Time Accession # Performing Department Results    7/9/24  4:09 PM [id number] [facility]      PACS Images     Show images for CT Abdomen Pelvis w Contrast     Study Result    Narrative & Impression   CT ABDOMEN AND PELVIS WITH CONTRAST 7/9/2024 4:09 PM     CLINICAL HISTORY: Weight loss.     TECHNIQUE: CT scan of the abdomen and pelvis was performed following  injection of IV contrast. Multiplanar reformats were obtained. Dose  reduction techniques were used.     CONTRAST: 64mL [id number]-370     COMPARISON: 3/9/2022     FINDINGS:   LOWER CHEST: Normal.     HEPATOBILIARY: Small lesion in the periphery of the right hepatic lobe  is unchanged from previous.     PANCREAS: Signs of chronic pancreatitis with pancreatic atrophy,  ductal dilation, and calcifications, similar to previous.     SPLEEN: Normal.     ADRENAL GLANDS: Normal.     KIDNEYS/BLADDER: The kidneys are unremarkable. Mild bladder wall  thickening similar to previous.     BOWEL: No obstruction or inflammation. No visible mass.     PELVIC ORGANS: Normal.     ADDITIONAL FINDINGS: Moderate atherosclerosis. Mildly enlarged right  inguinal lymph nodes are most likely reactive, and measure up to 1.1  cm short axis.     MUSCULOSKELETAL: Normal.                                                                      IMPRESSION:   1.   "Mild right inguinal lymphadenopathy is indeterminant but more  likely reactive.  2.  Otherwise, no acute findings or significant change in the abdomen  or pelvis compared to previous.     JUAN JOSE MICHELLE MD      9/17/24  4:07 PM 9/17/24  4:07 PM [id number] [facility]      PACS Images     Show images for XR Sacroiliac Joint 1/2 Views     Study Result    Narrative & Impression   EXAM: XR SACROILIAC JOINT 1/2 VIEWS  LOCATION: [facility]  DATE: 9/17/2024     INDICATION: evaluate for inflammatory sacroiliitis  COMPARISON: None.                                                                      IMPRESSION: The SI joints are negative for sacroiliitis, ankylosis, or diastasis on this single projection. Pelvis negative for fracture. Both hips negative for fracture.     ASSESSMENT AND PLAN:   #CKD  Mr Mayfield is a 54 y.o male with a complex past medical history, most recently related to severe inflammation of the Right knee - initially thought related to septic joint - but that failed to improve with antibiotics.  Repeat evaluations have failed to detect an infection.  He has had at least one episode of SALVADOR, which was reportedly attributed to SALVADOR from antibiotic exposure vs component of \"pre-renal\" azotemia.  However, review of his labs show a persistently elevated Cr (given the relatively small body size and habitus).    Indeed, his CKD is significantly more severe that suggested by S Cr alone, as his Cyst C-based eGFR is consistently significantly lower than the Cr-based estimate.  Based on Cyst C, his GFR is only  around 30 ml/min/1.73m2    The etiology of CKD is unclear at this point.  He has had poorly controled diabetes (HgbA1c>10), a history of hypertension both of which would contribute to CKD - and the former to the albuminuria/proteinuria.  However, he had a normal UACR in Nov 2021.  His ANCA is negative.  Prior seroligic work up is unrevealing - " "except for the persistently elevated IgG4.  This, with the presence of \"chronic pancreatitis\" raises the suspicion of IgG4 disease.    I have discussed with Mr Mayfield the indication and rationale for a diagnostic kidney biopsy.  I will arrange for a pRBC transfusion prior to the biopsy.  He was also instructed NOT to take Aspirin (which he states he is not currently taking).    #Anemia:  Indices are consistent with anemia of chronic disease - with possibly a component of Fe deficiency.  His eGFR is low enough to account for the anemia in part.  He may benefit of erythropoeitin therapy.    #Spiculated Lung Nodule.   I have ordered a repeat CT of the chest, however I see that he is already scheduled for a PET CT on 10/23 (ordered by Dr Mcgowan) -> will cancel the Chest CT.    I plan to see Mr Mayfield in F/up in about 4 weeks to review the biopsy results.      I spent a total of 100 minutes on the date of this encounter in reviewing the medical records, face-to-face evaluation and physical exam, review of labs, counseling, orders, care coordination and documentation      Jordy Chinchilla MD  Division of Renal Disease and Hypertension  October 15, 2024        Again, thank you for allowing me to participate in the care of your patient.      Sincerely,    Jordy Chinchilla MD    "

## 2024-10-15 NOTE — ASSESSMENT & PLAN NOTE
Current weight 122 pounds.  He was 135 pounds in July 2024.  Concern for malignancy due to anemia and abnormality seen on chest x-ray in September.  He is scheduled for a CT of the chest 10/22/24 PET scan scheduled for 10/23/2024.

## 2024-10-15 NOTE — PROGRESS NOTES
The longitudinal plan of care for the diagnosis(es)/condition(s) as documented were addressed during this visit. Due to the added complexity in care, I will continue to support Long in the subsequent management and with ongoing continuity of care.    No LOS data to display   Time spent by me doing chart review, history and exam, documentation and further activities per the note  Assessment & Plan   Problem List Items Addressed This Visit       Acute renal insufficiency     Last Comprehensive Metabolic Panel:  Lab Results   Component Value Date     (L) 10/15/2024    POTASSIUM 4.0 10/15/2024    CHLORIDE 98 10/15/2024    CO2 24 10/15/2024    ANIONGAP 9 10/15/2024     (H) 10/15/2024    BUN 22.3 (H) 10/15/2024    CR 1.35 (H) 10/15/2024    GFRESTIMATED 62 10/15/2024    AROLDO 8.6 (L) 10/15/2024     Seen by nephrology earlier today.  He is going to undergo kidney biopsy.  Creatinine 1.35 with GFR 62.  Awaiting Cystatin C at this time.  He is to avoid NSAIDs.                         Anemia, unspecified type     Lab Results   Component Value Date    WBC 9.5 10/15/2024    WBC 3.9 10/08/2017     Lab Results   Component Value Date    RBC 3.53 10/15/2024    RBC 4.07 10/08/2017     Lab Results   Component Value Date    HGB 8.3 10/15/2024    HGB 12.1 10/08/2017     Lab Results   Component Value Date    HCT 27.1 10/15/2024    HCT 34.2 10/08/2017     Lab Results   Component Value Date    MCV 77 10/15/2024    MCV 84 10/08/2017     Lab Results   Component Value Date    MCH 23.5 10/15/2024    MCH 29.7 10/08/2017     Lab Results   Component Value Date    MCHC 30.6 10/15/2024    MCHC 35.4 10/08/2017     Lab Results   Component Value Date    RDW 17.1 10/15/2024    RDW 13.4 10/08/2017     Lab Results   Component Value Date     10/15/2024     10/08/2017        He has a history of anemia which has been noted since May 2024.  Cause has not been identified.  Additional evaluation with CBC, retake count, TSH,  haptoglobin, iron studies, LDH, ferritin, B12 and folate.  Ferritin elevated 418, iron low at 20, iron binding capacity low at 2030, iron saturation low at 9.  Normal B12 and folate.  Awaiting haptoglobin and reticulocyte count.  Denies blood in stool.  Upper and lower endoscopy completed 5/2024.   No GI source was identified for anemia.      He has been referred to hematology.  Appointment is scheduled for 12/12/2024.  Will attempt to move this appointment up to a closer date.  Concern for malignancy due to weight loss, anemia and abnormality seen on chest x-ray.  He has an upcoming CT of the chest scheduled for 10/22/2024 and a PET scan scheduled for 10/23/2024.    PERIPHERAL BLOOD MORPHOLOGY:        1.  MILD HYPOCHROMIC-BORDERLINE MICROCYTIC ANEMIA  A.  MODERATE ANISOCYTOSIS, WITHOUT INCREASED POIKILOCYTOSIS  B.  IRON STUDIES SUGGESTIVE OF ANEMIA OF CHRONIC DISEASE  C.  LACK OF CORRESPONDING RETICULOCYTOSIS        2.  NORMAL LEUKOCYTE DIFFERENTIAL AND MORPHOLOGY        3.  UNREMARKABLE PLATELET MORPHOLOGY            Relevant Orders    Adult Oncology/Hematology  Referral    Alcohol abuse     He remains in abstinence.         Diabetic ulcer of toe of right foot associated with type 2 diabetes mellitus, unspecified ulcer stage (H)     Lab Results   Component Value Date    A1C 9.2 08/17/2024    A1C 9.4 05/04/2024    A1C >14.0 03/08/2022     Follow-up endocrinology appointment scheduled for November 2024.  Blood sugars have been running high around 300-400.  He will be due for repeat A1c in November as well.  Currently on sliding scale insulin along with Lantus.  Will reach out to endocrinology to see if they can follow-up with him sooner.         Relevant Medications    oxyCODONE (ROXICODONE) 5 MG tablet    Primary hypertension     B/P is at goal.  Continue amlodipine.         Type 2 diabetes mellitus with kidney complication, with long-term current use of insulin (H)     Lab Results   Component Value Date     A1C 9.2 08/17/2024    A1C 9.4 05/04/2024    A1C >14.0 03/08/2022     He is currently on Lantus insulin along with aspart insulin 3 times daily with use of sliding scale insulin.  He has an upcoming appointment with endocrinology 11/5/2024.  Repeat A1c is due at that time.  States his Blood sugars are running 300-400.  Will reach out to endocrinology to see if they can follow-up with him sooner.  He was previously referred to the diabetes educator but they were unable to reach him.         Unintentional weight loss     Current weight 122 pounds.  He was 135 pounds in July 2024.  Concern for malignancy due to anemia and abnormality seen on chest x-ray in September.  He is scheduled for a CT of the chest 10/22/24 PET scan scheduled for 10/23/2024.         Relevant Orders    Adult Oncology/Hematology  Referral    Pyogenic arthritis of right knee joint, due to unspecified organism (H)     He has a right knee inflammatory arthritis, etiology unclear at this time.  He was evaluated by infectious disease on October 2, 2024.   PET scan ordered.  IgG4 levels noted to be elevated.  His inflammatory arthritis is culture negative multiple times and extensive ID workup has been negative so far.  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 Upcoming appointment with Rheumatology 10/29/24.    Uses oxycodone 5 mg every 6 hours as needed.  He uses it only for severe pain.  CSA was signed today.  PDMP was reviewed.  He is also interested in looking into medical marijuana.         Relevant Medications    oxyCODONE (ROXICODONE) 5 MG tablet    Chronic pancreatitis (H) - Primary     Has a history of chronic pancreatitis.  He was previously referred to gastroenterology.  I do not see an upcoming appointment.  Encouraged to call [facility] to set up appointment.  Will reach out to the care coordinators to see if they can assist in setting up his [facility] appointment.          Relevant Medications    oxyCODONE (ROXICODONE) 5 MG tablet    Hyperkalemia     Last Comprehensive Metabolic Panel:  Lab Results   Component Value Date     (L) 10/15/2024    POTASSIUM 4.0 10/15/2024    CHLORIDE 98 10/15/2024    CO2 24 10/15/2024    ANIONGAP 9 10/15/2024     (H) 10/15/2024    BUN 22.3 (H) 10/15/2024    CR 1.35 (H) 10/15/2024    GFRESTIMATED 62 10/15/2024    AROLDO 8.6 (L) 10/15/2024      Seen by nephrology today.  He is going to undergo biopsy of the kidney.   Creatinine 1.35 with GFR 62  Awaiting Cystatin C.             Abnormal CXR     Previous CT of the chest completed at [facility] in December 2023 demonstrated:   Impression:   1. Severe anasarca with moderate to large pleural effusions and a small pericardial effusion. Diffuse septal thickening in the lungs concerning for pulmonary edema. Anemic cardiac blood pool.   2. Solitary pulmonary nodule in the left lower lobe with an average diameter of 10 mm is indeterminate, but worrisome for neoplasm. This is new since 3/19/2022.   3. Patchy and confluent airspace consolidation within the left greater than right lung apices presumably represents infection, cannot exclude underlying soft tissue density nodularity on this noncontrast examination. Recommend pulmonary consultation.   4. Diffuse and severe vascular calcifications. No acute or suspicious abnormality noted within the abdomen or pelvis.     CXR completed September 11, 2024:  Extensive patchy and nodular opacities throughout the left  lung, concerning for pneumonia. A follow-up chest x-ray of chest CT  after treatment in about 6-8 weeks is recommended to ensure  resolution. No pleural effusion or pneumothorax. The cardiac and  mediastinal silhouettes are normal.    No history of smoking.  PET scan has been ordered by ID.  Concern for possible malignancy with weight loss and anemia and abnormal CXR.  No history of smoking.           Relevant Orders    Adult  "Oncology/Hematology  Referral     Other Visit Diagnoses       Pain        Relevant Medications    oxyCODONE (ROXICODONE) 5 MG tablet           Shamir Alves is a 54 year old, presenting for the following health issues:  Recheck Medication    History of Present Illness       Reason for visit:  Follow    He eats 2-3 servings of fruits and vegetables daily.He consumes 1 sweetened beverage(s) daily.He exercises with enough effort to increase his heart rate 9 or less minutes per day.  He exercises with enough effort to increase his heart rate 3 or less days per week.   He is taking medications regularly.             Objective    /72 (BP Location: Left arm, Patient Position: Sitting, Cuff Size: Adult Regular)   Pulse 77   Temp 98.4  F (36.9  C) (Oral)   Resp 16   Ht 1.651 m (5' 5\")   Wt 56.3 kg (124 lb 1.6 oz)   SpO2 99%   BMI 20.65 kg/m    Body mass index is 20.65 kg/m .  Physical Exam  Vitals and nursing note reviewed.   Constitutional:       Appearance: Normal appearance.      Comments: Thin appearing.   HENT:      Head: Normocephalic.   Cardiovascular:      Rate and Rhythm: Normal rate and regular rhythm.      Heart sounds: Normal heart sounds.   Pulmonary:      Effort: Pulmonary effort is normal.      Breath sounds: Normal breath sounds.   Abdominal:      General: Abdomen is flat.      Palpations: Abdomen is soft.   Skin:     General: Skin is warm.   Neurological:      Mental Status: He is alert.   Psychiatric:         Mood and Affect: Mood normal.         Behavior: Behavior normal.         Thought Content: Thought content normal.         Judgment: Judgment normal.                Signed Electronically by: Rena Blair PA-C    "

## 2024-10-15 NOTE — NURSING NOTE
Chief Complaint   Patient presents with    Consult    New Patient     New Glomerular       /73   Pulse 85   Temp 97.6  F (36.4  C) (Oral)   Wt 55.4 kg (122 lb 3.2 oz)   SpO2 92%   BMI 20.34 kg/m      Gael Jimenez on 10/15/2024 at 11:10 AM

## 2024-10-15 NOTE — PROGRESS NOTES
Nephrology Clinic    Long Mayfield MRN:[id number] YOB: 1970  Date of Service: 10/15/2024  Primary care provider: Ana Maria Blakely  Requesting physician: AISHA Mcgowan       REASON FOR CONSULT: evaluation for possible systemic vasculitis ? ANCA    HISTORY OF PRESENT ILLNESS:   Long Mayfield is a 54 year old male who presents for evaluation of possible ANCA vasculitis.  Per Dr Mcgowan's evaluation of 9/17/24:  He has chronic arthralgia since his age of 48 involving hands, elbows, neck, back and knees that he attributes to injuries from falling as he used to be a . He denies swelling in the hands, elbows, feet.      He has been getting gel injections in his knees with [facility] orthopedics and in May 2024, he developed swelling in the right knee, he was evaluated in July by Orthopedics at [facility], due to concerns for septic arthritis, he underwent washout and treated with 4 weeks of antibiotics. however he had recurrence of knee swelling. His infectious workup has been so far inconclusive however his cell count has been persistently elevated.    Hospitalize in [facility], Nov 2023.  Staph infection in R first toe.  Hospitalization during which he reports having severe edema, diffuse, including scrotal edema. Has 30 Lbs of fluid weight.  Required high dose diuretics.     In November - December 2023, he was hospitalized at [facility] for bilateral lower extremity swelling, rash, infection in his right big toe, he underwent surgery including amputation, there was concerns for osteomyelitis in the right foot.  During his evaluation, a skin biopsy showed LCV. He was treated with antibiotics. He saw Nephrology for SALVADOR that was felt to be prerenal at the time vs antibiotic related during the time.    ANCA by IFA was negative, NC-3 borderline positive, MPO antibody negative. Repeat ANCA by Dr Mcgowan on 9/17: neg by IgG        He has chronic diarrhea and chronic pancreatitis, following with  "GI, unclear etiology but felt likely secondary to alcohol intake,  No reported inflammatory bowel disease on prior Colonoscopies     He has never smoked. He has not drank alcohol for over 2 years.  History of remote cocaine use, none recently.     He was recently hospitalized for shortness of breath and felt to have pneumonia and treated with antibiotics. He had anemia and received blood transfusion, etiology of anemia is unknown.  Was also admitted for hypoglycemia.    Has had diabetes x 6 yrs.  Has always been on insulin.    Around 2015, Remote history of being overweight (peak weight 180 lbs). Went on low carb and no alcohol + exercise -> lost 30lbs to his baseline weight.     History of HTN for several years.  States BP tends to be labile.     Denies history of gross hematuria     History of pulmonary nodule seen on CT scan in Dec (in setting of anasarca, pulm edema and effusions).       October 15, 2024  Today, he reports joint pain in neck, shoulder and right knee.  The R knee remains quite swollen and warm  No SOB, CP.  Mild cough x week, non productive.  No N, V, ABd pain. Some diarrhea .  No melena or hematochezia  No rash.     History of alcohol use, used to drink \"a lot\".  Stopped 2 years ago.   Followed by [facility] for chronic diarrhea.    Denies salivary gland swelling.  Sensation of dry eyes.      PAST MEDICAL HISTORY:  Past Medical History:   Diagnosis Date    Acute pain of right knee 07/12/2024    Allergic rhinitis     Anemia     Arthritis     Bell's palsy     Cervicalgia     Diabetes (H)     Diabetic foot ulcer (H)     Generalized edema     Hyperkalemia     Hypertension     Hypo-osmolality and hyponatremia     Hypoglycemia 05/03/2024    Major depressive disorder, recurrent episode, mild (H)     Other acute osteomyelitis, right ankle and foot (H)     Other chronic pancreatitis (H)     Other polyosteoarthritis     Right knee pain 07/11/2024    Solitary pulmonary nodule      PAST SURGICAL HISTORY:  Past " Surgical History:   Procedure Laterality Date    ARTHROSCOPY KNEE Right 8/17/2024    Procedure: Arthroscopic;  Surgeon: Jeff Phoenix MD;  Location: [facility] OR    COLONOSCOPY N/A 05/07/2024    Procedure: Colonoscopy;  Surgeon: Nina Moya MD;  Location:  GI    ESOPHAGOSCOPY, GASTROSCOPY, DUODENOSCOPY (EGD), COMBINED N/A 05/06/2024    Procedure: Esophagoscopy, gastroscopy, duodenoscopy (EGD), combined;  Surgeon: Nina Moya MD;  Location:  GI    ESOPHAGOSCOPY, GASTROSCOPY, DUODENOSCOPY (EGD), COMBINED N/A 05/07/2024    Procedure: Esophagoscopy, gastroscopy, duodenoscopy (EGD), combined;  Surgeon: Nina Moya MD;  Location:  GI    IRRIGATION AND DEBRIDEMENT KNEE, COMBINED Right 8/17/2024    Procedure: IRRIGATION AND DEBRIDEMENT, Right KNEE,;  Surgeon: Jeff Phoenix MD;  Location: [facility] OR    IRRIGATION AND DEBRIDEMENT SPINE Right 7/11/2024    Procedure: Irrigation and debridement knee;  Surgeon: Dejon Espinoza MD;  Location: [facility] OR    ORTHOPEDIC SURGERY      partial amputation of right foot Right     PATELLA SURGERY       MEDICATIONS:  Prescription Medications as of 10/15/2024 reviewed with patient on this date        Rx Number Disp Refills Start End Last Dispensed Date Next Fill Date Owning Pharmacy    acetaminophen (TYLENOL) 500 MG tablet  -- --  --       Sig: Take 1,000 mg by mouth 3 times daily.    Class: Historical    Route: Oral    acetaminophen (TYLENOL) 500 MG tablet  -- --  --       Sig: Take 1,000 mg by mouth daily as needed for mild pain. In addition to scheduled doses.    Class: Historical    Route: Oral    amLODIPine (NORVASC) 10 MG tablet  30 tablet 2 9/24/2024 --   [id number] - [place], [place] - [address] AT [facility] [address]    Sig: Take 1 tablet (10 mg) by mouth daily.    Class: E-Prescribe    Route: Oral    aspirin 81 MG EC tablet  -- --  --       Sig: Take 81 mg by mouth daily.   NOT TAKING.  NOT  TAKEN FOR > 2 WEEKS.    Class: Historical    Route: Oral    bumetanide (BUMEX) 1 MG tablet  30 tablet 0 5/7/2024 --       Sig: Take 1 tablet (1 mg) by mouth daily Dose decreased to 1 mg on discharge.  Optimize dose on PCP visit... NOT TAKING    Class: Local Print    Route: Oral    cetirizine (ZYRTEC) 10 MG tablet  -- --  --       Sig: Take 10 mg by mouth daily    Class: Historical    Route: Oral    DULoxetine (CYMBALTA) 60 MG capsule  -- --  --       Sig: Take 60 mg by mouth daily    Class: Historical    Route: Oral    gabapentin (NEURONTIN) 300 MG capsule  -- --  --       Sig: Take 300 mg by mouth 2 times daily.    Class: Historical    Route: Oral    Glucagon, rDNA, (GLUCAGON EMERGENCY) 1 MG KIT  -- --  --       Sig: Inject 1 mg into the muscle daily as needed (hypoglycemia)    Class: Historical    Route: Intramuscular    glucose 40 % (400 mg/mL) gel  -- --  --       Sig: Take by mouth as needed for low blood sugar (For BG <60).    Class: Historical    Route: Oral    guaiFENesin (ROBITUSSIN) 20 mg/mL liquid  -- -- 9/14/2024 --       Sig: Take 10 mLs (200 mg) by mouth every 4 hours as needed for cough.  NOT TAKING    Class: Transitional    Route: Oral    hydrALAZINE (APRESOLINE) 25 MG tablet  -- --  --       Sig: Take 25 mg by mouth 2 times daily Hold if SBP < 110    Class: Historical    Route: Oral    insulin aspart (NOVOLOG FLEXPEN) 100 UNIT/ML pen  -- -- 5/7/2024 --       Sig: Inject 8 Units subcutaneously 2 times daily (with meals). 1130 & 1730    Class: Historical    Route: Subcutaneous    insulin aspart (NOVOLOG FLEXPEN) 100 UNIT/ML pen  -- --  --       Sig: Inject 1-5 Units subcutaneously 3 times daily (with meals). In addition to 6-8 units with each meal, inject additional units as per this sliding scale:  If 200-250 = 1   251-300 = 2   301-350 = 3  351-400 = 4  401+ = 5  Repeat BS after 3 hours and call MD if still over 400    Class: Historical    Route: Subcutaneous    insulin aspart (NOVOLOG PEN) 100  UNIT/ML pen  -- --  --       Sig: Inject 6 Units subcutaneously every morning. 0730    Class: Historical    Route: Subcutaneous    insulin glargine (LANTUS PEN) 100 UNIT/ML pen  -- --  --       Sig: Inject 13 Units subcutaneously every morning    Class: Historical    Route: Subcutaneous    lipase-protease-amylase (CREON 36) [id number] units CPEP  -- --  --       Sig: Take 3 capsules by mouth 3 times daily (with meals). 0730, 1130, 1630    Class: Historical    Route: Oral    loperamide (IMODIUM) 2 MG capsule  -- --  --       Sig: Take 4 mg by mouth every 8 hours as needed for diarrhea.    Class: Historical    Route: Oral    methocarbamol (ROBAXIN) 500 MG tablet  30 tablet 0 10/8/2024 --   [id number] [place], [place] - [address] AT [address]    Sig: Take 1 tablet (500 mg) by mouth every 8 hours as needed for muscle spasms.    Class: E-Prescribe    Route: Oral    oxyCODONE (ROXICODONE) 5 MG tablet  10 tablet 0 10/8/2024 --   [facility] #[id number] [place], [place] - [address] AT [address]    Sig: Take 1 tablet (5 mg) by mouth every 6 hours as needed for severe pain.    Class: E-Prescribe    Earliest Fill Date: 10/8/2024    Route: Oral    pantoprazole (PROTONIX) 40 MG EC tablet  30 tablet 0 5/7/2024 --       Sig: Take 1 tablet (40 mg) by mouth daily    Class: Local Print    Route: Oral    tamsulosin (FLOMAX) 0.4 MG capsule  -- -- 9/15/2024 --       Sig: Take 1 capsule (0.4 mg) by mouth daily.  NOT TAKING    Class: Transitional    Route: Oral          Clinic-Administered Medications as of 10/15/2024         Dose Frequency Start End    lidocaine (PF) (XYLOCAINE) 1 % injection 3 mL 3 mL  9/23/2024 --           ALLERGIES:    Allergies   Allergen Reactions    Vancomycin      Other Reaction(s): Renal Failure    Vancomycin-induced nephrotoxicity (11/2023, outside hospital)    Seasonal Allergies      HAYFEVER:    -Watery Eyes  -Eye burn     Daptomycin Rash     Likely delayed hypersensitivity reaction to daptomycin 11/2023     REVIEW OF SYSTEMS:  A comprehensive review of systems was performed and found to be negative except as described here or above.  SOCIAL HISTORY:   Social History     Socioeconomic History    Marital status: Single     Spouse name: Not on file    Number of children: Not on file    Years of education: Not on file    Highest education level: Not on file   Occupational History    Not on file   Tobacco Use    Smoking status: Never    Smokeless tobacco: Never   Vaping Use    Vaping status: Never Used   Substance and Sexual Activity    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    Drug use: No    Sexual activity: Not on file   Other Topics Concern    Not on file   Social History Narrative    Not on file     Social Determinants of Health     Financial Resource Strain: Low Risk  (9/24/2024)    Financial Resource Strain     Within the past 12 months, have you or your family members you live with been unable to get utilities (heat, electricity) when it was really needed?: No   Food Insecurity: Low Risk  (9/24/2024)    Food Insecurity     Within the past 12 months, did you worry that your food would run out before you got money to buy more?: No     Within the past 12 months, did the food you bought just not last and you didn t have money to get more?: No   Transportation Needs: Low Risk  (9/24/2024)    Transportation Needs     Within the past 12 months, has lack of transportation kept you from medical appointments, getting your medicines, non-medical meetings or appointments, work, or from getting things that you need?: No   Physical Activity: Not on file   Stress: Not on file   Social Connections: Unknown (3/16/2022)    [facility]    Social Connections     Frequency of Communication with Friends and Family: Not on file   Interpersonal  "Safety: High Risk (9/11/2024)    Interpersonal Safety     Do you feel physically and emotionally safe where you currently live?: No     Within the past 12 months, have you been hit, slapped, kicked or otherwise physically hurt by someone?: No     Within the past 12 months, have you been humiliated or emotionally abused in other ways by your partner or ex-partner?: No   Housing Stability: Low Risk  (9/24/2024)    Housing Stability     Do you have housing? : Yes     Are you worried about losing your housing?: No     FAMILY MEDICAL HISTORY:   No family history on file.  PHYSICAL EXAM:   /73   Pulse 85   Temp 97.6  F (36.4  C) (Oral)   Wt 55.4 kg (122 lb 3.2 oz)   SpO2 92%   BMI 20.34 kg/m    GENERAL APPEARANCE: alert and no distress  EYES: nonicteric  NECK: supple, no adenopathy  Carotids 2+ without bruit  RESP: lungs clear to auscultation   CV: regular rhythm, normal rate, no rub  ABDOMEN: soft, nontender,    Extremities: 1+  edema right ankle, lower shin.  No edema on the L (difference in swelling is reportedly chronic)  MS: The right knee is quite swollen and warm,but there is no redness and minimal tenderness.  The area proximal to the knee is rather \"hard\" to the touch (patient states this has been chronic).   SKIN: no rash  NEURO: mentation intact and speech normal  PSYCH: affect normal/bright   LABS:   Recent Results (from the past 672 hour(s))   HLA-B27 Typing    Collection Time: 09/17/24  2:58 PM   Result Value Ref Range    [id number] METHOD SSOP     B locus B27 Neg    Rheumatoid factor    Collection Time: 09/17/24  2:58 PM   Result Value Ref Range    Rheumatoid Factor <10 <14 IU/mL   Cyclic Citrullinated Peptide Antibody IgG    Collection Time: 09/17/24  2:58 PM   Result Value Ref Range    Cyclic Citrullinated Peptide Antibody IgG 1.6 <7.0 U/mL   Myeloperoxidase and Proteinase 3 Panel    Collection Time: 09/17/24  2:58 PM   Result Value Ref Range    MPO [place] IgG Instrument Value <0.3 <3.5 U/mL    " Myeloperoxidase Antibody IgG Negative Negative    Proteinase 3 [place] IgG Instrument Value <1.0 <2.0 U/mL    Proteinase 3 Antibody IgG Negative Negative   Immunoglobulins A G and M    Collection Time: 09/17/24  2:58 PM   Result Value Ref Range    Immunoglobulin G 2,121 (H) 610 - 1,616 mg/dL    Immunoglobulin A 457 84 - 499 mg/dL    Immunoglobulin M 281 (H) 35 - 242 mg/dL   IgG Subclasses    Collection Time: 09/17/24  2:58 PM   Result Value Ref Range    Immunoglobulin G 2,121 (H) 610 - 1,616 mg/dL    IgG1 1,179 (H) 382 - 929 mg/dL    IgG2 508 242 - 700 mg/dL    IgG3 126 22 - 176 mg/dL    IgG4 224 (H) 4 - 86 mg/dL    Subclasses Percent 96 %   UA with Microscopic    Collection Time: 09/17/24  3:05 PM   Result Value Ref Range    Color Urine Yellow Colorless, Straw, Light Yellow, Yellow    Appearance Urine Clear Clear    Glucose Urine 500 (A) Negative mg/dL    Bilirubin Urine Negative Negative    Ketones Urine Negative Negative mg/dL    Specific Gravity Urine 1.010 1.003 - 1.035    Blood Urine Small (A) Negative    pH Urine 6.0 5.0 - 7.0    Protein Albumin Urine 100 (A) Negative mg/dL    Urobilinogen Urine 0.2 0.2, 1.0 E.U./dL    Nitrite Urine Negative Negative    Leukocyte Esterase Urine Negative Negative   Urine Microscopic Exam    Collection Time: 09/17/24  3:05 PM   Result Value Ref Range    Bacteria Urine Few (A) None Seen /HPF    RBC Urine 2-5 (A) 0-2 /HPF /HPF    WBC Urine 0-5 0-5 /HPF /HPF    Mucus Urine Present (A) None Seen /LPF   Bacterial DNA 16S Ribosomal Non Blood with Reflex to NGS 16S with Reflex to Enterobacterales Identification    Collection Time: 09/23/24  1:52 PM    Specimen: Knee, Right; Synovial fluid   Result Value Ref Range    See Scanned Result       BACTERIAL DNA 16S RIBOSOMAL NON BLOOD WITH REFLEX TO NGS 16S WITH REFLEX TO ENTEROBACTERALES IDENTIF-Scanned   Fungal DNA 28S Ribosomal Non Blood    Collection Time: 09/23/24  1:52 PM    Specimen: Knee, Right; Synovial fluid   Result Value Ref Range     See Scanned Result FUNGAL DNA 28S RIBOSOMAL NON BLOOD-Scanned    Cytology non gyn    Collection Time: 09/23/24  1:52 PM   Result Value Ref Range    Final Diagnosis       Specimen A     Interpretation:      Negative for malignancy     Other Findings:      Acute inflammation present.     Adequacy:     Satisfactory for evaluation          Comment       Please correlate with microbiologic studies and crystal analysis.       Clinical Information       54M with right knee effusion, r/o PVNS      Gross Description       A(A). Knee, Right, :A. Knee, Right, Synovial Fluid:  Received 15 ml of hazy, orange fluid, processed as 1 Pap stained Autocyte,  1 Strickland stained cytospin and one hematoxylin and eosin stained cell block.       Microscopic Description       A microscopic examination was performed.     Case was reviewed by the following:  Pathology Fellow: Danielle Mccurdy DO  Pathology Fellow: Julee Oneal MD  Resident Pathologist: Dmitry Aparicio MD  A resident or fellow in a training program was involved in the initial review, preparation, and/or interpretation of this case.  I, as the senior physician, attest that I have personally reviewed all specimens and or slides, including the listed special stains, and used them with my medical judgement to determine the final diagnosis.              Performing Labs       The technical component of this testing was completed at [facility].     Stain controls for all stains resulted within this report have been reviewed and show appropriate reactivity.      Anaerobic Bacterial Culture Routine    Collection Time: 09/23/24  1:52 PM    Specimen: Knee, Right; Synovial fluid   Result Value Ref Range    Culture No anaerobic organisms isolated    Synovial fluid Aerobic Bacterial Culture Routine Without Gram Stain    Collection Time: 09/23/24  1:52 PM    Specimen: Knee, Right; Synovial fluid   Result Value  Ref Range    Culture No Growth    Fungal or Yeast Culture Routine    Collection Time: 09/23/24  1:52 PM    Specimen: Knee, Right; Synovial fluid   Result Value Ref Range    Culture No growth after 21 days    Cell Count Body Fluid    Collection Time: 09/23/24  1:52 PM   Result Value Ref Range    Color Yellow Colorless, Yellow    Clarity Turbid (A) Clear    Cell Count Fluid Source Knee, Right     Total Nucleated Cells 42,560 /uL   Acid-Fast Bacilli Culture and Stain    Collection Time: 09/23/24  1:52 PM    Specimen: Knee, Right; Synovial fluid   Result Value Ref Range    Acid Fast Stain No acid fast bacilli seen     Acid Fast Stain No acid fast bacilli seen    Extra Body Fluid/CSF Collection    Collection Time: 09/23/24  1:52 PM   Result Value Ref Range    Hold Specimen JIC    Differential Body Fluid    Collection Time: 09/23/24  1:52 PM   Result Value Ref Range    % Neutrophils 99 %    % Lymphocytes 1 %    % Monocyte/Macrophages 0 %   Immunoglobulin G subclasses    Collection Time: 09/24/24 10:35 AM   Result Value Ref Range    Immunoglobulin G 2,015 (H) 610 - 1,616 mg/dL    IgG1 1,126 (H) 382 - 929 mg/dL    IgG2 499 242 - 700 mg/dL    IgG3 130 22 - 176 mg/dL    IgG4 216 (H) 4 - 86 mg/dL    Subclasses Percent 98 %   Lipid panel reflex to direct LDL Non-fasting    Collection Time: 09/24/24 10:35 AM   Result Value Ref Range    Cholesterol 138 <200 mg/dL    Triglycerides 206 (H) <150 mg/dL    Direct Measure HDL 45 >=40 mg/dL    LDL Cholesterol Calculated 52 <100 mg/dL    Non HDL Cholesterol 93 <130 mg/dL    Patient Fasting > 8hrs? Yes    Comprehensive metabolic panel (BMP + Alb, Alk Phos, ALT, AST, Total. Bili, TP)    Collection Time: 09/24/24 10:35 AM   Result Value Ref Range    Sodium 132 (L) 135 - 145 mmol/L    Potassium 5.3 3.4 - 5.3 mmol/L    Carbon Dioxide (CO2) 21 (L) 22 - 29 mmol/L    Anion Gap 9 7 - 15 mmol/L    Urea Nitrogen 28.5 (H) 6.0 - 20.0 mg/dL    Creatinine 1.24 (H) 0.67 - 1.17 mg/dL    GFR Estimate 68  >60 mL/min/1.73m2    Calcium 9.0 8.8 - 10.4 mg/dL    Chloride 102 98 - 107 mmol/L    Glucose 241 (H) 70 - 99 mg/dL    Alkaline Phosphatase 161 (H) 40 - 150 U/L    AST 21 0 - 45 U/L    ALT 12 0 - 70 U/L    Protein Total 8.0 6.4 - 8.3 g/dL    Albumin 3.4 (L) 3.5 - 5.2 g/dL    Bilirubin Total 0.2 <=1.2 mg/dL    Patient Fasting > 8hrs? Yes    Cystatin C with GFR    Collection Time: 09/24/24 10:35 AM   Result Value Ref Range    Cystatin C 2.1 (H) 0.6 - 1.0 mg/L    GFR Calculated with Cystatin C 29 (L) >=60 mL/min/1.73m2   Ferritin    Collection Time: 09/24/24 10:35 AM   Result Value Ref Range    Ferritin 418 (H) 31 - 409 ng/mL   Iron and iron binding capacity    Collection Time: 09/24/24 10:35 AM   Result Value Ref Range    Iron 20 (L) 61 - 157 ug/dL    Iron Binding Capacity 230 (L) 240 - 430 ug/dL    Iron Sat Index 9 (L) 15 - 46 %   Folate    Collection Time: 09/24/24 10:35 AM   Result Value Ref Range    Folic Acid 10.0 4.6 - 34.8 ng/mL   Vitamin B12    Collection Time: 09/24/24 10:35 AM   Result Value Ref Range    Vitamin B12 662 232 - 1,245 pg/mL   Bld morphology pathology review    Collection Time: 09/24/24 10:35 AM   Result Value Ref Range    Final Diagnosis       PERIPHERAL BLOOD MORPHOLOGY:        1.  MILD HYPOCHROMIC-BORDERLINE MICROCYTIC ANEMIA  A.  MODERATE ANISOCYTOSIS, WITHOUT INCREASED POIKILOCYTOSIS  B.  IRON STUDIES SUGGESTIVE OF ANEMIA OF CHRONIC DISEASE  C.  LACK OF CORRESPONDING RETICULOCYTOSIS        2.  NORMAL LEUKOCYTE DIFFERENTIAL AND MORPHOLOGY        3.  UNREMARKABLE PLATELET MORPHOLOGY        Comment       The differential diagnosis of a borderline microcytic anemia would include iron deficiency anemia, pyridoxine responsive anemia, hemoglobinopathies/thalassemia, and anemia of chronic disease.  The iron studies are consistent with anemia of chronic disease.  A corresponding reticulocytosis (bone marrow response) is not present.      Clinical Information       Evaluation of anemia      Peripheral  Smear       Erythrocytes are mildly decreased in number, hypochromic and borderline microcytic.  There is moderate anisocytosis, without increased poikilocytosis.  Increased polychromasia, basophilic stippling and nucleated red blood cells are not identified.    Leukocytes are normal in number and show a normal differential distribution, without a significant neutrophilic left shift or toxic changes.  Occasional hypersegmented neutrophils are present.  Other megaloblastic changes, dysplastic features and blasts are not identified.  Lymphocytes are mature and polymorphic in appearance.    Platelets are normal in number and morphology.      Performing Labs       The technical component of this testing was completed at the [facility]      CBC with platelets and differential    Collection Time: 09/24/24 10:35 AM   Result Value Ref Range    WBC Count 10.8 4.0 - 11.0 10e3/uL    RBC Count 3.60 (L) 4.40 - 5.90 10e6/uL    Hemoglobin 8.7 (L) 13.3 - 17.7 g/dL    Hematocrit 28.0 (L) 40.0 - 53.0 %    MCV 78 78 - 100 fL    MCH 24.2 (L) 26.5 - 33.0 pg    MCHC 31.1 (L) 31.5 - 36.5 g/dL    RDW 18.0 (H) 10.0 - 15.0 %    Platelet Count 307 150 - 450 10e3/uL    % Neutrophils 70 %    % Lymphocytes 19 %    % Monocytes 8 %    % Eosinophils 2 %    % Basophils 0 %    % Immature Granulocytes 0 %    Absolute Neutrophils 7.5 1.6 - 8.3 10e3/uL    Absolute Lymphocytes 2.0 0.8 - 5.3 10e3/uL    Absolute Monocytes 0.9 0.0 - 1.3 10e3/uL    Absolute Eosinophils 0.3 0.0 - 0.7 10e3/uL    Absolute Basophils 0.0 0.0 - 0.2 10e3/uL    Absolute Immature Granulocytes 0.0 <=0.4 10e3/uL   Reticulocyte count    Collection Time: 09/24/24 10:35 AM   Result Value Ref Range    % Reticulocyte 1.1 0.5 - 2.0 %    Absolute Reticulocyte 0.041 0.025 - 0.095 10e6/uL   Albumin Random Urine Quantitative with Creat Ratio    Collection Time: 09/24/24 10:37 AM   Result Value Ref Range    Creatinine Urine  mg/dL 50.3 mg/dL    Albumin Urine mg/L 1,281.0 mg/L    Albumin Urine mg/g Cr 2,546.72 (H) 0.00 - 17.00 mg/g Cr     CMP  Recent Labs   Lab Test 10/15/24  1248 09/24/24  1035 09/14/24  1240 09/14/24  1057 09/13/24  1015 09/13/24  0858 09/11/24  1524 09/11/24  1129 07/13/24  1149 07/13/24  0833 05/06/24  0818 05/06/24  0724 05/05/24  1744 05/05/24  1438 05/05/24  1156 05/05/24  1037 03/08/22  1756 10/08/17  2105   * 132*  --  135  --  133*   < > 128*   < > 133*   < > 133*  --   --   --  134*   < > 131*   POTASSIUM 4.0 5.3  --  4.2  --  4.2   < > 5.4*   < > 5.6*   < > 4.8  --   --   --  4.6   < > 3.8   CHLORIDE 98 102  --  104  --  105   < > 94*   < > 100   < > 99  --   --   --  100   < > 95   CO2 24 21*  --  22  --  19*   < > 19*   < > 24   < > 22  --   --   --  22   < > 28   ANIONGAP 9 9  --  9  --  9   < > 15   < > 9   < > 12  --   --   --  12   < > 8   * 241* 167* 167*   < > 120*   < > 266*   < > 218*   < > 215*   < >  --    < > 230*   < > 403*   BUN 22.3* 28.5*  --  13.6  --  25.4*   < > 41.7*   < > 21.9*   < > 16.6  --   --   --  22.5*   < > 9   CR 1.35* 1.24*  --  1.01  --  1.24*   < > 2.41*   < > 1.38*   < > 0.99  --   --   --  1.17   < > 0.68   GFRESTIMATED 62 68  --  87  --  68   < > 31*   < > 60*   < > 89  --   --   --  73   < > >90   GFRESTBLACK  --   --   --   --   --   --   --   --   --   --   --   --   --   --   --   --   --  >90   AROLDO 8.6* 9.0  --  8.6*  --  8.2*   < > 8.2*   < > 8.3*   < > 9.0  --   --   --  9.1   < > 9.5   MAG  --   --   --   --   --   --   --   --   --  2.3  --  1.9  --  2.1  --  2.2   < >  --    PHOS 3.1  --   --   --   --   --   --   --   --   --   --  2.7  --  3.8  --  3.6   < >  --    PROTTOTAL 8.2 8.0  --   --   --  7.1  --  7.7   < >  --    < >  --   --   --   --   --    < > 7.7   ALBUMIN 3.3* 3.4*  --   --   --  2.7*  --  3.0*   < >  --    < >  --   --   --   --   --    < > 3.4   BILITOTAL 0.2 0.2  --   --   --  <0.2  --  0.3   < >  --    < >  --   --   --   --    --    < > 0.2   ALKPHOS 121 161*  --   --   --  160*  --  176*   < >  --    < >  --   --   --   --   --    < > 124   AST 15 21  --   --   --  40  --  24   < >  --    < >  --   --   --   --   --    < > 73*   ALT <5 12  --   --   --  12  --  9   < >  --    < >  --   --   --   --   --    < > 52    < > = values in this interval not displayed.     CBC  Recent Labs   Lab Test 10/15/24  1248 09/24/24  1035 09/14/24  1057 09/13/24  0858   HGB 8.3* 8.7* 8.5* 8.4*   WBC 9.5 10.8 10.6 12.1*   RBC 3.53* 3.60* 3.60* 3.48*   HCT 27.1* 28.0* 27.7* 27.3*   MCV 77* 78 77* 78   * 307 479* 462*     INR  Recent Labs   Lab Test 08/16/24  0610   INR 1.14   PTT 46*     ABG  Recent Labs   Lab Test 08/07/22  0828 03/08/22  1929   PH 7.42  --    O2PER  --  21      URINE STUDIES  Recent Labs   Lab Test 10/15/24  1254 09/17/24  1505 09/11/24  1802 08/16/24  0957   APPEARANCE Clear Clear Slightly Cloudy* Clear   URINEGLC 200* 500* 100* >=1000*   URINEBILI Negative Negative Negative Negative   URINEKETONE Negative Negative Negative Negative   SG 1.009 1.010 1.017 1.009   UBLD Small* Small* Trace* Negative   URINEPH 5.5 6.0 5.5 5.5   PROTEIN 70* 100* 70* 30*   UROBILINOGEN  --  0.2  --   --    NITRITE Negative Negative Negative Negative   LEUKEST Negative Negative Negative Negative   RBCU 3* 2-5* 17* 3*   WBCU 1 0-5 2 <1     Lab on 10/15/2024   Component Date Value Ref Range Status    Color Urine 10/15/2024 Light Yellow  Colorless, Straw, Light Yellow, Yellow Final    Appearance Urine 10/15/2024 Clear  Clear Final    Glucose Urine 10/15/2024 200 (A)  Negative mg/dL Final    Bilirubin Urine 10/15/2024 Negative  Negative Final    Ketones Urine 10/15/2024 Negative  Negative mg/dL Final    Specific Gravity Urine 10/15/2024 1.009  1.003 - 1.035 Final    Blood Urine 10/15/2024 Small (A)  Negative Final    pH Urine 10/15/2024 5.5  5.0 - 7.0 Final    Protein Albumin Urine 10/15/2024 70 (A)  Negative mg/dL Final    Urobilinogen Urine 10/15/2024  Normal  Normal, 2.0 mg/dL Final    Nitrite Urine 10/15/2024 Negative  Negative Final    Leukocyte Esterase Urine 10/15/2024 Negative  Negative Final    Mucus Urine 10/15/2024 Present (A)  None Seen /LPF Final    RBC Urine 10/15/2024 3 (H)  <=2 /HPF Final    WBC Urine 10/15/2024 1  <=5 /HPF Final    Hyaline Casts Urine 10/15/2024 14 (H)  <=2 /LPF Final    Sodium 10/15/2024 131 (L)  135 - 145 mmol/L Final    Potassium 10/15/2024 4.0  3.4 - 5.3 mmol/L Final    Chloride 10/15/2024 98  98 - 107 mmol/L Final    Carbon Dioxide (CO2) 10/15/2024 24  22 - 29 mmol/L Final    Anion Gap 10/15/2024 9  7 - 15 mmol/L Final    Glucose 10/15/2024 278 (H)  70 - 99 mg/dL Final    Urea Nitrogen 10/15/2024 22.3 (H)  6.0 - 20.0 mg/dL Final    Creatinine 10/15/2024 1.35 (H)  0.67 - 1.17 mg/dL Final    GFR Estimate 10/15/2024 62  >60 mL/min/1.73m2 Final    eGFR calculated using 2021 CKD-EPI equation.    Calcium 10/15/2024 8.6 (L)  8.8 - 10.4 mg/dL Final    Reference intervals for this test were updated on 7/16/2024 to reflect our healthy population more accurately. There may be differences in the flagging of prior results with similar values performed with this method. Those prior results can be interpreted in the context of the updated reference intervals.    Albumin 10/15/2024 3.3 (L)  3.5 - 5.2 g/dL Final    Phosphorus 10/15/2024 3.1  2.5 - 4.5 mg/dL Final    Total Protein Urine mg/dL 10/15/2024 93.2    mg/dL Final    Total Protein Urine mg/mg Creat 10/15/2024 1.41 (H)  0.00 - 0.20 mg/mg Cr Final    Creatinine Urine mg/dL 10/15/2024 65.9  mg/dL Final    Cystatin C 10/15/2024 2.0 (H)  0.6 - 1.0 mg/L Final    GFR Calculated with Cystatin C 10/15/2024 31 (L)  >=60 mL/min/1.73m2 Final    CRP Inflammation 10/15/2024 69.20 (H)  <5.00 mg/L Final    WBC Count 10/15/2024 9.5  4.0 - 11.0 10e3/uL Final    RBC Count 10/15/2024 3.53 (L)  4.40 - 5.90 10e6/uL Final    Hemoglobin 10/15/2024 8.3 (L)  13.3 - 17.7 g/dL Final    Hematocrit 10/15/2024 27.1  (L)  40.0 - 53.0 % Final    MCV 10/15/2024 77 (L)  78 - 100 fL Final    MCH 10/15/2024 23.5 (L)  26.5 - 33.0 pg Final    MCHC 10/15/2024 30.6 (L)  31.5 - 36.5 g/dL Final    RDW 10/15/2024 17.1 (H)  10.0 - 15.0 % Final    Platelet Count 10/15/2024 620 (H)  150 - 450 10e3/uL Final    % Neutrophils 10/15/2024 67  % Final    % Lymphocytes 10/15/2024 22  % Final    % Monocytes 10/15/2024 8  % Final    % Eosinophils 10/15/2024 3  % Final    % Basophils 10/15/2024 1  % Final    % Immature Granulocytes 10/15/2024 0  % Final    NRBCs per 100 WBC 10/15/2024 0  <1 /100 Final    Absolute Neutrophils 10/15/2024 6.3  1.6 - 8.3 10e3/uL Final    Absolute Lymphocytes 10/15/2024 2.1  0.8 - 5.3 10e3/uL Final    Absolute Monocytes 10/15/2024 0.8  0.0 - 1.3 10e3/uL Final    Absolute Eosinophils 10/15/2024 0.3  0.0 - 0.7 10e3/uL Final    Absolute Basophils 10/15/2024 0.1  0.0 - 0.2 10e3/uL Final    Absolute Immature Granulocytes 10/15/2024 0.0  <=0.4 10e3/uL Final    Absolute NRBCs 10/15/2024 0.0  10e3/uL Final    Alkaline Phosphatase 10/15/2024 121  40 - 150 U/L Final    ALT 10/15/2024 <5  0 - 70 U/L Final    AST 10/15/2024 15  0 - 45 U/L Final    Bilirubin Total 10/15/2024 0.2  <=1.2 mg/dL Final    Bilirubin Direct 10/15/2024 <0.20  0.00 - 0.30 mg/dL Final    Protein Total 10/15/2024 8.2  6.4 - 8.3 g/dL Final   HgbA1c 10.2 on 6/27/24 at [facility]  at [facility]:  Serum free light chains on 4/30/24 ;  Kappa 9.32, Lambda 6.87, ratio 1.36 (consistent with CKD).  SPEP was without monoclonal spike in Dec 2023   In Nov 2021, his UACR was < 30 at [facility]    CT chest 12/28/2023  [facility]    Reading Radiologist: Deacon Vines  Reading Resident: Dennis Chavez  Narrative    Comparison: 12/26/2023 radiograph. Outside chest CT from 3/9/2022.    Indication: Anasarca of uncertain etiology; r/o malignancy      Technique: Volumetric helical acquisition of CT images from the lung apices through the symphysis pubis without intravenous contrast.    Total DLP = 829  mGy.cm.      Findings:    Chest:    Lungs: Moderate-large pleural effusions. No pneumothorax. Central airways are clear. Spiculated solid pulmonary nodule in the peripheral left lower lobe with pleural tagging with average diameter of 10 mm (series 202 image 78). An additional juxtapleural 3 mm solid pulmonary nodules noted superiorly in the left lower lobe (series 202 image 55).    Patchy and confluent airspace consolidation in the left greater than right lung apices, additional scattered groundglass airspace densities and interstitial prominence are noted throughout the aerated lobes. Compressive atelectasis adjacent to the pleural effusions.    Mediastinum: Normal size heart, small pericardial effusion. Moderate calcific atherosclerosis of the coronary arteries. Anemic intracardiac blood pool. Small sliding hiatal hernia. Mildly prominent prevascular and paratracheal mediastinal lymph nodes. Reactive appearing bilateral axillary lymph nodes.    Abdomen/Pelvis:      Liver: Within normal limits.    Gallbladder and biliary tree:  Layering calcified gallstones. No intra- or extrahepatic biliary ductal dilation..    Pancreas: Markedly atrophic with dystrophic calcifications scattered throughout. No ductal dilatation.    Spleen: Within normal limits.    Adrenals: Within normal limits..    Kidneys, ureters and bladder: No hydronephrosis or obstructing calculus. Normal urinary bladder and ureters.    Reproductive organs: No pelvic masses.    Bowel: No dilated or overly thickened bowel loops. Small amount of stool admixed with contrast throughout the distal small bowel and proximal colon.    Vessels: Mild aortobiiliac atherosclerotic calcification, as well as circumferential atherosclerotic calcification throughout the internal iliac arteries and their terminal branches.    Lymph nodes: No enlarged lymph nodes.    Peritoneum: Diffuse mesenteric edema.    Bones/Soft Tissues:    No acute osseous abnormality. Opposing  chronic erosive and sclerotic endplate changes of C6 and C7. Bilateral chronic rib fractures. No worrisome lytic or sclerotic osseous lesions.      Exam Date Exam Time Accession # Performing Department Results    7/9/24  4:09 PM [id number] [facility]      PACS Images     Show images for CT Abdomen Pelvis w Contrast     Study Result    Narrative & Impression   CT ABDOMEN AND PELVIS WITH CONTRAST 7/9/2024 4:09 PM     CLINICAL HISTORY: Weight loss.     TECHNIQUE: CT scan of the abdomen and pelvis was performed following  injection of IV contrast. Multiplanar reformats were obtained. Dose  reduction techniques were used.     CONTRAST: 64mL Isovue-370     COMPARISON: 3/9/2022     FINDINGS:   LOWER CHEST: Normal.     HEPATOBILIARY: Small lesion in the periphery of the right hepatic lobe  is unchanged from previous.     PANCREAS: Signs of chronic pancreatitis with pancreatic atrophy,  ductal dilation, and calcifications, similar to previous.     SPLEEN: Normal.     ADRENAL GLANDS: Normal.     KIDNEYS/BLADDER: The kidneys are unremarkable. Mild bladder wall  thickening similar to previous.     BOWEL: No obstruction or inflammation. No visible mass.     PELVIC ORGANS: Normal.     ADDITIONAL FINDINGS: Moderate atherosclerosis. Mildly enlarged right  inguinal lymph nodes are most likely reactive, and measure up to 1.1  cm short axis.     MUSCULOSKELETAL: Normal.                                                                      IMPRESSION:   1.  Mild right inguinal lymphadenopathy is indeterminant but more  likely reactive.  2.  Otherwise, no acute findings or significant change in the abdomen  or pelvis compared to previous.     JUAN JOSE MICHELLE MD      9/17/24  4:07 PM 9/17/24  4:07 PM [id number] [facility]      PACS Images     Show images for XR Sacroiliac Joint 1/2 Views     Study Result    Narrative & Impression   EXAM: XR SACROILIAC JOINT 1/2  "VIEWS  LOCATION: [facility]  DATE: 9/17/2024     INDICATION: evaluate for inflammatory sacroiliitis  COMPARISON: None.                                                                      IMPRESSION: The SI joints are negative for sacroiliitis, ankylosis, or diastasis on this single projection. Pelvis negative for fracture. Both hips negative for fracture.     ASSESSMENT AND PLAN:   #CKD  Mr Mayfield is a 54 y.o male with a complex past medical history, most recently related to severe inflammation of the Right knee - initially thought related to septic joint - but that failed to improve with antibiotics.  Repeat evaluations have failed to detect an infection.  He has had at least one episode of SALVADOR, which was reportedly attributed to SALVADOR from antibiotic exposure vs component of \"pre-renal\" azotemia.  However, review of his labs show a persistently elevated Cr (given the relatively small body size and habitus).    Indeed, his CKD is significantly more severe that suggested by S Cr alone, as his Cyst C-based eGFR is consistently significantly lower than the Cr-based estimate.  Based on Cyst C, his GFR is only  around 30 ml/min/1.73m2    The etiology of CKD is unclear at this point.  He has had poorly controled diabetes (HgbA1c>10), a history of hypertension both of which would contribute to CKD - and the former to the albuminuria/proteinuria.  However, he had a normal UACR in Nov 2021.  His ANCA is negative.  Prior seroligic work up is unrevealing - except for the persistently elevated IgG4.  This, with the presence of \"chronic pancreatitis\" raises the suspicion of IgG4 disease.    I have discussed with Mr Mayfield the indication and rationale for a diagnostic kidney biopsy.  I will arrange for a pRBC transfusion prior to the biopsy.  He was also instructed NOT to take Aspirin (which he states he is not currently taking).    #Anemia:  Indices are consistent with anemia of " chronic disease - with possibly a component of Fe deficiency.  His eGFR is low enough to account for the anemia in part.  He may benefit of erythropoeitin therapy.    #Spiculated Lung Nodule.   I have ordered a repeat CT of the chest, however I see that he is already scheduled for a PET CT on 10/23 (ordered by Dr Mcgowan) -> will cancel the Chest CT.    I plan to see Mr Mayfield in F/up in about 4 weeks to review the biopsy results.      I spent a total of 100 minutes on the date of this encounter in reviewing the medical records, face-to-face evaluation and physical exam, review of labs, counseling, orders, care coordination and documentation      Jordy Chinchilla MD  Division of Renal Disease and Hypertension  October 15, 2024

## 2024-10-15 NOTE — ASSESSMENT & PLAN NOTE
Lab Results   Component Value Date    A1C 9.2 08/17/2024    A1C 9.4 05/04/2024    A1C >14.0 03/08/2022     He is currently on Lantus insulin along with aspart insulin 3 times daily with use of sliding scale insulin.  He has an upcoming appointment with endocrinology 11/5/2024.  Repeat A1c is due at that time.  States his Blood sugars are running 300-400.  Will reach out to endocrinology to see if they can follow-up with him sooner.  He was previously referred to the diabetes educator but they were unable to reach him.

## 2024-10-15 NOTE — ASSESSMENT & PLAN NOTE
Lab Results   Component Value Date    A1C 9.2 08/17/2024    A1C 9.4 05/04/2024    A1C >14.0 03/08/2022     Follow-up endocrinology appointment scheduled for November 2024.  Blood sugars have been running high around 300-400.  He will be due for repeat A1c in November as well.  Currently on sliding scale insulin along with Lantus.  Will reach out to endocrinology to see if they can follow-up with him sooner.

## 2024-10-15 NOTE — ASSESSMENT & PLAN NOTE
Has a history of chronic pancreatitis.  He was previously referred to gastroenterology.  I do not see an upcoming appointment.  Encouraged to call [facility] to set up appointment.  Will reach out to the care coordinators to see if they can assist in setting up his [facility] appointment.

## 2024-10-16 ENCOUNTER — TELEPHONE (OUTPATIENT)
Dept: FAMILY MEDICINE | Facility: CLINIC | Age: 54
End: 2024-10-16
Payer: COMMERCIAL

## 2024-10-16 ENCOUNTER — PATIENT OUTREACH (OUTPATIENT)
Dept: NEPHROLOGY | Facility: CLINIC | Age: 54
End: 2024-10-16
Payer: COMMERCIAL

## 2024-10-16 DIAGNOSIS — D64.9 ANEMIA, UNSPECIFIED TYPE: Primary | ICD-10-CM

## 2024-10-16 LAB
C3 SERPL-MCNC: 134 MG/DL (ref 81–157)
C4 SERPL-MCNC: 40 MG/DL (ref 13–39)
ENA SS-A AB SER IA-ACNC: 0.5 U/ML
ENA SS-A AB SER IA-ACNC: NEGATIVE
QUANTIFERON MITOGEN: 0.3 IU/ML
QUANTIFERON NIL TUBE: 0.01 IU/ML
QUANTIFERON TB1 TUBE: 0.05 IU/ML
QUANTIFERON TB2 TUBE: 0.02

## 2024-10-16 NOTE — TELEPHONE ENCOUNTER
- Oncology RN is currently processing updated referral. Notes indicate patient will be offered sooner appointment for 11/13/24. See 9/25/24 Hem/Onc referral notes.    - Spoke with patient, scheduling information provided for [facility] and Diabetic Education.

## 2024-10-16 NOTE — PROGRESS NOTES
From: Jordy Chinchilla MD   Sent: 10/15/2024   9:12 PM CDT   To: Darlene Luna RN   Subject: follow up imaging the biospy                     Samson Colon   I see that Dr Mcgowan has ordered a whole body PET CT which is scheduled for 10/23 -> we can therefore cancel the CT of the chest on 10/22.   With regard to the kidney biopsy.   His hemoglobin in < 9 -> he therefore needs a transfusion of 1 unit pRBC before the biopsy.  Can we arrange that in [facility]?   Thanks   PHN     -------------------------------------------------------------------------------------------------------------------------------------------------    Spoke to Long about cancellation of CT of chest.  Also spoke to him about reason behind needing a unit of blood prior to kidney biopsy. He was agreeable. Writer said she would call back tomorrow with Dr. Chinchilla to get consent.

## 2024-10-16 NOTE — TELEPHONE ENCOUNTER
----- Message from Tyra MCCOLLUM sent at 10/16/2024  7:53 AM CDT -----  Please see message below and assisit patient if possible with scheduling or giving all numbers needed for him to schedule himself  ----- Message -----  From: Rena Blair PA-C  Sent: 10/15/2024   5:49 PM CDT  To: [facility]    This patient has concern for possible malignacy due to microcytic anemia, abnormalities seen on previous CT of the chest, weight loss.  Please assist him in setting up the following appointments.    Please expedite his appointment with hematology due to microcytic anemia with concern for malignancy.  Please assist him in setting up an appointment with [facility] for follow-up of chronic pancreatitis.  Please assist him to expedite appointment with diabetes educator for management of diabetes as blood sugars have been running high.    Acute.

## 2024-10-17 LAB
GAMMA INTERFERON BACKGROUND BLD IA-ACNC: 0.01 IU/ML
M TB IFN-G BLD-IMP: ABNORMAL
M TB IFN-G CD4+ BCKGRND COR BLD-ACNC: 0.29 IU/ML
MITOGEN IGNF BCKGRD COR BLD-ACNC: 0.01 IU/ML
MITOGEN IGNF BCKGRD COR BLD-ACNC: 0.04 IU/ML

## 2024-10-17 RX ORDER — EPINEPHRINE 1 MG/ML
0.3 INJECTION, SOLUTION, CONCENTRATE INTRAVENOUS EVERY 5 MIN PRN
Status: CANCELLED | OUTPATIENT
Start: 2024-10-24

## 2024-10-17 RX ORDER — DIPHENHYDRAMINE HYDROCHLORIDE 50 MG/ML
50 INJECTION INTRAMUSCULAR; INTRAVENOUS
Status: CANCELLED
Start: 2024-10-24

## 2024-10-17 RX ORDER — HEPARIN SODIUM (PORCINE) LOCK FLUSH IV SOLN 100 UNIT/ML 100 UNIT/ML
5 SOLUTION INTRAVENOUS
Status: CANCELLED | OUTPATIENT
Start: 2024-10-24

## 2024-10-17 RX ORDER — HEPARIN SODIUM,PORCINE 10 UNIT/ML
5-20 VIAL (ML) INTRAVENOUS DAILY PRN
Status: CANCELLED | OUTPATIENT
Start: 2024-10-24

## 2024-10-18 ENCOUNTER — TELEPHONE (OUTPATIENT)
Dept: NEPHROLOGY | Facility: CLINIC | Age: 54
End: 2024-10-18
Payer: COMMERCIAL

## 2024-10-18 ENCOUNTER — TRANSFERRED RECORDS (OUTPATIENT)
Dept: HEALTH INFORMATION MANAGEMENT | Facility: CLINIC | Age: 54
End: 2024-10-18
Payer: COMMERCIAL

## 2024-10-18 DIAGNOSIS — N28.9 ACUTE RENAL INSUFFICIENCY: Primary | ICD-10-CM

## 2024-10-21 PROBLEM — E87.5 HYPERKALEMIA: Status: RESOLVED | Noted: 2024-09-17 | Resolved: 2024-10-21

## 2024-10-21 LAB — BACTERIA SNV CULT: NO GROWTH

## 2024-10-23 ENCOUNTER — ANCILLARY PROCEDURE (OUTPATIENT)
Dept: PET IMAGING | Facility: CLINIC | Age: 54
End: 2024-10-23
Attending: STUDENT IN AN ORGANIZED HEALTH CARE EDUCATION/TRAINING PROGRAM

## 2024-10-23 DIAGNOSIS — R59.0 INGUINAL ADENOPATHY: ICD-10-CM

## 2024-10-23 DIAGNOSIS — R91.1 LUNG NODULE: ICD-10-CM

## 2024-10-23 LAB
ACID FAST STAIN (ARUP): NORMAL
GLUCOSE SERPL-MCNC: 133 MG/DL (ref 70–99)

## 2024-10-23 RX ORDER — FLUDEOXYGLUCOSE F 18 200 MCI/ML
1-18 INJECTION, SOLUTION INTRAVENOUS ONCE
Status: COMPLETED | OUTPATIENT
Start: 2024-10-23 | End: 2024-10-23

## 2024-10-23 RX ADMIN — FLUDEOXYGLUCOSE F 18 13.28 MILLICURIE: 200 INJECTION, SOLUTION INTRAVENOUS at 10:10

## 2024-10-23 NOTE — TELEPHONE ENCOUNTER
DIAGNOSIS: Right Knee   APPOINTMENT DATE: 10/24/2024   NOTES STATUS DETAILS   OFFICE NOTE from referring provider     MEDICATION LIST Internal    EMG (for Spine) Internal 5/9/2024    LABS     CBC/DIFF Internal    INJECTIONS DONE IN RADIOLOGY Internal Large Joint Injections in EPIC   MRI Internal MRI Knee Right 9/21/2024   CT SCAN Internal CT Knee Right 8/19/2024   XRAYS (IMAGES & REPORTS) Internal Xray Knee Right 5/6/2024

## 2024-10-24 ENCOUNTER — TRANSFERRED RECORDS (OUTPATIENT)
Dept: HEALTH INFORMATION MANAGEMENT | Facility: CLINIC | Age: 54
End: 2024-10-24

## 2024-10-24 ENCOUNTER — PRE VISIT (OUTPATIENT)
Dept: ORTHOPEDICS | Facility: CLINIC | Age: 54
End: 2024-10-24

## 2024-10-24 ENCOUNTER — MYC MEDICAL ADVICE (OUTPATIENT)
Dept: FAMILY MEDICINE | Facility: CLINIC | Age: 54
End: 2024-10-24

## 2024-10-24 ENCOUNTER — OFFICE VISIT (OUTPATIENT)
Dept: ORTHOPEDICS | Facility: CLINIC | Age: 54
End: 2024-10-24
Payer: COMMERCIAL

## 2024-10-24 VITALS — WEIGHT: 132 LBS | HEIGHT: 65 IN | BODY MASS INDEX: 21.99 KG/M2

## 2024-10-24 DIAGNOSIS — M25.561 ACUTE PAIN OF RIGHT KNEE: Primary | ICD-10-CM

## 2024-10-24 DIAGNOSIS — M50.00 CERVICAL DISC DISEASE WITH MYELOPATHY: Primary | ICD-10-CM

## 2024-10-24 LAB
HOLD SPECIMEN: NORMAL
HOLD SPECIMEN: NORMAL

## 2024-10-24 PROCEDURE — 87070 CULTURE OTHR SPECIMN AEROBIC: CPT | Performed by: PHYSICIAN ASSISTANT

## 2024-10-24 PROCEDURE — 88341 IMHCHEM/IMCYTCHM EA ADD ANTB: CPT | Mod: TC | Performed by: PHYSICIAN ASSISTANT

## 2024-10-24 PROCEDURE — 88342 IMHCHEM/IMCYTCHM 1ST ANTB: CPT | Mod: 26 | Performed by: STUDENT IN AN ORGANIZED HEALTH CARE EDUCATION/TRAINING PROGRAM

## 2024-10-24 PROCEDURE — 87102 FUNGUS ISOLATION CULTURE: CPT | Performed by: PHYSICIAN ASSISTANT

## 2024-10-24 PROCEDURE — 88341 IMHCHEM/IMCYTCHM EA ADD ANTB: CPT | Mod: 26 | Performed by: STUDENT IN AN ORGANIZED HEALTH CARE EDUCATION/TRAINING PROGRAM

## 2024-10-24 PROCEDURE — 20610 DRAIN/INJ JOINT/BURSA W/O US: CPT | Mod: RT | Performed by: ORTHOPAEDIC SURGERY

## 2024-10-24 PROCEDURE — 99214 OFFICE O/P EST MOD 30 MIN: CPT | Mod: 25 | Performed by: ORTHOPAEDIC SURGERY

## 2024-10-24 PROCEDURE — 88312 SPECIAL STAINS GROUP 1: CPT | Mod: 26 | Performed by: STUDENT IN AN ORGANIZED HEALTH CARE EDUCATION/TRAINING PROGRAM

## 2024-10-24 PROCEDURE — 88307 TISSUE EXAM BY PATHOLOGIST: CPT | Mod: 26 | Performed by: STUDENT IN AN ORGANIZED HEALTH CARE EDUCATION/TRAINING PROGRAM

## 2024-10-24 PROCEDURE — 87075 CULTR BACTERIA EXCEPT BLOOD: CPT | Performed by: PHYSICIAN ASSISTANT

## 2024-10-24 PROCEDURE — 99000 SPECIMEN HANDLING OFFICE-LAB: CPT | Performed by: PATHOLOGY

## 2024-10-24 ASSESSMENT — KOOS JR
STANDING UPRIGHT: SEVERE
BENDING TO THE FLOOR TO PICK UP OBJECT: EXTREME
TWISING OR PIVOTING ON KNEE: EXTREME
KOOS JR SCORING: 34.17
RISING FROM SITTING: SEVERE
STRAIGHTENING KNEE FULLY: SEVERE
HOW SEVERE IS YOUR KNEE STIFFNESS AFTER FIRST WAKING IN MORNING: EXTREME

## 2024-10-24 NOTE — NURSING NOTE
[facility] [address] [address]  [phone]  Dept: [phone]  ______________________________________________________________________________    Patient: Long Mayfield   : 1970   MRN: [id number]   2024    INVASIVE PROCEDURE SAFETY CHECKLIST    Date: 10/24/2024   Procedure:right knee aspiration and USG biopsy  Patient Name: Long Mayfield  MRN: [id number]  YOB: 1970    Action: Complete sections as appropriate. Any discrepancy results in a HARD COPY until resolved.     PRE PROCEDURE:  Patient ID verified with 2 identifiers (name and  or MRN): Yes  Procedure and site verified with patient/designee (when able): Yes  Accurate consent documentation in medical record: Yes  H&P (or appropriate assessment) documented in medical record: NA  H&P must be up to 20 days prior to procedure and updates within 24 hours of procedure as applicable: NA  Relevant diagnostic and radiology test results appropriately labeled and displayed as applicable: Yes  Procedure site(s) marked with provider initials: NA    TIMEOUT:  Time-Out performed immediately prior to starting procedure, including verbal and active participation of all team members addressing the following:Yes  * Correct patient identify  * Confirmed that the correct side and site are marked  * An accurate procedure consent form  * Agreement on the procedure to be done  * Correct patient position  * Relevant images and results are properly labeled and appropriately displayed  * The need to administer antibiotics or fluids for irrigation purposes during the procedure as applicable   * Safety precautions based on patient history or medication use    DURING PROCEDURE: Verification of correct person, site, and procedures any time the responsibility for care of the patient is transferred to another member of the care team.       The following medications were  given:         Prior to injection, verified patient identity using patient's name and date of birth.  Due to injection administration, patient instructed to remain in clinic for 15 minutes  afterwards, and to report any adverse reaction to me immediately.    Joint injection was performed.    Medication Name: lidocaine NDC 6361-5084-08  Drug Amount Wasted:  Yes: 16 mg/ml   Vial/Syringe: Single dose vial  Expiration Date:  2/1/2025        Scribed by Miracle Hobbs ATC for Dr. Day and Darren Oviedo PA-C on October 24, 2024 at 1:54p based on the provider's statements to me.     Miracle Hobbs ATC

## 2024-10-24 NOTE — LETTER
10/24/2024      Long Mayfield  [address]  [facility] [address]      Dear Colleague,    Thank you for referring your patient, Long Mayfield, to the [facility] [place]. Please see a copy of my visit note below.        [facility]  Orthopaedic Surgery      Long Mayfield MRN# [id number]    YOB: 1970     Background history:  DX:  Acute metabolic encephalopathy  Hyperkalemia  Chronic pancreatitis  Type 2 diabetes with kidney complication  Alcohol abuse hyperglycemia  Primary hypertension  Cardiac calcification  GERD.    Anemia  Acute renal insufficiency    TREATMENTS:  1997, open reduction and intra fixation right patella  7/11/2024, I&D of right knee, Dr. Espinoza (Cultures negative)  8/17/2024, I&D of right knee, Dr. Phoenix (Cultures negative)  9/23/2024, Right knee aspiration, (Cutlures negative, Cytology negative)           Assessment and Plan:   Assessment:  54-year-old male with chronic right knee pain, severe bulky synovitis with extension proximally into the thigh, and recurrent effusions of unknown etiology    Plan:  We had a long discussion with the patient regarding his laboratory findings, imaging findings, and clinical presentation.  At this time, we are still uncertain as to the likely etiology of the patient's recurrent knee effusions, marked right lower extremity swelling, right knee pain, and persistently elevated synovial white blood cell counts.  Current workup has included both rheumatologic and infectious workups all which have appeared to be negative.  These infectious workups have included PCR and genetic analysis, as well as screening for tuberculosis--all which have been negative.  His advanced imaging does not appear to suggest osteomyelitis and the MRI demonstrates large complex knee effusions but no findings out right leg suggestive of PVNS.  In order to better elucidate the cause of the patient's symptoms, we believe that the  next best course of action would be to obtain a synovial biopsy.  Therefore we will  attempt a synovial biopsy here in clinic.  If that proves unsuccessful then the patient will be scheduled for synovial biopsy of the right knee in the OR.  All patient's questions were answered to his satisfaction.    Carlos Solorio MD  Fellow Adult Reconstruction     Attending MD (Dr. Dickson Day) Attestation :  This patient was seen and evaluated by me including a history, exam, and interpretation of all imaging and/or lab data.  I formulated the treatment plan along with either a physician's assistant (MAX) or training physician (resident/fellow), who also saw the patient. That individual or a scribe has documented the visit in the attached note which I approve.    Dickson Day MD  [facility] Professor  Oncology and Adult Reconstructive Surgery  Dept Orthopaedic Surgery, [facility]  [phone] office, [phone] pager  [email or web address]          Procedure:   After informed consent repeat scanning was performed to identify the thickened synovium and medial intra-articular septation and adjacent anatomical structures. The skin was prepped and draped in sterile fashion.  1% lidocaine was used for local anesthesia.  Ultrasound was necessary to assure intrasynovial positioning and to avoid vital adjacent structures. Ultrasound guidance was used to pass a 14-gauge core biopsy needle into the mass for tissue biopsy. 10 passes were made.  Following the core needle biopsy 30 mL of serosanguineous complex fluid was aspirated from the joint and sent for labs. Images were permanently stored for the patient's record.The specimen was immediately placed in formalin and sent to the lab. The patient tolerated the procedure well and there were no immediate complications following the procedure.    1% lidocaine: Lot# [id number]    expiration Feb 1, 2025    Darren Oviedo PA-C  Physician Assistant   Oncology and Adult  Reconstructive Surgery  Dept Orthopaedic Surgery, [facility]            History of Present Illness:   54 year old male who presents today for evaluation of a chronic right knee effusion.  Patient states he was getting Synvisc injections of bilateral knees for arthritis at [facility].  Following his last Synvisc injection in May 2024 the right knee began to swell.  Knee was aspirated which showed a cell count of 76K and a week later at 100 9K.  All cultures were negative.  He was transferred to [facility] and did an I&D on 8/14/2024.  To follow-up I&D on 8/17/2024 by Dr. Phoenix.  All cultures have been negative and following the second I&D was placed on ciprofloxacin and referred to rheumatology and infectious disease.  He has had chronically elevated sed rate and CRP.  All cultures have remained negative.  Rheumatology and infectious disease reach out to me to rule out PVNS or other causes of chronic effusions.  He was recently evaluated in the orthopedic clinic where he underwent a right knee aspiration and the synovial fluid was sent for culture and cytology.  Both results to this workup were negative.  He has also been evaluated by rheumatology who has completed a autoimmune workup that has been inconclusive.  The patient's symptoms remain unchanged she has marked swelling to the right knee with a large effusion.  He has no draining sinuses or surrounding erythema.  He now requires a cane for ambulation assistance secondary to the right knee pain.      Of note the patient does have a right foot TMA (November 2023) for a infected diabetic foot ulcer which grew ESBL.             Physical Exam:     EXAMINATION pertinent findings:   PSYCH: Pleasant, healthy-appearing, alert, oriented x3, cooperative. Normal mood and affect.  VITAL SIGNS: There were no vitals taken for this visit..  Reviewed nursing intake notes.   There is no height or weight on file to calculate  BMI.  RESP: non labored breathing   ABD: benign, soft, non-tender, no acute peritoneal findings  SKIN: grossly normal   LYMPHATIC: grossly normal, no adenopathy, no extremity edema  NEURO: grossly normal , no motor deficits  VASCULAR: satisfactory perfusion of all extremities   MUSCULOSKELETAL:     Right knee:   The incision is clean, dry, and intact with no erythema, dehiscence, or discharge.  Large joint effusion.  Pain with palpation diffusely throughout the knee.  Range of motion 0 to 90 degrees.  Good stability with varus valgus stress.  Normal patellar tracking minimal peripheral edema.    Right LE:              Thigh and leg compartments soft and compressible              +Quad/TA/GSC/FHL/EHL              SILT DP/SP/Cindy/Saph/Tib nerve distributions              Palpable dorsalis pedis pulse              Data:   All laboratory data reviewed  All imaging studies reviewed by me    9/21/2024 MRI right knee with and without contrast: Large joint effusion.  No soft tissue lesions appreciated.  Reactive synovitis noted.  Large complex popliteal cyst noted.  Subchondral tibial cysts with reactive edema.  Tricompartmental DJD with degenerative changes of the meniscus.    IMPRESSION:  1.  Large complex joint effusion with mildly thickened enhancing synovium of indeterminate etiology. Findings may be sequela of degenerative arthropathy, or an inflammatory or infectious arthropathy. If clinically indicated repeat arthrocentesis could be   performed for further characterization.  2.  Mild patchy enhancing edema-like marrow signal of the knee which is likely reactive. Early osteomyelitis cannot be entirely excluded, though, is considered less likely.  3.  Tricompartmental degenerative arthrosis.  4.  Marked mucoid degeneration/maceration of the medial and lateral menisci.  5.  Chronic rupture of the ACL.  6.  Mucoid degeneration and/or partial tearing of the PCL.  7.  Intact collateral ligaments.     Recent Labs   Lab Test  09/14/24  1057 09/13/24  0858 09/12/24  0758 09/11/24  1129 08/27/24  1725 08/16/24  0610 08/14/24  1253 07/12/24  0716 07/10/24  1157   HGB 8.5* 8.4* 6.4*   < > 7.3*   < > 7.4*   < > 8.3*   SED  --   --   --   --  99*  --  105*  --  112*   CRPI  --  176.09*  --    < > 39.60*   < > 103.00*   < > 113.00*   WBC 10.6 12.1* 12.3*   < > 8.4   < > 8.0   < > 10.4    < > = values in this interval not displayed.     Recent Labs   Lab Test 08/27/24  1709 08/14/24  1338 07/10/24  1136   FNEU 96 100 93   FCOL Red* Yellow Yellow   FAPR Bloody* Turbid* Turbid*   FWBC 53,900 94,560 65,670      09/11/2024 1807 09/13/2024 1341 Respiratory Aerobic Bacterial Culture with Gram Stain [[id number]]   Sputum from Expectorate    Final result Component Value   Culture 2+ Normal Connor   Gram Stain Result <10 Squamous epithelial cells/low power field    >25 PMNs/low power field    1+ Mixed connor          09/11/2024 1801 09/12/2024 0554 Enteric Bacteria and Virus Panel PCR [[id number]]    Stool from Per Rectum    Final result Component Value   Campylobacter species Negative   Salmonella species Negative   Vibrio species Negative   Vibrio cholerae Negative   Yersinia enterocolitica Negative   Enteropathogenic E. coli (EPEC) Negative   Shiga-like toxin-producing E. coli (STEC) Negative   Shigella/Enteroinvasive E. coli (EIEC) Negative   Cryptosporidium species Negative   Giardia lamblia Negative   Norovirus Gl/Gll Negative   Rotavirus A Negative   Plesiomonas shigelloides Negative   Enteroaggregative E. coli (EAEC) Negative   Enterotoxigenic E. coli (ETEC) Negative   E. coli O157 NA   Cyclospora cayetanensis Negative   Entamoeba histolytica Negative   Adenovirus F40/41 Negative   Astrovirus Negative   Sapovirus Negative          09/11/2024 1801 09/11/2024 2326 C. difficile Toxin B PCR with reflex to C. difficile Antigen and Toxins A/B EIA [[id number]]    Stool from Per Rectum    Final result Component Value   C Difficile Toxin B by PCR  Negative   A negative result does not exclude actual disease due to C. difficile and may be due to improper collection, handling and storage of the specimen or the number of organisms in the specimen is below the detection limit of the assay.          09/11/2024 1138 09/11/2024 1233 Symptomatic Influenza A/B, RSV, & SARS-CoV2 PCR (COVID-19) Nasopharyngeal [[id number]]    Swab from Nasopharyngeal    Final result Component Value   Influenza A PCR Negative   Influenza B PCR Negative   RSV PCR Negative   SARS CoV2 PCR Negative   NEGATIVE: SARS-CoV-2 (COVID-19) RNA not detected, presumed negative.          09/11/2024 1129 09/16/2024 1605 Blood Culture Peripheral Blood [[id number]]   Peripheral Blood    Final result Component Value   Culture No Growth          08/27/2024 1709 09/10/2024 1340 Anaerobic Bacterial Culture Routine [[id number]]   Synovial fluid from Knee, Right    Final result Component Value   Culture No anaerobic organisms isolated          08/27/2024 1709 09/01/2024 0708 Synovial fluid Aerobic Bacterial Culture Routine With Gram Stain [[id number]]   Synovial fluid from Knee, Right    Final result Component Value   Culture No Growth   Gram Stain Result No organisms seen    4+ WBC seen    Predominantly PMNs          08/27/2024 1709 08/27/2024 2020 Cell count with differential fluid [[id number]]    (Abnormal)   Synovial fluid from Knee, Right    Final result Component Value   No component results          08/27/2024 1709 09/22/2024 1831 Fungal or Yeast Culture Routine [[id number]]   Synovial fluid from Knee, Right    Preliminary result Component Value   Culture No growth after 26 days P          08/27/2024 1709 08/27/2024 1714 Acid-Fast Bacilli Culture and Stain [[id number]]    Synovial fluid from Knee, Right    In process Component Value   No component results          08/27/2024 1709 08/27/2024 2018 Crystal ID Synovial Fluid [[id number]]   Synovial fluid from Knee, Right    Final result  Component Value   Crystals Analysis No clinically significant crystals seen.          08/27/2024 1709 08/27/2024 2018 Cell Count Body Fluid [[id number]]    (Abnormal)   Synovial fluid from Knee, Right    Final result Component Value Units   Color Red Abnormal     Clarity Bloody Abnormal     Cell Count Fluid Source Knee, Right    Total Nucleated Cells 53,900 /uL          08/27/2024 1709 08/30/2024 0940 Acid-Fast Bacilli Culture and Stain [[id number]]    Synovial fluid from Knee, Right    Preliminary result Component Value   Acid Fast Stain No acid fast bacilli seen P   Performed By: [address] [place], [place] [address] Director: Raman Hummel MD, PhDCLIA Number: [id number]   Acid Fast Stain No acid fast bacilli seen P   Performed By: [address] [place], [place] [address] Director: Raman Hummel MD, PhDCLIA Number: [id number]  This is an appended report. These results have been appended to a previously preliminary verified report.          08/27/2024 1709 08/27/2024 2020 Differential Body Fluid [[id number]]    Synovial fluid from Knee, Right    Final result Component Value Units   % Neutrophils 96 %   % Lymphocytes 2 %   % Monocyte/Macrophages 2 %          08/17/2024 1006 08/24/2024 0722 Anaerobic Bacterial Culture Routine [[id number]]   Tissue from Knee, Right    Final result Component Value   Culture No anaerobic organisms isolated          08/17/2024 1006 08/24/2024 0722 Anaerobic Bacterial Culture Routine [[id number]]   Tissue from Knee, Right    Final result Component Value   Culture No anaerobic organisms isolated          08/17/2024 1006 09/14/2024 1023 Fungal or Yeast Culture Routine [[id number]]   Tissue from Knee, Right    Final result Component Value   Culture No Growth          08/17/2024 1006 09/14/2024 1023 Fungal or Yeast Culture Routine [[id number]]   Tissue from Knee, Right    Final result Component Value   Culture No  Growth          08/17/2024 1006 08/22/2024 0705 Tissue Aerobic Bacterial Culture Routine [[id number]]   Tissue from Knee, Right    Final result Component Value   Culture No Growth          08/17/2024 1006 08/22/2024 0709 Tissue Aerobic Bacterial Culture Routine [[id number]]   Tissue from Knee, Right    Final result Component Value   Culture No Growth          08/17/2024 1005 08/24/2024 0722 Anaerobic Bacterial Culture Routine [[id number]]   Tissue from Knee, Right    Final result Component Value   Culture No anaerobic organisms isolated          08/17/2024 1005 09/14/2024 1023 Fungal or Yeast Culture Routine [[id number]]   Tissue from Knee, Right    Final result Component Value   Culture No Growth          08/17/2024 1005 08/22/2024 0705 Tissue Aerobic Bacterial Culture Routine [[id number]]   Tissue from Knee, Right    Final result Component Value   Culture No Growth          08/17/2024 1005 08/26/2024 0755 Bacterial DNA 16S Ribosomal Non Blood with Reflex to NGS 16S with Reflex to Enterobacterales Identification [[id number]]   Tissue from Knee, Right    Final result Component Value   See Scanned Result BACTERIAL DNA 16S RIBOSOMAL NON BLOOD WITH REFLEX TO NGS 16S WITH REFLEX TO ENTEROBACTERALES IDENTIF-Scanned          08/17/2024 1005 08/26/2024 0757 Fungal DNA 28S Ribosomal Non Blood [[id number]]   Tissue from Knee, Right    Edited Result - FINAL Component Value   See Scanned Result FUNGAL DNA 28S RIBOSOMAL NON BLOOD-Scanned          08/14/2024 1338 08/28/2024 0754 Anaerobic Bacterial Culture Routine [[id number]]   Aspirate from Knee, Right    Final result Component Value   Culture No anaerobic organisms isolated          08/14/2024 1338 08/14/2024 1607 Cell count with differential fluid [[id number]]    (Abnormal)   Synovial fluid from Knee, Right    Final result Component Value   No component results          08/14/2024 1338 08/19/2024 0704 Aspirate Aerobic Bacterial Culture Routine Without Gram  Stain [[id number]]   Aspirate from Knee, Right    Final result Component Value   Culture No Growth          08/14/2024 1338 09/11/2024 0716 Fungal or Yeast Culture Routine [[id number]]   Aspirate from Knee, Right    Final result Component Value   Culture No Growth          08/14/2024 1338 08/14/2024 1609 Crystal ID Synovial Fluid [[id number]]   Synovial fluid from Knee, Right    Final result Component Value   Crystals Analysis No clinically significant crystals seen.          08/14/2024 1338 08/14/2024 1605 Cell Count Body Fluid [[id number]]    (Abnormal)   Synovial fluid from Knee, Right    Final result Component Value Units   Color Yellow    Clarity Turbid Abnormal     Cell Count Fluid Source Knee, Right    Total Nucleated Cells 94,560 /uL          08/14/2024 1338 08/14/2024 1607 Differential Body Fluid [[id number]]    Synovial fluid from Knee, Right    Final result Component Value Units   % Neutrophils 100 %   % Lymphocytes 0 %   % Monocyte/Macrophages 0 %          07/13/2024 1556 07/14/2024 1154 Chlamydia trachomatis/Neisseria gonorrhoeae by PCR [[id number]]   Urine, Voided    Final result Component Value   Chlamydia Trachomatis Negative   Negative for C. trachomatis rRNA by transcription mediated amplification.  A negative result by transcription mediated amplification does not preclude the presence of infection because results are dependent on proper and adequate collection, absence of inhibitors and sufficient rRNA to be detected.   Neisseria gonorrhoeae Negative   Negative for N. gonorrhoeae rRNA by transcription mediated amplification. A negative result by transcription mediated amplification does not preclude the presence of C. trachomatis infection because results are dependent on proper and adequate collection, absence of inhibitors and sufficient rRNA to be detected.          07/12/2024 2112 07/15/2024 1345 ESBL Organism Culture [[id number]]   Stool from Per Rectum    Final result  Component Value   Culture No ESBL-producing organisms isolated. A negative result does not preclude the presence of ESBL-producing organisms.          07/11/2024 1913 07/25/2024 0749 Anaerobic Bacterial Culture Routine [[id number]]   Aspirate from Knee, Right    Final result Component Value   Culture No anaerobic organisms isolated          07/11/2024 1913 07/25/2024 1200 Anaerobic Bacterial Culture Routine [[id number]]   Aspirate from Knee, Right    Final result Component Value   Culture No anaerobic organisms isolated          07/11/2024 1913 07/11/2024 2200 Gram Stain [[id number]]   Aspirate from Knee, Right    Final result Component Value   GS Culture See corresponding culture for results   Gram Stain Result No organisms seen   Gram Stain Result 4+ WBC seen   Predominantly PMNs          07/11/2024 1913 07/11/2024 2203 Gram Stain [[id number]]   Aspirate from Knee, Right    Final result Component Value   GS Culture See corresponding culture for results   Gram Stain Result No organisms seen   Gram Stain Result 4+ WBC seen   Predominantly PMNs          07/11/2024 1913 08/08/2024 0809 Fungal or Yeast Culture Routine [[id number]]   Aspirate from Knee, Right    Final result Component Value   Culture No Growth          07/11/2024 1913 08/08/2024 0809 Fungal or Yeast Culture Routine [[id number]]   Aspirate from Knee, Right    Final result Component Value   Culture No Growth          07/11/2024 1913 07/16/2024 0726 Aspirate Aerobic Bacterial Culture Routine [[id number]]   Aspirate from Knee, Right    Final result Component Value   Culture No Growth          07/11/2024 1913 07/16/2024 0726 Aspirate Aerobic Bacterial Culture Routine [[id number]]   Aspirate from Knee, Right    Final result Component Value   Culture No Growth          07/11/2024 1913 07/22/2024 0915 Bacterial DNA 16S Ribosomal Non Blood with Reflex to NGS 16S with Reflex to Enterobacterales Identification [[id number]]   Body fluid, unsp from  Knee, Right    Final result Component Value   See Scanned Result BACTERIAL DNA 16S RIBOSOMAL NON BLOOD WITH REFLEX TO NGS 16S WITH REFLEX TO ENTEROBACTERALES IDENTIF-Scanned          07/11/2024 1912 07/25/2024 0757 Anaerobic Bacterial Culture Routine [[id number]]   Aspirate from Knee, Right    Final result Component Value   Culture No anaerobic organisms isolated          07/11/2024 1912 07/11/2024 2135 Gram Stain [[id number]]   Aspirate from Knee, Right    Final result Component Value   GS Culture See corresponding culture for results   Gram Stain Result No organisms seen   Gram Stain Result 4+ WBC seen   Predominantly PMNs          07/11/2024 1912 08/08/2024 0809 Fungal or Yeast Culture Routine [[id number]]   Aspirate from Knee, Right    Final result Component Value   Culture No Growth          07/11/2024 1912 07/16/2024 0733 Aspirate Aerobic Bacterial Culture Routine [[id number]]   Aspirate from Knee, Right    Final result Component Value   Culture No Growth          07/10/2024 1136 07/24/2024 0938 Anaerobic Bacterial Culture Routine [[id number]]   Aspirate from Knee, Right    Final result Component Value   Culture No anaerobic organisms isolated          07/10/2024 1136 07/10/2024 1506 Cell count with differential fluid [[id number]]    (Abnormal)   Synovial fluid from Knee, Right    Final result Component Value   No component results          07/10/2024 1136 07/15/2024 0715 Aspirate Aerobic Bacterial Culture Routine With Gram Stain [[id number]]   Aspirate from Knee, Right    Final result Component Value   Culture No Growth   Gram Stain Result No organisms seen    4+ WBC seen    Predominantly PMNs          07/10/2024 1136 07/10/2024 1506 Cell Count Body Fluid [[id number]]    (Abnormal)   Synovial fluid from Knee, Right    Final result Component Value Units   Color Yellow    Clarity Turbid Abnormal     Cell Count Fluid Source Knee, Right    Total Nucleated Cells 65,670 /uL          07/10/2024 1136  07/10/2024 1506 Differential Body Fluid [[id number]]    Synovial fluid from Knee, Right    Final result Component Value Units   % Neutrophils 93 %   % Lymphocytes 1 %   % Monocyte/Macrophages 6 %          05/29/2024 1000 06/03/2024 1031 Synovial fluid Aerobic Bacterial Culture Routine [[id number]]   Synovial fluid from Knee, Right    Final result Component Value   Culture No Growth          05/29/2024 1000 05/29/2024 1727 Gram Stain [[id number]]   Synovial fluid from Knee, Right    Final result Component Value   GS Culture See corresponding culture for results   Gram Stain Result No organisms seen   Gram Stain Result 4+ WBC seen   Predominantly PMNs          05/29/2024 1000 06/12/2024 1028 Anaerobic Bacterial Culture Routine [[id number]]   Synovial fluid from Knee, Right    Final result Component Value   Culture No anaerobic organisms isolated          05/29/2024 1000 05/29/2024 1546 Cell count with differential fluid [[id number]]    (Abnormal)   Synovial fluid from Knee, Right    Final result Component Value   No component results          05/29/2024 1000 05/29/2024 1546 Crystal ID Synovial Fluid [[id number]]   Synovial fluid from Knee, Right    Final result Component Value   Crystals Analysis No clinically significant crystals seen.          05/29/2024 1000 05/29/2024 1546 Cell Count Body Fluid [[id number]]    (Abnormal)   Synovial fluid from Knee, Right    Final result Component Value Units   Color Yellow    Clarity Cloudy Abnormal     Cell Count Fluid Source Knee, Right    Total Nucleated Cells 109,100 /uL          05/29/2024 1000 05/29/2024 1546 Differential Body Fluid [[id number]]    Synovial fluid from Knee, Right    Final result Component Value Units   % Neutrophils 95 %   % Lymphocytes 1 %   % Monocyte/Macrophages 4 %          05/22/2024 0926 05/27/2024 0709 Synovial fluid Aerobic Bacterial Culture Routine [[id number]]   Synovial fluid from Knee, Right    Final result Component Value    Culture No Growth          05/22/2024 0926 06/05/2024 0907 Anaerobic Bacterial Culture Routine [[id number]]   Synovial fluid from Knee, Right    Final result Component Value   Culture No anaerobic organisms isolated          05/22/2024 0926 05/22/2024 1348 Cell count with differential fluid [[id number]]    (Abnormal)   Synovial fluid from Knee, Right    Final result Component Value   No component results          05/22/2024 0926 05/22/2024 1340 Crystal ID Synovial Fluid [[id number]]   Synovial fluid from Knee, Right    Final result Component Value   Crystals Analysis No clinically significant crystals seen.          05/22/2024 0926 05/22/2024 1631 Gram Stain [[id number]]   Synovial fluid from Knee, Right    Final result Component Value   Culture See corresponding culture for results   Gram Stain Result No organisms seen   Gram Stain Result 4+ WBC seen   Predominantly PMNs          05/22/2024 0926 05/22/2024 1348 Cell Count Body Fluid [[id number]]    (Abnormal)   Synovial fluid from Knee, Right    Final result Component Value Units   Color Yellow    Clarity Hazy Abnormal     Cell Count Fluid Source Knee, Right    Total Nucleated Cells 76,860 /uL          05/22/2024 0926 05/22/2024 1348 Differential Body Fluid [[id number]]    Synovial fluid from Knee, Right    Final result Component Value Units   % Neutrophils 98 %   % Lymphocytes 1 %   % Monocyte/Macrophages 1 %            DATA for DOCUMENTATION:         Past Medical History:     Patient Active Problem List   Diagnosis     Ketosis (H)     Hyperglycemia     Chronic diarrhea     Acute renal insufficiency     Anemia, unspecified type     Alcohol abuse     Cardiac calcification (H)     Cervical disc disease with myelopathy     Chronic bilateral low back pain with bilateral sciatica     Diabetic ulcer of toe of right foot associated with type 2 diabetes mellitus, unspecified ulcer stage (H)     Exocrine pancreatic insufficiency     GERD (gastroesophageal  reflux disease)     Other hyperlipidemia     Primary hypertension     Severe episode of recurrent major depressive disorder, without psychotic features (H)     Type 2 diabetes mellitus with kidney complication, with long-term current use of insulin (H)     Unintentional weight loss     Pyogenic arthritis of right knee joint, due to unspecified organism (H)     Cervical radiculopathy     Change or removal of drains     Encounter to establish care     Chronic pancreatitis (H)     Hypoxia     Pneumonia of left lower lobe due to infectious organism     Acute metabolic encephalopathy     Hyperkalemia     Abnormal CXR     Past Medical History:   Diagnosis Date     Acute pain of right knee 07/12/2024     Allergic rhinitis      Anemia      Arthritis      Bell's palsy      Cervicalgia      Diabetes (H)      Diabetic foot ulcer (H)      Generalized edema      Hyperkalemia      Hypertension      Hypo-osmolality and hyponatremia      Hypoglycemia 05/03/2024     Major depressive disorder, recurrent episode, mild (H)      Other acute osteomyelitis, right ankle and foot (H)      Other chronic pancreatitis (H)      Other polyosteoarthritis      Right knee pain 07/11/2024     Solitary pulmonary nodule        Also see scanned health assessment forms.       Past Surgical History:     Past Surgical History:   Procedure Laterality Date     ARTHROSCOPY KNEE Right 8/17/2024    Procedure: Arthroscopic;  Surgeon: Jeff Phoenix MD;  Location: [facility]     COLONOSCOPY N/A 05/07/2024    Procedure: Colonoscopy;  Surgeon: Nina Moya MD;  Location:  GI     ESOPHAGOSCOPY, GASTROSCOPY, DUODENOSCOPY (EGD), COMBINED N/A 05/06/2024    Procedure: Esophagoscopy, gastroscopy, duodenoscopy (EGD), combined;  Surgeon: Nina Moya MD;  Location:  GI     ESOPHAGOSCOPY, GASTROSCOPY, DUODENOSCOPY (EGD), COMBINED N/A 05/07/2024    Procedure: Esophagoscopy, gastroscopy, duodenoscopy (EGD), combined;  Surgeon: Nita  Nina Figueroa MD;  Location:  GI     IRRIGATION AND DEBRIDEMENT KNEE, COMBINED Right 8/17/2024    Procedure: IRRIGATION AND DEBRIDEMENT, Right KNEE,;  Surgeon: Jeff Phoenix MD;  Location: [facility]     IRRIGATION AND DEBRIDEMENT SPINE Right 7/11/2024    Procedure: Irrigation and debridement knee;  Surgeon: Dejon Espinoza MD;  Location: [facility] OR     ORTHOPEDIC SURGERY       partial amputation of right foot Right      PATELLA SURGERY              Social History:     Social History     Socioeconomic History     Marital status: Single     Spouse name: Not on file     Number of children: Not on file     Years of education: Not on file     Highest education level: Not on file   Occupational History     Not on file   Tobacco Use     Smoking status: Never     Smokeless tobacco: Never   Vaping Use     Vaping status: Never Used   Substance and Sexual Activity     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     Drug use: No     Sexual activity: Not on file   Other Topics Concern     Not on file   Social History Narrative     Not on file     Social Determinants of Health     Financial Resource Strain: Low Risk  (9/24/2024)    Financial Resource Strain      Within the past 12 months, have you or your family members you live with been unable to get utilities (heat, electricity) when it was really needed?: No   Food Insecurity: Low Risk  (9/24/2024)    Food Insecurity      Within the past 12 months, did you worry that your food would run out before you got money to buy more?: No      Within the past 12 months, did the food you bought just not last and you didn t have money to get more?: No   Transportation Needs: Low Risk  (9/24/2024)    Transportation Needs      Within the past 12 months, has lack of transportation kept you from medical appointments, getting your medicines, non-medical meetings or appointments, work, or from getting things that you need?: No   Physical Activity: Not on file   Stress:  Not on file   Social Connections: Unknown (3/16/2022)    Received from [facility], [facility]    Social Connections      Frequency of Communication with Friends and Family: Not on file   Interpersonal Safety: High Risk (9/11/2024)    Interpersonal Safety      Do you feel physically and emotionally safe where you currently live?: No      Within the past 12 months, have you been hit, slapped, kicked or otherwise physically hurt by someone?: No      Within the past 12 months, have you been humiliated or emotionally abused in other ways by your partner or ex-partner?: No   Housing Stability: Low Risk  (9/24/2024)    Housing Stability      Do you have housing? : Yes      Are you worried about losing your housing?: No            Family History:     No family history on file.         Medications:     Current Outpatient Medications   Medication Sig Dispense Refill     acetaminophen (TYLENOL) 500 MG tablet Take 1,000 mg by mouth daily as needed for mild pain. In addition to scheduled doses.       amLODIPine (NORVASC) 10 MG tablet Take 1 tablet (10 mg) by mouth daily. 30 tablet 2     bumetanide (BUMEX) 1 MG tablet Take 1 tablet (1 mg) by mouth daily Dose decreased to 1 mg on discharge.  Optimize dose on PCP visit. 30 tablet 0     cetirizine (ZYRTEC) 10 MG tablet Take 10 mg by mouth daily       Continuous Glucose Sensor (DEXCOM G7 SENSOR) MISC CHANGE EVERY 10 DAYS       DULoxetine (CYMBALTA) 60 MG capsule Take 60 mg by mouth daily       gabapentin (NEURONTIN) 300 MG capsule Take 300 mg by mouth 2 times daily.       Glucagon, rDNA, (GLUCAGON EMERGENCY) 1 MG KIT Inject 1 mg into the muscle daily as needed (hypoglycemia)       glucose 40 % (400 mg/mL) gel Take by mouth as needed for low blood sugar (For BG <60).       guaiFENesin (ROBITUSSIN) 20 mg/mL liquid Take 10 mLs (200 mg) by mouth every 4 hours as needed for cough.       hydrALAZINE (APRESOLINE) 25 MG  tablet Take 25 mg by mouth 2 times daily Hold if SBP < 110       insulin aspart (NOVOLOG FLEXPEN) 100 UNIT/ML pen Inject 8 Units subcutaneously 2 times daily (with meals). 1130 & 1730       insulin aspart (NOVOLOG FLEXPEN) 100 UNIT/ML pen Inject 1-5 Units subcutaneously 3 times daily (with meals). In addition to 6-8 units with each meal, inject additional units as per this sliding scale:  If 200-250 = 1   251-300 = 2   301-350 = 3  351-400 = 4  401+ = 5  Repeat BS after 3 hours and call MD if still over 400       insulin aspart (NOVOLOG PEN) 100 UNIT/ML pen Inject 6 Units subcutaneously every morning. 0730       insulin glargine (LANTUS PEN) 100 UNIT/ML pen Inject 13 Units subcutaneously every morning       lipase-protease-amylase (CREON 36) [id number] units CPEP Take 3 capsules by mouth 3 times daily (with meals). 0730, 1130, 1630       lipase-protease-amylase (CREON 36) [id number] units CPEP Take 1 capsule by mouth 2 times daily. With snack 1000 & 2000       loperamide (IMODIUM) 2 MG capsule Take 4 mg by mouth every 8 hours as needed for diarrhea.       methocarbamol (ROBAXIN) 500 MG tablet Take 1 tablet (500 mg) by mouth every 8 hours as needed for muscle spasms. 30 tablet 0     multivitamin w/minerals (THERA-VIT-M) tablet Take 1 tablet by mouth daily 30 tablet 0     oxyCODONE (ROXICODONE) 5 MG tablet Take 1 tablet (5 mg) by mouth every 6 hours as needed for severe pain. 10 tablet 0     pantoprazole (PROTONIX) 40 MG EC tablet Take 1 tablet (40 mg) by mouth daily 30 tablet 0     sildenafil (VIAGRA) 50 MG tablet as needed.       tamsulosin (FLOMAX) 0.4 MG capsule Take 1 capsule (0.4 mg) by mouth daily.       triamcinolone (KENALOG) 0.1 % external ointment Apply topically.       Current Facility-Administered Medications   Medication Dose Route Frequency Provider Last Rate Last Admin     lidocaine (PF) (XYLOCAINE) 1 % injection 3 mL  3 mL      3 mL at 09/23/24 6776              Review of Systems:    A comprehensive 10 point review of systems (constitutional, ENT, cardiac, peripheral vascular, lymphatic, respiratory, GI, , Musculoskeletal, skin, Neurological) was performed and found to be negative except as described in this note.                                   Again, thank you for allowing me to participate in the care of your patient.        Sincerely,        Dickson Day MD

## 2024-10-25 ENCOUNTER — ALLIED HEALTH/NURSE VISIT (OUTPATIENT)
Dept: EDUCATION SERVICES | Facility: CLINIC | Age: 54
End: 2024-10-25
Attending: PHYSICIAN ASSISTANT
Payer: COMMERCIAL

## 2024-10-25 DIAGNOSIS — N18.31 TYPE 2 DIABETES MELLITUS WITH STAGE 3A CHRONIC KIDNEY DISEASE, WITH LONG-TERM CURRENT USE OF INSULIN (H): ICD-10-CM

## 2024-10-25 DIAGNOSIS — Z11.1 TUBERCULOSIS SCREENING: Primary | ICD-10-CM

## 2024-10-25 DIAGNOSIS — E11.22 TYPE 2 DIABETES MELLITUS WITH STAGE 3A CHRONIC KIDNEY DISEASE, WITH LONG-TERM CURRENT USE OF INSULIN (H): ICD-10-CM

## 2024-10-25 DIAGNOSIS — Z79.4 TYPE 2 DIABETES MELLITUS WITH STAGE 3A CHRONIC KIDNEY DISEASE, WITH LONG-TERM CURRENT USE OF INSULIN (H): ICD-10-CM

## 2024-10-25 LAB
Lab: NORMAL
PERFORMING LABORATORY: NORMAL
SPECIMEN STATUS: NORMAL
TEST NAME: NORMAL

## 2024-10-25 PROCEDURE — G0108 DIAB MANAGE TRN  PER INDIV: HCPCS | Mod: AE | Performed by: DIETITIAN, REGISTERED

## 2024-10-25 NOTE — LETTER
10/25/2024         RE: Long Mayfield  [address] [facility] [address]        Dear Colleague,    Thank you for referring your patient, Long Mayfield, to the [facility]. Please see a copy of my visit note below.    Diabetes Self-Management Education & Support    Presents for: Individual review    Type of Service: In Person Visit      ASSESSMENT:  Patient presents to clinic today to assess/assist with current diabetes self management skills. Patient arrived today unaccompanied. Medications were reviewed and patient confirmed they are currently taking 11 units of Lantus daily, and 4 units of NovoLog at meals plus correction scale as needed (1 unit in the 100s, 2 units in the 200s, 3 units in the 300s, etc.) with no missed or skipped doses. Patient is currently using a personal CGM to monitor their blood glucose which we were unable to link remotely to Dexcom Clarity. These results were interpreted and reviewed with patient.     30 days - average  mg/dL, estimated A1c 10.4%  15% TIR (goal greater than 70%)  18% time high (goal less than 25%)  66% time very high (goal less than 5%)    Intake was briefly reviewed.  Patient shares that he has irregular eating patterns.  Based on patient's responses to insulin doses, it seems he is likely pretty insulin sensitive. Activity is generally low given physical impairments.     Much of the visit today was spent covering pre insulin pump education topics.  Patient reports that he does not understand or do carbohydrate counting.  For this reason, the beta bionic iLet insulin pump may be a good match for him.    Patient's most recent   Lab Results   Component Value Date    A1C 9.2 08/17/2024    A1C 9.4 05/04/2024    A1C >14.0 03/08/2022    is not meeting goal of <7.0 -- Noted anemia at time of A1c check    Diabetes knowledge and skills assessment:   Patient is knowledgeable in diabetes management concepts related to:  Monitoring    Continue education with the following diabetes management concepts: Healthy Eating, Being Active, Monitoring, Taking Medication, Problem Solving, Reducing Risks, and Healthy Coping    Based on learning assessment above, most appropriate setting for further diabetes education would be: Individual setting.      PLAN  Lantus: 12 units  Set dose Novolog at meals: 2 units each + Correction Scale below    Pre-Meal Correction Scale:  If pre-meal glucose is: Add extra Humalog/Novolog to your meal dose per below chart:   151 - 200 +1 unit   201 - 250 +2 units   251 - 300 +3 units   301 - 350 +4 units   351 - 400  +5 units   Over 400 +6 units   THIS IS YOUR CORRECTION SCALE TO USE WITH MEALS.  ADD THE CORRESPONDING AMOUNT OF INSULIN TO YOUR MEAL COVERAGE, BASED ON WHAT YOUR BG IS BEFORE EATING.  DO NOT CORRECT YOUR BLOOD GLUCOSE MORE OFTEN THAN EVERY FOUR HOURS.     Bedtime Correction Scale:  Do Not give Bedtime Correction Insulin if BG less than 200.   If bedtime glucose is:       Take Humalog/Novolog dose below:   201 - 250 1 unit   251 - 300 2 units   301 - 350 3 units   351 - 400 4 units   Over 400  5 units       Topics to cover at upcoming visits: Healthy Eating, Being Active, Monitoring, Taking Medication, Problem Solving, Reducing Risks, Healthy Coping, and Insulin Pump Concepts Carbohydrate counting  Problem solving with insulin pump therapy (BG monitoring; hypoglycemia signs/symptoms, treatment (glucagon) and prevention; hyperglycemia signs/symptoms, treatment and prevention; ketones, DKA signs/symptoms and prevention)    Follow-up: 11/5/2024 with endocrinology, 11/20/2024 with [facility]    See Care Plan for co-developed, patient-state behavior change goals.  AVS provided for patient today.    Education Materials Provided:  No new materials provided today      SUBJECTIVE/OBJECTIVE:  Presents for: Individual review  Accompanied by: Self  Diabetes education in the past 24mo: Yes  Focus of Visit: Patient  "Unsure, Problem Solving  Diabetes type: Type 2  Disease course: Worsening  How confident are you filling out medical forms by yourself:: Not Assessed  Transportation concerns: Yes  Difficulty affording diabetes medication?: No  Difficulty affording diabetes testing supplies?: No  Cultural Influences/Ethnic Background:  Not  or     Diabetes Symptoms & Complications:  Disease course: Worsening       Patient Problem List and Family Medical History reviewed for relevant medical history, current medical status, and diabetes risk factors.    Vitals:  There were no vitals taken for this visit.  Estimated body mass index is 22.1 kg/m  as calculated from the following:    Height as of 10/29/24: 1.651 m (5' 5\").    Weight as of 10/29/24: 60.2 kg (132 lb 12.8 oz).   Last 3 BP:   BP Readings from Last 3 Encounters:   10/29/24 108/65   10/15/24 129/72   10/15/24 107/73       History   Smoking Status     Never   Smokeless Tobacco     Never       Labs:  Lab Results   Component Value Date    A1C 9.2 08/17/2024     Lab Results   Component Value Date     10/23/2024     Lab Results   Component Value Date    LDL 52 09/24/2024     Direct Measure HDL   Date Value Ref Range Status   09/24/2024 45 >=40 mg/dL Final     GFR Estimate   Date Value Ref Range Status   10/15/2024 62 >60 mL/min/1.73m2 Final     Comment:     eGFR calculated using 2021 CKD-EPI equation.   10/08/2017 >90 >60 mL/min/1.7m2 Final     Comment:     Non  GFR Calc     GFR, ESTIMATED POCT   Date Value Ref Range Status   07/09/2024 41 (L) >60 mL/min/1.73m2 Final     GFR Estimate If Black   Date Value Ref Range Status   10/08/2017 >90 >60 mL/min/1.7m2 Final     Comment:      GFR Calc     Lab Results   Component Value Date    CR 1.35 10/15/2024    CR 0.68 10/08/2017     No results found for: \"MICROALBUMIN\"    Healthy Eating:  Has patient met with a dietitian in the past?: No    Being Active:  Being Active Assessed Today: " Yes  Exercise:: Unable to exercise  Barrier to exercise: Physical limitation    Monitoring:  Monitoring Assessed Today: Yes  Did patient bring glucose meter to appointment? : Yes  Blood Glucose Meter: CGM  Blood glucose trend: Increasing    Taking Medications:  Diabetes Medication(s)       Diabetic Other       Glucagon, rDNA, (GLUCAGON EMERGENCY) 1 MG KIT Inject 1 mg into the muscle daily as needed (hypoglycemia)     glucose 40 % (400 mg/mL) gel Take by mouth as needed for low blood sugar (For BG <60).       Insulin       insulin aspart (NOVOLOG FLEXPEN) 100 UNIT/ML pen Inject 2 Units subcutaneously 3 times daily (with meals).     insulin aspart (NOVOLOG FLEXPEN) 100 UNIT/ML pen Inject 1-5 Units subcutaneously 3 times daily (with meals). In addition to 6-8 units with each meal, inject additional units as per this sliding scale:  If 200-250 = 1   251-300 = 2   301-350 = 3  351-400 = 4  401+ = 5  Repeat BS after 3 hours and call MD if still over 400     insulin glargine (LANTUS PEN) 100 UNIT/ML pen Inject 12 Units subcutaneously every morning.            Taking Medication Assessed Today: Yes  Current Treatments: Insulin Injections  Dose schedule: Pre-breakfast, Pre-lunch, Pre-dinner  Given by: Patient    Problem Solving:  Problem Solving Assessed Today: Yes  Is the patient at risk for hypoglycemia?: Yes  Hypoglycemia Frequency: Never  Does patient have glucagon emergency kit?: Yes    Reducing Risks:  Reducing Risks Assessed Today: No    Healthy Coping:  Healthy Coping Assessed Today: Yes  Emotional response to diabetes: Unable to access  Informal Support system:: Family  Stage of change: PREPARATION (Decided to change - considering how)    Care Plan and Education Provided:  Monitoring: Frequency of monitoring, Individual glucose targets, and Log and interpret results, Taking Medication: Action of prescribed medication(s) and When to take medication(s), and Problem Solving: Low glucose - causes, signs/symptoms,  treatment and prevention  importance of changing infusion set every 3-7 days, automated insulin delivery functions/features, integrated continuous glucose monitor function/features, continuous glucose monitor sensor calibration, importance of accurate carbohydrate counting, how to use the bolus calculator, importance of bolusing before meals, insulin pump alerts and alarms, and multiple daily injection back-up plan in case of pump failure    Marlen Ryan, ALONDRA, CHRISTI, LD, [facility]  Diabetes     [phone]  Diabetes Education Care Team: [phone]    Time Spent: 65 minutes  Encounter Type: Individual    Any diabetes medication dose changes were made via the CDE Protocol per the patient's referring provider. A copy of this encounter was shared with the provider.

## 2024-10-25 NOTE — PATIENT INSTRUCTIONS
APPOINTMENT REMINDERS:    Lantus: 12 units  Set dose Novolog at meals: 2 units each + Correction Scale below    Pre-Meal Correction Scale:  If pre-meal glucose is: Add extra Humalog/Novolog to your meal dose per below chart:   151 - 200 +1 unit   201 - 250 +2 units   251 - 300 +3 units   301 - 350 +4 units   351 - 400  +5 units   Over 400 +6 units   THIS IS YOUR CORRECTION SCALE TO USE WITH MEALS.  ADD THE CORRESPONDING AMOUNT OF INSULIN TO YOUR MEAL COVERAGE, BASED ON WHAT YOUR BG IS BEFORE EATING.  DO NOT CORRECT YOUR BLOOD GLUCOSE MORE OFTEN THAN EVERY FOUR HOURS.     Bedtime Correction Scale:  Do Not give Bedtime Correction Insulin if BG less than 200.   If bedtime glucose is:       Take Humalog/Novolog dose below:   201 - 250 1 unit   251 - 300 2 units   301 - 350 3 units   351 - 400 4 units   Over 400  5 units       I truly appreciate your feedback on our appointment together. You will either receive an email, text message, or mailing survey about today's appointment. Please fill this out at your soonest convenience so I can continue to improve upon my practice. Thank you!      Contact Information:  Marlen Ryan, ALONDRA, RDN, LD, [facility]  Diabetes     [phone]  Diabetes Education Care Team: [phone]

## 2024-10-25 NOTE — PROGRESS NOTES
Diabetes Self-Management Education & Support    Presents for: Individual review    Type of Service: In Person Visit      ASSESSMENT:  Patient presents to clinic today to assess/assist with current diabetes self management skills. Patient arrived today unaccompanied. Medications were reviewed and patient confirmed they are currently taking 11 units of Lantus daily, and 4 units of NovoLog at meals plus correction scale as needed (1 unit in the 100s, 2 units in the 200s, 3 units in the 300s, etc.) with no missed or skipped doses. Patient is currently using a personal CGM to monitor their blood glucose which we were unable to link remotely to [employer]. These results were interpreted and reviewed with patient.     30 days - average  mg/dL, estimated A1c 10.4%  15% TIR (goal greater than 70%)  18% time high (goal less than 25%)  66% time very high (goal less than 5%)    Intake was briefly reviewed.  Patient shares that he has irregular eating patterns.  Based on patient's responses to insulin doses, it seems he is likely pretty insulin sensitive. Activity is generally low given physical impairments.     Much of the visit today was spent covering pre insulin pump education topics.  Patient reports that he does not understand or do carbohydrate counting.  For this reason, the beta bionic iLet insulin pump may be a good match for him.    Patient's most recent   Lab Results   Component Value Date    A1C 9.2 08/17/2024    A1C 9.4 05/04/2024    A1C >14.0 03/08/2022    is not meeting goal of <7.0 -- Noted anemia at time of A1c check    Diabetes knowledge and skills assessment:   Patient is knowledgeable in diabetes management concepts related to: Monitoring    Continue education with the following diabetes management concepts: Healthy Eating, Being Active, Monitoring, Taking Medication, Problem Solving, Reducing Risks, and Healthy Coping    Based on learning assessment above, most appropriate setting for further  diabetes education would be: Individual setting.      PLAN  Lantus: 12 units  Set dose Novolog at meals: 2 units each + Correction Scale below    Pre-Meal Correction Scale:  If pre-meal glucose is: Add extra Humalog/Novolog to your meal dose per below chart:   151 - 200 +1 unit   201 - 250 +2 units   251 - 300 +3 units   301 - 350 +4 units   351 - 400  +5 units   Over 400 +6 units   THIS IS YOUR CORRECTION SCALE TO USE WITH MEALS.  ADD THE CORRESPONDING AMOUNT OF INSULIN TO YOUR MEAL COVERAGE, BASED ON WHAT YOUR BG IS BEFORE EATING.  DO NOT CORRECT YOUR BLOOD GLUCOSE MORE OFTEN THAN EVERY FOUR HOURS.     Bedtime Correction Scale:  Do Not give Bedtime Correction Insulin if BG less than 200.   If bedtime glucose is:       Take Humalog/Novolog dose below:   201 - 250 1 unit   251 - 300 2 units   301 - 350 3 units   351 - 400 4 units   Over 400  5 units       Topics to cover at upcoming visits: Healthy Eating, Being Active, Monitoring, Taking Medication, Problem Solving, Reducing Risks, Healthy Coping, and Insulin Pump Concepts Carbohydrate counting  Problem solving with insulin pump therapy (BG monitoring; hypoglycemia signs/symptoms, treatment (glucagon) and prevention; hyperglycemia signs/symptoms, treatment and prevention; ketones, DKA signs/symptoms and prevention)    Follow-up: 11/5/2024 with endocrinology, 11/20/2024 with [facility]    See Care Plan for co-developed, patient-state behavior change goals.  AVS provided for patient today.    Education Materials Provided:  No new materials provided today      SUBJECTIVE/OBJECTIVE:  Presents for: Individual review  Accompanied by: Self  Diabetes education in the past 24mo: Yes  Focus of Visit: Patient Unsure, Problem Solving  Diabetes type: Type 2  Disease course: Worsening  How confident are you filling out medical forms by yourself:: Not Assessed  Transportation concerns: Yes  Difficulty affording diabetes medication?: No  Difficulty affording diabetes testing supplies?:  "No  Cultural Influences/Ethnic Background:  Not  or     Diabetes Symptoms & Complications:  Disease course: Worsening       Patient Problem List and Family Medical History reviewed for relevant medical history, current medical status, and diabetes risk factors.    Vitals:  There were no vitals taken for this visit.  Estimated body mass index is 22.1 kg/m  as calculated from the following:    Height as of 10/29/24: 1.651 m (5' 5\").    Weight as of 10/29/24: 60.2 kg (132 lb 12.8 oz).   Last 3 BP:   BP Readings from Last 3 Encounters:   10/29/24 108/65   10/15/24 129/72   10/15/24 107/73       History   Smoking Status    Never   Smokeless Tobacco    Never       Labs:  Lab Results   Component Value Date    A1C 9.2 08/17/2024     Lab Results   Component Value Date     10/23/2024     Lab Results   Component Value Date    LDL 52 09/24/2024     Direct Measure HDL   Date Value Ref Range Status   09/24/2024 45 >=40 mg/dL Final     GFR Estimate   Date Value Ref Range Status   10/15/2024 62 >60 mL/min/1.73m2 Final     Comment:     eGFR calculated using 2021 CKD-EPI equation.   10/08/2017 >90 >60 mL/min/1.7m2 Final     Comment:     Non  GFR Calc     GFR, ESTIMATED POCT   Date Value Ref Range Status   07/09/2024 41 (L) >60 mL/min/1.73m2 Final     GFR Estimate If Black   Date Value Ref Range Status   10/08/2017 >90 >60 mL/min/1.7m2 Final     Comment:      GFR Calc     Lab Results   Component Value Date    CR 1.35 10/15/2024    CR 0.68 10/08/2017     No results found for: \"MICROALBUMIN\"    Healthy Eating:  Has patient met with a dietitian in the past?: No    Being Active:  Being Active Assessed Today: Yes  Exercise:: Unable to exercise  Barrier to exercise: Physical limitation    Monitoring:  Monitoring Assessed Today: Yes  Did patient bring glucose meter to appointment? : Yes  Blood Glucose Meter: CGM  Blood glucose trend: Increasing    Taking Medications:  Diabetes " Medication(s)       Diabetic Other       Glucagon, rDNA, (GLUCAGON EMERGENCY) 1 MG KIT Inject 1 mg into the muscle daily as needed (hypoglycemia)     glucose 40 % (400 mg/mL) gel Take by mouth as needed for low blood sugar (For BG <60).       Insulin       insulin aspart (NOVOLOG FLEXPEN) 100 UNIT/ML pen Inject 2 Units subcutaneously 3 times daily (with meals).     insulin aspart (NOVOLOG FLEXPEN) 100 UNIT/ML pen Inject 1-5 Units subcutaneously 3 times daily (with meals). In addition to 6-8 units with each meal, inject additional units as per this sliding scale:  If 200-250 = 1   251-300 = 2   301-350 = 3  351-400 = 4  401+ = 5  Repeat BS after 3 hours and call MD if still over 400     insulin glargine (LANTUS PEN) 100 UNIT/ML pen Inject 12 Units subcutaneously every morning.            Taking Medication Assessed Today: Yes  Current Treatments: Insulin Injections  Dose schedule: Pre-breakfast, Pre-lunch, Pre-dinner  Given by: Patient    Problem Solving:  Problem Solving Assessed Today: Yes  Is the patient at risk for hypoglycemia?: Yes  Hypoglycemia Frequency: Never  Does patient have glucagon emergency kit?: Yes    Reducing Risks:  Reducing Risks Assessed Today: No    Healthy Coping:  Healthy Coping Assessed Today: Yes  Emotional response to diabetes: Unable to access  Informal Support system:: Family  Stage of change: PREPARATION (Decided to change - considering how)    Care Plan and Education Provided:  Monitoring: Frequency of monitoring, Individual glucose targets, and Log and interpret results, Taking Medication: Action of prescribed medication(s) and When to take medication(s), and Problem Solving: Low glucose - causes, signs/symptoms, treatment and prevention  importance of changing infusion set every 3-7 days, automated insulin delivery functions/features, integrated continuous glucose monitor function/features, continuous glucose monitor sensor calibration, importance of accurate carbohydrate counting, how  to use the bolus calculator, importance of bolusing before meals, insulin pump alerts and alarms, and multiple daily injection back-up plan in case of pump failure    Marlen Ryan, ALONDRA, MYLESN, LD, CDCES  Diabetes     [phone]  Diabetes Education Care Team: [phone]    Time Spent: 65 minutes  Encounter Type: Individual    Any diabetes medication dose changes were made via the CDE Protocol per the patient's referring provider. A copy of this encounter was shared with the provider.

## 2024-10-29 ENCOUNTER — OFFICE VISIT (OUTPATIENT)
Dept: RHEUMATOLOGY | Facility: CLINIC | Age: 54
End: 2024-10-29
Attending: STUDENT IN AN ORGANIZED HEALTH CARE EDUCATION/TRAINING PROGRAM
Payer: COMMERCIAL

## 2024-10-29 ENCOUNTER — TELEPHONE (OUTPATIENT)
Dept: SCHEDULING | Facility: CLINIC | Age: 54
End: 2024-10-29

## 2024-10-29 ENCOUNTER — OFFICE VISIT (OUTPATIENT)
Dept: FAMILY MEDICINE | Facility: CLINIC | Age: 54
End: 2024-10-29
Payer: COMMERCIAL

## 2024-10-29 VITALS
BODY MASS INDEX: 22.13 KG/M2 | HEART RATE: 81 BPM | SYSTOLIC BLOOD PRESSURE: 108 MMHG | OXYGEN SATURATION: 98 % | RESPIRATION RATE: 16 BRPM | TEMPERATURE: 98.4 F | WEIGHT: 132.8 LBS | DIASTOLIC BLOOD PRESSURE: 65 MMHG | HEIGHT: 65 IN

## 2024-10-29 VITALS
BODY MASS INDEX: 20.8 KG/M2 | DIASTOLIC BLOOD PRESSURE: 62 MMHG | WEIGHT: 125 LBS | SYSTOLIC BLOOD PRESSURE: 101 MMHG | OXYGEN SATURATION: 99 % | HEART RATE: 66 BPM

## 2024-10-29 DIAGNOSIS — F10.10 ALCOHOL ABUSE: ICD-10-CM

## 2024-10-29 DIAGNOSIS — R63.4 UNINTENTIONAL WEIGHT LOSS: ICD-10-CM

## 2024-10-29 DIAGNOSIS — I10 PRIMARY HYPERTENSION: ICD-10-CM

## 2024-10-29 DIAGNOSIS — Z79.4 TYPE 2 DIABETES MELLITUS WITH STAGE 3A CHRONIC KIDNEY DISEASE, WITH LONG-TERM CURRENT USE OF INSULIN (H): ICD-10-CM

## 2024-10-29 DIAGNOSIS — E11.22 TYPE 2 DIABETES MELLITUS WITH STAGE 3A CHRONIC KIDNEY DISEASE, WITH LONG-TERM CURRENT USE OF INSULIN (H): ICD-10-CM

## 2024-10-29 DIAGNOSIS — Z11.59 NEED FOR HEPATITIS B SCREENING TEST: ICD-10-CM

## 2024-10-29 DIAGNOSIS — N28.9 ACUTE RENAL INSUFFICIENCY: ICD-10-CM

## 2024-10-29 DIAGNOSIS — M25.50 MULTIPLE JOINT PAIN: Primary | ICD-10-CM

## 2024-10-29 DIAGNOSIS — N18.31 TYPE 2 DIABETES MELLITUS WITH STAGE 3A CHRONIC KIDNEY DISEASE, WITH LONG-TERM CURRENT USE OF INSULIN (H): ICD-10-CM

## 2024-10-29 DIAGNOSIS — E11.00 TYPE 2 DIABETES MELLITUS WITH HYPEROSMOLARITY WITHOUT COMA, WITH LONG-TERM CURRENT USE OF INSULIN (H): ICD-10-CM

## 2024-10-29 DIAGNOSIS — R93.89 ABNORMAL CXR: ICD-10-CM

## 2024-10-29 DIAGNOSIS — D64.9 ANEMIA, UNSPECIFIED TYPE: ICD-10-CM

## 2024-10-29 DIAGNOSIS — Z79.4 TYPE 2 DIABETES MELLITUS WITH HYPEROSMOLARITY WITHOUT COMA, WITH LONG-TERM CURRENT USE OF INSULIN (H): ICD-10-CM

## 2024-10-29 DIAGNOSIS — M00.9 PYOGENIC ARTHRITIS OF RIGHT KNEE JOINT, DUE TO UNSPECIFIED ORGANISM (H): ICD-10-CM

## 2024-10-29 DIAGNOSIS — F10.21 ALCOHOL DEPENDENCE IN REMISSION (H): ICD-10-CM

## 2024-10-29 DIAGNOSIS — K86.0 ALCOHOL-INDUCED CHRONIC PANCREATITIS (H): Primary | ICD-10-CM

## 2024-10-29 DIAGNOSIS — Z86.79 HISTORY OF VASCULITIS: ICD-10-CM

## 2024-10-29 DIAGNOSIS — F11.20 CONTINUOUS OPIOID DEPENDENCE (H): ICD-10-CM

## 2024-10-29 PROBLEM — J18.9 PNEUMONIA OF LEFT LOWER LOBE DUE TO INFECTIOUS ORGANISM: Status: RESOLVED | Noted: 2024-09-11 | Resolved: 2024-10-29

## 2024-10-29 PROBLEM — R09.02 HYPOXIA: Status: RESOLVED | Noted: 2024-09-11 | Resolved: 2024-10-29

## 2024-10-29 LAB — BACTERIA SNV CULT: NO GROWTH

## 2024-10-29 PROCEDURE — 99215 OFFICE O/P EST HI 40 MIN: CPT | Performed by: PHYSICIAN ASSISTANT

## 2024-10-29 PROCEDURE — 99417 PROLNG OP E/M EACH 15 MIN: CPT | Performed by: PHYSICIAN ASSISTANT

## 2024-10-29 PROCEDURE — 99215 OFFICE O/P EST HI 40 MIN: CPT | Performed by: STUDENT IN AN ORGANIZED HEALTH CARE EDUCATION/TRAINING PROGRAM

## 2024-10-29 PROCEDURE — G0463 HOSPITAL OUTPT CLINIC VISIT: HCPCS | Performed by: STUDENT IN AN ORGANIZED HEALTH CARE EDUCATION/TRAINING PROGRAM

## 2024-10-29 PROCEDURE — G2211 COMPLEX E/M VISIT ADD ON: HCPCS | Performed by: PHYSICIAN ASSISTANT

## 2024-10-29 RX ORDER — MONTELUKAST SODIUM 4 MG/1
TABLET, CHEWABLE ORAL
COMMUNITY
Start: 2024-10-18

## 2024-10-29 RX ORDER — OXYCODONE HYDROCHLORIDE 5 MG/1
5 TABLET ORAL EVERY 6 HOURS PRN
Qty: 30 TABLET | Refills: 0 | Status: SHIPPED | OUTPATIENT
Start: 2024-10-29

## 2024-10-29 ASSESSMENT — ENCOUNTER SYMPTOMS
HEMOPTYSIS: 0
EYE REDNESS: 0
SHORTNESS OF BREATH: 0
HEMATURIA: 0
BLOOD IN STOOL: 0
SINUS PAIN: 0
BACK PAIN: 1
FEVER: 0

## 2024-10-29 ASSESSMENT — PAIN SCALES - GENERAL: PAINLEVEL_OUTOF10: EXTREME PAIN (9)

## 2024-10-29 NOTE — PROGRESS NOTES
RHEUMATOLOGY OUTPATIENT CLINIC NOTE     Referring Provider:   Edward Zuleta MD     Lab review        HLA-B27: Negative    MPO/AR-3: Negative    Rheumatoid factor: Negative  CCP antibody: Negative  Ig elevated  IgG4: 224 elevated      KATHY: Negative  dsDNA:  Negative    Complement C3: normal  Complement C4: normal  ANCA by IFA: Negative   MPO: Negative    AR-3 Abs: Positive, mildly positive [place] [facility] [place]     Cell count, right knee multiple aspirations    Latest Reference Range & Units 24 09:26 24 10:00 07/10/24 11:36 24 13:38 24 17:09   Total Nucleated Cells /uL 76,860 109,100 65,670 94,560 53,900     [place]23[place] [facility] [place]   A. Skin, left, forearm, punch biopsy - Leukocytoclastic vasculitis.   B. Skin biopsy, left, forearm, direct immunofluorescence (DIF) -  Negative immunofluorescence      Soft tissue, right knee, biopsy, 10/2024:  - Tenosynovial, fibroadipose and skeletal muscular tissue with granulation tissue formation with fibrinoid necrosis and foci of microabscesses, vascular proliferation, patchy mixed acute and chronic inflammation with perivascular accentuation, and hemosiderin deposition.  - Gram stains (blocks A1-A4) are negative for bacterial organisms.  - Examination of a couple of focuses with lymphoplasmacytic-predominant infiltrates demonstrates scattered IgG4-positive plasma cells, with an IgG4/IgG plasma cell ratio of approximately 10-15% (lower than 40%, which is the generally applied criteria to support IgG4-related disease).  Overall, there are no characteristic histologic and immunophenotypic features within this biopsy specimen to support IgG4-related disease.    Imaging review      PET scan 10/2024  1. No evidence of hypermetabolic pulmonary nodules or suspicious uptake in the chest.   2. Large, complex right knee joint effusion with synovial thickening previously characterized on 2024 MRI. Multiple enlarged right inguinal and right  iliac chain lymph nodes with mild to moderate FDG uptake are likely reactive to the right knee infective/inflammatory process.  3. Findings of positive fluid balance including trace ascites and diffuse mild subcutaneous anasarca. Right greater than left lower extremity edema.  4. Diffuse bone marrow uptake, which is nonspecific and may be related to anemia given low density blood pool.    MRI right knee 9/2024  1.  Large complex joint effusion with mildly thickened enhancing synovium of indeterminate etiology. Findings may be sequela of degenerative arthropathy, or an inflammatory or infectious arthropathy. If clinically indicated repeat arthrocentesis could be  performed for further characterization.  2.  Mild patchy enhancing edema-like marrow signal of the knee which is likely reactive. Early osteomyelitis cannot be entirely excluded, though, is considered less likely.  3.  Tricompartmental degenerative arthrosis.  4.  Marked mucoid degeneration/maceration of the medial and lateral menisci.  5.  Chronic rupture of the ACL.  6.  Mucoid degeneration and/or partial tearing of the PCL.  7.  Intact collateral ligaments.    X-ray SIJ 9/2024  The SI joints are negative for sacroiliitis, ankylosis, or diastasis on this single projection. Pelvis negative for fracture. Both hips negative for fracture.    Hand X-ray 9/2024  Old healed fracture of the right first metacarpal. No evidence for acute fracture on either side.   Additional chronic fracture of the left scaphoid.   There is some sclerosis within the proximal fracture fragment which may represent superimposed  avascular necrosis of the proximal pole of the left scaphoid. Degenerative change both wrists, greater on the left at the radioscaphoid and radiolunate articulation with cystic change in the distal aspect of the left radius. No erosive changes are identified. Vascular calcifications bilaterally. Degenerative change at both first MCP joints.    X-ray left foot  9/2024  Degenerative change at the first MTP joint and IP joint of the great toe. No evidence for acute fracture. No erosive changes are identified. Vascular calcifications.    MRI cervical spine, outside, only report available 3/2024:  C4-5 moderate central/left-sided canal stenosis with left ventral cord impingement.  No abnormal T2 signal  Mild central canal stenosis with cord abutment C3-4  Multilevel degenerative foraminal stenosis severe left C 3-4, C4-5 with expected nerve impingement  Prominent erosive/inflammatory facet arthropathy left C3-4, C4-5     MRI Right foot, 1/2024:  1. Enhancing marrow edema at the second metatarsal amputation margin as well as head and neck of the third and fourth metatarsals. Indeterminate faint T1 hypointense marrow signal changes of the fourth metatarsal head and about the displaced oblique fracture of the third metatarsal neck. Overall findings compatible with osteitis, possibly osteomyelitis.   2. Irregular soft tissue amputation margins of the medial forefoot with emanating enhancing tissue edema concerning for cellulitis      CT chest, Abdomen, pelvis without contrast, 12/2023:  1. Severe anasarca with moderate to large pleural effusions and a small pericardial effusion. Diffuse septal thickening in the lungs concerning for pulmonary edema. Anemic cardiac blood pool.   2. Solitary pulmonary nodule in the left lower lobe with an average diameter of 10 mm is indeterminate, but worrisome for neoplasm. This is new since 3/19/2022.   3. Patchy and confluent airspace consolidation within the left greater than right lung apices presumably represents infection, cannot exclude underlying soft tissue density nodularity on this noncontrast examination. Recommend pulmonary consultation.   4. Diffuse and severe vascular calcifications. No acute or suspicious abnormality noted within the abdomen or pelvis.        Subjective     Visit date October 29, 2024    Long is a 54 year old  male who presents today for follow up.  Since the last visit,    He has been evaluated by Nephrology and planned for renal biopsy to better understand     - Workup 10/15 showed:    Hemoglobin low  WBC  within normal limits   Platelets  mildly elevated   CRP  elevated  AST  within normal limits   Creatinine  mildly elevated     Elevated IgG and IgG4 levels  ANCA serologies negative     PET scan 10/2024  1. No evidence of hypermetabolic pulmonary nodules or suspicious uptake in the chest.   2. Large, complex right knee joint effusion with synovial thickening previously characterized on 9/21/2024 MRI. Multiple enlarged right inguinal and right iliac chain lymph nodes with mild to moderate FDG uptake are likely reactive to the right knee infective/inflammatory process.  3. Findings of positive fluid balance including trace ascites and diffuse mild subcutaneous anasarca. Right greater than left lower extremity edema.  4. Diffuse bone marrow uptake, which is nonspecific and may be related to anemia given low density blood pool.    MRI right knee 9/2024  1.  Large complex joint effusion with mildly thickened enhancing synovium of indeterminate etiology. Findings may be sequela of degenerative arthropathy, or an inflammatory or infectious arthropathy. If clinically indicated repeat arthrocentesis could be  performed for further characterization.  2.  Mild patchy enhancing edema-like marrow signal of the knee which is likely reactive. Early osteomyelitis cannot be entirely excluded, though, is considered less likely.  3.  Tricompartmental degenerative arthrosis.  4.  Marked mucoid degeneration/maceration of the medial and lateral menisci.  5.  Chronic rupture of the ACL.  6.  Mucoid degeneration and/or partial tearing of the PCL.  7.  Intact collateral ligaments.    X-ray SIJ 9/2024  The SI joints are negative for sacroiliitis, ankylosis, or diastasis on this single projection. Pelvis negative for fracture. Both hips  negative for fracture.    Hand X-ray 9/2024  Old healed fracture of the right first metacarpal. No evidence for acute fracture on either side.   Additional chronic fracture of the left scaphoid.   There is some sclerosis within the proximal fracture fragment which may represent superimposed  avascular necrosis of the proximal pole of the left scaphoid. Degenerative change both wrists, greater on the left at the radioscaphoid and radiolunate articulation with cystic change in the distal aspect of the left radius. No erosive changes are identified. Vascular calcifications bilaterally. Degenerative change at both first MCP joints.    X-ray left foot 9/2024  Degenerative change at the first MTP joint and IP joint of the great toe. No evidence for acute fracture. No erosive changes are identified. Vascular calcifications.    Today, Long mentioned the following:    He remains with right pain and swelling, comes and goes.  He remains neck pain, shoulder pain.   He denies swelling in hands or dactylitis. He is able to make a fist in the morning     He shared that he has become diabetic when he received prednisone 5 years ago for eye infection and he ended up admitted in the hospital for about one week with significant hyperglycemia.      Review of Systems   Constitutional:  Negative for fever.   HENT:  Negative for hearing loss, nosebleeds and sinus pain.    Eyes:  Negative for redness.   Respiratory:  Negative for hemoptysis and shortness of breath.    Cardiovascular:  Negative for chest pain.   Gastrointestinal:  Negative for blood in stool.   Genitourinary:  Negative for hematuria.   Musculoskeletal:  Positive for back pain and joint pain.   Skin:  Negative for rash.       Past Medical history   Past Medical History:   Diagnosis Date    Acute pain of right knee 07/12/2024    Alcohol abuse 09/12/2022    Allergic rhinitis     Anemia     Arthritis     Bell's palsy     Cervicalgia     Diabetes (H)     Diabetic foot ulcer  (H)     Generalized edema     Hyperkalemia     Hypertension     Hypo-osmolality and hyponatremia     Hypoglycemia 05/03/2024    Hypoxia 09/11/2024    Major depressive disorder, recurrent episode, mild (H)     Other acute osteomyelitis, right ankle and foot (H)     Other chronic pancreatitis (H)     Other polyosteoarthritis     Pneumonia of left lower lobe due to infectious organism 09/11/2024    Right knee pain 07/11/2024    Solitary pulmonary nodule        Objective   /62 (BP Location: Right arm, Patient Position: Sitting, Cuff Size: Adult Regular)   Pulse 66   Wt 56.7 kg (125 lb)   SpO2 99%   BMI 20.80 kg/m        PHYSICAL EXAMINATION  Physical Exam  HENT:      Head: Normocephalic.      Mouth/Throat:      Mouth: Mucous membranes are moist.   Eyes:      Conjunctiva/sclera: Conjunctivae normal.   Pulmonary:      Effort: Pulmonary effort is normal.      Breath sounds: Normal breath sounds.   Musculoskeletal:         General: Swelling and tenderness present.      Comments: No noticeable swelling in the hands (MCP, PIP, DIP), wrists, elbows, ankles, feet.   Range of motion in the shoulders and hips are within accepted range for age.     Significant right knee swelling   Skin:     Findings: No rash.   Neurological:      Mental Status: He is alert.       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    His presentation remains to be elusive, there is evidence of inflammatory  cell count in his right knee on multiple aspirations and thus far his infectious workup has been nonrevealing including extensive next-generation sequencing studies, bacterial, fungal and mycobacterial cultures.     He is undergoing multidisciplinary approach with infectious disease, orthopedics, nephrology with the assistance of our colleagues in pathology.  He was identified to have elevated IgG as well as IgG4 subclass raising possibility of IgG4 related disease, he underwent nephrology evaluation and planned for renal biopsy to ascertain the cause of chronic renal disease and his biopsy is planned in few days.    He does have a history of diabetes previously severe hyperglycemia with steroids therefore this would limit the possibility of empiric steroids as a trial and we will try to use lowest possible dose of steroids along with targeted biologic therapy if possible based on what his disease process would be    He underwent synovial biopsy with our colleagues in orthopedics and the pathology is showing mixed acute and chronic inflammation with fibrinoid necrosis and foci of micro abscesses, there was slightly increased IgG4 staining however not reaching 40% seen with IgG4 related disease.  Based on extensive infectious disease evaluation, an infectious etiology is felt to be less likely and consensus from his multidisciplinary approach would be to wait for additional information from his renal biopsy before making treatment decisions.    Plan:  - Await additional testing on Synovial fluid   - Await results of renal biopsy    If there is no concern for infection from infectious disease standpoint, then immunosuppressive therapy can be pursued.   If there is evidence suggestive of ANCA vasculitis on his renal biopsy then rituximab and avacopan can be pursued with brief course to avoid steroids given his diabetes.  If there there is evidence of IgG4 staining in the renal biopsy consistent with IgG4 related  disease then rituximab and low-dose prednisone can be pursued.        45 minutes spent by me on the date of the encounter doing chart review, history and exam, documentation and further activities per the note      Return in about 6 weeks (around 12/10/2024) for Follow up.    Hector Cruz MD  [facility] RHEUMATOLOGY

## 2024-10-29 NOTE — TELEPHONE ENCOUNTER
General Call    Contacts       Contact Date/Time Type Contact Phone/Fax    10/29/2024 03:57 PM CDT Phone (Incoming) [place] (Self) [phone] (M)          Reason for Call:  PATIENT NEEDS RECORD TRANSFERED    What are your questions or concerns:  PATIENT IS BEING DENIED SOCIAL SECURITY, WOULD LIKE DR AYALA TO INFORM SOCIAL SECURITY THAT HE CAN NOT PHYSICALLY WORK    Date of last appointment with provider: 10/29/24    Could we send this information to you in [facility] or would you prefer to receive a phone call?:   Patient would prefer a phone call   Okay to leave a detailed message?: Yes at Cell number on file:    Telephone Information:   Mobile [phone]

## 2024-10-29 NOTE — PROGRESS NOTES
"The longitudinal plan of care for the diagnosis(es)/condition(s) as documented were addressed during this visit. Due to the added complexity in care, I will continue to support Long in the subsequent management and with ongoing continuity of care.    I spent a total of 74 minutes on the day of the visit.  Time spent by me doing chart review, history and exam, documentation and further activities per the note.    Assessment & Plan   Problem List Items Addressed This Visit       Acute renal insufficiency     Last Comprehensive Metabolic Panel:  Lab Results   Component Value Date     (L) 10/15/2024    POTASSIUM 4.0 10/15/2024    CHLORIDE 98 10/15/2024    CO2 24 10/15/2024    ANIONGAP 9 10/15/2024     (A) 10/23/2024    BUN 22.3 (H) 10/15/2024    CR 1.35 (H) 10/15/2024    GFRESTIMATED 62 10/15/2024    AROLDO 8.6 (L) 10/15/2024   Seen by nepphrology 10/12/24:  History of poorly controled diabetes (HgbA1c>10), a history of hypertension both of which would contribute to CKD - and the former to the albuminuria/proteinuria.  Normal UACR in Nov 2021. His ANCA is negative.  Prior seroligic work up is unrevealing - except for the persistently elevated IgG4.  This, with the presence of \"chronic pancreatitis\" raises the suspicion of IgG4 disease.     Cystatin C 2.0. Avoid NSAIDS.    11/8/2024: Renal biopsy scheduled.    1/27/2024: Follow-up with nephrology.           Anemia, unspecified type     He has a history of anemia which has been noted since May 2024.  Cause has not been identified.  Additional evaluation with CBC, retake count, TSH, haptoglobin, iron studies, LDH, ferritin, B12 and folate.  Ferritin elevated 418, iron low at 20, iron binding capacity low at 2030, iron saturation low at 9.  Normal B12 and folate.  Awaiting haptoglobin and reticulocyte count.  Denies blood in stool.  Upper and lower endoscopy completed 5/2024.   No GI source was identified for anemia.       He has been referred to hematology.  " Appointment is scheduled for 12/12/2024.   The source of his anemia has not been identified after a thorough work up with EGD, colonoscopy, labs and PET scan.    PET scan was completed October 23, 2023 which demonstrated:  1. No evidence of hypermetabolic pulmonary nodules or suspicious  uptake in the chest.      2. Large, complex right knee joint effusion with synovial thickening  previously characterized on 9/21/2024 MRI. Multiple enlarged right  inguinal and right iliac chain lymph nodes with mild to moderate FDG  uptake are likely reactive to the right knee infective/inflammatory  process.     3. Findings of positive fluid balance including trace ascites and  diffuse mild subcutaneous anasarca. Right greater than left lower  extremity edema.     4. Diffuse bone marrow uptake, which is nonspecific and may be related to anemia given low density blood pool.         PERIPHERAL BLOOD MORPHOLOGY:        1.  MILD HYPOCHROMIC-BORDERLINE MICROCYTIC ANEMIA  A.  MODERATE ANISOCYTOSIS, WITHOUT INCREASED POIKILOCYTOSIS  B.  IRON STUDIES SUGGESTIVE OF ANEMIA OF CHRONIC DISEASE  C.  LACK OF CORRESPONDING RETICULOCYTOSIS        2.  NORMAL LEUKOCYTE DIFFERENTIAL AND MORPHOLOGY        3.  UNREMARKABLE PLATELET MORPHOLOGY       PERIPHERAL BLOOD MORPHOLOGY:        1.  MILD HYPOCHROMIC-BORDERLINE MICROCYTIC ANEMIA  A.  MODERATE ANISOCYTOSIS, WITHOUT INCREASED POIKILOCYTOSIS  B.  IRON STUDIES SUGGESTIVE OF ANEMIA OF CHRONIC DISEASE  C.  LACK OF CORRESPONDING RETICULOCYTOSIS        2.  NORMAL LEUKOCYTE DIFFERENTIAL AND MORPHOLOGY        3.  UNREMARKABLE PLATELET MORPHOLOGY    Lab Results   Component Value Date    WBC 9.5 10/15/2024    WBC 3.9 10/08/2017     Lab Results   Component Value Date    RBC 3.53 10/15/2024    RBC 4.07 10/08/2017     Lab Results   Component Value Date    HGB 8.3 10/15/2024    HGB 12.1 10/08/2017     Lab Results   Component Value Date    HCT 27.1 10/15/2024    HCT 34.2 10/08/2017     No components found for:  "\"MCT\"  Lab Results   Component Value Date    MCV 77 10/15/2024    MCV 84 10/08/2017     Lab Results   Component Value Date    MCH 23.5 10/15/2024    MCH 29.7 10/08/2017     Lab Results   Component Value Date    MCHC 30.6 10/15/2024    MCHC 35.4 10/08/2017     Lab Results   Component Value Date    RDW 17.1 10/15/2024    RDW 13.4 10/08/2017     Lab Results   Component Value Date     10/15/2024     10/08/2017                   RESOLVED: Alcohol abuse     Remains in abstinence.         Primary hypertension     B/P is at goal.  Continue amlodipine.         Type 2 diabetes mellitus with kidney complication, with long-term current use of insulin (H)     Lab Results   Component Value Date    A1C 9.2 08/17/2024    A1C 9.4 05/04/2024    A1C >14.0 03/08/2022     Endocrinology follow up 11/5/24.  Follow up with DE 11/20/24.  Continue Lantus along with aspart insulin 3 times daily with sliding scale insulin.  He will be due for repeat A1c in November.         Unintentional weight loss     He has had a significant weight loss over the past several months.  Weight today is 132 pounds.  He was 124 pounds at his prior visit 2 weeks ago.  He has under gone testing to identify cause of anemia which ha not been determined.  He is seeing hematology in December.  The cause of his weight loss has not been identified.  He is underwent EGD, colonoscopy, microcytic anemia workup and PET scan.  No etiology has been found.  Will await recommendations of his upcoming hematology appointment in December.    He had a PET scan completed October 23, 2024 which demonstrated:  1. No evidence of hypermetabolic pulmonary nodules or suspicious  uptake in the chest.      2. Large, complex right knee joint effusion with synovial thickening  previously characterized on 9/21/2024 MRI. Multiple enlarged right  inguinal and right iliac chain lymph nodes with mild to moderate FDG  uptake are likely reactive to the right knee " infective/inflammatory  process.     3. Findings of positive fluid balance including trace ascites and  diffuse mild subcutaneous anasarca. Right greater than left lower  extremity edema.     4. Diffuse bone marrow uptake, which is nonspecific and may be related  to anemia given low density blood pool.         Pyogenic arthritis of right knee joint, due to unspecified organism (H)     He has a right knee inflammatory arthritis, etiology unclear at this time.  He was evaluated by infectious disease on October 2, 2024.   PET scan ordered.  IgG4 levels noted to be elevated.  His inflammatory arthritis is culture negative multiple times and extensive ID workup has been negative so far.  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 He missed his appointment with rheumatology earlier today.     Uses oxycodone 5 mg every 6 hours as needed.  He uses it only for severe pain.  CSA was signed 10/15/24.  Some days he is able to not use any oxycodone.  Other days he uses 1-2 tabs.  PDMP was checked today and there is been no misuse or abuse of the medication.  I have refilled the medication today with 30 tablets as I am hopeful that this will last him a longer period of time.    Due to weakness and complex medical conditions, he requires the use of a power chair.  Diagnoses include chronic kidney disease, anemia, weight loss, chronic pancreatitis and pyogenic arthritis of his right knee.         Relevant Medications    oxyCODONE (ROXICODONE) 5 MG tablet    Chronic pancreatitis (H) - Primary     Due to continued right knee swelling and history of chronic pancreatitis with chronic diarrhea, he was referred to GI to review possible Whipple's disease.    10/18/2024 seen by [facility].  We do not have those records and will request those records to be faxed over.  Once I get those records I will review them with recommendations.  Per patient he is to follow-up in 6 months.          Relevant Medications    oxyCODONE (ROXICODONE) 5 MG tablet    Abnormal CXR     Previous CT of the chest completed at [facility] in December 2023 demonstrated:   Impression:   1. Severe anasarca with moderate to large pleural effusions and a small pericardial effusion. Diffuse septal thickening in the lungs concerning for pulmonary edema. Anemic cardiac blood pool.   2. Solitary pulmonary nodule in the left lower lobe with an average diameter of 10 mm is indeterminate, but worrisome for neoplasm. This is new since 3/19/2022.   3. Patchy and confluent airspace consolidation within the left greater than right lung apices presumably represents infection, cannot exclude underlying soft tissue density nodularity on this noncontrast examination. Recommend pulmonary consultation.   4. Diffuse and severe vascular calcifications. No acute or suspicious abnormality noted within the abdomen or pelvis    10/23/2023 PET scan completed:   1. No evidence of hypermetabolic pulmonary nodules or suspicious  uptake in the chest.      2. Large, complex right knee joint effusion with synovial thickening  previously characterized on 9/21/2024 MRI. Multiple enlarged right  inguinal and right iliac chain lymph nodes with mild to moderate FDG  uptake are likely reactive to the right knee infective/inflammatory  process.     3. Findings of positive fluid balance including trace ascites and  diffuse mild subcutaneous anasarca. Right greater than left lower  extremity edema.     4. Diffuse bone marrow uptake, which is nonspecific and may be related  to anemia given low density blood pool.    Recent PET scan showed no concerning lung nodule.         Alcohol dependence in remission (H)     He remains abstinence from alcohol use.         Type 2 diabetes mellitus with hyperosmolarity without coma, with long-term current use of insulin (H)     Please refer to assessment and plan under type 2 diabetes with kidney complication.          "Continuous opioid dependence (H)     CSA was signed 10/15/2024.  He is on chronic opioids for chronic pain secondary to pyogenic arthritis of the right knee.  He uses oxycodone 5 mg tablet as needed for pain.  Some days he does not require the medication at all and some days he requires up to 2 tabs.    PDMP was reviewed today.  There is been no misuse or abuse of the medication.  I am going to refill his medication today with 30 tabs, previously he was receiving 10 tabs which was not lasting him more than just a few days.           Shamir Alves is a 54 year old, presenting for the following health issues:  Recheck Medication and Imm/Inj        10/29/2024     1:38 PM   Additional Questions   Roomed by Alexei Madera MA   Accompanied by Self         10/29/2024     1:38 PM   Patient Reported Additional Medications   Patient reports taking the following new medications None     History of Present Illness       Reason for visit:  Follow up    He eats 0-1 servings of fruits and vegetables daily.He consumes 1 sweetened beverage(s) daily.He exercises with enough effort to increase his heart rate 9 or less minutes per day.  He exercises with enough effort to increase his heart rate 3 or less days per week.   He is taking medications regularly.         Objective    /65 (BP Location: Left arm, Patient Position: Sitting, Cuff Size: Adult Regular)   Pulse 81   Temp 98.4  F (36.9  C) (Oral)   Resp 16   Ht 1.651 m (5' 5\")   Wt 60.2 kg (132 lb 12.8 oz)   SpO2 98%   BMI 22.10 kg/m    Body mass index is 22.1 kg/m .  Physical Exam  Vitals and nursing note reviewed.   Constitutional:       Appearance: Normal appearance.   Neurological:      Mental Status: He is alert.   Psychiatric:         Mood and Affect: Mood normal.         Behavior: Behavior normal.         Thought Content: Thought content normal.         Judgment: Judgment normal.                Signed Electronically by: Rena Blair PA-C    "

## 2024-10-29 NOTE — ASSESSMENT & PLAN NOTE
Previous CT of the chest completed at [facility] in December 2023 demonstrated:   Impression:   1. Severe anasarca with moderate to large pleural effusions and a small pericardial effusion. Diffuse septal thickening in the lungs concerning for pulmonary edema. Anemic cardiac blood pool.   2. Solitary pulmonary nodule in the left lower lobe with an average diameter of 10 mm is indeterminate, but worrisome for neoplasm. This is new since 3/19/2022.   3. Patchy and confluent airspace consolidation within the left greater than right lung apices presumably represents infection, cannot exclude underlying soft tissue density nodularity on this noncontrast examination. Recommend pulmonary consultation.   4. Diffuse and severe vascular calcifications. No acute or suspicious abnormality noted within the abdomen or pelvis    10/23/2023 PET scan completed:   1. No evidence of hypermetabolic pulmonary nodules or suspicious  uptake in the chest.      2. Large, complex right knee joint effusion with synovial thickening  previously characterized on 9/21/2024 MRI. Multiple enlarged right  inguinal and right iliac chain lymph nodes with mild to moderate FDG  uptake are likely reactive to the right knee infective/inflammatory  process.     3. Findings of positive fluid balance including trace ascites and  diffuse mild subcutaneous anasarca. Right greater than left lower  extremity edema.     4. Diffuse bone marrow uptake, which is nonspecific and may be related  to anemia given low density blood pool.    Recent PET scan showed no concerning lung nodule.

## 2024-10-29 NOTE — ASSESSMENT & PLAN NOTE
He has a right knee inflammatory arthritis, etiology unclear at this time.  He was evaluated by infectious disease on October 2, 2024.   PET scan ordered.  IgG4 levels noted to be elevated.  His inflammatory arthritis is culture negative multiple times and extensive ID workup has been negative so far.  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 He missed his appointment with rheumatology earlier today.     Uses oxycodone 5 mg every 6 hours as needed.  He uses it only for severe pain.  CSA was signed 10/15/24.  Some days he is able to not use any oxycodone.  Other days he uses 1-2 tabs.  PDMP was checked today and there is been no misuse or abuse of the medication.  I have refilled the medication today with 30 tablets as I am hopeful that this will last him a longer period of time.    Due to weakness and complex medical conditions, he requires the use of a power chair.  Diagnoses include chronic kidney disease, anemia, weight loss, chronic pancreatitis and pyogenic arthritis of his right knee.

## 2024-10-29 NOTE — LETTER
10/29/2024       RE: Long Mayfield  [id number] [facility] [place] [address]     Dear Colleague,    Thank you for referring your patient, Long Mayfield, to the [facility] RHEUMATOLOGY at [facility]. Please see a copy of my visit note below.    RHEUMATOLOGY OUTPATIENT CLINIC NOTE     Referring Provider:   Edward Zuleta MD     Lab review        HLA-B27: Negative    MPO/IA-3: Negative    Rheumatoid factor: Negative  CCP antibody: Negative  Ig elevated  IgG4: 224 elevated      KATHY: Negative  dsDNA:  Negative    Complement C3: normal  Complement C4: normal  ANCA by IFA: Negative   MPO: Negative    IA-3 Abs: Positive, mildly [place] [facility] [place]     Cell count, right knee multiple aspirations    Latest Reference Range & Units 24 09:26 24 10:00 07/10/24 11:36 24 13:38 24 17:09   Total Nucleated Cells /uL 76,860 109,100 65,670 94,560 53,900     [place] [facility] [place]   A. Skin, left, forearm, punch biopsy - Leukocytoclastic vasculitis.   B. Skin biopsy, left, forearm, direct immunofluorescence (DIF) -  Negative immunofluorescence      Soft tissue, right knee, biopsy, 10/2024:  - Tenosynovial, fibroadipose and skeletal muscular tissue with granulation tissue formation with fibrinoid necrosis and foci of microabscesses, vascular proliferation, patchy mixed acute and chronic inflammation with perivascular accentuation, and hemosiderin deposition.  - Gram stains (blocks A1-A4) are negative for bacterial organisms.  - Examination of a couple of focuses with lymphoplasmacytic-predominant infiltrates demonstrates scattered IgG4-positive plasma cells, with an IgG4/IgG plasma cell ratio of approximately 10-15% (lower than 40%, which is the generally applied criteria to support IgG4-related disease).  Overall, there are no characteristic histologic and immunophenotypic features within this biopsy specimen to support  IgG4-related disease.    Imaging review      PET scan 10/2024  1. No evidence of hypermetabolic pulmonary nodules or suspicious uptake in the chest.   2. Large, complex right knee joint effusion with synovial thickening previously characterized on 9/21/2024 MRI. Multiple enlarged right inguinal and right iliac chain lymph nodes with mild to moderate FDG uptake are likely reactive to the right knee infective/inflammatory process.  3. Findings of positive fluid balance including trace ascites and diffuse mild subcutaneous anasarca. Right greater than left lower extremity edema.  4. Diffuse bone marrow uptake, which is nonspecific and may be related to anemia given low density blood pool.    MRI right knee 9/2024  1.  Large complex joint effusion with mildly thickened enhancing synovium of indeterminate etiology. Findings may be sequela of degenerative arthropathy, or an inflammatory or infectious arthropathy. If clinically indicated repeat arthrocentesis could be  performed for further characterization.  2.  Mild patchy enhancing edema-like marrow signal of the knee which is likely reactive. Early osteomyelitis cannot be entirely excluded, though, is considered less likely.  3.  Tricompartmental degenerative arthrosis.  4.  Marked mucoid degeneration/maceration of the medial and lateral menisci.  5.  Chronic rupture of the ACL.  6.  Mucoid degeneration and/or partial tearing of the PCL.  7.  Intact collateral ligaments.    X-ray SIJ 9/2024  The SI joints are negative for sacroiliitis, ankylosis, or diastasis on this single projection. Pelvis negative for fracture. Both hips negative for fracture.    Hand X-ray 9/2024  Old healed fracture of the right first metacarpal. No evidence for acute fracture on either side.   Additional chronic fracture of the left scaphoid.   There is some sclerosis within the proximal fracture fragment which may represent superimposed  avascular necrosis of the proximal pole of the left  scaphoid. Degenerative change both wrists, greater on the left at the radioscaphoid and radiolunate articulation with cystic change in the distal aspect of the left radius. No erosive changes are identified. Vascular calcifications bilaterally. Degenerative change at both first MCP joints.    X-ray left foot 9/2024  Degenerative change at the first MTP joint and IP joint of the great toe. No evidence for acute fracture. No erosive changes are identified. Vascular calcifications.    MRI cervical spine, outside, only report available 3/2024:  C4-5 moderate central/left-sided canal stenosis with left ventral cord impingement.  No abnormal T2 signal  Mild central canal stenosis with cord abutment C3-4  Multilevel degenerative foraminal stenosis severe left C 3-4, C4-5 with expected nerve impingement  Prominent erosive/inflammatory facet arthropathy left C3-4, C4-5     MRI Right foot, 1/2024:  1. Enhancing marrow edema at the second metatarsal amputation margin as well as head and neck of the third and fourth metatarsals. Indeterminate faint T1 hypointense marrow signal changes of the fourth metatarsal head and about the displaced oblique fracture of the third metatarsal neck. Overall findings compatible with osteitis, possibly osteomyelitis.   2. Irregular soft tissue amputation margins of the medial forefoot with emanating enhancing tissue edema concerning for cellulitis      CT chest, Abdomen, pelvis without contrast, 12/2023:  1. Severe anasarca with moderate to large pleural effusions and a small pericardial effusion. Diffuse septal thickening in the lungs concerning for pulmonary edema. Anemic cardiac blood pool.   2. Solitary pulmonary nodule in the left lower lobe with an average diameter of 10 mm is indeterminate, but worrisome for neoplasm. This is new since 3/19/2022.   3. Patchy and confluent airspace consolidation within the left greater than right lung apices presumably represents infection, cannot exclude  underlying soft tissue density nodularity on this noncontrast examination. Recommend pulmonary consultation.   4. Diffuse and severe vascular calcifications. No acute or suspicious abnormality noted within the abdomen or pelvis.        Subjective    Visit date October 29, 2024    Long is a 54 year old male who presents today for follow up.  Since the last visit,    He has been evaluated by Nephrology and planned for renal biopsy to better understand     - Workup 10/15 showed:    Hemoglobin low  WBC  within normal limits   Platelets  mildly elevated   CRP  elevated  AST  within normal limits   Creatinine  mildly elevated     Elevated IgG and IgG4 levels  ANCA serologies negative     PET scan 10/2024  1. No evidence of hypermetabolic pulmonary nodules or suspicious uptake in the chest.   2. Large, complex right knee joint effusion with synovial thickening previously characterized on 9/21/2024 MRI. Multiple enlarged right inguinal and right iliac chain lymph nodes with mild to moderate FDG uptake are likely reactive to the right knee infective/inflammatory process.  3. Findings of positive fluid balance including trace ascites and diffuse mild subcutaneous anasarca. Right greater than left lower extremity edema.  4. Diffuse bone marrow uptake, which is nonspecific and may be related to anemia given low density blood pool.    MRI right knee 9/2024  1.  Large complex joint effusion with mildly thickened enhancing synovium of indeterminate etiology. Findings may be sequela of degenerative arthropathy, or an inflammatory or infectious arthropathy. If clinically indicated repeat arthrocentesis could be  performed for further characterization.  2.  Mild patchy enhancing edema-like marrow signal of the knee which is likely reactive. Early osteomyelitis cannot be entirely excluded, though, is considered less likely.  3.  Tricompartmental degenerative arthrosis.  4.  Marked mucoid degeneration/maceration of the medial and  lateral menisci.  5.  Chronic rupture of the ACL.  6.  Mucoid degeneration and/or partial tearing of the PCL.  7.  Intact collateral ligaments.    X-ray SIJ 9/2024  The SI joints are negative for sacroiliitis, ankylosis, or diastasis on this single projection. Pelvis negative for fracture. Both hips negative for fracture.    Hand X-ray 9/2024  Old healed fracture of the right first metacarpal. No evidence for acute fracture on either side.   Additional chronic fracture of the left scaphoid.   There is some sclerosis within the proximal fracture fragment which may represent superimposed  avascular necrosis of the proximal pole of the left scaphoid. Degenerative change both wrists, greater on the left at the radioscaphoid and radiolunate articulation with cystic change in the distal aspect of the left radius. No erosive changes are identified. Vascular calcifications bilaterally. Degenerative change at both first MCP joints.    X-ray left foot 9/2024  Degenerative change at the first MTP joint and IP joint of the great toe. No evidence for acute fracture. No erosive changes are identified. Vascular calcifications.    Today, Long mentioned the following:    He remains with right pain and swelling, comes and goes.  He remains neck pain, shoulder pain.   He denies swelling in hands or dactylitis. He is able to make a fist in the morning     He shared that he has become diabetic when he received prednisone 5 years ago for eye infection and he ended up admitted in the hospital for about one week with significant hyperglycemia.      Review of Systems   Constitutional:  Negative for fever.   HENT:  Negative for hearing loss, nosebleeds and sinus pain.    Eyes:  Negative for redness.   Respiratory:  Negative for hemoptysis and shortness of breath.    Cardiovascular:  Negative for chest pain.   Gastrointestinal:  Negative for blood in stool.   Genitourinary:  Negative for hematuria.   Musculoskeletal:  Positive for back  pain and joint pain.   Skin:  Negative for rash.       Past Medical history   Past Medical History:   Diagnosis Date     Acute pain of right knee 07/12/2024     Alcohol abuse 09/12/2022     Allergic rhinitis      Anemia      Arthritis      Bell's palsy      Cervicalgia      Diabetes (H)      Diabetic foot ulcer (H)      Generalized edema      Hyperkalemia      Hypertension      Hypo-osmolality and hyponatremia      Hypoglycemia 05/03/2024     Hypoxia 09/11/2024     Major depressive disorder, recurrent episode, mild (H)      Other acute osteomyelitis, right ankle and foot (H)      Other chronic pancreatitis (H)      Other polyosteoarthritis      Pneumonia of left lower lobe due to infectious organism 09/11/2024     Right knee pain 07/11/2024     Solitary pulmonary nodule        Objective  /62 (BP Location: Right arm, Patient Position: Sitting, Cuff Size: Adult Regular)   Pulse 66   Wt 56.7 kg (125 lb)   SpO2 99%   BMI 20.80 kg/m        PHYSICAL EXAMINATION  Physical Exam  HENT:      Head: Normocephalic.      Mouth/Throat:      Mouth: Mucous membranes are moist.   Eyes:      Conjunctiva/sclera: Conjunctivae normal.   Pulmonary:      Effort: Pulmonary effort is normal.      Breath sounds: Normal breath sounds.   Musculoskeletal:         General: Swelling and tenderness present.      Comments: No noticeable swelling in the hands (MCP, PIP, DIP), wrists, elbows, ankles, feet.   Range of motion in the shoulders and hips are within accepted range for age.     Significant right knee swelling   Skin:     Findings: No rash.   Neurological:      Mental Status: He is alert.       Assessment & Plan    # Recurrent knee effusion, inflammatory cell count, concern for inflammatory arthropathy vs smoldering infection  # Chronic arthralgia   # Chronic diarrhea, pancreatitis, concern for IgG4 related disease  # History of acute kidney injury, leukocytoclastic vasculitis, concern for ANCA vasculitis    # Peripheral arterial  disease  # History of right foot osteomyelitis status post amputation  # Multiple comorbidities [anemia, diabetes with complications, hypertension, hyperlipidemia]      Long is presenting today for follow-up for recurrent right knee effusion.    His presentation remains to be elusive, there is evidence of inflammatory cell count in his right knee on multiple aspirations and thus far his infectious workup has been nonrevealing including extensive next-generation sequencing studies, bacterial, fungal and mycobacterial cultures.     He is undergoing multidisciplinary approach with infectious disease, orthopedics, nephrology with the assistance of our colleagues in pathology.  He was identified to have elevated IgG as well as IgG4 subclass raising possibility of IgG4 related disease, he underwent nephrology evaluation and planned for renal biopsy to ascertain the cause of chronic renal disease and his biopsy is planned in few days.    He does have a history of diabetes previously severe hyperglycemia with steroids therefore this would limit the possibility of empiric steroids as a trial and we will try to use lowest possible dose of steroids along with targeted biologic therapy if possible based on what his disease process would be    He underwent synovial biopsy with our colleagues in orthopedics and the pathology is showing mixed acute and chronic inflammation with fibrinoid necrosis and foci of micro abscesses, there was slightly increased IgG4 staining however not reaching 40% seen with IgG4 related disease.  Based on extensive infectious disease evaluation, an infectious etiology is felt to be less likely and consensus from his multidisciplinary approach would be to wait for additional information from his renal biopsy before making treatment decisions.    Plan:  - Await additional testing on Synovial fluid   - Await results of renal biopsy    If there is no concern for infection from infectious disease  standpoint, then immunosuppressive therapy can be pursued.   If there is evidence suggestive of ANCA vasculitis on his renal biopsy then rituximab and avacopan can be pursued with brief course to avoid steroids given his diabetes.  If there there is evidence of IgG4 staining in the renal biopsy consistent with IgG4 related disease then rituximab and low-dose prednisone can be pursued.        45 minutes spent by me on the date of the encounter doing chart review, history and exam, documentation and further activities per the note      Return in about 6 weeks (around 12/10/2024) for Follow up.    Hector Cruz MD  [facility] RHEUMATOLOGY      Again, thank you for allowing me to participate in the care of your patient.      Sincerely,    Hector Cruz MD

## 2024-10-29 NOTE — ASSESSMENT & PLAN NOTE
He has a history of anemia which has been noted since May 2024.  Cause has not been identified.  Additional evaluation with CBC, retake count, TSH, haptoglobin, iron studies, LDH, ferritin, B12 and folate.  Ferritin elevated 418, iron low at 20, iron binding capacity low at 2030, iron saturation low at 9.  Normal B12 and folate.  Awaiting haptoglobin and reticulocyte count.  Denies blood in stool.  Upper and lower endoscopy completed 5/2024.   No GI source was identified for anemia.       He has been referred to hematology.  Appointment is scheduled for 12/12/2024.   The source of his anemia has not been identified after a thorough work up with EGD, colonoscopy, labs and PET scan.    PET scan was completed October 23, 2023 which demonstrated:  1. No evidence of hypermetabolic pulmonary nodules or suspicious  uptake in the chest.      2. Large, complex right knee joint effusion with synovial thickening  previously characterized on 9/21/2024 MRI. Multiple enlarged right  inguinal and right iliac chain lymph nodes with mild to moderate FDG  uptake are likely reactive to the right knee infective/inflammatory  process.     3. Findings of positive fluid balance including trace ascites and  diffuse mild subcutaneous anasarca. Right greater than left lower  extremity edema.     4. Diffuse bone marrow uptake, which is nonspecific and may be related to anemia given low density blood pool.         PERIPHERAL BLOOD MORPHOLOGY:        1.  MILD HYPOCHROMIC-BORDERLINE MICROCYTIC ANEMIA  A.  MODERATE ANISOCYTOSIS, WITHOUT INCREASED POIKILOCYTOSIS  B.  IRON STUDIES SUGGESTIVE OF ANEMIA OF CHRONIC DISEASE  C.  LACK OF CORRESPONDING RETICULOCYTOSIS        2.  NORMAL LEUKOCYTE DIFFERENTIAL AND MORPHOLOGY        3.  UNREMARKABLE PLATELET MORPHOLOGY       PERIPHERAL BLOOD MORPHOLOGY:        1.  MILD HYPOCHROMIC-BORDERLINE MICROCYTIC ANEMIA  A.  MODERATE ANISOCYTOSIS, WITHOUT INCREASED POIKILOCYTOSIS  B.  IRON STUDIES SUGGESTIVE OF ANEMIA  "OF CHRONIC DISEASE  C.  LACK OF CORRESPONDING RETICULOCYTOSIS        2.  NORMAL LEUKOCYTE DIFFERENTIAL AND MORPHOLOGY        3.  UNREMARKABLE PLATELET MORPHOLOGY    Lab Results   Component Value Date    WBC 9.5 10/15/2024    WBC 3.9 10/08/2017     Lab Results   Component Value Date    RBC 3.53 10/15/2024    RBC 4.07 10/08/2017     Lab Results   Component Value Date    HGB 8.3 10/15/2024    HGB 12.1 10/08/2017     Lab Results   Component Value Date    HCT 27.1 10/15/2024    HCT 34.2 10/08/2017     No components found for: \"MCT\"  Lab Results   Component Value Date    MCV 77 10/15/2024    MCV 84 10/08/2017     Lab Results   Component Value Date    MCH 23.5 10/15/2024    MCH 29.7 10/08/2017     Lab Results   Component Value Date    MCHC 30.6 10/15/2024    MCHC 35.4 10/08/2017     Lab Results   Component Value Date    RDW 17.1 10/15/2024    RDW 13.4 10/08/2017     Lab Results   Component Value Date     10/15/2024     10/08/2017            "

## 2024-10-29 NOTE — ASSESSMENT & PLAN NOTE
Due to continued right knee swelling and history of chronic pancreatitis with chronic diarrhea, he was referred to GI to review possible Whipple's disease.    10/18/2024 seen by [facility].  We do not have those records and will request those records to be faxed over.  Once I get those records I will review them with recommendations.  Per patient he is to follow-up in 6 months.

## 2024-10-29 NOTE — ASSESSMENT & PLAN NOTE
"Last Comprehensive Metabolic Panel:  Lab Results   Component Value Date     (L) 10/15/2024    POTASSIUM 4.0 10/15/2024    CHLORIDE 98 10/15/2024    CO2 24 10/15/2024    ANIONGAP 9 10/15/2024     (A) 10/23/2024    BUN 22.3 (H) 10/15/2024    CR 1.35 (H) 10/15/2024    GFRESTIMATED 62 10/15/2024    AROLDO 8.6 (L) 10/15/2024   Seen by nepphrology 10/12/24:  History of poorly controled diabetes (HgbA1c>10), a history of hypertension both of which would contribute to CKD - and the former to the albuminuria/proteinuria.  Normal UACR in Nov 2021. His ANCA is negative.  Prior seroligic work up is unrevealing - except for the persistently elevated IgG4.  This, with the presence of \"chronic pancreatitis\" raises the suspicion of IgG4 disease.     Cystatin C 2.0. Avoid NSAIDS.    11/8/2024: Renal biopsy scheduled.    1/27/2024: Follow-up with nephrology.    "

## 2024-10-29 NOTE — PROGRESS NOTES
A user error has taken place: encounter opened in error, closed for administrative reasons.       Betsey Colin RN  [facility]

## 2024-10-29 NOTE — ASSESSMENT & PLAN NOTE
Lab Results   Component Value Date    A1C 9.2 08/17/2024    A1C 9.4 05/04/2024    A1C >14.0 03/08/2022     Endocrinology follow up 11/5/24.  Follow up with DE 11/20/24.  Continue Lantus along with aspart insulin 3 times daily with sliding scale insulin.  He will be due for repeat A1c in November.

## 2024-10-29 NOTE — TELEPHONE ENCOUNTER
Can you find out what I need to do to inform Social [employer] that he is currently unable to work due to complex medical care that he is receiving?  Thank you so much.

## 2024-10-29 NOTE — NURSING NOTE
Chief Complaint   Patient presents with    RECHECK     /62 (BP Location: Right arm, Patient Position: Sitting, Cuff Size: Adult Regular)   Pulse 66   Wt 56.7 kg (125 lb)   SpO2 99%   BMI 20.80 kg/m    Nina MCCLOUD

## 2024-10-29 NOTE — ASSESSMENT & PLAN NOTE
He has had a significant weight loss over the past several months.  Weight today is 132 pounds.  He was 124 pounds at his prior visit 2 weeks ago.  He has under gone testing to identify cause of anemia which ha not been determined.  He is seeing hematology in December.  The cause of his weight loss has not been identified.  He is underwent EGD, colonoscopy, microcytic anemia workup and PET scan.  No etiology has been found.  Will await recommendations of his upcoming hematology appointment in December.    He had a PET scan completed October 23, 2024 which demonstrated:  1. No evidence of hypermetabolic pulmonary nodules or suspicious  uptake in the chest.      2. Large, complex right knee joint effusion with synovial thickening  previously characterized on 9/21/2024 MRI. Multiple enlarged right  inguinal and right iliac chain lymph nodes with mild to moderate FDG  uptake are likely reactive to the right knee infective/inflammatory  process.     3. Findings of positive fluid balance including trace ascites and  diffuse mild subcutaneous anasarca. Right greater than left lower  extremity edema.     4. Diffuse bone marrow uptake, which is nonspecific and may be related  to anemia given low density blood pool.

## 2024-10-30 ENCOUNTER — TELEPHONE (OUTPATIENT)
Dept: RHEUMATOLOGY | Facility: CLINIC | Age: 54
End: 2024-10-30
Payer: COMMERCIAL

## 2024-10-30 ENCOUNTER — TELEPHONE (OUTPATIENT)
Dept: FAMILY MEDICINE | Facility: CLINIC | Age: 54
End: 2024-10-30

## 2024-10-30 LAB
PATH REPORT.ADDENDUM SPEC: NORMAL
PATH REPORT.COMMENTS IMP SPEC: NORMAL
PATH REPORT.FINAL DX SPEC: NORMAL
PATH REPORT.GROSS SPEC: NORMAL
PATH REPORT.MICROSCOPIC SPEC OTHER STN: NORMAL
PATH REPORT.RELEVANT HX SPEC: NORMAL
PHOTO IMAGE: NORMAL

## 2024-10-30 NOTE — TELEPHONE ENCOUNTER
[facility] sent form to fill out for a Power Wheelchair Evaluation.  Saw RASHAAD Phoenix on 10/29/24.  Form is completed, however, waiting for PT assessment and letter of Medical Necessity in order to fully complete form.  Spoke with patient today, informed him of PT referral and phone number provided if he does not hear from them by Friday to give the referral department a call.  Form placed in [place] mailbox at .

## 2024-10-30 NOTE — TELEPHONE ENCOUNTER
Called pt to inform him that TSPOT TB labs can be drawn at [facility]. LVM w/ instructions to call back for help scheduling lab appt.

## 2024-10-30 NOTE — TELEPHONE ENCOUNTER
Spoke with patient to clarify request. He will contact Social Security to obtain form or detailed information about what is needed from provider. Patient will update clinic.

## 2024-10-30 NOTE — PROGRESS NOTES
[facility]  Orthopaedic Surgery      Long Mayfield MRN# [id number]    YOB: 1970     Background history:  DX:  Acute metabolic encephalopathy  Hyperkalemia  Chronic pancreatitis  Type 2 diabetes with kidney complication  Alcohol abuse hyperglycemia  Primary hypertension  Cardiac calcification  GERD.    Anemia  Acute renal insufficiency    TREATMENTS:  1997, open reduction and intra fixation right patella  7/11/2024, I&D of right knee, Dr. Espinoza (Cultures negative)  8/17/2024, I&D of right knee, Dr. Phoenix (Cultures negative)  9/23/2024, Right knee aspiration, (Cutlures negative, Cytology negative)    Mr. Mayfield returns to see me for discussion of his right knee.  He has a large degree of synovitis.  He was discussed at our tumor board meeting.  There have been numerous communications among the rheumatology nephrology and infectious disease services.  It is my recommendation that we refrain from any surgery on his knee joint, for example synovectomy, until the underlying disease is identified.  In addition, treatment for any underlying disease may resolve the process within his right knee joint.  Therefore I explained to the patient that we would refrain from any surgical treatment on his right knee and he should proceed with the kidney biopsy tomorrow and follow-up with Dr. Chinchilla in the renal service and also proceed with any recommended treatment thereafter.  I will plan to see him in January for recheck to assess his response or status at that time.    Meanwhile he is having quite a bit of difficulty with a swollen knee and limited walking ability and requires use of a cane.  This is affecting his ability to perform any type of gainful employment.  I expect this will continue until his disease is identified treated and resolved.  This likely will take several months.    MD Tashia Monet Family Professor  Oncology and Adult Reconstructive Surgery  Dept Orthopaedic  Surgery, [facility]  [phone] office, [phone] pager  [email or web address]      Total combined visit time and work time before and after clinic visit, independent of trainee, on encounter date = 20 min

## 2024-10-31 ENCOUNTER — PATIENT OUTREACH (OUTPATIENT)
Dept: NEPHROLOGY | Facility: CLINIC | Age: 54
End: 2024-10-31
Payer: COMMERCIAL

## 2024-10-31 ENCOUNTER — TELEPHONE (OUTPATIENT)
Dept: ONCOLOGY | Facility: HOSPITAL | Age: 54
End: 2024-10-31
Payer: COMMERCIAL

## 2024-10-31 ENCOUNTER — TEAM CONFERENCE (OUTPATIENT)
Dept: ORTHOPEDICS | Facility: CLINIC | Age: 54
End: 2024-10-31
Payer: COMMERCIAL

## 2024-10-31 NOTE — PROGRESS NOTES
Dickson Day MD Zhou, Yan, MD; Edward Zuleta MD; Hector Cruz MD; Maggie Liriano MD; Sahara Graham MD; 3 others  Appreciate the follow -up.  Also discussed in our tumor board this AM.    PLAN: we should await outcome of rheumatologic workup, renal biopsy and any attempted treatment prior to making any decision about surgical intervention for his left lower extremity.    Dickson Day MD  [facility] Family Professor  Oncology and Adult Reconstructive Surgery  Dept Orthopaedic Surgery, [facility]  [phone] office, [phone] pager  [email or web address]          Previous Messages       ----- Message -----  From: Tera Mclain MD  Sent: 10/30/2024   9:39 AM CDT  To: Darren Oviedo PA-C; *    Dr Zuleta and all,    Although given the presence of neutrophilic inflammation and necrosis, infectious etiology remains a consideration, the histologic features are relatively nonspecific to support a more specific etiology.What I can tell from the histology is that there are no histologic features to support TB/mycobacteria infection on this biopsy specimen.    I reviewed the IgG/IgG4 stains today. As discussed before, there are no typical histologic features to support IgG4-related disease on this biopsy specimen. Examining a couple of areas relatively enriched (but not as dense as seen characteristically in IgG4 related disease) with lymphoplasmacytic infiltrates, IgG4 positive plasma cells are scattered and accounts for ~10-15% of total population of plasma cells, not enough to support IgG4-related disease if using the general criteria of >40% IgG4/IgG ratio. That being said, I am not aware of and could not find literature and consensus criteria on histologic features of IgG4-related disease particularly involving joint/synovial tissue. If clinically highly suspicious, my thought would be that other involved organs might be a better source to evaluate.    Thanks,  Tera  ----- Message  -----  From: Edward Zuleta MD  Sent: 10/29/2024   9:19 PM CDT  To: Darren Oviedo PA-C; *    Thanks for the reminder. If it's performed already, then we don't need to repeat. I am exploring a Next gen sequencing with Dr. Brothers but since it is not CLIA approved I would have to get IRB.    Dr. Mclain,  Any idea, what infections could present with this histologic pattern?  ----- Message -----  From: Tera Mclain MD  Sent: 10/29/2024   9:01 PM CDT  To: Darren Oviedo PA-C; *    ZULEYMA Martin the tissue left in the paraffin block from the joint specimen is of decent size and should be adequate for molecular testing, if needed and if this is the appropriate material source. I saw a Bacterial DNA 16S Ribosomal test was done on tissue in 8/2024 and you mentioned about a broader panel, right? Let me know how I can help to facilitate the workflow if team decide to move forward.    ThanksTera  ----- Message -----  From: Edward Zuleta MD  Sent: 10/29/2024   3:31 PM CDT  To: Darren Oviedo PA-C; *    Let's gather more information.    Dr. Day, Dr. Mclain,    Have we exhausted the testing for synovial tissue? Or does a bigger piece of synovium will yield more information? Can we add broadrange PCR to the synovium tissue specimen?  ----- Message -----  From: Hector Cruz MD  Sent: 10/29/2024   1:10 PM CDT  To: Darren Oviedo PA-C; *    Thanks Dr. Mclain, much appreciated.    I think since there remains to be uncertainty then best to wait for renal biopsy without steroids to get the highest yield    Dr. Zuleta - Are you okay from infectious disease standpoint for initiation of immunosuppressive therapy?    I think we can try medications first before surgical intervention from inflammatory standpoint    Hector Green  ----- Message -----  From: Tera Mclain MD  Sent: 10/29/2024   9:19 AM CDT  To: Darren Oviedo PA-C; *    Hello team,    It was a challenging case. I can try IgG and IgG4  "stains.    Histologically, it does not appear to have the typical features to support IgG4-related disease; will see the results of pending stains. The lymphoplasmacytic infiltrate and fibrosis are not most prominent features; infiltrates are mostly associated with granulation tissue and intermixed with acute inflammation.    Actually I initially thought about vasculitis since that the presence of necrosis and neutrophilic microabscesses in this specimen raises the possibility of vasculitis. However, these histologic findings could also be seen secondary to tissue erosion/ulceration due to other causes. Unfortunately to my limited knowledge, the literature on histopathology of joint diseases in the setting of systemic vasculitis is sparse, possibly due to limited tissue specimens obtained from involved joints. Per bone and joint pathology textbook, it has been believed that, paradoxically though, \"arthritis seen in vasculitides is never erosive. Synovial pathology would show a low grade inflammatory cell infiltrate that resemble histologic pattern generally seen in seronegative arthropathies\".    I will let you know more when I get the stains back.    Thanks,  Tera  ----- Message -----  From: Hector Cruz MD  Sent: 10/28/2024   5:10 PM CDT  To: Darren Oviedo PA-C; *    Thanks Dr. Day for the update.    This is getting more and more interesting.    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    We see similar description to this synovial specimen in meningeal specimens in patients with pachymeningitis related to GPA rather than IgG4 related disease. PMID: [id number]    If indeed he has IgG4 related disease then this can respond to steroids and immunosuppressive therapy and similarly if this was GPA.    It would be helpful to know if there is increased IgG4 cells in the synovial tissue.    He is planned for renal biopsy, I am not sure if starting steroids prior to renal biopsy " could affect the yield of the biopsy. But I think we can get things going for him since we have a date but I will leave the final say for Dr. Amor Mclain, Can we do IgG4 staining for the synovial tissue?    Dr. Chinchilla,  Are you okay with prednisone prior to biopsy currently scheduled for 11/8 or would you prefer to wait?  Seems like we would be heading towards steroids and rituximab.    Peter,  Hector  ----- Message -----  From: Dickson Day MD  Sent: 10/28/2024   1:27 PM CDT  To: Darren Oviedo PA-C; *    Dr Cruz,  Path report below.  I'm not familiar with IgG4-RD and have never knowingly seen this condition in a joint.  My reading of literature indicates that it usually involves internal organs and sites.  Even if IgG4-RD was present in the renal biopsy, I'd advocate for doing the synovial biopsy.   Are you anticipating any systemic therapy or steroids?  If not, I think the pt will need a surgical debulking or synovectomy as he is quite symptomatic.      Dr Mclain,  Does the synovial biopsy rule in or rule out IgG4-RD?      _________________________________    Synovial tissue biopsy Path report:  Final Diagnosis  A. Soft tissue, right knee, biopsy:  - Tenosynovial, fibroadipose and skeletal muscular tissue with granulation tissue formation with fibrinoid necrosis and foci of microabscesses, vascular proliferation, patchy mixed acute and chronic inflammation with perivascular accentuation, and hemosiderin deposition.  - Gram stains (blocks A1-A4) are negative for bacterial organisms.  - See comment.     Electronically signed by Tera Mclain MD on 10/28/2024 at 12:07 PM  Comment UUMAYO  Histologic findings are relatively nonspecific, some of which could represent chronic reactive or degenerative changes. The presence of granulation tissue, fibrinoid necrosis, mixed chronic and acute inflammation with lymphoplasmacytic and neutrophilic infiltration may be seen in a variety of conditions, including  autoimmune conditions, infections and seronegative arthropathies due to various conditions. Correlation with clinical and laboratory (e.g. serologic) findings are needed.  ----- Message -----  From: Hectro Cruz MD  Sent: 10/26/2024  11:34 AM CDT  To: Darren Oviedo PA-C; *    Thank you for including me.  This is quite challenging case. He may end up being IgG4 related disease, we need tissue confirmation.  We are awaiting his renal biopsy results.     Hopefully the synovial biopsy could also give some answers.    Hector Green  ----- Message -----  From: Edward Zuleta MD  Sent: 10/24/2024   1:42 PM CDT  To: Darren Oviedo PA-C; *    [facility],    I'm following this patient together with Dr. Cruz. I'm adding him to the chain here. I don't think [facility] has anything to offer for this patient at the moment. Micro[facility] Dx is ok.    He is undergoing workup for rheumatologic causes especially IgG4 disease. Our next step is synovial biopsies to send for histopathology. I believe the nephrologist is also planning on a kidney biopsy. I'm adding Dr. Chinchilla as well.    Najma  ----- Message -----  From: Dickson Day MD  Sent: 10/24/2024  12:56 PM CDT  To: Darren Oviedo PA-C; *    Pat,  Our clinic staff is receiving communication that the MicroGenDX Next generation sequencing test is not being sent out by our Micro lab anymore.      Unfortunately, there are some times when we have difficult recovering an organism and the clinical scenario either is suspicious for an infection or remains undiagnosed.  Sometimes the [facility] 16S and 28S rRNA tests have been completed already as well.  In addition, Dr. Matt Weiss has advised setting up an algorithm identifying the decision making for when to perform another DNA panel screening test known as BioFire.  I am sure she's willing to share the algorithm with you.    With the patient in question identified in this message, he probably fits  the situation when additional testing would be warranted.  I have asked our staff to input orders for the Microgen DX and BioFire tests on this patient's knee aspirate.  If you agree that it is an appropriate clinical situation, would you be able to approve doing so?    Thanks very much of your help, Ed

## 2024-10-31 NOTE — TELEPHONE ENCOUNTER
Received a phone call today from Long. He is calling because he is scheduled for a blood transfusion on 11/5 at 11:30am. He is scheduled for another appointment in [place] at 1:15 and he is wondering how long his transfusion will take. I let him know the transfusion will be a minimum of 2 hours but likely longer. He does not want to come in any sooner and will work on rescheduling his appointment in [place]. He has no other questions at this time.     Yoselin Ramachandran RN on 10/31/2024 at 11:30 AM

## 2024-10-31 NOTE — PROGRESS NOTES
Update from Dr. Chinchilla to support scheduling follow up post kidney biopsy that is now scheduled in a few weeks.  Updated patient of add on clinic request for 12/13, Friday am and that staffing will reach out to confirm appt as well as get lab appt set prior to appt as well.  Darlene Luna RN, BSN, PHN  [facility] Nephrology Nurse Navigator  [phone]

## 2024-11-05 ENCOUNTER — LAB (OUTPATIENT)
Dept: INFUSION THERAPY | Facility: HOSPITAL | Age: 54
End: 2024-11-05
Attending: INTERNAL MEDICINE
Payer: COMMERCIAL

## 2024-11-05 ENCOUNTER — PATIENT OUTREACH (OUTPATIENT)
Dept: NEPHROLOGY | Facility: CLINIC | Age: 54
End: 2024-11-05

## 2024-11-05 ENCOUNTER — MYC MEDICAL ADVICE (OUTPATIENT)
Dept: INTERVENTIONAL RADIOLOGY/VASCULAR | Facility: CLINIC | Age: 54
End: 2024-11-05

## 2024-11-05 VITALS
TEMPERATURE: 97.6 F | RESPIRATION RATE: 16 BRPM | DIASTOLIC BLOOD PRESSURE: 95 MMHG | HEART RATE: 69 BPM | OXYGEN SATURATION: 98 % | SYSTOLIC BLOOD PRESSURE: 159 MMHG

## 2024-11-05 DIAGNOSIS — D64.9 ANEMIA, UNSPECIFIED TYPE: Primary | ICD-10-CM

## 2024-11-05 DIAGNOSIS — N28.9 ACUTE RENAL INSUFFICIENCY: ICD-10-CM

## 2024-11-05 DIAGNOSIS — Z11.59 NEED FOR HEPATITIS B SCREENING TEST: ICD-10-CM

## 2024-11-05 DIAGNOSIS — Z98.890 S/P DEBRIDEMENT: ICD-10-CM

## 2024-11-05 LAB
ABO/RH(D): NORMAL
ALBUMIN SERPL BCG-MCNC: 3 G/DL (ref 3.5–5.2)
ALP SERPL-CCNC: 133 U/L (ref 40–150)
ALT SERPL W P-5'-P-CCNC: <5 U/L (ref 0–70)
ANION GAP SERPL CALCULATED.3IONS-SCNC: 13 MMOL/L (ref 7–15)
ANTIBODY SCREEN: NEGATIVE
AST SERPL W P-5'-P-CCNC: 13 U/L (ref 0–45)
BASOPHILS # BLD AUTO: 0 10E3/UL (ref 0–0.2)
BASOPHILS NFR BLD AUTO: 0 %
BILIRUB SERPL-MCNC: <0.2 MG/DL
BLD PROD TYP BPU: NORMAL
BLD PROD TYP BPU: NORMAL
BLOOD COMPONENT TYPE: NORMAL
BLOOD COMPONENT TYPE: NORMAL
BUN SERPL-MCNC: 19.4 MG/DL (ref 6–20)
CALCIUM SERPL-MCNC: 8.2 MG/DL (ref 8.8–10.4)
CHLORIDE SERPL-SCNC: 97 MMOL/L (ref 98–107)
CODING SYSTEM: NORMAL
CODING SYSTEM: NORMAL
CREAT SERPL-MCNC: 1.49 MG/DL (ref 0.67–1.17)
CROSSMATCH: NORMAL
CROSSMATCH: NORMAL
EGFRCR SERPLBLD CKD-EPI 2021: 55 ML/MIN/1.73M2
EOSINOPHIL # BLD AUTO: 0.3 10E3/UL (ref 0–0.7)
EOSINOPHIL NFR BLD AUTO: 4 %
ERYTHROCYTE [DISTWIDTH] IN BLOOD BY AUTOMATED COUNT: 17.1 % (ref 10–15)
GLUCOSE SERPL-MCNC: 271 MG/DL (ref 70–99)
HBV CORE AB SERPL QL IA: NONREACTIVE
HCO3 SERPL-SCNC: 21 MMOL/L (ref 22–29)
HCT VFR BLD AUTO: 23.1 % (ref 40–53)
HGB BLD-MCNC: 7.1 G/DL (ref 13.3–17.7)
IMM GRANULOCYTES # BLD: 0 10E3/UL
IMM GRANULOCYTES NFR BLD: 0 %
ISSUE DATE AND TIME: NORMAL
ISSUE DATE AND TIME: NORMAL
LYMPHOCYTES # BLD AUTO: 1.9 10E3/UL (ref 0.8–5.3)
LYMPHOCYTES NFR BLD AUTO: 28 %
MCH RBC QN AUTO: 22.9 PG (ref 26.5–33)
MCHC RBC AUTO-ENTMCNC: 30.7 G/DL (ref 31.5–36.5)
MCV RBC AUTO: 75 FL (ref 78–100)
MONOCYTES # BLD AUTO: 0.7 10E3/UL (ref 0–1.3)
MONOCYTES NFR BLD AUTO: 11 %
NEUTROPHILS # BLD AUTO: 3.9 10E3/UL (ref 1.6–8.3)
NEUTROPHILS NFR BLD AUTO: 56 %
NRBC # BLD AUTO: 0 10E3/UL
NRBC BLD AUTO-RTO: 0 /100
PLATELET # BLD AUTO: 587 10E3/UL (ref 150–450)
POTASSIUM SERPL-SCNC: 4.1 MMOL/L (ref 3.4–5.3)
PROT SERPL-MCNC: 7.6 G/DL (ref 6.4–8.3)
RBC # BLD AUTO: 3.1 10E6/UL (ref 4.4–5.9)
SODIUM SERPL-SCNC: 131 MMOL/L (ref 135–145)
SPECIMEN EXPIRATION DATE: NORMAL
UNIT ABO/RH: NORMAL
UNIT ABO/RH: NORMAL
UNIT NUMBER: NORMAL
UNIT NUMBER: NORMAL
UNIT STATUS: NORMAL
UNIT STATUS: NORMAL
UNIT TYPE ISBT: 5100
UNIT TYPE ISBT: 5100
WBC # BLD AUTO: 6.9 10E3/UL (ref 4–11)

## 2024-11-05 PROCEDURE — P9016 RBC LEUKOCYTES REDUCED: HCPCS | Performed by: INTERNAL MEDICINE

## 2024-11-05 PROCEDURE — 86481 TB AG RESPONSE T-CELL SUSP: CPT

## 2024-11-05 PROCEDURE — 36430 TRANSFUSION BLD/BLD COMPNT: CPT

## 2024-11-05 PROCEDURE — 258N000003 HC RX IP 258 OP 636: Performed by: INTERNAL MEDICINE

## 2024-11-05 PROCEDURE — 86923 COMPATIBILITY TEST ELECTRIC: CPT | Performed by: INTERNAL MEDICINE

## 2024-11-05 PROCEDURE — 82310 ASSAY OF CALCIUM: CPT

## 2024-11-05 PROCEDURE — 86900 BLOOD TYPING SEROLOGIC ABO: CPT | Performed by: INTERNAL MEDICINE

## 2024-11-05 PROCEDURE — 86901 BLOOD TYPING SEROLOGIC RH(D): CPT | Performed by: INTERNAL MEDICINE

## 2024-11-05 PROCEDURE — 85025 COMPLETE CBC W/AUTO DIFF WBC: CPT | Performed by: INTERNAL MEDICINE

## 2024-11-05 PROCEDURE — 36415 COLL VENOUS BLD VENIPUNCTURE: CPT

## 2024-11-05 PROCEDURE — 86704 HEP B CORE ANTIBODY TOTAL: CPT

## 2024-11-05 RX ORDER — HEPARIN SODIUM (PORCINE) LOCK FLUSH IV SOLN 100 UNIT/ML 100 UNIT/ML
5 SOLUTION INTRAVENOUS
OUTPATIENT
Start: 2024-11-06

## 2024-11-05 RX ORDER — DIPHENHYDRAMINE HYDROCHLORIDE 50 MG/ML
50 INJECTION INTRAMUSCULAR; INTRAVENOUS
Status: CANCELLED
Start: 2024-11-05

## 2024-11-05 RX ORDER — EPINEPHRINE 1 MG/ML
0.3 INJECTION, SOLUTION, CONCENTRATE INTRAVENOUS EVERY 5 MIN PRN
OUTPATIENT
Start: 2024-11-06

## 2024-11-05 RX ORDER — HEPARIN SODIUM (PORCINE) LOCK FLUSH IV SOLN 100 UNIT/ML 100 UNIT/ML
5 SOLUTION INTRAVENOUS
Status: CANCELLED | OUTPATIENT
Start: 2024-11-05

## 2024-11-05 RX ORDER — EPINEPHRINE 1 MG/ML
0.3 INJECTION, SOLUTION INTRAMUSCULAR; SUBCUTANEOUS EVERY 5 MIN PRN
Status: DISCONTINUED | OUTPATIENT
Start: 2024-11-05 | End: 2024-11-05 | Stop reason: HOSPADM

## 2024-11-05 RX ORDER — DIPHENHYDRAMINE HYDROCHLORIDE 50 MG/ML
50 INJECTION INTRAMUSCULAR; INTRAVENOUS
Start: 2024-11-06

## 2024-11-05 RX ORDER — EPINEPHRINE 1 MG/ML
0.3 INJECTION, SOLUTION INTRAMUSCULAR; SUBCUTANEOUS EVERY 5 MIN PRN
Status: CANCELLED | OUTPATIENT
Start: 2024-11-05

## 2024-11-05 RX ORDER — HEPARIN SODIUM,PORCINE 10 UNIT/ML
5-20 VIAL (ML) INTRAVENOUS DAILY PRN
Status: CANCELLED | OUTPATIENT
Start: 2024-11-05

## 2024-11-05 RX ORDER — DIPHENHYDRAMINE HYDROCHLORIDE 50 MG/ML
50 INJECTION INTRAMUSCULAR; INTRAVENOUS
Status: DISCONTINUED | OUTPATIENT
Start: 2024-11-05 | End: 2024-11-05 | Stop reason: HOSPADM

## 2024-11-05 RX ORDER — HEPARIN SODIUM,PORCINE 10 UNIT/ML
5-20 VIAL (ML) INTRAVENOUS DAILY PRN
OUTPATIENT
Start: 2024-11-06

## 2024-11-05 RX ADMIN — SODIUM CHLORIDE 250 ML: 9 INJECTION, SOLUTION INTRAVENOUS at 11:40

## 2024-11-05 NOTE — PROGRESS NOTES
"Infusion Nursing Note:  Long Mayfield presents today for blood transfusion.    Patient seen by provider today: No   present during visit today: Not Applicable.    Note: Long arrives to [facility] via wheelchair. He has been feeling fatigued the past couple of weeks. He is having a lot of pain (8/10) to shoulder, neck, arm, knee.  Right knee swollen. Hemoglobin 7.1. He received  1 unit of blood today for parameter of hemoglobin <9. I notified his nephrology team of today's hemoglobin of 7.1 as they want him at 9 or above for biopsy on 11/8. No further instructions given but Long aware they may be calling him with further instructions prior to renal biopsy on Friday.  Initial /79. Long states that is more unusual for him as he usually is 140s/80-90s.  Next /92.  BP more elevated following transfusion 169/104. Long kept in infusion for observation.  /109.  I notified his primary provider NITIN Phoenix.  BP then down to 159/95 and she was comfortable with discharging him. Also, by the end of transfusion, Long rating pain \"11\" on 1-10 scale as he has missed oxycodone doses while in the infusion center today. He feels like this is why his BP is elevated and also states that elevated BP is normal for him. Advised him to follow up with primary care regarding hypertension.      Intravenous Access:  Labs drawn without difficulty.  Peripheral IV placed.    Treatment Conditions:  Lab Results   Component Value Date    HGB 7.1 (L) 11/05/2024    WBC 6.9 11/05/2024    ANEU 2.4 10/08/2017    ANEUTAUTO 3.9 11/05/2024     (H) 11/05/2024        Results reviewed, labs MET treatment parameters, ok to proceed with treatment.  Hemoglobin 7.1.  Transfuse 1 unit for hgb <9  Blood consent signed 9-    Post Infusion Assessment:  Patient tolerated infusion without incident.  Site patent and intact, free from redness, edema or discomfort.  No evidence of " extravasations.  Access discontinued per protocol.       Discharge Plan:   Discharge instructions reviewed with: Patient.  Patient and/or family verbalized understanding of discharge instructions and all questions answered.  AVS to patient via MYCHART.   Patient discharged in stable condition accompanied by: self.  Departure Mode: Wheelchair.      Celeste Teixeira RN

## 2024-11-05 NOTE — PROGRESS NOTES
==================================================================    Kidney biopsy is scheduled for Friday Nov 8th. Writer called [facility] as [facility] has no openings this week. Writer spoke to Namita. Namita said they do have an opening tomorrow at 1130. His type and cross is good until Nov 8th.  They can call him to see if he can come tomorrow. Writer asked that they let him know that writer understands he lives over in [place] but time was of the essence and [facility] had no openings which is why we checked with the [facility] outpatient infusion clinic. Namita said she would add it to the message for the schedulers. They will re-check hgb at 1100.

## 2024-11-06 ENCOUNTER — INFUSION THERAPY VISIT (OUTPATIENT)
Dept: INFUSION THERAPY | Facility: CLINIC | Age: 54
End: 2024-11-06
Attending: INTERNAL MEDICINE
Payer: COMMERCIAL

## 2024-11-06 ENCOUNTER — LAB (OUTPATIENT)
Dept: LAB | Facility: CLINIC | Age: 54
End: 2024-11-06
Payer: COMMERCIAL

## 2024-11-06 VITALS
OXYGEN SATURATION: 99 % | RESPIRATION RATE: 15 BRPM | HEART RATE: 66 BPM | SYSTOLIC BLOOD PRESSURE: 178 MMHG | TEMPERATURE: 97.7 F | DIASTOLIC BLOOD PRESSURE: 105 MMHG

## 2024-11-06 DIAGNOSIS — Z79.4 TYPE 2 DIABETES MELLITUS WITH HYPEROSMOLARITY WITHOUT COMA, WITH LONG-TERM CURRENT USE OF INSULIN (H): Primary | ICD-10-CM

## 2024-11-06 DIAGNOSIS — D64.9 ANEMIA, UNSPECIFIED TYPE: Primary | ICD-10-CM

## 2024-11-06 DIAGNOSIS — E11.00 TYPE 2 DIABETES MELLITUS WITH HYPEROSMOLARITY WITHOUT COMA, WITH LONG-TERM CURRENT USE OF INSULIN (H): Primary | ICD-10-CM

## 2024-11-06 LAB
BASOPHILS # BLD AUTO: 0.1 10E3/UL (ref 0–0.2)
BASOPHILS NFR BLD AUTO: 1 %
EOSINOPHIL # BLD AUTO: 0.2 10E3/UL (ref 0–0.7)
EOSINOPHIL NFR BLD AUTO: 3 %
ERYTHROCYTE [DISTWIDTH] IN BLOOD BY AUTOMATED COUNT: 17 % (ref 10–15)
EST. AVERAGE GLUCOSE BLD GHB EST-MCNC: 240 MG/DL
GAMMA INTERFERON BACKGROUND BLD IA-ACNC: 0.01 IU/ML
HBA1C MFR BLD: 10 %
HCT VFR BLD AUTO: 29.7 % (ref 40–53)
HGB BLD-MCNC: 9.1 G/DL (ref 13.3–17.7)
IMM GRANULOCYTES # BLD: 0 10E3/UL
IMM GRANULOCYTES NFR BLD: 1 %
LYMPHOCYTES # BLD AUTO: 2 10E3/UL (ref 0.8–5.3)
LYMPHOCYTES NFR BLD AUTO: 23 %
M TB IFN-G BLD-IMP: NEGATIVE
M TB IFN-G CD4+ BCKGRND COR BLD-ACNC: 7.82 IU/ML
MCH RBC QN AUTO: 23.3 PG (ref 26.5–33)
MCHC RBC AUTO-ENTMCNC: 30.6 G/DL (ref 31.5–36.5)
MCV RBC AUTO: 76 FL (ref 78–100)
MITOGEN IGNF BCKGRD COR BLD-ACNC: 0 IU/ML
MITOGEN IGNF BCKGRD COR BLD-ACNC: 0.02 IU/ML
MONOCYTES # BLD AUTO: 0.7 10E3/UL (ref 0–1.3)
MONOCYTES NFR BLD AUTO: 8 %
NEUTROPHILS # BLD AUTO: 5.5 10E3/UL (ref 1.6–8.3)
NEUTROPHILS NFR BLD AUTO: 65 %
NRBC # BLD AUTO: 0 10E3/UL
NRBC BLD AUTO-RTO: 0 /100
PLATELET # BLD AUTO: 612 10E3/UL (ref 150–450)
QUANTIFERON MITOGEN: 7.83 IU/ML
QUANTIFERON NIL TUBE: 0.01 IU/ML
QUANTIFERON TB1 TUBE: 0.01 IU/ML
QUANTIFERON TB2 TUBE: 0.03
RBC # BLD AUTO: 3.91 10E6/UL (ref 4.4–5.9)
WBC # BLD AUTO: 8.5 10E3/UL (ref 4–11)

## 2024-11-06 PROCEDURE — 36430 TRANSFUSION BLD/BLD COMPNT: CPT

## 2024-11-06 PROCEDURE — 85025 COMPLETE CBC W/AUTO DIFF WBC: CPT | Performed by: INTERNAL MEDICINE

## 2024-11-06 PROCEDURE — 83036 HEMOGLOBIN GLYCOSYLATED A1C: CPT | Performed by: PHYSICIAN ASSISTANT

## 2024-11-06 PROCEDURE — 36415 COLL VENOUS BLD VENIPUNCTURE: CPT | Performed by: INTERNAL MEDICINE

## 2024-11-06 PROCEDURE — P9016 RBC LEUKOCYTES REDUCED: HCPCS | Performed by: INTERNAL MEDICINE

## 2024-11-06 PROCEDURE — 36415 COLL VENOUS BLD VENIPUNCTURE: CPT | Performed by: PATHOLOGY

## 2024-11-06 PROCEDURE — 99000 SPECIMEN HANDLING OFFICE-LAB: CPT | Performed by: PATHOLOGY

## 2024-11-06 PROCEDURE — 258N000003 HC RX IP 258 OP 636: Performed by: INTERNAL MEDICINE

## 2024-11-06 RX ADMIN — SODIUM CHLORIDE 250 ML: 9 INJECTION, SOLUTION INTRAVENOUS at 13:56

## 2024-11-06 ASSESSMENT — PAIN SCALES - GENERAL: PAINLEVEL_OUTOF10: EXTREME PAIN (9)

## 2024-11-06 NOTE — PATIENT INSTRUCTIONS
Dear Long Mayfield    Thank you for choosing [facility] ([facility] for your transfusion.  The following information is a summary of our appointment as well as important reminders.      If you are a transplant patient and require transplant education, please click on this link: [email or web address].    If you have any questions on your upcoming Specialty Infusion appointments, please call scheduling at [phone].  It was a pleasure taking care of you today.    Sincerely,    [facility]  [facility]  [address] [place] [address]  [id number]  Phone:  [phone]

## 2024-11-06 NOTE — PROGRESS NOTES
Blood Product Transfusion Nursing Note:    Long Mayfield presents today to [facility] for a blood transfusion.  During today's [facility] appointment orders from Dr Chinchilla were completed.    Progress note:  ID verified by name and .  Assessment completed.  Vitals were stable throughout time in [facility].    verbal education given to patient/representative regarding transfusion and possible side effects.  Patient/representative verbalized understanding.     present during visit today: Not Applicable.    All pertinent labs reviewed prior to infusion: YES  Hemoglobin   Date Value Ref Range Status   2024 9.1 (L) 13.3 - 17.7 g/dL Final         Patient's hemoglobin was above parameter of 9 or less to receive transfusion. Pt needs to be above 9 for kidney biopsy on . Dr Chinchilla paged. Dr Chinchilla wanted to proceed with 1 unit blood as planned since the procedure is in 2 days and the hemoglobin needs to hold steady above 9.     Vascular access: peripheral IV placed today.    Date of consent or authorization: 24    Patient tolerated the procedure well. Pt was hypertensive to the 170-180s/105-111. Initial /83. He was asymptomatic. Pt declined to wait to see if it came down. He said that sometimes it goes up when he is out and unable to take pain medication. He will notify provider or seek care if he becomes symptomatic.    Transfusion given over approximately 1.5 hours (maximum rate of infusion 250 ml/hr)     Discharge Plan:    Discharge instructions were reviewed with patient Yes  Patient/representative verbalized understanding of discharge instructions and all questions answered Yes.  Patient discharged to home.     Venita Marcano RN    BP (!) 151/80 (BP Location: Right arm, Patient Position: Sitting, Cuff Size: Adult Regular)   Pulse 70   Temp 98.4  F (36.9  C)   Resp 16   SpO2 99%

## 2024-11-07 ENCOUNTER — TELEPHONE (OUTPATIENT)
Dept: FAMILY MEDICINE | Facility: CLINIC | Age: 54
End: 2024-11-07

## 2024-11-07 ENCOUNTER — OFFICE VISIT (OUTPATIENT)
Dept: ORTHOPEDICS | Facility: CLINIC | Age: 54
End: 2024-11-07
Payer: COMMERCIAL

## 2024-11-07 DIAGNOSIS — M25.561 ACUTE PAIN OF RIGHT KNEE: Primary | ICD-10-CM

## 2024-11-07 LAB — BACTERIA SNV CULT: NORMAL

## 2024-11-07 PROCEDURE — 99214 OFFICE O/P EST MOD 30 MIN: CPT | Performed by: ORTHOPAEDIC SURGERY

## 2024-11-07 NOTE — TELEPHONE ENCOUNTER
"Long understands recommendation.  Patient states that if he adjusts his insulin he has hypoglycemic episodes.      Long is asking if he can take 1-2 oxycodone tablets every 6 hours.  His pain sometimes becomes intense and the 1 tablet does not offer adequate relief.    Recommendation from Rena TAVERAS:  \"Please let patient know that I message the diabetes educator with the following recommendations:     If he is still having fasting blood sugars >150, I would have him increase his Lantus dose to 14 units. If he is experiencing pre-meal blood sugars >150 consistently, he should increase his Novolog dose to 3 units each meal + Correction Scale.     Please notify patient of these recommendations.  Thank you \"  "

## 2024-11-07 NOTE — LETTER
11/7/2024      Long Mayfield  [id number] [facility] [address]      Dear Colleague,    Thank you for referring your patient, Long Mayfield, to the [facility] [place]. Please see a copy of my visit note below.        [facility]  Orthopaedic Surgery      Long Mayfield MRN# [id number]    YOB: 1970     Background history:  DX:  Acute metabolic encephalopathy  Hyperkalemia  Chronic pancreatitis  Type 2 diabetes with kidney complication  Alcohol abuse hyperglycemia  Primary hypertension  Cardiac calcification  GERD.    Anemia  Acute renal insufficiency    TREATMENTS:  1997, open reduction and intra fixation right patella  7/11/2024, I&D of right knee, Dr. Espinoza (Cultures negative)  8/17/2024, I&D of right knee, Dr. Phoenix (Cultures negative)  9/23/2024, Right knee aspiration, (Cutlures negative, Cytology negative)    Mr. Mayfield returns to see me for discussion of his right knee.  He has a large degree of synovitis.  He was discussed at our tumor board meeting.  There have been numerous communications among the rheumatology nephrology and infectious disease services.  It is my recommendation that we refrain from any surgery on his knee joint, for example synovectomy, until the underlying disease is identified.  In addition, treatment for any underlying disease may resolve the process within his right knee joint.  Therefore I explained to the patient that we would refrain from any surgical treatment on his right knee and he should proceed with the kidney biopsy tomorrow and follow-up with Dr. Chinchilla in the renal service and also proceed with any recommended treatment thereafter.  I will plan to see him in January for recheck to assess his response or status at that time.    Meanwhile he is having quite a bit of difficulty with a swollen knee and limited walking ability and requires use of a cane.  This is affecting his ability to perform any type of  gainful employment.  I expect this will continue until his disease is identified treated and resolved.  This likely will take several months.    Dickson Day MD  [facility] Family Professor  Oncology and Adult Reconstructive Surgery  Dept Orthopaedic Surgery, [facility]  [phone] office, [phone] pager  [email or web address]      Total combined visit time and work time before and after clinic visit, independent of trainee, on encounter date = 20 min                           Again, thank you for allowing me to participate in the care of your patient.        Sincerely,        Dickson Day MD

## 2024-11-07 NOTE — TELEPHONE ENCOUNTER
Called Long and relayed message from Rena TAVERAS.  Long understands and has no questions at this time.

## 2024-11-07 NOTE — TELEPHONE ENCOUNTER
Thank you for the update regarding his concern regarding hypoglycemia if he adjusts his insulin.  I think it is important that he keep his upcoming appointment with the diabetes educator to review this.    In regards to the oxycodone, I would recommend trying to limit it to 1 tablet every 6 hours.  If he has severe pain he may take 2 tablets.  Thank you.

## 2024-11-08 ENCOUNTER — APPOINTMENT (OUTPATIENT)
Dept: MEDSURG UNIT | Facility: CLINIC | Age: 54
End: 2024-11-08
Attending: INTERNAL MEDICINE
Payer: COMMERCIAL

## 2024-11-08 ENCOUNTER — HOSPITAL ENCOUNTER (OUTPATIENT)
Facility: CLINIC | Age: 54
Discharge: HOME | End: 2024-11-08
Attending: INTERNAL MEDICINE | Admitting: INTERNAL MEDICINE
Payer: COMMERCIAL

## 2024-11-08 ENCOUNTER — APPOINTMENT (OUTPATIENT)
Dept: INTERVENTIONAL RADIOLOGY/VASCULAR | Facility: CLINIC | Age: 54
End: 2024-11-08
Attending: INTERNAL MEDICINE
Payer: COMMERCIAL

## 2024-11-08 VITALS
OXYGEN SATURATION: 100 % | SYSTOLIC BLOOD PRESSURE: 142 MMHG | HEART RATE: 56 BPM | HEIGHT: 65 IN | WEIGHT: 120.7 LBS | DIASTOLIC BLOOD PRESSURE: 82 MMHG | RESPIRATION RATE: 20 BRPM | BODY MASS INDEX: 20.11 KG/M2 | TEMPERATURE: 97.5 F

## 2024-11-08 DIAGNOSIS — R31.9 HEMATURIA, UNSPECIFIED TYPE: ICD-10-CM

## 2024-11-08 DIAGNOSIS — I77.6 VASCULITIS (H): ICD-10-CM

## 2024-11-08 LAB
FASTING STATUS PATIENT QL REPORTED: YES
GLUCOSE SERPL-MCNC: 178 MG/DL (ref 70–99)
INR PPP: 1.15 (ref 0.85–1.15)

## 2024-11-08 PROCEDURE — 250N000009 HC RX 250

## 2024-11-08 PROCEDURE — 88342 IMHCHEM/IMCYTCHM 1ST ANTB: CPT | Mod: 26 | Performed by: PATHOLOGY

## 2024-11-08 PROCEDURE — 88346 IMFLUOR 1ST 1ANTB STAIN PX: CPT | Mod: 26 | Performed by: PATHOLOGY

## 2024-11-08 PROCEDURE — 88348 ELECTRON MICROSCOPY DX: CPT | Mod: 26 | Performed by: PATHOLOGY

## 2024-11-08 PROCEDURE — 88305 TISSUE EXAM BY PATHOLOGIST: CPT | Mod: 26 | Performed by: PATHOLOGY

## 2024-11-08 PROCEDURE — 250N000011 HC RX IP 250 OP 636

## 2024-11-08 PROCEDURE — 999N000142 HC STATISTIC PROCEDURE PREP ONLY

## 2024-11-08 PROCEDURE — 50200 RENAL BIOPSY PERQ: CPT | Mod: LT | Performed by: STUDENT IN AN ORGANIZED HEALTH CARE EDUCATION/TRAINING PROGRAM

## 2024-11-08 PROCEDURE — 85610 PROTHROMBIN TIME: CPT | Performed by: NURSE PRACTITIONER

## 2024-11-08 PROCEDURE — 36415 COLL VENOUS BLD VENIPUNCTURE: CPT | Performed by: RADIOLOGY

## 2024-11-08 PROCEDURE — 999N000133 HC STATISTIC PP CARE STAGE 2

## 2024-11-08 PROCEDURE — 88313 SPECIAL STAINS GROUP 2: CPT | Mod: 26 | Performed by: PATHOLOGY

## 2024-11-08 PROCEDURE — 88341 IMHCHEM/IMCYTCHM EA ADD ANTB: CPT | Mod: 26 | Performed by: PATHOLOGY

## 2024-11-08 PROCEDURE — 272N000505 IR RENAL BIOPSY RIGHT

## 2024-11-08 PROCEDURE — 88348 ELECTRON MICROSCOPY DX: CPT | Mod: TC | Performed by: INTERNAL MEDICINE

## 2024-11-08 PROCEDURE — 88350 IMFLUOR EA ADDL 1ANTB STN PX: CPT | Mod: 26 | Performed by: PATHOLOGY

## 2024-11-08 PROCEDURE — 99152 MOD SED SAME PHYS/QHP 5/>YRS: CPT | Performed by: STUDENT IN AN ORGANIZED HEALTH CARE EDUCATION/TRAINING PROGRAM

## 2024-11-08 PROCEDURE — 88305 TISSUE EXAM BY PATHOLOGIST: CPT | Mod: TC | Performed by: INTERNAL MEDICINE

## 2024-11-08 PROCEDURE — 99152 MOD SED SAME PHYS/QHP 5/>YRS: CPT

## 2024-11-08 PROCEDURE — 76942 ECHO GUIDE FOR BIOPSY: CPT | Mod: 26 | Performed by: STUDENT IN AN ORGANIZED HEALTH CARE EDUCATION/TRAINING PROGRAM

## 2024-11-08 PROCEDURE — 82947 ASSAY GLUCOSE BLOOD QUANT: CPT | Performed by: RADIOLOGY

## 2024-11-08 RX ORDER — DEXTROSE MONOHYDRATE 25 G/50ML
25-50 INJECTION, SOLUTION INTRAVENOUS
Status: DISCONTINUED | OUTPATIENT
Start: 2024-11-08 | End: 2024-11-08 | Stop reason: HOSPADM

## 2024-11-08 RX ORDER — FENTANYL CITRATE 50 UG/ML
25-50 INJECTION, SOLUTION INTRAMUSCULAR; INTRAVENOUS EVERY 5 MIN PRN
Status: DISCONTINUED | OUTPATIENT
Start: 2024-11-08 | End: 2024-11-08 | Stop reason: HOSPADM

## 2024-11-08 RX ORDER — NALOXONE HYDROCHLORIDE 0.4 MG/ML
0.4 INJECTION, SOLUTION INTRAMUSCULAR; INTRAVENOUS; SUBCUTANEOUS
Status: DISCONTINUED | OUTPATIENT
Start: 2024-11-08 | End: 2024-11-08 | Stop reason: HOSPADM

## 2024-11-08 RX ORDER — NALOXONE HYDROCHLORIDE 0.4 MG/ML
0.2 INJECTION, SOLUTION INTRAMUSCULAR; INTRAVENOUS; SUBCUTANEOUS
Status: DISCONTINUED | OUTPATIENT
Start: 2024-11-08 | End: 2024-11-08 | Stop reason: HOSPADM

## 2024-11-08 RX ORDER — LIDOCAINE 40 MG/G
CREAM TOPICAL
Status: DISCONTINUED | OUTPATIENT
Start: 2024-11-08 | End: 2024-11-08 | Stop reason: HOSPADM

## 2024-11-08 RX ORDER — ONDANSETRON 2 MG/ML
4 INJECTION INTRAMUSCULAR; INTRAVENOUS
Status: DISCONTINUED | OUTPATIENT
Start: 2024-11-08 | End: 2024-11-08 | Stop reason: HOSPADM

## 2024-11-08 RX ORDER — NICOTINE POLACRILEX 4 MG
15-30 LOZENGE BUCCAL
Status: DISCONTINUED | OUTPATIENT
Start: 2024-11-08 | End: 2024-11-08 | Stop reason: HOSPADM

## 2024-11-08 RX ORDER — FLUMAZENIL 0.1 MG/ML
0.2 INJECTION, SOLUTION INTRAVENOUS
Status: DISCONTINUED | OUTPATIENT
Start: 2024-11-08 | End: 2024-11-08 | Stop reason: HOSPADM

## 2024-11-08 RX ADMIN — MIDAZOLAM 0.5 MG: 1 INJECTION INTRAMUSCULAR; INTRAVENOUS at 13:13

## 2024-11-08 RX ADMIN — FENTANYL CITRATE 25 MCG: 50 INJECTION, SOLUTION INTRAMUSCULAR; INTRAVENOUS at 13:13

## 2024-11-08 RX ADMIN — FENTANYL CITRATE 25 MCG: 50 INJECTION, SOLUTION INTRAMUSCULAR; INTRAVENOUS at 13:08

## 2024-11-08 RX ADMIN — FENTANYL CITRATE 50 MCG: 50 INJECTION, SOLUTION INTRAMUSCULAR; INTRAVENOUS at 12:57

## 2024-11-08 RX ADMIN — MIDAZOLAM 1 MG: 1 INJECTION INTRAMUSCULAR; INTRAVENOUS at 12:57

## 2024-11-08 RX ADMIN — LIDOCAINE HYDROCHLORIDE 2 ML: 10 INJECTION, SOLUTION EPIDURAL; INFILTRATION; INTRACAUDAL; PERINEURAL at 13:32

## 2024-11-08 RX ADMIN — MIDAZOLAM 0.5 MG: 1 INJECTION INTRAMUSCULAR; INTRAVENOUS at 13:09

## 2024-11-08 ASSESSMENT — ACTIVITIES OF DAILY LIVING (ADL)
ADLS_ACUITY_SCORE: 0

## 2024-11-08 NOTE — PROGRESS NOTES
Patient tolerated recovery stage well. VSS, zaida flank  sites clean/dry/intact, no hematoma, and denies pain. Patient tolerated PO food and fluids. Teaching was done and discharge instructions were given. Patient ambulated, voided, and PIV was removed. Patient discharged from the hospital to home with brother.

## 2024-11-08 NOTE — PROGRESS NOTES
Pt returned from IR via liter accompanied by nurse at 1345.Left flank kidney biopsy site is C/D/I no hematoma an denies pain.Pt also has a right flank dressing and it is C/D/I a site where they were unable to numb the area enough to do biopsy.Pt tolerating food and fluids.

## 2024-11-08 NOTE — PRE-PROCEDURE
GENERAL PRE-PROCEDURE:   Procedure:  Random native kidney biopsy of undetermined laterality with imaging guidance  Date/Time:  11/8/2024 11:34 AM    Verbal consent obtained?: Yes    Written consent obtained?: Yes    Risks and benefits: Risks, benefits and alternatives were discussed    DC Plan: Appropriate discharge home plan in place for patients who are going home after procedure   Consent given by:  Patient  Patient states understanding of procedure being performed: Yes    Patient's understanding of procedure matches consent: Yes    Procedure consent matches procedure scheduled: Yes    Expected level of sedation:  Moderate  Appropriately NPO:  Yes  ASA Class:  3  Mallampati  :  Grade 3- soft palate visible, posterior pharyngeal wall not visible  Lungs:  Lungs clear with good breath sounds bilaterally  Heart:  Normal heart sounds and rate  History & Physical reviewed:  History and physical reviewed and no updates needed  Statement of review:  I have reviewed the lab findings, diagnostic data, medications, and the plan for sedation

## 2024-11-08 NOTE — PROCEDURES
[facility]    Procedure: IR Procedure Note    Date/Time: 11/8/2024 1:31 PM    Performed by: Kathy Gaona PA-C  Authorized by: Kathy Gaona PA-C  IR Fellow Physician:  Other(s) attending procedure: Dr. Wilkins    Pre Procedure Diagnosis: Concern for IgA vasculitis and hx of proteinuria  Post Procedure Diagnosis: Same    UNIVERSAL PROTOCOL   Site Marked: NA  Prior Images Obtained and Reviewed:  Yes  Required items: Required blood products, implants, devices and special equipment available    Patient identity confirmed:  Arm band, provided demographic data, hospital-assigned identification number and verbally with patient  Patient was reevaluated immediately before administering moderate or deep sedation or anesthesia  Confirmation Checklist:  Correct equipment/implants were available, procedure was appropriate and matched the consent or emergent situation, relevant allergies and patient's identity using two indicators  Time out: Immediately prior to the procedure a time out was called    Universal Protocol: the Joint Commission Universal Protocol was followed    Preparation: Patient was prepped and draped in usual sterile fashion       ANESTHESIA    Anesthesia:  Local infiltration  Local Anesthetic:  Lidocaine 1% without epinephrine  Anesthetic Total (mL):  11      SEDATION  Patient Sedated: Yes    Sedation Type:  Moderate (conscious) sedation  Sedation:  Fentanyl and midazolam  Vital signs: Vital signs monitored during sedation    See dictated procedure note for full details.  Findings: Image guided left renal biopsy, lower pole. Three passes, three cores.     Specimens: core needle biopsy specimens sent for pathological analysis    Procedural Complications: None    Condition: Stable    Plan: Patient to return to . Plan for 2 hours of bedrest on back. Plan for 1 hour of strict bedrest, 1 hour of bedrest with bathroom privileges. Adequacy confirmed by pathology.        PROCEDURE  Describe Procedure: Consideration of right renal biopsy, however, unable to numb to kidney due to rib in trajectory of kidney. Image guided left kidney biopsy, lower pole. Three cores, three passes. Adequacy confirmed by pathology. Sterile dressing applied.   Patient Tolerance:  Patient tolerated the procedure well with no immediate complications  Length of time physician/provider present for 1:1 monitoring during sedation:  23-37 min

## 2024-11-08 NOTE — IR NOTE
Patient Name: Long Mayfield  Medical Record Number: [id number]  Today's Date: 11/8/2024    Procedure: Image guided kidney biopsy  Proceduralist: Kathy Gaona PA-C, Dr. CASSIE Wilkins  Pathology present: Yes Renal    Procedure Start: 1256  Procedure end: 1328    Sedation medications administered: Midazolam 2 mg, Fentanyl 100 mcg     Report given to: Jayleen THOMAS RN  : N/A    Other Notes: Pt arrived to IR room 6 from . Consent reviewed. Pt denies any questions or concerns regarding procedure. Pt positioned Prone and monitored per protocol. 3 cores obtained and sent to lab as ordered.  Torpedo, Gel foam used to close biopsy needle track.  Hemastatis achieved.  Patient to be on bedrest for 2 hours.  Pt tolerated procedure without any noted complications. Pt transferred back to .

## 2024-11-08 NOTE — DISCHARGE INSTRUCTIONS
[facility]    Interventional Radiology  Patient Instructions Following Biopsy    AFTER YOU GO HOME  If you were given sedation, for the first 24 hours: we recommend that an adult stay with you, DO NOT drive or operate machinery at home or at work, DO NOT smoke, DO NOT make any important or legal decisions.  DO NOT drink alcoholic beverages the day of your procedure  Drink plenty of fluids   Resume your regular diet, unless otherwise instructed by your Primary Physician  Relax and take it easy for 48 hours  DO NOT do any strenuous exercise or lifting (> 10 lbs) for at least 7 days following your procedure  Keep the dressing dry and in place for 24 hours. Replace with Band aid for 2 days.  Never leave a wet dressing in place.  Do not take a shower for at least 12 hours following your procedure. No tub bath, hot tub, or swimming for 5 days.  There should be minimum drainage from the biopsy site    CALL THE PHYSICIAN IF:  You start bleeding from the procedure site.  If you do start to bleed from that site, lie down flat and hold pressure on the site for a minimum of 10 minutes.  Your physician will tell you if you need to return to the hospital  You develop nausea or vomiting  You have excessive swelling, redness, or tenderness at the site  You have drainage that looks like it is infected.  You experience severe pain  You develop hives or a rash or unexplained itching  You develop shortness of breath  You develop a temperature of 101 degrees F or greater  You develop bloody clots or red urine after you are discharged  You develop chest pain or cough up blood, lightheadedness or fainting    [facility] INTERVENTIONAL RADIOLOGY DEPARTMENT  Procedure Physician: Kathy TAVERAS                                     Date of procedure: November 8, 2024  Telephone Numbers: [phone]      Monday-Friday 7:30 am to 4:00 pm  [phone] After 4:00 pm Monday-Friday, Weekends & Holidays.   Ask for the Interventional  Radiologist on call.  Someone is on call 24 hrs/day  [facility] toll free number: [phone] Monday-Friday 8:00 am to 4:30 pm  [facility] Emergency Dept: [phone]

## 2024-11-12 DIAGNOSIS — M50.00 CERVICAL DISC DISEASE WITH MYELOPATHY: Primary | ICD-10-CM

## 2024-11-12 RX ORDER — GABAPENTIN 300 MG/1
300 CAPSULE ORAL 2 TIMES DAILY
Qty: 60 CAPSULE | Refills: 2 | Status: SHIPPED | OUTPATIENT
Start: 2024-11-12

## 2024-11-12 NOTE — TELEPHONE ENCOUNTER
Incoming call from patient he is in need of a refill for his gabapentin. He states this was prescribed in TCU and patient states that Rena Blair is PCP

## 2024-11-13 ENCOUNTER — TELEPHONE (OUTPATIENT)
Dept: FAMILY MEDICINE | Facility: CLINIC | Age: 54
End: 2024-11-13
Payer: COMMERCIAL

## 2024-11-13 DIAGNOSIS — D64.9 ANEMIA, UNSPECIFIED TYPE: Primary | ICD-10-CM

## 2024-11-13 NOTE — TELEPHONE ENCOUNTER
Rena,     Pt called (during [facility] down time) stating he feels fatigue and sleepy. States he has had 4 blood transfusion in the last 6 mths due to low HGB. Reports no respiratory symptoms and no tarry stools Pt is requesting order to recheck to his HGB.     Please advise.     Yoselin KHAN RN  [facility]

## 2024-11-13 NOTE — TELEPHONE ENCOUNTER
"Called patient and relayed below message from Rena Blair PA-C as written.  Patient agreeable to an appointment and this was scheduled with Rena Blair PA-C on 11/19/24 at 3:00pm.  Patient states he will call back to schedule lab appointment for CBC prior to clinic visit.      Betsey Colin RN  [facility]     ===========================================    Received below message in routing comment:     \"Rena Blair PA-C to [facility]   RN      11/13/24  3:48 PM   Please let patient know that his most recent CBC on November 6, 2024 showed his hemoglobin was stable at 9.1.  I would recommend he set up an appointment with me next week.  I have ordered a CBC if he would like to have that completed now.  Thank you.\"  "

## 2024-11-14 PROBLEM — R53.83 OTHER FATIGUE: Status: ACTIVE | Noted: 2024-11-14

## 2024-11-14 NOTE — ASSESSMENT & PLAN NOTE
On November 13, 2024 he contacted the clinic and reported symptoms of fatigue.  Requested a repeat CBC as he has a history of anemia.  CBC was ordered.

## 2024-11-15 LAB
PATH REPORT.COMMENTS IMP SPEC: NORMAL
PATH REPORT.FINAL DX SPEC: NORMAL
PATH REPORT.GROSS SPEC: NORMAL
PATH REPORT.MICROSCOPIC SPEC OTHER STN: NORMAL
PATH REPORT.RELEVANT HX SPEC: NORMAL
PHOTO IMAGE: NORMAL

## 2024-11-18 ENCOUNTER — DOCUMENTATION ONLY (OUTPATIENT)
Dept: RHEUMATOLOGY | Facility: CLINIC | Age: 54
End: 2024-11-18
Payer: COMMERCIAL

## 2024-11-18 LAB
ACID FAST STAIN (ARUP): NORMAL

## 2024-11-18 NOTE — PROGRESS NOTES
Rheumatology note     Renal biopsy performed and showed diabetic glomerulosclerosis, features suspicious for IgG4 related disease as well glomerulopathy with intramembranous and mesangial deposits.     Ongoing multidisciplinary discussion including multiple specialties involving Nephrology, Orthopedics, Infectious disease, Pathology and Rheumatology.     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     Close observation will be planned with other specialists for an evolution a side effect of Immunosuppression especially infectious related complication.     Close observation with primary care provider will be needed especially after receiving rituximab since there would be solumedrol used as premedication and will likely cause a transient hyperglycemia       Hector Cruz MD  Rheumatology attending

## 2024-11-19 ENCOUNTER — VIRTUAL VISIT (OUTPATIENT)
Dept: RHEUMATOLOGY | Facility: CLINIC | Age: 54
End: 2024-11-19
Attending: STUDENT IN AN ORGANIZED HEALTH CARE EDUCATION/TRAINING PROGRAM
Payer: COMMERCIAL

## 2024-11-19 ENCOUNTER — OFFICE VISIT (OUTPATIENT)
Dept: FAMILY MEDICINE | Facility: CLINIC | Age: 54
End: 2024-11-19
Payer: COMMERCIAL

## 2024-11-19 ENCOUNTER — TELEPHONE (OUTPATIENT)
Dept: RHEUMATOLOGY | Facility: CLINIC | Age: 54
End: 2024-11-19

## 2024-11-19 VITALS
TEMPERATURE: 98.2 F | SYSTOLIC BLOOD PRESSURE: 129 MMHG | OXYGEN SATURATION: 100 % | BODY MASS INDEX: 19.66 KG/M2 | RESPIRATION RATE: 16 BRPM | HEART RATE: 68 BPM | WEIGHT: 118 LBS | HEIGHT: 65 IN | DIASTOLIC BLOOD PRESSURE: 84 MMHG

## 2024-11-19 VITALS — BODY MASS INDEX: 20.83 KG/M2 | WEIGHT: 125 LBS | HEIGHT: 65 IN

## 2024-11-19 DIAGNOSIS — R63.4 UNINTENTIONAL WEIGHT LOSS: ICD-10-CM

## 2024-11-19 DIAGNOSIS — I51.5 CARDIAC CALCIFICATION (H): ICD-10-CM

## 2024-11-19 DIAGNOSIS — F10.21 ALCOHOL DEPENDENCE IN REMISSION (H): ICD-10-CM

## 2024-11-19 DIAGNOSIS — D64.9 ANEMIA, UNSPECIFIED TYPE: ICD-10-CM

## 2024-11-19 DIAGNOSIS — Z79.899 HIGH RISK MEDICATION USE: ICD-10-CM

## 2024-11-19 DIAGNOSIS — F11.20 CONTINUOUS OPIOID DEPENDENCE (H): ICD-10-CM

## 2024-11-19 DIAGNOSIS — D89.84 IGG4 RELATED DISEASE (H): ICD-10-CM

## 2024-11-19 DIAGNOSIS — M00.9 PYOGENIC ARTHRITIS OF RIGHT KNEE JOINT, DUE TO UNSPECIFIED ORGANISM (H): ICD-10-CM

## 2024-11-19 DIAGNOSIS — N18.31 TYPE 2 DIABETES MELLITUS WITH STAGE 3A CHRONIC KIDNEY DISEASE, WITH LONG-TERM CURRENT USE OF INSULIN (H): ICD-10-CM

## 2024-11-19 DIAGNOSIS — J30.2 SEASONAL ALLERGIC RHINITIS, UNSPECIFIED TRIGGER: ICD-10-CM

## 2024-11-19 DIAGNOSIS — N28.9 ACUTE RENAL INSUFFICIENCY: ICD-10-CM

## 2024-11-19 DIAGNOSIS — M31.8 SYSTEMIC VASCULITIS (H): Primary | ICD-10-CM

## 2024-11-19 DIAGNOSIS — R53.83 OTHER FATIGUE: Primary | ICD-10-CM

## 2024-11-19 DIAGNOSIS — Z79.4 TYPE 2 DIABETES MELLITUS WITH STAGE 3A CHRONIC KIDNEY DISEASE, WITH LONG-TERM CURRENT USE OF INSULIN (H): ICD-10-CM

## 2024-11-19 DIAGNOSIS — E11.22 TYPE 2 DIABETES MELLITUS WITH STAGE 3A CHRONIC KIDNEY DISEASE, WITH LONG-TERM CURRENT USE OF INSULIN (H): ICD-10-CM

## 2024-11-19 DIAGNOSIS — K86.0 ALCOHOL-INDUCED CHRONIC PANCREATITIS (H): ICD-10-CM

## 2024-11-19 LAB
AMPHETAMINES UR QL: NOT DETECTED
BARBITURATES UR QL SCN: NOT DETECTED
BENZODIAZ UR QL SCN: NOT DETECTED
BUPRENORPHINE UR QL: NOT DETECTED
CANNABINOIDS UR QL: NOT DETECTED
COCAINE UR QL SCN: DETECTED
D-METHAMPHET UR QL: NOT DETECTED
METHADONE UR QL SCN: NOT DETECTED
OPIATES UR QL SCN: NOT DETECTED
OXYCODONE UR QL SCN: DETECTED
PCP UR QL SCN: NOT DETECTED
TRICYCLICS UR QL SCN: NOT DETECTED

## 2024-11-19 PROCEDURE — 90472 IMMUNIZATION ADMIN EACH ADD: CPT | Performed by: PHYSICIAN ASSISTANT

## 2024-11-19 PROCEDURE — G2211 COMPLEX E/M VISIT ADD ON: HCPCS | Performed by: STUDENT IN AN ORGANIZED HEALTH CARE EDUCATION/TRAINING PROGRAM

## 2024-11-19 PROCEDURE — 80306 DRUG TEST PRSMV INSTRMNT: CPT | Performed by: PHYSICIAN ASSISTANT

## 2024-11-19 PROCEDURE — 90471 IMMUNIZATION ADMIN: CPT | Performed by: PHYSICIAN ASSISTANT

## 2024-11-19 PROCEDURE — 91320 SARSCV2 VAC 30MCG TRS-SUC IM: CPT | Performed by: PHYSICIAN ASSISTANT

## 2024-11-19 PROCEDURE — 99215 OFFICE O/P EST HI 40 MIN: CPT | Mod: 25 | Performed by: PHYSICIAN ASSISTANT

## 2024-11-19 PROCEDURE — 90750 HZV VACC RECOMBINANT IM: CPT | Performed by: PHYSICIAN ASSISTANT

## 2024-11-19 PROCEDURE — 99214 OFFICE O/P EST MOD 30 MIN: CPT | Mod: 95 | Performed by: STUDENT IN AN ORGANIZED HEALTH CARE EDUCATION/TRAINING PROGRAM

## 2024-11-19 PROCEDURE — 90746 HEPB VACCINE 3 DOSE ADULT IM: CPT | Performed by: PHYSICIAN ASSISTANT

## 2024-11-19 PROCEDURE — 90480 ADMN SARSCOV2 VAC 1/ONLY CMP: CPT | Performed by: PHYSICIAN ASSISTANT

## 2024-11-19 RX ORDER — ALBUTEROL SULFATE 0.83 MG/ML
2.5 SOLUTION RESPIRATORY (INHALATION)
OUTPATIENT
Start: 2024-11-19

## 2024-11-19 RX ORDER — MEPERIDINE HYDROCHLORIDE 25 MG/ML
25 INJECTION INTRAMUSCULAR; INTRAVENOUS; SUBCUTANEOUS
OUTPATIENT
Start: 2024-11-19

## 2024-11-19 RX ORDER — ALBUTEROL SULFATE 90 UG/1
1-2 INHALANT RESPIRATORY (INHALATION)
Start: 2024-11-19

## 2024-11-19 RX ORDER — MULTIPLE VITAMINS W/ MINERALS TAB 9MG-400MCG
1 TAB ORAL DAILY
Qty: 30 TABLET | Refills: 11 | Status: SHIPPED | OUTPATIENT
Start: 2024-11-19

## 2024-11-19 RX ORDER — DIPHENHYDRAMINE HCL 25 MG
50 CAPSULE ORAL ONCE
Start: 2024-11-19

## 2024-11-19 RX ORDER — HEPARIN SODIUM (PORCINE) LOCK FLUSH IV SOLN 100 UNIT/ML 100 UNIT/ML
5 SOLUTION INTRAVENOUS
OUTPATIENT
Start: 2024-11-19

## 2024-11-19 RX ORDER — EPINEPHRINE 1 MG/ML
0.3 INJECTION, SOLUTION, CONCENTRATE INTRAVENOUS EVERY 5 MIN PRN
OUTPATIENT
Start: 2024-11-19

## 2024-11-19 RX ORDER — DIPHENHYDRAMINE HYDROCHLORIDE 50 MG/ML
50 INJECTION INTRAMUSCULAR; INTRAVENOUS
Start: 2024-11-19

## 2024-11-19 RX ORDER — METHYLPREDNISOLONE SODIUM SUCCINATE 125 MG/2ML
80 INJECTION INTRAMUSCULAR; INTRAVENOUS ONCE
OUTPATIENT
Start: 2024-11-19

## 2024-11-19 RX ORDER — METHOCARBAMOL 500 MG/1
500 TABLET, FILM COATED ORAL EVERY 8 HOURS PRN
Qty: 30 TABLET | Refills: 3 | Status: SHIPPED | OUTPATIENT
Start: 2024-11-19

## 2024-11-19 RX ORDER — SULFAMETHOXAZOLE AND TRIMETHOPRIM 400; 80 MG/1; MG/1
1 TABLET ORAL DAILY
Qty: 90 TABLET | Refills: 1 | Status: SHIPPED | OUTPATIENT
Start: 2024-11-19 | End: 2024-11-19

## 2024-11-19 RX ORDER — OXYCODONE HYDROCHLORIDE 5 MG/1
5 TABLET ORAL EVERY 6 HOURS PRN
Qty: 30 TABLET | Refills: 0 | Status: SHIPPED | OUTPATIENT
Start: 2024-11-19

## 2024-11-19 RX ORDER — DIPHENHYDRAMINE HYDROCHLORIDE 50 MG/ML
25 INJECTION INTRAMUSCULAR; INTRAVENOUS
Start: 2024-11-19

## 2024-11-19 RX ORDER — ACETAMINOPHEN 325 MG/1
650 TABLET ORAL ONCE
Start: 2024-11-19

## 2024-11-19 RX ORDER — SULFAMETHOXAZOLE AND TRIMETHOPRIM 400; 80 MG/1; MG/1
1 TABLET ORAL DAILY
Status: SHIPPED
Start: 2024-11-19

## 2024-11-19 RX ORDER — HEPARIN SODIUM,PORCINE 10 UNIT/ML
5-20 VIAL (ML) INTRAVENOUS DAILY PRN
OUTPATIENT
Start: 2024-11-19

## 2024-11-19 RX ORDER — CETIRIZINE HYDROCHLORIDE 10 MG/1
10 TABLET ORAL DAILY
Qty: 30 TABLET | Refills: 11 | Status: SHIPPED | OUTPATIENT
Start: 2024-11-19

## 2024-11-19 RX ORDER — METHYLPREDNISOLONE SODIUM SUCCINATE 40 MG/ML
40 INJECTION INTRAMUSCULAR; INTRAVENOUS
Start: 2024-11-19

## 2024-11-19 ASSESSMENT — ENCOUNTER SYMPTOMS
HEMATURIA: 0
SINUS PAIN: 0
FEVER: 0
FATIGUE: 1
BLOOD IN STOOL: 0
BACK PAIN: 1

## 2024-11-19 ASSESSMENT — ANXIETY QUESTIONNAIRES
1. FEELING NERVOUS, ANXIOUS, OR ON EDGE: SEVERAL DAYS
2. NOT BEING ABLE TO STOP OR CONTROL WORRYING: SEVERAL DAYS
GAD7 TOTAL SCORE: 6
3. WORRYING TOO MUCH ABOUT DIFFERENT THINGS: SEVERAL DAYS
GAD7 TOTAL SCORE: 6
4. TROUBLE RELAXING: SEVERAL DAYS
8. IF YOU CHECKED OFF ANY PROBLEMS, HOW DIFFICULT HAVE THESE MADE IT FOR YOU TO DO YOUR WORK, TAKE CARE OF THINGS AT HOME, OR GET ALONG WITH OTHER PEOPLE?: SOMEWHAT DIFFICULT
IF YOU CHECKED OFF ANY PROBLEMS ON THIS QUESTIONNAIRE, HOW DIFFICULT HAVE THESE PROBLEMS MADE IT FOR YOU TO DO YOUR WORK, TAKE CARE OF THINGS AT HOME, OR GET ALONG WITH OTHER PEOPLE: SOMEWHAT DIFFICULT
7. FEELING AFRAID AS IF SOMETHING AWFUL MIGHT HAPPEN: NOT AT ALL
5. BEING SO RESTLESS THAT IT IS HARD TO SIT STILL: SEVERAL DAYS
6. BECOMING EASILY ANNOYED OR IRRITABLE: SEVERAL DAYS
GAD7 TOTAL SCORE: 6
7. FEELING AFRAID AS IF SOMETHING AWFUL MIGHT HAPPEN: NOT AT ALL

## 2024-11-19 ASSESSMENT — PAIN SCALES - GENERAL
PAINLEVEL_OUTOF10: EXTREME PAIN (9)
PAINLEVEL_OUTOF10: EXTREME PAIN (9)

## 2024-11-19 NOTE — NURSING NOTE
Current patient location: [address] [place] [id number]    Is the patient currently in the state of [place]? YES    Visit mode:VIDEO    If the visit is dropped, the patient can be reconnected by:VIDEO VISIT: Text to cell phone:   Telephone Information:   Mobile [phone]       Will anyone else be joining the visit? NO  (If patient encounters technical issues they should call [phone]    Are changes needed to the allergy or medication list? No    Are refills needed on medications prescribed by this physician? NO    Rooming Documentation:  Questionnaire(s) completed    Reason for visit: GISSELLE YAO

## 2024-11-19 NOTE — ASSESSMENT & PLAN NOTE
CSA was previously signed October 15, 2024.  Urine drug screen completed today.  He is on chronic opioids for chronic pain secondary to pyogenic arthritis of his right knee.  He was seen by the rheumatologist earlier today and plans for starting rituximab.  We discussed that with improvement in his knee pain we are hopeful that he will be able to wean off the oxycodone in the near future.  He generally uses 0 up to 2 tabs daily.  He was reviewed.  Oxycodone was last filled October 30, 2024.  Next fill date November 27, 2024.  He currently has 15 tabs left.

## 2024-11-19 NOTE — PROGRESS NOTES
"The longitudinal plan of care for the diagnosis(es)/condition(s) as documented were addressed during this visit. Due to the added complexity in care, I will continue to support Long in the subsequent management and with ongoing continuity of care.    I spent a total of 50 minutes on the day of the visit.   Time spent by me today doing chart review, history and exam, documentation and further activities per the note  Assessment & Plan   Problem List Items Addressed This Visit       Acute renal insufficiency     Seen by nepphrology 10/12/24:  History of poorly controled diabetes (HgbA1c>10), a history of hypertension both of which would contribute to CKD - and the former to the albuminuria/proteinuria.  Normal UACR in Nov 2021. His ANCA is negative.  Prior seroligic work up is unrevealing - except for the persistently elevated IgG4.  This, with the presence of \"chronic pancreatitis\" raises the suspicion of IgG4 disease.      Cystatin C 2.0. Avoid NSAIDS.     11/8/2024: Renal biopsy scheduled.  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               Anemia, unspecified type     He has a history of anemia which has been noted since May 2024.  Cause has not been identified.  Additional " evaluation with CBC, retake count, TSH, haptoglobin, iron studies, LDH, ferritin, B12 and folate.  Ferritin elevated 418, iron low at 20, iron binding capacity low at 2030, iron saturation low at 9.  Normal B12 and folate.  Awaiting haptoglobin and reticulocyte count.  Denies blood in stool.  Upper and lower endoscopy completed 5/2024.   No GI source was identified for anemia.       The source of his anemia has not been identified after a thorough work up with EGD, colonoscopy, labs and PET scan.           PET scan was completed October 23, 2023 which demonstrated:  1. No evidence of hypermetabolic pulmonary nodules or suspicious  uptake in the chest.      2. Large, complex right knee joint effusion with synovial thickening  previously characterized on 9/21/2024 MRI. Multiple enlarged right  inguinal and right iliac chain lymph nodes with mild to moderate FDG  uptake are likely reactive to the right knee infective/inflammatory  process.     3. Findings of positive fluid balance including trace ascites and  diffuse mild subcutaneous anasarca. Right greater than left lower  extremity edema.     4. Diffuse bone marrow uptake, which is nonspecific and may be related  to anemia given low density blood pool.           PERIPHERAL BLOOD MORPHOLOGY:        1.  MILD HYPOCHROMIC-BORDERLINE MICROCYTIC ANEMIA  A.  MODERATE ANISOCYTOSIS, WITHOUT INCREASED POIKILOCYTOSIS  B.  IRON STUDIES SUGGESTIVE OF ANEMIA OF CHRONIC DISEASE  C.  LACK OF CORRESPONDING RETICULOCYTOSIS        2.  NORMAL LEUKOCYTE DIFFERENTIAL AND MORPHOLOGY        3.  UNREMARKABLE PLATELET MORPHOLOGY        PERIPHERAL BLOOD MORPHOLOGY:        1.  MILD HYPOCHROMIC-BORDERLINE MICROCYTIC ANEMIA  A.  MODERATE ANISOCYTOSIS, WITHOUT INCREASED POIKILOCYTOSIS  B.  IRON STUDIES SUGGESTIVE OF ANEMIA OF CHRONIC DISEASE  C.  LACK OF CORRESPONDING RETICULOCYTOSIS        2.  NORMAL LEUKOCYTE DIFFERENTIAL AND MORPHOLOGY        3.  UNREMARKABLE PLATELET MORPHOLOGY      12/12/2024: Scheduled to see hematology.           Cardiac calcification (H)     Lab Results   Component Value Date    CHOL 138 09/24/2024     Lab Results   Component Value Date    HDL 45 09/24/2024     Lab Results   Component Value Date    LDL 52 09/24/2024     Lab Results   Component Value Date    TRIG 206 09/24/2024     He was previously on colestid.  States he is not taking it currently.  LDL 52 September 24, 2024.  He is taking a baby aspirin daily.         Type 2 diabetes mellitus with kidney complication, with long-term current use of insulin (H)     Hemoglobin A1C   Date Value Ref Range Status   11/06/2024 10.0 (H) <5.7 % Final     Comment:     Normal <5.7%   Prediabetes 5.7-6.4%    Diabetes 6.5% or higher     Note: Adopted from ADA consensus guidelines.        He is followed by diabetes education.  He has an upcoming appointment November 20, 2024.  He will be starting rituximab with Solu-Medrol.  He will require close follow-up with diabetes education with the start of the medication as this will cause elevated blood sugars.         Unintentional weight loss     Weight is down to 118 pounds.  Recommend taking ensure daily.         Relevant Orders    Miscellaneous DME Supply Order (Use only if a more specific DME order does not already exist)    Pyogenic arthritis of right knee joint, due to unspecified organism (H)     Seen by rheumatology on November 18, 2024.  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  They are planning to initiate rituximab as immunosuppressive therapy.  The infusion will occur twice  by 2 weeks with Solu-Medrol 125 mg as premedication to avoid infusion reaction.  This will likely elevate his blood sugars.  Plan for close follow-up with diabetes educator.  He has an upcoming appointment 11/20/2024.     Start rituximab 1000 mg x 2 doses  by 2 weeks  - Start Bactrim for PJP prophylaxis  - Follow-up with primary care provider and diabetic educator for  monitoring of blood sugars  - [facility] pharmacy follow-up for counseling and prior authorization for rituximab    Immunizations are updated today prior to start of rituximab.  We discussed that he will be on Bactrim for PJP prophylaxis.         Relevant Medications    methocarbamol (ROBAXIN) 500 MG tablet    oxyCODONE (ROXICODONE) 5 MG tablet    Chronic pancreatitis (H)     10/18/2024 seen by Yaron GUTIERREZ.  We do not have those records and will request those records to be faxed over.  Once I get those records I will review them with recommendations.  Per patient he is to follow-up in 6 months.          Relevant Medications    oxyCODONE (ROXICODONE) 5 MG tablet    Alcohol dependence in remission (H)     Remains in sobriety.         Continuous opioid dependence (H)     CSA was previously signed October 15, 2024.  Urine drug screen completed today.  He is on chronic opioids for chronic pain secondary to pyogenic arthritis of his right knee.  He was seen by the rheumatologist earlier today and plans for starting rituximab.  We discussed that with improvement in his knee pain we are hopeful that he will be able to wean off the oxycodone in the near future.  He generally uses 0 up to 2 tabs daily.  He was reviewed.  Oxycodone was last filled October 30, 2024.  Next fill date November 27, 2024.  He currently has 15 tabs left.         Relevant Medications    methocarbamol (ROBAXIN) 500 MG tablet    Other Relevant Orders    Urine Drug Screen Clinic    Other fatigue - Primary     On November 13, 2024 he contacted the clinic and reported symptoms of fatigue.  Requested a repeat CBC as he has a history of anemia.  CBC was ordered.         Relevant Medications    multivitamin w/minerals (THERA-VIT-M) tablet     Other Visit Diagnoses       Seasonal allergic rhinitis, unspecified trigger        Relevant Medications    cetirizine (ZYRTEC) 10 MG tablet    High risk medication use        Relevant Medications     "sulfamethoxazole-trimethoprim (BACTRIM) 400-80 MG tablet             Subjective   Long is a 54 year old, presenting for the following health issues:  Fatigue (For 6 months per patient)        11/19/2024     2:54 PM   Additional Questions   Roomed by Julita HAYDEN   Accompanied by Self     Fatigue  Associated symptoms include fatigue.              Objective    /84 (BP Location: Left arm, Patient Position: Sitting, Cuff Size: Adult Regular)   Pulse 68   Temp 98.2  F (36.8  C) (Oral)   Resp 16   Ht 1.651 m (5' 5\")   Wt 53.5 kg (118 lb)   SpO2 100%   BMI 19.64 kg/m    Body mass index is 19.64 kg/m .  Physical Exam  Vitals and nursing note reviewed.   Constitutional:       Appearance: Normal appearance.   HENT:      Head: Normocephalic.   Neurological:      Mental Status: He is alert.   Psychiatric:         Mood and Affect: Mood normal.         Behavior: Behavior normal.         Thought Content: Thought content normal.         Judgment: Judgment normal.                Signed Electronically by: Rena Blair PA-C    "

## 2024-11-19 NOTE — PROGRESS NOTES
RHEUMATOLOGY OUTPATIENT CLINIC NOTE     Referring Provider:   Edward Zuleta MD     Lab review        HLA-B27: Negative    MPO/VA-3: Negative    Rheumatoid factor: Negative  CCP antibody: Negative  Ig elevated  IgG4: 224 elevated      KATHY: Negative  dsDNA:  Negative    Complement C3: normal  Complement C4: normal  ANCA by IFA: Negative   MPO: Negative    VA-3 Abs: Positive, [place] [facility] [place]     Cell count, right knee multiple aspirations    Latest Reference Range & Units 24 09:26 24 10:00 07/10/24 11:36 24 13:38 24 17:09   Total Nucleated Cells /uL 76,860 109,100 65,670 94,560 53,900     [place] [facility] [place]   A. Skin, left, forearm, punch biopsy - Leukocytoclastic vasculitis.   B. Skin biopsy, left, forearm, direct immunofluorescence (DIF) -  Negative immunofluorescence      Soft tissue, right knee, biopsy, 10/2024:  - Tenosynovial, fibroadipose and skeletal muscular tissue with granulation tissue formation with fibrinoid necrosis and foci of microabscesses, vascular proliferation, patchy mixed acute and chronic inflammation with perivascular accentuation, and hemosiderin deposition.  - Gram stains (blocks A1-A4) are negative for bacterial organisms.  - Examination of a couple of focuses with lymphoplasmacytic-predominant infiltrates demonstrates scattered IgG4-positive plasma cells, with an IgG4/IgG plasma cell ratio of approximately 10-15% (lower than 40%, which is the generally applied criteria to support IgG4-related disease).  Overall, there are no characteristic histologic and immunophenotypic features within this biopsy specimen to support IgG4-related disease.    Renal biopsy, 2024:  Nodular diabetic glomerulosclerosis  Histological features suspicious for IgG4-related tubulointerstitial nephritis (see comment) patchy dense lymphoplasmacytic interstitial inflammation; 10-20 IgG4+ plasma cells/400x field   Glomerulopathy with rare  intramembranous and mesangial deposits reactive for C3      - the differential diagnosis includes a mild glomerulonephritis C3 or a resolving immune complex-mediated glomerulopathy      - there are no signs of an active glomerulitis currently     Moderate chronic changes of the parenchyma, including:   - focal global glomerulosclerosis (4% of glomeruli)   - focal tubular atrophy and interstitial fibrosis (40-50% of the cortex)   - severe arterial and arteriolar sclerosis      Imaging review      PET scan 10/2024  1. No evidence of hypermetabolic pulmonary nodules or suspicious uptake in the chest.   2. Large, complex right knee joint effusion with synovial thickening previously characterized on 9/21/2024 MRI. Multiple enlarged right inguinal and right iliac chain lymph nodes with mild to moderate FDG uptake are likely reactive to the right knee infective/inflammatory process.  3. Findings of positive fluid balance including trace ascites and diffuse mild subcutaneous anasarca. Right greater than left lower extremity edema.  4. Diffuse bone marrow uptake, which is nonspecific and may be related to anemia given low density blood pool.    MRI right knee 9/2024  1.  Large complex joint effusion with mildly thickened enhancing synovium of indeterminate etiology. Findings may be sequela of degenerative arthropathy, or an inflammatory or infectious arthropathy. If clinically indicated repeat arthrocentesis could be  performed for further characterization.  2.  Mild patchy enhancing edema-like marrow signal of the knee which is likely reactive. Early osteomyelitis cannot be entirely excluded, though, is considered less likely.  3.  Tricompartmental degenerative arthrosis.  4.  Marked mucoid degeneration/maceration of the medial and lateral menisci.  5.  Chronic rupture of the ACL.  6.  Mucoid degeneration and/or partial tearing of the PCL.  7.  Intact collateral ligaments.    X-ray SIJ 9/2024  The SI joints are negative for  sacroiliitis, ankylosis, or diastasis on this single projection. Pelvis negative for fracture. Both hips negative for fracture.    Hand X-ray 9/2024  Old healed fracture of the right first metacarpal. No evidence for acute fracture on either side.   Additional chronic fracture of the left scaphoid.   There is some sclerosis within the proximal fracture fragment which may represent superimposed  avascular necrosis of the proximal pole of the left scaphoid. Degenerative change both wrists, greater on the left at the radioscaphoid and radiolunate articulation with cystic change in the distal aspect of the left radius. No erosive changes are identified. Vascular calcifications bilaterally. Degenerative change at both first MCP joints.    X-ray left foot 9/2024  Degenerative change at the first MTP joint and IP joint of the great toe. No evidence for acute fracture. No erosive changes are identified. Vascular calcifications.    MRI cervical spine, outside, only report available 3/2024:  C4-5 moderate central/left-sided canal stenosis with left ventral cord impingement.  No abnormal T2 signal  Mild central canal stenosis with cord abutment C3-4  Multilevel degenerative foraminal stenosis severe left C 3-4, C4-5 with expected nerve impingement  Prominent erosive/inflammatory facet arthropathy left C3-4, C4-5     MRI Right foot, 1/2024:  1. Enhancing marrow edema at the second metatarsal amputation margin as well as head and neck of the third and fourth metatarsals. Indeterminate faint T1 hypointense marrow signal changes of the fourth metatarsal head and about the displaced oblique fracture of the third metatarsal neck. Overall findings compatible with osteitis, possibly osteomyelitis.   2. Irregular soft tissue amputation margins of the medial forefoot with emanating enhancing tissue edema concerning for cellulitis      CT chest, Abdomen, pelvis without contrast, 12/2023:  1. Severe anasarca with moderate to large pleural  effusions and a small pericardial effusion. Diffuse septal thickening in the lungs concerning for pulmonary edema. Anemic cardiac blood pool.   2. Solitary pulmonary nodule in the left lower lobe with an average diameter of 10 mm is indeterminate, but worrisome for neoplasm. This is new since 3/19/2022.   3. Patchy and confluent airspace consolidation within the left greater than right lung apices presumably represents infection, cannot exclude underlying soft tissue density nodularity on this noncontrast examination. Recommend pulmonary consultation.   4. Diffuse and severe vascular calcifications. No acute or suspicious abnormality noted within the abdomen or pelvis.        Subjective     Visit date October 29, 2024    Long is a 54 year old male who presents today for follow up.    Since the last visit,    - Workup 11/06 showed:  Hemoglobin low  WBC  within normal limits   Platelets  elevated  AST  within normal limits   Creatinine  elevated    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 (see comment) patchy dense lymphoplasmacytic interstitial inflammation; 10-20 IgG4+ plasma cells/400x field   Glomerulopathy with rare intramembranous and mesangial deposits reactive for C3      - the differential diagnosis includes a mild glomerulonephritis C3 or a resolving immune complex-mediated glomerulopathy      - there are no signs of an active glomerulitis currently     Moderate chronic changes of the parenchyma, including:   - focal global glomerulosclerosis (4% of glomeruli)   - focal tubular atrophy and interstitial fibrosis (40-50% of the cortex)   - severe arterial and arteriolar sclerosis      Today, Long mentioned the following:    He underwent renal biopsy, reported above  He remains with joint pain in the right knee, neck, shoulder and arm     Review of Systems   Constitutional:  Negative for fever.   HENT:   "Negative for hearing loss, nosebleeds and sinus pain.    Gastrointestinal:  Negative for blood in stool.   Genitourinary:  Negative for hematuria.   Musculoskeletal:  Positive for back pain and joint pain.   Skin:  Negative for rash.       Past Medical history   Past Medical History:   Diagnosis Date    Acute pain of right knee 07/12/2024    Alcohol abuse 09/12/2022    Allergic rhinitis     Anemia     Arthritis     Bell's palsy     Cervicalgia     Diabetes (H)     Diabetic foot ulcer (H)     Generalized edema     Hyperkalemia     Hypertension     Hypo-osmolality and hyponatremia     Hypoglycemia 05/03/2024    Hypoxia 09/11/2024    Major depressive disorder, recurrent episode, mild (H)     Other acute osteomyelitis, right ankle and foot (H)     Other chronic pancreatitis (H)     Other polyosteoarthritis     Pneumonia of left lower lobe due to infectious organism 09/11/2024    Right knee pain 07/11/2024    Solitary pulmonary nodule        Objective   Ht 1.651 m (5' 5\")   Wt 56.7 kg (125 lb)   BMI 20.80 kg/m        PHYSICAL EXAMINATION  Video visit  Able to speak full sentence  Breathing comfortably  No visible skin rash      Assessment & Plan     # IgG4 related disease  # Chronic kidney disease, multifactorial [diabetes, hypertension, IgG4, immune complex]  # Recurrent knee effusion, inflammatory cell count, concern for inflammatory arthropathy vs smoldering infection  # Chronic arthralgia   # Chronic diarrhea, pancreatitis, concern for IgG4 related disease  # History of acute kidney injury, leukocytoclastic vasculitis, less likely  ANCA vasculitis    # Peripheral arterial disease  # History of right foot osteomyelitis status post amputation  # Multiple comorbidities [anemia, diabetes with complications, hypertension, hyperlipidemia]    Long is presenting today for follow-up  He is undergoing multidisciplinary approach with infectious disease, orthopedics, nephrology with the assistance of our colleagues in " pathology.    He underwent renal biopsy that showed diabetic glomerulosclerosis as well as features suspicious for IgG4 related disease, glomerulopathy with intramembranous and mesangial deposits.  Right knee synovial biopsy showed inflammation with fibrinoid necrosis.    His infectious workup has been so far inconclusive.  After discussion with Dr. Chinchilla and nephrology, the consensus is that there is his presentation is indeed suggestive of an inflammatory condition and in this case would be suggestive of IgG4 related disease which can cause variable vessel vasculitis.    IgG4 related disease can explain his chronic pancreatitis as well as inflammatory arthropathy and he has elevated serum of IgG4.    Given his severe diabetes with elevated hemoglobin A1c of 10%, hyperglycemia and diabetic nephropathy on renal biopsy as well as his previously reported severe hyperglycemia with oral prednisone that we discussed the need to avoid steroids to minimize complication and we will proceed with rituximab for induction of remission for IgG4 related disease.  Risks and side effects of rituximab were discussed at length including increased risk of infections [bacterial, viral, fungal, mycobacterial, CNS infection] and he is in agreement to start rituximab.  He will start Bactrim for PJP prophylaxis while on rituximab.    I updated his primary care provider as well as diabetic educator about the plan and he will need Solu-Medrol IV infusion with rituximab infusion therapy as premedication to avoid infusion reaction so close monitoring for his blood sugars will be advisable.    He will probably need his right knee effusion to be tapped to help with clearance of inflammatory fluid, this can be done after introduction of systemic therapy and control of systemic inflammation.    He is in agreement with this plan and seems comfortable with this approach.    Plan:  - Start rituximab 1000 mg x 2 doses  by 2 weeks  - Start  Bactrim for PJP prophylaxis  - Follow-up with primary care provider and diabetic educator for monitoring of blood sugars  - [facility] pharmacy follow-up for counseling and prior authorization for rituximab      # Screening for chronic infections  2024  Hepatitis B surface antigen: Negative  Hepatitis B core: Negative  Hepatitis C antibody: Negative   QuantiFERON gold: Negative      # Longitudinal care  The longitudinal plan of care for the diagnosis(es)/condition(s) as documented were addressed during this visit. Due to the added complexity in care, I will continue to support Long in the subsequent management and with ongoing continuity of care.    Review of the result(s) of each unique test - as HPI  Ordering of each unique test  Prescription drug management          Return in about 3 months (around 2/19/2025) for Follow up.    Hector Cruz MD  [facility] RHEUMATOLOGY    Virtual Visit Details    Type of service:  Video Visit     Joined the call at 11/19/2024, 1:38:08 pm.  Left the call at 11/19/2024, 1:55:33 pm.  You were on the call for 17 minutes 25 seconds      Originating Location (pt. Location): Home    Distant Location (provider location):  On-site  Platform used for Video Visit: Gian

## 2024-11-19 NOTE — ASSESSMENT & PLAN NOTE
Lab Results   Component Value Date    CHOL 138 09/24/2024     Lab Results   Component Value Date    HDL 45 09/24/2024     Lab Results   Component Value Date    LDL 52 09/24/2024     Lab Results   Component Value Date    TRIG 206 09/24/2024     He was previously on colestid.  States he is not taking it currently.  LDL 52 September 24, 2024.  He is taking a baby aspirin daily.

## 2024-11-19 NOTE — TELEPHONE ENCOUNTER
"Per Dr. Cruz \"3 months follow up , Labs 1 week prior \" Pt is shcedled for follow-up 2/26 and lab work is scheduled @ [facility] on 2/19.  "

## 2024-11-19 NOTE — ASSESSMENT & PLAN NOTE
Hemoglobin A1C   Date Value Ref Range Status   11/06/2024 10.0 (H) <5.7 % Final     Comment:     Normal <5.7%   Prediabetes 5.7-6.4%    Diabetes 6.5% or higher     Note: Adopted from ADA consensus guidelines.        He is followed by diabetes education.  He has an upcoming appointment November 20, 2024.  He will be starting rituximab with Solu-Medrol.  He will require close follow-up with diabetes education with the start of the medication as this will cause elevated blood sugars.

## 2024-11-19 NOTE — ASSESSMENT & PLAN NOTE
Seen by rheumatology on November 18, 2024.  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  They are planning to initiate rituximab as immunosuppressive therapy.  The infusion will occur twice  by 2 weeks with Solu-Medrol 125 mg as premedication to avoid infusion reaction.  This will likely elevate his blood sugars.  Plan for close follow-up with diabetes educator.  He has an upcoming appointment 11/20/2024.     Start rituximab 1000 mg x 2 doses  by 2 weeks  - Start Bactrim for PJP prophylaxis  - Follow-up with primary care provider and diabetic educator for monitoring of blood sugars  - [facility] pharmacy follow-up for counseling and prior authorization for rituximab    Immunizations are updated today prior to start of rituximab.  We discussed that he will be on Bactrim for PJP prophylaxis.

## 2024-11-19 NOTE — ASSESSMENT & PLAN NOTE
10/18/2024 seen by Yaron GUTIERREZ.  We do not have those records and will request those records to be faxed over.  Once I get those records I will review them with recommendations.  Per patient he is to follow-up in 6 months.

## 2024-11-19 NOTE — ASSESSMENT & PLAN NOTE
"Seen by nepphrology 10/12/24:  History of poorly controled diabetes (HgbA1c>10), a history of hypertension both of which would contribute to CKD - and the former to the albuminuria/proteinuria.  Normal UACR in Nov 2021. His ANCA is negative.  Prior seroligic work up is unrevealing - except for the persistently elevated IgG4.  This, with the presence of \"chronic pancreatitis\" raises the suspicion of IgG4 disease.      Cystatin C 2.0. Avoid NSAIDS.     11/8/2024: Renal biopsy scheduled.  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        "

## 2024-11-19 NOTE — PATIENT INSTRUCTIONS
Our plan for today is:    - Medications:  Initiation of Rituximab, 1000 mg x 2 doses  by 2 weeks   Start bactrim for PJP prophylaxis   Monitor blood sugar closely

## 2024-11-19 NOTE — ASSESSMENT & PLAN NOTE
He has a history of anemia which has been noted since May 2024.  Cause has not been identified.  Additional evaluation with CBC, retake count, TSH, haptoglobin, iron studies, LDH, ferritin, B12 and folate.  Ferritin elevated 418, iron low at 20, iron binding capacity low at 2030, iron saturation low at 9.  Normal B12 and folate.  Awaiting haptoglobin and reticulocyte count.  Denies blood in stool.  Upper and lower endoscopy completed 5/2024.   No GI source was identified for anemia.       The source of his anemia has not been identified after a thorough work up with EGD, colonoscopy, labs and PET scan.           PET scan was completed October 23, 2023 which demonstrated:  1. No evidence of hypermetabolic pulmonary nodules or suspicious  uptake in the chest.      2. Large, complex right knee joint effusion with synovial thickening  previously characterized on 9/21/2024 MRI. Multiple enlarged right  inguinal and right iliac chain lymph nodes with mild to moderate FDG  uptake are likely reactive to the right knee infective/inflammatory  process.     3. Findings of positive fluid balance including trace ascites and  diffuse mild subcutaneous anasarca. Right greater than left lower  extremity edema.     4. Diffuse bone marrow uptake, which is nonspecific and may be related  to anemia given low density blood pool.           PERIPHERAL BLOOD MORPHOLOGY:        1.  MILD HYPOCHROMIC-BORDERLINE MICROCYTIC ANEMIA  A.  MODERATE ANISOCYTOSIS, WITHOUT INCREASED POIKILOCYTOSIS  B.  IRON STUDIES SUGGESTIVE OF ANEMIA OF CHRONIC DISEASE  C.  LACK OF CORRESPONDING RETICULOCYTOSIS        2.  NORMAL LEUKOCYTE DIFFERENTIAL AND MORPHOLOGY        3.  UNREMARKABLE PLATELET MORPHOLOGY        PERIPHERAL BLOOD MORPHOLOGY:        1.  MILD HYPOCHROMIC-BORDERLINE MICROCYTIC ANEMIA  A.  MODERATE ANISOCYTOSIS, WITHOUT INCREASED POIKILOCYTOSIS  B.  IRON STUDIES SUGGESTIVE OF ANEMIA OF CHRONIC DISEASE  C.  LACK OF CORRESPONDING RETICULOCYTOSIS         2.  NORMAL LEUKOCYTE DIFFERENTIAL AND MORPHOLOGY        3.  UNREMARKABLE PLATELET MORPHOLOGY     12/12/2024: Scheduled to see hematology.

## 2024-11-19 NOTE — LETTER
2024       RE: Long Mayfield  [id number] [facility] [address]     Dear Colleague,    Thank you for referring your patient, Long Mayfield, to the [facility] RHEUMATOLOGY at [facility]. Please see a copy of my visit note below.    RHEUMATOLOGY OUTPATIENT CLINIC NOTE     Referring Provider:   Edward Zuleta MD     Lab review        HLA-B27: Negative    MPO/RI-3: Negative    Rheumatoid factor: Negative  CCP antibody: Negative  Ig elevated  IgG4: 224 elevated      KATHY: Negative  dsDNA:  Negative    Complement C3: normal  Complement C4: normal  ANCA by IFA: Negative   MPO: Negative    RI-3 Abs: Positive, mildly [place] [facility]     Cell count, right knee multiple aspirations    Latest Reference Range & Units 24 09:26 24 10:00 07/10/24 11:36 24 13:38 24 17:09   Total Nucleated Cells /uL 76,860 109,100 65,670 94,560 53,900     [place]23[place] [facility]   A. Skin, left, forearm, punch biopsy - Leukocytoclastic vasculitis.   B. Skin biopsy, left, forearm, direct immunofluorescence (DIF) -  Negative immunofluorescence      Soft tissue, right knee, biopsy, 10/2024:  - Tenosynovial, fibroadipose and skeletal muscular tissue with granulation tissue formation with fibrinoid necrosis and foci of microabscesses, vascular proliferation, patchy mixed acute and chronic inflammation with perivascular accentuation, and hemosiderin deposition.  - Gram stains (blocks A1-A4) are negative for bacterial organisms.  - Examination of a couple of focuses with lymphoplasmacytic-predominant infiltrates demonstrates scattered IgG4-positive plasma cells, with an IgG4/IgG plasma cell ratio of approximately 10-15% (lower than 40%, which is the generally applied criteria to support IgG4-related disease).  Overall, there are no characteristic histologic and immunophenotypic features within this biopsy specimen to support  IgG4-related disease.    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 (see comment) patchy dense lymphoplasmacytic interstitial inflammation; 10-20 IgG4+ plasma cells/400x field   Glomerulopathy with rare intramembranous and mesangial deposits reactive for C3      - the differential diagnosis includes a mild glomerulonephritis C3 or a resolving immune complex-mediated glomerulopathy      - there are no signs of an active glomerulitis currently     Moderate chronic changes of the parenchyma, including:   - focal global glomerulosclerosis (4% of glomeruli)   - focal tubular atrophy and interstitial fibrosis (40-50% of the cortex)   - severe arterial and arteriolar sclerosis      Imaging review      PET scan 10/2024  1. No evidence of hypermetabolic pulmonary nodules or suspicious uptake in the chest.   2. Large, complex right knee joint effusion with synovial thickening previously characterized on 9/21/2024 MRI. Multiple enlarged right inguinal and right iliac chain lymph nodes with mild to moderate FDG uptake are likely reactive to the right knee infective/inflammatory process.  3. Findings of positive fluid balance including trace ascites and diffuse mild subcutaneous anasarca. Right greater than left lower extremity edema.  4. Diffuse bone marrow uptake, which is nonspecific and may be related to anemia given low density blood pool.    MRI right knee 9/2024  1.  Large complex joint effusion with mildly thickened enhancing synovium of indeterminate etiology. Findings may be sequela of degenerative arthropathy, or an inflammatory or infectious arthropathy. If clinically indicated repeat arthrocentesis could be  performed for further characterization.  2.  Mild patchy enhancing edema-like marrow signal of the knee which is likely reactive. Early osteomyelitis cannot be entirely excluded, though, is considered less  likely.  3.  Tricompartmental degenerative arthrosis.  4.  Marked mucoid degeneration/maceration of the medial and lateral menisci.  5.  Chronic rupture of the ACL.  6.  Mucoid degeneration and/or partial tearing of the PCL.  7.  Intact collateral ligaments.    X-ray SIJ 9/2024  The SI joints are negative for sacroiliitis, ankylosis, or diastasis on this single projection. Pelvis negative for fracture. Both hips negative for fracture.    Hand X-ray 9/2024  Old healed fracture of the right first metacarpal. No evidence for acute fracture on either side.   Additional chronic fracture of the left scaphoid.   There is some sclerosis within the proximal fracture fragment which may represent superimposed  avascular necrosis of the proximal pole of the left scaphoid. Degenerative change both wrists, greater on the left at the radioscaphoid and radiolunate articulation with cystic change in the distal aspect of the left radius. No erosive changes are identified. Vascular calcifications bilaterally. Degenerative change at both first MCP joints.    X-ray left foot 9/2024  Degenerative change at the first MTP joint and IP joint of the great toe. No evidence for acute fracture. No erosive changes are identified. Vascular calcifications.    MRI cervical spine, outside, only report available 3/2024:  C4-5 moderate central/left-sided canal stenosis with left ventral cord impingement.  No abnormal T2 signal  Mild central canal stenosis with cord abutment C3-4  Multilevel degenerative foraminal stenosis severe left C 3-4, C4-5 with expected nerve impingement  Prominent erosive/inflammatory facet arthropathy left C3-4, C4-5     MRI Right foot, 1/2024:  1. Enhancing marrow edema at the second metatarsal amputation margin as well as head and neck of the third and fourth metatarsals. Indeterminate faint T1 hypointense marrow signal changes of the fourth metatarsal head and about the displaced oblique fracture of the third metatarsal  neck. Overall findings compatible with osteitis, possibly osteomyelitis.   2. Irregular soft tissue amputation margins of the medial forefoot with emanating enhancing tissue edema concerning for cellulitis      CT chest, Abdomen, pelvis without contrast, 12/2023:  1. Severe anasarca with moderate to large pleural effusions and a small pericardial effusion. Diffuse septal thickening in the lungs concerning for pulmonary edema. Anemic cardiac blood pool.   2. Solitary pulmonary nodule in the left lower lobe with an average diameter of 10 mm is indeterminate, but worrisome for neoplasm. This is new since 3/19/2022.   3. Patchy and confluent airspace consolidation within the left greater than right lung apices presumably represents infection, cannot exclude underlying soft tissue density nodularity on this noncontrast examination. Recommend pulmonary consultation.   4. Diffuse and severe vascular calcifications. No acute or suspicious abnormality noted within the abdomen or pelvis.        Subjective    Visit date October 29, 2024    Long is a 54 year old male who presents today for follow up.    Since the last visit,    - Workup 11/06 showed:  Hemoglobin low  WBC  within normal limits   Platelets  elevated  AST  within normal limits   Creatinine  elevated    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 (see comment) patchy dense lymphoplasmacytic interstitial inflammation; 10-20 IgG4+ plasma cells/400x field   Glomerulopathy with rare intramembranous and mesangial deposits reactive for C3      - the differential diagnosis includes a mild glomerulonephritis C3 or a resolving immune complex-mediated glomerulopathy      - there are no signs of an active glomerulitis currently     Moderate chronic changes of the parenchyma, including:   - focal global glomerulosclerosis (4% of glomeruli)   - focal tubular atrophy and  "interstitial fibrosis (40-50% of the cortex)   - severe arterial and arteriolar sclerosis      Today, Long mentioned the following:    He underwent renal biopsy, reported above  He remains with joint pain in the right knee, neck, shoulder and arm     Review of Systems   Constitutional:  Negative for fever.   HENT:  Negative for hearing loss, nosebleeds and sinus pain.    Gastrointestinal:  Negative for blood in stool.   Genitourinary:  Negative for hematuria.   Musculoskeletal:  Positive for back pain and joint pain.   Skin:  Negative for rash.       Past Medical history   Past Medical History:   Diagnosis Date     Acute pain of right knee 07/12/2024     Alcohol abuse 09/12/2022     Allergic rhinitis      Anemia      Arthritis      Bell's palsy      Cervicalgia      Diabetes (H)      Diabetic foot ulcer (H)      Generalized edema      Hyperkalemia      Hypertension      Hypo-osmolality and hyponatremia      Hypoglycemia 05/03/2024     Hypoxia 09/11/2024     Major depressive disorder, recurrent episode, mild (H)      Other acute osteomyelitis, right ankle and foot (H)      Other chronic pancreatitis (H)      Other polyosteoarthritis      Pneumonia of left lower lobe due to infectious organism 09/11/2024     Right knee pain 07/11/2024     Solitary pulmonary nodule        Objective  Ht 1.651 m (5' 5\")   Wt 56.7 kg (125 lb)   BMI 20.80 kg/m        PHYSICAL EXAMINATION  Video visit  Able to speak full sentence  Breathing comfortably  No visible skin rash      Assessment & Plan    # IgG4 related disease  # Chronic kidney disease, multifactorial [diabetes, hypertension, IgG4, immune complex]  # Recurrent knee effusion, inflammatory cell count, concern for inflammatory arthropathy vs smoldering infection  # Chronic arthralgia   # Chronic diarrhea, pancreatitis, concern for IgG4 related disease  # History of acute kidney injury, leukocytoclastic vasculitis, less likely  ANCA vasculitis    # Peripheral arterial " disease  # History of right foot osteomyelitis status post amputation  # Multiple comorbidities [anemia, diabetes with complications, hypertension, hyperlipidemia]    Long is presenting today for follow-up  He is undergoing multidisciplinary approach with infectious disease, orthopedics, nephrology with the assistance of our colleagues in pathology.    He underwent renal biopsy that showed diabetic glomerulosclerosis as well as features suspicious for IgG4 related disease, glomerulopathy with intramembranous and mesangial deposits.  Right knee synovial biopsy showed inflammation with fibrinoid necrosis.    His infectious workup has been so far inconclusive.  After discussion with Dr. Chinchilla and nephrology, the consensus is that there is his presentation is indeed suggestive of an inflammatory condition and in this case would be suggestive of IgG4 related disease which can cause variable vessel vasculitis.    IgG4 related disease can explain his chronic pancreatitis as well as inflammatory arthropathy and he has elevated serum of IgG4.    Given his severe diabetes with elevated hemoglobin A1c of 10%, hyperglycemia and diabetic nephropathy on renal biopsy as well as his previously reported severe hyperglycemia with oral prednisone that we discussed the need to avoid steroids to minimize complication and we will proceed with rituximab for induction of remission for IgG4 related disease.  Risks and side effects of rituximab were discussed at length including increased risk of infections [bacterial, viral, fungal, mycobacterial, CNS infection] and he is in agreement to start rituximab.  He will start Bactrim for PJP prophylaxis while on rituximab.    I updated his primary care provider as well as diabetic educator about the plan and he will need Solu-Medrol IV infusion with rituximab infusion therapy as premedication to avoid infusion reaction so close monitoring for his blood sugars will be advisable.    He will  probably need his right knee effusion to be tapped to help with clearance of inflammatory fluid, this can be done after introduction of systemic therapy and control of systemic inflammation.    He is in agreement with this plan and seems comfortable with this approach.    Plan:  - Start rituximab 1000 mg x 2 doses  by 2 weeks  - Start Bactrim for PJP prophylaxis  - Follow-up with primary care provider and diabetic educator for monitoring of blood sugars  - [facility] pharmacy follow-up for counseling and prior authorization for rituximab      # Screening for chronic infections  2024  Hepatitis B surface antigen: Negative  Hepatitis B core: Negative  Hepatitis C antibody: Negative   QuantiFERON gold: Negative      # Longitudinal care  The longitudinal plan of care for the diagnosis(es)/condition(s) as documented were addressed during this visit. Due to the added complexity in care, I will continue to support Long in the subsequent management and with ongoing continuity of care.    Review of the result(s) of each unique test - as HPI  Ordering of each unique test  Prescription drug management          Return in about 3 months (around 2/19/2025) for Follow up.    Hector Cruz MD  [facility] RHEUMATOLOGY    Virtual Visit Details    Type of service:  Video Visit     Joined the call at 11/19/2024, 1:38:08 pm.  Left the call at 11/19/2024, 1:55:33 pm.  You were on the call for 17 minutes 25 seconds      Originating Location (pt. Location): Home    Distant Location (provider location):  On-site  Platform used for Video Visit: Gian      Again, thank you for allowing me to participate in the care of your patient.      Sincerely,    Hector Cruz MD

## 2024-11-20 LAB — CREAT UR-MCNC: 52 MG/DL

## 2024-11-21 ENCOUNTER — TELEPHONE (OUTPATIENT)
Dept: RHEUMATOLOGY | Facility: CLINIC | Age: 54
End: 2024-11-21
Payer: COMMERCIAL

## 2024-11-21 LAB — BACTERIA SNV CULT: NO GROWTH

## 2024-11-21 NOTE — TELEPHONE ENCOUNTER
MTM referral from: [facility] visit (referral by provider)    MTM referral outreach attempt #2 on November 21, 2024 at 10:25 AM      Outcome: Patient not reachable after several attempts, routed to Pharmacist Team/Provider as an [employer] Daily Message Sent    [facility]

## 2024-11-22 ENCOUNTER — TELEPHONE (OUTPATIENT)
Dept: FAMILY MEDICINE | Facility: CLINIC | Age: 54
End: 2024-11-22
Payer: COMMERCIAL

## 2024-11-22 NOTE — TELEPHONE ENCOUNTER
[facility]  Fax:[phone]  Spec: [phone]  MO: [phone]

## 2024-11-25 ENCOUNTER — TELEPHONE (OUTPATIENT)
Dept: NEPHROLOGY | Facility: CLINIC | Age: 54
End: 2024-11-25
Payer: COMMERCIAL

## 2024-11-25 NOTE — TELEPHONE ENCOUNTER
Patient Contacted for the patient to call back and schedule the following:    Appointment type: Return Glomerular  Provider: Dr. Chinchilla  Return date: 12/13 230pm  Specialty phone number: [phone]  Additional appointment(s) needed: Labs prior  Additonal Notes: slot per [place]

## 2024-11-26 NOTE — TELEPHONE ENCOUNTER
[facility] Pharm[facility] Prior Authorization Team   Phone: [phone]      CMM KEY: [id number]      PA Initiation    Medication: HUMULIN N KWIKPEN 100 UNIT/ML SC SUPN  Insurance Company: [employer] - Phone [phone] Fax [phone]  Pharmacy Filling the Rx:    Filling Pharmacy Phone:    Filling Pharmacy Fax:    Start Date: 11/26/2024

## 2024-11-27 NOTE — TELEPHONE ENCOUNTER
"Retail Pharmacy Prior Authorization Team   Phone: [phone]      PRIOR AUTHORIZATION DENIED    Medication: HUMULIN N KWIKPEN 100 UNIT/ML SC SUPN  Insurance Company: [employer] - Phone [phone] Fax [phone]  Denial Date: 11/26/2024  Denial Reason(s): HUMULIN N VIALS ARE ON FORMULARY AND ARE COVERED WITHOUT A PA.  THE PATIENT IS DIRECTED TO USE THAT.      Appeal Information: To send an appeal to the patient's insurance, please provide a letter of medical necessity for the requested medication that was denied.  Please use the denial rationale from the patient's insurance to write the letter.  Once it is completed, please have it available under the \"letters tab\" in the patient's chart and route directly back to me: NALDO GIVENS and I can send the appeal to the patient's insurance.     Patient Notified: No. The [employer] [facility] PA Team does not notify of the denial outcomes as the patient often will ask what their provider will prescribe in place of the denied medication, or additional information in regards to other therapies they can take in place of the denied medication.  This is not something we can advise on in our department.      "

## 2024-11-27 NOTE — TELEPHONE ENCOUNTER
Please call patient and let him know that I received a denial for his Humulin and KwikPen which was prescribed to be used prior to his upcoming steroid injection.  Is he willing to pay for the KwikPen?  If not there are Humulin N vials we can prescribe.  With the vials he would have to draw up the correct amount of insulin.  Please let me know how he would like to proceed.

## 2024-11-27 NOTE — TELEPHONE ENCOUNTER
Called Long and relayed message.  Long stated that he has new insurance.  He will call for the insurance information and contact the pharmacy to see if it is covered.

## 2024-11-30 ENCOUNTER — HEALTH MAINTENANCE LETTER (OUTPATIENT)
Age: 54
End: 2024-11-30

## 2024-12-02 LAB
SCANNED LAB RESULT: ABNORMAL
TEST NAME: ABNORMAL

## 2024-12-03 ENCOUNTER — OFFICE VISIT (OUTPATIENT)
Dept: FAMILY MEDICINE | Facility: CLINIC | Age: 54
End: 2024-12-03
Attending: PHYSICIAN ASSISTANT
Payer: MEDICAID

## 2024-12-03 VITALS
OXYGEN SATURATION: 99 % | RESPIRATION RATE: 14 BRPM | BODY MASS INDEX: 20.24 KG/M2 | HEIGHT: 65 IN | WEIGHT: 121.5 LBS | DIASTOLIC BLOOD PRESSURE: 88 MMHG | HEART RATE: 71 BPM | SYSTOLIC BLOOD PRESSURE: 146 MMHG

## 2024-12-03 DIAGNOSIS — F10.21 ALCOHOL DEPENDENCE IN REMISSION (H): ICD-10-CM

## 2024-12-03 DIAGNOSIS — K86.0 ALCOHOL-INDUCED CHRONIC PANCREATITIS (H): Primary | ICD-10-CM

## 2024-12-03 DIAGNOSIS — N18.31 TYPE 2 DIABETES MELLITUS WITH STAGE 3A CHRONIC KIDNEY DISEASE, WITH LONG-TERM CURRENT USE OF INSULIN (H): ICD-10-CM

## 2024-12-03 DIAGNOSIS — Z79.4 TYPE 2 DIABETES MELLITUS WITH STAGE 3A CHRONIC KIDNEY DISEASE, WITH LONG-TERM CURRENT USE OF INSULIN (H): ICD-10-CM

## 2024-12-03 DIAGNOSIS — F11.20 CONTINUOUS OPIOID DEPENDENCE (H): ICD-10-CM

## 2024-12-03 DIAGNOSIS — R63.4 UNINTENTIONAL WEIGHT LOSS: ICD-10-CM

## 2024-12-03 DIAGNOSIS — M25.50 MULTIPLE JOINT PAIN: ICD-10-CM

## 2024-12-03 DIAGNOSIS — K86.81 EXOCRINE PANCREATIC INSUFFICIENCY: ICD-10-CM

## 2024-12-03 DIAGNOSIS — D64.9 ANEMIA, UNSPECIFIED TYPE: ICD-10-CM

## 2024-12-03 DIAGNOSIS — Z89.431 S/P TRANSMETATARSAL AMPUTATION OF FOOT, RIGHT (H): ICD-10-CM

## 2024-12-03 DIAGNOSIS — E11.22 TYPE 2 DIABETES MELLITUS WITH STAGE 3A CHRONIC KIDNEY DISEASE, WITH LONG-TERM CURRENT USE OF INSULIN (H): ICD-10-CM

## 2024-12-03 DIAGNOSIS — M00.9 PYOGENIC ARTHRITIS OF RIGHT KNEE JOINT, DUE TO UNSPECIFIED ORGANISM (H): ICD-10-CM

## 2024-12-03 DIAGNOSIS — K52.9 CHRONIC DIARRHEA: ICD-10-CM

## 2024-12-03 PROBLEM — R73.9 HYPERGLYCEMIA: Status: RESOLVED | Noted: 2022-03-08 | Resolved: 2024-12-03

## 2024-12-03 PROBLEM — E88.89 KETOSIS (H): Status: RESOLVED | Noted: 2022-03-08 | Resolved: 2024-12-03

## 2024-12-03 LAB
AMPHETAMINES UR QL SCN: NORMAL
BARBITURATES UR QL SCN: NORMAL
BASOPHILS # BLD AUTO: 0.1 10E3/UL (ref 0–0.2)
BASOPHILS NFR BLD AUTO: 1 %
BENZODIAZ UR QL SCN: NORMAL
BZE UR QL SCN: NORMAL
CANNABINOIDS UR QL SCN: NORMAL
EOSINOPHIL # BLD AUTO: 0.3 10E3/UL (ref 0–0.7)
EOSINOPHIL NFR BLD AUTO: 3 %
ERYTHROCYTE [DISTWIDTH] IN BLOOD BY AUTOMATED COUNT: 17.7 % (ref 10–15)
ERYTHROCYTE [SEDIMENTATION RATE] IN BLOOD BY WESTERGREN METHOD: 111 MM/HR (ref 0–20)
FENTANYL UR QL: NORMAL
HCT VFR BLD AUTO: 25.5 % (ref 40–53)
HGB BLD-MCNC: 8.2 G/DL (ref 13.3–17.7)
IMM GRANULOCYTES # BLD: 0 10E3/UL
IMM GRANULOCYTES NFR BLD: 0 %
LYMPHOCYTES # BLD AUTO: 2 10E3/UL (ref 0.8–5.3)
LYMPHOCYTES NFR BLD AUTO: 25 %
MCH RBC QN AUTO: 24.3 PG (ref 26.5–33)
MCHC RBC AUTO-ENTMCNC: 32.2 G/DL (ref 31.5–36.5)
MCV RBC AUTO: 75 FL (ref 78–100)
MONOCYTES # BLD AUTO: 0.7 10E3/UL (ref 0–1.3)
MONOCYTES NFR BLD AUTO: 8 %
NEUTROPHILS # BLD AUTO: 5 10E3/UL (ref 1.6–8.3)
NEUTROPHILS NFR BLD AUTO: 63 %
OPIATES UR QL SCN: NORMAL
OXYCODONE UR QL: ABNORMAL
PCP QUAL URINE (ROCHE): NORMAL
PLATELET # BLD AUTO: 513 10E3/UL (ref 150–450)
RBC # BLD AUTO: 3.38 10E6/UL (ref 4.4–5.9)
RETICS # AUTO: 0.06 10E6/UL (ref 0.03–0.1)
RETICS/RBC NFR AUTO: 1.7 % (ref 0.5–2)
WBC # BLD AUTO: 7.9 10E3/UL (ref 4–11)

## 2024-12-03 PROCEDURE — 85652 RBC SED RATE AUTOMATED: CPT | Performed by: PHYSICIAN ASSISTANT

## 2024-12-03 PROCEDURE — 85025 COMPLETE CBC W/AUTO DIFF WBC: CPT | Performed by: PHYSICIAN ASSISTANT

## 2024-12-03 PROCEDURE — G2211 COMPLEX E/M VISIT ADD ON: HCPCS | Performed by: PHYSICIAN ASSISTANT

## 2024-12-03 PROCEDURE — 86140 C-REACTIVE PROTEIN: CPT | Performed by: PHYSICIAN ASSISTANT

## 2024-12-03 PROCEDURE — 84450 TRANSFERASE (AST) (SGOT): CPT | Performed by: PHYSICIAN ASSISTANT

## 2024-12-03 PROCEDURE — 80307 DRUG TEST PRSMV CHEM ANLYZR: CPT | Performed by: PHYSICIAN ASSISTANT

## 2024-12-03 PROCEDURE — 99214 OFFICE O/P EST MOD 30 MIN: CPT | Performed by: PHYSICIAN ASSISTANT

## 2024-12-03 PROCEDURE — 82565 ASSAY OF CREATININE: CPT | Performed by: PHYSICIAN ASSISTANT

## 2024-12-03 RX ORDER — INSULIN HUMAN 100 [IU]/ML
INJECTION, SUSPENSION SUBCUTANEOUS
Qty: 3 ML | Refills: 0 | Status: CANCELLED | OUTPATIENT
Start: 2024-12-03

## 2024-12-03 RX ORDER — METHYLCELLULOSE 500 MG/1
500 TABLET ORAL DAILY
Qty: 90 TABLET | Refills: 0 | Status: SHIPPED | OUTPATIENT
Start: 2024-12-03

## 2024-12-03 RX ORDER — SYRINGE-NEEDLE,INSULIN,0.5 ML 27GX1/2"
SYRINGE, EMPTY DISPOSABLE MISCELLANEOUS
Qty: 10 EACH | Refills: 0 | Status: SHIPPED | OUTPATIENT
Start: 2024-12-03

## 2024-12-03 NOTE — ASSESSMENT & PLAN NOTE
11/13/2024 urine drug screen positive for cocaine.  We discussed that this is a violation of his CSA that was signed October 15, 2024.  He stated that he had attended a party and used cocaine.  Will repeat urine drug screen today.  Discussed that if there is another violation will need to refer to addiction medicine.

## 2024-12-03 NOTE — ASSESSMENT & PLAN NOTE
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     Brucella, Bartonella, Q fever, CD4, Ig levels.   MRI right knee with and without contrast if negative.   Rheumatology and GI consult.   Complete oral ciprofloxacin course.      Admitted to [facility] 8/16 to 8/19/2024.  \"Has been seen a handful of times and outpatient follow-up with persistent right knee swelling found to have high synovial cell count.  Multiple cultures off antibiotics have been negative following I&D procedures with antibiotic therapy and ineffective.  Work up included zoonotic causes of infection.  Has been referred to rheumatology to assist in differential diagnosis.\"  Possible new vasculitis. Rheumatology appointment scheduled for 9/17/24.  Orthopedic surgery recommended MRI right knee with and without contrast (scheduled for 9/21/24).  Prescribed bumex 1 mg daily for knee swelling.  Bumex held in the hospital, will resume upon discharge.  Monitor at TCU and consider discontinuing bumex if any signs of dehydration.  Symptoms and right knee appeared to be at recent baseline, no acute worsening prior to or during this admission.  Continue with outpatient evaluation/management as previously directed.     Seen by infectious disease for October 2024.  PET scan ordered.  IgG4 levels noted to be elevated.    10/25/24:  seen by orthopedics.  Synovial biopsy was completed.       10/29/24:  missed appt with rheumatology. Needs to call to reschedule.    11/18/24:  His presentation is suggestive for IgG4 related disease and we will be planning for initiation of rituximab as immunosuppressive therapy.  This will be an infusion twice  by 2 weeks with Solu-Medrol 125 mg as premedication to avoid infusion reaction.     - Start rituximab 1000 mg x 2 doses  by 2 weeks  - Start Bactrim for PJP prophylaxis  - Follow-up with primary care provider and diabetic educator for monitoring of blood sugars  - MTM " pharmacy follow-up for counseling and prior authorization for rituximab    12/4/24:  initial infultion of rituximab.    12/11/24:  follow up with rheumatologist.

## 2024-12-03 NOTE — PROGRESS NOTES
The longitudinal plan of care for the diagnosis(es)/condition(s) as documented were addressed during this visit. Due to the added complexity in care, I will continue to support Long in the subsequent management and with ongoing continuity of care.    Assessment & Plan   Problem List Items Addressed This Visit       Chronic diarrhea     He had a virtual visit with [facility] recently.  He continues to have chronic diarrhea after eating.  His weight is stable but significantly lower then his usual weight.  History of C. Diff infection 4 years ago.  No blood in stools.  No abdominal pain.    He uses Creon and take loperamide before meals.  He is to schedule a follow up with [facility] as they requested.  Will check C. Diff and stool culture today.           Relevant Medications    methylcellulose (CITRUCEL) 500 MG TABS tablet    Other Relevant Orders    C. difficile Toxin B PCR with reflex to C. difficile Antigen and Toxins A/B EIA    Enteric Bacteria and Virus Panel by KYLE Stool    Anemia, unspecified type     He has a history of anemia which has been noted since May 2024.  Cause has not been identified.  Additional evaluation with CBC, retake count, TSH, haptoglobin, iron studies, LDH, ferritin, B12 and folate.  Ferritin elevated 418, iron low at 20, iron binding capacity low at 2030, iron saturation low at 9.  Normal B12 and folate.  Awaiting haptoglobin and reticulocyte count.  Denies blood in stool.  Upper and lower endoscopy completed 5/2024.   No GI source was identified for anemia.       The source of his anemia has not been identified after a thorough work up with EGD, colonoscopy, labs and PET scan.            PET scan was completed October 23, 2023 which demonstrated:  1. No evidence of hypermetabolic pulmonary nodules or suspicious  uptake in the chest.      2. Large, complex right knee joint effusion with synovial thickening  previously characterized on 9/21/2024 MRI. Multiple enlarged right  inguinal and right  "iliac chain lymph nodes with mild to moderate FDG  uptake are likely reactive to the right knee infective/inflammatory  process.     3. Findings of positive fluid balance including trace ascites and  diffuse mild subcutaneous anasarca. Right greater than left lower  extremity edema.     4. Diffuse bone marrow uptake, which is nonspecific and may be related  to anemia given low density blood pool.           PERIPHERAL BLOOD MORPHOLOGY:        1.  MILD HYPOCHROMIC-BORDERLINE MICROCYTIC ANEMIA  A.  MODERATE ANISOCYTOSIS, WITHOUT INCREASED POIKILOCYTOSIS  B.  IRON STUDIES SUGGESTIVE OF ANEMIA OF CHRONIC DISEASE  C.  LACK OF CORRESPONDING RETICULOCYTOSIS        2.  NORMAL LEUKOCYTE DIFFERENTIAL AND MORPHOLOGY        3.  UNREMARKABLE PLATELET MORPHOLOGY        PERIPHERAL BLOOD MORPHOLOGY:        1.  MILD HYPOCHROMIC-BORDERLINE MICROCYTIC ANEMIA  A.  MODERATE ANISOCYTOSIS, WITHOUT INCREASED POIKILOCYTOSIS  B.  IRON STUDIES SUGGESTIVE OF ANEMIA OF CHRONIC DISEASE  C.  LACK OF CORRESPONDING RETICULOCYTOSIS        2.  NORMAL LEUKOCYTE DIFFERENTIAL AND MORPHOLOGY        3.  UNREMARKABLE PLATELET MORPHOLOGY     12/12/2024: Scheduled to see hematology.           S/P transmetatarsal amputation of foot, right (H)    Exocrine pancreatic insufficiency     He is on Creon and is followed by GI.          Type 2 diabetes mellitus with kidney complication, with long-term current use of insulin (H)     Follows with diabetes education.  He is to take Humulin N 13 units at start of steroid infusion.  This was ordered today as the kwik pen was not covered.      Lab Results   Component Value Date    A1C 10.0 11/06/2024    A1C 9.2 08/17/2024    A1C 9.4 05/04/2024    A1C >14.0 03/08/2022     Will repeat A1c  2/2025.  Currently on lantus with sliding scale insulin.         Relevant Medications    insulin  UNIT/ML vial    insulin syringe-needle U-100 (29G X 1/2\" 0.5 ML) 29G X 1/2\" 0.5 ML miscellaneous    Unintentional weight loss     " "Weight 121 pounds today which is stable from previous.  He has had a significant weight loss over the past year.  He continues to have diarrhea and recently saw [facility].  Will check c.diff and stool culture today.  He take loperamide before meals and is on Creon.  Recommend follow up with [facility].         Pyogenic arthritis of right knee joint, due to unspecified organism (H)     Right knee inflammatory arthritis, etiology to be determined, s/p I&D Jul 11, Aug 18, 2024.     Brucella, Bartonella, Q fever, CD4, Ig levels.   MRI right knee with and without contrast if negative.   Rheumatology and GI consult.   Complete oral ciprofloxacin course.      Admitted to [facility] 8/16 to 8/19/2024.  \"Has been seen a handful of times and outpatient follow-up with persistent right knee swelling found to have high synovial cell count.  Multiple cultures off antibiotics have been negative following I&D procedures with antibiotic therapy and ineffective.  Work up included zoonotic causes of infection.  Has been referred to rheumatology to assist in differential diagnosis.\"  Possible new vasculitis. Rheumatology appointment scheduled for 9/17/24.  Orthopedic surgery recommended MRI right knee with and without contrast (scheduled for 9/21/24).  Prescribed bumex 1 mg daily for knee swelling.  Bumex held in the hospital, will resume upon discharge.  Monitor at TCU and consider discontinuing bumex if any signs of dehydration.  Symptoms and right knee appeared to be at recent baseline, no acute worsening prior to or during this admission.  Continue with outpatient evaluation/management as previously directed.     Seen by infectious disease for October 2024.  PET scan ordered.  IgG4 levels noted to be elevated.    10/25/24:  seen by orthopedics.  Synovial biopsy was completed.       10/29/24:  missed appt with rheumatology. Needs to call to reschedule.    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  This will be an infusion twice  by 2 weeks with Solu-Medrol 125 mg as premedication to avoid infusion reaction.     - Start rituximab 1000 mg x 2 doses  by 2 weeks  - Start Bactrim for PJP prophylaxis  - Follow-up with primary care provider and diabetic educator for monitoring of blood sugars  - [facility] pharmacy follow-up for counseling and prior authorization for rituximab    12/4/24:  initial infultion of rituximab.    12/11/24:  follow up with rheumatologist.         Chronic pancreatitis (H) - Primary    Alcohol dependence in remission (H)     Remains in abstinence.         Continuous opioid dependence (H)     11/13/2024 urine drug screen positive for cocaine.  We discussed that this is a violation of his CSA that was signed October 15, 2024.  He stated that he had attended a party and used cocaine.  Will repeat urine drug screen today.  Discussed that if there is another violation will need to refer to addiction medicine.           Relevant Orders    Urine Drug Screen Oxycodone Urine Qualitative [id number]          Subjective   Long is a 54 year old, presenting for the following health issues:  Follow Up and Recheck Medication        12/3/2024     2:24 PM   Additional Questions   Roomed by Julita HAYDEN   Accompanied by Self     History of Present Illness       CKD: He uses over the counter pain medication, including oxycodone, a few times a week.    Diabetes:   He presents for follow up of diabetes.  He is checking home blood glucose four or more times daily.   He checks blood glucose before and after meals and at bedtime.  Blood glucose is sometimes over 200 and sometimes under 70. He is aware of hypoglycemia symptoms including dizziness and other.   He is concerned about blood sugar frequently over 200 and low blood sugar, several less than 70 in the past few weeks.   He is having numbness in feet, excessive thirst and weight loss.     "        Hypertension: He presents for follow up of hypertension.  He does not check blood pressure  regularly outside of the clinic. Outside blood pressures have been over 140/90. He follows a low salt diet.     He eats 0-1 servings of fruits and vegetables daily.He consumes 1 sweetened beverage(s) daily.He exercises with enough effort to increase his heart rate 10 to 19 minutes per day.  He exercises with enough effort to increase his heart rate 4 days per week.   He is taking medications regularly.             Objective    BP (!) 146/88 (BP Location: Left arm, Patient Position: Sitting, Cuff Size: Adult Regular)   Pulse 71   Resp 14   Ht 1.651 m (5' 5\")   Wt 55.1 kg (121 lb 8 oz)   SpO2 99%   BMI 20.22 kg/m    Body mass index is 20.22 kg/m .  Physical Exam  Vitals and nursing note reviewed.   Constitutional:       Appearance: Normal appearance.   Neurological:      Mental Status: He is alert.                Signed Electronically by: Rena Blair PA-C    "

## 2024-12-03 NOTE — LETTER
December 3, 2024      Long Mayfield  [id number] [facility] [address]        To Whom It May Concern:    Long Mayfield  was seen on 12/3/24.  I recommend that he goes to [facility] for strengthening 3-5 times weekly.      Sincerely,        Rena Blair PA-C

## 2024-12-03 NOTE — ASSESSMENT & PLAN NOTE
Weight 121 pounds today which is stable from previous.  He has had a significant weight loss over the past year.  He continues to have diarrhea and recently saw [facility].  Will check c.diff and stool culture today.  He take loperamide before meals and is on Creon.  Recommend follow up with [facility].

## 2024-12-03 NOTE — ASSESSMENT & PLAN NOTE
He had a virtual visit with [facility] recently.  He continues to have chronic diarrhea after eating.  His weight is stable but significantly lower then his usual weight.  History of C. Diff infection 4 years ago.  No blood in stools.  No abdominal pain.    He uses Creon and take loperamide before meals.  He is to schedule a follow up with [facility] as they requested.  Will check C. Diff and stool culture today.

## 2024-12-03 NOTE — ASSESSMENT & PLAN NOTE
Follows with diabetes education.  He is to take Humulin N 13 units at start of steroid infusion.  This was ordered today as the kwik pen was not covered.      Lab Results   Component Value Date    A1C 10.0 11/06/2024    A1C 9.2 08/17/2024    A1C 9.4 05/04/2024    A1C >14.0 03/08/2022     Will repeat A1c  2/2025.  Currently on lantus with sliding scale insulin.

## 2024-12-04 ENCOUNTER — INFUSION THERAPY VISIT (OUTPATIENT)
Dept: INFUSION THERAPY | Facility: HOSPITAL | Age: 54
End: 2024-12-04
Attending: STUDENT IN AN ORGANIZED HEALTH CARE EDUCATION/TRAINING PROGRAM
Payer: MEDICAID

## 2024-12-04 VITALS
RESPIRATION RATE: 16 BRPM | OXYGEN SATURATION: 96 % | SYSTOLIC BLOOD PRESSURE: 116 MMHG | DIASTOLIC BLOOD PRESSURE: 57 MMHG | HEART RATE: 54 BPM

## 2024-12-04 DIAGNOSIS — D89.84 IGG4 RELATED DISEASE (H): ICD-10-CM

## 2024-12-04 DIAGNOSIS — M31.8 SYSTEMIC VASCULITIS (H): Primary | ICD-10-CM

## 2024-12-04 LAB
AST SERPL W P-5'-P-CCNC: 47 U/L (ref 0–45)
CD19 B CELL COMMENT: NORMAL
CD19 CELLS # BLD: 187 CELLS/UL (ref 107–698)
CD19 CELLS NFR BLD: 11 % (ref 6–27)
CREAT SERPL-MCNC: 1.09 MG/DL (ref 0.67–1.17)
CRP SERPL-MCNC: 19.7 MG/L
EGFRCR SERPLBLD CKD-EPI 2021: 80 ML/MIN/1.73M2
HAPTOGLOB SERPL-MCNC: 364 MG/DL (ref 30–200)

## 2024-12-04 PROCEDURE — 258N000003 HC RX IP 258 OP 636: Performed by: STUDENT IN AN ORGANIZED HEALTH CARE EDUCATION/TRAINING PROGRAM

## 2024-12-04 PROCEDURE — 250N000011 HC RX IP 250 OP 636: Performed by: STUDENT IN AN ORGANIZED HEALTH CARE EDUCATION/TRAINING PROGRAM

## 2024-12-04 PROCEDURE — 96415 CHEMO IV INFUSION ADDL HR: CPT

## 2024-12-04 PROCEDURE — 96367 TX/PROPH/DG ADDL SEQ IV INF: CPT

## 2024-12-04 PROCEDURE — 86355 B CELLS TOTAL COUNT: CPT | Performed by: STUDENT IN AN ORGANIZED HEALTH CARE EDUCATION/TRAINING PROGRAM

## 2024-12-04 PROCEDURE — 250N000013 HC RX MED GY IP 250 OP 250 PS 637: Performed by: STUDENT IN AN ORGANIZED HEALTH CARE EDUCATION/TRAINING PROGRAM

## 2024-12-04 PROCEDURE — 96375 TX/PRO/DX INJ NEW DRUG ADDON: CPT

## 2024-12-04 PROCEDURE — 82784 ASSAY IGA/IGD/IGG/IGM EACH: CPT | Performed by: STUDENT IN AN ORGANIZED HEALTH CARE EDUCATION/TRAINING PROGRAM

## 2024-12-04 PROCEDURE — 96413 CHEMO IV INFUSION 1 HR: CPT

## 2024-12-04 PROCEDURE — 36415 COLL VENOUS BLD VENIPUNCTURE: CPT | Performed by: STUDENT IN AN ORGANIZED HEALTH CARE EDUCATION/TRAINING PROGRAM

## 2024-12-04 RX ORDER — DIPHENHYDRAMINE HYDROCHLORIDE 50 MG/ML
25 INJECTION INTRAMUSCULAR; INTRAVENOUS
Status: DISCONTINUED | OUTPATIENT
Start: 2024-12-04 | End: 2024-12-04 | Stop reason: HOSPADM

## 2024-12-04 RX ORDER — ACETAMINOPHEN 325 MG/1
650 TABLET ORAL ONCE
Start: 2024-12-18

## 2024-12-04 RX ORDER — METHYLPREDNISOLONE SODIUM SUCCINATE 125 MG/2ML
80 INJECTION INTRAMUSCULAR; INTRAVENOUS ONCE
OUTPATIENT
Start: 2024-12-18

## 2024-12-04 RX ORDER — DIPHENHYDRAMINE HCL 50 MG
50 CAPSULE ORAL ONCE
Start: 2024-12-18

## 2024-12-04 RX ORDER — DIPHENHYDRAMINE HYDROCHLORIDE 50 MG/ML
50 INJECTION INTRAMUSCULAR; INTRAVENOUS
Start: 2024-12-18

## 2024-12-04 RX ORDER — HEPARIN SODIUM (PORCINE) LOCK FLUSH IV SOLN 100 UNIT/ML 100 UNIT/ML
5 SOLUTION INTRAVENOUS
OUTPATIENT
Start: 2024-12-18

## 2024-12-04 RX ORDER — HEPARIN SODIUM,PORCINE 10 UNIT/ML
5-20 VIAL (ML) INTRAVENOUS DAILY PRN
OUTPATIENT
Start: 2024-12-18

## 2024-12-04 RX ORDER — ALBUTEROL SULFATE 0.83 MG/ML
2.5 SOLUTION RESPIRATORY (INHALATION)
OUTPATIENT
Start: 2024-12-18

## 2024-12-04 RX ORDER — DIPHENHYDRAMINE HYDROCHLORIDE 50 MG/ML
25 INJECTION INTRAMUSCULAR; INTRAVENOUS
Start: 2024-12-18

## 2024-12-04 RX ORDER — ACETAMINOPHEN 325 MG/1
650 TABLET ORAL ONCE
Status: COMPLETED | OUTPATIENT
Start: 2024-12-04 | End: 2024-12-04

## 2024-12-04 RX ORDER — ALBUTEROL SULFATE 90 UG/1
1-2 INHALANT RESPIRATORY (INHALATION)
Start: 2024-12-18

## 2024-12-04 RX ORDER — MEPERIDINE HYDROCHLORIDE 25 MG/ML
25 INJECTION INTRAMUSCULAR; INTRAVENOUS; SUBCUTANEOUS
OUTPATIENT
Start: 2024-12-18

## 2024-12-04 RX ORDER — EPINEPHRINE 1 MG/ML
0.3 INJECTION, SOLUTION INTRAMUSCULAR; SUBCUTANEOUS EVERY 5 MIN PRN
Status: DISCONTINUED | OUTPATIENT
Start: 2024-12-04 | End: 2024-12-04 | Stop reason: HOSPADM

## 2024-12-04 RX ORDER — METHYLPREDNISOLONE SODIUM SUCCINATE 40 MG/ML
40 INJECTION INTRAMUSCULAR; INTRAVENOUS
Start: 2024-12-18

## 2024-12-04 RX ORDER — DIPHENHYDRAMINE HYDROCHLORIDE 50 MG/ML
50 INJECTION INTRAMUSCULAR; INTRAVENOUS
Status: DISCONTINUED | OUTPATIENT
Start: 2024-12-04 | End: 2024-12-04 | Stop reason: HOSPADM

## 2024-12-04 RX ORDER — EPINEPHRINE 1 MG/ML
0.3 INJECTION, SOLUTION INTRAMUSCULAR; SUBCUTANEOUS EVERY 5 MIN PRN
OUTPATIENT
Start: 2024-12-18

## 2024-12-04 RX ORDER — METHYLPREDNISOLONE SODIUM SUCCINATE 125 MG/2ML
80 INJECTION INTRAMUSCULAR; INTRAVENOUS ONCE
Status: COMPLETED | OUTPATIENT
Start: 2024-12-04 | End: 2024-12-04

## 2024-12-04 RX ADMIN — SODIUM CHLORIDE 250 ML: 9 INJECTION, SOLUTION INTRAVENOUS at 10:56

## 2024-12-04 RX ADMIN — METHYLPREDNISOLONE SODIUM SUCCINATE 81.25 MG: 125 INJECTION, POWDER, FOR SOLUTION INTRAMUSCULAR; INTRAVENOUS at 10:59

## 2024-12-04 RX ADMIN — DIPHENHYDRAMINE HYDROCHLORIDE 50 MG: 50 INJECTION INTRAMUSCULAR; INTRAVENOUS at 10:59

## 2024-12-04 RX ADMIN — RITUXIMAB 1000 MG: 10 INJECTION, SOLUTION INTRAVENOUS at 11:37

## 2024-12-04 RX ADMIN — ACETAMINOPHEN 650 MG: 325 TABLET ORAL at 10:58

## 2024-12-04 NOTE — PROGRESS NOTES
Infusion Nursing Note:  Long Mayfield presents today for first dose Rituxin.    Patient seen by provider today: No   present during visit today: Not Applicable.    Note: Patient arrives via wheelchair for his infusion today. Patient can walk with a cane short distances.  Patient is alert and oriented x 4 and VSS.  Patient educated on his treatment plan and elects to proceed with Rituxin today. Patient has all of his home medications with him today including Humulin insulin which is prescribed for one time dose of 13 units prior to methylprednisolone.  Patient has continuous blood sugar monitor which patient states is reading 107 prior to steroid dose.  Patient states he is not comfortable using the insulin with that blood sugar level.  Patient advised ultimately that is up to him as it is an outside medication and not indicated in the treatment plan.  Patient states he will monitor his blood sugars throughout the visit and if they start to rise he will use the insulin.  Pre-meds given per plan and Rituxin administered per protocol.       Intravenous Access:  Lab draw site left AC, Needle type butterfly, Gauge 23.  Peripheral IV placed, blood return sluggish, labs drawn peripherally.    Treatment Conditions:  Lab Results   Component Value Date    HGB 8.2 (L) 12/03/2024    WBC 7.9 12/03/2024    ANEU 2.4 10/08/2017    ANEUTAUTO 5.0 12/03/2024     (H) 12/03/2024        Lab Results   Component Value Date     (L) 11/05/2024    POTASSIUM 4.1 11/05/2024    MAG 2.3 07/13/2024    CR 1.09 12/03/2024    AROLDO 8.2 (L) 11/05/2024    BILITOTAL <0.2 11/05/2024    ALBUMIN 3.0 (L) 11/05/2024    ALT <5 11/05/2024    AST 47 (H) 12/03/2024       Biological Infusion Checklist:  ~~~ NOTE: If the patient answers yes to any of the questions below, hold the infusion and contact ordering provider or on-call provider.    Have you recently had an elevated temperature, fever, chills, productive cough, coughing for 3  weeks or longer or hemoptysis,  abnormal vital signs, night sweats,  chest pain or have you noticed a decrease in your appetite, unexplained weight loss or fatigue? No  Do you have any open wounds or new incisions? No  Do you have any upcoming hospitalizations or surgeries? Does not include esophagogastroduodenoscopy, colonoscopy, endoscopic retrograde cholangiopancreatography (ERCP), endoscopic ultrasound (EUS), dental procedures or joint aspiration/steroid injections No  Do you currently have any signs of illness or infection or are you on any antibiotics? No  Have you had any new, sudden or worsening abdominal pain? No  Have you or anyone in your household received a live vaccination in the past 4 weeks? Please note: No live vaccines while on biologic/chemotherapy until 6 months after the last treatment. Patient can receive the flu vaccine (shot only), pneumovax and the Covid vaccine. It is optimal for the patient to get these vaccines mid cycle, but they can be given at any time as long as it is not on the day of the infusion. No  Have you recently been diagnosed with any new nervous system diseases (ie. Multiple sclerosis, Guillain [place], seizures, neurological changes) or cancer diagnosis? Are you on any form of radiation or chemotherapy? No  Are you pregnant or breast feeding or do you have plans of pregnancy in the future? No  Have you been having any signs of worsening depression or suicidal ideations?  (benlysta only) No  Have there been any other new onset medical symptoms? No  Have you had any new blood clots? (IVIG only) No      Post Infusion Assessment:  Patient tolerated infusion without incident.  Site patent and intact, free from redness, edema or discomfort.  No evidence of extravasations.  Access discontinued per protocol.  Biologic Infusion Post Education: Call the triage nurse at your clinic or seek medical attention if you have chills and/or temperature greater than or equal to 100.5,  uncontrolled nausea/vomiting, diarrhea, constipation, dizziness, shortness of breath, chest pain, heart palpitations, weakness or any other new or concerning symptoms, questions or concerns.  You cannot have any live virus vaccines prior to or during treatment or up to 6 months post infusion.  If you have an upcoming surgery, medical procedure or dental procedure during treatment, this should be discussed with your ordering physician and your surgeon/dentist.  If you are having any concerning symptom, if you are unsure if you should get your next infusion or wish to speak to a provider before your next infusion, please call your care coordinator or triage nurse at your clinic to notify them so we can adequately serve you.       Discharge Plan:   Discharge instructions reviewed with: Patient.  Patient and/or family verbalized understanding of discharge instructions and all questions answered.  Copy of AVS reviewed with patient and/or family.  Patient will return two weeks for next appointment.  Patient discharged in stable condition accompanied by: self.  Departure Mode: Wheelchair.      ARIN SUN RN

## 2024-12-05 LAB
IGA SERPL-MCNC: 424 MG/DL (ref 84–499)
IGG SERPL-MCNC: 1904 MG/DL (ref 610–1616)
IGM SERPL-MCNC: 226 MG/DL (ref 35–242)

## 2024-12-09 ENCOUNTER — DOCUMENTATION ONLY (OUTPATIENT)
Dept: MULTI SPECIALTY CLINIC | Facility: CLINIC | Age: 54
End: 2024-12-09
Payer: MEDICAID

## 2024-12-09 DIAGNOSIS — M31.8 SYSTEMIC VASCULITIS (H): Primary | ICD-10-CM

## 2024-12-09 NOTE — PROGRESS NOTES
Hello,   In order to offer our patients the best possible experience, the lab schedule is reviewed to ensure patients have lab orders in Epic prior to arriving at the lab.    Please have one of your team members place a lab order in Epic for this patient prior to the lab appointment date.     Thank you for your attention,   [facility] [phone]

## 2024-12-10 ENCOUNTER — TELEPHONE (OUTPATIENT)
Dept: ORTHOPEDICS | Facility: CLINIC | Age: 54
End: 2024-12-10
Payer: MEDICAID

## 2024-12-10 ENCOUNTER — TELEPHONE (OUTPATIENT)
Dept: ONCOLOGY | Facility: HOSPITAL | Age: 54
End: 2024-12-10
Payer: MEDICAID

## 2024-12-10 ENCOUNTER — VIRTUAL VISIT (OUTPATIENT)
Dept: RHEUMATOLOGY | Facility: CLINIC | Age: 54
End: 2024-12-10
Attending: INTERNAL MEDICINE
Payer: MEDICAID

## 2024-12-10 VITALS — WEIGHT: 120 LBS | HEIGHT: 65 IN | BODY MASS INDEX: 19.99 KG/M2

## 2024-12-10 DIAGNOSIS — Z79.899 HIGH RISK MEDICATION USE: ICD-10-CM

## 2024-12-10 PROCEDURE — 99214 OFFICE O/P EST MOD 30 MIN: CPT | Mod: 95 | Performed by: STUDENT IN AN ORGANIZED HEALTH CARE EDUCATION/TRAINING PROGRAM

## 2024-12-10 PROCEDURE — G2211 COMPLEX E/M VISIT ADD ON: HCPCS | Mod: 95 | Performed by: STUDENT IN AN ORGANIZED HEALTH CARE EDUCATION/TRAINING PROGRAM

## 2024-12-10 RX ORDER — SULFAMETHOXAZOLE AND TRIMETHOPRIM 400; 80 MG/1; MG/1
1 TABLET ORAL DAILY
Qty: 90 TABLET | Refills: 1 | Status: SHIPPED | OUTPATIENT
Start: 2024-12-10 | End: 2025-06-08

## 2024-12-10 ASSESSMENT — ENCOUNTER SYMPTOMS
SINUS PAIN: 0
BACK PAIN: 1
HEMATURIA: 0
FEVER: 0
BLOOD IN STOOL: 0

## 2024-12-10 ASSESSMENT — PAIN SCALES - GENERAL: PAINLEVEL_OUTOF10: EXTREME PAIN (8)

## 2024-12-10 NOTE — NURSING NOTE
Current patient location: [address] [place] [id number]    Is the patient currently in the state of [place]? YES    Visit mode:VIDEO    If the visit is dropped, the patient can be reconnected by:VIDEO VISIT: Text to cell phone:   Telephone Information:   Mobile [phone]       Will anyone else be joining the visit? NO  (If patient encounters technical issues they should call [phone])    Are changes needed to the allergy or medication list? Pt stated no med changes    Are refills needed on medications prescribed by this physician? NO    Rooming Documentation:  Questionnaire(s) completed    Reason for visit: GISSELLE YAO

## 2024-12-10 NOTE — TELEPHONE ENCOUNTER
Patient confirmed rescheduled appointment:  Date: 1/6 to 1/13/25  Time: 3:20pm  Visit type: Return Knee  Provider: Dr. Day

## 2024-12-10 NOTE — LETTER
12/10/2024       RE: Long Mayfield  [address]  [place] [facility] [address]     Dear Colleague,    Thank you for referring your patient, Long Mayfield, to the [facility] RHEUMATOLOGY at [facility]. Please see a copy of my visit note below.    Virtual Visit Details    Type of service:  Video Visit     Joined the call at 12/10/2024, 4:51:49 pm.  Left the call at 12/10/2024, 5:23:21 pm.  You were on the call for 31 minutes 32 seconds    Originating Location (pt. Location): Home    Distant Location (provider location):  On-site  Platform used for Video Visit: [facility]    RHEUMATOLOGY OUTPATIENT CLINIC NOTE     Referring Provider:   Edward Zuleta MD     Lab review        HLA-B27: Negative    MPO/NY-3: Negative    Rheumatoid factor: Negative  CCP antibody: Negative  Ig elevated  IgG4: 224 elevated      KATHY: Negative  dsDNA:  Negative    Complement C3: normal  Complement C4: normal  ANCA by IFA: Negative   MPO: Negative    [place]3 Abs: Positive, [place] [facility] [place] count, right knee multiple aspirations    Latest Reference Range & Units 24 09:26 24 10:00 07/10/24 11:36 24 13:38 24 17:09   Total Nucleated Cells /uL 76,860 109,100 65,670 94,560 53,900     [place] [facility] [place]   A. Skin, left, forearm, punch biopsy - Leukocytoclastic vasculitis.   B. Skin biopsy, left, forearm, direct immunofluorescence (DIF) -  Negative immunofluorescence      Soft tissue, right knee, biopsy, 10/2024:  - Tenosynovial, fibroadipose and skeletal muscular tissue with granulation tissue formation with fibrinoid necrosis and foci of microabscesses, vascular proliferation, patchy mixed acute and chronic inflammation with perivascular accentuation, and hemosiderin deposition.  - Gram stains (blocks A1-A4) are negative for bacterial organisms.  - Examination of a couple of focuses with lymphoplasmacytic-predominant  infiltrates demonstrates scattered IgG4-positive plasma cells, with an IgG4/IgG plasma cell ratio of approximately 10-15% (lower than 40%, which is the generally applied criteria to support IgG4-related disease).  Overall, there are no characteristic histologic and immunophenotypic features within this biopsy specimen to support IgG4-related disease.    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 (see comment) patchy dense lymphoplasmacytic interstitial inflammation; 10-20 IgG4+ plasma cells/400x field   Glomerulopathy with rare intramembranous and mesangial deposits reactive for C3      - the differential diagnosis includes a mild glomerulonephritis C3 or a resolving immune complex-mediated glomerulopathy      - there are no signs of an active glomerulitis currently     Moderate chronic changes of the parenchyma, including:   - focal global glomerulosclerosis (4% of glomeruli)   - focal tubular atrophy and interstitial fibrosis (40-50% of the cortex)   - severe arterial and arteriolar sclerosis      Imaging review      PET scan 10/2024  1. No evidence of hypermetabolic pulmonary nodules or suspicious uptake in the chest.   2. Large, complex right knee joint effusion with synovial thickening previously characterized on 9/21/2024 MRI. Multiple enlarged right inguinal and right iliac chain lymph nodes with mild to moderate FDG uptake are likely reactive to the right knee infective/inflammatory process.  3. Findings of positive fluid balance including trace ascites and diffuse mild subcutaneous anasarca. Right greater than left lower extremity edema.  4. Diffuse bone marrow uptake, which is nonspecific and may be related to anemia given low density blood pool.    MRI right knee 9/2024  1.  Large complex joint effusion with mildly thickened enhancing synovium of indeterminate etiology. Findings may be sequela of degenerative  arthropathy, or an inflammatory or infectious arthropathy. If clinically indicated repeat arthrocentesis could be  performed for further characterization.  2.  Mild patchy enhancing edema-like marrow signal of the knee which is likely reactive. Early osteomyelitis cannot be entirely excluded, though, is considered less likely.  3.  Tricompartmental degenerative arthrosis.  4.  Marked mucoid degeneration/maceration of the medial and lateral menisci.  5.  Chronic rupture of the ACL.  6.  Mucoid degeneration and/or partial tearing of the PCL.  7.  Intact collateral ligaments.    X-ray SIJ 9/2024  The SI joints are negative for sacroiliitis, ankylosis, or diastasis on this single projection. Pelvis negative for fracture. Both hips negative for fracture.    Hand X-ray 9/2024  Old healed fracture of the right first metacarpal. No evidence for acute fracture on either side.   Additional chronic fracture of the left scaphoid.   There is some sclerosis within the proximal fracture fragment which may represent superimposed  avascular necrosis of the proximal pole of the left scaphoid. Degenerative change both wrists, greater on the left at the radioscaphoid and radiolunate articulation with cystic change in the distal aspect of the left radius. No erosive changes are identified. Vascular calcifications bilaterally. Degenerative change at both first MCP joints.    X-ray left foot 9/2024  Degenerative change at the first MTP joint and IP joint of the great toe. No evidence for acute fracture. No erosive changes are identified. Vascular calcifications.    MRI cervical spine, outside, only report available 3/2024:  C4-5 moderate central/left-sided canal stenosis with left ventral cord impingement.  No abnormal T2 signal  Mild central canal stenosis with cord abutment C3-4  Multilevel degenerative foraminal stenosis severe left C 3-4, C4-5 with expected nerve impingement  Prominent erosive/inflammatory facet arthropathy left C3-4,  C4-5     MRI Right foot, 1/2024:  1. Enhancing marrow edema at the second metatarsal amputation margin as well as head and neck of the third and fourth metatarsals. Indeterminate faint T1 hypointense marrow signal changes of the fourth metatarsal head and about the displaced oblique fracture of the third metatarsal neck. Overall findings compatible with osteitis, possibly osteomyelitis.   2. Irregular soft tissue amputation margins of the medial forefoot with emanating enhancing tissue edema concerning for cellulitis      CT chest, Abdomen, pelvis without contrast, 12/2023:  1. Severe anasarca with moderate to large pleural effusions and a small pericardial effusion. Diffuse septal thickening in the lungs concerning for pulmonary edema. Anemic cardiac blood pool.   2. Solitary pulmonary nodule in the left lower lobe with an average diameter of 10 mm is indeterminate, but worrisome for neoplasm. This is new since 3/19/2022.   3. Patchy and confluent airspace consolidation within the left greater than right lung apices presumably represents infection, cannot exclude underlying soft tissue density nodularity on this noncontrast examination. Recommend pulmonary consultation.   4. Diffuse and severe vascular calcifications. No acute or suspicious abnormality noted within the abdomen or pelvis.        Subjective    Visit date December 10, 2024    Long is a 54 year old male who presents today for follow up.    Since the last visit,    - Workup 12/03 showed:    Hemoglobin low  WBC  within normal limits   Platelets  elevated  ESR  elevated  CRP  elevated  AST  elevated  Creatinine  within normal limits      Today, Long mentioned the following:    He had the first dose of rituximab on 12/4 that went uneventful. He had blood sugar in the 400's and he adjusted his insulin and his blood sugar is now back to 260's.    He had an increase in his right knee swelling for few days later then stablized afterwards     He remains  "with diarrhea, not improved with rituximab infusion     He remains with joint pain in the neck, shoulder and arm     Review of Systems   Constitutional:  Negative for fever.   HENT:  Negative for hearing loss, nosebleeds and sinus pain.    Gastrointestinal:  Negative for blood in stool.   Genitourinary:  Negative for hematuria.   Musculoskeletal:  Positive for back pain and joint pain.   Skin:  Negative for rash.       Past Medical history   Past Medical History:   Diagnosis Date     Acute pain of right knee 07/12/2024     Alcohol abuse 09/12/2022     Allergic rhinitis      Anemia      Arthritis      Bell's palsy      Cervicalgia      Diabetes (H)      Diabetic foot ulcer (H)      Generalized edema      Hyperglycemia 03/08/2022     Hyperkalemia      Hypertension      Hypo-osmolality and hyponatremia      Hypoglycemia 05/03/2024     Hypoxia 09/11/2024     Ketosis (H) 03/08/2022     Major depressive disorder, recurrent episode, mild (H)      Other acute osteomyelitis, right ankle and foot (H)      Other chronic pancreatitis (H)      Other polyosteoarthritis      Pneumonia of left lower lobe due to infectious organism 09/11/2024     Right knee pain 07/11/2024     Solitary pulmonary nodule        Objective  Ht 1.651 m (5' 5\")   Wt 54.4 kg (120 lb)   BMI 19.97 kg/m        PHYSICAL EXAMINATION  Video visit  Able to speak full sentence  Breathing comfortably  No visible skin rash      Assessment & Plan    # IgG4 related disease  # Chronic kidney disease, multifactorial [diabetes, hypertension, IgG4, immune complex]  # Recurrent knee effusion, inflammatory cell count, concern for inflammatory arthropathy vs smoldering infection  # Chronic arthralgia   # Chronic diarrhea, pancreatitis, concern for IgG4 related disease  # History of acute kidney injury, leukocytoclastic vasculitis, less likely  ANCA vasculitis    # Peripheral arterial disease  # History of right foot osteomyelitis status post amputation  # Multiple " comorbidities [anemia, diabetes with complications, hypertension, hyperlipidemia]    Long is presenting today for follow-up.    He is undergoing multidisciplinary approach with infectious disease, orthopedics, nephrology. He underwent renal biopsy that showed diabetic glomerulosclerosis as well as features suspicious for IgG4 related disease, glomerulopathy with intramembranous and mesangial deposits.Right knee synovial biopsy showed inflammation with fibrinoid necrosis.    He received first dose of rituximab uneventfully and he is scheduled for subsequent infusion in 1 week.      He remains with chronic knee pain and diarrhea    We discussed that it is too early to  on the effectiveness of rituximab, it is encouraging that he has not noticed significant worsening or systemic symptoms, this further suggest that his presentation is less likely related to an infection but rather an underlying immune-mediated inflammatory process and hopefully this will be better controlled with immunosuppressant therapy.     He has not started Bactrim for unclear reasons, I resent the prescription and recommended to started.    He will likely need local therapy for his right knee swelling, he is following with our colleagues in orthopedics next month so aspiration can be pursued then and if felt clinically appropriate and safe then local steroid injection can be pursued to expedite recovery.    He is requesting evaluation for functional capacity, I will provide him with resources with external physical medicine and rehabilitation to complete these evaluations as they are not available within our system.      He is in agreement with this plan and seems comfortable with this approach.    Plan:  - Continue the second rituximab 1000 mg infusion infusion   - Start Bactrim for PJP prophylaxis  - Follow-up with primary care provider and diabetic educator for monitoring of blood sugars  - [facility] pharmacy follow-up  - Orthopedics follow  up for possibility of arthrocentesis of the right knee and consider of prednisone injection if okay from infection disease standpoint.   - External referral for physical medicine and evaluation for functional capacity evaluation.     # Screening for chronic infections  2024  Hepatitis B surface antigen: Negative  Hepatitis B core: Negative  Hepatitis C antibody: Negative   QuantiFERON gold: Negative    # High risk medication requiring monitoring   Rituximab  Risks and side effects of rituximab were discussed at length including increased risk of infections [bacterial, viral, fungal, mycobacterial, CNS infection] and worsening of knee infection increased risk of cancer  and he is in agreement to start rituximab.        # Longitudinal care  The longitudinal plan of care for the diagnosis(es)/condition(s) as documented were addressed during this visit. Due to the added complexity in care, I will continue to support Long in the subsequent management and with ongoing continuity of care.        39 minutes spent by me on the date of the encounter doing chart review, history and exam, documentation and further activities per the note        Review of the result(s) of each unique test - as HPI  Ordering of each unique test  Prescription drug management          Return in about 3 months (around 2/26/2025).    Hector Cruz MD  [facility] RHEUMATOLOGY      Again, thank you for allowing me to participate in the care of your patient.      Sincerely,    Hector Cruz MD

## 2024-12-10 NOTE — PROGRESS NOTES
RHEUMATOLOGY OUTPATIENT CLINIC NOTE     Referring Provider:   Edward Zuleta MD     Lab review        HLA-B27: Negative    MPO/CA-3: Negative    Rheumatoid factor: Negative  CCP antibody: Negative  Ig elevated  IgG4: 224 elevated      KATHY: Negative  dsDNA:  Negative    Complement C3: normal  Complement C4: normal  ANCA by IFA: Negative   MPO: Negative    CA-3 Abs: Positive, mildly positive outside at [facility]     Cell count, right knee multiple aspirations    Latest Reference Range & Units 24 09:26 24 10:00 07/10/24 11:36 24 13:38 24 17:09   Total Nucleated Cells /uL 76,860 109,100 65,670 94,560 53,900     Skin biopsy 2023, [facility]:   A. Skin, left, forearm, punch biopsy - Leukocytoclastic vasculitis.   B. Skin biopsy, left, forearm, direct immunofluorescence (DIF) -  Negative immunofluorescence      Soft tissue, right knee, biopsy, 10/2024:  - Tenosynovial, fibroadipose and skeletal muscular tissue with granulation tissue formation with fibrinoid necrosis and foci of microabscesses, vascular proliferation, patchy mixed acute and chronic inflammation with perivascular accentuation, and hemosiderin deposition.  - Gram stains (blocks A1-A4) are negative for bacterial organisms.  - Examination of a couple of focuses with lymphoplasmacytic-predominant infiltrates demonstrates scattered IgG4-positive plasma cells, with an IgG4/IgG plasma cell ratio of approximately 10-15% (lower than 40%, which is the generally applied criteria to support IgG4-related disease).  Overall, there are no characteristic histologic and immunophenotypic features within this biopsy specimen to support IgG4-related disease.    Renal biopsy, 2024:  Nodular diabetic glomerulosclerosis  Histological features suspicious for IgG4-related tubulointerstitial nephritis (see comment) patchy dense lymphoplasmacytic interstitial inflammation; 10-20 IgG4+ plasma cells/400x field   Glomerulopathy with rare  intramembranous and mesangial deposits reactive for C3      - the differential diagnosis includes a mild glomerulonephritis C3 or a resolving immune complex-mediated glomerulopathy      - there are no signs of an active glomerulitis currently     Moderate chronic changes of the parenchyma, including:   - focal global glomerulosclerosis (4% of glomeruli)   - focal tubular atrophy and interstitial fibrosis (40-50% of the cortex)   - severe arterial and arteriolar sclerosis      Imaging review      PET scan 10/2024  1. No evidence of hypermetabolic pulmonary nodules or suspicious uptake in the chest.   2. Large, complex right knee joint effusion with synovial thickening previously characterized on 9/21/2024 MRI. Multiple enlarged right inguinal and right iliac chain lymph nodes with mild to moderate FDG uptake are likely reactive to the right knee infective/inflammatory process.  3. Findings of positive fluid balance including trace ascites and diffuse mild subcutaneous anasarca. Right greater than left lower extremity edema.  4. Diffuse bone marrow uptake, which is nonspecific and may be related to anemia given low density blood pool.    MRI right knee 9/2024  1.  Large complex joint effusion with mildly thickened enhancing synovium of indeterminate etiology. Findings may be sequela of degenerative arthropathy, or an inflammatory or infectious arthropathy. If clinically indicated repeat arthrocentesis could be  performed for further characterization.  2.  Mild patchy enhancing edema-like marrow signal of the knee which is likely reactive. Early osteomyelitis cannot be entirely excluded, though, is considered less likely.  3.  Tricompartmental degenerative arthrosis.  4.  Marked mucoid degeneration/maceration of the medial and lateral menisci.  5.  Chronic rupture of the ACL.  6.  Mucoid degeneration and/or partial tearing of the PCL.  7.  Intact collateral ligaments.    X-ray SIJ 9/2024  The SI joints are negative for  sacroiliitis, ankylosis, or diastasis on this single projection. Pelvis negative for fracture. Both hips negative for fracture.    Hand X-ray 9/2024  Old healed fracture of the right first metacarpal. No evidence for acute fracture on either side.   Additional chronic fracture of the left scaphoid.   There is some sclerosis within the proximal fracture fragment which may represent superimposed  avascular necrosis of the proximal pole of the left scaphoid. Degenerative change both wrists, greater on the left at the radioscaphoid and radiolunate articulation with cystic change in the distal aspect of the left radius. No erosive changes are identified. Vascular calcifications bilaterally. Degenerative change at both first MCP joints.    X-ray left foot 9/2024  Degenerative change at the first MTP joint and IP joint of the great toe. No evidence for acute fracture. No erosive changes are identified. Vascular calcifications.    MRI cervical spine, outside, only report available 3/2024:  C4-5 moderate central/left-sided canal stenosis with left ventral cord impingement.  No abnormal T2 signal  Mild central canal stenosis with cord abutment C3-4  Multilevel degenerative foraminal stenosis severe left C 3-4, C4-5 with expected nerve impingement  Prominent erosive/inflammatory facet arthropathy left C3-4, C4-5     MRI Right foot, 1/2024:  1. Enhancing marrow edema at the second metatarsal amputation margin as well as head and neck of the third and fourth metatarsals. Indeterminate faint T1 hypointense marrow signal changes of the fourth metatarsal head and about the displaced oblique fracture of the third metatarsal neck. Overall findings compatible with osteitis, possibly osteomyelitis.   2. Irregular soft tissue amputation margins of the medial forefoot with emanating enhancing tissue edema concerning for cellulitis      CT chest, Abdomen, pelvis without contrast, 12/2023:  1. Severe anasarca with moderate to large pleural  effusions and a small pericardial effusion. Diffuse septal thickening in the lungs concerning for pulmonary edema. Anemic cardiac blood pool.   2. Solitary pulmonary nodule in the left lower lobe with an average diameter of 10 mm is indeterminate, but worrisome for neoplasm. This is new since 3/19/2022.   3. Patchy and confluent airspace consolidation within the left greater than right lung apices presumably represents infection, cannot exclude underlying soft tissue density nodularity on this noncontrast examination. Recommend pulmonary consultation.   4. Diffuse and severe vascular calcifications. No acute or suspicious abnormality noted within the abdomen or pelvis.        Subjective     Visit date December 10, 2024    Long is a 54 year old male who presents today for follow up.    Since the last visit,    - Workup 12/03 showed:    Hemoglobin low  WBC  within normal limits   Platelets  elevated  ESR  elevated  CRP  elevated  AST  elevated  Creatinine  within normal limits      Today, Long mentioned the following:    He had the first dose of rituximab on 12/4 that went uneventful. He had blood sugar in the 400's and he adjusted his insulin and his blood sugar is now back to 260's.    He had an increase in his right knee swelling for few days later then stablized afterwards     He remains with diarrhea, not improved with rituximab infusion     He remains with joint pain in the neck, shoulder and arm     Review of Systems   Constitutional:  Negative for fever.   HENT:  Negative for hearing loss, nosebleeds and sinus pain.    Gastrointestinal:  Negative for blood in stool.   Genitourinary:  Negative for hematuria.   Musculoskeletal:  Positive for back pain and joint pain.   Skin:  Negative for rash.       Past Medical history   Past Medical History:   Diagnosis Date    Acute pain of right knee 07/12/2024    Alcohol abuse 09/12/2022    Allergic rhinitis     Anemia     Arthritis     Bell's palsy     Cervicalgia   "   Diabetes (H)     Diabetic foot ulcer (H)     Generalized edema     Hyperglycemia 03/08/2022    Hyperkalemia     Hypertension     Hypo-osmolality and hyponatremia     Hypoglycemia 05/03/2024    Hypoxia 09/11/2024    Ketosis (H) 03/08/2022    Major depressive disorder, recurrent episode, mild (H)     Other acute osteomyelitis, right ankle and foot (H)     Other chronic pancreatitis (H)     Other polyosteoarthritis     Pneumonia of left lower lobe due to infectious organism 09/11/2024    Right knee pain 07/11/2024    Solitary pulmonary nodule        Objective   Ht 1.651 m (5' 5\")   Wt 54.4 kg (120 lb)   BMI 19.97 kg/m        PHYSICAL EXAMINATION  Video visit  Able to speak full sentence  Breathing comfortably  No visible skin rash      Assessment & Plan     # IgG4 related disease  # Chronic kidney disease, multifactorial [diabetes, hypertension, IgG4, immune complex]  # Recurrent knee effusion, inflammatory cell count, concern for inflammatory arthropathy vs smoldering infection  # Chronic arthralgia   # Chronic diarrhea, pancreatitis, concern for IgG4 related disease  # History of acute kidney injury, leukocytoclastic vasculitis, less likely  ANCA vasculitis    # Peripheral arterial disease  # History of right foot osteomyelitis status post amputation  # Multiple comorbidities [anemia, diabetes with complications, hypertension, hyperlipidemia]    Long is presenting today for follow-up.    He is undergoing multidisciplinary approach with infectious disease, orthopedics, nephrology. He underwent renal biopsy that showed diabetic glomerulosclerosis as well as features suspicious for IgG4 related disease, glomerulopathy with intramembranous and mesangial deposits.Right knee synovial biopsy showed inflammation with fibrinoid necrosis.    He received first dose of rituximab uneventfully and he is scheduled for subsequent infusion in 1 week.      He remains with chronic knee pain and diarrhea    We discussed that it " is too early to  on the effectiveness of rituximab, it is encouraging that he has not noticed significant worsening or systemic symptoms, this further suggest that his presentation is less likely related to an infection but rather an underlying immune-mediated inflammatory process and hopefully this will be better controlled with immunosuppressant therapy.     He has not started Bactrim for unclear reasons, I resent the prescription and recommended to started.    He will likely need local therapy for his right knee swelling, he is following with our colleagues in orthopedics next month so aspiration can be pursued then and if felt clinically appropriate and safe then local steroid injection can be pursued to expedite recovery.    He is requesting evaluation for functional capacity, I will provide him with resources with external physical medicine and rehabilitation to complete these evaluations as they are not available within our system.      He is in agreement with this plan and seems comfortable with this approach.    Plan:  - Continue the second rituximab 1000 mg infusion infusion   - Start Bactrim for PJP prophylaxis  - Follow-up with primary care provider and diabetic educator for monitoring of blood sugars  - [facility] pharmacy follow-up  - Orthopedics follow up for possibility of arthrocentesis of the right knee and consider of prednisone injection if okay from infection disease standpoint.   - External referral for physical medicine and evaluation for functional capacity evaluation.     # Screening for chronic infections  2024  Hepatitis B surface antigen: Negative  Hepatitis B core: Negative  Hepatitis C antibody: Negative   QuantiFERON gold: Negative    # High risk medication requiring monitoring   Rituximab  Risks and side effects of rituximab were discussed at length including increased risk of infections [bacterial, viral, fungal, mycobacterial, CNS infection] and worsening of knee infection increased  risk of cancer  and he is in agreement to start rituximab.        # Longitudinal care  The longitudinal plan of care for the diagnosis(es)/condition(s) as documented were addressed during this visit. Due to the added complexity in care, I will continue to support Long in the subsequent management and with ongoing continuity of care.        39 minutes spent by me on the date of the encounter doing chart review, history and exam, documentation and further activities per the note        Review of the result(s) of each unique test - as HPI  Ordering of each unique test  Prescription drug management          Return in about 3 months (around 2/26/2025).    Hector Cruz MD  [facility] RHEUMATOLOGY

## 2024-12-10 NOTE — Clinical Note
Wade Bartholomew and Long Moore tolerated first dose of rituximab well, no signs of disseminated infection so far which is encouraging.  I was wondering if we can proceed with some local treatment [aspiration, steroid injection] for his knee when he is scheduled to see orthopedics in mid January to expedite recovery if felt safe and feasible. Thank you, Hector

## 2024-12-10 NOTE — TELEPHONE ENCOUNTER
I received a phone call today from Long.  He is calling to see what he can expect in terms of his knee swelling and pain after receiving his first rituxan infusion on 12/4.  I let him know that this infusion was ordered by his rheumatologist.  I encouraged him to ask this question when he sees the rheumatologist this afternoon at 4:15.  Long verbalized understanding and agrees with this plan.    Yoselin Ramachandran RN on 12/10/2024 at 3:55 PM

## 2024-12-10 NOTE — PROGRESS NOTES
Virtual Visit Details    Type of service:  Video Visit     Joined the call at 12/10/2024, 4:51:49 pm.  Left the call at 12/10/2024, 5:23:21 pm.  You were on the call for 31 minutes 32 seconds    Originating Location (pt. Location): Home    Distant Location (provider location):  On-site  Platform used for Video Visit: Gian

## 2024-12-10 NOTE — Clinical Note
Okay to cancel appointment with Dr. Coronado in February.  Labs 1 week prior to his appointment in February with me.  Thank you

## 2024-12-10 NOTE — TELEPHONE ENCOUNTER
RECORDS STATUS - ALL OTHER DIAGNOSIS      RECORDS RECEIVED FROM: [facility] - Internal records   DATE RECEIVED: 12/10       Reason for Disposition   Caller has already spoken with the PCP (or office), and has no further questions    Protocols used: NO CONTACT OR DUPLICATE CONTACT CALL-ADULT-[place]    Employee calling to inform RN Access of going to ED yesterday. He thought he had a kidney stone and went to ED, has test and imaging done. Called employee health and was told to call RN Access to have it documented.

## 2024-12-11 LAB
DAT POLY: NORMAL
SPECIMEN EXPIRATION DATE: NORMAL

## 2024-12-12 ENCOUNTER — PRE VISIT (OUTPATIENT)
Dept: ONCOLOGY | Facility: CLINIC | Age: 54
End: 2024-12-12
Payer: MEDICAID

## 2024-12-12 ENCOUNTER — LAB (OUTPATIENT)
Dept: INFUSION THERAPY | Facility: CLINIC | Age: 54
End: 2024-12-12
Attending: PHYSICIAN ASSISTANT
Payer: MEDICAID

## 2024-12-12 ENCOUNTER — ONCOLOGY VISIT (OUTPATIENT)
Dept: ONCOLOGY | Facility: CLINIC | Age: 54
End: 2024-12-12
Attending: PHYSICIAN ASSISTANT
Payer: MEDICAID

## 2024-12-12 VITALS
HEART RATE: 71 BPM | DIASTOLIC BLOOD PRESSURE: 82 MMHG | HEIGHT: 65 IN | SYSTOLIC BLOOD PRESSURE: 137 MMHG | TEMPERATURE: 97.8 F | RESPIRATION RATE: 16 BRPM | BODY MASS INDEX: 20.16 KG/M2 | OXYGEN SATURATION: 99 % | WEIGHT: 121 LBS

## 2024-12-12 DIAGNOSIS — D64.9 ANEMIA, UNSPECIFIED TYPE: ICD-10-CM

## 2024-12-12 LAB
ALBUMIN SERPL BCG-MCNC: 3.3 G/DL (ref 3.5–5.2)
ALP SERPL-CCNC: 214 U/L (ref 40–150)
ALT SERPL W P-5'-P-CCNC: 28 U/L (ref 0–70)
ANION GAP SERPL CALCULATED.3IONS-SCNC: 12 MMOL/L (ref 7–15)
AST SERPL W P-5'-P-CCNC: 39 U/L (ref 0–45)
BASOPHILS # BLD AUTO: 0.1 10E3/UL (ref 0–0.2)
BASOPHILS NFR BLD AUTO: 1 %
BILIRUB SERPL-MCNC: 0.2 MG/DL
BUN SERPL-MCNC: 19.2 MG/DL (ref 6–20)
CALCIUM SERPL-MCNC: 8.4 MG/DL (ref 8.8–10.4)
CHLORIDE SERPL-SCNC: 102 MMOL/L (ref 98–107)
CREAT SERPL-MCNC: 1.34 MG/DL (ref 0.67–1.17)
DAT, ANTI-IGG: NORMAL
EGFRCR SERPLBLD CKD-EPI 2021: 62 ML/MIN/1.73M2
EOSINOPHIL # BLD AUTO: 0.4 10E3/UL (ref 0–0.7)
EOSINOPHIL NFR BLD AUTO: 5 %
ERYTHROCYTE [DISTWIDTH] IN BLOOD BY AUTOMATED COUNT: 17.9 % (ref 10–15)
FERRITIN SERPL-MCNC: 499 NG/ML (ref 31–409)
FOLATE SERPL-MCNC: 6.5 NG/ML (ref 4.6–34.8)
GLUCOSE SERPL-MCNC: 122 MG/DL (ref 70–99)
HAPTOGLOB SERPL-MCNC: 395 MG/DL (ref 30–200)
HCO3 SERPL-SCNC: 21 MMOL/L (ref 22–29)
HCT VFR BLD AUTO: 25.1 % (ref 40–53)
HGB BLD-MCNC: 8.3 G/DL (ref 13.3–17.7)
IMM GRANULOCYTES # BLD: 0 10E3/UL
IMM GRANULOCYTES NFR BLD: 0 %
IRON BINDING CAPACITY (ROCHE): 192 UG/DL (ref 240–430)
IRON SATN MFR SERPL: 20 % (ref 15–46)
IRON SERPL-MCNC: 38 UG/DL (ref 61–157)
LDH SERPL L TO P-CCNC: 199 U/L (ref 0–250)
LYMPHOCYTES # BLD AUTO: 2.2 10E3/UL (ref 0.8–5.3)
LYMPHOCYTES NFR BLD AUTO: 23 %
MCH RBC QN AUTO: 24.4 PG (ref 26.5–33)
MCHC RBC AUTO-ENTMCNC: 33.1 G/DL (ref 31.5–36.5)
MCV RBC AUTO: 74 FL (ref 78–100)
MONOCYTES # BLD AUTO: 0.8 10E3/UL (ref 0–1.3)
MONOCYTES NFR BLD AUTO: 9 %
NEUTROPHILS # BLD AUTO: 5.8 10E3/UL (ref 1.6–8.3)
NEUTROPHILS NFR BLD AUTO: 63 %
NRBC # BLD AUTO: 0 10E3/UL
NRBC BLD AUTO-RTO: 0 /100
PLATELET # BLD AUTO: 519 10E3/UL (ref 150–450)
POTASSIUM SERPL-SCNC: 3.4 MMOL/L (ref 3.4–5.3)
PROT SERPL-MCNC: 8.3 G/DL (ref 6.4–8.3)
RBC # BLD AUTO: 3.4 10E6/UL (ref 4.4–5.9)
RETICS # AUTO: 0.04 10E6/UL (ref 0.03–0.1)
RETICS/RBC NFR AUTO: 1.3 % (ref 0.5–2)
SODIUM SERPL-SCNC: 135 MMOL/L (ref 135–145)
SPECIMEN EXPIRATION DATE: NORMAL
TOTAL PROTEIN SERUM FOR ELP: 7.9 G/DL (ref 6.4–8.3)
VIT B12 SERPL-MCNC: 647 PG/ML (ref 232–1245)
WBC # BLD AUTO: 9.3 10E3/UL (ref 4–11)

## 2024-12-12 PROCEDURE — 85004 AUTOMATED DIFF WBC COUNT: CPT | Performed by: INTERNAL MEDICINE

## 2024-12-12 PROCEDURE — 84155 ASSAY OF PROTEIN SERUM: CPT | Performed by: INTERNAL MEDICINE

## 2024-12-12 PROCEDURE — 84630 ASSAY OF ZINC: CPT | Performed by: INTERNAL MEDICINE

## 2024-12-12 PROCEDURE — 83550 IRON BINDING TEST: CPT | Performed by: INTERNAL MEDICINE

## 2024-12-12 PROCEDURE — 82525 ASSAY OF COPPER: CPT | Performed by: INTERNAL MEDICINE

## 2024-12-12 PROCEDURE — 82607 VITAMIN B-12: CPT | Performed by: INTERNAL MEDICINE

## 2024-12-12 PROCEDURE — 84165 PROTEIN E-PHORESIS SERUM: CPT | Mod: 26 | Performed by: PATHOLOGY

## 2024-12-12 PROCEDURE — 83615 LACTATE (LD) (LDH) ENZYME: CPT | Performed by: INTERNAL MEDICINE

## 2024-12-12 PROCEDURE — 85041 AUTOMATED RBC COUNT: CPT | Performed by: INTERNAL MEDICINE

## 2024-12-12 PROCEDURE — 82746 ASSAY OF FOLIC ACID SERUM: CPT | Performed by: INTERNAL MEDICINE

## 2024-12-12 PROCEDURE — 86880 COOMBS TEST DIRECT: CPT | Performed by: INTERNAL MEDICINE

## 2024-12-12 PROCEDURE — 84460 ALANINE AMINO (ALT) (SGPT): CPT | Performed by: INTERNAL MEDICINE

## 2024-12-12 PROCEDURE — 82728 ASSAY OF FERRITIN: CPT | Performed by: INTERNAL MEDICINE

## 2024-12-12 PROCEDURE — 84165 PROTEIN E-PHORESIS SERUM: CPT | Mod: TC | Performed by: PATHOLOGY

## 2024-12-12 PROCEDURE — 85045 AUTOMATED RETICULOCYTE COUNT: CPT | Performed by: INTERNAL MEDICINE

## 2024-12-12 PROCEDURE — 82668 ASSAY OF ERYTHROPOIETIN: CPT | Performed by: INTERNAL MEDICINE

## 2024-12-12 PROCEDURE — 83010 ASSAY OF HAPTOGLOBIN QUANT: CPT | Performed by: INTERNAL MEDICINE

## 2024-12-12 ASSESSMENT — PAIN SCALES - GENERAL: PAINLEVEL_OUTOF10: EXTREME PAIN (8)

## 2024-12-12 NOTE — PROGRESS NOTES
This consult has been requested by Rena Blair PA-C for anemia.    Mr. Mayfield is a 54-year-old gentleman with multiple medical problems including pancreatic insufficiency and diabetes mellitus.  Patient has chronic anemia.  He has been referred to hematology for it.  Investigations are reviewed and summarized below.  1.  On 10/08/2017, hemoglobin of 12.1.  -On 03/08/2022, hemoglobin of 11.9.  -On 05/03/2024, hemoglobin of 7.0.  Normal MCV.  2.  On 11/05/2024:  -WBC of 6.9, hemoglobin of 7.1 and platelet of 587.  MCV of 75.  RDW elevated.  -CMP reveals creatinine of 1.49.  Normal LFT.  3.  On 05/07/2024:  -EGD revealed moderate gastritis.  Otherwise normal.  -Colonoscopy revealed moderate diverticulosis.  There is external hemorrhoids.  4.  PET scan on 10/23/2024 does not reveal any evidence of malignancy.  There is complex right knee effusion.  There are enlarged right inguinal right iliac chain lymph node secondary to right knee infection/inflammation.    Patient denies bleeding from any site.  Patient follows up with GI.  He is scheduled for video capsule endoscopy.    Patient has pancreatic insufficiency secondary to chronic pancreatitis.  He gets 7-10 bowel movements a day.  He is on Creon.    He gets pain in the right knee.  Previously he had arthroscopic surgery.    Patient has generalized weakness.  He is able to do his activities slowly.  No headache.  Occasional dizziness.  No chest pain.  No shortness of breath at rest.  He gets mild intermittent abdominal pain.  Occasional nausea.  No vomiting.  No urinary complaint.  He gets diarrhea.  No bleeding from any site.  All other review of system negative.    Allergies: Reviewed    Medications: Reviewed    Past medical history:  -Chronic anemia  -Right knee pain status post arthroscopic surgery  -Allergic rhinitis  -Chronic pancreatitis  -Pancreatic insufficiency  -Diabetes mellitus  -Diabetic foot ulcer  -Hypertension  -Recurrent depression  -Lung nodule.   Benign on PET scan.  -Orthopedic surgery in the right foot and patella.    Social history:  -No smoking  -Quit alcohol 2 years ago.    Exam:  He is alert and oriented x 3.  Not in any distress.  Vitals: Reviewed  Rest of system not examined.    Labs: Reviewed    Assessment:  1.  A 54-year-old gentleman with chronic anemia.  Anemia has been either normocytic or microcytic.  Anemia is multifactorial from renal disease, anemia of chronic disease and iron deficiency.  2.  Thrombocytosis, reactive.  3.  Multiple medical problems including chronic pancreatitis, pancreatic insufficiency, hypertension and diabetes mellitus.    Recommendation:  -Labs for workup of anemia.  -Virtual visit in 1 to 2 weeks.    Discussion:  1.  I long discussion the patient regarding anemia.  Investigations were reviewed.  Explained to the patient that he has chronic anemia.  Anemia has been either normocytic or microcytic.    Different causes of anemia discussed.  His anemia is multifactorial from renal disease, anemia of chronic disease/inflammation and iron deficiency.    Discussed regarding iron deficiency.  Patient denies bleeding from any site.  EGD and colonoscopy have been normal.  Patient does have malabsorption.  He likely has iron deficiency secondary to it.    2.  Discussed regarding further workup.  Will get labs for workup of anemia.    Patient follows up with GI.  He is going to get video capsule endoscopy.  I agree with that.    Explained to the patient that if all the workup come back negative, we will may consider bone marrow biopsy at some point to make sure there is no primary bone marrow pathology also contributing to anemia.    3.  He had multiple questions which were all answered.  I will see him in 1 to 2 weeks to review the labs.  Advised him to call us with any questions or concerns.    Thanks for the consult.    Total visit time of 45 minutes. Time spent in today's visit, review of chart/investigations today and  documentation today.

## 2024-12-12 NOTE — LETTER
"    12/12/2024         RE: Long Mayfield  [address]  [facility] [address]      Oncology Rooming Note    December 12, 2024 10:17 AM   Long Mayfield is a 54 year old male who presents for:    Chief Complaint   Patient presents with     Oncology Clinic Visit     Initial Vitals: There were no vitals taken for this visit. Estimated body mass index is 19.97 kg/m  as calculated from the following:    Height as of 12/10/24: 1.651 m (5' 5\").    Weight as of 12/10/24: 54.4 kg (120 lb). There is no height or weight on file to calculate BSA.  Data Unavailable Comment: Data Unavailable   No LMP for male patient.  Allergies reviewed: Yes  Medications reviewed: Yes    Medications: Medication refills not needed today.  Pharmacy name entered into [employer]:    "[id number] - [place], [place] - [address] AT [address] [place] [address] & [facility] [address] [id number] - [place], [place] - [address] [facility] [address]  [id number] - [place], [place] - [address] AT [facility] [address]  [facility] - [place], [place] - [address]    Frailty Screening:   Is the patient here for a new oncology consult visit in cancer care? 2. No      Clinical concerns: doesn't know why he is here  Dr. Medina  was notified.      Shari J. Schoenberger, [facility]              This consult has been requested by Rena Blair PA-C for anemia.    Mr. Mayfield is a 54-year-old gentleman with multiple medical problems including pancreatic insufficiency and diabetes mellitus.  Patient has chronic anemia.  He has been referred to hematology for it.  Investigations are reviewed and summarized below.  1.  On 10/08/2017, hemoglobin of 12.1.  -On 03/08/2022, hemoglobin of 11.9.  -On 05/03/2024, hemoglobin of 7.0.  Normal MCV.  2.  On 11/05/2024:  -WBC of 6.9, hemoglobin of 7.1 and platelet of 587.  MCV of 75.  RDW elevated.  -CMP " reveals creatinine of 1.49.  Normal LFT.  3.  On 05/07/2024:  -EGD revealed moderate gastritis.  Otherwise normal.  -Colonoscopy revealed moderate diverticulosis.  There is external hemorrhoids.  4.  PET scan on 10/23/2024 does not reveal any evidence of malignancy.  There is complex right knee effusion.  There are enlarged right inguinal right iliac chain lymph node secondary to right knee infection/inflammation.    Patient denies bleeding from any site.  Patient follows up with GI.  He is scheduled for video capsule endoscopy.    Patient has pancreatic insufficiency secondary to chronic pancreatitis.  He gets 7-10 bowel movements a day.  He is on Creon.    He gets pain in the right knee.  Previously he had arthroscopic surgery.    Patient has generalized weakness.  He is able to do his activities slowly.  No headache.  Occasional dizziness.  No chest pain.  No shortness of breath at rest.  He gets mild intermittent abdominal pain.  Occasional nausea.  No vomiting.  No urinary complaint.  He gets diarrhea.  No bleeding from any site.  All other review of system negative.    Allergies: Reviewed    Medications: Reviewed    Past medical history:  -Chronic anemia  -Right knee pain status post arthroscopic surgery  -Allergic rhinitis  -Chronic pancreatitis  -Pancreatic insufficiency  -Diabetes mellitus  -Diabetic foot ulcer  -Hypertension  -Recurrent depression  -Lung nodule.  Benign on PET scan.  -Orthopedic surgery in the right foot and patella.    Social history:  -No smoking  -Quit alcohol 2 years ago.    Exam:  He is alert and oriented x 3.  Not in any distress.  Vitals: Reviewed  Rest of system not examined.    Labs: Reviewed    Assessment:  1.  A 54-year-old gentleman with chronic anemia.  Anemia has been either normocytic or microcytic.  Anemia is multifactorial from renal disease, anemia of chronic disease and iron deficiency.  2.  Thrombocytosis, reactive.  3.  Multiple medical problems including chronic  pancreatitis, pancreatic insufficiency, hypertension and diabetes mellitus.    Recommendation:  -Labs for workup of anemia.  -Virtual visit in 1 to 2 weeks.    Discussion:  1.  I long discussion the patient regarding anemia.  Investigations were reviewed.  Explained to the patient that he has chronic anemia.  Anemia has been either normocytic or microcytic.    Different causes of anemia discussed.  His anemia is multifactorial from renal disease, anemia of chronic disease/inflammation and iron deficiency.    Discussed regarding iron deficiency.  Patient denies bleeding from any site.  EGD and colonoscopy have been normal.  Patient does have malabsorption.  He likely has iron deficiency secondary to it.    2.  Discussed regarding further workup.  Will get labs for workup of anemia.    Patient follows up with GI.  He is going to get video capsule endoscopy.  I agree with that.    Explained to the patient that if all the workup come back negative, we will may consider bone marrow biopsy at some point to make sure there is no primary bone marrow pathology also contributing to anemia.    3.  He had multiple questions which were all answered.  I will see him in 1 to 2 weeks to review the labs.  Advised him to call us with any questions or concerns.    Thanks for the consult.    Total visit time of 45 minutes. Time spent in today's visit, review of chart/investigations today and documentation today.          Prosper Medina MD

## 2024-12-12 NOTE — PROGRESS NOTES
"Oncology Rooming Note    December 12, 2024 10:17 AM   Long Mayfield is a 54 year old male who presents for:    Chief Complaint   Patient presents with    Oncology Clinic Visit     Initial Vitals: There were no vitals taken for this visit. Estimated body mass index is 19.97 kg/m  as calculated from the following:    Height as of 12/10/24: 1.651 m (5' 5\").    Weight as of 12/10/24: 54.4 kg (120 lb). There is no height or weight on file to calculate BSA.  Data Unavailable Comment: Data Unavailable   No LMP for male patient.  Allergies reviewed: Yes  Medications reviewed: Yes    Medications: Medication refills not needed today.  Pharmacy name entered into [employer]:    [facility] #[id number] - [place], [place] - [address] AT [address] [place] [address] - [facility]  [id number] - [place], [place] - [address] [facility] [address]  [id number] - [place], [place] - [address] [facility] [address]  [facility] - [place], [place] - [address]    Frailty Screening:   Is the patient here for a new oncology consult visit in cancer care? 2. No      Clinical concerns: doesn't know why he is here  Dr. Medina  was notified.      Shari J. Schoenberger, [facility]            "

## 2024-12-12 NOTE — LETTER
"12/12/2024      Long Mayfield  [address]  [place] [address]      Dear Colleague,    Thank you for referring your patient, Long Mayfield, to the [facility]. Please see a copy of my visit note below.    Oncology Rooming Note    December 12, 2024 10:17 AM   Long Mayfield is a 54 year old male who presents for:    Chief Complaint   Patient presents with     Oncology Clinic Visit     Initial Vitals: There were no vitals taken for this visit. Estimated body mass index is 19.97 kg/m  as calculated from the following:    Height as of 12/10/24: 1.651 m (5' 5\").    Weight as of 12/10/24: 54.4 kg (120 lb). There is no height or weight on file to calculate BSA.  Data Unavailable Comment: Data Unavailable   No LMP for male patient.  Allergies reviewed: Yes  Medications reviewed: Yes    Medications: Medication refills not needed today.  Pharmacy name entered into [employer]:    [facility] #[id number] - [place], [place] - [address] AT [address] [place], [place] [id number] [id number] [address] [facility] [address] [facility] [address] [facility] #[id number] - [place], [place] - [address] AT [facility] [address] [facility] [address]  [facility] - [place], [place] - [address]    Frailty Screening:   Is the patient here for a new oncology consult visit in cancer care? 2. No      Clinical concerns: doesn't know why he is here  Dr. Medina  was notified.      Shari J. Schoenberger, [facility]              This consult has been requested by Rena Blair PA-C for anemia.    Mr. Mayfield is a 54-year-old gentleman with multiple medical problems including pancreatic insufficiency and diabetes mellitus.  Patient has chronic anemia.  He has been referred to hematology for it.  Investigations are reviewed and summarized below.  1.  On 10/08/2017, hemoglobin of 12.1.  -On 03/08/2022, hemoglobin of " 11.9.  -On 05/03/2024, hemoglobin of 7.0.  Normal MCV.  2.  On 11/05/2024:  -WBC of 6.9, hemoglobin of 7.1 and platelet of 587.  MCV of 75.  RDW elevated.  -CMP reveals creatinine of 1.49.  Normal LFT.  3.  On 05/07/2024:  -EGD revealed moderate gastritis.  Otherwise normal.  -Colonoscopy revealed moderate diverticulosis.  There is external hemorrhoids.  4.  PET scan on 10/23/2024 does not reveal any evidence of malignancy.  There is complex right knee effusion.  There are enlarged right inguinal right iliac chain lymph node secondary to right knee infection/inflammation.    Patient denies bleeding from any site.  Patient follows up with GI.  He is scheduled for video capsule endoscopy.    Patient has pancreatic insufficiency secondary to chronic pancreatitis.  He gets 7-10 bowel movements a day.  He is on Creon.    He gets pain in the right knee.  Previously he had arthroscopic surgery.    Patient has generalized weakness.  He is able to do his activities slowly.  No headache.  Occasional dizziness.  No chest pain.  No shortness of breath at rest.  He gets mild intermittent abdominal pain.  Occasional nausea.  No vomiting.  No urinary complaint.  He gets diarrhea.  No bleeding from any site.  All other review of system negative.    Allergies: Reviewed    Medications: Reviewed    Past medical history:  -Chronic anemia  -Right knee pain status post arthroscopic surgery  -Allergic rhinitis  -Chronic pancreatitis  -Pancreatic insufficiency  -Diabetes mellitus  -Diabetic foot ulcer  -Hypertension  -Recurrent depression  -Lung nodule.  Benign on PET scan.  -Orthopedic surgery in the right foot and patella.    Social history:  -No smoking  -Quit alcohol 2 years ago.    Exam:  He is alert and oriented x 3.  Not in any distress.  Vitals: Reviewed  Rest of system not examined.    Labs: Reviewed    Assessment:  1.  A 54-year-old gentleman with chronic anemia.  Anemia has been either normocytic or microcytic.  Anemia is  multifactorial from renal disease, anemia of chronic disease and iron deficiency.  2.  Thrombocytosis, reactive.  3.  Multiple medical problems including chronic pancreatitis, pancreatic insufficiency, hypertension and diabetes mellitus.    Recommendation:  -Labs for workup of anemia.  -Virtual visit in 1 to 2 weeks.    Discussion:  1.  I long discussion the patient regarding anemia.  Investigations were reviewed.  Explained to the patient that he has chronic anemia.  Anemia has been either normocytic or microcytic.    Different causes of anemia discussed.  His anemia is multifactorial from renal disease, anemia of chronic disease/inflammation and iron deficiency.    Discussed regarding iron deficiency.  Patient denies bleeding from any site.  EGD and colonoscopy have been normal.  Patient does have malabsorption.  He likely has iron deficiency secondary to it.    2.  Discussed regarding further workup.  Will get labs for workup of anemia.    Patient follows up with GI.  He is going to get video capsule endoscopy.  I agree with that.    Explained to the patient that if all the workup come back negative, we will may consider bone marrow biopsy at some point to make sure there is no primary bone marrow pathology also contributing to anemia.    3.  He had multiple questions which were all answered.  I will see him in 1 to 2 weeks to review the labs.  Advised him to call us with any questions or concerns.    Thanks for the consult.    Total visit time of 45 minutes. Time spent in today's visit, review of chart/investigations today and documentation today.        Again, thank you for allowing me to participate in the care of your patient.        Sincerely,        Prosper Medina MD

## 2024-12-13 ENCOUNTER — OFFICE VISIT (OUTPATIENT)
Dept: NEPHROLOGY | Facility: CLINIC | Age: 54
End: 2024-12-13
Attending: INTERNAL MEDICINE
Payer: MEDICAID

## 2024-12-13 ENCOUNTER — LAB (OUTPATIENT)
Dept: LAB | Facility: CLINIC | Age: 54
End: 2024-12-13
Attending: INTERNAL MEDICINE
Payer: MEDICAID

## 2024-12-13 VITALS
DIASTOLIC BLOOD PRESSURE: 83 MMHG | HEART RATE: 68 BPM | SYSTOLIC BLOOD PRESSURE: 122 MMHG | OXYGEN SATURATION: 98 % | TEMPERATURE: 97.8 F | BODY MASS INDEX: 20.02 KG/M2 | WEIGHT: 120.3 LBS

## 2024-12-13 DIAGNOSIS — N28.9 ACUTE RENAL INSUFFICIENCY: ICD-10-CM

## 2024-12-13 DIAGNOSIS — N18.32 ANEMIA DUE TO STAGE 3B CHRONIC KIDNEY DISEASE (H): ICD-10-CM

## 2024-12-13 DIAGNOSIS — D63.1 ANEMIA DUE TO STAGE 3B CHRONIC KIDNEY DISEASE (H): ICD-10-CM

## 2024-12-13 DIAGNOSIS — N05.9 GLOMERULONEPHRITIS DETERMINED BY BIOPSY: ICD-10-CM

## 2024-12-13 DIAGNOSIS — E10.8 TYPE 1 DIABETES MELLITUS WITH COMPLICATIONS (H): ICD-10-CM

## 2024-12-13 DIAGNOSIS — M31.8 SYSTEMIC VASCULITIS (H): ICD-10-CM

## 2024-12-13 DIAGNOSIS — D84.9 IMMUNOSUPPRESSION (H): ICD-10-CM

## 2024-12-13 DIAGNOSIS — N18.32 CKD STAGE 3B, GFR 30-44 ML/MIN (H): Primary | ICD-10-CM

## 2024-12-13 DIAGNOSIS — E10.21 DIABETIC NEPHROPATHY ASSOCIATED WITH TYPE 1 DIABETES MELLITUS (H): ICD-10-CM

## 2024-12-13 PROBLEM — Z48.03 CHANGE OR REMOVAL OF DRAINS: Status: RESOLVED | Noted: 2024-08-26 | Resolved: 2024-12-13

## 2024-12-13 LAB
ALBUMIN MFR UR ELPH: 35.8 MG/DL
ALBUMIN SERPL BCG-MCNC: 3.1 G/DL (ref 3.5–5.2)
ALBUMIN UR-MCNC: 20 MG/DL
ANION GAP SERPL CALCULATED.3IONS-SCNC: 11 MMOL/L (ref 7–15)
APPEARANCE UR: CLEAR
BASOPHILS # BLD AUTO: 0.1 10E3/UL (ref 0–0.2)
BASOPHILS NFR BLD AUTO: 1 %
BILIRUB UR QL STRIP: NEGATIVE
BUN SERPL-MCNC: 16.7 MG/DL (ref 6–20)
CALCIUM SERPL-MCNC: 7.9 MG/DL (ref 8.8–10.4)
CHLORIDE SERPL-SCNC: 95 MMOL/L (ref 98–107)
COLOR UR AUTO: ABNORMAL
CREAT SERPL-MCNC: 1.23 MG/DL (ref 0.67–1.17)
CREAT UR-MCNC: 33.7 MG/DL
CREAT UR-MCNC: 34.2 MG/DL
EGFRCR SERPLBLD CKD-EPI 2021: 69 ML/MIN/1.73M2
EOSINOPHIL # BLD AUTO: 0.4 10E3/UL (ref 0–0.7)
EOSINOPHIL NFR BLD AUTO: 5 %
ERYTHROCYTE [DISTWIDTH] IN BLOOD BY AUTOMATED COUNT: 17.8 % (ref 10–15)
GLUCOSE SERPL-MCNC: 562 MG/DL (ref 70–99)
GLUCOSE UR STRIP-MCNC: >=1000 MG/DL
HCO3 SERPL-SCNC: 21 MMOL/L (ref 22–29)
HCT VFR BLD AUTO: 25.4 % (ref 40–53)
HGB BLD-MCNC: 8 G/DL (ref 13.3–17.7)
HGB UR QL STRIP: ABNORMAL
HYALINE CASTS: 3 /LPF
IMM GRANULOCYTES # BLD: 0 10E3/UL
IMM GRANULOCYTES NFR BLD: 0 %
KETONES UR STRIP-MCNC: NEGATIVE MG/DL
LEUKOCYTE ESTERASE UR QL STRIP: NEGATIVE
LYMPHOCYTES # BLD AUTO: 1.7 10E3/UL (ref 0.8–5.3)
LYMPHOCYTES NFR BLD AUTO: 23 %
MCH RBC QN AUTO: 23.5 PG (ref 26.5–33)
MCHC RBC AUTO-ENTMCNC: 31.5 G/DL (ref 31.5–36.5)
MCV RBC AUTO: 75 FL (ref 78–100)
MICROALBUMIN UR-MCNC: 140 MG/L
MICROALBUMIN/CREAT UR: 409.36 MG/G CR (ref 0–17)
MONOCYTES # BLD AUTO: 0.5 10E3/UL (ref 0–1.3)
MONOCYTES NFR BLD AUTO: 7 %
NEUTROPHILS # BLD AUTO: 4.7 10E3/UL (ref 1.6–8.3)
NEUTROPHILS NFR BLD AUTO: 64 %
NITRATE UR QL: NEGATIVE
NRBC # BLD AUTO: 0 10E3/UL
NRBC BLD AUTO-RTO: 0 /100
PH UR STRIP: 6 [PH] (ref 5–7)
PHOSPHATE SERPL-MCNC: 3.1 MG/DL (ref 2.5–4.5)
PLATELET # BLD AUTO: 480 10E3/UL (ref 150–450)
POTASSIUM SERPL-SCNC: 3 MMOL/L (ref 3.4–5.3)
PROT/CREAT 24H UR: 1.06 MG/MG CR (ref 0–0.2)
RBC # BLD AUTO: 3.41 10E6/UL (ref 4.4–5.9)
RBC URINE: 2 /HPF
SODIUM SERPL-SCNC: 127 MMOL/L (ref 135–145)
SP GR UR STRIP: 1.01 (ref 1–1.03)
UROBILINOGEN UR STRIP-MCNC: NORMAL MG/DL
WBC # BLD AUTO: 7.3 10E3/UL (ref 4–11)
WBC URINE: 1 /HPF

## 2024-12-13 PROCEDURE — 83516 IMMUNOASSAY NONANTIBODY: CPT | Performed by: INTERNAL MEDICINE

## 2024-12-13 PROCEDURE — 82570 ASSAY OF URINE CREATININE: CPT | Performed by: INTERNAL MEDICINE

## 2024-12-13 PROCEDURE — 84238 ASSAY NONENDOCRINE RECEPTOR: CPT | Performed by: INTERNAL MEDICINE

## 2024-12-13 PROCEDURE — 36415 COLL VENOUS BLD VENIPUNCTURE: CPT | Performed by: PATHOLOGY

## 2024-12-13 PROCEDURE — 83876 ASSAY MYELOPEROXIDASE: CPT | Performed by: INTERNAL MEDICINE

## 2024-12-13 PROCEDURE — 81001 URINALYSIS AUTO W/SCOPE: CPT | Performed by: PATHOLOGY

## 2024-12-13 PROCEDURE — 84156 ASSAY OF PROTEIN URINE: CPT | Performed by: PATHOLOGY

## 2024-12-13 PROCEDURE — 85060 BLOOD SMEAR INTERPRETATION: CPT | Performed by: PATHOLOGY

## 2024-12-13 PROCEDURE — G0463 HOSPITAL OUTPT CLINIC VISIT: HCPCS | Performed by: INTERNAL MEDICINE

## 2024-12-13 PROCEDURE — 85025 COMPLETE CBC W/AUTO DIFF WBC: CPT | Performed by: PATHOLOGY

## 2024-12-13 PROCEDURE — 82043 UR ALBUMIN QUANTITATIVE: CPT | Performed by: INTERNAL MEDICINE

## 2024-12-13 PROCEDURE — 99214 OFFICE O/P EST MOD 30 MIN: CPT | Performed by: INTERNAL MEDICINE

## 2024-12-13 PROCEDURE — 80069 RENAL FUNCTION PANEL: CPT | Performed by: PATHOLOGY

## 2024-12-13 PROCEDURE — 99000 SPECIMEN HANDLING OFFICE-LAB: CPT | Performed by: PATHOLOGY

## 2024-12-13 PROCEDURE — 86036 ANCA SCREEN EACH ANTIBODY: CPT | Performed by: INTERNAL MEDICINE

## 2024-12-13 ASSESSMENT — PAIN SCALES - GENERAL: PAINLEVEL_OUTOF10: EXTREME PAIN (8)

## 2024-12-13 NOTE — NURSING NOTE
Chief Complaint   Patient presents with    RECHECK      RETURN GLOMERULAR - 2 month follow up, slot per [place]         Vitals:    12/13/24 1432 12/13/24 1441   BP: (!) 150/90 122/83   BP Location: Right arm Right arm   Patient Position: Sitting Sitting   Cuff Size: Adult Regular Adult Regular   Pulse: 68    Temp: 97.8  F (36.6  C)    TempSrc: Oral    SpO2: 98%    Weight: 54.6 kg (120 lb 4.8 oz)        BP Readings from Last 3 Encounters:   12/13/24 122/83   12/12/24 137/82   12/04/24 116/57       /83 (BP Location: Right arm, Patient Position: Sitting, Cuff Size: Adult Regular)   Pulse 68   Temp 97.8  F (36.6  C) (Oral)   Wt 54.6 kg (120 lb 4.8 oz)   SpO2 98%   BMI 20.02 kg/m       Jeff Malhotra, [facility]

## 2024-12-13 NOTE — LETTER
12/13/2024       RE: Long Mayfield  [address] [facility] [address]     Dear Colleague,    Thank you for referring your patient, Long Mayfield, to the [facility] NEPHROLOGY [facility] [place] at [facility]. Please see a copy of my visit note below.    Nephrology Clinic    Long Mayfield MRN:[id number] YOB: 1970  Date of Service: 12/13/2024  Primary care provider: Ana Maria Blakely  Requesting physician: AISHA Mcgowan       REASON FOR CONSULT: evaluation for possible systemic vasculitis ? ANCA    HISTORY OF PRESENT ILLNESS:   Long Mayfield is a 54 year old male who presents for evaluation of possible ANCA vasculitis.  Per Dr Mcgowan's evaluation of 9/17/24:  He has chronic arthralgia since his age of 48 involving hands, elbows, neck, back and knees that he attributes to injuries from falling as he used to be a . He denies swelling in the hands, elbows, feet.      He has been getting gel injections in his knees with [facility] orthopedics and in May 2024, he developed swelling in the right knee, he was evaluated in July by Orthopedics at [facility], due to concerns for septic arthritis, he underwent washout and treated with 4 weeks of antibiotics. however he had recurrence of knee swelling. His infectious workup has been so far inconclusive however his cell count has been persistently elevated.    Hospitalize in [facility], Nov 2023.  Staph infection in R first toe.  Hospitalization during which he reports having severe edema, diffuse, including scrotal edema. Has 30 Lbs of fluid weight.  Required high dose diuretics.     In November - December 2023, he was hospitalized at [facility] for bilateral lower extremity swelling, rash, infection in his right big toe, he underwent surgery including amputation, there was concerns for osteomyelitis in the right foot.  During his evaluation, a skin biopsy showed LCV. He was treated with  "antibiotics. He saw Nephrology for SALVADOR that was felt to be prerenal at the time vs antibiotic related during the time.    ANCA by IFA was negative, DE-3 borderline positive, MPO antibody negative. Repeat ANCA by Dr Mcgowan on 9/17: neg by IgG        He has chronic diarrhea and chronic pancreatitis, following with GI, unclear etiology but felt likely secondary to alcohol intake,  No reported inflammatory bowel disease on prior Colonoscopies     He has never smoked. He has not drank alcohol for over 2 years.  History of remote cocaine use, none recently.     He was recently hospitalized for shortness of breath and felt to have pneumonia and treated with antibiotics. He had anemia and received blood transfusion, etiology of anemia is unknown.  Was also admitted for hypoglycemia.    Has had diabetes x 6 yrs.  Has always been on insulin.    Around 2015, Remote history of being overweight (peak weight 180 lbs). Went on low carb and no alcohol + exercise -> lost 30lbs to his baseline weight.     History of HTN for several years.  States BP tends to be labile.     Denies history of gross hematuria     History of pulmonary nodule seen on CT scan in Dec (in setting of anasarca, pulm edema and effusions).       October 15, 2024  Today, he reports joint pain in neck, shoulder and right knee.  The R knee remains quite swollen and warm  No SOB, CP.  Mild cough x week, non productive.  No N, V, ABd pain. Some diarrhea .  No melena or hematochezia  No rash.     History of alcohol use, used to drink \"a lot\".  Stopped 2 years ago.   Followed by [facility] for chronic diarrhea.    Denies salivary gland swelling.  Sensation of dry eyes.    December 13, 2024  Reports joint pains and stiffness,   R knee pain and swelling, no significant symptomatic improvement from last visit.  PET CT on 10/23/24: no pulm nodule or mass, R knee inflammation.  Blood sugars are very labile.  > 500 today.  Doesn't have spms associated with hyperglycemia, but " "does sense hypoglycemia.  Has dexcom but no insulin pump.  Has appoint with DM service on 12/18    Kidney biopsy consistent with DMN + some evidence of IgG4 dis.   Ritux #1 on Dec 4, 2024.  Next scheduled for 12/19.    Summary of Immunosuppression  Rituximab  #1 , 100 mg IV Dec 4, 2024      ROS.. no fevers, chills,   Mild \"goofy cough\" non productive.   No CP.   No N, V, Abd pain.   Diarrhea persistent.   Followed by [facility].        PAST MEDICAL HISTORY:  Past Medical History:   Diagnosis Date     Acute pain of right knee 07/12/2024     Alcohol abuse 09/12/2022     Allergic rhinitis      Anemia      Arthritis      Bell's palsy      Cervicalgia      Change or removal of drains 08/26/2024     Diabetes (H)      Diabetic foot ulcer (H)      Generalized edema      Hyperglycemia 03/08/2022     Hyperkalemia      Hypertension      Hypo-osmolality and hyponatremia      Hypoglycemia 05/03/2024     Hypoxia 09/11/2024     Ketosis (H) 03/08/2022     Major depressive disorder, recurrent episode, mild (H)      Other acute osteomyelitis, right ankle and foot (H)      Other chronic pancreatitis (H)      Other polyosteoarthritis      Pneumonia of left lower lobe due to infectious organism 09/11/2024     Right knee pain 07/11/2024     Solitary pulmonary nodule      PAST SURGICAL HISTORY:  Past Surgical History:   Procedure Laterality Date     ARTHROSCOPY KNEE Right 08/17/2024    Procedure: Arthroscopic;  Surgeon: Jeff Phoenix MD;  Location: [facility] OR     COLONOSCOPY N/A 05/07/2024    Procedure: Colonoscopy;  Surgeon: Nina Moya MD;  Location:  GI     ESOPHAGOSCOPY, GASTROSCOPY, DUODENOSCOPY (EGD), COMBINED N/A 05/06/2024    Procedure: Esophagoscopy, gastroscopy, duodenoscopy (EGD), combined;  Surgeon: Nina Moya MD;  Location:  GI     ESOPHAGOSCOPY, GASTROSCOPY, DUODENOSCOPY (EGD), COMBINED N/A 05/07/2024    Procedure: Esophagoscopy, gastroscopy, duodenoscopy (EGD), combined;  Surgeon: Nita, " "Nina Figueroa MD;  Location: [facility] GI     IR RENAL BIOPSY RIGHT  11/08/2024     IRRIGATION AND DEBRIDEMENT KNEE, COMBINED Right 08/17/2024    Procedure: IRRIGATION AND DEBRIDEMENT, Right KNEE,;  Surgeon: Jeff Phoenix MD;  Location: [facility] OR     IRRIGATION AND DEBRIDEMENT SPINE Right 07/11/2024    Procedure: Irrigation and debridement knee;  Surgeon: Dejon Espinoza MD;  Location: [facility] OR     ORTHOPEDIC SURGERY       partial amputation of right foot Right      PATELLA SURGERY       MEDICATIONS:  Current Outpatient Medications reviewed with patient on 12/13/2024   Medication Sig Dispense Refill     amLODIPine (NORVASC) 10 MG tablet TAKE 1 TABLET(10 MG) BY MOUTH DAILY 90 tablet 1     cetirizine (ZYRTEC) 10 MG tablet Take 1 tablet (10 mg) by mouth daily. 30 tablet 11     gabapentin (NEURONTIN) 300 MG capsule Take 1 capsule (300 mg) by mouth 2 times daily. 60 capsule 2     hydrALAZINE (APRESOLINE) 25 MG tablet Take 25 mg by mouth 2 times daily. Hold if SBP < 110  NOT TAKING       insulin aspart (NOVOLOG FLEXPEN) 100 UNIT/ML pen Inject 1-5 Units subcutaneously 3 times daily (with meals). In addition to 6-8 units with each meal, inject additional units as per this sliding scale:  If 200-250 = 1   251-300 = 2   301-350 = 3  351-400 = 4  401+ = 5  Repeat BS after 3 hours and call MD if still over 400       insulin glargine (LANTUS PEN) 100 UNIT/ML pen Inject 12 Units subcutaneously every morning. 15 mL 3     insulin pen needle (31G X 6 MM) 31G X 6 MM miscellaneous Use 4 pen needles daily or as directed. 100 each 11     insulin syringe-needle U-100 (29G X 1/2\" 0.5 ML) 29G X 1/2\" 0.5 ML miscellaneous Use one syringe as directed prior to steroid infusion. 10 each 0     lipase-protease-amylase (CREON 36) [id number] units CPEP Take 3 capsules by mouth 3 times daily (with meals). 0730, 1130, 1630       lipase-protease-amylase (CREON 36) [id number] units CPEP Take 1 capsule by mouth 2 times daily. " With snack 1000 & 2000       loperamide (IMODIUM) 2 MG capsule Take 4 mg by mouth every 8 hours as needed for diarrhea.       methocarbamol (ROBAXIN) 500 MG tablet Take 1 tablet (500 mg) by mouth every 8 hours as needed for muscle spasms. 30 tablet 3     oxyCODONE (ROXICODONE) 5 MG tablet Take 1 tablet (5 mg) by mouth every 6 hours as needed for pain. 30 tablet 0     pantoprazole (PROTONIX) 40 MG EC tablet Take 1 tablet (40 mg) by mouth daily  NOT TAKING 30 tablet 0     sildenafil (VIAGRA) 50 MG tablet as needed.       triamcinolone (KENALOG) 0.1 % external ointment Apply topically.       atorvastatin (LIPITOR) 20 MG tablet Take 1 tablet (20 mg) by mouth daily.  NOT TAKING 90 tablet 3     colestipol (COLESTID) 1 g tablet NOT TAKING       Continuous Glucose Sensor (DEXCOM G7 SENSOR) MISC CHANGE EVERY 10 DAYS (Patient not taking: Reported on 12/13/2024)       DULoxetine (CYMBALTA) 60 MG capsule Take 60 mg by mouth daily  NOT TAKING       Glucagon, rDNA, (GLUCAGON EMERGENCY) 1 MG KIT Inject 1 mg into the muscle daily as needed (hypoglycemia) (Patient not taking: Reported on 12/13/2024)       glucose 40 % (400 mg/mL) gel Take by mouth as needed for low blood sugar (For BG <60). (Patient not taking: Reported on 12/13/2024)       insulin NPH (HUMULIN N KWIKPEN) 100 UNIT/ML injection Inject 13 units at the start of your steroid infusion at clinic. (Patient not taking: Reported on 12/13/2024) 3 mL 0     insulin  UNIT/ML vial Inject 13 units at the start of your steroid infusion at clinic which is scheduled for 12/4/24. (Patient not taking: Reported on 12/13/2024) 3 mL 0     methylcellulose (CITRUCEL) 500 MG TABS tablet Take 1 tablet (500 mg) by mouth daily. (Patient not taking: Reported on 12/13/2024) 90 tablet 0     multivitamin w/minerals (THERA-VIT-M) tablet Take 1 tablet by mouth daily. (Patient not taking: Reported on 12/13/2024) 30 tablet 11     sulfamethoxazole-trimethoprim (BACTRIM) 400-80 MG tablet Take 1  tablet by mouth daily. (Patient not taking: Reported on 12/13/2024) 90 tablet 1     ALLERGIES:    Allergies   Allergen Reactions     Vancomycin      Other Reaction(s): Renal Failure    Vancomycin-induced nephrotoxicity (11/2023, outside hospital)     Seasonal Allergies      HAYFEVER:    -Watery Eyes  -Eye burn     Daptomycin Rash     Likely delayed hypersensitivity reaction to daptomycin 11/2023     REVIEW OF SYSTEMS:  A comprehensive review of systems was performed and found to be negative except as described here or above.  SOCIAL HISTORY:   Social History     Socioeconomic History     Marital status: Single     Spouse name: Not on file     Number of children: Not on file     Years of education: Not on file     Highest education level: Not on file   Occupational History     Not on file   Tobacco Use     Smoking status: Never     Passive exposure: Never     Smokeless tobacco: Never   Vaping Use     Vaping status: Never Used   Substance and Sexual Activity     Alcohol use: Not Currently     Comment: sober 2 year     Drug use: Not Currently     Types: Marijuana     Sexual activity: Not on file   Other Topics Concern     Not on file   Social History Narrative     Not on file     Social Drivers of Health     Financial Resource Strain: Low Risk  (9/24/2024)    Financial Resource Strain      Within the past 12 months, have you or your family members you live with been unable to get utilities (heat, electricity) when it was really needed?: No   Food Insecurity: Low Risk  (9/24/2024)    Food Insecurity      Within the past 12 months, did you worry that your food would run out before you got money to buy more?: No      Within the past 12 months, did the food you bought just not last and you didn t have money to get more?: No   Transportation Needs: Low Risk  (9/24/2024)    Transportation Needs      Within the past 12 months, has lack of transportation kept you from medical appointments, getting your medicines, non-medical  meetings or appointments, work, or from getting things that you need?: No   Physical Activity: Not on file   Stress: Not on file   Social Connections: Unknown (3/16/2022)    [facility], [facility]    Social Connections      Frequency of Communication with Friends and Family: Not on file   Interpersonal Safety: Low Risk  (11/8/2024)    Interpersonal Safety      Do you feel physically and emotionally safe where you currently live?: Yes      Within the past 12 months, have you been hit, slapped, kicked or otherwise physically hurt by someone?: No      Within the past 12 months, have you been humiliated or emotionally abused in other ways by your partner or ex-partner?: No   Recent Concern: Interpersonal Safety - High Risk (9/11/2024)    Interpersonal Safety      Do you feel physically and emotionally safe where you currently live?: No      Within the past 12 months, have you been hit, slapped, kicked or otherwise physically hurt by someone?: No      Within the past 12 months, have you been humiliated or emotionally abused in other ways by your partner or ex-partner?: No   Housing Stability: Low Risk  (9/24/2024)    Housing Stability      Do you have housing? : Yes      Are you worried about losing your housing?: No     FAMILY MEDICAL HISTORY:   No family history on file.  PHYSICAL EXAM:   /83 (BP Location: Right arm, Patient Position: Sitting, Cuff Size: Adult Regular)   Pulse 68   Temp 97.8  F (36.6  C) (Oral)   Wt 54.6 kg (120 lb 4.8 oz)   SpO2 98%   BMI 20.02 kg/m    GENERAL APPEARANCE: alert and no distress  EYES: nonicteric  NECK: supple, no adenopathy  Carotids 2+ without bruit  RESP: lungs clear to auscultation   CV: regular rhythm, normal rate, no rub  ABDOMEN: soft, nontender,    Extremities: 1-2+  edema right ankle, lower shin.  No edema on the L (difference in swelling is reportedly chronic)  MS: The right knee is  quite swollen ,but there is minimal tenderness.  The area proximal to the knee is soft today (unlike last visit).  Non tender.   SKIN: no rash  NEURO: mentation intact and speech normal  PSYCH: affect normal/bright   LABS:   Recent Results (from the past 672 hour(s))   HLA-B27 Typing    Collection Time: 09/17/24  2:58 PM   Result Value Ref Range    [id number] METHOD SSOP     B locus B27 Neg    Rheumatoid factor    Collection Time: 09/17/24  2:58 PM   Result Value Ref Range    Rheumatoid Factor <10 <14 IU/mL   Cyclic Citrullinated Peptide Antibody IgG    Collection Time: 09/17/24  2:58 PM   Result Value Ref Range    Cyclic Citrullinated Peptide Antibody IgG 1.6 <7.0 U/mL   Myeloperoxidase and Proteinase 3 Panel    Collection Time: 09/17/24  2:58 PM   Result Value Ref Range    MPO [place] IgG Instrument Value <0.3 <3.5 U/mL    Myeloperoxidase Antibody IgG Negative Negative    Proteinase 3 [place] IgG Instrument Value <1.0 <2.0 U/mL    Proteinase 3 Antibody IgG Negative Negative   Immunoglobulins A G and M    Collection Time: 09/17/24  2:58 PM   Result Value Ref Range    Immunoglobulin G 2,121 (H) 610 - 1,616 mg/dL    Immunoglobulin A 457 84 - 499 mg/dL    Immunoglobulin M 281 (H) 35 - 242 mg/dL   IgG Subclasses    Collection Time: 09/17/24  2:58 PM   Result Value Ref Range    Immunoglobulin G 2,121 (H) 610 - 1,616 mg/dL    IgG1 1,179 (H) 382 - 929 mg/dL    IgG2 508 242 - 700 mg/dL    IgG3 126 22 - 176 mg/dL    IgG4 224 (H) 4 - 86 mg/dL    Subclasses Percent 96 %   Cytology non gyn    Collection Time: 09/23/24  1:52 PM   Result Value Ref Range    Final Diagnosis       Specimen A     Interpretation:      Negative for malignancy     Other Findings:      Acute inflammation present.     Adequacy:     Satisfactory for evaluation          Comment       Please correlate with microbiologic studies and crystal analysis.       Clinical Information       54M with right knee effusion, r/o PVNS      Gross Description       A(A). Knee, Right,  :A. Knee, Right, Synovial Fluid:  Received 15 ml of hazy, orange fluid, processed as 1 Pap stained Autocyte,  1 Strickland stained cytospin and one hematoxylin and eosin stained cell block.       Microscopic Description       A microscopic examination was performed.     Case was reviewed by the following:  Pathology Fellow: Danielle Mccurdy DO  Pathology Fellow: Julee Oneal MD  Resident Pathologist: Dmitry Aparicio MD  A resident or fellow in a training program was involved in the initial review, preparation, and/or interpretation of this case.  I, as the senior physician, attest that I have personally reviewed all specimens and or slides, including the listed special stains, and used them with my medical judgement to determine the final diagnosis.              Performing Labs       The technical component of this testing was completed at [facility] and [facility].     Stain controls for all stains resulted within this report have been reviewed and show appropriate reactivity.      Immunoglobulin G subclasses    Collection Time: 09/24/24 10:35 AM   Result Value Ref Range    Immunoglobulin G 2,015 (H) 610 - 1,616 mg/dL    IgG1 1,126 (H) 382 - 929 mg/dL    IgG2 499 242 - 700 mg/dL    IgG3 130 22 - 176 mg/dL    IgG4 216 (H) 4 - 86 mg/dL    Subclasses Percent 98 %    Creatinine 1.24 (H) 0.67 - 1.17 mg/dL    GFR Estimate 68 >60 mL/min/1.73m2   Cystatin C with GFR    Collection Time: 09/24/24 10:35 AM   Result Value Ref Range    Cystatin C 2.1 (H) 0.6 - 1.0 mg/L    GFR Calculated with Cystatin C 29 (L) >=60 mL/min/1.73m2   Ferritin    Collection Time: 09/24/24 10:35 AM   Result Value Ref Range    Ferritin 418 (H) 31 - 409 ng/mL   Iron and iron binding capacity    Collection Time: 09/24/24 10:35 AM   Result Value Ref Range    Iron 20 (L) 61 - 157 ug/dL    Iron Binding Capacity 230 (L) 240 - 430 ug/dL    Iron Sat Index 9 (L) 15 - 46 %   Folate    Collection  Time: 09/24/24 10:35 AM   Result Value Ref Range    Folic Acid 10.0 4.6 - 34.8 ng/mL   Vitamin B12    Collection Time: 09/24/24 10:35 AM   Result Value Ref Range    Vitamin B12 662 232 - 1,245 pg/mL   Bld morphology pathology review    Collection Time: 09/24/24 10:35 AM   Result Value Ref Range    Final Diagnosis       PERIPHERAL BLOOD MORPHOLOGY:        1.  MILD HYPOCHROMIC-BORDERLINE MICROCYTIC ANEMIA  A.  MODERATE ANISOCYTOSIS, WITHOUT INCREASED POIKILOCYTOSIS  B.  IRON STUDIES SUGGESTIVE OF ANEMIA OF CHRONIC DISEASE  C.  LACK OF CORRESPONDING RETICULOCYTOSIS        2.  NORMAL LEUKOCYTE DIFFERENTIAL AND MORPHOLOGY        3.  UNREMARKABLE PLATELET MORPHOLOGY        Comment       The differential diagnosis of a borderline microcytic anemia would include iron deficiency anemia, pyridoxine responsive anemia, hemoglobinopathies/thalassemia, and anemia of chronic disease.  The iron studies are consistent with anemia of chronic disease.  A corresponding reticulocytosis (bone marrow response) is not present.      Clinical Information       Evaluation of anemia      Peripheral Smear       Erythrocytes are mildly decreased in number, hypochromic and borderline microcytic.  There is moderate anisocytosis, without increased poikilocytosis.  Increased polychromasia, basophilic stippling and nucleated red blood cells are not identified.    Leukocytes are normal in number and show a normal differential distribution, without a significant neutrophilic left shift or toxic changes.  Occasional hypersegmented neutrophils are present.  Other megaloblastic changes, dysplastic features and blasts are not identified.  Lymphocytes are mature and polymorphic in appearance.    Platelets are normal in number and morphology.      Performing Labs       The technical component of this testing was completed at the [facility] and [facility]      Albumin Random Urine Quantitative with Creat  Ratio    Collection Time: 09/24/24 10:37 AM   Result Value Ref Range    Creatinine Urine mg/dL 50.3 mg/dL    Albumin Urine mg/L 1,281.0 mg/L    Albumin Urine mg/g Cr 2,546.72 (H) 0.00 - 17.00 mg/g Cr     CMP  Recent Labs   Lab Test 12/12/24  1113 12/03/24  1532 11/08/24  1142 11/05/24  1105 10/23/24  1005 10/15/24  1248 09/24/24  1035 07/13/24  1149 07/13/24  0833 05/06/24  0818 05/06/24  0724 05/05/24  1744 05/05/24  1438 05/05/24  1156 05/05/24  1037 03/08/22  1756 10/08/17  2105     --   --  131*  --  131* 132*   < > 133*   < > 133*  --   --   --  134*   < > 131*   POTASSIUM 3.4  --   --  4.1  --  4.0 5.3   < > 5.6*   < > 4.8  --   --   --  4.6   < > 3.8   CHLORIDE 102  --   --  97*  --  98 102   < > 100   < > 99  --   --   --  100   < > 95   CO2 21*  --   --  21*  --  24 21*   < > 24   < > 22  --   --   --  22   < > 28   ANIONGAP 12  --   --  13  --  9 9   < > 9   < > 12  --   --   --  12   < > 8   *  --  178* 271* 133* 278* 241*   < > 218*   < > 215*   < >  --    < > 230*   < > 403*   BUN 19.2  --   --  19.4  --  22.3* 28.5*   < > 21.9*   < > 16.6  --   --   --  22.5*   < > 9   CR 1.34* 1.09  --  1.49*  --  1.35* 1.24*   < > 1.38*   < > 0.99  --   --   --  1.17   < > 0.68   GFRESTIMATED 62 80  --  55*  --  62 68   < > 60*   < > 89  --   --   --  73   < > >90   GFRESTBLACK  --   --   --   --   --   --   --   --   --   --   --   --   --   --   --   --  >90   AROLDO 8.4*  --   --  8.2*  --  8.6* 9.0   < > 8.3*   < > 9.0  --   --   --  9.1   < > 9.5   MAG  --   --   --   --   --   --   --   --  2.3  --  1.9  --  2.1  --  2.2   < >  --    PHOS  --   --   --   --   --  3.1  --   --   --   --  2.7  --  3.8  --  3.6   < >  --    PROTTOTAL 8.3  --   --  7.6  --  8.2 8.0   < >  --    < >  --   --   --   --   --    < > 7.7   ALBUMIN 3.3*  --   --  3.0*  --  3.3* 3.4*   < >  --    < >  --   --   --   --   --    < > 3.4   BILITOTAL 0.2  --   --  <0.2  --  0.2 0.2   < >  --    < >  --   --   --   --   --    < > 0.2    ALKPHOS 214*  --   --  133  --  121 161*   < >  --    < >  --   --   --   --   --    < > 124   AST 39 47*  --  13  --  15 21   < >  --    < >  --   --   --   --   --    < > 73*   ALT 28  --   --  <5  --  <5 12   < >  --    < >  --   --   --   --   --    < > 52    < > = values in this interval not displayed.     CBC  Recent Labs   Lab Test 12/13/24  1415 12/12/24  1113 12/03/24  1532 11/06/24  1205   HGB 8.0* 8.3* 8.2* 9.1*   WBC 7.3 9.3 7.9 8.5   RBC 3.41* 3.40* 3.38* 3.91*   HCT 25.4* 25.1* 25.5* 29.7*   MCV 75* 74* 75* 76*   * 519* 513* 612*     INR  Recent Labs   Lab Test 11/08/24  1142 08/16/24  0610   INR 1.15 1.14   PTT  --  46*     ABG  Recent Labs   Lab Test 08/07/22  0828 03/08/22  1929   PH 7.42  --    O2PER  --  21      URINE STUDIES  Recent Labs   Lab Test 12/13/24  1426 10/15/24  1254 09/17/24  1505 09/11/24  1802   APPEARANCE Clear Clear Clear Slightly Cloudy*   URINEGLC >=1000* 200* 500* 100*   URINEBILI Negative Negative Negative Negative   URINEKETONE Negative Negative Negative Negative   SG 1.008 1.009 1.010 1.017   UBLD Trace* Small* Small* Trace*   URINEPH 6.0 5.5 6.0 5.5   PROTEIN 20* 70* 100* 70*   UROBILINOGEN  --   --  0.2  --    NITRITE Negative Negative Negative Negative   LEUKEST Negative Negative Negative Negative   RBCU 2 3* 2-5* 17*   WBCU 1 1 0-5 2     Lab on 12/13/2024   Component Date Value Ref Range Status     Color Urine 12/13/2024 Straw  Colorless, Straw, Light Yellow, Yellow Final     Appearance Urine 12/13/2024 Clear  Clear Final     Glucose Urine 12/13/2024 >=1000 (A)  Negative mg/dL Final     Bilirubin Urine 12/13/2024 Negative  Negative Final     Ketones Urine 12/13/2024 Negative  Negative mg/dL Final     Specific Gravity Urine 12/13/2024 1.008  1.003 - 1.035 Final     Blood Urine 12/13/2024 Trace (A)  Negative Final     pH Urine 12/13/2024 6.0  5.0 - 7.0 Final     Protein Albumin Urine 12/13/2024 20 (A)  Negative mg/dL Final     Urobilinogen Urine 12/13/2024 Normal   Normal, 2.0 mg/dL Final     Nitrite Urine 12/13/2024 Negative  Negative Final     Leukocyte Esterase Urine 12/13/2024 Negative  Negative Final     RBC Urine 12/13/2024 2  <=2 /HPF Final     WBC Urine 12/13/2024 1  <=5 /HPF Final     Hyaline Casts Urine 12/13/2024 3 (H)  <=2 /LPF Final     Sodium 12/13/2024 127 (L)  135 - 145 mmol/L Final     Potassium 12/13/2024 3.0 (L)  3.4 - 5.3 mmol/L Final     Chloride 12/13/2024 95 (L)  98 - 107 mmol/L Final     Carbon Dioxide (CO2) 12/13/2024 21 (L)  22 - 29 mmol/L Final     Anion Gap 12/13/2024 11  7 - 15 mmol/L Final     Glucose 12/13/2024 562 (HH)  70 - 99 mg/dL Final     Urea Nitrogen 12/13/2024 16.7  6.0 - 20.0 mg/dL Final     Creatinine 12/13/2024 1.23 (H)  0.67 - 1.17 mg/dL Final     GFR Estimate 12/13/2024 69  >60 mL/min/1.73m2 Final    eGFR calculated using 2021 CKD-EPI equation.     Calcium 12/13/2024 7.9 (L)  8.8 - 10.4 mg/dL Final    Reference intervals for this test were updated on 7/16/2024 to reflect our healthy population more accurately. There may be differences in the flagging of prior results with similar values performed with this method. Those prior results can be interpreted in the context of the updated reference intervals.     Albumin 12/13/2024 3.1 (L)  3.5 - 5.2 g/dL Final     Phosphorus 12/13/2024 3.1  2.5 - 4.5 mg/dL Final     WBC Count 12/13/2024 7.3  4.0 - 11.0 10e3/uL Final     RBC Count 12/13/2024 3.41 (L)  4.40 - 5.90 10e6/uL Final     Hemoglobin 12/13/2024 8.0 (L)  13.3 - 17.7 g/dL Final     Hematocrit 12/13/2024 25.4 (L)  40.0 - 53.0 % Final     MCV 12/13/2024 75 (L)  78 - 100 fL Final     MCH 12/13/2024 23.5 (L)  26.5 - 33.0 pg Final     MCHC 12/13/2024 31.5  31.5 - 36.5 g/dL Final     RDW 12/13/2024 17.8 (H)  10.0 - 15.0 % Final     Platelet Count 12/13/2024 480 (H)  150 - 450 10e3/uL Final     % Neutrophils 12/13/2024 64  % Final     % Lymphocytes 12/13/2024 23  % Final     % Monocytes 12/13/2024 7  % Final     % Eosinophils 12/13/2024 5   % Final     % Basophils 12/13/2024 1  % Final     % Immature Granulocytes 12/13/2024 0  % Final     NRBCs per 100 WBC 12/13/2024 0  <1 /100 Final     Absolute Neutrophils 12/13/2024 4.7  1.6 - 8.3 10e3/uL Final     Absolute Lymphocytes 12/13/2024 1.7  0.8 - 5.3 10e3/uL Final     Absolute Monocytes 12/13/2024 0.5  0.0 - 1.3 10e3/uL Final     Absolute Eosinophils 12/13/2024 0.4  0.0 - 0.7 10e3/uL Final     Absolute Basophils 12/13/2024 0.1  0.0 - 0.2 10e3/uL Final     Absolute Immature Granulocytes 12/13/2024 0.0  <=0.4 10e3/uL Final     Absolute NRBCs 12/13/2024 0.0  10e3/uL Final   Oncology Visit on 12/12/2024   Component Date Value Ref Range Status     Haptoglobin 12/12/2024 395 (H)  30 - 200 mg/dL Final     Lactate Dehydrogenase 12/12/2024 199  0 - 250 U/L Final     Sodium 12/12/2024 135  135 - 145 mmol/L Final     Potassium 12/12/2024 3.4  3.4 - 5.3 mmol/L Final     Carbon Dioxide (CO2) 12/12/2024 21 (L)  22 - 29 mmol/L Final     Anion Gap 12/12/2024 12  7 - 15 mmol/L Final     Urea Nitrogen 12/12/2024 19.2  6.0 - 20.0 mg/dL Final     Creatinine 12/12/2024 1.34 (H)  0.67 - 1.17 mg/dL Final     GFR Estimate 12/12/2024 62  >60 mL/min/1.73m2 Final    eGFR calculated using 2021 CKD-EPI equation.     Calcium 12/12/2024 8.4 (L)  8.8 - 10.4 mg/dL Final    Reference intervals for this test were updated on 7/16/2024 to reflect our healthy population more accurately. There may be differences in the flagging of prior results with similar values performed with this method. Those prior results can be interpreted in the context of the updated reference intervals.     Chloride 12/12/2024 102  98 - 107 mmol/L Final     Glucose 12/12/2024 122 (H)  70 - 99 mg/dL Final     Alkaline Phosphatase 12/12/2024 214 (H)  40 - 150 U/L Final     AST 12/12/2024 39  0 - 45 U/L Final     ALT 12/12/2024 28  0 - 70 U/L Final     Protein Total 12/12/2024 8.3  6.4 - 8.3 g/dL Final     Albumin 12/12/2024 3.3 (L)  3.5 - 5.2 g/dL Final     Bilirubin  Total 12/12/2024 0.2  <=1.2 mg/dL Final     Folic Acid 12/12/2024 6.5  4.6 - 34.8 ng/mL Final     Vitamin B12 12/12/2024 647  232 - 1,245 pg/mL Final     Ferritin 12/12/2024 499 (H)  31 - 409 ng/mL Final     Iron 12/12/2024 38 (L)  61 - 157 ug/dL Final     Iron Binding Capacity 12/12/2024 192 (L)  240 - 430 ug/dL Final     Iron Sat Index 12/12/2024 20  15 - 46 % Final     Final Diagnosis 12/12/2024    Final                    Value:Peripheral blood:  --Marked anemia, macrocytic normochromic.  --Slight thrombocytosis.       Comment 12/12/2024    Final                    Value:The cause of this patient's anemia is unclear from the peripheral smear. There is no morphologic evidence of red cell fragmentation or increased red cell regeneration to suggest either hemolysis or acute blood loss.         Clinical Information 12/12/2024    Final                    Value:Anemia       Peripheral Smear 12/12/2024    Final                    Value:The red blood cells appear normochromic.  Rouleaux formation does not appear increased.  Polychromasia does not appear increased.  Poikilocytosis appears mild and nonspecific.  Platelets  are well granulated.  Lymphocytes are predominantly small with cytologically mature chromatin and appear overall polymorphous.  Neutrophils contain normal cytoplasmic granulation and unremarkable nuclear morphology.  There is no dysplasia and no circulating blasts are seen.         Peripheral Hematologic Data 12/12/2024    Final                    Value: Latest Reference Range & Units 12/12/24 11:13   WBC 4.0 - 11.0 10e3/uL 9.3   Hemoglobin 13.3 - 17.7 g/dL 8.3 (L)   Hematocrit 40.0 - 53.0 % 25.1 (L)   Platelet Count 150 - 450 10e3/uL 519 (H)   RBC Count 4.40 - 5.90 10e6/uL 3.40 (L)   MCV 78 - 100 fL 74 (L)   MCH 26.5 - 33.0 pg 24.4 (L)   MCHC 31.5 - 36.5 g/dL 33.1   RDW 10.0 - 15.0 % 17.9 (H)   % Neutrophils % 63   % Lymphocytes % 23   % Monocytes % 9   % Eosinophils % 5   % Basophils % 1   Absolute  Basophils 0.0 - 0.2 10e3/uL 0.1   Absolute Eosinophils 0.0 - 0.7 10e3/uL 0.4   Absolute Immature Granulocytes <=0.4 10e3/uL 0.0   Absolute Lymphocytes 0.8 - 5.3 10e3/uL 2.2   Absolute Monocytes 0.0 - 1.3 10e3/uL 0.8   % Immature Granulocytes % 0   Absolute Neutrophils 1.6 - 8.3 10e3/uL 5.8   Absolute NRBCs 10e3/uL 0.0   NRBCs per 100 WBC <1 /100 0   % Retic 0.5 - 2.0 % 1.3   Absolute Retic 0.025 - 0.095 10e6/uL 0.045   (L): Data is abnormally low  (H): Data is abnormally high       Performing Labs 12/12/2024    Final                    Value:The technical component of this testing was completed at [facility], [facility] and [facility]       WBC Count 12/12/2024 9.3  4.0 - 11.0 10e3/uL Final     RBC Count 12/12/2024 3.40 (L)  4.40 - 5.90 10e6/uL Final     Hemoglobin 12/12/2024 8.3 (L)  13.3 - 17.7 g/dL Final     Hematocrit 12/12/2024 25.1 (L)  40.0 - 53.0 % Final     MCV 12/12/2024 74 (L)  78 - 100 fL Final     MCH 12/12/2024 24.4 (L)  26.5 - 33.0 pg Final     MCHC 12/12/2024 33.1  31.5 - 36.5 g/dL Final     RDW 12/12/2024 17.9 (H)  10.0 - 15.0 % Final     Platelet Count 12/12/2024 519 (H)  150 - 450 10e3/uL Final     % Neutrophils 12/12/2024 63  % Final     % Lymphocytes 12/12/2024 23  % Final     % Monocytes 12/12/2024 9  % Final     % Eosinophils 12/12/2024 5  % Final     % Basophils 12/12/2024 1  % Final     % Immature Granulocytes 12/12/2024 0  % Final     NRBCs per 100 WBC 12/12/2024 0  <1 /100 Final     Absolute Neutrophils 12/12/2024 5.8  1.6 - 8.3 10e3/uL Final     Absolute Lymphocytes 12/12/2024 2.2  0.8 - 5.3 10e3/uL Final     Absolute Monocytes 12/12/2024 0.8  0.0 - 1.3 10e3/uL Final     Absolute Eosinophils 12/12/2024 0.4  0.0 - 0.7 10e3/uL Final     Absolute Basophils 12/12/2024 0.1  0.0 - 0.2 10e3/uL Final     Absolute Immature Granulocytes 12/12/2024 0.0  <=0.4 10e3/uL Final     Absolute NRBCs 12/12/2024 0.0   10e3/uL Final     % Reticulocyte 12/12/2024 1.3  0.5 - 2.0 % Final     Absolute Reticulocyte 12/12/2024 0.045  0.025 - 0.095 10e6/uL Final     Total Protein Serum for ELP 12/12/2024 7.9  6.4 - 8.3 g/dL Final     Albumin 12/12/2024 3.0 (L)  3.7 - 5.1 g/dL Final     Alpha 1 12/12/2024 0.6 (H)  0.2 - 0.4 g/dL Final     Alpha 2 12/12/2024 1.2 (H)  0.5 - 0.9 g/dL Final     Beta Globulin 12/12/2024 1.0  0.6 - 1.0 g/dL Final     Gamma Globulin 12/12/2024 2.1 (H)  0.7 - 1.6 g/dL Final     Monoclonal Peak 12/12/2024 0.0  <=0.0 g/dL Final     ELP Interpretation 12/12/2024    Final                    Value:Significant hypoalbuminemia with increased alpha 1, alpha 2 and gamma globulins, suggestive of acute phase reaction with chronic inflammation. No obvious monoclonal protein seen. Pathologic significance requires clinical correlation. PERLA Mai M.D., Ph.D., Pathologist ().     Signout Location if Remote 12/12/2024 JHE1   Final     RICHY Anti-IgG,-C3d 12/12/2024 Positive 3+   Final     SPECIMEN EXPIRATION DATE 12/12/2024 [id number]   Final     SPECIMEN EXPIRATION DATE 12/12/2024 [id number]   Final     RICHY Anti-IgG 12/12/2024 Positive 1+   Final   Infusion Therapy Visit on 12/04/2024   Component Date Value Ref Range Status     CD19% B Cells 12/04/2024 11  6 - 27 % Final     Absolute CD19, B Cells 12/04/2024 187  107 - 698 cells/uL Final     Immunoglobulin G 12/04/2024 1,904 (H)  610 - 1,616 mg/dL Final     Immunoglobulin A 12/04/2024 424  84 - 499 mg/dL Final     Immunoglobulin M 12/04/2024 226  35 - 242 mg/dL Final     Oxycodone Urine 12/03/2024 Screen Positive (A)  Screen Negative Final    Cutoff for a positive oxycodone is 100 ng/mL or greater.   This is an unconfirmed screening result to be used for medical purposes only.      AST 12/03/2024 47 (H)  0 - 45 U/L Final     Creatinine 12/03/2024 1.09  0.67 - 1.17 mg/dL Final     GFR Estimate 12/03/2024 80  >60 mL/min/1.73m2 Final    eGFR calculated using  2021 CKD-EPI equation.     CRP Inflammation 12/03/2024 19.70 (H)  <5.00 mg/L Final     Erythrocyte Sedimentation Rate 12/03/2024 111 (H)  0 - 20 mm/hr Final     WBC Count 12/03/2024 7.9  4.0 - 11.0 10e3/uL Final     RBC Count 12/03/2024 3.38 (L)  4.40 - 5.90 10e6/uL Final     Hemoglobin 12/03/2024 8.2 (L)  13.3 - 17.7 g/dL Final     Hematocrit 12/03/2024 25.5 (L)  40.0 - 53.0 % Final     MCV 12/03/2024 75 (L)  78 - 100 fL Final     MCH 12/03/2024 24.3 (L)  26.5 - 33.0 pg Final     MCHC 12/03/2024 32.2  31.5 - 36.5 g/dL Final     RDW 12/03/2024 17.7 (H)  10.0 - 15.0 % Final     Platelet Count 12/03/2024 513 (H)  150 - 450 10e3/uL Final     % Neutrophils 12/03/2024 63  % Final     % Lymphocytes 12/03/2024 25  % Final     % Monocytes 12/03/2024 8  % Final     % Eosinophils 12/03/2024 3  % Final     % Basophils 12/03/2024 1  % Final     % Immature Granulocytes 12/03/2024 0  % Final     Absolute Neutrophils 12/03/2024 5.0  1.6 - 8.3 10e3/uL Final     Absolute Lymphocytes 12/03/2024 2.0  0.8 - 5.3 10e3/uL Final     Absolute Monocytes 12/03/2024 0.7  0.0 - 1.3 10e3/uL Final     Absolute Eosinophils 12/03/2024 0.3  0.0 - 0.7 10e3/uL Final     Absolute Basophils 12/03/2024 0.1  0.0 - 0.2 10e3/uL Final     Absolute Immature Granulocytes 12/03/2024 0.0  <=0.4 10e3/uL Final   HgbA1c 10.2 on 6[place]27/24 at [facility] [place]  [place] [facility] [place]:  Serum free light chains on 4/30/24 ;  Kappa 9.32, Lambda 6.87, ratio 1.36 (consistent with CKD).  SPEP was without monoclonal spike in Dec 2023   In Nov 2021, his UACR was < 30 at [facility] [place]    Erythropoietin  Erythropoietin  Collected: 12/12/24 1113   Result status: Final   Resulting lab: [facility]   Reference range: 4 - 27 mU/mL   Value: 16   Comment: INTERPRETIVE INFORMATION: Erythropoietin  Normal serum concentrations of erythropoietin for 95% of  individuals with normal hematocrits range from 4-27 mU/mL.    As the hematocrit is lowered by iron deficiency, aplastic,  or hemolytic anemia, the concentration of  erythropoietin  increases as shown in the graph below. In the absence of  anemia, elevated concentrations are seen in renal tumors,  as a manifestation of renal transplant rejection, and in  secondary polycythemia. Low values may be observed in  hemochromatosis.          Expected Erythropoietin Concentrations in Patients                     with Uncomplicated Anemia       Erythropoietin (mU/mL)                100,000 - +                          +                 10,000 - +.......                          + .......                  1,000 - +    .......                          +     ........                    100 - +       ........                          +        ........                     10 - +          ........                          +---+---+---+---+---+---+                         10   20  30  40  50  60  70                                (Hematocrit %)              (Contributions To Nephrology 1988:66:54-62)       CT chest 12/28/2023  [facility] [place]    Reading Radiologist: Deacon Vines  Reading Resident: Dennis Chavez  Narrative    Comparison: 12/26/2023 radiograph. Outside chest CT from 3/9/2022.    Indication: Anasarca of uncertain etiology; r/o malignancy      Technique: Volumetric helical acquisition of CT images from the lung apices through the symphysis pubis without intravenous contrast.    Total DLP = 829 mGy.cm.      Findings:    Chest:    Lungs: Moderate-large pleural effusions. No pneumothorax. Central airways are clear. Spiculated solid pulmonary nodule in the peripheral left lower lobe with pleural tagging with average diameter of 10 mm (series 202 image 78). An additional juxtapleural 3 mm solid pulmonary nodules noted superiorly in the left lower lobe (series 202 image 55).    Patchy and confluent airspace consolidation in the left greater than right lung apices, additional scattered groundglass airspace densities and interstitial prominence are noted throughout the aerated lobes. Compressive  atelectasis adjacent to the pleural effusions.    Mediastinum: Normal size heart, small pericardial effusion. Moderate calcific atherosclerosis of the coronary arteries. Anemic intracardiac blood pool. Small sliding hiatal hernia. Mildly prominent prevascular and paratracheal mediastinal lymph nodes. Reactive appearing bilateral axillary lymph nodes.    Abdomen/Pelvis:      Liver: Within normal limits.    Gallbladder and biliary tree:  Layering calcified gallstones. No intra- or extrahepatic biliary ductal dilation..    Pancreas: Markedly atrophic with dystrophic calcifications scattered throughout. No ductal dilatation.    Spleen: Within normal limits.    Adrenals: Within normal limits..    Kidneys, ureters and bladder: No hydronephrosis or obstructing calculus. Normal urinary bladder and ureters.    Reproductive organs: No pelvic masses.    Bowel: No dilated or overly thickened bowel loops. Small amount of stool admixed with contrast throughout the distal small bowel and proximal colon.    Vessels: Mild aortobiiliac atherosclerotic calcification, as well as circumferential atherosclerotic calcification throughout the internal iliac arteries and their terminal branches.    Lymph nodes: No enlarged lymph nodes.    Peritoneum: Diffuse mesenteric edema.    Bones/Soft Tissues:    No acute osseous abnormality. Opposing chronic erosive and sclerotic endplate changes of C6 and C7. Bilateral chronic rib fractures. No worrisome lytic or sclerotic osseous lesions.      Exam Date Exam Time Accession # Performing Department Results    7/9/24  4:09 PM [id number] [facility]      PACS Images     Show images for CT Abdomen Pelvis w Contrast     Study Result    Narrative & Impression   CT ABDOMEN AND PELVIS WITH CONTRAST 7/9/2024 4:09 PM     CLINICAL HISTORY: Weight loss.     TECHNIQUE: CT scan of the abdomen and pelvis was performed following  injection of IV contrast. Multiplanar  reformats were obtained. Dose  reduction techniques were used.     CONTRAST: 64mL Isovue-370     COMPARISON: 3/9/2022     FINDINGS:   LOWER CHEST: Normal.     HEPATOBILIARY: Small lesion in the periphery of the right hepatic lobe  is unchanged from previous.     PANCREAS: Signs of chronic pancreatitis with pancreatic atrophy,  ductal dilation, and calcifications, similar to previous.     SPLEEN: Normal.     ADRENAL GLANDS: Normal.     KIDNEYS/BLADDER: The kidneys are unremarkable. Mild bladder wall  thickening similar to previous.     BOWEL: No obstruction or inflammation. No visible mass.     PELVIC ORGANS: Normal.     ADDITIONAL FINDINGS: Moderate atherosclerosis. Mildly enlarged right  inguinal lymph nodes are most likely reactive, and measure up to 1.1  cm short axis.     MUSCULOSKELETAL: Normal.                                                                      IMPRESSION:   1.  Mild right inguinal lymphadenopathy is indeterminant but more  likely reactive.  2.  Otherwise, no acute findings or significant change in the abdomen  or pelvis compared to previous.     JUAN JOSE MICHELLE MD      9/17/24  4:07 PM 9/17/24  4:07 PM [id number] [facility]        Narrative & Impression   EXAM: XR SACROILIAC JOINT 1/2 VIEWS  LOCATION: [facility]  DATE: 9/17/2024     INDICATION: evaluate for inflammatory sacroiliitis  COMPARISON: None.                                                                      IMPRESSION: The SI joints are negative for sacroiliitis, ankylosis, or diastasis on this single projection. Pelvis negative for fracture. Both hips negative for fracture.   Collected 11/8/2024 12:30 PM       Status: Final result       Visible to patient: Yes (seen)    0 Result Notes       Component  Resulting Agency   Case Report   Surgical Pathology Report                         Case: [id number]                                   Authorizing Provider:  Amor  MD Jordy       Collected:           11/08/2024 12:30 PM           Ordering Location:     [facility]     Received:            11/08/2024 01:47 PM                                  [address] [place]                                                             Pathologist:           Rosalba Yao MD                                                               Specimen:    Kidney, Left, Left kidney biopsy                                                          Final Diagnosis   A. 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 (see comment)   - patchy dense lymphoplasmacytic interstitial inflammation; 10-20 IgG4+ plasma cells/400x field      Glomerulopathy with rare intramembranous and mesangial deposits reactive for C3      - the differential diagnosis includes a mild glomerulonephritis C3 or a resolving immune complex-mediated glomerulopathy      - there are no signs of an active glomerulitis currently     Moderate chronic changes of the parenchyma, including:   - focal global glomerulosclerosis (4% of glomeruli)   - focal tubular atrophy and interstitial fibrosis (40-50% of the cortex)   - severe arterial and arteriolar sclerosis   Electronically signed by Rosalba Yao MD on 11/15/2024 at 10:58 AM   Comment  UUMAYO   The interstitial nephritis shows some morphologic features suggestive of IgG4-related tubulointerstitial nephritis, characterized by 1). focal dense lymphoplasmacytic interstitial inflammation, and 2). the number of IgG4-positive plasma cells greater than 10 per high powered field (see reference). However, the typical storiform fibrosis is not noted in the sample and the ratio of IgG4-positive/IgG-positive plasma cells is lower than than 40%.  Correlation with the overall clinical scenario is required.       Reference:  Sharee IRVING, Norbert Y, Misha MACKEYK, Ovi EY, Carlos Eduardo Y, Starr T, Skinny PRATHER,  "Juarez MACKEYG, Uday A, Mark JA, Thuy RC. Consensus statement on the pathology of IgG4-related disease. Modern pathology. 2012 Sep;25(9):1181.     Umarun H, Okester K, Kawa S, Boi H, Goto H, Matsui S, Ishizaka N, Akamijacqueline T, Carlos Eduardo Y, Kawano M, Research Program for Intractable Disease by the [employer] ([facility]) [place].. The 2020 revised comprehensive diagnostic (RCD) criteria for IgG4-RD. Modern Rheumatology. 2021 May 4;31(3):529-33.   Clinical Information  UJOSS   54 y.o male with a complex past medical history, poorly controled diabetes (HgbA1c>10), a history of hypertension, severe inflammation of the Right knee, at least one episode of SALVADOR,  Cyst C-based eGFR around 30 ml/min/1.73m2. The etiology of CKD is unclear at this point.  However, he had a normal UACR in Nov 2021. His ANCA is negative.  Prior seroligic work up is unrevealing - except for the persistently elevated IgG4.  This, with the presence of \"chronic pancreatitis\" raises the suspicion of IgG4 disease.   Gross Description  MARCIANO ESPAÑA(A). Kidney, Left, Left kidney biopsy:  Native kidney biopsy, left, immediate assessment:  \"Cortical kidney tissue confirmed for light microscopy, immunofluorescence, and electron microscopy. \" (Deb TAVERAS([facility]))     The specimen is received in formalin with proper patient identification, labeled \"left kidney biopsy\".  The specimen consists of 4 tan needle core biopsies 0.2-0.9 cm in length and each 0.1 cm in diameter.  The specimen is wrapped and is entirely submitted in cassette A1.     A separate specimen is received in Nicolas fixative and consists of a 0.4 cm in length and 0.1 cm in diameter tan core biopsy which is processed for immunofluorescence studies.     A separate specimen is in glutaraldehyde and consists of a single tan core biopsy, 0.2 cm in length and 0.1 cm in diameter which is processed for electron microscopic studies.         Microscopic Description  UUMAPATRICIA "   LIGHT MICROSCOPY:  Sections of formalin-fixed, paraffin embedded tissue were evaluated using H&E, PAS, Milner, and trichrome stains with the appropriate controls. An H&E-stained frozen section taken from the tissue allocated for immunofluorescence microscopy and methylene blue-stained epoxy sections of the tissue processed for electron microscopy were also evaluated using light microscopy.      The sample consists of kidney cortex and it includes 46 glomeruli (LM-36; IF-7; EM-3), of which 2 (2+0+0) are globally sclerosed, 2 show segmental sclerosis and few are hypoperfused. Mesangiolysis with adjacent endocapillary hypercellularity is seen in few glomeruli. The mesangium of some glomeruli is expanded by increased extracellular matrix and increased numbers of mesangial cells, occasionally forming characteristic Kimmelstiel-Tae nodules. There are wrinkled basement membranes in many glomeruli and occasional distinctive double contours of the basement membranes. No basement membrane spikes or craters are seen in the glomeruli. Tubules show focal mild degenerative changes. No oxalate, phosphate, or urate crystals are present in the sample. Obvious signs of a viral cytopathic effect are not seen in the sample. The interstitium contains focal dense inflammation, and the infiltrates are composed of mononuclear cells and frequent plasma cells. Approximately 40-50% of the renal cortex shows tubular atrophy and mild interstitial fibrosis. Arteries and arterioles show severe sclerosis. Arterioles also show focal hyalinosis.     Immunohistochemistry studies of IgG and IgG4 reveal there are focally 10-20 IgG4+ plasma cells per high power field and an IgG4+/IgG+ plasma cell ratio of <40%.     IMMUNOFLUORESCENCE MICROSCOPY:   The sections of the sample submitted for immunofluorescence studies were incubated with antibodies specific for the heavy chains of IgG, IgA, and IgM, for kappa and lambda light chains, fibrin, albumin,  and complement components C3 and C1q. The immunofluorescence reactivity is graded on a scale of 0-4+.     The sample contains 7 glomeruli. There are no globally sclerosed glomeruli. There is fine granular reactivity for C3 (trace) and IgM (trace) in the mesangium. Diffuse linear reactivity for IgG (2+), albumin (2+), kappa light chain (2+) and lambda light chain (2+) is noted along the glomerular and tubular basement membranes. Tubules contain several intraluminal casts reactive for polyclonal IgA. The interstitium reveals scattered fibrin deposits. Arterioles reveal focal and strong reactivity for C3. There is no difference in reactivity between kappa and lambda light chains in the glomeruli, tubular casts and background of the tissue.      ELECTRON MICROSCOPY:   The sample submitted for electron microscopy examination contains 3 glomeruli; 2 glomeruli are examined ultrastructurally. The glomerular visceral epithelial cells reveal moderately segmental effacement of their foot processes. The glomerular basement membranes are thickened. The endothelial cells show focal swelling. Tubuloreticular inclusions are not seen within the cytoplasm of glomerular endothelial cells. The mesangium reveals focal mesangiolysis and nodular expansion of the mesangial matrix, with irregular densities and the deposition of cellular debris. Capillary loops and mesangium reveal the presence of rare intramembranous and mesangial granular electron dense deposits.     I have personally reviewed all specimens and slides, including the listed special stains, and used them with my medical judgement to determine the final diagnosis.     Case was reviewed by the following:  Resident Pathologist: Mimi Torres MD  A resident or fellow in a training program was involved in the initial review, preparation, and/or interpretation of this case.  I, as the senior physician, attest that I have personally reviewed all specimens and or slides, including  the listed special stains, and used them with my medical judgement to determine the final diagnosis.        Exam Date Exam Time Accession # Performing Department Results    10/23/24  1:03 PM [id number] [facility] Clinical Imaging Research      Narrative & Impression   Combined Report of: PET and CT on  10/23/2024 1:03 PM:     1. PET of the neck, chest, abdomen, and pelvis.  2. PET CT Fusion for Attenuation Correction and Anatomical  Localization:    3. 3D MIP and PET-CT fused images were processed on an independent  workstation and archived to PACS and reviewed by a radiologist.     Technique:     1. PET: The patient received 13.28 mCi of F-18-FDG; the serum glucose  was 133 mg/dL prior to administration, body weight was 56.3 kg. Images  were evaluated in the axial, sagittal, and coronal planes as well as  the rotational whole body MIP. Images were acquired from the Vertex to  the Feet.     UPTAKE WAS MEASURED  MINUTES.      2. CT: CT only obtained for attenuation correction and not diagnostic  purposes.     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     COMPARISON: CT abdomen and pelvis on 7/19/2024, CT chest abdomen and  pelvis on 3/9/2022, right knee MRI 9/21/2024     FINDINGS:   BACKGROUND: Liver SUV max = 1.97, Aorta Blood SUV max = 1.01.      HEAD/NECK:  No suspicious uptake.     Diffusely increased uptake in the tongue is favored to represent  muscle activation.     Mild diffuse uptake in the thyroid gland may relate to thyroiditis,  consider correlation with thyroid function tests.     CHEST:  No suspicious pulmonary nodules identified. Dependent subsegmental  atelectasis, greater on the left.       Heart size at the upper limits of normal. Diffusely increased left  atrial uptake is nonspecific, may relate to atrial fibrillation in the  appropriate clinical setting. Low-density aortic blood pool.      Segmental uptake in the distal esophagus likely represents reflux in  the setting of small hiatal hernia.     ABDOMEN AND PELVIS:  No suspicious uptake.      Stable subcentimeter cyst in the right hepatic lobe. Diffusely  atrophic pancreas with dystrophic parenchymal calcifications.     Diffuse mesenteric stranding/trace ascites. Nondistended urinary  bladder with unchanged somewhat asymmetric wall thickening. Multiple  enlarged right iliac chain and right inguinal lymph nodes  demonstrating mild to moderate uptake, for example, elongated right  iliac chain lymph node with SUV max of 4.3. In addition, multiple  right inguinal lymph nodes demonstrate central necrosis.     LOWER EXTREMITIES:   Complex, multiloculated large right knee joint effusion with synovial  thickening, demonstrating SUV max of 6.6.      Bilateral lower extremity edema, greater on the right.     BONES AND SOFT TISSUES:   No suspicious focal uptake.      Diffuse bone marrow uptake is likely reactive. Multiple healed  bilateral rib fracture deformities. Diffuse mild subcutaneous  anasarca. Multilevel degenerative changes in the spine with grade 1  anterolisthesis of L4 over L5.                                                                      IMPRESSION:      1. No evidence of hypermetabolic pulmonary nodules or suspicious uptake in the chest.      2. Large, complex right knee joint effusion with synovial thickening  previously characterized on 9/21/2024 MRI. Multiple enlarged right  inguinal and right iliac chain lymph nodes with mild to moderate FDG  uptake are likely reactive to the right knee infective/inflammatory  process.     3. Findings of positive fluid balance including trace ascites and  diffuse mild subcutaneous anasarca.  "Right greater than left lower  extremity edema.     4. Diffuse bone marrow uptake, which is nonspecific and may be related  to anemia given low density blood pool.     I have personally reviewed the examination and initial interpretation  and I agree with the findings.     JADEN CAT MD         ASSESSMENT AND PLAN:   #CKD  Mr Mayfield is a 54 y.o male with a complex past medical history, most recently related to severe inflammation of the Right knee - initially thought related to septic joint - but that failed to improve with antibiotics.  Repeat evaluations have failed to detect an infection.  He has had at least one episode of SALVADOR, which was reportedly attributed to SALVADOR from antibiotic exposure vs component of \"pre-renal\" azotemia.  However, review of his labs show a persistently elevated Cr (given the relatively small body size and habitus).    Indeed, his CKD is significantly more severe that suggested by S Cr alone, as his Cyst C-based eGFR is consistently significantly lower than the Cr-based estimate.  Based on Cyst C, his GFR is only  around 30 ml/min/1.73m2    The etiology of CKD is unclear.  He has had poorly controled diabetes (HgbA1c>10), a history of hypertension both of which would contribute to CKD - and the former to the albuminuria/proteinuria.    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  He is s/p the first dose of Rituximab and is scheduled for the second dose next week.  This is too early to expect a therapeutic effect of rituximab.  However, I suspect that the major cause of his kidney disease is indeed diabetic nephropathy associated with very poorly controlled diabetes - as supported by the severe hyperglycemia today.  Mr Mayfield is scheduled for appointments with the diabetes center in the coming week and subsequently.  I hope that he will be deemed a candidate for an insulin pump.  Mr Mayfield self administered 6 " unit(s) of insulin in the clinic.  He voiced not being concerned about his hyperglycemia today, and able to follow up with additional doses of insulin at home.     #Anemia:  Indices are consistent with anemia of chronic disease - with possibly a component of Fe deficiency.  His eGFR is low enough to account for the anemia in part.  Indeed, his erythropoeitin level is only 16 mU/ml with a hematocrit of 25 -> it should be > 500 mU/ml.  I will refer him to the anemia clinic for aranesp therapy.     #Spiculated Lung Nodule.   The PET CT did NOT confirm the presence of a mass.     I plan to see him again in person in April 2025     Jordy Chinchilla MD  Division of Renal Disease and Hypertension        Again, thank you for allowing me to participate in the care of your patient.      Sincerely,    Jordy Chinchilla MD

## 2024-12-13 NOTE — PROGRESS NOTES
Nephrology Clinic    Long Mayfield MRN:[id number] YOB: 1970  Date of Service: 12/13/2024  Primary care provider: Ana Maria Blakely  Requesting physician: AISHA Mcgowan       REASON FOR CONSULT: evaluation for possible systemic vasculitis ? ANCA    HISTORY OF PRESENT ILLNESS:   Long Mayfield is a 54 year old male who presents for evaluation of possible ANCA vasculitis.  Per Dr Mcgowan's evaluation of 9/17/24:  He has chronic arthralgia since his age of 48 involving hands, elbows, neck, back and knees that he attributes to injuries from falling as he used to be a . He denies swelling in the hands, elbows, feet.      He has been getting gel injections in his knees with [facility] and in May 2024, he developed swelling in the right knee, he was evaluated in July by Orthopedics at [facility], due to concerns for septic arthritis, he underwent washout and treated with 4 weeks of antibiotics. however he had recurrence of knee swelling. His infectious workup has been so far inconclusive however his cell count has been persistently elevated.    Hospitalize in [facility], Nov 2023.  Staph infection in R first toe.  Hospitalization during which he reports having severe edema, diffuse, including scrotal edema. Has 30 Lbs of fluid weight.  Required high dose diuretics.     In November - December 2023, he was hospitalized at [facility] for bilateral lower extremity swelling, rash, infection in his right big toe, he underwent surgery including amputation, there was concerns for osteomyelitis in the right foot.  During his evaluation, a skin biopsy showed LCV. He was treated with antibiotics. He saw Nephrology for SALVADOR that was felt to be prerenal at the time vs antibiotic related during the time.    ANCA by IFA was negative, AZ-3 borderline positive, MPO antibody negative. Repeat ANCA by Dr Mcgowan on 9/17: neg by IgG        He has chronic diarrhea and chronic pancreatitis, following with  "GI, unclear etiology but felt likely secondary to alcohol intake,  No reported inflammatory bowel disease on prior Colonoscopies     He has never smoked. He has not drank alcohol for over 2 years.  History of remote cocaine use, none recently.     He was recently hospitalized for shortness of breath and felt to have pneumonia and treated with antibiotics. He had anemia and received blood transfusion, etiology of anemia is unknown.  Was also admitted for hypoglycemia.    Has had diabetes x 6 yrs.  Has always been on insulin.    Around 2015, Remote history of being overweight (peak weight 180 lbs). Went on low carb and no alcohol + exercise -> lost 30lbs to his baseline weight.     History of HTN for several years.  States BP tends to be labile.     Denies history of gross hematuria     History of pulmonary nodule seen on CT scan in Dec (in setting of anasarca, pulm edema and effusions).       October 15, 2024  Today, he reports joint pain in neck, shoulder and right knee.  The R knee remains quite swollen and warm  No SOB, CP.  Mild cough x week, non productive.  No N, V, ABd pain. Some diarrhea .  No melena or hematochezia  No rash.     History of alcohol use, used to drink \"a lot\".  Stopped 2 years ago.   Followed by [facility] for chronic diarrhea.    Denies salivary gland swelling.  Sensation of dry eyes.    December 13, 2024  Reports joint pains and stiffness,   R knee pain and swelling, no significant symptomatic improvement from last visit.  PET CT on 10/23/24: no pulm nodule or mass, R knee inflammation.  Blood sugars are very labile.  > 500 today.  Doesn't have spms associated with hyperglycemia, but does sense hypoglycemia.  Has dexcom but no insulin pump.  Has appoint with DM service on 12/18    Kidney biopsy consistent with DMN + some evidence of IgG4 dis.   Ritux #1 on Dec 4, 2024.  Next scheduled for 12/19.    Summary of Immunosuppression  Rituximab  #1 , 100 mg IV Dec 4, 2024      ROS.. no fevers, chills, " "  Mild \"goofy cough\" non productive.   No CP.   No N, V, Abd pain.   Diarrhea persistent.   Followed by [facility].        PAST MEDICAL HISTORY:  Past Medical History:   Diagnosis Date    Acute pain of right knee 07/12/2024    Alcohol abuse 09/12/2022    Allergic rhinitis     Anemia     Arthritis     Bell's palsy     Cervicalgia     Change or removal of drains 08/26/2024    Diabetes (H)     Diabetic foot ulcer (H)     Generalized edema     Hyperglycemia 03/08/2022    Hyperkalemia     Hypertension     Hypo-osmolality and hyponatremia     Hypoglycemia 05/03/2024    Hypoxia 09/11/2024    Ketosis (H) 03/08/2022    Major depressive disorder, recurrent episode, mild (H)     Other acute osteomyelitis, right ankle and foot (H)     Other chronic pancreatitis (H)     Other polyosteoarthritis     Pneumonia of left lower lobe due to infectious organism 09/11/2024    Right knee pain 07/11/2024    Solitary pulmonary nodule      PAST SURGICAL HISTORY:  Past Surgical History:   Procedure Laterality Date    ARTHROSCOPY KNEE Right 08/17/2024    Procedure: Arthroscopic;  Surgeon: Jeff Phoenix MD;  Location: [facility] OR    COLONOSCOPY N/A 05/07/2024    Procedure: Colonoscopy;  Surgeon: Nina Moya MD;  Location:  GI    ESOPHAGOSCOPY, GASTROSCOPY, DUODENOSCOPY (EGD), COMBINED N/A 05/06/2024    Procedure: Esophagoscopy, gastroscopy, duodenoscopy (EGD), combined;  Surgeon: Nina Moya MD;  Location:  GI    ESOPHAGOSCOPY, GASTROSCOPY, DUODENOSCOPY (EGD), COMBINED N/A 05/07/2024    Procedure: Esophagoscopy, gastroscopy, duodenoscopy (EGD), combined;  Surgeon: Nina Moya MD;  Location:  GI    IR RENAL BIOPSY RIGHT  11/08/2024    IRRIGATION AND DEBRIDEMENT KNEE, COMBINED Right 08/17/2024    Procedure: IRRIGATION AND DEBRIDEMENT, Right KNEE,;  Surgeon: Jeff Phoenix MD;  Location: [facility] OR    IRRIGATION AND DEBRIDEMENT SPINE Right 07/11/2024    Procedure: Irrigation and debridement knee; " " Surgeon: Dejon Espinoza MD;  Location: [facility] OR    ORTHOPEDIC SURGERY      partial amputation of right foot Right     PATELLA SURGERY       MEDICATIONS:  Current Outpatient Medications reviewed with patient on 12/13/2024   Medication Sig Dispense Refill    amLODIPine (NORVASC) 10 MG tablet TAKE 1 TABLET(10 MG) BY MOUTH DAILY 90 tablet 1    cetirizine (ZYRTEC) 10 MG tablet Take 1 tablet (10 mg) by mouth daily. 30 tablet 11    gabapentin (NEURONTIN) 300 MG capsule Take 1 capsule (300 mg) by mouth 2 times daily. 60 capsule 2    hydrALAZINE (APRESOLINE) 25 MG tablet Take 25 mg by mouth 2 times daily. Hold if SBP < 110  NOT TAKING      insulin aspart (NOVOLOG FLEXPEN) 100 UNIT/ML pen Inject 1-5 Units subcutaneously 3 times daily (with meals). In addition to 6-8 units with each meal, inject additional units as per this sliding scale:  If 200-250 = 1   251-300 = 2   301-350 = 3  351-400 = 4  401+ = 5  Repeat BS after 3 hours and call MD if still over 400      insulin glargine (LANTUS PEN) 100 UNIT/ML pen Inject 12 Units subcutaneously every morning. 15 mL 3    insulin pen needle (31G X 6 MM) 31G X 6 MM miscellaneous Use 4 pen needles daily or as directed. 100 each 11    insulin syringe-needle U-100 (29G X 1/2\" 0.5 ML) 29G X 1/2\" 0.5 ML miscellaneous Use one syringe as directed prior to steroid infusion. 10 each 0    lipase-protease-amylase (CREON 36) [id number] units CPEP Take 3 capsules by mouth 3 times daily (with meals). 0730, 1130, 1630      lipase-protease-amylase (CREON 36) [id number] units CPEP Take 1 capsule by mouth 2 times daily. With snack 1000 & 2000      loperamide (IMODIUM) 2 MG capsule Take 4 mg by mouth every 8 hours as needed for diarrhea.      methocarbamol (ROBAXIN) 500 MG tablet Take 1 tablet (500 mg) by mouth every 8 hours as needed for muscle spasms. 30 tablet 3    oxyCODONE (ROXICODONE) 5 MG tablet Take 1 tablet (5 mg) by mouth every 6 hours as needed for pain. 30 tablet 0    " pantoprazole (PROTONIX) 40 MG EC tablet Take 1 tablet (40 mg) by mouth daily  NOT TAKING 30 tablet 0    sildenafil (VIAGRA) 50 MG tablet as needed.      triamcinolone (KENALOG) 0.1 % external ointment Apply topically.      atorvastatin (LIPITOR) 20 MG tablet Take 1 tablet (20 mg) by mouth daily.  NOT TAKING 90 tablet 3    colestipol (COLESTID) 1 g tablet NOT TAKING      Continuous Glucose Sensor (DEXCOM G7 SENSOR) MISC CHANGE EVERY 10 DAYS (Patient not taking: Reported on 12/13/2024)      DULoxetine (CYMBALTA) 60 MG capsule Take 60 mg by mouth daily  NOT TAKING      Glucagon, rDNA, (GLUCAGON EMERGENCY) 1 MG KIT Inject 1 mg into the muscle daily as needed (hypoglycemia) (Patient not taking: Reported on 12/13/2024)      glucose 40 % (400 mg/mL) gel Take by mouth as needed for low blood sugar (For BG <60). (Patient not taking: Reported on 12/13/2024)      insulin NPH (HUMULIN N KWIKPEN) 100 UNIT/ML injection Inject 13 units at the start of your steroid infusion at clinic. (Patient not taking: Reported on 12/13/2024) 3 mL 0    insulin  UNIT/ML vial Inject 13 units at the start of your steroid infusion at clinic which is scheduled for 12/4/24. (Patient not taking: Reported on 12/13/2024) 3 mL 0    methylcellulose (CITRUCEL) 500 MG TABS tablet Take 1 tablet (500 mg) by mouth daily. (Patient not taking: Reported on 12/13/2024) 90 tablet 0    multivitamin w/minerals (THERA-VIT-M) tablet Take 1 tablet by mouth daily. (Patient not taking: Reported on 12/13/2024) 30 tablet 11    sulfamethoxazole-trimethoprim (BACTRIM) 400-80 MG tablet Take 1 tablet by mouth daily. (Patient not taking: Reported on 12/13/2024) 90 tablet 1     ALLERGIES:    Allergies   Allergen Reactions    Vancomycin      Other Reaction(s): Renal Failure    Vancomycin-induced nephrotoxicity (11/2023, outside hospital)    Seasonal Allergies      HAYFEVER:    -Watery Eyes  -Eye burn    Daptomycin Rash     Likely delayed hypersensitivity reaction to  daptomycin 11/2023     REVIEW OF SYSTEMS:  A comprehensive review of systems was performed and found to be negative except as described here or above.  SOCIAL HISTORY:   Social History     Socioeconomic History    Marital status: Single     Spouse name: Not on file    Number of children: Not on file    Years of education: Not on file    Highest education level: Not on file   Occupational History    Not on file   Tobacco Use    Smoking status: Never     Passive exposure: Never    Smokeless tobacco: Never   Vaping Use    Vaping status: Never Used   Substance and Sexual Activity    Alcohol use: Not Currently     Comment: sober 2 year    Drug use: Not Currently     Types: Marijuana    Sexual activity: Not on file   Other Topics Concern    Not on file   Social History Narrative    Not on file     Social Drivers of Health     Financial Resource Strain: Low Risk  (9/24/2024)    Financial Resource Strain     Within the past 12 months, have you or your family members you live with been unable to get utilities (heat, electricity) when it was really needed?: No   Food Insecurity: Low Risk  (9/24/2024)    Food Insecurity     Within the past 12 months, did you worry that your food would run out before you got money to buy more?: No     Within the past 12 months, did the food you bought just not last and you didn t have money to get more?: No   Transportation Needs: Low Risk  (9/24/2024)    Transportation Needs     Within the past 12 months, has lack of transportation kept you from medical appointments, getting your medicines, non-medical meetings or appointments, work, or from getting things that you need?: No   Physical Activity: Not on file   Stress: Not on file   Social Connections: Unknown (3/16/2022)    Received from [facility]    Social Connections     Frequency of Communication with Friends and Family: Not on file   Interpersonal Safety: Low  Risk  (11/8/2024)    Interpersonal Safety     Do you feel physically and emotionally safe where you currently live?: Yes     Within the past 12 months, have you been hit, slapped, kicked or otherwise physically hurt by someone?: No     Within the past 12 months, have you been humiliated or emotionally abused in other ways by your partner or ex-partner?: No   Recent Concern: Interpersonal Safety - High Risk (9/11/2024)    Interpersonal Safety     Do you feel physically and emotionally safe where you currently live?: No     Within the past 12 months, have you been hit, slapped, kicked or otherwise physically hurt by someone?: No     Within the past 12 months, have you been humiliated or emotionally abused in other ways by your partner or ex-partner?: No   Housing Stability: Low Risk  (9/24/2024)    Housing Stability     Do you have housing? : Yes     Are you worried about losing your housing?: No     FAMILY MEDICAL HISTORY:   No family history on file.  PHYSICAL EXAM:   /83 (BP Location: Right arm, Patient Position: Sitting, Cuff Size: Adult Regular)   Pulse 68   Temp 97.8  F (36.6  C) (Oral)   Wt 54.6 kg (120 lb 4.8 oz)   SpO2 98%   BMI 20.02 kg/m    GENERAL APPEARANCE: alert and no distress  EYES: nonicteric  NECK: supple, no adenopathy  Carotids 2+ without bruit  RESP: lungs clear to auscultation   CV: regular rhythm, normal rate, no rub  ABDOMEN: soft, nontender,    Extremities: 1-2+  edema right ankle, lower shin.  No edema on the L (difference in swelling is reportedly chronic)  MS: The right knee is quite swollen ,but there is minimal tenderness.  The area proximal to the knee is soft today (unlike last visit).  Non tender.   SKIN: no rash  NEURO: mentation intact and speech normal  PSYCH: affect normal/bright   LABS:   Recent Results (from the past 672 hour(s))   HLA-B27 Typing    Collection Time: 09/17/24  2:58 PM   Result Value Ref Range    [id number] METHOD SSOP     B locus B27 Neg    Rheumatoid  factor    Collection Time: 09/17/24  2:58 PM   Result Value Ref Range    Rheumatoid Factor <10 <14 IU/mL   Cyclic Citrullinated Peptide Antibody IgG    Collection Time: 09/17/24  2:58 PM   Result Value Ref Range    Cyclic Citrullinated Peptide Antibody IgG 1.6 <7.0 U/mL   Myeloperoxidase and Proteinase 3 Panel    Collection Time: 09/17/24  2:58 PM   Result Value Ref Range    MPO [place] IgG Instrument Value <0.3 <3.5 U/mL    Myeloperoxidase Antibody IgG Negative Negative    Proteinase 3 [place] IgG Instrument Value <1.0 <2.0 U/mL    Proteinase 3 Antibody IgG Negative Negative   Immunoglobulins A G and M    Collection Time: 09/17/24  2:58 PM   Result Value Ref Range    Immunoglobulin G 2,121 (H) 610 - 1,616 mg/dL    Immunoglobulin A 457 84 - 499 mg/dL    Immunoglobulin M 281 (H) 35 - 242 mg/dL   IgG Subclasses    Collection Time: 09/17/24  2:58 PM   Result Value Ref Range    Immunoglobulin G 2,121 (H) 610 - 1,616 mg/dL    IgG1 1,179 (H) 382 - 929 mg/dL    IgG2 508 242 - 700 mg/dL    IgG3 126 22 - 176 mg/dL    IgG4 224 (H) 4 - 86 mg/dL    Subclasses Percent 96 %   Cytology non gyn    Collection Time: 09/23/24  1:52 PM   Result Value Ref Range    Final Diagnosis       Specimen A     Interpretation:      Negative for malignancy     Other Findings:      Acute inflammation present.     Adequacy:     Satisfactory for evaluation          Comment       Please correlate with microbiologic studies and crystal analysis.       Clinical Information       54M with right knee effusion, r/o PVNS      Gross Description       A(A). Knee, Right, :A. Knee, Right, Synovial Fluid:  Received 15 ml of hazy, orange fluid, processed as 1 Pap stained Autocyte,  1 Strickland stained cytospin and one hematoxylin and eosin stained cell block.       Microscopic Description       A microscopic examination was performed.     Case was reviewed by the following:  Pathology Fellow: Danielle Mccurdy DO  Pathology Fellow: Julee Oneal MD  Resident  Pathologist: Dmitry Aparicio MD  A resident or fellow in a training program was involved in the initial review, preparation, and/or interpretation of this case.  I, as the senior physician, attest that I have personally reviewed all specimens and or slides, including the listed special stains, and used them with my medical judgement to determine the final diagnosis.              Performing Labs       The technical component of this testing was completed at [facility] and [facility].     Stain controls for all stains resulted within this report have been reviewed and show appropriate reactivity.      Immunoglobulin G subclasses    Collection Time: 09/24/24 10:35 AM   Result Value Ref Range    Immunoglobulin G 2,015 (H) 610 - 1,616 mg/dL    IgG1 1,126 (H) 382 - 929 mg/dL    IgG2 499 242 - 700 mg/dL    IgG3 130 22 - 176 mg/dL    IgG4 216 (H) 4 - 86 mg/dL    Subclasses Percent 98 %    Creatinine 1.24 (H) 0.67 - 1.17 mg/dL    GFR Estimate 68 >60 mL/min/1.73m2   Cystatin C with GFR    Collection Time: 09/24/24 10:35 AM   Result Value Ref Range    Cystatin C 2.1 (H) 0.6 - 1.0 mg/L    GFR Calculated with Cystatin C 29 (L) >=60 mL/min/1.73m2   Ferritin    Collection Time: 09/24/24 10:35 AM   Result Value Ref Range    Ferritin 418 (H) 31 - 409 ng/mL   Iron and iron binding capacity    Collection Time: 09/24/24 10:35 AM   Result Value Ref Range    Iron 20 (L) 61 - 157 ug/dL    Iron Binding Capacity 230 (L) 240 - 430 ug/dL    Iron Sat Index 9 (L) 15 - 46 %   Folate    Collection Time: 09/24/24 10:35 AM   Result Value Ref Range    Folic Acid 10.0 4.6 - 34.8 ng/mL   Vitamin B12    Collection Time: 09/24/24 10:35 AM   Result Value Ref Range    Vitamin B12 662 232 - 1,245 pg/mL   Bld morphology pathology review    Collection Time: 09/24/24 10:35 AM   Result Value Ref Range    Final Diagnosis       PERIPHERAL BLOOD MORPHOLOGY:        1.  MILD HYPOCHROMIC-BORDERLINE MICROCYTIC  ANEMIA  A.  MODERATE ANISOCYTOSIS, WITHOUT INCREASED POIKILOCYTOSIS  B.  IRON STUDIES SUGGESTIVE OF ANEMIA OF CHRONIC DISEASE  C.  LACK OF CORRESPONDING RETICULOCYTOSIS        2.  NORMAL LEUKOCYTE DIFFERENTIAL AND MORPHOLOGY        3.  UNREMARKABLE PLATELET MORPHOLOGY        Comment       The differential diagnosis of a borderline microcytic anemia would include iron deficiency anemia, pyridoxine responsive anemia, hemoglobinopathies/thalassemia, and anemia of chronic disease.  The iron studies are consistent with anemia of chronic disease.  A corresponding reticulocytosis (bone marrow response) is not present.      Clinical Information       Evaluation of anemia      Peripheral Smear       Erythrocytes are mildly decreased in number, hypochromic and borderline microcytic.  There is moderate anisocytosis, without increased poikilocytosis.  Increased polychromasia, basophilic stippling and nucleated red blood cells are not identified.    Leukocytes are normal in number and show a normal differential distribution, without a significant neutrophilic left shift or toxic changes.  Occasional hypersegmented neutrophils are present.  Other megaloblastic changes, dysplastic features and blasts are not identified.  Lymphocytes are mature and polymorphic in appearance.    Platelets are normal in number and morphology.      Performing Labs       The technical component of this testing was completed at the [facility] and [facility]      Albumin Random Urine Quantitative with Creat Ratio    Collection Time: 09/24/24 10:37 AM   Result Value Ref Range    Creatinine Urine mg/dL 50.3 mg/dL    Albumin Urine mg/L 1,281.0 mg/L    Albumin Urine mg/g Cr 2,546.72 (H) 0.00 - 17.00 mg/g Cr     CMP  Recent Labs   Lab Test 12/12/24  1113 12/03/24  1532 11/08/24  1142 11/05/24  1105 10/23/24  1005 10/15/24  1248 09/24/24  1035 07/13/24  1149 07/13/24  0833 05/06/24  0818 05/06/24  0724  05/05/24  1744 05/05/24  1438 05/05/24  1156 05/05/24  1037 03/08/22  1756 10/08/17  2105     --   --  131*  --  131* 132*   < > 133*   < > 133*  --   --   --  134*   < > 131*   POTASSIUM 3.4  --   --  4.1  --  4.0 5.3   < > 5.6*   < > 4.8  --   --   --  4.6   < > 3.8   CHLORIDE 102  --   --  97*  --  98 102   < > 100   < > 99  --   --   --  100   < > 95   CO2 21*  --   --  21*  --  24 21*   < > 24   < > 22  --   --   --  22   < > 28   ANIONGAP 12  --   --  13  --  9 9   < > 9   < > 12  --   --   --  12   < > 8   *  --  178* 271* 133* 278* 241*   < > 218*   < > 215*   < >  --    < > 230*   < > 403*   BUN 19.2  --   --  19.4  --  22.3* 28.5*   < > 21.9*   < > 16.6  --   --   --  22.5*   < > 9   CR 1.34* 1.09  --  1.49*  --  1.35* 1.24*   < > 1.38*   < > 0.99  --   --   --  1.17   < > 0.68   GFRESTIMATED 62 80  --  55*  --  62 68   < > 60*   < > 89  --   --   --  73   < > >90   GFRESTBLACK  --   --   --   --   --   --   --   --   --   --   --   --   --   --   --   --  >90   AROLDO 8.4*  --   --  8.2*  --  8.6* 9.0   < > 8.3*   < > 9.0  --   --   --  9.1   < > 9.5   MAG  --   --   --   --   --   --   --   --  2.3  --  1.9  --  2.1  --  2.2   < >  --    PHOS  --   --   --   --   --  3.1  --   --   --   --  2.7  --  3.8  --  3.6   < >  --    PROTTOTAL 8.3  --   --  7.6  --  8.2 8.0   < >  --    < >  --   --   --   --   --    < > 7.7   ALBUMIN 3.3*  --   --  3.0*  --  3.3* 3.4*   < >  --    < >  --   --   --   --   --    < > 3.4   BILITOTAL 0.2  --   --  <0.2  --  0.2 0.2   < >  --    < >  --   --   --   --   --    < > 0.2   ALKPHOS 214*  --   --  133  --  121 161*   < >  --    < >  --   --   --   --   --    < > 124   AST 39 47*  --  13  --  15 21   < >  --    < >  --   --   --   --   --    < > 73*   ALT 28  --   --  <5  --  <5 12   < >  --    < >  --   --   --   --   --    < > 52    < > = values in this interval not displayed.     CBC  Recent Labs   Lab Test 12/13/24  1415 12/12/24  1113 12/03/24  1532  11/06/24  1205   HGB 8.0* 8.3* 8.2* 9.1*   WBC 7.3 9.3 7.9 8.5   RBC 3.41* 3.40* 3.38* 3.91*   HCT 25.4* 25.1* 25.5* 29.7*   MCV 75* 74* 75* 76*   * 519* 513* 612*     INR  Recent Labs   Lab Test 11/08/24  1142 08/16/24  0610   INR 1.15 1.14   PTT  --  46*     ABG  Recent Labs   Lab Test 08/07/22  0828 03/08/22  1929   PH 7.42  --    O2PER  --  21      URINE STUDIES  Recent Labs   Lab Test 12/13/24  1426 10/15/24  1254 09/17/24  1505 09/11/24  1802   APPEARANCE Clear Clear Clear Slightly Cloudy*   URINEGLC >=1000* 200* 500* 100*   URINEBILI Negative Negative Negative Negative   URINEKETONE Negative Negative Negative Negative   SG 1.008 1.009 1.010 1.017   UBLD Trace* Small* Small* Trace*   URINEPH 6.0 5.5 6.0 5.5   PROTEIN 20* 70* 100* 70*   UROBILINOGEN  --   --  0.2  --    NITRITE Negative Negative Negative Negative   LEUKEST Negative Negative Negative Negative   RBCU 2 3* 2-5* 17*   WBCU 1 1 0-5 2     Lab on 12/13/2024   Component Date Value Ref Range Status    Color Urine 12/13/2024 Straw  Colorless, Straw, Light Yellow, Yellow Final    Appearance Urine 12/13/2024 Clear  Clear Final    Glucose Urine 12/13/2024 >=1000 (A)  Negative mg/dL Final    Bilirubin Urine 12/13/2024 Negative  Negative Final    Ketones Urine 12/13/2024 Negative  Negative mg/dL Final    Specific Gravity Urine 12/13/2024 1.008  1.003 - 1.035 Final    Blood Urine 12/13/2024 Trace (A)  Negative Final    pH Urine 12/13/2024 6.0  5.0 - 7.0 Final    Protein Albumin Urine 12/13/2024 20 (A)  Negative mg/dL Final    Urobilinogen Urine 12/13/2024 Normal  Normal, 2.0 mg/dL Final    Nitrite Urine 12/13/2024 Negative  Negative Final    Leukocyte Esterase Urine 12/13/2024 Negative  Negative Final    RBC Urine 12/13/2024 2  <=2 /HPF Final    WBC Urine 12/13/2024 1  <=5 /HPF Final    Hyaline Casts Urine 12/13/2024 3 (H)  <=2 /LPF Final    Sodium 12/13/2024 127 (L)  135 - 145 mmol/L Final    Potassium 12/13/2024 3.0 (L)  3.4 - 5.3 mmol/L Final     Chloride 12/13/2024 95 (L)  98 - 107 mmol/L Final    Carbon Dioxide (CO2) 12/13/2024 21 (L)  22 - 29 mmol/L Final    Anion Gap 12/13/2024 11  7 - 15 mmol/L Final    Glucose 12/13/2024 562 (HH)  70 - 99 mg/dL Final    Urea Nitrogen 12/13/2024 16.7  6.0 - 20.0 mg/dL Final    Creatinine 12/13/2024 1.23 (H)  0.67 - 1.17 mg/dL Final    GFR Estimate 12/13/2024 69  >60 mL/min/1.73m2 Final    eGFR calculated using 2021 CKD-EPI equation.    Calcium 12/13/2024 7.9 (L)  8.8 - 10.4 mg/dL Final    Reference intervals for this test were updated on 7/16/2024 to reflect our healthy population more accurately. There may be differences in the flagging of prior results with similar values performed with this method. Those prior results can be interpreted in the context of the updated reference intervals.    Albumin 12/13/2024 3.1 (L)  3.5 - 5.2 g/dL Final    Phosphorus 12/13/2024 3.1  2.5 - 4.5 mg/dL Final    WBC Count 12/13/2024 7.3  4.0 - 11.0 10e3/uL Final    RBC Count 12/13/2024 3.41 (L)  4.40 - 5.90 10e6/uL Final    Hemoglobin 12/13/2024 8.0 (L)  13.3 - 17.7 g/dL Final    Hematocrit 12/13/2024 25.4 (L)  40.0 - 53.0 % Final    MCV 12/13/2024 75 (L)  78 - 100 fL Final    MCH 12/13/2024 23.5 (L)  26.5 - 33.0 pg Final    MCHC 12/13/2024 31.5  31.5 - 36.5 g/dL Final    RDW 12/13/2024 17.8 (H)  10.0 - 15.0 % Final    Platelet Count 12/13/2024 480 (H)  150 - 450 10e3/uL Final    % Neutrophils 12/13/2024 64  % Final    % Lymphocytes 12/13/2024 23  % Final    % Monocytes 12/13/2024 7  % Final    % Eosinophils 12/13/2024 5  % Final    % Basophils 12/13/2024 1  % Final    % Immature Granulocytes 12/13/2024 0  % Final    NRBCs per 100 WBC 12/13/2024 0  <1 /100 Final    Absolute Neutrophils 12/13/2024 4.7  1.6 - 8.3 10e3/uL Final    Absolute Lymphocytes 12/13/2024 1.7  0.8 - 5.3 10e3/uL Final    Absolute Monocytes 12/13/2024 0.5  0.0 - 1.3 10e3/uL Final    Absolute Eosinophils 12/13/2024 0.4  0.0 - 0.7 10e3/uL Final    Absolute Basophils  12/13/2024 0.1  0.0 - 0.2 10e3/uL Final    Absolute Immature Granulocytes 12/13/2024 0.0  <=0.4 10e3/uL Final    Absolute NRBCs 12/13/2024 0.0  10e3/uL Final   Oncology Visit on 12/12/2024   Component Date Value Ref Range Status    Haptoglobin 12/12/2024 395 (H)  30 - 200 mg/dL Final    Lactate Dehydrogenase 12/12/2024 199  0 - 250 U/L Final    Sodium 12/12/2024 135  135 - 145 mmol/L Final    Potassium 12/12/2024 3.4  3.4 - 5.3 mmol/L Final    Carbon Dioxide (CO2) 12/12/2024 21 (L)  22 - 29 mmol/L Final    Anion Gap 12/12/2024 12  7 - 15 mmol/L Final    Urea Nitrogen 12/12/2024 19.2  6.0 - 20.0 mg/dL Final    Creatinine 12/12/2024 1.34 (H)  0.67 - 1.17 mg/dL Final    GFR Estimate 12/12/2024 62  >60 mL/min/1.73m2 Final    eGFR calculated using 2021 CKD-EPI equation.    Calcium 12/12/2024 8.4 (L)  8.8 - 10.4 mg/dL Final    Reference intervals for this test were updated on 7/16/2024 to reflect our healthy population more accurately. There may be differences in the flagging of prior results with similar values performed with this method. Those prior results can be interpreted in the context of the updated reference intervals.    Chloride 12/12/2024 102  98 - 107 mmol/L Final    Glucose 12/12/2024 122 (H)  70 - 99 mg/dL Final    Alkaline Phosphatase 12/12/2024 214 (H)  40 - 150 U/L Final    AST 12/12/2024 39  0 - 45 U/L Final    ALT 12/12/2024 28  0 - 70 U/L Final    Protein Total 12/12/2024 8.3  6.4 - 8.3 g/dL Final    Albumin 12/12/2024 3.3 (L)  3.5 - 5.2 g/dL Final    Bilirubin Total 12/12/2024 0.2  <=1.2 mg/dL Final    Folic Acid 12/12/2024 6.5  4.6 - 34.8 ng/mL Final    Vitamin B12 12/12/2024 647  232 - 1,245 pg/mL Final    Ferritin 12/12/2024 499 (H)  31 - 409 ng/mL Final    Iron 12/12/2024 38 (L)  61 - 157 ug/dL Final    Iron Binding Capacity 12/12/2024 192 (L)  240 - 430 ug/dL Final    Iron Sat Index 12/12/2024 20  15 - 46 % Final    Final Diagnosis 12/12/2024    Final                    Value:Peripheral  blood:  --Marked anemia, macrocytic normochromic.  --Slight thrombocytosis.      Comment 12/12/2024    Final                    Value:The cause of this patient's anemia is unclear from the peripheral smear. There is no morphologic evidence of red cell fragmentation or increased red cell regeneration to suggest either hemolysis or acute blood loss.        Clinical Information 12/12/2024    Final                    Value:Anemia      Peripheral Smear 12/12/2024    Final                    Value:The red blood cells appear normochromic.  Rouleaux formation does not appear increased.  Polychromasia does not appear increased.  Poikilocytosis appears mild and nonspecific.  Platelets  are well granulated.  Lymphocytes are predominantly small with cytologically mature chromatin and appear overall polymorphous.  Neutrophils contain normal cytoplasmic granulation and unremarkable nuclear morphology.  There is no dysplasia and no circulating blasts are seen.        Peripheral Hematologic Data 12/12/2024    Final                    Value: Latest Reference Range & Units 12/12/24 11:13   WBC 4.0 - 11.0 10e3/uL 9.3   Hemoglobin 13.3 - 17.7 g/dL 8.3 (L)   Hematocrit 40.0 - 53.0 % 25.1 (L)   Platelet Count 150 - 450 10e3/uL 519 (H)   RBC Count 4.40 - 5.90 10e6/uL 3.40 (L)   MCV 78 - 100 fL 74 (L)   MCH 26.5 - 33.0 pg 24.4 (L)   MCHC 31.5 - 36.5 g/dL 33.1   RDW 10.0 - 15.0 % 17.9 (H)   % Neutrophils % 63   % Lymphocytes % 23   % Monocytes % 9   % Eosinophils % 5   % Basophils % 1   Absolute Basophils 0.0 - 0.2 10e3/uL 0.1   Absolute Eosinophils 0.0 - 0.7 10e3/uL 0.4   Absolute Immature Granulocytes <=0.4 10e3/uL 0.0   Absolute Lymphocytes 0.8 - 5.3 10e3/uL 2.2   Absolute Monocytes 0.0 - 1.3 10e3/uL 0.8   % Immature Granulocytes % 0   Absolute Neutrophils 1.6 - 8.3 10e3/uL 5.8   Absolute NRBCs 10e3/uL 0.0   NRBCs per 100 WBC <1 /100 0   % Retic 0.5 - 2.0 % 1.3   Absolute Retic 0.025 - 0.095 10e6/uL 0.045   (L): Data is abnormally  low  (H): Data is abnormally high      Performing Labs 12/12/2024    Final                    Value:The technical component of this testing was completed at [facility], [facility] and [facility]      WBC Count 12/12/2024 9.3  4.0 - 11.0 10e3/uL Final    RBC Count 12/12/2024 3.40 (L)  4.40 - 5.90 10e6/uL Final    Hemoglobin 12/12/2024 8.3 (L)  13.3 - 17.7 g/dL Final    Hematocrit 12/12/2024 25.1 (L)  40.0 - 53.0 % Final    MCV 12/12/2024 74 (L)  78 - 100 fL Final    MCH 12/12/2024 24.4 (L)  26.5 - 33.0 pg Final    MCHC 12/12/2024 33.1  31.5 - 36.5 g/dL Final    RDW 12/12/2024 17.9 (H)  10.0 - 15.0 % Final    Platelet Count 12/12/2024 519 (H)  150 - 450 10e3/uL Final    % Neutrophils 12/12/2024 63  % Final    % Lymphocytes 12/12/2024 23  % Final    % Monocytes 12/12/2024 9  % Final    % Eosinophils 12/12/2024 5  % Final    % Basophils 12/12/2024 1  % Final    % Immature Granulocytes 12/12/2024 0  % Final    NRBCs per 100 WBC 12/12/2024 0  <1 /100 Final    Absolute Neutrophils 12/12/2024 5.8  1.6 - 8.3 10e3/uL Final    Absolute Lymphocytes 12/12/2024 2.2  0.8 - 5.3 10e3/uL Final    Absolute Monocytes 12/12/2024 0.8  0.0 - 1.3 10e3/uL Final    Absolute Eosinophils 12/12/2024 0.4  0.0 - 0.7 10e3/uL Final    Absolute Basophils 12/12/2024 0.1  0.0 - 0.2 10e3/uL Final    Absolute Immature Granulocytes 12/12/2024 0.0  <=0.4 10e3/uL Final    Absolute NRBCs 12/12/2024 0.0  10e3/uL Final    % Reticulocyte 12/12/2024 1.3  0.5 - 2.0 % Final    Absolute Reticulocyte 12/12/2024 0.045  0.025 - 0.095 10e6/uL Final    Total Protein Serum for ELP 12/12/2024 7.9  6.4 - 8.3 g/dL Final    Albumin 12/12/2024 3.0 (L)  3.7 - 5.1 g/dL Final    Alpha 1 12/12/2024 0.6 (H)  0.2 - 0.4 g/dL Final    Alpha 2 12/12/2024 1.2 (H)  0.5 - 0.9 g/dL Final    Beta Globulin 12/12/2024 1.0  0.6 - 1.0 g/dL Final    Gamma Globulin 12/12/2024 2.1 (H)  0.7 - 1.6 g/dL  Final    Monoclonal Peak 12/12/2024 0.0  <=0.0 g/dL Final    ELP Interpretation 12/12/2024    Final                    Value:Significant hypoalbuminemia with increased alpha 1, alpha 2 and gamma globulins, suggestive of acute phase reaction with chronic inflammation. No obvious monoclonal protein seen. Pathologic significance requires clinical correlation. PERLA Mai M.D., Ph.D., Pathologist ().    Signout Location if Remote 12/12/2024 JHE1   Final    RICHY Anti-IgG,-C3d 12/12/2024 Positive 3+   Final    SPECIMEN EXPIRATION DATE 12/12/2024 [id number]   Final    SPECIMEN EXPIRATION DATE 12/12/2024 [id number]   Final    RICHY Anti-IgG 12/12/2024 Positive 1+   Final   Infusion Therapy Visit on 12/04/2024   Component Date Value Ref Range Status    CD19% B Cells 12/04/2024 11  6 - 27 % Final    Absolute CD19, B Cells 12/04/2024 187  107 - 698 cells/uL Final    Immunoglobulin G 12/04/2024 1,904 (H)  610 - 1,616 mg/dL Final    Immunoglobulin A 12/04/2024 424  84 - 499 mg/dL Final    Immunoglobulin M 12/04/2024 226  35 - 242 mg/dL Final    Oxycodone Urine 12/03/2024 Screen Positive (A)  Screen Negative Final    Cutoff for a positive oxycodone is 100 ng/mL or greater.   This is an unconfirmed screening result to be used for medical purposes only.     AST 12/03/2024 47 (H)  0 - 45 U/L Final    Creatinine 12/03/2024 1.09  0.67 - 1.17 mg/dL Final    GFR Estimate 12/03/2024 80  >60 mL/min/1.73m2 Final    eGFR calculated using 2021 CKD-EPI equation.    CRP Inflammation 12/03/2024 19.70 (H)  <5.00 mg/L Final    Erythrocyte Sedimentation Rate 12/03/2024 111 (H)  0 - 20 mm/hr Final    WBC Count 12/03/2024 7.9  4.0 - 11.0 10e3/uL Final    RBC Count 12/03/2024 3.38 (L)  4.40 - 5.90 10e6/uL Final    Hemoglobin 12/03/2024 8.2 (L)  13.3 - 17.7 g/dL Final    Hematocrit 12/03/2024 25.5 (L)  40.0 - 53.0 % Final    MCV 12/03/2024 75 (L)  78 - 100 fL Final    MCH 12/03/2024 24.3 (L)  26.5 - 33.0 pg Final    MCHC 12/03/2024  32.2  31.5 - 36.5 g/dL Final    RDW 12/03/2024 17.7 (H)  10.0 - 15.0 % Final    Platelet Count 12/03/2024 513 (H)  150 - 450 10e3/uL Final    % Neutrophils 12/03/2024 63  % Final    % Lymphocytes 12/03/2024 25  % Final    % Monocytes 12/03/2024 8  % Final    % Eosinophils 12/03/2024 3  % Final    % Basophils 12/03/2024 1  % Final    % Immature Granulocytes 12/03/2024 0  % Final    Absolute Neutrophils 12/03/2024 5.0  1.6 - 8.3 10e3/uL Final    Absolute Lymphocytes 12/03/2024 2.0  0.8 - 5.3 10e3/uL Final    Absolute Monocytes 12/03/2024 0.7  0.0 - 1.3 10e3/uL Final    Absolute Eosinophils 12/03/2024 0.3  0.0 - 0.7 10e3/uL Final    Absolute Basophils 12/03/2024 0.1  0.0 - 0.2 10e3/uL Final    Absolute Immature Granulocytes 12/03/2024 0.0  <=0.4 10e3/uL Final   HgbA1c 10.2 on 6/27/24 at [facility]  at [facility]:  Serum free light chains on 4/30/24 ;  Kappa 9.32, Lambda 6.87, ratio 1.36 (consistent with CKD).  SPEP was without monoclonal spike in Dec 2023   In Nov 2021, his UACR was < 30 at [facility]    Erythropoietin  Erythropoietin  Collected: 12/12/24 1113   Result status: Final   Resulting lab: [facility]   Reference range: 4 - 27 mU/mL   Value: 16   Comment: INTERPRETIVE INFORMATION: Erythropoietin  Normal serum concentrations of erythropoietin for 95% of  individuals with normal hematocrits range from 4-27 mU/mL.    As the hematocrit is lowered by iron deficiency, aplastic,  or hemolytic anemia, the concentration of erythropoietin  increases as shown in the graph below. In the absence of  anemia, elevated concentrations are seen in renal tumors,  as a manifestation of renal transplant rejection, and in  secondary polycythemia. Low values may be observed in  hemochromatosis.          Expected Erythropoietin Concentrations in Patients                     with Uncomplicated Anemia       Erythropoietin (mU/mL)                100,000 - +                          +                 10,000 - +.......                          + .......                   1,000 - +    .......                          +     ........                    100 - +       ........                          +        ........                     10 - +          ........                          +---+---+---+---+---+---+                         10   20  30  40  50  60  70                                (Hematocrit %)              (Contributions To Nephrology 1988:66:54-62)       [place]  [facility] [place] Radiologist: Deacon Vines  Reading Resident: Dennis Chavez  Narrative    Comparison: 12/26/2023 radiograph. Outside chest CT from 3/9/2022.    Indication: Anasarca of uncertain etiology; r/o malignancy      Technique: Volumetric helical acquisition of CT images from the lung apices through the symphysis pubis without intravenous contrast.    Total DLP = 829 mGy.cm.      Findings:    Chest:    Lungs: Moderate-large pleural effusions. No pneumothorax. Central airways are clear. Spiculated solid pulmonary nodule in the peripheral left lower lobe with pleural tagging with average diameter of 10 mm (series 202 image 78). An additional juxtapleural 3 mm solid pulmonary nodules noted superiorly in the left lower lobe (series 202 image 55).    Patchy and confluent airspace consolidation in the left greater than right lung apices, additional scattered groundglass airspace densities and interstitial prominence are noted throughout the aerated lobes. Compressive atelectasis adjacent to the pleural effusions.    Mediastinum: Normal size heart, small pericardial effusion. Moderate calcific atherosclerosis of the coronary arteries. Anemic intracardiac blood pool. Small sliding hiatal hernia. Mildly prominent prevascular and paratracheal mediastinal lymph nodes. Reactive appearing bilateral axillary lymph nodes.    Abdomen/Pelvis:      Liver: Within normal limits.    Gallbladder and biliary tree:  Layering calcified gallstones. No intra- or extrahepatic biliary ductal  dilation..    Pancreas: Markedly atrophic with dystrophic calcifications scattered throughout. No ductal dilatation.    Spleen: Within normal limits.    Adrenals: Within normal limits..    Kidneys, ureters and bladder: No hydronephrosis or obstructing calculus. Normal urinary bladder and ureters.    Reproductive organs: No pelvic masses.    Bowel: No dilated or overly thickened bowel loops. Small amount of stool admixed with contrast throughout the distal small bowel and proximal colon.    Vessels: Mild aortobiiliac atherosclerotic calcification, as well as circumferential atherosclerotic calcification throughout the internal iliac arteries and their terminal branches.    Lymph nodes: No enlarged lymph nodes.    Peritoneum: Diffuse mesenteric edema.    Bones/Soft Tissues:    No acute osseous abnormality. Opposing chronic erosive and sclerotic endplate changes of C6 and C7. Bilateral chronic rib fractures. No worrisome lytic or sclerotic osseous lesions.      Exam Date Exam Time Accession # Performing Department Results    7/9/24  4:09 PM [id number] [facility]      PACS Images     Show images for CT Abdomen Pelvis w Contrast     Study Result    Narrative & Impression   CT ABDOMEN AND PELVIS WITH CONTRAST 7/9/2024 4:09 PM     CLINICAL HISTORY: Weight loss.     TECHNIQUE: CT scan of the abdomen and pelvis was performed following  injection of IV contrast. Multiplanar reformats were obtained. Dose  reduction techniques were used.     CONTRAST: 64mL Isovue-370     COMPARISON: 3/9/2022     FINDINGS:   LOWER CHEST: Normal.     HEPATOBILIARY: Small lesion in the periphery of the right hepatic lobe  is unchanged from previous.     PANCREAS: Signs of chronic pancreatitis with pancreatic atrophy,  ductal dilation, and calcifications, similar to previous.     SPLEEN: Normal.     ADRENAL GLANDS: Normal.     KIDNEYS/BLADDER: The kidneys are unremarkable. Mild bladder wall  thickening  similar to previous.     BOWEL: No obstruction or inflammation. No visible mass.     PELVIC ORGANS: Normal.     ADDITIONAL FINDINGS: Moderate atherosclerosis. Mildly enlarged right  inguinal lymph nodes are most likely reactive, and measure up to 1.1  cm short axis.     MUSCULOSKELETAL: Normal.                                                                      IMPRESSION:   1.  Mild right inguinal lymphadenopathy is indeterminant but more  likely reactive.  2.  Otherwise, no acute findings or significant change in the abdomen  or pelvis compared to previous.     JUAN JOSE MICHELLE MD      9/17/24  4:07 PM 9/17/24  4:07 PM [id number] [facility]        Narrative & Impression   EXAM: XR SACROILIAC JOINT 1/2 VIEWS  LOCATION: [facility]  DATE: 9/17/2024     INDICATION: evaluate for inflammatory sacroiliitis  COMPARISON: None.                                                                      IMPRESSION: The SI joints are negative for sacroiliitis, ankylosis, or diastasis on this single projection. Pelvis negative for fracture. Both hips negative for fracture.   Collected 11/8/2024 12:30 PM       Status: Final result       Visible to patient: Yes (seen)    0 Result Notes       Component  Resulting Agency   Case Report   Surgical Pathology Report                         Case: [id number]                                   Authorizing Provider:  Jodry Chinchilla MD       Collected:           11/08/2024 12:30 PM           Ordering Location:     [facility]     Received:            11/08/2024 01:47 PM                                  [address] [place]                                                             Pathologist:           Rosalba Yao MD                                                               Specimen:    Kidney, Left, Left kidney biopsy                                                          Final Diagnosis   A. 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 (see comment)   - patchy dense lymphoplasmacytic interstitial inflammation; 10-20 IgG4+ plasma cells/400x field      Glomerulopathy with rare intramembranous and mesangial deposits reactive for C3      - the differential diagnosis includes a mild glomerulonephritis C3 or a resolving immune complex-mediated glomerulopathy      - there are no signs of an active glomerulitis currently     Moderate chronic changes of the parenchyma, including:   - focal global glomerulosclerosis (4% of glomeruli)   - focal tubular atrophy and interstitial fibrosis (40-50% of the cortex)   - severe arterial and arteriolar sclerosis   Electronically signed by Rosalba Yao MD on 11/15/2024 at 10:58 AM   Comment  [facility]   The interstitial nephritis shows some morphologic features suggestive of IgG4-related tubulointerstitial nephritis, characterized by 1). focal dense lymphoplasmacytic interstitial inflammation, and 2). the number of IgG4-positive plasma cells greater than 10 per high powered field (see reference). However, the typical storiform fibrosis is not noted in the sample and the ratio of IgG4-positive/IgG-positive plasma cells is lower than than 40%.  Correlation with the overall clinical scenario is required.       Reference:  Sharee V, Norbert Y, Misha JK, Ovi EY, Carlos Eduardo Y, Starr T, Skinny G, Juarez MACKEYG, Uday A, Mark JA, Thuy RC. Consensus statement on the pathology of IgG4-related disease. Modern pathology. 2012 Sep;25(9):1181.     Ila H, Hai K, Yuri S, Obi H, Goto H, Matsui S, Ishlatonya N, Akamijacqueline T, Carlos Eduardo Y, Cleopatra M, Research Program for Intractable Disease by the [employer] [place].. The 2020 revised comprehensive diagnostic (RCD) criteria for IgG4-RD. Modern Rheumatology. 2021 May 4;31(3):529-33.   Clinical Information  [facility]   54  "y.o male with a complex past medical history, poorly controled diabetes (HgbA1c>10), a history of hypertension, severe inflammation of the Right knee, at least one episode of SALVADOR,  Cyst C-based eGFR around 30 ml/min/1.73m2. The etiology of CKD is unclear at this point.  However, he had a normal UACR in Nov 2021. His ANCA is negative.  Prior seroligic work up is unrevealing - except for the persistently elevated IgG4.  This, with the presence of \"chronic pancreatitis\" raises the suspicion of IgG4 disease.   Gross Description  MARCI[place]A(A). Kidney, Left, Left kidney biopsy:  Native kidney biopsy, left, immediate assessment:  \"Cortical kidney tissue confirmed for light microscopy, immunofluorescence, and electron microscopy. \" (Deb TAVERAS([facility]))     The specimen is received in formalin with proper patient identification, labeled \"left kidney biopsy\".  The specimen consists of 4 tan needle core biopsies 0.2-0.9 cm in length and each 0.1 cm in diameter.  The specimen is wrapped and is entirely submitted in cassette A1.     A separate specimen is received in Nicolas fixative and consists of a 0.4 cm in length and 0.1 cm in diameter tan core biopsy which is processed for immunofluorescence studies.     A separate specimen is in glutaraldehyde and consists of a single tan core biopsy, 0.2 cm in length and 0.1 cm in diameter which is processed for electron microscopic studies.         Microscopic Description  [facility]   LIGHT MICROSCOPY:  Sections of formalin-fixed, paraffin embedded tissue were evaluated using H&E, PAS, Milner, and trichrome stains with the appropriate controls. An H&E-stained frozen section taken from the tissue allocated for immunofluorescence microscopy and methylene blue-stained epoxy sections of the tissue processed for electron microscopy were also evaluated using light microscopy.      The sample consists of kidney cortex and it includes 46 glomeruli (LM-36; IF-7; EM-3), of which 2 (2+0+0) are " globally sclerosed, 2 show segmental sclerosis and few are hypoperfused. Mesangiolysis with adjacent endocapillary hypercellularity is seen in few glomeruli. The mesangium of some glomeruli is expanded by increased extracellular matrix and increased numbers of mesangial cells, occasionally forming characteristic Kimmelstiel-Tae nodules. There are wrinkled basement membranes in many glomeruli and occasional distinctive double contours of the basement membranes. No basement membrane spikes or craters are seen in the glomeruli. Tubules show focal mild degenerative changes. No oxalate, phosphate, or urate crystals are present in the sample. Obvious signs of a viral cytopathic effect are not seen in the sample. The interstitium contains focal dense inflammation, and the infiltrates are composed of mononuclear cells and frequent plasma cells. Approximately 40-50% of the renal cortex shows tubular atrophy and mild interstitial fibrosis. Arteries and arterioles show severe sclerosis. Arterioles also show focal hyalinosis.     Immunohistochemistry studies of IgG and IgG4 reveal there are focally 10-20 IgG4+ plasma cells per high power field and an IgG4+/IgG+ plasma cell ratio of <40%.     IMMUNOFLUORESCENCE MICROSCOPY:   The sections of the sample submitted for immunofluorescence studies were incubated with antibodies specific for the heavy chains of IgG, IgA, and IgM, for kappa and lambda light chains, fibrin, albumin, and complement components C3 and C1q. The immunofluorescence reactivity is graded on a scale of 0-4+.     The sample contains 7 glomeruli. There are no globally sclerosed glomeruli. There is fine granular reactivity for C3 (trace) and IgM (trace) in the mesangium. Diffuse linear reactivity for IgG (2+), albumin (2+), kappa light chain (2+) and lambda light chain (2+) is noted along the glomerular and tubular basement membranes. Tubules contain several intraluminal casts reactive for polyclonal IgA. The  interstitium reveals scattered fibrin deposits. Arterioles reveal focal and strong reactivity for C3. There is no difference in reactivity between kappa and lambda light chains in the glomeruli, tubular casts and background of the tissue.      ELECTRON MICROSCOPY:   The sample submitted for electron microscopy examination contains 3 glomeruli; 2 glomeruli are examined ultrastructurally. The glomerular visceral epithelial cells reveal moderately segmental effacement of their foot processes. The glomerular basement membranes are thickened. The endothelial cells show focal swelling. Tubuloreticular inclusions are not seen within the cytoplasm of glomerular endothelial cells. The mesangium reveals focal mesangiolysis and nodular expansion of the mesangial matrix, with irregular densities and the deposition of cellular debris. Capillary loops and mesangium reveal the presence of rare intramembranous and mesangial granular electron dense deposits.     I have personally reviewed all specimens and slides, including the listed special stains, and used them with my medical judgement to determine the final diagnosis.     Case was reviewed by the following:  Resident Pathologist: Mimi Torres MD  A resident or fellow in a training program was involved in the initial review, preparation, and/or interpretation of this case.  I, as the senior physician, attest that I have personally reviewed all specimens and or slides, including the listed special stains, and used them with my medical judgement to determine the final diagnosis.        Exam Date Exam Time Accession # Performing Department Results    10/23/24  1:03 PM [id number] [facility]      Narrative & Impression   Combined Report of: PET and CT on  10/23/2024 1:03 PM:     1. PET of the neck, chest, abdomen, and pelvis.  2. PET CT Fusion for Attenuation Correction and Anatomical  Localization:    3. 3D MIP and PET-CT fused  images were processed on an independent  workstation and archived to PACS and reviewed by a radiologist.     Technique:     1. PET: The patient received 13.28 mCi of F-18-FDG; the serum glucose  was 133 mg/dL prior to administration, body weight was 56.3 kg. Images  were evaluated in the axial, sagittal, and coronal planes as well as  the rotational whole body MIP. Images were acquired from the Vertex to  the Feet.     UPTAKE WAS MEASURED  MINUTES.      2. CT: CT only obtained for attenuation correction and not diagnostic  purposes.     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     COMPARISON: CT abdomen and pelvis on 7/19/2024, CT chest abdomen and  pelvis on 3/9/2022, right knee MRI 9/21/2024     FINDINGS:   BACKGROUND: Liver SUV max = 1.97, Aorta Blood SUV max = 1.01.      HEAD/NECK:  No suspicious uptake.     Diffusely increased uptake in the tongue is favored to represent  muscle activation.     Mild diffuse uptake in the thyroid gland may relate to thyroiditis,  consider correlation with thyroid function tests.     CHEST:  No suspicious pulmonary nodules identified. Dependent subsegmental  atelectasis, greater on the left.      Heart size at the upper limits of normal. Diffusely increased left  atrial uptake is nonspecific, may relate to atrial fibrillation in the  appropriate clinical setting. Low-density aortic blood pool.      Segmental uptake in the distal esophagus likely represents reflux in  the setting of small hiatal hernia.     ABDOMEN AND PELVIS:  No suspicious uptake.      Stable subcentimeter cyst in the right hepatic lobe. Diffusely  atrophic pancreas with dystrophic parenchymal calcifications.      Diffuse mesenteric stranding/trace ascites. Nondistended urinary  bladder with unchanged somewhat asymmetric wall thickening. Multiple  enlarged right iliac chain and right inguinal lymph nodes  demonstrating mild to moderate uptake, for example, elongated right  iliac chain lymph node with SUV max of 4.3. In addition, multiple  right inguinal lymph nodes demonstrate central necrosis.     LOWER EXTREMITIES:   Complex, multiloculated large right knee joint effusion with synovial  thickening, demonstrating SUV max of 6.6.      Bilateral lower extremity edema, greater on the right.     BONES AND SOFT TISSUES:   No suspicious focal uptake.      Diffuse bone marrow uptake is likely reactive. Multiple healed  bilateral rib fracture deformities. Diffuse mild subcutaneous  anasarca. Multilevel degenerative changes in the spine with grade 1  anterolisthesis of L4 over L5.                                                                      IMPRESSION:      1. No evidence of hypermetabolic pulmonary nodules or suspicious uptake in the chest.      2. Large, complex right knee joint effusion with synovial thickening  previously characterized on 9/21/2024 MRI. Multiple enlarged right  inguinal and right iliac chain lymph nodes with mild to moderate FDG  uptake are likely reactive to the right knee infective/inflammatory  process.     3. Findings of positive fluid balance including trace ascites and  diffuse mild subcutaneous anasarca. Right greater than left lower  extremity edema.     4. Diffuse bone marrow uptake, which is nonspecific and may be related  to anemia given low density blood pool.     I have personally reviewed the examination and initial interpretation  and I agree with the findings.     JADEN CAT MD         ASSESSMENT AND PLAN:   #CKD  Mr Mayfield is a 54 y.o male with a complex past medical history, most recently related to severe inflammation of the Right knee - initially thought related to septic joint  "- but that failed to improve with antibiotics.  Repeat evaluations have failed to detect an infection.  He has had at least one episode of SALVADOR, which was reportedly attributed to SALVADOR from antibiotic exposure vs component of \"pre-renal\" azotemia.  However, review of his labs show a persistently elevated Cr (given the relatively small body size and habitus).    Indeed, his CKD is significantly more severe that suggested by S Cr alone, as his Cyst C-based eGFR is consistently significantly lower than the Cr-based estimate.  Based on Cyst C, his GFR is only  around 30 ml/min/1.73m2    The etiology of CKD is unclear.  He has had poorly controled diabetes (HgbA1c>10), a history of hypertension both of which would contribute to CKD - and the former to the albuminuria/proteinuria.    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  He is s/p the first dose of Rituximab and is scheduled for the second dose next week.  This is too early to expect a therapeutic effect of rituximab.  However, I suspect that the major cause of his kidney disease is indeed diabetic nephropathy associated with very poorly controlled diabetes - as supported by the severe hyperglycemia today.  Mr Mayfield is scheduled for appointments with the diabetes center in the coming week and subsequently.  I hope that he will be deemed a candidate for an insulin pump.  Mr Mayfield self administered 6 unit(s) of insulin in the clinic.  He voiced not being concerned about his hyperglycemia today, and able to follow up with additional doses of insulin at home.     #Anemia:  Indices are consistent with anemia of chronic disease - with possibly a component of Fe deficiency.  His eGFR is low enough to account for the anemia in part.  Indeed, his erythropoeitin level is only 16 mU/ml with a hematocrit of 25 -> it should be > 500 mU/ml.  I will refer him to the anemia clinic for aranesp therapy. "     #Spiculated Lung Nodule.   The PET CT did NOT confirm the presence of a mass.     I plan to see him again in person in April 2025     Jordy Chinchilla MD  Division of Renal Disease and Hypertension

## 2024-12-14 LAB
COPPER SERPL-MCNC: 143.6 UG/DL
ZINC SERPL-MCNC: 55.7 UG/DL

## 2024-12-15 DIAGNOSIS — D64.9 ANEMIA, UNSPECIFIED TYPE: Primary | ICD-10-CM

## 2024-12-16 ENCOUNTER — NURSE TRIAGE (OUTPATIENT)
Dept: FAMILY MEDICINE | Facility: CLINIC | Age: 54
End: 2024-12-16
Payer: MEDICAID

## 2024-12-16 ENCOUNTER — PATIENT OUTREACH (OUTPATIENT)
Dept: CARE COORDINATION | Facility: CLINIC | Age: 54
End: 2024-12-16
Payer: MEDICAID

## 2024-12-16 DIAGNOSIS — D63.1 ANEMIA OF CHRONIC RENAL FAILURE, STAGE 3 (MODERATE), UNSPECIFIED WHETHER STAGE 3A OR 3B CKD (H): ICD-10-CM

## 2024-12-16 DIAGNOSIS — N18.30 ANEMIA OF CHRONIC RENAL FAILURE, STAGE 3 (MODERATE), UNSPECIFIED WHETHER STAGE 3A OR 3B CKD (H): ICD-10-CM

## 2024-12-16 DIAGNOSIS — D64.9 ANEMIA, UNSPECIFIED TYPE: Primary | ICD-10-CM

## 2024-12-16 LAB
ANCA AB PATTERN SER IF-IMP: NORMAL
C-ANCA TITR SER IF: NORMAL {TITER}

## 2024-12-16 NOTE — PROGRESS NOTES
Anemia Management Note - Enrollment    SUBJECTIVE/OBJECTIVE:    Referred by Dr. Jordy Chinchilla  on 2024  Primary Diagnosis: Anemia in Chronic Kidney Disease (N18.3, D63.1)     Secondary Diagnosis: Chronic Kidney Disease, Stage 3 (N18.3)   Hgb goal range: 9-10    Epo/Darbo: Aranesp 40 mcg  every two weeks for Hgb <10.0 At home  Iron regimen: NA.    *24: Pt states Dr. Chinchilla said his Iron was ok. Will recheck labs in a month.     Labs : 2025  RX/TX plans :     Recent DIAN use, transfusion, IV iron: NA  No history of stroke, MI, and blood clots or cancers. Hx of HTN.     Contact: No Consent to Communicate On File.         Latest Ref Rng & Units 2024 10/15/2024 2024 2024 12/3/2024 2024 2024   Anemia   Hemoglobin 13.3 - 17.7 g/dL 8.7  8.3  7.1  9.1  8.2  8.3  8.0    TSAT 15 - 46 % 9      20     Ferritin 31 - 409 ng/mL 418      499          BP Readings from Last 3 Encounters:   24 122/83   24 137/82   24 116/57     Wt Readings from Last 2 Encounters:   24 54.6 kg (120 lb 4.8 oz)   24 54.9 kg (121 lb)     Current Outpatient Medications   Medication Sig Dispense Refill    amLODIPine (NORVASC) 10 MG tablet TAKE 1 TABLET(10 MG) BY MOUTH DAILY 90 tablet 1    atorvastatin (LIPITOR) 20 MG tablet Take 1 tablet (20 mg) by mouth daily. 90 tablet 3    cetirizine (ZYRTEC) 10 MG tablet Take 1 tablet (10 mg) by mouth daily. 30 tablet 11    colestipol (COLESTID) 1 g tablet       Continuous Glucose Sensor (DEXCOM G7 SENSOR) MISC CHANGE EVERY 10 DAYS (Patient not taking: Reported on 2024)      DULoxetine (CYMBALTA) 60 MG capsule Take 60 mg by mouth daily      gabapentin (NEURONTIN) 300 MG capsule Take 1 capsule (300 mg) by mouth 2 times daily. 60 capsule 2    Glucagon, rDNA, (GLUCAGON EMERGENCY) 1 MG KIT Inject 1 mg into the muscle daily as needed (hypoglycemia) (Patient not taking: Reported on 2024)      glucose 40 % (400 mg/mL)  "gel Take by mouth as needed for low blood sugar (For BG <60). (Patient not taking: Reported on 12/13/2024)      hydrALAZINE (APRESOLINE) 25 MG tablet Take 25 mg by mouth 2 times daily. Hold if SBP < 110      insulin aspart (NOVOLOG FLEXPEN) 100 UNIT/ML pen Inject 1-5 Units subcutaneously 3 times daily (with meals). In addition to 6-8 units with each meal, inject additional units as per this sliding scale:  If 200-250 = 1   251-300 = 2   301-350 = 3  351-400 = 4  401+ = 5  Repeat BS after 3 hours and call MD if still over 400      insulin glargine (LANTUS PEN) 100 UNIT/ML pen Inject 12 Units subcutaneously every morning. 15 mL 3    insulin NPH (HUMULIN N KWIKPEN) 100 UNIT/ML injection Inject 13 units at the start of your steroid infusion at clinic. (Patient not taking: Reported on 12/13/2024) 3 mL 0    insulin  UNIT/ML vial Inject 13 units at the start of your steroid infusion at clinic which is scheduled for 12/4/24. (Patient not taking: Reported on 12/13/2024) 3 mL 0    insulin pen needle (31G X 6 MM) 31G X 6 MM miscellaneous Use 4 pen needles daily or as directed. 100 each 11    insulin syringe-needle U-100 (29G X 1/2\" 0.5 ML) 29G X 1/2\" 0.5 ML miscellaneous Use one syringe as directed prior to steroid infusion. 10 each 0    lipase-protease-amylase (CREON 36) 41023-341296-252361 units CPEP Take 3 capsules by mouth 3 times daily (with meals). 0730, 1130, 1630      lipase-protease-amylase (CREON 36) [id number] units CPEP Take 1 capsule by mouth 2 times daily. With snack 1000 & 2000      loperamide (IMODIUM) 2 MG capsule Take 4 mg by mouth every 8 hours as needed for diarrhea.      methocarbamol (ROBAXIN) 500 MG tablet Take 1 tablet (500 mg) by mouth every 8 hours as needed for muscle spasms. 30 tablet 3    methylcellulose (CITRUCEL) 500 MG TABS tablet Take 1 tablet (500 mg) by mouth daily. (Patient not taking: Reported on 12/13/2024) 90 tablet 0    multivitamin w/minerals (THERA-VIT-M) tablet Take 1 " tablet by mouth daily. (Patient not taking: Reported on 12/13/2024) 30 tablet 11    oxyCODONE (ROXICODONE) 5 MG tablet Take 1 tablet (5 mg) by mouth every 6 hours as needed for pain. 30 tablet 0    pantoprazole (PROTONIX) 40 MG EC tablet Take 1 tablet (40 mg) by mouth daily 30 tablet 0    sildenafil (VIAGRA) 50 MG tablet as needed.      sulfamethoxazole-trimethoprim (BACTRIM) 400-80 MG tablet Take 1 tablet by mouth daily. (Patient not taking: Reported on 12/13/2024) 90 tablet 1    triamcinolone (KENALOG) 0.1 % external ointment Apply topically.         ASSESSMENT:  Hgb Not at goal/Initiation of therapy   Ferritin: At goal (>100ng/mL)  TSat: not at goal of >30%  Iron regimen recommended: NA  Recommended DIAN regimen: Aranesp 40mcg every 14 days   Blood Pressure: Stable        PLAN:  1. Patient called today for enrollment in Anemia Management Service.  2. Discussed: anemia overview, monitoring service, and goal hemoglobin range and rationale and risks of DIAN blood clots, stroke, and increase in blood pressure  3. Dose location: at home  4. Labs: Joey;Yaquelin  5. Pharmacy: [facility]      Long would prefer to dose at home.  Long is familiar with subcutaneous injections. Orders sent to [facility] for Aranesp every 2 weeks.  Once DIAN is approved, need to discuss with Long how often Hgb will need to be drawn, was unsure of what DIAN will be approved at the time of his initial call.  Aranesp vs Procrit. If Aranesp is approved will only need a Hgb every 2 weeks.     Next call date:  12/18/24    Julita Brewer RN   Anemia Services  [facility]  [email or web address]   Office : [phone]  Fax: [phone]

## 2024-12-16 NOTE — TELEPHONE ENCOUNTER
Called patient back and relayed below message from Rena Blair PA-C as written.  Patient agreeable to clinic appointment but needs to work around his other scheduled appointments this week.  Appointment scheduled with Dr. Ortiz on 12/18/24 at 4:00pm.  Patient understands to go in sooner should symptoms worsen.  No further questions/concerns at this time.      Betsey Colin RN  [facility]

## 2024-12-16 NOTE — TELEPHONE ENCOUNTER
I would recommend that he be seen in the office in the next day or so for evaluation of leg swelling.  Thank you.

## 2024-12-16 NOTE — TELEPHONE ENCOUNTER
"Nurse Triage SBAR    Is this a 2nd Level Triage? NO    Situation: Bilateral feet and RIGHT leg swelling    Background: Patient states his feet and right leg are swollen. Patient noticed the swelling starting 12/15/2024. Swelling is below the right knee. Patient states this is not emergent concern for him, this is just a  call to let his provider know.     Assessment: Patient reports bilateral feet swelling, right leg swelling - below the knee, indents when touched but comes right back, warmth is normal, cough, skin is \"solid as a rock\" per patient, small amount of oozing on right ankle. Patient denies SOB, breathing difficulty, chest pain, weight changes, calf pain.    Protocol Recommended Disposition:   See in Office Within 3 Days    Recommendation: RN reviewed red flag symptoms with patient per RN protocol. Advised patient to seek emergency care if symptoms worsen or if becomes unmanageable. Patient verbalized understanding, agreeable to plan and no further questions at this time.    Routed to provider per patient request    Carolyn Ferrell RN on 12/16/2024 at 3:31 PM    Reason for Disposition   MILD swelling of both ankles (i.e., pedal edema) AND new-onset or getting worse    Additional Information   Negative: Sounds like a life-threatening emergency to the triager   Negative: Difficulty breathing at rest   Negative: Entire foot is cool or blue in comparison to other side   Negative: Cast on leg or ankle and has increasing pain   Negative: Can't walk or can barely stand (new-onset)   Negative: Fever and red area (or area very tender to touch)   Negative: Patient sounds very sick or weak to the triager   Negative: SEVERE swelling (e.g., swelling extends above knee, entire leg is swollen, weeping fluid)   Negative: Pregnant 20 or more weeks and sudden weight gain (i.e., > 2 lbs, 1 kg in one week)   Negative: Thigh or calf pain and only 1 side and present > 1 hour   Negative: Thigh, calf, or ankle swelling in only " "one leg   Negative: Thigh, calf, or ankle swelling in both legs, but one side is definitely more swollen (Exception: Longstanding difference between legs.)   Negative: Difficulty breathing with exertion AND new-onset or getting worse   Negative: MODERATE swelling of both ankles (e.g., swelling extends up to the knees) AND new-onset or getting worse   Negative: Swelling of face, arm or hands  (Exception: Slight puffiness of fingers during hot weather.)   Negative: Looks like a boil, infected sore, deep ulcer, or other infected rash (spreading redness, pus)   Negative: Patient wants to be seen    Answer Assessment - Initial Assessment Questions  1. ONSET: \"When did the swelling start?\" (e.g., minutes, hours, days)      12/15/2024  2. LOCATION: \"What part of the leg is swollen?\"  \"Are both legs swollen or just one leg?\"      Bilateral Feet, right leg  3. SEVERITY: \"How bad is the swelling?\" (e.g., localized; mild, moderate, severe)      Edema  4. REDNESS: \"Does the swelling look red or infected?\"      Denies redness  5. PAIN: \"Is the swelling painful to touch?\" If Yes, ask: \"How painful is it?\"   (Scale 1-10; mild, moderate or severe)      Denies pain to the touch  6. FEVER: \"Do you have a fever?\" If Yes, ask: \"What is it, how was it measured, and when did it start?\"       Denies fever  7. CAUSE: \"What do you think is causing the leg swelling?\"      Unsure  8. MEDICAL HISTORY: \"Do you have a history of blood clots (e.g., DVT), cancer, heart failure, kidney disease, or liver failure?\"      Denies hx   9. RECURRENT SYMPTOM: \"Have you had leg swelling before?\" If Yes, ask: \"When was the last time?\" \"What happened that time?\"      Yes, unsure of last time  10. OTHER SYMPTOMS: \"Do you have any other symptoms?\" (e.g., chest pain, difficulty breathing)        Denies chest pain, breathing difficulty, SOB  11. PREGNANCY: \"Is there any chance you are pregnant?\" \"When was your last menstrual period?\"    Protocols used: Leg " Swelling and Edema-A-OH

## 2024-12-17 ENCOUNTER — THERAPY VISIT (OUTPATIENT)
Dept: OCCUPATIONAL THERAPY | Facility: CLINIC | Age: 54
End: 2024-12-17
Attending: PHYSICIAN ASSISTANT
Payer: MEDICAID

## 2024-12-17 DIAGNOSIS — M54.41 CHRONIC BILATERAL LOW BACK PAIN WITH BILATERAL SCIATICA: ICD-10-CM

## 2024-12-17 DIAGNOSIS — M54.42 CHRONIC BILATERAL LOW BACK PAIN WITH BILATERAL SCIATICA: ICD-10-CM

## 2024-12-17 DIAGNOSIS — G89.29 CHRONIC BILATERAL LOW BACK PAIN WITH BILATERAL SCIATICA: ICD-10-CM

## 2024-12-17 DIAGNOSIS — Z78.9 IMPAIRED MOBILITY AND ADLS: Primary | ICD-10-CM

## 2024-12-17 DIAGNOSIS — M00.9 PYOGENIC ARTHRITIS OF RIGHT KNEE JOINT, DUE TO UNSPECIFIED ORGANISM (H): ICD-10-CM

## 2024-12-17 DIAGNOSIS — Z74.09 IMPAIRED MOBILITY AND ADLS: Primary | ICD-10-CM

## 2024-12-17 PROBLEM — R60.9 EDEMA: Status: ACTIVE | Noted: 2024-12-17

## 2024-12-17 LAB
MYELOPEROXIDASE AB SER IA-ACNC: 0.4 U/ML
MYELOPEROXIDASE AB SER IA-ACNC: NEGATIVE
PROTEINASE3 AB SER IA-ACNC: <1 U/ML
PROTEINASE3 AB SER IA-ACNC: NEGATIVE
STFR SERPL-MCNC: 3 MG/L

## 2024-12-17 PROCEDURE — 97542 WHEELCHAIR MNGMENT TRAINING: CPT | Mod: GO | Performed by: OCCUPATIONAL THERAPIST

## 2024-12-17 NOTE — PROGRESS NOTES
"                                                                             SEATING AND WHEELED MOBILITY ASSESSMENT    [facility]  Occupational Therapy   Date of service: December 17, 2024    Referring provider: Rena Blair PA-C   Order Date 10/31/24  Onset Date: 10/31/24    Order Details: maura lemus    Funding:[id number]/TITO    Vendor: [place] OCHOA from [employer]    Height/Weight: 5'5\" / 120    Medical diagnosis:   Nervous and Auditory  Cervical disc disease with myelopathy  Chronic bilateral low back pain with bilateral sciatica  Cervical radiculopathy  Acute metabolic encephalopathy     Digestive  Chronic diarrhea  Exocrine pancreatic insufficiency  GERD (gastroesophageal reflux disease)  Chronic pancreatitis (H)     Endocrine  Other hyperlipidemia  Type 2 diabetes mellitus with kidney complication, with long-term current use of insulin (H)  Type 2 diabetes mellitus with hyperosmolarity without coma, with long-term current use of insulin (H)     Circulatory  Cardiac calcification (H)  Primary hypertension  Systemic vasculitis (H)     Musculoskeletal and Integumentary  Pyogenic arthritis of right knee joint, due to unspecified organism (H)     Immune  IgG4 related disease (H)     Urinary  Acute renal insufficiency     Hematologic  Anemia, unspecified type     Behavioral  Severe episode of recurrent major depressive disorder, without psychotic features (H)     Other  S/P transmetatarsal amputation of foot, right (H)  Unintentional weight loss  Encounter to establish care  Abnormal CXR  Alcohol dependence in remission (H)  Continuous opioid dependence (H)  Other fatigue  Edema    11/08/2024  Ir renal biopsy right  08/17/2024  Arthroscopy knee (Right) Procedure: Arthroscopic;  Surgeon: Jeff Phoenix MD;  Location:  OR  08/17/2024  Irrigation and debridement knee, combined (Right) Procedure: IRRIGATION AND DEBRIDEMENT, Right KNEE,;  Surgeon: Jeff Phoenix MD;  " Location:  OR  07/11/2024  Irrigation and debridement spine (Right) Procedure: Irrigation and debridement knee;  Surgeon: Dejon Espinoza MD;  Location:  OR  05/07/2024  Esophagoscopy, gastroscopy, duodenoscopy (egd), combined (N/A) Procedure: Esophagoscopy, gastroscopy, duodenoscopy (EGD), combined;  Surgeon: Nina Moya MD;  Location: [facility]  05/07/2024  Colonoscopy (N/A) Procedure: Colonoscopy;  Surgeon: Nina Moya MD;  Location: [facility]  05/06/2024  Esophagoscopy, gastroscopy, duodenoscopy (egd), combined (N/A) Procedure: Esophagoscopy, gastroscopy, duodenoscopy (EGD), combined;  Surgeon: Nina Moya MD;  Location:  GI  Date Unknown  Orthopedic surgery  Date Unknown  partial amputation of right foot [Other] (Right)  Date Unknown  Patella surgery    Patient concerns/goals: wheelchair    Living environment:  Apartment    Living environment barriers:  None      Current assistance/living environment:  Lives alone  Requires partial assistance    Community Mobility:  Transportation: Transportation Services    Current mobility equipment:  Standard cane(s)       Patient Measurements  Per atp notes    Fall Risk Screen:   Has the patient fallen 2 or more times in the last year? Yes      Has the patient fallen and had an injury in the past year? No       Timed Up and Go Score: unable to stand upright    Is the patient a fall risk? Yes, department fall risk interventions implemented    ADL:   Feeding self:  ind  Grooming: ind  UE Dressing:  ind  LE Dressing:  ind  Showering/bathing: ind with shower chair  Toileting/transfer:  grab bars  Functional Mobility: SEC    IADL:    Medications:ind  Meals: ind  Home management:  ind  Driving:  medical rides  Occupation: Disability    Evaluation     Pain assessment:  R Knee, low back 8/10    Cognition:  tangential    Balance:  Unsupported Sitting Balance: Uses UE for Balance  Sitting Balance in Chair: Uses UE for Balance  Standing Balance:  Uses UE for Balance    Ambulation:  Level of [place]: Independent, 10 ft only at time  Assistive Device(s): Cane (single end)    Transfers:   ind    Neuromuscular:  Coordination:  Arthritic hands and limited at ankles especially right    Tone:   Hypotonic    Sensation:  Sensory Deficits Reported: numbness in hands and feet    Head and Neck:  Head and Neck Position: WFL  Head Control: Adequate  Tone/Movement of Head and Neck: painful extension    Upper Extremities  UE ROM: L shoulder very limited to about 80 degrees flexion   UE Strength:  L 2- R WFL  Dominance: Right    Pelvis  Anterior/Posterior Pelvis Position: Partially Flexible, Posterior Tilt    Trunk:  Anterior/Posterior Trunk Position: Increased Thoracic Kyphosis    Lower Extremities:  LE ROM: R hips to 90 degree R knee lacking 20 degrees and L lacking 10 degrees  LE Strength:  R WFL L 2-  Foot Positioning: Plantarflexed    Edema: Severe R Le swelling.       Impairments:  Fatigue  Muscle atrophy  Coordination  Balance  Pain  Range of motion     Treatment diagnosis:  Impaired mobility  Impaired activities of daily living     Recommendations/Plan of care:  Patient would benefit from interventions to enhance safety and independence.  Rehab potential good for stated goals.  Occupational therapy intervention for  wheelchair management.    Goals:   Target date:   Patient, family and/or caregiver will verbalize/demonstrate understanding of compensatory methods /equipment to enhance functional independence and safety.    Educational assessment/barriers to learning:  No barriers noted    Treatment provided this date:   Wheelchair management, 40 minutes   Determined need for power mobility for safe independence.  R Le limitations, back pain and L shoulder limitations all indicate need for power mobility.    Response to treatment/recommendations: receptive    Goal attainment:  All goals met    Risks and benefits of evaluation/treatment have been  explained.  Patient, family and/or caregiver are in agreement with Plan of Care.     Timed Code Treatment Minutes: 40  Total Treatment Time (sum of timed and untimed services): 40    Electronically signed by:  Maine HAYNES, ATP      Occupational Therapist, Assistive   [phone]      fax: [phone]      [email or web address]  [facility], [address]  [address]  [place]   [address]  December 17, 2024    Coverage Criteria Per Local Coverage Determination    A) Long has  mobility limitations due to R knee injury limitations, R toe amputations, back pain and dise disease and myelopathy, sciatica, dmii, and opioid dependency that significantly impairs his ability to participate in all of his mobility-related activities of daily living (MRADL). Specifically affected are toileting (being able to get there in time to prevent accidents), dressing, and bathing (getting into the bathroom of designated place). Current equipment used is standard manual chair with challenges as he cannot use right Le or L shoulder to propel. This patient needs the new equipment requested to be able to allow for safe and independent participation in MRADLS and IADLs. Please see additional documentation in the seating and wheeled mobility report for details.   Long  had a successful clinical trial here, and also a successful trial at home with the recommended equipment. He is very willing and physically / cognitively able to use the recommended equipment to assist his with mobility related activities of daily living and general mobility.     B) Long 's mobility limitation cannot be sufficiently and safely resolved by the use of an appropriately fitted cane or walker because he is only able to ambulate 10 ft at a time with limited posture. Strength of legs is R 2- L wfl for one maximal repetition. Fatigue also  impacts this patient's ability to  ambulate, regardless of the gait aid.    C) Long  does not have sufficient upper extremity function to self-propel an optimally-configured manual wheelchair in his home to perform MRADLs during a typical day due to limitations in strength, endurance, range of motion, and coordination. Distance and time to propel a light weight manual wheelchair limited due to only able to use r arm.  Strength of arms is L 2- R wfl.    D)  Long is not able to use a POV/scooter because it will not fit in his home environment. He is unable to safely transfer to and from a POV,  unable to operate the [place] steering system, and unable to maintain postural stability and position while operating the POV. He needs more appropriate seating and positioning than any scooter seat provides.    E) The need for this equipment is LIFETIME.     RECOMMENDATIONS FOR MOBILITY BASE, SEATING SYSTEM AND COMPONENTS  Tony MACKEY4 - this mid wheel drive power wheelchair is needed for this patient to continue to have independent mobility and to be able to allow him to complete or assist in all of his mobility related activities of daily living (MRADLs). This wheelchair will also have the seating and positioning system and seat function he needs to be able to use and tolerate the wheelchair full time, and have functional and comfortable positioning for a full day's activities. Long has R knee injury limitations, R toe amputations, back pain and dise disease and myelopathy, sciatica, dmii, and opiod dependency which impairs his ability to move in his home without the use of the requested wheelchair. He lives in a apartment with level access and uses services for transportation.     2 Batteries and  - gel sealed, and two are necessary to power the wheelchair. They are maintenance free and are safe for travel on the road or in the air. They are necessary to provide reliable use of the power wheelchair on a single charge.     Swing away joystick mount  "- needed to be able to move the joystick out of the way during transfers, or when at the desksing the computer, or at the table eating, so as not to inadvertently hit the joystick, thus moving the wheelchair or turning the power on or off without his knowledge.     2 post, flip back, height adjustable, removable, full length armrests - needed for arm support at appropriate height, not available with standard armrests    Power tilt - allows him to change his position by rotating rearward without changing the angle of his hips or knees in order to allow for necessary pressure re-distribution and postural support to reduce the risk of skin breakdown. Due to atrophy of his buttocks and inability to hold a sufficient weight shift He is at high risk of developing pressure ulcers if not able to change his position. Due to compromised ability to exert himself for weight shifting, the power tilt seat allows him to do this by operating it through the joystick. He can also use a slight degree of tilt for assisting in his seating and positioning for normal functions during the day a to assist keeping him in a midline, erect and upright position by using the effect of gravity. It allows for changes in position for improved sleeping or rest breaks required due to decreased activity tolerance, eliminating the need for transfers in/out of the chair during the day and can promotes improved sitting and activity tolerance and independent repositioning for pain management    Stealth Comfort Plus 10\" headrest and mounting hardware - needed to keep [place] head in an erect, midline and upright position whether he is in the upright, tilted or reclined position. His head and neck need to be supported as well as the rest of his body.    Adj/removable mounting hardware - needed for mounting the requested headrest in the most appropriate position on the back of the wheelchair for optimal head and neck support and to be able to be removed " for transfers.      JUNAID acta contour Solid curved and padded back support - firm and contoured back support is needed to support Long' thoracolumbar area in an upright and midline position due to asymmetries that require proper alignment for safe and independent chair use. This will provide support whether he is in the upright or tilted position. This back support is essential to provide sufficient lateral contour to maximize his postural alignment and minimize his tendency to develop scoliosis and other secondary complications.    Stealth seat cushion - this pressure distribution and positioning seat cushion will optimally distribute seating pressures to prevent pressure ulcers, but also provide a stable base of support for him to use during MRADLs. He has asymmetries on their seating surface leaving them more subseptal for future skin breakdown with sitting.      This equipment is reasonable and necessary with reference to accepted standards of medical practice and treatment of this patient's condition and is not being recommended as a convenience item. Without this recommended equipment, he is highly likely to sustain injuries from falls, develop pressure sores or postural compensation, and/or be bed confined, which those costs far exceed the cost of the requested equipment.    Electronically signed by:  Maine HAYNES, ATP      Occupational Therapist, Assistive   [phone]      fax: [phone]      [email or web address]  [facility], [address]  [address]  [place]   [address]  December 17, 2024

## 2024-12-17 NOTE — PROGRESS NOTES
Per pharmacy, Aranesp was approved and delivery is scheduled for [id number]  Follow up date: [id number] to discuss administration and lab frequency    Carrie Leopold, RN BSN  Anemia Services  [facility]  [email or web address]  Office: [phone]  Fax [phone]

## 2024-12-18 ENCOUNTER — TELEPHONE (OUTPATIENT)
Dept: EDUCATION SERVICES | Facility: CLINIC | Age: 54
End: 2024-12-18

## 2024-12-18 ENCOUNTER — MYC MEDICAL ADVICE (OUTPATIENT)
Dept: EDUCATION SERVICES | Facility: CLINIC | Age: 54
End: 2024-12-18

## 2024-12-18 ENCOUNTER — OFFICE VISIT (OUTPATIENT)
Dept: FAMILY MEDICINE | Facility: CLINIC | Age: 54
End: 2024-12-18
Payer: MEDICAID

## 2024-12-18 VITALS
DIASTOLIC BLOOD PRESSURE: 68 MMHG | BODY MASS INDEX: 21.14 KG/M2 | HEIGHT: 65 IN | OXYGEN SATURATION: 98 % | RESPIRATION RATE: 16 BRPM | WEIGHT: 126.9 LBS | HEART RATE: 82 BPM | TEMPERATURE: 99.7 F | SYSTOLIC BLOOD PRESSURE: 126 MMHG

## 2024-12-18 DIAGNOSIS — I10 PRIMARY HYPERTENSION: ICD-10-CM

## 2024-12-18 DIAGNOSIS — R60.0 BILATERAL LOWER EXTREMITY EDEMA: Primary | ICD-10-CM

## 2024-12-18 PROCEDURE — 36415 COLL VENOUS BLD VENIPUNCTURE: CPT | Performed by: FAMILY MEDICINE

## 2024-12-18 PROCEDURE — 83880 ASSAY OF NATRIURETIC PEPTIDE: CPT | Performed by: FAMILY MEDICINE

## 2024-12-18 PROCEDURE — 99214 OFFICE O/P EST MOD 30 MIN: CPT | Performed by: FAMILY MEDICINE

## 2024-12-18 RX ORDER — BUMETANIDE 2 MG/1
2 TABLET ORAL DAILY
Qty: 6 TABLET | Refills: 0 | Status: SHIPPED | OUTPATIENT
Start: 2024-12-18

## 2024-12-18 RX ORDER — ATORVASTATIN CALCIUM 40 MG/1
TABLET, FILM COATED ORAL
COMMUNITY
Start: 2024-11-22

## 2024-12-18 NOTE — ASSESSMENT & PLAN NOTE
Most likely venous insufficiency combined with amlodipine combined with diet.  Discussed using low-sodium diet.  Will diurese over the next 3 days and have close follow-up.  Will check BNP to rule out possible cardiac even though patient did have a normal echocardiogram in May.  Will have patient follow-up closely next week with me and he has been advised to bring all his medications with so we can reconcile with his medication chart.

## 2024-12-18 NOTE — PROGRESS NOTES
Assessment & Plan   Problem List Items Addressed This Visit       Bilateral lower extremity edema - Primary     Most likely venous insufficiency combined with amlodipine combined with diet.  Discussed using low-sodium diet.  Will diurese over the next 3 days and have close follow-up.  Will check BNP to rule out possible cardiac even though patient did have a normal echocardiogram in May.  Will have patient follow-up closely next week with me and he has been advised to bring all his medications with so we can reconcile with his medication chart.         Relevant Medications    bumetanide (BUMEX) 2 MG tablet    Other Relevant Orders    BNP-N terminal pro             See Patient Instructions      Subjective   Long is a 54 year old, presenting for the following health issues:  Follow Up (Bilateral Pedal Edema and Right Knee/Leg Edema x3 days (see Nurse Triage 12/16/24))      12/18/2024     3:54 PM   Additional Questions   Roomed by VEDA Willoughby   Accompanied by Self         12/18/2024     3:54 PM   Patient Reported Additional Medications   Patient reports taking the following new medications N/A     Bilateral lower extremities.  Happening in both legs.  Started about 3 days ago.  Does elevate the go down slightly but then they worsened by the end of the day and especially when he is walking around.  Diet has been some TV dinners.  He admits that the previous ones were lower in sodium.  He is unsure of all of his medications other than his sliding scale for his insulin and his Lantus dose.  And the amlodipine which was at 10 mg.  Blood pressure is under good control today.  Denies any shortness of breath.  Denies any recent travel or long trips.  Denies any dyspnea on exertion.  Sugars are quite labile.    History of Present Illness       Reason for visit:  Fluid in my knee leg feet    He eats 0-1 servings of fruits and vegetables daily.He consumes 1 sweetened beverage(s) daily.He exercises with enough effort to increase  "his heart rate 9 or less minutes per day.  He exercises with enough effort to increase his heart rate 3 or less days per week.   He is taking medications regularly.         Objective    /68 (BP Location: Left arm, Patient Position: Sitting, Cuff Size: Adult Large)   Pulse 82   Temp 99.7  F (37.6  C) (Oral)   Resp 16   Ht 1.651 m (5' 5\")   Wt 57.6 kg (126 lb 14.4 oz)   SpO2 98%   BMI 21.12 kg/m    Body mass index is 21.12 kg/m .  Physical Exam  Vitals and nursing note reviewed.   Constitutional:       General: He is not in acute distress.     Appearance: Normal appearance. He is not ill-appearing.   HENT:      Head: Normocephalic and atraumatic.   Eyes:      Extraocular Movements: Extraocular movements intact.      Conjunctiva/sclera: Conjunctivae normal.   Pulmonary:      Effort: Pulmonary effort is normal.   Musculoskeletal:      Comments: 1-2 pitting edema bilateral lower extremities up to mid tibia.  Mild skin changes but no ulcerations.  Capillary refill less than 2 seconds.  Calves are soft with no ropes or tenderness.   Neurological:      Mental Status: He is alert and oriented to person, place, and time.   Psychiatric:         Attention and Perception: Attention normal.         Mood and Affect: Mood normal.         Speech: Speech normal.         Thought Content: Thought content normal.              Signed Electronically by: Jeff Ortiz,     "

## 2024-12-18 NOTE — TELEPHONE ENCOUNTER
[facility] Call Center    Phone Message    May a detailed message be left on voicemail: yes     Reason for Call: Other: Per pt claims to be in the visit and just now ended the video visit because no joined him. Per pt was wanting to talk about his sensor problem at the pharmacy with Marlen. Please call pt back to discuss at this time. Please and thank you.       Action Taken: Message routed to:  [facility] ENDO    Travel Screening: Not Applicable     Date of Service:

## 2024-12-18 NOTE — ASSESSMENT & PLAN NOTE
Blood pressure is under good control how however this swelling could be caused from the amlodipine.  On follow-up will determine as to whether or not to moved to compression stockings or look at change in treatment.

## 2024-12-19 ENCOUNTER — INFUSION THERAPY VISIT (OUTPATIENT)
Dept: INFUSION THERAPY | Facility: HOSPITAL | Age: 54
End: 2024-12-19
Payer: MEDICAID

## 2024-12-19 VITALS
SYSTOLIC BLOOD PRESSURE: 137 MMHG | OXYGEN SATURATION: 99 % | DIASTOLIC BLOOD PRESSURE: 74 MMHG | RESPIRATION RATE: 16 BRPM | WEIGHT: 127 LBS | HEART RATE: 74 BPM | TEMPERATURE: 98.5 F | BODY MASS INDEX: 21.13 KG/M2

## 2024-12-19 DIAGNOSIS — Z79.4 TYPE 2 DIABETES MELLITUS WITH STAGE 3A CHRONIC KIDNEY DISEASE, WITH LONG-TERM CURRENT USE OF INSULIN (H): Primary | ICD-10-CM

## 2024-12-19 DIAGNOSIS — E11.22 TYPE 2 DIABETES MELLITUS WITH STAGE 3A CHRONIC KIDNEY DISEASE, WITH LONG-TERM CURRENT USE OF INSULIN (H): Primary | ICD-10-CM

## 2024-12-19 DIAGNOSIS — D89.84 IGG4 RELATED DISEASE (H): ICD-10-CM

## 2024-12-19 DIAGNOSIS — M31.8 SYSTEMIC VASCULITIS (H): Primary | ICD-10-CM

## 2024-12-19 DIAGNOSIS — F11.20 CONTINUOUS OPIOID DEPENDENCE (H): ICD-10-CM

## 2024-12-19 DIAGNOSIS — N18.31 TYPE 2 DIABETES MELLITUS WITH STAGE 3A CHRONIC KIDNEY DISEASE, WITH LONG-TERM CURRENT USE OF INSULIN (H): Primary | ICD-10-CM

## 2024-12-19 DIAGNOSIS — M00.9 PYOGENIC ARTHRITIS OF RIGHT KNEE JOINT, DUE TO UNSPECIFIED ORGANISM (H): ICD-10-CM

## 2024-12-19 LAB — NT-PROBNP SERPL-MCNC: 1515 PG/ML (ref 0–900)

## 2024-12-19 PROCEDURE — 250N000013 HC RX MED GY IP 250 OP 250 PS 637: Performed by: STUDENT IN AN ORGANIZED HEALTH CARE EDUCATION/TRAINING PROGRAM

## 2024-12-19 PROCEDURE — 250N000011 HC RX IP 250 OP 636: Performed by: STUDENT IN AN ORGANIZED HEALTH CARE EDUCATION/TRAINING PROGRAM

## 2024-12-19 PROCEDURE — 258N000003 HC RX IP 258 OP 636: Performed by: STUDENT IN AN ORGANIZED HEALTH CARE EDUCATION/TRAINING PROGRAM

## 2024-12-19 RX ORDER — ALBUTEROL SULFATE 0.83 MG/ML
2.5 SOLUTION RESPIRATORY (INHALATION)
OUTPATIENT
Start: 2025-01-01

## 2024-12-19 RX ORDER — ACYCLOVIR 400 MG/1
TABLET ORAL
Qty: 3 EACH | Refills: 3 | Status: SHIPPED | OUTPATIENT
Start: 2024-12-19

## 2024-12-19 RX ORDER — HEPARIN SODIUM (PORCINE) LOCK FLUSH IV SOLN 100 UNIT/ML 100 UNIT/ML
5 SOLUTION INTRAVENOUS
OUTPATIENT
Start: 2025-01-01

## 2024-12-19 RX ORDER — HEPARIN SODIUM,PORCINE 10 UNIT/ML
5-20 VIAL (ML) INTRAVENOUS DAILY PRN
OUTPATIENT
Start: 2025-01-01

## 2024-12-19 RX ORDER — ACETAMINOPHEN 325 MG/1
650 TABLET ORAL ONCE
Status: COMPLETED | OUTPATIENT
Start: 2024-12-19 | End: 2024-12-19

## 2024-12-19 RX ORDER — ALBUTEROL SULFATE 90 UG/1
1-2 INHALANT RESPIRATORY (INHALATION)
Status: DISCONTINUED | OUTPATIENT
Start: 2024-12-19 | End: 2024-12-19 | Stop reason: HOSPADM

## 2024-12-19 RX ORDER — DIPHENHYDRAMINE HYDROCHLORIDE 50 MG/ML
25 INJECTION INTRAMUSCULAR; INTRAVENOUS
Status: DISCONTINUED | OUTPATIENT
Start: 2024-12-19 | End: 2024-12-19 | Stop reason: HOSPADM

## 2024-12-19 RX ORDER — DIPHENHYDRAMINE HYDROCHLORIDE 50 MG/ML
50 INJECTION INTRAMUSCULAR; INTRAVENOUS
Start: 2025-01-01

## 2024-12-19 RX ORDER — MEPERIDINE HYDROCHLORIDE 25 MG/ML
25 INJECTION INTRAMUSCULAR; INTRAVENOUS; SUBCUTANEOUS
Status: DISCONTINUED | OUTPATIENT
Start: 2024-12-19 | End: 2024-12-19 | Stop reason: HOSPADM

## 2024-12-19 RX ORDER — ALBUTEROL SULFATE 0.83 MG/ML
2.5 SOLUTION RESPIRATORY (INHALATION)
Status: DISCONTINUED | OUTPATIENT
Start: 2024-12-19 | End: 2024-12-19 | Stop reason: HOSPADM

## 2024-12-19 RX ORDER — OXYCODONE HYDROCHLORIDE 5 MG/1
5 TABLET ORAL EVERY 6 HOURS PRN
Qty: 30 TABLET | Refills: 0 | Status: SHIPPED | OUTPATIENT
Start: 2024-12-19

## 2024-12-19 RX ORDER — METHYLPREDNISOLONE SODIUM SUCCINATE 40 MG/ML
40 INJECTION INTRAMUSCULAR; INTRAVENOUS
Start: 2025-01-01

## 2024-12-19 RX ORDER — ALBUTEROL SULFATE 90 UG/1
1-2 INHALANT RESPIRATORY (INHALATION)
Start: 2025-01-01

## 2024-12-19 RX ORDER — ACETAMINOPHEN 325 MG/1
650 TABLET ORAL ONCE
Start: 2025-01-01

## 2024-12-19 RX ORDER — EPINEPHRINE 1 MG/ML
0.3 INJECTION, SOLUTION INTRAMUSCULAR; SUBCUTANEOUS EVERY 5 MIN PRN
Status: DISCONTINUED | OUTPATIENT
Start: 2024-12-19 | End: 2024-12-19 | Stop reason: HOSPADM

## 2024-12-19 RX ORDER — METHYLPREDNISOLONE SODIUM SUCCINATE 40 MG/ML
40 INJECTION INTRAMUSCULAR; INTRAVENOUS
Status: DISCONTINUED | OUTPATIENT
Start: 2024-12-19 | End: 2024-12-19 | Stop reason: HOSPADM

## 2024-12-19 RX ORDER — DIPHENHYDRAMINE HCL 50 MG
50 CAPSULE ORAL ONCE
Start: 2025-01-01

## 2024-12-19 RX ORDER — MEPERIDINE HYDROCHLORIDE 25 MG/ML
25 INJECTION INTRAMUSCULAR; INTRAVENOUS; SUBCUTANEOUS
OUTPATIENT
Start: 2025-01-01

## 2024-12-19 RX ORDER — METHYLPREDNISOLONE SODIUM SUCCINATE 125 MG/2ML
80 INJECTION INTRAMUSCULAR; INTRAVENOUS ONCE
OUTPATIENT
Start: 2025-01-01

## 2024-12-19 RX ORDER — METHYLPREDNISOLONE SODIUM SUCCINATE 125 MG/2ML
80 INJECTION INTRAMUSCULAR; INTRAVENOUS ONCE
Status: COMPLETED | OUTPATIENT
Start: 2024-12-19 | End: 2024-12-19

## 2024-12-19 RX ORDER — METHOCARBAMOL 500 MG/1
500 TABLET, FILM COATED ORAL EVERY 8 HOURS PRN
Qty: 30 TABLET | Refills: 3 | Status: SHIPPED | OUTPATIENT
Start: 2024-12-19

## 2024-12-19 RX ORDER — DIPHENHYDRAMINE HCL 50 MG
50 CAPSULE ORAL ONCE
Status: COMPLETED | OUTPATIENT
Start: 2024-12-19 | End: 2024-12-19

## 2024-12-19 RX ORDER — EPINEPHRINE 1 MG/ML
0.3 INJECTION, SOLUTION INTRAMUSCULAR; SUBCUTANEOUS EVERY 5 MIN PRN
OUTPATIENT
Start: 2025-01-01

## 2024-12-19 RX ORDER — DIPHENHYDRAMINE HYDROCHLORIDE 50 MG/ML
25 INJECTION INTRAMUSCULAR; INTRAVENOUS
Start: 2025-01-01

## 2024-12-19 RX ORDER — DIPHENHYDRAMINE HYDROCHLORIDE 50 MG/ML
50 INJECTION INTRAMUSCULAR; INTRAVENOUS
Status: DISCONTINUED | OUTPATIENT
Start: 2024-12-19 | End: 2024-12-19 | Stop reason: HOSPADM

## 2024-12-19 RX ADMIN — ACETAMINOPHEN 650 MG: 325 TABLET ORAL at 12:26

## 2024-12-19 RX ADMIN — DIPHENHYDRAMINE HYDROCHLORIDE 50 MG: 50 CAPSULE ORAL at 12:26

## 2024-12-19 RX ADMIN — METHYLPREDNISOLONE SODIUM SUCCINATE 81.25 MG: 125 INJECTION, POWDER, FOR SOLUTION INTRAMUSCULAR; INTRAVENOUS at 12:26

## 2024-12-19 RX ADMIN — SODIUM CHLORIDE 1000 MG: 9 INJECTION, SOLUTION INTRAVENOUS at 12:50

## 2024-12-19 ASSESSMENT — PAIN SCALES - GENERAL: PAINLEVEL_OUTOF10: EXTREME PAIN (8)

## 2024-12-19 NOTE — PROGRESS NOTES
Long is at 3 hr infusion appt this afternoon.  Writer will follow up re: DIAN administration tomorrow, [id number]    Carrie Leopold, RN BSN  Anemia Services  [facility]  [email or web address]  Office: [phone]  Fax [phone]

## 2024-12-19 NOTE — PROGRESS NOTES
Infusion Nursing Note:  Long May[place] presents today for Rituxan.    Patient seen by provider today: No   present during visit today: Not Applicable.    Note: Long arrived via a wheel chair for Rituxan today in a stable condition, VSS. Plan of care and biology questions reviewed, he verbalized understanding of his plan of care and return to clinic.He declined all the biologic questions. Retuxan infused 30 minutes after pre medication which he tolerated with no ill effect noted at this time.      Intravenous Access:  Peripheral IV placed.    Treatment Conditions:  Biological Infusion Checklist:  ~~~ NOTE: If the patient answers yes to any of the questions below, hold the infusion and contact ordering provider or on-call provider.    Have you recently had an elevated temperature, fever, chills, productive cough, coughing for 3 weeks or longer or hemoptysis,  abnormal vital signs, night sweats,  chest pain or have you noticed a decrease in your appetite, unexplained weight loss or fatigue? No  Do you have any open wounds or new incisions? No  Do you have any upcoming hospitalizations or surgeries? Does not include esophagogastroduodenoscopy, colonoscopy, endoscopic retrograde cholangiopancreatography (ERCP), endoscopic ultrasound (EUS), dental procedures or joint aspiration/steroid injections No  Do you currently have any signs of illness or infection or are you on any antibiotics? No  Have you had any new, sudden or worsening abdominal pain? No  Have you or anyone in your household received a live vaccination in the past 4 weeks? Please note: No live vaccines while on biologic/chemotherapy until 6 months after the last treatment. Patient can receive the flu vaccine (shot only), pneumovax and the Covid vaccine. It is optimal for the patient to get these vaccines mid cycle, but they can be given at any time as long as it is not on the day of the infusion. No  Have you recently been diagnosed with any  new nervous system diseases (ie. Multiple sclerosis, Guillain [place], seizures, neurological changes) or cancer diagnosis? Are you on any form of radiation or chemotherapy? No  Are you pregnant or breast feeding or do you have plans of pregnancy in the future? No  Have you been having any signs of worsening depression or suicidal ideations?  (benlysta only) No  Have there been any other new onset medical symptoms? No  Have you had any new blood clots? (IVIG only) No      Post Infusion Assessment:  Patient tolerated infusion without incident.  Site patent and intact, free from redness, edema or discomfort.  No evidence of extravasations.  Access discontinued per protocol.  Biologic Infusion Post Education: Call the triage nurse at your clinic or seek medical attention if you have chills and/or temperature greater than or equal to 100.5, uncontrolled nausea/vomiting, diarrhea, constipation, dizziness, shortness of breath, chest pain, heart palpitations, weakness or any other new or concerning symptoms, questions or concerns.  You cannot have any live virus vaccines prior to or during treatment or up to 6 months post infusion.  If you have an upcoming surgery, medical procedure or dental procedure during treatment, this should be discussed with your ordering physician and your surgeon/dentist.  If you are having any concerning symptom, if you are unsure if you should get your next infusion or wish to speak to a provider before your next infusion, please call your care coordinator or triage nurse at your clinic to notify them so we can adequately serve you.     Discharge Plan:   Discharge instructions reviewed with: Patient.  Patient and/or family verbalized understanding of discharge instructions and all questions answered.  Patient discharged in stable condition accompanied by: self.  Departure Mode: Ambulatory.    Ruba Hollis RN

## 2024-12-19 NOTE — TELEPHONE ENCOUNTER
Helping patient through a MyChart encounter so closing telephone encounter.     Marlen Ryan, ALONDRA, MYLESN, LD, [facility]  Diabetes     [phone]  Diabetes Education Care Team: [phone]

## 2024-12-19 NOTE — TELEPHONE ENCOUNTER
Medication Request  Medication name: methocarbamol (ROBAXIN) 500 MG tablet   oxyCODONE (ROXICODONE) 5 MG tablet   Continuous Glucose Sensor [facility]DEXCOM G[facility] SENSOR[facility]   [facility]  When was patient last seen for this?:  12/29/2024  Patient offered appointment:  no  Okay to leave a detailed message: no     Patient is also requesting a multivitamin to be sent to pharmacy

## 2024-12-23 ENCOUNTER — PATIENT OUTREACH (OUTPATIENT)
Dept: CARE COORDINATION | Facility: CLINIC | Age: 54
End: 2024-12-23

## 2024-12-23 ENCOUNTER — OFFICE VISIT (OUTPATIENT)
Dept: FAMILY MEDICINE | Facility: CLINIC | Age: 54
End: 2024-12-23
Payer: MEDICAID

## 2024-12-23 VITALS
RESPIRATION RATE: 12 BRPM | HEART RATE: 68 BPM | BODY MASS INDEX: 20.16 KG/M2 | DIASTOLIC BLOOD PRESSURE: 67 MMHG | TEMPERATURE: 98.4 F | HEIGHT: 65 IN | WEIGHT: 121 LBS | SYSTOLIC BLOOD PRESSURE: 123 MMHG | OXYGEN SATURATION: 98 %

## 2024-12-23 DIAGNOSIS — N28.9 ACUTE RENAL INSUFFICIENCY: ICD-10-CM

## 2024-12-23 DIAGNOSIS — R23.8 VESICLE OF SKIN: ICD-10-CM

## 2024-12-23 DIAGNOSIS — R60.0 BILATERAL LOWER EXTREMITY EDEMA: Primary | ICD-10-CM

## 2024-12-23 LAB
ANION GAP SERPL CALCULATED.3IONS-SCNC: 11 MMOL/L (ref 7–15)
BUN SERPL-MCNC: 23.7 MG/DL (ref 6–20)
CALCIUM SERPL-MCNC: 8.3 MG/DL (ref 8.8–10.4)
CHLORIDE SERPL-SCNC: 95 MMOL/L (ref 98–107)
CREAT SERPL-MCNC: 1.5 MG/DL (ref 0.67–1.17)
EGFRCR SERPLBLD CKD-EPI 2021: 54 ML/MIN/1.73M2
GLUCOSE SERPL-MCNC: 464 MG/DL (ref 70–99)
HCO3 SERPL-SCNC: 26 MMOL/L (ref 22–29)
POTASSIUM SERPL-SCNC: 3.6 MMOL/L (ref 3.4–5.3)
SODIUM SERPL-SCNC: 132 MMOL/L (ref 135–145)

## 2024-12-23 PROCEDURE — G2211 COMPLEX E/M VISIT ADD ON: HCPCS | Performed by: FAMILY MEDICINE

## 2024-12-23 PROCEDURE — 36415 COLL VENOUS BLD VENIPUNCTURE: CPT | Performed by: FAMILY MEDICINE

## 2024-12-23 PROCEDURE — 99214 OFFICE O/P EST MOD 30 MIN: CPT | Performed by: FAMILY MEDICINE

## 2024-12-23 PROCEDURE — 80048 BASIC METABOLIC PNL TOTAL CA: CPT | Performed by: FAMILY MEDICINE

## 2024-12-23 RX ORDER — AMOXICILLIN 250 MG/1
CAPSULE ORAL
COMMUNITY
Start: 2024-12-20

## 2024-12-23 RX ORDER — METRONIDAZOLE 250 MG/1
1 TABLET ORAL
COMMUNITY
Start: 2024-12-20

## 2024-12-23 RX ORDER — BUMETANIDE 2 MG/1
2 TABLET ORAL DAILY
Qty: 90 TABLET | Refills: 3 | Status: SHIPPED | OUTPATIENT
Start: 2024-12-23

## 2024-12-23 NOTE — ASSESSMENT & PLAN NOTE
Significantly improved with 3 days of Bumex.  Given the fact that his BNP was also elevated will plan to continue on Bumex at 2 mg daily.  Continue low-salt diet.  Will recheck BMP today.    Orders:    bumetanide (BUMEX) 2 MG tablet; Take 1 tablet (2 mg) by mouth daily.    Basic metabolic panel  (Ca, Cl, CO2, Creat, Gluc, K, Na, BUN); Future

## 2024-12-23 NOTE — PROGRESS NOTES
Anemia Management Note - Reminder     Follow-up with anemia management service:    Bertrand LVM on 12/20/24 asking for a return call re his Aranesp Injections.   Returned call to Bertrand.  Bertrand gave his Aransesp injection while I was on the phone with him.     Plan to recheck Hgb in 2 weeks.   Bertrand agreed with this plan.         Latest Ref Rng & Units 10/15/2024 11/5/2024 11/6/2024 12/3/2024 12/12/2024 12/13/2024 12/23/2024   Anemia   HGB Goal        9 - 10   DIAN Dose        40mcg   Hemoglobin 13.3 - 17.7 g/dL 8.3  7.1  9.1  8.2  8.3  8.0     TSAT 15 - 46 %     20      Ferritin 31 - 409 ng/mL     499            Follow-up call date: 1/6/25    Julita Brewer RN   Anemia Services  [facility]  [email or web address]   Office : [phone]  Fax: [phone]

## 2024-12-23 NOTE — PROGRESS NOTES
Assessment & Plan   Assessment & Plan  Bilateral lower extremity edema  Significantly improved with 3 days of Bumex.  Given the fact that his BNP was also elevated will plan to continue on Bumex at 2 mg daily.  Continue low-salt diet.  Will recheck BMP today.    Orders:    bumetanide (BUMEX) 2 MG tablet; Take 1 tablet (2 mg) by mouth daily.    Basic metabolic panel  (Ca, Cl, CO2, Creat, Gluc, K, Na, BUN); Future    Acute renal insufficiency  Recheck BMP today.  Orders:    Basic metabolic panel  (Ca, Cl, CO2, Creat, Gluc, K, Na, BUN); Future    Vesicle of skin  Right heal. Extensive. Unable to determine depth. No signs of infection. Given his history will engage wound clinic for further evaluation.    Orders:    Wound Care Referral; Future           See Patient Instructions      Shamir Alves is a 54 year old, presenting for the following health issues:  Follow Up (Bilateral Pedal Edema and Right Leg Edema -- Check Sore on Heel of Right Foot)        12/23/2024     1:37 PM   Additional Questions   Roomed by VEDA Willoughby   Accompanied by Self         12/23/2024     1:37 PM   Patient Reported Additional Medications   Patient reports taking the following new medications N/A     Patient is feeling much better.  He actually has more energy and feels like he is actually breathing easier which surprised him as he did not realize he was having difficulty.  Leg swelling is down significantly though he did get a blister on his heel that was a good portion of his heel.  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  No signs of infection now however it is noted that there is a collapsed vesicle encompassing the entire heel.  Given his history will set up with wound clinic early to ensure that it heals properly.  No signs of infection today.  Will continue to work on keeping his swelling down with the Bumex.    History of Present Illness       Reason for visit:  Fluid in my knee leg feet    He eats  "0-1 servings of fruits and vegetables daily.He consumes 1 sweetened beverage(s) daily.He exercises with enough effort to increase his heart rate 9 or less minutes per day.  He exercises with enough effort to increase his heart rate 3 or less days per week.   He is taking medications regularly.           Objective    /67 (BP Location: Left arm, Patient Position: Sitting, Cuff Size: Adult Large)   Pulse 68   Temp 98.4  F (36.9  C) (Oral)   Resp 12   Ht 1.651 m (5' 5\")   Wt 54.9 kg (121 lb)   SpO2 98%   BMI 20.14 kg/m    Body mass index is 20.14 kg/m .  Physical Exam  Vitals and nursing note reviewed.   Constitutional:       General: He is not in acute distress.     Appearance: Normal appearance. He is not ill-appearing.   HENT:      Head: Normocephalic and atraumatic.   Eyes:      Extraocular Movements: Extraocular movements intact.      Conjunctiva/sclera: Conjunctivae normal.   Pulmonary:      Effort: Pulmonary effort is normal.   Musculoskeletal:      Comments: Trace to 1+ edema bilateral lower extremities.  The right heel does have a collapsed vesicle that incorporates the entire heel.  No signs of infection.  Unable to determine the depth.   Skin:     Capillary Refill: Capillary refill takes less than 2 seconds.   Neurological:      Mental Status: He is alert and oriented to person, place, and time.   Psychiatric:         Attention and Perception: Attention normal.         Mood and Affect: Mood normal.         Speech: Speech normal.         Thought Content: Thought content normal.      The longitudinal plan of care for the diagnosis(es)/condition(s) as documented were addressed during this visit. Due to the added complexity in care, I will continue to support Long in the subsequent management and with ongoing continuity of care.        Signed Electronically by: Jeff Ortiz,     "

## 2024-12-23 NOTE — ASSESSMENT & PLAN NOTE
Recheck BMP today.  Orders:    Basic metabolic panel  (Ca, Cl, CO2, Creat, Gluc, K, Na, BUN); Future

## 2024-12-23 NOTE — ASSESSMENT & PLAN NOTE
Right heal. Extensive. Unable to determine depth. No signs of infection. Given his history will engage wound clinic for further evaluation.    Orders:    Wound Care Referral; Future

## 2024-12-24 ENCOUNTER — TELEPHONE (OUTPATIENT)
Dept: WOUND CARE | Facility: CLINIC | Age: 54
End: 2024-12-24
Payer: MEDICAID

## 2024-12-24 DIAGNOSIS — R60.0 BILATERAL LOWER EXTREMITY EDEMA: Primary | ICD-10-CM

## 2024-12-24 NOTE — TELEPHONE ENCOUNTER
Vascular Referral Intake    Appointment note (to be pasted into appt note. Also add where additional info is located ie: outside images pushed to PACS, in Epic, sent to HIM, etc):   Referred by Jeff Ortiz DO  for Large blister, right heel; edema, history of forefoot amputation this foot due to infection    Specialty: Wound Clinic    Specific Provider if Necessary:  THERESE Fernandez or THERESE Sehti    Visit Type: New    Time Frame: Next Available    Testing/Imaging Needed Before Consult: HERNANDO with toe pressures    *Schedulers: Please send welcome letter to patient after appointment(s) have been scheduled*

## 2024-12-26 ENCOUNTER — MYC MEDICAL ADVICE (OUTPATIENT)
Dept: NEPHROLOGY | Facility: CLINIC | Age: 54
End: 2024-12-26
Payer: MEDICAID

## 2024-12-31 ENCOUNTER — TELEPHONE (OUTPATIENT)
Dept: VASCULAR SURGERY | Facility: CLINIC | Age: 54
End: 2024-12-31

## 2024-12-31 ENCOUNTER — ORDERS ONLY (AUTO-RELEASED) (OUTPATIENT)
Dept: FAMILY MEDICINE | Facility: CLINIC | Age: 54
End: 2024-12-31

## 2024-12-31 ENCOUNTER — TELEPHONE (OUTPATIENT)
Dept: FAMILY MEDICINE | Facility: CLINIC | Age: 54
End: 2024-12-31

## 2024-12-31 ENCOUNTER — VIRTUAL VISIT (OUTPATIENT)
Dept: FAMILY MEDICINE | Facility: CLINIC | Age: 54
End: 2024-12-31
Payer: MEDICAID

## 2024-12-31 DIAGNOSIS — D89.84 IGG4 RELATED DISEASE (H): ICD-10-CM

## 2024-12-31 DIAGNOSIS — R60.0 BILATERAL LOWER EXTREMITY EDEMA: ICD-10-CM

## 2024-12-31 DIAGNOSIS — R23.8 VESICLE OF SKIN: Primary | ICD-10-CM

## 2024-12-31 DIAGNOSIS — R79.89 ELEVATED BRAIN NATRIURETIC PEPTIDE (BNP) LEVEL: ICD-10-CM

## 2024-12-31 DIAGNOSIS — L97.419 ULCER OF RIGHT HEEL AND MIDFOOT, UNSPECIFIED ULCER STAGE (H): ICD-10-CM

## 2024-12-31 DIAGNOSIS — M00.9 PYOGENIC ARTHRITIS OF RIGHT KNEE JOINT, DUE TO UNSPECIFIED ORGANISM (H): ICD-10-CM

## 2024-12-31 PROBLEM — Z76.89 ENCOUNTER TO ESTABLISH CARE: Status: RESOLVED | Noted: 2024-09-06 | Resolved: 2024-12-31

## 2024-12-31 PROBLEM — G93.41 ACUTE METABOLIC ENCEPHALOPATHY: Status: RESOLVED | Noted: 2024-09-17 | Resolved: 2024-12-31

## 2024-12-31 PROBLEM — R60.9 EDEMA: Status: RESOLVED | Noted: 2024-12-17 | Resolved: 2024-12-31

## 2024-12-31 PROCEDURE — 99214 OFFICE O/P EST MOD 30 MIN: CPT | Mod: 95 | Performed by: PHYSICIAN ASSISTANT

## 2024-12-31 PROCEDURE — G2211 COMPLEX E/M VISIT ADD ON: HCPCS | Mod: 95 | Performed by: PHYSICIAN ASSISTANT

## 2024-12-31 NOTE — PROGRESS NOTES
Long is a 54 year old who is being evaluated via a billable video visit.    How would you like to obtain your AVS? [employer]trihart  If the video visit is dropped, the invitation should be resent by: Text to cell phone: [phone]  Will anyone else be joining your video visit? No    Assessment & Plan   Problem List Items Addressed This Visit       Pyogenic arthritis of right knee joint, due to unspecified organism (H)     He is followed by rheumatology and was started on rituximab 12/4/2024.  Following infusion he has developed lower extremity edema.  Review of literature from up-to-date this medication can cause lower extremity edema.  His BNP is also elevated at 1500.  He was placed on Bumex on 12/18/2024.  Will proceed with echocardiogram, chest x-ray and repeat BMP at this time.  Will also refer to cardiology.         IgG4 related disease (H)     Followed by rheumatology.  Started rituximab infusions 12/4/2024.  Follow-up appointment with rheumatology scheduled for 2/5/2025.         Bilateral lower extremity edema     Previously seen by  Rested on 12/18/2024 for lower extremity edema.  He was placed on Bumex 2 mg daily.  He had a follow-up visit on 12/23/2024 which showed significant improvement in his lower extremity edema.  BNP was elevated at 1500.  It was recommended he continue Bumex 2 mg daily.      I reviewed the results of his previous echocardiogram in May 2024 which showed normal ejection fraction of 60 to 65%.  Previous PET scan completed in October 2024 showed his heart size at the upper limits of normal. Diffusely increased left atrial uptake is nonspecific, may relate to atrial fibrillation in the  appropriate clinical setting.    Reports that the edema has improved on the Bumex but he continues to have some lower extremity edema.  Denies shortness of breath or chest pain.  History of atrial fibrillation.    I would like to proceed with a repeat echocardiogram, Zio patch to evaluate for possible  atrial fibrillation and chest x-ray.  We will also repeat BMP and BNP today.  Referral placed to cardiology as well.      He had echocardiogram completed May 2024 which demonstrated:  1. Normal biventricular size and function. Left ventricular ejection fraction  of 60-65%.  2. No segmental wall motion abnormalities noted.  3. No hemodynamically significant valvular disease.  No prior study for comparison. Technically adequate study.    Previous PET scan 10/24 demonstrated:  CHEST:  No suspicious pulmonary nodules identified. Dependent subsegmental  atelectasis, greater on the left.      Heart size at the upper limits of normal. Diffusely increased left  atrial uptake is nonspecific, may relate to atrial fibrillation in the  appropriate clinical setting. Low-density aortic blood pool.      Segmental uptake in the distal esophagus likely represents reflux in  the setting of small hiatal hernia         Relevant Orders    Basic metabolic panel  (Ca, Cl, CO2, Creat, Gluc, K, Na, BUN)    BNP-N terminal pro    XR Chest 2 Views    Echocardiogram Complete    Adult Cardiology Eval  Referral    ZIO PATCH MAIL OUT    Ulcer of right heel and midfoot (H) - Primary     At his visit on 12/23/2024 with Dr. Ortiz he found to have a blister that encompassed his right heel.  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  He is also has uncontrolled diabetes with most recent A1c 10.0 on 11/6/2024.  He was referred to the wound clinic.  Appointment is scheduled for January 15, 2025 in the wound clinic.      I have recommended that he call wound clinic to see if this appointment can be moved up.   A new order has been placed for referral to the wound clinic.  In the meantime he is to avoid pressure on the heel.  He is to keep the area clean and dry.  Per his report there is no evidence of infection.  This was a video visit today.  He shows me his right heel today which shows a stage II  ulcer in which the blister has opened and the skin has peeled off.  I also reached out to the diabetes educator to reach out to him regarding need for improved blood sugar control.    Lab Results   Component Value Date    A1C 10.0 11/06/2024    A1C 9.2 08/17/2024    A1C 9.4 05/04/2024    A1C >14.0 03/08/2022               Other Visit Diagnoses       Elevated brain natriuretic peptide (BNP) level        Relevant Orders    Basic metabolic panel  (Ca, Cl, CO2, Creat, Gluc, K, Na, BUN)    BNP-N terminal pro    XR Chest 2 Views    Echocardiogram Complete    Adult Cardiology Eval  Referral    ZIO PATCH MAIL OUT           Subjective   Long is a 54 year old, presenting for the following health issues:  Follow Up (Edema)        12/31/2024    10:58 AM   Additional Questions   Roomed by Julita HAYDEN   Accompanied by Self     Video Start Time:  11:55 am.    HPI       Objective           Vitals:  No vitals were obtained today due to virtual visit.    Physical Exam   GENERAL: alert and no distress  EYES: Eyes grossly normal to inspection.  No discharge or erythema, or obvious scleral/conjunctival abnormalities.  RESP: No audible wheeze, cough, or visible cyanosis.    SKIN: Visible skin clear. No significant rash, abnormal pigmentation or lesions.  NEURO: Cranial nerves grossly intact.  Mentation and speech appropriate for age.  PSYCH: Appropriate affect, tone, and pace of words        Video-Visit Details    Type of service:  Video Visit   Video End Time: 12:20 pm  Originating Location (pt. Location): Home  Distant Location (provider location):  On-site  Platform used for Video Visit: Topmission (Cognitum)  Signed Electronically by: Rena Blair PA-C

## 2024-12-31 NOTE — TELEPHONE ENCOUNTER
Reached out to patient to clarify ask  Darlene Luna RN, BSN, PHN  [facility] Nephrology Nurse Navigator  [phone]    
Normal

## 2024-12-31 NOTE — ASSESSMENT & PLAN NOTE
He is followed by rheumatology and was started on rituximab 12/4/2024.  Following infusion he has developed lower extremity edema.  Review of literature from up-to-date this medication can cause lower extremity edema.  His BNP is also elevated at 1500.  He was placed on Bumex on 12/18/2024.  Will proceed with echocardiogram, chest x-ray and repeat BMP at this time.  Will also refer to cardiology.

## 2024-12-31 NOTE — ASSESSMENT & PLAN NOTE
Previously seen by  Rested on 12/18/2024 for lower extremity edema.  He was placed on Bumex 2 mg daily.  He had a follow-up visit on 12/23/2024 which showed significant improvement in his lower extremity edema.  BNP was elevated at 1500.  It was recommended he continue Bumex 2 mg daily.      I reviewed the results of his previous echocardiogram in May 2024 which showed normal ejection fraction of 60 to 65%.  Previous PET scan completed in October 2024 showed his heart size at the upper limits of normal. Diffusely increased left atrial uptake is nonspecific, may relate to atrial fibrillation in the  appropriate clinical setting.    Reports that the edema has improved on the Bumex but he continues to have some lower extremity edema.  Denies shortness of breath or chest pain.  History of atrial fibrillation.    I would like to proceed with a repeat echocardiogram, Zio patch to evaluate for possible atrial fibrillation and chest x-ray.  We will also repeat BMP and BNP today.  Referral placed to cardiology as well.      He had echocardiogram completed May 2024 which demonstrated:  1. Normal biventricular size and function. Left ventricular ejection fraction  of 60-65%.  2. No segmental wall motion abnormalities noted.  3. No hemodynamically significant valvular disease.  No prior study for comparison. Technically adequate study.    Previous PET scan 10/24 demonstrated:  CHEST:  No suspicious pulmonary nodules identified. Dependent subsegmental  atelectasis, greater on the left.      Heart size at the upper limits of normal. Diffusely increased left  atrial uptake is nonspecific, may relate to atrial fibrillation in the  appropriate clinical setting. Low-density aortic blood pool.      Segmental uptake in the distal esophagus likely represents reflux in  the setting of small hiatal hernia

## 2024-12-31 NOTE — ASSESSMENT & PLAN NOTE
Followed by rheumatology.  Started rituximab infusions 12/4/2024.  Follow-up appointment with rheumatology scheduled for 2/5/2025.

## 2024-12-31 NOTE — ASSESSMENT & PLAN NOTE
At his visit on 12/23/2024 with Dr. Ortiz he found to have a blister that encompassed his right heel.  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  He is also has uncontrolled diabetes with most recent A1c 10.0 on 11/6/2024.  He was referred to the wound clinic.  Appointment is scheduled for January 15, 2025 in the wound clinic.      I have recommended that he call wound clinic to see if this appointment can be moved up.   A new order has been placed for referral to the wound clinic.  In the meantime he is to avoid pressure on the heel.  He is to keep the area clean and dry.  Per his report there is no evidence of infection.  This was a video visit today.  He shows me his right heel today which shows a stage II ulcer in which the blister has opened and the skin has peeled off.  I also reached out to the diabetes educator to reach out to him regarding need for improved blood sugar control.    Lab Results   Component Value Date    A1C 10.0 11/06/2024    A1C 9.2 08/17/2024    A1C 9.4 05/04/2024    A1C >14.0 03/08/2022

## 2024-12-31 NOTE — TELEPHONE ENCOUNTER
Patient calling asking to be seen sooner.  Unfortunately, no openings to be seen sooner.  Patient states understanding and was added to wait list.

## 2025-01-01 ENCOUNTER — RESULTS FOLLOW-UP (OUTPATIENT)
Dept: FAMILY MEDICINE | Facility: CLINIC | Age: 55
End: 2025-01-01

## 2025-01-02 ENCOUNTER — VIRTUAL VISIT (OUTPATIENT)
Dept: ONCOLOGY | Facility: CLINIC | Age: 55
End: 2025-01-02
Attending: INTERNAL MEDICINE
Payer: MEDICAID

## 2025-01-02 VITALS — HEIGHT: 65 IN | WEIGHT: 120 LBS | BODY MASS INDEX: 19.99 KG/M2

## 2025-01-02 DIAGNOSIS — D64.9 ANEMIA, UNSPECIFIED TYPE: Primary | ICD-10-CM

## 2025-01-02 ASSESSMENT — PAIN SCALES - GENERAL: PAINLEVEL_OUTOF10: SEVERE PAIN (7)

## 2025-01-02 NOTE — LETTER
1/2/2025      Long Mayfield  [address]  [facility] [address]      Dear Colleague,    Thank you for referring your patient, Long Mayfield, to the [facility] [place]. Please see a copy of my visit note below.    Virtual Visit Details    Type of service:  Video Visit     Originating Location (pt. Location): Home    Distant Location (provider location):  On-site  Platform used for Video Visit: Wudya    HEMATOLOGY HISTORY: Mr. Mayfield is a gentleman with chronic anemia.  -He also has pancreatic insufficiency and diabetes mellitus.      1.  On 10/08/2017, hemoglobin of 12.1.  -On 03/08/2022, hemoglobin of 11.9.  -On 05/03/2024, hemoglobin of 7.0.  Normal MCV.  2.  On 11/05/2024:  -WBC of 6.9, hemoglobin of 7.1 and platelet of 587.  MCV of 75.  RDW elevated.  -CMP reveals creatinine of 1.49.  Normal LFT.  3.  On 05/07/2024:  -EGD revealed moderate gastritis.  Otherwise normal.  -Colonoscopy revealed moderate diverticulosis.  There is external hemorrhoids.  4.  PET scan on 10/23/2024 does not reveal any evidence of malignancy.  There is complex right knee effusion.  There are enlarged right inguinal right iliac chain lymph node secondary to right knee infection/inflammation.  5.  Multiple labs done on 12/12/2024:  -Low hemoglobin of 8.3.  Low MCV of 74.  Normal WBC.  Platelet elevated at 519.  Reticulocyte of 1.3%.  -Creatinine mildly elevated 1.34.  -Mildly elevated copper.  -Mildly low zinc.  -Normal vitamin B12.  -Normal folate.  -Normal LDH.  -Low iron of 38.  Normal iron saturation index of 20%.  Elevated ferritin of 499.  -Normal soluble transferrin receptor.  -SPEP is normal  -Haptoglobin elevated.  -Peripheral blood smear review reveals marked anemia, macrocytic normochromic.    Subjective:  Mr. Mayfield is a 54-year-old gentleman with chronic anemia.  He has had multiple investigations done. Anemia is due to anemia of chronic disease and renal disease.  Primary bone marrow  pathology like MDS cannot be ruled out.    Overall his condition is stable. Patient has generalized weakness.  He is able to do his activities slowly.  No headache.  Occasional dizziness.  No chest pain.  No shortness of breath at rest.  He gets mild intermittent abdominal pain.  Occasional nausea.  No vomiting.  No urinary complaint.  He gets diarrhea.  No bleeding from any site.       Exam:  He is alert and oriented x 3.  Not in any distress.  Rest of system not examined.     Labs: Reviewed     Assessment:  1.  A 54-year-old gentleman with chronic anemia.  Anemia has been either normocytic or microcytic.  Anemia is multifactorial from renal disease and anemia of chronic disease.  MDS needs to be ruled out.  2.  Thrombocytosis, reactive.  3.  Multiple medical problems including chronic pancreatitis, pancreatic insufficiency, hypertension and diabetes mellitus.    Plan:  -Bone marrow biopsy.  -See me 2 weeks after bone marrow biopsy.    Discussion:  1.  Labs done on 12/12/2024 reviewed with the patient.  Explained to the patient that his anemia is due to renal disease and anemia of chronic disease.  His renal insufficiency is mild.  That should not cause such severe anemia with hemoglobin around 7.    Explained to the patient that I am concerned there could be primary bone marrow pathology like MDS.    Explained to the patient that he does not have any evidence of hemolysis.  LDH is elevated.  I am not suspecting iron deficiency.  Although his iron is low, his ferritin is elevated.  Soluble transferrin receptor is normal which would go against iron deficiency.    Discussed regarding bone marrow biopsy.  He is agreeable for it.  That will be scheduled.    2.  He has mild thrombocytosis.  This is reactive.  It will improve as anemia improves.    3.  I will see him 2 weeks after bone marrow biopsy.  Advised him to call us with any questions or concerns.      Total video visit time of 20 minutes. Time spent in today's  visit, review of chart/investigations today and documentation.      Again, thank you for allowing me to participate in the care of your patient.        Sincerely,        Prosper Medina MD    Electronically signed

## 2025-01-02 NOTE — NURSING NOTE
Current patient location: [address] [place] [address] N   [facility] [id number]    Is the patient currently in the state of [place]? YES    Visit mode:VIDEO    If the visit is dropped, the patient can be reconnected by:VIDEO VISIT: Text to cell phone:   Telephone Information:   Mobile [phone]       Will anyone else be joining the visit? NO  (If patient encounters technical issues they should call [phone]    Are changes needed to the allergy or medication list? Pt stated no med changes    Are refills needed on medications prescribed by this physician? NO    Rooming Documentation:  Unable to complete questionnaire(s) due to time    Reason for visit: GISSELLE YAO

## 2025-01-02 NOTE — LETTER
1/2/2025         RE: Long Mayfield  [address]  [facility] [address]      Virtual Visit Details    Type of service:  Video Visit     Originating Location (pt. Location): Home    Distant Location (provider location):  On-site  Platform used for Video Visit: Gian    HEMATOLOGY HISTORY: Mr. Mayfield is a gentleman with chronic anemia.  -He also has pancreatic insufficiency and diabetes mellitus.      1.  On 10/08/2017, hemoglobin of 12.1.  -On 03/08/2022, hemoglobin of 11.9.  -On 05/03/2024, hemoglobin of 7.0.  Normal MCV.  2.  On 11/05/2024:  -WBC of 6.9, hemoglobin of 7.1 and platelet of 587.  MCV of 75.  RDW elevated.  -CMP reveals creatinine of 1.49.  Normal LFT.  3.  On 05/07/2024:  -EGD revealed moderate gastritis.  Otherwise normal.  -Colonoscopy revealed moderate diverticulosis.  There is external hemorrhoids.  4.  PET scan on 10/23/2024 does not reveal any evidence of malignancy.  There is complex right knee effusion.  There are enlarged right inguinal right iliac chain lymph node secondary to right knee infection/inflammation.  5.  Multiple labs done on 12/12/2024:  -Low hemoglobin of 8.3.  Low MCV of 74.  Normal WBC.  Platelet elevated at 519.  Reticulocyte of 1.3%.  -Creatinine mildly elevated 1.34.  -Mildly elevated copper.  -Mildly low zinc.  -Normal vitamin B12.  -Normal folate.  -Normal LDH.  -Low iron of 38.  Normal iron saturation index of 20%.  Elevated ferritin of 499.  -Normal soluble transferrin receptor.  -SPEP is normal  -Haptoglobin elevated.  -Peripheral blood smear review reveals marked anemia, macrocytic normochromic.    Subjective:  Mr. Mayfield is a 54-year-old gentleman with chronic anemia.  He has had multiple investigations done. Anemia is due to anemia of chronic disease and renal disease.  Primary bone marrow pathology like MDS cannot be ruled out.    Overall his condition is stable. Patient has generalized weakness.  He is able to do his activities slowly.  No  headache.  Occasional dizziness.  No chest pain.  No shortness of breath at rest.  He gets mild intermittent abdominal pain.  Occasional nausea.  No vomiting.  No urinary complaint.  He gets diarrhea.  No bleeding from any site.       Exam:  He is alert and oriented x 3.  Not in any distress.  Rest of system not examined.     Labs: Reviewed     Assessment:  1.  A 54-year-old gentleman with chronic anemia.  Anemia has been either normocytic or microcytic.  Anemia is multifactorial from renal disease and anemia of chronic disease.  MDS needs to be ruled out.  2.  Thrombocytosis, reactive.  3.  Multiple medical problems including chronic pancreatitis, pancreatic insufficiency, hypertension and diabetes mellitus.    Plan:  -Bone marrow biopsy.  -See me 2 weeks after bone marrow biopsy.    Discussion:  1.  Labs done on 12/12/2024 reviewed with the patient.  Explained to the patient that his anemia is due to renal disease and anemia of chronic disease.  His renal insufficiency is mild.  That should not cause such severe anemia with hemoglobin around 7.    Explained to the patient that I am concerned there could be primary bone marrow pathology like MDS.    Explained to the patient that he does not have any evidence of hemolysis.  LDH is elevated.  I am not suspecting iron deficiency.  Although his iron is low, his ferritin is elevated.  Soluble transferrin receptor is normal which would go against iron deficiency.    Discussed regarding bone marrow biopsy.  He is agreeable for it.  That will be scheduled.    2.  He has mild thrombocytosis.  This is reactive.  It will improve as anemia improves.    3.  I will see him 2 weeks after bone marrow biopsy.  Advised him to call us with any questions or concerns.      Total video visit time of 20 minutes. Time spent in today's visit, review of chart/investigations today and documentation.        Prosper Medina MD

## 2025-01-02 NOTE — PROGRESS NOTES
Virtual Visit Details    Type of service:  Video Visit     Originating Location (pt. Location): Home    Distant Location (provider location):  On-site  Platform used for Video Visit: DrAvailable    HEMATOLOGY HISTORY: Mr. Mayfield is a gentleman with chronic anemia.  -He also has pancreatic insufficiency and diabetes mellitus.      1.  On 10/08/2017, hemoglobin of 12.1.  -On 03/08/2022, hemoglobin of 11.9.  -On 05/03/2024, hemoglobin of 7.0.  Normal MCV.  2.  On 11/05/2024:  -WBC of 6.9, hemoglobin of 7.1 and platelet of 587.  MCV of 75.  RDW elevated.  -CMP reveals creatinine of 1.49.  Normal LFT.  3.  On 05/07/2024:  -EGD revealed moderate gastritis.  Otherwise normal.  -Colonoscopy revealed moderate diverticulosis.  There is external hemorrhoids.  4.  PET scan on 10/23/2024 does not reveal any evidence of malignancy.  There is complex right knee effusion.  There are enlarged right inguinal right iliac chain lymph node secondary to right knee infection/inflammation.  5.  Multiple labs done on 12/12/2024:  -Low hemoglobin of 8.3.  Low MCV of 74.  Normal WBC.  Platelet elevated at 519.  Reticulocyte of 1.3%.  -Creatinine mildly elevated 1.34.  -Mildly elevated copper.  -Mildly low zinc.  -Normal vitamin B12.  -Normal folate.  -Normal LDH.  -Low iron of 38.  Normal iron saturation index of 20%.  Elevated ferritin of 499.  -Normal soluble transferrin receptor.  -SPEP is normal  -Haptoglobin elevated.  -Peripheral blood smear review reveals marked anemia, macrocytic normochromic.    Subjective:  Mr. Mayfield is a 54-year-old gentleman with chronic anemia.  He has had multiple investigations done. Anemia is due to anemia of chronic disease and renal disease.  Primary bone marrow pathology like MDS cannot be ruled out.    Overall his condition is stable. Patient has generalized weakness.  He is able to do his activities slowly.  No headache.  Occasional dizziness.  No chest pain.  No shortness of breath at rest.  He gets mild  intermittent abdominal pain.  Occasional nausea.  No vomiting.  No urinary complaint.  He gets diarrhea.  No bleeding from any site.       Exam:  He is alert and oriented x 3.  Not in any distress.  Rest of system not examined.     Labs: Reviewed     Assessment:  1.  A 54-year-old gentleman with chronic anemia.  Anemia has been either normocytic or microcytic.  Anemia is multifactorial from renal disease and anemia of chronic disease.  MDS needs to be ruled out.  2.  Thrombocytosis, reactive.  3.  Multiple medical problems including chronic pancreatitis, pancreatic insufficiency, hypertension and diabetes mellitus.    Plan:  -Bone marrow biopsy.  -See me 2 weeks after bone marrow biopsy.    Discussion:  1.  Labs done on 12/12/2024 reviewed with the patient.  Explained to the patient that his anemia is due to renal disease and anemia of chronic disease.  His renal insufficiency is mild.  That should not cause such severe anemia with hemoglobin around 7.    Explained to the patient that I am concerned there could be primary bone marrow pathology like MDS.    Explained to the patient that he does not have any evidence of hemolysis.  LDH is elevated.  I am not suspecting iron deficiency.  Although his iron is low, his ferritin is elevated.  Soluble transferrin receptor is normal which would go against iron deficiency.    Discussed regarding bone marrow biopsy.  He is agreeable for it.  That will be scheduled.    2.  He has mild thrombocytosis.  This is reactive.  It will improve as anemia improves.    3.  I will see him 2 weeks after bone marrow biopsy.  Advised him to call us with any questions or concerns.      Total video visit time of 20 minutes. Time spent in today's visit, review of chart/investigations today and documentation.

## 2025-01-02 NOTE — LETTER
1/2/2025      Long Mayifeld  [address]  [facility] [address]      Dear Colleague,    Thank you for referring your patient, Long Mayfield, to the [facility] [place]. Please see a copy of my visit note below.    Virtual Visit Details    Type of service:  Video Visit     Originating Location (pt. Location): Home    Distant Location (provider location):  On-site  Platform used for Video Visit: Websense    HEMATOLOGY HISTORY: Mr. Mayfield is a gentleman with chronic anemia.  -He also has pancreatic insufficiency and diabetes mellitus.      1.  On 10/08/2017, hemoglobin of 12.1.  -On 03/08/2022, hemoglobin of 11.9.  -On 05/03/2024, hemoglobin of 7.0.  Normal MCV.  2.  On 11/05/2024:  -WBC of 6.9, hemoglobin of 7.1 and platelet of 587.  MCV of 75.  RDW elevated.  -CMP reveals creatinine of 1.49.  Normal LFT.  3.  On 05/07/2024:  -EGD revealed moderate gastritis.  Otherwise normal.  -Colonoscopy revealed moderate diverticulosis.  There is external hemorrhoids.  4.  PET scan on 10/23/2024 does not reveal any evidence of malignancy.  There is complex right knee effusion.  There are enlarged right inguinal right iliac chain lymph node secondary to right knee infection/inflammation.  5.  Multiple labs done on 12/12/2024:  -Low hemoglobin of 8.3.  Low MCV of 74.  Normal WBC.  Platelet elevated at 519.  Reticulocyte of 1.3%.  -Creatinine mildly elevated 1.34.  -Mildly elevated copper.  -Mildly low zinc.  -Normal vitamin B12.  -Normal folate.  -Normal LDH.  -Low iron of 38.  Normal iron saturation index of 20%.  Elevated ferritin of 499.  -Normal soluble transferrin receptor.  -SPEP is normal  -Haptoglobin elevated.  -Peripheral blood smear review reveals marked anemia, macrocytic normochromic.    Subjective:  Mr. Mayfield is a 54-year-old gentleman with chronic anemia.  He has had multiple investigations done. Anemia is due to anemia of chronic disease and renal disease.  Primary bone marrow  pathology like MDS cannot be ruled out.    Overall his condition is stable. Patient has generalized weakness.  He is able to do his activities slowly.  No headache.  Occasional dizziness.  No chest pain.  No shortness of breath at rest.  He gets mild intermittent abdominal pain.  Occasional nausea.  No vomiting.  No urinary complaint.  He gets diarrhea.  No bleeding from any site.       Exam:  He is alert and oriented x 3.  Not in any distress.  Rest of system not examined.     Labs: Reviewed     Assessment:  1.  A 54-year-old gentleman with chronic anemia.  Anemia has been either normocytic or microcytic.  Anemia is multifactorial from renal disease and anemia of chronic disease.  MDS needs to be ruled out.  2.  Thrombocytosis, reactive.  3.  Multiple medical problems including chronic pancreatitis, pancreatic insufficiency, hypertension and diabetes mellitus.    Plan:  -Bone marrow biopsy.  -See me 2 weeks after bone marrow biopsy.    Discussion:  1.  Labs done on 12/12/2024 reviewed with the patient.  Explained to the patient that his anemia is due to renal disease and anemia of chronic disease.  His renal insufficiency is mild.  That should not cause such severe anemia with hemoglobin around 7.    Explained to the patient that I am concerned there could be primary bone marrow pathology like MDS.    Explained to the patient that he does not have any evidence of hemolysis.  LDH is elevated.  I am not suspecting iron deficiency.  Although his iron is low, his ferritin is elevated.  Soluble transferrin receptor is normal which would go against iron deficiency.    Discussed regarding bone marrow biopsy.  He is agreeable for it.  That will be scheduled.    2.  He has mild thrombocytosis.  This is reactive.  It will improve as anemia improves.    3.  I will see him 2 weeks after bone marrow biopsy.  Advised him to call us with any questions or concerns.      Total video visit time of 20 minutes. Time spent in today's  visit, review of chart/investigations today and documentation.      Again, thank you for allowing me to participate in the care of your patient.        Sincerely,        Prosper Medina MD    Electronically signed

## 2025-01-03 PROBLEM — N18.31 STAGE 3A CHRONIC KIDNEY DISEASE (H): Status: ACTIVE | Noted: 2024-08-03

## 2025-01-03 PROBLEM — I25.10 ATHEROSCLEROTIC HEART DISEASE OF NATIVE CORONARY ARTERY WITHOUT ANGINA PECTORIS: Status: ACTIVE | Noted: 2024-01-11

## 2025-01-03 PROBLEM — M86.171 OTHER ACUTE OSTEOMYELITIS, RIGHT ANKLE AND FOOT (H): Status: ACTIVE | Noted: 2024-01-12

## 2025-01-03 PROBLEM — Z79.4 LONG TERM (CURRENT) USE OF INSULIN (H): Status: ACTIVE | Noted: 2024-08-19

## 2025-01-03 PROBLEM — E87.20 ACIDOSIS: Status: ACTIVE | Noted: 2020-01-08

## 2025-01-03 PROBLEM — R26.89 OTHER ABNORMALITIES OF GAIT AND MOBILITY: Status: ACTIVE | Noted: 2024-01-12

## 2025-01-03 PROBLEM — F33.0 MAJOR DEPRESSIVE DISORDER, RECURRENT, MILD: Status: ACTIVE | Noted: 2024-08-19

## 2025-01-03 PROBLEM — Z89.421 ACQUIRED ABSENCE OF OTHER RIGHT TOE(S): Status: ACTIVE | Noted: 2024-01-12

## 2025-01-03 PROBLEM — M00.861: Status: ACTIVE | Noted: 2024-08-19

## 2025-01-03 PROBLEM — M62.59 MUSCLE WASTING AND ATROPHY, NOT ELSEWHERE CLASSIFIED, MULTIPLE SITES: Status: ACTIVE | Noted: 2024-04-01

## 2025-01-03 PROBLEM — M86.9 OSTEOMYELITIS OF GREAT TOE OF RIGHT FOOT (H): Status: ACTIVE | Noted: 2023-11-22

## 2025-01-03 PROBLEM — R79.89 ABNORMAL LFTS: Status: ACTIVE | Noted: 2025-01-03

## 2025-01-03 PROBLEM — Z74.1 NEED FOR ASSISTANCE WITH PERSONAL CARE: Status: ACTIVE | Noted: 2024-01-12

## 2025-01-03 PROBLEM — M62.81 MUSCLE WEAKNESS (GENERALIZED): Status: ACTIVE | Noted: 2024-01-12

## 2025-01-03 PROBLEM — G51.0 BELL'S PALSY: Status: ACTIVE | Noted: 2024-08-19

## 2025-01-03 PROBLEM — E55.9 VITAMIN D DEFICIENCY, UNSPECIFIED: Status: ACTIVE | Noted: 2024-01-11

## 2025-01-03 PROBLEM — F41.9 ANXIETY DISORDER, UNSPECIFIED: Status: ACTIVE | Noted: 2024-01-11

## 2025-01-03 PROBLEM — E11.621 TYPE 2 DIABETES MELLITUS WITH FOOT ULCER (H): Status: ACTIVE | Noted: 2024-01-11

## 2025-01-03 PROBLEM — L97.509 TYPE 2 DIABETES MELLITUS WITH FOOT ULCER (H): Status: ACTIVE | Noted: 2024-01-11

## 2025-01-03 PROBLEM — J81.0 ACUTE PULMONARY EDEMA (H): Status: ACTIVE | Noted: 2024-01-11

## 2025-01-03 PROBLEM — N18.9 CHRONIC KIDNEY DISEASE, UNSPECIFIED: Status: ACTIVE | Noted: 2024-01-11

## 2025-01-03 PROBLEM — R26.2 DIFFICULTY IN WALKING, NOT ELSEWHERE CLASSIFIED: Status: ACTIVE | Noted: 2024-07-19

## 2025-01-03 PROBLEM — I73.9 PERIPHERAL VASCULAR DISEASE, UNSPECIFIED: Status: ACTIVE | Noted: 2024-08-19

## 2025-01-03 PROBLEM — E87.1 HYPONATREMIA: Status: ACTIVE | Noted: 2020-01-08

## 2025-01-03 NOTE — PROGRESS NOTES
[facility]  Orthopaedic Surgery      Long Mayfield MRN# [id number]    YOB: 1970     Ref MD:    Dr. Hector Cruz, [facility] rheumatology  Dr. Jordy Chinchilla, [facility] nephrology      Background history:  DX:  Synovitis, chronic, possibly secondary to #2 below  Hx c/w IgG4-related tubulointerstitial nephritis   Acute metabolic encephalopathy  Hyperkalemia  Chronic pancreatitis  Type 2 diabetes with kidney complication  Alcohol abuse hyperglycemia  Primary hypertension  Cardiac calcification  GERD.    Anemia  Acute renal insufficiency    TREATMENTS:  1997, open reduction and intra fixation right patella  7/11/2024, I&D of right knee, Dr. Espinoza (Cultures negative)  8/17/2024, I&D of right knee, Dr. Phoenix (Cultures negative)  9/23/2024, Right knee aspiration, (Cutlures negative, Cytology negative)      Mr. Mayfield is seen for recheck with his right knee joint and persistent effusion and synovitis involving the right knee.  This complaint and physical examination today.  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  He has been initiated on rituximab infusions.    Today's visit is to discuss possibly a steroid injection and aspiration.    Examination reveals large swelling about his right knee with an effusion.  He continues to have pain.    Impression:  Persistent effusion of right knee, recent injection of rituximab therapy    Plan:  Advised proceeding with aspiration and steroid injection.    Procedure note:  Under sterile precautions, 30 cc of yellow fluid was aspirated from his right knee joint.  4 cc of Celestone Soluspan and 4 cc of 1% Xylocaine were then instilled into the right knee joint.  There were no complications.    Patient to return in 3 months time for recheck.  If persistent effusions are identified at that time despite rituximab therapy and steroid injections then 1 might consider surgical  synovectomy which I would discuss with his care team.    MD Tashia Monet Family Professor  Oncology and Adult Reconstructive Surgery  Dept Orthopaedic Surgery, [facility]  [phone] office, [phone] pager  [email or web address]    Total combined visit time and work time before and after clinic visit, independent of trainee, on encounter date = 30 min      Large Joint Injection/Arthocentesis: R knee joint    Date/Time: 1/13/2025 4:23 PM    Performed by: Dickson Day MD  Authorized by: Dickson Day MD    Indications:  Pain  Needle Size:  21 G  Guidance: landmark guided    Approach:  Lateral  Location:  Knee      Medications:  24 mg betamethasone acet & sod phos 6 (3-3) MG/ML; 4 mL lidocaine (PF) 1 %  Aspirate amount (mL):  30  Aspirate:  Yellow  Aspirate analysis: sent for lab analysis    Outcome:  Tolerated well, no immediate complications  Procedure discussed: discussed risks, benefits, and alternatives    Consent Given by:  Patient  Timeout: timeout called immediately prior to procedure    Prep: patient was prepped and draped in usual sterile fashion

## 2025-01-04 PROBLEM — D64.9 ANEMIA, UNSPECIFIED TYPE: Status: ACTIVE | Noted: 2024-05-03

## 2025-01-06 ENCOUNTER — PATIENT OUTREACH (OUTPATIENT)
Dept: CARE COORDINATION | Facility: CLINIC | Age: 55
End: 2025-01-06

## 2025-01-06 ENCOUNTER — LAB (OUTPATIENT)
Dept: LAB | Facility: CLINIC | Age: 55
End: 2025-01-06
Payer: MEDICAID

## 2025-01-06 ENCOUNTER — ANCILLARY PROCEDURE (OUTPATIENT)
Dept: GENERAL RADIOLOGY | Facility: CLINIC | Age: 55
End: 2025-01-06
Attending: PHYSICIAN ASSISTANT
Payer: MEDICAID

## 2025-01-06 DIAGNOSIS — R79.89 ELEVATED BRAIN NATRIURETIC PEPTIDE (BNP) LEVEL: ICD-10-CM

## 2025-01-06 DIAGNOSIS — N18.30 ANEMIA OF CHRONIC RENAL FAILURE, STAGE 3 (MODERATE), UNSPECIFIED WHETHER STAGE 3A OR 3B CKD (H): ICD-10-CM

## 2025-01-06 DIAGNOSIS — R60.0 BILATERAL LOWER EXTREMITY EDEMA: ICD-10-CM

## 2025-01-06 DIAGNOSIS — D63.1 ANEMIA OF CHRONIC RENAL FAILURE, STAGE 3 (MODERATE), UNSPECIFIED WHETHER STAGE 3A OR 3B CKD (H): ICD-10-CM

## 2025-01-06 LAB
HCT VFR BLD AUTO: 30.7 % (ref 40–53)
HGB BLD-MCNC: 9.6 G/DL (ref 13.3–17.7)

## 2025-01-06 PROCEDURE — 71046 X-RAY EXAM CHEST 2 VIEWS: CPT | Mod: TC | Performed by: RADIOLOGY

## 2025-01-06 PROCEDURE — 83880 ASSAY OF NATRIURETIC PEPTIDE: CPT

## 2025-01-06 PROCEDURE — 36415 COLL VENOUS BLD VENIPUNCTURE: CPT

## 2025-01-06 PROCEDURE — 80048 BASIC METABOLIC PNL TOTAL CA: CPT

## 2025-01-06 NOTE — PROGRESS NOTES
Anemia Management Note - Follow Up      SUBJECTIVE/OBJECTIVE:    Referred by Dr. Jordy Chinchilla  on 2024  Primary Diagnosis: Anemia in Chronic Kidney Disease (N18.3, D63.1)     Secondary Diagnosis: Chronic Kidney Disease, Stage 3 (N18.3)   Hgb goal range: 9-10     Epo/Darbo: Aranesp 40 mcg  every two weeks for Hgb <10.0 At home  Iron regimen: NA.    *24: Pt states Dr. Chinchilla said his Iron was ok. Will recheck labs in a month.      Labs : 2025  RX/TX plans : 2025     Recent DIAN use, transfusion, IV iron: NA  No history of stroke, MI, and blood clots or cancers. Hx of HTN.      Contact: No Consent to Communicate On File.         Latest Ref Rng & Units 2024 2024 12/3/2024 2024 2024 2024 2025   Anemia   HGB Goal       9 - 10 9 - 10   DIAN Dose       40mcg 40mcg   Hemoglobin 13.3 - 17.7 g/dL 7.1  9.1  8.2  8.3  8.0   9.6    TSAT 15 - 46 %    20       Ferritin 31 - 409 ng/mL    499         BP Readings from Last 3 Encounters:   24 123/67   24 137/74   24 126/68     Wt Readings from Last 2 Encounters:   25 54.4 kg (120 lb)   24 54.9 kg (121 lb)         ASSESSMENT:    Hgb:At goal - recommend dose  TSat: not at goal of >30% Ferritin: At goal (>100ng/mL)   Goals Addressed    None         PLAN:  Dose with Aranesp.  RTC for hgb then Aranesp if needed in 2 week(s).    Orders needed to be renewed (for next follow-up date) in EPIC: None    Iron labs due:  mid 2025    Plan discussed with:  Long, instructing him to give a dose today after the syringe comes to room temp. He questioned the purpose of the medication, noting that he was not aware that he had kidney disease. Discussed progress notes from OV on 1213, noting that diabetes and possibly the SALVADOR from antibiotics are factors involved in the CKD. He notes his blood sugars are 'all over the place' and he's taking his meds as prescribed. Reviewed that we hope the DIAN is not  long term, and his body did respond positively to the first dose 2 weeks ago. He has clinic appt on 011624, and RN encouraged him to get labs done while there. Writer will then follow up with him about the need for a dose, due 012025.  He verbalized understanding of all information provided, voiced he'd give a dose today. Writer offered to follow up with [facility] team re: need to set up next shipment. Gave him call back number if any further questions come up    NEXT FOLLOW-UP DATE:  011725    Carrie Leopold, RN BSN  Anemia Services  [facility]  [email or web address]  Office: [phone]  Fax [phone]

## 2025-01-07 ENCOUNTER — OFFICE VISIT (OUTPATIENT)
Dept: FAMILY MEDICINE | Facility: CLINIC | Age: 55
End: 2025-01-07
Attending: PHYSICIAN ASSISTANT
Payer: MEDICAID

## 2025-01-07 ENCOUNTER — TELEPHONE (OUTPATIENT)
Dept: EDUCATION SERVICES | Facility: CLINIC | Age: 55
End: 2025-01-07

## 2025-01-07 VITALS
OXYGEN SATURATION: 100 % | HEART RATE: 77 BPM | DIASTOLIC BLOOD PRESSURE: 77 MMHG | RESPIRATION RATE: 12 BRPM | SYSTOLIC BLOOD PRESSURE: 139 MMHG | HEIGHT: 65 IN | BODY MASS INDEX: 19.46 KG/M2 | WEIGHT: 116.8 LBS

## 2025-01-07 DIAGNOSIS — N18.31 TYPE 2 DIABETES MELLITUS WITH STAGE 3A CHRONIC KIDNEY DISEASE, WITH LONG-TERM CURRENT USE OF INSULIN (H): ICD-10-CM

## 2025-01-07 DIAGNOSIS — F10.21 ALCOHOL DEPENDENCE IN REMISSION (H): ICD-10-CM

## 2025-01-07 DIAGNOSIS — K86.0 ALCOHOL-INDUCED CHRONIC PANCREATITIS (H): ICD-10-CM

## 2025-01-07 DIAGNOSIS — D64.9 ANEMIA, UNSPECIFIED TYPE: ICD-10-CM

## 2025-01-07 DIAGNOSIS — D89.84 IGG4 RELATED DISEASE (H): ICD-10-CM

## 2025-01-07 DIAGNOSIS — E11.22 TYPE 2 DIABETES MELLITUS WITH STAGE 3A CHRONIC KIDNEY DISEASE, WITH LONG-TERM CURRENT USE OF INSULIN (H): ICD-10-CM

## 2025-01-07 DIAGNOSIS — R63.4 UNINTENTIONAL WEIGHT LOSS: ICD-10-CM

## 2025-01-07 DIAGNOSIS — R60.0 BILATERAL LOWER EXTREMITY EDEMA: ICD-10-CM

## 2025-01-07 DIAGNOSIS — M00.9 PYOGENIC ARTHRITIS OF RIGHT KNEE JOINT, DUE TO UNSPECIFIED ORGANISM (H): ICD-10-CM

## 2025-01-07 DIAGNOSIS — F11.20 CONTINUOUS OPIOID DEPENDENCE (H): ICD-10-CM

## 2025-01-07 DIAGNOSIS — I51.5 CARDIAC CALCIFICATION (H): ICD-10-CM

## 2025-01-07 DIAGNOSIS — K86.81 EXOCRINE PANCREATIC INSUFFICIENCY: ICD-10-CM

## 2025-01-07 DIAGNOSIS — F33.2 SEVERE EPISODE OF RECURRENT MAJOR DEPRESSIVE DISORDER, WITHOUT PSYCHOTIC FEATURES (H): ICD-10-CM

## 2025-01-07 DIAGNOSIS — I10 PRIMARY HYPERTENSION: ICD-10-CM

## 2025-01-07 DIAGNOSIS — N18.31 STAGE 3A CHRONIC KIDNEY DISEASE (H): ICD-10-CM

## 2025-01-07 DIAGNOSIS — Z79.4 TYPE 2 DIABETES MELLITUS WITH STAGE 3A CHRONIC KIDNEY DISEASE, WITH LONG-TERM CURRENT USE OF INSULIN (H): ICD-10-CM

## 2025-01-07 DIAGNOSIS — K52.9 CHRONIC DIARRHEA: Primary | ICD-10-CM

## 2025-01-07 DIAGNOSIS — L97.419 ULCER OF RIGHT HEEL AND MIDFOOT, UNSPECIFIED ULCER STAGE (H): ICD-10-CM

## 2025-01-07 PROBLEM — E11.29 TYPE 2 DIABETES MELLITUS WITH KIDNEY COMPLICATION, WITH LONG-TERM CURRENT USE OF INSULIN (H): Status: ACTIVE | Noted: 2018-10-05

## 2025-01-07 LAB
ANION GAP SERPL CALCULATED.3IONS-SCNC: 7 MMOL/L (ref 7–15)
BUN SERPL-MCNC: 18.4 MG/DL (ref 6–20)
CALCIUM SERPL-MCNC: 9.1 MG/DL (ref 8.8–10.4)
CHLORIDE SERPL-SCNC: 97 MMOL/L (ref 98–107)
CREAT SERPL-MCNC: 1.28 MG/DL (ref 0.67–1.17)
EGFRCR SERPLBLD CKD-EPI 2021: 66 ML/MIN/1.73M2
FERRITIN SERPL-MCNC: 470 NG/ML (ref 31–409)
GLUCOSE SERPL-MCNC: 270 MG/DL (ref 70–99)
HCO3 SERPL-SCNC: 27 MMOL/L (ref 22–29)
IRON BINDING CAPACITY (ROCHE): 189 UG/DL (ref 240–430)
IRON SATN MFR SERPL: 21 % (ref 15–46)
IRON SERPL-MCNC: 39 UG/DL (ref 61–157)
NT-PROBNP SERPL-MCNC: 527 PG/ML (ref 0–900)
POTASSIUM SERPL-SCNC: 5.3 MMOL/L (ref 3.4–5.3)
SODIUM SERPL-SCNC: 131 MMOL/L (ref 135–145)

## 2025-01-07 RX ORDER — PROCHLORPERAZINE 25 MG/1
SUPPOSITORY RECTAL
COMMUNITY
Start: 2025-01-05

## 2025-01-07 RX ORDER — INSULIN HUMAN 100 [IU]/ML
INJECTION, SUSPENSION SUBCUTANEOUS
Status: SHIPPED
Start: 2025-01-07

## 2025-01-07 NOTE — ASSESSMENT & PLAN NOTE
He has a history of anemia which has been noted since May 2024.  Cause has not been identified.  Additional evaluation with CBC, retake count, TSH, haptoglobin, iron studies, LDH, ferritin, B12 and folate.  Ferritin elevated 418, iron low at 20, iron binding capacity low at 2030, iron saturation low at 9.  Normal B12 and folate.  Awaiting haptoglobin and reticulocyte count.  Denies blood in stool.  Upper and lower endoscopy completed 5/2024.   No GI source was identified for anemia.       The source of his anemia has not been identified after a thorough work up with EGD, colonoscopy, labs and PET scan.      12/12/2024: Scheduled to see hematology.       PET scan was completed October 23, 2023 which demonstrated:  1. No evidence of hypermetabolic pulmonary nodules or suspicious  uptake in the chest.      2. Large, complex right knee joint effusion with synovial thickening  previously characterized on 9/21/2024 MRI. Multiple enlarged right  inguinal and right iliac chain lymph nodes with mild to moderate FDG  uptake are likely reactive to the right knee infective/inflammatory  process.     3. Findings of positive fluid balance including trace ascites and  diffuse mild subcutaneous anasarca. Right greater than left lower  extremity edema.     4. Diffuse bone marrow uptake, which is nonspecific and may be related  to anemia given low density blood pool.           PERIPHERAL BLOOD MORPHOLOGY:        1.  MILD HYPOCHROMIC-BORDERLINE MICROCYTIC ANEMIA  A.  MODERATE ANISOCYTOSIS, WITHOUT INCREASED POIKILOCYTOSIS  B.  IRON STUDIES SUGGESTIVE OF ANEMIA OF CHRONIC DISEASE  C.  LACK OF CORRESPONDING RETICULOCYTOSIS        2.  NORMAL LEUKOCYTE DIFFERENTIAL AND MORPHOLOGY        3.  UNREMARKABLE PLATELET MORPHOLOGY        PERIPHERAL BLOOD MORPHOLOGY:        1.  MILD HYPOCHROMIC-BORDERLINE MICROCYTIC ANEMIA  A.  MODERATE ANISOCYTOSIS, WITHOUT INCREASED POIKILOCYTOSIS  B.  IRON STUDIES SUGGESTIVE OF ANEMIA OF CHRONIC DISEASE  C.   LACK OF CORRESPONDING RETICULOCYTOSIS        2.  NORMAL LEUKOCYTE DIFFERENTIAL AND MORPHOLOGY        3.  UNREMARKABLE PLATELET MORPHOLOGY     12/12/2024 seen by hematology with the following recommendations:  Anemia has been either normocytic or microcytic.   His anemia is multifactorial from renal disease, anemia of chronic disease/inflammation and iron deficiency.  EGD and colonoscopy have been normal.  Patient does have malabsorption.  He likely has iron deficiency secondary to it.  Labs were ordered.  He is going to get video capsule endoscopy.    If all the workup come back negative, we consider bone marrow biopsy at some point to make sure there is no primary bone marrow pathology also contributing to anemia.     12/13/24  Seen by Nephrology:  Indices are consistent with anemia of chronic disease - with possibly a component of Fe deficiency. His eGFR is low enough to account for the anemia in part. Indeed, his erythropoeitin level is only 16 mU/ml with a hematocrit of 25 -> it should be > 500 mU/ml.  He was referred to the anemia clinic for aranesp therapy.      1/3/25 Seen by Hematology:  Plan to proceed with bone marrow biopsy.

## 2025-01-07 NOTE — ASSESSMENT & PLAN NOTE
Previously seen by  Rested on 12/18/2024 for lower extremity edema.  He was placed on Bumex 2 mg daily.  He had a follow-up visit on 12/23/2024 which showed significant improvement in his lower extremity edema.  BNP was elevated at 1500.  It was recommended he continue Bumex 2 mg daily.       I reviewed the results of his previous echocardiogram in May 2024 which showed normal ejection fraction of 60 to 65%.  Previous PET scan completed in October 2024 showed his heart size at the upper limits of normal. Diffusely increased left atrial uptake is nonspecific, may relate to atrial fibrillation in the  appropriate clinical setting.     Reports that the edema has improved on the Bumex but he continues to have some lower extremity edema.  Denies shortness of breath or chest pain.  History of atrial fibrillation.     I would like to proceed with a repeat echocardiogram, Zio patch to evaluate for possible atrial fibrillation and chest x-ray.  Previous BNP 1515, now down to 527 after starting bumex 2 mg daily.  Edema significantly improved.  Upcoming echocardiogram scheduled for 1/9/25. and appointment with cardiology 1/16/25.        He had echocardiogram completed May 2024 which demonstrated:  1. Normal biventricular size and function. Left ventricular ejection fraction  of 60-65%.  2. No segmental wall motion abnormalities noted.  3. No hemodynamically significant valvular disease.  No prior study for comparison. Technically adequate study.     Previous PET scan 10/24 demonstrated:  CHEST:  No suspicious pulmonary nodules identified. Dependent subsegmental  atelectasis, greater on the left.      Heart size at the upper limits of normal. Diffusely increased left  atrial uptake is nonspecific, may relate to atrial fibrillation in the  appropriate clinical setting. Low-density aortic blood pool.      Segmental uptake in the distal esophagus likely represents reflux in  the setting of small hiatal hernia

## 2025-01-07 NOTE — PROGRESS NOTES
The longitudinal plan of care for the diagnosis(es)/condition(s) as documented were addressed during this visit. Due to the added complexity in care, I will continue to support Long in the subsequent management and with ongoing continuity of care.    I spent a total of 63 minutes on the day of the visit.  Time spent by me today doing chart review, history and exam, documentation and further activities per the note    Assessment & Plan   Problem List Items Addressed This Visit       Chronic diarrhea - Primary     He has chronic diarrhea due to pancreatic insufficiency.  He follows with [facility].  He has a history of C. difficile infection 4 years ago.  He uses Creon and takes loperamide before meals.         Anemia, unspecified type     He has a history of anemia which has been noted since May 2024.  Cause has not been identified.  Additional evaluation with CBC, retake count, TSH, haptoglobin, iron studies, LDH, ferritin, B12 and folate.  Ferritin elevated 418, iron low at 20, iron binding capacity low at 2030, iron saturation low at 9.  Normal B12 and folate.  Awaiting haptoglobin and reticulocyte count.  Denies blood in stool.  Upper and lower endoscopy completed 5/2024.   No GI source was identified for anemia.       The source of his anemia has not been identified after a thorough work up with EGD, colonoscopy, labs and PET scan.      12/12/2024: Scheduled to see hematology.       PET scan was completed October 23, 2023 which demonstrated:  1. No evidence of hypermetabolic pulmonary nodules or suspicious  uptake in the chest.      2. Large, complex right knee joint effusion with synovial thickening  previously characterized on 9/21/2024 MRI. Multiple enlarged right  inguinal and right iliac chain lymph nodes with mild to moderate FDG  uptake are likely reactive to the right knee infective/inflammatory  process.     3. Findings of positive fluid balance including trace ascites and  diffuse mild  subcutaneous anasarca. Right greater than left lower  extremity edema.     4. Diffuse bone marrow uptake, which is nonspecific and may be related  to anemia given low density blood pool.           PERIPHERAL BLOOD MORPHOLOGY:        1.  MILD HYPOCHROMIC-BORDERLINE MICROCYTIC ANEMIA  A.  MODERATE ANISOCYTOSIS, WITHOUT INCREASED POIKILOCYTOSIS  B.  IRON STUDIES SUGGESTIVE OF ANEMIA OF CHRONIC DISEASE  C.  LACK OF CORRESPONDING RETICULOCYTOSIS        2.  NORMAL LEUKOCYTE DIFFERENTIAL AND MORPHOLOGY        3.  UNREMARKABLE PLATELET MORPHOLOGY        PERIPHERAL BLOOD MORPHOLOGY:        1.  MILD HYPOCHROMIC-BORDERLINE MICROCYTIC ANEMIA  A.  MODERATE ANISOCYTOSIS, WITHOUT INCREASED POIKILOCYTOSIS  B.  IRON STUDIES SUGGESTIVE OF ANEMIA OF CHRONIC DISEASE  C.  LACK OF CORRESPONDING RETICULOCYTOSIS        2.  NORMAL LEUKOCYTE DIFFERENTIAL AND MORPHOLOGY        3.  UNREMARKABLE PLATELET MORPHOLOGY     12/12/2024 seen by hematology with the following recommendations:  Anemia has been either normocytic or microcytic.   His anemia is multifactorial from renal disease, anemia of chronic disease/inflammation and iron deficiency.  EGD and colonoscopy have been normal.  Patient does have malabsorption.  He likely has iron deficiency secondary to it.  Labs were ordered.  He is going to get video capsule endoscopy.    If all the workup come back negative, we consider bone marrow biopsy at some point to make sure there is no primary bone marrow pathology also contributing to anemia.     12/13/24  Seen by Nephrology:  Indices are consistent with anemia of chronic disease - with possibly a component of Fe deficiency. His eGFR is low enough to account for the anemia in part. Indeed, his erythropoeitin level is only 16 mU/ml with a hematocrit of 25 -> it should be > 500 mU/ml.  He was referred to the anemia clinic for aranesp therapy.      1/3/25 Seen by Hematology:  Plan to proceed with bone marrow biopsy.            Relevant Orders     Primary Care - Care Coordination Referral    Cardiac calcification (H)     Currently managed with atorvastatin 40 mg daily and aspirin 81 mg daily.         Relevant Orders    Primary Care - Care Coordination Referral    Exocrine pancreatic insufficiency     He is on Creon and is followed by [facility].         Primary hypertension     Blood pressure is at goal on amlodipine and bumetanide.    Last Comprehensive Metabolic Panel:  Lab Results   Component Value Date     (L) 01/06/2025    POTASSIUM 5.3 01/06/2025    CHLORIDE 97 (L) 01/06/2025    CO2 27 01/06/2025    ANIONGAP 7 01/06/2025     (H) 01/06/2025    BUN 18.4 01/06/2025    CR 1.28 (H) 01/06/2025    GFRESTIMATED 66 01/06/2025    AROLDO 9.1 01/06/2025                  Relevant Orders    Primary Care - Care Coordination Referral    Severe episode of recurrent major depressive disorder, without psychotic features (H)     Symptoms remain stable on duloxetine 60 mg daily.         Relevant Orders    Primary Care - Care Coordination Referral    Type 2 diabetes mellitus with kidney complication, with long-term current use of insulin (H)     Lab Results   Component Value Date    A1C 10.0 11/06/2024    A1C 9.2 08/17/2024    A1C 9.4 05/04/2024    A1C >14.0 03/08/2022     Follows with diabetes education.  Last seen November 20, 2024 with the following recommendations:  Lantus: INCREASE to 13 units --> CONTINUE to increase dose by 2 units every 3 days until your fasting blood sugar (when you wake up) is  mg/dL.        Reports he is taking 12 units Lantus.  FBS have been around 200.  He has not increased the insulin 2 units every 3 days as previously recommended.   Uses Novolog for correction.    We discussed importance of keeping BS's under good control due to nonhealing ulcer on foot and CKD. He has upcoming appt with DE 1/14/25.  Will inform them of the fact he has not increased  insulin.  I reviewed recommendations today.      Due for repeat A1c February  2025.              Relevant Medications    insulin NPH (HUMULIN N VIAL) 100 UNIT/ML vial    Other Relevant Orders    Primary Care - Care Coordination Referral    Unintentional weight loss     Significant weight loss over the past year.  He has had a workup.  Please see assessment and plan under anemia.  He is followed by hematology and [facility].  He continues to take loperamide before meals and is on Creon.         Relevant Orders    Primary Care - Care Coordination Referral    Pyogenic arthritis of right knee joint, due to unspecified organism (H)    Relevant Orders    Primary Care - Care Coordination Referral    Chronic pancreatitis (H)     Follows with [facility] for chronic pancreatitis.  He was previously seen October 18, 2024.  Recommend follow-up in 6 months at that time.  This will be due in April 2025.         Relevant Orders    Primary Care - Care Coordination Referral    Alcohol dependence in remission (H)     Remains in sobriety.         Relevant Orders    Primary Care - Care Coordination Referral    IgG4 related disease (H)     Followed by rheumatology. Started rituximab infusions 12/4/2024. Follow-up appointment with rheumatology scheduled for 2/5/2025.          Relevant Orders    Primary Care - Care Coordination Referral    Bilateral lower extremity edema     Previously seen by  [facility] on 12/18/2024 for lower extremity edema.  He was placed on Bumex 2 mg daily.  He had a follow-up visit on 12/23/2024 which showed significant improvement in his lower extremity edema.  BNP was elevated at 1500.  It was recommended he continue Bumex 2 mg daily.       I reviewed the results of his previous echocardiogram in May 2024 which showed normal ejection fraction of 60 to 65%.  Previous PET scan completed in October 2024 showed his heart size at the upper limits of normal. Diffusely increased left atrial uptake is nonspecific, may relate to atrial fibrillation in the  appropriate clinical setting.      Reports that the edema has improved on the Bumex but he continues to have some lower extremity edema.  Denies shortness of breath or chest pain.  History of atrial fibrillation.     I would like to proceed with a repeat echocardiogram, Zio patch to evaluate for possible atrial fibrillation and chest x-ray.  Previous BNP 1515, now down to 527 after starting bumex 2 mg daily.  Edema significantly improved.  Upcoming echocardiogram scheduled for 1/9/25. and appointment with cardiology 1/16/25.        He had echocardiogram completed May 2024 which demonstrated:  1. Normal biventricular size and function. Left ventricular ejection fraction  of 60-65%.  2. No segmental wall motion abnormalities noted.  3. No hemodynamically significant valvular disease.  No prior study for comparison. Technically adequate study.     Previous PET scan 10/24 demonstrated:  CHEST:  No suspicious pulmonary nodules identified. Dependent subsegmental  atelectasis, greater on the left.      Heart size at the upper limits of normal. Diffusely increased left  atrial uptake is nonspecific, may relate to atrial fibrillation in the  appropriate clinical setting. Low-density aortic blood pool.      Segmental uptake in the distal esophagus likely represents reflux in  the setting of small hiatal hernia         Relevant Orders    ZIO PATCH 3-7 DAYS (additional cost to patient) (Completed)    ZIO PATCH 3-7 DAYS APPLICATION (Completed)    Primary Care - Care Coordination Referral    Ulcer of right heel and midfoot (H)     At his visit on 12/23/2024 with Dr. Ortiz he found to have a blister that encompassed his right heel.  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  He is also has uncontrolled diabetes with most recent A1c 10.0 on 11/6/2024.  He was referred to the wound clinic.  Appointment is scheduled for January 15, 2025 in the wound clinic.       He is to avoid pressure on the heel.  He is to keep the  area clean and dry.  No evidence of infection.  Stage 2 pressure ulcer with eschar tissue. I also reached out to the diabetes educator to reach out to him regarding need for improved blood sugar control.  Appointment scheduled for 1/14/25 with DE.  The nurse met with the patient today and went over ways he could try to avoid pressure on his right heel.             Lab Results   Component Value Date     A1C 10.0 11/06/2024     A1C 9.2 08/17/2024     A1C 9.4 05/04/2024     A1C >14.0 03/08/2022                  Relevant Orders    Primary Care - Care Coordination Referral    Stage 3a chronic kidney disease (H)     Last Comprehensive Metabolic Panel:  Lab Results   Component Value Date     (L) 01/06/2025    POTASSIUM 5.3 01/06/2025    CHLORIDE 97 (L) 01/06/2025    CO2 27 01/06/2025    ANIONGAP 7 01/06/2025     (H) 01/06/2025    BUN 18.4 01/06/2025    CR 1.28 (H) 01/06/2025    GFRESTIMATED 66 01/06/2025    AROLDO 9.1 01/06/2025       Creatinine slightly improved from previous.  Creatinine 1.28 with GFR 66.  Follows with nephrology.  Upcoming appointment with nephrology January 27, 2025. On aranesp infusions.         Relevant Orders    Primary Care - Care Coordination Referral     Other Visit Diagnoses       Continuous opioid dependence (H)        Relevant Orders    Primary Care - Care Coordination Referral             Shamir Alves is a 54 year old, presenting for the following health issues:  Follow Up, Derm Problem (Right heel- blister? Per patient.), and Referral (Requesting a referral to talk with someone, per patient)        1/7/2025     2:25 PM   Additional Questions   Roomed by Julita HAYDEN   Accompanied by Self     History of Present Illness       Reason for visit:  Im not exactly sure    He eats 0-1 servings of fruits and vegetables daily.He consumes 1 sweetened beverage(s) daily.He exercises with enough effort to increase his heart rate 9 or less minutes per day.  He exercises with enough effort  "to increase his heart rate 3 or less days per week.   He is taking medications regularly.           Objective    /77 (BP Location: Left arm, Patient Position: Sitting, Cuff Size: Adult Regular)   Pulse 77   Resp 12   Ht 1.651 m (5' 5\")   Wt 53 kg (116 lb 12.8 oz)   SpO2 100%   BMI 19.44 kg/m    Body mass index is 19.44 kg/m .  Physical Exam  Vitals and nursing note reviewed.   Constitutional:       Appearance: Normal appearance.   HENT:      Head: Normocephalic.   Skin:     Comments: See image under media.  On exam of his right heel, 4 x 5 cm stage 2 ulcer with black eschar tissue.  1-2 p;us edema.  Pedal pulse 2+ in right foot.   Neurological:      Mental Status: He is alert.                Signed Electronically by: Rena Blair PA-C    "

## 2025-01-07 NOTE — ASSESSMENT & PLAN NOTE
Last Comprehensive Metabolic Panel:  Lab Results   Component Value Date     (L) 01/06/2025    POTASSIUM 5.3 01/06/2025    CHLORIDE 97 (L) 01/06/2025    CO2 27 01/06/2025    ANIONGAP 7 01/06/2025     (H) 01/06/2025    BUN 18.4 01/06/2025    CR 1.28 (H) 01/06/2025    GFRESTIMATED 66 01/06/2025    AROLDO 9.1 01/06/2025       Creatinine slightly improved from previous.  Creatinine 1.28 with GFR 66.  Follows with nephrology.  Upcoming appointment with nephrology January 27, 2025. On aranesp infusions.

## 2025-01-07 NOTE — ASSESSMENT & PLAN NOTE
Lab Results   Component Value Date    A1C 10.0 11/06/2024    A1C 9.2 08/17/2024    A1C 9.4 05/04/2024    A1C >14.0 03/08/2022     Follows with diabetes education.  Last seen November 20, 2024 with the following recommendations:  Lantus: INCREASE to 13 units --> CONTINUE to increase dose by 2 units every 3 days until your fasting blood sugar (when you wake up) is  mg/dL.        Reports he is taking 12 units Lantus.  FBS have been around 200.  He has not increased the insulin 2 units every 3 days as previously recommended.   Uses Novolog for correction.    We discussed importance of keeping BS's under good control due to nonhealing ulcer on foot and CKD. He has upcoming appt with DE 1/14/25.  Will inform them of the fact he has not increased  insulin.  I reviewed recommendations today.      Due for repeat A1c February 2025.

## 2025-01-07 NOTE — ASSESSMENT & PLAN NOTE
Follows with [facility] for chronic pancreatitis.  He was previously seen October 18, 2024.  Recommend follow-up in 6 months at that time.  This will be due in April 2025.

## 2025-01-07 NOTE — ASSESSMENT & PLAN NOTE
He has chronic diarrhea due to pancreatic insufficiency.  He follows with [facility].  He has a history of C. difficile infection 4 years ago.  He uses Creon and takes loperamide before meals.

## 2025-01-07 NOTE — ASSESSMENT & PLAN NOTE
At his visit on 12/23/2024 with Dr. Ortiz he found to have a blister that encompassed his right heel.  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  He is also has uncontrolled diabetes with most recent A1c 10.0 on 11/6/2024.  He was referred to the wound clinic.  Appointment is scheduled for January 15, 2025 in the wound clinic.       He is to avoid pressure on the heel.  He is to keep the area clean and dry.  No evidence of infection.  Stage 2 pressure ulcer with eschar tissue. I also reached out to the diabetes educator to reach out to him regarding need for improved blood sugar control.  Appointment scheduled for 1/14/25 with DE.  The nurse met with the patient today and went over ways he could try to avoid pressure on his right heel.             Lab Results   Component Value Date     A1C 10.0 11/06/2024     A1C 9.2 08/17/2024     A1C 9.4 05/04/2024     A1C >14.0 03/08/2022

## 2025-01-07 NOTE — ASSESSMENT & PLAN NOTE
Significant weight loss over the past year.  He has had a workup.  Please see assessment and plan under anemia.  He is followed by hematology and [facility].  He continues to take loperamide before meals and is on Creon.

## 2025-01-07 NOTE — PROGRESS NOTES
Long Mayfield arrived here on 1/7/2025 4:07 PM for 3-7 Days  Zio monitor placement per ordering provider Rena Blair PA-C for the diagnosis bilateral lower extremity edema.  Patient s skin was prepped per protocol.  Rena Blair PA-C is the supervising provider.  Zio monitor was placed.  Instructions were reviewed with and given to the patient.  Patient verbalized understanding of wear, troubleshooting and monitor return instructions.    Betsey Colin RN  [facility]

## 2025-01-07 NOTE — TELEPHONE ENCOUNTER
Connected with patient re: getting scheduled for follow-up on blood sugar monitoring. Provider does not have any openings until early February, so scheduled pt with colleague on 1/14/25.     Marlen Ryan, ALONDRA, RDN, LD, [facility]  Diabetes

## 2025-01-07 NOTE — ASSESSMENT & PLAN NOTE
Blood pressure is at goal on amlodipine and bumetanide.    Last Comprehensive Metabolic Panel:  Lab Results   Component Value Date     (L) 01/06/2025    POTASSIUM 5.3 01/06/2025    CHLORIDE 97 (L) 01/06/2025    CO2 27 01/06/2025    ANIONGAP 7 01/06/2025     (H) 01/06/2025    BUN 18.4 01/06/2025    CR 1.28 (H) 01/06/2025    GFRESTIMATED 66 01/06/2025    AROLDO 9.1 01/06/2025

## 2025-01-08 ENCOUNTER — TELEPHONE (OUTPATIENT)
Dept: RHEUMATOLOGY | Facility: CLINIC | Age: 55
End: 2025-01-08
Payer: MEDICAID

## 2025-01-08 DIAGNOSIS — D89.84 IGG4 RELATED DISEASE (H): ICD-10-CM

## 2025-01-08 DIAGNOSIS — M31.8 SYSTEMIC VASCULITIS (H): ICD-10-CM

## 2025-01-08 DIAGNOSIS — Z79.899 HIGH RISK MEDICATION USE: Primary | ICD-10-CM

## 2025-01-09 ENCOUNTER — HOSPITAL ENCOUNTER (OUTPATIENT)
Dept: CARDIOLOGY | Facility: CLINIC | Age: 55
End: 2025-01-09
Attending: PHYSICIAN ASSISTANT
Payer: MEDICAID

## 2025-01-09 ENCOUNTER — PATIENT OUTREACH (OUTPATIENT)
Dept: CARE COORDINATION | Facility: CLINIC | Age: 55
End: 2025-01-09
Payer: MEDICAID

## 2025-01-09 DIAGNOSIS — R79.89 ELEVATED BRAIN NATRIURETIC PEPTIDE (BNP) LEVEL: ICD-10-CM

## 2025-01-09 DIAGNOSIS — R60.0 BILATERAL LOWER EXTREMITY EDEMA: ICD-10-CM

## 2025-01-09 PROBLEM — M00.9 PYOGENIC ARTHRITIS OF RIGHT KNEE JOINT, DUE TO UNSPECIFIED ORGANISM (H): Status: ACTIVE | Noted: 2024-07-11

## 2025-01-09 LAB — LVEF ECHO: NORMAL

## 2025-01-09 PROCEDURE — 93306 TTE W/DOPPLER COMPLETE: CPT

## 2025-01-09 RX ORDER — SULFAMETHOXAZOLE AND TRIMETHOPRIM 400; 80 MG/1; MG/1
1 TABLET ORAL DAILY
Qty: 90 TABLET | Refills: 1 | Status: SHIPPED | OUTPATIENT
Start: 2025-01-09 | End: 2025-07-08

## 2025-01-09 NOTE — PROGRESS NOTES
Clinic Care Coordination Contact  [facility]/Voicemail    Clinical Data: Care Coordinator Outreach    Outreach Documentation Number of Outreach Attempt   1/9/2025  10:11 AM 1       Left message on patient's voicemail with call back information and requested return call.      Plan: Care Coordinator will try to reach patient again in 1-2 business days.    [place] DAIGLE [facility] Community Health Worker  Ambulatory Care Coordination  Covering for Rosa BRANCH

## 2025-01-13 ENCOUNTER — OFFICE VISIT (OUTPATIENT)
Dept: ORTHOPEDICS | Facility: CLINIC | Age: 55
End: 2025-01-13
Payer: MEDICAID

## 2025-01-13 DIAGNOSIS — M65.969 SYNOVITIS OF KNEE: ICD-10-CM

## 2025-01-13 DIAGNOSIS — M00.9 PYOGENIC ARTHRITIS OF RIGHT KNEE JOINT, DUE TO UNSPECIFIED ORGANISM (H): Primary | ICD-10-CM

## 2025-01-13 LAB
APPEARANCE FLD: ABNORMAL
CELL COUNT BODY FLUID SOURCE: ABNORMAL
COLOR FLD: ABNORMAL
EOSINOPHIL NFR FLD MANUAL: 1 %
LYMPHOCYTES NFR FLD MANUAL: 6 %
MONOS+MACROS NFR FLD MANUAL: 41 %
NEUTS BAND NFR FLD MANUAL: 52 %
WBC # FLD AUTO: 805 /UL

## 2025-01-13 PROCEDURE — 87070 CULTURE OTHR SPECIMN AEROBIC: CPT | Performed by: ORTHOPAEDIC SURGERY

## 2025-01-13 PROCEDURE — 99000 SPECIMEN HANDLING OFFICE-LAB: CPT | Performed by: PATHOLOGY

## 2025-01-13 PROCEDURE — 89051 BODY FLUID CELL COUNT: CPT | Performed by: ORTHOPAEDIC SURGERY

## 2025-01-13 PROCEDURE — 87075 CULTR BACTERIA EXCEPT BLOOD: CPT | Performed by: ORTHOPAEDIC SURGERY

## 2025-01-13 PROCEDURE — 87102 FUNGUS ISOLATION CULTURE: CPT | Performed by: ORTHOPAEDIC SURGERY

## 2025-01-13 PROCEDURE — 99213 OFFICE O/P EST LOW 20 MIN: CPT | Mod: 25 | Performed by: ORTHOPAEDIC SURGERY

## 2025-01-13 PROCEDURE — 20610 DRAIN/INJ JOINT/BURSA W/O US: CPT | Mod: RT | Performed by: ORTHOPAEDIC SURGERY

## 2025-01-13 RX ORDER — LIDOCAINE HYDROCHLORIDE 10 MG/ML
4 INJECTION, SOLUTION EPIDURAL; INFILTRATION; INTRACAUDAL; PERINEURAL
Status: COMPLETED | OUTPATIENT
Start: 2025-01-13 | End: 2025-01-13

## 2025-01-13 RX ORDER — BETAMETHASONE SODIUM PHOSPHATE AND BETAMETHASONE ACETATE 3; 3 MG/ML; MG/ML
24 INJECTION, SUSPENSION INTRA-ARTICULAR; INTRALESIONAL; INTRAMUSCULAR; SOFT TISSUE
Status: COMPLETED | OUTPATIENT
Start: 2025-01-13 | End: 2025-01-13

## 2025-01-13 RX ADMIN — LIDOCAINE HYDROCHLORIDE 4 ML: 10 INJECTION, SOLUTION EPIDURAL; INFILTRATION; INTRACAUDAL; PERINEURAL at 16:23

## 2025-01-13 RX ADMIN — BETAMETHASONE SODIUM PHOSPHATE AND BETAMETHASONE ACETATE 24 MG: 3; 3 INJECTION, SUSPENSION INTRA-ARTICULAR; INTRALESIONAL; INTRAMUSCULAR; SOFT TISSUE at 16:23

## 2025-01-13 ASSESSMENT — KOOS JR
STANDING UPRIGHT: SEVERE
BENDING TO THE FLOOR TO PICK UP OBJECT: SEVERE
TWISING OR PIVOTING ON KNEE: SEVERE
STRAIGHTENING KNEE FULLY: MODERATE
HOW SEVERE IS YOUR KNEE STIFFNESS AFTER FIRST WAKING IN MORNING: SEVERE
GOING UP OR DOWN STAIRS: EXTREME
RISING FROM SITTING: SEVERE
KOOS JR SCORING: 34.17

## 2025-01-13 NOTE — LETTER
1/13/2025      Long Mayfield  [address]  [place] [address]      Dear Colleague,    Thank you for referring your patient, Long Mayfield, to the [facility] [place]. Please see a copy of my visit note below.        [facility]  Orthopaedic Surgery      Long Mayfield MRN# [id number]    YOB: 1970     Ref MD:    Dr. Hector Cruz, [facility] rheumatology  Dr. Jordy Chinchilla, [facility] nephrology      Background history:  DX:  Synovitis, chronic, possibly secondary to #2 below  Hx c/w IgG4-related tubulointerstitial nephritis   Acute metabolic encephalopathy  Hyperkalemia  Chronic pancreatitis  Type 2 diabetes with kidney complication  Alcohol abuse hyperglycemia  Primary hypertension  Cardiac calcification  GERD.    Anemia  Acute renal insufficiency    TREATMENTS:  1997, open reduction and intra fixation right patella  7/11/2024, I&D of right knee, Dr. Espinoza (Cultures negative)  8/17/2024, I&D of right knee, Dr. Phoenix (Cultures negative)  9/23/2024, Right knee aspiration, (Cutlures negative, Cytology negative)      Mr. Mayfield is seen for recheck with his right knee joint and persistent effusion and synovitis involving the right knee.  This complaint and physical examination today.  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  He has been initiated on rituximab infusions.    Today's visit is to discuss possibly a steroid injection and aspiration.    Examination reveals large swelling about his right knee with an effusion.  He continues to have pain.    Impression:  Persistent effusion of right knee, recent injection of rituximab therapy    Plan:  Advised proceeding with aspiration and steroid injection.    Procedure note:  Under sterile precautions, 30 cc of yellow fluid was aspirated from his right knee joint.  4 cc of Celestone Soluspan and 4 cc of 1% Xylocaine were  then instilled into the right knee joint.  There were no complications.    Patient to return in 3 months time for recheck.  If persistent effusions are identified at that time despite rituximab therapy and steroid injections then 1 might consider surgical synovectomy which I would discuss with his care team.    MD Tashia Monet Family Professor  Oncology and Adult Reconstructive Surgery  Dept Orthopaedic Surgery, [facility]  [phone] office, [phone] pager  [email or web address]    Total combined visit time and work time before and after clinic visit, independent of trainee, on encounter date = 30 min      Large Joint Injection/Arthocentesis: R knee joint    Date/Time: 1/13/2025 4:23 PM    Performed by: Dickson Day MD  Authorized by: Dickson Day MD    Indications:  Pain  Needle Size:  21 G  Guidance: landmark guided    Approach:  Lateral  Location:  Knee      Medications:  24 mg betamethasone acet & sod phos 6 (3-3) MG/ML; 4 mL lidocaine (PF) 1 %  Aspirate amount (mL):  30  Aspirate:  Yellow  Aspirate analysis: sent for lab analysis    Outcome:  Tolerated well, no immediate complications  Procedure discussed: discussed risks, benefits, and alternatives    Consent Given by:  Patient  Timeout: timeout called immediately prior to procedure    Prep: patient was prepped and draped in usual sterile fashion          Again, thank you for allowing me to participate in the care of your patient.        Sincerely,        Dickson Day MD    Electronically signed

## 2025-01-13 NOTE — NURSING NOTE
[facility] [address] [address] [phone]  Dept: [phone]  ______________________________________________________________________________    Patient: Long Mayfield   : 1970   MRN: [id number]   2025    INVASIVE PROCEDURE SAFETY CHECKLIST    Date: 2025   Procedure:Right knee steroid injection and aspiration  Patient Name: Long Mayfield  MRN: [id number]  YOB: 1970    Action: Complete sections as appropriate. Any discrepancy results in a HARD COPY until resolved.     PRE PROCEDURE:  Patient ID verified with 2 identifiers (name and  or MRN): Yes  Procedure and site verified with patient/designee (when able): Yes  Accurate consent documentation in medical record: Yes  H&P (or appropriate assessment) documented in medical record: NA  H&P must be up to 20 days prior to procedure and updates within 24 hours of procedure as applicable: NA  Relevant diagnostic and radiology test results appropriately labeled and displayed as applicable: Yes  Procedure site(s) marked with provider initials: NA    TIMEOUT:  Time-Out performed immediately prior to starting procedure, including verbal and active participation of all team members addressing the following:Yes  * Correct patient identify  * Confirmed that the correct side and site are marked  * An accurate procedure consent form  * Agreement on the procedure to be done  * Correct patient position  * Relevant images and results are properly labeled and appropriately displayed  * The need to administer antibiotics or fluids for irrigation purposes during the procedure as applicable   * Safety precautions based on patient history or medication use    DURING PROCEDURE: Verification of correct person, site, and procedures any time the responsibility for care of the patient is transferred to another member of the care team.       The following medications  were given:         Prior to injection, verified patient identity using patient's name and date of birth.  Due to injection administration, patient instructed to remain in clinic for 15 minutes  afterwards, and to report any adverse reaction to me immediately.    Joint injection was performed.    Medication Name: Lidocaine NDC 7456-2428-59  Drug Amount Wasted:  Yes: 16 mg/ml   Vial/Syringe: Single dose vial  Expiration Date:  2/1/25    Medication Name: Celestone NDC 5413-4170-38  Drug Amount Wasted:  Yes: 1 mg/ml   Vial/Syringe: Single dose vial  Expiration Date:  06/30/25    Scribed by Miracle Hobbs ATC for Dr. Day on January 13, 2025 at 4:23p based on the provider's statements to me.     Miracle Hobbs ATC

## 2025-01-14 ENCOUNTER — TELEPHONE (OUTPATIENT)
Dept: EDUCATION SERVICES | Facility: CLINIC | Age: 55
End: 2025-01-14

## 2025-01-14 ENCOUNTER — NURSE TRIAGE (OUTPATIENT)
Dept: NURSING | Facility: CLINIC | Age: 55
End: 2025-01-14

## 2025-01-14 NOTE — PROGRESS NOTES
[facility] Consult Note    Date of Service: January 14, 2025     Requesting Provider: Dr. Charlie Ortiz    Chief Complaint: right heel ulcer and left hand burn    History of Present Illness: [place] is being seen at [facility] today regarding right heel ulcer and left hand burn. They arrive to the clinic today alone; he used a cane to get to the room. The patient reports that the right heel wound started 3 weeks ago; he has been having issues with recurrent right knee effusions; and pain; he is minimally ambulatory from this; he developed right heel blister and when he went to the bathroom this burst open. He has a history of infection in his right toe 1 year ago and required TMA. Was currently/previously using telfa; rolled gauze; coban to the left hand; and coban to the heel. Reports pain of 0/10; currently using nothing for pain. Has used nothing as compression in the past, is currently using nothing for compression. Denies any fevers, chills, or generalized ill feeling. Sleeps in a bed/recliner with legs; he states the heels are on the mattress; but he tends to sleep on his sides.  Denies history of Joint Replacement and Vein Procedures. Positive history of DVT. I personally reviewed outside imaging, lab work, and progress noted through Care Everywhere and outside records. Non-smoker. The patient does have diabetes; blood sugars have been running 200-400; their last A1C was 10% and this was done 11/6/24. States his sugars are elevated due to steroids for his right knee. He is currently on bactrim; he is not sure why he is on this. He had arterial studies done today these were non-diagnostic due to calcified non-compressible arteries..         Review of Systems:   Constitutional:  variable weights; loses and regains   EENTM: negative for glasses;  negative [place]  GI:  negative for nausea/vomiting;   negative for constipation   +diarrhea;   negative for fecal  incontinence  +/- weight loss working with [employer]  :   negative dysuria,  negative incontinence  MS: patient is ambulatory;  does use assistive devices  Cardiac: negative   Respiratory:  negative SOB  Endocrine:  positive  diabetes  Psych: positive  depression/anxiety    Past Medical History:   Past Medical History:   Diagnosis Date    Abnormal CXR 10/15/2024    Previous CT of the chest completed at [facility] in December 2023 demonstrated:   Impression:   1. Severe anasarca with moderate to large pleural effusions and a small pericardial effusion. Diffuse septal thickening in the lungs concerning for pulmonary edema. Anemic cardiac blood pool.   2. Solitary pulmonary nodule in the left lower lobe with an average diameter of 10 mm is indeterminat    Abnormal LFTs 01/03/2025    Acidosis 01/08/2020    Acquired absence of other right toe(s) 01/12/2024    Acute metabolic encephalopathy 09/17/2024    Acute pain of right knee 07/12/2024    Acute pulmonary edema (H) 01/11/2024    Acute renal insufficiency 05/03/2024    Last Comprehensive Metabolic Panel:          Lab Results      Component    Value    Date           NA    131 (L)    10/15/2024           POTASSIUM    4.0    10/15/2024           CHLORIDE    98    10/15/2024           CO2    24    10/15/2024           ANIONGAP    9    10/15/2024           GLC    133 (A)    10/23/2024           BUN    22.3 (H)    10/15/2024           CR    1.35 (H)    10/15/2024        Alcohol abuse 09/12/2022    Alcohol dependence in remission (H) 10/29/2024    Allergic rhinitis     Anemia     Anemia, unspecified type 05/03/2024    He has a history of anemia which has been noted since May 2024.  Cause has not been identified.  Additional evaluation with CBC, retake count, TSH, haptoglobin, iron studies, LDH, ferritin, B12 and folate.  Ferritin elevated 418, iron low at 20, iron binding capacity low at 2030, iron saturation low at 9.  Normal B12 and folate.  Awaiting haptoglobin and  reticulocyte count.  Denies blood in stool.    Anxiety disorder, unspecified 01/11/2024    Arthritis     Arthritis due to other bacteria, right knee (H) 08/19/2024    Atherosclerotic heart disease of native coronary artery without angina pectoris 01/11/2024    Bell's palsy     Bilateral lower extremity edema 12/18/2024    Cardiac calcification (H) 01/19/2023    Treatment:  Colestid 1 g tablet.  Aspirin 81 mg daily.      Cervical disc disease with myelopathy 05/01/2022    Formatting of this note might be different from the original.   seen spine specialist- [facility]  Formatting of this note might be different from the original.   seen spine specialist      Cervical radiculopathy 07/11/2024    Cervicalgia     Change or removal of drains 08/26/2024    Chronic bilateral low back pain with bilateral sciatica 04/08/2022    Chronic diarrhea 03/08/2022    Chronic kidney disease, unspecified 01/11/2024    Chronic pancreatitis (H) 09/06/2024    Due to continued right knee swelling and history of chronic pancreatitis with chronic diarrhea, he was referred to GI to review possible Whipple's disease.     10/18/2024 seen by [facility].  We do not have those records and will request those records to be faxed over.  Once I get those records I will review them with recommendations.  Per patient he is to follow-up in 6 months.      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  Started rituximab infusions 12/4/2024.      Ketosis (H) 03/08/2022    Long term (current) use of insulin (H) 08/19/2024    Major depressive  "disorder, recurrent episode, mild     Major depressive disorder, recurrent, mild 08/19/2024    Muscle wasting and atrophy, not elsewhere classified, multiple sites 04/01/2024    Muscle weakness (generalized) 01/12/2024    Need for assistance with personal care 01/12/2024    Osteomyelitis of great toe of right foot (H) 11/22/2023    Other abnormalities of gait and mobility 01/12/2024    Other acute osteomyelitis, right ankle and foot (H)     Other chronic pancreatitis (H)     Other fatigue 11/14/2024    Other hyperlipidemia 03/15/2022    Other polyosteoarthritis     Peripheral vascular disease, unspecified 08/19/2024    Pneumonia of left lower lobe due to infectious organism 09/11/2024    Primary hypertension 10/09/2019    Pyogenic arthritis of right knee joint, due to unspecified organism (H) 07/11/2024    Right knee inflammatory arthritis, etiology to be determined, s/p I&D Jul 11, Aug 18, 2024.         Brucella, Bartonella, Q fever, CD4, Ig levels.     MRI right knee with and without contrast if negative.     Rheumatology and GI consult.     Complete oral ciprofloxacin course.      Admitted to [facility] 8/16 to 8/19/2024.  \"Has been seen a handful of times and outpatient follow-up with persistent right    Right knee pain 07/11/2024    S/P transmetatarsal amputation of foot, right (H) 12/01/2023     s/p transmetatarsal amputation, right foot (DOS 1/9/24)      Severe episode of recurrent major depressive disorder, without psychotic features (H) 01/19/2023    Solitary pulmonary nodule     Stage 3a chronic kidney disease (H) 1/11/2024    Systemic vasculitis (H) 11/19/2024    Type 2 diabetes mellitus with foot ulcer (H) 01/11/2024    Type 2 diabetes mellitus with hyperosmolarity without coma, with long-term current use of insulin (H) 10/29/2024    Type 2 diabetes mellitus with kidney complication, with long-term current use of insulin (H) 10/05/2018    Seen by diabetes educator 10/29/2024:   PLAN  Lantus: 12 units  Set dose " Novolog at meals: 3 units each + Correction Scale below     Pre-Meal Correction Scale:        If pre-meal glucose is:    Add extra Humalog/Novolog to your meal dose per below chart:      151 - 200    +1 unit      201 - 250    +2 units      251 - 300    +3 units      301 - 350    +4 units      351 - 400     +5 units      Over     Ulcer of right heel and midfoot (H) 12/23/2024    Unintentional weight loss 10/11/2023    Vitamin D deficiency, unspecified 01/11/2024        Surgical History:   Past Surgical History:   Procedure Laterality Date    ARTHROSCOPY KNEE Right 08/17/2024    Procedure: Arthroscopic;  Surgeon: Jeff Phoenix MD;  Location: [facility] OR    COLONOSCOPY N/A 05/07/2024    Procedure: Colonoscopy;  Surgeon: Nina Moya MD;  Location:  GI    ESOPHAGOSCOPY, GASTROSCOPY, DUODENOSCOPY (EGD), COMBINED N/A 05/06/2024    Procedure: Esophagoscopy, gastroscopy, duodenoscopy (EGD), combined;  Surgeon: Nina Moya MD;  Location:  GI    ESOPHAGOSCOPY, GASTROSCOPY, DUODENOSCOPY (EGD), COMBINED N/A 05/07/2024    Procedure: Esophagoscopy, gastroscopy, duodenoscopy (EGD), combined;  Surgeon: Nina Moya MD;  Location:  GI    IR RENAL BIOPSY RIGHT  11/08/2024    IRRIGATION AND DEBRIDEMENT KNEE, COMBINED Right 08/17/2024    Procedure: IRRIGATION AND DEBRIDEMENT, Right KNEE,;  Surgeon: Jeff Phoenix MD;  Location: [facility] OR    IRRIGATION AND DEBRIDEMENT SPINE Right 07/11/2024    Procedure: Irrigation and debridement knee;  Surgeon: Dejon Espinoza MD;  Location: [facility] OR    ORTHOPEDIC SURGERY      partial amputation of right foot Right     PATELLA SURGERY          Medications:   Current Outpatient Medications   Medication Sig Dispense Refill    amLODIPine (NORVASC) 10 MG tablet TAKE 1 TABLET(10 MG) BY MOUTH DAILY 90 tablet 1    atorvastatin (LIPITOR) 40 MG tablet       bumetanide (BUMEX) 2 MG tablet Take 1 tablet (2 mg) by mouth daily. 90 tablet 3    cetirizine  "(ZYRTEC) 10 MG tablet Take 1 tablet (10 mg) by mouth daily. 30 tablet 11    darbepoetin sunshine-polysorbate (ARANESP) 40 MCG/0.4ML injection Inject 0.4 mLs (40 mcg) subcutaneously every 14 days. Do not shake. Administer for Hgb <10.0. HOLD dose if systolic BP >180. 0.8 mL 11    gabapentin (NEURONTIN) 300 MG capsule Take 1 capsule (300 mg) by mouth 2 times daily. 60 capsule 2    insulin aspart (NOVOLOG FLEXPEN) 100 UNIT/ML pen Inject 1-5 Units subcutaneously 3 times daily (with meals). In addition to 6-8 units with each meal, inject additional units as per this sliding scale:  If 200-250 = 1   251-300 = 2   301-350 = 3  351-400 = 4  401+ = 5  Repeat BS after 3 hours and call MD if still over 400      insulin glargine (LANTUS PEN) 100 UNIT/ML pen Inject 12 Units subcutaneously every morning. 15 mL 3    insulin NPH (HUMULIN N VIAL) 100 UNIT/ML vial INJECT 13 UNITS AT THE START OF YOUR STEROID INFUSION AT CLINIC WHICH IS SCHEDULED FOR 12/4/24      insulin pen needle (31G X 6 MM) 31G X 6 MM miscellaneous Use 4 pen needles daily or as directed. 100 each 11    insulin syringe-needle U-100 (29G X 1/2\" 0.5 ML) 29G X 1/2\" 0.5 ML miscellaneous Use one syringe as directed prior to steroid infusion. 10 each 0    lipase-protease-amylase (CREON 36) 11395-832566-847599 units CPEP Take 3 capsules by mouth 3 times daily (with meals). 0730, 1130, 1630      loperamide (IMODIUM) 2 MG capsule Take 4 mg by mouth every 8 hours as needed for diarrhea.      methocarbamol (ROBAXIN) 500 MG tablet Take 1 tablet (500 mg) by mouth every 8 hours as needed for muscle spasms. 30 tablet 3    metroNIDAZOLE (FLAGYL) 250 MG tablet Take 1 tablet by mouth 3 times daily.      oxyCODONE (ROXICODONE) 5 MG tablet Take 1 tablet (5 mg) by mouth every 6 hours as needed for pain. 30 tablet 0    silver sulfADIAZINE (SILVADENE) 1 % external cream Apply topically 2 times daily. 400 g 2    sulfamethoxazole-trimethoprim (BACTRIM) 400-80 MG tablet Take 1 tablet by mouth " daily. 90 tablet 1    amoxicillin (AMOXIL) 250 MG capsule take 1 capsule by mouth every 8 hours (Patient not taking: Reported on 1/15/2025)      Continuous Glucose Sensor (DEXCOM G7 SENSOR) MISC Change every 10 days. (Patient not taking: Reported on 1/15/2025) 3 each 3    Continuous Glucose Transmitter (DEXCOM G6 TRANSMITTER) MISC  (Patient not taking: Reported on 1/15/2025)      triamcinolone (KENALOG) 0.1 % external ointment Apply topically. (Patient not taking: Reported on 1/15/2025)       Current Facility-Administered Medications   Medication Dose Route Frequency Provider Last Rate Last Admin    lidocaine (PF) (XYLOCAINE) 1 % injection 3 mL  3 mL      3 mL at 09/23/24 1623    lidocaine (XYLOCAINE) 5 % ointment   Topical Daily PRN Marina Fernandez NP   Given at 01/15/25 1354       Allergies:   Allergies   Allergen Reactions    Vancomycin      Other Reaction(s): Renal Failure    Vancomycin-induced nephrotoxicity (11/2023, outside hospital)    Seasonal Allergies      HAYFEVER:    -Watery Eyes  -Eye burn    Daptomycin Rash     Likely delayed hypersensitivity reaction to daptomycin 11/2023       Family history: No family history on file.     Social History:   Social History     Socioeconomic History    Marital status: Single     Spouse name: Not on file    Number of children: Not on file    Years of education: Not on file    Highest education level: Not on file   Occupational History    Not on file   Tobacco Use    Smoking status: Never     Passive exposure: Never    Smokeless tobacco: Never   Vaping Use    Vaping status: Never Used   Substance and Sexual Activity    Alcohol use: Not Currently     Comment: sober 2 year    Drug use: Not Currently     Types: Marijuana    Sexual activity: Not on file   Other Topics Concern    Not on file   Social History Narrative    Not on file     Social Drivers of Health     Financial Resource Strain: Low Risk  (9/24/2024)    Financial Resource Strain     Within the past 12 months,  have you or your family members you live with been unable to get utilities (heat, electricity) when it was really needed?: No   Food Insecurity: Low Risk  (9/24/2024)    Food Insecurity     Within the past 12 months, did you worry that your food would run out before you got money to buy more?: No     Within the past 12 months, did the food you bought just not last and you didn t have money to get more?: No   Transportation Needs: Low Risk  (9/24/2024)    Transportation Needs     Within the past 12 months, has lack of transportation kept you from medical appointments, getting your medicines, non-medical meetings or appointments, work, or from getting things that you need?: No   Physical Activity: Not on file   Stress: Not on file   Social Connections: Unknown (3/16/2022)    Received from [facility]    Social Connections     Frequency of Communication with Friends and Family: Not on file   Interpersonal Safety: Low Risk  (11/8/2024)    Interpersonal Safety     Do you feel physically and emotionally safe where you currently live?: Yes     Within the past 12 months, have you been hit, slapped, kicked or otherwise physically hurt by someone?: No     Within the past 12 months, have you been humiliated or emotionally abused in other ways by your partner or ex-partner?: No   Recent Concern: Interpersonal Safety - High Risk (9/11/2024)    Interpersonal Safety     Do you feel physically and emotionally safe where you currently live?: No     Within the past 12 months, have you been hit, slapped, kicked or otherwise physically hurt by someone?: No     Within the past 12 months, have you been humiliated or emotionally abused in other ways by your partner or ex-partner?: No   Housing Stability: Low Risk  (9/24/2024)    Housing Stability     Do you have housing? : Yes     Are you worried about losing your housing?: No        Physical Exam  Vitals: BP  "128/83   Pulse 58   Temp 97.3  F (36.3  C) (Oral)   Resp 18   SpO2 98%   Weight is 0 lbs 0 oz          There is no height or weight on file to calculate BMI.  General: This is a 54 year old male who appears their reported age, not in acute distress  Head: normocephalic, Atraumatic; not wearing glasses; non-icteric sclera; no exudate; mild hearing loss  Respiratory: unlabored breathing; no cough   Skin: Uniformly warm and dry  Psych: Alert and oriented x4; calm and cooperative throughout exam  Abdomen: Normal bowel sounds. Soft, symmetric, no guarding or rigidity, nontender with palpation.  No organomegaly or masses palpated.   Extremities: right leg; TMA; well healed; right posterior plantar heel full thickness necrotic ulcer small drainage; no surrounding erythema; measures [id number] trace edema in the legs; left hand irregular full thickness small drainage; measures 4x4x0.1cm    Sensation: Decreased to pinprick and light touch in a stocking distribution bilaterally     Peripheral Vascular:  Good capillary refill. No unusual venous distention. Negative for spider veins, telangiectasias, hemosiderin deposition or hyperpigmentation, and fibrosis or scarring         Circumferential volume measures:            1/15/2025     1:00 PM   Circumferential Measures   Right Ankle 19   Right Widest Calf 30   Right Knee to Ankle 17.5   Left - just above MTP 21   Left Ankle 30       Ulceration(s)/Wound(s):     VASC Wound Right heel (Active)   Pre Size Length 5 01/15/25 1300   Pre Size Width 3 01/15/25 1300   Pre Size Depth 0 01/15/25 1300   Pre Total Sq cm 15 01/15/25 1300       VASC Wound Left hand (Active)   Pre Size Length 4 01/15/25 1300   Pre Size Width 4 01/15/25 1300   Pre Size Depth 0.1 01/15/25 1300   Pre Total Sq cm 16 01/15/25 1300       Laboratory studies:     I personally reviewed the following lab results today and those on care everywhere, if indicated     No results found for: \"CRP\"   Erythrocyte Sedimentation " Rate   Date Value Ref Range Status   12/03/2024 111 (H) 0 - 20 mm/hr Final      Last Renal Panel:  Sodium   Date Value Ref Range Status   01/06/2025 131 (L) 135 - 145 mmol/L Final   10/08/2017 131 (L) 133 - 144 mmol/L Final     Potassium   Date Value Ref Range Status   01/06/2025 5.3 3.4 - 5.3 mmol/L Final   08/06/2022 2.9 (L) 3.4 - 5.3 mmol/L Final   10/08/2017 3.8 3.4 - 5.3 mmol/L Final     Chloride   Date Value Ref Range Status   01/06/2025 97 (L) 98 - 107 mmol/L Final   08/06/2022 97 94 - 109 mmol/L Final   10/08/2017 95 94 - 109 mmol/L Final     Carbon Dioxide   Date Value Ref Range Status   10/08/2017 28 20 - 32 mmol/L Final     Carbon Dioxide (CO2)   Date Value Ref Range Status   01/06/2025 27 22 - 29 mmol/L Final   08/06/2022 24 20 - 32 mmol/L Final     Anion Gap   Date Value Ref Range Status   01/06/2025 7 7 - 15 mmol/L Final   08/06/2022 13 3 - 14 mmol/L Final   10/08/2017 8 3 - 14 mmol/L Final     Glucose   Date Value Ref Range Status   01/06/2025 270 (H) 70 - 99 mg/dL Final   10/23/2024 133 (A) 70 - 99 mg/dL Final     Urea Nitrogen   Date Value Ref Range Status   01/06/2025 18.4 6.0 - 20.0 mg/dL Final   08/06/2022 12 7 - 30 mg/dL Final   10/08/2017 9 7 - 30 mg/dL Final     Creatinine   Date Value Ref Range Status   01/06/2025 1.28 (H) 0.67 - 1.17 mg/dL Final   10/08/2017 0.68 0.66 - 1.25 mg/dL Final     GFR Estimate   Date Value Ref Range Status   01/06/2025 66 >60 mL/min/1.73m2 Final     Comment:     eGFR calculated using 2021 CKD-EPI equation.   10/08/2017 >90 >60 mL/min/1.7m2 Final     Comment:     Non  GFR Calc     GFR, ESTIMATED POCT   Date Value Ref Range Status   07/09/2024 41 (L) >60 mL/min/1.73m2 Final     Calcium   Date Value Ref Range Status   01/06/2025 9.1 8.8 - 10.4 mg/dL Final     Comment:     Reference intervals for this test were updated on 7/16/2024 to reflect our healthy population more accurately. There may be differences in the flagging of prior results with similar  "values performed with this method. Those prior results can be interpreted in the context of the updated reference intervals.   10/08/2017 9.5 8.5 - 10.1 mg/dL Final     Phosphorus   Date Value Ref Range Status   12/13/2024 3.1 2.5 - 4.5 mg/dL Final     Albumin   Date Value Ref Range Status   12/13/2024 3.1 (L) 3.5 - 5.2 g/dL Final   08/06/2022 3.0 (L) 3.4 - 5.0 g/dL Final   10/08/2017 3.4 3.4 - 5.0 g/dL Final      Lab Results   Component Value Date    WBC 7.3 12/13/2024    WBC 3.9 10/08/2017     Lab Results   Component Value Date    RBC 3.41 12/13/2024    RBC 4.07 10/08/2017     Lab Results   Component Value Date    HGB 9.6 01/06/2025    HGB 12.1 10/08/2017     Lab Results   Component Value Date    HCT 30.7 01/06/2025    HCT 34.2 10/08/2017     No components found for: \"MCT\"  Lab Results   Component Value Date    MCV 75 12/13/2024    MCV 84 10/08/2017     Lab Results   Component Value Date    MCH 23.5 12/13/2024    MCH 29.7 10/08/2017     Lab Results   Component Value Date    MCHC 31.5 12/13/2024    MCHC 35.4 10/08/2017     Lab Results   Component Value Date    RDW 17.8 12/13/2024    RDW 13.4 10/08/2017     Lab Results   Component Value Date     12/13/2024     10/08/2017      Lab Results   Component Value Date    A1C 10.0 11/06/2024    A1C 9.2 08/17/2024    A1C 9.4 05/04/2024    A1C >14.0 03/08/2022      TSH   Date Value Ref Range Status   03/09/2022 1.80 0.40 - 4.00 mU/L Final      No results found for: \"VITDT\"       Impression:    Encounter Diagnoses   Name Primary?    Type 2 diabetes mellitus with diabetic heel ulcer (H) Yes    Venous insufficiency     Venous hypertension of both lower extremities     Secondary lymphedema     Right ankle instability     Partial thickness burn of finger of right hand, initial encounter     Peripheral vascular disease, unspecified                      Assessment/Plan:  1. Debridement: Nursing staff remove the old dressing and cleanse the wound and surrounding skin " with recommended solution. After discussion of risk factors and verbal consent was obtained 2% Lidocaine HCL jelly was applied, under clean conditions, the left hand ulceration(s) were debrided using currette. Devitalized and nonviable tissue, along with any fibrin and slough, was removed to improve granulation tissue formation, stimulate wound healing, decrease overall bacteria load, disrupt biofilm formation and decrease edge senescence.  Total excisional debridement was 16 sq cm from the epidermis/dermis area or into the subcutaneous tissue with a depth of 0.1 cm.   Ulcers were improved afterwards and .  Measures were unchanged after debridement. The heel was not debrided.    2. Treatment: wound treatment will include irrigation and dressings to promote autolytic debridement which will include: due to the uncontrolled diabetes; malnutrition; history of TMA and non-diagnostic arterial study I will preserve the eschar as its own biological dressing; paint the area with betadine daily and dressing with dry gauze; do not get we in the shower; needs to be NWB on the right; has w/c at home; will order prafo boot. For the burn I will use ssd; oil emulsion; gauze; rolled gauze; coban; change bid; pt did not know if he wanted home care; instructed to call us if he would like us to order.    If for some reason the patient is not able to get your dressing(s) changed as outlined above (due to illness, lack of supplies, lack of help) please do the following: remove old, soiled dressings; wash the ulcers with saline; pat dry; apply ABD pad or other absorbant pad and secure with rolled gauze; avoid tape directly on your skin; Patient instructed to call the clinic as soon as possible to let us know what the current issues are in receiving ulcer care.    3. Edema. Elevation; no compression    4. Offloading: nwb to the right foot    5. Nutrition: needs better diabetes control; working with pcp and diabetes educator    6.  Wound Etiology: neuropathic ulcer to the right heel; 2nd degree burn to the left hand    Patient to return to clinic in 2 week(s) for re-evaluation. They were instructed to call the clinic sooner with any further questions or concerns. Answered all questions.          Marina Fernandez NP   [facility]  [phone]      This note was electronically signed by Marina Fernandez NP

## 2025-01-14 NOTE — TELEPHONE ENCOUNTER
"  Nurse Triage SBAR    Is this a 2nd Level Triage? YES, LICENSED PRACTITIONER REVIEW IS REQUIRED    Situation: Hyperglycemia    Background:  Patient was seen by Ortho for knee arthritis yesterday and was given injectable steroid at the visit. This morning he noticed that his glucometer read 500. His CBS was \"around 400\" around lunch time which was 11:00 AM and he stated he gave himself 7 units of short acting insulin. He also stated he did not take his morning Lantus (12 units). He denies any symptoms. His main question is: Does the steroid cause high blood sugar\" which was answered. Patient states he sees Endo with Nahum Temple MD with [facility]. Patient has a reported/historical sliding scale in medication list that does not seem to match what he takes now. Did not go over his sliding scale vs what we had in the chart.    Assessment: Blood sugar reads \"high\" via manual. He does use a continuous glucose monitor as well. Denies symptoms at this time. Denies confusion when questioned about Lantus being forgotten this morning. Says it was an honest mistake due to putting the cap on the table and thinking I already gave the injection to myself.     Protocol Recommended Disposition:   Call ADS/Go to ED/UCC Now (Or To Office with PCP Approval)    Recommendation: Instructed patient to recheck blood sugar in 15 minutes and to call 911 with any DKA symptoms. Also instructed patient to attempt to call Endo clinic where is provider is to see if they have any instructions or recommendations.    Routed to provider    Does the patient meet one of the following criteria for ADS visit consideration? 16+ years old, with an [facility] PCP     TIP  Providers, please consider if this condition is appropriate for management at one of our Acute and Diagnostic Services sites.     If patient is a good candidate, please use dotphrase <dot>triageresponse and select Refer to ADS to document.          Reason for Disposition   Blood glucose > 500 mg/dL " (27.8 mmol/L)    Additional Information   Negative: Unconscious or difficult to awaken   Negative: Acting confused (e.g., disoriented, slurred speech)   Negative: Very weak (can't stand)   Negative: Sounds like a life-threatening emergency to the triager   Negative: Vomiting and signs of dehydration (e.g., very dry mouth, lightheaded, dark urine)   Negative: Blood glucose > 240 mg/dL (13.3 mmol/L) and rapid breathing    Protocols used: Diabetes - High Blood Sugar-A-OH

## 2025-01-14 NOTE — TELEPHONE ENCOUNTER
Called and left a voicemail message for Kim Mir the Diabetic Educator is only licensed in [place] and cannot see patients outside of [place] virtually. I did offer to change his appointment to in-person or reschedule to a [place] provider, and asked that he call the [phone] main scheduling line

## 2025-01-14 NOTE — TELEPHONE ENCOUNTER
OK, thank you.        ----- Message from Hector Cruz sent at 1/14/2025  2:30 AM CST -----  Thank Dr. Day for the follow up.   The results from the joint are interesting, non-inflammatory and less neutrophilic than all the prior.   Agree with watching for the next 3 months and hopefully things get better however if persisted then we can proceed with synovectomy.     Hector  ----- Message -----  From: Dickson Day MD  Sent: 1/13/2025   4:47 PM CST  To: Hector Cruz MD; Rena Blair PA-C; #    Today I saw the patient and aspirated 30 cc from the right knee.  I instilled 4 cc of Celestone Soluspan and 4 cc of 1% Xylocaine.  Cultures and cell count sent.    I suspect if the synovitis and effusion persist over the next 3 months, then would you advise surgical synovectomy as the next step in management?  He is quite symptomatic in the right knee and actually would like something done sooner but I have advised him to wait for a bit.    Thank you  ----- Message -----  From: Rena Blair PA-C  Sent: 1/9/2025   9:44 AM CST  To: Hector Cruz MD; Dickson Day MD    Thank you.  I just sent him a message regarding the plan for repeating the rituximab infusion in 6 months.  I told him to be sure he is taking the Bactrim as prescribed.  Will await recommendations of orthopedist regarding possible steroid injection and aspiration.  Thanks again for all your help. marcia  ----- Message -----  From: Hector Crzu MD  Sent: 1/9/2025   9:24 AM CST  To: Rena Blair PA-C; Dickson Day MD    Thanks Marcia for reaching out.   The Rituximab is slow in action which is what I explained to him in the last appointment. I would suggest consideration of aspiration and steroid injection with orthopedics in his upcoming appointment to expedite recovery. I have previously reached out to Dr. Zuleta (ID) and there was no contraindication from ID standpoint to local steroid injection.   I am cc'ing   Shay to be aware.     The Rituximab induction is 2 infusions which he received and he will be on maintenance infusions every 6 months thereafter. He was supposed to be on bactrim which seemed to have been discontinued, not sure why, I would recommend to keep him on bactrim. I will prescribe it again for him.     All the best  Hector  ----- Message -----  From: Rena Blair PA-C  Sent: 1/8/2025   1:02 PM CST  To: Hector Cruz MD    [facility],    I saw Bertrand in the clinic yesterday for follow-up.  He has had really no improvement in his pain or swelling of his knee after starting the rituximab infusions.  He was confused as to whether he is supposed to get additional infusions.  Can you help to clarify this?    Thank you so much for your care, Marcia

## 2025-01-15 ENCOUNTER — ANCILLARY PROCEDURE (OUTPATIENT)
Dept: VASCULAR ULTRASOUND | Facility: CLINIC | Age: 55
End: 2025-01-15
Attending: NURSE PRACTITIONER
Payer: MEDICAID

## 2025-01-15 ENCOUNTER — MYC MEDICAL ADVICE (OUTPATIENT)
Dept: FAMILY MEDICINE | Facility: CLINIC | Age: 55
End: 2025-01-15

## 2025-01-15 ENCOUNTER — OFFICE VISIT (OUTPATIENT)
Dept: VASCULAR SURGERY | Facility: CLINIC | Age: 55
End: 2025-01-15
Attending: FAMILY MEDICINE
Payer: MEDICAID

## 2025-01-15 VITALS
TEMPERATURE: 97.3 F | SYSTOLIC BLOOD PRESSURE: 128 MMHG | HEART RATE: 58 BPM | DIASTOLIC BLOOD PRESSURE: 83 MMHG | RESPIRATION RATE: 18 BRPM | OXYGEN SATURATION: 98 %

## 2025-01-15 DIAGNOSIS — I73.9 PERIPHERAL VASCULAR DISEASE, UNSPECIFIED: ICD-10-CM

## 2025-01-15 DIAGNOSIS — M25.371 RIGHT ANKLE INSTABILITY: ICD-10-CM

## 2025-01-15 DIAGNOSIS — E11.621 TYPE 2 DIABETES MELLITUS WITH DIABETIC HEEL ULCER (H): Primary | ICD-10-CM

## 2025-01-15 DIAGNOSIS — I87.303 VENOUS HYPERTENSION OF BOTH LOWER EXTREMITIES: ICD-10-CM

## 2025-01-15 DIAGNOSIS — T23.221A PARTIAL THICKNESS BURN OF FINGER OF RIGHT HAND, INITIAL ENCOUNTER: ICD-10-CM

## 2025-01-15 DIAGNOSIS — L97.409 TYPE 2 DIABETES MELLITUS WITH DIABETIC HEEL ULCER (H): Primary | ICD-10-CM

## 2025-01-15 DIAGNOSIS — I89.0 SECONDARY LYMPHEDEMA: ICD-10-CM

## 2025-01-15 DIAGNOSIS — I87.2 VENOUS INSUFFICIENCY: ICD-10-CM

## 2025-01-15 DIAGNOSIS — R60.0 BILATERAL LOWER EXTREMITY EDEMA: ICD-10-CM

## 2025-01-15 PROBLEM — L97.419: Status: ACTIVE | Noted: 2024-12-23

## 2025-01-15 PROCEDURE — 11042 DBRDMT SUBQ TIS 1ST 20SQCM/<: CPT | Performed by: NURSE PRACTITIONER

## 2025-01-15 PROCEDURE — G0463 HOSPITAL OUTPT CLINIC VISIT: HCPCS | Performed by: NURSE PRACTITIONER

## 2025-01-15 PROCEDURE — 93923 UPR/LXTR ART STDY 3+ LVLS: CPT

## 2025-01-15 RX ORDER — SILVER SULFADIAZINE 10 MG/G
CREAM TOPICAL 2 TIMES DAILY
Qty: 400 G | Refills: 2 | Status: SHIPPED | OUTPATIENT
Start: 2025-01-15

## 2025-01-15 RX ORDER — LIDOCAINE 50 MG/G
OINTMENT TOPICAL DAILY PRN
Status: ACTIVE | OUTPATIENT
Start: 2025-01-15

## 2025-01-15 RX ADMIN — LIDOCAINE: 50 OINTMENT TOPICAL at 13:54

## 2025-01-15 ASSESSMENT — PAIN SCALES - GENERAL: PAINLEVEL_OUTOF10: NO PAIN (0)

## 2025-01-15 NOTE — TELEPHONE ENCOUNTER
"Incoming call from patient reporting he was told to call back to follow up if he hadn't heard back from clinic.  He states he's called and left a message with his diabetic educator, hasn't heard back yet.  He rechecked his BG again and his glucometer was still reading \"high.\"  States \"I feel fine, trying to walk around and exercise to bring my BG down.\"  He was scheduled to see the CDE today but the appointment was cancelled as the provider was in [place] and unable to do a VV as patient lives in [place].      RN informed patient Rena Blair PA-C was in the process of reviewing the triage note at time of patient calling back.  Rena Blair PA-C called RN while talking with patient, and patient was placed on hold to talk to provider.  Rena Blair PA-C instructed patient to take half the units of his usual Lantus dose now and if it's been over 3 hours since he took Novolog and his BG is still > 400, he should take another 5 units of Novolog.  Pt to recheck BG again in 2 hours, at 7:30pm, and if still > 400 or glucometer reading \"high\" patient to be seen in the ER.      Upon reviewing provider instructions with patient, he said he's already taken the full dose (13 units) of Lantus and also took an additional 7 units of Novolog \"within the last hour\" because his BG was still high and not registering on his glucometer.  He continues to report \"I feel fine.\"    RN asked patient to check BG now (5:43pm), and it was still registering as \"high\" on his machine.  He states he's considering taking another 7 units of Novolog as his BG continues to be high.  RN instructed patient not to take anymore insulin as he doesn't know what his BG is and we do not want to bottom out his BG from taking too much insulin.  He stated he now has a CGM but hasn't yet applied it, but will work on applying this tonight.  RN will update Rena Blair PA-C and call him back with further instructions.      Spoke with and updated Rena Blair PA-C, informed of " "above additional information from patient and also that patient is scheduled to see his endocrinologist at [facility] on 1/24/25.  Rena Blair PA-C instructed patient be seen in the ER tonight due to persistently elevated BG levels.  He should keep his appointment with endocrinology as scheduled on 1/24/25.  RN to call patient back in AM to check in on how he's doing.     Called patient back with provider directions; patient refused to go to the ER stating \"I feel fine, and I have 2 very important appointments over the next 2 days (Vascular/Wound Care and the dentist) that are more important than my BG levels, I can't lose my foot.\"  RN educated patient that his uncontrolled BG is all connected and related to his wound not healing on the bottom of his foot.  Encouraged going to the ER, patient again declined.  RN reviewed symptoms of DKA and instructed to call 911 should symptoms develop, which patient is agreeable to doing.  Instructed to apply CGM to see if it will  his BG levels. RN will call patient back late tomorrow morning to check on patient; patient requested past 9:30am (when RN's shift starts) as he's not awake that early.  No further questions/concerns at this time.        Betsey Colin RN  [facility]     "

## 2025-01-15 NOTE — TELEPHONE ENCOUNTER
Left message to call back for:  Long  Information to relay to patient:  Please transfer to STWT RN to update/assess how patient is doing and what his BG levels have been since last night.  Please route update to Rena Blair PA-C.      Betsey Colin, ANGUS  [facility]

## 2025-01-15 NOTE — LETTER
[facility]  [facility]  [place], [place] [address]  [phone]      Fax [phone]    [facility]           FAX: [phone]            Customer Service: [phone]        Account #: [id number]    Wound Dressing Rx and Order Form  Order Status: new  Verbal: Jenelle  Date: January 15, 2025     [place]  Gender: male  : 1970  [address]   [facility] [address]  [phone] (home)     Medical Record: [id number]  Primary Care Provider: Rena Blair      ICD-10-CM    1. 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 Venous insufficiency  I87.2 lidocaine (XYLOCAINE) 5 % ointment     Wound care     DEBRIDE SKIN/SUBQ TISSUE      3. Venous hypertension of both lower extremities  I87.303 lidocaine (XYLOCAINE) 5 % ointment     Wound care     DEBRIDE SKIN/SUBQ TISSUE      4. Secondary lymphedema  I89.0 lidocaine (XYLOCAINE) 5 % ointment     Wound care     DEBRIDE SKIN/SUBQ TISSUE      5. Right ankle instability  M25.371 Orthotics, Mastectomy and Custom Compression Orders     Wound care     DEBRIDE SKIN/SUBQ TISSUE      6. Partial thickness burn of finger of right hand, initial encounter  T23.221A silver sulfADIAZINE (SILVADENE) 1 % external cream     Wound care     DEBRIDE SKIN/SUBQ TISSUE      7. Peripheral vascular disease, unspecified  I73.9 Wound care     DEBRIDE SKIN/SUBQ TISSUE            Insurance Info:  INSURER: Payor[place] / Plan: [place] / Product Type: Medicaid /   Policy ID#:  [id number]  SECONDARY INSURANCE:    Secondary Policy ID#:  N/A        Physician Info:   Name:  ANNIE PATTERSON     Dept Address/Phones:   [address] [place] [address], [address]  [facility] [address] [phone]  Fax: [phone]    Lymphedema circumferential measurements (in cm):      1/15/2025     1:00 PM   Circumferential Measures   Right Ankle 19   Right Widest Calf 30   Right Knee to  "Ankle 17.5   Left - just above MTP 21   Left Ankle 30         Wound info:  VASC Wound Right heel (Active)   Pre Size Length 5 01/15/25 1300   Pre Size Width 3 01/15/25 1300   Pre Size Depth 0 01/15/25 1300   Pre Total Sq cm 15 01/15/25 1300   Number of days: 0       VASC Wound Left hand (Active)   Pre Size Length 4 01/15/25 1300   Pre Size Width 4 01/15/25 1300   Pre Size Depth 0.1 01/15/25 1300   Pre Total Sq cm 16 01/15/25 1300   Number of days: 0       Incision/Surgical Site 08/17/24 Anterior;Right Knee (Active)   Number of days: 151        Drainage: slight  Thickness:  full  Duration of Need: 30 DAYS  Days Supply: 30 DAYS  Start Date: 1/15/2025  Starter Kit, Ancillary Kit (saline, gloves, gauze): Yes   Qualifying wound/Debridement: Yes     DISPENSE AS WRITTEN.   Call [phone]. Please call patient for out-of-pocket costs and options.      Dressing Type Brand Size Frequency of change  Quantity   Primary Betadine swab   DAILY and as needed 30 swabs   secondary Non-sterile square gauze  4\"x4\" DAILY and as needed 1 loaf    Sof form roll gauze  4\"x75\" DAILY and as needed 60 rolls           Secondary Oil emulsion gauze  3\"x3\" Twice daily and as needed 15     DISPENSE AS WRITTEN. Call [phone] with questions.     Wound Care Instructions heel    DAILY and as needed, Cleanse your right heel wound(s) with Dilute hibiclens 30cc in 500cc NS.    Pat Dry with non-sterile gauze    Primary Dressing: Apply betadine into/onto the wounds    Secondary dressing: Cover with gauze 4\"x4\"    Secure with non-sterile roll gauze (4\" x 75\" roll) and tape (1\" roll tape) as needed; avoid adhesive directly on the skin    Wound Care Instructions hand    TWICE PER DAY and as needed, Cleanse your left hand wound(s) with Dilute hibiclens 30cc in 500cc NS.    Primary Dressing: Apply silvadene cream into/onto the wounds    Secondary dressing: Cover with oil emulson 3\"x3\"    Secure with non-sterile roll gauze (4\" x 75\" roll) and tape (1\" roll " "tape) as needed; avoid adhesive directly on the skin    Compression: coban 2\" to secure      OK to forward to covered supplier.    Electronically Signed Physician:  ANNIE PATTERSON             Date: January 15, 2025    "

## 2025-01-15 NOTE — PATIENT INSTRUCTIONS
"Wound care supplies were ordered today through [facility] and if you are not receiving your supplies or have a question on your bill please contact Yuri Sanon [phone]. Please allow 2-5 business days for delivery of supplies. You may get a call from a 1-800 # if there are additional information Kelsey needs. It is important to  or return their call. PLEASE NOTE: If you need to return your supplies, you MUST call customer service within 15 days of delivery date.     You need to stay off the right foot at all times; use wheelchair while at home     LORRIE marisaclot to help keep pressure off the wound at all times    I sent a prescription to your pharmacy for silvadene    Wound Care Instructions    DAILY and as needed, Cleanse your right heel wound(s) with Dilute hibiclens 30cc in 500cc NS.    Pat Dry with non-sterile gauze    Primary Dressing: Apply betadine into/onto the wounds    Secondary dressing: Cover with gauze    Secure with non-sterile roll gauze (4\" x 75\" roll) and tape (1\" roll tape) as needed; avoid adhesive directly on the skin    Compression: none    It is not ok to get your wound wet in the bath or shower                Wound Care Instructions    TWICE PER DAY and as needed, Cleanse your left hand wound(s) with Dilute hibiclens 30cc in 500cc NS.    Pat Dry with non-sterile gauze    Apply Lotion to the intact skin surrounding your wound and other dry skin locations. Some good lotions include: Remedy Skin Repair Cream, Sarna, Vanicream or Cetaphil    Primary Dressing: Apply silvadene cream into/onto the wounds    Secondary dressing: Cover with oil emulson    Secure with non-sterile roll gauze (4\" x 75\" roll) and tape (1\" roll tape) as needed; avoid adhesive directly on the skin    Compression: coban to secure    It is ok to get your wound wet in the bath or shower      If for some reason you are not able to get your dressing(s) changed as outlined above (due to illness, lack of supplies, lack of " help) please do the following: remove old, soiled dressings; wash the wounds with saline; pat dry; apply ABD pad or other absorbant pad and secure with rolled gauze; avoid tape directly on your skin; Call the clinic as soon as possible to let us know what the current issues are in receiving wound care [phone].      SEEK MEDICAL CARE IF:  You have an increase in swelling, pain, or redness around the wound.  You have an increase in the amount of pus coming from the wound.  There is a bad smell coming from the wound.  The wound appears to be worsening/enlarging  You have a fever greater than 101.5 F      It is ok to continue current wound care treatment/products for the next 2-3 days until new wound care supplies are ordered and arrive. If longer than this please contact our office at [phone].    If you have a 2 layer or 4 layer compression wrap on these are safe to have on for ONLY 7 days. If for some reason you are not able to get the wrap(s) changed (due to illness; lack of supplies, lack of help, lack of transportation) please do the following: unwrap the old 2 or 4 layer compression wrap; avoid using scissors as you could cut your skin and cause wounds; use tubular compression when available. Call to reschedule your home care or clinic visit appointment as soon as possible.    Please NOTE: if you are 15 minutes late to your arrival time, you will have to be rescheduled. Please call our clinic as soon as possible if you know you will not be able to get to your appointment at [phone]. If you fail to show up to 3 scheduled clinic appointments you will be dismissed from our clinic.              We want to hear from you!  In the next few weeks, you should receive a call or email to complete a survey about your visit at [facility]. Please help us improve your appointment experience by letting us know how we did today. We strive to make your experience good and value any ways in which we  could do better.      We value your input and suggestions.    Thank you for choosing the [facility]!           It is recommended that you do not get your ulcer wet when showering.  Listed below are several ways of keeping it dry when you shower.     1. Wrap it with Press and Seal plastic wrap.  It can be found in the stores where the plastic wraps or tin foil is kept.               2.  Some people take a bath and hang their leg/foot out of the tub.                        3  Put your leg in a plastic bag and tape it on.           4. You can purchase a shower cover for casts at some pharmacies and through the Internet.            5. Take a Bed Bath or wash up at the sink     High Protein Foods  Chicken  -Chicken breast, 3.5oz.-30 grams protein  -Chicken thigh-10 grams(average size)  -Drumstick-11 grams  -Wing- 6 grams  -Chicken meat, cooked, 4 oz.  Beef  -Hamburger eleni, 4 oz-28 grams protein  -Steak, 6 oz-42 grams  -Most cuts of beef- 7 grams of protein per ounce  Fish  -Most fish fillets or steaks are about 22 grams of protein for 3 1/2 oz(100 grams) of cooked fish, or 6 grams per ounce  -Tuna, 6 oz can-40 grams of protein  Pork  -Pork chop, average-22 grams protein  -Pork loin or tenderloin, 4 oz.-29 grams  -Ham, 3oz serving- 19 grams  -Ground pork 3oz cooked-22 grams  -Ashley, 1 slice-3 grams  -Cypriot-style ashley(black ashley), slice-5-6 grams  Eggs and Dairy  -Egg, large-7 grams  -Milk, 1 cup-8 grams  -Cottage cheese, 1/2 cup-15 grams  -Greek yogurt, 1 cup-usually 8-12 grams, check label  -Soft cheeses (Mozzarella, Brie, Camembert)- 6 grams  -Medium cheeses(cheddar, swiss)- 7 or 8 grams per oz  -Hard cheeses(parmesan)- 10 grams per oz  Beans  -Tofu, 1/2 cup 20 grams  -Tofu, 1 oz., 2.3 grams  -Soy milk, 1 cup-6-10 grams  -Most beans(black, ayon, lentils, etc.) about 7-10 grams protein per half cup of cooked beans  -soy beans, 1/2 cup cooked-14 grams  -Split peas, 1/2 cup cooked- 8  grams  Nuts and Seeds  -Peanut butter, 2 Tablespoons- 8 grams protein  -Almonds, 1/4 cup- 8 grams  -Peanuts, 1/4 cup-9 grams  -Cashews, 1/4 cup- 5 grams  -Pecans, 1/4 cup- 2.5 grams  -Sunflower seeds, 1/4 cup- 6 grams  -Pumpkin seeds, 1/4 cup-8 grams  -Flax seeds- 1/4 cup- 8 grams  Protein Supplements  -Ensure  -Boost  -Glucerna, if diabetic  When you have an open ulcer, your bodies protein needs are much higher, so it is recommended eat good sources of protein       Prevalon Boot          EZ Heelift Boot              Prafo Boot          Foam Ring

## 2025-01-16 ENCOUNTER — OFFICE VISIT (OUTPATIENT)
Dept: CARDIOLOGY | Facility: CLINIC | Age: 55
End: 2025-01-16
Payer: MEDICAID

## 2025-01-16 ENCOUNTER — TELEPHONE (OUTPATIENT)
Dept: FAMILY MEDICINE | Facility: CLINIC | Age: 55
End: 2025-01-16

## 2025-01-16 VITALS
BODY MASS INDEX: 19.16 KG/M2 | OXYGEN SATURATION: 99 % | HEIGHT: 65 IN | DIASTOLIC BLOOD PRESSURE: 54 MMHG | SYSTOLIC BLOOD PRESSURE: 91 MMHG | TEMPERATURE: 98 F | WEIGHT: 115 LBS | HEART RATE: 67 BPM | RESPIRATION RATE: 16 BRPM

## 2025-01-16 DIAGNOSIS — I10 HYPERTENSION, UNSPECIFIED TYPE: Primary | ICD-10-CM

## 2025-01-16 DIAGNOSIS — R79.89 ELEVATED BRAIN NATRIURETIC PEPTIDE (BNP) LEVEL: ICD-10-CM

## 2025-01-16 DIAGNOSIS — R60.0 BILATERAL LOWER EXTREMITY EDEMA: ICD-10-CM

## 2025-01-16 DIAGNOSIS — I25.10 CORONARY ARTERY DISEASE INVOLVING NATIVE CORONARY ARTERY OF NATIVE HEART WITHOUT ANGINA PECTORIS: ICD-10-CM

## 2025-01-16 LAB
BACTERIA SNV CULT: NORMAL

## 2025-01-16 RX ORDER — AMLODIPINE BESYLATE 5 MG/1
5 TABLET ORAL DAILY
Qty: 90 TABLET | Refills: 3 | Status: SHIPPED | OUTPATIENT
Start: 2025-01-16

## 2025-01-16 RX ORDER — BLOOD SUGAR DIAGNOSTIC
STRIP MISCELLANEOUS
COMMUNITY
Start: 2025-01-16

## 2025-01-16 NOTE — LETTER
"1/16/2025    Rena Blair PA-C  [address]  [facility] [address]    RE: Long Mayfield       Dear Colleague,     I had the pleasure of seeing Long Mayfield in the [facility].         [facility] [address], [place] [address]   [email or web address]   OFFICE: [phone]        Thank you Dr. Blair for asking the Health system Heart Care team to participate in the care of your patient, Long Mayfield.     Impression and Plan     1.  Coronary artery disease.  Long has a history of coronary artery disease by virtue of prior imaging revealing evidence of coronary calcification.    Denies any chest pain or other symptoms concerning for angina.      2.  Hypertension. Blood pressure is somewhat on the lower side today.  He does report some mild lightheadedness.  Decrease amlodipine from 10 mg daily to 5 mg daily.  I recommended that he obtain a home blood pressure monitoring device and intermittently monitor his blood pressure and call should he have a tendency toward increased readings with reduction of the dose.    3.  Dyslipidemia. Lipid profile 24 September 2024 revealed LDL 52 mg/dL and HDL 45 mg/dL.    4.  \"Abnormal positron emission tomography test\". Previous PET scan completed in October 2024 showed his heart size at the upper limits of normal. Diffusely increased left atrial uptake is nonspecific, may relate to atrial fibrillation in the appropriate clinical setting.  Echocardiogram 9 January 2025 revealed no significant structural heart disease.  Ambulatory monitor has been ordered by primary provider for completeness and we will review the results when available.    35 minutes spent reviewing prior records (including documentation, laboratory studies, cardiac testing/imaging), interview with patient along with physical exam, planning, and subsequent documentation/crafting of note).           History of Present Illness    Once " "again I would like to thank you again for asking me to participate in the care of your patient, Long Mayfield.  As you know, but to reiterate for my own records, Long Mayfield is a 54 year old male with history of coronary artery disease by virtue of prior imaging revealing evidence of coronary calcification.    Denies any symptoms of chest pain.  His breathing is uncomfortable.  He does report some mild lightheadedness, but no pronounced lightheadedness or fainting.    Further review of systems is otherwise negative/noncontributory (medical record and 13 point review of systems reviewed as well and pertinent positives noted).         Cardiac Diagnostics/Imaging      Echocardiogram 9 January 2025:  Normal left ventricular size and systolic performance with ejection fraction of 65-70%.  No significant valvular heart disease.  Normal right ventricular size and systolic performance.  Normal atrial dimensions.    Twelve-lead ECG (personally reviewed) 11 September 2024: Sinus rhythm.  Normal ECG.    Chest radiograph 6 January 2025:  Normal heart size.   No pleural effusion or pneumothorax.   Lungs are clear.   No evidence for CHF or pneumonia.           Physical Examination       BP 91/54 (BP Location: Left arm, Patient Position: Sitting, Cuff Size: Adult Regular)   Pulse 67   Temp 98  F (36.7  C) (Oral)   Resp 16   Ht 1.651 m (5' 5\")   Wt 52.2 kg (115 lb)   SpO2 99%   BMI 19.14 kg/m          Wt Readings from Last 3 Encounters:   01/16/25 52.2 kg (115 lb)   01/07/25 53 kg (116 lb 12.8 oz)   01/02/25 54.4 kg (120 lb)       The patient is alert and oriented times three. Sclerae are anicteric. Mucosal membranes are moist. Jugular venous pressure is normal. No significant adenopathy/thyromegally appreciated. Lungs are fairly clear though somewhat diminished. On cardiovascular exam, the patient has a regular S1 and S2. Abdomen is soft and non-tender.         Family History/Social History/Risk Factors   Patient " does not smoke.  Family history reviewed.         Medical History  Surgical History Family History Social History   Past Medical History:   Diagnosis Date     Abnormal CXR 10/15/2024    Previous CT of the chest completed at [facility] [place] in December 2023 demonstrated:   Impression:   1. Severe anasarca with moderate to large pleural effusions and a small pericardial effusion. Diffuse septal thickening in the lungs concerning for pulmonary edema. Anemic cardiac blood pool.   2. Solitary pulmonary nodule in the left lower lobe with an average diameter of 10 mm is indeterminat     Abnormal LFTs 01/03/2025     Acidosis 01/08/2020     Acquired absence of other right toe(s) 01/12/2024     Acute metabolic encephalopathy 09/17/2024     Acute pain of right knee 07/12/2024     Acute pulmonary edema (H) 01/11/2024     Acute renal insufficiency 05/03/2024    Last Comprehensive Metabolic Panel:          Lab Results      Component    Value    Date           NA    131 (L)    10/15/2024           POTASSIUM    4.0    10/15/2024           CHLORIDE    98    10/15/2024           CO2    24    10/15/2024           ANIONGAP    9    10/15/2024           GLC    133 (A)    10/23/2024           BUN    22.3 (H)    10/15/2024           CR    1.35 (H)    10/15/2024         Alcohol abuse 09/12/2022     Alcohol dependence in remission (H) 10/29/2024     Allergic rhinitis      Anemia      Anemia, unspecified type 05/03/2024    He has a history of anemia which has been noted since May 2024.  Cause has not been identified.  Additional evaluation with CBC, retake count, TSH, haptoglobin, iron studies, LDH, ferritin, B12 and folate.  Ferritin elevated 418, iron low at 20, iron binding capacity low at 2030, iron saturation low at 9.  Normal B12 and folate.  Awaiting haptoglobin and reticulocyte count.  Denies blood in stool.     Anxiety disorder, unspecified 01/11/2024     Arthritis      Arthritis due to other bacteria, right knee (H) 08/19/2024      Atherosclerotic heart disease of native coronary artery without angina pectoris 01/11/2024     Bell's palsy      Bilateral lower extremity edema 12/18/2024     Cardiac calcification (H) 01/19/2023    Treatment:  Colestid 1 g tablet.  Aspirin 81 mg daily.       Cervical disc disease with myelopathy 05/01/2022    Formatting of this note might be different from the original.   seen spine specialist- [facility]  Formatting of this note might be different from the original.   seen spine specialist       Cervical radiculopathy 07/11/2024     Cervicalgia      Change or removal of drains 08/26/2024     Chronic bilateral low back pain with bilateral sciatica 04/08/2022     Chronic diarrhea 03/08/2022     Chronic kidney disease, unspecified 01/11/2024     Chronic pancreatitis (H) 09/06/2024    Due to continued right knee swelling and history of chronic pancreatitis with chronic diarrhea, he was referred to GI to review possible Whipple's disease.     10/18/2024 seen by [facility].  We do not have those records and will request those records to be faxed over.  Once I get those records I will review them with recommendations.  Per patient he is to follow-up in 6 months.       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  Started rituximab infusions 12/4/2024.       Ketosis (H) 03/08/2022     Long term (current) use of insulin (H) 08/19/2024     Major depressive disorder, recurrent episode, mild      Major depressive disorder, recurrent, mild 08/19/2024     Muscle wasting and atrophy, not  "elsewhere classified, multiple sites 04/01/2024     Muscle weakness (generalized) 01/12/2024     Need for assistance with personal care 01/12/2024     Osteomyelitis of great toe of right foot (H) 11/22/2023     Other abnormalities of gait and mobility 01/12/2024     Other acute osteomyelitis, right ankle and foot (H)      Other chronic pancreatitis (H)      Other fatigue 11/14/2024     Other hyperlipidemia 03/15/2022     Other polyosteoarthritis      Peripheral vascular disease, unspecified 08/19/2024     Pneumonia of left lower lobe due to infectious organism 09/11/2024     Primary hypertension 10/09/2019     Pyogenic arthritis of right knee joint, due to unspecified organism (H) 07/11/2024    Right knee inflammatory arthritis, etiology to be determined, s/p I&D Jul 11, Aug 18, 2024.         Brucella, Bartonella, Q fever, CD4, Ig levels.     MRI right knee with and without contrast if negative.     Rheumatology and GI consult.     Complete oral ciprofloxacin course.      Admitted to [facility] 8/16 to 8/19/2024.  \"Has been seen a handful of times and outpatient follow-up with persistent right     Right knee pain 07/11/2024     S/P transmetatarsal amputation of foot, right (H) 12/01/2023     s/p transmetatarsal amputation, right foot (DOS 1/9/24)       Severe episode of recurrent major depressive disorder, without psychotic features (H) 01/19/2023     Solitary pulmonary nodule      Stage 3a chronic kidney disease (H) 1/11/2024     Systemic vasculitis (H) 11/19/2024     Type 2 diabetes mellitus with foot ulcer (H) 01/11/2024     Type 2 diabetes mellitus with hyperosmolarity without coma, with long-term current use of insulin (H) 10/29/2024     Type 2 diabetes mellitus with kidney complication, with long-term current use of insulin (H) 10/05/2018    Seen by diabetes educator 10/29/2024:   PLAN  Lantus: 12 units  Set dose Novolog at meals: 3 units each + Correction Scale below     Pre-Meal Correction Scale:        If " pre-meal glucose is:    Add extra Humalog/Novolog to your meal dose per below chart:      151 - 200    +1 unit      201 - 250    +2 units      251 - 300    +3 units      301 - 350    +4 units      351 - 400     +5 units      Over      Ulcer of right heel and midfoot (H) 12/23/2024     Unintentional weight loss 10/11/2023     Vitamin D deficiency, unspecified 01/11/2024     Past Surgical History:   Procedure Laterality Date     ARTHROSCOPY KNEE Right 08/17/2024    Procedure: Arthroscopic;  Surgeon: Jeff Phoenix MD;  Location: [facility] OR     COLONOSCOPY N/A 05/07/2024    Procedure: Colonoscopy;  Surgeon: Nina Moya MD;  Location:  GI     ESOPHAGOSCOPY, GASTROSCOPY, DUODENOSCOPY (EGD), COMBINED N/A 05/06/2024    Procedure: Esophagoscopy, gastroscopy, duodenoscopy (EGD), combined;  Surgeon: Nina Moya MD;  Location:  GI     ESOPHAGOSCOPY, GASTROSCOPY, DUODENOSCOPY (EGD), COMBINED N/A 05/07/2024    Procedure: Esophagoscopy, gastroscopy, duodenoscopy (EGD), combined;  Surgeon: Nina Moya MD;  Location:  GI     IR RENAL BIOPSY RIGHT  11/08/2024     IRRIGATION AND DEBRIDEMENT KNEE, COMBINED Right 08/17/2024    Procedure: IRRIGATION AND DEBRIDEMENT, Right KNEE,;  Surgeon: Jeff Phoenix MD;  Location: [facility] OR     IRRIGATION AND DEBRIDEMENT SPINE Right 07/11/2024    Procedure: Irrigation and debridement knee;  Surgeon: Dejon Espinoza MD;  Location: [facility] OR     ORTHOPEDIC SURGERY       partial amputation of right foot Right      PATELLA SURGERY       No family history on file.     Social History     Socioeconomic History     Marital status: Single     Spouse name: Not on file     Number of children: Not on file     Years of education: Not on file     Highest education level: Not on file   Occupational History     Not on file   Tobacco Use     Smoking status: Never     Passive exposure: Never     Smokeless tobacco: Never   Vaping Use     Vaping status: Never  Used   Substance and Sexual Activity     Alcohol use: Not Currently     Comment: sober 2 year     Drug use: Not Currently     Types: Marijuana     Sexual activity: Not on file   Other Topics Concern     Not on file   Social History Narrative     Not on file     Social Drivers of Health     Financial Resource Strain: Low Risk  (9/24/2024)    Financial Resource Strain      Within the past 12 months, have you or your family members you live with been unable to get utilities (heat, electricity) when it was really needed?: No   Food Insecurity: Low Risk  (9/24/2024)    Food Insecurity      Within the past 12 months, did you worry that your food would run out before you got money to buy more?: No      Within the past 12 months, did the food you bought just not last and you didn t have money to get more?: No   Transportation Needs: Low Risk  (9/24/2024)    Transportation Needs      Within the past 12 months, has lack of transportation kept you from medical appointments, getting your medicines, non-medical meetings or appointments, work, or from getting things that you need?: No   Physical Activity: Not on file   Stress: Not on file   Social Connections: Unknown (3/16/2022)    Received from [facility]    Social Connections      Frequency of Communication with Friends and Family: Not on file   Interpersonal Safety: Low Risk  (11/8/2024)    Interpersonal Safety      Do you feel physically and emotionally safe where you currently live?: Yes      Within the past 12 months, have you been hit, slapped, kicked or otherwise physically hurt by someone?: No      Within the past 12 months, have you been humiliated or emotionally abused in other ways by your partner or ex-partner?: No   Recent Concern: Interpersonal Safety - High Risk (9/11/2024)    Interpersonal Safety      Do you feel physically and emotionally safe where you currently live?: No       Within the past 12 months, have you been hit, slapped, kicked or otherwise physically hurt by someone?: No      Within the past 12 months, have you been humiliated or emotionally abused in other ways by your partner or ex-partner?: No   Housing Stability: Low Risk  (9/24/2024)    Housing Stability      Do you have housing? : Yes      Are you worried about losing your housing?: No           Medications  Allergies   Current Outpatient Medications   Medication Sig Dispense Refill     ACCU-CHEK GUIDE TEST test strip        amLODIPine (NORVASC) 10 MG tablet TAKE 1 TABLET(10 MG) BY MOUTH DAILY 90 tablet 1     amoxicillin (AMOXIL) 250 MG capsule        atorvastatin (LIPITOR) 40 MG tablet        bumetanide (BUMEX) 2 MG tablet Take 1 tablet (2 mg) by mouth daily. 90 tablet 3     cetirizine (ZYRTEC) 10 MG tablet Take 1 tablet (10 mg) by mouth daily. 30 tablet 11     Continuous Glucose Sensor (DEXCOM G7 SENSOR) MISC Change every 10 days. 3 each 3     Continuous Glucose Transmitter (DEXCOM G6 TRANSMITTER) MISC        darbepoetin sunshine-polysorbate (ARANESP) 40 MCG/0.4ML injection Inject 0.4 mLs (40 mcg) subcutaneously every 14 days. Do not shake. Administer for Hgb <10.0. HOLD dose if systolic BP >180. 0.8 mL 11     gabapentin (NEURONTIN) 300 MG capsule Take 1 capsule (300 mg) by mouth 2 times daily. 60 capsule 2     insulin aspart (NOVOLOG FLEXPEN) 100 UNIT/ML pen Inject 1-5 Units subcutaneously 3 times daily (with meals). In addition to 6-8 units with each meal, inject additional units as per this sliding scale:  If 200-250 = 1   251-300 = 2   301-350 = 3  351-400 = 4  401+ = 5  Repeat BS after 3 hours and call MD if still over 400       insulin glargine (LANTUS PEN) 100 UNIT/ML pen Inject 12 Units subcutaneously every morning. 15 mL 3     insulin NPH (HUMULIN N VIAL) 100 UNIT/ML vial INJECT 13 UNITS AT THE START OF YOUR STEROID INFUSION AT CLINIC WHICH IS SCHEDULED FOR 12/4/24       insulin pen needle (31G X 6 MM) 31G X 6 MM  "miscellaneous Use 4 pen needles daily or as directed. 100 each 11     insulin syringe-needle U-100 (29G X 1/2\" 0.5 ML) 29G X 1/2\" 0.5 ML miscellaneous Use one syringe as directed prior to steroid infusion. 10 each 0     lipase-protease-amylase (CREON 36) 84286-171299-089531 units CPEP Take 3 capsules by mouth 3 times daily (with meals). 0730, 1130, 1630       loperamide (IMODIUM) 2 MG capsule Take 4 mg by mouth every 8 hours as needed for diarrhea.       methocarbamol (ROBAXIN) 500 MG tablet Take 1 tablet (500 mg) by mouth every 8 hours as needed for muscle spasms. 30 tablet 3     metroNIDAZOLE (FLAGYL) 250 MG tablet Take 1 tablet by mouth 3 times daily.       oxyCODONE (ROXICODONE) 5 MG tablet Take 1 tablet (5 mg) by mouth every 6 hours as needed for pain. 30 tablet 0     silver sulfADIAZINE (SILVADENE) 1 % external cream Apply topically 2 times daily. 400 g 2     sulfamethoxazole-trimethoprim (BACTRIM) 400-80 MG tablet Take 1 tablet by mouth daily. 90 tablet 1     triamcinolone (KENALOG) 0.1 % external ointment Apply topically.         Allergies   Allergen Reactions     Vancomycin      Other Reaction(s): Renal Failure    Vancomycin-induced nephrotoxicity (11/2023, outside hospital)     Seasonal Allergies      HAYFEVER:    -Watery Eyes  -Eye burn     Daptomycin Rash     Likely delayed hypersensitivity reaction to daptomycin 11/2023          Lab Results    Chemistry/lipid CBC Cardiac Enzymes/BNP/TSH/INR   Recent Labs   Lab Test 09/24/24  1035   CHOL 138   HDL 45   LDL 52   TRIG 206*     Recent Labs   Lab Test 09/24/24  1035   LDL 52     Recent Labs   Lab Test 01/06/25  1129   *   POTASSIUM 5.3   CHLORIDE 97*   CO2 27   *   BUN 18.4   CR 1.28*   GFRESTIMATED 66   AROLDO 9.1     Recent Labs   Lab Test 01/06/25  1129 12/23/24  1419 12/13/24  1415   CR 1.28* 1.50* 1.23*     Recent Labs   Lab Test 11/06/24  1124 08/17/24  0648 05/04/24  1126   A1C 10.0* 9.2* 9.4*          Recent Labs   Lab Test 01/06/25  1129 " "12/13/24  1415   WBC  --  7.3   HGB 9.6* 8.0*   HCT 30.7* 25.4*   MCV  --  75*   PLT  --  480*     Recent Labs   Lab Test 01/06/25  1129 12/13/24  1415 12/12/24  1113   HGB 9.6* 8.0* 8.3*    No results for input(s): \"TROPONINI\" in the last 72962 hours.  Recent Labs   Lab Test 01/06/25  1129 12/18/24  1635   NTBNP 527 1,515*     Recent Labs   Lab Test 03/09/22  0651   TSH 1.80     Recent Labs   Lab Test 11/08/24  1142 08/16/24  0610   INR 1.15 1.14          Medications  Allergies   Current Outpatient Medications   Medication Sig Dispense Refill     ACCU-CHEK GUIDE TEST test strip        amLODIPine (NORVASC) 10 MG tablet TAKE 1 TABLET(10 MG) BY MOUTH DAILY 90 tablet 1     amoxicillin (AMOXIL) 250 MG capsule        atorvastatin (LIPITOR) 40 MG tablet        bumetanide (BUMEX) 2 MG tablet Take 1 tablet (2 mg) by mouth daily. 90 tablet 3     cetirizine (ZYRTEC) 10 MG tablet Take 1 tablet (10 mg) by mouth daily. 30 tablet 11     Continuous Glucose Sensor (DEXCOM G7 SENSOR) MISC Change every 10 days. 3 each 3     Continuous Glucose Transmitter (DEXCOM G6 TRANSMITTER) MISC        darbepoetin sunshine-polysorbate (ARANESP) 40 MCG/0.4ML injection Inject 0.4 mLs (40 mcg) subcutaneously every 14 days. Do not shake. Administer for Hgb <10.0. HOLD dose if systolic BP >180. 0.8 mL 11     gabapentin (NEURONTIN) 300 MG capsule Take 1 capsule (300 mg) by mouth 2 times daily. 60 capsule 2     insulin aspart (NOVOLOG FLEXPEN) 100 UNIT/ML pen Inject 1-5 Units subcutaneously 3 times daily (with meals). In addition to 6-8 units with each meal, inject additional units as per this sliding scale:  If 200-250 = 1   251-300 = 2   301-350 = 3  351-400 = 4  401+ = 5  Repeat BS after 3 hours and call MD if still over 400       insulin glargine (LANTUS PEN) 100 UNIT/ML pen Inject 12 Units subcutaneously every morning. 15 mL 3     insulin NPH (HUMULIN N VIAL) 100 UNIT/ML vial INJECT 13 UNITS AT THE START OF YOUR STEROID INFUSION AT CLINIC WHICH IS " "SCHEDULED FOR 12/4/24       insulin pen needle (31G X 6 MM) 31G X 6 MM miscellaneous Use 4 pen needles daily or as directed. 100 each 11     insulin syringe-needle U-100 (29G X 1/2\" 0.5 ML) 29G X 1/2\" 0.5 ML miscellaneous Use one syringe as directed prior to steroid infusion. 10 each 0     lipase-protease-amylase (CREON 36) [id number] units CPEP Take 3 capsules by mouth 3 times daily (with meals). 0730, 1130, 1630       loperamide (IMODIUM) 2 MG capsule Take 4 mg by mouth every 8 hours as needed for diarrhea.       methocarbamol (ROBAXIN) 500 MG tablet Take 1 tablet (500 mg) by mouth every 8 hours as needed for muscle spasms. 30 tablet 3     metroNIDAZOLE (FLAGYL) 250 MG tablet Take 1 tablet by mouth 3 times daily.       oxyCODONE (ROXICODONE) 5 MG tablet Take 1 tablet (5 mg) by mouth every 6 hours as needed for pain. 30 tablet 0     silver sulfADIAZINE (SILVADENE) 1 % external cream Apply topically 2 times daily. 400 g 2     sulfamethoxazole-trimethoprim (BACTRIM) 400-80 MG tablet Take 1 tablet by mouth daily. 90 tablet 1     triamcinolone (KENALOG) 0.1 % external ointment Apply topically.        Allergies   Allergen Reactions     Vancomycin      Other Reaction(s): Renal Failure    Vancomycin-induced nephrotoxicity (11/2023, outside hospital)     Seasonal Allergies      HAYFEVER:    -Watery Eyes  -Eye burn     Daptomycin Rash     Likely delayed hypersensitivity reaction to daptomycin 11/2023          Lab Results   Lab Results   Component Value Date     01/06/2025     10/08/2017    CO2 27 01/06/2025    CO2 24 08/06/2022    CO2 28 10/08/2017    BUN 18.4 01/06/2025    BUN 12 08/06/2022    BUN 9 10/08/2017     Lab Results   Component Value Date    WBC 7.3 12/13/2024    WBC 3.9 10/08/2017    HGB 9.6 01/06/2025    HGB 12.1 10/08/2017    HCT 30.7 01/06/2025    HCT 34.2 10/08/2017    MCV 75 12/13/2024    MCV 84 10/08/2017     12/13/2024     10/08/2017     Lab Results   Component Value " "Date    CHOL 138 09/24/2024    TRIG 206 09/24/2024    HDL 45 09/24/2024     Lab Results   Component Value Date    INR 1.15 11/08/2024     No results found for: \"BNP\"  No results found for: \"CKTOTAL\", \"CKMB\", \"TROPONINI\"  Lab Results   Component Value Date    TSH 1.80 03/09/2022                      Thank you for allowing me to participate in the care of your patient.      Sincerely,     Jodi Blandon MD     [facility]  cc:   Rena Blair PA-C  [address] [place] [address]      "

## 2025-01-16 NOTE — TELEPHONE ENCOUNTER
Patient called and message relayed  as written.  Long understands and has no questions at this time.

## 2025-01-16 NOTE — TELEPHONE ENCOUNTER
Please let patient know that I have reviewed his message.  He is on a controlled substance agreement with oxycodone use and is not to use any illicit substances.  If he is interested in looking at medical marijuana as an option for his pain treatment and he should go to the [place] site and complete application.

## 2025-01-16 NOTE — TELEPHONE ENCOUNTER
General Call    Contacts       Contact Date/Time Type Contact Phone/Fax    01/16/2025 03:26 PM CST Phone (Incoming) Long Mayfield (Self) [phone] (M)          Reason for Call:  question about Marijuana    What are your questions or concerns: talked to PCP a few months about smoking marijuana, she said no its not a good idea, so its been a few months and wanted to try asking again,if its ok for him to smoke a little, to help him with pain and it calms him down..    Date of last appointment with provider: 01/07/25    Could we send this information to you in [employer] or would you prefer to receive a phone call?:   Patient would prefer a phone call or Text  Okay to leave a detailed message?: Yes at Cell number on file:    Telephone Information:   Mobile [phone]

## 2025-01-16 NOTE — PROGRESS NOTES
"       [facility]   [address], [place] [address]   [email or web address]   OFFICE: [phone]        Thank you Dr. Blair for asking the [facility] team to participate in the care of your patient, Long Mayfield.     Impression and Plan     1.  Coronary artery disease.  Long has a history of coronary artery disease by virtue of prior imaging revealing evidence of coronary calcification.    Denies any chest pain or other symptoms concerning for angina.      2.  Hypertension. Blood pressure is somewhat on the lower side today.  He does report some mild lightheadedness.  Decrease amlodipine from 10 mg daily to 5 mg daily.  I recommended that he obtain a home blood pressure monitoring device and intermittently monitor his blood pressure and call should he have a tendency toward increased readings with reduction of the dose.    3.  Dyslipidemia. Lipid profile 24 September 2024 revealed LDL 52 mg/dL and HDL 45 mg/dL.    4.  \"Abnormal positron emission tomography test\". Previous PET scan completed in October 2024 showed his heart size at the upper limits of normal. Diffusely increased left atrial uptake is nonspecific, may relate to atrial fibrillation in the appropriate clinical setting.  Echocardiogram 9 January 2025 revealed no significant structural heart disease.  Ambulatory monitor has been ordered by primary provider for completeness and we will review the results when available.    35 minutes spent reviewing prior records (including documentation, laboratory studies, cardiac testing/imaging), interview with patient along with physical exam, planning, and subsequent documentation/crafting of note).           History of Present Illness    Once again I would like to thank you again for asking me to participate in the care of your patient, Long Mayfield.  As you know, but to reiterate for my own records, Long Mayfield is a 54 year old male with " "history of coronary artery disease by virtue of prior imaging revealing evidence of coronary calcification.    Denies any symptoms of chest pain.  His breathing is uncomfortable.  He does report some mild lightheadedness, but no pronounced lightheadedness or fainting.    Further review of systems is otherwise negative/noncontributory (medical record and 13 point review of systems reviewed as well and pertinent positives noted).         Cardiac Diagnostics/Imaging      Echocardiogram 9 January 2025:  Normal left ventricular size and systolic performance with ejection fraction of 65-70%.  No significant valvular heart disease.  Normal right ventricular size and systolic performance.  Normal atrial dimensions.    Twelve-lead ECG (personally reviewed) 11 September 2024: Sinus rhythm.  Normal ECG.    Chest radiograph 6 January 2025:  Normal heart size.   No pleural effusion or pneumothorax.   Lungs are clear.   No evidence for CHF or pneumonia.           Physical Examination       BP 91/54 (BP Location: Left arm, Patient Position: Sitting, Cuff Size: Adult Regular)   Pulse 67   Temp 98  F (36.7  C) (Oral)   Resp 16   Ht 1.651 m (5' 5\")   Wt 52.2 kg (115 lb)   SpO2 99%   BMI 19.14 kg/m          Wt Readings from Last 3 Encounters:   01/16/25 52.2 kg (115 lb)   01/07/25 53 kg (116 lb 12.8 oz)   01/02/25 54.4 kg (120 lb)       The patient is alert and oriented times three. Sclerae are anicteric. Mucosal membranes are moist. Jugular venous pressure is normal. No significant adenopathy/thyromegally appreciated. Lungs are fairly clear though somewhat diminished. On cardiovascular exam, the patient has a regular S1 and S2. Abdomen is soft and non-tender.         Family History/Social History/Risk Factors   Patient does not smoke.  Family history reviewed.         Medical History  Surgical History Family History Social History   Past Medical History:   Diagnosis Date    Abnormal CXR 10/15/2024    Previous CT of the chest " completed at [facility] in December 2023 demonstrated:   Impression:   1. Severe anasarca with moderate to large pleural effusions and a small pericardial effusion. Diffuse septal thickening in the lungs concerning for pulmonary edema. Anemic cardiac blood pool.   2. Solitary pulmonary nodule in the left lower lobe with an average diameter of 10 mm is indeterminat    Abnormal LFTs 01/03/2025    Acidosis 01/08/2020    Acquired absence of other right toe(s) 01/12/2024    Acute metabolic encephalopathy 09/17/2024    Acute pain of right knee 07/12/2024    Acute pulmonary edema (H) 01/11/2024    Acute renal insufficiency 05/03/2024    Last Comprehensive Metabolic Panel:          Lab Results      Component    Value    Date           NA    131 (L)    10/15/2024           POTASSIUM    4.0    10/15/2024           CHLORIDE    98    10/15/2024           CO2    24    10/15/2024           ANIONGAP    9    10/15/2024           GLC    133 (A)    10/23/2024           BUN    22.3 (H)    10/15/2024           CR    1.35 (H)    10/15/2024        Alcohol abuse 09/12/2022    Alcohol dependence in remission (H) 10/29/2024    Allergic rhinitis     Anemia     Anemia, unspecified type 05/03/2024    He has a history of anemia which has been noted since May 2024.  Cause has not been identified.  Additional evaluation with CBC, retake count, TSH, haptoglobin, iron studies, LDH, ferritin, B12 and folate.  Ferritin elevated 418, iron low at 20, iron binding capacity low at 2030, iron saturation low at 9.  Normal B12 and folate.  Awaiting haptoglobin and reticulocyte count.  Denies blood in stool.    Anxiety disorder, unspecified 01/11/2024    Arthritis     Arthritis due to other bacteria, right knee (H) 08/19/2024    Atherosclerotic heart disease of native coronary artery without angina pectoris 01/11/2024    Bell's palsy     Bilateral lower extremity edema 12/18/2024    Cardiac calcification (H) 01/19/2023    Treatment:  Colestid 1 g  tablet.  Aspirin 81 mg daily.      Cervical disc disease with myelopathy 05/01/2022    Formatting of this note might be different from the original.   seen spine specialist- [facility]  Formatting of this note might be different from the original.   seen spine specialist      Cervical radiculopathy 07/11/2024    Cervicalgia     Change or removal of drains 08/26/2024    Chronic bilateral low back pain with bilateral sciatica 04/08/2022    Chronic diarrhea 03/08/2022    Chronic kidney disease, unspecified 01/11/2024    Chronic pancreatitis (H) 09/06/2024    Due to continued right knee swelling and history of chronic pancreatitis with chronic diarrhea, he was referred to GI to review possible Whipple's disease.     10/18/2024 seen by [place] MATT.  We do not have those records and will request those records to be faxed over.  Once I get those records I will review them with recommendations.  Per patient he is to follow-up in 6 months.      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  Started rituximab infusions 12/4/2024.      Ketosis (H) 03/08/2022    Long term (current) use of insulin (H) 08/19/2024    Major depressive disorder, recurrent episode, mild     Major depressive disorder, recurrent, mild 08/19/2024    Muscle wasting and atrophy, not elsewhere classified, multiple sites 04/01/2024    Muscle weakness (generalized) 01/12/2024    Need for assistance with personal care 01/12/2024    Osteomyelitis of great toe of right foot (H) 11/22/2023    Other abnormalities of gait and mobility 01/12/2024     "Other acute osteomyelitis, right ankle and foot (H)     Other chronic pancreatitis (H)     Other fatigue 11/14/2024    Other hyperlipidemia 03/15/2022    Other polyosteoarthritis     Peripheral vascular disease, unspecified 08/19/2024    Pneumonia of left lower lobe due to infectious organism 09/11/2024    Primary hypertension 10/09/2019    Pyogenic arthritis of right knee joint, due to unspecified organism (H) 07/11/2024    Right knee inflammatory arthritis, etiology to be determined, s/p I&D Jul 11, Aug 18, 2024.         Brucella, Bartonella, Q fever, CD4, Ig levels.     MRI right knee with and without contrast if negative.     Rheumatology and GI consult.     Complete oral ciprofloxacin course.      Admitted to [facility] 8/16 to 8/19/2024.  \"Has been seen a handful of times and outpatient follow-up with persistent right    Right knee pain 07/11/2024    S/P transmetatarsal amputation of foot, right (H) 12/01/2023     s/p transmetatarsal amputation, right foot (DOS 1/9/24)      Severe episode of recurrent major depressive disorder, without psychotic features (H) 01/19/2023    Solitary pulmonary nodule     Stage 3a chronic kidney disease (H) 1/11/2024    Systemic vasculitis (H) 11/19/2024    Type 2 diabetes mellitus with foot ulcer (H) 01/11/2024    Type 2 diabetes mellitus with hyperosmolarity without coma, with long-term current use of insulin (H) 10/29/2024    Type 2 diabetes mellitus with kidney complication, with long-term current use of insulin (H) 10/05/2018    Seen by diabetes educator 10/29/2024:   PLAN  Lantus: 12 units  Set dose Novolog at meals: 3 units each + Correction Scale below     Pre-Meal Correction Scale:        If pre-meal glucose is:    Add extra Humalog/Novolog to your meal dose per below chart:      151 - 200    +1 unit      201 - 250    +2 units      251 - 300    +3 units      301 - 350    +4 units      351 - 400     +5 units      Over     Ulcer of right heel and midfoot (H) 12/23/2024    " Unintentional weight loss 10/11/2023    Vitamin D deficiency, unspecified 01/11/2024     Past Surgical History:   Procedure Laterality Date    ARTHROSCOPY KNEE Right 08/17/2024    Procedure: Arthroscopic;  Surgeon: Jeff Phoenix MD;  Location: [facility] OR    COLONOSCOPY N/A 05/07/2024    Procedure: Colonoscopy;  Surgeon: Nina Moya MD;  Location:  GI    ESOPHAGOSCOPY, GASTROSCOPY, DUODENOSCOPY (EGD), COMBINED N/A 05/06/2024    Procedure: Esophagoscopy, gastroscopy, duodenoscopy (EGD), combined;  Surgeon: Nina Moya MD;  Location:  GI    ESOPHAGOSCOPY, GASTROSCOPY, DUODENOSCOPY (EGD), COMBINED N/A 05/07/2024    Procedure: Esophagoscopy, gastroscopy, duodenoscopy (EGD), combined;  Surgeon: Nina Moya MD;  Location:  GI    IR RENAL BIOPSY RIGHT  11/08/2024    IRRIGATION AND DEBRIDEMENT KNEE, COMBINED Right 08/17/2024    Procedure: IRRIGATION AND DEBRIDEMENT, Right KNEE,;  Surgeon: Jeff Phoenix MD;  Location: [facility] OR    IRRIGATION AND DEBRIDEMENT SPINE Right 07/11/2024    Procedure: Irrigation and debridement knee;  Surgeon: Dejon Espinoza MD;  Location: [facility] OR    ORTHOPEDIC SURGERY      partial amputation of right foot Right     PATELLA SURGERY       No family history on file.     Social History     Socioeconomic History    Marital status: Single     Spouse name: Not on file    Number of children: Not on file    Years of education: Not on file    Highest education level: Not on file   Occupational History    Not on file   Tobacco Use    Smoking status: Never     Passive exposure: Never    Smokeless tobacco: Never   Vaping Use    Vaping status: Never Used   Substance and Sexual Activity    Alcohol use: Not Currently     Comment: sober 2 year    Drug use: Not Currently     Types: Marijuana    Sexual activity: Not on file   Other Topics Concern    Not on file   Social History Narrative    Not on file     Social Drivers of Health     Financial Resource  Strain: Low Risk  (9/24/2024)    Financial Resource Strain     Within the past 12 months, have you or your family members you live with been unable to get utilities (heat, electricity) when it was really needed?: No   Food Insecurity: Low Risk  (9/24/2024)    Food Insecurity     Within the past 12 months, did you worry that your food would run out before you got money to buy more?: No     Within the past 12 months, did the food you bought just not last and you didn t have money to get more?: No   Transportation Needs: Low Risk  (9/24/2024)    Transportation Needs     Within the past 12 months, has lack of transportation kept you from medical appointments, getting your medicines, non-medical meetings or appointments, work, or from getting things that you need?: No   Physical Activity: Not on file   Stress: Not on file   Social Connections: Unknown (3/16/2022)    [facility], [facility]    Social Connections     Frequency of Communication with Friends and Family: Not on file   Interpersonal Safety: Low Risk  (11/8/2024)    Interpersonal Safety     Do you feel physically and emotionally safe where you currently live?: Yes     Within the past 12 months, have you been hit, slapped, kicked or otherwise physically hurt by someone?: No     Within the past 12 months, have you been humiliated or emotionally abused in other ways by your partner or ex-partner?: No   Recent Concern: Interpersonal Safety - High Risk (9/11/2024)    Interpersonal Safety     Do you feel physically and emotionally safe where you currently live?: No     Within the past 12 months, have you been hit, slapped, kicked or otherwise physically hurt by someone?: No     Within the past 12 months, have you been humiliated or emotionally abused in other ways by your partner or ex-partner?: No   Housing Stability: Low Risk  (9/24/2024)    Housing Stability     Do you have housing? :  "Yes     Are you worried about losing your housing?: No           Medications  Allergies   Current Outpatient Medications   Medication Sig Dispense Refill    ACCU-CHEK GUIDE TEST test strip       amLODIPine (NORVASC) 10 MG tablet TAKE 1 TABLET(10 MG) BY MOUTH DAILY 90 tablet 1    amoxicillin (AMOXIL) 250 MG capsule       atorvastatin (LIPITOR) 40 MG tablet       bumetanide (BUMEX) 2 MG tablet Take 1 tablet (2 mg) by mouth daily. 90 tablet 3    cetirizine (ZYRTEC) 10 MG tablet Take 1 tablet (10 mg) by mouth daily. 30 tablet 11    Continuous Glucose Sensor (DEXCOM G7 SENSOR) MISC Change every 10 days. 3 each 3    Continuous Glucose Transmitter (DEXCOM G6 TRANSMITTER) MISC       darbepoetin sunshine-polysorbate (ARANESP) 40 MCG/0.4ML injection Inject 0.4 mLs (40 mcg) subcutaneously every 14 days. Do not shake. Administer for Hgb <10.0. HOLD dose if systolic BP >180. 0.8 mL 11    gabapentin (NEURONTIN) 300 MG capsule Take 1 capsule (300 mg) by mouth 2 times daily. 60 capsule 2    insulin aspart (NOVOLOG FLEXPEN) 100 UNIT/ML pen Inject 1-5 Units subcutaneously 3 times daily (with meals). In addition to 6-8 units with each meal, inject additional units as per this sliding scale:  If 200-250 = 1   251-300 = 2   301-350 = 3  351-400 = 4  401+ = 5  Repeat BS after 3 hours and call MD if still over 400      insulin glargine (LANTUS PEN) 100 UNIT/ML pen Inject 12 Units subcutaneously every morning. 15 mL 3    insulin NPH (HUMULIN N VIAL) 100 UNIT/ML vial INJECT 13 UNITS AT THE START OF YOUR STEROID INFUSION AT CLINIC WHICH IS SCHEDULED FOR 12/4/24      insulin pen needle (31G X 6 MM) 31G X 6 MM miscellaneous Use 4 pen needles daily or as directed. 100 each 11    insulin syringe-needle U-100 (29G X 1/2\" 0.5 ML) 29G X 1/2\" 0.5 ML miscellaneous Use one syringe as directed prior to steroid infusion. 10 each 0    lipase-protease-amylase (CREON 36) 54949-369127-802218 units CPEP Take 3 capsules by mouth 3 times daily (with meals). 0730, " "1130, 1630      loperamide (IMODIUM) 2 MG capsule Take 4 mg by mouth every 8 hours as needed for diarrhea.      methocarbamol (ROBAXIN) 500 MG tablet Take 1 tablet (500 mg) by mouth every 8 hours as needed for muscle spasms. 30 tablet 3    metroNIDAZOLE (FLAGYL) 250 MG tablet Take 1 tablet by mouth 3 times daily.      oxyCODONE (ROXICODONE) 5 MG tablet Take 1 tablet (5 mg) by mouth every 6 hours as needed for pain. 30 tablet 0    silver sulfADIAZINE (SILVADENE) 1 % external cream Apply topically 2 times daily. 400 g 2    sulfamethoxazole-trimethoprim (BACTRIM) 400-80 MG tablet Take 1 tablet by mouth daily. 90 tablet 1    triamcinolone (KENALOG) 0.1 % external ointment Apply topically.         Allergies   Allergen Reactions    Vancomycin      Other Reaction(s): Renal Failure    Vancomycin-induced nephrotoxicity (11/2023, outside hospital)    Seasonal Allergies      HAYFEVER:    -Watery Eyes  -Eye burn    Daptomycin Rash     Likely delayed hypersensitivity reaction to daptomycin 11/2023          Lab Results    Chemistry/lipid CBC Cardiac Enzymes/BNP/TSH/INR   Recent Labs   Lab Test 09/24/24  1035   CHOL 138   HDL 45   LDL 52   TRIG 206*     Recent Labs   Lab Test 09/24/24  1035   LDL 52     Recent Labs   Lab Test 01/06/25  1129   *   POTASSIUM 5.3   CHLORIDE 97*   CO2 27   *   BUN 18.4   CR 1.28*   GFRESTIMATED 66   AROLDO 9.1     Recent Labs   Lab Test 01/06/25  1129 12/23/24  1419 12/13/24  1415   CR 1.28* 1.50* 1.23*     Recent Labs   Lab Test 11/06/24  1124 08/17/24  0648 05/04/24  1126   A1C 10.0* 9.2* 9.4*          Recent Labs   Lab Test 01/06/25  1129 12/13/24  1415   WBC  --  7.3   HGB 9.6* 8.0*   HCT 30.7* 25.4*   MCV  --  75*   PLT  --  480*     Recent Labs   Lab Test 01/06/25  1129 12/13/24  1415 12/12/24  1113   HGB 9.6* 8.0* 8.3*    No results for input(s): \"TROPONINI\" in the last 33155 hours.  Recent Labs   Lab Test 01/06/25  1129 12/18/24  1635   NTBNP 527 1,515*     Recent Labs   Lab Test " "03/09/22  0651   TSH 1.80     Recent Labs   Lab Test 11/08/24  1142 08/16/24  0610   INR 1.15 1.14          Medications  Allergies   Current Outpatient Medications   Medication Sig Dispense Refill    ACCU-CHEK GUIDE TEST test strip       amLODIPine (NORVASC) 10 MG tablet TAKE 1 TABLET(10 MG) BY MOUTH DAILY 90 tablet 1    amoxicillin (AMOXIL) 250 MG capsule       atorvastatin (LIPITOR) 40 MG tablet       bumetanide (BUMEX) 2 MG tablet Take 1 tablet (2 mg) by mouth daily. 90 tablet 3    cetirizine (ZYRTEC) 10 MG tablet Take 1 tablet (10 mg) by mouth daily. 30 tablet 11    Continuous Glucose Sensor (DEXCOM G7 SENSOR) MISC Change every 10 days. 3 each 3    Continuous Glucose Transmitter (DEXCOM G6 TRANSMITTER) MISC       darbepoetin sunshine-polysorbate (ARANESP) 40 MCG/0.4ML injection Inject 0.4 mLs (40 mcg) subcutaneously every 14 days. Do not shake. Administer for Hgb <10.0. HOLD dose if systolic BP >180. 0.8 mL 11    gabapentin (NEURONTIN) 300 MG capsule Take 1 capsule (300 mg) by mouth 2 times daily. 60 capsule 2    insulin aspart (NOVOLOG FLEXPEN) 100 UNIT/ML pen Inject 1-5 Units subcutaneously 3 times daily (with meals). In addition to 6-8 units with each meal, inject additional units as per this sliding scale:  If 200-250 = 1   251-300 = 2   301-350 = 3  351-400 = 4  401+ = 5  Repeat BS after 3 hours and call MD if still over 400      insulin glargine (LANTUS PEN) 100 UNIT/ML pen Inject 12 Units subcutaneously every morning. 15 mL 3    insulin NPH (HUMULIN N VIAL) 100 UNIT/ML vial INJECT 13 UNITS AT THE START OF YOUR STEROID INFUSION AT CLINIC WHICH IS SCHEDULED FOR 12/4/24      insulin pen needle (31G X 6 MM) 31G X 6 MM miscellaneous Use 4 pen needles daily or as directed. 100 each 11    insulin syringe-needle U-100 (29G X 1/2\" 0.5 ML) 29G X 1/2\" 0.5 ML miscellaneous Use one syringe as directed prior to steroid infusion. 10 each 0    lipase-protease-amylase (CREON 36) [id number] units CPEP Take 3 capsules " "by mouth 3 times daily (with meals). 0730, 1130, 1630      loperamide (IMODIUM) 2 MG capsule Take 4 mg by mouth every 8 hours as needed for diarrhea.      methocarbamol (ROBAXIN) 500 MG tablet Take 1 tablet (500 mg) by mouth every 8 hours as needed for muscle spasms. 30 tablet 3    metroNIDAZOLE (FLAGYL) 250 MG tablet Take 1 tablet by mouth 3 times daily.      oxyCODONE (ROXICODONE) 5 MG tablet Take 1 tablet (5 mg) by mouth every 6 hours as needed for pain. 30 tablet 0    silver sulfADIAZINE (SILVADENE) 1 % external cream Apply topically 2 times daily. 400 g 2    sulfamethoxazole-trimethoprim (BACTRIM) 400-80 MG tablet Take 1 tablet by mouth daily. 90 tablet 1    triamcinolone (KENALOG) 0.1 % external ointment Apply topically.        Allergies   Allergen Reactions    Vancomycin      Other Reaction(s): Renal Failure    Vancomycin-induced nephrotoxicity (11/2023, outside hospital)    Seasonal Allergies      HAYFEVER:    -Watery Eyes  -Eye burn    Daptomycin Rash     Likely delayed hypersensitivity reaction to daptomycin 11/2023          Lab Results   Lab Results   Component Value Date     01/06/2025     10/08/2017    CO2 27 01/06/2025    CO2 24 08/06/2022    CO2 28 10/08/2017    BUN 18.4 01/06/2025    BUN 12 08/06/2022    BUN 9 10/08/2017     Lab Results   Component Value Date    WBC 7.3 12/13/2024    WBC 3.9 10/08/2017    HGB 9.6 01/06/2025    HGB 12.1 10/08/2017    HCT 30.7 01/06/2025    HCT 34.2 10/08/2017    MCV 75 12/13/2024    MCV 84 10/08/2017     12/13/2024     10/08/2017     Lab Results   Component Value Date    CHOL 138 09/24/2024    TRIG 206 09/24/2024    HDL 45 09/24/2024     Lab Results   Component Value Date    INR 1.15 11/08/2024     No results found for: \"BNP\"  No results found for: \"CKTOTAL\", \"CKMB\", \"TROPONINI\"  Lab Results   Component Value Date    TSH 1.80 03/09/2022                  "

## 2025-01-16 NOTE — TELEPHONE ENCOUNTER
Call to patient to get update. Blood sugar down to 130 this morning, unsure if this was fasting. Writer explained what a fasting blood sugar was and patient still unsure if he took this prior to eating or not. Encouraged patient to be taking fasting glucose readings every morning and if greater than 130, patient is to increase his glargine insulin by 2 units every 3 days. Writer reiterated this to patient several times, unsure if he understood directions. Informed patient these recommendations are in a mychart message from Rena Blair for him to reference. Current glargine use is 14u in the morning. He is not experiencing any symptoms of hyper/hypo glycemia. Encouraged patient to call clinic back with any questions/concerns.      He verbalized understanding of follow-up appointment with ifrah on 1/24.

## 2025-01-18 LAB — BACTERIA SNV CULT: NO GROWTH

## 2025-01-23 DIAGNOSIS — M00.9 PYOGENIC ARTHRITIS OF RIGHT KNEE JOINT, DUE TO UNSPECIFIED ORGANISM (H): ICD-10-CM

## 2025-01-23 LAB
BACTERIA SNV CULT: NORMAL
BACTERIA SNV CULT: NORMAL

## 2025-01-23 RX ORDER — OXYCODONE HYDROCHLORIDE 5 MG/1
5 TABLET ORAL EVERY 6 HOURS PRN
Qty: 30 TABLET | Refills: 0 | Status: CANCELLED | OUTPATIENT
Start: 2025-01-23

## 2025-01-23 NOTE — TELEPHONE ENCOUNTER
Medication Question or Refill        What medication are you calling about (include dose and sig)?: oxyCODONE (ROXICODONE) 5 MG tablet     Preferred Pharmacy:   [id number] - [place], [place] - [address]  [address]  [facility] [address]  Phone: [phone] Fax: [phone]        Controlled Substance Agreement on file:   CSA -- Patient Level:     [Media Unavailable] Controlled Substance Agreement - Opioid - Scan on 10/16/2024 10:32 AM       Who prescribed the medication?: Rena Blair    Do you need a refill? Yes    When did you use the medication last? 1/22/2025    Do you have any questions or concerns?  Yes: pt plans on going to pharmacy later today vijay he will have transportation and unsure when he will get to pharmacy      Could we send this information to you in [facility] or would you prefer to receive a phone call?:   Patient would prefer a phone call   Okay to leave a detailed message?: Yes at Cell number on file:    Telephone Information:   Mobile [phone]

## 2025-01-27 LAB — BACTERIA SNV CULT: NORMAL

## 2025-01-28 ENCOUNTER — TRANSFERRED RECORDS (OUTPATIENT)
Dept: HEALTH INFORMATION MANAGEMENT | Facility: CLINIC | Age: 55
End: 2025-01-28
Payer: MEDICAID

## 2025-01-28 PROBLEM — M86.171 OTHER ACUTE OSTEOMYELITIS, RIGHT ANKLE AND FOOT (H): Status: RESOLVED | Noted: 2024-01-12 | Resolved: 2025-01-28

## 2025-01-28 PROBLEM — N28.9 ACUTE RENAL INSUFFICIENCY: Status: RESOLVED | Noted: 2024-05-03 | Resolved: 2025-01-28

## 2025-01-28 PROBLEM — Z74.1 NEED FOR ASSISTANCE WITH PERSONAL CARE: Status: RESOLVED | Noted: 2024-01-12 | Resolved: 2025-01-28

## 2025-01-28 PROBLEM — M86.9 OSTEOMYELITIS OF GREAT TOE OF RIGHT FOOT (H): Status: RESOLVED | Noted: 2023-11-22 | Resolved: 2025-01-28

## 2025-01-28 PROBLEM — J81.0 ACUTE PULMONARY EDEMA (H): Status: RESOLVED | Noted: 2024-01-11 | Resolved: 2025-01-28

## 2025-01-29 ENCOUNTER — TELEPHONE (OUTPATIENT)
Dept: VASCULAR SURGERY | Facility: CLINIC | Age: 55
End: 2025-01-29
Payer: MEDICAID

## 2025-01-29 ENCOUNTER — TELEPHONE (OUTPATIENT)
Dept: NEPHROLOGY | Facility: CLINIC | Age: 55
End: 2025-01-29
Payer: MEDICAID

## 2025-01-29 NOTE — TELEPHONE ENCOUNTER
Home care referral sent to  [facility] - refaxed 1/30 LB  [facility] 1/30 NK        Declined:   [facility]- KN 1/30 at payor capacity  [facility] - KN 1/30 at capacity for the location

## 2025-01-30 ENCOUNTER — PATIENT OUTREACH (OUTPATIENT)
Dept: NURSING | Facility: CLINIC | Age: 55
End: 2025-01-30
Payer: MEDICAID

## 2025-01-30 LAB — BACTERIA SNV CULT: NORMAL

## 2025-01-30 ASSESSMENT — ACTIVITIES OF DAILY LIVING (ADL): DEPENDENT_IADLS:: MEDICATION MANAGEMENT;TRANSPORTATION

## 2025-01-30 NOTE — PROGRESS NOTES
Clinic Care Coordination Contact  Clinic Care Coordination Contact  OUTREACH    Referral Information:  Referral Source: PCP    Primary Diagnosis: Psychosocial    Chief Complaint   Patient presents with    Clinic Care Coordination - Initial        Universal Utilization:   Clinic Utilization  Difficulty keeping appointments:: No  Compliance Concerns: No  No-Show Concerns: No  No PCP office visit in Past Year: No  Utilization      No Show Count (past year)  2             ED Visits  6             Hospital Admissions  6                    Current as of: 1/30/2025  4:30 PM                Clinical Concerns:  Current Medical Concerns:    Patient Active Problem List   Diagnosis    Chronic diarrhea    Anemia, unspecified type    Cardiac calcification (H)    Cervical disc disease with myelopathy    Chronic bilateral low back pain with bilateral sciatica    S/P transmetatarsal amputation of foot, right (H)    Exocrine pancreatic insufficiency    GERD (gastroesophageal reflux disease)    Other hyperlipidemia    Primary hypertension    Severe episode of recurrent major depressive disorder, without psychotic features (H)    Type 2 diabetes mellitus with kidney complication, with long-term current use of insulin (H)    Unintentional weight loss    Pyogenic arthritis of right knee joint, due to unspecified organism (H)    Cervical radiculopathy    Chronic pancreatitis (H)    Abnormal CXR    Alcohol dependence in remission (H)    Type 2 diabetes mellitus with hyperosmolarity without coma, with long-term current use of insulin (H)    Other fatigue    Systemic vasculitis (H)    IgG4 related disease (H)    Bilateral lower extremity edema    Ulcer of right heel and midfoot (H)    Abnormal LFTs    Acidosis    Acquired absence of other right toe(s)    Anxiety disorder, unspecified    Arthritis due to other bacteria, right knee (H)    Atherosclerotic heart disease of native coronary artery without angina pectoris    Bell's palsy     Difficulty in walking, not elsewhere classified    Hyponatremia    Long term (current) use of insulin (H)    Major depressive disorder, recurrent, mild    Muscle wasting and atrophy, not elsewhere classified, multiple sites    Muscle weakness (generalized)    Other abnormalities of gait and mobility    Peripheral vascular disease, unspecified    Stage 3a chronic kidney disease (H)    Type 2 diabetes mellitus with foot ulcer (H)    Vitamin D deficiency, unspecified        Current Behavioral Concerns: no current concerns      Education Provided to patient:     Transportation:     Help At Your Door - [email or web address]  With a team of personal drivers, we can get you where you want to go.  We can take you to appointments and anywhere else within the seven [facility] we serve.  Do you have a list of errands to run? No problem. We can make multiple stops and wait for you.  Want a friend to come along? Rides can be accompanied at no extra charge.  Call [phone] to schedule.     Transit Link - [email or web address]  Transit Link is a thpc-ya-nhwa minibus or van service for the general public that operates on weekdays throughout the [place] metropolitan area. It is a shared-ride service, which must be reserved in advance. You may reserve a ride up to five days in advance, or call at least two hours in advance for same-day trips. Rides are subject to availability, and fares are based on the distance traveled. The base fare is $3.50, one way.  Transit Link customers can transfer to a Metro Transit bus without paying a separate fare. Ask the  for a paratransit transfer.  Please Note: Transit Link service is not available where regular transit routes are operating. Call [phone] ([phone]) for more information or to reserve a ride.    Mind[employer] - don't forget you have the Community Thread bus loop for free on Monday at your building.     Food:     Fare For All - [address] [place] [address]  Monthly on a Tuesday, from 3:30pm - 5:30pm.  [email or web address]/groceries/[email or web address]    [facility] - [address] [place] [address]  We will provide food to people through Appointments, drive up or delivery during the following hours: Monday 9:30am-12:30pm, Tuesday/Thursday 1:30-6:30pm and Friday 9:30am-12:30am  For resource assistance, financial assistance or for any other service-related needs, call our main line ([phone]) with questions.  [email or web address]    [facility] [employer] -  [address] [address]  Call for an appointment between 9-10am  Monday-Thursday 9-11am by appointment only.    [facility]   [address] [place] [address]  Call to set up delivery : [phone]      Pain  Pain (GOAL):: No  Health Maintenance Reviewed: Due/Overdue   Health Maintenance Due   Topic Date Due    DIABETIC FOOT EXAM  Never done    DEPRESSION ACTION PLAN  Never done    YEARLY PREVENTIVE VISIT  Never done    HEPATITIS A IMMUNIZATION (3 of 3 - Hep A Twinrix risk 3-dose series) 05/23/2023    DTAP/TDAP/TD IMMUNIZATION (1 - Tdap) 09/02/2023    COVID-19 Vaccine (3 - Mixed Product risk series) 12/17/2024    A1C  02/06/2025      Clinical Pathway: None    Medication Management:  Medication review status: Medications reviewed and no changes reported per patient.             Functional Status:  Dependent ADLs:: Ambulation-cane  Dependent IADLs:: Medication Management, Transportation  Bed or wheelchair confined:: No  Mobility Status: Independent w/Device  Fallen 2 or more times in the past year?: No  Any fall with injury in the past year?: No    Living Situation:  Current living arrangement:: I live alone  Type of residence:: Apartment    Lifestyle & Psychosocial Needs:    Social Drivers of Health     Food Insecurity: Low Risk  (9/24/2024)    Food Insecurity     Within the past 12 months, did you worry that your food would  run out before you got money to buy more?: No     Within the past 12 months, did the food you bought just not last and you didn t have money to get more?: No   Depression: Not at risk (12/10/2024)    PHQ-2     PHQ-2 Score: 2   Housing Stability: Low Risk  (9/24/2024)    Housing Stability     Do you have housing? : Yes     Are you worried about losing your housing?: No   Tobacco Use: Low Risk  (1/16/2025)    Patient History     Smoking Tobacco Use: Never     Smokeless Tobacco Use: Never     Passive Exposure: Never   Financial Resource Strain: Low Risk  (9/24/2024)    Financial Resource Strain     Within the past 12 months, have you or your family members you live with been unable to get utilities (heat, electricity) when it was really needed?: No   Alcohol Use: Not At Risk (6/27/2024)    Received from [facility]    AUDIT-C     Frequency of Alcohol Consumption: Never     Average Number of Drinks: Patient does not drink     Frequency of Binge Drinking: Never   Transportation Needs: Low Risk  (9/24/2024)    Transportation Needs     Within the past 12 months, has lack of transportation kept you from medical appointments, getting your medicines, non-medical meetings or appointments, work, or from getting things that you need?: No   Physical Activity: Not on file   Interpersonal Safety: Low Risk  (11/8/2024)    Interpersonal Safety     Do you feel physically and emotionally safe where you currently live?: Yes     Within the past 12 months, have you been hit, slapped, kicked or otherwise physically hurt by someone?: No     Within the past 12 months, have you been humiliated or emotionally abused in other ways by your partner or ex-partner?: No   Recent Concern: Interpersonal Safety - High Risk (9/11/2024)    Interpersonal Safety     Do you feel physically and emotionally safe where you currently live?: No     Within the past 12 months, have you been hit, slapped, kicked or otherwise physically hurt by someone?: No     Within  the past 12 months, have you been humiliated or emotionally abused in other ways by your partner or ex-partner?: No   Stress: Not on file   Social Connections: Unknown (3/16/2022)    Received from [facility], [facility]    Social Connections     Frequency of Communication with Friends and Family: Not on file   Health Literacy: Not on file     Diet:: Regular  Inadequate nutrition (GOAL):: No  Tube Feeding: No  Inadequate activity/exercise (GOAL):: No  Significant changes in sleep pattern (GOAL): No  Transportation means:: Medical transport, Public transportation     Anabaptist or spiritual beliefs that impact treatment:: No  Mental health DX:: No  Mental health management concern (GOAL):: No  Chemical Dependency Status: No Current Concerns  Informal Support system:: Caridad based             Resources and Interventions:  Current Resources:      Community Resources: Financial/Insurance  Supplies Currently Used at Home: None  Equipment Currently Used at Home: none  Employment Status: disabled         Advance Care Plan/Directive  Advanced Care Plans/Directives on file:: No    Referrals Placed: Transportation, Community Resources         Care Plan:  Care Plan: Health Maintenance       Problem: Health Maintenance Due or Overdue       Goal: Become up-to-date with health maintenance visit(s)       Start Date: 1/30/2025    Note:     Goal Statement: I will complete my annual wellness visit.    Barriers: transportation   Strengths: motivated     Patient expressed understanding of goal: yes  Action steps to achieve this goal:  1. I will schedule my annual wellness visit; [phone] ([phone])  2. I will attend my annual wellness visit.  3. I will contact my Care Management or clinic team if I have barriers to attending my annual wellness visit.                              Care Plan: Community Resources       Problem: Lack of transportation       Goal:  Establish Reliable Transportation       Start Date: 1/30/2025    Note:     Goal Statement: I will continue to take steps to secure safe, consistent and reliable transportation over the next three month(s).  Barriers: financial   Strengths: motivated  Patient expressed understanding of goal: yes    Action steps to achieve this goal:  I will review resources and supports sent to me via [employer]  I will outreach to supports and consider establishing with ones I might find beneficial: transit link, community thread, help at your door  I will try one new transportation option in the next month                        Problem: Reliable food source       Goal: Establish Options for Affordable Food Sources       Start Date: 1/30/2025    Note:     Goal Statement: I will continue to take steps to minimize food insecurity over the next two month(s).  Barriers: transportation   Strengths: motivated  Patient expressed understanding of goal: yes    Action steps to achieve this goal:  I will review resources and supports sent to me via [employer]: food pantry resources   I will outreach to supports and consider establishing with ones I might find beneficial - I will try going to one food pantry in the next month  I will continue to outreach to care coordination as needed for additional resources or supports.                                Patient/Caregiver understanding: Pt reports understanding and denies any additional questions or concerns at this times. SW CC engaged in AIDET communication during encounter.     Outreach Frequency: monthly, more frequently as needed  Future Appointments                Tomorrow STWT LAB [facility], [facility] STWT    In 1 week Lucia Mir RD [facility], [place]    In 1 week Rena Blair PA-C [facility], [facility] STWT    In 2 weeks WBWW LAB [facility], [facility]    In  3 weeks Marina Fernandez NP [facility] [place], [facility]    In 3 weeks Hector Cruz MD [facility], [place]    In 2 months UC LAB [facility]    In 2 months Jordy Chinchilla MD [facility]    In 2 months Dickson Day MD [facility]    In 2 months Peggy Robison PA-C [facility]            Plan: SWCC completed enrollment to [facility]. SWCC completed handoff to [facility]. SWCC provided resources via phone and my chart. [facility] will complete outreach in about 4 week. SWCC will complete chart review in about 6 weeks. SWCC reviewed care coordination role and availability with pt. SWCC discussed resources and supports available. Resources discussed: food, transportation.   Pt noted they moved to the area about 6 months ago from [place] - they are not sure what is available to them in the [facility] in terms of support. Pt confirmed they are aware of the community thread bus loop which does come to their apartment. They do use their medical rides. Pt's main concern is being able to go places in the community (bank, [employer], ect). Pt wants the freedom to go places other than just on Monday bus loop day.     Transportation - pt and SWCC talked over options such as transit link, help at your door. Pt will try one of these options after they review the information sent by this [facility]. Pt would like to try to keep cost down so will try transit link first.     Food - pt will look into food pantry resources sent by this [facility]. Pt noted they have never been to a food pantry. Pt and SWCC talked through what it is like; how it is like a grocery store and they can pick out what they like. Options provided via my chart which pt can review. Some options do offer delivery which pt can set up if that works better for them.

## 2025-01-31 ENCOUNTER — LAB (OUTPATIENT)
Dept: LAB | Facility: CLINIC | Age: 55
End: 2025-01-31
Payer: MEDICAID

## 2025-01-31 DIAGNOSIS — L97.509 TYPE 2 DIABETES MELLITUS WITH FOOT ULCER (H): Primary | ICD-10-CM

## 2025-01-31 DIAGNOSIS — E11.621 TYPE 2 DIABETES MELLITUS WITH FOOT ULCER (H): Primary | ICD-10-CM

## 2025-02-03 ENCOUNTER — TELEPHONE (OUTPATIENT)
Dept: FAMILY MEDICINE | Facility: CLINIC | Age: 55
End: 2025-02-03
Payer: COMMERCIAL

## 2025-02-03 ENCOUNTER — PATIENT OUTREACH (OUTPATIENT)
Dept: CARE COORDINATION | Facility: CLINIC | Age: 55
End: 2025-02-03
Payer: COMMERCIAL

## 2025-02-03 NOTE — TELEPHONE ENCOUNTER
Left message to call back for: pt  Information to relay to patient: see providers message below and relay

## 2025-02-03 NOTE — TELEPHONE ENCOUNTER
----- Message from Rena Blair sent at 1/31/2025  4:05 PM CST -----  Please let patient know that his A1c has increased to 11.5.  This is very worrisome due to the fact that he has an ulcer on his foot.  I see he has an upcoming appointment with the diabetes educator next week.  I have sent them a message to let them know about the elevation in his A1c.  He needs to be sure to follow-up with them for medication adjustment.  Thank you.

## 2025-02-03 NOTE — PROGRESS NOTES
Disregard, opened in error    Carrie Leopold, RN BSN  Anemia Services  [facility]  [email or web address]  Office: [phone]  Fax [phone]

## 2025-02-03 NOTE — PROGRESS NOTES
Anemia Management Note - Follow Up      SUBJECTIVE/OBJECTIVE:    Referred by Dr. Jordy Chinchilla  on 2024  Primary Diagnosis: Anemia in Chronic Kidney Disease (N18.3, D63.1)     Secondary Diagnosis: Chronic Kidney Disease, Stage 3 (N18.3)   Hgb goal range: 9-10     Epo/Darbo: Aranesp 40 mcg  every two weeks for Hgb <10.0 At home  Iron regimen: NA.    *24: Pt states Dr. Chinchilla said his Iron was ok. Will recheck labs in a month.      Labs : 2025  RX/TX plans : 2025     Recent DIAN use, transfusion, IV iron: NA  No history of stroke, MI, and blood clots or cancers. Hx of HTN.      Contact: No Consent to Communicate On File.         Latest Ref Rng & Units 2024 12/3/2024 2024 2024 2024 2025 2025   Anemia   HGB Goal      9 - 10 9 - 10    DIAN Dose      40mcg 40mcg    Hemoglobin 13.3 - 17.7 g/dL 9.1  8.2  8.3  8.0   9.6  9.5    TSAT 15 - 46 %   20    21     Ferritin 31 - 409 ng/mL   499    470       BP Readings from Last 3 Encounters:   25 124/71   25 91/54   01/15/25 128/83     Wt Readings from Last 2 Encounters:   25 52.2 kg (115 lb)   25 53 kg (116 lb 12.8 oz)         ASSESSMENT:    Hgb:At goal - recommend dose  TSat: not at goal of >30% Ferritin: At goal (>100ng/mL)   Goals Addressed    None         PLAN:  Dose with Aranesp.  RTC for hgb then Aranesp if needed in 2 week(s).    Orders needed to be renewed (for next follow-up date) in EPIC: None    Iron labs due:  monthly, early 2025    Plan discussed with:  Long via [employer]. Did he dose over the weekend?      NEXT FOLLOW-UP DATE:  204    Carrie Leopold, RN BSN  Anemia Services  [facility]  [email or web address]  Office: [phone]  Fax [phone]

## 2025-02-04 NOTE — TELEPHONE ENCOUNTER
Patient returned call. Provider message relayed and patient verbalized understanding. He reports he had telemedicine appointment with [facility] 1/24/25 and will keep his appointment on 2/6/25 with diabetic educator.

## 2025-02-05 NOTE — PROGRESS NOTES
Spoke to Bertrand. He did not give himself Aranesp after his labs on [id number].  Instructed him to dose tonight, then he has labs already scheduled on 021925. Once RN has reviewed the results, RN will call him re: need to dose. He verbalized understanding. He voiced no questions.    Aranesp added to anemia flowsheet    Follow up date: 022025 review labs and call Doug Carrie Leopold, RN BSN  Anemia Services  [facility]  [email or web address]  Office: [phone]  Fax [phone]

## 2025-02-06 ENCOUNTER — ALLIED HEALTH/NURSE VISIT (OUTPATIENT)
Dept: EDUCATION SERVICES | Facility: CLINIC | Age: 55
End: 2025-02-06
Payer: COMMERCIAL

## 2025-02-06 ENCOUNTER — TELEPHONE (OUTPATIENT)
Dept: MULTI SPECIALTY CLINIC | Facility: CLINIC | Age: 55
End: 2025-02-06

## 2025-02-06 VITALS — HEIGHT: 65 IN | WEIGHT: 122.9 LBS | BODY MASS INDEX: 20.48 KG/M2

## 2025-02-06 DIAGNOSIS — Z79.4 TYPE 2 DIABETES MELLITUS WITH STAGE 3A CHRONIC KIDNEY DISEASE, WITH LONG-TERM CURRENT USE OF INSULIN (H): Primary | ICD-10-CM

## 2025-02-06 DIAGNOSIS — E11.22 TYPE 2 DIABETES MELLITUS WITH STAGE 3A CHRONIC KIDNEY DISEASE, WITH LONG-TERM CURRENT USE OF INSULIN (H): Primary | ICD-10-CM

## 2025-02-06 DIAGNOSIS — N18.31 TYPE 2 DIABETES MELLITUS WITH STAGE 3A CHRONIC KIDNEY DISEASE, WITH LONG-TERM CURRENT USE OF INSULIN (H): Primary | ICD-10-CM

## 2025-02-06 LAB — BACTERIA SNV CULT: NORMAL

## 2025-02-06 NOTE — PATIENT INSTRUCTIONS
Lantus currently at 18units and continue to increase by 1 unit every other day until am glucose is between 90 - 140 then continue at that dose.          Goals:  Practice healthy stress management and mindful eating - think are you physically hungry or are you bored, stressed, emotional etc, make of list of things to do besides eat.    Try to get good quality sleep with a goal of 7-8 hours per night.  Stay physically active daily.  Recommend working up to a total of 30 minutes on 5 days/ week.  Recommend a fitness tracker.     Eat in a healthy way- eliminate trans fats, limit saturated fats and added sugars; follow the plate method - picture above.  Keep a food record (MyFitnessPal, Loseit).    A meal is 3 or more food groups; make it colorful for better nutrition.

## 2025-02-06 NOTE — TELEPHONE ENCOUNTER
[facility] Prior Authorization Team   Phone: [phone]  Fax: [phone]    Prior Authorization Approval    Medication: ARANESP (ALBUMIN FREE) 40 MCG/0.4ML IJ SOSY  Authorization Effective Date: 2/1/2025  Authorization Expiration Date: 2/6/2026  Approved Dose/Quantity: 0.8/28 DAYS  Reference #: [id number]   Insurance Company: MEDICA - Phone [phone] Fax [phone]  Expected CoPay: $ 0  CoPay Card Available: No    Financial Assistance Needed: NO  Which Pharmacy is filling the prescription: [facility] - MAIL ORDER ONLY - [place], [place]  Pharmacy Notified: YES  Patient Notified: [facility] HAS BEEN NOTIFIED - PA SUBMITTED BY SP

## 2025-02-06 NOTE — LETTER
2/6/2025         RE: Long Mayfield  [address]  [facility] [address]        Dear Colleague,    Thank you for referring your patient, Long Mayfield, to the [facility]. Please see a copy of my visit note below.    Diabetes Self-Management Education & Support    Presents for: Type 2 diabetes (will have C-peptide drawn at next PCP appointment)    Type of Service: In Person Visit      Assessment  2/6/2025  Patient new to writer.  Longstanding history of poorly controlled diabetes as evidenced by elevated A1cs and complications including CKD and toe amputations on foot, slow healing ulcer on right heel.  Current diabetes regimen MDI Lantus and NovoLog with questionable insulin use.  Patient is utilizing Dexcom G6 sensor, patient reports Dexcom G7 not approved.  Patient is interested in pump therapy but will first need to have C-peptide drawn to evaluate to see if he qualifies with current insurance requiring low C-peptide value.      Patient's most recent   Lab Results   Component Value Date    A1C 11.5 01/31/2025    A1C 10.0 11/06/2024    A1C 9.2 08/17/2024    A1C 9.4 05/04/2024    A1C >14.0 03/08/2022       is not meeting goal of <8.0    Diabetes knowledge and skills assessment:   Patient is knowledgeable in diabetes management concepts related to: Monitoring    Based on learning assessment above, most appropriate setting for further diabetes education would be: Individual setting.    Care Plan and Education Provided:  Healthy Eating: Healthy weight gain ideas small frequent meals/low fat for exocrine pancreatic insufficiency,     Being Active: Finding a physical activity routine that works for you,     Monitoring: Blood glucose versus Continuous Glucose Monitoring, Frequency of monitoring, Individual glucose targets, and Dexcom clarity reporting system set up with clinic account patient is shown how to use the Dexcom clarity haylee and review time in target,     Taking  Medication: Lantus currently taking 18 units will increase by 1 unit every other day until a.m. glucose is in target range ~ 80 - 140mg/dL, focusing on getting Lantus at correct dose versus trying to confuse pt with Novolog dosing.  Patient does not have a good grasp on the differences between insulins despite education.    Problem Solving: Rule of 15 and carrying a carbohydrate source at all times in case of low glucose,     Reducing Risks: Goal for A1c, how it relates to glucose and how often to check      Healthy Coping: Benefits of making appropriate lifestyle changes    Patient verbalized understanding of diabetes self-management education concepts discussed, opportunities for ongoing education and support, and recommendations provided today.    Plan  Lantus currently at 18units and continue to increase by 1 unit every other day until am glucose is between 90 - 140 then continue at that dose.      Patient to look at clarity reporting trying to improve time in target currently   30-day time in target is 10% (12 out of 30 days) 0% low and 76% very high    Topics to cover at upcoming visits: Healthy Eating, Being Active, Monitoring, Taking Medication, Problem Solving, Reducing Risks, and Healthy Coping    Follow-up:  Upcoming Diabetes Ed Appointments     Visit Type Date Time Department    DIABETES ED 2/6/2025  2:00 PM [facility] DIABETES EDUCATION    DIABETES ED 2/27/2025  2:00 PM [facility] DIABETES EDUCATION        See Care Plan for co-developed, patient-state behavior change goals.    Education Materials Provided:  Goals for your diabetes care dietary recommendations for exocrine pancreatic insufficiency foods to consume/foods to limit      Subjective/Objective  Long is an 54 year old year old, presenting for the following diabetes education related to: Presents for: Follow-up  Accompanied by: Self  Diabetes education in the past 24mo: Yes  Diabetes type: Type 2  Transportation concerns: Yes (Takes a taxi)  Difficulty  "affording diabetes medication?: No  Difficulty affording diabetes testing supplies?: No  Other concerns:: Other (Questionable memory, insight to diabetes care)  Cultural Influences/Ethnic Background:  Not  or     Diabetes Symptoms & Complications:     Complications assessed today?: Yes  Autonomic neuropathy: Yes  Heart disease: Yes  Nephropathy: Yes  Peripheral neuropathy: Yes  Peripheral Vascular Disease: Yes    Patient Problem List and Family Medical History reviewed for relevant medical history, current medical status, and diabetes risk factors.    Vitals:  Ht 1.651 m (5' 5\")   Wt 55.7 kg (122 lb 14.4 oz)   BMI 20.45 kg/m    Estimated body mass index is 20.45 kg/m  as calculated from the following:    Height as of this encounter: 1.651 m (5' 5\").    Weight as of this encounter: 55.7 kg (122 lb 14.4 oz).   Last 3 BP:   BP Readings from Last 3 Encounters:   01/29/25 124/71   01/16/25 91/54   01/15/25 128/83       History   Smoking Status     Never   Smokeless Tobacco     Never       Labs:  Lab Results   Component Value Date    A1C 11.5 01/31/2025     Lab Results   Component Value Date     01/06/2025     10/23/2024     Lab Results   Component Value Date    LDL 52 09/24/2024     Direct Measure HDL   Date Value Ref Range Status   09/24/2024 45 >=40 mg/dL Final   ]  GFR Estimate   Date Value Ref Range Status   01/06/2025 66 >60 mL/min/1.73m2 Final     Comment:     eGFR calculated using 2021 CKD-EPI equation.   10/08/2017 >90 >60 mL/min/1.7m2 Final     Comment:     Non  GFR Calc     GFR, ESTIMATED POCT   Date Value Ref Range Status   07/09/2024 41 (L) >60 mL/min/1.73m2 Final     GFR Estimate If Black   Date Value Ref Range Status   10/08/2017 >90 >60 mL/min/1.7m2 Final     Comment:      GFR Calc     Lab Results   Component Value Date    CR 1.28 01/06/2025    CR 0.68 10/08/2017     No results found for: \"MICROALBUMIN\"    Healthy Eating:  Healthy Eating " "Assessed Today: Yes  Cultural/Buddhism diet restrictions?: No  Do you have any food allergies or intolerances?: No  Meal planning/habits: Low salt, Frequent snacking  Who cooks/prepares meals for you?: Self  Who purchases food in  your home?: Self  How many times a week on average do you eat food made away from home (restaurant/take-out)?: 0  Meals include: Breakfast, Lunch, Dinner, Morning Snack, Afternoon Snack, Evening Snack  Other: Frequently eating does not have a certain routine sounds like often having sandwiches questionable amount of food preparation.  Some food insecurity patient is waiting approval for disability to start receiving additional finances.  Patient states anything he eats half of it \" runs right through me\" patient does take Creon 3 tablets with all meals but patient reports that does not do anything.  Patient will be seeing [facility] in the near future.  Beverages: Water, Juice, Soda  Has patient met with a dietitian in the past?: Yes    Being Active:  Being Active Assessed Today: Yes  Exercise:: Unable to exercise  Barrier to exercise: Physical limitation    Monitoring:  Monitoring Assessed Today: Yes  Blood Glucose Meter: CGM          Taking Medications:  Diabetes Medication(s)       Insulin       insulin aspart (NOVOLOG FLEXPEN) 100 UNIT/ML pen Inject 1-5 Units subcutaneously 3 times daily (with meals). In addition to 6-8 units with each meal, inject additional units as per this sliding scale:  If 200-250 = 1   251-300 = 2   301-350 = 3  351-400 = 4  401+ = 5  Repeat BS after 3 hours and call MD if still over 400     insulin glargine (LANTUS PEN) 100 UNIT/ML pen Inject 12 Units subcutaneously every morning.     insulin NPH (HUMULIN N VIAL) 100 UNIT/ML vial INJECT 13 UNITS AT THE START OF YOUR STEROID INFUSION AT CLINIC WHICH IS SCHEDULED FOR 12/4/24          Taking Medication Assessed Today: Yes  Current Treatments: Insulin Injections    Problem Solving:  Problem Solving Assessed Today: " Yes  Is the patient at risk for hypoglycemia?: Yes        Reducing Risks:  Reducing Risks Assessed Today: No  Has dilated eye exam at least once a year?: Yes  Sees dentist every 6 months?: Yes  Feet checked by healthcare provider in the last year?: Yes    Healthy Coping:  Healthy Coping Assessed Today: Yes  Emotional response to diabetes: Unable to access  Informal Support system:: Caridad based  Stage of change: PREPARATION (Decided to change - considering how)  Support resources: In-person Offerings    Lucia Mir RD  Time Spent: 60 minutes  Encounter Type: Individual    Any diabetes medication dose changes were made via the CDCES Standing Orders under the patient's referring provider.

## 2025-02-06 NOTE — PROGRESS NOTES
Diabetes Self-Management Education & Support    Presents for: Type 2 diabetes (will have C-peptide drawn at next PCP appointment)    Type of Service: In Person Visit      Assessment  2/6/2025  Patient new to writer.  Longstanding history of poorly controlled diabetes as evidenced by elevated A1cs and complications including CKD and toe amputations on foot, slow healing ulcer on right heel.  Current diabetes regimen MDI Lantus and NovoLog with questionable insulin use.  Patient is utilizing Dexcom G6 sensor, patient reports Dexcom G7 not approved.  Patient is interested in pump therapy but will first need to have C-peptide drawn to evaluate to see if he qualifies with current insurance requiring low C-peptide value.      Patient's most recent   Lab Results   Component Value Date    A1C 11.5 01/31/2025    A1C 10.0 11/06/2024    A1C 9.2 08/17/2024    A1C 9.4 05/04/2024    A1C >14.0 03/08/2022       is not meeting goal of <8.0    Diabetes knowledge and skills assessment:   Patient is knowledgeable in diabetes management concepts related to: Monitoring    Based on learning assessment above, most appropriate setting for further diabetes education would be: Individual setting.    Care Plan and Education Provided:  Healthy Eating: Healthy weight gain ideas small frequent meals/low fat for exocrine pancreatic insufficiency,     Being Active: Finding a physical activity routine that works for you,     Monitoring: Blood glucose versus Continuous Glucose Monitoring, Frequency of monitoring, Individual glucose targets, and Dexcom clarity reporting system set up with clinic account patient is shown how to use the Dexcom clarity haylee and review time in target,     Taking Medication: Lantus currently taking 18 units will increase by 1 unit every other day until a.m. glucose is in target range ~ 80 - 140mg/dL, focusing on getting Lantus at correct dose versus trying to confuse pt with Novolog dosing.  Patient does not have a good  grasp on the differences between insulins despite education.    Problem Solving: Rule of 15 and carrying a carbohydrate source at all times in case of low glucose,     Reducing Risks: Goal for A1c, how it relates to glucose and how often to check      Healthy Coping: Benefits of making appropriate lifestyle changes    Patient verbalized understanding of diabetes self-management education concepts discussed, opportunities for ongoing education and support, and recommendations provided today.    Plan  Lantus currently at 18units and continue to increase by 1 unit every other day until am glucose is between 90 - 140 then continue at that dose.      Patient to look at clarity reporting trying to improve time in target currently   30-day time in target is 10% (12 out of 30 days) 0% low and 76% very high    Topics to cover at upcoming visits: Healthy Eating, Being Active, Monitoring, Taking Medication, Problem Solving, Reducing Risks, and Healthy Coping    Follow-up:  Upcoming Diabetes Ed Appointments     Visit Type Date Time Department    DIABETES ED 2/6/2025  2:00 PM [facility] DIABETES EDUCATION    DIABETES ED 2/27/2025  2:00 PM [facility] DIABETES EDUCATION        See Care Plan for co-developed, patient-state behavior change goals.    Education Materials Provided:  Goals for your diabetes care dietary recommendations for exocrine pancreatic insufficiency foods to consume/foods to limit      Subjective/Objective  Long is an 54 year old year old, presenting for the following diabetes education related to: Presents for: Follow-up  Accompanied by: Self  Diabetes education in the past 24mo: Yes  Diabetes type: Type 2  Transportation concerns: Yes (Takes a taxi)  Difficulty affording diabetes medication?: No  Difficulty affording diabetes testing supplies?: No  Other concerns:: Other (Questionable memory, insight to diabetes care)  Cultural Influences/Ethnic Background:  Not  or     Diabetes Symptoms & Complications:    "  Complications assessed today?: Yes  Autonomic neuropathy: Yes  Heart disease: Yes  Nephropathy: Yes  Peripheral neuropathy: Yes  Peripheral Vascular Disease: Yes    Patient Problem List and Family Medical History reviewed for relevant medical history, current medical status, and diabetes risk factors.    Vitals:  Ht 1.651 m (5' 5\")   Wt 55.7 kg (122 lb 14.4 oz)   BMI 20.45 kg/m    Estimated body mass index is 20.45 kg/m  as calculated from the following:    Height as of this encounter: 1.651 m (5' 5\").    Weight as of this encounter: 55.7 kg (122 lb 14.4 oz).   Last 3 BP:   BP Readings from Last 3 Encounters:   01/29/25 124/71   01/16/25 91/54   01/15/25 128/83       History   Smoking Status    Never   Smokeless Tobacco    Never       Labs:  Lab Results   Component Value Date    A1C 11.5 01/31/2025     Lab Results   Component Value Date     01/06/2025     10/23/2024     Lab Results   Component Value Date    LDL 52 09/24/2024     Direct Measure HDL   Date Value Ref Range Status   09/24/2024 45 >=40 mg/dL Final   ]  GFR Estimate   Date Value Ref Range Status   01/06/2025 66 >60 mL/min/1.73m2 Final     Comment:     eGFR calculated using 2021 CKD-EPI equation.   10/08/2017 >90 >60 mL/min/1.7m2 Final     Comment:     Non  GFR Calc     GFR, ESTIMATED POCT   Date Value Ref Range Status   07/09/2024 41 (L) >60 mL/min/1.73m2 Final     GFR Estimate If Black   Date Value Ref Range Status   10/08/2017 >90 >60 mL/min/1.7m2 Final     Comment:      GFR Calc     Lab Results   Component Value Date    CR 1.28 01/06/2025    CR 0.68 10/08/2017     No results found for: \"MICROALBUMIN\"    Healthy Eating:  Healthy Eating Assessed Today: Yes  Cultural/Yazidism diet restrictions?: No  Do you have any food allergies or intolerances?: No  Meal planning/habits: Low salt, Frequent snacking  Who cooks/prepares meals for you?: Self  Who purchases food in  your home?: Self  How many times a week " "on average do you eat food made away from home (restaurant/take-out)?: 0  Meals include: Breakfast, Lunch, Dinner, Morning Snack, Afternoon Snack, Evening Snack  Other: Frequently eating does not have a certain routine sounds like often having sandwiches questionable amount of food preparation.  Some food insecurity patient is waiting approval for disability to start receiving additional finances.  Patient states anything he eats half of it \" runs right through me\" patient does take Creon 3 tablets with all meals but patient reports that does not do anything.  Patient will be seeing [facility] in the near future.  Beverages: Water, Juice, Soda  Has patient met with a dietitian in the past?: Yes    Being Active:  Being Active Assessed Today: Yes  Exercise:: Unable to exercise  Barrier to exercise: Physical limitation    Monitoring:  Monitoring Assessed Today: Yes  Blood Glucose Meter: CGM          Taking Medications:  Diabetes Medication(s)       Insulin       insulin aspart (NOVOLOG FLEXPEN) 100 UNIT/ML pen Inject 1-5 Units subcutaneously 3 times daily (with meals). In addition to 6-8 units with each meal, inject additional units as per this sliding scale:  If 200-250 = 1   251-300 = 2   301-350 = 3  351-400 = 4  401+ = 5  Repeat BS after 3 hours and call MD if still over 400     insulin glargine (LANTUS PEN) 100 UNIT/ML pen Inject 12 Units subcutaneously every morning.     insulin NPH (HUMULIN N VIAL) 100 UNIT/ML vial INJECT 13 UNITS AT THE START OF YOUR STEROID INFUSION AT CLINIC WHICH IS SCHEDULED FOR 12/4/24          Taking Medication Assessed Today: Yes  Current Treatments: Insulin Injections    Problem Solving:  Problem Solving Assessed Today: Yes  Is the patient at risk for hypoglycemia?: Yes        Reducing Risks:  Reducing Risks Assessed Today: No  Has dilated eye exam at least once a year?: Yes  Sees dentist every 6 months?: Yes  Feet checked by healthcare provider in the last year?: Yes    Healthy " Coping:  Healthy Coping Assessed Today: Yes  Emotional response to diabetes: Unable to access  Informal Support system:: Caridad based  Stage of change: PREPARATION (Decided to change - considering how)  Support resources: In-person Offerings    Lucia Mir RD  Time Spent: 60 minutes  Encounter Type: Individual    Any diabetes medication dose changes were made via the CDCES Standing Orders under the patient's referring provider.

## 2025-02-06 NOTE — TELEPHONE ENCOUNTER
Long had an insurance change and now has [employer] PM[employer] plan. He is required to fill with [facility]. I got the Aranesp Prior Auth approved with the new plan, and reached out to Long to let him know [facility] will be filling his Aranesp. He was also given their phone #. Please send RX for Aranesp and future refills to [facility].    Thanks,  Ingrid Cadet, [facility]  Patient    [facility]   [address] [place] [id number]  [email or web address]   [email or web address]  Phone: [phone]

## 2025-02-10 PROBLEM — K86.1 CHRONIC PANCREATITIS (H): Status: ACTIVE | Noted: 2024-09-06

## 2025-02-10 LAB — BACTERIA SNV CULT: NO GROWTH

## 2025-02-10 NOTE — ASSESSMENT & PLAN NOTE
1/29/2025 evaluated by wound clinic with the following recommendations:  Ulcer treatment: ulcer treatment will include irrigation and dressings to promote autolytic debridement which will include:will continue to paint the heel with betadine; gauze; rolled gauze; will use ssd and oil emulsion; gauze; rolled gauze on the left hand; change daily; do not get wet in the shower; pt now wants home care this was ordered If for some reason the patient is not able to get their dressing(s) changed as outlined above (due to illness, lack of supplies, lack of help) please do the following: remove old, soiled dressings; wash the ulcers with saline; pat dry; apply ABD pad or other absorbant pad and secure with rolled gauze; avoid tape directly on your skin.    We discussed the importance of improving blood sugars to help with healing of ulcer and burn on his left hand.  Encouraged to elevated legs as much as possible.  He has not been using the cam boot.      Follow-up in wound clinic in 4 weeks.

## 2025-02-10 NOTE — ASSESSMENT & PLAN NOTE
Followed by rheumatology. Started rituximab infusions 12/4/2024. Follow-up appointment with rheumatology scheduled for 2/26/2025

## 2025-02-10 NOTE — ASSESSMENT & PLAN NOTE
He is followed by gastroenterology at [facility].  Known pancreatic atrophy with calcification and low pancreatic elastase levels in the setting of history of chronic alcohol use.  Currently managed with loperamide and Creon.  He did not take the Colestid due to difficulty swallowing the pill.  They are going to consider starting Questran after labs and small bowel capsule which is scheduled for mid February.  Follow-up with GI March 2025.

## 2025-02-10 NOTE — ASSESSMENT & PLAN NOTE
Follows with [facility].  Recommendations are to proceed with capsule study.  They may trial Questran for chronic diarrhea.  Follow-up with [facility] in March 2025.

## 2025-02-10 NOTE — ASSESSMENT & PLAN NOTE
He was seen by hematology in January 3, 2025 with plans to proceed with bone marrow biopsy.  His indices are consistent with anemia of chronic disease.  GFR is low enough to account for the anemia as well.  He is receiving Aranesp therapy.

## 2025-02-10 NOTE — ASSESSMENT & PLAN NOTE
Last Comprehensive Metabolic Panel:  Lab Results   Component Value Date     (L) 01/06/2025    POTASSIUM 5.3 01/06/2025    CHLORIDE 97 (L) 01/06/2025    CO2 27 01/06/2025    ANIONGAP 7 01/06/2025     (H) 01/06/2025    BUN 18.4 01/06/2025    CR 1.28 (H) 01/06/2025    GFRESTIMATED 66 01/06/2025    AROLDO 9.1 01/06/2025       There has been  concern for possible systemic vasculitis (ANCA by IFA was negative, MN-3 borderline positive, MPO antibody negative. Repeat ANCA by Dr Mcgowan on 9/17: neg by IgG). Underwent renal biopsy which was consistent with diabetic nephropathy but also with some evidence for IgG4 disease.  He has started Rituxan infusions.    Follow-up nephrology appointment 2/20/2025.

## 2025-02-10 NOTE — ASSESSMENT & PLAN NOTE
2/6/24 seen by diabetes educator with the following recommendations:  Lantus currently at 18units and continue to increase by 1 unit every other day until am glucose is between 90 - 140 then continue at that dose.    Will check C-peptide.    He states he has been having low blood sugars in the evenings after increasing lantus to 20 units.  He has decreased the lantus to 18 units.  I will reach out to Lucia PARKS to reach to him regarding this.     Lab Results   Component Value Date    A1C 11.5 01/31/2025    A1C 10.0 11/06/2024    A1C 9.2 08/17/2024    A1C 9.4 05/04/2024    A1C >14.0 03/08/2022        negative

## 2025-02-11 ENCOUNTER — OFFICE VISIT (OUTPATIENT)
Dept: FAMILY MEDICINE | Facility: CLINIC | Age: 55
End: 2025-02-11
Attending: PHYSICIAN ASSISTANT
Payer: COMMERCIAL

## 2025-02-11 VITALS
HEART RATE: 84 BPM | OXYGEN SATURATION: 99 % | DIASTOLIC BLOOD PRESSURE: 90 MMHG | RESPIRATION RATE: 14 BRPM | BODY MASS INDEX: 21.58 KG/M2 | TEMPERATURE: 98 F | SYSTOLIC BLOOD PRESSURE: 144 MMHG | WEIGHT: 129.5 LBS | HEIGHT: 65 IN

## 2025-02-11 DIAGNOSIS — Z79.4 TYPE 2 DIABETES MELLITUS WITH STAGE 3A CHRONIC KIDNEY DISEASE, WITH LONG-TERM CURRENT USE OF INSULIN (H): ICD-10-CM

## 2025-02-11 DIAGNOSIS — F11.20 CONTINUOUS OPIOID DEPENDENCE (H): ICD-10-CM

## 2025-02-11 DIAGNOSIS — L97.419 ULCER OF RIGHT HEEL AND MIDFOOT, UNSPECIFIED ULCER STAGE (H): ICD-10-CM

## 2025-02-11 DIAGNOSIS — N18.31 TYPE 2 DIABETES MELLITUS WITH STAGE 3A CHRONIC KIDNEY DISEASE, WITH LONG-TERM CURRENT USE OF INSULIN (H): ICD-10-CM

## 2025-02-11 DIAGNOSIS — Z79.4 TYPE 2 DIABETES MELLITUS WITH FOOT ULCER, WITH LONG-TERM CURRENT USE OF INSULIN (H): ICD-10-CM

## 2025-02-11 DIAGNOSIS — Z79.4 TYPE 2 DIABETES MELLITUS WITH HYPEROSMOLARITY WITHOUT COMA, WITH LONG-TERM CURRENT USE OF INSULIN (H): Primary | ICD-10-CM

## 2025-02-11 DIAGNOSIS — Z79.4 LONG TERM (CURRENT) USE OF INSULIN (H): ICD-10-CM

## 2025-02-11 DIAGNOSIS — Z79.899 HIGH RISK MEDICATION USE: ICD-10-CM

## 2025-02-11 DIAGNOSIS — E11.621 TYPE 2 DIABETES MELLITUS WITH FOOT ULCER, WITH LONG-TERM CURRENT USE OF INSULIN (H): ICD-10-CM

## 2025-02-11 DIAGNOSIS — E11.00 TYPE 2 DIABETES MELLITUS WITH HYPEROSMOLARITY WITHOUT COMA, WITH LONG-TERM CURRENT USE OF INSULIN (H): Primary | ICD-10-CM

## 2025-02-11 DIAGNOSIS — K86.0 ALCOHOL-INDUCED CHRONIC PANCREATITIS (H): ICD-10-CM

## 2025-02-11 DIAGNOSIS — K86.81 EXOCRINE PANCREATIC INSUFFICIENCY: ICD-10-CM

## 2025-02-11 DIAGNOSIS — E11.22 TYPE 2 DIABETES MELLITUS WITH STAGE 3A CHRONIC KIDNEY DISEASE, WITH LONG-TERM CURRENT USE OF INSULIN (H): ICD-10-CM

## 2025-02-11 DIAGNOSIS — D64.9 ANEMIA, UNSPECIFIED TYPE: ICD-10-CM

## 2025-02-11 DIAGNOSIS — L97.509 TYPE 2 DIABETES MELLITUS WITH FOOT ULCER, WITH LONG-TERM CURRENT USE OF INSULIN (H): ICD-10-CM

## 2025-02-11 DIAGNOSIS — D89.84 IGG4 RELATED DISEASE (H): ICD-10-CM

## 2025-02-11 DIAGNOSIS — Z89.431 S/P TRANSMETATARSAL AMPUTATION OF FOOT, RIGHT (H): ICD-10-CM

## 2025-02-11 DIAGNOSIS — R63.4 UNINTENTIONAL WEIGHT LOSS: ICD-10-CM

## 2025-02-11 DIAGNOSIS — N18.31 STAGE 3A CHRONIC KIDNEY DISEASE (H): ICD-10-CM

## 2025-02-11 DIAGNOSIS — R60.0 BILATERAL LOWER EXTREMITY EDEMA: ICD-10-CM

## 2025-02-11 DIAGNOSIS — M31.8 SYSTEMIC VASCULITIS (H): ICD-10-CM

## 2025-02-11 DIAGNOSIS — M00.9 PYOGENIC ARTHRITIS OF RIGHT KNEE JOINT, DUE TO UNSPECIFIED ORGANISM (H): ICD-10-CM

## 2025-02-11 DIAGNOSIS — I51.5 CARDIAC CALCIFICATION (H): ICD-10-CM

## 2025-02-11 DIAGNOSIS — I10 PRIMARY HYPERTENSION: ICD-10-CM

## 2025-02-11 PROCEDURE — 99215 OFFICE O/P EST HI 40 MIN: CPT | Performed by: PHYSICIAN ASSISTANT

## 2025-02-11 PROCEDURE — 84681 ASSAY OF C-PEPTIDE: CPT | Performed by: PHYSICIAN ASSISTANT

## 2025-02-11 PROCEDURE — 36415 COLL VENOUS BLD VENIPUNCTURE: CPT | Performed by: PHYSICIAN ASSISTANT

## 2025-02-11 PROCEDURE — G2211 COMPLEX E/M VISIT ADD ON: HCPCS | Performed by: PHYSICIAN ASSISTANT

## 2025-02-11 RX ORDER — SULFAMETHOXAZOLE AND TRIMETHOPRIM 400; 80 MG/1; MG/1
1 TABLET ORAL DAILY
Qty: 90 TABLET | Refills: 1 | Status: SHIPPED | OUTPATIENT
Start: 2025-02-11 | End: 2025-02-11

## 2025-02-11 RX ORDER — SULFAMETHOXAZOLE AND TRIMETHOPRIM 400; 80 MG/1; MG/1
1 TABLET ORAL DAILY
Qty: 90 TABLET | Refills: 5 | Status: SHIPPED | OUTPATIENT
Start: 2025-02-11

## 2025-02-11 RX ORDER — POLYETHYLENE GLYCOL 3350 17 G/17G
POWDER, FOR SOLUTION ORAL
COMMUNITY
Start: 2025-02-07

## 2025-02-11 NOTE — ASSESSMENT & PLAN NOTE
1/13/2025: Appointment with orthopedics underwent steroid injection and aspiration right knee.  Recommendation is to watch for the next 3 months and hopefully things get better however if persisted then we can proceed with synovectomy.     He tells me he has not been taking the bactrim for PJP prophylaxis.  I have reordered this for him.

## 2025-02-11 NOTE — ASSESSMENT & PLAN NOTE
Previously seen by Dr. Javiered on 12/18/2024 for lower extremity edema.  He was placed on Bumex 2 mg daily.  He had a follow-up visit on 12/23/2024 which showed significant improvement in his lower extremity edema.  BNP was elevated at 1500.  It was recommended he continue Bumex 2 mg daily.       I reviewed the results of his previous echocardiogram in May 2024 which showed normal ejection fraction of 60 to 65%.  Previous PET scan completed in October 2024 showed his heart size at the upper limits of normal. Diffusely increased left atrial uptake is nonspecific, may relate to atrial fibrillation in the appropriate clinical setting.     Reports that the edema has improved on the Bumex but he continues to have some lower extremity edema.  Denies shortness of breath or chest pain.  History of atrial fibrillation.     I would like to proceed with a repeat echocardiogram, Zio patch to evaluate for possible atrial fibrillation and chest x-ray.  Previous BNP 1515, now down to 527 after starting bumex 2 mg daily.  Edema significantly improved.  Upcoming echocardiogram scheduled for 1/9/25. and appointment with cardiology 1/16/25.        He had echocardiogram completed May 2024 which demonstrated:  1. Normal biventricular size and function. Left ventricular ejection fraction  of 60-65%.  2. No segmental wall motion abnormalities noted.  3. No hemodynamically significant valvular disease.  No prior study for comparison. Technically adequate study.     Previous PET scan 10/24 demonstrated:  CHEST:  No suspicious pulmonary nodules identified. Dependent subsegmental  atelectasis, greater on the left.      Heart size at the upper limits of normal. Diffusely increased left  atrial uptake is nonspecific, may relate to atrial fibrillation in the  appropriate clinical setting. Low-density aortic blood pool.      Segmental uptake in the distal esophagus likely represents reflux in  the setting of small hiatal hernia     1/16/2025: Seen  by cardiology with no new recommendations other than to decrease amlodipine to 5 mg daily due to hypotension.     Edema has improved  Of note, weight is up 7 pounds but he has been working on improving is caloric intake.

## 2025-02-11 NOTE — ASSESSMENT & PLAN NOTE
He has been working on increasing caloric intake.  Weight is up to 127 pounds.  This is 7 pound weight gain.  He has 2+ edema which overall has improved.

## 2025-02-11 NOTE — ASSESSMENT & PLAN NOTE
CSA that was signed October 15, 2024.  He has chronic knee pain and is undergoing evaluation and treatment by rheumatology, orthopedics and ID.  He is on oxycodone 5 mg every 6 hours as needed for pain.  He occasionally uses 1-2 day and some days does not require any pain meds.

## 2025-02-11 NOTE — ASSESSMENT & PLAN NOTE
B/p mildly elevated today.  Amlodipine was decreased on 1/16/25 due to low b/p.  He is also on bumex 2 mg daily.  No change in medications today.  If remains elevated at follow up visit will consider increasing amlodipine to 10 mg daily.

## 2025-02-11 NOTE — PROGRESS NOTES
The longitudinal plan of care for the diagnosis(es)/condition(s) as documented were addressed during this visit. Due to the added complexity in care, I will continue to support Long in the subsequent management and with ongoing continuity of care.    I spent a total of 44 minutes on the day of the visit.  Time spent by me today doing chart review, history and exam, documentation and further activities per the note    Assessment & Plan   Problem List Items Addressed This Visit       Anemia, unspecified type     He was seen by hematology in January 3, 2025 with plans to proceed with bone marrow biopsy.  His indices are consistent with anemia of chronic disease.  GFR is low enough to account for the anemia as well.  He is receiving Aranesp therapy.         Cardiac calcification (H)     Treatment:  Colestid 1 g tablet.  Atorvastatin 40 mg daily.  Aspirin 81 mg daily.    Prior imaging revealing evidence of coronary calcification.    1/16/2025: seen by cardiology with no new recommendations.         S/P transmetatarsal amputation of foot, right (H)     s/p transmetatarsal amputation, right foot (DOS 1/9/24)         Exocrine pancreatic insufficiency     He is followed by gastroenterology at [facility].  Known pancreatic atrophy with calcification and low pancreatic elastase levels in the setting of history of chronic alcohol use.  Currently managed with loperamide and Creon.  He did not take the Colestid due to difficulty swallowing the pill.  They are going to consider starting Questran after labs and small bowel capsule which is scheduled for mid February.  Follow-up with GI March 2025.            Primary hypertension     B/p mildly elevated today.  Amlodipine was decreased on 1/16/25 due to low b/p.  He is also on bumex 2 mg daily.  No change in medications today.  If remains elevated at follow up visit will consider increasing amlodipine to 10 mg daily.           Type 2 diabetes mellitus with kidney complication,  with long-term current use of insulin (H)     2/6/24 seen by diabetes educator with the following recommendations:  Lantus currently at 18units and continue to increase by 1 unit every other day until am glucose is between 90 - 140 then continue at that dose.    Will check C-peptide.    He states he has been having low blood sugars in the evenings after increasing lantus to 20 units.  He has decreased the lantus to 18 units.  I will reach out to Lucia PARKS to reach to him regarding this.     Lab Results   Component Value Date    A1C 11.5 01/31/2025    A1C 10.0 11/06/2024    A1C 9.2 08/17/2024    A1C 9.4 05/04/2024    A1C >14.0 03/08/2022              Unintentional weight loss     He has been working on increasing caloric intake.  Weight is up to 127 pounds.  This is 7 pound weight gain.  He has 2+ edema which overall has improved.           Pyogenic arthritis of right knee joint, due to unspecified organism (H)     1/13/2025: Appointment with orthopedics underwent steroid injection and aspiration right knee.  Recommendation is to watch for the next 3 months and hopefully things get better however if persisted then we can proceed with synovectomy.     He tells me he has not been taking the bactrim for PJP prophylaxis.  I have reordered this for him.           Chronic pancreatitis (H)     Follows with [facility].  Recommendations are to proceed with capsule study.  They may trial Questran for chronic diarrhea.  Follow-up with [facility] in March 2025.         Type 2 diabetes mellitus with hyperosmolarity without coma, with long-term current use of insulin (H) - Primary     Please refer to assessment and plan under type 2 diabetes mellitus with long-term insulin use.         Relevant Orders    C-peptide    Systemic vasculitis (H)    IgG4 related disease (H)     Followed by rheumatology. Started rituximab infusions 12/4/2024. Follow-up appointment with rheumatology scheduled for 2/26/2025          Relevant  Medications    sulfamethoxazole-trimethoprim (BACTRIM) 400-80 MG tablet    Bilateral lower extremity edema     Previously seen by  Rested on 12/18/2024 for lower extremity edema.  He was placed on Bumex 2 mg daily.  He had a follow-up visit on 12/23/2024 which showed significant improvement in his lower extremity edema.  BNP was elevated at 1500.  It was recommended he continue Bumex 2 mg daily.       I reviewed the results of his previous echocardiogram in May 2024 which showed normal ejection fraction of 60 to 65%.  Previous PET scan completed in October 2024 showed his heart size at the upper limits of normal. Diffusely increased left atrial uptake is nonspecific, may relate to atrial fibrillation in the appropriate clinical setting.     Reports that the edema has improved on the Bumex but he continues to have some lower extremity edema.  Denies shortness of breath or chest pain.  History of atrial fibrillation.     I would like to proceed with a repeat echocardiogram, Zio patch to evaluate for possible atrial fibrillation and chest x-ray.  Previous BNP 1515, now down to 527 after starting bumex 2 mg daily.  Edema significantly improved.  Upcoming echocardiogram scheduled for 1/9/25. and appointment with cardiology 1/16/25.        He had echocardiogram completed May 2024 which demonstrated:  1. Normal biventricular size and function. Left ventricular ejection fraction  of 60-65%.  2. No segmental wall motion abnormalities noted.  3. No hemodynamically significant valvular disease.  No prior study for comparison. Technically adequate study.     Previous PET scan 10/24 demonstrated:  CHEST:  No suspicious pulmonary nodules identified. Dependent subsegmental  atelectasis, greater on the left.      Heart size at the upper limits of normal. Diffusely increased left  atrial uptake is nonspecific, may relate to atrial fibrillation in the  appropriate clinical setting. Low-density aortic blood pool.      Segmental  uptake in the distal esophagus likely represents reflux in  the setting of small hiatal hernia     1/16/2025: Seen by cardiology with no new recommendations other than to decrease amlodipine to 5 mg daily due to hypotension.     Edema has improved  Of note, weight is up 7 pounds but he has been working on improving is caloric intake.           Ulcer of right heel and midfoot (H)     1/29/2025 evaluated by wound clinic with the following recommendations:  Ulcer treatment: ulcer treatment will include irrigation and dressings to promote autolytic debridement which will include:will continue to paint the heel with betadine; gauze; rolled gauze; will use ssd and oil emulsion; gauze; rolled gauze on the left hand; change daily; do not get wet in the shower; pt now wants home care this was ordered If for some reason the patient is not able to get their dressing(s) changed as outlined above (due to illness, lack of supplies, lack of help) please do the following: remove old, soiled dressings; wash the ulcers with saline; pat dry; apply ABD pad or other absorbant pad and secure with rolled gauze; avoid tape directly on your skin.    We discussed the importance of improving blood sugars to help with healing of ulcer and burn on his left hand.  Encouraged to elevated legs as much as possible.  He has not been using the cam boot.      Follow-up in wound clinic in 4 weeks.         Long term (current) use of insulin (H)     Follows with diabetes education and endocrinology.  He is on Lantus and NovoLog insulin.         Stage 3a chronic kidney disease (H)     Last Comprehensive Metabolic Panel:  Lab Results   Component Value Date     (L) 01/06/2025    POTASSIUM 5.3 01/06/2025    CHLORIDE 97 (L) 01/06/2025    CO2 27 01/06/2025    ANIONGAP 7 01/06/2025     (H) 01/06/2025    BUN 18.4 01/06/2025    CR 1.28 (H) 01/06/2025    GFRESTIMATED 66 01/06/2025    AROLDO 9.1 01/06/2025       There has been  concern for possible  "systemic vasculitis (ANCA by IFA was negative, KS-3 borderline positive, MPO antibody negative. Repeat ANCA by Dr Mcgowan on 9/17: neg by IgG). Underwent renal biopsy which was consistent with diabetic nephropathy but also with some evidence for IgG4 disease.  He has started Rituxan infusions.    Follow-up nephrology appointment 2/20/2025.         Type 2 diabetes mellitus with foot ulcer (H)     Please refer to assessment and plan under type 2 diabetes mellitus with long-term insulin use.         Continuous opioid dependence (H)     CSA that was signed October 15, 2024.  He has chronic knee pain and is undergoing evaluation and treatment by rheumatology, orthopedics and ID.  He is on oxycodone 5 mg every 6 hours as needed for pain.  He occasionally uses 1-2 day and some days does not require any pain meds.           High risk medication use     On rituxan for IG4 related disease.           The longitudinal plan of care for the diagnosis(es)/condition(s) as documented were addressed during this visit. Due to the added complexity in care, I will continue to support Long in the subsequent management and with ongoing continuity of care.    Subjective   Long is a 54 year old, presenting for the following health issues:  Follow Up        2/11/2025     1:34 PM   Additional Questions   Roomed by Julita HAYEDN   Accompanied by Self     History of Present Illness       Reason for visit:  Dr Avelar    He eats 0-1 servings of fruits and vegetables daily.He consumes 1 sweetened beverage(s) daily.He exercises with enough effort to increase his heart rate 10 to 19 minutes per day.  He exercises with enough effort to increase his heart rate 3 or less days per week.   He is taking medications regularly.             Objective    BP (!) 144/90 (BP Location: Left arm, Patient Position: Sitting, Cuff Size: Adult Regular)   Pulse 84   Temp 98  F (36.7  C) (Oral)   Resp 14   Ht 1.651 m (5' 5\")   Wt 58.7 kg (129 lb 8 oz)   " SpO2 99%   BMI 21.55 kg/m    Body mass index is 21.55 kg/m .  Physical Exam  Vitals and nursing note reviewed.   Constitutional:       Appearance: Normal appearance.   Cardiovascular:      Rate and Rhythm: Normal rate and regular rhythm.      Heart sounds: Normal heart sounds.   Pulmonary:      Effort: Pulmonary effort is normal.      Breath sounds: Normal breath sounds.   Skin:     Comments: Dressings in place to left hand and right foot.  2+ edema in lower extremities.   Neurological:      Mental Status: He is alert.                Signed Electronically by: Rena Blair PA-C

## 2025-02-11 NOTE — ASSESSMENT & PLAN NOTE
Treatment:  Colestid 1 g tablet.  Atorvastatin 40 mg daily.  Aspirin 81 mg daily.    Prior imaging revealing evidence of coronary calcification.    1/16/2025: seen by cardiology with no new recommendations.

## 2025-02-12 ENCOUNTER — TELEPHONE (OUTPATIENT)
Dept: EDUCATION SERVICES | Facility: CLINIC | Age: 55
End: 2025-02-12
Payer: COMMERCIAL

## 2025-02-12 DIAGNOSIS — E11.22 TYPE 2 DIABETES MELLITUS WITH STAGE 3A CHRONIC KIDNEY DISEASE, WITH LONG-TERM CURRENT USE OF INSULIN (H): ICD-10-CM

## 2025-02-12 DIAGNOSIS — N18.31 TYPE 2 DIABETES MELLITUS WITH STAGE 3A CHRONIC KIDNEY DISEASE, WITH LONG-TERM CURRENT USE OF INSULIN (H): ICD-10-CM

## 2025-02-12 DIAGNOSIS — Z79.4 TYPE 2 DIABETES MELLITUS WITH STAGE 3A CHRONIC KIDNEY DISEASE, WITH LONG-TERM CURRENT USE OF INSULIN (H): ICD-10-CM

## 2025-02-12 LAB
C PEPTIDE SERPL-MCNC: 0.5 NG/ML (ref 0.9–6.9)
FASTING STATUS PATIENT QL REPORTED: NO

## 2025-02-12 NOTE — TELEPHONE ENCOUNTER
PCP reported patient concerns with hypoglycemia.  Patient called to discuss and Dexcom reports reviewed.      Patient is having hypoglycemic episodes related to taking rapid acting insulin.  Patient had a difficult time understanding the need to continue increasing long-acting insulin and decreasing short acting insulin by 4 units.  Patient will have further education in office on 2/19/2025.  Will provide new correction scale and base dosing for rapid acting insulin.

## 2025-02-13 ENCOUNTER — PATIENT OUTREACH (OUTPATIENT)
Dept: CARE COORDINATION | Facility: CLINIC | Age: 55
End: 2025-02-13
Payer: COMMERCIAL

## 2025-02-13 NOTE — PROGRESS NOTES
Anemia Management Note - Reminder     Follow-up with anemia management service:    Called Bertrand to see if he's heard from [facility] about Aranesp. LVM asking him to call back with any updates        Latest Ref Rng & Units 12/3/2024 12/12/2024 12/13/2024 12/23/2024 1/6/2025 1/31/2025 2/5/2025   Anemia   HGB Goal     9 - 10 9 - 10     DIAN Dose     40mcg 40mcg  40mcg   Hemoglobin 13.3 - 17.7 g/dL 8.2  8.3  8.0   9.6  9.5     TSAT 15 - 46 %  20    21      Ferritin 31 - 409 ng/mL  499    470        Addendum: Bertrand called back. He has not heard from [facility] yet. RN will reach out to pharmacy and update Bertrand via [employer]    Follow-up call date: 021425    Carrie Leopold, RN BSN  Anemia Services  [facility]  [email or web address]  Office: [phone]  Fax [phone]

## 2025-02-17 ENCOUNTER — MYC MEDICAL ADVICE (OUTPATIENT)
Dept: FAMILY MEDICINE | Facility: CLINIC | Age: 55
End: 2025-02-17
Payer: COMMERCIAL

## 2025-02-17 ENCOUNTER — TELEPHONE (OUTPATIENT)
Dept: NURSING | Facility: CLINIC | Age: 55
End: 2025-02-17
Payer: COMMERCIAL

## 2025-02-17 DIAGNOSIS — D89.84 IGG4 RELATED DISEASE (H): Primary | ICD-10-CM

## 2025-02-17 NOTE — TELEPHONE ENCOUNTER
Patient has lab tests ordered and is not sure why these tests were ordered. I mentioned the ordering providers and the patient notes he does not recognize the names. Patient received a call from the [facility] indicating the labs are ordered and they are fasting. Patient would like more information on why these were indicated/ordered?    Routing to ordering providers.

## 2025-02-17 NOTE — TELEPHONE ENCOUNTER
Called pt and let him know these labs are for Dr. Cruz for his underlying condition. And that he does not need to be fasting for these.

## 2025-02-19 ENCOUNTER — LAB (OUTPATIENT)
Dept: LAB | Facility: CLINIC | Age: 55
End: 2025-02-19
Payer: COMMERCIAL

## 2025-02-19 DIAGNOSIS — D89.84 IGG4 RELATED DISEASE (H): ICD-10-CM

## 2025-02-19 LAB — ERYTHROCYTE [SEDIMENTATION RATE] IN BLOOD BY WESTERGREN METHOD: 51 MM/HR (ref 0–20)

## 2025-02-19 PROCEDURE — 86140 C-REACTIVE PROTEIN: CPT

## 2025-02-19 PROCEDURE — 85652 RBC SED RATE AUTOMATED: CPT

## 2025-02-19 PROCEDURE — 36415 COLL VENOUS BLD VENIPUNCTURE: CPT

## 2025-02-20 ENCOUNTER — PATIENT OUTREACH (OUTPATIENT)
Dept: CARE COORDINATION | Facility: CLINIC | Age: 55
End: 2025-02-20

## 2025-02-20 LAB — CRP SERPL-MCNC: 3.19 MG/L

## 2025-02-20 NOTE — PROGRESS NOTES
Anemia Management Note - Reminder     Follow-up with anemia management service:    Spoke to Bertrand briefly as he was on his way out to a dentist appt. He has not yet heard from [facility] re: Citlaly.    Spoke to Clive at [facility]. Juliáncoleen cannot be scheduled for delivery at this time. They need 10-15 business days to verify insurance, and will then reach out to patient for setting up the delivery.           Latest Ref Rng & Units 12/3/2024 12/12/2024 12/13/2024 12/23/2024 1/6/2025 1/31/2025 2/5/2025   Anemia   HGB Goal     9 - 10 9 - 10     DIAN Dose     40mcg 40mcg  40mcg   Hemoglobin 13.3 - 17.7 g/dL 8.2  8.3  8.0   9.6  9.5     TSAT 15 - 46 %  20    21      Ferritin 31 - 409 ng/mL  499    470          Follow-up call date: 022725    Carrie Leopold, RN BSN  Anemia Services  [facility]  [email or web address]  Office: [phone]  Fax [phone]

## 2025-02-24 PROBLEM — D89.84 IGG4 RELATED DISEASE (H): Status: ACTIVE | Noted: 2024-11-19

## 2025-02-25 ENCOUNTER — TELEPHONE (OUTPATIENT)
Dept: FAMILY MEDICINE | Facility: CLINIC | Age: 55
End: 2025-02-25
Payer: COMMERCIAL

## 2025-02-25 ENCOUNTER — TELEPHONE (OUTPATIENT)
Dept: MULTI SPECIALTY CLINIC | Facility: CLINIC | Age: 55
End: 2025-02-25
Payer: COMMERCIAL

## 2025-02-25 ENCOUNTER — OFFICE VISIT (OUTPATIENT)
Dept: VASCULAR SURGERY | Facility: CLINIC | Age: 55
End: 2025-02-25
Attending: NURSE PRACTITIONER
Payer: COMMERCIAL

## 2025-02-25 VITALS
OXYGEN SATURATION: 98 % | SYSTOLIC BLOOD PRESSURE: 100 MMHG | DIASTOLIC BLOOD PRESSURE: 42 MMHG | TEMPERATURE: 97.5 F | HEART RATE: 77 BPM

## 2025-02-25 DIAGNOSIS — T23.221A PARTIAL THICKNESS BURN OF FINGER OF RIGHT HAND, INITIAL ENCOUNTER: ICD-10-CM

## 2025-02-25 DIAGNOSIS — M25.371 RIGHT ANKLE INSTABILITY: ICD-10-CM

## 2025-02-25 DIAGNOSIS — L97.409 TYPE 2 DIABETES MELLITUS WITH DIABETIC HEEL ULCER (H): ICD-10-CM

## 2025-02-25 DIAGNOSIS — I89.0 SECONDARY LYMPHEDEMA: ICD-10-CM

## 2025-02-25 DIAGNOSIS — I87.303 VENOUS HYPERTENSION OF BOTH LOWER EXTREMITIES: ICD-10-CM

## 2025-02-25 DIAGNOSIS — I87.2 VENOUS INSUFFICIENCY: Primary | ICD-10-CM

## 2025-02-25 DIAGNOSIS — I73.9 PERIPHERAL VASCULAR DISEASE, UNSPECIFIED: ICD-10-CM

## 2025-02-25 DIAGNOSIS — E11.621 TYPE 2 DIABETES MELLITUS WITH DIABETIC HEEL ULCER (H): ICD-10-CM

## 2025-02-25 DIAGNOSIS — M00.9 PYOGENIC ARTHRITIS OF RIGHT KNEE JOINT, DUE TO UNSPECIFIED ORGANISM (H): ICD-10-CM

## 2025-02-25 PROCEDURE — 3078F DIAST BP <80 MM HG: CPT | Performed by: NURSE PRACTITIONER

## 2025-02-25 PROCEDURE — 11045 DBRDMT SUBQ TISS EACH ADDL: CPT | Performed by: NURSE PRACTITIONER

## 2025-02-25 PROCEDURE — 1126F AMNT PAIN NOTED NONE PRSNT: CPT | Performed by: NURSE PRACTITIONER

## 2025-02-25 PROCEDURE — 11042 DBRDMT SUBQ TIS 1ST 20SQCM/<: CPT | Performed by: NURSE PRACTITIONER

## 2025-02-25 PROCEDURE — 3074F SYST BP LT 130 MM HG: CPT | Performed by: NURSE PRACTITIONER

## 2025-02-25 RX ORDER — OXYCODONE HYDROCHLORIDE 5 MG/1
5 TABLET ORAL EVERY 6 HOURS PRN
Qty: 30 TABLET | Refills: 0 | Status: SHIPPED | OUTPATIENT
Start: 2025-02-25

## 2025-02-25 ASSESSMENT — PAIN SCALES - GENERAL: PAINLEVEL_OUTOF10: NO PAIN (0)

## 2025-02-25 NOTE — TELEPHONE ENCOUNTER
Called [facility], spoke to pharmacist Debi. Relayed last hgh/hematocrit from 1/31/25.   She notes that Aranesp is scheduled to ship out today, pt should receive delivery tomorrow.    Carrie Leopold, RN BSN  Anemia Services  [facility]  [email or web address]  Office: [phone]  Fax [phone]

## 2025-02-25 NOTE — TELEPHONE ENCOUNTER
General Call    Contacts       Contact Date/Time Type Contact Phone/Fax    02/25/2025 11:29 AM CST Phone (Incoming) Long [place] (Self) [phone] (M)          Reason for Call:  Medication Question    What are your questions or concerns:  Patient calling to request clarification on dose and dispense quantity of oxycodone. He reports the correct dose should be 10mg tablets and dispense quantity 60. Please advise.    2/11/25 Visit notes:  Continuous opioid dependence (H)   CSA that was signed October 15, 2024.  He has chronic knee pain and is undergoing evaluation and treatment by rheumatology, orthopedics and ID.  He is on oxycodone 5 mg every 6 hours as needed for pain.  He occasionally uses 1-2 day and some days does not require any pain meds.         Date of last appointment with provider: 2/11/25    Could we send this information to you in [place] or would you prefer to receive a phone call?:   Patient would prefer a phone call   Okay to leave a detailed message?: Yes at Cell number on file:    Telephone Information:   Mobile [phone]

## 2025-02-25 NOTE — TELEPHONE ENCOUNTER
"Called Long.  Relayed message form Rena TAVERAS  \"Rena Blair PA-C to [facility]     2/25/25  1:24 PM  Please let patient know that I have refilled his oxycodone today.  I would like him to use 1 tab daily and if needed he may repeat up to 2 a day.  I would like him to discuss his continued pain with the rheumatologist at his appointment tomorrow.  We were hopeful that the Rituxan that he was started on in December would help improve his pain.  Thank you.\"    Long states that between his knee, foot, shoulder and neck, he lives at a pain rating at an 8.  The 5 mg dose only gives him minimal relief when he needs it.  Call ended when his taxi came and he ended the jeannine.  "

## 2025-02-25 NOTE — LETTER
"  Patient:     Long Mayfield MRN:  [id number]   [address]   [place] [address] :  1970  Sex:  M   Phone: [phone]        [employer]ANCE [employer] # SUBSCRIBER ID   Primary:    AYLIN Bassett [id number]            Allergies   Allergen Reactions    Vancomycin         Other Reaction(s): Renal Failure     Vancomycin-induced nephrotoxicity (2023, outside hospital)    Seasonal Allergies         HAYFEVER:     -Watery Eyes  -Eye burn    Daptomycin Rash       Likely delayed hypersensitivity reaction to daptomycin 2023      Order Date:  2025  Wound care       (Order ID: [id number])     Diagnosis:     Priority:  Routine Expiration Date:   Interval:   Count:    Comments: Wound care supplies were ordered today through [employer] and if you are not receiving your supplies or have a question on your bill please contact Yuri Sanon [phone]. Please allow 2-5 business days for delivery of supplies. You may get a call from a [phone] # if there are additional information Kelsey needs. It is important to  or return their call. PLEASE NOTE: If you need to return your supplies, you MUST call customer service within 15 days of delivery date.      You need to stay off the right foot at all times; use wheelchair while at home      PRAFO boot to help keep pressure off the wound at all times; if this is not fitting well please go back to the orthotics clinic to see what else they can offer     I sent a prescription to your pharmacy for silvadene     Wound Care Instructions     Every 3 days Cleanse your right heel wound(s) with Dilute hibiclens 30cc in 500cc NS.     Pat Dry with non-sterile gauze     Primary Dressing: Apply manuka honey into/onto the wounds     Secondary dressing: Cover with gauze     Secure with non-sterile roll gauze (4\" x 75\" roll) and tape (1\" roll tape) as needed; avoid adhesive directly on the skin     Compression: none     It is not ok to get your wound wet in the " "bath or shower                       Wound Care Instructions     Daily  and as needed, Cleanse your left hand wound(s) with Dilute hibiclens 30cc in 500cc NS.     Pat Dry with non-sterile gauze     Apply Lotion to the intact skin surrounding your wound and other dry skin locations. Some good lotions include: Remedy Skin Repair Cream, Sarna, Vanicream or Cetaphil     Primary Dressing: Apply silvadene cream into/onto the wounds     Secondary dressing: Cover with oil emulson     Secure with non-sterile roll gauze (4\" x 75\" roll) and tape (1\" roll tape) as needed; avoid adhesive directly on the skin     Compression: coban to secure     It is ok to get your wound wet in the bath or shower        If for some reason you are not able to get your dressing(s) changed as outlined above (due to illness, lack of supplies, lack of help) please do the following: remove old, soiled dressings; wash the wounds with saline; pat dry; apply ABD pad or other absorbant pad and secure with rolled gauze; avoid tape directly on your skin; Call the clinic as soon as possible to let us know what the current issues are in receiving wound care [phone].        SEEK MEDICAL CARE IF:  ?You have an increase in swelling, pain, or redness around the wound.  ?You have an increase in the amount of pus coming from the wound.  ?There is a bad smell coming from the wound.  ?The wound appears to be worsening/enlarging  ?You have a fever greater than 101.5 F        It is ok to continue current wound care treatment/products for the next 2-3 days until new wound care supplies are ordered and arrive. If longer than this please contact our office at [phone].     If you have a 2 layer or 4 layer compression wrap on these are safe to have on for ONLY 7 days. If for some reason you are not able to get the wrap(s) changed (due to illness; lack of supplies, lack of help, lack of transportation) please do the following: unwrap the old 2 or 4 layer " compression wrap; avoid using scissors as you could cut your skin and cause wounds; use tubular compression when available. Call to reschedule your home care or clinic visit appointment as soon as possible.     Please NOTE: if you are 15 minutes late to your arrival time, you will have to be rescheduled. Please call our clinic as soon as possible if you know you will not be able to get to your appointment at [phone]. If you fail to show up to 3 scheduled clinic appointments you will be dismissed from our clinic.                    We want to hear from you!  In the next few weeks, you should receive a call or email to complete a survey about your visit at [facility]. Please help us improve your appointment experience by letting us know how we did today. We strive to make your experience good and value any ways in which we could do better.       We value your input and suggestions.     Thank you for choosing the [facility]!               It is recommended that you do not get your ulcer wet when showering.  Listed below are several ways of keeping it dry when you shower.      1. Wrap it with Press and Seal plastic wrap.  It can be found in the stores where the plastic wraps or tin foil is kept.                    2.  Some people take a bath and hang their leg/foot out of the tub.                              3  Put your leg in a plastic bag and tape it on.              4. You can purchase a shower cover for casts at some pharmacies and through the Internet.                          5. Take a Bed Bath or wash up at the sink       High Protein Foods  Chicken  -Chicken breast, 3.5oz.-30 grams protein  -Chicken thigh-10 grams(average size)  -Drumstick-11 grams  -Wing- 6 grams  -Chicken meat, cooked, 4 oz.  Beef  -Hamburger eleni, 4 oz-28 grams protein  -Steak, 6 oz-42 grams  -Most cuts of beef- 7 grams of protein per ounce  Fish  -Most fish fillets or steaks are about 22 grams of  protein for 3 1/2 oz(100 grams) of cooked fish, or 6 grams per ounce  -Tuna, 6 oz can-40 grams of protein  Pork  -Pork chop, average-22 grams protein  -Pork loin or tenderloin, 4 oz.-29 grams  -Ham, 3oz serving- 19 grams  -Ground pork 3oz cooked-22 grams  -Cedeno, 1 slice-3 grams  -[place]le-style cedeno(black cedeno), slice-5-6 grams  Eggs and Dairy  -Egg, large-7 grams  -Milk, 1 cup-8 grams  -Cottage cheese, 1/2 cup-15 grams  -Greek yogurt, 1 cup-usually 8-12 grams, check label  -Soft cheeses (Mozzarella, Brie, Camembert)- 6 grams  -Medium cheeses(cheddar, swiss)- 7 or 8 grams per oz  -Hard cheeses(parmesan)- 10 grams per oz  Beans  -Tofu, 1/2 cup 20 grams  -Tofu, 1 oz., 2.3 grams  -Soy milk, 1 cup-6-10 grams  -Most beans(black, ayon, lentils, etc.) about 7-10 grams protein per half cup of cooked beans  -soy beans, 1/2 cup cooked-14 grams  -Split peas, 1/2 cup cooked- 8 grams  Nuts and Seeds  -Peanut butter, 2 Tablespoons- 8 grams protein  -Almonds, 1/4 cup- 8 grams  -Peanuts, 1/4 cup-9 grams  -Cashews, 1/4 cup- 5 grams  -Pecans, 1/4 cup- 2.5 grams  -Sunflower seeds, 1/4 cup- 6 grams  -Pumpkin seeds, 1/4 cup-8 grams  -Flax seeds- 1/4 cup- 8 grams  Protein Supplements  -Ensure  -Boost  -Glucerna, if diabetic  When you have an open ulcer, your bodies protein needs are much higher, so it is recommended eat good sources of protein          Prevalon Boot              EZ Heelift Boot                    Prafo Boot              Foam Ring     Ordered by: Landy Oliva LPN  Authorized by:  Marina Fernandez NP   (NPI: [id number])

## 2025-02-25 NOTE — PATIENT INSTRUCTIONS
"Wound care supplies were ordered today through [facility] and if you are not receiving your supplies or have a question on your bill please contact Yuri Sanon [phone]. Please allow 2-5 business days for delivery of supplies. You may get a call from a [phone] if there are additional information [facility] needs. It is important to  or return their call. PLEASE NOTE: If you need to return your supplies, you MUST call customer service within 15 days of delivery date.     You need to stay off the right foot at all times; use wheelchair while at home     PRABIBIANA boot to help keep pressure off the wound at all times; if this is not fitting well please go back to the orthotics clinic to see what else they can offer    I sent a prescription to your pharmacy for silvadene    Wound Care Instructions    Every 3 days Cleanse your right heel wound(s) with Dilute hibiclens 30cc in 500cc NS.    Pat Dry with non-sterile gauze    Primary Dressing: Apply manuka honey into/onto the wounds     Secondary dressing: Cover with gauze    Secure with non-sterile roll gauze (4\" x 75\" roll) and tape (1\" roll tape) as needed; avoid adhesive directly on the skin    Compression: none    It is not ok to get your wound wet in the bath or shower                Wound Care Instructions    Daily  and as needed, Cleanse your left hand wound(s) with Dilute hibiclens 30cc in 500cc NS.    Pat Dry with non-sterile gauze    Apply Lotion to the intact skin surrounding your wound and other dry skin locations. Some good lotions include: Remedy Skin Repair Cream, Sarna, Vanicream or Cetaphil    Primary Dressing: Apply silvadene cream into/onto the wounds    Secondary dressing: Cover with oil emulson    Secure with non-sterile roll gauze (4\" x 75\" roll) and tape (1\" roll tape) as needed; avoid adhesive directly on the skin    Compression: coban to secure    It is ok to get your wound wet in the bath or shower      If for some reason you are not able to " get your dressing(s) changed as outlined above (due to illness, lack of supplies, lack of help) please do the following: remove old, soiled dressings; wash the wounds with saline; pat dry; apply ABD pad or other absorbant pad and secure with rolled gauze; avoid tape directly on your skin; Call the clinic as soon as possible to let us know what the current issues are in receiving wound care [phone].      SEEK MEDICAL CARE IF:  You have an increase in swelling, pain, or redness around the wound.  You have an increase in the amount of pus coming from the wound.  There is a bad smell coming from the wound.  The wound appears to be worsening/enlarging  You have a fever greater than 101.5 F      It is ok to continue current wound care treatment/products for the next 2-3 days until new wound care supplies are ordered and arrive. If longer than this please contact our office at [phone].    If you have a 2 layer or 4 layer compression wrap on these are safe to have on for ONLY 7 days. If for some reason you are not able to get the wrap(s) changed (due to illness; lack of supplies, lack of help, lack of transportation) please do the following: unwrap the old 2 or 4 layer compression wrap; avoid using scissors as you could cut your skin and cause wounds; use tubular compression when available. Call to reschedule your home care or clinic visit appointment as soon as possible.    Please NOTE: if you are 15 minutes late to your arrival time, you will have to be rescheduled. Please call our clinic as soon as possible if you know you will not be able to get to your appointment at [phone]. If you fail to show up to 3 scheduled clinic appointments you will be dismissed from our clinic.              We want to hear from you!  In the next few weeks, you should receive a call or email to complete a survey about your visit at [facility]. Please help us improve your appointment experience by letting us know  how we did today. We strive to make your experience good and value any ways in which we could do better.      We value your input and suggestions.    Thank you for choosing the [facility]!           It is recommended that you do not get your ulcer wet when showering.  Listed below are several ways of keeping it dry when you shower.     1. Wrap it with Press and Seal plastic wrap.  It can be found in the stores where the plastic wraps or tin foil is kept.               2.  Some people take a bath and hang their leg/foot out of the tub.                        3  Put your leg in a plastic bag and tape it on.           4. You can purchase a shower cover for casts at some pharmacies and through the Internet.            5. Take a Bed Bath or wash up at the sink     High Protein Foods  Chicken  -Chicken breast, 3.5oz.-30 grams protein  -Chicken thigh-10 grams(average size)  -Drumstick-11 grams  -Wing- 6 grams  -Chicken meat, cooked, 4 oz.  Beef  -Hamburger eleni, 4 oz-28 grams protein  -Steak, 6 oz-42 grams  -Most cuts of beef- 7 grams of protein per ounce  Fish  -Most fish fillets or steaks are about 22 grams of protein for 3 1/2 oz(100 grams) of cooked fish, or 6 grams per ounce  -Tuna, 6 oz can-40 grams of protein  Pork  -Pork chop, average-22 grams protein  -Pork loin or tenderloin, 4 oz.-29 grams  -Ham, 3oz serving- 19 grams  -Ground pork 3oz cooked-22 grams  -Ashley, 1 slice-3 grams  -Woodbine-style ashley(black ashley), slice-5-6 grams  Eggs and Dairy  -Egg, large-7 grams  -Milk, 1 cup-8 grams  -Cottage cheese, 1/2 cup-15 grams  -Greek yogurt, 1 cup-usually 8-12 grams, check label  -Soft cheeses (Mozzarella, Brie, Camembert)- 6 grams  -Medium cheeses(cheddar, swiss)- 7 or 8 grams per oz  -Hard cheeses(parmesan)- 10 grams per oz  Beans  -Tofu, 1/2 cup 20 grams  -Tofu, 1 oz., 2.3 grams  -Soy milk, 1 cup-6-10 grams  -Most beans(black, ayon, lentils, etc.) about 7-10 grams protein per half cup of  cooked beans  -soy beans, 1/2 cup cooked-14 grams  -Split peas, 1/2 cup cooked- 8 grams  Nuts and Seeds  -Peanut butter, 2 Tablespoons- 8 grams protein  -Almonds, 1/4 cup- 8 grams  -Peanuts, 1/4 cup-9 grams  -Cashews, 1/4 cup- 5 grams  -Pecans, 1/4 cup- 2.5 grams  -Sunflower seeds, 1/4 cup- 6 grams  -Pumpkin seeds, 1/4 cup-8 grams  -Flax seeds- 1/4 cup- 8 grams  Protein Supplements  -Ensure  -Boost  -Glucerna, if diabetic  When you have an open ulcer, your bodies protein needs are much higher, so it is recommended eat good sources of protein       Prevalon Boot          EZ Heelift Boot              Prafo Boot          Foam Ring

## 2025-02-25 NOTE — PROGRESS NOTES
Follow up Vascular Visit       Date of Service:02/25/25      Chief Complaint: right heel ulcer; left hand burn      Pt returns to [facility] with regards to their right heel ulcer; left hand burn.  They arrive today alone. He walked today to the exam room he is supposed to be NWB to the right; he is not doing this.  They are currently using betadine to the heel; SSD; oil emulsion; telfa to the hand to the wounds. This is being done by home care. They are using nothing for compression. They are feeling well today. Denies fevers, chills. No shortness of breath.     Allergies:   Allergies   Allergen Reactions    Vancomycin      Other Reaction(s): Renal Failure    Vancomycin-induced nephrotoxicity (11/2023, outside hospital)    Seasonal Allergies      HAYFEVER:    -Watery Eyes  -Eye burn    Daptomycin Rash     Likely delayed hypersensitivity reaction to daptomycin 11/2023       Medications:   Current Outpatient Medications:     ACCU-CHEK GUIDE TEST test strip, , Disp: , Rfl:     amLODIPine (NORVASC) 5 MG tablet, Take 1 tablet (5 mg) by mouth daily., Disp: 90 tablet, Rfl: 3    atorvastatin (LIPITOR) 40 MG tablet, , Disp: , Rfl:     bumetanide (BUMEX) 2 MG tablet, Take 1 tablet (2 mg) by mouth daily., Disp: 90 tablet, Rfl: 3    cetirizine (ZYRTEC) 10 MG tablet, Take 1 tablet (10 mg) by mouth daily., Disp: 30 tablet, Rfl: 11    Continuous Glucose Sensor (DEXCOM G7 SENSOR) MISC, Change every 10 days., Disp: 3 each, Rfl: 3    Continuous Glucose Transmitter (DEXCOM G6 TRANSMITTER) MISC, , Disp: , Rfl:     darbepoetin sunshine-polysorbate (ARANESP) 40 MCG/0.4ML injection, Inject 0.4 mLs (40 mcg) subcutaneously every 14 days. Do not shake. Administer for Hgb <10.0. HOLD dose if systolic BP >180., Disp: 0.8 mL, Rfl: 11    gabapentin (NEURONTIN) 300 MG capsule, TAKE 1 CAPSULE(300 MG) BY MOUTH TWICE DAILY, Disp: 60 capsule, Rfl: 2    insulin aspart (NOVOLOG FLEXPEN) 100 UNIT/ML pen, Inject 1-5 Units  "subcutaneously 3 times daily (with meals). In addition to 6-8 units with each meal, inject additional units as per this sliding scale: If 200-250 = 1  251-300 = 2  301-350 = 3 351-400 = 4 401+ = 5 Repeat BS after 3 hours and call MD if still over 400, Disp: , Rfl:     insulin glargine (LANTUS PEN) 100 UNIT/ML pen, Currently at 20 units will be increasing up to max dose 30 units, Disp: 30 mL, Rfl: 2    insulin NPH (HUMULIN N VIAL) 100 UNIT/ML vial, INJECT 13 UNITS AT THE START OF YOUR STEROID INFUSION AT CLINIC WHICH IS SCHEDULED FOR 12/4/24, Disp: , Rfl:     insulin pen needle (31G X 6 MM) 31G X 6 MM miscellaneous, Use 4 pen needles daily or as directed., Disp: 100 each, Rfl: 11    insulin syringe-needle U-100 (29G X 1/2\" 0.5 ML) 29G X 1/2\" 0.5 ML miscellaneous, Use one syringe as directed prior to steroid infusion., Disp: 10 each, Rfl: 0    lipase-protease-amylase (CREON 36) [id number] units CPEP, Take 3 capsules by mouth 3 times daily (with meals). 0730, 1130, 1630, Disp: , Rfl:     loperamide (IMODIUM) 2 MG capsule, Take 4 mg by mouth every 8 hours as needed for diarrhea., Disp: , Rfl:     methocarbamol (ROBAXIN) 500 MG tablet, Take 1 tablet (500 mg) by mouth every 8 hours as needed for muscle spasms., Disp: 30 tablet, Rfl: 3    naloxone (NARCAN) 4 MG/0.1ML nasal spray, Spray 1 spray (4 mg) into one nostril alternating nostrils once as needed for opioid reversal. every 2-3 minutes until assistance arrives, Disp: 0.2 mL, Rfl: 0    oxyCODONE (ROXICODONE) 5 MG tablet, Take 1 tablet (5 mg) by mouth every 6 hours as needed for pain., Disp: 30 tablet, Rfl: 0    polyethylene glycol (MIRALAX) 17 GM/Dose powder, , Disp: , Rfl:     silver sulfADIAZINE (SILVADENE) 1 % external cream, Apply topically 2 times daily., Disp: 400 g, Rfl: 2    sulfamethoxazole-trimethoprim (BACTRIM) 400-80 MG tablet, Take 1 tablet by mouth daily., Disp: 90 tablet, Rfl: 5    triamcinolone (KENALOG) 0.1 % external ointment, Apply " topically., Disp: , Rfl:     Current Facility-Administered Medications:     lidocaine (XYLOCAINE) 5 % ointment, , Topical, Daily PRN, Jim, Marina, NP, Given at 01/15/25 1354    History:   Past Medical History:   Diagnosis Date    Abnormal CXR 10/15/2024    Previous CT of the chest completed at [facility] in December 2023 demonstrated:   Impression:   1. Severe anasarca with moderate to large pleural effusions and a small pericardial effusion. Diffuse septal thickening in the lungs concerning for pulmonary edema. Anemic cardiac blood pool.   2. Solitary pulmonary nodule in the left lower lobe with an average diameter of 10 mm is indeterminat    Abnormal LFTs 01/03/2025    Acidosis 01/08/2020    Acquired absence of other right toe(s) 01/12/2024    Acute metabolic encephalopathy 09/17/2024    Acute pain of right knee 07/12/2024    Acute pulmonary edema (H) 01/11/2024    Acute renal insufficiency 05/03/2024    Last Comprehensive Metabolic Panel:          Lab Results      Component    Value    Date           NA    131 (L)    10/15/2024           POTASSIUM    4.0    10/15/2024           CHLORIDE    98    10/15/2024           CO2    24    10/15/2024           ANIONGAP    9    10/15/2024           GLC    133 (A)    10/23/2024           BUN    22.3 (H)    10/15/2024           CR    1.35 (H)    10/15/2024        Alcohol abuse 09/12/2022    Alcohol dependence in remission (H) 10/29/2024    Allergic rhinitis     Anemia     Anemia, unspecified type 05/03/2024    He has a history of anemia which has been noted since May 2024.  Cause has not been identified.  Additional evaluation with CBC, retake count, TSH, haptoglobin, iron studies, LDH, ferritin, B12 and folate.  Ferritin elevated 418, iron low at 20, iron binding capacity low at 2030, iron saturation low at 9.  Normal B12 and folate.  Awaiting haptoglobin and reticulocyte count.  Denies blood in stool.    Anxiety disorder, unspecified 01/11/2024    Arthritis      Arthritis due to other bacteria, right knee (H) 08/19/2024    Atherosclerotic heart disease of native coronary artery without angina pectoris 01/11/2024    Bell's palsy     Bilateral lower extremity edema 12/18/2024    Cardiac calcification (H) 01/19/2023    Treatment:  Colestid 1 g tablet.  Aspirin 81 mg daily.      Cervical disc disease with myelopathy 05/01/2022    Formatting of this note might be different from the original.   seen spine specialist- [facility]  Formatting of this note might be different from the original.   seen spine specialist      Cervical radiculopathy 07/11/2024    Cervicalgia     Change or removal of drains 08/26/2024    Chronic bilateral low back pain with bilateral sciatica 04/08/2022    Chronic diarrhea 03/08/2022    Chronic kidney disease, unspecified 01/11/2024    Chronic pancreatitis (H) 09/06/2024    Due to continued right knee swelling and history of chronic pancreatitis with chronic diarrhea, he was referred to GI to review possible Whipple's disease.     10/18/2024 seen by [facility].  We do not have those records and will request those records to be faxed over.  Once I get those records I will review them with recommendations.  Per patient he is to follow-up in 6 months.      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  Started rituximab infusions 12/4/2024.      Ketosis (H) 03/08/2022    Long term (current) use of insulin (H) 08/19/2024    Major depressive disorder, recurrent episode, mild     Major depressive disorder, recurrent, mild 08/19/2024    Muscle  "wasting and atrophy, not elsewhere classified, multiple sites 04/01/2024    Muscle weakness (generalized) 01/12/2024    Need for assistance with personal care 01/12/2024    Osteomyelitis of great toe of right foot (H) 11/22/2023    Other abnormalities of gait and mobility 01/12/2024    Other acute osteomyelitis, right ankle and foot (H)     Other chronic pancreatitis (H)     Other fatigue 11/14/2024    Other hyperlipidemia 03/15/2022    Other polyosteoarthritis     Peripheral vascular disease, unspecified 08/19/2024    Pneumonia of left lower lobe due to infectious organism 09/11/2024    Primary hypertension 10/09/2019    Pyogenic arthritis of right knee joint, due to unspecified organism (H) 07/11/2024    Right knee inflammatory arthritis, etiology to be determined, s/p I&D Jul 11, Aug 18, 2024.         Brucella, Bartonella, Q fever, CD4, Ig levels.     MRI right knee with and without contrast if negative.     Rheumatology and GI consult.     Complete oral ciprofloxacin course.      Admitted to [facility] 8/16 to 8/19/2024.  \"Has been seen a handful of times and outpatient follow-up with persistent right    Right knee pain 07/11/2024    S/P transmetatarsal amputation of foot, right (H) 12/01/2023     s/p transmetatarsal amputation, right foot (DOS 1/9/24)      Severe episode of recurrent major depressive disorder, without psychotic features (H) 01/19/2023    Solitary pulmonary nodule     Stage 3a chronic kidney disease (H) 1/11/2024    Systemic vasculitis (H) 11/19/2024    Type 2 diabetes mellitus with foot ulcer (H) 01/11/2024    Type 2 diabetes mellitus with hyperosmolarity without coma, with long-term current use of insulin (H) 10/29/2024    Type 2 diabetes mellitus with kidney complication, with long-term current use of insulin (H) 10/05/2018    Seen by diabetes educator 10/29/2024:   PLAN  Lantus: 12 units  Set dose Novolog at meals: 3 units each + Correction Scale below     Pre-Meal Correction Scale:        If " pre-meal glucose is:    Add extra Humalog/Novolog to your meal dose per below chart:      151 - 200    +1 unit      201 - 250    +2 units      251 - 300    +3 units      301 - 350    +4 units      351 - 400     +5 units      Over     Ulcer of right heel and midfoot (H) 12/23/2024    Unintentional weight loss 10/11/2023    Vitamin D deficiency, unspecified 01/11/2024       Physical Exam:    /42   Pulse 77   Temp 97.5  F (36.4  C) (Oral)   SpO2 98%     General:  Patient presents to clinic in no apparent distress.  Head: normocephalic atraumatic  Psychiatric:  Alert and oriented x3.   Respiratory: unlabored breathing; no cough  Integumentary:  Skin is uniformly warm, dry and pink.    Ulcer #1 Location: hand  Size: 1L x 3W x 0.1depth.  no sinus tract present, Wound base: slough  no undermining present. Ulcer is full thickness. There is moderate drainage. Periwound: no denudement, erythema, induration, maceration or warmth.      Ulcer #2 Location: right heel Size: 4x6x 0.1depth.  no sinus tract present, Wound base: slough  no undermining present. Ulcer is full thickness. There is moderate drainage. Periwound: no denudement, erythema, induration, maceration or warmth.      VASC Wound Right heel (Active)   Pre Size Length 5 02/25/25 1400   Pre Size Width 3 02/25/25 1400   Pre Size Depth 0.1 02/25/25 1400   Pre Total Sq cm 15 02/25/25 1400   Post Size Length 6 02/25/25 1400   Post Size Width 4 02/25/25 1400   Post Size Depth 0.1 02/25/25 1400   Post Total Sq cm 24 02/25/25 1400   Number of days: 41       VASC Wound Left hand (Active)   Pre Size Length 3 02/25/25 1400   Pre Size Width 3 02/25/25 1400   Pre Size Depth 0.1 02/25/25 1400   Pre Total Sq cm 9 02/25/25 1400   Post Size Length 1 02/25/25 1400   Post Size Width 3 02/25/25 1400   Post Size Depth 0.1 02/25/25 1400   Post Total Sq cm 3 02/25/25 1400   Number of days: 41       Incision/Surgical Site 08/17/24 Anterior;Right Knee (Active)   Number of days: 192     "        Circumferential volume measures:          1/15/2025     1:00 PM 1/29/2025     2:00 PM   Circumferential Measures   Right Ankle 19 22   Right Widest Calf 30 32   Right Knee to Ankle 17.5    Left - just above MTP 21    Left Ankle 30        Labs:    I personally reviewed the following lab results today and those on care everywhere    No results found for: \"CRP\"   Erythrocyte Sedimentation Rate   Date Value Ref Range Status   02/19/2025 51 (H) 0 - 20 mm/hr Final      Last Renal Panel:  Sodium   Date Value Ref Range Status   01/06/2025 131 (L) 135 - 145 mmol/L Final   10/08/2017 131 (L) 133 - 144 mmol/L Final     Potassium   Date Value Ref Range Status   01/06/2025 5.3 3.4 - 5.3 mmol/L Final   08/06/2022 2.9 (L) 3.4 - 5.3 mmol/L Final   10/08/2017 3.8 3.4 - 5.3 mmol/L Final     Chloride   Date Value Ref Range Status   01/06/2025 97 (L) 98 - 107 mmol/L Final   08/06/2022 97 94 - 109 mmol/L Final   10/08/2017 95 94 - 109 mmol/L Final     Carbon Dioxide   Date Value Ref Range Status   10/08/2017 28 20 - 32 mmol/L Final     Carbon Dioxide (CO2)   Date Value Ref Range Status   01/06/2025 27 22 - 29 mmol/L Final   08/06/2022 24 20 - 32 mmol/L Final     Anion Gap   Date Value Ref Range Status   01/06/2025 7 7 - 15 mmol/L Final   08/06/2022 13 3 - 14 mmol/L Final   10/08/2017 8 3 - 14 mmol/L Final     Glucose   Date Value Ref Range Status   01/06/2025 270 (H) 70 - 99 mg/dL Final   10/23/2024 133 (A) 70 - 99 mg/dL Final     Urea Nitrogen   Date Value Ref Range Status   01/06/2025 18.4 6.0 - 20.0 mg/dL Final   08/06/2022 12 7 - 30 mg/dL Final   10/08/2017 9 7 - 30 mg/dL Final     Creatinine   Date Value Ref Range Status   01/06/2025 1.28 (H) 0.67 - 1.17 mg/dL Final   10/08/2017 0.68 0.66 - 1.25 mg/dL Final     GFR Estimate   Date Value Ref Range Status   01/06/2025 66 >60 mL/min/1.73m2 Final     Comment:     eGFR calculated using 2021 CKD-EPI equation.   10/08/2017 >90 >60 mL/min/1.7m2 Final     Comment:     Non  " "American GFR Calc     GFR, ESTIMATED POCT   Date Value Ref Range Status   07/09/2024 41 (L) >60 mL/min/1.73m2 Final     Calcium   Date Value Ref Range Status   01/06/2025 9.1 8.8 - 10.4 mg/dL Final     Comment:     Reference intervals for this test were updated on 7/16/2024 to reflect our healthy population more accurately. There may be differences in the flagging of prior results with similar values performed with this method. Those prior results can be interpreted in the context of the updated reference intervals.   10/08/2017 9.5 8.5 - 10.1 mg/dL Final     Phosphorus   Date Value Ref Range Status   12/13/2024 3.1 2.5 - 4.5 mg/dL Final     Albumin   Date Value Ref Range Status   12/13/2024 3.1 (L) 3.5 - 5.2 g/dL Final   08/06/2022 3.0 (L) 3.4 - 5.0 g/dL Final   10/08/2017 3.4 3.4 - 5.0 g/dL Final      Lab Results   Component Value Date    WBC 7.3 12/13/2024    WBC 3.9 10/08/2017     Lab Results   Component Value Date    RBC 3.41 12/13/2024    RBC 4.07 10/08/2017     Lab Results   Component Value Date    HGB 9.5 01/31/2025    HGB 12.1 10/08/2017     Lab Results   Component Value Date    HCT 30.4 01/31/2025    HCT 34.2 10/08/2017     No components found for: \"MCT\"  Lab Results   Component Value Date    MCV 75 12/13/2024    MCV 84 10/08/2017     Lab Results   Component Value Date    MCH 23.5 12/13/2024    MCH 29.7 10/08/2017     Lab Results   Component Value Date    MCHC 31.5 12/13/2024    MCHC 35.4 10/08/2017     Lab Results   Component Value Date    RDW 17.8 12/13/2024    RDW 13.4 10/08/2017     Lab Results   Component Value Date     12/13/2024     10/08/2017      Lab Results   Component Value Date    A1C 11.5 01/31/2025    A1C 10.0 11/06/2024    A1C 9.2 08/17/2024    A1C 9.4 05/04/2024    A1C >14.0 03/08/2022      TSH   Date Value Ref Range Status   03/09/2022 1.80 0.40 - 4.00 mU/L Final      No results found for: \"VITDT\"                Impression:  Encounter Diagnoses   Name Primary?    Venous " insufficiency Yes    Venous hypertension of both lower extremities     Secondary lymphedema     Right ankle instability     Type 2 diabetes mellitus with diabetic heel ulcer (H)     Peripheral vascular disease, unspecified     Partial thickness burn of finger of right hand, initial encounter           2/25/2025 left hand burn   1/29/25 left hand burn     2/25/2025 right heel    1/9/25 right heel          Are any of these ulcers new today: No; Location: na    Assessment/Plan:          1. Debridement: Nursing staff removed the old dressing and cleanse the wound(s) with specified solution. After discussion of risk factors and verbal consent was obtained 2% Lidocaine HCL jelly was applied, under clean conditions, the right heel; left hand ulceration(s) were debrided using currette. Devitalized and nonviable tissue, along with any fibrin and slough, was removed to improve granulation tissue formation, stimulate wound healing, decrease overall bacteria load, disrupt biofilm formation and decrease edge senescence.  Total excisional debridement was 27 sq cm from the epidermis/dermis area and into the subcutaneous tissue with a depth of 0.1 cm.   Ulcers were improved afterwards and .  Measures were as noted on the flow sheet.       2.  Ulcer treatment: ulcer treatment will include irrigation and dressings to promote autolytic debridement which will include:will continue with ssd; adaptic; gauze; rolled gauze; to the hand and stop the betadine to the heel as the eschar is completely gone now and go to medihoney and gauze rolled gauze; pt having issues getting supplies; home care will not order supplies; we will try to order; unsure if insurance will cover this If for some reason the patient is not able to get their dressing(s) changed as outlined above (due to illness, lack of supplies, lack of help) please do the following: remove old, soiled dressings; wash the ulcers with saline; pat dry; apply ABD pad or other  absorbant pad and secure with rolled gauze; avoid tape directly on your skin; patient instructed to call the clinic as soon as possible to let us know what the current issues are in receiving ulcer care. Stable            3. Edema: none.            4. Nutrition: focus on protein           5. Offloading: needs to be NWB to the right foot; he will not do this; the wound most likely will not heal if he continues to walk and he will be facing amputation of the limb          6. Wound Etiology: pressure ulcer to the heel; burn to hand     Patient will follow up with me in 4 weeks for reevaluation. They were instructed to call the clinic sooner with any signs or symptoms of infection or any further questions/concerns. Answered all questions.          Marina Fernandez DNP, RN, CNP, CWOCN, CFCN, CLT  [facility]   [phone]        This note was electronically signed by Marina Fernandez NP

## 2025-02-25 NOTE — TELEPHONE ENCOUNTER
[facility] Call Center    Phone Message    May a detailed message be left on voicemail: no     Reason for Call: Other: [facility] pharmacy calling to obtain patient recent HGB. Please call back at # listed with patient reference #.       Action Taken: Other: [facility]    Travel Screening: Not Applicable     Date of Service:

## 2025-02-25 NOTE — TELEPHONE ENCOUNTER
Called Long.  Long is requesting that either the dose is changed to 10 mg or the quantity is changes to 60 tablets.  At this time he is having a swollen knee exacerbation and states that the RX does state that he can have the medication every 6 hours.

## 2025-02-25 NOTE — LETTER
[facility]  [facility]  [place], [place] [address]  [phone]      Fax [phone]    [facility]           FAX: [phone]            Customer Service: [phone]        Account #: [id number]    Wound Dressing Rx and Order Form  Order Status: re-order  Verbal: Jenelle  Date: 2025     Longterrell Estrellanes  Gender: male  : 1970  [address]   [facility] [address]  [phone] (home)     Medical Record: [id number]  Primary Care Provider: Rena Blair      ICD-10-CM    1. Venous insufficiency  I87.2 DEBRIDE SKIN/SUBQ TISSUE     MI DEBRIDEMENT,SUB-Q TISSUE, EA ADDL 20 SQ CM      2. Venous hypertension of both lower extremities  I87.303 DEBRIDE SKIN/SUBQ TISSUE     MI DEBRIDEMENT,SUB-Q TISSUE, EA ADDL 20 SQ CM      3. Secondary lymphedema  I89.0 DEBRIDE SKIN/SUBQ TISSUE     MI DEBRIDEMENT,SUB-Q TISSUE, EA ADDL 20 SQ CM      4. Right ankle instability  M25.371 DEBRIDE SKIN/SUBQ TISSUE     MI DEBRIDEMENT,SUB-Q TISSUE, EA ADDL 20 SQ CM      5. Type 2 diabetes mellitus with diabetic heel ulcer (H)  E11.621 DEBRIDE SKIN/SUBQ TISSUE    L97.409 MI DEBRIDEMENT,SUB-Q TISSUE, EA ADDL 20 SQ CM      6. Peripheral vascular disease, unspecified  I73.9 DEBRIDE SKIN/SUBQ TISSUE     MI DEBRIDEMENT,SUB-Q TISSUE, EA ADDL 20 SQ CM      7. Partial thickness burn of finger of right hand, initial encounter  T23.221A DEBRIDE SKIN/SUBQ TISSUE     MI DEBRIDEMENT,SUB-Q TISSUE, EA ADDL 20 SQ CM            Insurance Info:  INSURER: Payor: MEDICA / Plan: MEDICA ACCESS ABILITY MA / Product Type: HMO /   Policy ID#:  [id number]  SECONDARY INSURANCE:    Secondary Policy ID#:  N/A        Physician Info:   Name:  ANNIE PATTERSON     Dept Address/Phones:   [address] [place] [address], [address]  [facility] [phone]  Fax: [phone]    Lymphedema circumferential measurements (in cm):      1/15/2025     1:00 PM 2025     2:00 PM   Circumferential Measures   Right  "Ankle 19 22   Right Widest Calf 30 32   Right Knee to Ankle 17.5    Left - just above MTP 21    Left Ankle 30          Wound info:  VASC Wound Right heel (Active)   Pre Size Length 5 02/25/25 1400   Pre Size Width 3 02/25/25 1400   Pre Size Depth 0.1 02/25/25 1400   Pre Total Sq cm 15 02/25/25 1400   Post Size Length 6 02/25/25 1400   Post Size Width 4 02/25/25 1400   Post Size Depth 0.1 02/25/25 1400   Post Total Sq cm 24 02/25/25 1400   Number of days: 41       VASC Wound Left hand (Active)   Pre Size Length 3 02/25/25 1400   Pre Size Width 3 02/25/25 1400   Pre Size Depth 0.1 02/25/25 1400   Pre Total Sq cm 9 02/25/25 1400   Post Size Length 1 02/25/25 1400   Post Size Width 3 02/25/25 1400   Post Size Depth 0.1 02/25/25 1400   Post Total Sq cm 3 02/25/25 1400   Number of days: 41       Incision/Surgical Site 08/17/24 Anterior;Right Knee (Active)   Number of days: 192        Drainage: moderate  Thickness:  full  Duration of Need: 30 DAYS  Days Supply: 30 DAYS  Start Date: 2/25/2025  Starter Kit, Ancillary Kit (saline, gloves, gauze): Yes   Qualifying wound/Debridement: Yes     DISPENSE AS WRITTEN.   Call [phone]. Please call patient for out-of-pocket costs and options.      Dressing Type Brand Size Frequency of change  Quantity   Primary Manuka honey HD super lite  2\"x2\" Every 3 days and as needed  10   Primary  Oil emulsion gauze  3\"x3\" DAILY and as needed 15    Non-sterile square gauze  4\"x4\" DAILY and as needed 1 loaf    Sof form roll gauze  4\"x75\" DAILY and as needed 30   tape paper  1\" DAILY and as needed 2 rolls     DISPENSE AS WRITTEN. Call [phone] with questions.     Wound Care Instructions    Every 3 days Cleanse your right heel wound(s) with Dilute hibiclens 30cc in 500cc NS.    Pat Dry with non-sterile gauze    Primary Dressing: Apply manuka honey 2\"x2\" into/onto the wounds     Secondary dressing: Cover with gauze    Secure with non-sterile roll gauze (4\" x 75\" roll) and tape (1\" roll tape) " "as needed; avoid adhesive directly on the skin    Wound Care Instructions    Daily  and as needed, Cleanse your left hand wound(s) with Dilute hibiclens 30cc in 500cc NS.    Primary Dressing: Apply silvadene cream into/onto the wounds    Secondary dressing: Cover with oil emulson    Secure with non-sterile roll gauze (4\" x 75\" roll) and tape (1\" roll tape) as needed; avoid adhesive directly on the skin    OK to forward to covered supplier.    Electronically Signed Physician:  ANNIE PATTERSON             Date: February 25, 2025    "

## 2025-02-26 ENCOUNTER — TELEPHONE (OUTPATIENT)
Dept: FAMILY MEDICINE | Facility: CLINIC | Age: 55
End: 2025-02-26

## 2025-02-26 ENCOUNTER — OFFICE VISIT (OUTPATIENT)
Dept: RHEUMATOLOGY | Facility: CLINIC | Age: 55
End: 2025-02-26
Attending: STUDENT IN AN ORGANIZED HEALTH CARE EDUCATION/TRAINING PROGRAM
Payer: COMMERCIAL

## 2025-02-26 VITALS
WEIGHT: 126.6 LBS | SYSTOLIC BLOOD PRESSURE: 140 MMHG | OXYGEN SATURATION: 99 % | DIASTOLIC BLOOD PRESSURE: 84 MMHG | HEART RATE: 69 BPM | BODY MASS INDEX: 21.07 KG/M2

## 2025-02-26 DIAGNOSIS — I10 PRIMARY HYPERTENSION: ICD-10-CM

## 2025-02-26 DIAGNOSIS — D89.84 IGG4 RELATED DISEASE (H): ICD-10-CM

## 2025-02-26 DIAGNOSIS — N18.31 TYPE 2 DIABETES MELLITUS WITH STAGE 3A CHRONIC KIDNEY DISEASE, WITH LONG-TERM CURRENT USE OF INSULIN (H): Primary | ICD-10-CM

## 2025-02-26 DIAGNOSIS — Z79.899 HIGH RISK MEDICATION USE: ICD-10-CM

## 2025-02-26 DIAGNOSIS — E11.22 TYPE 2 DIABETES MELLITUS WITH STAGE 3A CHRONIC KIDNEY DISEASE, WITH LONG-TERM CURRENT USE OF INSULIN (H): Primary | ICD-10-CM

## 2025-02-26 DIAGNOSIS — L97.419 ULCER OF RIGHT HEEL AND MIDFOOT, UNSPECIFIED ULCER STAGE (H): ICD-10-CM

## 2025-02-26 DIAGNOSIS — Z29.89 NEED FOR PNEUMOCYSTIS PROPHYLAXIS: Primary | ICD-10-CM

## 2025-02-26 DIAGNOSIS — Z79.4 TYPE 2 DIABETES MELLITUS WITH STAGE 3A CHRONIC KIDNEY DISEASE, WITH LONG-TERM CURRENT USE OF INSULIN (H): Primary | ICD-10-CM

## 2025-02-26 PROCEDURE — 99214 OFFICE O/P EST MOD 30 MIN: CPT | Performed by: STUDENT IN AN ORGANIZED HEALTH CARE EDUCATION/TRAINING PROGRAM

## 2025-02-26 PROCEDURE — 3077F SYST BP >= 140 MM HG: CPT | Performed by: STUDENT IN AN ORGANIZED HEALTH CARE EDUCATION/TRAINING PROGRAM

## 2025-02-26 PROCEDURE — 1125F AMNT PAIN NOTED PAIN PRSNT: CPT | Performed by: STUDENT IN AN ORGANIZED HEALTH CARE EDUCATION/TRAINING PROGRAM

## 2025-02-26 PROCEDURE — 3079F DIAST BP 80-89 MM HG: CPT | Performed by: STUDENT IN AN ORGANIZED HEALTH CARE EDUCATION/TRAINING PROGRAM

## 2025-02-26 PROCEDURE — G0463 HOSPITAL OUTPT CLINIC VISIT: HCPCS | Performed by: STUDENT IN AN ORGANIZED HEALTH CARE EDUCATION/TRAINING PROGRAM

## 2025-02-26 PROCEDURE — G2211 COMPLEX E/M VISIT ADD ON: HCPCS | Performed by: STUDENT IN AN ORGANIZED HEALTH CARE EDUCATION/TRAINING PROGRAM

## 2025-02-26 RX ORDER — SULFAMETHOXAZOLE AND TRIMETHOPRIM 400; 80 MG/1; MG/1
1 TABLET ORAL DAILY
Qty: 90 TABLET | Refills: 0 | Status: SHIPPED | OUTPATIENT
Start: 2025-02-26 | End: 2025-05-27

## 2025-02-26 ASSESSMENT — PAIN SCALES - GENERAL: PAINLEVEL_OUTOF10: SEVERE PAIN (8)

## 2025-02-26 ASSESSMENT — ENCOUNTER SYMPTOMS
FEVER: 0
BACK PAIN: 1
HEMATURIA: 0
SINUS PAIN: 0
BLOOD IN STOOL: 0

## 2025-02-26 NOTE — PROGRESS NOTES
RHEUMATOLOGY OUTPATIENT CLINIC NOTE     Referring Provider:   Edward Zuleta MD     Lab review        HLA-B27: Negative  MPO/NC-3: Negative  Rheumatoid factor: Negative  CCP antibody: Negative  Ig elevated  IgG4: 224 elevated      KATHY: Negative  dsDNA:  Negative    Complement C3: normal  Complement C4: normal  ANCA by IFA: Negative   MPO: Negative    NC-3 Abs: Positive, [place] [facility] [place] count, right knee multiple aspirations    Latest Reference Range & Units 24 09:26 24 10:00 07/10/24 11:36 24 13:38 24 17:09 25   Total Nucleated Cells /uL 76,860 109,100 65,670 94,560 53,900 805     [place] [facility] [place]   A. Skin, left, forearm, punch biopsy - Leukocytoclastic vasculitis.   B. Skin biopsy, left, forearm, direct immunofluorescence (DIF) -  Negative immunofluorescence      Soft tissue, right knee, biopsy, 10/2024:  - Tenosynovial, fibroadipose and skeletal muscular tissue with granulation tissue formation with fibrinoid necrosis and foci of microabscesses, vascular proliferation, patchy mixed acute and chronic inflammation with perivascular accentuation, and hemosiderin deposition.  - Gram stains (blocks A1-A4) are negative for bacterial organisms.  - Examination of a couple of focuses with lymphoplasmacytic-predominant infiltrates demonstrates scattered IgG4-positive plasma cells, with an IgG4/IgG plasma cell ratio of approximately 10-15% (lower than 40%, which is the generally applied criteria to support IgG4-related disease).  Overall, there are no characteristic histologic and immunophenotypic features within this biopsy specimen to support IgG4-related disease.    Renal biopsy, 2024:  Nodular diabetic glomerulosclerosis  Histological features suspicious for IgG4-related tubulointerstitial nephritis (see comment) patchy dense lymphoplasmacytic interstitial inflammation; 10-20 IgG4+ plasma cells/400x field   Glomerulopathy with rare  intramembranous and mesangial deposits reactive for C3      - the differential diagnosis includes a mild glomerulonephritis C3 or a resolving immune complex-mediated glomerulopathy      - there are no signs of an active glomerulitis currently     Moderate chronic changes of the parenchyma, including:   - focal global glomerulosclerosis (4% of glomeruli)   - focal tubular atrophy and interstitial fibrosis (40-50% of the cortex)   - severe arterial and arteriolar sclerosis      Imaging review      PET scan 10/2024  1. No evidence of hypermetabolic pulmonary nodules or suspicious uptake in the chest.   2. Large, complex right knee joint effusion with synovial thickening previously characterized on 9/21/2024 MRI. Multiple enlarged right inguinal and right iliac chain lymph nodes with mild to moderate FDG uptake are likely reactive to the right knee infective/inflammatory process.  3. Findings of positive fluid balance including trace ascites and diffuse mild subcutaneous anasarca. Right greater than left lower extremity edema.  4. Diffuse bone marrow uptake, which is nonspecific and may be related to anemia given low density blood pool.    MRI right knee 9/2024  1.  Large complex joint effusion with mildly thickened enhancing synovium of indeterminate etiology. Findings may be sequela of degenerative arthropathy, or an inflammatory or infectious arthropathy. If clinically indicated repeat arthrocentesis could be  performed for further characterization.  2.  Mild patchy enhancing edema-like marrow signal of the knee which is likely reactive. Early osteomyelitis cannot be entirely excluded, though, is considered less likely.  3.  Tricompartmental degenerative arthrosis.  4.  Marked mucoid degeneration/maceration of the medial and lateral menisci.  5.  Chronic rupture of the ACL.  6.  Mucoid degeneration and/or partial tearing of the PCL.  7.  Intact collateral ligaments.    X-ray SIJ 9/2024  The SI joints are negative for  sacroiliitis, ankylosis, or diastasis on this single projection. Pelvis negative for fracture. Both hips negative for fracture.    Hand X-ray 9/2024  Old healed fracture of the right first metacarpal. No evidence for acute fracture on either side.   Additional chronic fracture of the left scaphoid.   There is some sclerosis within the proximal fracture fragment which may represent superimposed  avascular necrosis of the proximal pole of the left scaphoid. Degenerative change both wrists, greater on the left at the radioscaphoid and radiolunate articulation with cystic change in the distal aspect of the left radius. No erosive changes are identified. Vascular calcifications bilaterally. Degenerative change at both first MCP joints.    X-ray left foot 9/2024  Degenerative change at the first MTP joint and IP joint of the great toe. No evidence for acute fracture. No erosive changes are identified. Vascular calcifications.    MRI cervical spine, outside, only report available 3/2024:  C4-5 moderate central/left-sided canal stenosis with left ventral cord impingement.  No abnormal T2 signal  Mild central canal stenosis with cord abutment C3-4  Multilevel degenerative foraminal stenosis severe left C 3-4, C4-5 with expected nerve impingement  Prominent erosive/inflammatory facet arthropathy left C3-4, C4-5     MRI Right foot, 1/2024:  1. Enhancing marrow edema at the second metatarsal amputation margin as well as head and neck of the third and fourth metatarsals. Indeterminate faint T1 hypointense marrow signal changes of the fourth metatarsal head and about the displaced oblique fracture of the third metatarsal neck. Overall findings compatible with osteitis, possibly osteomyelitis.   2. Irregular soft tissue amputation margins of the medial forefoot with emanating enhancing tissue edema concerning for cellulitis      CT chest, Abdomen, pelvis without contrast, 12/2023:  1. Severe anasarca with moderate to large pleural  effusions and a small pericardial effusion. Diffuse septal thickening in the lungs concerning for pulmonary edema. Anemic cardiac blood pool.   2. Solitary pulmonary nodule in the left lower lobe with an average diameter of 10 mm is indeterminate, but worrisome for neoplasm. This is new since 3/19/2022.   3. Patchy and confluent airspace consolidation within the left greater than right lung apices presumably represents infection, cannot exclude underlying soft tissue density nodularity on this noncontrast examination. Recommend pulmonary consultation.   4. Diffuse and severe vascular calcifications. No acute or suspicious abnormality noted within the abdomen or pelvis.        Subjective     Visit date February 26, 2025    Long is a 54 year old male who presents today for follow up.    Since the last visit,    He underwent repeat arthrocentesis of the right knee in January 2025, cell count showed 805 cells, 52% neutrophils, steroid injection locally was provided.    - Workup 12/03 showed:    Hemoglobin low  WBC  within normal limits   Platelets  elevated  ESR  elevated  CRP  elevated  AST  elevated  Creatinine  within normal limits      Today, Long mentioned the following:    He completed the rituximab infusions without complications     He remains with pain and swelling in the right knee     He remains with diarrhea, not improved with rituximab infusion     He remains with joint pain in the neck, shoulder and arm     Review of Systems   Constitutional:  Negative for fever.   HENT:  Negative for hearing loss, nosebleeds and sinus pain.    Gastrointestinal:  Negative for blood in stool.   Genitourinary:  Negative for hematuria.   Musculoskeletal:  Positive for back pain and joint pain.   Skin:  Negative for rash.       Past Medical history   Past Medical History:   Diagnosis Date    Abnormal CXR 10/15/2024    Previous CT of the chest completed at [facility] in December 2023 demonstrated:   Impression:    1. Severe anasarca with moderate to large pleural effusions and a small pericardial effusion. Diffuse septal thickening in the lungs concerning for pulmonary edema. Anemic cardiac blood pool.   2. Solitary pulmonary nodule in the left lower lobe with an average diameter of 10 mm is indeterminat    Abnormal LFTs 01/03/2025    Acidosis 01/08/2020    Acquired absence of other right toe(s) 01/12/2024    Acute metabolic encephalopathy 09/17/2024    Acute pain of right knee 07/12/2024    Acute pulmonary edema (H) 01/11/2024    Acute renal insufficiency 05/03/2024    Last Comprehensive Metabolic Panel:          Lab Results      Component    Value    Date           NA    131 (L)    10/15/2024           POTASSIUM    4.0    10/15/2024           CHLORIDE    98    10/15/2024           CO2    24    10/15/2024           ANIONGAP    9    10/15/2024           GLC    133 (A)    10/23/2024           BUN    22.3 (H)    10/15/2024           CR    1.35 (H)    10/15/2024        Alcohol abuse 09/12/2022    Alcohol dependence in remission (H) 10/29/2024    Allergic rhinitis     Anemia     Anemia, unspecified type 05/03/2024    He has a history of anemia which has been noted since May 2024.  Cause has not been identified.  Additional evaluation with CBC, retake count, TSH, haptoglobin, iron studies, LDH, ferritin, B12 and folate.  Ferritin elevated 418, iron low at 20, iron binding capacity low at 2030, iron saturation low at 9.  Normal B12 and folate.  Awaiting haptoglobin and reticulocyte count.  Denies blood in stool.    Anxiety disorder, unspecified 01/11/2024    Arthritis     Arthritis due to other bacteria, right knee (H) 08/19/2024    Atherosclerotic heart disease of native coronary artery without angina pectoris 01/11/2024    Bell's palsy     Bilateral lower extremity edema 12/18/2024    Cardiac calcification (H) 01/19/2023    Treatment:  Colestid 1 g tablet.  Aspirin 81 mg daily.      Cervical disc disease with myelopathy  05/01/2022    Formatting of this note might be different from the original.   seen spine specialist- [facility]  Formatting of this note might be different from the original.   seen spine specialist      Cervical radiculopathy 07/11/2024    Cervicalgia     Change or removal of drains 08/26/2024    Chronic bilateral low back pain with bilateral sciatica 04/08/2022    Chronic diarrhea 03/08/2022    Chronic kidney disease, unspecified 01/11/2024    Chronic pancreatitis (H) 09/06/2024    Due to continued right knee swelling and history of chronic pancreatitis with chronic diarrhea, he was referred to GI to review possible Whipple's disease.     10/18/2024 seen by [facility].  We do not have those records and will request those records to be faxed over.  Once I get those records I will review them with recommendations.  Per patient he is to follow-up in 6 months.      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  Started rituximab infusions 12/4/2024.      Ketosis (H) 03/08/2022    Long term (current) use of insulin (H) 08/19/2024    Major depressive disorder, recurrent episode, mild     Major depressive disorder, recurrent, mild 08/19/2024    Muscle wasting and atrophy, not elsewhere classified, multiple sites 04/01/2024    Muscle weakness (generalized) 01/12/2024    Need for assistance with personal care 01/12/2024    Osteomyelitis of great toe of right foot (H) 11/22/2023    Other abnormalities of gait and mobility 01/12/2024    Other acute osteomyelitis, right ankle and foot (H)     Other chronic  "pancreatitis (H)     Other fatigue 11/14/2024    Other hyperlipidemia 03/15/2022    Other polyosteoarthritis     Peripheral vascular disease, unspecified 08/19/2024    Pneumonia of left lower lobe due to infectious organism 09/11/2024    Primary hypertension 10/09/2019    Pyogenic arthritis of right knee joint, due to unspecified organism (H) 07/11/2024    Right knee inflammatory arthritis, etiology to be determined, s/p I&D Jul 11, Aug 18, 2024.         Brucella, Bartonella, Q fever, CD4, Ig levels.     MRI right knee with and without contrast if negative.     Rheumatology and GI consult.     Complete oral ciprofloxacin course.      Admitted to [facility] 8/16 to 8/19/2024.  \"Has been seen a handful of times and outpatient follow-up with persistent right    Right knee pain 07/11/2024    S/P transmetatarsal amputation of foot, right (H) 12/01/2023     s/p transmetatarsal amputation, right foot (DOS 1/9/24)      Severe episode of recurrent major depressive disorder, without psychotic features (H) 01/19/2023    Solitary pulmonary nodule     Stage 3a chronic kidney disease (H) 1/11/2024    Systemic vasculitis (H) 11/19/2024    Type 2 diabetes mellitus with foot ulcer (H) 01/11/2024    Type 2 diabetes mellitus with hyperosmolarity without coma, with long-term current use of insulin (H) 10/29/2024    Type 2 diabetes mellitus with kidney complication, with long-term current use of insulin (H) 10/05/2018    Seen by diabetes educator 10/29/2024:   PLAN  Lantus: 12 units  Set dose Novolog at meals: 3 units each + Correction Scale below     Pre-Meal Correction Scale:        If pre-meal glucose is:    Add extra Humalog/Novolog to your meal dose per below chart:      151 - 200    +1 unit      201 - 250    +2 units      251 - 300    +3 units      301 - 350    +4 units      351 - 400     +5 units      Over     Ulcer of right heel and midfoot (H) 12/23/2024    Unintentional weight loss 10/11/2023    Vitamin D deficiency, unspecified " 01/11/2024       Objective   BP (!) 140/84 (BP Location: Left arm, Patient Position: Sitting, Cuff Size: Adult Regular)   Pulse 69   Wt 57.4 kg (126 lb 9.6 oz)   SpO2 99%   BMI 21.07 kg/m        PHYSICAL EXAMINATION  Physical Exam  HENT:      Head: Normocephalic.      Mouth/Throat:      Mouth: Mucous membranes are moist.   Eyes:      Conjunctiva/sclera: Conjunctivae normal.   Pulmonary:      Effort: Pulmonary effort is normal.      Breath sounds: Normal breath sounds.   Musculoskeletal:         General: Swelling and tenderness present.      Comments: No noticeable swelling in the hands (MCP, PIP, DIP), wrists, elbows, ankles, feet.   Range of motion in the shoulders and hips are within accepted range for age.     Significant right knee swelling   Skin:     Findings: No rash.   Neurological:      Mental Status: He is alert.           Assessment & Plan     # IgG4 related disease  # Chronic kidney disease, multifactorial [diabetes, hypertension, IgG4, immune complex]  # Recurrent knee effusion, inflammatory cell count, concern for inflammatory arthropathy vs smoldering infection  # Chronic arthralgia   # Chronic diarrhea, pancreatitis, concern for IgG4 related disease  # History of acute kidney injury, leukocytoclastic vasculitis, less likely  ANCA vasculitis  # Peripheral arterial disease  # History of right foot osteomyelitis status post amputation  # Multiple comorbidities [anemia, diabetes with complications, hypertension, hyperlipidemia]  # Screening for chronic infections  # High risk medication requiring monitoring  # Longitudinal care        # IgG4 related disease  # Recurrent knee effusion, inflammatory cell count, concern for inflammatory arthropathy vs smoldering infection  # Chronic arthralgia   # Chronic diarrhea, pancreatitis, concern for IgG4 related disease    Long is presenting today for follow-up.    He is undergoing multidisciplinary approach with infectious disease, orthopedics, nephrology.  He underwent renal biopsy that showed diabetic glomerulosclerosis as well as features suspicious for IgG4 related disease, glomerulopathy with intramembranous and mesangial deposits. Right knee synovial biopsy showed inflammation with fibrinoid necrosis therefore he was treated with rituximab therapy without steroids given concerns of significant hyperglycemia if concurrent steroids were used.    He remains with chronic knee pain and diarrhea, he underwent arthrocentesis with our colleagues in orthopedics in January 2025 after completing rituximab infusions and indeed his cell count was 805 which has significantly improved from prior cell count [42K, 53K, 94K, 65K, 109K, 76K] and he received local corticosteroid injection.     Moreover his serum IgG4 has decreased and CRP has normalized with significant improvement in ESR.    We discussed that it is early to make a determination of the effectiveness of rituximab specially that we did not use concurrent prednisone yet we are seeing biochemical parameters suggesting improvement in his underlying inflammatory disease.      It is also possible that he has significant damage in the right knee that is contributing and could be possibly now driving his knee pain and swelling especially with the last synovial fluid being noninflammatory in nature.    He is understandably frustrated with continued knee pain and swelling and he is currently scheduled for follow-up with our colleagues in orthopedics next month therefore we discussed that if he remains with knee swelling and he is interested in an intervention then an operative intervention can be planned either synovectomy or knee replacement.  He has been working with spine orthopedics as well at [facility] for cervical fusion given his radiculopathy and this has been postponed in the last few months.    We discussed that typically in patients on rituximab that we advise to wait till 6 months from the last rituximab infusion to  perform surgeries so he would be okay to proceed with surgery in late June, early July 2025 and he can work with orthopedics knee and spine surgeons on the timing of both interventions and we would hold off on continued rituximab infusions at the current time to give him an opportunity to undergo these surgeries.     He has not started Bactrim for unclear reasons despite prescribing it on multiple occasions, I resent the prescription and recommended to started.    He is in agreement with this plan and seems comfortable with this approach.    Plan:   - Start Bactrim for PJP prophylaxis    - Follow-up with primary care provider and diabetic educator for monitoring of blood sugars    - [facility] pharmacy follow-up    - Orthopedics follow up for surgery of the right knee as above     - Spine surgeon follow up as above     # Chronic kidney disease, multifactorial [diabetes, hypertension, IgG4, immune complex]  Following with [facility]. Chinch[facility]rology [facility]      # Chronic diarrhea, pancreatitis, concern for IgG4 related disease  Following with [facility]     # Screening for chronic infections  2024  Hepatitis B surface antigen: Negative  Hepatitis B core: Negative  Hepatitis C antibody: Negative   QuantiFERON gold: Negative    # High risk medication requiring monitoring   Rituximab  Risks and side effects of rituximab were discussed at length including increased risk of infections [bacterial, viral, fungal, mycobacterial, CNS infection] and worsening of knee infection increased risk of cancer  and he is in agreement to start rituximab.      # Longitudinal care  The longitudinal plan of care for the diagnosis(es)/condition(s) as documented were addressed during this visit. Due to the added complexity in care, I will continue to support Long in the subsequent management and with ongoing continuity of care.    Review of the result(s) of each unique test - as HPI  Ordering of each unique test  Prescription drug  management          Return in about 4 months (around 6/26/2025).    Hector Cruz MD  [facility] RHEUMATOLOGY

## 2025-02-26 NOTE — LETTER
2025       RE: Long Mayfield  [address]  [facility] [place] [address]     Dear Colleague,    Thank you for referring your patient, Long Mayfield, to the [facility] RHEUMATOLOGY at [facility]. Please see a copy of my visit note below.    RHEUMATOLOGY OUTPATIENT CLINIC NOTE     Referring Provider:   Edward Zuleta MD     Lab review        HLA-B27: Negative  MPO/ID-3: Negative  Rheumatoid factor: Negative  CCP antibody: Negative  Ig elevated  IgG4: 224 elevated      KATHY: Negative  dsDNA:  Negative    Complement C3: normal  Complement C4: normal  ANCA by IFA: Negative   MPO: Negative    ID-3 Abs: Positive, mildly [place] [facility]     Cell count, right knee multiple aspirations    Latest Reference Range & Units 24 09:26 24 10:00 07/10/24 11:36 24 13:38 24 17:09 25   Total Nucleated Cells /uL 76,860 109,100 65,670 94,560 53,900 805     [place]kin biopsy 2023, [facility]   A. Skin, left, forearm, punch biopsy - Leukocytoclastic vasculitis.   B. Skin biopsy, left, forearm, direct immunofluorescence (DIF) -  Negative immunofluorescence      Soft tissue, right knee, biopsy, 10/2024:  - Tenosynovial, fibroadipose and skeletal muscular tissue with granulation tissue formation with fibrinoid necrosis and foci of microabscesses, vascular proliferation, patchy mixed acute and chronic inflammation with perivascular accentuation, and hemosiderin deposition.  - Gram stains (blocks A1-A4) are negative for bacterial organisms.  - Examination of a couple of focuses with lymphoplasmacytic-predominant infiltrates demonstrates scattered IgG4-positive plasma cells, with an IgG4/IgG plasma cell ratio of approximately 10-15% (lower than 40%, which is the generally applied criteria to support IgG4-related disease).  Overall, there are no characteristic histologic and immunophenotypic features within this biopsy specimen  to support IgG4-related disease.    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 (see comment) patchy dense lymphoplasmacytic interstitial inflammation; 10-20 IgG4+ plasma cells/400x field   Glomerulopathy with rare intramembranous and mesangial deposits reactive for C3      - the differential diagnosis includes a mild glomerulonephritis C3 or a resolving immune complex-mediated glomerulopathy      - there are no signs of an active glomerulitis currently     Moderate chronic changes of the parenchyma, including:   - focal global glomerulosclerosis (4% of glomeruli)   - focal tubular atrophy and interstitial fibrosis (40-50% of the cortex)   - severe arterial and arteriolar sclerosis      Imaging review      PET scan 10/2024  1. No evidence of hypermetabolic pulmonary nodules or suspicious uptake in the chest.   2. Large, complex right knee joint effusion with synovial thickening previously characterized on 9/21/2024 MRI. Multiple enlarged right inguinal and right iliac chain lymph nodes with mild to moderate FDG uptake are likely reactive to the right knee infective/inflammatory process.  3. Findings of positive fluid balance including trace ascites and diffuse mild subcutaneous anasarca. Right greater than left lower extremity edema.  4. Diffuse bone marrow uptake, which is nonspecific and may be related to anemia given low density blood pool.    MRI right knee 9/2024  1.  Large complex joint effusion with mildly thickened enhancing synovium of indeterminate etiology. Findings may be sequela of degenerative arthropathy, or an inflammatory or infectious arthropathy. If clinically indicated repeat arthrocentesis could be  performed for further characterization.  2.  Mild patchy enhancing edema-like marrow signal of the knee which is likely reactive. Early osteomyelitis cannot be entirely excluded, though, is considered less  likely.  3.  Tricompartmental degenerative arthrosis.  4.  Marked mucoid degeneration/maceration of the medial and lateral menisci.  5.  Chronic rupture of the ACL.  6.  Mucoid degeneration and/or partial tearing of the PCL.  7.  Intact collateral ligaments.    X-ray SIJ 9/2024  The SI joints are negative for sacroiliitis, ankylosis, or diastasis on this single projection. Pelvis negative for fracture. Both hips negative for fracture.    Hand X-ray 9/2024  Old healed fracture of the right first metacarpal. No evidence for acute fracture on either side.   Additional chronic fracture of the left scaphoid.   There is some sclerosis within the proximal fracture fragment which may represent superimposed  avascular necrosis of the proximal pole of the left scaphoid. Degenerative change both wrists, greater on the left at the radioscaphoid and radiolunate articulation with cystic change in the distal aspect of the left radius. No erosive changes are identified. Vascular calcifications bilaterally. Degenerative change at both first MCP joints.    X-ray left foot 9/2024  Degenerative change at the first MTP joint and IP joint of the great toe. No evidence for acute fracture. No erosive changes are identified. Vascular calcifications.    MRI cervical spine, outside, only report available 3/2024:  C4-5 moderate central/left-sided canal stenosis with left ventral cord impingement.  No abnormal T2 signal  Mild central canal stenosis with cord abutment C3-4  Multilevel degenerative foraminal stenosis severe left C 3-4, C4-5 with expected nerve impingement  Prominent erosive/inflammatory facet arthropathy left C3-4, C4-5     MRI Right foot, 1/2024:  1. Enhancing marrow edema at the second metatarsal amputation margin as well as head and neck of the third and fourth metatarsals. Indeterminate faint T1 hypointense marrow signal changes of the fourth metatarsal head and about the displaced oblique fracture of the third metatarsal  neck. Overall findings compatible with osteitis, possibly osteomyelitis.   2. Irregular soft tissue amputation margins of the medial forefoot with emanating enhancing tissue edema concerning for cellulitis      CT chest, Abdomen, pelvis without contrast, 12/2023:  1. Severe anasarca with moderate to large pleural effusions and a small pericardial effusion. Diffuse septal thickening in the lungs concerning for pulmonary edema. Anemic cardiac blood pool.   2. Solitary pulmonary nodule in the left lower lobe with an average diameter of 10 mm is indeterminate, but worrisome for neoplasm. This is new since 3/19/2022.   3. Patchy and confluent airspace consolidation within the left greater than right lung apices presumably represents infection, cannot exclude underlying soft tissue density nodularity on this noncontrast examination. Recommend pulmonary consultation.   4. Diffuse and severe vascular calcifications. No acute or suspicious abnormality noted within the abdomen or pelvis.        Subjective    Visit date February 26, 2025    Long is a 54 year old male who presents today for follow up.    Since the last visit,    He underwent repeat arthrocentesis of the right knee in January 2025, cell count showed 805 cells, 52% neutrophils, steroid injection locally was provided.    - Workup 12/03 showed:    Hemoglobin low  WBC  within normal limits   Platelets  elevated  ESR  elevated  CRP  elevated  AST  elevated  Creatinine  within normal limits      Today, Long mentioned the following:    He completed the rituximab infusions without complications     He remains with pain and swelling in the right knee     He remains with diarrhea, not improved with rituximab infusion     He remains with joint pain in the neck, shoulder and arm     Review of Systems   Constitutional:  Negative for fever.   HENT:  Negative for hearing loss, nosebleeds and sinus pain.    Gastrointestinal:  Negative for blood in stool.   Genitourinary:   Negative for hematuria.   Musculoskeletal:  Positive for back pain and joint pain.   Skin:  Negative for rash.       Past Medical history   Past Medical History:   Diagnosis Date     Abnormal CXR 10/15/2024    Previous CT of the chest completed at [facility] [place] in December 2023 demonstrated:   Impression:   1. Severe anasarca with moderate to large pleural effusions and a small pericardial effusion. Diffuse septal thickening in the lungs concerning for pulmonary edema. Anemic cardiac blood pool.   2. Solitary pulmonary nodule in the left lower lobe with an average diameter of 10 mm is indeterminat     Abnormal LFTs 01/03/2025     Acidosis 01/08/2020     Acquired absence of other right toe(s) 01/12/2024     Acute metabolic encephalopathy 09/17/2024     Acute pain of right knee 07/12/2024     Acute pulmonary edema (H) 01/11/2024     Acute renal insufficiency 05/03/2024    Last Comprehensive Metabolic Panel:          Lab Results      Component    Value    Date           NA    131 (L)    10/15/2024           POTASSIUM    4.0    10/15/2024           CHLORIDE    98    10/15/2024           CO2    24    10/15/2024           ANIONGAP    9    10/15/2024           GLC    133 (A)    10/23/2024           BUN    22.3 (H)    10/15/2024           CR    1.35 (H)    10/15/2024         Alcohol abuse 09/12/2022     Alcohol dependence in remission (H) 10/29/2024     Allergic rhinitis      Anemia      Anemia, unspecified type 05/03/2024    He has a history of anemia which has been noted since May 2024.  Cause has not been identified.  Additional evaluation with CBC, retake count, TSH, haptoglobin, iron studies, LDH, ferritin, B12 and folate.  Ferritin elevated 418, iron low at 20, iron binding capacity low at 2030, iron saturation low at 9.  Normal B12 and folate.  Awaiting haptoglobin and reticulocyte count.  Denies blood in stool.     Anxiety disorder, unspecified 01/11/2024     Arthritis      Arthritis due to other bacteria,  right knee (H) 08/19/2024     Atherosclerotic heart disease of native coronary artery without angina pectoris 01/11/2024     Bell's palsy      Bilateral lower extremity edema 12/18/2024     Cardiac calcification (H) 01/19/2023    Treatment:  Colestid 1 g tablet.  Aspirin 81 mg daily.       Cervical disc disease with myelopathy 05/01/2022    Formatting of this note might be different from the original.   seen spine specialist- [facility]  Formatting of this note might be different from the original.   seen spine specialist       Cervical radiculopathy 07/11/2024     Cervicalgia      Change or removal of drains 08/26/2024     Chronic bilateral low back pain with bilateral sciatica 04/08/2022     Chronic diarrhea 03/08/2022     Chronic kidney disease, unspecified 01/11/2024     Chronic pancreatitis (H) 09/06/2024    Due to continued right knee swelling and history of chronic pancreatitis with chronic diarrhea, he was referred to GI to review possible Whipple's disease.     10/18/2024 seen by [facility].  We do not have those records and will request those records to be faxed over.  Once I get those records I will review them with recommendations.  Per patient he is to follow-up in 6 months.       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  Started rituximab infusions 12/4/2024.       Ketosis (H) 03/08/2022     Long term (current) use of insulin (H) 08/19/2024     Major depressive disorder, recurrent episode, mild      Major depressive disorder, recurrent, mild 08/19/2024     Muscle  "wasting and atrophy, not elsewhere classified, multiple sites 04/01/2024     Muscle weakness (generalized) 01/12/2024     Need for assistance with personal care 01/12/2024     Osteomyelitis of great toe of right foot (H) 11/22/2023     Other abnormalities of gait and mobility 01/12/2024     Other acute osteomyelitis, right ankle and foot (H)      Other chronic pancreatitis (H)      Other fatigue 11/14/2024     Other hyperlipidemia 03/15/2022     Other polyosteoarthritis      Peripheral vascular disease, unspecified 08/19/2024     Pneumonia of left lower lobe due to infectious organism 09/11/2024     Primary hypertension 10/09/2019     Pyogenic arthritis of right knee joint, due to unspecified organism (H) 07/11/2024    Right knee inflammatory arthritis, etiology to be determined, s/p I&D Jul 11, Aug 18, 2024.         Brucella, Bartonella, Q fever, CD4, Ig levels.     MRI right knee with and without contrast if negative.     Rheumatology and GI consult.     Complete oral ciprofloxacin course.      Admitted to [facility] 8/16 to 8/19/2024.  \"Has been seen a handful of times and outpatient follow-up with persistent right     Right knee pain 07/11/2024     S/P transmetatarsal amputation of foot, right (H) 12/01/2023     s/p transmetatarsal amputation, right foot (DOS 1/9/24)       Severe episode of recurrent major depressive disorder, without psychotic features (H) 01/19/2023     Solitary pulmonary nodule      Stage 3a chronic kidney disease (H) 1/11/2024     Systemic vasculitis (H) 11/19/2024     Type 2 diabetes mellitus with foot ulcer (H) 01/11/2024     Type 2 diabetes mellitus with hyperosmolarity without coma, with long-term current use of insulin (H) 10/29/2024     Type 2 diabetes mellitus with kidney complication, with long-term current use of insulin (H) 10/05/2018    Seen by diabetes educator 10/29/2024:   PLAN  Lantus: 12 units  Set dose Novolog at meals: 3 units each + Correction Scale below     Pre-Meal Correction " Scale:        If pre-meal glucose is:    Add extra Humalog/Novolog to your meal dose per below chart:      151 - 200    +1 unit      201 - 250    +2 units      251 - 300    +3 units      301 - 350    +4 units      351 - 400     +5 units      Over      Ulcer of right heel and midfoot (H) 12/23/2024     Unintentional weight loss 10/11/2023     Vitamin D deficiency, unspecified 01/11/2024       Objective  BP (!) 140/84 (BP Location: Left arm, Patient Position: Sitting, Cuff Size: Adult Regular)   Pulse 69   Wt 57.4 kg (126 lb 9.6 oz)   SpO2 99%   BMI 21.07 kg/m        PHYSICAL EXAMINATION  Physical Exam  HENT:      Head: Normocephalic.      Mouth/Throat:      Mouth: Mucous membranes are moist.   Eyes:      Conjunctiva/sclera: Conjunctivae normal.   Pulmonary:      Effort: Pulmonary effort is normal.      Breath sounds: Normal breath sounds.   Musculoskeletal:         General: Swelling and tenderness present.      Comments: No noticeable swelling in the hands (MCP, PIP, DIP), wrists, elbows, ankles, feet.   Range of motion in the shoulders and hips are within accepted range for age.     Significant right knee swelling   Skin:     Findings: No rash.   Neurological:      Mental Status: He is alert.           Assessment & Plan    # IgG4 related disease  # Chronic kidney disease, multifactorial [diabetes, hypertension, IgG4, immune complex]  # Recurrent knee effusion, inflammatory cell count, concern for inflammatory arthropathy vs smoldering infection  # Chronic arthralgia   # Chronic diarrhea, pancreatitis, concern for IgG4 related disease  # History of acute kidney injury, leukocytoclastic vasculitis, less likely  ANCA vasculitis  # Peripheral arterial disease  # History of right foot osteomyelitis status post amputation  # Multiple comorbidities [anemia, diabetes with complications, hypertension, hyperlipidemia]  # Screening for chronic infections  # High risk medication requiring monitoring  # Longitudinal  care        # IgG4 related disease  # Recurrent knee effusion, inflammatory cell count, concern for inflammatory arthropathy vs smoldering infection  # Chronic arthralgia   # Chronic diarrhea, pancreatitis, concern for IgG4 related disease    Long is presenting today for follow-up.    He is undergoing multidisciplinary approach with infectious disease, orthopedics, nephrology. He underwent renal biopsy that showed diabetic glomerulosclerosis as well as features suspicious for IgG4 related disease, glomerulopathy with intramembranous and mesangial deposits. Right knee synovial biopsy showed inflammation with fibrinoid necrosis therefore he was treated with rituximab therapy without steroids given concerns of significant hyperglycemia if concurrent steroids were used.    He remains with chronic knee pain and diarrhea, he underwent arthrocentesis with our colleagues in orthopedics in January 2025 after completing rituximab infusions and indeed his cell count was 805 which has significantly improved from prior cell count [42K, 53K, 94K, 65K, 109K, 76K] and he received local corticosteroid injection.     Moreover his serum IgG4 has decreased and CRP has normalized with significant improvement in ESR.    We discussed that it is early to make a determination of the effectiveness of rituximab specially that we did not use concurrent prednisone yet we are seeing biochemical parameters suggesting improvement in his underlying inflammatory disease.      It is also possible that he has significant damage in the right knee that is contributing and could be possibly now driving his knee pain and swelling especially with the last synovial fluid being noninflammatory in nature.    He is understandably frustrated with continued knee pain and swelling and he is currently scheduled for follow-up with our colleagues in orthopedics next month therefore we discussed that if he remains with knee swelling and he is interested in an  intervention then an operative intervention can be planned either synovectomy or knee replacement.  He has been working with spine orthopedics as well at [facility] for cervical fusion given his radiculopathy and this has been postponed in the last few months.    We discussed that typically in patients on rituximab that we advise to wait till 6 months from the last rituximab infusion to perform surgeries so he would be okay to proceed with surgery in late June, early July 2025 and he can work with orthopedics knee and spine surgeons on the timing of both interventions and we would hold off on continued rituximab infusions at the current time to give him an opportunity to undergo these surgeries.     He has not started Bactrim for unclear reasons despite prescribing it on multiple occasions, I resent the prescription and recommended to started.    He is in agreement with this plan and seems comfortable with this approach.    Plan:   - Start Bactrim for PJP prophylaxis    - Follow-up with primary care provider and diabetic educator for monitoring of blood sugars    - [facility] follow-up    - Orthopedics follow up for surgery of the right knee as above     - Spine surgeon follow up as above     # Chronic kidney disease, multifactorial [diabetes, hypertension, IgG4, immune complex]  Following with Dr. Chinchilla nephrology at [facility]      # Chronic diarrhea, pancreatitis, concern for IgG4 related disease  Following with [facility]     # Screening for chronic infections  2024  Hepatitis B surface antigen: Negative  Hepatitis B core: Negative  Hepatitis C antibody: Negative   QuantiFERON gold: Negative    # High risk medication requiring monitoring   Rituximab  Risks and side effects of rituximab were discussed at length including increased risk of infections [bacterial, viral, fungal, mycobacterial, CNS infection] and worsening of knee infection increased risk of cancer  and he is in agreement to start rituximab.      # Longitudinal  care  The longitudinal plan of care for the diagnosis(es)/condition(s) as documented were addressed during this visit. Due to the added complexity in care, I will continue to support Long in the subsequent management and with ongoing continuity of care.    Review of the result(s) of each unique test - as HPI  Ordering of each unique test  Prescription drug management          Return in about 4 months (around 6/26/2025).    Hector Cruz MD  [facility] RHEUMATOLOGY      Again, thank you for allowing me to participate in the care of your patient.      Sincerely,    Hector Cruz MD

## 2025-02-26 NOTE — NURSING NOTE
Chief Complaint   Patient presents with    RECHECK     BP (!) 140/84 (BP Location: Left arm, Patient Position: Sitting, Cuff Size: Adult Regular)   Pulse 69   Wt 57.4 kg (126 lb 9.6 oz)   SpO2 99%   BMI 21.07 kg/m

## 2025-02-27 ENCOUNTER — ALLIED HEALTH/NURSE VISIT (OUTPATIENT)
Dept: EDUCATION SERVICES | Facility: CLINIC | Age: 55
End: 2025-02-27
Payer: COMMERCIAL

## 2025-02-27 ENCOUNTER — PATIENT OUTREACH (OUTPATIENT)
Dept: CARE COORDINATION | Facility: CLINIC | Age: 55
End: 2025-02-27

## 2025-02-27 VITALS — WEIGHT: 125.9 LBS | BODY MASS INDEX: 20.98 KG/M2 | HEIGHT: 65 IN

## 2025-02-27 DIAGNOSIS — E11.22 TYPE 2 DIABETES MELLITUS WITH STAGE 3A CHRONIC KIDNEY DISEASE, WITH LONG-TERM CURRENT USE OF INSULIN (H): Primary | ICD-10-CM

## 2025-02-27 DIAGNOSIS — N18.31 TYPE 2 DIABETES MELLITUS WITH STAGE 3A CHRONIC KIDNEY DISEASE, WITH LONG-TERM CURRENT USE OF INSULIN (H): Primary | ICD-10-CM

## 2025-02-27 DIAGNOSIS — Z79.4 TYPE 2 DIABETES MELLITUS WITH STAGE 3A CHRONIC KIDNEY DISEASE, WITH LONG-TERM CURRENT USE OF INSULIN (H): Primary | ICD-10-CM

## 2025-02-27 NOTE — PROGRESS NOTES
Diabetes Self-Management Education & Support    Presents for: Follow-up type 2 diabetes, low C-peptide, CKD stage 3a    Type of Service: In Person Visit      Assessment  2/6/2025  Patient new to writer.  Longstanding history of poorly controlled diabetes as evidenced by elevated A1cs and complications including CKD and toe amputations on foot, slow healing ulcer on right heel.  Current diabetes regimen MDI Lantus and NovoLog with questionable insulin use.  Patient is utilizing Dexcom G6 sensor, patient reports Dexcom G7 not approved.  Patient is interested in pump therapy but will first need to have C-peptide drawn to evaluate to see if he qualifies with current insurance requiring low C-peptide value.      2/27/2025 Follow up.  Lantus patient did not increase as directed had been on 18 units now on 20 units still overnight mean from 12 AM to 7  mg/dL.  Patient does frequently forgets to take insulin did not fill out simple glucose/insulin dosing sheets.  Patient is shown how to log insulin injections on the Dexcom haylee.  Patient did have 1 hypoglycemic episode with reading of 60 resulting from NovoLog dosing then spiked to greater than 400 from over correcting.  Will reduce NovoLog dosing and increase Lantus dosing.    Insulin pump, patient does have a low C-peptide likely qualifying him for insulin pump therapy.  Patient has shown a couple of insulin pumps today and patient does not want an insulin pump with tubing therefore OmniPod is patient's choice.  OmniPod process started today for potential 30-day trial period.      Patient's most recent   Lab Results   Component Value Date    A1C 11.5 01/31/2025     is not meeting goal of <8.0    Diabetes knowledge and skills assessment:   Patient is knowledgeable in diabetes management concepts related to: Patient benefits from reeducation in all areas    Based on learning assessment above, most appropriate setting for further diabetes education would be: Individual  setting.    Care Plan and Education Provided:  Healthy Eating: Carbohydrate Counting and basic insulin to carbohydrate ratio 1 unit for every carbohydrate choice plus correction factor previously used    Being Active: Relationship of activity to glucose -patient does use a cane Limited physical activity    Monitoring: Blood glucose versus Continuous Glucose Monitoring and Individual glucose targets,     Taking Medication: Action of prescribed medication(s) again Long discussion regarding insulin dosing Lantus insulin action time.  Encouraged a few days of lower carbohydrate intake to help patient understand Lantus needs and adjust dose.      Problem Solving: Rule of 15 and carrying a carbohydrate source at all times in case of low glucose,     Reducing Risks: Complications of diabetes -difficult for patient to grasp how hyperglycemia can lead to further complications as patient feels fine with glucose readings in the 300s    Healthy Coping: Methods of coping with stress      Patient verbalized understanding of diabetes self-management education concepts discussed, opportunities for ongoing education and support, and recommendations provided today.    Plan    Lantus currently at 20 units continue to go up by 1 unit every day or every couple of days     Goal 90 - 140 consistently so if you don't eat or drink anything with carbs your glucose should be really stable     Novolog   1 unit for each carb choice that you eat (15 grams of carbs)     1 slice of bread 1 unit   1/3 cup of pasta, rice or potato   1 piece of fruit 1 unit   PLUS   (NOVOLOG   200-250 = 1   251-300 = 2   301-350 = 3  351-400 = 4  401+ = 5     Representative from [facility] will be calling patient to discuss OmniPod coverage and cost    Topics to cover at upcoming visits: Healthy Eating, Monitoring, Taking Medication, Reducing Risks, and Insulin Pump Concepts -- Pump hardware - tubed vs pod/patch, infusion sets, cannulas, reservoirs & cartridges    -- Pump software - automated insulin delivery systems, features of different AID algorithms   -- Balancing glucose and insulin - carbohydrate counting, bolus calculator, ICR, ISF, timing of insulin delivery, troubleshooting missed boluses   -- Problem solving with insulin pump therapy - risk of pump failure, MDI back up plan, risk of DKA, keeping adequate supplies on hand, risk of hypoglycemia, exiting and re-entering automated delivery modes   -- Hands on practice with insulin pump simulator apps      Follow-up:  Upcoming Diabetes Ed Appointments     Visit Type Date Time Department    DIABETES ED 2/27/2025  2:00 PM [facility] DIABETES EDUCATION    DIABETES ED 3/27/2025  2:00 PM [facility] DIABETES EDUCATION        See Care Plan for co-developed, patient-state behavior change goals.    Education Materials Provided:  Omnipod materials and carbohydrate counting and choices keeping it simple      Subjective/Objective  Long is an 54 year old year old, presenting for the following diabetes education related to: Presents for: Follow-up  Accompanied by: Self  Diabetes education in the past 24mo: Yes  Focus of Visit: Problem Solving, Monitoring, Taking Medication, Healthy Eating  Diabetes type: Type 2  Disease course: Stable  How confident are you filling out medical forms by yourself:: Somewhat  Transportation concerns: Yes (Takes a taxi)  Difficulty affording diabetes medication?: No  Difficulty affording diabetes testing supplies?: No  Other concerns:: Other (Questionable memory, insight to diabetes care)  Cultural Influences/Ethnic Background:  Not  or     Diabetes Symptoms & Complications:  Weight trend: Stable  Symptom course: Stable  Disease course: Stable  Complications assessed today?: Yes  Autonomic neuropathy: Yes  Heart disease: Yes  Nephropathy: Yes  Peripheral neuropathy: Yes  Peripheral Vascular Disease: Yes    Patient Problem List and Family Medical History reviewed for relevant medical history,  "current medical status, and diabetes risk factors.    Vitals:  Ht 1.651 m (5' 5\")   Wt 57.1 kg (125 lb 14.4 oz)   BMI 20.95 kg/m    Estimated body mass index is 20.95 kg/m  as calculated from the following:    Height as of this encounter: 1.651 m (5' 5\").    Weight as of this encounter: 57.1 kg (125 lb 14.4 oz).   Last 3 BP:   BP Readings from Last 3 Encounters:   02/26/25 (!) 140/84   02/25/25 100/42   02/11/25 (!) 144/90       History   Smoking Status    Never   Smokeless Tobacco    Never       Labs:  Lab Results   Component Value Date    A1C 11.5 01/31/2025     Lab Results   Component Value Date     01/06/2025     10/23/2024     Lab Results   Component Value Date    LDL 52 09/24/2024     Direct Measure HDL   Date Value Ref Range Status   09/24/2024 45 >=40 mg/dL Final   ]  GFR Estimate   Date Value Ref Range Status   01/06/2025 66 >60 mL/min/1.73m2 Final     Comment:     eGFR calculated using 2021 CKD-EPI equation.   10/08/2017 >90 >60 mL/min/1.7m2 Final     Comment:     Non  GFR Calc     GFR, ESTIMATED POCT   Date Value Ref Range Status   07/09/2024 41 (L) >60 mL/min/1.73m2 Final     GFR Estimate If Black   Date Value Ref Range Status   10/08/2017 >90 >60 mL/min/1.7m2 Final     Comment:      GFR Calc     Lab Results   Component Value Date    CR 1.28 01/06/2025    CR 0.68 10/08/2017     No results found for: \"MICROALBUMIN\"    Healthy Eating:  Healthy Eating Assessed Today: Yes  Cultural/Caodaism diet restrictions?: No  Do you have any food allergies or intolerances?: No  Meal planning/habits: Low salt, Frequent snacking  Who cooks/prepares meals for you?: Self  Who purchases food in  your home?: Self  How many times a week on average do you eat food made away from home (restaurant/take-out)?: 0  Meals include: Breakfast, Lunch, Dinner, Morning Snack, Afternoon Snack, Evening Snack  Other: Frequently eating does not have a certain routine sounds like often having " "sandwiches questionable amount of food preparation.  Some food insecurity patient is waiting approval for disability to start receiving additional finances.  Patient states anything he eats half of it \" runs right through me\" patient does take Creon 3 tablets with all meals but patient reports that does not do anything.  Does like chicken and salsa and tuna, does not have fruit at home  Beverages: Water, Juice, Soda (Regular soda ~1 x/ day)  Has patient met with a dietitian in the past?: Yes    Being Active:  Being Active Assessed Today: Yes  Exercise:: Unable to exercise  Barrier to exercise: Physical limitation (Uses a cane)    Monitoring:  Monitoring Assessed Today: Yes  Did patient bring glucose meter to appointment? : Yes  Blood Glucose Meter: CGM  Blood glucose trend: No change    Note 18 out of 30 days, 14% time in target improved from 10% time in target      Taking Medications:  Diabetes Medication(s)       Insulin       insulin aspart (NOVOLOG FLEXPEN) 100 UNIT/ML pen Inject 1-5 Units subcutaneously 3 times daily (with meals). In addition to 6-8 units with each meal, inject additional units as per this sliding scale:  If 200-250 = 1   251-300 = 2   301-350 = 3  351-400 = 4  401+ = 5  Repeat BS after 3 hours and call MD if still over 400     insulin glargine (LANTUS PEN) 100 UNIT/ML pen Currently at 20 units will be increasing up to max dose 30 units     insulin NPH (HUMULIN N VIAL) 100 UNIT/ML vial INJECT 13 UNITS AT THE START OF YOUR STEROID INFUSION AT CLINIC WHICH IS SCHEDULED FOR 12/4/24          Taking Medication Assessed Today: Yes  Current Treatments: Insulin Injections (Forgetful)  Given by: Patient  Problems taking diabetes medications regularly?: Yes  Diabetes medication side effects?: No    Problem Solving:  Problem Solving Assessed Today: Yes  Is the patient at risk for hypoglycemia?: Yes  Hypoglycemia Treatment: Glucose (tablets or gel), Juice, Candy, Other food  Is the patient at risk for " DKA?: Yes        Reducing Risks:  Reducing Risks Assessed Today: No  Diabetes Risks: Age over 45 years  CAD Risks: Male sex  Has dilated eye exam at least once a year?: Yes  Sees dentist every 6 months?: Yes  Feet checked by healthcare provider in the last year?: Yes    Healthy Coping:  Healthy Coping Assessed Today: Yes  Emotional response to diabetes: Unable to access  Informal Support system:: Caridad based  Stage of change: PREPARATION (Decided to change - considering how)  Support resources: In-person Offerings    Lucia Mir RD  Time Spent: 60 minutes  Encounter Type: Individual    Any diabetes medication dose changes were made via the CDCES Standing Orders under the patient's referring provider.

## 2025-02-27 NOTE — PATIENT INSTRUCTIONS
Lantus currently at 20 units continue to go up by 1 unit every day or every couple of days     Goal 90 - 140 consistently so if you don't eat or drink anything with carbs your glucose should be really stable     Novolog   1 unit for each carb choice that you eat (15 grams of carbs)     1 slice of bread 1 unit   1/3 cup of pasta, rice or potato   1 piece of fruit 1 unit   PLUS   (NOVOLOG   200-250 = 1   251-300 = 2   301-350 = 3  351-400 = 4  401+ = 5       Our partner, [facility], will check your benefits for the Omnipod  5 Automated Insulin Delivery System.    [facility] and its dedicated staff power the Omnipod Pharmacy Support Program. The [facility] team is here for you to check your coverage and coordinate fulfillment at your pharmacy of choice.    What's next:  Icon Next Steps 1  [facility] will contact your doctor for a prescription for the Omnipod 5 Automated Insulin Delivery System.    Icon Next Steps 2  Once they have the prescription, they will let you know what your copay will be.    Icon Next Steps 3  If the copay works for you, choose your preferred pharmacy and get ready to Pod!    You should receive initial contact within 1-2 business days. After this time period, if you have yet not received any calls or messages from [facility], give them a call at [phone]. (Messages should come from [phone].)

## 2025-02-27 NOTE — LETTER
2/27/2025         RE: Long Mayfiled  [address]  [place] [address]        Dear Colleague,    Thank you for referring your patient, Long Mayfield, to the [facility]. Please see a copy of my visit note below.    Diabetes Self-Management Education & Support    Presents for: Follow-up type 2 diabetes, low C-peptide, CKD stage 3a    Type of Service: In Person Visit      Assessment  2/6/2025  Patient new to writer.  Longstanding history of poorly controlled diabetes as evidenced by elevated A1cs and complications including CKD and toe amputations on foot, slow healing ulcer on right heel.  Current diabetes regimen MDI Lantus and NovoLog with questionable insulin use.  Patient is utilizing Dexcom G6 sensor, patient reports Dexcom G7 not approved.  Patient is interested in pump therapy but will first need to have C-peptide drawn to evaluate to see if he qualifies with current insurance requiring low C-peptide value.      2/27/2025 Follow up.  Lantus patient did not increase as directed had been on 18 units now on 20 units still overnight mean from 12 AM to 7  mg/dL.  Patient does frequently forgets to take insulin did not fill out simple glucose/insulin dosing sheets.  Patient is shown how to log insulin injections on the Dexcom haylee.  Patient did have 1 hypoglycemic episode with reading of 60 resulting from NovoLog dosing then spiked to greater than 400 from over correcting.  Will reduce NovoLog dosing and increase Lantus dosing.    Insulin pump, patient does have a low C-peptide likely qualifying him for insulin pump therapy.  Patient has shown a couple of insulin pumps today and patient does not want an insulin pump with tubing therefore OmniPod is patient's choice.  OmniPod process started today for potential 30-day trial period.      Patient's most recent   Lab Results   Component Value Date    A1C 11.5 01/31/2025     is not meeting goal of <8.0    Diabetes  knowledge and skills assessment:   Patient is knowledgeable in diabetes management concepts related to: Patient benefits from reeducation in all areas    Based on learning assessment above, most appropriate setting for further diabetes education would be: Individual setting.    Care Plan and Education Provided:  Healthy Eating: Carbohydrate Counting and basic insulin to carbohydrate ratio 1 unit for every carbohydrate choice plus correction factor previously used    Being Active: Relationship of activity to glucose -patient does use a cane Limited physical activity    Monitoring: Blood glucose versus Continuous Glucose Monitoring and Individual glucose targets,     Taking Medication: Action of prescribed medication(s) again Long discussion regarding insulin dosing Lantus insulin action time.  Encouraged a few days of lower carbohydrate intake to help patient understand Lantus needs and adjust dose.      Problem Solving: Rule of 15 and carrying a carbohydrate source at all times in case of low glucose,     Reducing Risks: Complications of diabetes -difficult for patient to grasp how hyperglycemia can lead to further complications as patient feels fine with glucose readings in the 300s    Healthy Coping: Methods of coping with stress      Patient verbalized understanding of diabetes self-management education concepts discussed, opportunities for ongoing education and support, and recommendations provided today.    Plan    Lantus currently at 20 units continue to go up by 1 unit every day or every couple of days     Goal 90 - 140 consistently so if you don't eat or drink anything with carbs your glucose should be really stable     Novolog   1 unit for each carb choice that you eat (15 grams of carbs)     1 slice of bread 1 unit   1/3 cup of pasta, rice or potato   1 piece of fruit 1 unit   PLUS   (NOVOLOG   200-250 = 1   251-300 = 2   301-350 = 3  351-400 = 4  401+ = 5     Representative from [facility] will be  calling patient to discuss OmniPod coverage and cost    Topics to cover at upcoming visits: Healthy Eating, Monitoring, Taking Medication, Reducing Risks, and Insulin Pump Concepts -- Pump hardware - tubed vs pod/patch, infusion sets, cannulas, reservoirs & cartridges   -- Pump software - automated insulin delivery systems, features of different AID algorithms   -- Balancing glucose and insulin - carbohydrate counting, bolus calculator, ICR, ISF, timing of insulin delivery, troubleshooting missed boluses   -- Problem solving with insulin pump therapy - risk of pump failure, MDI back up plan, risk of DKA, keeping adequate supplies on hand, risk of hypoglycemia, exiting and re-entering automated delivery modes   -- Hands on practice with insulin pump simulator apps      Follow-up:  Upcoming Diabetes Ed Appointments     Visit Type Date Time Department    DIABETES ED 2/27/2025  2:00 PM [facility] DIABETES EDUCATION    DIABETES ED 3/27/2025  2:00 PM [facility] DIABETES EDUCATION        See Care Plan for co-developed, patient-state behavior change goals.    Education Materials Provided:  Omnipod materials and carbohydrate counting and choices keeping it simple      Subjective/Objective  Long is an 54 year old year old, presenting for the following diabetes education related to: Presents for: Follow-up  Accompanied by: Self  Diabetes education in the past 24mo: Yes  Focus of Visit: Problem Solving, Monitoring, Taking Medication, Healthy Eating  Diabetes type: Type 2  Disease course: Stable  How confident are you filling out medical forms by yourself:: Somewhat  Transportation concerns: Yes (Takes a taxi)  Difficulty affording diabetes medication?: No  Difficulty affording diabetes testing supplies?: No  Other concerns:: Other (Questionable memory, insight to diabetes care)  Cultural Influences/Ethnic Background:  Not  or     Diabetes Symptoms & Complications:  Weight trend: Stable  Symptom course: Stable  Disease  "course: Stable  Complications assessed today?: Yes  Autonomic neuropathy: Yes  Heart disease: Yes  Nephropathy: Yes  Peripheral neuropathy: Yes  Peripheral Vascular Disease: Yes    Patient Problem List and Family Medical History reviewed for relevant medical history, current medical status, and diabetes risk factors.    Vitals:  Ht 1.651 m (5' 5\")   Wt 57.1 kg (125 lb 14.4 oz)   BMI 20.95 kg/m    Estimated body mass index is 20.95 kg/m  as calculated from the following:    Height as of this encounter: 1.651 m (5' 5\").    Weight as of this encounter: 57.1 kg (125 lb 14.4 oz).   Last 3 BP:   BP Readings from Last 3 Encounters:   02/26/25 (!) 140/84   02/25/25 100/42   02/11/25 (!) 144/90       History   Smoking Status     Never   Smokeless Tobacco     Never       Labs:  Lab Results   Component Value Date    A1C 11.5 01/31/2025     Lab Results   Component Value Date     01/06/2025     10/23/2024     Lab Results   Component Value Date    LDL 52 09/24/2024     Direct Measure HDL   Date Value Ref Range Status   09/24/2024 45 >=40 mg/dL Final   ]  GFR Estimate   Date Value Ref Range Status   01/06/2025 66 >60 mL/min/1.73m2 Final     Comment:     eGFR calculated using 2021 CKD-EPI equation.   10/08/2017 >90 >60 mL/min/1.7m2 Final     Comment:     Non  GFR Calc     GFR, ESTIMATED POCT   Date Value Ref Range Status   07/09/2024 41 (L) >60 mL/min/1.73m2 Final     GFR Estimate If Black   Date Value Ref Range Status   10/08/2017 >90 >60 mL/min/1.7m2 Final     Comment:      GFR Calc     Lab Results   Component Value Date    CR 1.28 01/06/2025    CR 0.68 10/08/2017     No results found for: \"MICROALBUMIN\"    Healthy Eating:  Healthy Eating Assessed Today: Yes  Cultural/Restoration diet restrictions?: No  Do you have any food allergies or intolerances?: No  Meal planning/habits: Low salt, Frequent snacking  Who cooks/prepares meals for you?: Self  Who purchases food in  your home?: " "Self  How many times a week on average do you eat food made away from home (restaurant/take-out)?: 0  Meals include: Breakfast, Lunch, Dinner, Morning Snack, Afternoon Snack, Evening Snack  Other: Frequently eating does not have a certain routine sounds like often having sandwiches questionable amount of food preparation.  Some food insecurity patient is waiting approval for disability to start receiving additional finances.  Patient states anything he eats half of it \" runs right through me\" patient does take Creon 3 tablets with all meals but patient reports that does not do anything.  Does like chicken and salsa and tuna, does not have fruit at home  Beverages: Water, Juice, Soda (Regular soda ~1 x/ day)  Has patient met with a dietitian in the past?: Yes    Being Active:  Being Active Assessed Today: Yes  Exercise:: Unable to exercise  Barrier to exercise: Physical limitation (Uses a cane)    Monitoring:  Monitoring Assessed Today: Yes  Did patient bring glucose meter to appointment? : Yes  Blood Glucose Meter: CGM  Blood glucose trend: No change    Note 18 out of 30 days, 14% time in target improved from 10% time in target      Taking Medications:  Diabetes Medication(s)       Insulin       insulin aspart (NOVOLOG FLEXPEN) 100 UNIT/ML pen Inject 1-5 Units subcutaneously 3 times daily (with meals). In addition to 6-8 units with each meal, inject additional units as per this sliding scale:  If 200-250 = 1   251-300 = 2   301-350 = 3  351-400 = 4  401+ = 5  Repeat BS after 3 hours and call MD if still over 400     insulin glargine (LANTUS PEN) 100 UNIT/ML pen Currently at 20 units will be increasing up to max dose 30 units     insulin NPH (HUMULIN N VIAL) 100 UNIT/ML vial INJECT 13 UNITS AT THE START OF YOUR STEROID INFUSION AT CLINIC WHICH IS SCHEDULED FOR 12/4/24          Taking Medication Assessed Today: Yes  Current Treatments: Insulin Injections (Forgetful)  Given by: Patient  Problems taking diabetes " medications regularly?: Yes  Diabetes medication side effects?: No    Problem Solving:  Problem Solving Assessed Today: Yes  Is the patient at risk for hypoglycemia?: Yes  Hypoglycemia Treatment: Glucose (tablets or gel), Juice, Candy, Other food  Is the patient at risk for DKA?: Yes        Reducing Risks:  Reducing Risks Assessed Today: No  Diabetes Risks: Age over 45 years  CAD Risks: Male sex  Has dilated eye exam at least once a year?: Yes  Sees dentist every 6 months?: Yes  Feet checked by healthcare provider in the last year?: Yes    Healthy Coping:  Healthy Coping Assessed Today: Yes  Emotional response to diabetes: Unable to access  Informal Support system:: Caridad based  Stage of change: PREPARATION (Decided to change - considering how)  Support resources: In-person Offerings    Lucia Mir RD  Time Spent: 60 minutes  Encounter Type: Individual    Any diabetes medication dose changes were made via the CDCES Standing Orders under the patient's referring provider.

## 2025-02-27 NOTE — PROGRESS NOTES
Anemia Management Note - Reminder     Follow-up with anemia management service:    Call to [facility] to check that Aranesp was delivered. He will need labs done prior to giving first dose, as it's been over 3 weeks.   LVM with call back number      Latest Ref Rng & Units 12/3/2024 12/12/2024 12/13/2024 12/23/2024 1/6/2025 1/31/2025 2/5/2025   Anemia   HGB Goal     9 - 10 9 - 10     DIAN Dose     40mcg 40mcg  40mcg   Hemoglobin 13.3 - 17.7 g/dL 8.2  8.3  8.0   9.6  9.5     TSAT 15 - 46 %  20    21      Ferritin 31 - 409 ng/mL  499    470            Follow-up call date: [id number]    Carrie Leopold, RN BSN  Anemia Services  [facility]  [email or web address]  Office: [phone]  Fax [phone]

## 2025-02-27 NOTE — PROGRESS NOTES
Clinic Care Coordination Contact    Situation: Patient chart reviewed by care coordinator.    Background: Incoming telephone message received from PCP asking for CCC to review Vascular appointment note from yesterday related to patient having difficulty obtaining wound care supplies.  PCP additionally asking for patient support related to his diabetic management.    Assessment: Called to [facility] and left a VM for patient's RN Case Manager.  Inquiring on the status of his wound care supplies and how they are being managed and by whom.    Plan/Recommendations: Awaiting a call back.    Will follow up on concerns related to patient's diabetic management with RN Case Manager.

## 2025-03-01 NOTE — PATIENT INSTRUCTIONS
Our plan for today is:    - Tests:    Labs with next appointment     - Medications:    Start Bactrim daily

## 2025-03-03 ENCOUNTER — PATIENT OUTREACH (OUTPATIENT)
Dept: CARE COORDINATION | Facility: CLINIC | Age: 55
End: 2025-03-03
Payer: COMMERCIAL

## 2025-03-03 PROBLEM — F33.2 SEVERE EPISODE OF RECURRENT MAJOR DEPRESSIVE DISORDER, WITHOUT PSYCHOTIC FEATURES (H): Status: ACTIVE | Noted: 2023-01-19

## 2025-03-03 PROBLEM — Z00.00 PREVENTATIVE HEALTH CARE: Status: ACTIVE | Noted: 2025-03-03

## 2025-03-03 NOTE — PROGRESS NOTES
Clinic Care Coordination Contact  Community Health Worker Follow Up    Care Gaps:     Health Maintenance Due   Topic Date Due    DIABETIC FOOT EXAM  Never done    DEPRESSION ACTION PLAN  Never done    YEARLY PREVENTIVE VISIT  Never done    HEPATITIS A IMMUNIZATION (3 of 3 - Hep A Twinrix risk 3-dose series) 05/23/2023    DTAP/TDAP/TD IMMUNIZATION (1 - Tdap) 09/02/2023    COVID-19 Vaccine (3 - Mixed Product risk series) 12/17/2024    EYE EXAM  03/15/2025       Scheduled for AWV on 3/13/25 @ 2:40      Care Plan:   Care Plan: Health Maintenance       Problem: Health Maintenance Due or Overdue       Goal: Become up-to-date with health maintenance visit(s)       Start Date: 1/30/2025    This Visit's Progress: 50%    Note:     Goal Statement: I will complete my annual wellness visit.    Barriers: transportation   Strengths: motivated     Patient expressed understanding of goal: yes  Action steps to achieve this goal:  1. I will schedule my annual wellness visit; [phone] ([phone]). My CHW assisted me in scheduling my AWV for 3/13/25 @ 2:40.    2. I will attend my annual wellness visit. Continuous (MB)  3. I will contact my Care Management or clinic team if I have barriers to attending my annual wellness visit. Continuous (MB)                            Care Plan: Community Resources       Problem: Lack of transportation       Goal: Establish Reliable Transportation       Start Date: 1/30/2025    This Visit's Progress: 20%    Note:     Goal Statement: I will continue to take steps to secure safe, consistent and reliable transportation over the next three month(s).  Barriers: financial   Strengths: motivated  Patient expressed understanding of goal: yes    Action steps to achieve this goal:  I will review resources and supports sent to me via [employer]  I will outreach to supports and consider establishing with ones I might find beneficial: transit link, community thread, help at your door. I am using transportation  through [facility] for medical appointments. I have looked into Transit [employer] and they were booked far out. I will look into them again in the future.   I will try one new transportation option in the next month                        Problem: Reliable food source       Goal: Establish Options for Affordable Food Sources       Start Date: 1/30/2025    This Visit's Progress: 0%    Note:     Goal Statement: I will continue to take steps to minimize food insecurity over the next two month(s).  Barriers: transportation   Strengths: motivated  Patient expressed understanding of goal: yes    Action steps to achieve this goal:  I will review resources and supports sent to me via [employer]: food pantry resources. Completed  I will outreach to supports and consider establishing with ones I might find beneficial - I will try going to one food pantry in the next month. I have not went to these locations yet. I hope to look into them soon.  I will continue to outreach to care coordination as needed for additional resources or supports.                              Care Plan: Appointments       Problem: HP GENERAL PROBLEM       Goal: I would like to attend the following appointments.       Start Date: 2/28/2025    This Visit's Progress: 10%    Priority: High    Note:     Barriers: numerous appointments  Strengths: motivated  Patient expressed understanding of goal: per chart review  Action steps to achieve this goal:  1.  3/11 @ 2:40 PCP. Continuous (MB)  2.  3/27 @ 1:55 Diabetic Ed Continuous (MB)  3.  4/2 @ 1:45 Vascular Continuous (MB)  4.  4/3 @ 11:00 lab Continuous (MB)  5.  4/3 @ 11:45 Renal Dr. Chinchilla Continuous (MB)  6.  4/7 @ 2:05 orthopedics Continuous (MB)  7.  4/23 @ 2:00 video Diabetes Endocrinology Continuous (MB)                              Intervention and Education during outreach: CHW assisted patient in scheduling AWV for 3/13/25 @ 2:40 with Rena Blair.     Patient would like to look into help in the home. CHW  and patient discussed [facility] services and patient is only wanting help in the home with cleaning. CHW assisted patient in scheduling an appointment with [facility] SW on 3/12/25 3:00 to look into community resources for help in the home.     See goals for other details. No other needs at this time.     CHW Plan: CHW will follow up with patient in about 1 month.     Rosa Grimaldo  Community Health Worker  [facility] Care Coordination   [place], [place], [place], [facility]  Office: [phone]

## 2025-03-04 ENCOUNTER — TELEPHONE (OUTPATIENT)
Dept: ORTHOPEDICS | Facility: CLINIC | Age: 55
End: 2025-03-04

## 2025-03-04 ENCOUNTER — LAB (OUTPATIENT)
Dept: LAB | Facility: CLINIC | Age: 55
End: 2025-03-04
Payer: COMMERCIAL

## 2025-03-04 ENCOUNTER — PATIENT OUTREACH (OUTPATIENT)
Dept: CARE COORDINATION | Facility: CLINIC | Age: 55
End: 2025-03-04

## 2025-03-04 DIAGNOSIS — D63.1 ANEMIA OF CHRONIC RENAL FAILURE, STAGE 3 (MODERATE), UNSPECIFIED WHETHER STAGE 3A OR 3B CKD (H): ICD-10-CM

## 2025-03-04 DIAGNOSIS — N18.30 ANEMIA OF CHRONIC RENAL FAILURE, STAGE 3 (MODERATE), UNSPECIFIED WHETHER STAGE 3A OR 3B CKD (H): ICD-10-CM

## 2025-03-04 LAB
HCT VFR BLD AUTO: 29 % (ref 40–53)
HGB BLD-MCNC: 9.1 G/DL (ref 13.3–17.7)

## 2025-03-04 PROCEDURE — 83550 IRON BINDING TEST: CPT

## 2025-03-04 PROCEDURE — 36415 COLL VENOUS BLD VENIPUNCTURE: CPT

## 2025-03-04 PROCEDURE — 82728 ASSAY OF FERRITIN: CPT

## 2025-03-04 PROCEDURE — 85018 HEMOGLOBIN: CPT

## 2025-03-04 PROCEDURE — 85014 HEMATOCRIT: CPT

## 2025-03-04 PROCEDURE — 83540 ASSAY OF IRON: CPT

## 2025-03-04 NOTE — PROGRESS NOTES
Clinic Care Coordination Contact    Situation: Patient chart reviewed by care coordinator.    Background: Patient Enrolled to [facility] 1/10/2025. Asked by PCP to clarify patient's use of antibx's sulfamethoxazole-trimethoprim (BACTRIM) 400-80 MG prescribed by Rheumatology.     Assessment: Called and spoke with Bertrand.  He stated he has been taking the prescribed antibx sulfamethoxazole-trimethoprim for quite some time but was confused by the provider using the brand name Bactrim and Bertrand's prescription bottle at home saying sulfamethoxazole-trimethoprim.       Plan/Recommendations: Patient to follow up with PCP 3/11/25

## 2025-03-04 NOTE — TELEPHONE ENCOUNTER
FYI - Status Update    Who is Calling: patient    Update: patient wanting to know what all he has to do to have TKA, requesting callback   Does caller want a call/response back: Yes     Could we send this information to you in [place] or would you prefer to receive a phone call?:   Patient would prefer a phone call   Okay to leave a detailed message?: Yes at Cell number on file:    Telephone Information:   Mobile [phone]

## 2025-03-05 ENCOUNTER — PATIENT OUTREACH (OUTPATIENT)
Dept: CARE COORDINATION | Facility: CLINIC | Age: 55
End: 2025-03-05
Payer: COMMERCIAL

## 2025-03-05 DIAGNOSIS — D63.1 ANEMIA OF CHRONIC RENAL FAILURE, STAGE 3 (MODERATE), UNSPECIFIED WHETHER STAGE 3A OR 3B CKD (H): Primary | ICD-10-CM

## 2025-03-05 DIAGNOSIS — N18.30 ANEMIA OF CHRONIC RENAL FAILURE, STAGE 3 (MODERATE), UNSPECIFIED WHETHER STAGE 3A OR 3B CKD (H): Primary | ICD-10-CM

## 2025-03-05 LAB
FERRITIN SERPL-MCNC: 211 NG/ML (ref 31–409)
IRON BINDING CAPACITY (ROCHE): 243 UG/DL (ref 240–430)
IRON SATN MFR SERPL: 11 % (ref 15–46)
IRON SERPL-MCNC: 27 UG/DL (ref 61–157)

## 2025-03-05 RX ORDER — FERROUS GLUCONATE 324(38)MG
324 TABLET ORAL EVERY OTHER DAY
Qty: 45 TABLET | Refills: 3 | Status: SHIPPED | OUTPATIENT
Start: 2025-03-05

## 2025-03-05 NOTE — TELEPHONE ENCOUNTER
Patient was called back    He can discuss with Dr. Day at his visit in early April about surgical options.  It was recommended that if he has any dental procedures to have those completed or planned to be completed soon as we would want those completed before any total joint surgery.  Our number was left for any other questions.

## 2025-03-05 NOTE — PROGRESS NOTES
Anemia Management Note - Follow Up      SUBJECTIVE/OBJECTIVE:    Referred by Dr. Jordy Chinchilla  on 2024  Primary Diagnosis: Anemia in Chronic Kidney Disease (N18.3, D63.1)     Secondary Diagnosis: Chronic Kidney Disease, Stage 3 (N18.3)   Hgb goal range: 9-10     Epo/Darbo: Aranesp 40 mcg  every two weeks for Hgb <10.0 At home  Iron regimen: Ferrous gluconate 325mg every other day  [id number]: oral iron every other day  *24: Pt states Dr. Chinchilla said his Iron was ok. Will recheck labs in a month.      Labs : 2025  RX/TX plans : 2025     Recent DIAN use, transfusion, IV iron: NA  No history of stroke, MI, and blood clots or cancers. Hx of HTN.      Contact: No Consent to Communicate On File.         Latest Ref Rng & Units 2024 2024 2025 2025 2025 3/4/2025 3/5/2025   Anemia   HGB Goal   9 - 10 9 - 10    9 - 10   DIAN Dose   40mcg 40mcg  40mcg  40mcg   Hemoglobin 13.3 - 17.7 g/dL 8.0   9.6  9.5   9.1     TSAT 15 - 46 %   21    11     Ferritin 31 - 409 ng/mL   470    211       BP Readings from Last 3 Encounters:   25 (!) 140/84   25 100/42   25 (!) 144/90     Wt Readings from Last 2 Encounters:   25 57.1 kg (125 lb 14.4 oz)   25 57.4 kg (126 lb 9.6 oz)         ASSESSMENT:    Hgb:At goal - recommend dose  TSat: not at goal of >30% Ferritin: At goal (>100ng/mL)   Goals Addressed    None         PLAN:  Dose with Aranesp.  RTC for hgb then Aranesp if needed in 2 week(s).  Discuss iron supplementation    Orders needed to be renewed (for next follow-up date) in EPIC: None    Iron labs due:  monthly    Plan discussed with:  LVM for Bertrand to call back    Addendum: Long called back. He will give Aranesp today, then return in 2 weeks for labs. He denies any issues with dosing. He notes sleeping for the majority of the last few days. Discussed that anemia is only one factor to explain the fatigue, and the CKD is the larger cause. Reviewed low  iron levels, he agreed to start oral iron every other day. Will send rx to [facility] in [place]. Reviewed common SE ie black stools, GI issues.      NEXT FOLLOW-UP DATE:  031825    Carrie Leopold, RN BSN  Anemia Services  [facility]  [email or web address]  Office: [phone]  Fax [phone]

## 2025-03-11 ENCOUNTER — OFFICE VISIT (OUTPATIENT)
Dept: FAMILY MEDICINE | Facility: CLINIC | Age: 55
End: 2025-03-11
Attending: PHYSICIAN ASSISTANT
Payer: COMMERCIAL

## 2025-03-11 VITALS
WEIGHT: 126.9 LBS | SYSTOLIC BLOOD PRESSURE: 88 MMHG | DIASTOLIC BLOOD PRESSURE: 51 MMHG | BODY MASS INDEX: 21.14 KG/M2 | OXYGEN SATURATION: 93 % | TEMPERATURE: 97.7 F | RESPIRATION RATE: 16 BRPM | HEIGHT: 65 IN | HEART RATE: 80 BPM

## 2025-03-11 DIAGNOSIS — D89.84 IGG4 RELATED DISEASE (H): ICD-10-CM

## 2025-03-11 DIAGNOSIS — R60.0 BILATERAL LOWER EXTREMITY EDEMA: ICD-10-CM

## 2025-03-11 DIAGNOSIS — N18.31 STAGE 3A CHRONIC KIDNEY DISEASE (H): ICD-10-CM

## 2025-03-11 DIAGNOSIS — Z79.4 LONG TERM (CURRENT) USE OF INSULIN (H): ICD-10-CM

## 2025-03-11 DIAGNOSIS — E11.22 TYPE 2 DIABETES MELLITUS WITH STAGE 3A CHRONIC KIDNEY DISEASE, WITH LONG-TERM CURRENT USE OF INSULIN (H): ICD-10-CM

## 2025-03-11 DIAGNOSIS — M00.9 PYOGENIC ARTHRITIS OF RIGHT KNEE JOINT, DUE TO UNSPECIFIED ORGANISM (H): ICD-10-CM

## 2025-03-11 DIAGNOSIS — L97.419 ULCER OF RIGHT HEEL AND MIDFOOT, UNSPECIFIED ULCER STAGE (H): ICD-10-CM

## 2025-03-11 DIAGNOSIS — E11.621 TYPE 2 DIABETES MELLITUS WITH FOOT ULCER, WITH LONG-TERM CURRENT USE OF INSULIN (H): ICD-10-CM

## 2025-03-11 DIAGNOSIS — K86.0 ALCOHOL-INDUCED CHRONIC PANCREATITIS (H): Primary | ICD-10-CM

## 2025-03-11 DIAGNOSIS — Z79.4 TYPE 2 DIABETES MELLITUS WITH HYPEROSMOLARITY WITHOUT COMA, WITH LONG-TERM CURRENT USE OF INSULIN (H): ICD-10-CM

## 2025-03-11 DIAGNOSIS — I51.5 CARDIAC CALCIFICATION (H): ICD-10-CM

## 2025-03-11 DIAGNOSIS — Z89.431 S/P TRANSMETATARSAL AMPUTATION OF FOOT, RIGHT (H): ICD-10-CM

## 2025-03-11 DIAGNOSIS — D64.9 ANEMIA, UNSPECIFIED TYPE: ICD-10-CM

## 2025-03-11 DIAGNOSIS — F10.21 ALCOHOL DEPENDENCE IN REMISSION (H): ICD-10-CM

## 2025-03-11 DIAGNOSIS — Z79.4 TYPE 2 DIABETES MELLITUS WITH STAGE 3A CHRONIC KIDNEY DISEASE, WITH LONG-TERM CURRENT USE OF INSULIN (H): ICD-10-CM

## 2025-03-11 DIAGNOSIS — Z79.4 TYPE 2 DIABETES MELLITUS WITH FOOT ULCER, WITH LONG-TERM CURRENT USE OF INSULIN (H): ICD-10-CM

## 2025-03-11 DIAGNOSIS — L97.509 TYPE 2 DIABETES MELLITUS WITH FOOT ULCER, WITH LONG-TERM CURRENT USE OF INSULIN (H): ICD-10-CM

## 2025-03-11 DIAGNOSIS — I10 PRIMARY HYPERTENSION: ICD-10-CM

## 2025-03-11 DIAGNOSIS — N18.31 TYPE 2 DIABETES MELLITUS WITH STAGE 3A CHRONIC KIDNEY DISEASE, WITH LONG-TERM CURRENT USE OF INSULIN (H): ICD-10-CM

## 2025-03-11 DIAGNOSIS — F11.20 CONTINUOUS OPIOID DEPENDENCE (H): ICD-10-CM

## 2025-03-11 DIAGNOSIS — E11.00 TYPE 2 DIABETES MELLITUS WITH HYPEROSMOLARITY WITHOUT COMA, WITH LONG-TERM CURRENT USE OF INSULIN (H): ICD-10-CM

## 2025-03-11 PROCEDURE — 3078F DIAST BP <80 MM HG: CPT | Performed by: PHYSICIAN ASSISTANT

## 2025-03-11 PROCEDURE — 36415 COLL VENOUS BLD VENIPUNCTURE: CPT | Performed by: PHYSICIAN ASSISTANT

## 2025-03-11 PROCEDURE — G2211 COMPLEX E/M VISIT ADD ON: HCPCS | Performed by: PHYSICIAN ASSISTANT

## 2025-03-11 PROCEDURE — 3074F SYST BP LT 130 MM HG: CPT | Performed by: PHYSICIAN ASSISTANT

## 2025-03-11 PROCEDURE — 99215 OFFICE O/P EST HI 40 MIN: CPT | Performed by: PHYSICIAN ASSISTANT

## 2025-03-11 RX ORDER — BUMETANIDE 1 MG/1
1 TABLET ORAL DAILY
Qty: 90 TABLET | Refills: 0 | Status: SHIPPED | OUTPATIENT
Start: 2025-03-11

## 2025-03-11 NOTE — ASSESSMENT & PLAN NOTE
Follows with nephrology.  He has an upcoming visit 4/3/2025.    Last Comprehensive Metabolic Panel:  Lab Results   Component Value Date     (L) 01/06/2025    POTASSIUM 5.3 01/06/2025    CHLORIDE 97 (L) 01/06/2025    CO2 27 01/06/2025    ANIONGAP 7 01/06/2025     (H) 01/06/2025    BUN 18.4 01/06/2025    CR 1.28 (H) 01/06/2025    GFRESTIMATED 66 01/06/2025    AROLDO 9.1 01/06/2025       Will repeat BMP today.

## 2025-03-11 NOTE — PROGRESS NOTES
The longitudinal plan of care for the diagnosis(es)/condition(s) as documented were addressed during this visit. Due to the added complexity in care, I will continue to support Long in the subsequent management and with ongoing continuity of care.      I spent a total of 41 minutes on the day of the visit.   Time spent by me today doing chart review, history and exam, documentation and further activities per the note    Assessment & Plan   Problem List Items Addressed This Visit       Anemia, unspecified type     Anemia likely related to chronic disease with the possibility of iron deficiency.  He is followed by hematology.  He is on Aranesp therapy.  At his appointment in January it was recommended to proceed with bone marrow biopsy.         Cardiac calcification (H)     Follows with cardiology.  Currently managed with atorvastatin and aspirin 81 mg daily.         S/P transmetatarsal amputation of foot, right (H)     s/p transmetatarsal amputation, right foot (DOS 1/9/24)         Primary hypertension     Blood pressure is low and experiencing lightheadedness.  Will decrease bumetanide to 1 mg daily.  Continue amlodipine 5 mg daily.  Will request Home Care nurse to check b/p with change in dose of bumetanide.     Last Comprehensive Metabolic Panel:  Lab Results   Component Value Date     (L) 01/06/2025    POTASSIUM 5.3 01/06/2025    CHLORIDE 97 (L) 01/06/2025    CO2 27 01/06/2025    ANIONGAP 7 01/06/2025     (H) 01/06/2025    BUN 18.4 01/06/2025    CR 1.28 (H) 01/06/2025    GFRESTIMATED 66 01/06/2025    AROLDO 9.1 01/06/2025                Relevant Medications    bumetanide (BUMEX) 1 MG tablet    Type 2 diabetes mellitus with kidney complication, with long-term current use of insulin (H)     Lab Results   Component Value Date    A1C 11.5 01/31/2025    A1C 10.0 11/06/2024    A1C 9.2 08/17/2024    A1C 9.4 05/04/2024    A1C >14.0 03/08/2022   Follows with diabetes education and has an upcoming appointment  with endocrinology 4/23/2025.  Currently on Lantus 22 units and NovoLog sliding scale insulin.  States his blood sugars have been running around 250.  He has occasional low blood sugars also.  It has been difficult for him to adjust the insulin as recommended by diabetes education.  We have requested the home care nurses to be working with him regarding this. We spent time today discussing the he is to increase lantus by 1 unit every day or every other day to goal of FBS  consistently.   A1c is due in April.           Relevant Orders    Adult Eye  Referral    Pyogenic arthritis of right knee joint, due to unspecified organism (H)     1/13/2025: Appointment with orthopedics underwent steroid injection and aspiration right knee.  Recommendation is to watch for the next 3 months and hopefully things get better however if persisted then we can proceed with synovectomy.      2/26/2025 appointment with rheumatology.   Continue the second rituximab 1000 mg infusion infusion   - Start Bactrim for PJP prophylaxis  - Follow-up with primary care provider and diabetic educator for monitoring of blood sugars  - [facility] pharmacy follow-up  - Orthopedics follow up for possibility of arthrocentesis of the right knee and consider of prednisone injection if okay from infection disease standpoint.   - External referral for physical medicine and evaluation for functional capacity evaluation.      4/7/2025 appointment with orthopedics.            Chronic pancreatitis (H) - Primary     He is followed by gastroenterology at [facility].  Known pancreatic atrophy with calcification and low pancreatic elastase levels in the setting of history of chronic alcohol use.  Currently managed with loperamide and Creon.  He did not take the Colestid due to difficulty swallowing the pill.  They are going to consider starting Questran after labs and small bowel capsule which is scheduled for mid February.  Follow-up with GI March 2025.              Alcohol dependence in remission (H)     Remains abstinent from alcohol use.         Type 2 diabetes mellitus with hyperosmolarity without coma, with long-term current use of insulin (H)     Please refer to assessment and plan under type 2 diabetes mellitus with long-term insulin use.            IgG4 related disease (H)     He had a follow-up visit with rheumatology on 2025.  It was recommended he wait a total of 6 months from the last infusion of rituximab to proceed with surgery.  He is working with orthopedics and spine surgeons on the timing of both interventions for his knee and spine.  There was concern he had not been taking the Bactrim but upon further investigation he did not know the other name for the medication list trimethoprim sulfamethoxazole.  He is taking the medication daily for PJP prophylaxis.  We are working on improving his blood sugars to help aid in healing.  He was referred to physical medicine at his last appointment with rheumatology.         Bilateral lower extremity edema     Edema is minimal in lower extremities.  B/P is low today.  Will  bumetanide to 1 mg daily.           Relevant Medications    bumetanide (BUMEX) 1 MG tablet    Ulcer of right heel and midfoot (H)     Follows with vascular for right heel wound.  Home care is ordering dressing supplies.  They assist with dressing change twice weekly and he does dressing change once weekly or whenever needed.      He has not been consistent with nonweightbearing to his right foot.  He knows that this poses risk for nonhealing wound leading to possible amputation.  Upcoming appointment with vascular 2025.         Long term (current) use of insulin (H)     Continues to follow with diabetes education and endocrinology.  He is on Lantus and NovoLog insulin.         Stage 3a chronic kidney disease (H)     Follows with nephrology.  He has an upcoming visit 4/3/2025.    Last Comprehensive Metabolic Panel:  Lab Results    Component Value Date     (L) 01/06/2025    POTASSIUM 5.3 01/06/2025    CHLORIDE 97 (L) 01/06/2025    CO2 27 01/06/2025    ANIONGAP 7 01/06/2025     (H) 01/06/2025    BUN 18.4 01/06/2025    CR 1.28 (H) 01/06/2025    GFRESTIMATED 66 01/06/2025    AROLDO 9.1 01/06/2025       Will repeat BMP today.         Relevant Orders    Basic metabolic panel  (Ca, Cl, CO2, Creat, Gluc, K, Na, BUN)    Type 2 diabetes mellitus with foot ulcer (H)     Please refer to assessment and plan under type 2 diabetes mellitus with long-term insulin use.            Continuous opioid dependence (H)     CSA was signed October 15, 2024.  Due to chronic knee pain he is on oxycodone 5 mg every 6 hours as needed for pain.  He usually uses 1-2 tabs daily.  Some days he is able to go without any medication.  He has been followed closely by rheumatology and orthopedics as well as infectious disease.  They are considering knee surgery in the near future.                     Shamir Alves is a 54 year old, presenting for the following health issues:  RECHECK        3/11/2025     2:39 PM   Additional Questions   Roomed by xl     History of Present Illness       Back Pain:  He presents for follow up of back pain. Patient's back pain is a chronic problem.  Location of back pain:  Right lower back, right side of neck, left side of neck and left shoulder  Description of back pain: cramping, dull ache, fullness, gnawing, sharp, shooting and stabbing  Back pain spreads: right knee, left shoulder and left side of neck    Since patient first noticed back pain, pain is: unchanged  Does back pain interfere with his job:  Not applicable       Diabetes:   He presents for follow up of diabetes.  He is checking home blood glucose four or more times daily.   He checks blood glucose before meals, after meals, before and after meals and at bedtime.  Blood glucose is sometimes over 200 and sometimes under 70. He is aware of hypoglycemia symptoms including  "shakiness, dizziness and blurred vision.   He is concerned about other.   He is having numbness in feet.  The patient has not had a diabetic eye exam in the last 12 months.          He eats 0-1 servings of fruits and vegetables daily.He consumes 1 sweetened beverage(s) daily.He exercises with enough effort to increase his heart rate 10 to 19 minutes per day.  He exercises with enough effort to increase his heart rate 3 or less days per week.   He is taking medications regularly.              Objective    BP (!) 88/51 (BP Location: Left arm, Patient Position: Sitting, Cuff Size: Adult Regular)   Pulse 80   Temp 97.7  F (36.5  C) (Oral)   Resp 16   Ht 1.651 m (5' 5\")   Wt 57.6 kg (126 lb 14.4 oz)   SpO2 93%   BMI 21.12 kg/m    Body mass index is 21.12 kg/m .  Physical Exam  Vitals and nursing note reviewed.   Constitutional:       Appearance: Normal appearance.   Cardiovascular:      Rate and Rhythm: Normal rate and regular rhythm.      Heart sounds: Murmur heard.      Comments: Trace edema lower extremities.   Pulmonary:      Effort: Pulmonary effort is normal.      Breath sounds: Normal breath sounds.   Musculoskeletal:      Comments: Continues swelling of right knee.   Skin:     Comments: Dressing in place on right heel.   Neurological:      Mental Status: He is alert.                Signed Electronically by: Rena Blair PA-C    "

## 2025-03-11 NOTE — ASSESSMENT & PLAN NOTE
Anemia likely related to chronic disease with the possibility of iron deficiency.  He is followed by hematology.  He is on Aranesp therapy.  At his appointment in January it was recommended to proceed with bone marrow biopsy.

## 2025-03-11 NOTE — ASSESSMENT & PLAN NOTE
1/13/2025: Appointment with orthopedics underwent steroid injection and aspiration right knee.  Recommendation is to watch for the next 3 months and hopefully things get better however if persisted then we can proceed with synovectomy.      2/26/2025 appointment with rheumatology.   Continue the second rituximab 1000 mg infusion infusion   - Start Bactrim for PJP prophylaxis  - Follow-up with primary care provider and diabetic educator for monitoring of blood sugars  - [facility] pharmacy follow-up  - Orthopedics follow up for possibility of arthrocentesis of the right knee and consider of prednisone injection if okay from infection disease standpoint.   - External referral for physical medicine and evaluation for functional capacity evaluation.      4/7/2025 appointment with orthopedics.

## 2025-03-11 NOTE — ASSESSMENT & PLAN NOTE
CSA was signed October 15, 2024.  Due to chronic knee pain he is on oxycodone 5 mg every 6 hours as needed for pain.  He usually uses 1-2 tabs daily.  Some days he is able to go without any medication.  He has been followed closely by rheumatology and orthopedics as well as infectious disease.  They are considering knee surgery in the near future.

## 2025-03-11 NOTE — ASSESSMENT & PLAN NOTE
Continues to follow with diabetes education and endocrinology.  He is on Lantus and NovoLog insulin.

## 2025-03-11 NOTE — ASSESSMENT & PLAN NOTE
Blood pressure is low and experiencing lightheadedness.  Will decrease bumetanide to 1 mg daily.  Continue amlodipine 5 mg daily.  Will request Home Care nurse to check b/p with change in dose of bumetanide.     Last Comprehensive Metabolic Panel:  Lab Results   Component Value Date     (L) 01/06/2025    POTASSIUM 5.3 01/06/2025    CHLORIDE 97 (L) 01/06/2025    CO2 27 01/06/2025    ANIONGAP 7 01/06/2025     (H) 01/06/2025    BUN 18.4 01/06/2025    CR 1.28 (H) 01/06/2025    GFRESTIMATED 66 01/06/2025    AROLDO 9.1 01/06/2025

## 2025-03-11 NOTE — ASSESSMENT & PLAN NOTE
Follows with vascular for right heel wound.  Home care is ordering dressing supplies.  They assist with dressing change twice weekly and he does dressing change once weekly or whenever needed.      He has not been consistent with nonweightbearing to his right foot.  He knows that this poses risk for nonhealing wound leading to possible amputation.  Upcoming appointment with vascular 4/2/2025.

## 2025-03-11 NOTE — ASSESSMENT & PLAN NOTE
Lab Results   Component Value Date    A1C 11.5 01/31/2025    A1C 10.0 11/06/2024    A1C 9.2 08/17/2024    A1C 9.4 05/04/2024    A1C >14.0 03/08/2022   Follows with diabetes education and has an upcoming appointment with endocrinology 4/23/2025.  Currently on Lantus 22 units and NovoLog sliding scale insulin.  States his blood sugars have been running around 250.  He has occasional low blood sugars also.  It has been difficult for him to adjust the insulin as recommended by diabetes education.  We have requested the home care nurses to be working with him regarding this. We spent time today discussing the he is to increase lantus by 1 unit every day or every other day to goal of FBS  consistently.   A1c is due in April.

## 2025-03-11 NOTE — ASSESSMENT & PLAN NOTE
He had a follow-up visit with rheumatology on 2/26/2025.  It was recommended he wait a total of 6 months from the last infusion of rituximab to proceed with surgery.  He is working with orthopedics and spine surgeons on the timing of both interventions for his knee and spine.  There was concern he had not been taking the Bactrim but upon further investigation he did not know the other name for the medication list trimethoprim sulfamethoxazole.  He is taking the medication daily for PJP prophylaxis.  We are working on improving his blood sugars to help aid in healing.  He was referred to physical medicine at his last appointment with rheumatology.

## 2025-03-12 ENCOUNTER — PATIENT OUTREACH (OUTPATIENT)
Dept: NURSING | Facility: CLINIC | Age: 55
End: 2025-03-12
Payer: COMMERCIAL

## 2025-03-12 NOTE — TELEPHONE ENCOUNTER
Nursing can you reach out to the home care office with the following:    He has been getting dressing changes twice weekly.  It has been recommended he get dressing changes 3 times weekly.  Can home care visits to be increased?    Blood pressure was low today in the clinic.  I decrease the Bumex to 1 mg daily.  Can you asked the home care nurses to be sure he is taking the correct medication and monitoring blood pressure?    Lastly, he is to adjust his insulin by 1 unit daily or every other day if fasting blood sugar is not between 90-1 40.  Can they help work with him on this?    Thank you.

## 2025-03-12 NOTE — PROGRESS NOTES
Clinic Care Coordination Contact  Follow Up Progress Note      Assessment: SWCC reached out to pt re: appointment set by [facility] for review of help in home.     Care Gaps:    Health Maintenance Due   Topic Date Due    DIABETIC FOOT EXAM  Never done    DEPRESSION ACTION PLAN  Never done    YEARLY PREVENTIVE VISIT  Never done    HEPATITIS A IMMUNIZATION (3 of 3 - Hep A Twinrix risk 3-dose series) 05/23/2023    DTAP/TDAP/TD IMMUNIZATION (1 - Tdap) 09/02/2023    COVID-19 Vaccine (3 - Mixed Product risk series) 12/17/2024    EYE EXAM  03/15/2025       Postponed to next outreach     Care Plans  Care Plan: Health Maintenance       Problem: Health Maintenance Due or Overdue       Goal: Become up-to-date with health maintenance visit(s)       Start Date: 1/30/2025    This Visit's Progress: 50%    Note:     Goal Statement: I will complete my annual wellness visit.    Barriers: transportation   Strengths: motivated     Patient expressed understanding of goal: yes  Action steps to achieve this goal:  1. I will schedule my annual wellness visit; [phone] ([phone]). My CHW assisted me in scheduling my AWV for 3/13/25 @ 2:40.    2. I will attend my annual wellness visit. Continuous (MB)  3. I will contact my Care Management or clinic team if I have barriers to attending my annual wellness visit. Continuous (MB)                            Care Plan: Community Resources       Problem: Lack of transportation       Goal: Establish Reliable Transportation       Start Date: 1/30/2025    Recent Progress: 20%    Note:     Goal Statement: I will continue to take steps to secure safe, consistent and reliable transportation over the next three month(s).  Barriers: financial   Strengths: motivated  Patient expressed understanding of goal: yes    Action steps to achieve this goal:  I will review resources and supports sent to me via [employer]  I will outreach to supports and consider establishing with ones I might find beneficial: transit  link, community thread, help at your door. I am using transportation through [employer] for medical appointments. I have looked into Transit [employer] and they were booked far out. I will look into them again in the future.   I will sign up for one pass through [employer] so that I can schedule rides to the gym  I will try one new transportation option in the next month                        Problem: Reliable food source       Goal: Establish Options for Affordable Food Sources       Start Date: 1/30/2025    This Visit's Progress: 0%    Note:     Goal Statement: I will continue to take steps to minimize food insecurity over the next two month(s).  Barriers: transportation   Strengths: motivated  Patient expressed understanding of goal: yes    Action steps to achieve this goal:  I will review resources and supports sent to me via [employer]: food pantry resources. Completed  I will outreach to supports and consider establishing with ones I might find beneficial - I will try going to one food pantry in the next month. I have not went to these locations yet. I hope to look into them soon.  I will continue to outreach to care coordination as needed for additional resources or supports.                              Care Plan: Appointments       Problem: HP GENERAL PROBLEM       Goal: I would like to attend the following appointments.       Start Date: 2/28/2025    This Visit's Progress: 10%    Priority: High    Note:     Barriers: numerous appointments  Strengths: motivated  Patient expressed understanding of goal: per chart review  Action steps to achieve this goal:  1.  3/11 @ 2:40 PCP. Continuous (MB)  2.  3/27 @ 1:55 Diabetic Ed Continuous (MB)  3.  4/2 @ 1:45 Vascular Continuous (MB)  4.  4/3 @ 11:00 lab Continuous (MB)  5.  4/3 @ 11:45 Renal Dr. Chinchilla Continuous (MB)  6.  4/7 @ 2:05 orthopedics Continuous (MB)  7.  4/23 @ 2:00 video Diabetes Endocrinology Continuous (MB)                              Intervention/Education  provided during outreach:     [email or web address] One Pass: [email or web address]/find-care/[email or web address]-[email or web address]     1 - Create a One Pass account online or by phone. You'll need your:  First and last name  Date of birth  [employer] member identification (ID) number    2 - Save the One Pass member code assigned to your account.  You'll use your code to unlock all One Pass features, including unlimited use of any One Pass fitness facility. Just show your code on your first visit to each location.    3 - Contact One Pass  Explore the One Pass program online or by calling One Pass Support.    Create your One Pass account, find locations, and see what s available to you  [phone]; This call is free.  8 a.m. - 9 p.m. CT, Monday - Friday    Medical Rides:   You can go to these types of visits:  Medical  Dental  Mental health  Substance use disorder  Pharmacy  Durable medical equipment  Urgent care*  Emergency room *  [employer] DUAL Solution and [employer] AccessAbility Solution Enhanced members are eligible for transportation to One Pass fitness sites.  [employer] D[employer] Solution and [employer] Choice Care MSC+ members with an Elderly Waiver (EW) are eligible for transportation to locations approved through EW.    How to set up your ride  Call [employer] Member Services at the number on the back of your [employer] identification (ID) card at least five days before your appointment. Have your information ready, including:  -[employer] member identification number  -Date of birth  -Full name and address of the doctor, clinic or location.              Plan:   SWCC and pt reviewed needs and concerns. Pt noted they need help in the home cleaning (floors). Pt could not identify any other assistance needed; noting that in the future he might not need help but right now with his knee assistance is needed. Pt accepted referral to [facility] for services - understands this could be an 8 month wait. Pt reported they are not able to  pay out of pocket for services. SWCC and pt talked over [employer] one pass and how to get pt signed up for this. Pt will look into this and then set up rides to the gym if they are eligible.   CHW Care Coordinator will follow up in about 3 weeks for regular outreach.     Care Coordination Clinician Chart Review    Situation: Patient chart reviewed by Care Coordinator.       Background: Care Coordination Program started: 1/7/2025. Initial assessment completed and patient-centered care plan(s) were developed with participation from patient. Lead CC handed patient off to CHW for continued outreaches.       Assessment: Per chart review, patient outreach completed by CC CHW on 3/3/25.  Patient is actively working to accomplish goal(s). Patient's goal(s) appropriate and relevant at this time. Patient is not due for updated Plan of Care.  Assessments will be completed annually or as needed/with change of patient status.      Care Plan: Health Maintenance       Problem: Health Maintenance Due or Overdue       Goal: Become up-to-date with health maintenance visit(s)       Start Date: 1/30/2025    This Visit's Progress: 50%    Note:     Goal Statement: I will complete my annual wellness visit.    Barriers: transportation   Strengths: motivated     Patient expressed understanding of goal: yes  Action steps to achieve this goal:  1. I will schedule my annual wellness visit; [phone] ([phone]). My CHW assisted me in scheduling my AWV for 3/13/25 @ 2:40.    2. I will attend my annual wellness visit. Continuous (MB)  3. I will contact my Care Management or clinic team if I have barriers to attending my annual wellness visit. Continuous (MB)                            Care Plan: Community Resources       Problem: Lack of transportation       Goal: Establish Reliable Transportation       Start Date: 1/30/2025    Recent Progress: 20%    Note:     Goal Statement: I will continue to take steps to secure safe, consistent and  reliable transportation over the next three month(s).  Barriers: financial   Strengths: motivated  Patient expressed understanding of goal: yes    Action steps to achieve this goal:  I will review resources and supports sent to me via [employer]  I will outreach to supports and consider establishing with ones I might find beneficial: transit link, community thread, help at your door. I am using transportation through [employer] for medical appointments. I have looked into [employer] and they were booked far out. I will look into them again in the future.   I will sign up for one pass through [employer] so that I can schedule rides to the gym  I will try one new transportation option in the next month                        Problem: Reliable food source       Goal: Establish Options for Affordable Food Sources       Start Date: 1/30/2025    This Visit's Progress: 0%    Note:     Goal Statement: I will continue to take steps to minimize food insecurity over the next two month(s).  Barriers: transportation   Strengths: motivated  Patient expressed understanding of goal: yes    Action steps to achieve this goal:  I will review resources and supports sent to me via [employer]: food pantry resources. Completed  I will outreach to supports and consider establishing with ones I might find beneficial - I will try going to one food pantry in the next month. I have not went to these locations yet. I hope to look into them soon.  I will continue to outreach to care coordination as needed for additional resources or supports.                              Care Plan: Appointments       Problem: HP GENERAL PROBLEM       Goal: I would like to attend the following appointments.       Start Date: 2/28/2025    This Visit's Progress: 10%    Priority: High    Note:     Barriers: numerous appointments  Strengths: motivated  Patient expressed understanding of goal: per chart review  Action steps to achieve this goal:  1.  3/11 @ 2:40 PCP. Continuous  (MB)  2.  3/27 @ 1:55 Diabetic Ed Continuous (MB)  3.  4/2 @ 1:45 Vascular Continuous (MB)  4.  4/3 @ 11:00 lab Continuous (MB)  5.  4/3 @ 11:45 Renal Dr. Chinchilla Continuous (MB)  6.  4/7 @ 2:05 orthopedics Continuous (MB)  7.  4/23 @ 2:00 video Diabetes Endocrinology Continuous (MB)                                 Plan/Recommendations: The patient will continue working with Care Coordination to achieve goal(s) as above. CHW will continue outreaches at minimum every 30 days and will involve Lead CC as needed or if patient is ready to move to Maintenance. Lead CC will continue to monitor CHW outreaches and patient's progress to goal(s) every 6 weeks.     Plan of Care updated and sent to patient: No

## 2025-03-12 NOTE — TELEPHONE ENCOUNTER
Called home care .  Home care request that this is put in an order and faxed to home care agency [facility].  Home care agency will not take a verbal order.  Fax number [phone]

## 2025-03-13 LAB
ANION GAP SERPL CALCULATED.3IONS-SCNC: 12 MMOL/L (ref 7–15)
BUN SERPL-MCNC: 39.4 MG/DL (ref 6–20)
CALCIUM SERPL-MCNC: ABNORMAL MG/DL
CHLORIDE SERPL-SCNC: 103 MMOL/L (ref 98–107)
CREAT SERPL-MCNC: 2.35 MG/DL (ref 0.67–1.17)
EGFRCR SERPLBLD CKD-EPI 2021: 32 ML/MIN/1.73M2
GLUCOSE SERPL-MCNC: 131 MG/DL (ref 70–99)
HCO3 SERPL-SCNC: 18 MMOL/L (ref 22–29)
POTASSIUM SERPL-SCNC: 3.9 MMOL/L (ref 3.4–5.3)
SODIUM SERPL-SCNC: 133 MMOL/L (ref 135–145)

## 2025-03-18 ENCOUNTER — TELEPHONE (OUTPATIENT)
Dept: ENDOCRINOLOGY | Facility: CLINIC | Age: 55
End: 2025-03-18
Payer: COMMERCIAL

## 2025-03-18 NOTE — PATIENT INSTRUCTIONS
New Diabetes Patient Info  Welcome to the Diabetes Optimization Program at the Endocrinology and Diabetes Clinic at [facility]!    Our Diabetes Optimization Program is here to provide you with a team-based, collaborative approach to optimize your diabetes management. The team is made up of Endocrinologists and Physician Assistants here at the Clinic, your Primary Care Physician, Certified Diabetes Educators, Registered Nurses, Medical Assistants, Emergency Medical Technicians and Visit Facilitators.     During your care with us, we promise to:   Work with you and your Primary Care Provider Rena Blair PA-C to optimize your diabetes management.   Provide you with the tools and support you need to achieve a healthier lifestyle  Help you to control your diabetes by offering new treatments, education, and support to improve your diabetes management  Help you graduate back to your primary care provider for ongoing care once your diabetes is under control      Endocrinology Clinics Phone Number: [phone]    [facility] Address:   [facility] Address:     [address] [place] [id number]   [id number] [place] [address] [place] [address]

## 2025-03-18 NOTE — PROGRESS NOTES
Virtual Visit Details    Type of service:  Video Visit     Originating Location (pt. Location): Home    Distant Location (provider location):  Off-site  Platform used for Video Visit: Gian    Outcome for 03/18/25 8:43 AM: Data obtained via Dexcom website  Yuval Sylvester MA      Patient is showing 3/5 MNCM met. A1c not in range and Aspirin not prescribed   Yuval Sylvester MA

## 2025-03-18 NOTE — TELEPHONE ENCOUNTER
Called pt regarding BG data for 3/19 appt. He is using the dexcom G7 and haylee on his phone. I was able to get him sharing with the  Endo clinic.

## 2025-03-19 ENCOUNTER — VIRTUAL VISIT (OUTPATIENT)
Dept: ENDOCRINOLOGY | Facility: CLINIC | Age: 55
End: 2025-03-19
Payer: COMMERCIAL

## 2025-03-19 DIAGNOSIS — Z79.4 TYPE 2 DIABETES MELLITUS WITH STAGE 3A CHRONIC KIDNEY DISEASE, WITH LONG-TERM CURRENT USE OF INSULIN (H): ICD-10-CM

## 2025-03-19 DIAGNOSIS — N18.31 TYPE 2 DIABETES MELLITUS WITH STAGE 3A CHRONIC KIDNEY DISEASE, WITH LONG-TERM CURRENT USE OF INSULIN (H): ICD-10-CM

## 2025-03-19 DIAGNOSIS — E11.22 TYPE 2 DIABETES MELLITUS WITH STAGE 3A CHRONIC KIDNEY DISEASE, WITH LONG-TERM CURRENT USE OF INSULIN (H): ICD-10-CM

## 2025-03-19 PROCEDURE — 1125F AMNT PAIN NOTED PAIN PRSNT: CPT | Mod: 95 | Performed by: PHYSICIAN ASSISTANT

## 2025-03-19 PROCEDURE — 98007 SYNCH AUDIO-VIDEO EST HI 40: CPT | Performed by: PHYSICIAN ASSISTANT

## 2025-03-19 NOTE — NURSING NOTE
Current patient location: [address] [place] [address] [address]   [facility] [address]    Is the patient currently in the state of [place]? YES    Visit mode: VIDEO    If the visit is dropped, the patient can be reconnected by:VIDEO VISIT: Text to cell phone:   Telephone Information:   Mobile [phone]       Will anyone else be joining the visit? NO  (If patient encounters technical issues they should call [phone]    Are changes needed to the allergy or medication list? No and Pt stated no med changes    Are refills needed on medications prescribed by this physician? NO    Rooming Documentation:  Not applicable    Reason for visit: Consult    Lana YAO

## 2025-03-19 NOTE — PROGRESS NOTES
Assessment/Plan :   Type 2 DM. Long would like to get his blood sugars under better control. He doesn't see the value of getting his numbers down but he would like his readings to be more stable. He worries about the constant glucose fluctuation. He has been discussing insulin pump options with our CDE team and he would like to move forward with the Omnipod. I think this will help with his overall control. We discussed the differences between insulin pump therapy and MDI therapy. We did have to end the appt abruptly because he needed to use the bathroom. He has a follow-up with Lucia Mir RD next week. I will send her a message and let her know that I agree with moving forward with pump therapy. I would like to follow-up with Long in about 4 mos.     Due to the COVID 19 pandemic this visit was a telephone/video visit in order to help prevent spread of infection in this patient and the general population. The patient gave verbal consent for the visit today. I have independently reviewed and interpreted labs, imaging as indicated.       Distant Location (provider location):  Off-site  Mode of Communication:  Video Conference via Vobile  Chart review/prep time 10    Joined the call at 3/19/2025, 1:05:01 pm.  Left the call at 3/19/2025, 1:24:57 pm.  You were on the call for 19 minutes 55 seconds .  33 minutes spent on the date of the encounter doing chart review, history and exam, documentation and further activities as noted above.      Chief complaint:  Long is a 54 year old male who comes to our office to establish care for the management of Type 2 DM.    I have reviewed Care Everywhere including [facility], [facility], [facility], [facility], [facility] , [facility], [facility] lab reports, imaging reports and provider notes as indicated.      HISTORY OF PRESENT ILLNESS  Long was diagnosed with diabetes in 2017 after taking prednisone for an eye infection. This led to a  significant spike in his blood sugars and he was admitted with DKA. Over the years, he has managed his blood sugars with a combination of insulin and oral medications. He admits that he has never had good control over his blood sugars. He struggles to take medication, consistently. He also states that his schedule changes from one day to the next, which contributes to the inconsistencies.     He has previously tried metformin but could not tolerate it due to adverse effects. He was also previously taking Januvia. It did not seem to make a significant impact on his blood sugars so it was changed to Trulicity and then Victoza. He doesn't recall having any adverse effects but he does have a history of chronic pancreatitis and pancreatic exocrine deficiency. The only consistent medication since diagnosis, has been insulin. He has taken NPH before during hospitalizations but has primarily taken Lantus and Novolog.    At present time, he is taking 23 unit(s) of Lantus daily. He refuses to take any more than that due to the risk of low blood sugars. He takes Novolog before meals and for as correction. He reports that he calculates the dose based on 1 unit(s) for every 100 mg/dl. He monitors his blood sugars with the Dexcom G7 sensor. He reports that he will frequently drop from 400 mg/dl down to 50 mg/dl. He states that the drops occur at random times, with or without insulin. He also reports that he feels good when his blood sugars are up around 200 mg/dl. He denies any history of blurred vision or issues with retinopathy. He does have a history of neuropathy s/p partial amputation of his right foot. He also complains of ongoing knee pain, neck pain and shoulder pain. He states that he needs surgery.    His diabetes is complicated by chronic pain and opioid dependence, systemic vasculitis, peripheral vascular disease, osteoarthritis, CAD, muscle weakness, DPN, HTN, and pancreatic exocrine insufficiency.    Endocrine  relevant labs are as follows:   Latest Reference Range & Units 01/31/25 14:13   Hemoglobin A1C 0.0 - 5.6 % 11.5 (H)   (H): Data is abnormally high   Latest Reference Range & Units 02/11/25 14:45   C-Peptide 0.9 - 6.9 ng/mL 0.5 (L)   (L): Data is abnormally low   Latest Reference Range & Units 11/06/24 11:24   Hemoglobin A1C <5.7 % 10.0 (H)   (H): Data is abnormally high    REVIEW OF SYSTEMS    Endocrine: positive for diabetes  Skin: negative  Eyes: negative for, visual blurring, redness, tearing  Ears/Nose/Throat: negative  Respiratory: No shortness of breath, dyspnea on exertion, cough, or hemoptysis  Cardiovascular: positive for exercise intolerance, negative for, chest pain, and lower extremity edema  Gastrointestinal: positive for dyspepsia, abdominal pain, excessive gas or bloating, and diarrhea, negative for, nausea, vomiting, and constipation  Genitourinary: negative for, nocturia, dysuria, frequency, and urgency  Musculoskeletal: positive for muscular weakness, negative for, nocturnal cramping, and foot pain  Neurologic: positive for numbness or tingling of feet, negative for, local weakness, and numbness or tingling of hands  Psychiatric: negative  Hematologic/Lymphatic/Immunologic: positive for anemia    Past Medical History  Past Medical History:   Diagnosis Date    Abnormal CXR 10/15/2024    Previous CT of the chest completed at [facility] in December 2023 demonstrated:   Impression:   1. Severe anasarca with moderate to large pleural effusions and a small pericardial effusion. Diffuse septal thickening in the lungs concerning for pulmonary edema. Anemic cardiac blood pool.   2. Solitary pulmonary nodule in the left lower lobe with an average diameter of 10 mm is indeterminat    Abnormal LFTs 01/03/2025    Acidosis 01/08/2020    Acquired absence of other right toe(s) 01/12/2024    Acute metabolic encephalopathy 09/17/2024    Acute pain of right knee 07/12/2024    Acute pulmonary edema (H)  01/11/2024    Acute renal insufficiency 05/03/2024    Last Comprehensive Metabolic Panel:          Lab Results      Component    Value    Date           NA    131 (L)    10/15/2024           POTASSIUM    4.0    10/15/2024           CHLORIDE    98    10/15/2024           CO2    24    10/15/2024           ANIONGAP    9    10/15/2024           GLC    133 (A)    10/23/2024           BUN    22.3 (H)    10/15/2024           CR    1.35 (H)    10/15/2024        Alcohol abuse 09/12/2022    Alcohol dependence in remission (H) 10/29/2024    Allergic rhinitis     Anemia     Anemia, unspecified type 05/03/2024    He has a history of anemia which has been noted since May 2024.  Cause has not been identified.  Additional evaluation with CBC, retake count, TSH, haptoglobin, iron studies, LDH, ferritin, B12 and folate.  Ferritin elevated 418, iron low at 20, iron binding capacity low at 2030, iron saturation low at 9.  Normal B12 and folate.  Awaiting haptoglobin and reticulocyte count.  Denies blood in stool.    Anxiety disorder, unspecified 01/11/2024    Arthritis     Arthritis due to other bacteria, right knee (H) 08/19/2024    Atherosclerotic heart disease of native coronary artery without angina pectoris 01/11/2024    Bell's palsy     Bilateral lower extremity edema 12/18/2024    Cardiac calcification (H) 01/19/2023    Treatment:  Colestid 1 g tablet.  Aspirin 81 mg daily.      Cervical disc disease with myelopathy 05/01/2022    Formatting of this note might be different from the original.   seen spine specialist- [place] [facility]  Formatting of this note might be different from the original.   seen spine specialist      Cervical radiculopathy 07/11/2024    Cervicalgia     Change or removal of drains 08/26/2024    Chronic bilateral low back pain with bilateral sciatica 04/08/2022    Chronic diarrhea 03/08/2022    Chronic kidney disease, unspecified 01/11/2024    Chronic pancreatitis (H) 09/06/2024    Due to continued right knee  swelling and history of chronic pancreatitis with chronic diarrhea, he was referred to GI to review possible Whipple's disease.     10/18/2024 seen by [facility].  We do not have those records and will request those records to be faxed over.  Once I get those records I will review them with recommendations.  Per patient he is to follow-up in 6 months.      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  Started rituximab infusions 12/4/2024.      Ketosis (H) 03/08/2022    Long term (current) use of insulin (H) 08/19/2024    Major depressive disorder, recurrent episode, mild     Major depressive disorder, recurrent, mild 08/19/2024    Muscle wasting and atrophy, not elsewhere classified, multiple sites 04/01/2024    Muscle weakness (generalized) 01/12/2024    Need for assistance with personal care 01/12/2024    Osteomyelitis of great toe of right foot (H) 11/22/2023    Other abnormalities of gait and mobility 01/12/2024    Other acute osteomyelitis, right ankle and foot (H)     Other chronic pancreatitis (H)     Other fatigue 11/14/2024    Other hyperlipidemia 03/15/2022    Other polyosteoarthritis     Peripheral vascular disease, unspecified 08/19/2024    Pneumonia of left lower lobe due to infectious organism 09/11/2024    Primary hypertension 10/09/2019    Pyogenic arthritis of right knee joint, due to unspecified organism (H) 07/11/2024    Right knee inflammatory arthritis, etiology to be determined, s/p I&D Jul 11, Aug 18, 2024.         Brucella, Bartonella, Q fever, CD4, Ig levels.     MRI right knee  "with and without contrast if negative.     Rheumatology and GI consult.     Complete oral ciprofloxacin course.      Admitted to [facility] 8/16 to 8/19/2024.  \"Has been seen a handful of times and outpatient follow-up with persistent right    Right knee pain 07/11/2024    S/P transmetatarsal amputation of foot, right (H) 12/01/2023     s/p transmetatarsal amputation, right foot (DOS 1/9/24)      Severe episode of recurrent major depressive disorder, without psychotic features (H) 01/19/2023    Solitary pulmonary nodule     Stage 3a chronic kidney disease (H) 1/11/2024    Systemic vasculitis (H) 11/19/2024    Type 2 diabetes mellitus with foot ulcer (H) 01/11/2024    Type 2 diabetes mellitus with hyperosmolarity without coma, with long-term current use of insulin (H) 10/29/2024    Type 2 diabetes mellitus with kidney complication, with long-term current use of insulin (H) 10/05/2018    Seen by diabetes educator 10/29/2024:   PLAN  Lantus: 12 units  Set dose Novolog at meals: 3 units each + Correction Scale below     Pre-Meal Correction Scale:        If pre-meal glucose is:    Add extra Humalog/Novolog to your meal dose per below chart:      151 - 200    +1 unit      201 - 250    +2 units      251 - 300    +3 units      301 - 350    +4 units      351 - 400     +5 units      Over     Ulcer of right heel and midfoot (H) 12/23/2024    Unintentional weight loss 10/11/2023    Vitamin D deficiency, unspecified 01/11/2024       Medications  Current Outpatient Medications   Medication Sig Dispense Refill    ACCU-CHEK GUIDE TEST test strip       amLODIPine (NORVASC) 5 MG tablet Take 1 tablet (5 mg) by mouth daily. 90 tablet 3    atorvastatin (LIPITOR) 40 MG tablet       bumetanide (BUMEX) 1 MG tablet Take 1 tablet (1 mg) by mouth daily. 90 tablet 0    cetirizine (ZYRTEC) 10 MG tablet Take 1 tablet (10 mg) by mouth daily. (Patient taking differently: Take 10 mg by mouth as needed.) 30 tablet 11    Continuous Glucose Sensor (DEXCOM " "G7 SENSOR) MISC Change every 10 days. 3 each 3    Continuous Glucose Transmitter (DEXCOM G6 TRANSMITTER) MISC       darbepoetin sunshine-polysorbate (ARANESP) 40 MCG/0.4ML injection Inject 0.4 mLs (40 mcg) subcutaneously every 14 days. Do not shake. Administer for Hgb <10.0. HOLD dose if systolic BP >180. 0.8 mL 11    ferrous gluconate (FERGON) 324 (38 Fe) MG tablet Take 1 tablet (324 mg) by mouth every other day. 45 tablet 3    gabapentin (NEURONTIN) 300 MG capsule TAKE 1 CAPSULE(300 MG) BY MOUTH TWICE DAILY 60 capsule 2    insulin aspart (NOVOLOG FLEXPEN) 100 UNIT/ML pen Inject 1-5 Units subcutaneously 3 times daily (with meals). In addition to 6-8 units with each meal, inject additional units as per this sliding scale:  If 200-250 = 1   251-300 = 2   301-350 = 3  351-400 = 4  401+ = 5  Repeat BS after 3 hours and call MD if still over 400      insulin glargine (LANTUS PEN) 100 UNIT/ML pen Currently at 20 units will be increasing up to max dose 30 units 30 mL 2    insulin NPH (HUMULIN N VIAL) 100 UNIT/ML vial INJECT 13 UNITS AT THE START OF YOUR STEROID INFUSION AT CLINIC WHICH IS SCHEDULED FOR 12/4/24      insulin pen needle (31G X 6 MM) 31G X 6 MM miscellaneous Use 4 pen needles daily or as directed. 100 each 11    insulin syringe-needle U-100 (29G X 1/2\" 0.5 ML) 29G X 1/2\" 0.5 ML miscellaneous Use one syringe as directed prior to steroid infusion. 10 each 0    lipase-protease-amylase (CREON 36) 66039-534732-163321 units CPEP Take 3 capsules by mouth 3 times daily (with meals). 0730, 1130, 1630      loperamide (IMODIUM) 2 MG capsule Take 4 mg by mouth every 8 hours as needed for diarrhea.      methocarbamol (ROBAXIN) 500 MG tablet Take 1 tablet (500 mg) by mouth every 8 hours as needed for muscle spasms. 30 tablet 3    naloxone (NARCAN) 4 MG/0.1ML nasal spray Spray 1 spray (4 mg) into one nostril alternating nostrils once as needed for opioid reversal. every 2-3 minutes until assistance arrives 0.2 mL 0    oxyCODONE " (ROXICODONE) 5 MG tablet Take 1 tablet (5 mg) by mouth every 6 hours as needed for pain. 30 tablet 0    polyethylene glycol (MIRALAX) 17 GM/Dose powder  (Patient not taking: Reported on 3/11/2025)      silver sulfADIAZINE (SILVADENE) 1 % external cream Apply topically 2 times daily. 400 g 2    sulfamethoxazole-trimethoprim (BACTRIM) 400-80 MG tablet Take 1 tablet by mouth daily. 90 tablet 0    sulfamethoxazole-trimethoprim (BACTRIM) 400-80 MG tablet Take 1 tablet by mouth daily. 90 tablet 5    triamcinolone (KENALOG) 0.1 % external ointment Apply topically.         Allergies  Allergies   Allergen Reactions    Vancomycin      Other Reaction(s): Renal Failure    Vancomycin-induced nephrotoxicity (11/2023, outside hospital)    Seasonal Allergies      HAYFEVER:    -Watery Eyes  -Eye burn    Daptomycin Rash     Likely delayed hypersensitivity reaction to daptomycin 11/2023       Family History  family history is not on file.    Social History  Social History     Tobacco Use    Smoking status: Never     Passive exposure: Never    Smokeless tobacco: Never   Vaping Use    Vaping status: Never Used   Substance Use Topics    Alcohol use: Not Currently     Comment: sober 2 year    Drug use: Not Currently     Types: Marijuana       Physical Exam  There is no height or weight on file to calculate BMI.  GENERAL: no distress  SKIN: Visible skin clear. No significant rash, abnormal pigmentation or lesions.  EYES: Eyes grossly normal to inspection.  No discharge or erythema, or obvious scleral/conjunctival abnormalities.  NECK: visible goiter is not present; no visible neck masses  RESP: No audible wheeze, cough, or visible cyanosis.  No visible retractions or increased work of breathing.    NEURO: Awake, alert, responds appropriately to questions.  Mentation and speech fluent.  PSYCH:affect normal and appearance well-groomed.      DATA REVIEW  Dexcom Clarity  Time in target range 15%  Very Low<1%, Low 1%  High 19%, Very High  65%  Current Ave  mg/dl

## 2025-03-19 NOTE — LETTER
3/19/2025      Long Mayfield  [address]  [facility] [address]      Dear Colleague,    Thank you for referring your patient, Long Mayfield, to the [facility]. Please see a copy of my visit note below.    Virtual Visit Details    Type of service:  Video Visit     Originating Location (pt. Location): Home  {PROVIDER LOCATION On-site should be selected for visits conducted from your clinic location or adjoining [facility] hospital, academic office, or other nearby [facility] building. Off-site should be selected for all other provider locations, including home:[id number]}  Distant Location (provider location):  Off-site  Platform used for Video Visit: [employer]    Outcome for 03/18/25 8:43 AM: Data obtained via Dexcom website  Yuval Sylvester MA      Patient is showing 3/5 MNCM met. A1c not in range and Aspirin not prescribed   Yuval Sylvester MA              Assessment/Plan :   Type 2 DM. Long would like to get his blood sugars under better control. He doesn't see the value of getting his numbers down but he would like his readings to be more stable. He worries about the constant glucose fluctuation. He has been discussing insulin pump options with our CDE team and he would like to move forward with the Omnipod. I think this will help with his overall control. We discussed the differences between insulin pump therapy and MDI therapy. We did have to end the appt abruptly because he needed to use the bathroom. He has a follow-up with Lucia Mir RD next week. I will send her a message and let her know that I agree with moving forward with pump therapy. I would like to follow-up with Long in about 4 mos.     Due to the COVID 19 pandemic this visit was a telephone/video visit in order to help prevent spread of infection in this patient and the general population. The patient gave verbal consent for the visit today. I have independently reviewed and interpreted labs, imaging as indicated.      {PROVIDER LOCATION On-site should be selected for visits conducted from your clinic location or adjoining [facility] hospital, academic office, or other nearby [facility] building. Off-site should be selected for all other provider locations, including home:[id number]}  Distant Location (provider location):  Off-site  Mode of Communication:  Video Conference via Nextnav  Chart review/prep time 10    Joined the call at 3/19/2025, 1:05:01 pm.  Left the call at 3/19/2025, 1:24:57 pm.  You were on the call for 19 minutes 55 seconds .  33 minutes spent on the date of the encounter doing chart review, history and exam, documentation and further activities as noted above.      Chief complaint:  Long is a 54 year old male who comes to our office to establish care for the management of Type 2 DM.    I have reviewed Care Everywhere including [facility], [facility], [facility], [facility], [facility], [facility] , [facility], [facility] lab reports, imaging reports and provider notes as indicated.      HISTORY OF PRESENT ILLNESS  Long was diagnosed with diabetes in 2017 after taking prednisone for an eye infection. This led to a significant spike in his blood sugars and he was admitted with DKA. Over the years, he has managed his blood sugars with a combination of insulin and oral medications. He admits that he has never had good control over his blood sugars. He struggles to take medication, consistently. He also states that his schedule changes from one day to the next, which contributes to the inconsistencies.     He has previously tried metformin but could not tolerate it due to adverse effects. He was also previously taking Januvia. It did not seem to make a significant impact on his blood sugars so it was changed to Trulicity and then Victoza. He doesn't recall having any adverse effects but he does have a history of chronic pancreatitis and pancreatic exocrine deficiency. The only consistent medication since  diagnosis, has been insulin. He has taken NPH before during hospitalizations but has primarily taken Lantus and Novolog.    At present time, he is taking 23 unit(s) of Lantus daily. He refuses to take any more than that due to the risk of low blood sugars. He takes Novolog before meals and for as correction. He reports that he calculates the dose based on 1 unit(s) for every 100 mg/dl. He monitors his blood sugars with the Dexcom G7 sensor. He reports that he will frequently drop from 400 mg/dl down to 50 mg/dl. He states that the drops occur at random times, with or without insulin. He also reports that he feels good when his blood sugars are up around 200 mg/dl. He denies any history of blurred vision or issues with retinopathy. He does have a history of neuropathy s/p partial amputation of his right foot. He also complains of ongoing knee pain, neck pain and shoulder pain. He states that he needs surgery.    His diabetes is complicated by chronic pain and opioid dependence, systemic vasculitis, peripheral vascular disease, osteoarthritis, CAD, muscle weakness, DPN, HTN, and pancreatic exocrine insufficiency.    Endocrine relevant labs are as follows:   Latest Reference Range & Units 01/31/25 14:13   Hemoglobin A1C 0.0 - 5.6 % 11.5 (H)   (H): Data is abnormally high   Latest Reference Range & Units 02/11/25 14:45   C-Peptide 0.9 - 6.9 ng/mL 0.5 (L)   (L): Data is abnormally low   Latest Reference Range & Units 11/06/24 11:24   Hemoglobin A1C <5.7 % 10.0 (H)   (H): Data is abnormally high    REVIEW OF SYSTEMS    Endocrine: positive for diabetes  Skin: negative  Eyes: negative for, visual blurring, redness, tearing  Ears/Nose/Throat: negative  Respiratory: No shortness of breath, dyspnea on exertion, cough, or hemoptysis  Cardiovascular: positive for exercise intolerance, negative for, chest pain, and lower extremity edema  Gastrointestinal: positive for dyspepsia, abdominal pain, excessive gas or bloating, and  diarrhea, negative for, nausea, vomiting, and constipation  Genitourinary: negative for, nocturia, dysuria, frequency, and urgency  Musculoskeletal: positive for muscular weakness, negative for, nocturnal cramping, and foot pain  Neurologic: positive for numbness or tingling of feet, negative for, local weakness, and numbness or tingling of hands  Psychiatric: negative  Hematologic/Lymphatic/Immunologic: positive for anemia    Past Medical History  Past Medical History:   Diagnosis Date     Abnormal CXR 10/15/2024    Previous CT of the chest completed at [facility] in December 2023 demonstrated:   Impression:   1. Severe anasarca with moderate to large pleural effusions and a small pericardial effusion. Diffuse septal thickening in the lungs concerning for pulmonary edema. Anemic cardiac blood pool.   2. Solitary pulmonary nodule in the left lower lobe with an average diameter of 10 mm is indeterminat     Abnormal LFTs 01/03/2025     Acidosis 01/08/2020     Acquired absence of other right toe(s) 01/12/2024     Acute metabolic encephalopathy 09/17/2024     Acute pain of right knee 07/12/2024     Acute pulmonary edema (H) 01/11/2024     Acute renal insufficiency 05/03/2024    Last Comprehensive Metabolic Panel:          Lab Results      Component    Value    Date           NA    131 (L)    10/15/2024           POTASSIUM    4.0    10/15/2024           CHLORIDE    98    10/15/2024           CO2    24    10/15/2024           ANIONGAP    9    10/15/2024           GLC    133 (A)    10/23/2024           BUN    22.3 (H)    10/15/2024           CR    1.35 (H)    10/15/2024         Alcohol abuse 09/12/2022     Alcohol dependence in remission (H) 10/29/2024     Allergic rhinitis      Anemia      Anemia, unspecified type 05/03/2024    He has a history of anemia which has been noted since May 2024.  Cause has not been identified.  Additional evaluation with CBC, retake count, TSH, haptoglobin, iron studies, LDH,  ferritin, B12 and folate.  Ferritin elevated 418, iron low at 20, iron binding capacity low at 2030, iron saturation low at 9.  Normal B12 and folate.  Awaiting haptoglobin and reticulocyte count.  Denies blood in stool.     Anxiety disorder, unspecified 01/11/2024     Arthritis      Arthritis due to other bacteria, right knee (H) 08/19/2024     Atherosclerotic heart disease of native coronary artery without angina pectoris 01/11/2024     Bell's palsy      Bilateral lower extremity edema 12/18/2024     Cardiac calcification (H) 01/19/2023    Treatment:  Colestid 1 g tablet.  Aspirin 81 mg daily.       Cervical disc disease with myelopathy 05/01/2022    Formatting of this note might be different from the original.   seen spine specialist- [facility]  Formatting of this note might be different from the original.   seen spine specialist       Cervical radiculopathy 07/11/2024     Cervicalgia      Change or removal of drains 08/26/2024     Chronic bilateral low back pain with bilateral sciatica 04/08/2022     Chronic diarrhea 03/08/2022     Chronic kidney disease, unspecified 01/11/2024     Chronic pancreatitis (H) 09/06/2024    Due to continued right knee swelling and history of chronic pancreatitis with chronic diarrhea, he was referred to GI to review possible Whipple's disease.     10/18/2024 seen by [facility].  We do not have those records and will request those records to be faxed over.  Once I get those records I will review them with recommendations.  Per patient he is to follow-up in 6 months.       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  Started rituximab infusions 12/4/2024.       Ketosis (H) 03/08/2022     Long term (current) use of insulin (H) 08/19/2024     Major depressive disorder, recurrent episode, mild      Major depressive disorder, recurrent, mild 08/19/2024     Muscle wasting and atrophy, not elsewhere classified, multiple sites 04/01/2024     Muscle weakness (generalized) 01/12/2024     Need for assistance with personal care 01/12/2024     Osteomyelitis of great toe of right foot (H) 11/22/2023     Other abnormalities of gait and mobility 01/12/2024     Other acute osteomyelitis, right ankle and foot (H)      Other chronic pancreatitis (H)      Other fatigue 11/14/2024     Other hyperlipidemia 03/15/2022     Other polyosteoarthritis      Peripheral vascular disease, unspecified 08/19/2024     Pneumonia of left lower lobe due to infectious organism 09/11/2024     Primary hypertension 10/09/2019     Pyogenic arthritis of right knee joint, due to unspecified organism (H) 07/11/2024    Right knee inflammatory arthritis, etiology to be determined, s/p I&D Jul 11, Aug 18, 2024.         Brucella, Bartonella, Q fever, CD4, Ig levels.     MRI right knee with and without contrast if negative.     Rheumatology and GI consult.     Complete oral ciprofloxacin course.      Admitted to [facility] 8/16 to 8/19/2024.  \"Has been seen a handful of times and outpatient follow-up with persistent right     Right knee pain 07/11/2024     S/P transmetatarsal amputation of foot, right (H) 12/01/2023     s/p transmetatarsal amputation, right foot (DOS 1/9/24)       Severe episode of recurrent major depressive disorder, without psychotic features (H) 01/19/2023     Solitary pulmonary nodule      Stage 3a chronic kidney disease (H) 1/11/2024     Systemic vasculitis (H) 11/19/2024     Type 2 diabetes mellitus with foot ulcer (H) 01/11/2024     Type 2 diabetes mellitus with hyperosmolarity without coma, " with long-term current use of insulin (H) 10/29/2024     Type 2 diabetes mellitus with kidney complication, with long-term current use of insulin (H) 10/05/2018    Seen by diabetes educator 10/29/2024:   PLAN  Lantus: 12 units  Set dose Novolog at meals: 3 units each + Correction Scale below     Pre-Meal Correction Scale:        If pre-meal glucose is:    Add extra Humalog/Novolog to your meal dose per below chart:      151 - 200    +1 unit      201 - 250    +2 units      251 - 300    +3 units      301 - 350    +4 units      351 - 400     +5 units      Over      Ulcer of right heel and midfoot (H) 12/23/2024     Unintentional weight loss 10/11/2023     Vitamin D deficiency, unspecified 01/11/2024       Medications  Current Outpatient Medications   Medication Sig Dispense Refill     ACCU-CHEK GUIDE TEST test strip        amLODIPine (NORVASC) 5 MG tablet Take 1 tablet (5 mg) by mouth daily. 90 tablet 3     atorvastatin (LIPITOR) 40 MG tablet        bumetanide (BUMEX) 1 MG tablet Take 1 tablet (1 mg) by mouth daily. 90 tablet 0     cetirizine (ZYRTEC) 10 MG tablet Take 1 tablet (10 mg) by mouth daily. (Patient taking differently: Take 10 mg by mouth as needed.) 30 tablet 11     Continuous Glucose Sensor (DEXCOM G7 SENSOR) MISC Change every 10 days. 3 each 3     Continuous Glucose Transmitter (DEXCOM G6 TRANSMITTER) MISC        darbepoetin sunshine-polysorbate (ARANESP) 40 MCG/0.4ML injection Inject 0.4 mLs (40 mcg) subcutaneously every 14 days. Do not shake. Administer for Hgb <10.0. HOLD dose if systolic BP >180. 0.8 mL 11     ferrous gluconate (FERGON) 324 (38 Fe) MG tablet Take 1 tablet (324 mg) by mouth every other day. 45 tablet 3     gabapentin (NEURONTIN) 300 MG capsule TAKE 1 CAPSULE(300 MG) BY MOUTH TWICE DAILY 60 capsule 2     insulin aspart (NOVOLOG FLEXPEN) 100 UNIT/ML pen Inject 1-5 Units subcutaneously 3 times daily (with meals). In addition to 6-8 units with each meal, inject additional units as per this  "sliding scale:  If 200-250 = 1   251-300 = 2   301-350 = 3  351-400 = 4  401+ = 5  Repeat BS after 3 hours and call MD if still over 400       insulin glargine (LANTUS PEN) 100 UNIT/ML pen Currently at 20 units will be increasing up to max dose 30 units 30 mL 2     insulin NPH (HUMULIN N VIAL) 100 UNIT/ML vial INJECT 13 UNITS AT THE START OF YOUR STEROID INFUSION AT CLINIC WHICH IS SCHEDULED FOR 12/4/24       insulin pen needle (31G X 6 MM) 31G X 6 MM miscellaneous Use 4 pen needles daily or as directed. 100 each 11     insulin syringe-needle U-100 (29G X 1/2\" 0.5 ML) 29G X 1/2\" 0.5 ML miscellaneous Use one syringe as directed prior to steroid infusion. 10 each 0     lipase-protease-amylase (CREON 36) 32138-271758-263191 units CPEP Take 3 capsules by mouth 3 times daily (with meals). 0730, 1130, 1630       loperamide (IMODIUM) 2 MG capsule Take 4 mg by mouth every 8 hours as needed for diarrhea.       methocarbamol (ROBAXIN) 500 MG tablet Take 1 tablet (500 mg) by mouth every 8 hours as needed for muscle spasms. 30 tablet 3     naloxone (NARCAN) 4 MG/0.1ML nasal spray Spray 1 spray (4 mg) into one nostril alternating nostrils once as needed for opioid reversal. every 2-3 minutes until assistance arrives 0.2 mL 0     oxyCODONE (ROXICODONE) 5 MG tablet Take 1 tablet (5 mg) by mouth every 6 hours as needed for pain. 30 tablet 0     polyethylene glycol (MIRALAX) 17 GM/Dose powder  (Patient not taking: Reported on 3/11/2025)       silver sulfADIAZINE (SILVADENE) 1 % external cream Apply topically 2 times daily. 400 g 2     sulfamethoxazole-trimethoprim (BACTRIM) 400-80 MG tablet Take 1 tablet by mouth daily. 90 tablet 0     sulfamethoxazole-trimethoprim (BACTRIM) 400-80 MG tablet Take 1 tablet by mouth daily. 90 tablet 5     triamcinolone (KENALOG) 0.1 % external ointment Apply topically.         Allergies  Allergies   Allergen Reactions     Vancomycin      Other Reaction(s): Renal Failure    Vancomycin-induced " nephrotoxicity (11/2023, outside hospital)     Seasonal Allergies      HAYFEVER:    -Watery Eyes  -Eye burn     Daptomycin Rash     Likely delayed hypersensitivity reaction to daptomycin 11/2023       Family History  family history is not on file.    Social History  Social History     Tobacco Use     Smoking status: Never     Passive exposure: Never     Smokeless tobacco: Never   Vaping Use     Vaping status: Never Used   Substance Use Topics     Alcohol use: Not Currently     Comment: sober 2 year     Drug use: Not Currently     Types: Marijuana       Physical Exam  There is no height or weight on file to calculate BMI.  GENERAL: no distress  SKIN: Visible skin clear. No significant rash, abnormal pigmentation or lesions.  EYES: Eyes grossly normal to inspection.  No discharge or erythema, or obvious scleral/conjunctival abnormalities.  NECK: visible goiter is not present; no visible neck masses  RESP: No audible wheeze, cough, or visible cyanosis.  No visible retractions or increased work of breathing.    NEURO: Awake, alert, responds appropriately to questions.  Mentation and speech fluent.  PSYCH:affect normal and appearance well-groomed.      DATA REVIEW  Dexcom Clarity  Time in target range 15%  Very Low<1%, Low 1%  High 19%, Very High 65%  Current Ave  mg/dl        Again, thank you for allowing me to participate in the care of your patient.        Sincerely,        Peggy Robison PA-C    Electronically signed

## 2025-03-20 ENCOUNTER — PATIENT OUTREACH (OUTPATIENT)
Dept: CARE COORDINATION | Facility: CLINIC | Age: 55
End: 2025-03-20

## 2025-03-20 ENCOUNTER — LAB (OUTPATIENT)
Dept: LAB | Facility: CLINIC | Age: 55
End: 2025-03-20
Payer: COMMERCIAL

## 2025-03-20 DIAGNOSIS — D63.1 ANEMIA OF CHRONIC RENAL FAILURE, STAGE 3 (MODERATE), UNSPECIFIED WHETHER STAGE 3A OR 3B CKD (H): ICD-10-CM

## 2025-03-20 DIAGNOSIS — R19.5 ABNORMAL FECES: Primary | ICD-10-CM

## 2025-03-20 DIAGNOSIS — N18.31 STAGE 3A CHRONIC KIDNEY DISEASE (H): ICD-10-CM

## 2025-03-20 DIAGNOSIS — N18.30 ANEMIA OF CHRONIC RENAL FAILURE, STAGE 3 (MODERATE), UNSPECIFIED WHETHER STAGE 3A OR 3B CKD (H): ICD-10-CM

## 2025-03-20 LAB
HCT VFR BLD AUTO: 29.6 % (ref 40–53)
HGB BLD-MCNC: 9.4 G/DL (ref 13.3–17.7)

## 2025-03-20 NOTE — PROGRESS NOTES
Anemia Management Note - Follow Up      SUBJECTIVE/OBJECTIVE:    Referred by Dr. Jordy Chinchilla  on 2024  Primary Diagnosis: Anemia in Chronic Kidney Disease (N18.3, D63.1)     Secondary Diagnosis: Chronic Kidney Disease, Stage 3 (N18.3)   Hgb goal range: 9-10     Epo/Darbo: Aranesp 40 mcg  every two weeks for Hgb <10.0 At home  Iron regimen: Ferrous gluconate 325mg every other day  215220: oral iron every other day  *24: Pt states Dr. Chinchilla said his Iron was ok. Will recheck labs in a month.      Labs : 2025  RX/TX plans : 2025     Recent DIAN use, transfusion, IV iron: NA  No history of stroke, MI, and blood clots or cancers. Hx of HTN.      Contact: No Consent to Communicate On File.         Latest Ref Rng & Units 2024 2025 2025 2025 3/4/2025 3/5/2025 3/20/2025   Anemia   HGB Goal  9 - 10 9 - 10    9 - 10    DIAN Dose  40mcg 40mcg  40mcg  40mcg    Hemoglobin 13.3 - 17.7 g/dL  9.6  9.5   9.1   9.4    TSAT 15 - 46 %  21    11      Ferritin 31 - 409 ng/mL  470    211        BP Readings from Last 3 Encounters:   25 (!) 88/51   25 (!) 140/84   25 100/42     Wt Readings from Last 2 Encounters:   25 57.6 kg (126 lb 14.4 oz)   25 57.1 kg (125 lb 14.4 oz)         ASSESSMENT:    Hgb:At goal - recommend dose  TSat: not at goal of >30% Ferritin: At goal (>100ng/mL)   Goals Addressed    None         PLAN:  Dose with Aranesp.  RTC for hgb then Aranesp if needed in 2 week(s).    Orders needed to be renewed (for next follow-up date) in EPIC: None    Iron labs due:  monthly    Plan discussed with:  KRISTOFER and sent ticckle message to Long      NEXT FOLLOW-UP DATE:  404    Carrie Leopold, RN BSN  Anemia Services  [facility]  [email or web address]  Office: [phone]  Fax [phone]

## 2025-03-24 ENCOUNTER — TELEPHONE (OUTPATIENT)
Dept: FAMILY MEDICINE | Facility: CLINIC | Age: 55
End: 2025-03-24
Payer: COMMERCIAL

## 2025-03-24 DIAGNOSIS — M00.9 PYOGENIC ARTHRITIS OF RIGHT KNEE JOINT, DUE TO UNSPECIFIED ORGANISM (H): ICD-10-CM

## 2025-03-24 RX ORDER — OXYCODONE HYDROCHLORIDE 5 MG/1
5 TABLET ORAL EVERY 6 HOURS PRN
Qty: 30 TABLET | Refills: 0 | Status: SHIPPED | OUTPATIENT
Start: 2025-03-24

## 2025-03-24 NOTE — TELEPHONE ENCOUNTER
Patient Returning Call    Reason for call:  Need form fill out for DL    Information relayed to patient:      Patient has additional questions:  Yes    What are your questions/concerns: above    Who does the patient want to speak with:      Is an  needed?:  No      Could we send this information to you in [employer] [place] or would you prefer to receive a phone call?:   Patient would prefer a phone call   Okay to leave a detailed message?: Yes at Cell number on file:    Telephone Information:   Mobile [phone]

## 2025-03-24 NOTE — TELEPHONE ENCOUNTER
Medication Request  Medication name: oxyCODONE (ROXICODONE) 5 MG tablet   Requested [facility]acy: [facility]  When was patient last seen for this?:  03/11/2025  Patient offered appointment:  no  Okay to leave a detailed message: no

## 2025-03-25 NOTE — PROGRESS NOTES
[facility]  Orthopaedic Surgery      Long Mayfield MRN# [id number]    YOB: 1970     Ref MD:    Dr. Hector Cruz, [facility] rheumatology  Dr. Jordy Chinchilla, [facility] nephrology      Background history:  DX:  Synovitis, chronic, possibly secondary to #2 below  Hx c/w IgG4-related tubulointerstitial nephritis   Acute metabolic encephalopathy  Hyperkalemia  Chronic pancreatitis  Type 2 diabetes with kidney complication  Alcohol abuse hyperglycemia  Primary hypertension  Cardiac calcification  GERD.    Anemia  Acute renal insufficiency    TREATMENTS:  1997, open reduction and intra fixation right patella  7/11/2024, I&D of right knee, Dr. Espinoza (Cultures negative)  8/17/2024, I&D of right knee, Dr. Phoenix (Cultures negative)  9/23/2024, Right knee aspiration, (Cutlures negative, Cytology negative)        Mr. Conte is seen for recheck in regards to his IgG -4 nephropathy and tubulointerstitial nephritis.  He is on rituximab.  Rheumatologist and nephrologist have asked us to delay any knee surgery until June or July.  He also has an elevated A1c as a consequence of his steroid medication and is received a glucose monitor and is can be receiving insulin pump in the near future.    We obtained x-rays today which demonstrate progression of the arthritis of his knee and loss of cartilage thickness completely involving the lateral compartment.  He is a candidate for knee arthroplasty surgery once medically cleared.    Plan:  Return in June for recheck  Criteria for the surgery require hemoglobin A1c 7.0 or less prior to proceeding with knee replacement surgery.  If he meets criteria in June we will plan for surgery knee replacement later in the summertime.  I discussed other knee surgery requirements with him including dental evaluations and preoperative medical clearance.      MD Tashia Monet Family Professor  Oncology and Adult Reconstructive Surgery  Dept Orthopaedic Surgery, [facility]  [facility]  [phone] office, [phone] pager  [email or web address]    Total combined visit time and work time before and after clinic visit, independent of trainee, on encounter date = 30 min

## 2025-03-26 NOTE — ASSESSMENT & PLAN NOTE
He continues to be followed in the vascular clinic for a right heel wound. He has an upcoming appointment for 2/2025. We discussed the importance of remaining nonweightbearing is much as possible to aid in healing and keeping his blood sugars under good control. He is being followed closely by diabetes education.   Reports his heel is healing nicely.   Upcoming appointment with vascular on 4/2/25.    He now has a power chair at home.

## 2025-03-26 NOTE — ASSESSMENT & PLAN NOTE
Anemia likely related to chronic disease with the possibility of iron deficiency. He is followed by hematology. He is on Aranesp therapy. At his appointment in January it was recommended to proceed with bone marrow biopsy.     Bone marrow biopsy scheduled for 4/9/25.  Upcoming appointment with Hematology on 4/23/25.

## 2025-03-26 NOTE — ASSESSMENT & PLAN NOTE
Lab Results   Component Value Date     A1C 11.5 01/31/2025     A1C 10.0 11/06/2024     A1C 9.2 08/17/2024     A1C 9.4 05/04/2024     A1C >14.0 03/08/2022      A1c was elevated 11.5 on 1/31/2025.  He is followed by endocrinology and diabetes education.  He has an upcoming appointment tomorrow with diabetes education.  He was recently seen by endocrinology 3/19/2025 with recommendation to proceed with insulin pump.  Microalbumin is up-to-date.  He is due for an eye exam.  Reports that his blood sugars have improved after increasing lantus 24 units.   No hypoglycemia.      He brings in paperwork to be completed today for DMV.  He had an episode of hypoglycemia while driving in May 2024.  Paperwork was completed today.

## 2025-03-26 NOTE — ASSESSMENT & PLAN NOTE
3/21/2025 seen by gastroenterology.  Continue Creon.  Imodium 1 to 2 pills every 6 hours as needed.  Start cholestyramine 1 packet twice daily.  MR enterography.  Follow-up 3 months.

## 2025-03-27 ENCOUNTER — OFFICE VISIT (OUTPATIENT)
Dept: FAMILY MEDICINE | Facility: CLINIC | Age: 55
End: 2025-03-27
Payer: COMMERCIAL

## 2025-03-27 VITALS
BODY MASS INDEX: 21.34 KG/M2 | RESPIRATION RATE: 14 BRPM | OXYGEN SATURATION: 100 % | HEART RATE: 74 BPM | DIASTOLIC BLOOD PRESSURE: 79 MMHG | WEIGHT: 128.1 LBS | TEMPERATURE: 98.1 F | SYSTOLIC BLOOD PRESSURE: 138 MMHG | HEIGHT: 65 IN

## 2025-03-27 DIAGNOSIS — F10.21 ALCOHOL DEPENDENCE IN REMISSION (H): Primary | ICD-10-CM

## 2025-03-27 DIAGNOSIS — L97.419 ULCER OF RIGHT HEEL AND MIDFOOT, UNSPECIFIED ULCER STAGE (H): ICD-10-CM

## 2025-03-27 DIAGNOSIS — I10 PRIMARY HYPERTENSION: ICD-10-CM

## 2025-03-27 DIAGNOSIS — K86.0 ALCOHOL-INDUCED CHRONIC PANCREATITIS (H): ICD-10-CM

## 2025-03-27 DIAGNOSIS — N18.31 STAGE 3A CHRONIC KIDNEY DISEASE (H): ICD-10-CM

## 2025-03-27 DIAGNOSIS — E11.22 TYPE 2 DIABETES MELLITUS WITH STAGE 3A CHRONIC KIDNEY DISEASE, WITH LONG-TERM CURRENT USE OF INSULIN (H): ICD-10-CM

## 2025-03-27 DIAGNOSIS — R60.0 BILATERAL LOWER EXTREMITY EDEMA: ICD-10-CM

## 2025-03-27 DIAGNOSIS — Z79.4 TYPE 2 DIABETES MELLITUS WITH STAGE 3A CHRONIC KIDNEY DISEASE, WITH LONG-TERM CURRENT USE OF INSULIN (H): ICD-10-CM

## 2025-03-27 DIAGNOSIS — D64.9 ANEMIA, UNSPECIFIED TYPE: ICD-10-CM

## 2025-03-27 DIAGNOSIS — N18.31 TYPE 2 DIABETES MELLITUS WITH STAGE 3A CHRONIC KIDNEY DISEASE, WITH LONG-TERM CURRENT USE OF INSULIN (H): ICD-10-CM

## 2025-03-27 RX ORDER — BUMETANIDE 0.5 MG/1
0.5 TABLET ORAL DAILY
Qty: 30 TABLET | Refills: 0 | Status: CANCELLED | OUTPATIENT
Start: 2025-03-27

## 2025-03-27 RX ORDER — NICOTINE POLACRILEX 4 MG
LOZENGE BUCCAL
COMMUNITY
Start: 2024-08-20

## 2025-03-27 RX ORDER — CHOLESTYRAMINE LIGHT 4 G/5.7G
1 POWDER, FOR SUSPENSION ORAL EVERY 12 HOURS
COMMUNITY
Start: 2025-03-18

## 2025-03-27 RX ORDER — IBUPROFEN 600 MG/1
1 TABLET ORAL PRN
COMMUNITY
Start: 2024-08-19

## 2025-03-27 ASSESSMENT — PATIENT HEALTH QUESTIONNAIRE - PHQ9
SUM OF ALL RESPONSES TO PHQ QUESTIONS 1-9: 2
10. IF YOU CHECKED OFF ANY PROBLEMS, HOW DIFFICULT HAVE THESE PROBLEMS MADE IT FOR YOU TO DO YOUR WORK, TAKE CARE OF THINGS AT HOME, OR GET ALONG WITH OTHER PEOPLE: NOT DIFFICULT AT ALL
SUM OF ALL RESPONSES TO PHQ QUESTIONS 1-9: 2

## 2025-03-27 NOTE — ASSESSMENT & PLAN NOTE
Last Comprehensive Metabolic Panel:  Lab Results   Component Value Date     (L) 03/20/2025    POTASSIUM 4.6 03/20/2025    CHLORIDE 103 03/20/2025    CO2 19 (L) 03/20/2025    ANIONGAP 11 03/20/2025     (H) 03/20/2025    BUN 28.5 (H) 03/20/2025    CR 1.47 (H) 03/20/2025    GFRESTIMATED 56 (L) 03/20/2025    AROLDO 8.8 03/20/2025       He has upcoming appointment with nephrology.  Renal function stable.

## 2025-03-27 NOTE — PROGRESS NOTES
"  {PROVIDER CHARTING PREFERENCE:094940}    Shamir Alves is a 54 year old, presenting for the following health issues:  Forms      3/27/2025    12:48 PM   Additional Questions   Roomed by ac   Accompanied by self     Via the Health Maintenance questionnaire, the patient has reported the following services have been completed -Eye Exam: [facility] 2024-[facility], this information has been sent to the abstraction team.  History of Present Illness       Reason for visit:  Dmv paperwork    He eats 0-1 servings of fruits and vegetables daily.He consumes 1 sweetened beverage(s) daily.He exercises with enough effort to increase his heart rate 10 to 19 minutes per day.  He exercises with enough effort to increase his heart rate 3 or less days per week.   He is taking medications regularly.        {MA/LPN/RN Pre-Provider Visit Orders- hCG/UA/Strep (Optional):021763}  {SUPERLIST (Optional):571963}  {additonal problems for provider to add (Optional):018074}    {ROS Picklists (Optional):157274}      Objective    /79 (BP Location: Left arm, Patient Position: Sitting, Cuff Size: Adult Regular)   Pulse 74   Temp 98.1  F (36.7  C) (Oral)   Resp 14   Ht 1.651 m (5' 5\")   Wt 58.1 kg (128 lb 1.6 oz)   SpO2 100%   BMI 21.32 kg/m    Body mass index is 21.32 kg/m .  Physical Exam   {Exam List (Optional):392547}    {Diagnostic Test Results (Optional):418999}        Signed Electronically by: Rena Blair PA-C  {Email feedback regarding this note to [email or web address]   :440075}  "

## 2025-03-27 NOTE — ASSESSMENT & PLAN NOTE
After reducing the Bumex to 1 mg daily he currently has 2+ edema in his lower extremities.  His weight is stable.  At this time I would like him to remain on Bumex 1 mg daily.  He is to follow-up in the clinic in 1 month.  Discussed the importance of elevating his legs and to remain nonweightbearing is much as possible to aid in the healing of his ulcer on his foot.
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
## 2025-03-31 ENCOUNTER — PATIENT OUTREACH (OUTPATIENT)
Dept: CARE COORDINATION | Facility: CLINIC | Age: 55
End: 2025-03-31
Payer: COMMERCIAL

## 2025-04-02 ENCOUNTER — TELEPHONE (OUTPATIENT)
Dept: ORTHOPEDICS | Facility: CLINIC | Age: 55
End: 2025-04-02
Payer: COMMERCIAL

## 2025-04-02 ENCOUNTER — TELEPHONE (OUTPATIENT)
Dept: VASCULAR SURGERY | Facility: CLINIC | Age: 55
End: 2025-04-02

## 2025-04-02 ENCOUNTER — OFFICE VISIT (OUTPATIENT)
Dept: VASCULAR SURGERY | Facility: CLINIC | Age: 55
End: 2025-04-02
Attending: NURSE PRACTITIONER
Payer: COMMERCIAL

## 2025-04-02 VITALS
DIASTOLIC BLOOD PRESSURE: 68 MMHG | TEMPERATURE: 97.9 F | HEART RATE: 76 BPM | SYSTOLIC BLOOD PRESSURE: 95 MMHG | RESPIRATION RATE: 18 BRPM | OXYGEN SATURATION: 100 %

## 2025-04-02 DIAGNOSIS — I87.2 VENOUS INSUFFICIENCY: Primary | ICD-10-CM

## 2025-04-02 DIAGNOSIS — E11.621 TYPE 2 DIABETES MELLITUS WITH DIABETIC HEEL ULCER (H): ICD-10-CM

## 2025-04-02 DIAGNOSIS — L97.409 TYPE 2 DIABETES MELLITUS WITH DIABETIC HEEL ULCER (H): ICD-10-CM

## 2025-04-02 DIAGNOSIS — I89.0 SECONDARY LYMPHEDEMA: ICD-10-CM

## 2025-04-02 DIAGNOSIS — I87.303 VENOUS HYPERTENSION OF BOTH LOWER EXTREMITIES: ICD-10-CM

## 2025-04-02 PROCEDURE — G0463 HOSPITAL OUTPT CLINIC VISIT: HCPCS | Performed by: NURSE PRACTITIONER

## 2025-04-02 ASSESSMENT — PAIN SCALES - GENERAL: PAINLEVEL_OUTOF10: NO PAIN (0)

## 2025-04-02 NOTE — TELEPHONE ENCOUNTER
Called waitlist patient and offered an appointment with Dr. Day on this date 4/3/25 & time 3:20pm [place] [facility] [place]EDULE IN THE SAME DAY SPOT.     Please note there is no guarantee this appointment will be available as it is first come first serve.    Ping4hart sent with contact info per patient request.

## 2025-04-02 NOTE — LETTER
[facility]  [address] [place] [address] [address]  [place], [place] [address]  [phone]      Fax [phone]    [facility]           FAX: [phone]            Customer Service: [phone]        Account #: [id number]    Wound Dressing Rx and Order Form  Order Status: re-order  Verbal: Jenelle  Date: 2025     Long NARENDRA Beckham  Gender: male  : 1970  [address]   [place] [facility] [address]  [phone] (home)     Medical Record: [id number]  Primary Care Provider: Rena Blair      ICD-10-CM    1. Venous insufficiency  I87.2 Wound care      2. Venous hypertension of both lower extremities  I87.303 Wound care      3. Secondary lymphedema  I89.0 Wound care      4. Type 2 diabetes mellitus with diabetic heel ulcer (H)  E11.621 Wound care    L97.409             Insurance Info:  INSURER: Payor: MEDICA / Plan: MEDICA ACCESS ABILITY MA / Product Type: HMO /   Policy ID#:  [id number]  SECONDARY INSURANCE:    Secondary Policy ID#:  N/A        Physician Info:   Name:  ANNIE PATTERSON     Dept Address/Phones:   [address] [place] [address], [address] [address] [phone]  Fax: [phone]    Lymphedema circumferential measurements (in cm):      1/15/2025     1:00 PM 2025     2:00 PM   Circumferential Measures   Right Ankle 19 22   Right Widest Calf 30 32   Right Knee to Ankle 17.5    Left - just above MTP 21    Left Ankle 30          Wound info:  VASC Wound Right heel (Active)   Pre Size Length 4.5 25 1300   Pre Size Width 3 25 1300   Pre Size Depth 0.1 25 1300   Pre Total Sq cm 13.5 25 1300   Post Size Length 6 25 1400   Post Size Width 4 25 1400   Post Size Depth 0.1 25 1400   Post Total Sq cm 24 25 1400   Number of days: 77       VASC Wound Left hand (Active)   Pre Size Length 0 25 1300   Pre Size Width 0 25 1300   Pre Size Depth 0 04/02/25 1300   Pre Total Sq cm 0 25 1300   Post Size Length 1  "02/25/25 1400   Post Size Width 3 02/25/25 1400   Post Size Depth 0.1 02/25/25 1400   Post Total Sq cm 3 02/25/25 1400   Number of days: 77       VASC Wound Left heel (Active)   Pre Size Length 2.5 04/02/25 1300   Pre Size Width 1.5 04/02/25 1300   Pre Size Depth 0.1 04/02/25 1300   Pre Total Sq cm 3.75 04/02/25 1300   Number of days: 0       Incision/Surgical Site 08/17/24 Anterior;Right Knee (Active)   Number of days: 228        Drainage: moderate  Thickness:  full  Duration of Need: 30 DAYS  Days Supply: 30 DAYS  Start Date: 4/2/2025  Starter Kit, Ancillary Kit (saline, gloves, gauze): Yes   Qualifying wound/Debridement: Yes     DISPENSE AS WRITTEN.   Call [phone]. Please call patient for out-of-pocket costs and options.      Dressing Type Brand Size Frequency of change  Quantity   Primary Manuka honey hd super lite  2\"x2\" Every 3 days and as needed  8   secondary Non-sterile square gauze  4\"x4\" Every 3 days and as needed   1 loaf    Sof form roll gauze  4\"x75\" Every 3 days and as needed   24   tape paper  1\" Every 3 days and as needed   1     DISPENSE AS WRITTEN. Call [phone] with questions.     Wound Care Instructions    Every 3 days Cleanse your bilateral heel wound(s) with Dilute hibiclens 30cc in 500cc NS.    Pat Dry with non-sterile gauze    Primary Dressing: Apply manuka honey 2\"x2\" into/onto the wounds     Secondary dressing: Cover with gauze 4\"x4\"    Secure with non-sterile roll gauze (4\" x 75\" roll) and tape (1\" roll tape) as needed; avoid adhesive directly on the skin    OK to forward to covered supplier.    Electronically Signed Physician:  ANNIE PATTERSON             Date: April 2, 2025    "

## 2025-04-02 NOTE — PATIENT INSTRUCTIONS
"Wound care supplies were ordered today through [employer] and if you are not receiving your supplies or have a question on your bill please contact Yuri Sanon [phone]. Please allow 2-5 business days for delivery of supplies. You may get a call from a 1-800 # if there are additional information [employer] needs. It is important to  or return their call. PLEASE NOTE: If you need to return your supplies, you MUST call customer service within 15 days of delivery date.     You need to stay off both of your feet at all times; use wheelchair while at home     PRAFO boot to help keep pressure off the wound at all times; if this is not fitting well please go back to the orthotics clinic to see what else they can offer      Wound Care Instructions    Every 3 days Cleanse your bilateral heel wound(s) with Dilute hibiclens 30cc in 500cc NS.    Pat Dry with non-sterile gauze    Primary Dressing: Apply manuka honey into/onto the wounds     Secondary dressing: Cover with gauze    Secure with non-sterile roll gauze (4\" x 75\" roll) and tape (1\" roll tape) as needed; avoid adhesive directly on the skin    Compression: none    It is not ok to get your wound wet in the bath or shower         It is recommended that you do not get your ulcer wet when showering.  Listed below are several ways of keeping it dry when you shower.     1. Wrap it with Press and Seal plastic wrap.  It can be found in the stores where the plastic wraps or tin foil is kept.               2.  Some people take a bath and hang their leg/foot out of the tub.                        3  Put your leg in a plastic bag and tape it on.           4. You can purchase a shower cover for casts at some pharmacies and through the Internet.            5. Take a Bed Bath or wash up at the sink     High Protein Foods  Chicken  -Chicken breast, 3.5oz.-30 grams protein  -Chicken thigh-10 grams(average size)  -Drumstick-11 grams  -Wing- 6 grams  -Chicken meat, cooked, 4 " oz.  Beef  -Hamburger eleni, 4 oz-28 grams protein  -Steak, 6 oz-42 grams  -Most cuts of beef- 7 grams of protein per ounce  Fish  -Most fish fillets or steaks are about 22 grams of protein for 3 1/2 oz(100 grams) of cooked fish, or 6 grams per ounce  -Tuna, 6 oz can-40 grams of protein  Pork  -Pork chop, average-22 grams protein  -Pork loin or tenderloin, 4 oz.-29 grams  -Ham, 3oz serving- 19 grams  -Ground pork 3oz cooked-22 grams  -Ashley, 1 slice-3 grams  -[place]-style ashley(black ashley), slice-5-6 grams  Eggs and Dairy  -Egg, large-7 grams  -Milk, 1 cup-8 grams  -Cottage cheese, 1/2 cup-15 grams  -Greek yogurt, 1 cup-usually 8-12 grams, check label  -Soft cheeses (Mozzarella, Brie, Camembert)- 6 grams  -Medium cheeses(cheddar, swiss)- 7 or 8 grams per oz  -Hard cheeses(parmesan)- 10 grams per oz  Beans  -Tofu, 1/2 cup 20 grams  -Tofu, 1 oz., 2.3 grams  -Soy milk, 1 cup-6-10 grams  -Most beans(black, ayon, lentils, etc.) about 7-10 grams protein per half cup of cooked beans  -soy beans, 1/2 cup cooked-14 grams  -Split peas, 1/2 cup cooked- 8 grams  Nuts and Seeds  -Peanut butter, 2 Tablespoons- 8 grams protein  -Almonds, 1/4 cup- 8 grams  -Peanuts, 1/4 cup-9 grams  -Cashews, 1/4 cup- 5 grams  -Pecans, 1/4 cup- 2.5 grams  -Sunflower seeds, 1/4 cup- 6 grams  -Pumpkin seeds, 1/4 cup-8 grams  -Flax seeds- 1/4 cup- 8 grams  Protein Supplements  -Ensure  -Boost  -Glucerna, if diabetic  When you have an open ulcer, your bodies protein needs are much higher, so it is recommended eat good sources of protein       Prevalon Boot          EZ Heelift Boot              Prafo Boot          Foam Ring
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
## 2025-04-02 NOTE — TELEPHONE ENCOUNTER
Patient had an appointment with Marina Fernandez DNP today. He states he has had hyperbaric treatment previously to help wounds, and is wondering if this should happen again. Will review with COLLEEN, and call patient with response.

## 2025-04-03 ENCOUNTER — OFFICE VISIT (OUTPATIENT)
Dept: NEPHROLOGY | Facility: CLINIC | Age: 55
End: 2025-04-03
Attending: INTERNAL MEDICINE
Payer: COMMERCIAL

## 2025-04-03 ENCOUNTER — LAB (OUTPATIENT)
Dept: LAB | Facility: CLINIC | Age: 55
End: 2025-04-03
Attending: INTERNAL MEDICINE
Payer: COMMERCIAL

## 2025-04-03 ENCOUNTER — PATIENT OUTREACH (OUTPATIENT)
Dept: CARE COORDINATION | Facility: CLINIC | Age: 55
End: 2025-04-03

## 2025-04-03 VITALS
SYSTOLIC BLOOD PRESSURE: 127 MMHG | OXYGEN SATURATION: 99 % | HEIGHT: 65 IN | HEART RATE: 71 BPM | WEIGHT: 130 LBS | TEMPERATURE: 97.7 F | BODY MASS INDEX: 21.66 KG/M2 | DIASTOLIC BLOOD PRESSURE: 69 MMHG

## 2025-04-03 DIAGNOSIS — D84.9 IMMUNOSUPPRESSION: ICD-10-CM

## 2025-04-03 DIAGNOSIS — N05.9 GLOMERULONEPHRITIS DETERMINED BY BIOPSY: ICD-10-CM

## 2025-04-03 DIAGNOSIS — N05.9 GLOMERULONEPHRITIS DETERMINED BY BIOPSY: Primary | ICD-10-CM

## 2025-04-03 DIAGNOSIS — E10.21 DIABETIC NEPHROPATHY ASSOCIATED WITH TYPE 1 DIABETES MELLITUS (H): ICD-10-CM

## 2025-04-03 DIAGNOSIS — N18.32 CKD STAGE 3B, GFR 30-44 ML/MIN (H): ICD-10-CM

## 2025-04-03 LAB
ALBUMIN MFR UR ELPH: 34.2 MG/DL
ALBUMIN SERPL BCG-MCNC: 3.7 G/DL (ref 3.5–5.2)
ALBUMIN UR-MCNC: NEGATIVE MG/DL
ANION GAP SERPL CALCULATED.3IONS-SCNC: 7 MMOL/L (ref 7–15)
APPEARANCE UR: CLEAR
BASOPHILS # BLD AUTO: 0.1 10E3/UL (ref 0–0.2)
BASOPHILS NFR BLD AUTO: 1 %
BILIRUB UR QL STRIP: NEGATIVE
BUN SERPL-MCNC: 26.7 MG/DL (ref 6–20)
CALCIUM SERPL-MCNC: 8.7 MG/DL (ref 8.8–10.4)
CHLORIDE SERPL-SCNC: 110 MMOL/L (ref 98–107)
COLOR UR AUTO: ABNORMAL
CREAT SERPL-MCNC: 1.78 MG/DL (ref 0.67–1.17)
CREAT UR-MCNC: 66.5 MG/DL
CREAT UR-MCNC: 66.7 MG/DL
EGFRCR SERPLBLD CKD-EPI 2021: 44 ML/MIN/1.73M2
EOSINOPHIL # BLD AUTO: 0.4 10E3/UL (ref 0–0.7)
EOSINOPHIL NFR BLD AUTO: 5 %
ERYTHROCYTE [DISTWIDTH] IN BLOOD BY AUTOMATED COUNT: 17.2 % (ref 10–15)
GLUCOSE SERPL-MCNC: 203 MG/DL (ref 70–99)
GLUCOSE UR STRIP-MCNC: 100 MG/DL
HCO3 SERPL-SCNC: 19 MMOL/L (ref 22–29)
HCT VFR BLD AUTO: 36.2 % (ref 40–53)
HGB BLD-MCNC: 11 G/DL (ref 13.3–17.7)
HGB UR QL STRIP: ABNORMAL
HYALINE CASTS: 3 /LPF
IMM GRANULOCYTES # BLD: 0 10E3/UL
IMM GRANULOCYTES NFR BLD: 0 %
KETONES UR STRIP-MCNC: NEGATIVE MG/DL
LEUKOCYTE ESTERASE UR QL STRIP: NEGATIVE
LYMPHOCYTES # BLD AUTO: 2 10E3/UL (ref 0.8–5.3)
LYMPHOCYTES NFR BLD AUTO: 28 %
MCH RBC QN AUTO: 25.5 PG (ref 26.5–33)
MCHC RBC AUTO-ENTMCNC: 30.4 G/DL (ref 31.5–36.5)
MCV RBC AUTO: 84 FL (ref 78–100)
MICROALBUMIN UR-MCNC: 137 MG/L
MICROALBUMIN/CREAT UR: 206.02 MG/G CR (ref 0–17)
MONOCYTES # BLD AUTO: 0.9 10E3/UL (ref 0–1.3)
MONOCYTES NFR BLD AUTO: 12 %
MUCOUS THREADS #/AREA URNS LPF: PRESENT /LPF
NEUTROPHILS # BLD AUTO: 4.1 10E3/UL (ref 1.6–8.3)
NEUTROPHILS NFR BLD AUTO: 55 %
NITRATE UR QL: NEGATIVE
NRBC # BLD AUTO: 0 10E3/UL
NRBC BLD AUTO-RTO: 0 /100
PH UR STRIP: 5.5 [PH] (ref 5–7)
PHOSPHATE SERPL-MCNC: 3.8 MG/DL (ref 2.5–4.5)
PLATELET # BLD AUTO: 260 10E3/UL (ref 150–450)
POTASSIUM SERPL-SCNC: 5.3 MMOL/L (ref 3.4–5.3)
PROT/CREAT 24H UR: 0.51 MG/MG CR (ref 0–0.2)
RBC # BLD AUTO: 4.31 10E6/UL (ref 4.4–5.9)
RBC URINE: 1 /HPF
SODIUM SERPL-SCNC: 136 MMOL/L (ref 135–145)
SP GR UR STRIP: 1.01 (ref 1–1.03)
UROBILINOGEN UR STRIP-MCNC: NORMAL MG/DL
WBC # BLD AUTO: 7.4 10E3/UL (ref 4–11)
WBC URINE: 1 /HPF

## 2025-04-03 PROCEDURE — G0463 HOSPITAL OUTPT CLINIC VISIT: HCPCS | Performed by: INTERNAL MEDICINE

## 2025-04-03 PROCEDURE — 82570 ASSAY OF URINE CREATININE: CPT | Performed by: INTERNAL MEDICINE

## 2025-04-03 PROCEDURE — 82043 UR ALBUMIN QUANTITATIVE: CPT | Performed by: INTERNAL MEDICINE

## 2025-04-03 PROCEDURE — 99000 SPECIMEN HANDLING OFFICE-LAB: CPT | Performed by: PATHOLOGY

## 2025-04-03 ASSESSMENT — PAIN SCALES - GENERAL: PAINLEVEL_OUTOF10: SEVERE PAIN (8)

## 2025-04-03 NOTE — PROGRESS NOTES
Anemia Management Note - Follow Up      SUBJECTIVE/OBJECTIVE:    Referred by Dr. Jordy Chinchilla  on 2024  Primary Diagnosis: Anemia in Chronic Kidney Disease (N18.3, D63.1)     Secondary Diagnosis: Chronic Kidney Disease, Stage 3 (N18.3)   Hgb goal range: 9-10     Epo/Darbo: Aranesp 40 mcg  every two weeks for Hgb <10.0 At home  Iron regimen: Ferrous gluconate 325mg every other day  [id number]: oral iron every other day  *24: Pt states Dr. Chinchilla said his Iron was ok. Will recheck labs in a month.      Labs : 2025  RX/TX plans : 2025     Recent DIAN use, transfusion, IV iron: NA  No history of stroke, MI, and blood clots or cancers. Hx of HTN.      Contact: No Consent to Communicate On File.         Latest Ref Rng & Units 2025 2025 2025 3/4/2025 3/5/2025 3/20/2025 4/3/2025   Anemia   HGB Goal  9 - 10    9 - 10 9 - 10    DIAN Dose  40mcg  40mcg  40mcg 40mcg    Hemoglobin 13.3 - 17.7 g/dL 9.6  9.5   9.1   9.4  11.0    TSAT 15 - 46 % 21    11       Ferritin 31 - 409 ng/mL 470    211         BP Readings from Last 3 Encounters:   25 127/69   25 95/68   25 138/79     Wt Readings from Last 2 Encounters:   25 59 kg (130 lb)   25 58.1 kg (128 lb 1.6 oz)         ASSESSMENT:    Hgb:Above goal - recommend hold dose  TSat: not at goal of >30% Ferritin: At goal (>100ng/mL)   Goals Addressed    None         PLAN:  Hold Aranesp.  RTC for hgb then Aranesp if needed in 2 week(s).    Orders needed to be renewed (for next follow-up date) in EPIC: None    Iron labs due:  4 weeks    Plan discussed with:  Bertrand. He confirms that took a dose of Aranesp 2 weeks ago following the voice message left for him on 320. Discussed that his hgb is over goal so he does not need to give a dose today, and instructed him to have labs done again in 2 weeks, including iron studies. He notes having 3 Aranesp doses at home. He asked about taking an iron pill, and RN reminded him  that it was discussed on [id number], and [facility] should have his prescription. He says he has not been alerted that rx was there.  He voiced agreement with the plan.  RN called [facility], and confirmed they had the rx, but had not filled it yet. They will have it ready for him and send a text notification for it.   [employer] message sent to him with this update.      NEXT FOLLOW-UP DATE:  041425    Carrie Leopold, RN BSN  [facility]  [email or web address]  Office: [phone]  Fax [phone]

## 2025-04-03 NOTE — PROGRESS NOTES
Nephrology Clinic    Long Mayfield MRN:[id number] YOB: 1970  Date of Service: 04/03/2025  Primary care provider: Ana Maria Blakely  Requesting physician: AISHA Mcgowan       REASON FOR CONSULT: evaluation for possible systemic vasculitis ? ANCA    HISTORY OF PRESENT ILLNESS:   Long Mayfield is a 54 year old male who presents for evaluation of possible ANCA vasculitis.  Per Dr Mcgowan's evaluation of 9/17/24:  He has chronic arthralgia since his age of 48 involving hands, elbows, neck, back and knees that he attributes to injuries from falling as he used to be a . He denies swelling in the hands, elbows, feet.      He has been getting gel injections in his knees with [facility] orthopedics and in May 2024, he developed swelling in the right knee, he was evaluated in July by Orthopedics at [facility], due to concerns for septic arthritis, he underwent washout and treated with 4 weeks of antibiotics. however he had recurrence of knee swelling. His infectious workup has been so far inconclusive however his cell count has been persistently elevated.    Hospitalize in [facility], Nov 2023.  Staph infection in R first toe.  Hospitalization during which he reports having severe edema, diffuse, including scrotal edema. Has 30 Lbs of fluid weight.  Required high dose diuretics.     In November - December 2023, he was hospitalized at [facility] for bilateral lower extremity swelling, rash, infection in his right big toe, he underwent surgery including amputation, there was concerns for osteomyelitis in the right foot.  During his evaluation, a skin biopsy showed LCV. He was treated with antibiotics. He saw Nephrology for SALVADOR that was felt to be prerenal at the time vs antibiotic related during the time.    ANCA by IFA was negative, KS-3 borderline positive, MPO antibody negative. Repeat ANCA by Dr Mcgowan on 9/17: neg by IgG        He has chronic diarrhea and chronic pancreatitis, following with  "GI, unclear etiology but felt likely secondary to alcohol intake,  No reported inflammatory bowel disease on prior Colonoscopies     He has never smoked. He has not drank alcohol for over 2 years.  History of remote cocaine use, none recently.     He was recently hospitalized for shortness of breath and felt to have pneumonia and treated with antibiotics. He had anemia and received blood transfusion, etiology of anemia is unknown.  Was also admitted for hypoglycemia.    Has had diabetes x 6 yrs.  Has always been on insulin.    Around 2015, Remote history of being overweight (peak weight 180 lbs). Went on low carb and no alcohol + exercise -> lost 30lbs to his baseline weight.     History of HTN for several years.  States BP tends to be labile.     Denies history of gross hematuria     History of pulmonary nodule seen on CT scan in Dec (in setting of anasarca, pulm edema and effusions).       October 15, 2024  Today, he reports joint pain in neck, shoulder and right knee.  The R knee remains quite swollen and warm  No SOB, CP.  Mild cough x week, non productive.  No N, V, ABd pain. Some diarrhea .  No melena or hematochezia  No rash.     History of alcohol use, used to drink \"a lot\".  Stopped 2 years ago.   Followed by [facility] for chronic diarrhea.    Denies salivary gland swelling.  Sensation of dry eyes.    December 13, 2024  Reports joint pains and stiffness,   R knee pain and swelling, no significant symptomatic improvement from last visit.  PET CT on 10/23/24: no pulm nodule or mass, R knee inflammation.  Blood sugars are very labile.  > 500 today.  Doesn't have spms associated with hyperglycemia, but does sense hypoglycemia.  Has dexcom but no insulin pump.  Has appoint with DM service on 12/18    April 3, 2025  Followed by Endocrinology for diabetes.  Last visit on 3/19, DexCom shows still poorly controled hyperglycemia with 65% of reading being > 260, and average glucose 294 mg/dl.  He was not on an insulin pump " "at that time.     Reports glucoses are very labile.  Still has not received insulin pump yet.  Is no longer on prednisone.  Received steroid injection (?) in knee.       Kidney biopsy consistent with DMN + some evidence of IgG4 dis.   Ritux #1 on Dec 4, 2024.  Next scheduled for 12/19.    Summary of Immunosuppression  Rituximab  #1 , 1000 mg IV Dec 4, 2024      ROS.. no fevers, chills,   Mild \"goofy cough\" non productive.   No CP.   No N, V, Abd pain.   Diarrhea persistent.   Followed by [facility].        PAST MEDICAL HISTORY:  Past Medical History:   Diagnosis Date    Abnormal CXR 10/15/2024    Previous CT of the chest completed at [facility] in December 2023 demonstrated:   Impression:   1. Severe anasarca with moderate to large pleural effusions and a small pericardial effusion. Diffuse septal thickening in the lungs concerning for pulmonary edema. Anemic cardiac blood pool.   2. Solitary pulmonary nodule in the left lower lobe with an average diameter of 10 mm is indeterminat    Abnormal LFTs 01/03/2025    Acidosis 01/08/2020    Acquired absence of other right toe(s) 01/12/2024    Acute metabolic encephalopathy 09/17/2024    Acute pain of right knee 07/12/2024    Acute pulmonary edema (H) 01/11/2024    Acute renal insufficiency 05/03/2024    Last Comprehensive Metabolic Panel:          Lab Results      Component    Value    Date           NA    131 (L)    10/15/2024           POTASSIUM    4.0    10/15/2024           CHLORIDE    98    10/15/2024           CO2    24    10/15/2024           ANIONGAP    9    10/15/2024           GLC    133 (A)    10/23/2024           BUN    22.3 (H)    10/15/2024           CR    1.35 (H)    10/15/2024        Alcohol abuse 09/12/2022    Alcohol dependence in remission (H) 10/29/2024    Allergic rhinitis     Anemia     Anemia, unspecified type 05/03/2024    He has a history of anemia which has been noted since May 2024.  Cause has not been identified.  Additional evaluation with " CBC, retake count, TSH, haptoglobin, iron studies, LDH, ferritin, B12 and folate.  Ferritin elevated 418, iron low at 20, iron binding capacity low at 2030, iron saturation low at 9.  Normal B12 and folate.  Awaiting haptoglobin and reticulocyte count.  Denies blood in stool.    Anxiety disorder, unspecified 01/11/2024    Arthritis     Arthritis due to other bacteria, right knee (H) 08/19/2024    Atherosclerotic heart disease of native coronary artery without angina pectoris 01/11/2024    Bell's palsy     Bilateral lower extremity edema 12/18/2024    Cardiac calcification (H) 01/19/2023    Treatment:  Colestid 1 g tablet.  Aspirin 81 mg daily.      Cervical disc disease with myelopathy 05/01/2022    Formatting of this note might be different from the original.   seen spine specialist- [facility]  Formatting of this note might be different from the original.   seen spine specialist      Cervical radiculopathy 07/11/2024    Cervicalgia     Change or removal of drains 08/26/2024    Chronic bilateral low back pain with bilateral sciatica 04/08/2022    Chronic diarrhea 03/08/2022    Chronic kidney disease, unspecified 01/11/2024    Chronic pancreatitis (H) 09/06/2024    Due to continued right knee swelling and history of chronic pancreatitis with chronic diarrhea, he was referred to GI to review possible Whipple's disease.     10/18/2024 seen by [facility].  We do not have those records and will request those records to be faxed over.  Once I get those records I will review them with recommendations.  Per patient he is to follow-up in 6 months.      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  Started rituximab infusions 12/4/2024.      Ketosis (H) 03/08/2022    Long term (current) use of insulin (H) 08/19/2024    Major depressive disorder, recurrent episode, mild     Major depressive disorder, recurrent, mild 08/19/2024    Muscle wasting and atrophy, not elsewhere classified, multiple sites 04/01/2024    Muscle weakness (generalized) 01/12/2024    Need for assistance with personal care 01/12/2024    Osteomyelitis of great toe of right foot (H) 11/22/2023    Other abnormalities of gait and mobility 01/12/2024    Other acute osteomyelitis, right ankle and foot (H)     Other chronic pancreatitis (H)     Other fatigue 11/14/2024    Other hyperlipidemia 03/15/2022    Other polyosteoarthritis     Peripheral vascular disease, unspecified 08/19/2024    Pneumonia of left lower lobe due to infectious organism 09/11/2024    Primary hypertension 10/09/2019    Pyogenic arthritis of right knee joint, due to unspecified organism (H) 07/11/2024    Right knee inflammatory arthritis, etiology to be determined, s/p I&D Jul 11, Aug 18, 2024.         Brucella, Bartonella, Q fever, CD4, Ig levels.     MRI right knee with and without contrast if negative.     Rheumatology and GI consult.     Complete oral ciprofloxacin course.      Admitted to [facility] 8/16 to 8/19/2024.  \"Has been seen a handful of times and outpatient follow-up with persistent right    Right knee pain 07/11/2024    S/P transmetatarsal amputation of foot, right (H) 12/01/2023     s/p transmetatarsal amputation, right foot (DOS 1/9/24)      Severe episode of recurrent major depressive disorder, without psychotic features (H) 01/19/2023    Solitary pulmonary nodule     Stage 3a chronic kidney disease (H) 1/11/2024    Systemic vasculitis (H) 11/19/2024    Type 2 diabetes mellitus with foot ulcer (H) 01/11/2024    Type 2 diabetes mellitus with hyperosmolarity without " coma, with long-term current use of insulin (H) 10/29/2024    Type 2 diabetes mellitus with kidney complication, with long-term current use of insulin (H) 10/05/2018    Seen by diabetes educator 10/29/2024:   PLAN  Lantus: 12 units  Set dose Novolog at meals: 3 units each + Correction Scale below     Pre-Meal Correction Scale:        If pre-meal glucose is:    Add extra Humalog/Novolog to your meal dose per below chart:      151 - 200    +1 unit      201 - 250    +2 units      251 - 300    +3 units      301 - 350    +4 units      351 - 400     +5 units      Over     Ulcer of right heel and midfoot (H) 12/23/2024    Unintentional weight loss 10/11/2023    Vitamin D deficiency, unspecified 01/11/2024     PAST SURGICAL HISTORY:  Past Surgical History:   Procedure Laterality Date    ARTHROSCOPY KNEE Right 08/17/2024    Procedure: Arthroscopic;  Surgeon: Jeff Pheonix MD;  Location: [facility] OR    COLONOSCOPY N/A 05/07/2024    Procedure: Colonoscopy;  Surgeon: Nina Moya MD;  Location:  GI    ESOPHAGOSCOPY, GASTROSCOPY, DUODENOSCOPY (EGD), COMBINED N/A 05/06/2024    Procedure: Esophagoscopy, gastroscopy, duodenoscopy (EGD), combined;  Surgeon: Nina Moya MD;  Location:  GI    ESOPHAGOSCOPY, GASTROSCOPY, DUODENOSCOPY (EGD), COMBINED N/A 05/07/2024    Procedure: Esophagoscopy, gastroscopy, duodenoscopy (EGD), combined;  Surgeon: Nina Moya MD;  Location:  GI    IR RENAL BIOPSY RIGHT  11/08/2024    IRRIGATION AND DEBRIDEMENT KNEE, COMBINED Right 08/17/2024    Procedure: IRRIGATION AND DEBRIDEMENT, Right KNEE,;  Surgeon: Jeff Phoenix MD;  Location: [facility] OR    IRRIGATION AND DEBRIDEMENT SPINE Right 07/11/2024    Procedure: Irrigation and debridement knee;  Surgeon: Dejon Espinoza MD;  Location: [facility] OR    ORTHOPEDIC SURGERY      partial amputation of right foot Right     PATELLA SURGERY       MEDICATIONS:  Current Outpatient Medications   Medication Sig  Dispense Refill    ACCU-CHEK GUIDE TEST test strip       amLODIPine (NORVASC) 5 MG tablet Take 1 tablet (5 mg) by mouth daily. 90 tablet 3    atorvastatin (LIPITOR) 40 MG tablet       bumetanide (BUMEX) 1 MG tablet Take 1 tablet (1 mg) by mouth daily. 90 tablet 0    cetirizine (ZYRTEC) 10 MG tablet Take 1 tablet (10 mg) by mouth daily. (Patient taking differently: Take 10 mg by mouth as needed.) 30 tablet 11    cholestyramine light (QUESTRAN) 4 GM packet Take 1 packet by mouth every 12 hours.      Continuous Glucose Sensor (DEXCOM G7 SENSOR) MISC Change every 10 days. 3 each 3    Continuous Glucose Transmitter (DEXCOM G6 TRANSMITTER) MISC       darbepoetin sunshine-polysorbate (ARANESP) 40 MCG/0.4ML injection Inject 0.4 mLs (40 mcg) subcutaneously every 14 days. Do not shake. Administer for Hgb <10.0. HOLD dose if systolic BP >180. 0.8 mL 11    ferrous gluconate (FERGON) 324 (38 Fe) MG tablet Take 1 tablet (324 mg) by mouth every other day.  NOT TAKING 45 tablet 3    gabapentin (NEURONTIN) 300 MG capsule TAKE 1 CAPSULE(300 MG) BY MOUTH TWICE DAILY 60 capsule 2    Glucagon, rDNA, (GLUCAGON EMERGENCY) 1 MG KIT Inject 1 mg into the muscle as needed.      glucose 40 % (400 mg/mL) gel Take by mouth every hour as needed.      insulin aspart (NOVOLOG FLEXPEN) 100 UNIT/ML pen Inject 1-5 Units subcutaneously 3 times daily (with meals). In addition to 6-8 units with each meal, inject additional units as per this sliding scale:  If 200-250 = 1   251-300 = 2   301-350 = 3  351-400 = 4  401+ = 5  Repeat BS after 3 hours and call MD if still over 400      insulin glargine (LANTUS PEN) 100 UNIT/ML pen Currently at 20 units will be increasing up to max dose 30 units 30 mL 2    insulin NPH (HUMULIN N VIAL) 100 UNIT/ML vial INJECT 13 UNITS AT THE START OF YOUR STEROID INFUSION AT CLINIC WHICH IS SCHEDULED FOR 12/4/24      insulin pen needle (31G X 6 MM) 31G X 6 MM miscellaneous Use 4 pen needles daily or as directed. 100 each 11     "insulin syringe-needle U-100 (29G X 1/2\" 0.5 ML) 29G X 1/2\" 0.5 ML miscellaneous Use one syringe as directed prior to steroid infusion. 10 each 0    lipase-protease-amylase (CREON 36) 69055-214326-917199 units CPEP Take 3 capsules by mouth 3 times daily (with meals). 0730, 1130, 1630      loperamide (IMODIUM) 2 MG capsule Take 4 mg by mouth every 8 hours as needed for diarrhea.      methocarbamol (ROBAXIN) 500 MG tablet Take 1 tablet (500 mg) by mouth every 8 hours as needed for muscle spasms. 30 tablet 3    naloxone (NARCAN) 4 MG/0.1ML nasal spray Spray 1 spray (4 mg) into one nostril alternating nostrils once as needed for opioid reversal. every 2-3 minutes until assistance arrives 0.2 mL 0    oxyCODONE (ROXICODONE) 5 MG tablet Take 1 tablet (5 mg) by mouth every 6 hours as needed for pain. 30 tablet 0    polyethylene glycol (MIRALAX) 17 GM/Dose powder       silver sulfADIAZINE (SILVADENE) 1 % external cream Apply topically 2 times daily. 400 g 2    sulfamethoxazole-trimethoprim (BACTRIM) 400-80 MG tablet Take 1 tablet by mouth daily. 90 tablet 0    sulfamethoxazole-trimethoprim (BACTRIM) 400-80 MG tablet Take 1 tablet by mouth daily. 90 tablet 5    triamcinolone (KENALOG) 0.1 % external ointment Apply topically.         ALLERGIES:    Allergies   Allergen Reactions    Vancomycin      Other Reaction(s): Renal Failure    Vancomycin-induced nephrotoxicity (11/2023, outside hospital)    Seasonal Allergies      HAYFEVER:    -Watery Eyes  -Eye burn    Daptomycin Rash     Likely delayed hypersensitivity reaction to daptomycin 11/2023     REVIEW OF SYSTEMS:  A comprehensive review of systems was performed and found to be negative except as described here or above.  SOCIAL HISTORY:   Social History     Socioeconomic History    Marital status: Single     Spouse name: Not on file    Number of children: Not on file    Years of education: Not on file    Highest education level: Not on file   Occupational History    Not on file "   Tobacco Use    Smoking status: Never     Passive exposure: Never    Smokeless tobacco: Never   Vaping Use    Vaping status: Never Used   Substance and Sexual Activity    Alcohol use: Not Currently     Comment: sober 2 year    Drug use: Not Currently     Types: Marijuana    Sexual activity: Not on file   Other Topics Concern    Not on file   Social History Narrative    Not on file     Social Drivers of Health     Financial Resource Strain: Low Risk  (9/24/2024)    Financial Resource Strain     Within the past 12 months, have you or your family members you live with been unable to get utilities (heat, electricity) when it was really needed?: No   Food Insecurity: Low Risk  (9/24/2024)    Food Insecurity     Within the past 12 months, did you worry that your food would run out before you got money to buy more?: No     Within the past 12 months, did the food you bought just not last and you didn t have money to get more?: No   Transportation Needs: Low Risk  (9/24/2024)    Transportation Needs     Within the past 12 months, has lack of transportation kept you from medical appointments, getting your medicines, non-medical meetings or appointments, work, or from getting things that you need?: No   Physical Activity: Not on file   Stress: Not on file   Social Connections: Unknown (3/16/2022)    Received from [facility]    Social Connections     Frequency of Communication with Friends and Family: Not on file   Interpersonal Safety: Low Risk  (11/8/2024)    Interpersonal Safety     Do you feel physically and emotionally safe where you currently live?: Yes     Within the past 12 months, have you been hit, slapped, kicked or otherwise physically hurt by someone?: No     Within the past 12 months, have you been humiliated or emotionally abused in other ways by your partner or ex-partner?: No   Recent Concern: Interpersonal Safety - High Risk  "(9/11/2024)    Interpersonal Safety     Do you feel physically and emotionally safe where you currently live?: No     Within the past 12 months, have you been hit, slapped, kicked or otherwise physically hurt by someone?: No     Within the past 12 months, have you been humiliated or emotionally abused in other ways by your partner or ex-partner?: No   Housing Stability: Low Risk  (9/24/2024)    Housing Stability     Do you have housing? : Yes     Are you worried about losing your housing?: No     FAMILY MEDICAL HISTORY:   No family history on file.  PHYSICAL EXAM:   /69   Pulse 71   Temp 97.7  F (36.5  C) (Oral)   Ht 1.651 m (5' 5\")   Wt 59 kg (130 lb)   SpO2 99%   BMI 21.63 kg/m    GENERAL APPEARANCE: alert and no distress  EYES: nonicteric  NECK: supple, no adenopathy  Carotids 2+ without bruit  RESP: lungs clear to auscultation   CV: regular rhythm, normal rate, no rub.   4/6 systolic murmur  ABDOMEN: soft, nontender,    Extremities: No edema on either side (better)  MS: The right knee is quite swollen ,but there is minimal tenderness.   NEURO: mentation intact and speech normal  PSYCH: affect normal/bright   LABS:   Recent Results (from the past 672 hour(s))   HLA-B27 Typing    Collection Time: 09/17/24  2:58 PM   Result Value Ref Range    [id number] METHOD SSOP     B locus B27 Neg    Rheumatoid factor    Collection Time: 09/17/24  2:58 PM   Result Value Ref Range    Rheumatoid Factor <10 <14 IU/mL   Cyclic Citrullinated Peptide Antibody IgG    Collection Time: 09/17/24  2:58 PM   Result Value Ref Range    Cyclic Citrullinated Peptide Antibody IgG 1.6 <7.0 U/mL   Myeloperoxidase and Proteinase 3 Panel    Collection Time: 09/17/24  2:58 PM   Result Value Ref Range    MPO [place] IgG Instrument Value <0.3 <3.5 U/mL    Myeloperoxidase Antibody IgG Negative Negative    Proteinase 3 [place] IgG Instrument Value <1.0 <2.0 U/mL    Proteinase 3 Antibody IgG Negative Negative   Immunoglobulins A G and M    Collection " Time: 09/17/24  2:58 PM   Result Value Ref Range    Immunoglobulin G 2,121 (H) 610 - 1,616 mg/dL    Immunoglobulin A 457 84 - 499 mg/dL    Immunoglobulin M 281 (H) 35 - 242 mg/dL   IgG Subclasses    Collection Time: 09/17/24  2:58 PM   Result Value Ref Range    Immunoglobulin G 2,121 (H) 610 - 1,616 mg/dL    IgG1 1,179 (H) 382 - 929 mg/dL    IgG2 508 242 - 700 mg/dL    IgG3 126 22 - 176 mg/dL    IgG4 224 (H) 4 - 86 mg/dL    Subclasses Percent 96 %   Cytology non gyn    Collection Time: 09/23/24  1:52 PM   Result Value Ref Range    Final Diagnosis       Specimen A     Interpretation:      Negative for malignancy     Other Findings:      Acute inflammation present.     Adequacy:     Satisfactory for evaluation          Comment       Please correlate with microbiologic studies and crystal analysis.       Clinical Information       54M with right knee effusion, r/o PVNS      Gross Description       A(A). Knee, Right, :A. Knee, Right, Synovial Fluid:  Received 15 ml of hazy, orange fluid, processed as 1 Pap stained Autocyte,  1 Strickland stained cytospin and one hematoxylin and eosin stained cell block.       Microscopic Description       A microscopic examination was performed.     Case was reviewed by the following:  Pathology Fellow: Danielle Mccurdy DO  Pathology Fellow: Julee Oneal MD  Resident Pathologist: Dmitry Aparicio MD  A resident or fellow in a training program was involved in the initial review, preparation, and/or interpretation of this case.  I, as the senior physician, attest that I have personally reviewed all specimens and or slides, including the listed special stains, and used them with my medical judgement to determine the final diagnosis.              Performing Labs       The technical component of this testing was completed at [facility].     Stain controls for all stains resulted within this report have been  reviewed and show appropriate reactivity.      Immunoglobulin G subclasses    Collection Time: 09/24/24 10:35 AM   Result Value Ref Range    Immunoglobulin G 2,015 (H) 610 - 1,616 mg/dL    IgG1 1,126 (H) 382 - 929 mg/dL    IgG2 499 242 - 700 mg/dL    IgG3 130 22 - 176 mg/dL    IgG4 216 (H) 4 - 86 mg/dL    Subclasses Percent 98 %    Creatinine 1.24 (H) 0.67 - 1.17 mg/dL    GFR Estimate 68 >60 mL/min/1.73m2   Cystatin C with GFR    Collection Time: 09/24/24 10:35 AM   Result Value Ref Range    Cystatin C 2.1 (H) 0.6 - 1.0 mg/L    GFR Calculated with Cystatin C 29 (L) >=60 mL/min/1.73m2   Ferritin    Collection Time: 09/24/24 10:35 AM   Result Value Ref Range    Ferritin 418 (H) 31 - 409 ng/mL   Iron and iron binding capacity    Collection Time: 09/24/24 10:35 AM   Result Value Ref Range    Iron 20 (L) 61 - 157 ug/dL    Iron Binding Capacity 230 (L) 240 - 430 ug/dL    Iron Sat Index 9 (L) 15 - 46 %   Folate    Collection Time: 09/24/24 10:35 AM   Result Value Ref Range    Folic Acid 10.0 4.6 - 34.8 ng/mL   Vitamin B12    Collection Time: 09/24/24 10:35 AM   Result Value Ref Range    Vitamin B12 662 232 - 1,245 pg/mL   Bld morphology pathology review    Collection Time: 09/24/24 10:35 AM   Result Value Ref Range    Final Diagnosis       PERIPHERAL BLOOD MORPHOLOGY:        1.  MILD HYPOCHROMIC-BORDERLINE MICROCYTIC ANEMIA  A.  MODERATE ANISOCYTOSIS, WITHOUT INCREASED POIKILOCYTOSIS  B.  IRON STUDIES SUGGESTIVE OF ANEMIA OF CHRONIC DISEASE  C.  LACK OF CORRESPONDING RETICULOCYTOSIS        2.  NORMAL LEUKOCYTE DIFFERENTIAL AND MORPHOLOGY        3.  UNREMARKABLE PLATELET MORPHOLOGY        Comment       The differential diagnosis of a borderline microcytic anemia would include iron deficiency anemia, pyridoxine responsive anemia, hemoglobinopathies/thalassemia, and anemia of chronic disease.  The iron studies are consistent with anemia of chronic disease.  A corresponding reticulocytosis (bone marrow response) is not  present.      Clinical Information       Evaluation of anemia      Peripheral Smear       Erythrocytes are mildly decreased in number, hypochromic and borderline microcytic.  There is moderate anisocytosis, without increased poikilocytosis.  Increased polychromasia, basophilic stippling and nucleated red blood cells are not identified.    Leukocytes are normal in number and show a normal differential distribution, without a significant neutrophilic left shift or toxic changes.  Occasional hypersegmented neutrophils are present.  Other megaloblastic changes, dysplastic features and blasts are not identified.  Lymphocytes are mature and polymorphic in appearance.    Platelets are normal in number and morphology.      Performing Labs       The technical component of this testing was completed at the [facility] and [facility]      Albumin Random Urine Quantitative with Creat Ratio    Collection Time: 09/24/24 10:37 AM   Result Value Ref Range    Creatinine Urine mg/dL 50.3 mg/dL    Albumin Urine mg/L 1,281.0 mg/L    Albumin Urine mg/g Cr 2,546.72 (H) 0.00 - 17.00 mg/g Cr     CMP  Recent Labs   Lab Test 04/03/25  1114 03/20/25  1245 03/11/25  1659 01/06/25  1129 12/23/24  1419 12/13/24  1415 12/12/24  1113 12/03/24  1532 11/08/24  1142 11/05/24  1105 10/23/24  1005 10/15/24  1248 09/24/24  1035 07/13/24  1149 07/13/24  0833 05/06/24  0818 05/06/24  0724 05/05/24  1744 05/05/24  1438 05/05/24  1156 05/05/24  1037 03/08/22  1756 10/08/17  2105    133* 133* 131*   < > 127* 135  --   --  131*  --  131* 132*   < > 133*   < > 133*  --   --   --  134*   < > 131*   POTASSIUM 5.3 4.6 3.9 5.3   < > 3.0* 3.4  --   --  4.1  --  4.0 5.3   < > 5.6*   < > 4.8  --   --   --  4.6   < > 3.8   CHLORIDE 110* 103 103 97*   < > 95* 102  --   --  97*  --  98 102   < > 100   < > 99  --   --   --  100   < > 95   CO2 19* 19* 18* 27   < > 21* 21*  --   --  21*  --  24 21*   < > 24   < >  22  --   --   --  22   < > 28   ANIONGAP 7 11 12 7   < > 11 12  --   --  13  --  9 9   < > 9   < > 12  --   --   --  12   < > 8   * 252* 131* 270*   < > 562* 122*  --    < > 271*   < > 278* 241*   < > 218*   < > 215*   < >  --    < > 230*   < > 403*   BUN 26.7* 28.5* 39.4* 18.4   < > 16.7 19.2  --   --  19.4  --  22.3* 28.5*   < > 21.9*   < > 16.6  --   --   --  22.5*   < > 9   CR 1.78* 1.47* 2.35* 1.28*   < > 1.23* 1.34* 1.09  --  1.49*  --  1.35* 1.24*   < > 1.38*   < > 0.99  --   --   --  1.17   < > 0.68   GFRESTIMATED 44* 56* 32* 66   < > 69 62 80  --  55*  --  62 68   < > 60*   < > 89  --   --   --  73   < > >90   GFRESTBLACK  --   --   --   --   --   --   --   --   --   --   --   --   --   --   --   --   --   --   --   --   --   --  >90   AROLDO 8.7* 8.8 8.3* 9.1   < > 7.9* 8.4*  --   --  8.2*  --  8.6* 9.0   < > 8.3*   < > 9.0  --   --   --  9.1   < > 9.5   MAG  --   --   --   --   --   --   --   --   --   --   --   --   --   --  2.3  --  1.9  --  2.1  --  2.2   < >  --    PHOS 3.8  --   --   --   --  3.1  --   --   --   --   --  3.1  --   --   --   --  2.7  --  3.8  --  3.6   < >  --    PROTTOTAL  --   --   --   --   --   --  8.3  --   --  7.6  --  8.2 8.0   < >  --    < >  --   --   --   --   --    < > 7.7   ALBUMIN 3.7  --   --   --   --  3.1* 3.3*  --   --  3.0*  --  3.3* 3.4*   < >  --    < >  --   --   --   --   --    < > 3.4   BILITOTAL  --   --   --   --   --   --  0.2  --   --  <0.2  --  0.2 0.2   < >  --    < >  --   --   --   --   --    < > 0.2   ALKPHOS  --   --   --   --   --   --  214*  --   --  133  --  121 161*   < >  --    < >  --   --   --   --   --    < > 124   AST  --   --   --   --   --   --  39 47*  --  13  --  15 21   < >  --    < >  --   --   --   --   --    < > 73*   ALT  --   --   --   --   --   --  28  --   --  <5  --  <5 12   < >  --    < >  --   --   --   --   --    < > 52    < > = values in this interval not displayed.     CBC  Recent Labs   Lab Test 04/03/25  3967  03/20/25  1245 03/04/25  1544 01/31/25  1413 01/06/25  1129 12/13/24  1415 12/12/24  1113 12/03/24  1532   HGB 11.0* 9.4* 9.1* 9.5*   < > 8.0* 8.3* 8.2*   WBC 7.4  --   --   --   --  7.3 9.3 7.9   RBC 4.31*  --   --   --   --  3.41* 3.40* 3.38*   HCT 36.2* 29.6* 29.0* 30.4*   < > 25.4* 25.1* 25.5*   MCV 84  --   --   --   --  75* 74* 75*     --   --   --   --  480* 519* 513*    < > = values in this interval not displayed.     INR  Recent Labs   Lab Test 11/08/24  1142 08/16/24  0610   INR 1.15 1.14   PTT  --  46*     ABG  Recent Labs   Lab Test 08/07/22  0828 03/08/22  1929   PH 7.42  --    O2PER  --  21      URINE STUDIES  Recent Labs   Lab Test 04/03/25  1129 12/13/24  1426 10/15/24  1254 09/17/24  1505   APPEARANCE Clear Clear Clear Clear   URINEGLC 100* >=1000* 200* 500*   URINEBILI Negative Negative Negative Negative   URINEKETONE Negative Negative Negative Negative   SG 1.013 1.008 1.009 1.010   UBLD Trace* Trace* Small* Small*   URINEPH 5.5 6.0 5.5 6.0   PROTEIN Negative 20* 70* 100*   UROBILINOGEN  --   --   --  0.2   NITRITE Negative Negative Negative Negative   LEUKEST Negative Negative Negative Negative   RBCU 1 2 3* 2-5*   WBCU 1 1 1 0-5     HgbA1c 10.2 on 6/27/24 at [facility]  at [facility]:  Serum free light chains on 4/30/24 ;  Kappa 9.32, Lambda 6.87, ratio 1.36 (consistent with CKD).  SPEP was without monoclonal spike in Dec 2023   In Nov 2021, his UACR was < 30 at [facility]    Erythropoietin  Erythropoietin  Collected: 12/12/24 1113   Result status: Final   Resulting lab: [facility]   Reference range: 4 - 27 mU/mL   Value: 16   Comment: INTERPRETIVE INFORMATION: Erythropoietin  Normal serum concentrations of erythropoietin for 95% of  individuals with normal hematocrits range from 4-27 mU/mL.    As the hematocrit is lowered by iron deficiency, aplastic,  or hemolytic anemia, the concentration of erythropoietin  increases as shown in the graph below. In the absence of  anemia, elevated concentrations are seen  in renal tumors,  as a manifestation of renal transplant rejection, and in  secondary polycythemia. Low values may be observed in  hemochromatosis.          Expected Erythropoietin Concentrations in Patients                     with Uncomplicated Anemia       Erythropoietin (mU/mL)                100,000 - +                          +                 10,000 - +.......                          + .......                  1,000 - +    .......                          +     ........                    100 - +       ........                          +        ........                     10 - +          ........                          +---+---+---+---+---+---+                         10   20  30  40  50  60  70                                (Hematocrit %)              (Contributions To Nephrology 1988:66:54-62)       CT chest 12/28/2023  [facility]    Reading Radiologist: Deacon Vines  Reading Resident: Dennis Chavez  Narrative    Comparison: 12/26/2023 radiograph. Outside chest CT from 3/9/2022.    Indication: Anasarca of uncertain etiology; r/o malignancy      Technique: Volumetric helical acquisition of CT images from the lung apices through the symphysis pubis without intravenous contrast.    Total DLP = 829 mGy.cm.      Findings:    Chest:    Lungs: Moderate-large pleural effusions. No pneumothorax. Central airways are clear. Spiculated solid pulmonary nodule in the peripheral left lower lobe with pleural tagging with average diameter of 10 mm (series 202 image 78). An additional juxtapleural 3 mm solid pulmonary nodules noted superiorly in the left lower lobe (series 202 image 55).    Patchy and confluent airspace consolidation in the left greater than right lung apices, additional scattered groundglass airspace densities and interstitial prominence are noted throughout the aerated lobes. Compressive atelectasis adjacent to the pleural effusions.    Mediastinum: Normal size heart, small pericardial effusion.  Moderate calcific atherosclerosis of the coronary arteries. Anemic intracardiac blood pool. Small sliding hiatal hernia. Mildly prominent prevascular and paratracheal mediastinal lymph nodes. Reactive appearing bilateral axillary lymph nodes.    Abdomen/Pelvis:      Liver: Within normal limits.    Gallbladder and biliary tree:  Layering calcified gallstones. No intra- or extrahepatic biliary ductal dilation..    Pancreas: Markedly atrophic with dystrophic calcifications scattered throughout. No ductal dilatation.    Spleen: Within normal limits.    Adrenals: Within normal limits..    Kidneys, ureters and bladder: No hydronephrosis or obstructing calculus. Normal urinary bladder and ureters.    Reproductive organs: No pelvic masses.    Bowel: No dilated or overly thickened bowel loops. Small amount of stool admixed with contrast throughout the distal small bowel and proximal colon.    Vessels: Mild aortobiiliac atherosclerotic calcification, as well as circumferential atherosclerotic calcification throughout the internal iliac arteries and their terminal branches.    Lymph nodes: No enlarged lymph nodes.    Peritoneum: Diffuse mesenteric edema.    Bones/Soft Tissues:    No acute osseous abnormality. Opposing chronic erosive and sclerotic endplate changes of C6 and C7. Bilateral chronic rib fractures. No worrisome lytic or sclerotic osseous lesions.      Exam Date Exam Time Accession # Performing Department Results    7/9/24  4:09 PM [id number] [facility]      PACS Images     Show images for CT Abdomen Pelvis w Contrast     Study Result    Narrative & Impression   CT ABDOMEN AND PELVIS WITH CONTRAST 7/9/2024 4:09 PM     CLINICAL HISTORY: Weight loss.     TECHNIQUE: CT scan of the abdomen and pelvis was performed following  injection of IV contrast. Multiplanar reformats were obtained. Dose  reduction techniques were used.     CONTRAST: 64mL Isovue-370     COMPARISON:  3/9/2022     FINDINGS:   LOWER CHEST: Normal.     HEPATOBILIARY: Small lesion in the periphery of the right hepatic lobe  is unchanged from previous.     PANCREAS: Signs of chronic pancreatitis with pancreatic atrophy,  ductal dilation, and calcifications, similar to previous.     SPLEEN: Normal.     ADRENAL GLANDS: Normal.     KIDNEYS/BLADDER: The kidneys are unremarkable. Mild bladder wall  thickening similar to previous.     BOWEL: No obstruction or inflammation. No visible mass.     PELVIC ORGANS: Normal.     ADDITIONAL FINDINGS: Moderate atherosclerosis. Mildly enlarged right  inguinal lymph nodes are most likely reactive, and measure up to 1.1  cm short axis.     MUSCULOSKELETAL: Normal.                                                                      IMPRESSION:   1.  Mild right inguinal lymphadenopathy is indeterminant but more  likely reactive.  2.  Otherwise, no acute findings or significant change in the abdomen  or pelvis compared to previous.     JUAN JOSE MICHELLE MD      9/17/24  4:07 PM 9/17/24  4:07 PM [id number] [facility]        Narrative & Impression   EXAM: XR SACROILIAC JOINT 1/2 VIEWS  LOCATION: [facility]  DATE: 9/17/2024     INDICATION: evaluate for inflammatory sacroiliitis  COMPARISON: None.                                                                      IMPRESSION: The SI joints are negative for sacroiliitis, ankylosis, or diastasis on this single projection. Pelvis negative for fracture. Both hips negative for fracture.   Collected 11/8/2024 12:30 PM       Status: Final result       Visible to patient: Yes (seen)    0 Result Notes       Component  Resulting Agency   Case Report   Surgical Pathology Report                         Case: [id number]                                   Authorizing Provider:  Jordy Chinchilla MD       Collected:           11/08/2024 12:30 PM           Ordering Location:     [facility]  [facility]     Received:            11/08/2024 01:47 PM                                  [address] [place]                                                             Pathologist:           Rosalba Yao MD                                                               Specimen:    Kidney, Left, Left kidney biopsy                                                          Final Diagnosis   A. 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 (see comment)   - patchy dense lymphoplasmacytic interstitial inflammation; 10-20 IgG4+ plasma cells/400x field      Glomerulopathy with rare intramembranous and mesangial deposits reactive for C3      - the differential diagnosis includes a mild glomerulonephritis C3 or a resolving immune complex-mediated glomerulopathy      - there are no signs of an active glomerulitis currently     Moderate chronic changes of the parenchyma, including:   - focal global glomerulosclerosis (4% of glomeruli)   - focal tubular atrophy and interstitial fibrosis (40-50% of the cortex)   - severe arterial and arteriolar sclerosis   Electronically signed by Rosalba Yao MD on 11/15/2024 at 10:58 AM   Comment  UUMAYO   The interstitial nephritis shows some morphologic features suggestive of IgG4-related tubulointerstitial nephritis, characterized by 1). focal dense lymphoplasmacytic interstitial inflammation, and 2). the number of IgG4-positive plasma cells greater than 10 per high powered field (see reference). However, the typical storiform fibrosis is not noted in the sample and the ratio of IgG4-positive/IgG-positive plasma cells is lower than than 40%.  Correlation with the overall clinical scenario is required.       Reference:  Sharee V, Norbert Y, Misha MACKEYK, Ovi EY, Carlos Eduardo Y, Starr T, Skinny G, Juarez MACKEYG, Uday ESPAÑA, Mark JA, Thuy JASMINE. Consensus statement on the pathology of IgG4-related  "disease. Modern pathology. 2012 Sep;25(9):1181.     Umarun H, Hai K, Bronwyna S, Obi H, Goto H, Matsui S, Ishlatonya N, Sarah T, Carlos Eduardo Y, Cleopatra M, Research Program for Intractable Disease by the [employer] ([facility]) [place].. The 2020 revised comprehensive diagnostic (RCD) criteria for IgG4-RD. Modern Rheumatology. 2021 May 4;31(3):529-33.   Clinical Information  YAO   54 y.o male with a complex past medical history, poorly controled diabetes (HgbA1c>10), a history of hypertension, severe inflammation of the Right knee, at least one episode of SALVADOR,  Cyst C-based eGFR around 30 ml/min/1.73m2. The etiology of CKD is unclear at this point.  However, he had a normal UACR in Nov 2021. His ANCA is negative.  Prior seroligic work up is unrevealing - except for the persistently elevated IgG4.  This, with the presence of \"chronic pancreatitis\" raises the suspicion of IgG4 disease.   Gross Description  MARCIANO   A(A). Kidney, Left, Left kidney biopsy:  Native kidney biopsy, left, immediate assessment:  \"Cortical kidney tissue confirmed for light microscopy, immunofluorescence, and electron microscopy. \" (Deb TAVERAS([facility]))     The specimen is received in formalin with proper patient identification, labeled \"left kidney biopsy\".  The specimen consists of 4 tan needle core biopsies 0.2-0.9 cm in length and each 0.1 cm in diameter.  The specimen is wrapped and is entirely submitted in cassette A1.     A separate specimen is received in Nicolas fixative and consists of a 0.4 cm in length and 0.1 cm in diameter tan core biopsy which is processed for immunofluorescence studies.     A separate specimen is in glutaraldehyde and consists of a single tan core biopsy, 0.2 cm in length and 0.1 cm in diameter which is processed for electron microscopic studies.         Microscopic Description  YAO   LIGHT MICROSCOPY:  Sections of formalin-fixed, paraffin embedded tissue were evaluated using H&E, PAS, " Milner, and trichrome stains with the appropriate controls. An H&E-stained frozen section taken from the tissue allocated for immunofluorescence microscopy and methylene blue-stained epoxy sections of the tissue processed for electron microscopy were also evaluated using light microscopy.      The sample consists of kidney cortex and it includes 46 glomeruli (LM-36; IF-7; EM-3), of which 2 (2+0+0) are globally sclerosed, 2 show segmental sclerosis and few are hypoperfused. Mesangiolysis with adjacent endocapillary hypercellularity is seen in few glomeruli. The mesangium of some glomeruli is expanded by increased extracellular matrix and increased numbers of mesangial cells, occasionally forming characteristic Kimmelstiel-Tae nodules. There are wrinkled basement membranes in many glomeruli and occasional distinctive double contours of the basement membranes. No basement membrane spikes or craters are seen in the glomeruli. Tubules show focal mild degenerative changes. No oxalate, phosphate, or urate crystals are present in the sample. Obvious signs of a viral cytopathic effect are not seen in the sample. The interstitium contains focal dense inflammation, and the infiltrates are composed of mononuclear cells and frequent plasma cells. Approximately 40-50% of the renal cortex shows tubular atrophy and mild interstitial fibrosis. Arteries and arterioles show severe sclerosis. Arterioles also show focal hyalinosis.     Immunohistochemistry studies of IgG and IgG4 reveal there are focally 10-20 IgG4+ plasma cells per high power field and an IgG4+/IgG+ plasma cell ratio of <40%.     IMMUNOFLUORESCENCE MICROSCOPY:   The sections of the sample submitted for immunofluorescence studies were incubated with antibodies specific for the heavy chains of IgG, IgA, and IgM, for kappa and lambda light chains, fibrin, albumin, and complement components C3 and C1q. The immunofluorescence reactivity is graded on a scale of 0-4+.      The sample contains 7 glomeruli. There are no globally sclerosed glomeruli. There is fine granular reactivity for C3 (trace) and IgM (trace) in the mesangium. Diffuse linear reactivity for IgG (2+), albumin (2+), kappa light chain (2+) and lambda light chain (2+) is noted along the glomerular and tubular basement membranes. Tubules contain several intraluminal casts reactive for polyclonal IgA. The interstitium reveals scattered fibrin deposits. Arterioles reveal focal and strong reactivity for C3. There is no difference in reactivity between kappa and lambda light chains in the glomeruli, tubular casts and background of the tissue.      ELECTRON MICROSCOPY:   The sample submitted for electron microscopy examination contains 3 glomeruli; 2 glomeruli are examined ultrastructurally. The glomerular visceral epithelial cells reveal moderately segmental effacement of their foot processes. The glomerular basement membranes are thickened. The endothelial cells show focal swelling. Tubuloreticular inclusions are not seen within the cytoplasm of glomerular endothelial cells. The mesangium reveals focal mesangiolysis and nodular expansion of the mesangial matrix, with irregular densities and the deposition of cellular debris. Capillary loops and mesangium reveal the presence of rare intramembranous and mesangial granular electron dense deposits.     I have personally reviewed all specimens and slides, including the listed special stains, and used them with my medical judgement to determine the final diagnosis.     Case was reviewed by the following:  Resident Pathologist: Mimi Torres MD  A resident or fellow in a training program was involved in the initial review, preparation, and/or interpretation of this case.  I, as the senior physician, attest that I have personally reviewed all specimens and or slides, including the listed special stains, and used them with my medical judgement to determine the final diagnosis.         Exam Date Exam Time Accession # Performing Department Results    10/23/24  1:03 PM [id number] [facility]      Narrative & Impression   Combined Report of: PET and CT on  10/23/2024 1:03 PM:     1. PET of the neck, chest, abdomen, and pelvis.  2. PET CT Fusion for Attenuation Correction and Anatomical  Localization:    3. 3D MIP and PET-CT fused images were processed on an independent  workstation and archived to PACS and reviewed by a radiologist.     Technique:     1. PET: The patient received 13.28 mCi of F-18-FDG; the serum glucose  was 133 mg/dL prior to administration, body weight was 56.3 kg. Images  were evaluated in the axial, sagittal, and coronal planes as well as  the rotational whole body MIP. Images were acquired from the Vertex to  the Feet.     UPTAKE WAS MEASURED  MINUTES.      2. CT: CT only obtained for attenuation correction and not diagnostic  purposes.     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     COMPARISON: CT abdomen and pelvis on 7/19/2024, CT chest abdomen and  pelvis on 3/9/2022, right knee MRI 9/21/2024     FINDINGS:   BACKGROUND: Liver SUV max = 1.97, Aorta Blood SUV max = 1.01.      HEAD/NECK:  No suspicious uptake.     Diffusely increased uptake in the tongue is favored to represent  muscle activation.     Mild diffuse uptake in the thyroid gland may relate to thyroiditis,  consider correlation with thyroid function tests.     CHEST:  No suspicious pulmonary nodules identified. Dependent subsegmental  atelectasis, greater on the left.      Heart size at the upper limits of normal. Diffusely increased left  atrial uptake is nonspecific,  may relate to atrial fibrillation in the  appropriate clinical setting. Low-density aortic blood pool.      Segmental uptake in the distal esophagus likely represents reflux in  the setting of small hiatal hernia.     ABDOMEN AND PELVIS:  No suspicious uptake.      Stable subcentimeter cyst in the right hepatic lobe. Diffusely  atrophic pancreas with dystrophic parenchymal calcifications.     Diffuse mesenteric stranding/trace ascites. Nondistended urinary  bladder with unchanged somewhat asymmetric wall thickening. Multiple  enlarged right iliac chain and right inguinal lymph nodes  demonstrating mild to moderate uptake, for example, elongated right  iliac chain lymph node with SUV max of 4.3. In addition, multiple  right inguinal lymph nodes demonstrate central necrosis.     LOWER EXTREMITIES:   Complex, multiloculated large right knee joint effusion with synovial  thickening, demonstrating SUV max of 6.6.      Bilateral lower extremity edema, greater on the right.     BONES AND SOFT TISSUES:   No suspicious focal uptake.      Diffuse bone marrow uptake is likely reactive. Multiple healed  bilateral rib fracture deformities. Diffuse mild subcutaneous  anasarca. Multilevel degenerative changes in the spine with grade 1  anterolisthesis of L4 over L5.                                                                      IMPRESSION:      1. No evidence of hypermetabolic pulmonary nodules or suspicious uptake in the chest.      2. Large, complex right knee joint effusion with synovial thickening  previously characterized on 9/21/2024 MRI. Multiple enlarged right  inguinal and right iliac chain lymph nodes with mild to moderate FDG  uptake are likely reactive to the right knee infective/inflammatory  process.     3. Findings of positive fluid balance including trace ascites and  diffuse mild subcutaneous anasarca. Right greater than left lower  extremity edema.     4. Diffuse bone marrow uptake, which is nonspecific and  "may be related  to anemia given low density blood pool.     I have personally reviewed the examination and initial interpretation  and I agree with the findings.     JADEN CAT MD         Cardiac Echo.  1/9/25  Interpretation Summary  Global and regional left ventricular function is hyperkinetic with an EF of  65-70%.  Global right ventricular function is normal.  No significant valvular abnormalities.  IVC diameter <2.1 cm collapsing >50% with sniff suggests a normal RA pressure  of 3 mmHg.  No pericardial effusion.  This study was compared with the study from 5/5/2024. No significant changes  noted.  ASSESSMENT AND PLAN:   #CKD  Mr Mayfield is a 54 y.o male with a complex past medical history, most recently related to severe inflammation of the Right knee - initially thought related to septic joint - but that failed to improve with antibiotics.  Repeat evaluations have failed to detect an infection.  He has had at least one episode of SALVADOR, which was reportedly attributed to SALVADOR from antibiotic exposure vs component of \"pre-renal\" azotemia.  However, review of his labs show a persistently elevated Cr (given the relatively small body size and habitus).    Indeed, his CKD is significantly more severe that suggested by S Cr alone, as his Cyst C-based eGFR is consistently significantly lower than the Cr-based estimate.  Based on Cyst C, his GFR is only  around 30 ml/min/1.73m2    The etiology of CKD is unclear.  He has had poorly controled diabetes (HgbA1c>10), a history of hypertension both of which would contribute to CKD - and the former to the albuminuria/proteinuria.    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  He is s/p the first dose of Rituximab and is scheduled for the second dose next week.  This is too early to expect a therapeutic effect of rituximab.  However, I suspect that the major cause of his kidney disease is indeed " diabetic nephropathy associated with very poorly controlled diabetes - as supported by the severe hyperglycemia today.  Mr Mayfield is scheduled for appointments with the diabetes center in the coming week and subsequently.  I hope that he will be deemed a candidate for an insulin pump.  Mr Mayfield self administered 6 unit(s) of insulin in the clinic.  He voiced not being concerned about his hyperglycemia today, and able to follow up with additional doses of insulin at home.     #Anemia:  Indices are consistent with anemia of chronic disease - with possibly a component of Fe deficiency.  His eGFR is low enough to account for the anemia in part.  Indeed, his erythropoeitin level is only 16 mU/ml with a hematocrit of 25 -> it should be > 500 mU/ml.  I will refer him to the anemia clinic for aranesp therapy.     #Spiculated Lung Nodule.   The PET CT did NOT confirm the presence of a mass.     I plan to see him again in person in April 2025     Jordy Chinchilla MD  Division of Renal Disease and Hypertension

## 2025-04-03 NOTE — LETTER
4/3/2025       RE: Long Mayfield  [address]  [facility] [address]     Dear Colleague,    Thank you for referring your patient, Long Mayfield, to the [facility]PH[facility] [place] at [facility]. Please see a copy of my visit note below.    Nephrology Clinic    Long Mayfield MRN:[id number] YOB: 1970  Date of Service: 04/03/2025  Primary care provider: Ana Maria Blakely  Requesting physician: AISHA Mcgowan       REASON FOR CONSULT: evaluation for possible systemic vasculitis ? ANCA    HISTORY OF PRESENT ILLNESS:   Long Mayfield is a 54 year old male who presents for evaluation of possible ANCA vasculitis.  Per Dr Mcgowan's evaluation of 9/17/24:  He has chronic arthralgia since his age of 48 involving hands, elbows, neck, back and knees that he attributes to injuries from falling as he used to be a . He denies swelling in the hands, elbows, feet.      He has been getting gel injections in his knees with [facility] orthopedics and in May 2024, he developed swelling in the right knee, he was evaluated in July by Orthopedics at [facility], due to concerns for septic arthritis, he underwent washout and treated with 4 weeks of antibiotics. however he had recurrence of knee swelling. His infectious workup has been so far inconclusive however his cell count has been persistently elevated.    Hospitalize in [facility], Nov 2023.  Staph infection in R first toe.  Hospitalization during which he reports having severe edema, diffuse, including scrotal edema. Has 30 Lbs of fluid weight.  Required high dose diuretics.     In November - December 2023, he was hospitalized at [facility] for bilateral lower extremity swelling, rash, infection in his right big toe, he underwent surgery including amputation, there was concerns for osteomyelitis in the right foot.  During his evaluation, a skin biopsy showed LCV. He was treated  "with antibiotics. He saw Nephrology for SALVADOR that was felt to be prerenal at the time vs antibiotic related during the time.    ANCA by IFA was negative, UT-3 borderline positive, MPO antibody negative. Repeat ANCA by Dr Mcgowan on 9/17: neg by IgG        He has chronic diarrhea and chronic pancreatitis, following with GI, unclear etiology but felt likely secondary to alcohol intake,  No reported inflammatory bowel disease on prior Colonoscopies     He has never smoked. He has not drank alcohol for over 2 years.  History of remote cocaine use, none recently.     He was recently hospitalized for shortness of breath and felt to have pneumonia and treated with antibiotics. He had anemia and received blood transfusion, etiology of anemia is unknown.  Was also admitted for hypoglycemia.    Has had diabetes x 6 yrs.  Has always been on insulin.    Around 2015, Remote history of being overweight (peak weight 180 lbs). Went on low carb and no alcohol + exercise -> lost 30lbs to his baseline weight.     History of HTN for several years.  States BP tends to be labile.     Denies history of gross hematuria     History of pulmonary nodule seen on CT scan in Dec (in setting of anasarca, pulm edema and effusions).       October 15, 2024  Today, he reports joint pain in neck, shoulder and right knee.  The R knee remains quite swollen and warm  No SOB, CP.  Mild cough x week, non productive.  No N, V, ABd pain. Some diarrhea .  No melena or hematochezia.  No rash.   History of alcohol use, used to drink \"a lot\".  Stopped 2 years ago.   Followed by [facility] for chronic diarrhea.    Denies salivary gland swelling.  Sensation of dry eyes.    December 13, 2024  Reports joint pains and stiffness,   R knee pain and swelling, no significant symptomatic improvement from last visit.  PET CT on 10/23/24: no pulm nodule or mass, R knee inflammation.  Blood sugars are very labile.  > 500 today.  Doesn't have spms associated with hyperglycemia, " "but does sense hypoglycemia.  Has dexcom but no insulin pump.  Has appoint with DM service on 12/18    April 3, 2025  Followed by Endocrinology for diabetes.  Last visit on 3/19, DexCom shows still poorly controled hyperglycemia with 65% of reading being > 260, and average glucose 294 mg/dl.  He was not on an insulin pump at that time.   Reports glucoses are very labile.  Still has not received insulin pump yet.  Is no longer on prednisone.  Received steroid injection (?) in knee.     Kidney biopsy consistent with DMN + some evidence of IgG4 dis.   Ritux #1 on Dec 4, 2024.  Next scheduled for 12/19.    Summary of Immunosuppression  Rituximab  #1 , 1000 mg IV Dec 4, 2024      ROS.. no fevers, chills,   Mild \"goofy cough\" non productive.   No CP.   No N, V, Abd pain.   Diarrhea persistent.   Followed by [facility].      PAST MEDICAL HISTORY:  Past Medical History:   Diagnosis Date     Abnormal CXR 10/15/2024    Previous CT of the chest completed at [facility] in December 2023 demonstrated:   Impression:   1. Severe anasarca with moderate to large pleural effusions and a small pericardial effusion. Diffuse septal thickening in the lungs concerning for pulmonary edema. Anemic cardiac blood pool.   2. Solitary pulmonary nodule in the left lower lobe with an average diameter of 10 mm is indeterminat     Abnormal LFTs 01/03/2025     Acidosis 01/08/2020     Acquired absence of other right toe(s) 01/12/2024     Acute metabolic encephalopathy 09/17/2024     Acute pain of right knee 07/12/2024     Acute pulmonary edema (H) 01/11/2024     Acute renal insufficiency 05/03/2024    Last Comprehensive Metabolic Panel:          Lab Results      Component    Value    Date           NA    131 (L)    10/15/2024           POTASSIUM    4.0    10/15/2024           CHLORIDE    98    10/15/2024           CO2    24    10/15/2024           ANIONGAP    9    10/15/2024           GLC    133 (A)    10/23/2024           BUN    22.3 (H)    " 10/15/2024           CR    1.35 (H)    10/15/2024         Alcohol abuse 09/12/2022     Alcohol dependence in remission (H) 10/29/2024     Allergic rhinitis      Anemia      Anemia, unspecified type 05/03/2024    He has a history of anemia which has been noted since May 2024.  Cause has not been identified.  Additional evaluation with CBC, retake count, TSH, haptoglobin, iron studies, LDH, ferritin, B12 and folate.  Ferritin elevated 418, iron low at 20, iron binding capacity low at 2030, iron saturation low at 9.  Normal B12 and folate.  Awaiting haptoglobin and reticulocyte count.  Denies blood in stool.     Anxiety disorder, unspecified 01/11/2024     Arthritis      Arthritis due to other bacteria, right knee (H) 08/19/2024     Atherosclerotic heart disease of native coronary artery without angina pectoris 01/11/2024     Bell's palsy      Bilateral lower extremity edema 12/18/2024     Cardiac calcification (H) 01/19/2023    Treatment:  Colestid 1 g tablet.  Aspirin 81 mg daily.       Cervical disc disease with myelopathy 05/01/2022    Formatting of this note might be different from the original.   seen spine specialist- [facility]  Formatting of this note might be different from the original.   seen spine specialist       Cervical radiculopathy 07/11/2024     Cervicalgia      Change or removal of drains 08/26/2024     Chronic bilateral low back pain with bilateral sciatica 04/08/2022     Chronic diarrhea 03/08/2022     Chronic kidney disease, unspecified 01/11/2024     Chronic pancreatitis (H) 09/06/2024    Due to continued right knee swelling and history of chronic pancreatitis with chronic diarrhea, he was referred to GI to review possible Whipple's disease.     10/18/2024 seen by [facility].  We do not have those records and will request those records to be faxed over.  Once I get those records I will review them with recommendations.  Per patient he is to follow-up in 6 months.       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  Started rituximab infusions 12/4/2024.       Ketosis (H) 03/08/2022     Long term (current) use of insulin (H) 08/19/2024     Major depressive disorder, recurrent episode, mild      Major depressive disorder, recurrent, mild 08/19/2024     Muscle wasting and atrophy, not elsewhere classified, multiple sites 04/01/2024     Muscle weakness (generalized) 01/12/2024     Need for assistance with personal care 01/12/2024     Osteomyelitis of great toe of right foot (H) 11/22/2023     Other abnormalities of gait and mobility 01/12/2024     Other acute osteomyelitis, right ankle and foot (H)      Other chronic pancreatitis (H)      Other fatigue 11/14/2024     Other hyperlipidemia 03/15/2022     Other polyosteoarthritis      Peripheral vascular disease, unspecified 08/19/2024     Pneumonia of left lower lobe due to infectious organism 09/11/2024     Primary hypertension 10/09/2019     Pyogenic arthritis of right knee joint, due to unspecified organism (H) 07/11/2024    Right knee inflammatory arthritis, etiology to be determined, s/p I&D Jul 11, Aug 18, 2024.         Brucella, Bartonella, Q fever, CD4, Ig levels.     MRI right knee with and without contrast if negative.     Rheumatology and GI consult.     Complete oral ciprofloxacin course.      Admitted to [facility] 8/16 to 8/19/2024.  \"Has been seen a handful of times and outpatient follow-up with persistent right     Right knee pain 07/11/2024     S/P transmetatarsal amputation of foot, right (H) 12/01/2023     s/p " transmetatarsal amputation, right foot (DOS 1/9/24)       Severe episode of recurrent major depressive disorder, without psychotic features (H) 01/19/2023     Solitary pulmonary nodule      Stage 3a chronic kidney disease (H) 1/11/2024     Systemic vasculitis (H) 11/19/2024     Type 2 diabetes mellitus with foot ulcer (H) 01/11/2024     Type 2 diabetes mellitus with hyperosmolarity without coma, with long-term current use of insulin (H) 10/29/2024     Type 2 diabetes mellitus with kidney complication, with long-term current use of insulin (H) 10/05/2018    Seen by diabetes educator 10/29/2024:   PLAN  Lantus: 12 units  Set dose Novolog at meals: 3 units each + Correction Scale below     Pre-Meal Correction Scale:        If pre-meal glucose is:    Add extra Humalog/Novolog to your meal dose per below chart:      151 - 200    +1 unit      201 - 250    +2 units      251 - 300    +3 units      301 - 350    +4 units      351 - 400     +5 units      Over      Ulcer of right heel and midfoot (H) 12/23/2024     Unintentional weight loss 10/11/2023     Vitamin D deficiency, unspecified 01/11/2024     PAST SURGICAL HISTORY:  Past Surgical History:   Procedure Laterality Date     ARTHROSCOPY KNEE Right 08/17/2024    Procedure: Arthroscopic;  Surgeon: Jeff Phoenix MD;  Location: [facility] OR     COLONOSCOPY N/A 05/07/2024    Procedure: Colonoscopy;  Surgeon: Nina Moya MD;  Location:  GI     ESOPHAGOSCOPY, GASTROSCOPY, DUODENOSCOPY (EGD), COMBINED N/A 05/06/2024    Procedure: Esophagoscopy, gastroscopy, duodenoscopy (EGD), combined;  Surgeon: Nina Moya MD;  Location:  GI     ESOPHAGOSCOPY, GASTROSCOPY, DUODENOSCOPY (EGD), COMBINED N/A 05/07/2024    Procedure: Esophagoscopy, gastroscopy, duodenoscopy (EGD), combined;  Surgeon: Nina Moya MD;  Location:  GI     IR RENAL BIOPSY RIGHT  11/08/2024     IRRIGATION AND DEBRIDEMENT KNEE, COMBINED Right 08/17/2024    Procedure:  IRRIGATION AND DEBRIDEMENT, Right KNEE,;  Surgeon: Jeff Phoenix MD;  Location: [facility] OR     IRRIGATION AND DEBRIDEMENT SPINE Right 07/11/2024    Procedure: Irrigation and debridement knee;  Surgeon: Dejon Espinoza MD;  Location: [facility] OR     ORTHOPEDIC SURGERY       partial amputation of right foot Right      PATELLA SURGERY       MEDICATIONS:  Current Outpatient Medications   Medication Sig Dispense Refill     ACCU-CHEK GUIDE TEST test strip        amLODIPine (NORVASC) 5 MG tablet Take 1 tablet (5 mg) by mouth daily. 90 tablet 3     atorvastatin (LIPITOR) 40 MG tablet        bumetanide (BUMEX) 1 MG tablet Take 1 tablet (1 mg) by mouth daily. 90 tablet 0     cetirizine (ZYRTEC) 10 MG tablet Take 1 tablet (10 mg) by mouth daily. (Patient taking differently: Take 10 mg by mouth as needed.) 30 tablet 11     cholestyramine light (QUESTRAN) 4 GM packet Take 1 packet by mouth every 12 hours.       Continuous Glucose Sensor (DEXCOM G7 SENSOR) MISC Change every 10 days. 3 each 3     Continuous Glucose Transmitter (DEXCOM G6 TRANSMITTER) MISC        darbepoetin sunshine-polysorbate (ARANESP) 40 MCG/0.4ML injection Inject 0.4 mLs (40 mcg) subcutaneously every 14 days. Do not shake. Administer for Hgb <10.0. HOLD dose if systolic BP >180. 0.8 mL 11     ferrous gluconate (FERGON) 324 (38 Fe) MG tablet Take 1 tablet (324 mg) by mouth every other day.  NOT TAKING 45 tablet 3     gabapentin (NEURONTIN) 300 MG capsule TAKE 1 CAPSULE(300 MG) BY MOUTH TWICE DAILY 60 capsule 2     Glucagon, rDNA, (GLUCAGON EMERGENCY) 1 MG KIT Inject 1 mg into the muscle as needed.       glucose 40 % (400 mg/mL) gel Take by mouth every hour as needed.       insulin aspart (NOVOLOG FLEXPEN) 100 UNIT/ML pen Inject 1-5 Units subcutaneously 3 times daily (with meals). In addition to 6-8 units with each meal, inject additional units as per this sliding scale:  If 200-250 = 1   251-300 = 2   301-350 = 3  351-400 = 4  401+ = 5  Repeat BS after 3  "hours and call MD if still over 400       insulin glargine (LANTUS PEN) 100 UNIT/ML pen Currently at 20 units will be increasing up to max dose 30 units 30 mL 2     insulin NPH (HUMULIN N VIAL) 100 UNIT/ML vial INJECT 13 UNITS AT THE START OF YOUR STEROID INFUSION AT CLINIC WHICH IS SCHEDULED FOR 12/4/24       insulin pen needle (31G X 6 MM) 31G X 6 MM miscellaneous Use 4 pen needles daily or as directed. 100 each 11     insulin syringe-needle U-100 (29G X 1/2\" 0.5 ML) 29G X 1/2\" 0.5 ML miscellaneous Use one syringe as directed prior to steroid infusion. 10 each 0     lipase-protease-amylase (CREON 36) [id number] units CPEP Take 3 capsules by mouth 3 times daily (with meals). 0730, 1130, 1630       loperamide (IMODIUM) 2 MG capsule Take 4 mg by mouth every 8 hours as needed for diarrhea.       methocarbamol (ROBAXIN) 500 MG tablet Take 1 tablet (500 mg) by mouth every 8 hours as needed for muscle spasms. 30 tablet 3     naloxone (NARCAN) 4 MG/0.1ML nasal spray Spray 1 spray (4 mg) into one nostril alternating nostrils once as needed for opioid reversal. every 2-3 minutes until assistance arrives 0.2 mL 0     oxyCODONE (ROXICODONE) 5 MG tablet Take 1 tablet (5 mg) by mouth every 6 hours as needed for pain. 30 tablet 0     polyethylene glycol (MIRALAX) 17 GM/Dose powder        silver sulfADIAZINE (SILVADENE) 1 % external cream Apply topically 2 times daily. 400 g 2     sulfamethoxazole-trimethoprim (BACTRIM) 400-80 MG tablet Take 1 tablet by mouth daily. 90 tablet 0     sulfamethoxazole-trimethoprim (BACTRIM) 400-80 MG tablet Take 1 tablet by mouth daily. 90 tablet 5     triamcinolone (KENALOG) 0.1 % external ointment Apply topically.       ALLERGIES:    Allergies   Allergen Reactions     Vancomycin      Other Reaction(s): Renal Failure    Vancomycin-induced nephrotoxicity (11/2023, outside hospital)     Seasonal Allergies      HAYFEVER:    -Watery Eyes  -Eye burn     Daptomycin Rash     Likely delayed " hypersensitivity reaction to daptomycin 11/2023     REVIEW OF SYSTEMS:  A comprehensive review of systems was performed and found to be negative except as described here or above.  SOCIAL HISTORY:   Social History     Socioeconomic History     Marital status: Single     Spouse name: Not on file     Number of children: Not on file     Years of education: Not on file     Highest education level: Not on file   Occupational History     Not on file   Tobacco Use     Smoking status: Never     Passive exposure: Never     Smokeless tobacco: Never   Vaping Use     Vaping status: Never Used   Substance and Sexual Activity     Alcohol use: Not Currently     Comment: sober 2 year     Drug use: Not Currently     Types: Marijuana     Sexual activity: Not on file   Other Topics Concern     Not on file   Social History Narrative     Not on file     Social Drivers of Health     Financial Resource Strain: Low Risk  (9/24/2024)    Financial Resource Strain      Within the past 12 months, have you or your family members you live with been unable to get utilities (heat, electricity) when it was really needed?: No   Food Insecurity: Low Risk  (9/24/2024)    Food Insecurity      Within the past 12 months, did you worry that your food would run out before you got money to buy more?: No      Within the past 12 months, did the food you bought just not last and you didn t have money to get more?: No   Transportation Needs: Low Risk  (9/24/2024)    Transportation Needs      Within the past 12 months, has lack of transportation kept you from medical appointments, getting your medicines, non-medical meetings or appointments, work, or from getting things that you need?: No   Physical Activity: Not on file   Stress: Not on file   Social Connections: Unknown (3/16/2022)    [facility] from [facility]    Social Connections      Frequency of Communication with Friends and  "Family: Not on file   Interpersonal Safety: Low Risk  (11/8/2024)    Interpersonal Safety      Do you feel physically and emotionally safe where you currently live?: Yes      Within the past 12 months, have you been hit, slapped, kicked or otherwise physically hurt by someone?: No      Within the past 12 months, have you been humiliated or emotionally abused in other ways by your partner or ex-partner?: No   Recent Concern: Interpersonal Safety - High Risk (9/11/2024)    Interpersonal Safety      Do you feel physically and emotionally safe where you currently live?: No      Within the past 12 months, have you been hit, slapped, kicked or otherwise physically hurt by someone?: No      Within the past 12 months, have you been humiliated or emotionally abused in other ways by your partner or ex-partner?: No   Housing Stability: Low Risk  (9/24/2024)    Housing Stability      Do you have housing? : Yes      Are you worried about losing your housing?: No     FAMILY MEDICAL HISTORY:   No family history on file.  PHYSICAL EXAM:   /69   Pulse 71   Temp 97.7  F (36.5  C) (Oral)   Ht 1.651 m (5' 5\")   Wt 59 kg (130 lb)   SpO2 99%   BMI 21.63 kg/m    GENERAL APPEARANCE: alert and no distress  EYES: nonicteric  NECK: supple, no adenopathy  Carotids 2+ without bruit  RESP: lungs clear to auscultation   CV: regular rhythm, normal rate, no rub.   4/6 systolic murmur  ABDOMEN: soft, nontender,    Extremities: No edema on either side (better)  MS: The right knee is quite swollen ,but there is minimal tenderness.   NEURO: mentation intact and speech normal  PSYCH: affect normal/bright   LABS:   Recent Results (from the past 672 hour(s))   HLA-B27 Typing    Collection Time: 09/17/24  2:58 PM   Result Value Ref Range    [id number] METHOD SSOP     B locus B27 Neg    Rheumatoid factor    Collection Time: 09/17/24  2:58 PM   Result Value Ref Range    Rheumatoid Factor <10 <14 IU/mL   Cyclic Citrullinated Peptide Antibody IgG    " Collection Time: 09/17/24  2:58 PM   Result Value Ref Range    Cyclic Citrullinated Peptide Antibody IgG 1.6 <7.0 U/mL   Myeloperoxidase and Proteinase 3 Panel    Collection Time: 09/17/24  2:58 PM   Result Value Ref Range    MPO [place] IgG Instrument Value <0.3 <3.5 U/mL    Myeloperoxidase Antibody IgG Negative Negative    Proteinase 3 [place] IgG Instrument Value <1.0 <2.0 U/mL    Proteinase 3 Antibody IgG Negative Negative   Immunoglobulins A G and M    Collection Time: 09/17/24  2:58 PM   Result Value Ref Range    Immunoglobulin G 2,121 (H) 610 - 1,616 mg/dL    Immunoglobulin A 457 84 - 499 mg/dL    Immunoglobulin M 281 (H) 35 - 242 mg/dL   IgG Subclasses    Collection Time: 09/17/24  2:58 PM   Result Value Ref Range    Immunoglobulin G 2,121 (H) 610 - 1,616 mg/dL    IgG1 1,179 (H) 382 - 929 mg/dL    IgG2 508 242 - 700 mg/dL    IgG3 126 22 - 176 mg/dL    IgG4 224 (H) 4 - 86 mg/dL    Subclasses Percent 96 %   Cytology non gyn    Collection Time: 09/23/24  1:52 PM   Immunoglobulin G subclasses    Collection Time: 09/24/24 10:35 AM   Result Value Ref Range    Immunoglobulin G 2,015 (H) 610 - 1,616 mg/dL    IgG1 1,126 (H) 382 - 929 mg/dL    IgG2 499 242 - 700 mg/dL    IgG3 130 22 - 176 mg/dL    IgG4 216 (H) 4 - 86 mg/dL    Subclasses Percent 98 %    Creatinine 1.24 (H) 0.67 - 1.17 mg/dL    GFR Estimate 68 >60 mL/min/1.73m2   Cystatin C with GFR    Collection Time: 09/24/24 10:35 AM   Result Value Ref Range    Cystatin C 2.1 (H) 0.6 - 1.0 mg/L    GFR Calculated with Cystatin C 29 (L) >=60 mL/min/1.73m2   Ferritin    Collection Time: 09/24/24 10:35 AM   Result Value Ref Range    Ferritin 418 (H) 31 - 409 ng/mL   Iron and iron binding capacity    Collection Time: 09/24/24 10:35 AM   Result Value Ref Range    Iron 20 (L) 61 - 157 ug/dL    Iron Binding Capacity 230 (L) 240 - 430 ug/dL    Iron Sat Index 9 (L) 15 - 46 %     CMP  Recent Labs   Lab Test 04/03/25  1114 03/20/25  1245 03/11/25  1659 01/06/25  1129 12/23/24  1414  12/13/24  1415 12/12/24  1113 12/03/24  1532 11/08/24  1142 11/05/24  1105 10/23/24  1005 10/15/24  1248 09/24/24  1035 07/13/24  1149 07/13/24  0833 05/06/24  0818 05/06/24  0724 05/05/24  1744 05/05/24  1438 05/05/24  1156 05/05/24  1037 03/08/22  1756 10/08/17  2105    133* 133* 131*   < > 127* 135  --   --  131*  --  131* 132*   < > 133*   < > 133*  --   --   --  134*   < > 131*   POTASSIUM 5.3 4.6 3.9 5.3   < > 3.0* 3.4  --   --  4.1  --  4.0 5.3   < > 5.6*   < > 4.8  --   --   --  4.6   < > 3.8   CHLORIDE 110* 103 103 97*   < > 95* 102  --   --  97*  --  98 102   < > 100   < > 99  --   --   --  100   < > 95   CO2 19* 19* 18* 27   < > 21* 21*  --   --  21*  --  24 21*   < > 24   < > 22  --   --   --  22   < > 28   ANIONGAP 7 11 12 7   < > 11 12  --   --  13  --  9 9   < > 9   < > 12  --   --   --  12   < > 8   * 252* 131* 270*   < > 562* 122*  --    < > 271*   < > 278* 241*   < > 218*   < > 215*   < >  --    < > 230*   < > 403*   BUN 26.7* 28.5* 39.4* 18.4   < > 16.7 19.2  --   --  19.4  --  22.3* 28.5*   < > 21.9*   < > 16.6  --   --   --  22.5*   < > 9   CR 1.78* 1.47* 2.35* 1.28*   < > 1.23* 1.34* 1.09  --  1.49*  --  1.35* 1.24*   < > 1.38*   < > 0.99  --   --   --  1.17   < > 0.68   GFRESTIMATED 44* 56* 32* 66   < > 69 62 80  --  55*  --  62 68   < > 60*   < > 89  --   --   --  73   < > >90   GFRESTBLACK  --   --   --   --   --   --   --   --   --   --   --   --   --   --   --   --   --   --   --   --   --   --  >90   AROLDO 8.7* 8.8 8.3* 9.1   < > 7.9* 8.4*  --   --  8.2*  --  8.6* 9.0   < > 8.3*   < > 9.0  --   --   --  9.1   < > 9.5   MAG  --   --   --   --   --   --   --   --   --   --   --   --   --   --  2.3  --  1.9  --  2.1  --  2.2   < >  --    PHOS 3.8  --   --   --   --  3.1  --   --   --   --   --  3.1  --   --   --   --  2.7  --  3.8  --  3.6   < >  --    PROTTOTAL  --   --   --   --   --   --  8.3  --   --  7.6  --  8.2 8.0   < >  --    < >  --   --   --   --   --    < > 7.7    ALBUMIN 3.7  --   --   --   --  3.1* 3.3*  --   --  3.0*  --  3.3* 3.4*   < >  --    < >  --   --   --   --   --    < > 3.4   BILITOTAL  --   --   --   --   --   --  0.2  --   --  <0.2  --  0.2 0.2   < >  --    < >  --   --   --   --   --    < > 0.2   ALKPHOS  --   --   --   --   --   --  214*  --   --  133  --  121 161*   < >  --    < >  --   --   --   --   --    < > 124   AST  --   --   --   --   --   --  39 47*  --  13  --  15 21   < >  --    < >  --   --   --   --   --    < > 73*   ALT  --   --   --   --   --   --  28  --   --  <5  --  <5 12   < >  --    < >  --   --   --   --   --    < > 52    < > = values in this interval not displayed.     CBC  Recent Labs   Lab Test 04/03/25  1114 03/20/25  1245 03/04/25  1544 01/31/25  1413 01/06/25  1129 12/13/24  1415 12/12/24  1113 12/03/24  1532   HGB 11.0* 9.4* 9.1* 9.5*   < > 8.0* 8.3* 8.2*   WBC 7.4  --   --   --   --  7.3 9.3 7.9   RBC 4.31*  --   --   --   --  3.41* 3.40* 3.38*   HCT 36.2* 29.6* 29.0* 30.4*   < > 25.4* 25.1* 25.5*   MCV 84  --   --   --   --  75* 74* 75*     --   --   --   --  480* 519* 513*    < > = values in this interval not displayed.     URINE STUDIES  Recent Labs   Lab Test 04/03/25  1129 12/13/24  1426 10/15/24  1254 09/17/24  1505   APPEARANCE Clear Clear Clear Clear   URINEGLC 100* >=1000* 200* 500*   URINEBILI Negative Negative Negative Negative   URINEKETONE Negative Negative Negative Negative   SG 1.013 1.008 1.009 1.010   UBLD Trace* Trace* Small* Small*   URINEPH 5.5 6.0 5.5 6.0   PROTEIN Negative 20* 70* 100*   UROBILINOGEN  --   --   --  0.2   NITRITE Negative Negative Negative Negative   LEUKEST Negative Negative Negative Negative   RBCU 1 2 3* 2-5*   WBCU 1 1 1 0-5     HgbA1c 10.2 on 6/27/24 at [facility]  at [facility]:  Serum free light chains on 4/30/24 ;  Kappa 9.32, Lambda 6.87, ratio 1.36 (consistent with CKD).  SPEP was without monoclonal spike in Dec 2023   In Nov 2021, his UACR was < 30 at  [facility] [place]    Erythropoietin  Erythropoietin  Collected: 12/12/24 1113   Result status: Final   Resulting lab: [facility]   Reference range: 4 - 27 mU/mL   Value: 16   Comment: INTERPRETIVE INFORMATION: Erythropoietin  Normal serum concentrations of erythropoietin for 95% of  individuals with normal hematocrits range from 4-27 mU/mL.    As the hematocrit is lowered by iron deficiency, aplastic,  or hemolytic anemia, the concentration of erythropoietin  increases as shown in the graph below. In the absence of  anemia, elevated concentrations are seen in renal tumors,  as a manifestation of renal transplant rejection, and in  secondary polycythemia. Low values may be observed in  hemochromatosis.          Expected Erythropoietin Concentrations in Patients                     with Uncomplicated Anemia       Erythropoietin (mU/mL)                100,000 - +                          +                 10,000 - +.......                          + .......                  1,000 - +    .......                          +     ........                    100 - +       ........                          +        ........                     10 - +          ........                          +---+---+---+---+---+---+                         10   20  30  40  50  60  70                                (Hematocrit %)              (Contributions To Nephrology 1988:66:54-62)       Exam Date Exam Time Accession # Performing Department Results    7/9/24  4:09 PM [id number] [facility]      PACS Images     Show images for CT Abdomen Pelvis w Contrast     Study Result    Narrative & Impression   CT ABDOMEN AND PELVIS WITH CONTRAST 7/9/2024 4:09 PM     CLINICAL HISTORY: Weight loss.     TECHNIQUE: CT scan of the abdomen and pelvis was performed following  injection of IV contrast. Multiplanar reformats were obtained. Dose  reduction techniques were used.     CONTRAST: 64mL Isovue-370     COMPARISON:  3/9/2022     FINDINGS:   LOWER CHEST: Normal.     HEPATOBILIARY: Small lesion in the periphery of the right hepatic lobe  is unchanged from previous.     PANCREAS: Signs of chronic pancreatitis with pancreatic atrophy,  ductal dilation, and calcifications, similar to previous.  SPLEEN: Normal.  ADRENAL GLANDS: Normal.  KIDNEYS/BLADDER: The kidneys are unremarkable. Mild bladder wall  thickening similar to previous.     BOWEL: No obstruction or inflammation. No visible mass.  PELVIC ORGANS: Normal.    ADDITIONAL FINDINGS: Moderate atherosclerosis. Mildly enlarged right  inguinal lymph nodes are most likely reactive, and measure up to 1.1  cm short axis.     MUSCULOSKELETAL: Normal.                                                                    IMPRESSION:   1.  Mild right inguinal lymphadenopathy is indeterminant but more  likely reactive.  2.  Otherwise, no acute findings or significant change in the abdomen  or pelvis compared to previous.     JUAN JOSE MICHELLE MD      9/17/24  4:07 PM 9/17/24  4:07 PM [id number] [facility]        Narrative & Impression   EXAM: XR SACROILIAC JOINT 1/2 VIEWS  LOCATION: [facility]  DATE: 9/17/2024     INDICATION: evaluate for inflammatory sacroiliitis  COMPARISON: None.                                                                      IMPRESSION: The SI joints are negative for sacroiliitis, ankylosis, or diastasis on this single projection. Pelvis negative for fracture. Both hips negative for fracture.   Collected 11/8/2024 12:30 PM       Status: Final result       Visible to patient: Yes (seen)    0 Result Notes       Component  Resulting Agency   Case Report   Surgical Pathology Report                         Case: [id number]                                   Authorizing Provider:  Jordy Chinchilla MD       Collected:           11/08/2024 12:30 PM           Ordering Location:     [facility]      Received:            11/08/2024 01:47 PM                                  [address] [place]                                                             Pathologist:           Rosalba Yao MD                                                               Specimen:    Kidney, Left, Left kidney biopsy                                                          Final Diagnosis   A. 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 (see comment)   - patchy dense lymphoplasmacytic interstitial inflammation; 10-20 IgG4+ plasma cells/400x field      Glomerulopathy with rare intramembranous and mesangial deposits reactive for C3      - the differential diagnosis includes a mild glomerulonephritis C3 or a resolving immune complex-mediated glomerulopathy      - there are no signs of an active glomerulitis currently     Moderate chronic changes of the parenchyma, including:   - focal global glomerulosclerosis (4% of glomeruli)   - focal tubular atrophy and interstitial fibrosis (40-50% of the cortex)   - severe arterial and arteriolar sclerosis   Electronically signed by Rosalba Yao MD on 11/15/2024 at 10:58 AM   Comment  UUMAYO   The interstitial nephritis shows some morphologic features suggestive of IgG4-related tubulointerstitial nephritis, characterized by 1). focal dense lymphoplasmacytic interstitial inflammation, and 2). the number of IgG4-positive plasma cells greater than 10 per high powered field (see reference). However, the typical storiform fibrosis is not noted in the sample and the ratio of IgG4-positive/IgG-positive plasma cells is lower than than 40%.  Correlation with the overall clinical scenario is required.       Reference:  Sharee V, Norbert Y, Misha JK, Ovi EY, Carlos Eduardo Y, Starr T, Skinny G, Juarez MACKEYG, Uday A, Mark JA, Thuy RC. Consensus statement on the pathology of IgG4-related disease. Modern  "pathology. 2012 Sep;25(9):1181.     Umarun H, Hai K, Bronwyna S, Obi H, Goto H, Matsui S, Ishashliaka N, Akrosa T, Carlos Eduardo Y, Cleopatra M, Research Program for Intractable Disease by the [employer] ([facility]) [place].. The 2020 revised comprehensive diagnostic (RCD) criteria for IgG4-RD. Modern Rheumatology. 2021 May 4;31(3):529-33.   Clinical Information  YAO   54 y.o male with a complex past medical history, poorly controled diabetes (HgbA1c>10), a history of hypertension, severe inflammation of the Right knee, at least one episode of SALVADOR,  Cyst C-based eGFR around 30 ml/min/1.73m2. The etiology of CKD is unclear at this point.  However, he had a normal UACR in Nov 2021. His ANCA is negative.  Prior seroligic work up is unrevealing - except for the persistently elevated IgG4.  This, with the presence of \"chronic pancreatitis\" raises the suspicion of IgG4 disease.   Gross Description  MARCIANO   A(A). Kidney, Left, Left kidney biopsy:  Native kidney biopsy, left, immediate assessment:  \"Cortical kidney tissue confirmed for light microscopy, immunofluorescence, and electron microscopy. \" (Deb TAVERAS([facility]))     The specimen is received in formalin with proper patient identification, labeled \"left kidney biopsy\".  The specimen consists of 4 tan needle core biopsies 0.2-0.9 cm in length and each 0.1 cm in diameter.  The specimen is wrapped and is entirely submitted in cassette A1.     A separate specimen is received in Nicolas fixative and consists of a 0.4 cm in length and 0.1 cm in diameter tan core biopsy which is processed for immunofluorescence studies.     A separate specimen is in glutaraldehyde and consists of a single tan core biopsy, 0.2 cm in length and 0.1 cm in diameter which is processed for electron microscopic studies.         Microscopic Description  YAO   LIGHT MICROSCOPY:  Sections of formalin-fixed, paraffin embedded tissue were evaluated using H&E, PAS, Milner, and " trichrome stains with the appropriate controls. An H&E-stained frozen section taken from the tissue allocated for immunofluorescence microscopy and methylene blue-stained epoxy sections of the tissue processed for electron microscopy were also evaluated using light microscopy.      The sample consists of kidney cortex and it includes 46 glomeruli (LM-36; IF-7; EM-3), of which 2 (2+0+0) are globally sclerosed, 2 show segmental sclerosis and few are hypoperfused. Mesangiolysis with adjacent endocapillary hypercellularity is seen in few glomeruli. The mesangium of some glomeruli is expanded by increased extracellular matrix and increased numbers of mesangial cells, occasionally forming characteristic Kimmelstiel-Tae nodules. There are wrinkled basement membranes in many glomeruli and occasional distinctive double contours of the basement membranes. No basement membrane spikes or craters are seen in the glomeruli. Tubules show focal mild degenerative changes. No oxalate, phosphate, or urate crystals are present in the sample. Obvious signs of a viral cytopathic effect are not seen in the sample. The interstitium contains focal dense inflammation, and the infiltrates are composed of mononuclear cells and frequent plasma cells. Approximately 40-50% of the renal cortex shows tubular atrophy and mild interstitial fibrosis. Arteries and arterioles show severe sclerosis. Arterioles also show focal hyalinosis.     Immunohistochemistry studies of IgG and IgG4 reveal there are focally 10-20 IgG4+ plasma cells per high power field and an IgG4+/IgG+ plasma cell ratio of <40%.     IMMUNOFLUORESCENCE MICROSCOPY:   The sections of the sample submitted for immunofluorescence studies were incubated with antibodies specific for the heavy chains of IgG, IgA, and IgM, for kappa and lambda light chains, fibrin, albumin, and complement components C3 and C1q. The immunofluorescence reactivity is graded on a scale of 0-4+.     The sample  contains 7 glomeruli. There are no globally sclerosed glomeruli. There is fine granular reactivity for C3 (trace) and IgM (trace) in the mesangium. Diffuse linear reactivity for IgG (2+), albumin (2+), kappa light chain (2+) and lambda light chain (2+) is noted along the glomerular and tubular basement membranes. Tubules contain several intraluminal casts reactive for polyclonal IgA. The interstitium reveals scattered fibrin deposits. Arterioles reveal focal and strong reactivity for C3. There is no difference in reactivity between kappa and lambda light chains in the glomeruli, tubular casts and background of the tissue.      ELECTRON MICROSCOPY:   The sample submitted for electron microscopy examination contains 3 glomeruli; 2 glomeruli are examined ultrastructurally. The glomerular visceral epithelial cells reveal moderately segmental effacement of their foot processes. The glomerular basement membranes are thickened. The endothelial cells show focal swelling. Tubuloreticular inclusions are not seen within the cytoplasm of glomerular endothelial cells. The mesangium reveals focal mesangiolysis and nodular expansion of the mesangial matrix, with irregular densities and the deposition of cellular debris. Capillary loops and mesangium reveal the presence of rare intramembranous and mesangial granular electron dense deposits.     I have personally reviewed all specimens and slides, including the listed special stains, and used them with my medical judgement to determine the final diagnosis.     Case was reviewed by the following:  Resident Pathologist: Mimi Torres MD  A resident or fellow in a training program was involved in the initial review, preparation, and/or interpretation of this case.  I, as the senior physician, attest that I have personally reviewed all specimens and or slides, including the listed special stains, and used them with my medical judgement to determine the final diagnosis.        Exam Date  Exam Time Accession # Performing Department Results    10/23/24  1:03 PM [id number] [facility] Clinical [facility] Research      Narrative & Impression   Combined Report of: PET and CT on  10/23/2024 1:03 PM:     1. PET of the neck, chest, abdomen, and pelvis.  2. PET CT Fusion for Attenuation Correction and Anatomical  Localization:    3. 3D MIP and PET-CT fused images were processed on an independent  workstation and archived to PACS and reviewed by a radiologist.   [...]   INDICATION: lung nodule in outside CT concernning for neoplasm,  concern for IgG4 related disease, history of inguinal adenopathy; Lung  nodule; Inguinal adenopathy     ADDITIONAL INFORMATION OBTAINED FROM EMR: 10 mm solitary pulmonary  nodule in the left lower lobe on outside CT on 12/28/2023, which was  new compared to 3/19/2022.     COMPARISON: CT abdomen and pelvis on 7/19/2024, CT chest abdomen and  pelvis on 3/9/2022, right knee MRI 9/21/2024     FINDINGS:   BACKGROUND: Liver SUV max = 1.97, Aorta Blood SUV max = 1.01.      HEAD/NECK:  No suspicious uptake.   Diffusely increased uptake in the tongue is favored to represent  muscle activation.   Mild diffuse uptake in the thyroid gland may relate to thyroiditis,  consider correlation with thyroid function tests.     CHEST:  No suspicious pulmonary nodules identified. Dependent subsegmental  atelectasis, greater on the left.      Heart size at the upper limits of normal. Diffusely increased left  atrial uptake is nonspecific, may relate to atrial fibrillation in the  appropriate clinical setting. Low-density aortic blood pool.      Segmental uptake in the distal esophagus likely represents reflux in  the setting of small hiatal hernia.     ABDOMEN AND PELVIS:  No suspicious uptake.      Stable subcentimeter cyst in the right hepatic lobe. Diffusely  atrophic pancreas with dystrophic parenchymal calcifications.     Diffuse mesenteric stranding/trace ascites. Nondistended  urinary  bladder with unchanged somewhat asymmetric wall thickening. Multiple  enlarged right iliac chain and right inguinal lymph nodes  demonstrating mild to moderate uptake, for example, elongated right  iliac chain lymph node with SUV max of 4.3. In addition, multiple  right inguinal lymph nodes demonstrate central necrosis.     LOWER EXTREMITIES:   Complex, multiloculated large right knee joint effusion with synovial  thickening, demonstrating SUV max of 6.6.      Bilateral lower extremity edema, greater on the right.     BONES AND SOFT TISSUES:   No suspicious focal uptake.      Diffuse bone marrow uptake is likely reactive. Multiple healed  bilateral rib fracture deformities. Diffuse mild subcutaneous  anasarca. Multilevel degenerative changes in the spine with grade 1  anterolisthesis of L4 over L5.                                                                    IMPRESSION:    1. No evidence of hypermetabolic pulmonary nodules or suspicious uptake in the chest.    2. Large, complex right knee joint effusion with synovial thickening  previously characterized on 9/21/2024 MRI. Multiple enlarged right  inguinal and right iliac chain lymph nodes with mild to moderate FDG  uptake are likely reactive to the right knee infective/inflammatory  process.     3. Findings of positive fluid balance including trace ascites and  diffuse mild subcutaneous anasarca. Right greater than left lower  extremity edema.     4. Diffuse bone marrow uptake, which is nonspecific and may be related  to anemia given low density blood pool.     I have personally reviewed the examination and initial interpretation  and I agree with the findings.     JADEN CAT MD         Cardiac Echo.  1/9/25  Interpretation Summary  Global and regional left ventricular function is hyperkinetic with an EF of 65-70%.  Global right ventricular function is normal.  No significant valvular abnormalities.  IVC diameter <2.1 cm collapsing >50% with sniff  "suggests a normal RA pressure  of 3 mmHg.  No pericardial effusion.  This study was compared with the study from 5/5/2024. No significant changes  noted.  ASSESSMENT AND PLAN:   #CKD  Mr Mayfield is a 54 y.o male with a complex past medical history, most recently related to severe inflammation of the Right knee - initially thought related to septic joint - but that failed to improve with antibiotics.  Repeat evaluations have failed to detect an infection.  He has had at least one episode of SALVADOR, which was reportedly attributed to SALVADOR from antibiotic exposure vs component of \"pre-renal\" azotemia.  However, review of his labs show a persistently elevated Cr (given the relatively small body size and habitus).    Indeed, his CKD is significantly more severe than suggested by S Cr alone, as his Cyst C-based eGFR is consistently significantly lower than the Cr-based estimate.  Based on Cyst C, his GFR is only  around 30 ml/min/1.73m2    The etiology of CKD is unclear.  He has had poorly controled diabetes (HgbA1c>10), a history of hypertension both of which would contribute to CKD - and the former to the albuminuria/proteinuria.    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  He is s/p Rituximab x 2 in Dec 2024 .  However, I suspect that the major cause of his kidney disease is indeed diabetic nephropathy associated with very poorly controlled diabetes.  He is not yet fitted with an insulin pump, however, and his hyperglycemia remains poorly controlled.  I believe that getting his diabetes under control is the key to preserving his kidney function.  He is followed by the diabetes service. \  His kidney function is generally stable.       #Anemia: improved.     #Spiculated Lung Nodule.   The PET CT did NOT confirm the presence of a mass.     I will plan on seeing him again in about 5 months    Jordy Chinchilla MD  Division of Renal Disease and " Hypertension        Again, thank you for allowing me to participate in the care of your patient.      Sincerely,    Jordy Chinchilla MD

## 2025-04-03 NOTE — NURSING NOTE
"Chief Complaint   Patient presents with    RECHECK     Follow Up     /69   Pulse 71   Temp 97.7  F (36.5  C) (Oral)   Ht 1.651 m (5' 5\")   Wt 59 kg (130 lb)   SpO2 99%   BMI 21.63 kg/m    Sheri Butler on 4/3/2025 at 12:41 PM    "

## 2025-04-07 ENCOUNTER — TELEPHONE (OUTPATIENT)
Dept: FAMILY MEDICINE | Facility: CLINIC | Age: 55
End: 2025-04-07

## 2025-04-07 ENCOUNTER — PATIENT OUTREACH (OUTPATIENT)
Dept: ONCOLOGY | Facility: CLINIC | Age: 55
End: 2025-04-07

## 2025-04-07 ENCOUNTER — OFFICE VISIT (OUTPATIENT)
Dept: ORTHOPEDICS | Facility: CLINIC | Age: 55
End: 2025-04-07
Payer: COMMERCIAL

## 2025-04-07 ENCOUNTER — ANCILLARY PROCEDURE (OUTPATIENT)
Dept: GENERAL RADIOLOGY | Facility: CLINIC | Age: 55
End: 2025-04-07
Attending: ORTHOPAEDIC SURGERY
Payer: COMMERCIAL

## 2025-04-07 DIAGNOSIS — M00.9 PYOGENIC ARTHRITIS OF RIGHT KNEE JOINT, DUE TO UNSPECIFIED ORGANISM (H): Primary | ICD-10-CM

## 2025-04-07 DIAGNOSIS — M00.9 PYOGENIC ARTHRITIS OF RIGHT KNEE JOINT, DUE TO UNSPECIFIED ORGANISM (H): ICD-10-CM

## 2025-04-07 PROCEDURE — 73560 X-RAY EXAM OF KNEE 1 OR 2: CPT | Mod: RT | Performed by: RADIOLOGY

## 2025-04-07 PROCEDURE — 1125F AMNT PAIN NOTED PAIN PRSNT: CPT | Performed by: ORTHOPAEDIC SURGERY

## 2025-04-07 PROCEDURE — 99214 OFFICE O/P EST MOD 30 MIN: CPT | Performed by: ORTHOPAEDIC SURGERY

## 2025-04-07 ASSESSMENT — KOOS JR
STRAIGHTENING KNEE FULLY: SEVERE
HOW SEVERE IS YOUR KNEE STIFFNESS AFTER FIRST WAKING IN MORNING: SEVERE
BENDING TO THE FLOOR TO PICK UP OBJECT: SEVERE
KOOS JR SCORING: 31.31
RISING FROM SITTING: SEVERE
GOING UP OR DOWN STAIRS: EXTREME
TWISING OR PIVOTING ON KNEE: SEVERE
STANDING UPRIGHT: SEVERE

## 2025-04-07 NOTE — PROGRESS NOTES
"Writer spoke with patient about upcoming BMB  scheduled for 4/9. Patient  verbalizes not understanding why he needs one. I reviewed with him the office visit note from 1/2 that he had with Dr. Medina about anemia and ruling out MDS. Patient has recent labs and hgb is now 11.0 and things BMB is \"too much \". Writer will update Dr. Medina  with above information to advise on further instruction.     Writer spoke with Dr. Medina and he advise follow up in a few weeks with labs prior. Ok to cancel BMB.    Rhea Collins RN    "

## 2025-04-07 NOTE — TELEPHONE ENCOUNTER
Please let patient know that CBD Gummies can cause a trace of THC in the bloodstream.  This would be going against the pain contract.  There is a topical lotion that contains CBD which he could try applying to his knee to see if this helps.  Thank you.

## 2025-04-07 NOTE — LETTER
4/7/2025      Long Mayfield  [address]  [place] [address]      Dear Colleague,    Thank you for referring your patient, Long Mayfield, to the [facility] [place]. Please see a copy of my visit note below.        [facility]  Orthopaedic Surgery      Long Mayfield MRN# [id number]    YOB: 1970     Ref MD:    Dr. Hector Cruz, [facility] rheumatology  Dr. Jordy Chinchilla, [facility] nephrology      Background history:  DX:  Synovitis, chronic, possibly secondary to #2 below  Hx c/w IgG4-related tubulointerstitial nephritis   Acute metabolic encephalopathy  Hyperkalemia  Chronic pancreatitis  Type 2 diabetes with kidney complication  Alcohol abuse hyperglycemia  Primary hypertension  Cardiac calcification  GERD.    Anemia  Acute renal insufficiency    TREATMENTS:  1997, open reduction and intra fixation right patella  7/11/2024, I&D of right knee, Dr. Espinoza (Cultures negative)  8/17/2024, I&D of right knee, Dr. Phoenix (Cultures negative)  9/23/2024, Right knee aspiration, (Cutlures negative, Cytology negative)        Mr. Conte is seen for recheck in regards to his IgG -4 nephropathy and tubulointerstitial nephritis.  He is on rituximab.  Rheumatologist and nephrologist have asked us to delay any knee surgery until June or July.  He also has an elevated A1c as a consequence of his steroid medication and is received a glucose monitor and is can be receiving insulin pump in the near future.    We obtained x-rays today which demonstrate progression of the arthritis of his knee and loss of cartilage thickness completely involving the lateral compartment.  He is a candidate for knee arthroplasty surgery once medically cleared.    Plan:  Return in June for recheck  Criteria for the surgery require hemoglobin A1c 7.0 or less prior to proceeding with knee replacement surgery.  If he meets criteria in June we will plan for surgery knee replacement later in  the summertime.  I discussed other knee surgery requirements with him including dental evaluations and preoperative medical clearance.      Dickson Day MD  [facility] Family Professor  Oncology and Adult Reconstructive Surgery  Dept Orthopaedic Surgery, [facility]  [phone] office, [phone] pager  [email or web address]    Total combined visit time and work time before and after clinic visit, independent of trainee, on encounter date = 30 min      Again, thank you for allowing me to participate in the care of your patient.        Sincerely,        Dickson Day MD    Electronically signed

## 2025-04-07 NOTE — TELEPHONE ENCOUNTER
"Incoming call from patient with two questions.      Can he go to [facility] instead of [facility] for his diabetic eye exam.  RN informed he can go to any eye clinic of his choosing and encouraged contacting his insurance for in-network providers.    Asked if he take CBD gummies with his oxycodone CSA.  States he understands he cannot smoke marijuana but unsure if CBD gummies are okay to take to help calm him on days when he feels \"really active\" and needs to rest due to his knee pain and wound on the bottom of his R foot.        Please advise on patient's questions about CBD gummies.            Betsey Colin RN  [facility]   "

## 2025-04-07 NOTE — TELEPHONE ENCOUNTER
Patient notified of provider's message as written, he verbalized understanding.    Encouraged patient to call the clinic with further questions/concerns.     Zeferino MENDEZ RN  [facility]

## 2025-04-08 ENCOUNTER — MYC MEDICAL ADVICE (OUTPATIENT)
Dept: NEPHROLOGY | Facility: CLINIC | Age: 55
End: 2025-04-08

## 2025-04-08 ENCOUNTER — TELEPHONE (OUTPATIENT)
Dept: NEPHROLOGY | Facility: CLINIC | Age: 55
End: 2025-04-08

## 2025-04-08 DIAGNOSIS — N05.9 GLOMERULONEPHRITIS DETERMINED BY BIOPSY: Primary | ICD-10-CM

## 2025-04-08 NOTE — TELEPHONE ENCOUNTER
Left Voicemail (2nd Attempt) for the patient to call back and schedule the following:    Appointment type: RETURN GLOM  Provider: KLAUDIA  Return date: 9/11 ALBERT   Specialty phone number: [phone]  Additional appointment(s) needed: LABS  Additonal Notes: Per Darlene meyer to use ALBERT on 9/11 with Dr. Chinchilla.

## 2025-04-08 NOTE — TELEPHONE ENCOUNTER
Patient Contacted for the patient to call back and schedule the following:    Appointment type: RETURN GLOM  Provider: KLAUDIA  Return date: 9/11 ALBERT  Specialty phone number: [phone]  Additional appointment(s) needed: LABS  Additonal Notes: Patient requested a call back later for scheduling

## 2025-04-09 ENCOUNTER — PATIENT OUTREACH (OUTPATIENT)
Dept: CARE COORDINATION | Facility: CLINIC | Age: 55
End: 2025-04-09
Payer: COMMERCIAL

## 2025-04-09 NOTE — PROGRESS NOTES
Clinic Care Coordination Contact  Community Health Worker Follow Up    Care Gaps:     Health Maintenance Due   Topic Date Due    DIABETIC FOOT EXAM  Never done    DEPRESSION ACTION PLAN  Never done    YEARLY PREVENTIVE VISIT  Never done    HEPATITIS A IMMUNIZATION (3 of 3 - Hep A Twinrix risk 3-dose series) 05/23/2023    COVID-19 Vaccine (3 - Mixed Product risk series) 12/17/2024    EYE EXAM  03/15/2025       Patient accepted scheduling phone number for [facility]  to schedule independently     Care Plan:   Care Plan: Health Maintenance       Problem: Health Maintenance Due or Overdue       Goal: Become up-to-date with health maintenance visit(s)       Start Date: 1/30/2025    This Visit's Progress: 50% Recent Progress: 50%    Note:     Goal Statement: I will complete my annual wellness visit.    Barriers: transportation   Strengths: motivated     Patient expressed understanding of goal: yes  Action steps to achieve this goal:  1. I will schedule my annual wellness visit; [id number] ([phone]). My CHW assisted me in scheduling my AWV for 3/13/25 @ 2:40.  I will call to reschedule my AWV within the next 30 days.   2. I will attend my annual wellness visit. Continuous (MB)  3. I will contact my Care Management or clinic team if I have barriers to attending my annual wellness visit. Continuous (MB)                            Care Plan: Community Resources       Problem: Lack of transportation       Goal: Establish Reliable Transportation       Start Date: 1/30/2025    This Visit's Progress: 20% Recent Progress: 20%    Note:     Goal Statement: I will continue to take steps to secure safe, consistent and reliable transportation over the next three month(s).  Barriers: financial   Strengths: motivated  Patient expressed understanding of goal: yes    Action steps to achieve this goal:  I will review resources and supports sent to me via [employer]  I will outreach to supports and consider establishing with ones I  might find beneficial: transit link, community thread, help at your door. I am using transportation through [employer] for medical appointments. I will call Transit link again to look into there services within the next 30 days.  I will sign up for one pass through [employer] so that I can schedule rides to the gym. I will look into this program within the next 30 days.  I will try one new transportation option in the next month. Continuous (MB)                        Problem: Reliable food source       Goal: Establish Options for Affordable Food Sources       Start Date: 1/30/2025    This Visit's Progress: 0%    Note:     Goal Statement: I will continue to take steps to minimize food insecurity over the next two month(s).  Barriers: transportation   Strengths: motivated  Patient expressed understanding of goal: yes    Action steps to achieve this goal:  I will review resources and supports sent to me via [employer]: food pantry resources. Completed  I will outreach to supports and consider establishing with ones I might find beneficial - I will try going to one food pantry in the next month. I have not went to these locations yet. I hope to look into them soon.  I will continue to outreach to care coordination as needed for additional resources or supports.                              Care Plan: Appointments       Problem: HP GENERAL PROBLEM       Goal: I would like to attend the following appointments.       Start Date: 2/28/2025    This Visit's Progress: 80% Recent Progress: 10%    Priority: High    Note:     Barriers: numerous appointments  Strengths: motivated  Patient expressed understanding of goal: per chart review  Action steps to achieve this goal:    3/11 @ 2:40 PCP. Completed  2.  3/27 @ 1:55 Diabetic Ed- Rescheduled for 4/14/25 @ 1:55. Continuous (MB)-Appointment confirmed with patient 4/9/25   3.  4/2 @ 1:45 Vascular Completed. Follow up scheduled for 4/22/25 @ 2:25. Continuous (MB)-Appointment confirmed with  patient 4/9/25   4.  4/3 @ 11:00 lab Completed  5.  4/3 @ 11:45 Renal Dr. Chinchilla Completed  6.  4/7 @ 2:05 orthopedics Completed  7.  4/23 @ 2:00 video Diabetes Endocrinology- Completed                              Intervention and Education during outreach: N/A     See goals for details. CHW resent resources for transportation and food through [employer]. No other needs at this time.     CHW Plan: CHW will follow up with patient in about 1 month.     Rosa Grimaldo  Community Health Worker  [facility] Care Coordination   [place], [facility], [place], [place], [place] and [facility]  Office: [phone]

## 2025-04-10 NOTE — TELEPHONE ENCOUNTER
Patient confirmed scheduled appointment:  Date: 9/11/25  Time: 2:00PM  Visit type: RETURN GLOM  Provider: KLAUDIA  [facility] [facility]  Testing/imaging: N/A  Additional notes: ALBERT ok'd per Darlene

## 2025-04-10 NOTE — TELEPHONE ENCOUNTER
Update from Neph team of appt scheduled for Sept.  GN labs placed and request to be drawn earlier in week so results are ready to review at appt.  [facility] sent to do labs on Monday prior to appt.  Darlene Luna RN, BSN, PHN  GN Care Coordinator  [phone]

## 2025-04-14 ENCOUNTER — ALLIED HEALTH/NURSE VISIT (OUTPATIENT)
Dept: EDUCATION SERVICES | Facility: CLINIC | Age: 55
End: 2025-04-14
Payer: COMMERCIAL

## 2025-04-14 ENCOUNTER — TELEPHONE (OUTPATIENT)
Dept: FAMILY MEDICINE | Facility: CLINIC | Age: 55
End: 2025-04-14

## 2025-04-14 DIAGNOSIS — E11.22 TYPE 2 DIABETES MELLITUS WITH STAGE 3A CHRONIC KIDNEY DISEASE, WITH LONG-TERM CURRENT USE OF INSULIN (H): Primary | ICD-10-CM

## 2025-04-14 DIAGNOSIS — Z79.4 TYPE 2 DIABETES MELLITUS WITH STAGE 3A CHRONIC KIDNEY DISEASE, WITH LONG-TERM CURRENT USE OF INSULIN (H): Primary | ICD-10-CM

## 2025-04-14 DIAGNOSIS — N18.31 TYPE 2 DIABETES MELLITUS WITH STAGE 3A CHRONIC KIDNEY DISEASE, WITH LONG-TERM CURRENT USE OF INSULIN (H): Primary | ICD-10-CM

## 2025-04-14 PROCEDURE — G0108 DIAB MANAGE TRN  PER INDIV: HCPCS | Performed by: DIETITIAN, REGISTERED

## 2025-04-14 RX ORDER — INSULIN PMP CART,AUT,G6/7,CNTR
1 EACH SUBCUTANEOUS DAILY
Qty: 1 KIT | Refills: 0 | Status: SHIPPED | OUTPATIENT
Start: 2025-04-14

## 2025-04-14 RX ORDER — INSULIN PMP CART,AUT,G6/7,CNTR
1 EACH SUBCUTANEOUS
Qty: 30 EACH | Refills: 3 | Status: SHIPPED | OUTPATIENT
Start: 2025-04-14

## 2025-04-14 NOTE — LETTER
4/14/2025         RE: Long Mayfield  [address]  [place] [address]        Dear Colleague,    Thank you for referring your patient, Long Mayfield, to the [facility]. Please see a copy of my visit note below.    Diabetes Self-Management Education & Support    Presents for: Follow-up type 2 diabetes, Low C-peptide, CKD stage 3a    Type of Service: In Person Visit      Assessment  2/6/2025  Patient new to writer.  Longstanding history of poorly controlled diabetes as evidenced by elevated A1cs and complications including CKD and toe amputations on foot, slow healing ulcer on right heel.  Current diabetes regimen MDI Lantus and NovoLog with questionable insulin use.  Patient is utilizing Dexcom G6 sensor, patient reports Dexcom G7 not approved.  Patient is interested in pump therapy but will first need to have C-peptide drawn to evaluate to see if he qualifies with current insurance requiring low C-peptide value.       2/27/2025 Follow up.  Lantus patient did not increase as directed had been on 18 units now on 20 units still overnight mean from 12 AM to 7  mg/dL.  Patient does frequently forgets to take insulin did not fill out simple glucose/insulin dosing sheets.  Patient is shown how to log insulin injections on the Dexcom haylee.  Patient did have 1 hypoglycemic episode with reading of 60 resulting from NovoLog dosing then spiked to greater than 400 from over correcting.  Will reduce NovoLog dosing and increase Lantus dosing.    4/14/2025 Pt continues to be motivated in moving forward with insulin pump.  Had started process previously but mail order company delaying process.  Ordered Omnipod pump and supplies through [employer].  Pt to continue to work on improving glycemic control through increasing Lantus and taking Novolog as directed. Pt currently taking 24u long acting - reports taking it - very questionable dosing.  Pt has not been increasing as  "directed.  Does not like the idea of increasing it and pt states \"it just seems like a lot of insulin\" - another reason the pump is a good option for pt for continuous delivery.      Patient's most recent   Lab Results   Component Value Date    A1C 11.5 01/31/2025     is not meeting goal of <8.0    Diabetes knowledge and skills assessment:   Patient is knowledgeable in diabetes management concepts related to: Pt benefits from additional education and reminders in all areas     Based on learning assessment above, most appropriate setting for further diabetes education would be: Individual setting.    Care Plan and Education Provided:  Healthy Eating: Carbohydrate Counting and Consistency in amount and timing of carbohydrate intake,     Being Active: Finding a physical activity routine that works for you and Relationship of activity to glucose,     Monitoring: Blood glucose versus Continuous Glucose Monitoring and Individual glucose targets,     Taking Medication: Administering and storing injectable diabetes medications, Proper site selection and rotation for injections, and When to take medication(s),     Problem Solving: Rule of 15 and carrying a carbohydrate source at all times in case of low glucose,     Reducing Risks: Complications of diabetes and Goal for A1c, how it relates to glucose and how often to check, and     Healthy Coping: Identifying helpful resources    Patient verbalized understanding of diabetes self-management education concepts discussed, opportunities for ongoing education and support, and recommendations provided today.    Plan    Lantus currently at 24 units continue to go up by 1 unit every day or every couple of days until your am glucose reading is in target of 90 - 130 mg/dL     Novolog / Aspart - YOU DO NOT need to take this until am glucose reading closer to target.    1 unit for each carb choice that you eat (15 grams of carbs)      1 slice of bread 1 unit   1/3 cup of pasta, rice or " "potato   1 piece of fruit 1 unit   PLUS   (NOVOLOG/ Aspart)   200-250 = 1   251-300 = 2   301-350 = 3  351-400 = 4  401+ = 5     Topics to cover at upcoming visits: Any area as needed for ongoing diabetes self-management education and support/ motivational counseling. Pump education     Follow-up: 7/2025  Upcoming Diabetes Ed Appointments     Visit Type Date Time Department    DIABETES ED 7/14/2025  2:00 PM [facility] DIABETES EDUCATION        See Care Plan for co-developed, patient-state behavior change goals.    Education Materials Provided:  Pt to track insulin dosing on dexcom        Subjective/Objective  Long is an 55 year old, presenting for the following diabetes education related to: Follow-up  Cultural Influences/Ethnic Background:  Not  or     Diabetes Symptoms & Complications:  Weight trend: Stable  Symptom course: Stable  Disease course: Stable  Complications assessed today?: Yes  Autonomic neuropathy: Yes  Heart disease: Yes  Nephropathy: Yes  Peripheral neuropathy: Yes  Peripheral Vascular Disease: Yes    Patient Problem List and Family Medical History reviewed for relevant medical history, current medical status, and diabetes risk factors.    Vitals:  Ht 1.651 m (5' 5\")   Wt 59.2 kg (130 lb 8 oz)   BMI 21.72 kg/m    Estimated body mass index is 21.72 kg/m  as calculated from the following:    Height as of this encounter: 1.651 m (5' 5\").    Weight as of this encounter: 59.2 kg (130 lb 8 oz).   Last 3 BP:   BP Readings from Last 3 Encounters:   04/03/25 127/69   04/02/25 95/68   03/27/25 138/79       History   Smoking Status     Never   Smokeless Tobacco     Never       Labs:  Lab Results   Component Value Date    A1C 11.5 01/31/2025     Lab Results   Component Value Date     04/03/2025     10/23/2024     Lab Results   Component Value Date    LDL 52 09/24/2024     Direct Measure HDL   Date Value Ref Range Status   09/24/2024 45 >=40 mg/dL Final   ]  GFR Estimate   Date Value " "Ref Range Status   04/03/2025 44 (L) >60 mL/min/1.73m2 Final     Comment:     eGFR calculated using 2021 CKD-EPI equation.   10/08/2017 >90 >60 mL/min/1.7m2 Final     Comment:     Non  GFR Calc     GFR, ESTIMATED POCT   Date Value Ref Range Status   07/09/2024 41 (L) >60 mL/min/1.73m2 Final     GFR Estimate If Black   Date Value Ref Range Status   10/08/2017 >90 >60 mL/min/1.7m2 Final     Comment:      GFR Calc     Lab Results   Component Value Date    CR 1.78 04/03/2025    CR 0.68 10/08/2017     Lab Results   Component Value Date    MICROL 137.0 04/03/2025    UMALCR 206.02 (H) 04/03/2025    UCRR 66.7 04/03/2025    UCRR 66.5 04/03/2025 4/14/2025   Healthy Eating   Healthy Eating Assessed Today Yes   Cultural/Shinto diet restrictions? No   Do you have any food allergies or intolerances? No   Meal planning/habits Low salt;Frequent snacking   Who cooks/prepares meals for you? Self   Who purchases food in  your home? Self   How many times a week on average do you eat food made away from home (restaurant/take-out)? 0   Meals include Breakfast;Lunch;Dinner;Morning Snack;Afternoon Snack;Evening Snack   Other Frequently eating does not have a certain routine sounds like often having sandwiches questionable amount of food preparation.  Some food insecurity patient. Patient states anything he eats half of it \" runs right through me\" patient does take Creon 3 tablets with meals.  Does like chicken and salsa and tuna, does not have fruit at home   Beverages Water;Juice;Soda   Has patient met with a dietitian in the past? Yes         4/14/2025   Being Active   Being Active Assessed Today Yes   Exercise: Unable to exercise   Barrier to exercise Physical limitation         4/14/2025   Monitoring   Monitoring Assessed Today Yes   Did patient bring glucose meter to appointment?  Yes   Blood Glucose Meter CGM   Times checking blood sugar at home (number) 4   Times checking blood sugar at " home (per) Day   Blood glucose trend No change       Diabetes Medication(s)       Diabetic Other       Glucagon, rDNA, (GLUCAGON EMERGENCY) 1 MG KIT Inject 1 mg into the muscle as needed.     glucose 40 % (400 mg/mL) gel Take by mouth every hour as needed.       Insulin       insulin aspart (NOVOLOG FLEXPEN) 100 UNIT/ML pen Inject 1-5 Units subcutaneously 3 times daily (with meals). In addition to 6-8 units with each meal, inject additional units as per this sliding scale:  If 200-250 = 1   251-300 = 2   301-350 = 3  351-400 = 4  401+ = 5  Repeat BS after 3 hours and call MD if still over 400     insulin glargine (LANTUS PEN) 100 UNIT/ML pen Currently at 20 units will be increasing up to max dose 30 units     insulin NPH (HUMULIN N VIAL) 100 UNIT/ML vial INJECT 13 UNITS AT THE START OF YOUR STEROID INFUSION AT CLINIC WHICH IS SCHEDULED FOR 12/4/24 4/14/2025   Taking Medications   Taking Medication Assessed Today Yes   Current Treatments Insulin Injections   Given by Patient   Problems taking diabetes medications regularly? Yes   Diabetes medication side effects? No         4/14/2025   Problem Solving   Problem Solving Assessed Today Yes   Is the patient at risk for hypoglycemia? Yes   Hypoglycemia Treatment Glucose (tablets or gel);Juice;Candy;Other food   Is the patient at risk for DKA? Yes           4/14/2025   Reducing Risks   Reducing Risks Assessed Today No   Diabetes Risks Age over 45 years   CAD Risks Male sex   Has dilated eye exam at least once a year? Yes   Sees dentist every 6 months? Yes   Feet checked by healthcare provider in the last year? Yes         4/14/2025   Healthy Coping: Diabetes Distress Assessment   Healthy Coping Assessed Today Yes   Informal Support system: [place] based       MYLES Matias  Time Spent: 30 minutes  Encounter Type: Individual    Any diabetes medication dose changes were made via the [facility] Standing Orders under the patient's referring provider.

## 2025-04-14 NOTE — PATIENT INSTRUCTIONS
Lantus currently at 24 units continue to go up by 1 unit every day or every couple of days until your am glucose reading is in target of 90 - 130 mg/dL     Novolog / Aspart - YOU DO NOT need to take this until am glucose reading   1 unit for each carb choice that you eat (15 grams of carbs)      1 slice of bread 1 unit   1/3 cup of pasta, rice or potato   1 piece of fruit 1 unit   PLUS   (NOVOLOG/ Aspart)   200-250 = 1   251-300 = 2   301-350 = 3  351-400 = 4  401+ = 5

## 2025-04-14 NOTE — TELEPHONE ENCOUNTER
New Medication Request      What medication are you requesting?: Medication or provider recommendations to aid with sleep in evenings.    Reason for medication request: Patient states he is having difficulties relaxing in the evenings enough to sleep well and is requesting a medication to aid him. Patient reports he has discussed this briefly at previous appointments with provider but has not had anything prescribed yet due to also taking oxycodone.     Have you taken this medication before?: No    Controlled Substance Agreement on file:   CSA -- Patient Level:     [Media Unavailable] Controlled Substance Agreement - Opioid - Scan on 10/16/2024 10:32 AM         Patient offered an appointment? Yes: Patient is scheduled 04/29/2025 with Rena Blair    Preferred Pharmacy:     [id number] - [place], [place] - [address] AT [facility] [address]  [address]  [place] [facility] [address]  Phone: [phone] Fax: [phone]        Could we send this information to you in [employer] or would you prefer to receive a phone call?:   Patient would prefer a phone call   Okay to leave a detailed message?: Yes at Cell number on file:    Telephone Information:   Mobile [phone]

## 2025-04-16 ENCOUNTER — TRANSFERRED RECORDS (OUTPATIENT)
Dept: HEALTH INFORMATION MANAGEMENT | Facility: CLINIC | Age: 55
End: 2025-04-16
Payer: COMMERCIAL

## 2025-04-17 ENCOUNTER — PATIENT OUTREACH (OUTPATIENT)
Dept: CARE COORDINATION | Facility: CLINIC | Age: 55
End: 2025-04-17
Payer: COMMERCIAL

## 2025-04-17 NOTE — PROGRESS NOTES
Clinic Care Coordination Contact  Care Coordination Clinician Chart Review    Situation: Patient chart reviewed by Care Coordinator.       Background: Care Coordination Program started: 1/7/2025. Initial assessment completed and patient-centered care plan(s) were developed with participation from patient. Lead CC handed patient off to CHW for continued outreaches.       Assessment: Per chart review, patient outreach completed by CC CHW on 4/9/25.  Patient is actively working to accomplish goal(s). Patient's goal(s) appropriate and relevant at this time. Patient is due for updated Plan of Care.  Assessments will be completed annually or as needed/with change of patient status.      Care Plan: Health Maintenance       Problem: Health Maintenance Due or Overdue       Goal: Become up-to-date with health maintenance visit(s)       Start Date: 1/30/2025    This Visit's Progress: 50% Recent Progress: 50%    Note:     Goal Statement: I will complete my annual wellness visit.    Barriers: transportation   Strengths: motivated     Patient expressed understanding of goal: yes  Action steps to achieve this goal:  1. I will schedule my annual wellness visit; [id number] ([phone]). My CHW assisted me in scheduling my AWV for 3/13/25 @ 2:40.  I will call to reschedule my AWV within the next 30 days.   2. I will attend my annual wellness visit. Continuous (MB)  3. I will contact my Care Management or clinic team if I have barriers to attending my annual wellness visit. Continuous (MB)                            Care Plan: Community Resources       Problem: Lack of transportation       Goal: Establish Reliable Transportation       Start Date: 1/30/2025    This Visit's Progress: 20% Recent Progress: 20%    Note:     Goal Statement: I will continue to take steps to secure safe, consistent and reliable transportation over the next three month(s).  Barriers: financial   Strengths: motivated  Patient expressed understanding of goal:  yes    Action steps to achieve this goal:  I will review resources and supports sent to me via [employer]  I will outreach to supports and consider establishing with ones I might find beneficial: transit link, community thread, help at your door. I am using transportation through [employer] for medical appointments. I will call [employer] again to look into there services within the next 30 days.  I will sign up for one pass through [employer] so that I can schedule rides to the gym. I will look into this program within the next 30 days.  I will try one new transportation option in the next month. Continuous (MB)                        Problem: Reliable food source       Goal: Establish Options for Affordable Food Sources       Start Date: 1/30/2025    This Visit's Progress: 0%    Note:     Goal Statement: I will continue to take steps to minimize food insecurity over the next two month(s).  Barriers: transportation   Strengths: motivated  Patient expressed understanding of goal: yes    Action steps to achieve this goal:  I will review resources and supports sent to me via [employer]: food pantry resources. Completed  I will outreach to supports and consider establishing with ones I might find beneficial - I will try going to one food pantry in the next month. I have not went to these locations yet. I hope to look into them soon.  I will continue to outreach to care coordination as needed for additional resources or supports.                              Care Plan: Appointments       Problem: HP GENERAL PROBLEM       Goal: I would like to attend the following appointments.       Start Date: 2/28/2025    This Visit's Progress: 80% Recent Progress: 10%    Priority: High    Note:     Barriers: numerous appointments  Strengths: motivated  Patient expressed understanding of goal: per chart review  Action steps to achieve this goal:    3/11 @ 2:40 PCP. Completed  2.  3/27 @ 1:55 Diabetic Ed- Rescheduled for 4/14/25 @ 1:55. Continuous  (MB)-Appointment confirmed with patient 4/9/25   3.  4/2 @ 1:45 Vascular Completed. Follow up scheduled for 4/22/25 @ 2:25. Continuous (MB)-Appointment confirmed with patient 4/9/25   4.  4/3 @ 11:00 lab Completed  5.  4/3 @ 11:45 Renal Dr. Chinchilla Completed  6.  4/7 @ 2:05 orthopedics Completed  7.  4/23 @ 2:00 video Diabetes Endocrinology- Completed                                 Plan/Recommendations: The patient will continue working with Care Coordination to achieve goal(s) as above. CHW will continue outreaches at minimum every 30 days and will involve Lead CC as needed or if patient is ready to move to Maintenance. Lead CC will continue to monitor CHW outreaches and patient's progress to goal(s) every 6 weeks.     Plan of Care updated and sent to patient: Yes, via [employer]

## 2025-04-17 NOTE — PROGRESS NOTES
Anemia Management Note - Follow Up      SUBJECTIVE/OBJECTIVE:    Referred by Dr. Jordy Chinchilla  on 2024  Primary Diagnosis: Anemia in Chronic Kidney Disease (N18.3, D63.1)     Secondary Diagnosis: Chronic Kidney Disease, Stage 3 (N18.3)   Hgb goal range: 9-10     Epo/Darbo: Aranesp 40 mcg  every two weeks for Hgb <10.0 At home  Iron regimen: Ferrous gluconate 325mg every other day  [id number]: oral iron every other day  *24: Pt states Dr. Chinchilla said his Iron was ok. Will recheck labs in a month.      Labs : 2025  RX/TX plans : 2025     Recent DIAN use, transfusion, IV iron: NA  No history of stroke, MI, and blood clots or cancers. Hx of HTN.      Contact: No Consent to Communicate On File.         Latest Ref Rng & Units 2025 2025 2025 3/4/2025 3/5/2025 3/20/2025 4/3/2025   Anemia   HGB Goal  9 - 10    9 - 10 9 - 10    DIAN Dose  40mcg  40mcg  40mcg 40mcg    Hemoglobin 13.3 - 17.7 g/dL 9.6  9.5   9.1   9.4  11.0    TSAT 15 - 46 % 21    11       Ferritin 31 - 409 ng/mL 470    211         BP Readings from Last 3 Encounters:   25 127/69   25 95/68   25 138/79     Wt Readings from Last 2 Encounters:   25 59 kg (130 lb)   25 58.1 kg (128 lb 1.6 oz)         ASSESSMENT:    Hgb:Above goal - recommend hold dose  TSat: Due for labs Ferritin: Due for labs  He is tolerating oral iron every other day   Goals Addressed    None         PLAN:  RTC for anemia labs in 2 week(s). Has clinic appt on 429 and will have labs done then    Orders needed to be renewed (for next follow-up date) in EPIC: None    Iron labs due:  Due, planned to have done in late 2025    Plan discussed with:  Long      NEXT FOLLOW-UP DATE:  430    Carrie Leopold, RN BSN  Anemia Services  [facility]  [email or web address]  Office: [phone]  Fax [phone]

## 2025-04-17 NOTE — LETTER
[facility] CARE COORDINATION  [address]  [facility] [address]   April 17, 2025        Long Mayfield  [address]  [facility] [address]          Dear Long,     Attached is an updated Patient Centered Plan of Care for your continued enrollment in Care Coordination. Please let us know if you have additional questions, concerns, or goals that we can assist with.    Sincerely,    Shanel Herrera [facility] Clinical Care Coordinator  [facility], [facility], [facility], and [facility]  Patient Centered Plan of Care  About Me:        Patient Name:  Long Mayfield    YOB: 1970  Age:         55 year old   Joey MRN:    [id number] Telephone Information:  Home Phone [phone]   Mobile [phone]       Address:  [address]  [facility] [address] Email address:  [email or web address]      Emergency Contact(s)    Name Relationship Lgl Grd Work Phone Home Phone Mobile Phone   1. LUCI RODRIGUEZ Sister No   [phone]   2. CAMILO MAYFIELD Brother No   [phone]   3. KATHARINA BARAHAM* Friend    [phone]           Primary language:  English     needed? No   [facility] [facility]:  [phone] op. 1  Other communication barriers:Other (Questionable memory, insight to diabetes care)    Preferred Method of Communication:     Current living arrangement: I live alone    Mobility Status/ Medical Equipment: Independent w/Device        Health Maintenance  Health Maintenance Reviewed: Due/Overdue   Health Maintenance Due   Topic Date Due    DIABETIC FOOT EXAM  Never done    DEPRESSION ACTION PLAN  Never done    YEARLY PREVENTIVE VISIT  Never done    HEPATITIS A IMMUNIZATION (3 of 3 - Hep A Twinrix risk 3-dose series) 05/23/2023    COVID-19 Vaccine (3 - Mixed Product risk series) 12/17/2024    EYE EXAM  03/15/2025    A1C  04/30/2025          My Access Plan  Medical Emergency 911   Primary Clinic  Line [facility] [phone]   24 Hour Appointment Line [phone] or  [phone] ([phone]) (toll-free)   24 Hour Nurse Line [phone] (toll-free)   Preferred Urgent Care [facility], [phone]     Preferred Hospital Other ([facility])     Preferred Pharmacy [id number] [place], [place] - [address] AT [address] [place] [address]     Behavioral Health Crisis Line The National Suicide Prevention Lifeline at 1-209.756.7275 or Text/Call 238           My Care Team Members  Patient Care Team         Relationship Specialty Notifications Start End    Rena Blair PA-C PCP - General Family Medicine  11/20/24     Phone: [phone] Fax: [phone]         [address] [facility] [address]    Edward Zuleta MD Assigned Infectious Disease Provider   8/23/24     Phone: [phone] Fax: [phone]         [address] [facility], [address] [address]    Hector Cruz MD Assigned Rheumatology Provider   9/23/24     Phone: [phone] Fax: [phone]         [address] [place] [address]    Prosper Medina MD MD Hematology & Oncology  9/27/24     Phone: [phone] Fax: [phone] [address] [address]    Ana Laura Valdes MD MD Nephrology  9/27/24     Phone: [phone] Fax: [phone]         [address] [place] [address]    Bennett Chilel MD Physician Hematology & Oncology  9/27/24     Phone: [phone] Fax: [phone] [address] [facility] [address]    Darlene Luna, RN Specialty Care Coordinator Nephrology Abnormal results only, Admissions 10/1/24     Vasculitis Program Nurse Navigator: [phone]    Rena Blair PA-C Assigned Pain Medication Provider   10/23/24     Phone: [phone] Fax: [phone]         [facility] [address]    Rena Blair PA-C  Assigned PCP   10/23/24     Phone: [phone] Fax: [phone]         [address] [facility] [address]    Jordy Chinchilla MD Assigned Nephrology Provider   10/23/24     Phone: [phone] Fax: [phone]         [address] [facility] [address]    Marlen Ryan RD Diabetes Educator Diabetes Education  10/25/24     Leopold, Carrie A, RN Specialty Care Coordinator Nurse  12/16/24     Anemia Services    Phone: [phone]         Prosper Medina MD Assigned Cancer Care Provider   12/23/24     Phone: [phone] Fax: [phone] [address] [facility] [address] [facility] [address]    Jodi Blandon MD MD Cardiology  12/31/24     Phone: [phone] Fax: [phone]         [address] [facility] [address] [facility] [address]    Shanel Herrera LICSW Lead Care Coordinator Primary Care - CC Admissions 1/10/25     Phone: [phone]         Dickson Day MD Assigned Musculoskeletal Provider   1/23/25     Phone: [phone] Fax: [phone]         [address] [facility] [address] [place] [address]    Rosa Grimaldo CHW Community Health Worker  Admissions 1/30/25     Phone: [phone]         Lucia Mir RD Diabetes Educator Dietitian, Registered  2/6/25     Phone: [phone] Fax: [phone]         [address] [facility] [address]    Fang Astudillo, RN Lead Care Coordinator Primary Care - CC Admissions 2/27/25     Phone: [phone]         Peggy Robison PA-C Assigned Endocrinology Provider   3/23/25     Phone: [phone] Fax: [phone]         [address] [facility] [address]    Jodi Blandon MD Assigned Heart and Vascular Provider   3/23/25     Phone: [phone] Fax: [phone] [address] [facility]  [facility] [address]    Marina Fernandez NP Assigned Heart and Vascular Surgical Provider   3/23/25     Phone: [phone] Fax: [phone] [address] [facility] [address] [facility] [address]    Jordy Chinchilla MD MD Nephrology  4/8/25      Phone: [phone] Fax: [phone]         [address] [facility] [address]                My Care Plans  Self Management and Treatment Plan    Care Plan  Care Plan: Health Maintenance       Problem: Health Maintenance Due or Overdue       Goal: Become up-to-date with health maintenance visit(s)       Start Date: 1/30/2025    This Visit's Progress: 50% Recent Progress: 50%    Note:     Goal Statement: I will complete my annual wellness visit.    Barriers: transportation   Strengths: motivated     Patient expressed understanding of goal: yes  Action steps to achieve this goal:  1. I will schedule my annual wellness visit; [phone] ([phone]). My CHW assisted me in scheduling my AWV for 3/13/25 @ 2:40.  I will call to reschedule my AWV within the next 30 days.   2. I will attend my annual wellness visit. Continuous (MB)  3. I will contact my Care Management or clinic team if I have barriers to attending my annual wellness visit. Continuous (MB)                            Care Plan: Community Resources       Problem: Lack of transportation       Goal: Establish Reliable Transportation       Start Date: 1/30/2025    This Visit's Progress: 20% Recent Progress: 20%    Note:     Goal Statement: I will continue to take steps to secure safe, consistent and reliable transportation over the next three month(s).  Barriers: financial   Strengths: motivated  Patient expressed understanding of goal: yes    Action steps to achieve this goal:  I will review resources and supports sent to me via [email or web address]  I will outreach to supports and consider establishing with ones I might find beneficial: transit link, community thread, help at your door. I am using transportation through [employer] for medical appointments. I will call [employer] link again to look into there services within the next 30 days.  I will sign up for one pass through [employer] so that I can schedule rides to the gym. I will look into this program within the  next 30 days.  I will try one new transportation option in the next month. Continuous (MB)                        Problem: Reliable food source       Goal: Establish Options for Affordable Food Sources       Start Date: 1/30/2025    This Visit's Progress: 0%    Note:     Goal Statement: I will continue to take steps to minimize food insecurity over the next two month(s).  Barriers: transportation   Strengths: motivated  Patient expressed understanding of goal: yes    Action steps to achieve this goal:  I will review resources and supports sent to me via [employer]: food pantry resources. Completed  I will outreach to supports and consider establishing with ones I might find beneficial - I will try going to one food pantry in the next month. I have not went to these locations yet. I hope to look into them soon.  I will continue to outreach to care coordination as needed for additional resources or supports.                              Care Plan: Appointments       Problem: HP GENERAL PROBLEM       Goal: I would like to attend the following appointments.       Start Date: 2/28/2025    This Visit's Progress: 80% Recent Progress: 10%    Priority: High    Note:     Barriers: numerous appointments  Strengths: motivated  Patient expressed understanding of goal: per chart review  Action steps to achieve this goal:    3/11 @ 2:40 PCP. Completed  2.  3/27 @ 1:55 Diabetic Ed- Rescheduled for 4/14/25 @ 1:55. Continuous (MB)-Appointment confirmed with patient 4/9/25   3.  4/2 @ 1:45 Vascular Completed. Follow up scheduled for 4/22/25 @ 2:25. Continuous (MB)-Appointment confirmed with patient 4/9/25   4.  4/3 @ 11:00 lab Completed  5.  4/3 @ 11:45 Renal Dr. Chinchilla Completed  6.  4/7 @ 2:05 orthopedics Completed  7.  4/23 @ 2:00 video Diabetes Endocrinology- Completed                                       My Medical and Care Information  Problem List   Patient Active Problem List   Diagnosis    Chronic diarrhea    Anemia,  unspecified type    Cardiac calcification (H)    Cervical disc disease with myelopathy    Chronic bilateral low back pain with bilateral sciatica    S/P transmetatarsal amputation of foot, right (H)    Exocrine pancreatic insufficiency    GERD (gastroesophageal reflux disease)    Other hyperlipidemia    Primary hypertension    Severe episode of recurrent major depressive disorder, without psychotic features (H)    Type 2 diabetes mellitus with kidney complication, with long-term current use of insulin (H)    Unintentional weight loss    Pyogenic arthritis of right knee joint, due to unspecified organism (H)    Cervical radiculopathy    Chronic pancreatitis (H)    Abnormal CXR    Alcohol dependence in remission (H)    Type 2 diabetes mellitus with hyperosmolarity without coma, with long-term current use of insulin (H)    Other fatigue    Systemic vasculitis (H)    IgG4 related disease (H)    Bilateral lower extremity edema    Ulcer of right heel and midfoot (H)    Abnormal LFTs    Acidosis    Acquired absence of other right toe(s)    Anxiety disorder, unspecified    Atherosclerotic heart disease of native coronary artery without angina pectoris    Bell's palsy    Difficulty in walking, not elsewhere classified    Hyponatremia    Long term (current) use of insulin (H)    Muscle wasting and atrophy, not elsewhere classified, multiple sites    Muscle weakness (generalized)    Other abnormalities of gait and mobility    Peripheral vascular disease, unspecified    Stage 3a chronic kidney disease (H)    Type 2 diabetes mellitus with foot ulcer (H)    Vitamin D deficiency, unspecified    Continuous opioid dependence (H)    High risk medication use    Preventative health care          Care Coordination Start Date: 1/7/2025   Frequency of Care Coordination: monthly, more frequently as needed     Form Last Updated: 04/17/2025

## 2025-04-19 VITALS — HEIGHT: 65 IN | WEIGHT: 130.5 LBS | BODY MASS INDEX: 21.74 KG/M2

## 2025-04-20 NOTE — PROGRESS NOTES
"Diabetes Self-Management Education & Support    Presents for: Follow-up type 2 diabetes, Low C-peptide, CKD stage 3a    Type of Service: In Person Visit      Assessment  2/6/2025  Patient new to writer.  Longstanding history of poorly controlled diabetes as evidenced by elevated A1cs and complications including CKD and toe amputations on foot, slow healing ulcer on right heel.  Current diabetes regimen MDI Lantus and NovoLog with questionable insulin use.  Patient is utilizing Dexcom G6 sensor, patient reports Dexcom G7 not approved.  Patient is interested in pump therapy but will first need to have C-peptide drawn to evaluate to see if he qualifies with current insurance requiring low C-peptide value.       2/27/2025 Follow up.  Lantus patient did not increase as directed had been on 18 units now on 20 units still overnight mean from 12 AM to 7  mg/dL.  Patient does frequently forgets to take insulin did not fill out simple glucose/insulin dosing sheets.  Patient is shown how to log insulin injections on the Dexcom haylee.  Patient did have 1 hypoglycemic episode with reading of 60 resulting from NovoLog dosing then spiked to greater than 400 from over correcting.  Will reduce NovoLog dosing and increase Lantus dosing.    4/14/2025 Pt continues to be motivated in moving forward with insulin pump.  Had started process previously but mail order company delaying process.  Ordered Omnipod pump and supplies through [email or web address].  Pt to continue to work on improving glycemic control through increasing Lantus and taking Novolog as directed. Pt currently taking 24u long acting - reports taking it - very questionable dosing.  Pt has not been increasing as directed.  Does not like the idea of increasing it and pt states \"it just seems like a lot of insulin\" - another reason the pump is a good option for pt for continuous delivery.      Patient's most recent   Lab Results   Component Value Date    A1C 11.5 01/31/2025 "     is not meeting goal of <8.0    Diabetes knowledge and skills assessment:   Patient is knowledgeable in diabetes management concepts related to: Pt benefits from additional education and reminders in all areas     Based on learning assessment above, most appropriate setting for further diabetes education would be: Individual setting.    Care Plan and Education Provided:  Healthy Eating: Carbohydrate Counting and Consistency in amount and timing of carbohydrate intake,     Being Active: Finding a physical activity routine that works for you and Relationship of activity to glucose,     Monitoring: Blood glucose versus Continuous Glucose Monitoring and Individual glucose targets,     Taking Medication: Administering and storing injectable diabetes medications, Proper site selection and rotation for injections, and When to take medication(s),     Problem Solving: Rule of 15 and carrying a carbohydrate source at all times in case of low glucose,     Reducing Risks: Complications of diabetes and Goal for A1c, how it relates to glucose and how often to check, and     Healthy Coping: Identifying helpful resources    Patient verbalized understanding of diabetes self-management education concepts discussed, opportunities for ongoing education and support, and recommendations provided today.    Plan    Lantus currently at 24 units continue to go up by 1 unit every day or every couple of days until your am glucose reading is in target of 90 - 130 mg/dL     Novolog / Aspart - YOU DO NOT need to take this until am glucose reading closer to target.    1 unit for each carb choice that you eat (15 grams of carbs)      1 slice of bread 1 unit   1/3 cup of pasta, rice or potato   1 piece of fruit 1 unit   PLUS   (NOVOLOG/ Aspart)   200-250 = 1   251-300 = 2   301-350 = 3  351-400 = 4  401+ = 5     Topics to cover at upcoming visits: Any area as needed for ongoing diabetes self-management education and support/ motivational  "counseling. Pump education     Follow-up: 7/2025  Upcoming Diabetes Ed Appointments     Visit Type Date Time Department    DIABETES ED 7/14/2025  2:00 PM [facility] DIABETES EDUCATION        See Care Plan for co-developed, patient-state behavior change goals.    Education Materials Provided:  Pt to track insulin dosing on dexcom        Subjective/Objective  Long is an 55 year old, presenting for the following diabetes education related to: Follow-up  Cultural Influences/Ethnic Background:  Not  or     Diabetes Symptoms & Complications:  Weight trend: Stable  Symptom course: Stable  Disease course: Stable  Complications assessed today?: Yes  Autonomic neuropathy: Yes  Heart disease: Yes  Nephropathy: Yes  Peripheral neuropathy: Yes  Peripheral Vascular Disease: Yes    Patient Problem List and Family Medical History reviewed for relevant medical history, current medical status, and diabetes risk factors.    Vitals:  Ht 1.651 m (5' 5\")   Wt 59.2 kg (130 lb 8 oz)   BMI 21.72 kg/m    Estimated body mass index is 21.72 kg/m  as calculated from the following:    Height as of this encounter: 1.651 m (5' 5\").    Weight as of this encounter: 59.2 kg (130 lb 8 oz).   Last 3 BP:   BP Readings from Last 3 Encounters:   04/03/25 127/69   04/02/25 95/68   03/27/25 138/79       History   Smoking Status    Never   Smokeless Tobacco    Never       Labs:  Lab Results   Component Value Date    A1C 11.5 01/31/2025     Lab Results   Component Value Date     04/03/2025     10/23/2024     Lab Results   Component Value Date    LDL 52 09/24/2024     Direct Measure HDL   Date Value Ref Range Status   09/24/2024 45 >=40 mg/dL Final   ]  GFR Estimate   Date Value Ref Range Status   04/03/2025 44 (L) >60 mL/min/1.73m2 Final     Comment:     eGFR calculated using 2021 CKD-EPI equation.   10/08/2017 >90 >60 mL/min/1.7m2 Final     Comment:     Non  GFR Calc     GFR, ESTIMATED POCT   Date Value Ref Range " "Status   07/09/2024 41 (L) >60 mL/min/1.73m2 Final     GFR Estimate If Black   Date Value Ref Range Status   10/08/2017 >90 >60 mL/min/1.7m2 Final     Comment:      GFR Calc     Lab Results   Component Value Date    CR 1.78 04/03/2025    CR 0.68 10/08/2017     Lab Results   Component Value Date    MICROL 137.0 04/03/2025    UMALCR 206.02 (H) 04/03/2025    UCRR 66.7 04/03/2025    UCRR 66.5 04/03/2025 4/14/2025   Healthy Eating   Healthy Eating Assessed Today Yes   Cultural/Pentecostal diet restrictions? No   Do you have any food allergies or intolerances? No   Meal planning/habits Low salt;Frequent snacking   Who cooks/prepares meals for you? Self   Who purchases food in  your home? Self   How many times a week on average do you eat food made away from home (restaurant/take-out)? 0   Meals include Breakfast;Lunch;Dinner;Morning Snack;Afternoon Snack;Evening Snack   Other Frequently eating does not have a certain routine sounds like often having sandwiches questionable amount of food preparation.  Some food insecurity patient. Patient states anything he eats half of it \" runs right through me\" patient does take Creon 3 tablets with meals.  Does like chicken and salsa and tuna, does not have fruit at home   Beverages Water;Juice;Soda   Has patient met with a dietitian in the past? Yes         4/14/2025   Being Active   Being Active Assessed Today Yes   Exercise: Unable to exercise   Barrier to exercise Physical limitation         4/14/2025   Monitoring   Monitoring Assessed Today Yes   Did patient bring glucose meter to appointment?  Yes   Blood Glucose Meter CGM   Times checking blood sugar at home (number) 4   Times checking blood sugar at home (per) Day   Blood glucose trend No change       Diabetes Medication(s)       Diabetic Other       Glucagon, rDNA, (GLUCAGON EMERGENCY) 1 MG KIT Inject 1 mg into the muscle as needed.     glucose 40 % (400 mg/mL) gel Take by mouth every hour as needed. "       Insulin       insulin aspart (NOVOLOG FLEXPEN) 100 UNIT/ML pen Inject 1-5 Units subcutaneously 3 times daily (with meals). In addition to 6-8 units with each meal, inject additional units as per this sliding scale:  If 200-250 = 1   251-300 = 2   301-350 = 3  351-400 = 4  401+ = 5  Repeat BS after 3 hours and call MD if still over 400     insulin glargine (LANTUS PEN) 100 UNIT/ML pen Currently at 20 units will be increasing up to max dose 30 units     insulin NPH (HUMULIN N VIAL) 100 UNIT/ML vial INJECT 13 UNITS AT THE START OF YOUR STEROID INFUSION AT CLINIC WHICH IS SCHEDULED FOR 12/4/24 4/14/2025   Taking Medications   Taking Medication Assessed Today Yes   Current Treatments Insulin Injections   Given by Patient   Problems taking diabetes medications regularly? Yes   Diabetes medication side effects? No         4/14/2025   Problem Solving   Problem Solving Assessed Today Yes   Is the patient at risk for hypoglycemia? Yes   Hypoglycemia Treatment Glucose (tablets or gel);Juice;Candy;Other food   Is the patient at risk for DKA? Yes           4/14/2025   Reducing Risks   Reducing Risks Assessed Today No   Diabetes Risks Age over 45 years   CAD Risks Male sex   Has dilated eye exam at least once a year? Yes   Sees dentist every 6 months? Yes   Feet checked by healthcare provider in the last year? Yes         4/14/2025   Healthy Coping: Diabetes Distress Assessment   Healthy Coping Assessed Today Yes   Informal Support system: [place] based       MYLES Matias  Time Spent: 30 minutes  Encounter Type: Individual    Any diabetes medication dose changes were made via the HEATHER Standing Orders under the patient's referring provider.

## 2025-04-22 ENCOUNTER — OFFICE VISIT (OUTPATIENT)
Dept: VASCULAR SURGERY | Facility: CLINIC | Age: 55
End: 2025-04-22
Attending: NURSE PRACTITIONER
Payer: COMMERCIAL

## 2025-04-22 VITALS
SYSTOLIC BLOOD PRESSURE: 104 MMHG | WEIGHT: 127.5 LBS | RESPIRATION RATE: 16 BRPM | TEMPERATURE: 97.6 F | BODY MASS INDEX: 21.22 KG/M2 | DIASTOLIC BLOOD PRESSURE: 56 MMHG | HEART RATE: 71 BPM

## 2025-04-22 DIAGNOSIS — L97.409 TYPE 2 DIABETES MELLITUS WITH DIABETIC HEEL ULCER (H): ICD-10-CM

## 2025-04-22 DIAGNOSIS — E11.621 TYPE 2 DIABETES MELLITUS WITH DIABETIC HEEL ULCER (H): ICD-10-CM

## 2025-04-22 DIAGNOSIS — I73.9 PERIPHERAL VASCULAR DISEASE, UNSPECIFIED: ICD-10-CM

## 2025-04-22 DIAGNOSIS — M25.371 RIGHT ANKLE INSTABILITY: ICD-10-CM

## 2025-04-22 DIAGNOSIS — I87.2 VENOUS INSUFFICIENCY: Primary | ICD-10-CM

## 2025-04-22 DIAGNOSIS — I89.0 SECONDARY LYMPHEDEMA: ICD-10-CM

## 2025-04-22 DIAGNOSIS — I87.303 VENOUS HYPERTENSION OF BOTH LOWER EXTREMITIES: ICD-10-CM

## 2025-04-22 PROCEDURE — 11042 DBRDMT SUBQ TIS 1ST 20SQCM/<: CPT | Performed by: NURSE PRACTITIONER

## 2025-04-22 ASSESSMENT — PAIN SCALES - GENERAL: PAINLEVEL_OUTOF10: NO PAIN (0)

## 2025-04-22 NOTE — PROGRESS NOTES
Follow up Vascular Visit       Date of Service:04/22/25      Chief Complaint: bilateral heel ulcer; type 2 diabetes      Pt returns to [facility] with regards to their bilateral heel ulcers; type 2 diabetes.  They arrive today alone; he walked to the exam room in his hiking boots; he has been advised to be NWB to bilateral feet and to use a wheelchair; he has been advised to wear offloading boots and not his hiking boots. They are currently using manuka honey; gauze; rolled gauze to the wounds. This is being done by home care every 3 days; care focus. They are using nothing for compression. They are feeling well today. Denies fevers, chills. No shortness of breath.     Allergies:   Allergies   Allergen Reactions    Vancomycin      Other Reaction(s): Renal Failure    Vancomycin-induced nephrotoxicity (11/2023, outside hospital)    Seasonal Allergies      HAYFEVER:    -Watery Eyes  -Eye burn    Daptomycin Rash     Likely delayed hypersensitivity reaction to daptomycin 11/2023       Medications:   Current Outpatient Medications:     ACCU-CHEK GUIDE TEST test strip, , Disp: , Rfl:     amLODIPine (NORVASC) 5 MG tablet, Take 1 tablet (5 mg) by mouth daily., Disp: 90 tablet, Rfl: 3    atorvastatin (LIPITOR) 40 MG tablet, , Disp: , Rfl:     bumetanide (BUMEX) 1 MG tablet, Take 1 tablet (1 mg) by mouth daily., Disp: 90 tablet, Rfl: 0    cetirizine (ZYRTEC) 10 MG tablet, Take 1 tablet (10 mg) by mouth daily. (Patient taking differently: Take 10 mg by mouth as needed.), Disp: 30 tablet, Rfl: 11    cholestyramine light (QUESTRAN) 4 GM packet, Take 1 packet by mouth every 12 hours., Disp: , Rfl:     Continuous Glucose Sensor (DEXCOM G7 SENSOR) MISC, Change every 10 days., Disp: 3 each, Rfl: 3    Continuous Glucose Transmitter (DEXCOM G6 TRANSMITTER) MISC, , Disp: , Rfl:     darbepoetin sunshine-polysorbate (ARANESP) 40 MCG/0.4ML injection, Inject 0.4 mLs (40 mcg) subcutaneously every 14 days. Do not  "shake. Administer for Hgb <10.0. HOLD dose if systolic BP >180., Disp: 0.8 mL, Rfl: 11    ferrous gluconate (FERGON) 324 (38 Fe) MG tablet, Take 1 tablet (324 mg) by mouth every other day., Disp: 45 tablet, Rfl: 3    gabapentin (NEURONTIN) 300 MG capsule, TAKE 1 CAPSULE(300 MG) BY MOUTH TWICE DAILY, Disp: 60 capsule, Rfl: 2    Glucagon, rDNA, (GLUCAGON EMERGENCY) 1 MG KIT, Inject 1 mg into the muscle as needed., Disp: , Rfl:     glucose 40 % (400 mg/mL) gel, Take by mouth every hour as needed., Disp: , Rfl:     insulin aspart (NOVOLOG FLEXPEN) 100 UNIT/ML pen, Inject 1-5 Units subcutaneously 3 times daily (with meals). In addition to 6-8 units with each meal, inject additional units as per this sliding scale: If 200-250 = 1  251-300 = 2  301-350 = 3 351-400 = 4 401+ = 5 Repeat BS after 3 hours and call MD if still over 400, Disp: , Rfl:     Insulin Disposable Pump (OMNIPOD 5 G7 INTRO, GEN 5,) KIT, 1 each daily., Disp: 1 kit, Rfl: 0    Insulin Disposable Pump (OMNIPOD 5 G7 PODS, GEN 5,) MISC, 1 each every 3 days., Disp: 30 each, Rfl: 3    insulin glargine (LANTUS PEN) 100 UNIT/ML pen, Currently at 20 units will be increasing up to max dose 30 units, Disp: 30 mL, Rfl: 2    insulin NPH (HUMULIN N VIAL) 100 UNIT/ML vial, INJECT 13 UNITS AT THE START OF YOUR STEROID INFUSION AT CLINIC WHICH IS SCHEDULED FOR 12/4/24, Disp: , Rfl:     insulin pen needle (31G X 6 MM) 31G X 6 MM miscellaneous, Use 4 pen needles daily or as directed., Disp: 100 each, Rfl: 11    insulin syringe-needle U-100 (29G X 1/2\" 0.5 ML) 29G X 1/2\" 0.5 ML miscellaneous, Use one syringe as directed prior to steroid infusion., Disp: 10 each, Rfl: 0    lipase-protease-amylase (CREON 36) 39590-175555-163352 units CPEP, Take 3 capsules by mouth 3 times daily (with meals). 0730, 1130, 1630, Disp: , Rfl:     loperamide (IMODIUM) 2 MG capsule, Take 4 mg by mouth every 8 hours as needed for diarrhea., Disp: , Rfl:     methocarbamol (ROBAXIN) 500 MG tablet, Take 1 " tablet (500 mg) by mouth every 8 hours as needed for muscle spasms., Disp: 30 tablet, Rfl: 3    naloxone (NARCAN) 4 MG/0.1ML nasal spray, Spray 1 spray (4 mg) into one nostril alternating nostrils once as needed for opioid reversal. every 2-3 minutes until assistance arrives, Disp: 0.2 mL, Rfl: 0    oxyCODONE (ROXICODONE) 5 MG tablet, Take 1 tablet (5 mg) by mouth every 6 hours as needed for pain., Disp: 30 tablet, Rfl: 0    polyethylene glycol (MIRALAX) 17 GM/Dose powder, , Disp: , Rfl:     silver sulfADIAZINE (SILVADENE) 1 % external cream, Apply topically 2 times daily., Disp: 400 g, Rfl: 2    sulfamethoxazole-trimethoprim (BACTRIM) 400-80 MG tablet, Take 1 tablet by mouth daily., Disp: 90 tablet, Rfl: 0    sulfamethoxazole-trimethoprim (BACTRIM) 400-80 MG tablet, Take 1 tablet by mouth daily., Disp: 90 tablet, Rfl: 5    triamcinolone (KENALOG) 0.1 % external ointment, Apply topically., Disp: , Rfl:     Current Facility-Administered Medications:     lidocaine (XYLOCAINE) 5 % ointment, , Topical, Daily PRN, Marina Fernandez, NP, Given at 01/15/25 1354    History:   Past Medical History:   Diagnosis Date    Abnormal CXR 10/15/2024    Previous CT of the chest completed at [facility] in December 2023 demonstrated:   Impression:   1. Severe anasarca with moderate to large pleural effusions and a small pericardial effusion. Diffuse septal thickening in the lungs concerning for pulmonary edema. Anemic cardiac blood pool.   2. Solitary pulmonary nodule in the left lower lobe with an average diameter of 10 mm is indeterminat    Abnormal LFTs 01/03/2025    Acidosis 01/08/2020    Acquired absence of other right toe(s) 01/12/2024    Acute metabolic encephalopathy 09/17/2024    Acute pain of right knee 07/12/2024    Acute pulmonary edema (H) 01/11/2024    Acute renal insufficiency 05/03/2024    Last Comprehensive Metabolic Panel:          Lab Results      Component    Value    Date           NA    131 (L)    10/15/2024            POTASSIUM    4.0    10/15/2024           CHLORIDE    98    10/15/2024           CO2    24    10/15/2024           ANIONGAP    9    10/15/2024           GLC    133 (A)    10/23/2024           BUN    22.3 (H)    10/15/2024           CR    1.35 (H)    10/15/2024        Alcohol abuse 09/12/2022    Alcohol dependence in remission (H) 10/29/2024    Allergic rhinitis     Anemia     Anemia, unspecified type 05/03/2024    He has a history of anemia which has been noted since May 2024.  Cause has not been identified.  Additional evaluation with CBC, retake count, TSH, haptoglobin, iron studies, LDH, ferritin, B12 and folate.  Ferritin elevated 418, iron low at 20, iron binding capacity low at 2030, iron saturation low at 9.  Normal B12 and folate.  Awaiting haptoglobin and reticulocyte count.  Denies blood in stool.    Anxiety disorder, unspecified 01/11/2024    Arthritis     Arthritis due to other bacteria, right knee (H) 08/19/2024    Atherosclerotic heart disease of native coronary artery without angina pectoris 01/11/2024    Bell's palsy     Bilateral lower extremity edema 12/18/2024    Cardiac calcification (H) 01/19/2023    Treatment:  Colestid 1 g tablet.  Aspirin 81 mg daily.      Cervical disc disease with myelopathy 05/01/2022    Formatting of this note might be different from the original.   seen spine specialist- [facility]  Formatting of this note might be different from the original.   seen spine specialist      Cervical radiculopathy 07/11/2024    Cervicalgia     Change or removal of drains 08/26/2024    Chronic bilateral low back pain with bilateral sciatica 04/08/2022    Chronic diarrhea 03/08/2022    Chronic kidney disease, unspecified 01/11/2024    Chronic pancreatitis (H) 09/06/2024    Due to continued right knee swelling and history of chronic pancreatitis with chronic diarrhea, he was referred to GI to review possible Whipple's disease.     10/18/2024 seen by [facility].  We do not have  "those records and will request those records to be faxed over.  Once I get those records I will review them with recommendations.  Per patient he is to follow-up in 6 months.      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  Started rituximab infusions 12/4/2024.      Ketosis (H) 03/08/2022    Long term (current) use of insulin (H) 08/19/2024    Major depressive disorder, recurrent episode, mild     Major depressive disorder, recurrent, mild 08/19/2024    Muscle wasting and atrophy, not elsewhere classified, multiple sites 04/01/2024    Muscle weakness (generalized) 01/12/2024    Need for assistance with personal care 01/12/2024    Osteomyelitis of great toe of right foot (H) 11/22/2023    Other abnormalities of gait and mobility 01/12/2024    Other acute osteomyelitis, right ankle and foot (H)     Other chronic pancreatitis (H)     Other fatigue 11/14/2024    Other hyperlipidemia 03/15/2022    Other polyosteoarthritis     Peripheral vascular disease, unspecified 08/19/2024    Pneumonia of left lower lobe due to infectious organism 09/11/2024    Primary hypertension 10/09/2019    Pyogenic arthritis of right knee joint, due to unspecified organism (H) 07/11/2024    Right knee inflammatory arthritis, etiology to be determined, s/p I&D Jul 11, Aug 18, 2024.         Brucella, Bartonella, Q fever, CD4, Ig levels.     MRI right knee with and without contrast if negative.     Rheumatology and GI consult.     Complete oral ciprofloxacin course.      Admitted to [facility] 8/16 to 8/19/2024.  \"Has been seen a handful " of times and outpatient follow-up with persistent right    Right knee pain 07/11/2024    S/P transmetatarsal amputation of foot, right (H) 12/01/2023     s/p transmetatarsal amputation, right foot (DOS 1/9/24)      Severe episode of recurrent major depressive disorder, without psychotic features (H) 01/19/2023    Solitary pulmonary nodule     Stage 3a chronic kidney disease (H) 1/11/2024    Systemic vasculitis (H) 11/19/2024    Type 2 diabetes mellitus with foot ulcer (H) 01/11/2024    Type 2 diabetes mellitus with hyperosmolarity without coma, with long-term current use of insulin (H) 10/29/2024    Type 2 diabetes mellitus with kidney complication, with long-term current use of insulin (H) 10/05/2018    Seen by diabetes educator 10/29/2024:   PLAN  Lantus: 12 units  Set dose Novolog at meals: 3 units each + Correction Scale below     Pre-Meal Correction Scale:        If pre-meal glucose is:    Add extra Humalog/Novolog to your meal dose per below chart:      151 - 200    +1 unit      201 - 250    +2 units      251 - 300    +3 units      301 - 350    +4 units      351 - 400     +5 units      Over     Ulcer of right heel and midfoot (H) 12/23/2024    Unintentional weight loss 10/11/2023    Vitamin D deficiency, unspecified 01/11/2024       Physical Exam:    /56   Pulse 71   Temp 97.6  F (36.4  C)   Resp 16   Wt 127 lb 8 oz (57.8 kg)   BMI 21.22 kg/m      General:  Patient presents to clinic in no apparent distress.  Head: normocephalic atraumatic  Psychiatric:  Alert and oriented x3.   Respiratory: unlabored breathing; no cough  Integumentary:  Skin is uniformly warm, dry and pink.    Ulcer #1 Location: right heel  Size: 3.4L x 2.8W x 0.1depth.  No sinus tract present, Wound base: slough  no undermining present. Ulcer is full thickness. There is moderate drainage. Periwound: no denudement, erythema, induration, maceration or warmth.      Ulcer #2 Location: left heel  Size: 0.8L x 0.4W x 0.1depth.  No sinus  "tract present, Wound base: slough  no undermining present. Ulcer is full thickness. There is modate drainage. Periwound: no denudement, erythema, induration, maceration or warmth.      VASC Wound Right heel (Active)   Pre Size Length 4.5 04/02/25 1300   Pre Size Width 3 04/02/25 1300   Pre Size Depth 0.1 04/02/25 1300   Pre Total Sq cm 13.5 04/02/25 1300   Post Size Length 6 02/25/25 1400   Post Size Width 4 02/25/25 1400   Post Size Depth 0.1 02/25/25 1400   Post Total Sq cm 24 02/25/25 1400   Number of days: 97       VASC Wound Left hand (Active)   Pre Size Length 0 04/02/25 1300   Pre Size Width 0 04/02/25 1300   Pre Size Depth 0 04/02/25 1300   Pre Total Sq cm 0 04/02/25 1300   Post Size Length 1 02/25/25 1400   Post Size Width 3 02/25/25 1400   Post Size Depth 0.1 02/25/25 1400   Post Total Sq cm 3 02/25/25 1400   Number of days: 97       VASC Wound Left heel (Active)   Pre Size Length 2.5 04/02/25 1300   Pre Size Width 1.5 04/02/25 1300   Pre Size Depth 0.1 04/02/25 1300   Pre Total Sq cm 3.75 04/02/25 1300   Number of days: 20       Incision/Surgical Site 08/17/24 Anterior;Right Knee (Active)   Number of days: 248            Circumferential volume measures:          1/15/2025     1:00 PM 1/29/2025     2:00 PM   Circumferential Measures   Right Ankle 19 22   Right Widest Calf 30 32   Right Knee to Ankle 17.5    Left - just above MTP 21    Left Ankle 30        Labs:    I personally reviewed the following lab results today and those on care everywhere    No results found for: \"CRP\"   Erythrocyte Sedimentation Rate   Date Value Ref Range Status   02/19/2025 51 (H) 0 - 20 mm/hr Final      Last Renal Panel:  Sodium   Date Value Ref Range Status   04/03/2025 136 135 - 145 mmol/L Final   10/08/2017 131 (L) 133 - 144 mmol/L Final     Potassium   Date Value Ref Range Status   04/03/2025 5.3 3.4 - 5.3 mmol/L Final   08/06/2022 2.9 (L) 3.4 - 5.3 mmol/L Final   10/08/2017 3.8 3.4 - 5.3 mmol/L Final     Chloride   Date " Value Ref Range Status   04/03/2025 110 (H) 98 - 107 mmol/L Final   08/06/2022 97 94 - 109 mmol/L Final   10/08/2017 95 94 - 109 mmol/L Final     Carbon Dioxide   Date Value Ref Range Status   10/08/2017 28 20 - 32 mmol/L Final     Carbon Dioxide (CO2)   Date Value Ref Range Status   04/03/2025 19 (L) 22 - 29 mmol/L Final   08/06/2022 24 20 - 32 mmol/L Final     Anion Gap   Date Value Ref Range Status   04/03/2025 7 7 - 15 mmol/L Final   08/06/2022 13 3 - 14 mmol/L Final   10/08/2017 8 3 - 14 mmol/L Final     Glucose   Date Value Ref Range Status   04/03/2025 203 (H) 70 - 99 mg/dL Final   10/23/2024 133 (A) 70 - 99 mg/dL Final     Urea Nitrogen   Date Value Ref Range Status   04/03/2025 26.7 (H) 6.0 - 20.0 mg/dL Final   08/06/2022 12 7 - 30 mg/dL Final   10/08/2017 9 7 - 30 mg/dL Final     Creatinine   Date Value Ref Range Status   04/03/2025 1.78 (H) 0.67 - 1.17 mg/dL Final   10/08/2017 0.68 0.66 - 1.25 mg/dL Final     GFR Estimate   Date Value Ref Range Status   04/03/2025 44 (L) >60 mL/min/1.73m2 Final     Comment:     eGFR calculated using 2021 CKD-EPI equation.   10/08/2017 >90 >60 mL/min/1.7m2 Final     Comment:     Non  GFR Calc     GFR, ESTIMATED POCT   Date Value Ref Range Status   07/09/2024 41 (L) >60 mL/min/1.73m2 Final     Calcium   Date Value Ref Range Status   04/03/2025 8.7 (L) 8.8 - 10.4 mg/dL Final   10/08/2017 9.5 8.5 - 10.1 mg/dL Final     Phosphorus   Date Value Ref Range Status   04/03/2025 3.8 2.5 - 4.5 mg/dL Final     Albumin   Date Value Ref Range Status   04/03/2025 3.7 3.5 - 5.2 g/dL Final   08/06/2022 3.0 (L) 3.4 - 5.0 g/dL Final   10/08/2017 3.4 3.4 - 5.0 g/dL Final      Lab Results   Component Value Date    WBC 7.4 04/03/2025    WBC 3.9 10/08/2017     Lab Results   Component Value Date    RBC 4.31 04/03/2025    RBC 4.07 10/08/2017     Lab Results   Component Value Date    HGB 11.0 04/03/2025    HGB 12.1 10/08/2017     Lab Results   Component Value Date    HCT 36.2  "04/03/2025    HCT 34.2 10/08/2017     No components found for: \"MCT\"  Lab Results   Component Value Date    MCV 84 04/03/2025    MCV 84 10/08/2017     Lab Results   Component Value Date    MCH 25.5 04/03/2025    MCH 29.7 10/08/2017     Lab Results   Component Value Date    MCHC 30.4 04/03/2025    MCHC 35.4 10/08/2017     Lab Results   Component Value Date    RDW 17.2 04/03/2025    RDW 13.4 10/08/2017     Lab Results   Component Value Date     04/03/2025     10/08/2017      Lab Results   Component Value Date    A1C 11.5 01/31/2025    A1C 10.0 11/06/2024    A1C 9.2 08/17/2024    A1C 9.4 05/04/2024    A1C >14.0 03/08/2022      TSH   Date Value Ref Range Status   03/09/2022 1.80 0.40 - 4.00 mU/L Final      No results found for: \"VITDT\"                Impression:  Encounter Diagnoses   Name Primary?    Venous insufficiency Yes    Venous hypertension of both lower extremities     Secondary lymphedema     Type 2 diabetes mellitus with diabetic heel ulcer (H)     Right ankle instability     Peripheral vascular disease, unspecified           4/22/2025 left heel    4/22/2025 right heel                 Are any of these ulcers new today: No; Location: na    Assessment/Plan:          1. Debridement: Nursing staff removed the old dressing and cleanse the wound(s) with specified solution. After discussion of risk factors and verbal consent was obtained 2% Lidocaine HCL jelly was applied, under clean conditions, the bilateral heels ulceration(s) were debrided using currette. Devitalized and nonviable tissue, along with any fibrin and slough, was removed to improve granulation tissue formation, stimulate wound healing, decrease overall bacteria load, disrupt biofilm formation and decrease edge senescence.  Total excisional debridement was 9.84 sq cm from the epidermis/dermis area and into the subcutaneous tissue with a depth of 0.1 cm.   Ulcers were improved afterwards and .  Measures were unchanged after " debridement.       2.  Ulcer treatment: ulcer treatment will include irrigation and dressings to promote autolytic debridement which will include:will continue with home care; continue with medihoney; gauze; rolled gauze; change 3 days per week If for some reason the patient is not able to get their dressing(s) changed as outlined above (due to illness, lack of supplies, lack of help) please do the following: remove old, soiled dressings; wash the ulcers with saline; pat dry; apply ABD pad or other absorbant pad and secure with rolled gauze; avoid tape directly on your skin; patient instructed to call the clinic as soon as possible to let us know what the current issues are in receiving ulcer care. Stable            3. Edema: na.            4. Nutrition: having poor diabetes control; last A1c was 11.5% done 1/31/25; working with diabetes educator; working towards getting a pump           5. Offloading: needs to be NWB bilaterally; needs offloading boots; needs to use w/c; wounds will not heal if he does not stay off his feet.          6. Wound Etiology: diabetic     Patient will follow up with me in 4 weeks for reevaluation. They were instructed to call the clinic sooner with any signs or symptoms of infection or any further questions/concerns. Answered all questions.          Marina Fernandez DNP, RN, CNP, CWOCN, CFCN, CLT  [facility]   [phone]        This note was electronically signed by Marina Fernandez NP

## 2025-04-22 NOTE — PATIENT INSTRUCTIONS
"Wound care supplies were ordered today through [employer] and if you are not receiving your supplies or have a question on your bill please contact [facility] [phone]. Please allow 2-5 business days for delivery of supplies. You may get a call from a [phone] # if there are additional information [employer] needs. It is important to  or return their call. PLEASE NOTE: If you need to return your supplies, you MUST call customer service within 15 days of delivery date.     You need to stay off both of your feet at all times; use wheelchair while at home     PRAFO boot to help keep pressure off the wound at all times; if this is not fitting well please go back to the orthotics clinic to see what else they can offer      Wound Care Instructions    Every 3 days Cleanse your bilateral heel wound(s) with Dilute hibiclens 30cc in 500cc NS.    Pat Dry with non-sterile gauze    Primary Dressing: Apply manuka honey into/onto the wounds     Secondary dressing: Cover with gauze    Secure with non-sterile roll gauze (4\" x 75\" roll) and tape (1\" roll tape) as needed; avoid adhesive directly on the skin    Compression: none    It is not ok to get your wound wet in the bath or shower         It is recommended that you do not get your ulcer wet when showering.  Listed below are several ways of keeping it dry when you shower.     1. Wrap it with Press and Seal plastic wrap.  It can be found in the stores where the plastic wraps or tin foil is kept.               2.  Some people take a bath and hang their leg/foot out of the tub.                        3  Put your leg in a plastic bag and tape it on.           4. You can purchase a shower cover for casts at some pharmacies and through the Internet.            5. Take a Bed Bath or wash up at the sink     High Protein Foods  Chicken  -Chicken breast, 3.5oz.-30 grams protein  -Chicken thigh-10 grams(average size)  -Drumstick-11 grams  -Wing- 6 grams  -Chicken meat, cooked, 4 " oz.  Beef  -Hamburger eleni, 4 oz-28 grams protein  -Steak, 6 oz-42 grams  -Most cuts of beef- 7 grams of protein per ounce  Fish  -Most fish fillets or steaks are about 22 grams of protein for 3 1/2 oz(100 grams) of cooked fish, or 6 grams per ounce  -Tuna, 6 oz can-40 grams of protein  Pork  -Pork chop, average-22 grams protein  -Pork loin or tenderloin, 4 oz.-29 grams  -Ham, 3oz serving- 19 grams  -Ground pork 3oz cooked-22 grams  -Ashley, 1 slice-3 grams  -Cheshire-style ashley(black ashley), slice-5-6 grams  Eggs and Dairy  -Egg, large-7 grams  -Milk, 1 cup-8 grams  -Cottage cheese, 1/2 cup-15 grams  -Greek yogurt, 1 cup-usually 8-12 grams, check label  -Soft cheeses (Mozzarella, Brie, Camembert)- 6 grams  -Medium cheeses(cheddar, swiss)- 7 or 8 grams per oz  -Hard cheeses(parmesan)- 10 grams per oz  Beans  -Tofu, 1/2 cup 20 grams  -Tofu, 1 oz., 2.3 grams  -Soy milk, 1 cup-6-10 grams  -Most beans(black, ayon, lentils, etc.) about 7-10 grams protein per half cup of cooked beans  -soy beans, 1/2 cup cooked-14 grams  -Split peas, 1/2 cup cooked- 8 grams  Nuts and Seeds  -Peanut butter, 2 Tablespoons- 8 grams protein  -Almonds, 1/4 cup- 8 grams  -Peanuts, 1/4 cup-9 grams  -Cashews, 1/4 cup- 5 grams  -Pecans, 1/4 cup- 2.5 grams  -Sunflower seeds, 1/4 cup- 6 grams  -Pumpkin seeds, 1/4 cup-8 grams  -Flax seeds- 1/4 cup- 8 grams  Protein Supplements  -Ensure  -Boost  -Glucerna, if diabetic  When you have an open ulcer, your bodies protein needs are much higher, so it is recommended eat good sources of protein       Prevalon Boot          EZ Heelift Boot              Prafo Boot          Foam Ring

## 2025-04-23 ENCOUNTER — VIRTUAL VISIT (OUTPATIENT)
Dept: ONCOLOGY | Facility: CLINIC | Age: 55
End: 2025-04-23
Attending: INTERNAL MEDICINE
Payer: COMMERCIAL

## 2025-04-23 VITALS — WEIGHT: 128 LBS | BODY MASS INDEX: 21.33 KG/M2 | HEIGHT: 65 IN

## 2025-04-23 DIAGNOSIS — D64.9 ANEMIA, UNSPECIFIED TYPE: Primary | ICD-10-CM

## 2025-04-23 PROCEDURE — 1125F AMNT PAIN NOTED PAIN PRSNT: CPT | Performed by: INTERNAL MEDICINE

## 2025-04-23 PROCEDURE — 98005 SYNCH AUDIO-VIDEO EST LOW 20: CPT | Performed by: INTERNAL MEDICINE

## 2025-04-23 ASSESSMENT — PAIN SCALES - GENERAL: PAINLEVEL_OUTOF10: SEVERE PAIN (8)

## 2025-04-23 NOTE — PATIENT INSTRUCTIONS
-Continue Aranesp as per nephrologist.  - Follow-up with PCP and nephrologist.  - See me as needed.

## 2025-04-23 NOTE — LETTER
4/23/2025      Long Mayfield  [address]  [facility] [address]      Dear Colleague,    Thank you for referring your patient, Long Mayfield, to the [facility] [place]. Please see a copy of my visit note below.    Virtual Visit Details    Type of service:  Video Visit   Video Start Time: 1:38 PM  Video End Time:1:47 PM    Originating Location (pt. Location): Home    Distant Location (provider location):  On-site  Platform used for Video Visit: [employer]    HEMATOLOGY HISTORY: Mr. Mayfield is a gentleman with chronic anemia.  -He also has pancreatic insufficiency and diabetes mellitus.       1.  On 10/08/2017, hemoglobin of 12.1.  -On 03/08/2022, hemoglobin of 11.9.  -On 05/03/2024, hemoglobin of 7.0.  Normal MCV.  2.  On 11/05/2024:  -WBC of 6.9, hemoglobin of 7.1 and platelet of 587.  MCV of 75.  RDW elevated.  -CMP reveals creatinine of 1.49.  Normal LFT.  3.  On 05/07/2024:  -EGD revealed moderate gastritis.  Otherwise normal.  -Colonoscopy revealed moderate diverticulosis.  There is external hemorrhoids.  4.  PET scan on 10/23/2024 does not reveal any evidence of malignancy.  There is complex right knee effusion.  There are enlarged right inguinal right iliac chain lymph node secondary to right knee infection/inflammation.  5.  Multiple labs done on 12/12/2024:  -Low hemoglobin of 8.3.  Low MCV of 74.  Normal WBC.  Platelet elevated at 519.  Reticulocyte of 1.3%.  -Creatinine mildly elevated 1.34.  -Mildly elevated copper.  -Mildly low zinc.  -Normal vitamin B12.  -Normal folate.  -Normal LDH.  -Low iron of 38.  Normal iron saturation index of 20%.  Elevated ferritin of 499.  -Normal soluble transferrin receptor.  -SPEP is normal  -Haptoglobin elevated.  -Peripheral blood smear review reveals marked anemia, macrocytic normochromic.     Subjective:  Mr. Mayfield is a 55-year-old gentleman with chronic anemia.  He has had multiple investigations done. Anemia is due to anemia of  chronic disease and renal disease.  Primary bone marrow pathology like MDS cannot be ruled out.    Bone marrow biopsy was recommended.  He has not had it done.    Patient started on Aranesp by nephrologist for anemia due to renal disease.  So far he has received 2 doses.  Anemia is responding.  On 04/03/2025, hemoglobin of 11.0.    His overall condition is stable.  He has mild generalized weakness.  He is able to do activities of daily living. No headache. No dizziness.  No chest pain.  No shortness of breath at rest.  No abdominal pain.  Occasional nausea.  No vomiting.  Appetite is fairly good. No bleeding from any site.       Exam:  He is alert and oriented x 3.  Not in any distress.  Rest of system not examined as this is a video visit.     Labs: CBC and BMP on 04/03/2025 reviewed     Assessment:  1.  A 55-year-old gentleman with chronic anemia.  Anemia has been either normocytic or microcytic.  Anemia is multifactorial from renal disease and anemia of chronic disease.  MDS cannot be ruled out.  2.  Multiple medical problems including chronic kidney disease, pancreatic insufficiency, hypertension and diabetes mellitus.     Plan:  -Continue Aranesp as per nephrologist.  - Follow-up with PCP and nephrologist.  - See me as needed.     Discussion:  1.  Patient's overall condition is stable.  Labs done on 04/03/25 reviewed.  Hemoglobin has improved to 11.0.  Normal WBC, platelet and MCV.    Explained to the patient that anemia is mainly due to renal disease and anemia of chronic disease.  My suspicion for MDS is low as his anemia is responding well to Aranesp which would be expected with renal disease.    For anemia, he will continue on aranesp as per nephrologist.  Explained to the patient that if his hemoglobin stops responding to Aranesp, then I would recommend a bone marrow biopsy to rule out any MDS.  He is agreeable with this plan.    2.  Discussed regarding follow-up.  He will follow-up with his PCP and  nephrologist.  He will return to hematology/oncology clinic if his hemoglobin persistently remains below 10 on Aranesp.  Advised him to call us with any questions or concerns.     Total video visit time of 21 minutes. Time spent in today's visit, review of chart/investigations today and documentation toady.      Again, thank you for allowing me to participate in the care of your patient.        Sincerely,        Prosper Medina MD    Electronically signed

## 2025-04-23 NOTE — LETTER
4/23/2025      Long Mayfield  [address]  [facility] [address]      Dear Colleague,    Thank you for referring your patient, Long Mayfield, to the [facility] [place]. Please see a copy of my visit note below.    Virtual Visit Details    Type of service:  Video Visit   Video Start Time: 1:38 PM  Video End Time:1:47 PM    Originating Location (pt. Location): Home    Distant Location (provider location):  On-site  Platform used for Video Visit: [employer]    HEMATOLOGY HISTORY: Mr. Mayfield is a gentleman with chronic anemia.  -He also has pancreatic insufficiency and diabetes mellitus.       1.  On 10/08/2017, hemoglobin of 12.1.  -On 03/08/2022, hemoglobin of 11.9.  -On 05/03/2024, hemoglobin of 7.0.  Normal MCV.  2.  On 11/05/2024:  -WBC of 6.9, hemoglobin of 7.1 and platelet of 587.  MCV of 75.  RDW elevated.  -CMP reveals creatinine of 1.49.  Normal LFT.  3.  On 05/07/2024:  -EGD revealed moderate gastritis.  Otherwise normal.  -Colonoscopy revealed moderate diverticulosis.  There is external hemorrhoids.  4.  PET scan on 10/23/2024 does not reveal any evidence of malignancy.  There is complex right knee effusion.  There are enlarged right inguinal right iliac chain lymph node secondary to right knee infection/inflammation.  5.  Multiple labs done on 12/12/2024:  -Low hemoglobin of 8.3.  Low MCV of 74.  Normal WBC.  Platelet elevated at 519.  Reticulocyte of 1.3%.  -Creatinine mildly elevated 1.34.  -Mildly elevated copper.  -Mildly low zinc.  -Normal vitamin B12.  -Normal folate.  -Normal LDH.  -Low iron of 38.  Normal iron saturation index of 20%.  Elevated ferritin of 499.  -Normal soluble transferrin receptor.  -SPEP is normal  -Haptoglobin elevated.  -Peripheral blood smear review reveals marked anemia, macrocytic normochromic.     Subjective:  Mr. Mayfield is a 55-year-old gentleman with chronic anemia.  He has had multiple investigations done. Anemia is due to anemia of  chronic disease and renal disease.  Primary bone marrow pathology like MDS cannot be ruled out.    Bone marrow biopsy was recommended.  He has not had it done.    Patient started on Aranesp by nephrologist for anemia due to renal disease.  So far he has received 2 doses.  Anemia is responding.  On 04/03/2025, hemoglobin of 11.0.    His overall condition is stable.  He has mild generalized weakness.  He is able to do activities of daily living. No headache. No dizziness.  No chest pain.  No shortness of breath at rest.  No abdominal pain.  Occasional nausea.  No vomiting.  Appetite is fairly good. No bleeding from any site.       Exam:  He is alert and oriented x 3.  Not in any distress.  Rest of system not examined as this is a video visit.     Labs: CBC and BMP on 04/03/2025 reviewed     Assessment:  1.  A 55-year-old gentleman with chronic anemia.  Anemia has been either normocytic or microcytic.  Anemia is multifactorial from renal disease and anemia of chronic disease.  MDS cannot be ruled out.  2.  Multiple medical problems including chronic kidney disease, pancreatic insufficiency, hypertension and diabetes mellitus.     Plan:  -Continue Aranesp as per nephrologist.  - Follow-up with PCP and nephrologist.  - See me as needed.     Discussion:  1.  Patient's overall condition is stable.  Labs done on 04/03/25 reviewed.  Hemoglobin has improved to 11.0.  Normal WBC, platelet and MCV.    Explained to the patient that anemia is mainly due to renal disease and anemia of chronic disease.  My suspicion for MDS is low as his anemia is responding well to Aranesp which would be expected with renal disease.    For anemia, he will continue on aranesp as per nephrologist.  Explained to the patient that if his hemoglobin stops responding to Aranesp, then I would recommend a bone marrow biopsy to rule out any MDS.  He is agreeable with this plan.    2.  Discussed regarding follow-up.  He will follow-up with his PCP and  nephrologist.  He will return to hematology/oncology clinic if his hemoglobin persistently remains below 10 on Aranesp.  Advised him to call us with any questions or concerns.     Total video visit time of 21 minutes. Time spent in today's visit, review of chart/investigations today and documentation toady.      Again, thank you for allowing me to participate in the care of your patient.        Sincerely,        Prosper Medina MD    Electronically signed

## 2025-04-23 NOTE — PROGRESS NOTES
Virtual Visit Details    Type of service:  Video Visit   Video Start Time: 1:38 PM  Video End Time:1:47 PM    Originating Location (pt. Location): Home    Distant Location (provider location):  On-site  Platform used for Video Visit: [employer]    HEMATOLOGY HISTORY: Mr. Mayfield is a gentleman with chronic anemia.  -He also has pancreatic insufficiency and diabetes mellitus.       1.  On 10/08/2017, hemoglobin of 12.1.  -On 03/08/2022, hemoglobin of 11.9.  -On 05/03/2024, hemoglobin of 7.0.  Normal MCV.  2.  On 11/05/2024:  -WBC of 6.9, hemoglobin of 7.1 and platelet of 587.  MCV of 75.  RDW elevated.  -CMP reveals creatinine of 1.49.  Normal LFT.  3.  On 05/07/2024:  -EGD revealed moderate gastritis.  Otherwise normal.  -Colonoscopy revealed moderate diverticulosis.  There is external hemorrhoids.  4.  PET scan on 10/23/2024 does not reveal any evidence of malignancy.  There is complex right knee effusion.  There are enlarged right inguinal right iliac chain lymph node secondary to right knee infection/inflammation.  5.  Multiple labs done on 12/12/2024:  -Low hemoglobin of 8.3.  Low MCV of 74.  Normal WBC.  Platelet elevated at 519.  Reticulocyte of 1.3%.  -Creatinine mildly elevated 1.34.  -Mildly elevated copper.  -Mildly low zinc.  -Normal vitamin B12.  -Normal folate.  -Normal LDH.  -Low iron of 38.  Normal iron saturation index of 20%.  Elevated ferritin of 499.  -Normal soluble transferrin receptor.  -SPEP is normal  -Haptoglobin elevated.  -Peripheral blood smear review reveals marked anemia, macrocytic normochromic.     Subjective:  Mr. Mayfield is a 55-year-old gentleman with chronic anemia.  He has had multiple investigations done. Anemia is due to anemia of chronic disease and renal disease.  Primary bone marrow pathology like MDS cannot be ruled out.    Bone marrow biopsy was recommended.  He has not had it done.    Patient started on Aranesp by nephrologist for anemia due to renal disease.  So far he has  received 2 doses.  Anemia is responding.  On 04/03/2025, hemoglobin of 11.0.    His overall condition is stable.  He has mild generalized weakness.  He is able to do activities of daily living. No headache. No dizziness.  No chest pain.  No shortness of breath at rest.  No abdominal pain.  Occasional nausea.  No vomiting.  Appetite is fairly good. No bleeding from any site.       Exam:  He is alert and oriented x 3.  Not in any distress.  Rest of system not examined as this is a video visit.     Labs: CBC and BMP on 04/03/2025 reviewed     Assessment:  1.  A 55-year-old gentleman with chronic anemia.  Anemia has been either normocytic or microcytic.  Anemia is multifactorial from renal disease and anemia of chronic disease.  MDS cannot be ruled out.  2.  Multiple medical problems including chronic kidney disease, pancreatic insufficiency, hypertension and diabetes mellitus.     Plan:  -Continue Aranesp as per nephrologist.  - Follow-up with PCP and nephrologist.  - See me as needed.     Discussion:  1.  Patient's overall condition is stable.  Labs done on 04/03/25 reviewed.  Hemoglobin has improved to 11.0.  Normal WBC, platelet and MCV.    Explained to the patient that anemia is mainly due to renal disease and anemia of chronic disease.  My suspicion for MDS is low as his anemia is responding well to Aranesp which would be expected with renal disease.    For anemia, he will continue on aranesp as per nephrologist.  Explained to the patient that if his hemoglobin stops responding to Aranesp, then I would recommend a bone marrow biopsy to rule out any MDS.  He is agreeable with this plan.    2.  Discussed regarding follow-up.  He will follow-up with his PCP and nephrologist.  He will return to hematology/oncology clinic if his hemoglobin persistently remains below 10 on Aranesp.  Advised him to call us with any questions or concerns.     Total video visit time of 21 minutes. Time spent in today's visit, review of  chart/investigations today and documentation toady.

## 2025-04-23 NOTE — NURSING NOTE
Current patient location: [address] [place] [address] N   [facility] [address]    Is the patient currently in the state of [place]? YES    Visit mode: VIDEO    If the visit is dropped, the patient can be reconnected by:VIDEO VISIT: Send to e-mail at: [email or web address]    Will anyone else be joining the visit? NO  (If patient encounters technical issues they should call [phone]    Are changes needed to the allergy or medication list? No    Are refills needed on medications prescribed by this physician? NO    Rooming Documentation:  Questionnaire(s) completed    Reason for visit: GISSELLE YAO

## 2025-04-24 NOTE — ASSESSMENT & PLAN NOTE
4/7/25 Seen by Orthopeics:  Return in June for recheck  Criteria for the surgery require hemoglobin A1c 7.0 or less prior to proceeding with knee replacement surgery.  If he meets criteria in June we will plan for surgery knee replacement later in the summertime.     Follow-up appointment June 5, 2025 with orthopedics.

## 2025-04-24 NOTE — ASSESSMENT & PLAN NOTE
Continues to follow in the vascular clinic.  Upcoming appointment scheduled for 5/20/2025.  States the ulcer is healing nicely.

## 2025-04-24 NOTE — ASSESSMENT & PLAN NOTE
3/21/2025 seen by gastroenterology.  Continue Creon.  Imodium 1 to 2 pills every 6 hours as needed.  Start cholestyramine 1 packet twice daily.  MR enterography.  Follow-up 3 months.    Due to see GI in June.

## 2025-04-24 NOTE — ASSESSMENT & PLAN NOTE
Lab Results   Component Value Date    A1C 11.5 01/31/2025    A1C 10.0 11/06/2024    A1C 9.2 08/17/2024    A1C 9.4 05/04/2024    A1C >14.0 03/08/2022     He is followed by diabetes education.  He has elected to proceed with an insulin pump.  He is on a statin.  Not currently on an ACE or an ARB.    He states he has received the insulin and supplies.  He will contact the Diabetes educator to get insulin pump set up.

## 2025-04-24 NOTE — ASSESSMENT & PLAN NOTE
4/23/25 appointment with hematology:  Continue on aranesp as per nephrologist.  Explained to the patient that if his hemoglobin stops responding to Aranesp, then I would recommend a bone marrow biopsy to rule out any MDS.     Follow-up with his PCP and nephrologist.  Return to hematology/oncology clinic if his hemoglobin persistently remains below 10 on Aranesp.

## 2025-04-24 NOTE — ASSESSMENT & PLAN NOTE
"4/3/2025 appointment with nephrology:  \"s/p kidney biopsy with finding of diabetic nephropathy + suggestion of IgG4 disease.  He is s/p Rituximab x 2 in Dec 2024 .  Suspect that the major cause of his kidney disease is indeed diabetic nephropathy associated with very poorly controlled diabetes.  He is not yet fitted with an insulin pump, however, and his hyperglycemia remains poorly controlled.  I believe that getting his diabetes under control is the key to preserving his kidney function.  His kidney function is generally stable\".    Follow-up appointment with nephrology scheduled for 9/11/2025.  "

## 2025-04-28 PROBLEM — F10.21 ALCOHOL DEPENDENCE IN REMISSION (H): Status: ACTIVE | Noted: 2024-10-29

## 2025-04-28 PROBLEM — G51.0 BELL'S PALSY: Status: RESOLVED | Noted: 2024-08-19 | Resolved: 2025-04-28

## 2025-04-29 ENCOUNTER — OFFICE VISIT (OUTPATIENT)
Dept: FAMILY MEDICINE | Facility: CLINIC | Age: 55
End: 2025-04-29
Payer: COMMERCIAL

## 2025-04-29 VITALS
DIASTOLIC BLOOD PRESSURE: 97 MMHG | OXYGEN SATURATION: 99 % | BODY MASS INDEX: 21.69 KG/M2 | HEART RATE: 78 BPM | SYSTOLIC BLOOD PRESSURE: 156 MMHG | HEIGHT: 65 IN | RESPIRATION RATE: 16 BRPM | TEMPERATURE: 97.6 F | WEIGHT: 130.2 LBS

## 2025-04-29 DIAGNOSIS — L97.509 TYPE 2 DIABETES MELLITUS WITH FOOT ULCER, WITH LONG-TERM CURRENT USE OF INSULIN (H): ICD-10-CM

## 2025-04-29 DIAGNOSIS — E11.621 TYPE 2 DIABETES MELLITUS WITH FOOT ULCER (H): ICD-10-CM

## 2025-04-29 DIAGNOSIS — L97.509 TYPE 2 DIABETES MELLITUS WITH FOOT ULCER (H): ICD-10-CM

## 2025-04-29 DIAGNOSIS — N18.30 ANEMIA OF CHRONIC RENAL FAILURE, STAGE 3 (MODERATE), UNSPECIFIED WHETHER STAGE 3A OR 3B CKD (H): ICD-10-CM

## 2025-04-29 DIAGNOSIS — L97.419 ULCER OF RIGHT HEEL AND MIDFOOT, UNSPECIFIED ULCER STAGE (H): ICD-10-CM

## 2025-04-29 DIAGNOSIS — E11.22 TYPE 2 DIABETES MELLITUS WITH STAGE 3A CHRONIC KIDNEY DISEASE, WITH LONG-TERM CURRENT USE OF INSULIN (H): ICD-10-CM

## 2025-04-29 DIAGNOSIS — R60.0 BILATERAL LOWER EXTREMITY EDEMA: ICD-10-CM

## 2025-04-29 DIAGNOSIS — N18.31 TYPE 2 DIABETES MELLITUS WITH STAGE 3A CHRONIC KIDNEY DISEASE, WITH LONG-TERM CURRENT USE OF INSULIN (H): ICD-10-CM

## 2025-04-29 DIAGNOSIS — D63.1 ANEMIA OF CHRONIC RENAL FAILURE, STAGE 3 (MODERATE), UNSPECIFIED WHETHER STAGE 3A OR 3B CKD (H): ICD-10-CM

## 2025-04-29 DIAGNOSIS — D89.84 IGG4 RELATED DISEASE (H): ICD-10-CM

## 2025-04-29 DIAGNOSIS — F11.20 CONTINUOUS OPIOID DEPENDENCE (H): ICD-10-CM

## 2025-04-29 DIAGNOSIS — D64.9 ANEMIA, UNSPECIFIED TYPE: ICD-10-CM

## 2025-04-29 DIAGNOSIS — I10 PRIMARY HYPERTENSION: ICD-10-CM

## 2025-04-29 DIAGNOSIS — K86.0 ALCOHOL-INDUCED CHRONIC PANCREATITIS (H): ICD-10-CM

## 2025-04-29 DIAGNOSIS — E11.621 TYPE 2 DIABETES MELLITUS WITH FOOT ULCER, WITH LONG-TERM CURRENT USE OF INSULIN (H): ICD-10-CM

## 2025-04-29 DIAGNOSIS — F10.21 ALCOHOL DEPENDENCE IN REMISSION (H): Primary | ICD-10-CM

## 2025-04-29 DIAGNOSIS — Z79.4 TYPE 2 DIABETES MELLITUS WITH FOOT ULCER, WITH LONG-TERM CURRENT USE OF INSULIN (H): ICD-10-CM

## 2025-04-29 DIAGNOSIS — N18.31 STAGE 3A CHRONIC KIDNEY DISEASE (H): ICD-10-CM

## 2025-04-29 DIAGNOSIS — Z79.4 TYPE 2 DIABETES MELLITUS WITH STAGE 3A CHRONIC KIDNEY DISEASE, WITH LONG-TERM CURRENT USE OF INSULIN (H): ICD-10-CM

## 2025-04-29 DIAGNOSIS — Z79.4 LONG TERM (CURRENT) USE OF INSULIN (H): ICD-10-CM

## 2025-04-29 LAB
EST. AVERAGE GLUCOSE BLD GHB EST-MCNC: 258 MG/DL
HBA1C MFR BLD: 10.6 % (ref 0–5.6)
HCT VFR BLD AUTO: 32.5 % (ref 40–53)
HGB BLD-MCNC: 10.3 G/DL (ref 13.3–17.7)

## 2025-04-29 PROCEDURE — 3077F SYST BP >= 140 MM HG: CPT | Performed by: PHYSICIAN ASSISTANT

## 2025-04-29 PROCEDURE — 83036 HEMOGLOBIN GLYCOSYLATED A1C: CPT | Performed by: PHYSICIAN ASSISTANT

## 2025-04-29 PROCEDURE — 36415 COLL VENOUS BLD VENIPUNCTURE: CPT | Performed by: PHYSICIAN ASSISTANT

## 2025-04-29 PROCEDURE — 85014 HEMATOCRIT: CPT | Performed by: PHYSICIAN ASSISTANT

## 2025-04-29 PROCEDURE — G2211 COMPLEX E/M VISIT ADD ON: HCPCS | Performed by: PHYSICIAN ASSISTANT

## 2025-04-29 PROCEDURE — 99214 OFFICE O/P EST MOD 30 MIN: CPT | Performed by: PHYSICIAN ASSISTANT

## 2025-04-29 PROCEDURE — 85018 HEMOGLOBIN: CPT | Performed by: PHYSICIAN ASSISTANT

## 2025-04-29 PROCEDURE — 3080F DIAST BP >= 90 MM HG: CPT | Performed by: PHYSICIAN ASSISTANT

## 2025-04-29 RX ORDER — LOTILANER OPHTHALMIC SOLUTION 2.5 MG/ML
SOLUTION/ DROPS OPHTHALMIC
COMMUNITY
Start: 2025-04-28

## 2025-04-29 NOTE — ASSESSMENT & PLAN NOTE
Uses oxycodone 5 mg as needed every 6 hours.  Some days he does not use any.  Some days he uses 203 tabs a day.  Overall, 30 tabs last one month.  We are hopeful that following knee replacement he will no longer need oxycodone.      He is interested in Medical Cannibis.  He was certified today on the [place] Registry.  Out goal is to wean off oxycodone.  Follow up one month.

## 2025-04-29 NOTE — ASSESSMENT & PLAN NOTE
Continues to follow in Vascular clinic.  Ulcer is healing nicely per patient report.  Upcoming appointment 5/20/25 with Vascular clinic.

## 2025-04-29 NOTE — PROGRESS NOTES
The longitudinal plan of care for the diagnosis(es)/condition(s) as documented were addressed during this visit. Due to the added complexity in care, I will continue to support Long in the subsequent management and with ongoing continuity of care.    Assessment & Plan   Problem List Items Addressed This Visit       Anemia, unspecified type     4/23/25 appointment with hematology:  Continue on aranesp as per nephrologist.  Explained to the patient that if his hemoglobin stops responding to Aranesp, then I would recommend a bone marrow biopsy to rule out any MDS.     Follow-up with his PCP and nephrologist.  Return to hematology/oncology clinic if his hemoglobin persistently remains below 10 on Aranesp.            Primary hypertension     Blood pressure currently managed with amlodipine and bumetanide.  BP is elevated today.               Type 2 diabetes mellitus with kidney complication, with long-term current use of insulin (H)     Lab Results   Component Value Date    A1C 11.5 01/31/2025    A1C 10.0 11/06/2024    A1C 9.2 08/17/2024    A1C 9.4 05/04/2024    A1C >14.0 03/08/2022     He is followed by diabetes education.  He has elected to proceed with an insulin pump.  He is on a statin.  Not currently on an ACE or an ARB.    He states he has received the insulin and supplies.  He will contact the Diabetes educator to get insulin pump set up.           Chronic pancreatitis (H)     3/21/2025 seen by gastroenterology.  Continue Creon.  Imodium 1 to 2 pills every 6 hours as needed.  Start cholestyramine 1 packet twice daily.  MR enterography.  Follow-up 3 months.    Due to see GI in June.          Alcohol dependence in remission (H) - Primary     Remains in sobriety.         IgG4 related disease (H)     4/7/25 Seen by Orthopeics:  Return in June for recheck  Criteria for the surgery require hemoglobin A1c 7.0 or less prior to proceeding with knee replacement surgery.  If he meets criteria in June we will plan for  "surgery knee replacement later in the summertime.     Follow-up appointment June 5, 2025 with orthopedics.         Bilateral lower extremity edema    Ulcer of right heel and midfoot (H)     Continues to follow in Vascular clinic.  Ulcer is healing nicely per patient report.  Upcoming appointment 5/20/25 with Vascular clinic.         Long term (current) use of insulin (H)     Continues to follow with diabetes education and endocrinology. He is on Lantus and NovoLog insulin.          Stage 3a chronic kidney disease (H)     4/3/2025 appointment with nephrology:  \"s/p kidney biopsy with finding of diabetic nephropathy + suggestion of IgG4 disease.  He is s/p Rituximab x 2 in Dec 2024 .  Suspect that the major cause of his kidney disease is indeed diabetic nephropathy associated with very poorly controlled diabetes.  He is not yet fitted with an insulin pump, however, and his hyperglycemia remains poorly controlled.  I believe that getting his diabetes under control is the key to preserving his kidney function.  His kidney function is generally stable\".    Follow-up appointment with nephrology scheduled for 9/11/2025.         Type 2 diabetes mellitus with foot ulcer (H)     Continues to follow in the vascular clinic.  Upcoming appointment scheduled for 5/20/2025.  States the ulcer is healing nicely.           Continuous opioid dependence (H)     Uses oxycodone 5 mg as needed every 6 hours.  Some days he does not use any.  Some days he uses 203 tabs a day.  Overall, 30 tabs last one month.  We are hopeful that following knee replacement he will no longer need oxycodone.      He is interested in Medical Cannibis.  He was certified today on the [place] Registry.  Out goal is to wean off oxycodone.  Follow up one month.                      Shamir Alves is a 55 year old, presenting for the following health issues:  Follow Up        4/29/2025     1:57 PM   Additional Questions   Roomed by Alexei Madera MA   Accompanied " "by Self         4/29/2025     1:57 PM   Patient Reported Additional Medications   Patient reports taking the following new medications None     History of Present Illness       Reason for visit:  Checkn in    He eats 0-1 servings of fruits and vegetables daily.He consumes 1 sweetened beverage(s) daily.He exercises with enough effort to increase his heart rate 9 or less minutes per day.  He exercises with enough effort to increase his heart rate 3 or less days per week.   He is taking medications regularly.            Objective    BP (!) 161/99 (BP Location: Left arm, Patient Position: Sitting, Cuff Size: Adult Regular)   Pulse 78   Temp 97.6  F (36.4  C) (Oral)   Resp 16   Ht 1.651 m (5' 5\")   Wt 60 kg (132 lb 3.2 oz)   SpO2 99%   BMI 22.00 kg/m    Body mass index is 22 kg/m .  Physical Exam  Vitals and nursing note reviewed.   Constitutional:       Appearance: Normal appearance.   Neurological:      Mental Status: He is alert.   Psychiatric:         Mood and Affect: Mood normal.         Behavior: Behavior normal.         Thought Content: Thought content normal.         Judgment: Judgment normal.            Signed Electronically by: Rena Blair PA-C    "

## 2025-04-30 ENCOUNTER — PATIENT OUTREACH (OUTPATIENT)
Dept: CARE COORDINATION | Facility: CLINIC | Age: 55
End: 2025-04-30
Payer: COMMERCIAL

## 2025-04-30 NOTE — PROGRESS NOTES
Anemia Management Note - Follow Up      SUBJECTIVE/OBJECTIVE:    Referred by Dr. Jordy Chinchilla  on 2024  Primary Diagnosis: Anemia in Chronic Kidney Disease (N18.3, D63.1)     Secondary Diagnosis: Chronic Kidney Disease, Stage 3 (N18.3)   Hgb goal range: 9-10     Epo/Darbo: Aranesp 40 mcg  every two weeks for Hgb <10.0 At home  Iron regimen: Ferrous gluconate 325mg every other day  [id number]: oral iron every other day  *24: Pt states Dr. Chinchilla said his Iron was ok. Will recheck labs in a month.      Labs : 2025  RX/TX plans : 2025     Recent DIAN use, transfusion, IV iron: NA  No history of stroke, MI, and blood clots or cancers. Hx of HTN.      Contact: No Consent to Communicate On File.         Latest Ref Rng & Units 2025 2025 3/4/2025 3/5/2025 3/20/2025 4/3/2025 2025   Anemia   HGB Goal     9 - 10 9 - 10     DIAN Dose   40mcg  40mcg 40mcg     Hemoglobin 13.3 - 17.7 g/dL 9.5   9.1   9.4  11.0  10.3    TSAT 15 - 46 %   11        Ferritin 31 - 409 ng/mL   211          BP Readings from Last 3 Encounters:   25 (!) 156/97   25 104/56   25 127/69     Wt Readings from Last 2 Encounters:   25 59.1 kg (130 lb 3.2 oz)   25 58.1 kg (128 lb)         ASSESSMENT:    Hgb:Above goal - recommend hold dose  TSat: Due for labs Ferritin: Due for labs   Goals Addressed    None         PLAN:  Hold Aranesp.  RTC for hgb then Aranesp if needed in 2-3 week(s).    Orders needed to be renewed (for next follow-up date) in EPIC: None    Iron labs due:  DUE    Plan discussed with:  no call, chart reviewed      NEXT FOLLOW-UP DATE:  514    Carrie Leopold, RN BSN  Anemia Services  [facility]  [email or web address]  Office: [phone]  Fax [phone]

## 2025-05-07 ENCOUNTER — TELEPHONE (OUTPATIENT)
Dept: FAMILY MEDICINE | Facility: CLINIC | Age: 55
End: 2025-05-07
Payer: COMMERCIAL

## 2025-05-07 NOTE — TELEPHONE ENCOUNTER
Forms/Letter Request    Type of form/letter: OTHER: Information regarding PA       Do we have the form/letter: Yes: It is being faxed    Who is the form from? [facility] (if other please explain)    Where did/will the form come from? form was faxed in    When is form/letter needed by: ASAP    How would you like the form/letter returned: Fax : [phone]    Patient Notified form requests are processed in 5-7 business days:Yes        Faxed on 5/5/25   [facility] Peg TAVERAS rep    Fax back to [phone]

## 2025-05-07 NOTE — TELEPHONE ENCOUNTER
Patient Quality Outreach    Patient is due for the following:   Chronic Opioid Use -  Treatment Agreement (CSA) nothing is needed at this time    Action(s) Taken:   No follow up needed at this time.    Type of outreach:    Patient requests NO further contact/outreach    Questions for provider review:    None         Tyra Galarza MA  Chart routed to None.

## 2025-05-12 ENCOUNTER — PATIENT OUTREACH (OUTPATIENT)
Dept: CARE COORDINATION | Facility: CLINIC | Age: 55
End: 2025-05-12
Payer: COMMERCIAL

## 2025-05-12 PROBLEM — Z90.89 ACQUIRED ABSENCE OF ORGAN: Status: ACTIVE | Noted: 2024-01-12

## 2025-05-12 PROBLEM — F33.0 MILD RECURRENT MAJOR DEPRESSION: Status: ACTIVE | Noted: 2024-08-19

## 2025-05-12 PROBLEM — M00.80: Status: ACTIVE | Noted: 2024-08-19

## 2025-05-12 PROBLEM — R26.9 ABNORMAL GAIT: Status: ACTIVE | Noted: 2024-01-12

## 2025-05-12 PROBLEM — N18.9 CHRONIC KIDNEY DISEASE: Status: ACTIVE | Noted: 2024-01-11

## 2025-05-12 RX ORDER — ATORVASTATIN CALCIUM 10 MG/1
TABLET, FILM COATED ORAL
COMMUNITY
Start: 2025-04-26

## 2025-05-12 NOTE — PROGRESS NOTES
Clinic Care Coordination Contact  [facility]/Voicemail    Clinical Data: Care Coordinator Outreach    Outreach Documentation Number of Outreach Attempt   5/12/2025   9:33 AM 1     Left message on patient's voicemail with call back information and requested return call.    Plan: Care Coordinator will try to reach patient again in 10 business days.    Rosa Grimaldo  [facility] Health Worker  [facility] Care Coordination   [place], [facility], [facility]  Office: [phone]

## 2025-05-13 NOTE — TELEPHONE ENCOUNTER
Form faxed to [employer]. Fax confirmation received. Form placed in forms bin at  until scanned into chart.

## 2025-05-14 ENCOUNTER — PATIENT OUTREACH (OUTPATIENT)
Dept: CARE COORDINATION | Facility: CLINIC | Age: 55
End: 2025-05-14
Payer: COMMERCIAL

## 2025-05-14 NOTE — PROGRESS NOTES
Anemia Management Note - Reminder     Follow-up with anemia management service:    Long is due for anemia labs. LVM to see when he may be going in for labs again. Asked him to call back.        Latest Ref Rng & Units 1/31/2025 2/5/2025 3/4/2025 3/5/2025 3/20/2025 4/3/2025 4/29/2025   Anemia   HGB Goal     9 - 10  9 - 10     DIAN Dose   40mcg  40mcg 40mcg     Hemoglobin 13.3 - 17.7 g/dL 9.5   9.1   9.4  11.0  10.3    TSAT 15 - 46 %   11        Ferritin 31 - 409 ng/mL   211            Data saved with a previous flowsheet row definition         Follow-up call date: 052125    Carrie Leopold, RN BSN  Anemia Services  [facility]  [email or web address]  Office: [phone]  Fax [phone]

## 2025-05-20 ENCOUNTER — OFFICE VISIT (OUTPATIENT)
Dept: VASCULAR SURGERY | Facility: CLINIC | Age: 55
End: 2025-05-20
Attending: NURSE PRACTITIONER
Payer: COMMERCIAL

## 2025-05-20 VITALS — DIASTOLIC BLOOD PRESSURE: 69 MMHG | HEART RATE: 74 BPM | OXYGEN SATURATION: 99 % | SYSTOLIC BLOOD PRESSURE: 134 MMHG

## 2025-05-20 DIAGNOSIS — T23.231A 2ND DEGREE BURN OF MULTIPLE FINGERS OF RIGHT HAND NOT INCLUDING THUMB, INITIAL ENCOUNTER: ICD-10-CM

## 2025-05-20 DIAGNOSIS — L97.409 TYPE 2 DIABETES MELLITUS WITH DIABETIC HEEL ULCER (H): ICD-10-CM

## 2025-05-20 DIAGNOSIS — I87.303 VENOUS HYPERTENSION OF BOTH LOWER EXTREMITIES: ICD-10-CM

## 2025-05-20 DIAGNOSIS — I89.0 SECONDARY LYMPHEDEMA: ICD-10-CM

## 2025-05-20 DIAGNOSIS — T24.111A SUPERFICIAL BURN OF RIGHT THIGH, INITIAL ENCOUNTER: ICD-10-CM

## 2025-05-20 DIAGNOSIS — I73.9 PERIPHERAL VASCULAR DISEASE, UNSPECIFIED: ICD-10-CM

## 2025-05-20 DIAGNOSIS — E11.621 TYPE 2 DIABETES MELLITUS WITH DIABETIC HEEL ULCER (H): ICD-10-CM

## 2025-05-20 DIAGNOSIS — I87.2 VENOUS INSUFFICIENCY: Primary | ICD-10-CM

## 2025-05-20 DIAGNOSIS — M25.371 RIGHT ANKLE INSTABILITY: ICD-10-CM

## 2025-05-20 PROCEDURE — 1126F AMNT PAIN NOTED NONE PRSNT: CPT | Performed by: NURSE PRACTITIONER

## 2025-05-20 PROCEDURE — 99213 OFFICE O/P EST LOW 20 MIN: CPT | Mod: 25 | Performed by: NURSE PRACTITIONER

## 2025-05-20 PROCEDURE — 11042 DBRDMT SUBQ TIS 1ST 20SQCM/<: CPT | Performed by: NURSE PRACTITIONER

## 2025-05-20 PROCEDURE — 3078F DIAST BP <80 MM HG: CPT | Performed by: NURSE PRACTITIONER

## 2025-05-20 PROCEDURE — 3075F SYST BP GE 130 - 139MM HG: CPT | Performed by: NURSE PRACTITIONER

## 2025-05-20 ASSESSMENT — PAIN SCALES - GENERAL: PAINLEVEL_OUTOF10: NO PAIN (0)

## 2025-05-20 NOTE — PROGRESS NOTES
Follow up Vascular Visit       Date of Service:05/20/25      Chief Complaint: bilateral heel ulcers      Pt returns to [facility] with regards to their bilateral heel ulcers.  They arrive today alone; he walked to the exam room; he is supposed to be NWB bilateral feet; he states he only walks for appt; otherwise he is at home. They are currently using manuka honey; gauze; rolled gauze to the wounds. This is being done by the patient every 2 days and prn. They are using nothing for compression. They are feeling well today. Denies fevers, chills. No shortness of breath.     Allergies:   Allergies   Allergen Reactions    Vancomycin      Other Reaction(s): Renal Failure    Vancomycin-induced nephrotoxicity (11/2023, outside hospital)    Seasonal Allergies      HAYFEVER:    -Watery Eyes  -Eye burn    Daptomycin Rash     Likely delayed hypersensitivity reaction to daptomycin 11/2023       Medications:   Current Outpatient Medications:     ACCU-CHEK GUIDE TEST test strip, , Disp: , Rfl:     amLODIPine (NORVASC) 5 MG tablet, Take 1 tablet (5 mg) by mouth daily., Disp: 90 tablet, Rfl: 3    atorvastatin (LIPITOR) 10 MG tablet, , Disp: , Rfl:     atorvastatin (LIPITOR) 40 MG tablet, , Disp: , Rfl:     bumetanide (BUMEX) 1 MG tablet, Take 1 tablet (1 mg) by mouth daily., Disp: 90 tablet, Rfl: 0    cetirizine (ZYRTEC) 10 MG tablet, Take 1 tablet (10 mg) by mouth daily. (Patient taking differently: Take 10 mg by mouth as needed.), Disp: 30 tablet, Rfl: 11    cholestyramine light (QUESTRAN) 4 GM packet, Take 1 packet by mouth every 12 hours., Disp: , Rfl:     Continuous Glucose Sensor (DEXCOM G7 SENSOR) MISC, Change every 10 days., Disp: 3 each, Rfl: 3    Continuous Glucose Transmitter (DEXCOM G6 TRANSMITTER) MISC, , Disp: , Rfl:     darbepoetin sunshine-polysorbate (ARANESP) 40 MCG/0.4ML injection, Inject 0.4 mLs (40 mcg) subcutaneously every 14 days. Do not shake. Administer for Hgb <10.0. HOLD dose if  "systolic BP >180., Disp: 0.8 mL, Rfl: 11    ferrous gluconate (FERGON) 324 (38 Fe) MG tablet, Take 1 tablet (324 mg) by mouth every other day., Disp: 45 tablet, Rfl: 3    gabapentin (NEURONTIN) 300 MG capsule, TAKE 1 CAPSULE(300 MG) BY MOUTH TWICE DAILY, Disp: 60 capsule, Rfl: 2    Glucagon, rDNA, (GLUCAGON EMERGENCY) 1 MG KIT, Inject 1 mg into the muscle as needed., Disp: , Rfl:     glucose 40 % (400 mg/mL) gel, Take by mouth every hour as needed., Disp: , Rfl:     insulin aspart (NOVOLOG FLEXPEN) 100 UNIT/ML pen, Inject 1-5 Units subcutaneously 3 times daily (with meals). In addition to 6-8 units with each meal, inject additional units as per this sliding scale: If 200-250 = 1  251-300 = 2  301-350 = 3 351-400 = 4 401+ = 5 Repeat BS after 3 hours and call MD if still over 400, Disp: , Rfl:     Insulin Disposable Pump (OMNIPOD 5 G7 INTRO, GEN 5,) KIT, 1 each daily., Disp: 1 kit, Rfl: 0    Insulin Disposable Pump (OMNIPOD 5 G7 PODS, GEN 5,) MISC, 1 each every 3 days., Disp: 30 each, Rfl: 3    insulin glargine (LANTUS PEN) 100 UNIT/ML pen, Currently at 20 units will be increasing up to max dose 30 units, Disp: 30 mL, Rfl: 2    insulin NPH (HUMULIN N VIAL) 100 UNIT/ML vial, INJECT 13 UNITS AT THE START OF YOUR STEROID INFUSION AT CLINIC WHICH IS SCHEDULED FOR 12/4/24, Disp: , Rfl:     insulin pen needle (31G X 6 MM) 31G X 6 MM miscellaneous, Use 4 pen needles daily or as directed., Disp: 100 each, Rfl: 11    insulin syringe-needle U-100 (29G X 1/2\" 0.5 ML) 29G X 1/2\" 0.5 ML miscellaneous, Use one syringe as directed prior to steroid infusion., Disp: 10 each, Rfl: 0    lipase-protease-amylase (CREON 36) 96362-306124-733298 units CPEP, Take 3 capsules by mouth 3 times daily (with meals). 0730, 1130, 1630, Disp: , Rfl:     loperamide (IMODIUM) 2 MG capsule, Take 4 mg by mouth every 8 hours as needed for diarrhea., Disp: , Rfl:     methocarbamol (ROBAXIN) 500 MG tablet, Take 1 tablet (500 mg) by mouth every 8 hours as " needed for muscle spasms., Disp: 30 tablet, Rfl: 3    naloxone (NARCAN) 4 MG/0.1ML nasal spray, Spray 1 spray (4 mg) into one nostril alternating nostrils once as needed for opioid reversal. every 2-3 minutes until assistance arrives, Disp: 0.2 mL, Rfl: 0    oxyCODONE (ROXICODONE) 5 MG tablet, Take 1 tablet (5 mg) by mouth every 6 hours as needed for pain., Disp: 30 tablet, Rfl: 0    polyethylene glycol (MIRALAX) 17 GM/Dose powder, , Disp: , Rfl:     silver sulfADIAZINE (SILVADENE) 1 % external cream, Apply topically 2 times daily., Disp: 400 g, Rfl: 2    sulfamethoxazole-trimethoprim (BACTRIM) 400-80 MG tablet, Take 1 tablet by mouth daily., Disp: 90 tablet, Rfl: 0    sulfamethoxazole-trimethoprim (BACTRIM) 400-80 MG tablet, Take 1 tablet by mouth daily., Disp: 90 tablet, Rfl: 5    triamcinolone (KENALOG) 0.1 % external ointment, Apply topically., Disp: , Rfl:     XDEMVY 0.25 % SOLN, , Disp: , Rfl:     Current Facility-Administered Medications:     lidocaine (XYLOCAINE) 5 % ointment, , Topical, Daily PRN, Marina Fernandez, NP, Given at 01/15/25 1354    History:   Past Medical History:   Diagnosis Date    Abnormal CXR 10/15/2024    Previous CT of the chest completed at [facility] in December 2023 demonstrated:   Impression:   1. Severe anasarca with moderate to large pleural effusions and a small pericardial effusion. Diffuse septal thickening in the lungs concerning for pulmonary edema. Anemic cardiac blood pool.   2. Solitary pulmonary nodule in the left lower lobe with an average diameter of 10 mm is indeterminat    Abnormal gait 01/12/2024    Abnormal LFTs 01/03/2025    Acidosis 01/08/2020    Acquired absence of organ 01/12/2024    Acquired absence of other right toe(s) 01/12/2024    Acute metabolic encephalopathy 09/17/2024    Acute pain of right knee 07/12/2024    Acute pulmonary edema (H) 01/11/2024    Acute renal insufficiency 05/03/2024    Last Comprehensive Metabolic Panel:          Lab Results       Component    Value    Date           NA    131 (L)    10/15/2024           POTASSIUM    4.0    10/15/2024           CHLORIDE    98    10/15/2024           CO2    24    10/15/2024           ANIONGAP    9    10/15/2024           GLC    133 (A)    10/23/2024           BUN    22.3 (H)    10/15/2024           CR    1.35 (H)    10/15/2024        Alcohol abuse 09/12/2022    Alcohol dependence in remission (H) 10/29/2024    Allergic rhinitis     Anemia     Anemia, unspecified type 05/03/2024    He has a history of anemia which has been noted since May 2024.  Cause has not been identified.  Additional evaluation with CBC, retake count, TSH, haptoglobin, iron studies, LDH, ferritin, B12 and folate.  Ferritin elevated 418, iron low at 20, iron binding capacity low at 2030, iron saturation low at 9.  Normal B12 and folate.  Awaiting haptoglobin and reticulocyte count.  Denies blood in stool.    Anxiety disorder, unspecified 01/11/2024    Arthritis     Arthritis due to other bacteria, right knee (H) 08/19/2024    Atherosclerotic heart disease of native coronary artery without angina pectoris 01/11/2024    Bacterial arthritis (H) 08/19/2024    Bell's palsy     Bilateral lower extremity edema 12/18/2024    Cardiac calcification (H) 01/19/2023    Treatment:  Colestid 1 g tablet.  Aspirin 81 mg daily.      Cervical disc disease with myelopathy 05/01/2022    Formatting of this note might be different from the original.   seen spine specialist- [facility]  Formatting of this note might be different from the original.   seen spine specialist      Cervical radiculopathy 07/11/2024    Cervicalgia     Change or removal of drains 08/26/2024    Chronic bilateral low back pain with bilateral sciatica 04/08/2022    Chronic diarrhea 03/08/2022    Chronic kidney disease 01/11/2024    Chronic kidney disease, unspecified 01/11/2024    Chronic pancreatitis (H) 09/06/2024    Due to continued right knee swelling and history of chronic  pancreatitis with chronic diarrhea, he was referred to GI to review possible Whipple's disease.     10/18/2024 seen by [facility].  We do not have those records and will request those records to be faxed over.  Once I get those records I will review them with recommendations.  Per patient he is to follow-up in 6 months.      Diabetes (H)     Diabetic foot ulcer (H)     Difficulty in walking, not elsewhere classified 07/19/2024    Encounter to establish care 09/06/2024    Exocrine pancreatic insufficiency 05/03/2024    He is followed by gastroenterology at [facility].  Known pancreatic atrophy with calcification and low pancreatic elastase levels in the setting of history of chronic alcohol use.  Currently managed with loperamide and Creon.  He did not take the Colestid due to difficulty swallowing the pill.       3/21/2025 seen by gastroenterology.  Continue Creon.  Imodium 1 to 2 pills every 6 hours as neede    Generalized edema     GERD (gastroesophageal reflux disease) 05/03/2024    High risk medication use 02/11/2025 12/24:  On rituxan for IG4 related disease.      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  Started rituximab infusions 12/4/2024.      Ketosis (H) 03/08/2022    Long term (current) use of insulin (H) 08/19/2024    Major depressive disorder, recurrent episode, mild     Major depressive disorder, recurrent, mild 08/19/2024    Mild recurrent major depression 08/19/2024    Muscle wasting and atrophy, not elsewhere classified, multiple sites 04/01/2024    Muscle weakness (generalized) 01/12/2024    Need for assistance with personal care 01/12/2024    Osteomyelitis of great toe of right foot (H) 11/22/2023    Other abnormalities of gait and mobility 01/12/2024    Other acute osteomyelitis, right ankle  "and foot (H)     Other chronic pancreatitis (H)     Other fatigue 11/14/2024    Other hyperlipidemia 03/15/2022    Other polyosteoarthritis     Peripheral vascular disease, unspecified 08/19/2024    Pneumonia of left lower lobe due to infectious organism 09/11/2024    Preventative health care 03/03/2025    Colon cancer screen:     Due May 2034.  Immunizations: Due for DTaP, hepatitis A and COVID-vaccine.  Lipids: Due for lipids.  Glucose: A1c due April 2025.  Dexa: Not indicated.      Primary hypertension 10/09/2019    Pyogenic arthritis of right knee joint, due to unspecified organism (H) 07/11/2024    Right knee inflammatory arthritis, etiology to be determined, s/p I&D Jul 11, Aug 18, 2024.         Brucella, Bartonella, Q fever, CD4, Ig levels.     MRI right knee with and without contrast if negative.     Rheumatology and GI consult.     Complete oral ciprofloxacin course.      Admitted to [facility] 8/16 to 8/19/2024.  \"Has been seen a handful of times and outpatient follow-up with persistent right    Right knee pain 07/11/2024    S/P transmetatarsal amputation of foot, right (H) 12/01/2023     s/p transmetatarsal amputation, right foot (DOS 1/9/24)      Severe episode of recurrent major depressive disorder, without psychotic features (H) 01/19/2023    Solitary pulmonary nodule     Stage 3a chronic kidney disease (H) 01/11/2024    Systemic vasculitis (H) 11/19/2024    Type 2 diabetes mellitus with foot ulcer (H) 01/11/2024    Type 2 diabetes mellitus with hyperosmolarity without coma, with long-term current use of insulin (H) 10/29/2024    Type 2 diabetes mellitus with kidney complication, with long-term current use of insulin (H) 10/05/2018    Seen by diabetes educator 10/29/2024:   PLAN  Lantus: 12 units  Set dose Novolog at meals: 3 units each + Correction Scale below     Pre-Meal Correction Scale:        If pre-meal glucose is:    Add extra Humalog/Novolog to your meal dose per below chart:      151 - 200    +1 " unit      201 - 250    +2 units      251 - 300    +3 units      301 - 350    +4 units      351 - 400     +5 units      Over     Ulcer of right heel and midfoot (H) 12/23/2024    Unintentional weight loss 10/11/2023    Vitamin D deficiency, unspecified 01/11/2024       Physical Exam:    /69 (BP Location: Left arm)   Pulse 74   SpO2 99%     General:  Patient presents to clinic in no apparent distress.  Head: normocephalic atraumatic  Psychiatric:  Alert and oriented x3.   Respiratory: unlabored breathing; no cough  Integumentary:  Skin is uniformly warm, dry and pink.    Ulcer #1 Location: right hand  multiple burns to all fingers excluding thumb see measures below.  No sinus tract present, Wound base: slough  no undermining present. Ulcer is full thickness. There is moderate drainage. Periwound: no denudement, erythema, induration, maceration or warmth.      Ulcer #2 Location: right heel  1.3x1.x4x0.1cm  No sinus tract present, Wound base: slough  no undermining present. Ulcer is full thickness. There is moderate drainage. Periwound: no denudement, erythema, induration, maceration or warmth.      Right thigh eschar; this was removed and essentially healed    Left heel is intact but fragile    VASC Wound Right heel (Active)   Pre Size Length 1.3 05/20/25 1443   Pre Size Width 1.4 05/20/25 1443   Pre Size Depth 0.1 05/20/25 1443   Pre Total Sq cm 1.82 05/20/25 1443   Post Size Length 6 02/25/25 1400   Post Size Width 4 02/25/25 1400   Post Size Depth 0.1 02/25/25 1400   Post Total Sq cm 24 02/25/25 1400   Number of days: 125       VASC Wound Left hand (Active)   Pre Size Length 0 04/02/25 1300   Pre Size Width 0 04/02/25 1300   Pre Size Depth 0 04/02/25 1300   Pre Total Sq cm 0 04/02/25 1300   Post Size Length 1 02/25/25 1400   Post Size Width 3 02/25/25 1400   Post Size Depth 0.1 02/25/25 1400   Post Total Sq cm 3 02/25/25 1400   Description healed 05/20/25 1443   Number of days: 125       VASC Wound Left heel  "(Active)   Pre Size Length 0.8 04/22/25 1400   Pre Size Width 0.4 04/22/25 1400   Pre Size Depth 0.1 04/22/25 1400   Pre Total Sq cm 0.32 04/22/25 1400   Description scab 05/20/25 1443   Number of days: 48       VASC Wound R dorsal thigh (Active)   Pre Size Length 1.4 05/20/25 1443   Pre Size Width 1.4 05/20/25 1443   Pre Size Depth 0.1 05/20/25 1443   Pre Total Sq cm 1.96 05/20/25 1443   Description scab 05/20/25 1443   Number of days: 0       VASC Wound R pointer finger (Active)   Pre Size Length 3.5 05/20/25 1443   Pre Size Width 1.2 05/20/25 1443   Pre Size Depth 0.1 05/20/25 1443   Pre Total Sq cm 4.2 05/20/25 1443   Description burn 05/20/25 1443   Number of days: 0       VASC Wound R middle finger (Active)   Pre Size Length 5.2 05/20/25 1443   Pre Size Width 1.2 05/20/25 1443   Pre Size Depth 0.1 05/20/25 1443   Pre Total Sq cm 6.24 05/20/25 1443   Description burn 05/20/25 1443   Number of days: 0       VASC Wound R ring finger (Active)   Pre Size Length 2.1 05/20/25 1443   Pre Size Width 0.5 05/20/25 1443   Pre Size Depth 0.1 05/20/25 1443   Pre Total Sq cm 1.05 05/20/25 1443   Description burn 05/20/25 1443   Number of days: 0       VASC Wound R pinky finger (Active)   Pre Size Length 1.2 05/20/25 1443   Pre Size Width 0.4 05/20/25 1443   Pre Size Depth 0.1 05/20/25 1443   Pre Total Sq cm 0.48 05/20/25 1443   Description burn 05/20/25 1443   Number of days: 0       Incision/Surgical Site 08/17/24 Anterior;Right Knee (Active)   Number of days: 276            Circumferential volume measures:          1/15/2025     1:00 PM 1/29/2025     2:00 PM   Circumferential Measures   Right Ankle 19 22   Right Widest Calf 30 32   Right Knee to Ankle 17.5    Left - just above MTP 21    Left Ankle 30        Labs:    I personally reviewed the following lab results today and those on care everywhere    No results found for: \"CRP\"   Erythrocyte Sedimentation Rate   Date Value Ref Range Status   02/19/2025 51 (H) 0 - 20 mm/hr " Final      Last Renal Panel:  Sodium   Date Value Ref Range Status   04/03/2025 136 135 - 145 mmol/L Final   10/08/2017 131 (L) 133 - 144 mmol/L Final     Potassium   Date Value Ref Range Status   04/03/2025 5.3 3.4 - 5.3 mmol/L Final   08/06/2022 2.9 (L) 3.4 - 5.3 mmol/L Final   10/08/2017 3.8 3.4 - 5.3 mmol/L Final     Chloride   Date Value Ref Range Status   04/03/2025 110 (H) 98 - 107 mmol/L Final   08/06/2022 97 94 - 109 mmol/L Final   10/08/2017 95 94 - 109 mmol/L Final     Carbon Dioxide   Date Value Ref Range Status   10/08/2017 28 20 - 32 mmol/L Final     Carbon Dioxide (CO2)   Date Value Ref Range Status   04/03/2025 19 (L) 22 - 29 mmol/L Final   08/06/2022 24 20 - 32 mmol/L Final     Anion Gap   Date Value Ref Range Status   04/03/2025 7 7 - 15 mmol/L Final   08/06/2022 13 3 - 14 mmol/L Final   10/08/2017 8 3 - 14 mmol/L Final     Glucose   Date Value Ref Range Status   04/03/2025 203 (H) 70 - 99 mg/dL Final   10/23/2024 133 (A) 70 - 99 mg/dL Final     Urea Nitrogen   Date Value Ref Range Status   04/03/2025 26.7 (H) 6.0 - 20.0 mg/dL Final   08/06/2022 12 7 - 30 mg/dL Final   10/08/2017 9 7 - 30 mg/dL Final     Creatinine   Date Value Ref Range Status   04/03/2025 1.78 (H) 0.67 - 1.17 mg/dL Final   10/08/2017 0.68 0.66 - 1.25 mg/dL Final     GFR Estimate   Date Value Ref Range Status   04/03/2025 44 (L) >60 mL/min/1.73m2 Final     Comment:     eGFR calculated using 2021 CKD-EPI equation.   10/08/2017 >90 >60 mL/min/1.7m2 Final     Comment:     Non  GFR Calc     GFR, ESTIMATED POCT   Date Value Ref Range Status   07/09/2024 41 (L) >60 mL/min/1.73m2 Final     Calcium   Date Value Ref Range Status   04/03/2025 8.7 (L) 8.8 - 10.4 mg/dL Final   10/08/2017 9.5 8.5 - 10.1 mg/dL Final     Phosphorus   Date Value Ref Range Status   04/03/2025 3.8 2.5 - 4.5 mg/dL Final     Albumin   Date Value Ref Range Status   04/03/2025 3.7 3.5 - 5.2 g/dL Final   08/06/2022 3.0 (L) 3.4 - 5.0 g/dL Final  "  10/08/2017 3.4 3.4 - 5.0 g/dL Final      Lab Results   Component Value Date    WBC 7.4 04/03/2025    WBC 3.9 10/08/2017     Lab Results   Component Value Date    RBC 4.31 04/03/2025    RBC 4.07 10/08/2017     Lab Results   Component Value Date    HGB 10.3 04/29/2025    HGB 12.1 10/08/2017     Lab Results   Component Value Date    HCT 32.5 04/29/2025    HCT 34.2 10/08/2017     No components found for: \"MCT\"  Lab Results   Component Value Date    MCV 84 04/03/2025    MCV 84 10/08/2017     Lab Results   Component Value Date    MCH 25.5 04/03/2025    MCH 29.7 10/08/2017     Lab Results   Component Value Date    MCHC 30.4 04/03/2025    MCHC 35.4 10/08/2017     Lab Results   Component Value Date    RDW 17.2 04/03/2025    RDW 13.4 10/08/2017     Lab Results   Component Value Date     04/03/2025     10/08/2017      Lab Results   Component Value Date    A1C 10.6 04/29/2025    A1C 11.5 01/31/2025    A1C 10.0 11/06/2024    A1C 9.2 08/17/2024    A1C 9.4 05/04/2024      TSH   Date Value Ref Range Status   03/09/2022 1.80 0.40 - 4.00 mU/L Final      No results found for: \"VITDT\"                Impression:  Encounter Diagnoses   Name Primary?    Venous insufficiency Yes    Venous hypertension of both lower extremities     Secondary lymphedema     Type 2 diabetes mellitus with diabetic heel ulcer (H)     Right ankle instability     Peripheral vascular disease, unspecified     2nd degree burn of multiple fingers of right hand not including thumb, initial encounter     Superficial burn of right thigh, initial encounter           5/20/2025 right heel    2/25/25 right  heel     5/20/2025 left heel    4/2/25              Are any of these ulcers new today: Yes; Location: right hand and right thigh    Assessment/Plan:          1. Debridement: Nursing staff removed the old dressing and cleanse the wound(s) with specified solution. After discussion of risk factors and verbal consent was obtained 2% Lidocaine HCL jelly was " applied, under clean conditions, the right hand; right thigh; right heel ulceration(s) were debrided using currette. Devitalized and nonviable tissue, along with any fibrin and slough, was removed to improve granulation tissue formation, stimulate wound healing, decrease overall bacteria load, disrupt biofilm formation and decrease edge senescence.  Total excisional debridement was 15.75 sq cm from the epidermis/dermis area and into the subcutaneous tissue with a depth of 0.1 cm.   Ulcers were improved afterwards and .  Measures were unchanged after debridement.       2.  Ulcer treatment: ulcer treatment will include irrigation and dressings to promote autolytic debridement which will include:will use SSD; xeroform; gauze; rolled gauze on the right hand; mepilex border to the right thigh; medihoney and gauze to the right heel; left heel is healed; will cover with dry gauze If for some reason the patient is not able to get their dressing(s) changed as outlined above (due to illness, lack of supplies, lack of help) please do the following: remove old, soiled dressings; wash the ulcers with saline; pat dry; apply ABD pad or other absorbant pad and secure with rolled gauze; avoid tape directly on your skin; patient instructed to call the clinic as soon as possible to let us know what the current issues are in receiving ulcer care. Stable            4. Nutrition: focus on protein; diabetic diet           5. Offloading: NWB to the right heel          6. Wound Etiology: 2nd degree burn to right hand; diabetic ulcer to right heel     Patient will follow up with me in 4 weeks for reevaluation. They were instructed to call the clinic sooner with any signs or symptoms of infection or any further questions/concerns. Answered all questions.          Marina Fernandez DNP, RN, CNP, CWOCN, CFCN, CLT  [facility]   [phone]        This note was electronically signed by Marina Fernandez NP
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
## 2025-05-21 ENCOUNTER — TELEPHONE (OUTPATIENT)
Dept: FAMILY MEDICINE | Facility: CLINIC | Age: 55
End: 2025-05-21
Payer: COMMERCIAL

## 2025-05-21 PROBLEM — E87.1 HYPONATREMIA: Status: RESOLVED | Noted: 2020-01-08 | Resolved: 2025-05-21

## 2025-05-21 PROBLEM — R53.83 OTHER FATIGUE: Status: RESOLVED | Noted: 2024-11-14 | Resolved: 2025-05-21

## 2025-05-21 PROBLEM — R63.4 UNINTENTIONAL WEIGHT LOSS: Status: RESOLVED | Noted: 2023-10-11 | Resolved: 2025-05-21

## 2025-05-21 PROBLEM — M62.59 MUSCLE WASTING AND ATROPHY, NOT ELSEWHERE CLASSIFIED, MULTIPLE SITES: Status: RESOLVED | Noted: 2024-04-01 | Resolved: 2025-05-21

## 2025-05-21 PROBLEM — E55.9 VITAMIN D DEFICIENCY, UNSPECIFIED: Status: RESOLVED | Noted: 2024-01-11 | Resolved: 2025-05-21

## 2025-05-21 PROBLEM — M00.80: Status: RESOLVED | Noted: 2024-08-19 | Resolved: 2025-05-21

## 2025-05-21 PROBLEM — F41.9 ANXIETY DISORDER, UNSPECIFIED: Status: RESOLVED | Noted: 2024-01-11 | Resolved: 2025-05-21

## 2025-05-21 PROBLEM — R79.89 ABNORMAL LFTS: Status: RESOLVED | Noted: 2025-01-03 | Resolved: 2025-05-21

## 2025-05-21 PROBLEM — F33.0 MILD RECURRENT MAJOR DEPRESSION: Status: RESOLVED | Noted: 2024-08-19 | Resolved: 2025-05-21

## 2025-05-21 PROBLEM — E87.20 ACIDOSIS: Status: RESOLVED | Noted: 2020-01-08 | Resolved: 2025-05-21

## 2025-05-21 PROBLEM — F33.2 SEVERE EPISODE OF RECURRENT MAJOR DEPRESSIVE DISORDER, WITHOUT PSYCHOTIC FEATURES (H): Status: RESOLVED | Noted: 2023-01-19 | Resolved: 2025-05-21

## 2025-05-21 NOTE — TELEPHONE ENCOUNTER
Left message to callback and resched appt. Asya is out of the office     Ericka Moreno on 5/21/2025 at 10:17 AM

## 2025-05-22 DIAGNOSIS — M00.9 PYOGENIC ARTHRITIS OF RIGHT KNEE JOINT, DUE TO UNSPECIFIED ORGANISM (H): ICD-10-CM

## 2025-05-22 RX ORDER — OXYCODONE HYDROCHLORIDE 5 MG/1
5 TABLET ORAL EVERY 6 HOURS PRN
Qty: 30 TABLET | Refills: 0 | Status: SHIPPED | OUTPATIENT
Start: 2025-05-22

## 2025-05-22 NOTE — TELEPHONE ENCOUNTER
Medication Request  Medication name: oxyCODONE (ROXICODONE) 5 MG tablet   Requested Pharmacy: [facility] [id number]  When was patient last seen for this?:  4/29/25  Patient offered appointment:  No  Okay to leave a detailed message: yes

## 2025-05-27 ENCOUNTER — TELEPHONE (OUTPATIENT)
Dept: FAMILY MEDICINE | Facility: CLINIC | Age: 55
End: 2025-05-27
Payer: COMMERCIAL

## 2025-05-27 NOTE — TELEPHONE ENCOUNTER
"Incoming call from patient reporting he submitted the application for medical cannabis online today (5/27/25), and wanted to let Rena Blair PA-C.  He has an appointment scheduled with Rena Blair PA-C on 6/10/25.    Per 4/29/25 OV notes:     \"Continuous opioid dependence (H)        Uses oxycodone 5 mg as needed every 6 hours.  Some days he does not use any.  Some days he uses 203 tabs a day.  Overall, 30 tabs last one month.  We are hopeful that following knee replacement he will no longer need oxycodone.       He is interested in Medical Cannibis.  He was certified today on the [place] Registry.  Out goal is to wean off oxycodone.  Follow up one month.\"              Betsey Colin RN  [facility]   "

## 2025-05-28 ENCOUNTER — PATIENT OUTREACH (OUTPATIENT)
Dept: CARE COORDINATION | Facility: CLINIC | Age: 55
End: 2025-05-28
Payer: COMMERCIAL

## 2025-05-28 NOTE — Clinical Note
I have called patient x2 with no return call call. Please review for disenrollment or if you would like me to make a 3rd attempt.

## 2025-05-28 NOTE — PROGRESS NOTES
Clinic Care Coordination Contact  Care Coordination Clinician Chart Review    Situation: Patient chart reviewed by Care Coordinator.       Background: Care Coordination Program started: 1/7/2025. Initial assessment completed and patient-centered care plan(s) were developed with participation from patient. Lead CC handed patient off to CHW for continued outreaches.       Assessment: Per chart review, patient outreach completed by CC CHW on 5/12/25 - [facility], CHWCC will outreach again in about 10 business days.  Patient is not actively working to accomplish goal(s). Patient's goal(s) appropriate and relevant at this time. Patient is not due for updated Plan of Care.  Assessments will be completed annually or as needed/with change of patient status.      Care Plan: Health Maintenance       Problem: Health Maintenance Due or Overdue       Goal: Become up-to-date with health maintenance visit(s)       Start Date: 1/30/2025    This Visit's Progress: 50% Recent Progress: 50%    Note:     Goal Statement: I will complete my annual wellness visit.    Barriers: transportation   Strengths: motivated     Patient expressed understanding of goal: yes  Action steps to achieve this goal:  1. I will schedule my annual wellness visit; [phone] ([phone]). My CHW assisted me in scheduling my AWV for 3/13/25 @ 2:40.  I will call to reschedule my AWV within the next 30 days.   2. I will attend my annual wellness visit. Continuous (MB)  3. I will contact my Care Management or clinic team if I have barriers to attending my annual wellness visit. Continuous (MB)                            Care Plan: Community Resources       Problem: Lack of transportation       Goal: Establish Reliable Transportation       Start Date: 1/30/2025    This Visit's Progress: 20% Recent Progress: 20%    Note:     Goal Statement: I will continue to take steps to secure safe, consistent and reliable transportation over the next three month(s).  Barriers:  financial   Strengths: motivated  Patient expressed understanding of goal: yes    Action steps to achieve this goal:  I will review resources and supports sent to me via [employer]  I will outreach to supports and consider establishing with ones I might find beneficial: transit link, community thread, help at your door. I am using transportation through [facility] for medical appointments. I will call Transit link again to look into there services within the next 30 days.  I will sign up for one pass through [facility] so that I can schedule rides to the gym. I will look into this program within the next 30 days.  I will try one new transportation option in the next month. Continuous (MB)                        Problem: Reliable food source       Goal: Establish Options for Affordable Food Sources       Start Date: 1/30/2025    This Visit's Progress: 0%    Note:     Goal Statement: I will continue to take steps to minimize food insecurity over the next two month(s).  Barriers: transportation   Strengths: motivated  Patient expressed understanding of goal: yes    Action steps to achieve this goal:  I will review resources and supports sent to me via [employer]: food pantry resources. Completed  I will outreach to supports and consider establishing with ones I might find beneficial - I will try going to one food pantry in the next month. I have not went to these locations yet. I hope to look into them soon.  I will continue to outreach to care coordination as needed for additional resources or supports.                              Care Plan: Appointments       Problem: HP GENERAL PROBLEM       Goal: I would like to attend the following appointments.       Start Date: 2/28/2025    This Visit's Progress: 80% Recent Progress: 10%    Priority: High    Note:     Barriers: numerous appointments  Strengths: motivated  Patient expressed understanding of goal: per chart review  Action steps to achieve this goal:    3/11 @ 2:40 PCP.  Completed  2.  3/27 @ 1:55 Diabetic Ed- Rescheduled for 4/14/25 @ 1:55. Continuous (MB)-Appointment confirmed with patient 4/9/25   3.  4/2 @ 1:45 Vascular Completed. Follow up scheduled for 4/22/25 @ 2:25. Continuous (MB)-Appointment confirmed with patient 4/9/25   4.  4/3 @ 11:00 lab Completed  5.  4/3 @ 11:45 Renal Dr. Chinchilla Completed  6.  4/7 @ 2:05 orthopedics Completed  7.  4/23 @ 2:00 video Diabetes Endocrinology- Completed                                 Plan/Recommendations: The patient will continue working with Care Coordination to achieve goal(s) as above. CHW will continue outreaches at minimum every 30 days and will involve Lead CC as needed or if patient is ready to move to Maintenance. Lead CC will continue to monitor CHW outreaches and patient's progress to goal(s) every 6 weeks.     Plan of Care updated and sent to patient: No

## 2025-05-29 ENCOUNTER — PATIENT OUTREACH (OUTPATIENT)
Dept: CARE COORDINATION | Facility: CLINIC | Age: 55
End: 2025-05-29
Payer: COMMERCIAL

## 2025-05-29 NOTE — LETTER
[facility] CARE COORDINATION  [address]  [facility] [address]   May 29, 2025    Long Mayfield  [address]   [facility] [address]      Dear Long,    I have been unsuccessful in reaching you since our last contact. At this time the Care Coordination team will make no further attempts to reach you, however this does not change your ability to continue receiving care from your providers at your primary care clinic.     We are focused on providing you with the highest-quality healthcare experience possible.    Sincerely,    Shanel Herrera, [facility] Clinical Care Coordinator  [facility], [facility], and [facility]

## 2025-05-29 NOTE — Clinical Note
FYI - pt dis enrolled from care coordination; unable to contact. Please feel free to send a new referral as needed

## 2025-05-29 NOTE — PROGRESS NOTES
Clinic Care Coordination Contact    Situation: Patient chart reviewed by care coordinator.    Background: Pt is enrolled in care coordination and followed to assist with goal(s) progression. Chart routed to [facility] by CHW for review to determine eligibility of diserolling from Care Coordination per standard work.     Assessment: Per Chart Review pt has been unreachable for follow up x2 with no further engagement or return calls.     Plan/Recommendations: Per Care Coordination standard work pt will be disenrolled from Care Coordination. Care Coordinator sent disenrollment letter with care coordinator contact information via [employer]. Care Coordinator will remain available, however, will do no further outreaches at this time unless a new referral is made or a change it pt status occurs. Pt has been provided with this writer's contact information and has been encouraged to call with any questions. [facility] updated PCP as to dis enrollment to CCC.

## 2025-06-02 ENCOUNTER — TELEPHONE (OUTPATIENT)
Dept: ORTHOPEDICS | Facility: CLINIC | Age: 55
End: 2025-06-02
Payer: COMMERCIAL

## 2025-06-02 NOTE — TELEPHONE ENCOUNTER
Other: Long called he is scheduled to see Dr. Day on 06/05/25 and wants to know if he needs to have any labwork done while he is there. Please call patient to discuss     [facility] we [facility] this [facility] you [facility] or would you prefer to receive a phone call?:   Patient would prefer a phone call   Okay to leave a detailed message?: Yes at Cell number on file:    Telephone Information:   Mobile [phone]

## 2025-06-02 NOTE — TELEPHONE ENCOUNTER
- A call was placed to the patient.     - I let him know Dr. Day needs patient A1C < 7.0 before proceeding with surgery. Patient's last A1C was 10.6 on 4/29.     - We rescheduled patient's appointment to 8/11. He agreed if A1C is less than 7 before then he will call and we can move up his appointment. If A1C is still not less than 7 before appointment he will call to push this back.     - Patient verbalized understanding of plan and all questions were answered. Call back number to clinic was given and patient was told to call if they had an further questions.

## 2025-06-03 ENCOUNTER — PATIENT OUTREACH (OUTPATIENT)
Dept: CARE COORDINATION | Facility: CLINIC | Age: 55
End: 2025-06-03
Payer: COMMERCIAL

## 2025-06-03 NOTE — PROGRESS NOTES
Anemia Management Note - Reminder     Follow-up with anemia management service:    Unable to reach pt re: anemia labs, left VM on 5/14 and 5/21.  Sent [employer] message asking him to respond re: his plans for lab draw, as last labs found were done over a month ago.         Latest Ref Rng & Units 1/31/2025 2/5/2025 3/4/2025 3/5/2025 3/20/2025 4/3/2025 4/29/2025   Anemia   HGB Goal     9 - 10  9 - 10     DIAN Dose   40mcg  40mcg 40mcg     Hemoglobin 13.3 - 17.7 g/dL 9.5   9.1   9.4  11.0  10.3    TSAT 15 - 46 %   11        Ferritin 31 - 409 ng/mL   211            Data saved with a previous flowsheet row definition         Follow-up call date: 061725    Carrie Leopold, RN BSN  Anemia Services  [facility]  [email or web address]  Office: [phone]  Fax [phone]

## 2025-06-11 ENCOUNTER — TELEPHONE (OUTPATIENT)
Dept: EDUCATION SERVICES | Facility: CLINIC | Age: 55
End: 2025-06-11
Payer: COMMERCIAL

## 2025-06-11 NOTE — TELEPHONE ENCOUNTER
[facility] Call Center    Phone Message    May a detailed message be left on voicemail: yes     Reason for Call: Other: Patient wants to reschedule his pump start up.  States that he was unaware that the appt was for 2 1/2 hours.  Also has other questions.  Scheduling pod does not schedule pump start ups so assistance is required.  ,      Action Taken: Other: cde    Travel Screening: Not Applicable     Date of Service:

## 2025-06-11 NOTE — TELEPHONE ENCOUNTER
Called pt and spoke with pt.  To help pt with staying within covered educator hours per insurance pt to have Omnipod training with Omnipod .  Pt to call  option #2.  Pt to follow up with me 1 month after pump start or sooner if needed.

## 2025-06-14 DIAGNOSIS — E11.22 TYPE 2 DIABETES MELLITUS WITH STAGE 3A CHRONIC KIDNEY DISEASE, WITH LONG-TERM CURRENT USE OF INSULIN (H): ICD-10-CM

## 2025-06-14 DIAGNOSIS — Z79.4 TYPE 2 DIABETES MELLITUS WITH STAGE 3A CHRONIC KIDNEY DISEASE, WITH LONG-TERM CURRENT USE OF INSULIN (H): ICD-10-CM

## 2025-06-14 DIAGNOSIS — N18.31 TYPE 2 DIABETES MELLITUS WITH STAGE 3A CHRONIC KIDNEY DISEASE, WITH LONG-TERM CURRENT USE OF INSULIN (H): ICD-10-CM

## 2025-06-16 PROBLEM — R19.5 LOOSE STOOLS: Status: ACTIVE | Noted: 2025-06-16

## 2025-06-16 PROBLEM — R19.5 ABNORMAL FECES: Status: ACTIVE | Noted: 2025-03-18

## 2025-06-16 PROBLEM — K90.9 FATTY STOOL: Status: ACTIVE | Noted: 2025-06-16

## 2025-06-16 PROBLEM — F10.11 HISTORY OF ALCOHOL ABUSE: Status: ACTIVE | Noted: 2025-06-16

## 2025-06-16 PROBLEM — K90.3 PANCREATIC STEATORRHEA: Status: ACTIVE | Noted: 2025-06-16

## 2025-06-16 RX ORDER — ACYCLOVIR 400 MG/1
TABLET ORAL
Qty: 3 EACH | Refills: 2 | Status: SHIPPED | OUTPATIENT
Start: 2025-06-16

## 2025-06-17 ENCOUNTER — TELEPHONE (OUTPATIENT)
Dept: RHEUMATOLOGY | Facility: CLINIC | Age: 55
End: 2025-06-17
Payer: COMMERCIAL

## 2025-06-17 NOTE — TELEPHONE ENCOUNTER
LM with patient to discuss patient concerns further and get patient scheduled for follow up. Awaiting return call.     Coleen Plummer RN

## 2025-06-17 NOTE — TELEPHONE ENCOUNTER
[facility] Call Center    Phone Message    May a detailed message be left on voicemail: yes     Reason for Call: Other: patient wants to know why he had to have a 3 month follow up and wanted a call back to know why. Please advise.      Action Taken: Message routed to:  Other: rheum    Travel Screening: Not Applicable     Date of Service: 6/17/25

## 2025-06-18 ENCOUNTER — OFFICE VISIT (OUTPATIENT)
Dept: VASCULAR SURGERY | Facility: CLINIC | Age: 55
End: 2025-06-18
Attending: NURSE PRACTITIONER
Payer: COMMERCIAL

## 2025-06-18 ENCOUNTER — TELEPHONE (OUTPATIENT)
Dept: FAMILY MEDICINE | Facility: CLINIC | Age: 55
End: 2025-06-18

## 2025-06-18 VITALS — SYSTOLIC BLOOD PRESSURE: 186 MMHG | OXYGEN SATURATION: 96 % | DIASTOLIC BLOOD PRESSURE: 109 MMHG | HEART RATE: 87 BPM

## 2025-06-18 DIAGNOSIS — I73.9 PERIPHERAL VASCULAR DISEASE, UNSPECIFIED: ICD-10-CM

## 2025-06-18 DIAGNOSIS — I87.303 VENOUS HYPERTENSION OF BOTH LOWER EXTREMITIES: ICD-10-CM

## 2025-06-18 DIAGNOSIS — I89.0 SECONDARY LYMPHEDEMA: ICD-10-CM

## 2025-06-18 DIAGNOSIS — L97.409 TYPE 2 DIABETES MELLITUS WITH DIABETIC HEEL ULCER (H): Primary | ICD-10-CM

## 2025-06-18 DIAGNOSIS — T23.221A PARTIAL THICKNESS BURN OF FINGER OF RIGHT HAND, INITIAL ENCOUNTER: ICD-10-CM

## 2025-06-18 DIAGNOSIS — T24.111A SUPERFICIAL BURN OF RIGHT THIGH, INITIAL ENCOUNTER: ICD-10-CM

## 2025-06-18 DIAGNOSIS — I87.2 VENOUS INSUFFICIENCY: ICD-10-CM

## 2025-06-18 DIAGNOSIS — E11.621 TYPE 2 DIABETES MELLITUS WITH DIABETIC HEEL ULCER (H): Primary | ICD-10-CM

## 2025-06-18 DIAGNOSIS — T23.231A 2ND DEGREE BURN OF MULTIPLE FINGERS OF RIGHT HAND NOT INCLUDING THUMB, INITIAL ENCOUNTER: ICD-10-CM

## 2025-06-18 PROBLEM — F10.11 HISTORY OF ALCOHOL ABUSE: Status: RESOLVED | Noted: 2025-06-16 | Resolved: 2025-06-18

## 2025-06-18 PROCEDURE — 3080F DIAST BP >= 90 MM HG: CPT | Performed by: NURSE PRACTITIONER

## 2025-06-18 PROCEDURE — 11042 DBRDMT SUBQ TIS 1ST 20SQCM/<: CPT | Performed by: NURSE PRACTITIONER

## 2025-06-18 PROCEDURE — 3077F SYST BP >= 140 MM HG: CPT | Performed by: NURSE PRACTITIONER

## 2025-06-18 PROCEDURE — 1126F AMNT PAIN NOTED NONE PRSNT: CPT | Performed by: NURSE PRACTITIONER

## 2025-06-18 RX ADMIN — LIDOCAINE: 50 OINTMENT TOPICAL at 14:09

## 2025-06-18 ASSESSMENT — PAIN SCALES - GENERAL: PAINLEVEL_OUTOF10: NO PAIN (0)

## 2025-06-18 NOTE — PROGRESS NOTES
Clinic Administered Medication Documentation    Patient was given lidocaine. Prior to medication administration, verified patient's identity using patient's name and date of birth.    Merlene Vasquez RN

## 2025-06-18 NOTE — ASSESSMENT & PLAN NOTE
He follows with orthopedics and rheumatology.  He was treated with rituximab for IgG4 related disease.  He is on Bactrim for PJP prophylaxis.  Plan for right knee replacement with orthopedics once A1c is 7 or less.  He has an upcoming appointment with orthopedics in August.  Upcoming appointment with rheumatology on August 20, 2025.

## 2025-06-18 NOTE — TELEPHONE ENCOUNTER
Called patient and relayed below message from Rena Blair PA-C as written.      Patient states he took his BP medications right before the taxi picked him up for his Vascular appointment today.  BP initially was 200 then rechecked and it was 186.        He has been taking BP medications regularly, and states he rarely misses any doses.    Unable to check home BP as he does not have a BP cuff.        Scheduled to see Rena Blair PA-C tomorrow afternoon, 6/19/25 at 3pm, for follow up.        Routing update to Rena Blair PA-C for awareness.        Betsey Colin RN  [facility]
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
## 2025-06-18 NOTE — ASSESSMENT & PLAN NOTE
He follows with orthopedics and rheumatology.  Plan per Dr. Cruz is to hold the rituximab infusions while patient pursues knee replacement.  He is to follow-up with rheumatology once the timing of the surgery is known.  He is currently scheduled to see rheumatology 8/20/2025.

## 2025-06-18 NOTE — ASSESSMENT & PLAN NOTE
Continue atorvastatin and aspirin.  LDL 52.    Lab Results   Component Value Date    CHOL 138 09/24/2024     Lab Results   Component Value Date    HDL 45 09/24/2024     Lab Results   Component Value Date    LDL 52 09/24/2024     Lab Results   Component Value Date    TRIG 206 09/24/2024

## 2025-06-18 NOTE — ASSESSMENT & PLAN NOTE
He is currently on oxycodone 5 mg every 6 hours as needed for pain.  CSA was previously signed October 15, 2024.  He has chronic knee pain and is hopeful that he can proceed with knee replacement in the next several months.    At his last appointment on 4/29/2025 he was certified for medical cannabis.    He is all registered for Medical Cannibis. He plans to start medical cannibis in near future.  Our goal is that he will wean off the oxycodone with the use of the medical cannabis.  He is also looking into acupuncture.

## 2025-06-18 NOTE — ASSESSMENT & PLAN NOTE
4/23/25 appointment with hematology:  Continue on aranesp as per nephrologist.  Explained to the patient that if his hemoglobin stops responding to Aranesp, then I would recommend a bone marrow biopsy to rule out any MDS.     Follow-up with his PCP and nephrologist.  Return to hematology/oncology clinic if his hemoglobin persistently remains below 10 on Aranesp.    Hemoglobin 10.3 on 4/29/2025.  He had a repeat hemoglobin, iron studies and ferritin today.  He continues to follow with the nephrologist and is currently on Aranesp.

## 2025-06-18 NOTE — TELEPHONE ENCOUNTER
Call to patient to discuss follow up. Writer explained with patient that in last appointment, Dr. Cruz discussed holding rituximab infusions while patient pursues knee replacement. Writer stated it would be good to follow up on this especially once timing of surgery is known to come up with a plan. Patient reports he has to get his A1C down and is working on that. He hopes to be able to get surgery in August. Writer also explained that Bactrim was started at last appointment and it would be good to follow up with Dr. Cruz regarding how that is going for him as well as overall symptoms and lab results. Patient verbalized understanding of follow up appointment. Patient scheduled on 8/20/25 and added to wait list.     Coleen Plummer RN

## 2025-06-18 NOTE — TELEPHONE ENCOUNTER
Will plan to have patient follow-up in the clinic tomorrow for blood pressure check and appointment.

## 2025-06-18 NOTE — ASSESSMENT & PLAN NOTE
He continues to follow with nephrology for chronic kidney disease.  Previous biopsy with findings of diabetic nephropathy and suggestion of IgG4 disease.  He has received rituximab.  He has a follow-up appointment with nephrology scheduled for 9/11/2025.  Creatinine 1.78 with GFR 44 4/3/2025.  Will repeat BMP at this time.    Last Comprehensive Metabolic Panel:  Lab Results   Component Value Date     04/03/2025    POTASSIUM 5.3 04/03/2025    CHLORIDE 110 (H) 04/03/2025    CO2 19 (L) 04/03/2025    ANIONGAP 7 04/03/2025     (H) 04/03/2025    BUN 26.7 (H) 04/03/2025    CR 1.78 (H) 04/03/2025    GFRESTIMATED 44 (L) 04/03/2025    AROLDO 8.7 (L) 04/03/2025

## 2025-06-18 NOTE — ASSESSMENT & PLAN NOTE
6/18/2025 appointment with vascular:  ulcer treatment will include irrigation and dressings to promote autolytic debridement which will include:will continue medihoney; adaptic; gauze; rolled gauze; change by the patient every 1-3 days and prn; he should moisturize the old wounds/burns with aquaphor.     He reports that the ulcer on his right heel is gradually improving.  We are hopeful that once his blood sugars improved with the insulin pump the ulcer will completely heal.  He will continue to follow in the vascular clinic.

## 2025-06-18 NOTE — ASSESSMENT & PLAN NOTE
He follows in the vascular clinic for right foot ulcer.  He was last seen on 6/18/2025.  Current recommendation for ulcer treatment:  ulcer treatment will include irrigation and dressings to promote autolytic debridement which will include:will continue medihoney; adaptic; gauze; rolled gauze; change by the patient every 1-3 days and prn; he should moisturize the old wounds/burns with aquaphor.

## 2025-06-18 NOTE — PROGRESS NOTES
Follow up Vascular Visit       Date of Service:06/18/25      Chief Complaint: bilateral diabetic heel ulcers; right hand burn      Pt returns to [facility] with regards to their bilateral diabetic heel ulcers; right hand burn.  They arrive today alone. They are currently using medihoney to the heels and SSD to the hand burns to the wounds. This is being done by the patient or home care. They are using nothing for compression. They are feeling well today. Denies fevers, chills. No shortness of breath. He has been told to stay off his feet; he does not do this; continues to walk and wear shoes. Noted to have elevated bp today; he reports he has no symptoms; he states his bp is always high; states he just took his medications.     Allergies:   Allergies   Allergen Reactions    Vancomycin      Other Reaction(s): Renal Failure    Vancomycin-induced nephrotoxicity (11/2023, outside hospital)    Seasonal Allergies      HAYFEVER:    -Watery Eyes  -Eye burn    Daptomycin Rash     Likely delayed hypersensitivity reaction to daptomycin 11/2023       Medications:   Current Outpatient Medications:     ACCU-CHEK GUIDE TEST test strip, , Disp: , Rfl:     amLODIPine (NORVASC) 5 MG tablet, Take 1 tablet (5 mg) by mouth daily., Disp: 90 tablet, Rfl: 3    atorvastatin (LIPITOR) 10 MG tablet, , Disp: , Rfl:     atorvastatin (LIPITOR) 40 MG tablet, , Disp: , Rfl:     bumetanide (BUMEX) 1 MG tablet, TAKE 1 TABLET(1 MG) BY MOUTH DAILY, Disp: 90 tablet, Rfl: 0    cetirizine (ZYRTEC) 10 MG tablet, Take 1 tablet (10 mg) by mouth daily. (Patient taking differently: Take 10 mg by mouth as needed.), Disp: 30 tablet, Rfl: 11    cholestyramine light (QUESTRAN) 4 GM packet, Take 1 packet by mouth every 12 hours., Disp: , Rfl:     Continuous Glucose Sensor (DEXCOM G7 SENSOR) MISC, CHANGE EVERY 10 DAYS, Disp: 3 each, Rfl: 2    Continuous Glucose Transmitter (DEXCOM G6 TRANSMITTER) MISC, , Disp: , Rfl:     darbepoetin  "sunshine-polysorbate (ARANESP) 40 MCG/0.4ML injection, Inject 0.4 mLs (40 mcg) subcutaneously every 14 days. Do not shake. Administer for Hgb <10.0. HOLD dose if systolic BP >180., Disp: 0.8 mL, Rfl: 11    ferrous gluconate (FERGON) 324 (38 Fe) MG tablet, Take 1 tablet (324 mg) by mouth every other day., Disp: 45 tablet, Rfl: 3    gabapentin (NEURONTIN) 300 MG capsule, TAKE 1 CAPSULE(300 MG) BY MOUTH TWICE DAILY, Disp: 60 capsule, Rfl: 2    Glucagon, rDNA, (GLUCAGON EMERGENCY) 1 MG KIT, Inject 1 mg into the muscle as needed., Disp: , Rfl:     glucose 40 % (400 mg/mL) gel, Take by mouth every hour as needed., Disp: , Rfl:     insulin aspart (NOVOLOG FLEXPEN) 100 UNIT/ML pen, Inject 1-5 Units subcutaneously 3 times daily (with meals). In addition to 6-8 units with each meal, inject additional units as per this sliding scale: If 200-250 = 1  251-300 = 2  301-350 = 3 351-400 = 4 401+ = 5 Repeat BS after 3 hours and call MD if still over 400, Disp: , Rfl:     Insulin Disposable Pump (OMNIPOD 5 G7 INTRO, GEN 5,) KIT, 1 each daily., Disp: 1 kit, Rfl: 0    Insulin Disposable Pump (OMNIPOD 5 G7 PODS, GEN 5,) MISC, 1 each every 3 days., Disp: 30 each, Rfl: 3    insulin glargine (LANTUS PEN) 100 UNIT/ML pen, Currently at 20 units will be increasing up to max dose 30 units, Disp: 30 mL, Rfl: 2    insulin NPH (HUMULIN N VIAL) 100 UNIT/ML vial, INJECT 13 UNITS AT THE START OF YOUR STEROID INFUSION AT CLINIC WHICH IS SCHEDULED FOR 12/4/24, Disp: , Rfl:     insulin pen needle (31G X 6 MM) 31G X 6 MM miscellaneous, Use 4 pen needles daily or as directed., Disp: 100 each, Rfl: 11    insulin syringe-needle U-100 (29G X 1/2\" 0.5 ML) 29G X 1/2\" 0.5 ML miscellaneous, Use one syringe as directed prior to steroid infusion., Disp: 10 each, Rfl: 0    lipase-protease-amylase (CREON 36) 96176-426431-043223 units CPEP, Take 3 capsules by mouth 3 times daily (with meals). 0730, 1130, 1630, Disp: , Rfl:     loperamide (IMODIUM) 2 MG capsule, Take 4 " mg by mouth every 8 hours as needed for diarrhea., Disp: , Rfl:     methocarbamol (ROBAXIN) 500 MG tablet, Take 1 tablet (500 mg) by mouth every 8 hours as needed for muscle spasms., Disp: 30 tablet, Rfl: 3    naloxone (NARCAN) 4 MG/0.1ML nasal spray, Spray 1 spray (4 mg) into one nostril alternating nostrils once as needed for opioid reversal. every 2-3 minutes until assistance arrives, Disp: 0.2 mL, Rfl: 0    oxyCODONE (ROXICODONE) 5 MG tablet, Take 1 tablet (5 mg) by mouth every 6 hours as needed for pain., Disp: 30 tablet, Rfl: 0    polyethylene glycol (MIRALAX) 17 GM/Dose powder, , Disp: , Rfl:     silver sulfADIAZINE (SILVADENE) 1 % external cream, Apply topically 2 times daily., Disp: 400 g, Rfl: 2    sulfamethoxazole-trimethoprim (BACTRIM) 400-80 MG tablet, Take 1 tablet by mouth daily., Disp: 90 tablet, Rfl: 5    triamcinolone (KENALOG) 0.1 % external ointment, Apply topically., Disp: , Rfl:     XDEMVY 0.25 % SOLN, , Disp: , Rfl:     Current Facility-Administered Medications:     lidocaine (XYLOCAINE) 5 % ointment, , Topical, Daily PRN, Marina Fernandez, NP, Given at 06/18/25 1409    History:   Past Medical History:   Diagnosis Date    Abnormal CXR 10/15/2024    Previous CT of the chest completed at [facility] in December 2023 demonstrated:   Impression:   1. Severe anasarca with moderate to large pleural effusions and a small pericardial effusion. Diffuse septal thickening in the lungs concerning for pulmonary edema. Anemic cardiac blood pool.   2. Solitary pulmonary nodule in the left lower lobe with an average diameter of 10 mm is indeterminat    Abnormal gait 01/12/2024    Abnormal LFTs 01/03/2025    Acidosis 01/08/2020    Acquired absence of organ 01/12/2024    Acquired absence of other right toe(s) 01/12/2024    Acute metabolic encephalopathy 09/17/2024    Acute pain of right knee 07/12/2024    Acute pulmonary edema (H) 01/11/2024    Acute renal insufficiency 05/03/2024    Last Comprehensive  Metabolic Panel:          Lab Results      Component    Value    Date           NA    131 (L)    10/15/2024           POTASSIUM    4.0    10/15/2024           CHLORIDE    98    10/15/2024           CO2    24    10/15/2024           ANIONGAP    9    10/15/2024           GLC    133 (A)    10/23/2024           BUN    22.3 (H)    10/15/2024           CR    1.35 (H)    10/15/2024        Alcohol abuse 09/12/2022    Alcohol dependence in remission (H) 10/29/2024    Allergic rhinitis     Anemia     Anemia, unspecified type 05/03/2024    He has a history of anemia which has been noted since May 2024.  Cause has not been identified.  Additional evaluation with CBC, retake count, TSH, haptoglobin, iron studies, LDH, ferritin, B12 and folate.  Ferritin elevated 418, iron low at 20, iron binding capacity low at 2030, iron saturation low at 9.  Normal B12 and folate.  Awaiting haptoglobin and reticulocyte count.  Denies blood in stool.    Anxiety disorder, unspecified 01/11/2024    Arthritis     Arthritis due to other bacteria, right knee (H) 08/19/2024    Atherosclerotic heart disease of native coronary artery without angina pectoris 01/11/2024    Bacterial arthritis (H) 08/19/2024    Bell's palsy     Bilateral lower extremity edema 12/18/2024    Cardiac calcification (H) 01/19/2023    Treatment:  Colestid 1 g tablet.  Aspirin 81 mg daily.      Cervical disc disease with myelopathy 05/01/2022    Formatting of this note might be different from the original.   seen spine specialist- [facility]  Formatting of this note might be different from the original.   seen spine specialist      Cervical radiculopathy 07/11/2024    Cervicalgia     Change or removal of drains 08/26/2024    Chronic bilateral low back pain with bilateral sciatica 04/08/2022    Chronic diarrhea 03/08/2022    Chronic kidney disease 01/11/2024    Chronic kidney disease, unspecified 01/11/2024    Chronic pancreatitis (H) 09/06/2024    Due to continued right  knee swelling and history of chronic pancreatitis with chronic diarrhea, he was referred to GI to review possible Whipple's disease.     10/18/2024 seen by [facility].  We do not have those records and will request those records to be faxed over.  Once I get those records I will review them with recommendations.  Per patient he is to follow-up in 6 months.      Diabetes (H)     Diabetic foot ulcer (H)     Difficulty in walking, not elsewhere classified 07/19/2024    Encounter to establish care 09/06/2024    Exocrine pancreatic insufficiency 05/03/2024    He is followed by gastroenterology at [facility].  Known pancreatic atrophy with calcification and low pancreatic elastase levels in the setting of history of chronic alcohol use.  Currently managed with loperamide and Creon.  He did not take the Colestid due to difficulty swallowing the pill.       3/21/2025 seen by gastroenterology.  Continue Creon.  Imodium 1 to 2 pills every 6 hours as neede    Generalized edema     GERD (gastroesophageal reflux disease) 05/03/2024    High risk medication use 02/11/2025 12/24:  On rituxan for IG4 related disease.      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  Started rituximab infusions 12/4/2024.      Ketosis (H) 03/08/2022    Long term (current) use of insulin (H) 08/19/2024    Major depressive disorder, recurrent episode, mild     Major depressive disorder, recurrent, mild 08/19/2024    Mild recurrent major depression 08/19/2024    Muscle wasting and atrophy, not elsewhere classified, multiple sites 04/01/2024    Muscle weakness (generalized) 01/12/2024    Need for assistance with personal care 01/12/2024    Osteomyelitis of great toe of right foot (H) 11/22/2023    Other abnormalities of gait and mobility 01/12/2024     "Other acute osteomyelitis, right ankle and foot (H)     Other chronic pancreatitis (H)     Other fatigue 11/14/2024    Other hyperlipidemia 03/15/2022    Other polyosteoarthritis     Peripheral vascular disease, unspecified 08/19/2024    Pneumonia of left lower lobe due to infectious organism 09/11/2024    Preventative health care 03/03/2025    Colon cancer screen:     Due May 2034.  Immunizations: Due for DTaP, hepatitis A and COVID-vaccine.  Lipids: Due for lipids.  Glucose: A1c due April 2025.  Dexa: Not indicated.      Primary hypertension 10/09/2019    Pyogenic arthritis of right knee joint, due to unspecified organism (H) 07/11/2024    Right knee inflammatory arthritis, etiology to be determined, s/p I&D Jul 11, Aug 18, 2024.         Brucella, Bartonella, Q fever, CD4, Ig levels.     MRI right knee with and without contrast if negative.     Rheumatology and GI consult.     Complete oral ciprofloxacin course.      Admitted to [facility] 8/16 to 8/19/2024.  \"Has been seen a handful of times and outpatient follow-up with persistent right    Right knee pain 07/11/2024    S/P transmetatarsal amputation of foot, right (H) 12/01/2023     s/p transmetatarsal amputation, right foot (DOS 1/9/24)      Severe episode of recurrent major depressive disorder, without psychotic features (H) 01/19/2023    Solitary pulmonary nodule     Stage 3a chronic kidney disease (H) 01/11/2024    Systemic vasculitis (H) 11/19/2024    Type 2 diabetes mellitus with foot ulcer (H) 01/11/2024    Type 2 diabetes mellitus with hyperosmolarity without coma, with long-term current use of insulin (H) 10/29/2024    Type 2 diabetes mellitus with kidney complication, with long-term current use of insulin (H) 10/05/2018    Seen by diabetes educator 10/29/2024:   PLAN  Lantus: 12 units  Set dose Novolog at meals: 3 units each + Correction Scale below     Pre-Meal Correction Scale:        If pre-meal glucose is:    Add extra Humalog/Novolog to your meal dose " per below chart:      151 - 200    +1 unit      201 - 250    +2 units      251 - 300    +3 units      301 - 350    +4 units      351 - 400     +5 units      Over     Ulcer of right heel and midfoot (H) 12/23/2024    Unintentional weight loss 10/11/2023    Vitamin D deficiency, unspecified 01/11/2024       Physical Exam:    BP (!) 186/109 (BP Location: Left arm)   Pulse 87   SpO2 96%     General:  Patient presents to clinic in no apparent distress.  Head: normocephalic atraumatic  Psychiatric:  Alert and oriented x3.   Respiratory: unlabored breathing; no cough  Integumentary:  Skin is uniformly warm, dry and pink.    Ulcer #1 Location: right posterior heel  Size: 0.4L x 0.2W x 0.1depth.  no sinus tract present, Wound base: red; surrounding crust  no undermining present. Ulcer is full thickness. There is moderate drainage. Periwound: no denudement, erythema, induration, maceration or warmth.      Right thigh healed    Left heel healed    Bilateral hands healed    VASC Wound Right heel (Active)   Pre Size Length 0.4 06/18/25 1402   Pre Size Width 0.2 06/18/25 1402   Pre Size Depth 0.1 06/18/25 1402   Pre Total Sq cm 0.08 06/18/25 1402   Post Size Length 6 02/25/25 1400   Post Size Width 4 02/25/25 1400   Post Size Depth 0.1 02/25/25 1400   Post Total Sq cm 24 02/25/25 1400   Number of days: 154       VASC Wound Left hand (Active)   Pre Size Length 0 04/02/25 1300   Pre Size Width 0 04/02/25 1300   Pre Size Depth 0 04/02/25 1300   Pre Total Sq cm 0 04/02/25 1300   Post Size Length 1 02/25/25 1400   Post Size Width 3 02/25/25 1400   Post Size Depth 0.1 02/25/25 1400   Post Total Sq cm 3 02/25/25 1400   Description healed 06/18/25 1402   Number of days: 154       VASC Wound Left heel (Active)   Pre Size Length 0.8 04/22/25 1400   Pre Size Width 0.4 04/22/25 1400   Pre Size Depth 0.1 04/22/25 1400   Pre Total Sq cm 0.32 04/22/25 1400   Post Size Length 0 05/20/25 1443   Post Size Width 0 05/20/25 1443   Post Size Depth  "0 05/20/25 1443   Post Total Sq cm 0 05/20/25 1443   Undermined 0 05/20/25 1443   Description healed 06/18/25 1402   Number of days: 77       VASC Wound R dorsal thigh (Active)   Pre Size Length 1.4 05/20/25 1443   Pre Size Width 1.4 05/20/25 1443   Pre Size Depth 0.1 05/20/25 1443   Pre Total Sq cm 1.96 05/20/25 1443   Description healed 06/18/25 1402   Number of days: 29       VASC Wound R pointer finger (Active)   Pre Size Length 3.5 05/20/25 1443   Pre Size Width 1.2 05/20/25 1443   Pre Size Depth 0.1 05/20/25 1443   Pre Total Sq cm 4.2 05/20/25 1443   Description healed 06/18/25 1402   Number of days: 29       VASC Wound R middle finger (Active)   Pre Size Length 5.2 05/20/25 1443   Pre Size Width 1.2 05/20/25 1443   Pre Size Depth 0.1 05/20/25 1443   Pre Total Sq cm 6.24 05/20/25 1443   Description healed 06/18/25 1402   Number of days: 29       VASC Wound R ring finger (Active)   Pre Size Length 2.1 05/20/25 1443   Pre Size Width 0.5 05/20/25 1443   Pre Size Depth 0.1 05/20/25 1443   Pre Total Sq cm 1.05 05/20/25 1443   Description healed 06/18/25 1402   Number of days: 29       VASC Wound R pinky finger (Active)   Pre Size Length 1.2 05/20/25 1443   Pre Size Width 0.4 05/20/25 1443   Pre Size Depth 0.1 05/20/25 1443   Pre Total Sq cm 0.48 05/20/25 1443   Description healed 06/18/25 1402   Number of days: 29       Incision/Surgical Site 08/17/24 Anterior;Right Knee (Active)   Number of days: 305            Circumferential volume measures:          1/15/2025     1:00 PM 1/29/2025     2:00 PM 6/18/2025     2:00 PM   Circumferential Measures   Right Ankle 19 22 19.5   Right Widest Calf 30 32 30.8   Right Knee to Ankle 17.5     Left - just above MTP 21     Left Ankle 30         Labs:    I personally reviewed the following lab results today and those on care everywhere    No results found for: \"CRP\"   Erythrocyte Sedimentation Rate   Date Value Ref Range Status   02/19/2025 51 (H) 0 - 20 mm/hr Final      Last " Renal Panel:  Sodium   Date Value Ref Range Status   04/03/2025 136 135 - 145 mmol/L Final   10/08/2017 131 (L) 133 - 144 mmol/L Final     Potassium   Date Value Ref Range Status   04/03/2025 5.3 3.4 - 5.3 mmol/L Final   08/06/2022 2.9 (L) 3.4 - 5.3 mmol/L Final   10/08/2017 3.8 3.4 - 5.3 mmol/L Final     Chloride   Date Value Ref Range Status   04/03/2025 110 (H) 98 - 107 mmol/L Final   08/06/2022 97 94 - 109 mmol/L Final   10/08/2017 95 94 - 109 mmol/L Final     Carbon Dioxide   Date Value Ref Range Status   10/08/2017 28 20 - 32 mmol/L Final     Carbon Dioxide (CO2)   Date Value Ref Range Status   04/03/2025 19 (L) 22 - 29 mmol/L Final   08/06/2022 24 20 - 32 mmol/L Final     Anion Gap   Date Value Ref Range Status   04/03/2025 7 7 - 15 mmol/L Final   08/06/2022 13 3 - 14 mmol/L Final   10/08/2017 8 3 - 14 mmol/L Final     Glucose   Date Value Ref Range Status   04/03/2025 203 (H) 70 - 99 mg/dL Final   10/23/2024 133 (A) 70 - 99 mg/dL Final     Urea Nitrogen   Date Value Ref Range Status   04/03/2025 26.7 (H) 6.0 - 20.0 mg/dL Final   08/06/2022 12 7 - 30 mg/dL Final   10/08/2017 9 7 - 30 mg/dL Final     Creatinine   Date Value Ref Range Status   04/03/2025 1.78 (H) 0.67 - 1.17 mg/dL Final   10/08/2017 0.68 0.66 - 1.25 mg/dL Final     GFR Estimate   Date Value Ref Range Status   04/03/2025 44 (L) >60 mL/min/1.73m2 Final     Comment:     eGFR calculated using 2021 CKD-EPI equation.   10/08/2017 >90 >60 mL/min/1.7m2 Final     Comment:     Non  GFR Calc     GFR, ESTIMATED POCT   Date Value Ref Range Status   07/09/2024 41 (L) >60 mL/min/1.73m2 Final     Calcium   Date Value Ref Range Status   04/03/2025 8.7 (L) 8.8 - 10.4 mg/dL Final   10/08/2017 9.5 8.5 - 10.1 mg/dL Final     Phosphorus   Date Value Ref Range Status   04/03/2025 3.8 2.5 - 4.5 mg/dL Final     Albumin   Date Value Ref Range Status   04/03/2025 3.7 3.5 - 5.2 g/dL Final   08/06/2022 3.0 (L) 3.4 - 5.0 g/dL Final   10/08/2017 3.4 3.4 - 5.0  "g/dL Final      Lab Results   Component Value Date    WBC 7.4 04/03/2025    WBC 3.9 10/08/2017     Lab Results   Component Value Date    RBC 4.31 04/03/2025    RBC 4.07 10/08/2017     Lab Results   Component Value Date    HGB 10.3 04/29/2025    HGB 12.1 10/08/2017     Lab Results   Component Value Date    HCT 32.5 04/29/2025    HCT 34.2 10/08/2017     No components found for: \"MCT\"  Lab Results   Component Value Date    MCV 84 04/03/2025    MCV 84 10/08/2017     Lab Results   Component Value Date    MCH 25.5 04/03/2025    MCH 29.7 10/08/2017     Lab Results   Component Value Date    MCHC 30.4 04/03/2025    MCHC 35.4 10/08/2017     Lab Results   Component Value Date    RDW 17.2 04/03/2025    RDW 13.4 10/08/2017     Lab Results   Component Value Date     04/03/2025     10/08/2017      Lab Results   Component Value Date    A1C 10.6 04/29/2025    A1C 11.5 01/31/2025    A1C 10.0 11/06/2024    A1C 9.2 08/17/2024    A1C 9.4 05/04/2024      TSH   Date Value Ref Range Status   03/09/2022 1.80 0.40 - 4.00 mU/L Final      No results found for: \"VITDT\"                Impression:  Encounter Diagnoses   Name Primary?    Type 2 diabetes mellitus with diabetic heel ulcer (H) Yes    Venous insufficiency     Secondary lymphedema     Venous hypertension of both lower extremities     Peripheral vascular disease, unspecified     2nd degree burn of multiple fingers of right hand not including thumb, initial encounter     Superficial burn of right thigh, initial encounter     Partial thickness burn of finger of right hand, initial encounter                              Are any of these ulcers new today: No; Location: na    Assessment/Plan:          1. Debridement: Nursing staff removed the old dressing and cleanse the wound(s) with specified solution. After discussion of risk factors and verbal consent was obtained 2% Lidocaine HCL jelly was applied, under clean conditions, the right heel ulceration(s) were debrided using " jessica. Devitalized and nonviable tissue, along with any fibrin and slough, was removed to improve granulation tissue formation, stimulate wound healing, decrease overall bacteria load, disrupt biofilm formation and decrease edge senescence.  Total excisional debridement was 0.08 sq cm from the epidermis/dermis area and into the subcutaneous tissue with a depth of 0.1 cm.   Ulcers were improved afterwards and .  Measures were unchanged after debridement.       2.  Ulcer treatment: ulcer treatment will include irrigation and dressings to promote autolytic debridement which will include:will continue medihoney; adaptic; gauze; rolled gauze; change by the patient every 1-3 days and prn; he should moisturize the old wounds/burns with aquaphor.  If for some reason the patient is not able to get their dressing(s) changed as outlined above (due to illness, lack of supplies, lack of help) please do the following: remove old, soiled dressings; wash the ulcers with saline; pat dry; apply ABD pad or other absorbant pad and secure with rolled gauze; avoid tape directly on your skin; patient instructed to call the clinic as soon as possible to let us know what the current issues are in receiving ulcer care. Stable            3. Edema: na.            4. Nutrition: focus on protein           5. Offloading: NWB to right foot          6. Wound Etiology: burn to hand; diabetic foot ulcer          7. HTN: sent message to pcp about high bp.     Patient will follow up with me in 4 weeks for reevaluation. They were instructed to call the clinic sooner with any signs or symptoms of infection or any further questions/concerns. Answered all questions.          Marina Fernandez DNP, RN, CNP, CWOCN, CFCN, CLT  [facility]   [phone]        This note was electronically signed by Marina Fernandez NP

## 2025-06-18 NOTE — ASSESSMENT & PLAN NOTE
Continues to follow with diabetes education and endocrinology.  He is currently on Lantus and NovoLog insulin with hopes to get an insulin pump in the near future.

## 2025-06-18 NOTE — ASSESSMENT & PLAN NOTE
Blood pressure yesterday when at that vascular clinic was 186/109.  He had just taken his blood pressure medications prior to that appointment.  When he saw cardiology in January his amlodipine dose was decreased to 5 mg daily due to hypotension.  The bumetanide dosage was decreased to 1 mg daily in March.  He has known chronic kidney disease.  Will repeat BMP at this time.  Pressure today is 107/73.  No change in medication at this time.    Last Comprehensive Metabolic Panel:  Lab Results   Component Value Date     04/03/2025    POTASSIUM 5.3 04/03/2025    CHLORIDE 110 (H) 04/03/2025    CO2 19 (L) 04/03/2025    ANIONGAP 7 04/03/2025     (H) 04/03/2025    BUN 26.7 (H) 04/03/2025    CR 1.78 (H) 04/03/2025    GFRESTIMATED 44 (L) 04/03/2025    AROLDO 8.7 (L) 04/03/2025

## 2025-06-18 NOTE — PATIENT INSTRUCTIONS
"Wound care supplies were ordered today through [employer] and if you are not receiving your supplies or have a question on your bill please contact Yuri Sanon [phone]. Please allow 2-5 business days for delivery of supplies. You may get a call from a 1-800 # if there are additional information Kelsey needs. It is important to  or return their call. PLEASE NOTE: If you need to return your supplies, you MUST call customer service within 15 days of delivery date.     You need to stay off right foot at all times; use wheelchair while at home     PRAFO boot to help keep pressure off the wound at all times; if this is not fitting well please go back to the orthotics clinic to see what else they can offer    Your blood pressure was high today; I have sent a message to your PCP; if you develop headache; confusion; blurred vision; call 911    Wound Care Instructions    Every 3 days Cleanse your bilateral heel wound(s) with Dilute hibiclens 30cc in 500cc NS.    Pat Dry with non-sterile gauze    Primary Dressing: Apply manuka honey into/onto the wounds on the right heel    Secondary dressing: Cover with adaptic; gauze both heels    Secure with non-sterile roll gauze (4\" x 75\" roll) and tape (1\" roll tape) as needed; avoid adhesive directly on the skin    Compression: none    It is not ok to get your wound wet in the bath or shower      Old hand burns  Daily and as needed  Moisturize well    Thigh  Healed           It is recommended that you do not get your ulcer wet when showering.  Listed below are several ways of keeping it dry when you shower.     1. Wrap it with Press and Seal plastic wrap.  It can be found in the stores where the plastic wraps or tin foil is kept.               2.  Some people take a bath and hang their leg/foot out of the tub.                        3  Put your leg in a plastic bag and tape it on.           4. You can purchase a shower cover for casts at some pharmacies and through the " Internet.            5. Take a Bed Bath or wash up at the sink     High Protein Foods  Chicken  -Chicken breast, 3.5oz.-30 grams protein  -Chicken thigh-10 grams(average size)  -Drumstick-11 grams  -Wing- 6 grams  -Chicken meat, cooked, 4 oz.  Beef  -Hamburger eleni, 4 oz-28 grams protein  -Steak, 6 oz-42 grams  -Most cuts of beef- 7 grams of protein per ounce  Fish  -Most fish fillets or steaks are about 22 grams of protein for 3 1/2 oz(100 grams) of cooked fish, or 6 grams per ounce  -Tuna, 6 oz can-40 grams of protein  Pork  -Pork chop, average-22 grams protein  -Pork loin or tenderloin, 4 oz.-29 grams  -Ham, 3oz serving- 19 grams  -Ground pork 3oz cooked-22 grams  -Ashley, 1 slice-3 grams  -Pitt-style ashley(black ashley), slice-5-6 grams  Eggs and Dairy  -Egg, large-7 grams  -Milk, 1 cup-8 grams  -Cottage cheese, 1/2 cup-15 grams  -Greek yogurt, 1 cup-usually 8-12 grams, check label  -Soft cheeses (Mozzarella, Brie, Camembert)- 6 grams  -Medium cheeses(cheddar, swiss)- 7 or 8 grams per oz  -Hard cheeses(parmesan)- 10 grams per oz  Beans  -Tofu, 1/2 cup 20 grams  -Tofu, 1 oz., 2.3 grams  -Soy milk, 1 cup-6-10 grams  -Most beans(black, ayon, lentils, etc.) about 7-10 grams protein per half cup of cooked beans  -soy beans, 1/2 cup cooked-14 grams  -Split peas, 1/2 cup cooked- 8 grams  Nuts and Seeds  -Peanut butter, 2 Tablespoons- 8 grams protein  -Almonds, 1/4 cup- 8 grams  -Peanuts, 1/4 cup-9 grams  -Cashews, 1/4 cup- 5 grams  -Pecans, 1/4 cup- 2.5 grams  -Sunflower seeds, 1/4 cup- 6 grams  -Pumpkin seeds, 1/4 cup-8 grams  -Flax seeds- 1/4 cup- 8 grams  Protein Supplements  -Ensure  -Boost  -Glucerna, if diabetic  When you have an open ulcer, your bodies protein needs are much higher, so it is recommended eat good sources of protein       Prevalon Boot          EZ Heelift Boot              Prafo Boot          Foam Ring

## 2025-06-18 NOTE — ASSESSMENT & PLAN NOTE
He was last seen by gastroenterology March 21, 2025.  He is currently on Creon, Imodium and was told to start colestyramine at that time.  They also ordered an MR enterography at that time.  He was to follow-up in 3 months.    He is due for his follow-up appointment with [facility].

## 2025-06-19 ENCOUNTER — OFFICE VISIT (OUTPATIENT)
Dept: FAMILY MEDICINE | Facility: CLINIC | Age: 55
End: 2025-06-19
Payer: COMMERCIAL

## 2025-06-19 VITALS
BODY MASS INDEX: 21.61 KG/M2 | HEART RATE: 70 BPM | DIASTOLIC BLOOD PRESSURE: 73 MMHG | RESPIRATION RATE: 16 BRPM | TEMPERATURE: 98.3 F | WEIGHT: 129.7 LBS | SYSTOLIC BLOOD PRESSURE: 107 MMHG | OXYGEN SATURATION: 99 % | HEIGHT: 65 IN

## 2025-06-19 DIAGNOSIS — N18.31 STAGE 3A CHRONIC KIDNEY DISEASE (H): ICD-10-CM

## 2025-06-19 DIAGNOSIS — N18.31 TYPE 2 DIABETES MELLITUS WITH STAGE 3A CHRONIC KIDNEY DISEASE, WITH LONG-TERM CURRENT USE OF INSULIN (H): ICD-10-CM

## 2025-06-19 DIAGNOSIS — L97.509 TYPE 2 DIABETES MELLITUS WITH FOOT ULCER, WITH LONG-TERM CURRENT USE OF INSULIN (H): ICD-10-CM

## 2025-06-19 DIAGNOSIS — K86.0 ALCOHOL-INDUCED CHRONIC PANCREATITIS (H): ICD-10-CM

## 2025-06-19 DIAGNOSIS — I51.5 CARDIAC CALCIFICATION (H): ICD-10-CM

## 2025-06-19 DIAGNOSIS — D89.84 IGG4 RELATED DISEASE (H): ICD-10-CM

## 2025-06-19 DIAGNOSIS — D64.9 ANEMIA, UNSPECIFIED TYPE: ICD-10-CM

## 2025-06-19 DIAGNOSIS — Z79.4 TYPE 2 DIABETES MELLITUS WITH FOOT ULCER, WITH LONG-TERM CURRENT USE OF INSULIN (H): ICD-10-CM

## 2025-06-19 DIAGNOSIS — L97.419 ULCER OF RIGHT HEEL AND MIDFOOT, UNSPECIFIED ULCER STAGE (H): ICD-10-CM

## 2025-06-19 DIAGNOSIS — E11.22 TYPE 2 DIABETES MELLITUS WITH STAGE 3A CHRONIC KIDNEY DISEASE, WITH LONG-TERM CURRENT USE OF INSULIN (H): ICD-10-CM

## 2025-06-19 DIAGNOSIS — N18.30 ANEMIA OF CHRONIC RENAL FAILURE, STAGE 3 (MODERATE), UNSPECIFIED WHETHER STAGE 3A OR 3B CKD (H): ICD-10-CM

## 2025-06-19 DIAGNOSIS — D63.1 ANEMIA OF CHRONIC RENAL FAILURE, STAGE 3 (MODERATE), UNSPECIFIED WHETHER STAGE 3A OR 3B CKD (H): ICD-10-CM

## 2025-06-19 DIAGNOSIS — Z79.4 LONG TERM (CURRENT) USE OF INSULIN (H): ICD-10-CM

## 2025-06-19 DIAGNOSIS — F10.21 ALCOHOL DEPENDENCE IN REMISSION (H): ICD-10-CM

## 2025-06-19 DIAGNOSIS — E11.621 TYPE 2 DIABETES MELLITUS WITH FOOT ULCER, WITH LONG-TERM CURRENT USE OF INSULIN (H): ICD-10-CM

## 2025-06-19 DIAGNOSIS — Z79.4 TYPE 2 DIABETES MELLITUS WITH STAGE 3A CHRONIC KIDNEY DISEASE, WITH LONG-TERM CURRENT USE OF INSULIN (H): ICD-10-CM

## 2025-06-19 DIAGNOSIS — F11.20 CONTINUOUS OPIOID DEPENDENCE (H): ICD-10-CM

## 2025-06-19 DIAGNOSIS — I10 PRIMARY HYPERTENSION: Primary | ICD-10-CM

## 2025-06-19 DIAGNOSIS — M00.9 PYOGENIC ARTHRITIS OF RIGHT KNEE JOINT, DUE TO UNSPECIFIED ORGANISM (H): ICD-10-CM

## 2025-06-19 LAB
HCT VFR BLD AUTO: 29.6 % (ref 40–53)
HGB BLD-MCNC: 9.1 G/DL (ref 13.3–17.7)
MCV RBC AUTO: 87 FL (ref 78–100)

## 2025-06-19 NOTE — PROGRESS NOTES
The longitudinal plan of care for the diagnosis(es)/condition(s) as documented were addressed during this visit. Due to the added complexity in care, I will continue to support Long in the subsequent management and with ongoing continuity of care.    Assessment & Plan   Problem List Items Addressed This Visit       Anemia, unspecified type    4/23/25 appointment with hematology:  Continue on aranesp as per nephrologist.  Explained to the patient that if his hemoglobin stops responding to Aranesp, then I would recommend a bone marrow biopsy to rule out any MDS.     Follow-up with his PCP and nephrologist.  Return to hematology/oncology clinic if his hemoglobin persistently remains below 10 on Aranesp.    Hemoglobin 10.3 on 4/29/2025.  He had a repeat hemoglobin, iron studies and ferritin today.  He continues to follow with the nephrologist and is currently on Aranesp.            Cardiac calcification (H)    Continue atorvastatin and aspirin.  LDL 52.    Lab Results   Component Value Date    CHOL 138 09/24/2024     Lab Results   Component Value Date    HDL 45 09/24/2024     Lab Results   Component Value Date    LDL 52 09/24/2024     Lab Results   Component Value Date    TRIG 206 09/24/2024              Primary hypertension - Primary    Blood pressure yesterday when at that vascular clinic was 186/109.  He had just taken his blood pressure medications prior to that appointment.  When he saw cardiology in January his amlodipine dose was decreased to 5 mg daily due to hypotension.  The bumetanide dosage was decreased to 1 mg daily in March.  He has known chronic kidney disease.  Will repeat BMP at this time.  Pressure today is 107/73.  No change in medication at this time.    Last Comprehensive Metabolic Panel:  Lab Results   Component Value Date     04/03/2025    POTASSIUM 5.3 04/03/2025    CHLORIDE 110 (H) 04/03/2025    CO2 19 (L) 04/03/2025    ANIONGAP 7 04/03/2025     (H) 04/03/2025    BUN 26.7 (H)  04/03/2025    CR 1.78 (H) 04/03/2025    GFRESTIMATED 44 (L) 04/03/2025    AROLDO 8.7 (L) 04/03/2025                Relevant Orders    Basic metabolic panel  (Ca, Cl, CO2, Creat, Gluc, K, Na, BUN)    Type 2 diabetes mellitus with kidney complication, with long-term current use of insulin (H)    Lab Results   Component Value Date    A1C 10.6 04/29/2025    A1C 11.5 01/31/2025    A1C 10.0 11/06/2024    A1C 9.2 08/17/2024    A1C 9.4 05/04/2024     A1c has improved but remains elevated at 10.6.  He has an upcoming appointment with diabetes education on 7/14/2025.  He is currently on Lantus and NovoLog insulin.  He is pursuing an insulin pump.    A representative from [employer] was here today and forms were completed for him to start the insulin pump.  Will be following closely with him.           Pyogenic arthritis of right knee joint, due to unspecified organism (H)    He follows with orthopedics and rheumatology.  Plan per Dr. Cruz is to hold the rituximab infusions while patient pursues knee replacement.  He is to follow-up with rheumatology once the timing of the surgery is known.  He is currently scheduled to see rheumatology 8/20/2025.               Chronic pancreatitis (H)    He was last seen by gastroenterology March 21, 2025.  He is currently on Creon, Imodium and was told to start colestyramine at that time.  They also ordered an MR enterography at that time.  He was to follow-up in 3 months.    He is due for his follow-up appointment with [facility].         Alcohol dependence in remission (H)    IgG4 related disease (H)    He follows with orthopedics and rheumatology.  He was treated with rituximab for IgG4 related disease.  He is on Bactrim for PJP prophylaxis.  Plan for right knee replacement with orthopedics once A1c is 7 or less.  He has an upcoming appointment with orthopedics in August.  Upcoming appointment with rheumatology on August 20, 2025.         Ulcer of right heel and midfoot (H)    6/18/2025  appointment with vascular:  ulcer treatment will include irrigation and dressings to promote autolytic debridement which will include:will continue medihoney; adaptic; gauze; rolled gauze; change by the patient every 1-3 days and prn; he should moisturize the old wounds/burns with aquaphor.     He reports that the ulcer on his right heel is gradually improving.  We are hopeful that once his blood sugars improved with the insulin pump the ulcer will completely heal.  He will continue to follow in the vascular clinic.         Long term (current) use of insulin (H)    Continues to follow with diabetes education and endocrinology.  He is currently on Lantus and NovoLog insulin with hopes to get an insulin pump in the near future.         Stage 3a chronic kidney disease (H)    He continues to follow with nephrology for chronic kidney disease.  Previous biopsy with findings of diabetic nephropathy and suggestion of IgG4 disease.  He has received rituximab.  He has a follow-up appointment with nephrology scheduled for 9/11/2025.  Creatinine 1.78 with GFR 44 4/3/2025.  Will repeat BMP at this time.    Last Comprehensive Metabolic Panel:  Lab Results   Component Value Date     04/03/2025    POTASSIUM 5.3 04/03/2025    CHLORIDE 110 (H) 04/03/2025    CO2 19 (L) 04/03/2025    ANIONGAP 7 04/03/2025     (H) 04/03/2025    BUN 26.7 (H) 04/03/2025    CR 1.78 (H) 04/03/2025    GFRESTIMATED 44 (L) 04/03/2025    AROLDO 8.7 (L) 04/03/2025                Type 2 diabetes mellitus with foot ulcer (H)    He follows in the vascular clinic for right foot ulcer.  He was last seen on 6/18/2025.  Current recommendation for ulcer treatment:  ulcer treatment will include irrigation and dressings to promote autolytic debridement which will include:will continue medihoney; adaptic; gauze; rolled gauze; change by the patient every 1-3 days and prn; he should moisturize the old wounds/burns with aquaphor.          Continuous opioid  "dependence (H)    He is currently on oxycodone 5 mg every 6 hours as needed for pain.  CSA was previously signed October 15, 2024.  He has chronic knee pain and is hopeful that he can proceed with knee replacement in the next several months.    At his last appointment on 4/29/2025 he was certified for medical cannabis.    He is all registered for Medical Cannibis. He plans to start medical cannibis in near future.  Our goal is that he will wean off the oxycodone with the use of the medical cannabis.  He is also looking into acupuncture.          Other Visit Diagnoses         Anemia of chronic renal failure, stage 3 (moderate), unspecified whether stage 3a or 3b CKD (H)               Shamir Alves is a 55 year old, presenting for the following health issues:  Follow Up        6/19/2025     2:59 PM   Additional Questions   Roomed by Vera Holloway         6/19/2025   Forms   Any forms needing to be completed Yes         6/19/2025     2:59 PM   Patient Reported Additional Medications   Patient reports taking the following new medications Edilia Omnipod     HPI          Objective    /73 (BP Location: Left arm, Patient Position: Left side, Cuff Size: Adult Small)   Pulse 70   Temp 98.3  F (36.8  C)   Resp 16   Ht 1.638 m (5' 4.5\")   Wt 58.8 kg (129 lb 11.2 oz)   SpO2 99%   BMI 21.92 kg/m    Body mass index is 21.92 kg/m .  Physical Exam  Vitals and nursing note reviewed.   Constitutional:       Appearance: Normal appearance.   Cardiovascular:      Rate and Rhythm: Normal rate and regular rhythm.      Heart sounds: Normal heart sounds.   Pulmonary:      Effort: Pulmonary effort is normal.      Breath sounds: Normal breath sounds.   Neurological:      Mental Status: He is alert.            Signed Electronically by: Rena Blair PA-C    "

## 2025-06-23 ENCOUNTER — TELEPHONE (OUTPATIENT)
Dept: FAMILY MEDICINE | Facility: CLINIC | Age: 55
End: 2025-06-23
Payer: COMMERCIAL

## 2025-06-23 ENCOUNTER — PATIENT OUTREACH (OUTPATIENT)
Dept: CARE COORDINATION | Facility: CLINIC | Age: 55
End: 2025-06-23
Payer: COMMERCIAL

## 2025-06-23 NOTE — TELEPHONE ENCOUNTER
Order/Referral Request    Who is requesting: Patient    Orders being requested: Acupuncture     Reason service is needed/diagnosis: Chronic knee pain    When are orders needed by: When provider is able    Has this been discussed with Provider: Yes, discussed with PCP at recent OV 06/19    Does patient have a preference on a Group/Provider/Facility? [facility]    Does patient have an appointment scheduled?: No    Where to send orders: Place orders within Epic    Could we send this information to you in [facility] or would you prefer to receive a phone call?:   Patient would prefer a phone call   Okay to leave a detailed message?: Yes at Cell number on file:    Telephone Information:   Mobile [phone]

## 2025-06-23 NOTE — PROGRESS NOTES
Anemia Management Note - Follow Up      SUBJECTIVE/OBJECTIVE:      Referred by Dr. Jodry Chinchilla  on 2024  Primary Diagnosis: Anemia in Chronic Kidney Disease (N18.3, D63.1)     Secondary Diagnosis: Chronic Kidney Disease, Stage 3 (N18.3)   Hgb goal range: 9-10     Epo/Darbo: Aranesp 40 mcg  every two weeks for Hgb <10.0 At home  Iron regimen: Ferrous gluconate 325mg every other day  [id number]: oral iron every other day  *24: Pt states Dr. Chinchilla said his Iron was ok. Will recheck labs in a month.      Labs : 2025  RX/TX plans : 2025     Recent DIAN use, transfusion, IV iron: NA  No history of stroke, MI, and blood clots or cancers. Hx of HTN.      Contact: No Consent to Communicate On File.            Latest Ref Rng & Units 3/5/2025 3/20/2025 4/3/2025 2025 2025 2025 2025   Anemia   HGB Goal  9 - 10  9 - 10   9 - 10  9 - 10   DIAN Dose  40mcg 40mcg   40mcg  40mcg   Hemoglobin 13.3 - 17.7 g/dL  9.4  11.0  10.3   9.1     TSAT 15 - 46 %      20     Ferritin 31 - 409 ng/mL      129         Data saved with a previous flowsheet row definition     BP Readings from Last 3 Encounters:   25 107/73   25 (!) 186/109   25 134/69     Wt Readings from Last 2 Encounters:   25 58.8 kg (129 lb 11.2 oz)   25 59.1 kg (130 lb 3.2 oz)         ASSESSMENT:    Hgb:At goal - recommend dose  TSat: not at goal of >30%, but improved over level 3 months ago.  Ferritin: At goal (>100ng/mL)   Goals Addressed    None         PLAN:  Dose with Aranesp. He is able to give a dose today.  RTC for hgb then Aranesp if needed in 2 week(s).  Continue taking oral iron every other day. Confirmed dosing and he denies any SE.    Orders needed to be renewed (for next follow-up date) in EPIC: None    Iron labs due:  monthly    Plan discussed with:  Long      NEXT FOLLOW-UP DATE:  708    Carrie Leopold, RN BSN  Anemia Services  [facility]  [email or web address]  Office:  No anginal equivalentes noted   She would like to try to come off the ranexa as she has had no chest pain   Try every other day and then discontinue      [phone]  Fax [phone]

## 2025-06-25 DIAGNOSIS — M00.9 PYOGENIC ARTHRITIS OF RIGHT KNEE JOINT, DUE TO UNSPECIFIED ORGANISM (H): ICD-10-CM

## 2025-06-25 DIAGNOSIS — F11.20 CONTINUOUS OPIOID DEPENDENCE (H): ICD-10-CM

## 2025-06-25 RX ORDER — OXYCODONE HYDROCHLORIDE 5 MG/1
5 TABLET ORAL EVERY 6 HOURS PRN
Qty: 30 TABLET | Refills: 0 | Status: SHIPPED | OUTPATIENT
Start: 2025-06-25

## 2025-06-25 RX ORDER — METHOCARBAMOL 500 MG/1
500 TABLET, FILM COATED ORAL EVERY 8 HOURS PRN
Qty: 30 TABLET | Refills: 3 | Status: SHIPPED | OUTPATIENT
Start: 2025-06-25

## 2025-06-25 NOTE — TELEPHONE ENCOUNTER
Medication Request  Medication name: methocarbamol (ROBAXIN) 500 MG tablet   oxyCODONE (ROXICODONE) 5 MG tablet   [facility]ed [facility]acy: [facility]  When was patient last seen for this?:  06/19/2025  Patient offered appointment:  no  Okay to leave a detailed message: no

## 2025-06-26 NOTE — TELEPHONE ENCOUNTER
Outgoing call #1 to patient, No answer. LVMTCB.     When patient calls back, anyone can ask him if he has checked with insurance to see if he needs referral to go see a acupuncturist. Also where does he want us to send the referral?       Miracle MACKEY RN    Rena- I did find integrative therapies consult order we could potential use. I have not find anything else to use.

## 2025-06-26 NOTE — TELEPHONE ENCOUNTER
I have not previously ordered acupuncture for patient.  Can you look into how to proceed with ordering the acupuncture?  Do I place it under a physical therapy or occupational therapy?  He is looking at getting this done within the community of [place].  Thank you.

## 2025-06-27 NOTE — TELEPHONE ENCOUNTER
Outgoing call to patient. Reviewed information about it being hard for us to place that referral because we don't have a specific acupuncture referral.     I asked patient to contact his insurance to see if he needs the referral and also reach out to the acupuncture place to see if he needs a referral and what specific referral they would need.     Patient will call us back once he knows more.     Miracle MACKEY RN

## 2025-06-28 DIAGNOSIS — M50.00 CERVICAL DISC DISEASE WITH MYELOPATHY: ICD-10-CM

## 2025-06-28 RX ORDER — GABAPENTIN 300 MG/1
300 CAPSULE ORAL 2 TIMES DAILY
Qty: 60 CAPSULE | Refills: 2 | Status: SHIPPED | OUTPATIENT
Start: 2025-06-28

## 2025-07-10 ENCOUNTER — PATIENT OUTREACH (OUTPATIENT)
Dept: CARE COORDINATION | Facility: CLINIC | Age: 55
End: 2025-07-10
Payer: COMMERCIAL

## 2025-07-10 NOTE — PROGRESS NOTES
Anemia Management Note - Reminder     Follow-up with anemia management service:    Bertrand is overdue for labs, Aranesp injection. LVM to call back with update on plans for lab draw        Latest Ref Rng & Units 3/5/2025 3/20/2025 4/3/2025 4/29/2025 6/6/2025 6/19/2025 6/23/2025   Anemia   HGB Goal  9 - 10  9 - 10   9 - 10  9 - 10   DIAN Dose  40mcg 40mcg   40mcg  40mcg   Hemoglobin 13.3 - 17.7 g/dL  9.4  11.0  10.3   9.1     TSAT 15 - 46 %      20     Ferritin 31 - 409 ng/mL      129         Data saved with a previous flowsheet row definition         Follow-up call date: [id number]    Carrie Leopold, RN BSN  Anemia Services  [facility]  [email or web address]  Office: [phone]  Fax [phone]

## 2025-07-14 ENCOUNTER — TELEPHONE (OUTPATIENT)
Dept: FAMILY MEDICINE | Facility: CLINIC | Age: 55
End: 2025-07-14

## 2025-07-14 NOTE — TELEPHONE ENCOUNTER
General Call    Contacts       Contact Date/Time Type Contact Phone/Fax    07/14/2025 04:10 PM CDT Phone (Incoming) [place] (Self) [phone] (M)          Reason for Call:  Parking Certificate    What are your questions or concerns:  Patient calling to report that he needs a replacement handicap placard. Form printed and placed in Rena Blair's mailbox.    Because patient will need to  forms to complete his portion, patient would like to  when he comes for [employer] education 7/15/25 @ 1:00pm.      Could we send this information to you in [place] or would you prefer to receive a phone call?:   Patient would prefer a phone call   Okay to leave a detailed message?: Yes at Cell number on file:    Telephone Information:   Mobile [phone]

## 2025-07-15 ENCOUNTER — TELEPHONE (OUTPATIENT)
Dept: EDUCATION SERVICES | Facility: CLINIC | Age: 55
End: 2025-07-15
Payer: COMMERCIAL

## 2025-07-15 NOTE — TELEPHONE ENCOUNTER
[facility] Call Center    Phone Message    May a detailed message be left on voicemail: yes     Reason for Call: Other: Patient states he started his omnipod pump today.  States that he would like call back  Patient wants to know if he need needs a follow up visit.  Patient refused scheduling today.       Action Taken: Other: cde    Travel Screening: Not Applicable     Date of Service:

## 2025-07-21 ENCOUNTER — PATIENT OUTREACH (OUTPATIENT)
Dept: CARE COORDINATION | Facility: CLINIC | Age: 55
End: 2025-07-21
Payer: COMMERCIAL

## 2025-07-21 PROBLEM — R19.5 ABNORMAL FECES: Status: RESOLVED | Noted: 2025-03-18 | Resolved: 2025-07-21

## 2025-07-21 NOTE — ASSESSMENT & PLAN NOTE
He continues to follow with orthopedics and rheumatology for IgG4 related disease of his right knee.  He is currently on rituximab.  Will await recommendations of orthopedics and rheumatology.  He is hopeful that he will be able to proceed with knee replacement in the near future.    Continues to have chronic knee pain.

## 2025-07-21 NOTE — ASSESSMENT & PLAN NOTE
Lab Results   Component Value Date    A1C 10.6 04/29/2025    A1C 11.5 01/31/2025    A1C 10.0 11/06/2024    A1C 9.2 08/17/2024    A1C 9.4 05/04/2024     He follows with endocrinology and diabetes educator.  He has elected to proceed with an insulin pump.  He missed his appointment with diabetes education on 7/14/2025.  He has upcoming appointment on 8/11/25.      A1c was elevated at 10.6 on 4/29/2025.  He is due for repeat A1c.  He plans to restart the pump today.  Eye exam is up to date.      Last Comprehensive Metabolic Panel:  Lab Results   Component Value Date     (L) 06/19/2025    POTASSIUM 4.5 06/19/2025    CHLORIDE 102 06/19/2025    CO2 21 (L) 06/19/2025    ANIONGAP 10 06/19/2025     (H) 06/19/2025    BUN 19.2 06/19/2025    CR 1.70 (H) 06/19/2025    GFRESTIMATED 46 (L) 06/19/2025    AROLDO 8.3 (L) 06/19/2025     Renal function is stable over the past 3 months.  Creatinine 1.7 with GFR 46.  He follows with nephrology and has an upcoming appointment 9/11/2025.

## 2025-07-21 NOTE — ASSESSMENT & PLAN NOTE
He continues to follow with orthopedics and rheumatology.  He is currently on rituximab for IgG4 related disease.  He is on Bactrim for PJP prophylaxis.  He is hoping to proceed with knee replacement once A1c is under 7.  He has an upcoming appointment with orthopedics on 8/11/2025 and follow-up with rheumatology on 8/20/2025.    Continues to have chronic knee pain and swelling.  No erythema.

## 2025-07-21 NOTE — PROGRESS NOTES
Anemia Management Note - Reminder     Follow-up with anemia management service:    LVM for Bertrand, asking him to have labs done when at [facility] for appt on 072325 or he can go elsewhere if preferred        Latest Ref Rng & Units 3/5/2025 3/20/2025 4/3/2025 4/29/2025 6/6/2025 6/19/2025 6/23/2025   Anemia   HGB Goal  9 - 10  9 - 10   9 - 10  9 - 10   DIAN Dose  40mcg 40mcg   40mcg  40mcg   Hemoglobin 13.3 - 17.7 g/dL  9.4  11.0  10.3   9.1     TSAT 15 - 46 %      20     Ferritin 31 - 409 ng/mL      129         Data saved with a previous flowsheet row definition       Follow-up call date: 072425    Carrie Leopold, RN BSN  Anemia Services  [facility]  [email or web address]  Office: [phone]  Fax [phone]

## 2025-07-21 NOTE — ASSESSMENT & PLAN NOTE
Lab Results   Component Value Date    A1C 10.6 04/29/2025    A1C 11.5 01/31/2025    A1C 10.0 11/06/2024    A1C 9.2 08/17/2024    A1C 9.4 05/04/2024     His A1c has slightly improved.  He is working on getting insulin pump.  He had some difficulty keeping it on your leg and is now switching to abdomen.

## 2025-07-21 NOTE — ASSESSMENT & PLAN NOTE
Chronic kidney disease stage IIIa.  Follows with nephrology and has an upcoming appointment 9/11/2025.  Major cause of his chronic kidney disease is diabetic nephropathy with poorly controlled diabetes.  He plans to proceed with an insulin pump. Renal function has been stable.    Last Comprehensive Metabolic Panel:  Lab Results   Component Value Date     (L) 06/19/2025    POTASSIUM 4.5 06/19/2025    CHLORIDE 102 06/19/2025    CO2 21 (L) 06/19/2025    ANIONGAP 10 06/19/2025     (H) 06/19/2025    BUN 19.2 06/19/2025    CR 1.70 (H) 06/19/2025    GFRESTIMATED 46 (L) 06/19/2025    AROLDO 8.3 (L) 06/19/2025

## 2025-07-21 NOTE — ASSESSMENT & PLAN NOTE
Continue atorvastatin 40 mg daily and aspirin 81 mg daily.  LDL 52 September 2024.  Will plan to repeat lipids in the fall.    Lab Results   Component Value Date    CHOL 138 09/24/2024     Lab Results   Component Value Date    HDL 45 09/24/2024     Lab Results   Component Value Date    LDL 52 09/24/2024     Lab Results   Component Value Date    TRIG 206 09/24/2024

## 2025-07-21 NOTE — ASSESSMENT & PLAN NOTE
"Lab Results   Component Value Date    WBC 7.4 04/03/2025    WBC 3.9 10/08/2017     Lab Results   Component Value Date    RBC 4.31 04/03/2025    RBC 4.07 10/08/2017     Lab Results   Component Value Date    HGB 9.1 06/19/2025    HGB 12.1 10/08/2017     Lab Results   Component Value Date    HCT 29.6 06/19/2025    HCT 34.2 10/08/2017     No components found for: \"MCT\"  Lab Results   Component Value Date    MCV 87 06/19/2025    MCV 84 10/08/2017     Lab Results   Component Value Date    MCH 25.5 04/03/2025    MCH 29.7 10/08/2017     Lab Results   Component Value Date    MCHC 30.4 04/03/2025    MCHC 35.4 10/08/2017     Lab Results   Component Value Date    RDW 17.2 04/03/2025    RDW 13.4 10/08/2017     Lab Results   Component Value Date     04/03/2025     10/08/2017     Hemoglobin 9.1 on 6/19/2025. Iron 46.  Ferritin 129.  He follows with hematology and nephrology and is currently on Aranesp per nephrology.   Recommend to return to hematology due to persistent low hemoglobin under 10.  Referral placed today.    "

## 2025-07-21 NOTE — ASSESSMENT & PLAN NOTE
He is currently on oxycodone 5 mg every 6 hours as needed for severe pain.  He is followed by rheumatology and orthopedics.  He is hopeful to be able to proceed with knee replacement once his A1c is under 7.

## 2025-07-21 NOTE — ASSESSMENT & PLAN NOTE
Blood pressure is currently managed with amlodipine and bumetanide.    Blood pressure is elevated at 168/101.  Recheck on blood pressure remains elevated.  Blood pressure today is 198/110.  He declines an increase in the amlodipine dosage today.  States he has not taken his blood pressure medication today.  Recommend a nurse visit i next Tuesday.  To follow-up on his blood pressure.  If blood pressure remains elevated I would recommend increasing the amlodipine to 10 mg daily.    Last Comprehensive Metabolic Panel:  Lab Results   Component Value Date     (L) 06/19/2025    POTASSIUM 4.5 06/19/2025    CHLORIDE 102 06/19/2025    CO2 21 (L) 06/19/2025    ANIONGAP 10 06/19/2025     (H) 06/19/2025    BUN 19.2 06/19/2025    CR 1.70 (H) 06/19/2025    GFRESTIMATED 46 (L) 06/19/2025    AROLDO 8.3 (L) 06/19/2025

## 2025-07-21 NOTE — ASSESSMENT & PLAN NOTE
He follows in the vascular clinic and was last seen 6/18/2025.  Follow-up appointment with vascular in 7/23/2025.  Ulcer has healed on his foot.  Follow up vascular as needed.

## 2025-07-21 NOTE — ASSESSMENT & PLAN NOTE
He continues to follow with the diabetes educator but recently missed his appointment.  It is recommended he follow-up with the diabetes educator.  He is currently on Lantus and NovoLog.  Plans to proceed with insulin pump in the near future.

## 2025-07-21 NOTE — ASSESSMENT & PLAN NOTE
He follows with gastroenterology for chronic pancreatitis.  He is currently on Creon and Imodium as needed.  He was also started on cholestyramine at his visit in March.    Due for follow-up with gastroenterology.

## 2025-07-23 ENCOUNTER — OFFICE VISIT (OUTPATIENT)
Dept: VASCULAR SURGERY | Facility: CLINIC | Age: 55
End: 2025-07-23
Attending: NURSE PRACTITIONER
Payer: COMMERCIAL

## 2025-07-23 VITALS
HEART RATE: 75 BPM | RESPIRATION RATE: 18 BRPM | DIASTOLIC BLOOD PRESSURE: 83 MMHG | OXYGEN SATURATION: 99 % | SYSTOLIC BLOOD PRESSURE: 146 MMHG | TEMPERATURE: 97.6 F

## 2025-07-23 DIAGNOSIS — T23.221A PARTIAL THICKNESS BURN OF FINGER OF RIGHT HAND, INITIAL ENCOUNTER: ICD-10-CM

## 2025-07-23 DIAGNOSIS — I73.9 PERIPHERAL VASCULAR DISEASE, UNSPECIFIED: ICD-10-CM

## 2025-07-23 DIAGNOSIS — E11.621 TYPE 2 DIABETES MELLITUS WITH DIABETIC HEEL ULCER (H): ICD-10-CM

## 2025-07-23 DIAGNOSIS — I89.0 SECONDARY LYMPHEDEMA: ICD-10-CM

## 2025-07-23 DIAGNOSIS — I87.2 VENOUS INSUFFICIENCY: Primary | ICD-10-CM

## 2025-07-23 DIAGNOSIS — L97.409 TYPE 2 DIABETES MELLITUS WITH DIABETIC HEEL ULCER (H): ICD-10-CM

## 2025-07-23 DIAGNOSIS — I87.303 VENOUS HYPERTENSION OF BOTH LOWER EXTREMITIES: ICD-10-CM

## 2025-07-23 DIAGNOSIS — T23.231A 2ND DEGREE BURN OF MULTIPLE FINGERS OF RIGHT HAND NOT INCLUDING THUMB, INITIAL ENCOUNTER: ICD-10-CM

## 2025-07-23 DIAGNOSIS — T24.111A SUPERFICIAL BURN OF RIGHT THIGH, INITIAL ENCOUNTER: ICD-10-CM

## 2025-07-23 PROCEDURE — 97597 DBRDMT OPN WND 1ST 20 CM/<: CPT | Performed by: NURSE PRACTITIONER

## 2025-07-23 PROCEDURE — 3079F DIAST BP 80-89 MM HG: CPT | Performed by: NURSE PRACTITIONER

## 2025-07-23 PROCEDURE — 3077F SYST BP >= 140 MM HG: CPT | Performed by: NURSE PRACTITIONER

## 2025-07-23 PROCEDURE — 1126F AMNT PAIN NOTED NONE PRSNT: CPT | Performed by: NURSE PRACTITIONER

## 2025-07-23 ASSESSMENT — PAIN SCALES - GENERAL: PAINLEVEL_OUTOF10: NO PAIN (0)

## 2025-07-23 NOTE — PATIENT INSTRUCTIONS
Your wounds are all healed    No dressings needed    Slowly increase time up on your feet    Check your feet daily for wounds and blisters    No bathing restrictions    Take care!

## 2025-07-23 NOTE — TELEPHONE ENCOUNTER
Pt is requesting a call back at his number as he has a few questions that he wants to address before scheduling. Please advise.

## 2025-07-23 NOTE — TELEPHONE ENCOUNTER
Returned patient call and set up follow-up appointment.  Patient meeting with pump  again tomorrow.  Appointment set for 8/11/2025

## 2025-07-23 NOTE — PROGRESS NOTES
Follow up Vascular Visit       Date of Service:07/23/25      Chief Complaint: right heel ulcer      Pt returns to [facility] with regards to their right heel ulcer.  They arrive today alone; he walked to the exam room today with his regular shoes today; he is supposed to be NWB. They are currently using SSD; gauze; rolled gauze; coban to the wounds. This was not ordered supposed to be using Medihoney on this. This is being done by the patient daily. They are using nothing for compression. They are feeling well today. Denies fevers, chills. No shortness of breath.     Allergies:   Allergies   Allergen Reactions    Vancomycin      Other Reaction(s): Renal Failure    Vancomycin-induced nephrotoxicity (11/2023, outside hospital)    Seasonal Allergies      HAYFEVER:    -Watery Eyes  -Eye burn    Daptomycin Rash     Likely delayed hypersensitivity reaction to daptomycin 11/2023       Medications:   Current Outpatient Medications:     ACCU-CHEK GUIDE TEST test strip, , Disp: , Rfl:     amLODIPine (NORVASC) 5 MG tablet, Take 1 tablet (5 mg) by mouth daily., Disp: 90 tablet, Rfl: 3    atorvastatin (LIPITOR) 10 MG tablet, , Disp: , Rfl:     atorvastatin (LIPITOR) 40 MG tablet, , Disp: , Rfl:     bumetanide (BUMEX) 1 MG tablet, TAKE 1 TABLET(1 MG) BY MOUTH DAILY, Disp: 90 tablet, Rfl: 0    cetirizine (ZYRTEC) 10 MG tablet, Take 1 tablet (10 mg) by mouth daily., Disp: 30 tablet, Rfl: 11    cholestyramine light (QUESTRAN) 4 GM packet, Take 1 packet by mouth every 12 hours., Disp: , Rfl:     Continuous Glucose Sensor (DEXCOM G7 SENSOR) MISC, CHANGE EVERY 10 DAYS, Disp: 3 each, Rfl: 2    Continuous Glucose Transmitter (DEXCOM G6 TRANSMITTER) MISC, , Disp: , Rfl:     darbepoetin sunshine-polysorbate (ARANESP) 40 MCG/0.4ML injection, Inject 0.4 mLs (40 mcg) subcutaneously every 14 days. Do not shake. Administer for Hgb <10.0. HOLD dose if systolic BP >180., Disp: 0.8 mL, Rfl: 11    ferrous gluconate (FERGON)  "324 (38 Fe) MG tablet, Take 1 tablet (324 mg) by mouth every other day., Disp: 45 tablet, Rfl: 3    gabapentin (NEURONTIN) 300 MG capsule, TAKE 1 CAPSULE(300 MG) BY MOUTH TWICE DAILY, Disp: 60 capsule, Rfl: 2    Glucagon, rDNA, (GLUCAGON EMERGENCY) 1 MG KIT, Inject 1 mg into the muscle as needed., Disp: , Rfl:     glucose 40 % (400 mg/mL) gel, Take by mouth every hour as needed., Disp: , Rfl:     insulin aspart (NOVOLOG FLEXPEN) 100 UNIT/ML pen, Inject 1-5 Units subcutaneously 3 times daily (with meals). In addition to 6-8 units with each meal, inject additional units as per this sliding scale: If 200-250 = 1  251-300 = 2  301-350 = 3 351-400 = 4 401+ = 5 Repeat BS after 3 hours and call MD if still over 400, Disp: , Rfl:     Insulin Disposable Pump (OMNIPOD 5 G7 INTRO, GEN 5,) KIT, 1 each daily., Disp: 1 kit, Rfl: 0    Insulin Disposable Pump (OMNIPOD 5 G7 PODS, GEN 5,) MISC, 1 each every 3 days., Disp: 30 each, Rfl: 3    insulin glargine (LANTUS PEN) 100 UNIT/ML pen, Currently at 20 units will be increasing up to max dose 30 units, Disp: 30 mL, Rfl: 2    insulin NPH (HUMULIN N VIAL) 100 UNIT/ML vial, INJECT 13 UNITS AT THE START OF YOUR STEROID INFUSION AT CLINIC WHICH IS SCHEDULED FOR 12/4/24, Disp: , Rfl:     insulin pen needle (31G X 6 MM) 31G X 6 MM miscellaneous, Use 4 pen needles daily or as directed., Disp: 100 each, Rfl: 11    insulin syringe-needle U-100 (29G X 1/2\" 0.5 ML) 29G X 1/2\" 0.5 ML miscellaneous, Use one syringe as directed prior to steroid infusion., Disp: 10 each, Rfl: 0    lipase-protease-amylase (CREON 36) [id number] units CPEP, Take 3 capsules by mouth 3 times daily (with meals). 0730, 1130, 1630, Disp: , Rfl:     loperamide (IMODIUM) 2 MG capsule, Take 4 mg by mouth every 8 hours as needed for diarrhea., Disp: , Rfl:     methocarbamol (ROBAXIN) 500 MG tablet, Take 1 tablet (500 mg) by mouth every 8 hours as needed for muscle spasms., Disp: 30 tablet, Rfl: 3    naloxone (NARCAN) 4 " MG/0.1ML nasal spray, Spray 1 spray (4 mg) into one nostril alternating nostrils once as needed for opioid reversal. every 2-3 minutes until assistance arrives, Disp: 0.2 mL, Rfl: 0    oxyCODONE (ROXICODONE) 5 MG tablet, Take 1 tablet (5 mg) by mouth every 6 hours as needed for pain., Disp: 30 tablet, Rfl: 0    polyethylene glycol (MIRALAX) 17 GM/Dose powder, , Disp: , Rfl:     silver sulfADIAZINE (SILVADENE) 1 % external cream, Apply topically 2 times daily., Disp: 400 g, Rfl: 2    sulfamethoxazole-trimethoprim (BACTRIM) 400-80 MG tablet, Take 1 tablet by mouth daily., Disp: 90 tablet, Rfl: 5    triamcinolone (KENALOG) 0.1 % external ointment, Apply topically., Disp: , Rfl:     XDEMVY 0.25 % SOLN, , Disp: , Rfl:     Current Facility-Administered Medications:     lidocaine (XYLOCAINE) 5 % ointment, , Topical, Daily PRN, Marina Fernandez, NP, Given at 06/18/25 1409    History:   Past Medical History:   Diagnosis Date    Abnormal CXR 10/15/2024    Previous CT of the chest completed at [facility] in December 2023 demonstrated:   Impression:   1. Severe anasarca with moderate to large pleural effusions and a small pericardial effusion. Diffuse septal thickening in the lungs concerning for pulmonary edema. Anemic cardiac blood pool.   2. Solitary pulmonary nodule in the left lower lobe with an average diameter of 10 mm is indeterminat    Abnormal gait 01/12/2024    Abnormal LFTs 01/03/2025    Acidosis 01/08/2020    Acquired absence of organ 01/12/2024    Acquired absence of other right toe(s) 01/12/2024    Acute metabolic encephalopathy 09/17/2024    Acute pain of right knee 07/12/2024    Acute pulmonary edema (H) 01/11/2024    Acute renal insufficiency 05/03/2024    Last Comprehensive Metabolic Panel:          Lab Results      Component    Value    Date           NA    131 (L)    10/15/2024           POTASSIUM    4.0    10/15/2024           CHLORIDE    98    10/15/2024           CO2    24    10/15/2024            ANIONGAP    9    10/15/2024           GLC    133 (A)    10/23/2024           BUN    22.3 (H)    10/15/2024           CR    1.35 (H)    10/15/2024        Alcohol abuse 09/12/2022    Alcohol dependence in remission (H) 10/29/2024    Allergic rhinitis     Anemia     Anemia, unspecified type 05/03/2024    He has a history of anemia which has been noted since May 2024.  Cause has not been identified.  Additional evaluation with CBC, retake count, TSH, haptoglobin, iron studies, LDH, ferritin, B12 and folate.  Ferritin elevated 418, iron low at 20, iron binding capacity low at 2030, iron saturation low at 9.  Normal B12 and folate.  Awaiting haptoglobin and reticulocyte count.  Denies blood in stool.    Anxiety disorder, unspecified 01/11/2024    Arthritis     Arthritis due to other bacteria, right knee (H) 08/19/2024    Atherosclerotic heart disease of native coronary artery without angina pectoris 01/11/2024    Bacterial arthritis (H) 08/19/2024    Bell's palsy     Bilateral lower extremity edema 12/18/2024    Cardiac calcification (H) 01/19/2023    Treatment:  Colestid 1 g tablet.  Aspirin 81 mg daily.      Cervical disc disease with myelopathy 05/01/2022    Formatting of this note might be different from the original.   seen spine specialist- [facility]  Formatting of this note might be different from the original.   seen spine specialist      Cervical radiculopathy 07/11/2024    Cervicalgia     Change or removal of drains 08/26/2024    Chronic bilateral low back pain with bilateral sciatica 04/08/2022    Chronic diarrhea 03/08/2022    Chronic kidney disease 01/11/2024    Chronic kidney disease, unspecified 01/11/2024    Chronic pancreatitis (H) 09/06/2024    Due to continued right knee swelling and history of chronic pancreatitis with chronic diarrhea, he was referred to GI to review possible Whipple's disease.     10/18/2024 seen by [facility].  We do not have those records and will request those records to  be faxed over.  Once I get those records I will review them with recommendations.  Per patient he is to follow-up in 6 months.      Diabetes (H)     Diabetic foot ulcer (H)     Difficulty in walking, not elsewhere classified 07/19/2024    Encounter to establish care 09/06/2024    Exocrine pancreatic insufficiency 05/03/2024    He is followed by gastroenterology at [facility].  Known pancreatic atrophy with calcification and low pancreatic elastase levels in the setting of history of chronic alcohol use.  Currently managed with loperamide and Creon.  He did not take the Colestid due to difficulty swallowing the pill.       3/21/2025 seen by gastroenterology.  Continue Creon.  Imodium 1 to 2 pills every 6 hours as neede    Generalized edema     GERD (gastroesophageal reflux disease) 05/03/2024    High risk medication use 02/11/2025 12/24:  On rituxan for IG4 related disease.      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  Started rituximab infusions 12/4/2024.      Ketosis (H) 03/08/2022    Long term (current) use of insulin (H) 08/19/2024    Major depressive disorder, recurrent episode, mild     Major depressive disorder, recurrent, mild 08/19/2024    Mild recurrent major depression 08/19/2024    Muscle wasting and atrophy, not elsewhere classified, multiple sites 04/01/2024    Muscle weakness (generalized) 01/12/2024    Need for assistance with personal care 01/12/2024    Osteomyelitis of great toe of right foot (H) 11/22/2023    Other abnormalities of gait and mobility 01/12/2024    Other acute osteomyelitis, right ankle and foot (H)     Other chronic pancreatitis (H)     Other fatigue 11/14/2024    Other hyperlipidemia 03/15/2022    Other polyosteoarthritis     Peripheral  "vascular disease, unspecified 08/19/2024    Pneumonia of left lower lobe due to infectious organism 09/11/2024    Preventative health care 03/03/2025    Colon cancer screen:     Due May 2034.  Immunizations: Due for DTaP, hepatitis A and COVID-vaccine.  Lipids: Due for lipids.  Glucose: A1c due April 2025.  Dexa: Not indicated.      Primary hypertension 10/09/2019    Pyogenic arthritis of right knee joint, due to unspecified organism (H) 07/11/2024    Right knee inflammatory arthritis, etiology to be determined, s/p I&D Jul 11, Aug 18, 2024.         Brucella, Bartonella, Q fever, CD4, Ig levels.     MRI right knee with and without contrast if negative.     Rheumatology and GI consult.     Complete oral ciprofloxacin course.      Admitted to [facility] 8/16 to 8/19/2024.  \"Has been seen a handful of times and outpatient follow-up with persistent right    Right knee pain 07/11/2024    S/P transmetatarsal amputation of foot, right (H) 12/01/2023     s/p transmetatarsal amputation, right foot (DOS 1/9/24)      Severe episode of recurrent major depressive disorder, without psychotic features (H) 01/19/2023    Solitary pulmonary nodule     Stage 3a chronic kidney disease (H) 01/11/2024    Systemic vasculitis (H) 11/19/2024    Type 2 diabetes mellitus with foot ulcer (H) 01/11/2024    Type 2 diabetes mellitus with hyperosmolarity without coma, with long-term current use of insulin (H) 10/29/2024    Type 2 diabetes mellitus with kidney complication, with long-term current use of insulin (H) 10/05/2018    Seen by diabetes educator 10/29/2024:   PLAN  Lantus: 12 units  Set dose Novolog at meals: 3 units each + Correction Scale below     Pre-Meal Correction Scale:        If pre-meal glucose is:    Add extra Humalog/Novolog to your meal dose per below chart:      151 - 200    +1 unit      201 - 250    +2 units      251 - 300    +3 units      301 - 350    +4 units      351 - 400     +5 units      Over     Ulcer of right heel and " midfoot (H) 12/23/2024    Unintentional weight loss 10/11/2023    Vitamin D deficiency, unspecified 01/11/2024       Physical Exam:    BP (!) 146/83   Pulse 75   Temp 97.6  F (36.4  C) (Oral)   Resp 18   SpO2 99%     General:  Patient presents to clinic in no apparent distress.  Head: normocephalic atraumatic  Psychiatric:  Alert and oriented x3.   Respiratory: unlabored breathing; no cough  Integumentary:  Skin is uniformly warm, dry and pink.    Ulcer #1 Location: right posterior heel  Size: 1.5L x 1W x 0depth.  No sinus tract present, Wound base: eschar; this was removed and intact underneath  no undermining present. Ulcer is healed thickness. There is no drainage. Periwound: no denudement, erythema, induration, maceration or warmth.      VASC Wound Right heel (Active)   Pre Size Length 1.5 07/23/25 1300   Pre Size Width 1 07/23/25 1300   Pre Size Depth 0 07/23/25 1300   Pre Total Sq cm 1.5 07/23/25 1300   Post Size Length 6 02/25/25 1400   Post Size Width 4 02/25/25 1400   Post Size Depth 0.1 02/25/25 1400   Post Total Sq cm 24 02/25/25 1400   Number of days: 189       VASC Wound Left hand (Active)   Pre Size Length 0 04/02/25 1300   Pre Size Width 0 04/02/25 1300   Pre Size Depth 0 04/02/25 1300   Pre Total Sq cm 0 04/02/25 1300   Post Size Length 1 02/25/25 1400   Post Size Width 3 02/25/25 1400   Post Size Depth 0.1 02/25/25 1400   Post Total Sq cm 3 02/25/25 1400   Description healed 06/18/25 1402   Number of days: 189       VASC Wound Left heel (Active)   Pre Size Length 0.8 04/22/25 1400   Pre Size Width 0.4 04/22/25 1400   Pre Size Depth 0.1 04/22/25 1400   Pre Total Sq cm 0.32 04/22/25 1400   Post Size Length 0 05/20/25 1443   Post Size Width 0 05/20/25 1443   Post Size Depth 0 05/20/25 1443   Post Total Sq cm 0 05/20/25 1443   Undermined 0 05/20/25 1443   Description healed 06/18/25 1402   Number of days: 112       VASC Wound R dorsal thigh (Active)   Pre Size Length 1.4 05/20/25 1443   Pre Size  "Width 1.4 05/20/25 1443   Pre Size Depth 0.1 05/20/25 1443   Pre Total Sq cm 1.96 05/20/25 1443   Description healed 06/18/25 1402   Number of days: 64       VASC Wound R pointer finger (Active)   Pre Size Length 3.5 05/20/25 1443   Pre Size Width 1.2 05/20/25 1443   Pre Size Depth 0.1 05/20/25 1443   Pre Total Sq cm 4.2 05/20/25 1443   Description healed 06/18/25 1402   Number of days: 64       VASC Wound R middle finger (Active)   Pre Size Length 5.2 05/20/25 1443   Pre Size Width 1.2 05/20/25 1443   Pre Size Depth 0.1 05/20/25 1443   Pre Total Sq cm 6.24 05/20/25 1443   Description healed 06/18/25 1402   Number of days: 64       VASC Wound R ring finger (Active)   Pre Size Length 2.1 05/20/25 1443   Pre Size Width 0.5 05/20/25 1443   Pre Size Depth 0.1 05/20/25 1443   Pre Total Sq cm 1.05 05/20/25 1443   Description healed 06/18/25 1402   Number of days: 64       VASC Wound R pinky finger (Active)   Pre Size Length 1.2 05/20/25 1443   Pre Size Width 0.4 05/20/25 1443   Pre Size Depth 0.1 05/20/25 1443   Pre Total Sq cm 0.48 05/20/25 1443   Description healed 06/18/25 1402   Number of days: 64       Incision/Surgical Site 08/17/24 Anterior;Right Knee (Active)   Number of days: 340            Circumferential volume measures:          1/15/2025     1:00 PM 1/29/2025     2:00 PM 6/18/2025     2:00 PM   Circumferential Measures   Right Ankle 19 22 19.5   Right Widest Calf 30 32 30.8   Right Knee to Ankle 17.5     Left - just above MTP 21     Left Ankle 30         Labs:    I personally reviewed the following lab results today and those on care everywhere    No results found for: \"CRP\"   Erythrocyte Sedimentation Rate   Date Value Ref Range Status   02/19/2025 51 (H) 0 - 20 mm/hr Final      Last Renal Panel:  Sodium   Date Value Ref Range Status   06/19/2025 133 (L) 135 - 145 mmol/L Final   10/08/2017 131 (L) 133 - 144 mmol/L Final     Potassium   Date Value Ref Range Status   06/19/2025 4.5 3.4 - 5.3 mmol/L Final "   08/06/2022 2.9 (L) 3.4 - 5.3 mmol/L Final   10/08/2017 3.8 3.4 - 5.3 mmol/L Final     Chloride   Date Value Ref Range Status   06/19/2025 102 98 - 107 mmol/L Final   08/06/2022 97 94 - 109 mmol/L Final   10/08/2017 95 94 - 109 mmol/L Final     Carbon Dioxide   Date Value Ref Range Status   10/08/2017 28 20 - 32 mmol/L Final     Carbon Dioxide (CO2)   Date Value Ref Range Status   06/19/2025 21 (L) 22 - 29 mmol/L Final   08/06/2022 24 20 - 32 mmol/L Final     Anion Gap   Date Value Ref Range Status   06/19/2025 10 7 - 15 mmol/L Final   08/06/2022 13 3 - 14 mmol/L Final   10/08/2017 8 3 - 14 mmol/L Final     Glucose   Date Value Ref Range Status   06/19/2025 170 (H) 70 - 99 mg/dL Final   10/23/2024 133 (A) 70 - 99 mg/dL Final     Urea Nitrogen   Date Value Ref Range Status   06/19/2025 19.2 6.0 - 20.0 mg/dL Final   08/06/2022 12 7 - 30 mg/dL Final   10/08/2017 9 7 - 30 mg/dL Final     Creatinine   Date Value Ref Range Status   06/19/2025 1.70 (H) 0.67 - 1.17 mg/dL Final   10/08/2017 0.68 0.66 - 1.25 mg/dL Final     GFR Estimate   Date Value Ref Range Status   06/19/2025 46 (L) >60 mL/min/1.73m2 Final     Comment:     eGFR calculated using 2021 CKD-EPI equation.   10/08/2017 >90 >60 mL/min/1.7m2 Final     Comment:     Non  GFR Calc     GFR, ESTIMATED POCT   Date Value Ref Range Status   07/09/2024 41 (L) >60 mL/min/1.73m2 Final     Calcium   Date Value Ref Range Status   06/19/2025 8.3 (L) 8.8 - 10.4 mg/dL Final   10/08/2017 9.5 8.5 - 10.1 mg/dL Final     Phosphorus   Date Value Ref Range Status   04/03/2025 3.8 2.5 - 4.5 mg/dL Final     Albumin   Date Value Ref Range Status   04/03/2025 3.7 3.5 - 5.2 g/dL Final   08/06/2022 3.0 (L) 3.4 - 5.0 g/dL Final   10/08/2017 3.4 3.4 - 5.0 g/dL Final      Lab Results   Component Value Date    WBC 7.4 04/03/2025    WBC 3.9 10/08/2017     Lab Results   Component Value Date    RBC 4.31 04/03/2025    RBC 4.07 10/08/2017     Lab Results   Component Value Date    HGB  "9.1 06/19/2025    HGB 12.1 10/08/2017     Lab Results   Component Value Date    HCT 29.6 06/19/2025    HCT 34.2 10/08/2017     No components found for: \"MCT\"  Lab Results   Component Value Date    MCV 87 06/19/2025    MCV 84 10/08/2017     Lab Results   Component Value Date    MCH 25.5 04/03/2025    MCH 29.7 10/08/2017     Lab Results   Component Value Date    MCHC 30.4 04/03/2025    MCHC 35.4 10/08/2017     Lab Results   Component Value Date    RDW 17.2 04/03/2025    RDW 13.4 10/08/2017     Lab Results   Component Value Date     04/03/2025     10/08/2017      Lab Results   Component Value Date    A1C 10.6 04/29/2025    A1C 11.5 01/31/2025    A1C 10.0 11/06/2024    A1C 9.2 08/17/2024    A1C 9.4 05/04/2024      TSH   Date Value Ref Range Status   03/09/2022 1.80 0.40 - 4.00 mU/L Final      No results found for: \"VITDT\"                Impression:  Encounter Diagnoses   Name Primary?    Venous insufficiency Yes    Secondary lymphedema     Venous hypertension of both lower extremities     Type 2 diabetes mellitus with diabetic heel ulcer (H)     Peripheral vascular disease, unspecified     2nd degree burn of multiple fingers of right hand not including thumb, initial encounter     Superficial burn of right thigh, initial encounter     Partial thickness burn of finger of right hand, initial encounter           7/23/2025 right heel           Are any of these ulcers new today: No; Location: na    Assessment/Plan:          1. Debridement: Nursing staff removed the old dressing and cleanse the wound(s) with specified solution. After discussion of risk factors and verbal consent was obtained 2% Lidocaine HCL jelly was applied, under clean conditions, the right heel ulceration(s) were debrided using currette. Devitalized and nonviable tissue, along with any fibrin and slough, was removed to improve granulation tissue formation, stimulate wound healing, decrease overall bacteria load, disrupt biofilm formation and " decrease edge senescence.  Total excisional debridement was 1.5 sq cm from the epidermis/dermis area with a depth of 0 cm.   Ulcers were improved afterwards and .  Measures were healed underneath.       2.  Ulcer treatment: ulcer treatment will include irrigation and dressings to promote autolytic debridement which will include:no dressing; lotion daily If for some reason the patient is not able to get their dressing(s) changed as outlined above (due to illness, lack of supplies, lack of help) please do the following: remove old, soiled dressings; wash the ulcers with saline; pat dry; apply ABD pad or other absorbant pad and secure with rolled gauze; avoid tape directly on your skin; patient instructed to call the clinic as soon as possible to let us know what the current issues are in receiving ulcer care. Stable            3. Edema: none.            4. Nutrition: focus on diabetic control           5. Offloading: ok to slowly increase walking          6. Wound Etiology: diabetic     Patient will follow up with me in prn weeks for reevaluation. They were instructed to call the clinic sooner with any signs or symptoms of infection or any further questions/concerns. Answered all questions.          Marina Fernandez DNP, RN, CNP, CWOCN, CFCN, CLT  [facility]   [phone]        This note was electronically signed by Marina Fernandez NP

## 2025-07-24 ENCOUNTER — MYC MEDICAL ADVICE (OUTPATIENT)
Dept: FAMILY MEDICINE | Facility: CLINIC | Age: 55
End: 2025-07-24

## 2025-07-24 ENCOUNTER — OFFICE VISIT (OUTPATIENT)
Dept: FAMILY MEDICINE | Facility: CLINIC | Age: 55
End: 2025-07-24
Payer: COMMERCIAL

## 2025-07-24 ENCOUNTER — TELEPHONE (OUTPATIENT)
Dept: FAMILY MEDICINE | Facility: CLINIC | Age: 55
End: 2025-07-24

## 2025-07-24 VITALS
DIASTOLIC BLOOD PRESSURE: 110 MMHG | HEIGHT: 65 IN | HEART RATE: 78 BPM | SYSTOLIC BLOOD PRESSURE: 198 MMHG | TEMPERATURE: 98 F | WEIGHT: 126.3 LBS | BODY MASS INDEX: 21.04 KG/M2 | RESPIRATION RATE: 12 BRPM | OXYGEN SATURATION: 100 %

## 2025-07-24 DIAGNOSIS — N18.30 ANEMIA OF CHRONIC RENAL FAILURE, STAGE 3 (MODERATE), UNSPECIFIED WHETHER STAGE 3A OR 3B CKD (H): ICD-10-CM

## 2025-07-24 DIAGNOSIS — E11.621 TYPE 2 DIABETES MELLITUS WITH FOOT ULCER, WITH LONG-TERM CURRENT USE OF INSULIN (H): ICD-10-CM

## 2025-07-24 DIAGNOSIS — M00.9 PYOGENIC ARTHRITIS OF RIGHT KNEE JOINT, DUE TO UNSPECIFIED ORGANISM (H): ICD-10-CM

## 2025-07-24 DIAGNOSIS — L97.509 TYPE 2 DIABETES MELLITUS WITH FOOT ULCER, WITH LONG-TERM CURRENT USE OF INSULIN (H): ICD-10-CM

## 2025-07-24 DIAGNOSIS — Z79.4 TYPE 2 DIABETES MELLITUS WITH HYPEROSMOLARITY WITHOUT COMA, WITH LONG-TERM CURRENT USE OF INSULIN (H): ICD-10-CM

## 2025-07-24 DIAGNOSIS — Z79.4 TYPE 2 DIABETES MELLITUS WITH STAGE 3A CHRONIC KIDNEY DISEASE, WITH LONG-TERM CURRENT USE OF INSULIN (H): ICD-10-CM

## 2025-07-24 DIAGNOSIS — E11.22 TYPE 2 DIABETES MELLITUS WITH STAGE 3A CHRONIC KIDNEY DISEASE, WITH LONG-TERM CURRENT USE OF INSULIN (H): ICD-10-CM

## 2025-07-24 DIAGNOSIS — N18.31 TYPE 2 DIABETES MELLITUS WITH STAGE 3A CHRONIC KIDNEY DISEASE, WITH LONG-TERM CURRENT USE OF INSULIN (H): ICD-10-CM

## 2025-07-24 DIAGNOSIS — N18.31 STAGE 3A CHRONIC KIDNEY DISEASE (H): ICD-10-CM

## 2025-07-24 DIAGNOSIS — K86.0 ALCOHOL-INDUCED CHRONIC PANCREATITIS (H): ICD-10-CM

## 2025-07-24 DIAGNOSIS — F10.21 ALCOHOL DEPENDENCE IN REMISSION (H): Primary | ICD-10-CM

## 2025-07-24 DIAGNOSIS — I51.5 CARDIAC CALCIFICATION (H): ICD-10-CM

## 2025-07-24 DIAGNOSIS — D64.9 ANEMIA, UNSPECIFIED TYPE: ICD-10-CM

## 2025-07-24 DIAGNOSIS — D63.1 ANEMIA OF CHRONIC RENAL FAILURE, STAGE 3 (MODERATE), UNSPECIFIED WHETHER STAGE 3A OR 3B CKD (H): ICD-10-CM

## 2025-07-24 DIAGNOSIS — D89.84 IGG4 RELATED DISEASE (H): ICD-10-CM

## 2025-07-24 DIAGNOSIS — I10 PRIMARY HYPERTENSION: ICD-10-CM

## 2025-07-24 DIAGNOSIS — Z79.4 LONG TERM (CURRENT) USE OF INSULIN (H): ICD-10-CM

## 2025-07-24 DIAGNOSIS — Z79.4 TYPE 2 DIABETES MELLITUS WITH FOOT ULCER, WITH LONG-TERM CURRENT USE OF INSULIN (H): ICD-10-CM

## 2025-07-24 DIAGNOSIS — F11.20 CONTINUOUS OPIOID DEPENDENCE (H): ICD-10-CM

## 2025-07-24 DIAGNOSIS — E11.00 TYPE 2 DIABETES MELLITUS WITH HYPEROSMOLARITY WITHOUT COMA, WITH LONG-TERM CURRENT USE OF INSULIN (H): ICD-10-CM

## 2025-07-24 DIAGNOSIS — L97.419 ULCER OF RIGHT HEEL AND MIDFOOT, UNSPECIFIED ULCER STAGE (H): ICD-10-CM

## 2025-07-24 LAB
EST. AVERAGE GLUCOSE BLD GHB EST-MCNC: 206 MG/DL
HBA1C MFR BLD: 8.8 % (ref 0–5.6)
HCT VFR BLD AUTO: 36 % (ref 40–53)
HGB BLD-MCNC: 11.5 G/DL (ref 13.3–17.7)
MCV RBC AUTO: 86 FL (ref 78–100)

## 2025-07-24 RX ORDER — ACYCLOVIR 400 MG/1
TABLET ORAL
Qty: 3 EACH | Refills: 2 | Status: CANCELLED | OUTPATIENT
Start: 2025-07-24

## 2025-07-24 NOTE — TELEPHONE ENCOUNTER
Note: Due to record-high volumes, our turn-around time is taking longer than usual . We are currently 3  business days behind in the pools.   We are working diligently to submit all requests in a timely manner and in the order they are received. Please only flag TRUE URGENT requests as high priority to the pool at this time.   If you have questions on status of PA's,  please send a note/message in the active PA encounter and send back to the [facility] PA pool [[phone]].    If you have questions about the turn-around time or about our process, please reach out to our supervisor Magda Holman.   Thank you!   RPPA (Retail Pharmacy Prior Authorization) team      Retail Pharmacy Prior Authorization Team   Phone: [phone]    PA Initiation    Medication: DEXCOM G7 SENSOR MISC  Insurance Company: Paid/[employer] ([employer]) - Phone [phone] Fax [phone]  Pharmacy Filling the Rx: [id number] [address] AT [facility] [address]  Filling Pharmacy Phone: [phone]  Filling Pharmacy Fax:    Start Date: 7/24/2025    Started PA on CMM and a response of Drug is covered by current benefit plan. No further PA activity needed.  Ran test claim and received a paid claim and copay was zero.  Called pharmacy, they tried to run again and received PA needed.  Verified insurance, pharmacy was using something different, they switched to [employer] and received a paid claim.  They have order them in for tomorrow.  **Instructed pharmacy to notify patient when script is ready to /ship.**

## 2025-07-24 NOTE — TELEPHONE ENCOUNTER
Prior Authorization Retail Medication Request    PATIENT NEEDING THIS TODAY, STATED HE INITIATED IT AHEAD OF TIME, BUT IT ISN'T COMPLETED YET, HE IS SEEING PCP TODAY FOR AN APPT.     Medication/Dose:   Continuous Glucose Sensor (DEXCOM G7 SENSOR) MISC   Diagnosis and ICD code (if different than what is on RX):    New/renewal/insurance change PA/secondary ins. PA:  Previously Tried and Failed:    Rationale:      Primary Visit Coverage    Payer Plan Sponsor Code Group Number Group Name   MEDICA MED[place]ESS [place] [id number]      Primary Visit Coverage Subscriber    ID Name Banner Address   [id number] ENE[address]O         [id number] [address] AT [facility] OF [address]       Clinic Information  Preferred routing pool for dept communication: [facility]

## 2025-07-24 NOTE — PROGRESS NOTES
"The longitudinal plan of care for the diagnosis(es)/condition(s) as documented were addressed during this visit. Due to the added complexity in care, I will continue to support Long in the subsequent management and with ongoing continuity of care.    Assessment & Plan   Problem List Items Addressed This Visit       Anemia, unspecified type    Lab Results   Component Value Date    WBC 7.4 04/03/2025    WBC 3.9 10/08/2017     Lab Results   Component Value Date    RBC 4.31 04/03/2025    RBC 4.07 10/08/2017     Lab Results   Component Value Date    HGB 9.1 06/19/2025    HGB 12.1 10/08/2017     Lab Results   Component Value Date    HCT 29.6 06/19/2025    HCT 34.2 10/08/2017     No components found for: \"MCT\"  Lab Results   Component Value Date    MCV 87 06/19/2025    MCV 84 10/08/2017     Lab Results   Component Value Date    MCH 25.5 04/03/2025    MCH 29.7 10/08/2017     Lab Results   Component Value Date    MCHC 30.4 04/03/2025    MCHC 35.4 10/08/2017     Lab Results   Component Value Date    RDW 17.2 04/03/2025    RDW 13.4 10/08/2017     Lab Results   Component Value Date     04/03/2025     10/08/2017     Hemoglobin 9.1 on 6/19/2025. Iron 46.  Ferritin 129.  He follows with hematology and nephrology and is currently on Aranesp per nephrology.   Recommend to return to hematology due to persistent low hemoglobin under 10.  Referral placed today.           Relevant Orders    Adult Oncology/Hematology  Referral    Cardiac calcification (H)    Continue atorvastatin 40 mg daily and aspirin 81 mg daily.  LDL 52 September 2024.  Will plan to repeat lipids in the fall.    Lab Results   Component Value Date    CHOL 138 09/24/2024     Lab Results   Component Value Date    HDL 45 09/24/2024     Lab Results   Component Value Date    LDL 52 09/24/2024     Lab Results   Component Value Date    TRIG 206 09/24/2024                Primary hypertension    Blood pressure is currently managed with amlodipine and " bumetanide.    Blood pressure is elevated at 168/101.  Recheck on blood pressure remains elevated.  He declines an increase in the amlodipine dosage today.  States he has not taken his blood pressure medication today.  Recommend a nurse visit in 2 weeks to follow-up on his blood pressure.  If blood pressure remains elevated I would recommend increasing the amlodipine to 10 mg daily.    Last Comprehensive Metabolic Panel:  Lab Results   Component Value Date     (L) 06/19/2025    POTASSIUM 4.5 06/19/2025    CHLORIDE 102 06/19/2025    CO2 21 (L) 06/19/2025    ANIONGAP 10 06/19/2025     (H) 06/19/2025    BUN 19.2 06/19/2025    CR 1.70 (H) 06/19/2025    GFRESTIMATED 46 (L) 06/19/2025    AROLDO 8.3 (L) 06/19/2025                Type 2 diabetes mellitus with kidney complication, with long-term current use of insulin (H)    Lab Results   Component Value Date    A1C 10.6 04/29/2025    A1C 11.5 01/31/2025    A1C 10.0 11/06/2024    A1C 9.2 08/17/2024    A1C 9.4 05/04/2024     He follows with endocrinology and diabetes educator.  He has elected to proceed with an insulin pump.  He missed his appointment with diabetes education on 7/14/2025.  He has upcoming appointment on 8/11/25.      A1c was elevated at 10.6 on 4/29/2025.  He is due for repeat A1c.  He plans to restart the pump today.  Eye exam is up to date.      Last Comprehensive Metabolic Panel:  Lab Results   Component Value Date     (L) 06/19/2025    POTASSIUM 4.5 06/19/2025    CHLORIDE 102 06/19/2025    CO2 21 (L) 06/19/2025    ANIONGAP 10 06/19/2025     (H) 06/19/2025    BUN 19.2 06/19/2025    CR 1.70 (H) 06/19/2025    GFRESTIMATED 46 (L) 06/19/2025    AROLDO 8.3 (L) 06/19/2025     Renal function is stable over the past 3 months.  Creatinine 1.7 with GFR 46.  He follows with nephrology and has an upcoming appointment 9/11/2025.           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  He is currently on rituximab.  Will await recommendations of orthopedics and rheumatology.  He is hopeful that he will be able to proceed with knee replacement in the near future.    Continues to have chronic knee pain.         Chronic pancreatitis (H)    He follows with gastroenterology for chronic pancreatitis.  He is currently on Creon and Imodium as needed.  He was also started on cholestyramine at his visit in March.    Due for follow-up with gastroenterology.           Alcohol dependence in remission (H) - Primary    Type 2 diabetes mellitus with hyperosmolarity without coma, with long-term current use of insulin (H)    Lab Results   Component Value Date    A1C 10.6 04/29/2025    A1C 11.5 01/31/2025    A1C 10.0 11/06/2024    A1C 9.2 08/17/2024    A1C 9.4 05/04/2024     His A1c has slightly improved.  He is working on getting insulin pump.  He had some difficulty keeping it on your leg and is now switching to abdomen.           Relevant Orders    Hemoglobin A1c    IgG4 related disease (H)    He continues to follow with orthopedics and rheumatology.  He is currently on rituximab for IgG4 related disease.  He is on Bactrim for PJP prophylaxis.  He is hoping to proceed with knee replacement once A1c is under 7.  He has an upcoming appointment with orthopedics on 8/11/2025 and follow-up with rheumatology on 8/20/2025.    Continues to have chronic knee pain and swelling.  No erythema.           Ulcer of right heel and midfoot (H)    He follows in the vascular clinic and was last seen 6/18/2025.  Follow-up appointment with vascular in 7/23/2025.  Ulcer has healed on his foot.  Follow up vascular as needed.         Relevant Orders    FOOT EXAM (Completed)    Long term (current) use of insulin (H)    He continues to follow with the diabetes educator but recently missed his appointment.  It is recommended he follow-up with the diabetes educator.   "He is currently on Lantus and NovoLog.  Plans to proceed with insulin pump in the near future.             Stage 3a chronic kidney disease (H)    Chronic kidney disease stage IIIa.  Follows with nephrology and has an upcoming appointment 9/11/2025.  Major cause of his chronic kidney disease is diabetic nephropathy with poorly controlled diabetes.  He plans to proceed with an insulin pump. Renal function has been stable.    Last Comprehensive Metabolic Panel:  Lab Results   Component Value Date     (L) 06/19/2025    POTASSIUM 4.5 06/19/2025    CHLORIDE 102 06/19/2025    CO2 21 (L) 06/19/2025    ANIONGAP 10 06/19/2025     (H) 06/19/2025    BUN 19.2 06/19/2025    CR 1.70 (H) 06/19/2025    GFRESTIMATED 46 (L) 06/19/2025    AROLDO 8.3 (L) 06/19/2025                Type 2 diabetes mellitus with foot ulcer (H)    Refer to assessment and plan under type 2 diabetes with long-term use of insulin.         Relevant Orders    Hemoglobin A1c    FOOT EXAM (Completed)    Continuous opioid dependence (H)    He is currently on oxycodone 5 mg every 6 hours as needed for severe pain.  He is followed by rheumatology and orthopedics.  He is hopeful to be able to proceed with knee replacement once his A1c is under 7.          Other Visit Diagnoses         Anemia of chronic renal failure, stage 3 (moderate), unspecified whether stage 3a or 3b CKD (H)                 Shamir Alves is a 55 year old, presenting for the following health issues:  Follow Up (1 month follow up)        7/24/2025     2:39 PM   Additional Questions   Roomed by Eve CARDENAS MA     HPI            Objective    BP (!) 172/116   Pulse 78   Temp 98  F (36.7  C) (Oral)   Resp 12   Ht 1.638 m (5' 4.5\")   Wt 57.3 kg (126 lb 4.8 oz)   SpO2 100%   BMI 21.34 kg/m    Body mass index is 21.34 kg/m .  Physical Exam  Vitals and nursing note reviewed.   Constitutional:       Appearance: Normal appearance.   Cardiovascular:      Rate and Rhythm: Normal rate and " regular rhythm.      Heart sounds: Normal heart sounds.   Pulmonary:      Effort: Pulmonary effort is normal.      Breath sounds: Normal breath sounds.   Skin:     Comments: Exam of his right foot shows that the foot ulcer is completely healed.   Neurological:      Mental Status: He is alert.                Signed Electronically by: Rena Blair PA-C

## 2025-07-24 NOTE — TELEPHONE ENCOUNTER
Medication Question or Refill        What medication are you calling about (include dose and sig)?: Continuous Glucose Sensor (DEXCOM G7 SENSOR) MISC [     Preferred Pharmacy:   [id number] - [place], [place] - [address] AT [address]  [address]  [facility] [address]  Phone: [phone] Fax: [phone]      Controlled Substance Agreement on file:   CSA -- Patient Level:     [Media Unavailable] Controlled Substance Agreement - Opioid - Scan on 10/16/2024 10:32 AM       Who prescribed the medication?: Rena Blair     Do you need a refill? Yes    When did you use the medication last? N/A    Patient offered an appointment? Yes: patient has appt today    Do you have any questions or concerns?  Yes: patient needs this completed today, he does have appt with provider, but we needs it run through insurance, assuming that's a PA I will create that encounter as well.       Could we send this information to you in [employer] or would you prefer to receive a phone call?:   Patient would prefer a phone call   Okay to leave a detailed message?: Yes at Home number on file [phone] (home)

## 2025-07-28 ENCOUNTER — TELEPHONE (OUTPATIENT)
Dept: ONCOLOGY | Facility: CLINIC | Age: 55
End: 2025-07-28
Payer: COMMERCIAL

## 2025-07-28 RX ORDER — ACYCLOVIR 400 MG/1
TABLET ORAL
Qty: 3 EACH | Refills: 3 | Status: SHIPPED | OUTPATIENT
Start: 2025-07-28

## 2025-07-28 NOTE — TELEPHONE ENCOUNTER
m for patient to schedule a return visit for Dr. Medina . Please assist when pt calls     Juve Gurrola on 7/28/2025 at 7:58 AM

## 2025-07-29 ENCOUNTER — ALLIED HEALTH/NURSE VISIT (OUTPATIENT)
Dept: FAMILY MEDICINE | Facility: CLINIC | Age: 55
End: 2025-07-29
Payer: COMMERCIAL

## 2025-07-29 ENCOUNTER — TELEPHONE (OUTPATIENT)
Dept: FAMILY MEDICINE | Facility: CLINIC | Age: 55
End: 2025-07-29

## 2025-07-29 VITALS — OXYGEN SATURATION: 100 % | HEART RATE: 72 BPM | SYSTOLIC BLOOD PRESSURE: 172 MMHG | DIASTOLIC BLOOD PRESSURE: 111 MMHG

## 2025-07-29 DIAGNOSIS — I10 PRIMARY HYPERTENSION: Primary | ICD-10-CM

## 2025-07-29 DIAGNOSIS — F11.20 CONTINUOUS OPIOID DEPENDENCE (H): ICD-10-CM

## 2025-07-29 DIAGNOSIS — M00.9 PYOGENIC ARTHRITIS OF RIGHT KNEE JOINT, DUE TO UNSPECIFIED ORGANISM (H): ICD-10-CM

## 2025-07-29 PROCEDURE — 3077F SYST BP >= 140 MM HG: CPT

## 2025-07-29 PROCEDURE — 3080F DIAST BP >= 90 MM HG: CPT

## 2025-07-29 PROCEDURE — 99207 PR NO CHARGE NURSE ONLY: CPT

## 2025-07-29 RX ORDER — OXYCODONE HYDROCHLORIDE 5 MG/1
5 TABLET ORAL EVERY 6 HOURS PRN
Qty: 30 TABLET | Refills: 0 | Status: SHIPPED | OUTPATIENT
Start: 2025-07-29

## 2025-07-29 RX ORDER — METHOCARBAMOL 500 MG/1
500 TABLET, FILM COATED ORAL EVERY 8 HOURS PRN
Qty: 30 TABLET | Refills: 3 | Status: SHIPPED | OUTPATIENT
Start: 2025-07-29

## 2025-07-29 RX ORDER — AMLODIPINE BESYLATE 10 MG/1
10 TABLET ORAL DAILY
Qty: 90 TABLET | Refills: 0 | Status: SHIPPED | OUTPATIENT
Start: 2025-07-29

## 2025-07-29 NOTE — TELEPHONE ENCOUNTER
Please review chart and see if this has been completed.  I thought I had completed this not too long ago.  Thank you.

## 2025-07-29 NOTE — PROGRESS NOTES
Please let patient know blood pressure is not at goal.  I have increased the amlodipine to 10 mg daily.  He should follow-up in the clinic in 2 weeks for nurse blood pressure check.

## 2025-07-29 NOTE — PROGRESS NOTES
Called patient and relayed below message from Rena Blair PA-C as written.  Patient states he has plenty of the 5 mg amlodipine tablets at home and will start taking two tablets (a total of 10 mg daily) until he uses up what he has at home, and then  the 10 mg prescription.  2 week follow up nurse visit for BP recheck scheduled on 8/12/25 at 1:30pm.          Betsey Colin RN  [facility]

## 2025-07-29 NOTE — PROGRESS NOTES
"I met with [facility] [place] at the request of Rena Blair PA-C  to recheck his blood pressure.  Blood pressure medications on the med list were reviewed with patient.    Patient has taken all medications as per usual regimen: Yes  Patient reports tolerating them without any issues or concerns: Yes    Vitals:    07/29/25 1415 07/29/25 1427   BP: (!) 197/136 (!) 172/111   BP Location: Left arm Left arm   Patient Position: Sitting Sitting   Cuff Size: Adult Regular Adult Regular   Pulse: 72    SpO2: 100%        After 5 minutes, the patient's blood pressure remained greater than or equal to 140/90.    Is the patient currently having any chest pain? No  Does the patient currently have a headache? No  Does the patient currently have any vision changes? No  Does the patient currently have any nausea? No  Does the patient currently have any abdominal pain? No      The previous encounter was reviewed.  The patient was discharged and the note will be sent to the provider for final review.  BP was similar today as it was during clinic visit on 7/24/25.      Per 7/24/25 OV notes:     \"Primary hypertension      Blood pressure is currently managed with amlodipine and bumetanide.     Blood pressure is elevated at 168/101.  Recheck on blood pressure remains elevated.  He declines an increase in the amlodipine dosage today.  States he has not taken his blood pressure medication today.  Recommend a nurse visit in 2 weeks to follow-up on his blood pressure.  If blood pressure remains elevated I would recommend increasing the amlodipine to 10 mg daily.\"                  Betsey Colin RN  [facility]   "

## 2025-07-29 NOTE — TELEPHONE ENCOUNTER
Medication Request  Medication name: methocarbamol (ROBAXIN) 500 MG tablet   oxyCODONE (ROXICODONE) 5 MG tablet   [facility]ed Pharmacy: [facility]  When was patient last seen for this?:  07/24/2025  Patient offered appointment:  no  Okay to leave a detailed message: no

## 2025-07-29 NOTE — TELEPHONE ENCOUNTER
Forms/Letter Request    Type of form/letter: DMV/Handicap Parking      Is Release of Information needed?: No    Do we have the form/letter: Yes: in providers mailbox behind     Who is the form from? Patient    Where did/will the form come from? Patient or family brought in       When is form/letter needed by: as soon as provider gets to it    How would you like the form/letter returned: Mail  Is this the correct address?: Yes  [address] [address] [place] [address] [address]   [facility] [address]    Patient Notified form requests are processed in 5-7 business days:Yes    Could we send this information to you in [place] or would you prefer to receive a phone call?:   Patient would prefer a phone call   Okay to leave a detailed message?: No at Cell number on file:    Telephone Information:   Mobile [phone]

## 2025-08-11 ENCOUNTER — TELEPHONE (OUTPATIENT)
Dept: ORTHOPEDICS | Facility: CLINIC | Age: 55
End: 2025-08-11

## (undated) DEVICE — SU PDS II 2-0 CT-1 27" Z339H

## (undated) DEVICE — LINEN ORTHO PACK 5446

## (undated) DEVICE — Device

## (undated) DEVICE — SOL ISOPROPYL RUBBING ALCOHOL USP 70% 4OZ HDX-20 I0020

## (undated) DEVICE — SOL NACL 0.9% IRRIG 1000ML BOTTLE 2F7124

## (undated) DEVICE — DRAPE LAP TRANSVERSE 29421

## (undated) DEVICE — ESU BLADE PEAK PLASMA 3.0S PS210-030S

## (undated) DEVICE — SOL WATER IRRIG 1000ML BOTTLE 2F7114

## (undated) DEVICE — SUTURE VICRYL+ 2-0 18 CT1/CR VLT VCP839D

## (undated) DEVICE — SU ETHILON 3-0 PS-1 18" 1663H

## (undated) DEVICE — SUCTION MANIFOLD NEPTUNE 2 SYS 4 PORT 0702-020-000

## (undated) DEVICE — SU DERMABOND ADVANCED .7ML DNX12

## (undated) DEVICE — DRAIN JACKSON PRATT CHANNEL 10FR RND SIL W/TROCAR JP-2227

## (undated) DEVICE — SU PDS II 0 TP-1 60" Z991G

## (undated) DEVICE — GOWN IMPERVIOUS SPECIALTY XLG/XLONG 32474

## (undated) DEVICE — GLOVE BIOGEL PI ULTRATOUCH G SZ 7.5 42175

## (undated) DEVICE — ESU HOLDER LAP INST DISP PURPLE LONG 330MM H-PRO-330

## (undated) DEVICE — DRSG KERLIX FLUFFS X5

## (undated) DEVICE — DRSG STERI STRIP 1/2X4" R1547

## (undated) DEVICE — DRAPE STOCKINETTE IMPERVIOUS 12" 1587

## (undated) DEVICE — SU PDS II 0 CT-1 27" Z340H

## (undated) DEVICE — SPONGE LAP 18X18" X8435

## (undated) DEVICE — DRSG XEROFORM 5X9" 8884431605

## (undated) DEVICE — GLOVE BIOGEL PI MICRO INDICATOR UNDERGLOVE SZ 8.0 48980

## (undated) DEVICE — COVER CAMERA IN-LIGHT DISP LT-C02

## (undated) DEVICE — NEEDLE SPINAL DISP 18GA X 3.5" QUINCKE 333350

## (undated) DEVICE — BASIN SET MAJOR

## (undated) DEVICE — MITT SURGICAL PREP HAIR REMOVER LATEX FREE 617142

## (undated) DEVICE — DRSG TEGADERM 4X4 3/4" 1626W

## (undated) DEVICE — DRSG ABDOMINAL 07 1/2X8" 7197D

## (undated) DEVICE — DRAPE POUCH INSTRUMENT 1018

## (undated) DEVICE — DRAPE CONVERTORS U-DRAPE 60X72" 8476

## (undated) DEVICE — TUBING IRRIG CYSTO/BLADDER SET 81" LF 2C4040

## (undated) DEVICE — SU MONOCRYL 3-0 PS-2 18" UND MCP497G

## (undated) DEVICE — SOL BACTISURE WOUND LAVAGE 1L BAG 00-8887-002-00

## (undated) DEVICE — TUBING ARTHROSCOPY PUMP ARTHREX AR-6410

## (undated) DEVICE — GLOVE BIOGEL PI ULTRATOUCH G SZ 8.0 42180

## (undated) DEVICE — DRAPE U-DRAPE 1015NSD NON-STERILE

## (undated) DEVICE — ESU ELEC BLADE 2.75" COATED/INSULATED E1455

## (undated) DEVICE — BRUSH SURGICAL SCRUB W/4% CHLORHEXIDINE GLUCONATE SOL 4458A

## (undated) DEVICE — DECANTER TRANSFER DEVICE 2008S

## (undated) DEVICE — SOL NACL 0.9% IRRIG 3000ML BAG 2B7477

## (undated) DEVICE — PREP POVIDONE-IODINE 10% SOLUTION 4OZ BOTTLE MDS093944

## (undated) DEVICE — SU SILK 0 TIE 6X30" A306H

## (undated) DEVICE — DRSG GAUZE 4X8" NON21842

## (undated) DEVICE — DRSG DRAIN 4X4" 7086

## (undated) DEVICE — DRAIN JACKSON PRATT RESERVOIR 100ML SU130-1305

## (undated) DEVICE — EYE PREP BETADINE 5% SOLUTION 30ML 0065-0411-30

## (undated) DEVICE — SUTURE VICRYL+ 1 CT-1 CR 8X18" VIO VCP741D

## (undated) DEVICE — PREP CHLORAPREP 26ML TINTED HI-LITE ORANGE 930815

## (undated) DEVICE — STPL SKIN 35W ROTATING HEAD PRW35

## (undated) DEVICE — TRAY PREP DRY SKIN SCRUB 067

## (undated) DEVICE — ESU GROUND PAD UNIVERSAL W/O CORD

## (undated) DEVICE — POSITIONER ARMBOARD FOAM 1PAIR LF FP-ARMB1

## (undated) DEVICE — TOURNIQUET CUFF 30" REPRO BLUE 60-7070-105

## (undated) DEVICE — SUCTION MINISQUAIR SMOKE EVAC CAPTURE DEVICE SQ20012-01

## (undated) DEVICE — SYR 50ML LL W/O NDL 309653

## (undated) DEVICE — SUCTION IRR STRYKERFLOW II W/TIP 250-070-520

## (undated) DEVICE — LINEN BACK PACK 5440

## (undated) DEVICE — LINEN DRAPE 54X72" 5467

## (undated) DEVICE — SU PDS II 2-0 CT-2 27"  Z333H

## (undated) DEVICE — SPECIMEN CONTAINER 5OZ STERILE 2600SA

## (undated) DEVICE — NEEDLE HYPO MONOJECT 22GA 1.15IN BLUE 8881250206

## (undated) DEVICE — SUCTION IRR SYSTEM W/O TIP INTERPULSE HANDPIECE 0210-100-000

## (undated) DEVICE — STRAP KNEE/BODY 31143004

## (undated) DEVICE — SUTURE VICRYL+ 2-0 CT-2 CR 8X18" VCP726D

## (undated) DEVICE — DRSG TEGADERM 4X10" 1627

## (undated) DEVICE — CLEANSER JET LAVAGE IRRISEPT 0.05% CHG IRRISEPT45USA

## (undated) DEVICE — LINEN TOWEL PACK X5 5464

## (undated) DEVICE — BUR ARTHREX COOLCUT DISSECTOR 3.5MM  AR-8350DS

## (undated) RX ORDER — CEFAZOLIN SODIUM/WATER 2 G/20 ML
SYRINGE (ML) INTRAVENOUS
Status: DISPENSED
Start: 2024-07-11

## (undated) RX ORDER — LIDOCAINE HYDROCHLORIDE 10 MG/ML
INJECTION, SOLUTION EPIDURAL; INFILTRATION; INTRACAUDAL; PERINEURAL
Status: DISPENSED
Start: 2024-11-08

## (undated) RX ORDER — PROPOFOL 10 MG/ML
INJECTION, EMULSION INTRAVENOUS
Status: DISPENSED
Start: 2024-08-17

## (undated) RX ORDER — FENTANYL CITRATE 50 UG/ML
INJECTION, SOLUTION INTRAMUSCULAR; INTRAVENOUS
Status: DISPENSED
Start: 2024-08-17

## (undated) RX ORDER — FENTANYL CITRATE 50 UG/ML
INJECTION, SOLUTION INTRAMUSCULAR; INTRAVENOUS
Status: DISPENSED
Start: 2024-11-08

## (undated) RX ORDER — CEFAZOLIN SODIUM/WATER 2 G/20 ML
SYRINGE (ML) INTRAVENOUS
Status: DISPENSED
Start: 2024-08-16

## (undated) RX ORDER — TRANEXAMIC ACID 650 MG/1
TABLET ORAL
Status: DISPENSED
Start: 2024-08-17

## (undated) RX ORDER — ONDANSETRON 2 MG/ML
INJECTION INTRAMUSCULAR; INTRAVENOUS
Status: DISPENSED
Start: 2024-08-17

## (undated) RX ORDER — LIDOCAINE HYDROCHLORIDE 10 MG/ML
INJECTION, SOLUTION INFILTRATION; PERINEURAL
Status: DISPENSED
Start: 2024-10-24

## (undated) RX ORDER — LIDOCAINE HYDROCHLORIDE 10 MG/ML
INJECTION, SOLUTION INFILTRATION; PERINEURAL
Status: DISPENSED
Start: 2025-01-13

## (undated) RX ORDER — GABAPENTIN 300 MG/1
CAPSULE ORAL
Status: DISPENSED
Start: 2024-08-16

## (undated) RX ORDER — BETAMETHASONE SODIUM PHOSPHATE AND BETAMETHASONE ACETATE 3; 3 MG/ML; MG/ML
INJECTION, SUSPENSION INTRA-ARTICULAR; INTRALESIONAL; INTRAMUSCULAR; SOFT TISSUE
Status: DISPENSED
Start: 2025-01-13

## (undated) RX ORDER — ACETAMINOPHEN 325 MG/1
TABLET ORAL
Status: DISPENSED
Start: 2024-08-16

## (undated) RX ORDER — FENTANYL CITRATE 50 UG/ML
INJECTION, SOLUTION INTRAMUSCULAR; INTRAVENOUS
Status: DISPENSED
Start: 2024-08-16

## (undated) RX ORDER — CEFAZOLIN SODIUM/WATER 2 G/20 ML
SYRINGE (ML) INTRAVENOUS
Status: DISPENSED
Start: 2024-08-17

## (undated) RX ORDER — FENTANYL CITRATE 50 UG/ML
INJECTION, SOLUTION INTRAMUSCULAR; INTRAVENOUS
Status: DISPENSED
Start: 2024-05-07

## (undated) RX ORDER — LIDOCAINE HYDROCHLORIDE 10 MG/ML
INJECTION, SOLUTION EPIDURAL; INFILTRATION; INTRACAUDAL; PERINEURAL
Status: DISPENSED
Start: 2024-09-23

## (undated) RX ORDER — FENTANYL CITRATE 50 UG/ML
INJECTION, SOLUTION INTRAMUSCULAR; INTRAVENOUS
Status: DISPENSED
Start: 2024-07-11

## (undated) RX ORDER — TRANEXAMIC ACID 650 MG/1
TABLET ORAL
Status: DISPENSED
Start: 2024-07-11

## (undated) RX ORDER — ACETAMINOPHEN 325 MG/1
TABLET ORAL
Status: DISPENSED
Start: 2024-07-11

## (undated) RX ORDER — EPHEDRINE SULFATE 50 MG/ML
INJECTION, SOLUTION INTRAMUSCULAR; INTRAVENOUS; SUBCUTANEOUS
Status: DISPENSED
Start: 2024-07-11

## (undated) RX ORDER — DEXAMETHASONE SODIUM PHOSPHATE 4 MG/ML
INJECTION, SOLUTION INTRA-ARTICULAR; INTRALESIONAL; INTRAMUSCULAR; INTRAVENOUS; SOFT TISSUE
Status: DISPENSED
Start: 2024-08-17